# Patient Record
Sex: FEMALE | Employment: OTHER | URBAN - METROPOLITAN AREA
[De-identification: names, ages, dates, MRNs, and addresses within clinical notes are randomized per-mention and may not be internally consistent; named-entity substitution may affect disease eponyms.]

---

## 2019-11-29 ENCOUNTER — TRANSFERRED RECORDS (OUTPATIENT)
Dept: HEALTH INFORMATION MANAGEMENT | Facility: CLINIC | Age: 54
End: 2019-11-29

## 2019-12-03 ENCOUNTER — TRANSFERRED RECORDS (OUTPATIENT)
Dept: HEALTH INFORMATION MANAGEMENT | Facility: CLINIC | Age: 54
End: 2019-12-03

## 2019-12-03 NOTE — TELEPHONE ENCOUNTER
RECORDS RECEIVED FROM: Dr. Garcia from [facility] referring - Eval of posterior thigh mass, unclear etiology. MRI on 11/29 at [facility]. Appt per Susan   DATE RECEIVED: Dec 4, 2019     NOTES STATUS DETAILS   OFFICE NOTE from referring provider In process    OFFICE NOTE from other specialist Care Everywhere Mike Maya MD     DISCHARGE SUMMARY from hospital N/A    DISCHARGE REPORT from the ER N/A    OPERATIVE REPORT N/A    MEDICATION LIST Care Everywhere    IMPLANT RECORD/STICKER N/A    LABS     CBC/DIFF N/A    CULTURES N/A    INJECTIONS DONE IN RADIOLOGY N/A    MRI In process    CT SCAN N/A    XRAYS (IMAGES & REPORTS) In process    TUMOR     PATHOLOGY  Slides & report N/A      12/03/19   5:09 PM   Records from [facility] scanned urgently to chart  Images in PACS  Pre-visit complete  Kayla Emerson CMA    12/03/19   9:37 AM   Requests sent to [facility] and [facility]  Kayla Emerson CMA

## 2019-12-04 ENCOUNTER — OFFICE VISIT (OUTPATIENT)
Dept: ORTHOPEDICS | Facility: CLINIC | Age: 54
End: 2019-12-04
Payer: COMMERCIAL

## 2019-12-04 ENCOUNTER — PRE VISIT (OUTPATIENT)
Dept: ORTHOPEDICS | Facility: CLINIC | Age: 54
End: 2019-12-04

## 2019-12-04 DIAGNOSIS — M79.89 SOFT TISSUE MASS: Primary | ICD-10-CM

## 2019-12-04 RX ORDER — CHOLECALCIFEROL (VITAMIN D3) 50 MCG
1 TABLET ORAL DAILY
COMMUNITY

## 2019-12-04 RX ORDER — LORATADINE 10 MG/1
10 TABLET ORAL
COMMUNITY
End: 2021-01-27

## 2019-12-04 ASSESSMENT — ENCOUNTER SYMPTOMS
SPUTUM PRODUCTION: 0
DYSPNEA ON EXERTION: 0
POSTURAL DYSPNEA: 0
NECK MASS: 0
TROUBLE SWALLOWING: 0
HOARSE VOICE: 0
COUGH DISTURBING SLEEP: 0
SORE THROAT: 0
HEMOPTYSIS: 0
WHEEZING: 0
TASTE DISTURBANCE: 0
SHORTNESS OF BREATH: 0
SINUS CONGESTION: 0
SMELL DISTURBANCE: 0
COUGH: 0
SNORES LOUDLY: 1
SINUS PAIN: 1

## 2019-12-04 NOTE — LETTER
12/4/2019       RE: Richa Ashby  [address]  [place] [address]     Dear Colleague,    Thank you for referring your patient, Richa Ashby, to the [facility] at [facility]. Please see a copy of my visit note below.    This 54-year-old woman has noticed painful posterior right thigh mass.  The pain is isolated to this area and does not seem to radiate.  She notices it when she sits and the mass bumps against the edge of a chair.  I reviewed her patient survey information in the EMR.    On examination she is alert oriented has a normal mood and affect and is in no acute distress.  Respirations are regular and unlabored eyes are nonicteric.  She is over her ideal weight but the mass can be palpated with the leg extended.  Its minimally tender.  There is no erythema or edema in the right leg.  She is able to ambulate independently.  She has a normal motion of hip knee and ankle on that right side and the foot is well-perfused and the leg sensate.    I reviewed her MRI and I can see this enhancing soft tissue mass just superficial to the muscles but under the fascia in the posterior aspect of the thigh.  Its nonspecific in appearance.    I have offered the patient a needle biopsy risks of bleeding pain and infection.  We will call her next week with the results and plan to treat the tumor.  I have answered all of her questions.    description of procedure:    The patient was placed in a prone position after consent and the right thigh mass was marked.  I prepped the skin and infiltrated the skin and subcutaneous tissues with lidocaine.  I then made a small incision with an 11 blade and passed a Americo-Cut needle twice obtaining abnormal tissue.  This was sent in formalin to pathology.  After placing Steri-Strips and a sterile dry dressing the patient was able to stand and ambulate comfortably.  We will call her with the results of the biopsy and  recommendations on further treatment.    Chief Complaint: Right posterior thigh mass    History:     PastMedHx: Asthma, GERD, Obesity    PastSurgHx: Shoulder surgery - H/O respiratory arrest in  with sodium penthal    FamHx: Mother, sister, daughter - depression; Father - alcoholism, high cholesterol; Paternal grandmother - soft tissue cancer    Social History     Tobacco Use     Smoking status: Not on file   Substance Use Topics     Alcohol use: Not on file       Meds:   Current Outpatient Medications   Medication     albuterol (PROAIR RESPICLICK) 108 (90 Base) MCG/ACT inhaler     fluticasone-salmeterol (ADVAIR) 100-50 MCG/DOSE inhaler     loratadine (CLARITIN) 10 MG tablet     melatonin (MELATONIN) 1 MG/ML LIQD liquid     omeprazole (PRILOSEC) 20 MG DR capsule     vitamin D3 (CHOLECALCIFEROL) 2000 units (50 mcg) tablet     No current facility-administered medications for this visit.        Allergies:    Allergies   Allergen Reactions     Pentobarbital      resp arrest       Review of Systems:  See end of note    Physical Exam: There were no vitals taken for this visit.      Imaging:    Impression:    Plan:  Patient was also examined by Dr. Coulter, and he agrees with the plan of care.  Patient was also examined by Dr. Johnson and he agrees with the plan of care.       [facility]  [address] [place] [address] [id number] [phone]  Dept: [phone]  ______________________________________________________________________________    Patient: Richa Ashby   : 1965   MRN: [id number]   2019    INVASIVE PROCEDURE SAFETY CHECKLIST    Date: 2019   Procedure: Right Thigh Biopsy Injection   Patient Name: Richa Ashby  MRN: [id number]  YOB: 1965    Action: Complete sections as appropriate. Any discrepancy results in a HARD COPY until resolved.     PRE PROCEDURE:  Patient ID verified with 2 identifiers (name and  or MRN): Yes  Procedure  and site verified with patient/designee (when able): Yes  Accurate consent documentation in medical record: Yes  H&P (or appropriate assessment) documented in medical record: NA  H&P must be up to 20 days prior to procedure and updates within 24 hours of procedure as applicable: NA  Relevant diagnostic and radiology test results appropriately labeled and displayed as applicable: Yes  Procedure site(s) marked with provider initials: NA    TIMEOUT:  Time-Out performed immediately prior to starting procedure, including verbal and active participation of all team members addressing the following:Yes  * Correct patient identify  * Confirmed that the correct side and site are marked  * An accurate procedure consent form  * Agreement on the procedure to be done  * Correct patient position  * Relevant images and results are properly labeled and appropriately displayed  * The need to administer antibiotics or fluids for irrigation purposes during the procedure as applicable   * Safety precautions based on patient history or medication use    DURING PROCEDURE: Verification of correct person, site, and procedures any time the responsibility for care of the patient is transferred to another member of the care team.       The following medications were given:       Prior to injection, verified patient identity using patient's name and date of birth.  Due to injection administration, patient instructed to remain in clinic for 15 minutes  afterwards, and to report any adverse reaction to me immediately.    Biopsy Injection  Medication Name: Lidocaine NDC: 38434-975-31  Lot: [id number]  Drug Amount Wasted:  None.  Vial/Syringe: Syringe  Expiration Date:  5/1/23    Note: two vials of 5ml lidocaine were used    Audelia Carrera ATC    Again, thank you for allowing me to participate in the care of your patient.      Sincerely,    Guillermo Johnson MD

## 2019-12-04 NOTE — PROGRESS NOTES
This 54-year-old woman has noticed painful posterior right thigh mass.  The pain is isolated to this area and does not seem to radiate.  She notices it when she sits and the mass bumps against the edge of a chair.  I reviewed her patient survey information in the EMR.    On examination she is alert oriented has a normal mood and affect and is in no acute distress.  Respirations are regular and unlabored eyes are nonicteric.  She is over her ideal weight but the mass can be palpated with the leg extended.  Its minimally tender.  There is no erythema or edema in the right leg.  She is able to ambulate independently.  She has a normal motion of hip knee and ankle on that right side and the foot is well-perfused and the leg sensate.    I reviewed her MRI and I can see this enhancing soft tissue mass just superficial to the muscles but under the fascia in the posterior aspect of the thigh.  Its nonspecific in appearance.    I have offered the patient a needle biopsy risks of bleeding pain and infection.  We will call her next week with the results and plan to treat the tumor.  I have answered all of her questions.    description of procedure:    The patient was placed in a prone position after consent and the right thigh mass was marked.  I prepped the skin and infiltrated the skin and subcutaneous tissues with lidocaine.  I then made a small incision with an 11 blade and passed a Americo-Cut needle twice obtaining abnormal tissue.  This was sent in formalin to pathology.  After placing Steri-Strips and a sterile dry dressing the patient was able to stand and ambulate comfortably.  We will call her with the results of the biopsy and recommendations on further treatment.

## 2019-12-04 NOTE — PROGRESS NOTES
Chief Complaint: Right posterior thigh mass    History:     PastMedHx: Asthma, GERD, Obesity    PastSurgHx: Shoulder surgery - H/O respiratory arrest in 1983 with sodium penthal    FamHx: Mother, sister, daughter - depression; Father - alcoholism, high cholesterol; Paternal grandmother - soft tissue cancer    Social History     Tobacco Use     Smoking status: Not on file   Substance Use Topics     Alcohol use: Not on file       Meds:   Current Outpatient Medications   Medication     albuterol (PROAIR RESPICLICK) 108 (90 Base) MCG/ACT inhaler     fluticasone-salmeterol (ADVAIR) 100-50 MCG/DOSE inhaler     loratadine (CLARITIN) 10 MG tablet     melatonin (MELATONIN) 1 MG/ML LIQD liquid     omeprazole (PRILOSEC) 20 MG DR capsule     vitamin D3 (CHOLECALCIFEROL) 2000 units (50 mcg) tablet     No current facility-administered medications for this visit.        Allergies:    Allergies   Allergen Reactions     Pentobarbital      resp arrest       Review of Systems:  See end of note    Physical Exam: There were no vitals taken for this visit.      Imaging:    Impression:    Plan:  Patient was also examined by Dr. Coulter, and he agrees with the plan of care.  Patient was also examined by Dr. Johnson and he agrees with the plan of care.

## 2019-12-04 NOTE — PROGRESS NOTES
[facility]  [address] [place] [id number]  [phone]  Dept: [phone]  ______________________________________________________________________________    Patient: Richa Ashby   : 1965   MRN: [id number]   2019    INVASIVE PROCEDURE SAFETY CHECKLIST    Date: 2019   Procedure: Right Thigh Biopsy Injection   Patient Name: Richa Ashby  MRN: [id number]  YOB: 1965    Action: Complete sections as appropriate. Any discrepancy results in a HARD COPY until resolved.     PRE PROCEDURE:  Patient ID verified with 2 identifiers (name and  or MRN): Yes  Procedure and site verified with patient/designee (when able): Yes  Accurate consent documentation in medical record: Yes  H&P (or appropriate assessment) documented in medical record: NA  H&P must be up to 20 days prior to procedure and updates within 24 hours of procedure as applicable: NA  Relevant diagnostic and radiology test results appropriately labeled and displayed as applicable: Yes  Procedure site(s) marked with provider initials: NA    TIMEOUT:  Time-Out performed immediately prior to starting procedure, including verbal and active participation of all team members addressing the following:Yes  * Correct patient identify  * Confirmed that the correct side and site are marked  * An accurate procedure consent form  * Agreement on the procedure to be done  * Correct patient position  * Relevant images and results are properly labeled and appropriately displayed  * The need to administer antibiotics or fluids for irrigation purposes during the procedure as applicable   * Safety precautions based on patient history or medication use    DURING PROCEDURE: Verification of correct person, site, and procedures any time the responsibility for care of the patient is transferred to another member of the care team.       The following medications were given:         Prior to  injection, verified patient identity using patient's name and date of birth.  Due to injection administration, patient instructed to remain in clinic for 15 minutes  afterwards, and to report any adverse reaction to me immediately.    Biopsy Injection  Medication Name: Lidocaine NDC: 76982-433-54  Lot: [id number]  Drug Amount Wasted:  None.  Vial/Syringe: Syringe  Expiration Date:  5/1/23    Note: two vials of 5ml lidocaine were used        Audelia Carrera ATC

## 2019-12-04 NOTE — NURSING NOTE
Reason For Visit:   Chief Complaint   Patient presents with     Consult     right posterior distal thigh mass       There were no vitals taken for this visit.    Pain Assessment  Patient Currently in Pain: Yes(with pressure)  0-10 Pain Scale: 7  Primary Pain Location: (right posterior thigh)    Audelia Carrera ATC

## 2019-12-06 LAB — COPATH REPORT: NORMAL

## 2019-12-11 ENCOUNTER — TELEPHONE (OUTPATIENT)
Dept: ORTHOPEDICS | Facility: CLINIC | Age: 54
End: 2019-12-11

## 2019-12-11 NOTE — TELEPHONE ENCOUNTER
[facility] Call Center    Phone Message    May a detailed message be left on voicemail: yes    Reason for Call: Requesting Results   Name/type of test: Biopsy   Date of test: 12/04/19  Was test done at a location other than [facility] (Please fill in the location if not [facility])?: No      Action Taken: Message routed to:  [facility] Ortho   none

## 2019-12-12 ENCOUNTER — TELEPHONE (OUTPATIENT)
Dept: ORTHOPEDICS | Facility: CLINIC | Age: 54
End: 2019-12-12

## 2019-12-12 DIAGNOSIS — C49.9 SARCOMA (H): Primary | ICD-10-CM

## 2019-12-12 NOTE — TELEPHONE ENCOUNTER
RN called and spoke Connie.  She is informed of appointment time with Dr. Astudillo at 1300 on  12-17-19 and sim following.  She will have her CT Chest done on same day at 11:40 at the [place].  Patient will be gone out of town from  12-20- to 12-27-19.  She will attend these appointments.

## 2019-12-12 NOTE — TELEPHONE ENCOUNTER
RN called and spoke with Richa.  She works in [place].  She would prefer early am or after work for Radiation.  RN will ask to see if they are able to accommodate.  However, I am not able to schedule this for her and things may change, she is aware of this.  I will ask at time of scheduling consult appointment.  She is in agreement with this.  I will call her back and we will schedule CT same day also.

## 2019-12-12 NOTE — TELEPHONE ENCOUNTER
RN called and scheduled consult with Radiation and sim appointment for  12-17-19.  Pt will be out of town on  12-20-17- to 12-27-19.

## 2019-12-12 NOTE — TELEPHONE ENCOUNTER
She will have her CT Chest done on same day at 11:40 at the [place].  Patient will attend this appt.

## 2019-12-16 ENCOUNTER — TELEPHONE (OUTPATIENT)
Dept: ORTHOPEDICS | Facility: CLINIC | Age: 54
End: 2019-12-16

## 2019-12-16 NOTE — TELEPHONE ENCOUNTER
[facility] Call Center    Phone Message    May a detailed message be left on voicemail: yes    Reason for Call: Other: Pt was seen 12/04/2019 for a mass on thigh and is having some new symptoms and would like a call back as soon as possible to ask a few questions.     Action Taken: Message routed to:  [facility] Ump ortho

## 2019-12-16 NOTE — TELEPHONE ENCOUNTER
RN returned call to Connie.  She is stating that she is now having pain in the arch of her foot.  She denies any new shoe wear, injury or unusual activity.  She is icing and this helps..  She is encouraged to rest/elevate and ice which both have provided relief to her.  She also states that she is having some shoulder pain.  She had a surgery in  2004 to her Left shoulder.  It is not limiting her in her activity.  She states that this has been achy for 2-3 months. RN suggested that she work this up with her PCP.  She will have an appointment with Radiation tomorrow and her CT Chest.  She will mention this to the MD tomorrow as well.

## 2019-12-17 ENCOUNTER — OFFICE VISIT (OUTPATIENT)
Dept: RADIATION ONCOLOGY | Facility: CLINIC | Age: 54
End: 2019-12-17
Attending: RADIOLOGY
Payer: COMMERCIAL

## 2019-12-17 ENCOUNTER — HOSPITAL ENCOUNTER (OUTPATIENT)
Dept: CT IMAGING | Facility: CLINIC | Age: 54
Discharge: HOME | End: 2019-12-17
Attending: ORTHOPAEDIC SURGERY | Admitting: ORTHOPAEDIC SURGERY
Payer: COMMERCIAL

## 2019-12-17 VITALS
DIASTOLIC BLOOD PRESSURE: 70 MMHG | WEIGHT: 243 LBS | HEIGHT: 67 IN | BODY MASS INDEX: 38.14 KG/M2 | SYSTOLIC BLOOD PRESSURE: 141 MMHG

## 2019-12-17 DIAGNOSIS — C49.9 SARCOMA (H): Primary | ICD-10-CM

## 2019-12-17 DIAGNOSIS — C49.9 SARCOMA (H): ICD-10-CM

## 2019-12-17 PROCEDURE — 71250 CT THORAX DX C-: CPT

## 2019-12-17 PROCEDURE — G0463 HOSPITAL OUTPT CLINIC VISIT: HCPCS | Mod: 25 | Performed by: RADIOLOGY

## 2019-12-17 PROCEDURE — 77334 RADIATION TREATMENT AID(S): CPT | Performed by: RADIOLOGY

## 2019-12-17 PROCEDURE — 77290 THER RAD SIMULAJ FIELD CPLX: CPT | Performed by: RADIOLOGY

## 2019-12-17 SDOH — HEALTH STABILITY: MENTAL HEALTH: HOW OFTEN DO YOU HAVE A DRINK CONTAINING ALCOHOL?: 2-3 TIMES A WEEK

## 2019-12-17 SDOH — HEALTH STABILITY: MENTAL HEALTH: HOW MANY STANDARD DRINKS CONTAINING ALCOHOL DO YOU HAVE ON A TYPICAL DAY?: 1 OR 2

## 2019-12-17 SDOH — HEALTH STABILITY: MENTAL HEALTH: HOW OFTEN DO YOU HAVE 6 OR MORE DRINKS ON ONE OCCASION?: NEVER

## 2019-12-17 ASSESSMENT — ENCOUNTER SYMPTOMS
VOMITING: 0
HEMATURIA: 0
SEIZURES: 0
WHEEZING: 0
BLOOD IN STOOL: 0
NECK PAIN: 0
PHOTOPHOBIA: 0
WEAKNESS: 0
MEMORY LOSS: 0
DIARRHEA: 0
HALLUCINATIONS: 0
PND: 0
COUGH: 0
NAUSEA: 0
EYE PAIN: 0
CONSTIPATION: 0
SINUS PAIN: 0
DOUBLE VISION: 0
DIZZINESS: 0
CLAUDICATION: 0
TREMORS: 0
PALPITATIONS: 0
DIAPHORESIS: 0
FLANK PAIN: 0
BACK PAIN: 0
DYSURIA: 0
POLYDIPSIA: 0
HEADACHES: 0
CHILLS: 0
EYE REDNESS: 0
STRIDOR: 0
MYALGIAS: 0
DEPRESSION: 0
SPEECH CHANGE: 0
FOCAL WEAKNESS: 0
SORE THROAT: 0
HEMOPTYSIS: 0
HEARTBURN: 1
NERVOUS/ANXIOUS: 0
INSOMNIA: 0
WEIGHT LOSS: 0
LOSS OF CONSCIOUSNESS: 0
SHORTNESS OF BREATH: 0
FALLS: 0
ORTHOPNEA: 0
EYE DISCHARGE: 0
TINGLING: 0
ABDOMINAL PAIN: 0
FEVER: 0
SPUTUM PRODUCTION: 0
BRUISES/BLEEDS EASILY: 0
SENSORY CHANGE: 0
FREQUENCY: 0
BLURRED VISION: 0

## 2019-12-17 ASSESSMENT — MIFFLIN-ST. JEOR: SCORE: 1726.93

## 2019-12-17 ASSESSMENT — LIFESTYLE VARIABLES: SUBSTANCE_ABUSE: 0

## 2019-12-17 NOTE — PROGRESS NOTES
INITIAL PATIENT ASSESSMENT    Diagnosis: Sarcoma    Prior radiation therapy: None    Prior chemotherapy: None    Prior hormonal therapy:No    Pain Eval:  Current history of pain associated with this visit:   Intensity: 5/10  Current: aching and burning  Location: Right thigh, arch of right foot  Treatment: Ibuprofen 800 mg every 4 hours    Psychosocial  Living arrangements: Lives with   Fall Risk: independent   referral needs: Not needed    Advanced Directive: No  Implantable Cardiac Device? No    Nurse face-to-face time: Level 5:  over 15 min face to face time  HPI      Review of Systems   Constitutional: Negative for chills, diaphoresis, fever, malaise/fatigue and weight loss.   HENT: Negative for congestion, ear discharge, ear pain, hearing loss, nosebleeds, sinus pain, sore throat and tinnitus.    Eyes: Negative for blurred vision, double vision, photophobia, pain, discharge and redness.   Respiratory: Negative for cough, hemoptysis, sputum production, shortness of breath, wheezing and stridor.    Cardiovascular: Negative for chest pain, palpitations, orthopnea, claudication, leg swelling and PND.   Gastrointestinal: Positive for heartburn. Negative for abdominal pain, blood in stool, constipation, diarrhea, melena, nausea and vomiting.   Genitourinary: Negative for dysuria, flank pain, frequency, hematuria and urgency.   Musculoskeletal: Positive for joint pain. Negative for back pain, falls, myalgias and neck pain.        Left shoulder   Skin: Negative for itching and rash.   Neurological: Negative for dizziness, tingling, tremors, sensory change, speech change, focal weakness, seizures, loss of consciousness, weakness and headaches.   Endo/Heme/Allergies: Negative for environmental allergies and polydipsia. Does not bruise/bleed easily.   Psychiatric/Behavioral: Negative for depression, hallucinations, memory loss, substance abuse and suicidal ideas. The patient is not nervous/anxious  and does not have insomnia.

## 2019-12-17 NOTE — PROGRESS NOTES
Radiation Therapy Patient Education    Person involved with teaching: Patient    Patient educational needs for self management of treatment-related side effects assessment completed.  [facility] Patient Ed tab contains Patient Learning Assessment    Education Materials Given  Radiation Therapy and You    Educational Topics Discussed  Side effects expected, Pain management and When to call MD/RN    Response To Teaching  Verbalizes understanding    GYN Only  Vaginal Dilator-given and educated: N/A    Referrals sent: None    Chemotherapy?  No

## 2019-12-19 ENCOUNTER — TELEPHONE (OUTPATIENT)
Dept: ORTHOPEDICS | Facility: CLINIC | Age: 54
End: 2019-12-19

## 2019-12-19 NOTE — TELEPHONE ENCOUNTER
[facility] Call Center    Phone Message    May a detailed message be left on voicemail: yes    Reason for Call: Requesting Results   Name/type of test: CT   Date of test: 12/17/19  Was test done at a location other than [facility] (Please fill in the location if not [facility])?: No      Action Taken: Message routed to:  [facility] Ortho9

## 2019-12-20 ENCOUNTER — TELEPHONE (OUTPATIENT)
Dept: ORTHOPEDICS | Facility: CLINIC | Age: 54
End: 2019-12-20

## 2019-12-20 NOTE — TELEPHONE ENCOUNTER
"[facility] Call Center    Phone Message    May a detailed message be left on voicemail: yes    Reason for Call: Requesting Results---2ND REQUEST FROM pt  Name/type of test: CT Lung Scan   Date of test: Tues 12/17/19 at [facility] location---pt was given the expectation that the radiologist would give her the results on that same date, pt just needs to know \"WHEN\" she can expect the results.  Please call pt. Thank you  Was test done at a location other than [facility] (Please fill in the location if not [facility])?: No      Action Taken: Message routed to:  [facility]  Orthopedics  "

## 2019-12-23 NOTE — TELEPHONE ENCOUNTER
RN returned call to patient.  Review of CT Chest done on 12-17-19.  RN states that we should following with imaging again in 3 months to check for changes.  RN also offered the [facility] as a referral to follow this. She would like to do imaging in  3 month.  RN also states that she has a  Breast nodule.  That she should follow up with her PCP for a mammogram.     Showed:  EXAMINATION: CT CHEST LOW DOSE NON CONTRAST, 12/17/2019 11:42 AM     CLINICAL HISTORY: check for mets; Sarcoma (H)     COMPARISON: None available.     TECHNIQUE: Low-dose CT imaging obtained through the chest without  contrast. Coronal and axial MIP reformatted images obtained.      FINDINGS:     Normal heart size, no pericardial effusion. Normal caliber thoracic  aorta and main pulmonary artery. Normal branching of the transplant  vessels. Normal thyroid. No suspicious upper cervical, axillary, or  mediastinal lymphadenopathy. Normal caliber esophagus.     Substantial tracheal bronchial tree is widely patent. Bibasilar  dependent atelectasis. No pleural effusion or pneumothorax. 3 mm solid  nodule anterior right middle lobe (series 5, image the 177). There is  an additional 2 mm nodule posterior left lower lobe (series 5, image  201). 2 mm left lower lobe image nodule 164 calcified. There is 1.1 cm  partly calcified nodule of the left breast.     Limited views of the abdomen demonstrate no acute pathology. Status  post cholecystectomy.     Bones: No acute or specific bony abnormality. Ribs are normal.                                                                      IMPRESSION:  1. Scattered sub-3 mm pulmonary nodules as above. Follow-up in 3  months.    2. There is 1.1 cm partly calcified nodule in the left breast,  mammographic correlation suggested.      I have personally reviewed the examination and initial interpretation  and I agree with the findings

## 2019-12-30 ENCOUNTER — OFFICE VISIT (OUTPATIENT)
Dept: RADIATION ONCOLOGY | Facility: CLINIC | Age: 54
End: 2019-12-30
Attending: RADIOLOGY
Payer: COMMERCIAL

## 2019-12-30 VITALS — WEIGHT: 245 LBS | BODY MASS INDEX: 38.95 KG/M2

## 2019-12-30 DIAGNOSIS — C49.9 SARCOMA (H): Primary | ICD-10-CM

## 2019-12-30 PROCEDURE — 77386 ZZH IMRT TREATMENT DELIVERY, COMPLEX: CPT | Performed by: RADIOLOGY

## 2019-12-30 NOTE — LETTER
2019       RE: Richa Ashby  [address]  [place] [address]     Dear Colleague,    Thank you for referring your patient, Richa Ashby, to the RADIATION ONCOLOGY CLINIC. Please see a copy of my visit note below.    [facility]  SPECIALIZING IN BREAKTHROUGHS  Radiation Oncology    On Treatment Visit Note      Richa Ashby      Date: 2020   MRN: [id number]   : 1965  Diagnosis: Sarcoma    Treatment Summary to Date  Treatment Site: right thigh Current Dose: 200/5000 cGy Fractions:       Chemotherapy  Chemo concurrent with radx?: No    Subjective: Ms. Ashyb underwent her first treatment of radiation today. She has no new questions, concerns, or symptoms.     Nursing ROS:   Nutrition Alteration  Diet Type: Patient's Preference  Skin  Skin Reaction: 0 - No changes  Skin Note: Aquaphor    Pain Assessment  0-10 Pain Scale: 3    Objective:   Wt 111.1 kg (245 lb)   BMI 38.95 kg/m     Gen: Appears well, NAD  Skin: No erythema    Assessment:    Tolerating radiation therapy well.  All questions and concerns addressed.    Plan:   1. Continue current therapy    Mosaiq chart and setup information reviewed. Imaging reviewed.     Emiliana Astudillo MD  Pager: [phone]

## 2019-12-31 ENCOUNTER — APPOINTMENT (OUTPATIENT)
Dept: RADIATION ONCOLOGY | Facility: CLINIC | Age: 54
End: 2019-12-31
Attending: RADIOLOGY
Payer: COMMERCIAL

## 2019-12-31 PROCEDURE — 77386 ZZH IMRT TREATMENT DELIVERY, COMPLEX: CPT | Performed by: RADIOLOGY

## 2020-01-02 ENCOUNTER — APPOINTMENT (OUTPATIENT)
Dept: RADIATION ONCOLOGY | Facility: CLINIC | Age: 55
End: 2020-01-02
Attending: RADIOLOGY
Payer: COMMERCIAL

## 2020-01-02 PROCEDURE — 77386 ZZH IMRT TREATMENT DELIVERY, COMPLEX: CPT | Performed by: RADIOLOGY

## 2020-01-02 NOTE — PROGRESS NOTES
Radiation Oncology Consultation:  Date on this visit: 12/17/2019    Richa Ashby  is referred by Dr.Christian Celina Johnson for a radiation oncology consultation. She requires evaluation for diagnosis of a high grade undifferentiated pleomorphic sarcoma.       History Of Present Illness:  Ms. 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 Ms. Ashby noted a painful posterior right thigh mass above her popliteal fossa several months ago. She notes that it is fairly uncomfortable and she does at times bump it when sitting. She has had no significant numbness or tingling, or loss of right lower extremity strength or function. She has not noted any erythema, tenderness or warmth at the site. She underwent an MRI that demonstrated a soft tissue mass superficial to the muscle but under the fascia in the posterior aspect of the mid right thigh. She was referred to Dr. Johnson who set her up for biopsy of the mass on 12/04/19. Biopsy revealed a high grade undifferentiated pleomorphic sarcoma. She has now been referred for discussion of radiation treatment options.       Past Medical/Surgical History:  Past Medical History:   Diagnosis Date     Asthma, chronic      Sarcoma (H)     Right lower thigh     Past Surgical History:   Procedure Laterality Date     AS RAD RESEC TONSIL/PILLARS       GALLBLADDER SURGERY       ROTATOR CUFF REPAIR RT/LT  2012     SHOULDER SURGERY       SINUS SURGERY         Past Radiation History: None  Past Chemotherapy History: None  Implanted Cardiac device:   none    Review of Systems:  Reviewed.  See details in nursing note    Allergies:  Allergies as of 12/17/2019 - Reviewed 12/17/2019   Allergen Reaction Noted     Pentobarbital  03/17/2015       Current Medications:  Current Outpatient Medications   Medication Sig Dispense Refill     loratadine (CLARITIN) 10 MG tablet Take 10 mg by mouth       melatonin  "(MELATONIN) 1 MG/ML LIQD liquid Take 5 mg by mouth       omeprazole (PRILOSEC) 20 MG DR capsule        vitamin D3 (CHOLECALCIFEROL) 2000 units (50 mcg) tablet Take 1 tablet by mouth daily       albuterol (PROAIR RESPICLICK) 108 (90 Base) MCG/ACT inhaler Inhale 1-2 puffs into the lungs       fluticasone-salmeterol (ADVAIR) 100-50 MCG/DOSE inhaler Inhale 1 puff into the lungs          Family History:  Family History   Problem Relation Age of Onset     Lung Cancer Maternal Grandmother      Breast Cancer Paternal Grandmother        Social History:  Social History     Tobacco Use     Smoking status: Never Smoker     Smokeless tobacco: Never Used   Substance Use Topics     Alcohol use: Yes     Frequency: 2-3 times a week     Drinks per session: 1 or 2     Binge frequency: Never     Drug use: Never       Physical Exam:  BP (!) 141/70   Ht 1.689 m (5' 6.5\")   Wt 110.2 kg (243 lb)   BMI 38.63 kg/m      GENERAL APPEARANCE: healthy, alert and no distress  EYES: EOMS intact. Sclerae anicteric.   NECK: Supple. No asymmetry or masses visualized  MUSCULOSKELETAL: Palpable mass noted behind and above the right popliteal fossa with no overlying erythema, tenderness, or warmth. Extremities otherwise normal. FROM in all extremities.  SKIN: No suspicious lesions or rashes  NEURO: CN II-XII grossly intact   PSYCHIATRIC: Mentation appears normal and affect normal    Pathology:    Patient Name: DIMAS WILSON   MR#: [id number]   Specimen #: [id number]   Collected: 12/4/2019   Received: 12/4/2019   Reported: 12/6/2019 16:42   Ordering Phy(s): TASHIA ROBERTO     For improved result formatting, select 'View Enhanced Report Format' under    Linked Documents section.     SPECIMEN(S):   Soft tissue mass, right thigh     FINAL DIAGNOSIS:   Soft tissue, right thigh, biopsy:   - Undifferentiated pleomorphic sarcoma, high grade   - See comment     COMMENT:   Immunohistochemical stains were performed on block A1 with appropriate " "  controls. The tumor cells are negative   for SMA, keratin AE1/3, S100, CD34 and desmin, supporting the diagnosis.     I have personally reviewed all specimens and/or slides, including the   listed special stains, and used them   with my medical judgement to determine or confirm the final diagnosis.     Electronically signed out by:     Kaden Rosa M.D., [facility]     CLINICAL HISTORY:   The patient is a 54-year-old female with right thigh mass.     GROSS:   The specimen is received in formalin with proper patient identification,   labeled \"right thigh mass\".  The   specimen consists of 2 tan-yellow soft tissue cores, 0.5-1.4 cm in length   by 0.2 cm in diameter. The specimen   is submitted entirely in A1. (Dictated by: RUPERT Keys 12/4/2019   10:49 AM)     MICROSCOPIC:   Microscopic examination was performed.     The technical component of this testing was completed at the [facility], with the professional component performed    at the [facility], [address] [place] [id number] ([phone])     CPT Codes:   A: 99133-VW2, 14899-PHU, 58133-ODO, 50814-COZ, 90117-UES, 88896-UYK     COLLECTION SITE:   Client: [facility]   Location: [facility] (B)     Resident   [email or web address]     Laboratory/Imaging Studies    EXAMINATION: CT CHEST LOW DOSE NON CONTRAST, 12/17/2019 11:42 AM     CLINICAL HISTORY: check for mets; Sarcoma (H)     COMPARISON: None available.     TECHNIQUE: Low-dose CT imaging obtained through the chest without  contrast. Coronal and axial MIP reformatted images obtained.      FINDINGS:     Normal heart size, no pericardial effusion. Normal caliber thoracic  aorta and main pulmonary artery. Normal branching of the transplant  vessels. Normal thyroid. No suspicious upper cervical, axillary, or  mediastinal lymphadenopathy. Normal caliber " esophagus.     Substantial tracheal bronchial tree is widely patent. Bibasilar  dependent atelectasis. No pleural effusion or pneumothorax. 3 mm solid  nodule anterior right middle lobe (series 5, image the 177). There is  an additional 2 mm nodule posterior left lower lobe (series 5, image  201). 2 mm left lower lobe image nodule 164 calcified. There is 1.1 cm  partly calcified nodule of the left breast.     Limited views of the abdomen demonstrate no acute pathology. Status  post cholecystectomy.     Bones: No acute or specific bony abnormality. Ribs are normal.                                                                      IMPRESSION:  1. Scattered sub-3 mm pulmonary nodules as above. Follow-up in 3  months.    2. There is 1.1 cm partly calcified nodule in the left breast,  mammographic correlation suggested.      I have personally reviewed the examination and initial interpretation  and I agree with the findings.     SEVEN VANCE MD      ASSESSMENT:      Ms. Ashby is a 54 year old woman with a history of a right posterior thigh newly diagnosed undifferentiated pleomorphic sarcoma which appears localized.     RECOMMENDATION:      We had a long discussion with Ms. Ashby and her  regarding the nature of her soft tissue tumor. We discussed specifically that localized high grade UPS is typically treated with a combination of radiation and surgical resection. We discussed timing of radiotherapy either preoperatively or postoperatively with equivalent efficacy but different advantages and disadvantages regarding morbidity. With preoperative radiation, she would be at higher risk of a wound complication but overall, a lower radiation dose to a smaller volume could be utilized compared to postoperative radiotherapy. In this location, we would expect that preoperative radiotherapy may have decreased risk of fibrosis relative to postoperative treatment. Our recommendation would be to proceed with  preoperative radiation for her high grade UPS to overall use lower radiation dose to a smaller volume. We will also discuss our recommendations with Dr. Roberto to determine whether he would be comfortable with this plan.     The risks and benefits of radiotherapy were discussed with the patient. Potential acute and long term side effects were explained. The patient and her  had an opportunity to ask questions answered to their apparent satisfaction. She has agreed to proceed with radiation therapy. A written consent was obtained and we will schedule her for simulation as well.     Thank you for allowing me to participate in Richa Ashby 's care .  Please do not hesitate to call me with questions.    Emiliana Astudillo MD  Pager: [phone]    CC  Primary Physician: No primary care provider on file.   Patient Care Team:  Garry Garcia MD as Referring Physician (Surgery)  Jerica Hooper RN as Specialty Care Coordinator (Orthopedics)  TASHIA ROBERTO

## 2020-01-02 NOTE — PROGRESS NOTES
[facility] PHYSICIANS  SPECIALIZING IN BREAKTHROUGHS  Radiation Oncology    On Treatment Visit Note      Richa Ashby      Date: 2020   MRN: [id number]   : 1965  Diagnosis: Sarcoma    Treatment Summary to Date  Treatment Site: right thigh Current Dose: 200/5000 cGy Fractions:       Chemotherapy  Chemo concurrent with radx?: No    Subjective: Ms. Ashby underwent her first treatment of radiation today. She has no new questions, concerns, or symptoms.     Nursing ROS:   Nutrition Alteration  Diet Type: Patient's Preference  Skin  Skin Reaction: 0 - No changes  Skin Note: Aquaphor    Pain Assessment  0-10 Pain Scale: 3    Objective:   Wt 111.1 kg (245 lb)   BMI 38.95 kg/m    Gen: Appears well, NAD  Skin: No erythema    Assessment:    Tolerating radiation therapy well.  All questions and concerns addressed.    Plan:   1. Continue current therapy    Mosaiq chart and setup information reviewed. Imaging reviewed.     Emiliana Astudillo MD  Pager: [phone]

## 2020-01-03 ENCOUNTER — APPOINTMENT (OUTPATIENT)
Dept: RADIATION ONCOLOGY | Facility: CLINIC | Age: 55
End: 2020-01-03
Attending: RADIOLOGY
Payer: COMMERCIAL

## 2020-01-03 PROCEDURE — 77386 ZZH IMRT TREATMENT DELIVERY, COMPLEX: CPT | Performed by: RADIOLOGY

## 2020-01-06 ENCOUNTER — OFFICE VISIT (OUTPATIENT)
Dept: RADIATION ONCOLOGY | Facility: CLINIC | Age: 55
End: 2020-01-06
Attending: RADIOLOGY
Payer: COMMERCIAL

## 2020-01-06 ENCOUNTER — TELEPHONE (OUTPATIENT)
Dept: ORTHOPEDICS | Facility: CLINIC | Age: 55
End: 2020-01-06

## 2020-01-06 VITALS — BODY MASS INDEX: 38.79 KG/M2 | SYSTOLIC BLOOD PRESSURE: 139 MMHG | WEIGHT: 244 LBS | DIASTOLIC BLOOD PRESSURE: 74 MMHG

## 2020-01-06 DIAGNOSIS — C49.9 SARCOMA (H): ICD-10-CM

## 2020-01-06 DIAGNOSIS — R52 PAIN: Primary | ICD-10-CM

## 2020-01-06 PROCEDURE — 77386 ZZH IMRT TREATMENT DELIVERY, COMPLEX: CPT | Performed by: RADIOLOGY

## 2020-01-06 PROCEDURE — 77336 RADIATION PHYSICS CONSULT: CPT | Performed by: RADIOLOGY

## 2020-01-06 RX ORDER — TRAMADOL HYDROCHLORIDE 50 MG/1
50 TABLET ORAL EVERY 6 HOURS PRN
Qty: 100 TABLET | Refills: 0 | Status: SHIPPED | OUTPATIENT
Start: 2020-01-06 | End: 2020-03-12

## 2020-01-06 NOTE — LETTER
"2020       RE: Richa Ashby  [address]  [place] [address]     Dear Colleague,    Thank you for referring your patient, Richa Ashby, to the RADIATION ONCOLOGY CLINIC. Please see a copy of my visit note below.    [facility] PHYSICIANS  SPECIALIZING IN BREAKTHROUGHS  Radiation Oncology    On Treatment Visit Note      Richa Ashby      Date: 2020   MRN: [id number]   : 1965  Diagnosis: Sarcoma      Reason for Visit:  On Radiation Treatment Visit     Treatment Summary to Date  Treatment Site: right thigh Current Dose: 1000/5000 cGy Fractions:       Chemotherapy  Chemo concurrent with radx?: No    Subjective:  Reports worsening pain over the past week associated with the right posterior thigh mass. Pain radiates into the posterior/lateral leg and arch of the foot. Pain is exacerbated by sitting. She rates if 7/10 and describes it as \"white hot glass\" sensation. She has been taking ibuprofen 800 mg q4h for pain. She also reports some nausea over the past week. She has not noticed a skin reaction and is applying moisturizer BID. Reports fatigue.    Nursing ROS:   Nutrition Alteration  Diet Type: Patient's Preference  Skin  Skin Reaction: 0 - No changes  Skin Note: Aquaphor       Pain Assessment  0-10 Pain Scale: 8  Pain Note: Currently 800 mg of Ibuprofen      Objective:   /74   Wt 110.7 kg (244 lb)   BMI 38.79 kg/m     Gen: Appears well, in no acute distress  Skin: Deferred    Labs:  CBC RESULTS: No results for input(s): WBC, RBC, HGB, HCT, MCV, MCH, MCHC, RDW, PLT in the last 71851 hours.  ELECTROLYTES:  No results for input(s): NA, POTASSIUM, CHLORIDE, VIKTORIA, CO2, BUN, CR, GLC in the last 22713 hours.    Assessment:    Tolerating radiation therapy well.  All questions and concerns addressed.    Toxicities:  Fatigue: Grade 1: Fatigue relieved by rest  Pain: Grade 1: Mild pain  Dermatitis: Grade 0: No toxicity    Plan:   1. Continue current therapy.    2. Rx for " tramadol 50 mg q6h prn      Mosaiq chart and setup information reviewed  MVCT/IGRT images checked    Medication Review  Med list reviewed with patient?: Yes    Educational Topic Discussed  Education Instructions: Reviewed    The patient was seen and discussed with staff, Dr. Astudillo.    Jimmie Olmos MD  Resident, PGY-5  Department of Radiation Oncology  [facility]  P: [phone]    I saw the patient with the resident.  I agree with the resident note and plan of care.      Emiliana Astudillo MD    Please do not send letter to referring physician.        Again, thank you for allowing me to participate in the care of your patient.      Sincerely,    Emiliana Astudillo MD

## 2020-01-06 NOTE — TELEPHONE ENCOUNTER
----- Message from Guillermo Johnson MD sent at 1/3/2020 11:16 AM CST -----  Regarding: RE: [place] patient  Sounds perfect.  Thank you.  ----- Message -----  From: Emiliana Astudillo MD  Sent: 12/23/2019   3:44 PM CST  To: Guillermo Johnson MD, #  Subject: [place] patient                                   Hi all,   I saw Ms. Ashby the other day for discussion of preoperative radiation for her thigh sarcoma. I thought it would be very reasonable to proceed with preoperative RT and we would be able to get her started when she returns from vacation after this week. I saw that you had also chatted with her about her CT scan results. I wanted to check that you were comfortable with us proceeding with preop and I can let you know when she gets closer to completion.   Happy to discuss further.   Thanks much,   Emiliana

## 2020-01-06 NOTE — PROGRESS NOTES
"[facility] PHYSICIANS  SPECIALIZING IN BREAKTHROUGHS  Radiation Oncology    On Treatment Visit Note      Richa Ashby      Date: 2020   MRN: [id number]   : 1965  Diagnosis: Sarcoma      Reason for Visit:  On Radiation Treatment Visit     Treatment Summary to Date  Treatment Site: right thigh Current Dose: 1000/5000 cGy Fractions:       Chemotherapy  Chemo concurrent with radx?: No    Subjective:  Reports worsening pain over the past week associated with the right posterior thigh mass. Pain radiates into the posterior/lateral leg and arch of the foot. Pain is exacerbated by sitting. She rates if 7/10 and describes it as \"white hot glass\" sensation. She has been taking ibuprofen 800 mg q4h for pain. She also reports some nausea over the past week. She has not noticed a skin reaction and is applying moisturizer BID. Reports fatigue.    Nursing ROS:   Nutrition Alteration  Diet Type: Patient's Preference  Skin  Skin Reaction: 0 - No changes  Skin Note: Aquaphor       Pain Assessment  0-10 Pain Scale: 8  Pain Note: Currently 800 mg of Ibuprofen      Objective:   /74   Wt 110.7 kg (244 lb)   BMI 38.79 kg/m    Gen: Appears well, in no acute distress  Skin: Deferred    Labs:  CBC RESULTS: No results for input(s): WBC, RBC, HGB, HCT, MCV, MCH, MCHC, RDW, PLT in the last 46667 hours.  ELECTROLYTES:  No results for input(s): NA, POTASSIUM, CHLORIDE, VIKTORIA, CO2, BUN, CR, GLC in the last 60522 hours.    Assessment:    Tolerating radiation therapy well.  All questions and concerns addressed.    Toxicities:  Fatigue: Grade 1: Fatigue relieved by rest  Pain: Grade 1: Mild pain  Dermatitis: Grade 0: No toxicity    Plan:   1. Continue current therapy.    2. Rx for tramadol 50 mg q6h prn      Mosaiq chart and setup information reviewed  MVCT/IGRT images checked    Medication Review  Med list reviewed with patient?: Yes    Educational Topic Discussed  Education Instructions: Reviewed    The patient " was seen and discussed with staff, Dr. Astudillo.    Jimmie Olmos MD  Resident, PGY-5  Department of Radiation Oncology  [facility]  P: [phone]    I saw the patient with the resident.  I agree with the resident note and plan of care.      Emiliana Astudillo MD    Please do not send letter to referring physician.

## 2020-01-06 NOTE — LETTER
Date:January 14, 2020      Provider requested that no letter be sent. Do not send.       [facility] Health Information

## 2020-01-07 ENCOUNTER — ALLIED HEALTH/NURSE VISIT (OUTPATIENT)
Dept: RADIATION ONCOLOGY | Facility: CLINIC | Age: 55
End: 2020-01-07
Attending: RADIOLOGY
Payer: COMMERCIAL

## 2020-01-07 PROCEDURE — 77386 ZZH IMRT TREATMENT DELIVERY, COMPLEX: CPT | Performed by: RADIOLOGY

## 2020-01-08 ENCOUNTER — APPOINTMENT (OUTPATIENT)
Dept: RADIATION ONCOLOGY | Facility: CLINIC | Age: 55
End: 2020-01-08
Attending: RADIOLOGY
Payer: COMMERCIAL

## 2020-01-08 PROCEDURE — 77386 ZZH IMRT TREATMENT DELIVERY, COMPLEX: CPT | Performed by: RADIOLOGY

## 2020-01-09 ENCOUNTER — APPOINTMENT (OUTPATIENT)
Dept: RADIATION ONCOLOGY | Facility: CLINIC | Age: 55
End: 2020-01-09
Attending: RADIOLOGY
Payer: COMMERCIAL

## 2020-01-09 PROCEDURE — 77386 ZZH IMRT TREATMENT DELIVERY, COMPLEX: CPT | Performed by: RADIOLOGY

## 2020-01-10 ENCOUNTER — APPOINTMENT (OUTPATIENT)
Dept: RADIATION ONCOLOGY | Facility: CLINIC | Age: 55
End: 2020-01-10
Attending: RADIOLOGY
Payer: COMMERCIAL

## 2020-01-10 PROCEDURE — 77386 ZZH IMRT TREATMENT DELIVERY, COMPLEX: CPT | Performed by: RADIOLOGY

## 2020-01-13 ENCOUNTER — APPOINTMENT (OUTPATIENT)
Dept: RADIATION ONCOLOGY | Facility: CLINIC | Age: 55
End: 2020-01-13
Attending: RADIOLOGY
Payer: COMMERCIAL

## 2020-01-13 VITALS — SYSTOLIC BLOOD PRESSURE: 138 MMHG | WEIGHT: 238 LBS | BODY MASS INDEX: 37.84 KG/M2 | DIASTOLIC BLOOD PRESSURE: 78 MMHG

## 2020-01-13 DIAGNOSIS — R52 PAIN: Primary | ICD-10-CM

## 2020-01-13 PROCEDURE — 77386 ZZH IMRT TREATMENT DELIVERY, COMPLEX: CPT | Performed by: RADIOLOGY

## 2020-01-13 PROCEDURE — 77336 RADIATION PHYSICS CONSULT: CPT | Performed by: RADIOLOGY

## 2020-01-13 PROCEDURE — 25000132 ZZH RX MED GY IP 250 OP 250 PS 637: Mod: ZF | Performed by: RADIOLOGY

## 2020-01-13 RX ORDER — OXYCODONE HYDROCHLORIDE 5 MG/1
10 TABLET ORAL ONCE
Status: COMPLETED | OUTPATIENT
Start: 2020-01-13 | End: 2020-01-13

## 2020-01-13 RX ORDER — OXYCODONE HYDROCHLORIDE 5 MG/1
10 TABLET ORAL EVERY 6 HOURS PRN
Qty: 100 TABLET | Refills: 0 | Status: SHIPPED | OUTPATIENT
Start: 2020-01-13 | End: 2020-03-12

## 2020-01-13 RX ADMIN — OXYCODONE HYDROCHLORIDE 10 MG: 5 TABLET ORAL at 14:29

## 2020-01-13 NOTE — LETTER
2020       RE: Richa Ashby  [address]  [place] [address]     Dear Colleague,    Thank you for referring your patient, Richa Ashby, to the RADIATION ONCOLOGY CLINIC. Please see a copy of my visit note below.    Pt was given 10 mg of Oxycodone at 1:30 per Dr Astudillo.    [facility] PHYSICIANS  SPECIALIZING IN BREAKTHROUGHS  Radiation Oncology    On Treatment Visit Note      Richa Ashby      Date: 2020   MRN: [id number]   : 1965  Diagnosis: Sarcoma    Treatment Summary to Date  Treatment Site: right thigh Current Dose: 2000/5000 cGy Fractions: 10/25      Chemotherapy  Chemo concurrent with radx?: No    Subjective: Ms. Ashby is feeling increasing pain this week. We prescribed her Tramadol this past week which has provided minimal relief despite taking it frequently. She feels that the site is swollen too touch and has difficulty with pain in putting any pressure on it including sitting. She no longer has pain down in the foot but notes 9/10 pain in and around the mass above the posterior knee.     Nursing ROS:   Nutrition Alteration  Diet Type: Patient's Preference  Skin  Skin Reaction: 0 - No changes  Skin Note: Aquaphor    Pain Assessment  0-10 Pain Scale: 10(Given 10 mg of Oxycodone in clinic)  Pain Note: Tramadol    Objective:   /78   Wt 108 kg (238 lb)   BMI 37.84 kg/m     Gen: Appears well, NAD  Skin: Mild diffuse erythema over treatment field    Assessment:    Tolerating radiation therapy but noting significant increased pain this week. We discussed options for pain control including oxycodone 5-10 mg po q4-6 hr prn pain and/or initiating steroid if her tumor is presumably swelling due to inflammation as a result of radiation. We had a long discussion with her and her  about the potential pros and cons of oxycodone vs. Decadron vs. A combined approach. At this time, she would be most comfortable starting the oxycodone and we could add  decadron if the oxycodone does not provide relief. She will keep us updated through the week if her pain is uncontrolled.   All questions and concerns addressed.    Plan:   1. Continue current therapy  2. Initiate oxycodone 5-10 mg po prn pain q4-6 hrs - script given today    Mosaiq chart and setup information reviewed. Imaging reviewed.     Emiliana Astudillo MD  Pager: [phone]

## 2020-01-14 ENCOUNTER — APPOINTMENT (OUTPATIENT)
Dept: RADIATION ONCOLOGY | Facility: CLINIC | Age: 55
End: 2020-01-14
Attending: RADIOLOGY
Payer: COMMERCIAL

## 2020-01-14 DIAGNOSIS — G89.3 CANCER ASSOCIATED PAIN: Primary | ICD-10-CM

## 2020-01-14 DIAGNOSIS — R52 PAIN: ICD-10-CM

## 2020-01-14 PROCEDURE — 77386 ZZH IMRT TREATMENT DELIVERY, COMPLEX: CPT | Performed by: RADIOLOGY

## 2020-01-14 RX ORDER — OXYCODONE HYDROCHLORIDE 5 MG/1
10 TABLET ORAL EVERY 4 HOURS PRN
Qty: 100 TABLET | Refills: 0 | Status: SHIPPED | OUTPATIENT
Start: 2020-01-14 | End: 2020-03-12

## 2020-01-15 ENCOUNTER — APPOINTMENT (OUTPATIENT)
Dept: RADIATION ONCOLOGY | Facility: CLINIC | Age: 55
End: 2020-01-15
Attending: RADIOLOGY
Payer: COMMERCIAL

## 2020-01-15 PROCEDURE — 77386 ZZH IMRT TREATMENT DELIVERY, COMPLEX: CPT | Performed by: RADIOLOGY

## 2020-01-16 ENCOUNTER — APPOINTMENT (OUTPATIENT)
Dept: RADIATION ONCOLOGY | Facility: CLINIC | Age: 55
End: 2020-01-16
Attending: RADIOLOGY
Payer: COMMERCIAL

## 2020-01-16 PROCEDURE — 77386 ZZH IMRT TREATMENT DELIVERY, COMPLEX: CPT | Performed by: RADIOLOGY

## 2020-01-17 ENCOUNTER — APPOINTMENT (OUTPATIENT)
Dept: RADIATION ONCOLOGY | Facility: CLINIC | Age: 55
End: 2020-01-17
Attending: RADIOLOGY
Payer: COMMERCIAL

## 2020-01-17 PROCEDURE — 77386 ZZH IMRT TREATMENT DELIVERY, COMPLEX: CPT | Performed by: RADIOLOGY

## 2020-01-20 ENCOUNTER — OFFICE VISIT (OUTPATIENT)
Dept: RADIATION ONCOLOGY | Facility: CLINIC | Age: 55
End: 2020-01-20
Attending: RADIOLOGY
Payer: COMMERCIAL

## 2020-01-20 VITALS — WEIGHT: 232.5 LBS | BODY MASS INDEX: 36.96 KG/M2

## 2020-01-20 VITALS — SYSTOLIC BLOOD PRESSURE: 139 MMHG | HEART RATE: 79 BPM | DIASTOLIC BLOOD PRESSURE: 85 MMHG

## 2020-01-20 DIAGNOSIS — C49.9 SARCOMA (H): Primary | ICD-10-CM

## 2020-01-20 DIAGNOSIS — R52 PAIN: ICD-10-CM

## 2020-01-20 DIAGNOSIS — K59.03 DRUG-INDUCED CONSTIPATION: Primary | ICD-10-CM

## 2020-01-20 PROCEDURE — 77336 RADIATION PHYSICS CONSULT: CPT | Performed by: RADIOLOGY

## 2020-01-20 PROCEDURE — 77386 ZZH IMRT TREATMENT DELIVERY, COMPLEX: CPT | Performed by: RADIOLOGY

## 2020-01-20 RX ORDER — METHYLPREDNISOLONE 4 MG
TABLET, DOSE PACK ORAL
Qty: 21 TABLET | Refills: 0 | Status: SHIPPED | OUTPATIENT
Start: 2020-01-20 | End: 2020-10-16

## 2020-01-20 RX ORDER — POLYETHYLENE GLYCOL 3350 17 G/17G
1 POWDER, FOR SOLUTION ORAL DAILY
Qty: 507 G | Refills: 1 | Status: SHIPPED | OUTPATIENT
Start: 2020-01-20 | End: 2020-10-16

## 2020-01-20 NOTE — PROGRESS NOTES
[facility] PHYSICIANS  SPECIALIZING IN BREAKTHROUGHS  Radiation Oncology    On Treatment Visit Note      Richa Ashby      Date: 2020   MRN: [id number]   : 1965  Diagnosis: Sarcoma    Treatment Summary to Date  Treatment Site: right thigh Current Dose: 2000/5000 cGy Fractions: 10/25      Chemotherapy  Chemo concurrent with radx?: No    Subjective: Ms. Ashby is feeling increasing pain this week. We prescribed her Tramadol this past week which has provided minimal relief despite taking it frequently. She feels that the site is swollen too touch and has difficulty with pain in putting any pressure on it including sitting. She no longer has pain down in the foot but notes 9/10 pain in and around the mass above the posterior knee.     Nursing ROS:   Nutrition Alteration  Diet Type: Patient's Preference  Skin  Skin Reaction: 0 - No changes  Skin Note: Aquaphor    Pain Assessment  0-10 Pain Scale: 10(Given 10 mg of Oxycodone in clinic)  Pain Note: Tramadol    Objective:   /78   Wt 108 kg (238 lb)   BMI 37.84 kg/m    Gen: Appears well, NAD  Skin: Mild diffuse erythema over treatment field    Assessment:    Tolerating radiation therapy but noting significant increased pain this week. We discussed options for pain control including oxycodone 5-10 mg po q4-6 hr prn pain and/or initiating steroid if her tumor is presumably swelling due to inflammation as a result of radiation. We had a long discussion with her and her  about the potential pros and cons of oxycodone vs. Decadron vs. A combined approach. At this time, she would be most comfortable starting the oxycodone and we could add decadron if the oxycodone does not provide relief. She will keep us updated through the week if her pain is uncontrolled.   All questions and concerns addressed.    Plan:   1. Continue current therapy  2. Initiate oxycodone 5-10 mg po prn pain q4-6 hrs - script given today    Mosaiq chart and setup  information reviewed. Imaging reviewed.     Emiliana Astudillo MD  Pager: [phone]

## 2020-01-20 NOTE — LETTER
Date:January 28, 2020      Provider requested that no letter be sent. Do not send.       [facility] Health Information

## 2020-01-20 NOTE — LETTER
2020       RE: Richa Ashby  [address]  [place] [address]     Dear Colleague,    Thank you for referring your patient, Richa Ashby, to the RADIATION ONCOLOGY CLINIC. Please see a copy of my visit note below.    [facility] PHYSICIANS  SPECIALIZING IN BREAKTHROUGHS  Radiation Oncology    On Treatment Visit Note      Richa Ashby      Date: 2020   MRN: [id number]   : 1965  Diagnosis: Sarcoma      Reason for Visit:  On Radiation Treatment Visit     Treatment Summary to Date  Treatment Site: right thigh Current Dose: 3000/5000 cGy Fractions: 15/25      Chemotherapy  Chemo concurrent with radx?: No    Subjective:   Overall doing fairly well aside from her persistent, significant pain. Pain associated with mass is stable but oxycodone has helped somewhat. She is taking 10 mg q4h. The pain does occasionally wake her at night. She is also constipated despite taking senna-S 2 tabs at night. She is also fatigued and appetite is decreased. Applying moisturizer BID, noticing some slight skin irritation.     Nursing ROS:   Nutrition Alteration  Diet Type: Patient's Preference  Skin  Skin Reaction: 0 - No changes  Skin Note: Aquaphor           Gastrointestinal  Constipation NCI: 1 - Occasional or intermittent s/sx  GI Note: Senna x3 a day        Pain Assessment  0-10 Pain Scale: 8  Pain Note: Oxycodone       Objective:   Wt 105.5 kg (232 lb 8 oz)   BMI 36.96 kg/m     Gen: Appears well, in no acute distress  Skin: Mild diffuse erythema over treatment field      Assessment:    Tolerating radiation therapy well.  All questions and concerns addressed.    Toxicities:  Fatigue: Grade 1: Fatigue relieved by rest  Pain: Grade 1: Mild pain  Dermatitis: Grade 1: Faint erythema or dry desquamation    Plan:   1. Continue current therapy.    2. Rx sent for medrol dosepak for pain related to tumor-associated inflammation  3. Rx sent for miralax daily for constipation  4. Recommend  increasing senna to 2 tabs BID  5. Increase oxycodone to 10-15 mg q4h prn      Mosaiq chart and setup information reviewed  MVCT/IGRT images checked    Medication Review  Med list reviewed with patient?: Yes    Educational Topic Discussed  Education Instructions: Reviewed    The patient was seen and discussed with staff, Dr. Astudillo.    Jimmie Olmos MD  Resident, PGY-5  Department of Radiation Oncology  [facility]  P: [phone]    I saw the patient with the resident.  I agree with the resident note and plan of care.      Emiliana Astudillo MD      Please do not send letter to referring physician.        Again, thank you for allowing me to participate in the care of your patient.      Sincerely,    Emiliana Astudillo MD

## 2020-01-20 NOTE — PROGRESS NOTES
[facility] PHYSICIANS  SPECIALIZING IN BREAKTHROUGHS  Radiation Oncology    On Treatment Visit Note      Richa Ashby      Date: 2020   MRN: [id number]   : 1965  Diagnosis: Sarcoma      Reason for Visit:  On Radiation Treatment Visit     Treatment Summary to Date  Treatment Site: right thigh Current Dose: 3000/5000 cGy Fractions: 15/25      Chemotherapy  Chemo concurrent with radx?: No    Subjective:   Overall doing fairly well aside from her persistent, significant pain. Pain associated with mass is stable but oxycodone has helped somewhat. She is taking 10 mg q4h. The pain does occasionally wake her at night. She is also constipated despite taking senna-S 2 tabs at night. She is also fatigued and appetite is decreased. Applying moisturizer BID, noticing some slight skin irritation.     Nursing ROS:   Nutrition Alteration  Diet Type: Patient's Preference  Skin  Skin Reaction: 0 - No changes  Skin Note: Aquaphor           Gastrointestinal  Constipation NCI: 1 - Occasional or intermittent s/sx  GI Note: Senna x3 a day        Pain Assessment  0-10 Pain Scale: 8  Pain Note: Oxycodone       Objective:   Wt 105.5 kg (232 lb 8 oz)   BMI 36.96 kg/m    Gen: Appears well, in no acute distress  Skin: Mild diffuse erythema over treatment field      Assessment:    Tolerating radiation therapy well.  All questions and concerns addressed.    Toxicities:  Fatigue: Grade 1: Fatigue relieved by rest  Pain: Grade 1: Mild pain  Dermatitis: Grade 1: Faint erythema or dry desquamation    Plan:   1. Continue current therapy.    2. Rx sent for medrol dosepak for pain related to tumor-associated inflammation  3. Rx sent for miralax daily for constipation  4. Recommend increasing senna to 2 tabs BID  5. Increase oxycodone to 10-15 mg q4h prn      Mosaiq chart and setup information reviewed  MVCT/IGRT images checked    Medication Review  Med list reviewed with patient?: Yes    Educational Topic  Discussed  Education Instructions: Reviewed    The patient was seen and discussed with staff, Dr. Astudillo.    Jimmie Olmos MD  Resident, PGY-5  Department of Radiation Oncology  [facility]  P: [phone]    I saw the patient with the resident.  I agree with the resident note and plan of care.      Emiliana Astudillo MD      Please do not send letter to referring physician.

## 2020-01-21 ENCOUNTER — APPOINTMENT (OUTPATIENT)
Dept: RADIATION ONCOLOGY | Facility: CLINIC | Age: 55
End: 2020-01-21
Attending: RADIOLOGY
Payer: COMMERCIAL

## 2020-01-21 PROCEDURE — 77386 ZZH IMRT TREATMENT DELIVERY, COMPLEX: CPT | Performed by: RADIOLOGY

## 2020-01-22 ENCOUNTER — APPOINTMENT (OUTPATIENT)
Dept: RADIATION ONCOLOGY | Facility: CLINIC | Age: 55
End: 2020-01-22
Attending: RADIOLOGY
Payer: COMMERCIAL

## 2020-01-22 PROCEDURE — 77386 ZZH IMRT TREATMENT DELIVERY, COMPLEX: CPT | Performed by: RADIOLOGY

## 2020-01-23 ENCOUNTER — APPOINTMENT (OUTPATIENT)
Dept: RADIATION ONCOLOGY | Facility: CLINIC | Age: 55
End: 2020-01-23
Attending: RADIOLOGY
Payer: COMMERCIAL

## 2020-01-23 PROCEDURE — 77386 ZZH IMRT TREATMENT DELIVERY, COMPLEX: CPT | Performed by: RADIOLOGY

## 2020-01-24 ENCOUNTER — APPOINTMENT (OUTPATIENT)
Dept: RADIATION ONCOLOGY | Facility: CLINIC | Age: 55
End: 2020-01-24
Attending: RADIOLOGY
Payer: COMMERCIAL

## 2020-01-24 PROCEDURE — 77386 ZZH IMRT TREATMENT DELIVERY, COMPLEX: CPT | Performed by: RADIOLOGY

## 2020-01-27 ENCOUNTER — OFFICE VISIT (OUTPATIENT)
Dept: RADIATION ONCOLOGY | Facility: CLINIC | Age: 55
End: 2020-01-27
Attending: RADIOLOGY
Payer: COMMERCIAL

## 2020-01-27 VITALS
DIASTOLIC BLOOD PRESSURE: 74 MMHG | BODY MASS INDEX: 36.41 KG/M2 | SYSTOLIC BLOOD PRESSURE: 115 MMHG | HEART RATE: 72 BPM | WEIGHT: 229 LBS

## 2020-01-27 DIAGNOSIS — C49.9 SARCOMA (H): Primary | ICD-10-CM

## 2020-01-27 PROCEDURE — 77336 RADIATION PHYSICS CONSULT: CPT | Performed by: RADIOLOGY

## 2020-01-27 PROCEDURE — 77386 ZZH IMRT TREATMENT DELIVERY, COMPLEX: CPT | Performed by: RADIOLOGY

## 2020-01-27 NOTE — LETTER
2020       RE: Richa Ashby  [address]  [place] [address]     Dear Colleague,    Thank you for referring your patient, Richa Ashby, to the RADIATION ONCOLOGY CLINIC. Please see a copy of my visit note below.    [facility] PHYSICIANS  SPECIALIZING IN BREAKTHROUGHS  Radiation Oncology    On Treatment Visit Note      Richa Ashby      Date: 2020   MRN: [id number]   : 1965  Diagnosis: Sarcoma      Reason for Visit:  On Radiation Treatment Visit     Treatment Summary to Date  Treatment Site: right thigh Current Dose: 4000/5000 cGy Fractions:       Chemotherapy  Chemo concurrent with radx?: No    Subjective:  Overall doing better this week. Pain has improved significantly after the medrol Dosepak. She is no longer requiring any oxycodone. She is taking ibuprofen 400 mg 1-2 times per day as needed for pain. She notes some fatigue but went back to work today. No significant skin irritation. Constipation has resolved.      Nursing ROS:   Nutrition Alteration  Diet Type: Patient's Preference  Skin  Skin Reaction: 0 - No changes  Skin Note: Aquaphor           Gastrointestinal  Constipation NCI: 1 - Occasional or intermittent s/sx  GI Note: Senna x3 a day        Pain Assessment  0-10 Pain Scale: 4  Pain Note: Ibuprofen      Objective:   /74   Pulse 72   Wt 103.9 kg (229 lb)   BMI 36.41 kg/m     Gen: Appears well, in no acute distress  Skin: Mild diffuse erythema over treatment field    Assessment:    Tolerating radiation therapy well.  All questions and concerns addressed.    Toxicities:  Fatigue: Grade 1: Fatigue relieved by rest  Pain: Grade 1: Mild pain  Dermatitis: Grade 1: Faint erythema or dry desquamation    Plan:   1. Continue current therapy.        Mosaiq chart and setup information reviewed  MVCT/IGRT images checked    Medication Review  Med list reviewed with patient?: Yes    Educational Topic Discussed  Education Instructions: Reviewed      The  patient was seen and discussed with staff, Dr. Astudillo.    Jimmie Olmos MD  Resident, PGY-5  Department of Radiation Oncology  [facility]  P: [phone]    I saw the patient with the resident.  I agree with the resident note and plan of care.      Emiliana Astudillo MD      Please do not send letter to referring physician.        Again, thank you for allowing me to participate in the care of your patient.      Sincerely,    Emiliana Astudillo MD

## 2020-01-27 NOTE — PROGRESS NOTES
[facility] PHYSICIANS  SPECIALIZING IN BREAKTHROUGHS  Radiation Oncology    On Treatment Visit Note      Richa Ashby      Date: 2020   MRN: [id number]   : 1965  Diagnosis: Sarcoma      Reason for Visit:  On Radiation Treatment Visit     Treatment Summary to Date  Treatment Site: right thigh Current Dose: 4000/5000 cGy Fractions: 20/25      Chemotherapy  Chemo concurrent with radx?: No    Subjective:  Overall doing better this week. Pain has improved significantly after the medrol Dosepak. She is no longer requiring any oxycodone. She is taking ibuprofen 400 mg 1-2 times per day as needed for pain. She notes some fatigue but went back to work today. No significant skin irritation. Constipation has resolved.      Nursing ROS:   Nutrition Alteration  Diet Type: Patient's Preference  Skin  Skin Reaction: 0 - No changes  Skin Note: Aquaphor           Gastrointestinal  Constipation NCI: 1 - Occasional or intermittent s/sx  GI Note: Senna x3 a day        Pain Assessment  0-10 Pain Scale: 4  Pain Note: Ibuprofen      Objective:   /74   Pulse 72   Wt 103.9 kg (229 lb)   BMI 36.41 kg/m    Gen: Appears well, in no acute distress  Skin: Mild diffuse erythema over treatment field    Assessment:    Tolerating radiation therapy well.  All questions and concerns addressed.    Toxicities:  Fatigue: Grade 1: Fatigue relieved by rest  Pain: Grade 1: Mild pain  Dermatitis: Grade 1: Faint erythema or dry desquamation    Plan:   1. Continue current therapy.        Mosaiq chart and setup information reviewed  MVCT/IGRT images checked    Medication Review  Med list reviewed with patient?: Yes    Educational Topic Discussed  Education Instructions: Reviewed      The patient was seen and discussed with staff, Dr. Astudillo.    Jimmie Olmos MD  Resident, PGY-5  Department of Radiation Oncology  [facility]  P: [phone]    I saw the patient with the resident.  I agree with the  resident note and plan of care.      Emiliana Astudillo MD      Please do not send letter to referring physician.

## 2020-01-27 NOTE — LETTER
Date:February 4, 2020      Provider requested that no letter be sent. Do not send.       [facility] Physicians Health Information

## 2020-01-28 ENCOUNTER — APPOINTMENT (OUTPATIENT)
Dept: RADIATION ONCOLOGY | Facility: CLINIC | Age: 55
End: 2020-01-28
Attending: RADIOLOGY
Payer: COMMERCIAL

## 2020-01-28 PROCEDURE — 77386 ZZH IMRT TREATMENT DELIVERY, COMPLEX: CPT | Performed by: RADIOLOGY

## 2020-01-29 ENCOUNTER — APPOINTMENT (OUTPATIENT)
Dept: RADIATION ONCOLOGY | Facility: CLINIC | Age: 55
End: 2020-01-29
Attending: RADIOLOGY
Payer: COMMERCIAL

## 2020-01-29 PROCEDURE — 77386 ZZH IMRT TREATMENT DELIVERY, COMPLEX: CPT | Performed by: RADIOLOGY

## 2020-01-30 ENCOUNTER — APPOINTMENT (OUTPATIENT)
Dept: RADIATION ONCOLOGY | Facility: CLINIC | Age: 55
End: 2020-01-30
Attending: RADIOLOGY
Payer: COMMERCIAL

## 2020-01-30 PROCEDURE — 77386 ZZH IMRT TREATMENT DELIVERY, COMPLEX: CPT | Performed by: RADIOLOGY

## 2020-01-31 ENCOUNTER — APPOINTMENT (OUTPATIENT)
Dept: RADIATION ONCOLOGY | Facility: CLINIC | Age: 55
End: 2020-01-31
Attending: RADIOLOGY
Payer: COMMERCIAL

## 2020-01-31 ENCOUNTER — TELEPHONE (OUTPATIENT)
Dept: ORTHOPEDICS | Facility: CLINIC | Age: 55
End: 2020-01-31

## 2020-01-31 PROCEDURE — 77386 ZZH IMRT TREATMENT DELIVERY, COMPLEX: CPT | Performed by: RADIOLOGY

## 2020-01-31 NOTE — TELEPHONE ENCOUNTER
[facility] Call Center    Phone Message    May a detailed message be left on voicemail: yes    Reason for Call: patient did multiple radiation treatments after seeing Dr. Johnson for the first time, patient is having her last radiation treatment on 2/3/2020 and she is wanting to know what is the next step that Dr. Johnson would like her to do?     Please advise     Action Taken: Message routed to:  [facility] ([facility] ortho

## 2020-02-03 ENCOUNTER — APPOINTMENT (OUTPATIENT)
Dept: RADIATION ONCOLOGY | Facility: CLINIC | Age: 55
End: 2020-02-03
Attending: RADIOLOGY
Payer: COMMERCIAL

## 2020-02-03 ENCOUNTER — TELEPHONE (OUTPATIENT)
Dept: ORTHOPEDICS | Facility: CLINIC | Age: 55
End: 2020-02-03

## 2020-02-03 VITALS — SYSTOLIC BLOOD PRESSURE: 120 MMHG | DIASTOLIC BLOOD PRESSURE: 72 MMHG | BODY MASS INDEX: 36.41 KG/M2 | WEIGHT: 229 LBS

## 2020-02-03 DIAGNOSIS — C49.9 SARCOMA (H): Primary | ICD-10-CM

## 2020-02-03 PROCEDURE — 77386 ZZH IMRT TREATMENT DELIVERY, COMPLEX: CPT | Performed by: RADIOLOGY

## 2020-02-03 PROCEDURE — 77336 RADIATION PHYSICS CONSULT: CPT | Performed by: RADIOLOGY

## 2020-02-03 NOTE — LETTER
2/3/2020       RE: Richa Ashby  [address]  [place] [address]     Dear Colleague,    Thank you for referring your patient, Richa Ashby, to the RADIATION ONCOLOGY CLINIC. Please see a copy of my visit note below.    [facility] PHYSICIANS  SPECIALIZING IN BREAKTHROUGHS  Radiation Oncology    On Treatment Visit Note      Richa Ashby      Date: 2020   MRN: [id number]   : 1965  Diagnosis: Sarcoma    Treatment Summary to Date  Treatment Site: right thigh Current Dose: 5000/5000 cGy Fractions: 25/25      Chemotherapy  Chemo concurrent with radx?: No    Subjective: Ms. Ashby's pain has significantly improved since her Medrol Dosepak. She does note that she continues to have mild pain for which she takes oxycodone as needed.     Nursing ROS:   Nutrition Alteration  Diet Type: Patient's Preference  Skin  Skin Reaction: 0 - No changes  Skin Note: Aquaphor      Gastrointestinal  Constipation NCI: 1 - Occasional or intermittent s/sx  GI Note: Senna x3 a day     Pain Assessment  0-10 Pain Scale: 3  Pain Note: Oxycodone     Objective:   /72   Wt 103.9 kg (229 lb)   BMI 36.41 kg/m     Gen: Appears well, NAD  Skin: Mild diffuse erythema over treatment field    Assessment:    Tolerating radiation therapy well.  All questions and concerns addressed.    Plan:   1. Continue current therapy  2. She has an appointment scheduled with Dr. Johnson for follow-up and MRI. She is planning to return to our department in May for follow-up unless she has any questions or concerns that arise sooner.     Mosaiq chart and setup information reviewed. Imaging reviewed.     Emiliana Astudillo MD  Pager: [phone]

## 2020-02-03 NOTE — TELEPHONE ENCOUNTER
Rn returned call to Connie.  She has completed her Radiation as of today,.  Based on this plan of care will be:    MRI of right thigh on  03-04-20  CT [facility]  On  03-04-20  Dr. Johnson   To see on 03-04-20    Anticipated OR date of 03-12-20

## 2020-02-05 NOTE — PROCEDURES
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     Treatment Summary:  Radiation Oncology - Course: 1   Treatment Site Dose          Modality    From            To            Days Fx.  Right thigh         5,000 cGy 06 X 12/30/2019     2020  35 25           Dose per Fraction: 200 cGy        Total Dose: 5000 cGy              COMMENTS:                      Ms. 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  She noticed a mass in this area several months ago and an MRI demonstrated a soft tissue mass measuring 4.4 cm in this area.  She was subsequently referred to Dr. Johnson who biopsied the mass on 2019.  Pathology showed high-grade undifferentiated pleomorphic sarcoma.  She was subsequently referred to our department for preoperative radiotherapy which she completed in our department as described above over the course of 5 weeks.  She was treated using an IMRT plan.  She tolerated radiotherapy well overall.  However, she did develop significant progressive pain in the area of the mass.  This was associated with erythema and warmth overlying the mass but she never had signs of infection.  She was initially treated with oxycodone for pain which relieved her pain somewhat.  She was later treated with a steroid burst to address suspected inflammation associated with the mass which  decreased her pain significantly.  She otherwise experienced only the expected acute toxicities of treatment and had no unexpected treatment breaks.     ED visits/hospitalizations: None     Missed treatments: None     Acute Toxicity Profile by CTC v5.0:  Grade 1 fatigue  Grade 1 dermatitis  Grade 1 pain     PAIN MANAGEMENT: Oxycodone 10 mg q4h prn. Medrol dosepak                             FOLLOW UP PLAN: Follow up with Dr. Johnson in 1 month. She will follow-up with us in May after surgical recovery. She was instructed to call us with any questions or concerns that arise.                           Resident Physician:  Jimmie Olmos M.D.   Staff Physician: Emiliana Astudillo M.D.  Physicist: Zaire Lei     CC:   Guillermo Johnson MD                                           Radiation Oncology:  [facility] [address] [place] [address]

## 2020-02-10 ENCOUNTER — DOCUMENTATION ONLY (OUTPATIENT)
Dept: CARE COORDINATION | Facility: CLINIC | Age: 55
End: 2020-02-10

## 2020-02-10 NOTE — PROGRESS NOTES
[facility] PHYSICIANS  SPECIALIZING IN BREAKTHROUGHS  Radiation Oncology    On Treatment Visit Note      Richa Ashby      Date: 2020   MRN: [id number]   : 1965  Diagnosis: Sarcoma    Treatment Summary to Date  Treatment Site: right thigh Current Dose: 5000/5000 cGy Fractions: 25/25      Chemotherapy  Chemo concurrent with radx?: No    Subjective: Ms. Ashby's pain has significantly improved since her Medrol Dosepak. She does note that she continues to have mild pain for which she takes oxycodone as needed.     Nursing ROS:   Nutrition Alteration  Diet Type: Patient's Preference  Skin  Skin Reaction: 0 - No changes  Skin Note: Aquaphor      Gastrointestinal  Constipation NCI: 1 - Occasional or intermittent s/sx  GI Note: Senna x3 a day     Pain Assessment  0-10 Pain Scale: 3  Pain Note: Oxycodone     Objective:   /72   Wt 103.9 kg (229 lb)   BMI 36.41 kg/m    Gen: Appears well, NAD  Skin: Mild diffuse erythema over treatment field    Assessment:    Tolerating radiation therapy well.  All questions and concerns addressed.    Plan:   1. Continue current therapy  2. She has an appointment scheduled with Dr. Johnson for follow-up and MRI. She is planning to return to our department in May for follow-up unless she has any questions or concerns that arise sooner.     Mosaiq chart and setup information reviewed. Imaging reviewed.     Emiliana Astudillo MD  Pager: [phone]

## 2020-02-12 ENCOUNTER — TELEPHONE (OUTPATIENT)
Dept: ORTHOPEDICS | Facility: CLINIC | Age: 55
End: 2020-02-12

## 2020-02-12 NOTE — TELEPHONE ENCOUNTER
RN called and spoke with Connie.  I answered all her questions about recovery.  She will be back to normal in 2 weeks.

## 2020-03-04 ENCOUNTER — ANCILLARY PROCEDURE (OUTPATIENT)
Dept: MRI IMAGING | Facility: CLINIC | Age: 55
End: 2020-03-04
Attending: ORTHOPAEDIC SURGERY
Payer: COMMERCIAL

## 2020-03-04 ENCOUNTER — ANCILLARY PROCEDURE (OUTPATIENT)
Dept: CT IMAGING | Facility: CLINIC | Age: 55
End: 2020-03-04
Attending: ORTHOPAEDIC SURGERY
Payer: COMMERCIAL

## 2020-03-04 ENCOUNTER — OFFICE VISIT (OUTPATIENT)
Dept: ORTHOPEDICS | Facility: CLINIC | Age: 55
End: 2020-03-04
Payer: COMMERCIAL

## 2020-03-04 VITALS — BODY MASS INDEX: 36.88 KG/M2 | WEIGHT: 235 LBS | HEIGHT: 67 IN

## 2020-03-04 DIAGNOSIS — C49.9 SARCOMA (H): Primary | ICD-10-CM

## 2020-03-04 DIAGNOSIS — C49.9 SARCOMA (H): ICD-10-CM

## 2020-03-04 RX ORDER — GADOBUTROL 604.72 MG/ML
10 INJECTION INTRAVENOUS ONCE
Status: COMPLETED | OUTPATIENT
Start: 2020-03-04 | End: 2020-03-04

## 2020-03-04 RX ADMIN — GADOBUTROL 10 ML: 604.72 INJECTION INTRAVENOUS at 10:52

## 2020-03-04 ASSESSMENT — MIFFLIN-ST. JEOR: SCORE: 1693.58

## 2020-03-04 NOTE — LETTER
3/4/2020       RE: Richa Ashby  [address]  [place] [address]     Dear Colleague,    Thank you for referring your patient, Richa Ashby, to the [facility]TH[facility] at [facility]. Please see a copy of my visit note below.    This 55-year-old woman has completed radiation and presents now prior to surgery.  She notices that the mass has increased in size but is less sore now.  I talked with her for 10 more minutes about her imaging and the surgery which is more than half her visit.    Imaging shows that the edema surrounding the mass is decreased but the mass has increased in size.  It has a thick fibrin covering around it.  The chest scan showed no new nodules.    For surgery patient will have outpatient procedure and will go home with a 15 [place] Hemovac.  She will have to come back in a week to have this removed.  I have answered all of her questions.    Again, thank you for allowing me to participate in the care of your patient.      Sincerely,    Guillermo Johnson MD

## 2020-03-04 NOTE — PROGRESS NOTES
This 55-year-old woman has completed radiation and presents now prior to surgery.  She notices that the mass has increased in size but is less sore now.  I talked with her for 10 more minutes about her imaging and the surgery which is more than half her visit.    Imaging shows that the edema surrounding the mass is decreased but the mass has increased in size.  It has a thick fibrin covering around it.  The chest scan showed no new nodules.    For surgery patient will have outpatient procedure and will go home with a 15 [place] Hemovac.  She will have to come back in a week to have this removed.  I have answered all of her questions.

## 2020-03-04 NOTE — NURSING NOTE
"Reason For Visit:   Chief Complaint   Patient presents with     RECHECK     followup posterior right thigh mass and discuss surger // review MRI and CT       Ht 1.702 m (5' 7\")   Wt 106.6 kg (235 lb)   BMI 36.81 kg/m      Pain Assessment  Patient Currently in Pain: Yes  0-10 Pain Scale: 2  Primary Pain Location: (right thigh)  Pain Descriptors: Discomfort            Win Debi, ATC    "

## 2020-03-04 NOTE — NURSING NOTE
Teaching Flowsheet   Relevant Diagnosis: Right thigh mass resection  Teaching Topic: Pre op teaching     Person(s) involved in teaching:   Patient and      Motivation Level:  Asks Questions: Yes  Eager to Learn: Yes  Cooperative: Yes  Receptive (willing/able to accept information): Yes  Any cultural factors/Christianity beliefs that may influence understanding or compliance? No       Patient demonstrates understanding of the following:  Reason for the appointment, diagnosis and treatment plan: Yes  Knowledge of proper use of medications and conditions for which they are ordered (with special attention to potential side effects or drug interactions): Yes  Which situations necessitate calling provider and whom to contact: Yes       Teaching Concerns Addressed: RN discussed all aspects of pre op teaching with patient and spouse including location, pre op shower, NPO status and post surgery pain mgmt. Patient will come back in one week post op for drain removal. Date is set March 12 at [facility]. Patient has no further questions. Will reach out to Jerica is she has any.        Proper use and care of meds (medical equip, care aids, etc.): Yes  Nutritional needs and diet plan: Yes  Pain management techniques: Yes  Wound Care: Yes  How and/when to access community resources: Yes     Instructional Materials Used/Given: Pre op packet and antibacterial soap.     Time spent with patient: 15 minutes.

## 2020-03-11 ENCOUNTER — ANESTHESIA EVENT (OUTPATIENT)
Dept: SURGERY | Facility: AMBULATORY SURGERY CENTER | Age: 55
End: 2020-03-11

## 2020-03-12 ENCOUNTER — ANESTHESIA (OUTPATIENT)
Dept: SURGERY | Facility: AMBULATORY SURGERY CENTER | Age: 55
End: 2020-03-12

## 2020-03-12 ENCOUNTER — HOSPITAL ENCOUNTER (OUTPATIENT)
Facility: AMBULATORY SURGERY CENTER | Age: 55
End: 2020-03-12
Attending: ORTHOPAEDIC SURGERY
Payer: COMMERCIAL

## 2020-03-12 VITALS
TEMPERATURE: 97.8 F | HEART RATE: 59 BPM | HEIGHT: 67 IN | DIASTOLIC BLOOD PRESSURE: 77 MMHG | RESPIRATION RATE: 14 BRPM | OXYGEN SATURATION: 98 % | BODY MASS INDEX: 36.88 KG/M2 | SYSTOLIC BLOOD PRESSURE: 110 MMHG | WEIGHT: 235 LBS

## 2020-03-12 DIAGNOSIS — C49.9 SARCOMA (H): ICD-10-CM

## 2020-03-12 LAB
HCG UR QL: NEGATIVE
INTERNAL QC OK POCT: YES

## 2020-03-12 RX ORDER — CEFAZOLIN SODIUM 2 G/50ML
2 SOLUTION INTRAVENOUS
Status: COMPLETED | OUTPATIENT
Start: 2020-03-12 | End: 2020-03-12

## 2020-03-12 RX ORDER — OXYCODONE HYDROCHLORIDE 5 MG/1
5 TABLET ORAL EVERY 4 HOURS PRN
Status: DISCONTINUED | OUTPATIENT
Start: 2020-03-12 | End: 2020-03-13 | Stop reason: HOSPADM

## 2020-03-12 RX ORDER — OXYCODONE HYDROCHLORIDE 5 MG/1
5 TABLET ORAL
Status: COMPLETED | OUTPATIENT
Start: 2020-03-12 | End: 2020-03-12

## 2020-03-12 RX ORDER — SODIUM CHLORIDE, SODIUM LACTATE, POTASSIUM CHLORIDE, CALCIUM CHLORIDE 600; 310; 30; 20 MG/100ML; MG/100ML; MG/100ML; MG/100ML
INJECTION, SOLUTION INTRAVENOUS CONTINUOUS
Status: DISCONTINUED | OUTPATIENT
Start: 2020-03-12 | End: 2020-03-12 | Stop reason: HOSPADM

## 2020-03-12 RX ORDER — FENTANYL CITRATE 50 UG/ML
INJECTION, SOLUTION INTRAMUSCULAR; INTRAVENOUS PRN
Status: DISCONTINUED | OUTPATIENT
Start: 2020-03-12 | End: 2020-03-12

## 2020-03-12 RX ORDER — SODIUM CHLORIDE, SODIUM LACTATE, POTASSIUM CHLORIDE, CALCIUM CHLORIDE 600; 310; 30; 20 MG/100ML; MG/100ML; MG/100ML; MG/100ML
INJECTION, SOLUTION INTRAVENOUS CONTINUOUS
Status: DISCONTINUED | OUTPATIENT
Start: 2020-03-12 | End: 2020-03-13 | Stop reason: HOSPADM

## 2020-03-12 RX ORDER — LIDOCAINE 40 MG/G
CREAM TOPICAL
Status: DISCONTINUED | OUTPATIENT
Start: 2020-03-12 | End: 2020-03-12 | Stop reason: HOSPADM

## 2020-03-12 RX ORDER — ONDANSETRON 4 MG/1
4 TABLET, ORALLY DISINTEGRATING ORAL EVERY 30 MIN PRN
Status: DISCONTINUED | OUTPATIENT
Start: 2020-03-12 | End: 2020-03-13 | Stop reason: HOSPADM

## 2020-03-12 RX ORDER — KETOROLAC TROMETHAMINE 30 MG/ML
INJECTION, SOLUTION INTRAMUSCULAR; INTRAVENOUS PRN
Status: DISCONTINUED | OUTPATIENT
Start: 2020-03-12 | End: 2020-03-12

## 2020-03-12 RX ORDER — OXYCODONE HYDROCHLORIDE 5 MG/1
5-10 TABLET ORAL EVERY 4 HOURS PRN
Qty: 20 TABLET | Refills: 0 | Status: SHIPPED | OUTPATIENT
Start: 2020-03-12 | End: 2020-10-16

## 2020-03-12 RX ORDER — ONDANSETRON 2 MG/ML
4 INJECTION INTRAMUSCULAR; INTRAVENOUS EVERY 30 MIN PRN
Status: DISCONTINUED | OUTPATIENT
Start: 2020-03-12 | End: 2020-03-13 | Stop reason: HOSPADM

## 2020-03-12 RX ORDER — PROPOFOL 10 MG/ML
INJECTION, EMULSION INTRAVENOUS CONTINUOUS PRN
Status: DISCONTINUED | OUTPATIENT
Start: 2020-03-12 | End: 2020-03-12

## 2020-03-12 RX ORDER — MONTELUKAST SODIUM 10 MG/1
10 TABLET ORAL DAILY
COMMUNITY

## 2020-03-12 RX ORDER — PROPOFOL 10 MG/ML
INJECTION, EMULSION INTRAVENOUS PRN
Status: DISCONTINUED | OUTPATIENT
Start: 2020-03-12 | End: 2020-03-12

## 2020-03-12 RX ORDER — MEPERIDINE HYDROCHLORIDE 25 MG/ML
12.5 INJECTION INTRAMUSCULAR; INTRAVENOUS; SUBCUTANEOUS
Status: DISCONTINUED | OUTPATIENT
Start: 2020-03-12 | End: 2020-03-13 | Stop reason: HOSPADM

## 2020-03-12 RX ORDER — ACETAMINOPHEN 325 MG/1
650 TABLET ORAL
Status: DISCONTINUED | OUTPATIENT
Start: 2020-03-12 | End: 2020-03-13 | Stop reason: HOSPADM

## 2020-03-12 RX ORDER — CEFAZOLIN SODIUM 1 G/50ML
1 SOLUTION INTRAVENOUS SEE ADMIN INSTRUCTIONS
Status: DISCONTINUED | OUTPATIENT
Start: 2020-03-12 | End: 2020-03-12 | Stop reason: HOSPADM

## 2020-03-12 RX ORDER — SODIUM CHLORIDE, SODIUM LACTATE, POTASSIUM CHLORIDE, CALCIUM CHLORIDE 600; 310; 30; 20 MG/100ML; MG/100ML; MG/100ML; MG/100ML
INJECTION, SOLUTION INTRAVENOUS CONTINUOUS PRN
Status: DISCONTINUED | OUTPATIENT
Start: 2020-03-12 | End: 2020-03-12

## 2020-03-12 RX ORDER — FENTANYL CITRATE 50 UG/ML
25-50 INJECTION, SOLUTION INTRAMUSCULAR; INTRAVENOUS EVERY 5 MIN PRN
Status: DISCONTINUED | OUTPATIENT
Start: 2020-03-12 | End: 2020-03-12 | Stop reason: HOSPADM

## 2020-03-12 RX ORDER — HYDROXYZINE HYDROCHLORIDE 25 MG/1
25 TABLET, FILM COATED ORAL
Status: DISCONTINUED | OUTPATIENT
Start: 2020-03-12 | End: 2020-03-13 | Stop reason: HOSPADM

## 2020-03-12 RX ORDER — ACETAMINOPHEN 325 MG/1
975 TABLET ORAL ONCE
Status: COMPLETED | OUTPATIENT
Start: 2020-03-12 | End: 2020-03-12

## 2020-03-12 RX ORDER — AMOXICILLIN 250 MG
1-2 CAPSULE ORAL 2 TIMES DAILY
Qty: 30 TABLET | Refills: 0 | Status: SHIPPED | OUTPATIENT
Start: 2020-03-12 | End: 2020-10-16

## 2020-03-12 RX ORDER — LIDOCAINE HYDROCHLORIDE 20 MG/ML
INJECTION, SOLUTION INFILTRATION; PERINEURAL PRN
Status: DISCONTINUED | OUTPATIENT
Start: 2020-03-12 | End: 2020-03-12

## 2020-03-12 RX ORDER — BUPIVACAINE HYDROCHLORIDE 2.5 MG/ML
INJECTION, SOLUTION INFILTRATION; PERINEURAL PRN
Status: DISCONTINUED | OUTPATIENT
Start: 2020-03-12 | End: 2020-03-12 | Stop reason: HOSPADM

## 2020-03-12 RX ORDER — DEXAMETHASONE SODIUM PHOSPHATE 4 MG/ML
INJECTION, SOLUTION INTRA-ARTICULAR; INTRALESIONAL; INTRAMUSCULAR; INTRAVENOUS; SOFT TISSUE PRN
Status: DISCONTINUED | OUTPATIENT
Start: 2020-03-12 | End: 2020-03-12

## 2020-03-12 RX ORDER — ONDANSETRON 2 MG/ML
INJECTION INTRAMUSCULAR; INTRAVENOUS PRN
Status: DISCONTINUED | OUTPATIENT
Start: 2020-03-12 | End: 2020-03-12

## 2020-03-12 RX ORDER — NALOXONE HYDROCHLORIDE 0.4 MG/ML
.1-.4 INJECTION, SOLUTION INTRAMUSCULAR; INTRAVENOUS; SUBCUTANEOUS
Status: DISCONTINUED | OUTPATIENT
Start: 2020-03-12 | End: 2020-03-13 | Stop reason: HOSPADM

## 2020-03-12 RX ORDER — ONDANSETRON 4 MG/1
4 TABLET, ORALLY DISINTEGRATING ORAL
Status: DISCONTINUED | OUTPATIENT
Start: 2020-03-12 | End: 2020-03-13 | Stop reason: HOSPADM

## 2020-03-12 RX ORDER — GABAPENTIN 300 MG/1
300 CAPSULE ORAL ONCE
Status: COMPLETED | OUTPATIENT
Start: 2020-03-12 | End: 2020-03-12

## 2020-03-12 RX ADMIN — DEXAMETHASONE SODIUM PHOSPHATE 4 MG: 4 INJECTION, SOLUTION INTRA-ARTICULAR; INTRALESIONAL; INTRAMUSCULAR; INTRAVENOUS; SOFT TISSUE at 13:43

## 2020-03-12 RX ADMIN — FENTANYL CITRATE 25 MCG: 50 INJECTION, SOLUTION INTRAMUSCULAR; INTRAVENOUS at 15:14

## 2020-03-12 RX ADMIN — GABAPENTIN 300 MG: 300 CAPSULE ORAL at 12:24

## 2020-03-12 RX ADMIN — FENTANYL CITRATE 100 MCG: 50 INJECTION, SOLUTION INTRAMUSCULAR; INTRAVENOUS at 13:41

## 2020-03-12 RX ADMIN — ACETAMINOPHEN 975 MG: 325 TABLET ORAL at 12:24

## 2020-03-12 RX ADMIN — LIDOCAINE HYDROCHLORIDE 100 MG: 20 INJECTION, SOLUTION INFILTRATION; PERINEURAL at 13:41

## 2020-03-12 RX ADMIN — CEFAZOLIN SODIUM 2 G: 2 SOLUTION INTRAVENOUS at 14:00

## 2020-03-12 RX ADMIN — PROPOFOL 150 MCG/KG/MIN: 10 INJECTION, EMULSION INTRAVENOUS at 13:42

## 2020-03-12 RX ADMIN — Medication 50 MG: at 13:43

## 2020-03-12 RX ADMIN — SODIUM CHLORIDE, SODIUM LACTATE, POTASSIUM CHLORIDE, CALCIUM CHLORIDE: 600; 310; 30; 20 INJECTION, SOLUTION INTRAVENOUS at 12:25

## 2020-03-12 RX ADMIN — PROPOFOL 200 MG: 10 INJECTION, EMULSION INTRAVENOUS at 13:42

## 2020-03-12 RX ADMIN — SODIUM CHLORIDE, SODIUM LACTATE, POTASSIUM CHLORIDE, CALCIUM CHLORIDE: 600; 310; 30; 20 INJECTION, SOLUTION INTRAVENOUS at 13:31

## 2020-03-12 RX ADMIN — ONDANSETRON 4 MG: 2 INJECTION INTRAMUSCULAR; INTRAVENOUS at 14:39

## 2020-03-12 RX ADMIN — OXYCODONE HYDROCHLORIDE 5 MG: 5 TABLET ORAL at 15:20

## 2020-03-12 RX ADMIN — KETOROLAC TROMETHAMINE 30 MG: 30 INJECTION, SOLUTION INTRAMUSCULAR; INTRAVENOUS at 14:39

## 2020-03-12 RX ADMIN — FENTANYL CITRATE 25 MCG: 50 INJECTION, SOLUTION INTRAMUSCULAR; INTRAVENOUS at 15:26

## 2020-03-12 ASSESSMENT — MIFFLIN-ST. JEOR: SCORE: 1693.58

## 2020-03-12 NOTE — DISCHARGE INSTRUCTIONS
[facility]  Home Care Following Anesthesia  For 24 hours after surgery:  1. Get plenty of rest.  A responsible adult must stay with you for at least 24 hours after you leave the surgery center.  2. Do not drive or use heavy equipment.  If you have weakness or tingling, don't drive or use heavy equipment until this feeling goes away.   3. Do not drink alcohol.   4. Avoid strenuous or risky activities.  Ask for help when climbing stairs.  5. You may feel lightheaded.  IF so, sit for a few minutes before standing.  Have someone help you get up.   6. If you have nausea (feel sick to your stomach): Drink only clear liquids such as apple juice, ginger ale, broth or 7-Up.  Rest may also help.  Be sure to drink enough fluids.  Move to a regular diet as you feel able.   7. You may have a slight fever.  Call the doctor if your fever is over 100 F (37.7 C) (taken under the tongue) or lasts longer than 24 hours.  8. You may have a dry mouth, a sore throat, muscle aches or trouble sleeping. These should go away after 24 hours.  9. Do not make important or legal decisions.               Tips for taking pain medications  To get the best pain relief possible, remember these points:    Take pain medications as directed, before pain becomes severe.    Pain medication can upset your stomach: taking it with food may help.    Constipation is a common side effect of pain medication. Drink plenty of  fluids.    Eat foods high in fiber. Take a stool softener if recommended by your doctor or pharmacist.    Do not drink alcohol, drive or operate machinery while taking pain medications.    Ask about other ways to control pain, such as with heat, ice or relaxation.    Tylenol/Acetaminophen Consumption  To help encourage the safe use of acetaminophen, the makers of TYLENOL  have lowered the maximum daily dose for single-ingredient Extra Strength TYLENOL  (acetaminophen) products sold in the [place] from 8  pills per day (4,000 mg) to 6 pills per day (3,000 mg). The dosing interval has also changed from 2 pills every 4-6 hours to 2 pills every 6 hours.    If you feel your pain relief is insufficient, you may take Tylenol/Acetaminophen in addition to your narcotic pain medication.     Be careful not to exceed 3,000 mg of Tylenol/Acetaminophen in a 24 hour period from all sources.    If you are taking extra strength Tylenol/acetaminophen (500 mg), the maximum dose is 6 tablets in 24 hours.    If you are taking regular strength acetaminophen (325 mg), the maximum dose is 9 tablets in 24 hours.  975 mg of Tylenol given at 12:30 pm next dose may be given after 6:30 pm    Call a doctor for any of the followin. Signs of infection (fever, growing tenderness at the surgery site, a large amount of drainage or bleeding, severe pain, foul-smelling drainage, redness, swelling).  2. It has been over 8 to 10 hours since surgery and you are still not able to urinate (pass water).  3. Headache for over 24 hours.    Your doctor is:       Dr. Guillermo Johnson, Orthopaedics: [phone]               Or dial [phone] and ask for the resident on call for:  Orthopaedics  For emergency care, call the:  [facility] Emergency Department: [phone] (TTY for hearing impaired: [phone])      Safety Tips for Using Crutches    Crutch Fit:    Assume good standing posture with shoulders relaxed and crutch tips 6-8 inches out from the side of the foot.    The underarm pad should fall 2-3 fingers width below the armpit.    The handgrip is positioned level with the wrist to allow 30  flexion at the elbow.    Safety Tips:    Bear weight on your hands, not on your armpits.    Do not add extra padding to the underarm pad. This will, in effect, lengthen the crutches and increase risk of nerve injury.    Wear flat, properly fitting shoes. Do not walk in stocking feet, high heels or slippers.    Household hazards:  --Throw rugs should be  "removed from floors.  --Stairs should be cleared of obstacles.  --Use extra caution on slippery, highly polished, littered or uneven floor surfaces.  --Check for electric cords.    Check crutch tips for excessive wear and keep wing nuts tight.    While walking, look forward with  head up  and  eyes open.  Take equal length steps.    Use BOTH crutches.    Stairs Sequence:    UP: \"Good\" leg first, followed by  bad  leg, then crutches.    DOWN: Crutches, followed by  bad  leg, \"good\" leg.     Walking with Crutches:    Move both crutches forward at the same time.    Non-Weight Bearing (NWB):  Hold the involved leg up and swing through the crutches with the involved leg. The involved leg does not touch the floor.    Toe Touch Weight Bearing (TTWB): Move the involved leg forward. Rest it lightly on the floor for balance only. Step through the crutches with the uninvolved leg.    Partial Weight Bearing (PWB): Move the involved leg forward. Step down the weight of the leg only.  Step through the crutches with the uninvolved leg.    Weight Bearing As Tolerated (WBAT): Move the involved leg forward. Put as much pressure through the involved leg as you can tolerate comfortably. Then step through the crutches with the uninvolved leg.    Caring for Your Edson-De La Cruz Drain    You have been discharged with a Edson-De La Cruz drainage tube. This tube drains fluid from your incision, helping prevent swelling and reducing the risk for infection. The tube is held in place by a few stitches. The drain will be removed when your doctor determines you no longer need it and when the amount of drainage decreases. The color and amount of fluid varies. Right after surgery, the fluid may be bright red and may become clearer over time.   Dressing:    Keep the skin around the tube dry.    If you have a dressing, change it every day.   o Wash your hands.  o Remove the old bandage. Do not use scissors-you may accidentally cut the tube.  o Check for " any redness, swelling, drainage, or broken stitches. (Call your doctor with any of these findings).  o Wash your hands again.  o Wet a cotton swab (Q-tip) and clean around the incision and the tube site. Use normal saline solution (salt and water) or soap and water. Start at the tube site and move outward in a circular motion.   o Pat dry.  o Put a new bandage on the incision and tube site. Make the bandage large enough to cover the whole incision area.   o Tape the bandage in place.  o Throw out old materials and wash your hands.   Home Care:    Tape the tube to the skin below the bandage. Make sure to keep some slack in the tube to keep it from pulling out.     DO NOT sleep on the same side as the tube.    Secure the tube and bulb inside your clothing with a safety pin. This helps keep the tube from being pulled out.     Keep the bulb compressed at all times, except when you empty it.    Empty your drain at least twice a day. Empty it more often if the drain is full.   o Lift the opening of the drain.  o Drain the fluid into a measuring cup.  o Record the amount of fluid each time you empty. Share the information with your doctor at your follow-up visit.   o Squeeze the bulb with your hands until you hear air coming out of the bulb.  o Close the opening.     Tape plastic wrap over the bandage and tube site when you shower.      Stripping  the tube helps keep blood clots from blocking the tube.                         ONLY DO THIS IF YOUR DOCTOR INSTRUCTED YOU TO DO SO!  o Hold the tubing where it leaves the skin with one hand. This keeps it from pulling on the skin.  o Pinch the tubing with the thumb and first finger of your other hand.   o Slowly and firmly pull your thumb and first finger down the tube (squeezing the tube between your fingers). Keep squeezing the tube as you run your fingers towards the bulb. If the pulling hurts or feels like it is coming out of the skin, STOP. Begin again more gently.  o Let  go of the tubing with both hands. If the tube is still blocked, repeat these steps three or four times. Make sure that the bulb is compressed so it creates suction.  When to call your doctor:    New or increased pain around the tube    Redness, warmth, or swelling around the incision or tube    Drainage that is foul smelling    Vomiting    Fever over 101 F degrees    Fluid leaking around the tube    Incision seems not to be healing    Stitches become loose    The tube falls out    Drainage that changes from light pick to dark red    A sudden increase or decrease in the amount of drainage (over 30 ml).  Your drainage record:  Date Time Bulb 1: Amount of drainage (ml or cc) Bulb 2: Amount of drainage (ml or cc) Notes

## 2020-03-12 NOTE — ANESTHESIA POSTPROCEDURE EVALUATION
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     Anesthesia Type: General       Disposition: Outpatient   Postop Pain Control: Uneventful            Sign Out: Well controlled pain   PONV: No   Neuro/Psych: Uneventful            Sign Out: Acceptable/Baseline neuro status   Airway/Respiratory: Uneventful            Sign Out: Acceptable/Baseline resp. status   CV/Hemodynamics: Uneventful            Sign Out: Acceptable CV status   Other NRE: NONE   DID A NON-ROUTINE EVENT OCCUR? No         Last Anesthesia Record Vitals:  CRNA VITALS  3/12/2020 1432 - 3/12/2020 1532      3/12/2020             Pulse:  81    SpO2:  98 %    Resp Rate (observed):  16    Resp Rate (set):  (!) 6          Last PACU Vitals:  Vitals Value Taken Time   /77 3/12/2020  3:30 PM   Temp 36.6  C (97.8  F) 3/12/2020  3:30 PM   Pulse 59 3/12/2020  3:30 PM   Resp 14 3/12/2020  3:30 PM   SpO2 98 % 3/12/2020  3:30 PM   Temp src     NIBP     Pulse     SpO2     Resp     Temp     Ht Rate     Temp 2           Electronically Signed By: Ancelmo Kenyon MD, 2020, 5:05 PM

## 2020-03-12 NOTE — ANESTHESIA PREPROCEDURE EVALUATION
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]   Procedure(s):  Resection of right thigh tumor     LABS:  CBC: No results found for: WBC, HGB, HCT, PLT  BMP: No results found for: NA, POTASSIUM, CHLORIDE, CO2, BUN, CR, GLC  COAGS: No results found for: PTT, INR, FIBR  POC:   Lab Results   Component Value Date    HCG Negative 2020     OTHER: No results found for: PH, LACT, A1C, VIKTORIA, PHOS, MAG, ALBUMIN, PROTTOTAL, ALT, AST, GGT, ALKPHOS, BILITOTAL, BILIDIRECT, LIPASE, AMYLASE, LALO, TSH, T4, T3, CRP, SED     Preop Vitals    BP Readings from Last 3 Encounters:   20 (!) 140/72   20 120/72   20 115/74    Pulse Readings from Last 3 Encounters:   20 70   20 72   20 79      Resp Readings from Last 3 Encounters:   20 16    SpO2 Readings from Last 3 Encounters:   20 96%      Temp Readings from Last 1 Encounters:   20 37.1  C (98.7  F) (Oral)    Ht Readings from Last 1 Encounters:   20 1.702 m (5' 7\")      Wt Readings from Last 1 Encounters:   20 106.6 kg (235 lb)    Estimated body mass index is 36.81 kg/m  as calculated from the following:    Height as of this encounter: 1.702 m (5' 7\").    Weight as of this encounter: 106.6 kg (235 lb).     LDA:  Peripheral IV 20 Left Hand (Active)   Number of days: 0       ETT (Active)   Number of days: 0        Past Medical History:   Diagnosis Date     Asthma, chronic      Sarcoma (H)     Right lower thigh      Past Surgical History:   Procedure Laterality Date     AS RAD RESEC TONSIL/PILLARS       GALLBLADDER SURGERY       ROTATOR CUFF REPAIR RT/LT       SHOULDER SURGERY       SINUS SURGERY        Allergies   Allergen Reactions     Pentobarbital      resp arrest        Anesthesia Evaluation     . Pt has had prior anesthetic.     History of anesthetic " complications    Cardiac arrest with pentothal, otherwise no recent issues      ROS/MED HX    ENT/Pulmonary:     (+)Mild Persistent asthma , . .    Neurologic:  - neg neurologic ROS     Cardiovascular:  - neg cardiovascular ROS       METS/Exercise Tolerance:  >4 METS   Hematologic:  - neg hematologic  ROS       Musculoskeletal:  - neg musculoskeletal ROS       GI/Hepatic:  - neg GI/hepatic ROS       Renal/Genitourinary:  - ROS Renal section negative       Endo:     (+) Obesity, .      Psychiatric:  - neg psychiatric ROS       Infectious Disease:  - neg infectious disease ROS       Malignancy:   (+) Malignancy History of Other  Other CA Leg sarcoma status post         Other:                         PHYSICAL EXAM:   Mental Status/Neuro: A/A/O   Airway: Facies: Feasible  Mallampati: II  Mouth/Opening: Full  TM distance: > 6 cm  Neck ROM: Full   Respiratory:   Resp. Rate: Normal     Resp. Effort: Normal      CV:    Comments:      Dental: Normal Dentition                Assessment:   ASA SCORE: 2    H&P: History and physical reviewed and following examination; no interval change.   Smoking Status:  Non-Smoker/Unknown   NPO Status: NPO Appropriate     Plan:   Anes. Type:  General   Pre-Medication: Acetaminophen; Gabapentin   Induction:  IV (Standard)   Airway: ETT; Oral   Access/Monitoring: PIV   Maintenance: Balanced     Postop Plan:   Postop Pain: Opioids  Postop Sedation/Airway: Not planned  Disposition: Outpatient     PONV Management:   Adult Risk Factors: Female, Non-Smoker, Postop Opioids   Prevention: Ondansetron, Dexamethasone, Propofol     CONSENT: Direct conversation   Plan and risks discussed with: Patient   Blood Products: Consent Deferred (Minimal Blood Loss)                   Ancelmo Kenyon MD

## 2020-03-12 NOTE — OP NOTE
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  She presents now to have this resected and understands the risks of infection wound dehiscence of bleeding pain numbness tingling weakness and limp.    DESCRIPTION OF PROCEDURE: The patient was placed prone after general anesthesia and the right leg was washed and sterilely prepped and draped.  I made an incision directly over the mass sharply through the skin divided subcutaneous tissue with cautery.  We needed to leave some of the subcutaneous tissue on this tumor as it was emanating from the muscle fascia.  I divided the subcutaneous tissue circumferentially around the tumor and then divided the fascia.  I needed to also cut into the muscle and leave some muscle and tendon on the tumor where it was adherent.  After working all the way around the mass it was removed tagged for pathology and sent to the lab.  We copiously irrigated cauterized some small bleeding vessels and then closed over a 15 round English channel cut TAMIKO drain.  The drain was secured with a nylon stitch and then we used 2-0 PDS in the subcutaneous tissue and 3-0 PDS and the skin followed by Steri-Strips and a sterile dry dressing.  The patient was turned supine extubated and taken to the recovery room in stable condition.  The estimated blood loss is 20 mL.  There were no complications.  I was present for all critical portions of the procedure.    Guillermo Johnson MD

## 2020-03-12 NOTE — BRIEF OP NOTE
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]   Post-operative diagnosis same   Procedure: Procedure(s):  Resection of right thigh tumor   Surgeon(s): Surgeon(s) and Role:     * Guillermo Johnson MD - Primary     * Shannan Pedraza PA-C - Assisting   Estimated blood loss: 10 mL    Specimens: ID Type Source Tests Collected by Time Destination   A : Posterior Right Thigh Mass; short stitch proximal, long stitch lateral Tissue Leg, Right SURGICAL PATHOLOGY EXAM Guillermo Johnson MD 3/12/2020  2:29 PM       Findings: Sarcoma    Post-op Plan: aquacel until post-op appt  WB status:  FWB  Device:    Drain  DVT Prophylaxis:  Not needed   Follow-up:  1 week with Dr. Johnson/RAYA for wound check/drain removal

## 2020-03-12 NOTE — ANESTHESIA CARE TRANSFER NOTE
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    Anesthesia Type:   General     Note:  Airway :Room Air  Patient transferred to:PACU  Handoff Report: Identifed the Patient, Identified the Reponsible Provider, Reviewed the pertinent medical history, Discussed the surgical course, Reviewed Intra-OP anesthesia mangement and issues during anesthesia, Set expectations for post-procedure period and Allowed opportunity for questions and acknowledgement of understanding      Vitals: (Last set prior to Anesthesia Care Transfer)    CRNA VITALS  3/12/2020 1432 - 3/12/2020 1506      3/12/2020             Pulse:  81    SpO2:  98 %    Resp Rate (observed):  16    Resp Rate (set):  (!) 6                Electronically Signed By: NUPUR Anne CRNA  March 12, 2020  3:06 PM

## 2020-03-16 LAB — COPATH REPORT: NORMAL

## 2020-03-17 ENCOUNTER — NURSE TRIAGE (OUTPATIENT)
Dept: NURSING | Facility: CLINIC | Age: 55
End: 2020-03-17

## 2020-03-17 NOTE — TELEPHONE ENCOUNTER
On 3/12/2020 Resection of right thigh tumor.       Pt reporting, increased fluid draining from the incision area.    Empty the drain this morning.    Which was 30 CC of fluid from the incision.   The color is like a red tinge fluid from the incision.   No pain.  Still feeling a lot of numbness in the incision area.   No hot sweats, cold sweats, or the chills.             Also, Pt was supposed to get the drain out this week on Thursday.   But had not heard anything back from the clinic.     Pl call the Pt back @ [phone] for further assistance.     Thank you     Lexie Diggs RN  Central Triage Red Flags/Med Refills      Reason for Disposition    Dressing soaked with blood or body fluid (e.g., drainage)    Additional Information    Negative: [1] Major abdominal surgical incision AND [2] wound gaping open AND [3] visible internal organs    Negative: Sounds like a life-threatening emergency to the triager    Negative: [1] Bleeding from incision AND [2] won't stop after 10 minutes of direct pressure    Negative: [1] Widespread rash AND [2] bright red, sunburn-like    Negative: Severe pain in the incision    Negative: [1] Suture came out early AND [2] wound gaping AND [3] < 48 hours since sutures placed    Negative: [1] Incision gaping open AND [2] length of opening > 2 inches (5 cm)    Negative: Patient sounds very sick or weak to the triager    Negative: Sounds like a serious complication to the triager    Negative: Fever > 100.5 F (38.1 C)    Negative: [1] Incision looks infected (spreading redness, pain) AND [2] fever > 99.5 F (37.5 C)    Negative: [1] Incision looks infected (spreading redness, pain) AND [2] large red area (> 2 in. or 5 cm)    Negative: [1] Incision looks infected (spreading redness, pain) AND [2] face wound    Negative: [1] Red streak runs from the incision AND [2] longer than 1 inch (2.5 cm)    Negative: [1] Pus or bad-smelling fluid draining from incision AND [2] no fever    Negative: [1]  Post-op pain AND [2] not controlled with pain medications    Protocols used: POST-OP INCISION SYMPTOMS-A-AH

## 2020-03-18 NOTE — LETTER
12/17/2019       RE: Richa Ashby  [address]  [place] [address]     Dear Colleague,    Thank you for referring your patient, Richa Ashby, to the RADIATION ONCOLOGY CLINIC. Please see a copy of my visit note below.    INITIAL PATIENT ASSESSMENT    Diagnosis: Sarcoma    Prior radiation therapy: None    Prior chemotherapy: None    Prior hormonal therapy:No    Pain Eval:  Current history of pain associated with this visit:   Intensity: 5/10  Current: aching and burning  Location: Right thigh, arch of right foot  Treatment: Ibuprofen 800 mg every 4 hours    Psychosocial  Living arrangements: Lives with   Fall Risk: independent   referral needs: Not needed    Advanced Directive: No  Implantable Cardiac Device? No    Nurse face-to-face time: Level 5:  over 15 min face to face time  HPI      Review of Systems   Constitutional: Negative for chills, diaphoresis, fever, malaise/fatigue and weight loss.   HENT: Negative for congestion, ear discharge, ear pain, hearing loss, nosebleeds, sinus pain, sore throat and tinnitus.    Eyes: Negative for blurred vision, double vision, photophobia, pain, discharge and redness.   Respiratory: Negative for cough, hemoptysis, sputum production, shortness of breath, wheezing and stridor.    Cardiovascular: Negative for chest pain, palpitations, orthopnea, claudication, leg swelling and PND.   Gastrointestinal: Positive for heartburn. Negative for abdominal pain, blood in stool, constipation, diarrhea, melena, nausea and vomiting.   Genitourinary: Negative for dysuria, flank pain, frequency, hematuria and urgency.   Musculoskeletal: Positive for joint pain. Negative for back pain, falls, myalgias and neck pain.        Left shoulder   Skin: Negative for itching and rash.   Neurological: Negative for dizziness, tingling, tremors, sensory change, speech change, focal weakness, seizures, loss of consciousness, weakness and headaches.    Endo/Heme/Allergies: Negative for environmental allergies and polydipsia. Does not bruise/bleed easily.   Psychiatric/Behavioral: Negative for depression, hallucinations, memory loss, substance abuse and suicidal ideas. The patient is not nervous/anxious and does not have insomnia.                  Radiation Oncology Consultation:  Date on this visit: 12/17/2019    Richa Ashby  is referred by Dr.Christian Celina Johnson for a radiation oncology consultation. She requires evaluation for diagnosis of a high grade undifferentiated pleomorphic sarcoma.       History Of Present Illness:  Ms. 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 Ms. Ashby noted a painful posterior right thigh mass above her popliteal fossa several months ago. She notes that it is fairly uncomfortable and she does at times bump it when sitting. She has had no significant numbness or tingling, or loss of right lower extremity strength or function. She has not noted any erythema, tenderness or warmth at the site. She underwent an MRI that demonstrated a soft tissue mass superficial to the muscle but under the fascia in the posterior aspect of the mid right thigh. She was referred to Dr. Johnson who set her up for biopsy of the mass on 12/04/19. Biopsy revealed a high grade undifferentiated pleomorphic sarcoma. She has now been referred for discussion of radiation treatment options.       Past Medical/Surgical History:  Past Medical History:   Diagnosis Date     Asthma, chronic      Sarcoma (H)     Right lower thigh     Past Surgical History:   Procedure Laterality Date     AS RAD RESEC TONSIL/PILLARS       GALLBLADDER SURGERY       ROTATOR CUFF REPAIR RT/LT  2012     SHOULDER SURGERY       SINUS SURGERY         Past Radiation History: None  Past Chemotherapy History: None  Implanted Cardiac device:   none    Review of Systems:  Reviewed.  See details in   SAMIR TRINIDAD   is a   74   male who complains of change in stools. H/o chronic constipation, has been taking linzess 145mcg daily with good results along with MoM prn.BRHowever starting late last week (5 days ago) had explosive loose stool after taking linzess and about 12 BMs throughout the day (usually has 2-3 in the AM). Has not taken any laxatives or fiber since, and has continued to have loose stool daily, although improved from the first day. Yesterday had approx. 5 BMs and 2 so far this AM. Denies abdominal pain or bleeding. Of note, he has been having issues with intermittent low grade fevers and had a "touch of" PNA a few weeks ago although recent CXR was clear. PCP gave him cefdinir x 7 days and is finishing today. He continues on align daily, which he has taken for a few years now. S/p sigmoid colectomy 12/2018 for recurrent diverticulitis, no issues since. UTD on colonoscopy--5/2018 adenomatous polyps, recall 5 years.JOSE "nursing note    Allergies:  Allergies as of 12/17/2019 - Reviewed 12/17/2019   Allergen Reaction Noted     Pentobarbital  03/17/2015       Current Medications:  Current Outpatient Medications   Medication Sig Dispense Refill     loratadine (CLARITIN) 10 MG tablet Take 10 mg by mouth       melatonin (MELATONIN) 1 MG/ML LIQD liquid Take 5 mg by mouth       omeprazole (PRILOSEC) 20 MG DR capsule        vitamin D3 (CHOLECALCIFEROL) 2000 units (50 mcg) tablet Take 1 tablet by mouth daily       albuterol (PROAIR RESPICLICK) 108 (90 Base) MCG/ACT inhaler Inhale 1-2 puffs into the lungs       fluticasone-salmeterol (ADVAIR) 100-50 MCG/DOSE inhaler Inhale 1 puff into the lungs          Family History:  Family History   Problem Relation Age of Onset     Lung Cancer Maternal Grandmother      Breast Cancer Paternal Grandmother        Social History:  Social History     Tobacco Use     Smoking status: Never Smoker     Smokeless tobacco: Never Used   Substance Use Topics     Alcohol use: Yes     Frequency: 2-3 times a week     Drinks per session: 1 or 2     Binge frequency: Never     Drug use: Never       Physical Exam:  BP (!) 141/70   Ht 1.689 m (5' 6.5\")   Wt 110.2 kg (243 lb)   BMI 38.63 kg/m       GENERAL APPEARANCE: healthy, alert and no distress  EYES: EOMS intact. Sclerae anicteric.   NECK: Supple. No asymmetry or masses visualized  MUSCULOSKELETAL: Palpable mass noted behind and above the right popliteal fossa with no overlying erythema, tenderness, or warmth. Extremities otherwise normal. FROM in all extremities.  SKIN: No suspicious lesions or rashes  NEURO: CN II-XII grossly intact   PSYCHIATRIC: Mentation appears normal and affect normal    Pathology:    Patient Name: DIMAS WILSON   MR#: [id number]   Specimen #: [id number]   Collected: 12/4/2019   Received: 12/4/2019   Reported: 12/6/2019 16:42   Ordering Phy(s): TASHIA ROBERTO     For improved result formatting, select 'View Enhanced Report Format' " "under    Linked Documents section.     SPECIMEN(S):   Soft tissue mass, right thigh     FINAL DIAGNOSIS:   Soft tissue, right thigh, biopsy:   - Undifferentiated pleomorphic sarcoma, high grade   - See comment     COMMENT:   Immunohistochemical stains were performed on block A1 with appropriate   controls. The tumor cells are negative   for SMA, keratin AE1/3, S100, CD34 and desmin, supporting the diagnosis.     I have personally reviewed all specimens and/or slides, including the   listed special stains, and used them   with my medical judgement to determine or confirm the final diagnosis.     Electronically signed out by:     Kaden Rosa M.D., [facility]     CLINICAL HISTORY:   The patient is a 54-year-old female with right thigh mass.     GROSS:   The specimen is received in formalin with proper patient identification,   labeled \"right thigh mass\".  The   specimen consists of 2 tan-yellow soft tissue cores, 0.5-1.4 cm in length   by 0.2 cm in diameter. The specimen   is submitted entirely in A1. (Dictated by: RUPERT Keys 12/4/2019   10:49 AM)     MICROSCOPIC:   Microscopic examination was performed.     The technical component of this testing was completed at the [facility], with the professional component performed    at the [facility], [address] [place] [phone] ([phone])     CPT Codes:   A: 77942-OX2, 17958-RJV, 35863-IOQ, 60293-RXJ, 56196-MGQ, 98164-AWT     COLLECTION SITE:   Client: [facility]   Location: [facility] ([facility])     Resident   [email or web address]     Laboratory/Imaging Studies    EXAMINATION: CT CHEST LOW DOSE NON CONTRAST, 12/17/2019 11:42 AM     CLINICAL HISTORY: check for mets; Sarcoma (H)     COMPARISON: None available.     TECHNIQUE: Low-dose CT imaging obtained through the chest without  contrast. Coronal and axial MIP " reformatted images obtained.      FINDINGS:     Normal heart size, no pericardial effusion. Normal caliber thoracic  aorta and main pulmonary artery. Normal branching of the transplant  vessels. Normal thyroid. No suspicious upper cervical, axillary, or  mediastinal lymphadenopathy. Normal caliber esophagus.     Substantial tracheal bronchial tree is widely patent. Bibasilar  dependent atelectasis. No pleural effusion or pneumothorax. 3 mm solid  nodule anterior right middle lobe (series 5, image the 177). There is  an additional 2 mm nodule posterior left lower lobe (series 5, image  201). 2 mm left lower lobe image nodule 164 calcified. There is 1.1 cm  partly calcified nodule of the left breast.     Limited views of the abdomen demonstrate no acute pathology. Status  post cholecystectomy.     Bones: No acute or specific bony abnormality. Ribs are normal.                                                                      IMPRESSION:  1. Scattered sub-3 mm pulmonary nodules as above. Follow-up in 3  months.    2. There is 1.1 cm partly calcified nodule in the left breast,  mammographic correlation suggested.      I have personally reviewed the examination and initial interpretation  and I agree with the findings.     SEVEN VANCE MD      ASSESSMENT:      Ms. Ashby is a 54 year old woman with a history of a right posterior thigh newly diagnosed undifferentiated pleomorphic sarcoma which appears localized.     RECOMMENDATION:      We had a long discussion with Ms. Ashby and her  regarding the nature of her soft tissue tumor. We discussed specifically that localized high grade UPS is typically treated with a combination of radiation and surgical resection. We discussed timing of radiotherapy either preoperatively or postoperatively with equivalent efficacy but different advantages and disadvantages regarding morbidity. With preoperative radiation, she would be at higher risk of a wound complication but  overall, a lower radiation dose to a smaller volume could be utilized compared to postoperative radiotherapy. In this location, we would expect that preoperative radiotherapy may have decreased risk of fibrosis relative to postoperative treatment. Our recommendation would be to proceed with preoperative radiation for her high grade UPS to overall use lower radiation dose to a smaller volume. We will also discuss our recommendations with Dr. Roberto to determine whether he would be comfortable with this plan.     The risks and benefits of radiotherapy were discussed with the patient. Potential acute and long term side effects were explained. The patient and her  had an opportunity to ask questions answered to their apparent satisfaction. She has agreed to proceed with radiation therapy. A written consent was obtained and we will schedule her for simulation as well.     Thank you for allowing me to participate in Richa Ashby 's care .  Please do not hesitate to call me with questions.    Emiliana Astudillo MD  Pager: [phone]    CC  Primary Physician: No primary care provider on file.   Patient Care Team:  Garry Garcia MD as Referring Physician (Surgery)  Jerica Hooper RN as Specialty Care Coordinator (Orthopedics)  TASHIA ROBERTO

## 2020-03-19 ENCOUNTER — TELEPHONE (OUTPATIENT)
Dept: ORTHOPEDICS | Facility: CLINIC | Age: 55
End: 2020-03-19

## 2020-03-20 ENCOUNTER — OFFICE VISIT (OUTPATIENT)
Dept: ORTHOPEDICS | Facility: CLINIC | Age: 55
End: 2020-03-20
Payer: COMMERCIAL

## 2020-03-20 DIAGNOSIS — C49.9 SARCOMA (H): Primary | ICD-10-CM

## 2020-03-20 NOTE — PROGRESS NOTES
This patient is 8 days out from removal of a soft tissue sarcoma.  She is had a drain in place.  The drain is down to about 30 cc a day.  I remove the drain without difficulty.  The patient tolerated this well and there was no drainage following removal.  I covered the drain site with a sterile dressing.  The patient will return to see me in 3-1/2 months with an MRI of the thigh and a CT scan of the chest.  I have answered all of her questions about return to activity.

## 2020-03-20 NOTE — NURSING NOTE
"Reason For Visit:   Chief Complaint   Patient presents with     RECHECK     followup right thigh tumor resection DOS 3/12/20 // drain removal        There were no vitals taken for this visit.    Pain Assessment  Patient Currently in Pain: Yes  0-10 Pain Scale: 3  Primary Pain Location: (right thigh)  Aggravating Factors: (\"worst at the end of day\")        Audelia Carrera ATC    "

## 2020-04-03 ENCOUNTER — NURSE TRIAGE (OUTPATIENT)
Dept: NURSING | Facility: CLINIC | Age: 55
End: 2020-04-03

## 2020-04-03 ENCOUNTER — TELEPHONE (OUTPATIENT)
Dept: ORTHOPEDICS | Facility: CLINIC | Age: 55
End: 2020-04-03

## 2020-04-03 NOTE — TELEPHONE ENCOUNTER
This patient states that she remains swollen. She said the back of the leg where she had the incision is warm to touch.  Pt. reports having a resection of right thigh tumor. She does not report the wound being open.  She denies bleeding or drainage currently.  She did not retake her temperature at this time as she stated she did not have a thermometer at home. Some discoloration of skin due to radiation. Pt. denies tightness.   Pt. will continue to monitor symptoms- signs/symptoms of infection or wound opening to go to ED.

## 2020-04-03 NOTE — TELEPHONE ENCOUNTER
On 3/12/2020 Resection of right thigh tumor     Since the surgery Pt has had some discomfort and discoloration of the surgical area.   But within the last couple of days.    There has been increased swelling, redness, warmth to the touch, and increased discomfort.    Fever 99.0 (Forehead)    No drainage noted.    The redness has spread from the base of the incision up closer to the beginning of the incision surgical site.  There is pain in the back of the right knee up to the beginning of the surgical site.       Pt wondering if there is infection??    Pt has had a low grade fever for the last 2 days.    No other cold symptoms noted.  Pl call the Pt back @ [phone] for further assistance.        Lexie Diggs RN  Central Triage Red Flags/Med Refills          Reason for Disposition    [1] Incision looks infected (spreading redness, pain) AND [2] fever > 99.5 F (37.5 C)    Additional Information    Negative: [1] Major abdominal surgical incision AND [2] wound gaping open AND [3] visible internal organs    Negative: Sounds like a life-threatening emergency to the triager    Negative: [1] Bleeding from incision AND [2] won't stop after 10 minutes of direct pressure    Negative: [1] Widespread rash AND [2] bright red, sunburn-like    Negative: Severe pain in the incision    Negative: [1] Suture came out early AND [2] wound gaping AND [3] < 48 hours since sutures placed    Negative: [1] Incision gaping open AND [2] length of opening > 2 inches (5 cm)    Negative: Patient sounds very sick or weak to the triager    Negative: Sounds like a serious complication to the triager    Negative: Fever > 100.5 F (38.1 C)    Protocols used: POST-OP INCISION SYMPTOMS-A-AH

## 2020-05-10 NOTE — PROGRESS NOTES
Department of Radiation Oncology  [facility]  [address] [place] [address]  [phone]       Radiation Oncology Follow-up Visit  May 11, 2020      Richa Ashby  MRN: [id number]   : 1965     DISEASE TREATED:   high-grade undifferentiated pleomorphic sarcoma of the right posterior thigh    RADIATION THERAPY DELIVERED:   Pre-operative radiotherapy, 5000 cGy in 25 once-daily fractions, completed on 2/3/2020.    SYSTEMIC THERAPY:  None    INTERVAL SINCE COMPLETION OF RADIATION THERAPY:   3 months    SUBJECTIVE:   Richa Ashby is a 55 year old female who was seen by us for a diagnosis of high-grade undifferentiated pleomorphic sarcoma of the right posterior thigh, just superior to the popliteal fossa. She noticed a mass in this area months ago and an outside MRI on 2019 demonstrated a soft tissue mass measuring 4.4 cm in this area. She was subsequently referred to Dr. Johnson who biopsied the mass on 2019. Pathology ([id number]) showed high-grade undifferentiated pleomorphic sarcoma. She was subsequently referred to our department for preoperative radiotherapy which she completed in our department as described above over the course of 5 weeks. She tolerated radiotherapy well overall. However, she did develop significant progressive pain in the area of the mass, associated with erythema and warmth overlying the mass but she never had signs of infection. She was initially treated with oxycodone for pain, and was later treated with a steroid burst to address suspected inflammation associated with the mass which decreased her pain significantly. On 3/12/2020, she was brought back to the OR by Dr. Johnson for wide local resection. Surgical pathology ([id number]) reported the removal of a 8.6 cm undifferentiated pleomorphic sarcoma with 95% tumor necrosis in response to radiotherapy. Margins were uninvolved, with the closest margin of 1 mm anteriorly. Her  pathological disease staging is ypT2 cN0 cM0, grade 3, stage IIIA. Her post-operative course was initially without concerns, but on 4/3/2020 she discussed with Orthopedics the development of swelling and pain at the surgical site, which was warm to touch. She did not notice any wound opening, bleeding or drainage, but she also noted a low fever. She did not seek further care. She now presents for a follow-up visit schduled prior, and has appointment with Dr. Johnson in late 06/2020 after repeat MRI.     She is overall doing well. Her swelling has been improving since surgery. 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 This is associated with radiating pain to the arch of her right foot. She has been self managing with as needed ibuprofen, which seems to help a little, but she finds what works well is covering her thigh with ice pad. Otherwise, she reports numbness in the skin over the surgical incision, and notes there are two stiches that are on their way out. There is no bleeding, drainage, discharge, skin erythema, or warmness in the skin over her incision. She is otherwise without specific symptoms or complaints.    PHYSICAL EXAM:  Not done, virtual visit.    LABS AND IMAGING:  No recent labs/imaging    IMPRESSION:   Ms. Ashby is a 55 year old female with a high-grade undifferentiated pleomorphic sarcoma of the right posterior thigh, for which she underwent pre-operative radiotherapy to 5000 cGy followed by wide excision. Pathological staging of her disease is ypT2 cN0 cM0, grade 3, stage IIIA. Although the follow up is limited in its nature, she appears to be recovering well from her treatment, and has no symptoms suspicious for disease residual/recurrence. She likely has a seroma in the treated area.    PLAN:   We encouraged her to follow up with  Dr. Johnson as scheduled in late June. We briefly discussed the general approaches to managing seroma, but will defer to Dr. Johnson for his recommendations. From our end, we can follow up with Ms. Ashby on as-needed basis, and we encouraged her to call forth with any questions or concerns,    The patient was seen and discussed with staff, Dr. Astudillo.    Hipolito Ellis MD  Resident, PGY-2  Department of Radiation Oncology  [facility]    I saw the patient with the resident.  I agree with the resident note and plan of care.      Emiliana Astudillo MD

## 2020-05-11 ENCOUNTER — VIRTUAL VISIT (OUTPATIENT)
Dept: RADIATION ONCOLOGY | Facility: CLINIC | Age: 55
End: 2020-05-11
Attending: RADIOLOGY
Payer: COMMERCIAL

## 2020-05-11 DIAGNOSIS — C49.9 SARCOMA (H): Primary | ICD-10-CM

## 2020-05-11 NOTE — PROGRESS NOTES
"Richa Ashby is a 55 year old female who is being evaluated via a billable telephone visit.      The patient has been notified of following:     \"This telephone visit will be conducted via a call between you and your physician/provider. We have found that certain health care needs can be provided without the need for a physical exam.  This service lets us provide the care you need with a short phone conversation.  If a prescription is necessary we can send it directly to your pharmacy.  If lab work is needed we can place an order for that and you can then stop by our lab to have the test done at a later time.    Telephone visits are billed at different rates depending on your insurance coverage. During this emergency period, for some insurers they may be billed the same as an in-person visit.  Please reach out to your insurance provider with any questions.    If during the course of the call the physician/provider feels a telephone visit is not appropriate, you will not be charged for this service.\"    Patient has given verbal consent for Telephone visit?  Yes    What phone number would you like to be contacted at? [phone]    How would you like to obtain your AVS? MyChart    FOLLOW-UP VISIT    Patient Name: Richa Ashby      : 1965     Age: 55 year old        ______________________________________________________________________________     Chief Complaint   Patient presents with     Cancer     Telephone follow up:Sarcoma: Right thigh 5000 cGy completed 20       Pain  Denies    Labs  Other Labs: No    Imaging  None    Other Appointments:     MD Name: Dr Johnson Appointment Date:    MD Name: Appointment Date:   MD Name: Appointment Date:   Other Appointment Notes:     Residual Radiation side effect: Fatigue, discomfort and swelling     Additional Instructions:     Nurse face-to-face time: Level 3:  10 min face to face time          "

## 2020-07-01 ENCOUNTER — ANCILLARY PROCEDURE (OUTPATIENT)
Dept: MRI IMAGING | Facility: CLINIC | Age: 55
End: 2020-07-01
Attending: ORTHOPAEDIC SURGERY
Payer: COMMERCIAL

## 2020-07-01 ENCOUNTER — OFFICE VISIT (OUTPATIENT)
Dept: ORTHOPEDICS | Facility: CLINIC | Age: 55
End: 2020-07-01
Payer: COMMERCIAL

## 2020-07-01 DIAGNOSIS — C49.9 SARCOMA (H): Primary | ICD-10-CM

## 2020-07-01 DIAGNOSIS — C49.9 SARCOMA (H): ICD-10-CM

## 2020-07-01 RX ORDER — GADOBUTROL 604.72 MG/ML
10 INJECTION INTRAVENOUS ONCE
Status: COMPLETED | OUTPATIENT
Start: 2020-07-01 | End: 2020-07-01

## 2020-07-01 RX ADMIN — GADOBUTROL 10 ML: 604.72 INJECTION INTRAVENOUS at 11:30

## 2020-07-01 NOTE — LETTER
7/1/2020         RE: Richa Ashby  [address]  [address]  [place] [address]        Dear Colleague,    Thank you for referring your patient, Richa Ashby, to the [facility]. Please see a copy of my visit note below.    This 55-year-old is 3 months out from a right posterior thigh sarcoma resection with negative margins.  She is doing well overall.  She is noticed some suture fragments in the incision.  I removed 3 of these without difficulty.  There is no erythema around them no drainage and they did not even bleed.  Otherwise she is noticed some soreness when she sits too long or walks too much.  This will make the knee feel tight and when it is tight and she even has some discomfort that radiates down to the foot.    On examination she is alert oriented has a normal mood and affect and is in no acute distress.  Respirations are regular and unlabored eyes are nonicteric.  The incision is clean dry and intact.  She is able to extend the knee fully and flex past 90 degrees.  There is no distal edema or erythema.  She has some subtle swelling in the thigh but it is not edematous.    I reviewed her MRI which shows a fluid collection but no enhancing mass and no sign of local recurrence.    Would like her to return in 3 months at this time we will get an MRI and a CT scan of the lungs.  She should continue to work on her endurance and stretching of the hamstring muscles.  I have answered all of her questions today.

## 2020-07-01 NOTE — PROGRESS NOTES
This 55-year-old is 3 months out from a right posterior thigh sarcoma resection with negative margins.  She is doing well overall.  She is noticed some suture fragments in the incision.  I removed 3 of these without difficulty.  There is no erythema around them no drainage and they did not even bleed.  Otherwise she is noticed some soreness when she sits too long or walks too much.  This will make the knee feel tight and when it is tight and she even has some discomfort that radiates down to the foot.    On examination she is alert oriented has a normal mood and affect and is in no acute distress.  Respirations are regular and unlabored eyes are nonicteric.  The incision is clean dry and intact.  She is able to extend the knee fully and flex past 90 degrees.  There is no distal edema or erythema.  She has some subtle swelling in the thigh but it is not edematous.    I reviewed her MRI which shows a fluid collection but no enhancing mass and no sign of local recurrence.    Would like her to return in 3 months at this time we will get an MRI and a CT scan of the lungs.  She should continue to work on her endurance and stretching of the hamstring muscles.  I have answered all of her questions today.

## 2020-07-01 NOTE — DISCHARGE INSTRUCTIONS
MRI Contrast Discharge Instructions    The IV contrast you received today will pass out of your body in your  urine. This will happen in the next 24 hours. You will not feel this process.  Your urine will not change color.    Drink at least 4 extra glasses of water or juice today (unless your doctor  has restricted your fluids). This reduces the stress on your kidneys.  You may take your regular medicines.    If you are on dialysis: It is best to have dialysis today.    If you have a reaction: Most reactions happen right away. If you have  any new symptoms after leaving the hospital (such as hives or swelling),  call your hospital at the correct number below. Or call your family doctor.  If you have breathing distress or wheezing, call 911.    Special instructions: ***    I have read and understand the above information.    Signature:______________________________________ Date:___________    Staff:__________________________________________ Date:___________     Time:__________    [facility] Departments:    ___[place]: [phone]  ___[facility]: [phone]  ___[place]: [phone] ___[facility]: [phone]  ___[facility]: [phone]  ___[facility]: [phone]  ___[phone]  ___[facility]: [phone]  ___[phone]

## 2020-07-01 NOTE — NURSING NOTE
Reason For Visit:   Chief Complaint   Patient presents with     RECHECK     followup right thigh tumor resection // review MRI       There were no vitals taken for this visit.    Pain Assessment  Patient Currently in Pain: Yes(no pain of right knee, more of discomfort)  0-10 Pain Scale: 4(at its worst)  Primary Pain Location: Ankle(right ankle)  Pain Descriptors: Radiating(radiating pain from right ankle and to arch)    Audelia Carrera, ATC

## 2020-08-06 ENCOUNTER — NURSE TRIAGE (OUTPATIENT)
Dept: NURSING | Facility: CLINIC | Age: 55
End: 2020-08-06

## 2020-08-06 NOTE — TELEPHONE ENCOUNTER
Per Alexandra, pt can be seen virtually by Dr. Johnson tomorrow. ATC called pt and scheduled pt with Dr. Johnson. She is only available after 3 due to work. Pt will call with questions or concerns.            -Saurabh ATC- Orthopedics

## 2020-08-06 NOTE — TELEPHONE ENCOUNTER
"Richa calling today, as began having \"seepage\" from posterior right thigh incision yesterday after picking at an area that felt hard, light colored \"scab\" appearing piece removed prior to \"seepage\" occurring.  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   placed a silicone tegaderm pad over area last night, which is containing drainage.  Has not removed the pad this morning, is unable to do this on her own due to location on back of thigh.  After speaking with Ashish, high priority message being sent to ortho team  Advised to continue monitoring temp, if develops fever, increasing pain or drainage is to go to ED.  Agrees  Best number to contact today for appointment is [phone]    Additional Information    Negative: Major abdominal surgical incision and wound gaping open with visible internal organs    Negative: Sounds like a life-threatening emergency to the triager    Answer Assessment - Initial Assessment Questions  1. SYMPTOM: \"What's the main symptom you're concerned about?\" (e.g., redness, pain, drainage)      Incision seeping  2. ONSET: \"When did seeping  start?\"      Yesterday afternoon  3. SURGERY: \"What surgery was performed?\"      Resection of right thigh tumor   4. DATE of SURGERY: \"When was surgery performed?\"       3/12/20  5. INCISION SITE: \"Where is the incision located?\"       Back of right thigh  6. REDNESS: \"Is there any redness at the incision site?\" If yes, ask: \"How wide across is the redness?\" (Inches, centimeters)       Not that anyone has told her, she is unable to visualize incision  7. PAIN: \"Is there any pain?\" If so, ask: \"How bad is it?\"  (Scale 1-10; or mild, moderate, severe)      Denies pain, describes as discomfort, but not pain.   8. BLEEDING: \"Is there any bleeding?\" If so, ask: \"How much?\" and \"Where?\"      \"Seepage\" was clear in color, as it dried on gauze appeared yellow-brown  9. " "DRAINAGE: \"Is there any drainage from the incision site?\" If yes, ask: \"What color and how much?\" (e.g., red, cloudy, pus; drops, teaspoon)      Unsure if is still occurring.  Has a tegaderm silicone dressing in place, which was placed yesterday late afternoon by .  This dressing is containing the \"seepage\", nothing coming from under current bandage.  Is unable to change dressing on her own due to location of incision  10. FEVER: \"Do you have a fever?\" If so, ask: \"What is your temperature, how was it measured, and when did it start?\"        Denies, has checked it 3 times this morning  11. OTHER SYMPTOMS: \"Do you have any other symptoms?\" (e.g., shaking chills, weakness, rash elsewhere on body)        Has noticed more aching of ankle and arch of foot today, not relieved by ibuprofen.    Protocols used: POST-OP INCISION SYMPTOMS AND [id number]      "

## 2020-08-07 ENCOUNTER — VIRTUAL VISIT (OUTPATIENT)
Dept: ORTHOPEDICS | Facility: CLINIC | Age: 55
End: 2020-08-07
Payer: COMMERCIAL

## 2020-08-07 DIAGNOSIS — C49.9 SARCOMA (H): Primary | ICD-10-CM

## 2020-08-07 NOTE — PROGRESS NOTES
"Richa Ashby is a 55 year old female who is being evaluated via a billable video visit.      The patient has been notified of following:     \"This video visit will be conducted via a call between you and your physician/provider. We have found that certain health care needs can be provided without the need for an in-person physical exam.  This service lets us provide the care you need with a video conversation.  If a prescription is necessary we can send it directly to your pharmacy.  If lab work is needed we can place an order for that and you can then stop by our lab to have the test done at a later time.    Video visits are billed at different rates depending on your insurance coverage.  Please reach out to your insurance provider with any questions.    If during the course of the call the physician/provider feels a video visit is not appropriate, you will not be charged for this service.\"    Patient has given verbal consent for Video visit? Yes  How would you like to obtain your AVS? [employer]      Video Visit Technology for this patient: James Video Visit- Patient was left in waiting room        Will anyone else be joining your video visit? No        Video-Visit Details    Type of service:  Video Visit    Video Start Time: 3:08 PM  Video End Time: 3:16 PM    Originating Location (pt. 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  She recently picked the scab off the back of the thigh that she thought might be an emerging suture and noticed persistent drainage from this small hole for about 10 to 12 hours.  It has since sealed up.  She kept it covered for 2 days and no drainage out of it and now is not covering it any longer.    Today she was alert oriented in her usual mood and affect and in no acute distress.  " Respirations were regular and unlabored eyes are nonicteric.    I reviewed her old MRI but she has no new imaging.  Her old MRI showed a fluid collection in the subcutaneous tissue that was nonenhancing consistent with a postoperative seroma and a radiated sarcoma patient.    I think this drainage she had may have been from her deep collection.  Given that she has no erythema or soreness or persistent drainage then a would not treat her with antibiotics at this time.  If she does have intermittent drainage pain or erythema then we would consider antibiotics and potentially other treatments as well.  She will return to see us in about 2 months with repeat staging studies.  She will reach out to us immediately if she has further wound issues as noted above.  I have answered all of her questions today.    Guillermo Johnson MD

## 2020-08-07 NOTE — NURSING NOTE
Reason For Visit:   Chief Complaint   Patient presents with     RECHECK     right thigh posterior wound check       There were no vitals taken for this visit.    Pain Assessment  Patient Currently in Pain: Unable to assess    Audelia Carrera ATC

## 2020-08-07 NOTE — LETTER
"    8/7/2020         RE: Richa Ashby  [address]  [address]  [place] [address]        Dear Colleague,    Thank you for referring your patient, Richa Ashby, to the [facility]. Please see a copy of my visit note below.    Richa Ashby is a 55 year old female who is being evaluated via a billable video visit.      The patient has been notified of following:     \"This video visit will be conducted via a call between you and your physician/provider. We have found that certain health care needs can be provided without the need for an in-person physical exam.  This service lets us provide the care you need with a video conversation.  If a prescription is necessary we can send it directly to your pharmacy.  If lab work is needed we can place an order for that and you can then stop by our lab to have the test done at a later time.    Video visits are billed at different rates depending on your insurance coverage.  Please reach out to your insurance provider with any questions.    If during the course of the call the physician/provider feels a video visit is not appropriate, you will not be charged for this service.\"    Patient has given verbal consent for Video visit? Yes  How would you like to obtain your AVS? [employer]      Video Visit Technology for this patient: James Video Visit- Patient was left in waiting room        Will anyone else be joining your video visit? No        Video-Visit Details    Type of service:  Video Visit    Video Start Time: 3:08 PM  Video End Time: 3:16 PM    Originating Location (pt. Location): Home    Distant Location (provider location):  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  She recently picked the scab off the back of the thigh that she thought might be an emerging suture and noticed persistent drainage " from this small hole for about 10 to 12 hours.  It has since sealed up.  She kept it covered for 2 days and no drainage out of it and now is not covering it any longer.    Today she was alert oriented in her usual mood and affect and in no acute distress.  Respirations were regular and unlabored eyes are nonicteric.    I reviewed her old MRI but she has no new imaging.  Her old MRI showed a fluid collection in the subcutaneous tissue that was nonenhancing consistent with a postoperative seroma and a radiated sarcoma patient.    I think this drainage she had may have been from her deep collection.  Given that she has no erythema or soreness or persistent drainage then a would not treat her with antibiotics at this time.  If she does have intermittent drainage pain or erythema then we would consider antibiotics and potentially other treatments as well.  She will return to see us in about 2 months with repeat staging studies.  She will reach out to us immediately if she has further wound issues as noted above.  I have answered all of her questions today.    Guillermo Johnson MD

## 2020-08-17 DIAGNOSIS — C49.9 SARCOMA (H): Primary | ICD-10-CM

## 2020-08-24 ENCOUNTER — TELEPHONE (OUTPATIENT)
Dept: ORTHOPEDICS | Facility: CLINIC | Age: 55
End: 2020-08-24

## 2020-10-08 ENCOUNTER — TELEPHONE (OUTPATIENT)
Dept: ORTHOPEDICS | Facility: CLINIC | Age: 55
End: 2020-10-08

## 2020-10-08 NOTE — TELEPHONE ENCOUNTER
[facility] Call Center    Phone Message    May a detailed message be left on voicemail: yes     Reason for Call: Other: Patient would like a call back regarding a mass that was found through CT on her lung. Ok to leave msg if no answer.      Action Taken: Message routed to:  [facility] ortho    Travel Screening: Not Applicable

## 2020-10-08 NOTE — TELEPHONE ENCOUNTER
----- Message from Jose Alberto Lopez MD sent at 10/8/2020  3:31 PM CDT -----  An you get the images and reports to pacs    ----- Message -----  From: Imtiaz New MD  Sent: 10/8/2020   3:10 PM CDT  To: Guillermo Johnson MD, #    Nazareth, please see my note on this patient today.  Would you please contact this patient has she needs to see Dr. 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    Roya, would you also please import the patient's most recent lung CT into our PACS system.  Dr. King Diaz, her PCP, will know where the study was performed.    Thank you

## 2020-10-08 NOTE — LETTER
10/8/2020       RE: Richa Ashby  [address]  [address]  [place] [address]     Dear Colleague,    Thank you for referring your patient, Richa Ashby, to the [facility] [place] [facility]. Please see a copy of my visit note below.    Attending on-call note:    Received call from  Dr. King Diaz, patient's primary care physician, regarding this patient who had recent lung CT scan demonstrated multiple masses, likely metastases.  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 Johnson this past year.  I will notify him and make arrangements for her to be seen by Dr. Jose Alberto Lopez of our medical oncology section as well.     I will also ask Roya Gunn to obtain her chest CT for import into our PACS imaging system.    MD Nain Sandoval Family Professor  Oncology and Adult Reconstructive Surgery  Dept Orthopaedic Surgery, [facility]  [phone] office, [phone] pager  [email or web address]          Again, thank you for allowing me to participate in the care of your patient.      Sincerely,    Imtiaz New MD

## 2020-10-08 NOTE — TELEPHONE ENCOUNTER
Attending on-call note:    Received call from  Dr. King Diaz, patient's primary care physician, regarding this patient who had recent lung CT scan demonstrated multiple masses, likely metastases.  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 Johnson this past year.  I will notify him and make arrangements for her to be seen by Dr. Jose Alberto Lopez of our medical oncology section as well.     I will also ask Roya Gunn to obtain her chest CT for import into our PACS imaging system.    Imtiaz New MD  [facility] Family Professor  Oncology and Adult Reconstructive Surgery  Dept Orthopaedic Surgery, [facility]  [phone] office, [phone] pager  [email or web address]

## 2020-10-08 NOTE — TELEPHONE ENCOUNTER
RN left voice message with Imaging coordinator to obtain imaging CT Chest from [facility] [place] [facility]  CT Chest done on  10-07-20 and then cxr also

## 2020-10-09 NOTE — TELEPHONE ENCOUNTER
Pt had CXR and Chest CT recently per PCP. A mass was seen.  We do not have images or report.  Jerica is working on getting pt to be seen by Dr. Lopez next week.  I will add her on to tumor conference on Wed. For review of plan based on imaging findings.  We will call her after tumor conference with plan.  Pt is having some SOB with activity and is concerned.  Previous h/o right thigh UPS.

## 2020-10-15 ENCOUNTER — TELEPHONE (OUTPATIENT)
Dept: ORTHOPEDICS | Facility: CLINIC | Age: 55
End: 2020-10-15

## 2020-10-15 NOTE — TELEPHONE ENCOUNTER
----- Message from Jose Alberto Lopez MD sent at 10/15/2020  2:47 PM CDT -----  Regarding: can you arrange new pt VIDEO  visit w me 10-16 at 9 AM-thanks

## 2020-10-16 ENCOUNTER — VIRTUAL VISIT (OUTPATIENT)
Dept: ONCOLOGY | Facility: CLINIC | Age: 55
End: 2020-10-16
Attending: INTERNAL MEDICINE
Payer: COMMERCIAL

## 2020-10-16 DIAGNOSIS — C49.9 SARCOMA OF SOFT TISSUE (H): Primary | ICD-10-CM

## 2020-10-16 DIAGNOSIS — R91.8 PULMONARY NODULES: ICD-10-CM

## 2020-10-16 DIAGNOSIS — K21.9 GASTROESOPHAGEAL REFLUX DISEASE WITHOUT ESOPHAGITIS: ICD-10-CM

## 2020-10-16 DIAGNOSIS — E66.9 OBESITY WITHOUT SERIOUS COMORBIDITY, UNSPECIFIED CLASSIFICATION, UNSPECIFIED OBESITY TYPE: ICD-10-CM

## 2020-10-16 DIAGNOSIS — J45.40 MODERATE PERSISTENT ASTHMA, UNSPECIFIED WHETHER COMPLICATED: ICD-10-CM

## 2020-10-16 PROCEDURE — 96523 IRRIG DRUG DELIVERY DEVICE: CPT | Mod: GT

## 2020-10-16 PROCEDURE — 999N001193 HC VIDEO/TELEPHONE VISIT; NO CHARGE

## 2020-10-16 PROCEDURE — 99205 OFFICE O/P NEW HI 60 MIN: CPT | Mod: 95 | Performed by: INTERNAL MEDICINE

## 2020-10-16 NOTE — PROGRESS NOTES
"Richa Ashby is a 55 year old female who is being evaluated via a billable video visit.      The patient has been notified of following:     \"This video visit will be conducted via a call between you and your physician/provider. We have found that certain health care needs can be provided without the need for an in-person physical exam.  This service lets us provide the care you need with a video conversation.  If a prescription is necessary we can send it directly to your pharmacy.  If lab work is needed we can place an order for that and you can then stop by our lab to have the test done at a later time.    Video visits are billed at different rates depending on your insurance coverage.  Please reach out to your insurance provider with any questions.    If during the course of the call the physician/provider feels a video visit is not appropriate, you will not be charged for this service.\"    Patient has given verbal consent for Video visit? Yes  How would you like to obtain your AVS? MyChart  If you are dropped from the video visit, the video invite should be resent to: Text to cell phone: [phone]  Will anyone else be joining your video visit? No      Vitals - Patient Reported 10/16/2020   Height (Patient Reported) 5' 7\"   Weight (Patient Reported) 245 lb   BMI (Based on Pt Reported Ht/Wt) 38.37 kg/m2   Pulse (Patient Reported) 78     0/10 on pain scale.     No refills needed.     Yvonne Rivero, [facility]        Video-Visit Details    Type of service:  Video Visit    Video Start Time:858  Video End Time: 925    Originating Location (pt. Location):home    Distant Location (provider location):  [facility]     Platform used for Video Visit:[employer]            10-16-20    Due to covid we are doing a video visit    I saw Richa Ashby being referred for a sarcoma. In brief she noted a lump in the right leg in November 2019 that grew. This led to imaging and a biopsy showing a " high-grade UPS. She got preoperative radiotherapy and the tumor was resected 3-12-20. She was then followed but developed a cough and some tightness in the chest and right scapular sudden pain. She at first thought this was due to an asthma exacerbation but chest imaging revealed multiple lung nodules. That pain lasted about four to six hours and then became a much more moderate pain for five to seven days. She no longer has any pain there.  She tries to walk 20 to 30 minutes a day most days and stops due to fatigue. She does get NAZARIO going up one floor but that is not new. She does have some local pain in the right ankle when she's tired and this has been present since around the time of surgery. She does have a seroma but this is not bothering her. Her ROS is notable for GERD controlled w Prilosec and  the asthma for which she uses rescue inhalers one to two times a day along with her other medications. She's eating and sleeping well though she takes melatonin for sleep.  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 She is somewhat obese.     She has had an abnormal but stable mammogram in the past and is due for one soon.     Her 14 point ROS is otherwise negative.     Her past history is as noted in the chart and reviewed including T&A and sinus surgery and NC.  FH is NC though a paternal GM had breast cancer and mom had lung cancer but was a heavy smoker.  She is  and lives with her  and has two healthy adult children. She's a never smoker and doesn't abuse alcohol or other drugs. Her LMP was 2019.       On exam she appeared comfortable with normal affect.  HEENT full EOMs   MSK normal ROM of the neck and arms  NECK no obvious goiter neck mass  CHEST no resp distress  SKIN no rash on visible skin  NEURO normal mentation and speech  PSYCH good mood.   Further exam limited by covid restrictions      Pathology review:    Biopsy 12-4-19  Soft tissue, right thigh, biopsy:   - Undifferentiated  pleomorphic sarcoma, high grade     COMMENT:   Immunohistochemical stains were performed on block A1 with appropriate   controls. The tumor cells are negative   for SMA, keratin AE1/3, S100, CD34 and desmin, supporting the diagnosis.      3-12-10 resection:  Right thigh tumor, resection:   - Undifferentiated pleomorphic sarcoma, high grade   - Therapy effect: Present, 95% tumor necrosis   - Size: 8.6 cm in greatest dimension   - Negative margins    -  I reviewed her recent imaging and there are multiple new sizable lung nodules on the outside chest CT.    -    We discussed the nature of cancer in general and hers, in particular. Given the mammogram history we will get a lung biopsy do prove this is sarcoma which is the most likely diagnosis. We went over the scenario if this does turn out to be sarcoma including chemotherapy with doxil ifosfamide and went over the typical toxicities of that. Her  would do neuro monitoring. We also discussed the use of foundation 1 testing which we will get on the lung biopsy if possible. If that doesn't work we will get it down the original biopsy specimen not the radiation treated specimen. We will start pre approval for keytruda and we also went over that drug as well.  We did go over the serious nature of the problem with the very limited chance of cure and the limited overall survival on average. Nevertheless, some people do quite well and we pointed out we won't know how things go until we try that. She does not want to see palliaitive yet but will keep it in mind for the future. We addressed a number of questions including the approach to neutropenic fever and the use of a port and they will call if others arise. We will try to get most of this done next week and then start treatment.  She is set for a CT next week and this will give us a better idea of how quickly the tumor is growing.     We will get another chest CT just before cycle 2 to assess response.    Jose Alberto MCCOLLUM  MELANIE Lopez.  Professor  Hematology, Oncology and Transplantation

## 2020-10-16 NOTE — LETTER
"    10/16/2020         RE: Richa Ashby  [address]  [place] [address]        Dear Colleague,    Thank you for referring your patient, Richa Ashby, to the [facility]. Please see a copy of my visit note below.    Richa Ashby is a 55 year old female who is being evaluated via a billable video visit.      The patient has been notified of following:     \"This video visit will be conducted via a call between you and your physician/provider. We have found that certain health care needs can be provided without the need for an in-person physical exam.  This service lets us provide the care you need with a video conversation.  If a prescription is necessary we can send it directly to your pharmacy.  If lab work is needed we can place an order for that and you can then stop by our lab to have the test done at a later time.    Video visits are billed at different rates depending on your insurance coverage.  Please reach out to your insurance provider with any questions.    If during the course of the call the physician/provider feels a video visit is not appropriate, you will not be charged for this service.\"    Patient has given verbal consent for Video visit? Yes  How would you like to obtain your AVS? MyChart  If you are dropped from the video visit, the video invite should be resent to: Text to cell phone: [phone]  Will anyone else be joining your video visit? No      Vitals - Patient Reported 10/16/2020   Height (Patient Reported) 5' 7\"   Weight (Patient Reported) 245 lb   BMI (Based on Pt Reported Ht/Wt) 38.37 kg/m2   Pulse (Patient Reported) 78     0/10 on pain scale.     No refills needed.     Yvonne Rivero, LEYDA        Video-Visit Details    Type of service:  Video Visit    Video Start Time:858  Video End Time: 925    Originating Location (pt. Location):home    Distant Location (provider location):  [facility] "     Platform used for Video Visit:BlaBlaCar            10-16-20    Due to covid we are doing a video visit    I saw Richa Ashby being referred for a sarcoma. In brief she noted a lump in the right leg in November 2019 that grew. This led to imaging and a biopsy showing a high-grade UPS. She got preoperative radiotherapy and the tumor was resected 3-12-20. She was then followed but developed a cough and some tightness in the chest and right scapular sudden pain. She at first thought this was due to an asthma exacerbation but chest imaging revealed multiple lung nodules. That pain lasted about four to six hours and then became a much more moderate pain for five to seven days. She no longer has any pain there.  She tries to walk 20 to 30 minutes a day most days and stops due to fatigue. She does get NAZARIO going up one floor but that is not new. She does have some local pain in the right ankle when she's tired and this has been present since around the time of surgery. She does have a seroma but this is not bothering her. Her ROS is notable for GERD controlled w Prilosec and  the asthma for which she uses rescue inhalers one to two times a day along with her other medications. She's eating and sleeping well though she takes melatonin for sleep.  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 She is somewhat obese.     She has had an abnormal but stable mammogram in the past and is due for one soon.     Her 14 point ROS is otherwise negative.     Her past history is as noted in the chart and reviewed including T&A and sinus surgery and NC.  FH is NC though a paternal GM had breast cancer and mom had lung cancer but was a heavy smoker.  She is  and lives with her  and has two healthy adult children. She's a never smoker and doesn't abuse alcohol or other drugs. Her LMP was 2019.       On exam she appeared comfortable with normal affect.  HEENT full EOMs   MSK normal ROM of the neck and arms  NECK no  obvious goiter neck mass  CHEST no resp distress  SKIN no rash on visible skin  NEURO normal mentation and speech  PSYCH good mood.   Further exam limited by covid restrictions      Pathology review:    Biopsy 12-4-19  Soft tissue, right thigh, biopsy:   - Undifferentiated pleomorphic sarcoma, high grade     COMMENT:   Immunohistochemical stains were performed on block A1 with appropriate   controls. The tumor cells are negative   for SMA, keratin AE1/3, S100, CD34 and desmin, supporting the diagnosis.      3-12-10 resection:  Right thigh tumor, resection:   - Undifferentiated pleomorphic sarcoma, high grade   - Therapy effect: Present, 95% tumor necrosis   - Size: 8.6 cm in greatest dimension   - Negative margins    -  I reviewed her recent imaging and there are multiple new sizable lung nodules on the outside chest CT.    -    We discussed the nature of cancer in general and hers, in particular. Given the mammogram history we will get a lung biopsy do prove this is sarcoma which is the most likely diagnosis. We went over the scenario if this does turn out to be sarcoma including chemotherapy with doxil ifosfamide and went over the typical toxicities of that. Her  would do neuro monitoring. We also discussed the use of foundation 1 testing which we will get on the lung biopsy if possible. If that doesn't work we will get it down the original biopsy specimen not the radiation treated specimen. We will start pre approval for keytruda and we also went over that drug as well.  We did go over the serious nature of the problem with the very limited chance of cure and the limited overall survival on average. Nevertheless, some people do quite well and we pointed out we won't know how things go until we try that. She does not want to see palliaitive yet but will keep it in mind for the future. We addressed a number of questions including the approach to neutropenic fever and the use of a port and they will call if  others arise. We will try to get most of this done next week and then start treatment.  She is set for a CT next week and this will give us a better idea of how quickly the tumor is growing.     We will get another chest CT just before cycle 2 to assess response.    Jose Alberto Lopez M.D.  Professor  Hematology, Oncology and Transplantation

## 2020-10-19 ENCOUNTER — DOCUMENTATION ONLY (OUTPATIENT)
Dept: INTERVENTIONAL RADIOLOGY/VASCULAR | Facility: CLINIC | Age: 55
End: 2020-10-19

## 2020-10-19 DIAGNOSIS — C49.9 SARCOMA OF SOFT TISSUE (H): Primary | ICD-10-CM

## 2020-10-19 NOTE — PROGRESS NOTES
"Outpatient IR Biopsy Referral    This is a 55 year old female with history of possible sarcoma with new multiple lung lesions. IR has been asked to biopsy new lung nodules, radiologist choice.    Case and imaging was reviewed with Dr. Ackerman from IR and biopsy of the right upper lobe lung lesion is recommended, although may be radiologist choice.    In order for the biopsy to be scheduled the procedure order (CT lung biopsy right) and all diagnostic labs for the tissue/fluid sample need to be placed in Epic. Please use the IR order set \"IR RAD Biopsy or Fluid Aspirate Specimens\" to select your necessary diagnostic labs and pend for admission. If there are labs you desire that are not found in this order set or you have questions regarding specific diagnostic labs please call the associated lab personnel. IR will not enter diagnostic labs on the day of the procedure.     Page sent to primary team to enter orders. Clinic visit with IR provider will be coordinated to discuss biopsy procedure, risks and benefits.     Kenia Gonzales PA-C  Interventional Radiology   IR on-call pager: [phone]    "

## 2020-10-20 DIAGNOSIS — R91.8 PULMONARY NODULES: ICD-10-CM

## 2020-10-20 DIAGNOSIS — C49.9 SARCOMA OF SOFT TISSUE (H): Primary | ICD-10-CM

## 2020-10-20 DIAGNOSIS — Z11.59 ENCOUNTER FOR SCREENING FOR OTHER VIRAL DISEASES: Primary | ICD-10-CM

## 2020-10-20 ASSESSMENT — ENCOUNTER SYMPTOMS
DYSPNEA ON EXERTION: 1
SHORTNESS OF BREATH: 1
SPUTUM PRODUCTION: 0
WHEEZING: 1
SNORES LOUDLY: 1
COUGH: 1
POSTURAL DYSPNEA: 0
COUGH DISTURBING SLEEP: 0
HEMOPTYSIS: 0

## 2020-10-21 ENCOUNTER — OFFICE VISIT (OUTPATIENT)
Dept: ORTHOPEDICS | Facility: CLINIC | Age: 55
End: 2020-10-21
Payer: COMMERCIAL

## 2020-10-21 ENCOUNTER — TELEPHONE (OUTPATIENT)
Dept: INTERVENTIONAL RADIOLOGY/VASCULAR | Facility: CLINIC | Age: 55
End: 2020-10-21

## 2020-10-21 ENCOUNTER — ANCILLARY PROCEDURE (OUTPATIENT)
Dept: MRI IMAGING | Facility: CLINIC | Age: 55
End: 2020-10-21
Attending: ORTHOPAEDIC SURGERY
Payer: COMMERCIAL

## 2020-10-21 ENCOUNTER — ANCILLARY PROCEDURE (OUTPATIENT)
Dept: CT IMAGING | Facility: CLINIC | Age: 55
End: 2020-10-21
Attending: ORTHOPAEDIC SURGERY
Payer: COMMERCIAL

## 2020-10-21 DIAGNOSIS — C49.9 SARCOMA (H): ICD-10-CM

## 2020-10-21 DIAGNOSIS — C49.9 SARCOMA (H): Primary | ICD-10-CM

## 2020-10-21 DIAGNOSIS — C49.9 SARCOMA OF SOFT TISSUE (H): ICD-10-CM

## 2020-10-21 LAB
ALBUMIN SERPL-MCNC: 3.5 G/DL (ref 3.4–5)
ALP SERPL-CCNC: 91 U/L (ref 40–150)
ALT SERPL W P-5'-P-CCNC: 22 U/L (ref 0–50)
ANION GAP SERPL CALCULATED.3IONS-SCNC: 4 MMOL/L (ref 3–14)
AST SERPL W P-5'-P-CCNC: 12 U/L (ref 0–45)
BASOPHILS # BLD AUTO: 0 10E9/L (ref 0–0.2)
BASOPHILS NFR BLD AUTO: 0.6 %
BILIRUB SERPL-MCNC: 0.3 MG/DL (ref 0.2–1.3)
BUN SERPL-MCNC: 13 MG/DL (ref 7–30)
CALCIUM SERPL-MCNC: 9.1 MG/DL (ref 8.5–10.1)
CHLORIDE SERPL-SCNC: 105 MMOL/L (ref 94–109)
CO2 SERPL-SCNC: 29 MMOL/L (ref 20–32)
CREAT SERPL-MCNC: 0.81 MG/DL (ref 0.52–1.04)
DIFFERENTIAL METHOD BLD: NORMAL
EOSINOPHIL # BLD AUTO: 0.4 10E9/L (ref 0–0.7)
EOSINOPHIL NFR BLD AUTO: 5.1 %
ERYTHROCYTE [DISTWIDTH] IN BLOOD BY AUTOMATED COUNT: 12.7 % (ref 10–15)
GFR SERPL CREATININE-BSD FRML MDRD: 82 ML/MIN/{1.73_M2}
GLUCOSE SERPL-MCNC: 80 MG/DL (ref 70–99)
HCT VFR BLD AUTO: 39.7 % (ref 35–47)
HGB BLD-MCNC: 12.7 G/DL (ref 11.7–15.7)
IMM GRANULOCYTES # BLD: 0 10E9/L (ref 0–0.4)
IMM GRANULOCYTES NFR BLD: 0.3 %
LYMPHOCYTES # BLD AUTO: 1.6 10E9/L (ref 0.8–5.3)
LYMPHOCYTES NFR BLD AUTO: 23.1 %
MCH RBC QN AUTO: 27.9 PG (ref 26.5–33)
MCHC RBC AUTO-ENTMCNC: 32 G/DL (ref 31.5–36.5)
MCV RBC AUTO: 87 FL (ref 78–100)
MONOCYTES # BLD AUTO: 0.5 10E9/L (ref 0–1.3)
MONOCYTES NFR BLD AUTO: 6.6 %
NEUTROPHILS # BLD AUTO: 4.4 10E9/L (ref 1.6–8.3)
NEUTROPHILS NFR BLD AUTO: 64.3 %
NRBC # BLD AUTO: 0 10*3/UL
NRBC BLD AUTO-RTO: 0 /100
PLATELET # BLD AUTO: 323 10E9/L (ref 150–450)
POTASSIUM SERPL-SCNC: 4.3 MMOL/L (ref 3.4–5.3)
PROT SERPL-MCNC: 7.3 G/DL (ref 6.8–8.8)
RBC # BLD AUTO: 4.56 10E12/L (ref 3.8–5.2)
SODIUM SERPL-SCNC: 138 MMOL/L (ref 133–144)
WBC # BLD AUTO: 6.9 10E9/L (ref 4–11)

## 2020-10-21 PROCEDURE — 99213 OFFICE O/P EST LOW 20 MIN: CPT | Performed by: ORTHOPAEDIC SURGERY

## 2020-10-21 PROCEDURE — A9585 GADOBUTROL INJECTION: HCPCS | Performed by: RADIOLOGY

## 2020-10-21 PROCEDURE — 80053 COMPREHEN METABOLIC PANEL: CPT | Performed by: PATHOLOGY

## 2020-10-21 PROCEDURE — 36415 COLL VENOUS BLD VENIPUNCTURE: CPT | Performed by: PATHOLOGY

## 2020-10-21 PROCEDURE — 73720 MRI LWR EXTREMITY W/O&W/DYE: CPT | Mod: RT | Performed by: RADIOLOGY

## 2020-10-21 PROCEDURE — 71250 CT THORAX DX C-: CPT | Performed by: RADIOLOGY

## 2020-10-21 PROCEDURE — 85025 COMPLETE CBC W/AUTO DIFF WBC: CPT | Performed by: PATHOLOGY

## 2020-10-21 RX ORDER — GADOBUTROL 604.72 MG/ML
10 INJECTION INTRAVENOUS ONCE
Status: COMPLETED | OUTPATIENT
Start: 2020-10-21 | End: 2020-10-21

## 2020-10-21 RX ADMIN — GADOBUTROL 10 ML: 604.72 INJECTION INTRAVENOUS at 11:29

## 2020-10-21 NOTE — NURSING NOTE
Reason For Visit:   Chief Complaint   Patient presents with     RECHECK     followup right thigh // review MRI and CT       There were no vitals taken for this visit.    Pain Assessment  Patient Currently in Pain: Yes  0-10 Pain Scale: 1  Primary Pain Location: Knee(right posterior knee)    Audelia Carrera ATC

## 2020-10-21 NOTE — PROGRESS NOTES
This patient is now more than 7 months out from resection of a right posterior thigh soft tissue sarcoma.  On her last screening CT scan she was noted to have some lung masses.  She is getting set up for chemotherapy with our oncology group here.  She has no complaints about her leg although she does notice a little fullness just above the knee in the posterior aspect of the thigh.  She is able to ambulate comfortably and perform activities of daily living.    On examination she is alert oriented has a normal mood and affect and is in no acute distress.  Respirations are regular and unlabored eyes are nonicteric.  She does not appear to have any distal edema in the right lower extremity.  She is able to ambulate independently without a marked limp.    I reviewed her MRI and latest CT scan.  There is no sign of local recurrence but a large fluid collection around the hamstring muscles on the right thigh.  She does have numerous masses in the lung fields that are new since March 2020.    I will resume seeing her once she is through her chemotherapy.  I will follow-up with her medical oncologist to get updates on her condition and will scan her right thigh MRI for any symptoms there.

## 2020-10-21 NOTE — TELEPHONE ENCOUNTER
DIAGNOSIS: Lung biopsy   Refer: dave  Confirmed appt with pt 10.21 CR   DATE RECEIVED: 10.22.20   NOTES STATUS DETAILS   OFFICE NOTE from referring provider Internal 10.16.20  Dr. Jose Alberto Lopez  [facility]   OFFICE NOTE from other specialist Internal 8.7.20, 7.1.20, 3.20.20  Dr. Guillermo Johnson  [facility]   DISCHARGE SUMMARY from hospital N/A    DISCHARGE REPORT from the ER N/A    OPERATIVE REPORT N/A    MEDICATION LIST Internal    XRAYS (IMAGES & REPORTS) PACS 10.21.20, 3.4.20, 12.17.19  CT Chest   PATHOLOGY  Slides & report N/A

## 2020-10-21 NOTE — LETTER
10/21/2020         RE: Richa Ashby  [address]  [address]  [place] [address]        Dear Colleague,    Thank you for referring your patient, Richa Ashby, to the [facility] [place]. Please see a copy of my visit note below.    This patient is now more than 7 months out from resection of a right posterior thigh soft tissue sarcoma.  On her last screening CT scan she was noted to have some lung masses.  She is getting set up for chemotherapy with our oncology group here.  She has no complaints about her leg although she does notice a little fullness just above the knee in the posterior aspect of the thigh.  She is able to ambulate comfortably and perform activities of daily living.    On examination she is alert oriented has a normal mood and affect and is in no acute distress.  Respirations are regular and unlabored eyes are nonicteric.  She does not appear to have any distal edema in the right lower extremity.  She is able to ambulate independently without a marked limp.    I reviewed her MRI and latest CT scan.  There is no sign of local recurrence but a large fluid collection around the hamstring muscles on the right thigh.  She does have numerous masses in the lung fields that are new since March 2020.    I will resume seeing her once she is through her chemotherapy.  I will follow-up with her medical oncologist to get updates on her condition and will scan her right thigh MRI for any symptoms there.    Sincerely,        Guillermo Johnson MD

## 2020-10-22 ENCOUNTER — PRE VISIT (OUTPATIENT)
Dept: INTERVENTIONAL RADIOLOGY/VASCULAR | Facility: CLINIC | Age: 55
End: 2020-10-22

## 2020-10-26 DIAGNOSIS — Z11.59 ENCOUNTER FOR SCREENING FOR OTHER VIRAL DISEASES: ICD-10-CM

## 2020-10-26 PROCEDURE — U0003 INFECTIOUS AGENT DETECTION BY NUCLEIC ACID (DNA OR RNA); SEVERE ACUTE RESPIRATORY SYNDROME CORONAVIRUS 2 (SARS-COV-2) (CORONAVIRUS DISEASE [COVID-19]), AMPLIFIED PROBE TECHNIQUE, MAKING USE OF HIGH THROUGHPUT TECHNOLOGIES AS DESCRIBED BY CMS-2020-01-R: HCPCS | Performed by: RADIOLOGY

## 2020-10-27 ENCOUNTER — ANESTHESIA EVENT (OUTPATIENT)
Dept: SURGERY | Facility: AMBULATORY SURGERY CENTER | Age: 55
End: 2020-10-27

## 2020-10-27 ENCOUNTER — ANCILLARY PROCEDURE (OUTPATIENT)
Dept: CARDIOLOGY | Facility: CLINIC | Age: 55
End: 2020-10-27
Attending: INTERNAL MEDICINE
Payer: COMMERCIAL

## 2020-10-27 DIAGNOSIS — Z11.59 ENCOUNTER FOR SCREENING FOR OTHER VIRAL DISEASES: Primary | ICD-10-CM

## 2020-10-27 DIAGNOSIS — R91.8 PULMONARY NODULES: ICD-10-CM

## 2020-10-27 DIAGNOSIS — C49.9 SARCOMA OF SOFT TISSUE (H): ICD-10-CM

## 2020-10-27 DIAGNOSIS — C49.9 SARCOMA OF SOFT TISSUE (H): Primary | ICD-10-CM

## 2020-10-27 DIAGNOSIS — K21.9 GASTROESOPHAGEAL REFLUX DISEASE WITHOUT ESOPHAGITIS: ICD-10-CM

## 2020-10-27 DIAGNOSIS — E66.9 OBESITY WITHOUT SERIOUS COMORBIDITY, UNSPECIFIED CLASSIFICATION, UNSPECIFIED OBESITY TYPE: ICD-10-CM

## 2020-10-27 DIAGNOSIS — J45.40 MODERATE PERSISTENT ASTHMA, UNSPECIFIED WHETHER COMPLICATED: ICD-10-CM

## 2020-10-27 LAB
SARS-COV-2 RNA SPEC QL NAA+PROBE: NOT DETECTED
SPECIMEN SOURCE: NORMAL

## 2020-10-27 PROCEDURE — 93306 TTE W/DOPPLER COMPLETE: CPT | Performed by: STUDENT IN AN ORGANIZED HEALTH CARE EDUCATION/TRAINING PROGRAM

## 2020-10-27 NOTE — ANESTHESIA PREPROCEDURE EVALUATION
"Anesthesia Pre-Procedure Evaluation    Patient: Richa Ashby   MRN:     [id number] Gender:   female   Age:    55 year old :      1965        Preoperative Diagnosis: Soft tissue sarcoma (H) [C49.9]   Procedure(s):  single lumen power port placement @0745     LABS:  CBC:   Lab Results   Component Value Date    WBC 6.9 10/21/2020    HGB 12.7 10/21/2020    HCT 39.7 10/21/2020     10/21/2020     BMP:   Lab Results   Component Value Date     10/21/2020    POTASSIUM 4.3 10/21/2020    CHLORIDE 105 10/21/2020    CO2 29 10/21/2020    BUN 13 10/21/2020    CR 0.81 10/21/2020    GLC 80 10/21/2020     COAGS: No results found for: PTT, INR, FIBR  POC:   Lab Results   Component Value Date    HCG Negative 2020     OTHER:   Lab Results   Component Value Date    VIKTORIA 9.1 10/21/2020    ALBUMIN 3.5 10/21/2020    PROTTOTAL 7.3 10/21/2020    ALT 22 10/21/2020    AST 12 10/21/2020    ALKPHOS 91 10/21/2020    BILITOTAL 0.3 10/21/2020        Preop Vitals    BP Readings from Last 3 Encounters:   20 (P) 107/65   20 120/72   20 115/74    Pulse Readings from Last 3 Encounters:   20 59   20 72   20 79      Resp Readings from Last 3 Encounters:   20 (P) 16    SpO2 Readings from Last 3 Encounters:   20 98%      Temp Readings from Last 1 Encounters:   20 36.6  C (97.8  F)    Ht Readings from Last 1 Encounters:   20 1.702 m (5' 7\")      Wt Readings from Last 1 Encounters:   20 106.6 kg (235 lb)    Estimated body mass index is 36.81 kg/m  as calculated from the following:    Height as of 3/12/20: 1.702 m (5' 7\").    Weight as of 3/12/20: 106.6 kg (235 lb).     LDA:  Closed/Suction Drain Right;Posterior Leg Bulb 15 Turkmen round, channel drain (Active)   Number of days: 229        Past Medical History:   Diagnosis Date     Asthma, chronic      Sarcoma (H)     Right lower thigh      Past Surgical History:   Procedure Laterality Date     AS RAD RESEC " TONSIL/PILLARS       GALLBLADDER SURGERY       RESECT TUMOR LOWER EXTREMITY Right 3/12/2020    Procedure: Resection of right thigh tumor;  Surgeon: Guillermo Johnson MD;  Location: [facility] OR     ROTATOR CUFF REPAIR RT/LT  2012     SHOULDER SURGERY       SINUS SURGERY        Allergies   Allergen Reactions     Pentobarbital      resp arrest        Anesthesia Evaluation     . Pt has had prior anesthetic.     History of anesthetic complications    Cardiac arrest with pentothal, otherwise no recent issues      ROS/MED HX    ENT/Pulmonary:     (+)Mild Persistent asthma , . .    Neurologic:  - neg neurologic ROS     Cardiovascular:  - neg cardiovascular ROS       METS/Exercise Tolerance:  >4 METS   Hematologic:  - neg hematologic  ROS       Musculoskeletal:  - neg musculoskeletal ROS       GI/Hepatic:  - neg GI/hepatic ROS       Renal/Genitourinary:  - ROS Renal section negative       Endo:     (+) Obesity, .      Psychiatric:  - neg psychiatric ROS       Infectious Disease:  - neg infectious disease ROS       Malignancy:   (+) Malignancy History of Other  Other CA Leg sarcoma status post         Other:                         PHYSICAL EXAM:   Mental Status/Neuro: A/A/O   Airway: Facies: Feasible  Mallampati: II  Mouth/Opening: Full  TM distance: > 6 cm  Neck ROM: Full   Respiratory:   Resp. Rate: Normal     Resp. Effort: Normal      CV:    Comments:      Dental: Normal Dentition                Assessment:   ASA SCORE: 2    H&P: History and physical reviewed and following examination; no interval change.    NPO Status: NPO Appropriate     Plan:   Anes. Type:  MAC   Pre-Medication: None   Induction:  N/a   Airway: Native Airway   Access/Monitoring: PIV   Maintenance: N/a     Postop Plan:   Postop Pain: None  Postop Sedation/Airway: Not planned  Disposition: Outpatient     PONV Management: Adult Risk Factors: Female   Prevention: Ondansetron, Dexamethasone     CONSENT: Direct conversation   Plan and risks discussed with:  Patient                      Moises Gan MD     ANESTHESIA PREOP EVALUATION    PROCEDURE: Procedure(s):  single lumen power port placement @0745    HPI: Richa Ashby is a 55 year old female who presents for above procedure.    PAST MEDICAL HISTORY:    Past Medical History:   Diagnosis Date     Asthma, chronic      Sarcoma (H)     Right lower thigh       PAST SURGICAL HISTORY:    Past Surgical History:   Procedure Laterality Date     AS RAD RESEC TONSIL/PILLARS       GALLBLADDER SURGERY       RESECT TUMOR LOWER EXTREMITY Right 3/12/2020    Procedure: Resection of right thigh tumor;  Surgeon: Guillermo Johnson MD;  Location: [facility] OR     ROTATOR CUFF REPAIR RT/LT  2012     SHOULDER SURGERY       SINUS SURGERY         SOCIAL HISTORY:       Social History     Tobacco Use     Smoking status: Never Smoker     Smokeless tobacco: Never Used   Substance Use Topics     Alcohol use: Yes     Frequency: 2-3 times a week     Drinks per session: 1 or 2     Binge frequency: Never     Comment: 2-3 glasses of wine/week       ALLERGIES:     Allergies   Allergen Reactions     Pentobarbital      resp arrest       MEDICATIONS:     (Not in a hospital admission)      Current Outpatient Medications   Medication Sig Dispense Refill     ADVAIR DISKUS 500-50 MCG/DOSE inhaler TAKE 1 PUFF BY MOUTH TWICE A DAY       albuterol (PROAIR RESPICLICK) 108 (90 Base) MCG/ACT inhaler Inhale 1-2 puffs into the lungs       loratadine (CLARITIN) 10 MG tablet Take 10 mg by mouth       melatonin (MELATONIN) 1 MG/ML LIQD liquid Take 5 mg by mouth       montelukast (SINGULAIR) 10 MG tablet Take 10 mg by mouth daily       omeprazole (PRILOSEC) 20 MG DR capsule        vitamin D3 (CHOLECALCIFEROL) 2000 units (50 mcg) tablet Take 1 tablet by mouth daily         Current Outpatient Medications Ordered in Epic   Medication Sig Dispense Refill     ADVAIR DISKUS 500-50 MCG/DOSE inhaler TAKE 1 PUFF BY MOUTH TWICE A DAY       albuterol (PROAIR RESPICLICK) 108 (90  Base) MCG/ACT inhaler Inhale 1-2 puffs into the lungs       loratadine (CLARITIN) 10 MG tablet Take 10 mg by mouth       melatonin (MELATONIN) 1 MG/ML LIQD liquid Take 5 mg by mouth       montelukast (SINGULAIR) 10 MG tablet Take 10 mg by mouth daily       omeprazole (PRILOSEC) 20 MG DR capsule        vitamin D3 (CHOLECALCIFEROL) 2000 units (50 mcg) tablet Take 1 tablet by mouth daily       Current Facility-Administered Medications Ordered in Epic   Medication Dose Route Frequency Provider Last Rate Last Dose     sodium chloride 0.9 % bag TABLE SOLN  1 Bag TABLE SOLN Continuous PRN Garcia Saleh PA-C           PHYSICAL EXAM:    Vitals: T Data Unavailable, P Data Unavailable, BP Data Unavailable, R Data Unavailable, SpO2  , Weight   Wt Readings from Last 2 Encounters:   03/12/20 106.6 kg (235 lb)   03/04/20 106.6 kg (235 lb)       See doc flowsheet    NPO STATUS: see doc flowsheet    LABS:    BMP:  Recent Labs   Lab Test 10/21/20  1258      POTASSIUM 4.3   CHLORIDE 105   CO2 29   BUN 13   CR 0.81   GLC 80   VIKTORIA 9.1       LFTs:   Recent Labs   Lab Test 10/21/20  1258   PROTTOTAL 7.3   ALBUMIN 3.5   BILITOTAL 0.3   ALKPHOS 91   AST 12   ALT 22       CBC:   Recent Labs   Lab Test 10/21/20  1258   WBC 6.9   RBC 4.56   HGB 12.7   HCT 39.7   MCV 87   MCH 27.9   MCHC 32.0   RDW 12.7          Coags:  No results for input(s): INR, PTT, FIBR in the last 38519 hours.    Imaging:  No orders to display       Moises Gan MD  Anesthesiology Staff  Pager [phone]    10/27/2020  1:00 PM

## 2020-10-28 ENCOUNTER — ANCILLARY PROCEDURE (OUTPATIENT)
Dept: RADIOLOGY | Facility: AMBULATORY SURGERY CENTER | Age: 55
End: 2020-10-28
Attending: INTERNAL MEDICINE
Payer: COMMERCIAL

## 2020-10-28 ENCOUNTER — ANESTHESIA (OUTPATIENT)
Dept: SURGERY | Facility: AMBULATORY SURGERY CENTER | Age: 55
End: 2020-10-28

## 2020-10-28 ENCOUNTER — HOSPITAL ENCOUNTER (OUTPATIENT)
Facility: AMBULATORY SURGERY CENTER | Age: 55
Discharge: HOME | End: 2020-10-28
Attending: RADIOLOGY | Admitting: RADIOLOGY
Payer: COMMERCIAL

## 2020-10-28 VITALS
OXYGEN SATURATION: 98 % | RESPIRATION RATE: 16 BRPM | SYSTOLIC BLOOD PRESSURE: 101 MMHG | BODY MASS INDEX: 38.14 KG/M2 | HEIGHT: 67 IN | TEMPERATURE: 98 F | WEIGHT: 243 LBS | DIASTOLIC BLOOD PRESSURE: 62 MMHG | HEART RATE: 60 BPM

## 2020-10-28 DIAGNOSIS — C49.9 SARCOMA OF SOFT TISSUE (H): ICD-10-CM

## 2020-10-28 LAB — INR PPP: 0.9 (ref 0.86–1.14)

## 2020-10-28 PROCEDURE — 77001 FLUOROGUIDE FOR VEIN DEVICE: CPT | Mod: 26 | Performed by: RADIOLOGY

## 2020-10-28 PROCEDURE — 76937 US GUIDE VASCULAR ACCESS: CPT | Mod: 26 | Performed by: RADIOLOGY

## 2020-10-28 PROCEDURE — 36561 INSERT TUNNELED CV CATH: CPT | Performed by: RADIOLOGY

## 2020-10-28 PROCEDURE — 36561 INSERT TUNNELED CV CATH: CPT

## 2020-10-28 PROCEDURE — 999N000127 HC STATISTIC PERIPHERAL IV START W US GUIDANCE

## 2020-10-28 DEVICE — CATH PORT POWERPORT CLEARVUE SLIM 6FR 5616000
Type: IMPLANTABLE DEVICE | Site: CHEST  WALL | Status: NON-FUNCTIONAL
Removed: 2021-12-17

## 2020-10-28 RX ORDER — SODIUM CHLORIDE, SODIUM LACTATE, POTASSIUM CHLORIDE, CALCIUM CHLORIDE 600; 310; 30; 20 MG/100ML; MG/100ML; MG/100ML; MG/100ML
INJECTION, SOLUTION INTRAVENOUS CONTINUOUS
Status: DISCONTINUED | OUTPATIENT
Start: 2020-10-28 | End: 2020-10-29 | Stop reason: HOSPADM

## 2020-10-28 RX ORDER — KETOROLAC TROMETHAMINE 30 MG/ML
30 INJECTION, SOLUTION INTRAMUSCULAR; INTRAVENOUS EVERY 6 HOURS PRN
Status: DISCONTINUED | OUTPATIENT
Start: 2020-10-28 | End: 2020-10-29 | Stop reason: HOSPADM

## 2020-10-28 RX ORDER — HYDROMORPHONE HYDROCHLORIDE 1 MG/ML
.3-.5 INJECTION, SOLUTION INTRAMUSCULAR; INTRAVENOUS; SUBCUTANEOUS EVERY 10 MIN PRN
Status: DISCONTINUED | OUTPATIENT
Start: 2020-10-28 | End: 2020-10-29 | Stop reason: HOSPADM

## 2020-10-28 RX ORDER — CEFAZOLIN SODIUM 2 G/50ML
2 SOLUTION INTRAVENOUS
Status: COMPLETED | OUTPATIENT
Start: 2020-10-28 | End: 2020-10-28

## 2020-10-28 RX ORDER — ONDANSETRON 2 MG/ML
4 INJECTION INTRAMUSCULAR; INTRAVENOUS EVERY 30 MIN PRN
Status: DISCONTINUED | OUTPATIENT
Start: 2020-10-28 | End: 2020-10-29 | Stop reason: HOSPADM

## 2020-10-28 RX ORDER — LIDOCAINE HYDROCHLORIDE 20 MG/ML
INJECTION, SOLUTION INFILTRATION; PERINEURAL PRN
Status: DISCONTINUED | OUTPATIENT
Start: 2020-10-28 | End: 2020-10-28

## 2020-10-28 RX ORDER — LIDOCAINE 40 MG/G
CREAM TOPICAL
Status: DISCONTINUED | OUTPATIENT
Start: 2020-10-28 | End: 2020-10-29 | Stop reason: HOSPADM

## 2020-10-28 RX ORDER — HEPARIN SODIUM,PORCINE 10 UNIT/ML
5 VIAL (ML) INTRAVENOUS EVERY 24 HOURS
Status: CANCELLED | OUTPATIENT
Start: 2020-10-28

## 2020-10-28 RX ORDER — SODIUM CHLORIDE, SODIUM LACTATE, POTASSIUM CHLORIDE, CALCIUM CHLORIDE 600; 310; 30; 20 MG/100ML; MG/100ML; MG/100ML; MG/100ML
INJECTION, SOLUTION INTRAVENOUS CONTINUOUS PRN
Status: DISCONTINUED | OUTPATIENT
Start: 2020-10-28 | End: 2020-10-28

## 2020-10-28 RX ORDER — DEXTROSE MONOHYDRATE 25 G/50ML
25-50 INJECTION, SOLUTION INTRAVENOUS
Status: DISCONTINUED | OUTPATIENT
Start: 2020-10-28 | End: 2020-10-29 | Stop reason: HOSPADM

## 2020-10-28 RX ORDER — NICOTINE POLACRILEX 4 MG
15-30 LOZENGE BUCCAL
Status: DISCONTINUED | OUTPATIENT
Start: 2020-10-28 | End: 2020-10-29 | Stop reason: HOSPADM

## 2020-10-28 RX ORDER — ONDANSETRON 2 MG/ML
INJECTION INTRAMUSCULAR; INTRAVENOUS PRN
Status: DISCONTINUED | OUTPATIENT
Start: 2020-10-28 | End: 2020-10-28

## 2020-10-28 RX ORDER — OXYCODONE HYDROCHLORIDE 5 MG/1
5 TABLET ORAL EVERY 4 HOURS PRN
Status: DISCONTINUED | OUTPATIENT
Start: 2020-10-28 | End: 2020-10-29 | Stop reason: HOSPADM

## 2020-10-28 RX ORDER — ONDANSETRON 4 MG/1
4 TABLET, ORALLY DISINTEGRATING ORAL EVERY 30 MIN PRN
Status: DISCONTINUED | OUTPATIENT
Start: 2020-10-28 | End: 2020-10-29 | Stop reason: HOSPADM

## 2020-10-28 RX ORDER — HEPARIN SODIUM (PORCINE) LOCK FLUSH IV SOLN 100 UNIT/ML 100 UNIT/ML
5 SOLUTION INTRAVENOUS
Status: CANCELLED | OUTPATIENT
Start: 2020-10-28

## 2020-10-28 RX ORDER — PROPOFOL 10 MG/ML
INJECTION, EMULSION INTRAVENOUS PRN
Status: DISCONTINUED | OUTPATIENT
Start: 2020-10-28 | End: 2020-10-28

## 2020-10-28 RX ORDER — MEPERIDINE HYDROCHLORIDE 25 MG/ML
12.5 INJECTION INTRAMUSCULAR; INTRAVENOUS; SUBCUTANEOUS
Status: DISCONTINUED | OUTPATIENT
Start: 2020-10-28 | End: 2020-10-29 | Stop reason: HOSPADM

## 2020-10-28 RX ORDER — FENTANYL CITRATE 50 UG/ML
25-50 INJECTION, SOLUTION INTRAMUSCULAR; INTRAVENOUS
Status: DISCONTINUED | OUTPATIENT
Start: 2020-10-28 | End: 2020-10-29 | Stop reason: HOSPADM

## 2020-10-28 RX ORDER — PROPOFOL 10 MG/ML
INJECTION, EMULSION INTRAVENOUS CONTINUOUS PRN
Status: DISCONTINUED | OUTPATIENT
Start: 2020-10-28 | End: 2020-10-28

## 2020-10-28 RX ORDER — IBUPROFEN 200 MG
400 TABLET ORAL EVERY 4 HOURS PRN
COMMUNITY
End: 2021-01-27

## 2020-10-28 RX ORDER — NALOXONE HYDROCHLORIDE 0.4 MG/ML
.1-.4 INJECTION, SOLUTION INTRAMUSCULAR; INTRAVENOUS; SUBCUTANEOUS
Status: DISCONTINUED | OUTPATIENT
Start: 2020-10-28 | End: 2020-10-29 | Stop reason: HOSPADM

## 2020-10-28 RX ADMIN — PROPOFOL 125 MCG/KG/MIN: 10 INJECTION, EMULSION INTRAVENOUS at 08:25

## 2020-10-28 RX ADMIN — LIDOCAINE HYDROCHLORIDE 60 MG: 20 INJECTION, SOLUTION INFILTRATION; PERINEURAL at 08:25

## 2020-10-28 RX ADMIN — PROPOFOL 30 MG: 10 INJECTION, EMULSION INTRAVENOUS at 08:47

## 2020-10-28 RX ADMIN — ONDANSETRON 4 MG: 2 INJECTION INTRAMUSCULAR; INTRAVENOUS at 08:36

## 2020-10-28 RX ADMIN — CEFAZOLIN SODIUM 2 G: 2 SOLUTION INTRAVENOUS at 08:31

## 2020-10-28 RX ADMIN — SODIUM CHLORIDE, SODIUM LACTATE, POTASSIUM CHLORIDE, CALCIUM CHLORIDE: 600; 310; 30; 20 INJECTION, SOLUTION INTRAVENOUS at 08:16

## 2020-10-28 ASSESSMENT — MIFFLIN-ST. JEOR: SCORE: 1729.87

## 2020-10-28 NOTE — ANESTHESIA POSTPROCEDURE EVALUATION
Anesthesia POST Procedure Evaluation    Patient: Richa Ashby   MRN:     [id number] Gender:   female   Age:    55 year old :      1965        Preoperative Diagnosis: Soft tissue sarcoma (H) [C49.9]   Procedure(s):  single lumen power port placement @0745   Postop Comments: No value filed.     Anesthesia Type: MAC       Disposition: Outpatient   Postop Pain Control: Uneventful            Sign Out: Well controlled pain   PONV: No   Neuro/Psych: Uneventful            Sign Out: Acceptable/Baseline neuro status   Airway/Respiratory: Uneventful            Sign Out: Acceptable/Baseline resp. status   CV/Hemodynamics: Uneventful            Sign Out: Acceptable CV status   Other NRE: NONE   DID A NON-ROUTINE EVENT OCCUR? No         Last Anesthesia Record Vitals:  CRNA VITALS  10/28/2020 0838 - 10/28/2020 0938      10/28/2020             Resp Rate (observed):  (!) 1    Resp Rate (set):  10          Last PACU Vitals:  No vitals data found for the desired time range.        Electronically Signed By: Moises Gan MD, 2020, 2:48 PM

## 2020-10-28 NOTE — DISCHARGE INSTRUCTIONS
A collaboration between [facility] and [facility]  Experts in minimally invasive, targeted treatments performed using imaging guidance    Venous Access Device,  Port Catheter or Tunneled or Non-Tunneled Central Line Placement    Today you had a procedure today to install a venous access device; either a tunneled central vein catheter or a subcutaneous port catheter.    After you go home:  - Drink plenty of fluids.  Generally 6-8 (8 ounce) glasses a day is recommended.  - Resume your regular diet unless otherwise ordered by a medical provider.  - Keep any applied tape/gauze dressings clean and dry.  Change tape/gauze dressings if they get wet or soiled.  - You may shower the following day after procedure, however cover and protect from moisture any tape/gauze dressings.  You may let water hit and run over dried skin glue, but do not scrub.  Pat the area dry after showering.  - Port placement incisions are closed with absorbable suture, meaning they do not need to be removed at a later date, and a topical skin adhesive (skin glue).  This glue will wear off in 7-14 days.  Do not remove before this time.  If 14 days have passed and residual glue is present, you may gently remove it.  - Do not apply gels, lotions, or ointments to the glue site for the first 10 days as this may cause the glue to prematurely soften and fail.  - Do not perform strenuous activities or lift greater than 10 pounds for the next three days.  - If there is bleeding or oozing from the procedure site, apply firm pressure to the area for 5-10 minutes.  If the bleeding continues seek medical advice at the numbers below.  - Mild procedure site discomfort can be treated with an ice pack and over-the-counter pain relievers.        For 24 hours after any sedation used:  - Relax and take it easy.  No strenuous activities.  - Do not drive or operate machines at home or at work.  - No alcohol  consumption.  - Do not make any important or legal decisions.    Call our Interventional Radiology (IR) service if:  - If you start bleeding from the procedure site.  If you do start to bleed from the site, lie down and hold some pressure on the site.  Our radiology provider can help you decide if you need to return to the hospital.  - If you have new or worsening pain related to the procedure.  - If you have concerning swelling at the procedure site.  - If you develop persistent nausea or vomiting.  - If you develop hives or a rash or any unexplained itching.  - If you have a fever of greater than 100.5  F and chills in the first 5 days after procedure.  - Any other concerns related to your procedure.      [facility]  Interventional Radiology (IR)  [address] [place] [address]    Contact Number:  [phone]  (IR control desk)  - Monday - Friday 8:00 am - 4:30 pm    After hours for urgent concerns:  [phone]  - After 4:30 pm Monday - Friday, Weekends and Holidays.   - Ask for Interventional Radiology on-call.  Someone is available 24 hours a day.  - [facility] toll free number:  [phone]

## 2020-10-28 NOTE — ANESTHESIA CARE TRANSFER NOTE
Patient: Richa Ashby    Procedure(s):  single lumen power port placement @0745    Diagnosis: Soft tissue sarcoma (H) [C49.9]  Diagnosis Additional Information: No value filed.    Anesthesia Type:   MAC     Note:  Airway :Room Air  Patient transferred to:Phase II  Handoff Report: Identifed the Patient, Identified the Reponsible Provider, Reviewed the pertinent medical history, Discussed the surgical course, Reviewed Intra-OP anesthesia mangement and issues during anesthesia, Set expectations for post-procedure period and Allowed opportunity for questions and acknowledgement of understanding      Vitals: (Last set prior to Anesthesia Care Transfer)    CRNA VITALS  10/28/2020 0838 - 10/28/2020 0914      10/28/2020             Resp Rate (observed):  (!) 1    Resp Rate (set):  10                Electronically Signed By: NUPUR Argueta CRNA  October 28, 2020  9:14 AM

## 2020-10-28 NOTE — BRIEF OP NOTE
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  Complications: None.  Implants:   Implant Name Type Inv. Item Serial No.  Lot No. LRB No. Used Action   CATH PORT POWERPORT CLEARVUE SLIM 6FR [id number] Port CATH PORT POWERPORT CLEARVUE SLIM 6FR [id number]   [employer] [facility] [id number] Right 1 Implanted     Port placed via right internal jugular vein. Port heparin locked and ready for immediate use.

## 2020-10-29 DIAGNOSIS — Z11.59 ENCOUNTER FOR SCREENING FOR OTHER VIRAL DISEASES: ICD-10-CM

## 2020-10-29 PROCEDURE — U0003 INFECTIOUS AGENT DETECTION BY NUCLEIC ACID (DNA OR RNA); SEVERE ACUTE RESPIRATORY SYNDROME CORONAVIRUS 2 (SARS-COV-2) (CORONAVIRUS DISEASE [COVID-19]), AMPLIFIED PROBE TECHNIQUE, MAKING USE OF HIGH THROUGHPUT TECHNOLOGIES AS DESCRIBED BY CMS-2020-01-R: HCPCS | Performed by: PHYSICIAN ASSISTANT

## 2020-10-30 ENCOUNTER — TELEPHONE (OUTPATIENT)
Dept: INTERVENTIONAL RADIOLOGY/VASCULAR | Facility: CLINIC | Age: 55
End: 2020-10-30

## 2020-10-30 LAB
SARS-COV-2 RNA SPEC QL NAA+PROBE: NOT DETECTED
SPECIMEN SOURCE: NORMAL

## 2020-11-02 ENCOUNTER — APPOINTMENT (OUTPATIENT)
Dept: MEDSURG UNIT | Facility: CLINIC | Age: 55
End: 2020-11-02
Attending: INTERNAL MEDICINE
Payer: COMMERCIAL

## 2020-11-02 ENCOUNTER — HOSPITAL ENCOUNTER (OUTPATIENT)
Facility: CLINIC | Age: 55
Discharge: HOME | End: 2020-11-02
Attending: INTERNAL MEDICINE | Admitting: RADIOLOGY
Payer: COMMERCIAL

## 2020-11-02 ENCOUNTER — APPOINTMENT (OUTPATIENT)
Dept: GENERAL RADIOLOGY | Facility: CLINIC | Age: 55
End: 2020-11-02
Attending: INTERNAL MEDICINE
Payer: COMMERCIAL

## 2020-11-02 ENCOUNTER — APPOINTMENT (OUTPATIENT)
Dept: INTERVENTIONAL RADIOLOGY/VASCULAR | Facility: CLINIC | Age: 55
End: 2020-11-02
Attending: PHYSICIAN ASSISTANT
Payer: COMMERCIAL

## 2020-11-02 VITALS
BODY MASS INDEX: 38.14 KG/M2 | RESPIRATION RATE: 18 BRPM | OXYGEN SATURATION: 98 % | SYSTOLIC BLOOD PRESSURE: 113 MMHG | WEIGHT: 243 LBS | HEART RATE: 72 BPM | DIASTOLIC BLOOD PRESSURE: 56 MMHG | HEIGHT: 67 IN | TEMPERATURE: 98.1 F

## 2020-11-02 DIAGNOSIS — C49.9 SARCOMA (H): ICD-10-CM

## 2020-11-02 PROCEDURE — 88341 IMHCHEM/IMCYTCHM EA ADD ANTB: CPT | Mod: 26 | Performed by: PATHOLOGY

## 2020-11-02 PROCEDURE — 250N000011 HC RX IP 250 OP 636: Performed by: STUDENT IN AN ORGANIZED HEALTH CARE EDUCATION/TRAINING PROGRAM

## 2020-11-02 PROCEDURE — 88342 IMHCHEM/IMCYTCHM 1ST ANTB: CPT | Mod: TC | Performed by: INTERNAL MEDICINE

## 2020-11-02 PROCEDURE — 250N000013 HC RX MED GY IP 250 OP 250 PS 637: Performed by: STUDENT IN AN ORGANIZED HEALTH CARE EDUCATION/TRAINING PROGRAM

## 2020-11-02 PROCEDURE — 258N000003 HC RX IP 258 OP 636: Performed by: RADIOLOGY

## 2020-11-02 PROCEDURE — 999N000133 HC STATISTIC PP CARE STAGE 2

## 2020-11-02 PROCEDURE — 272N000506 HC NEEDLE CR6

## 2020-11-02 PROCEDURE — 88305 TISSUE EXAM BY PATHOLOGIST: CPT | Mod: 26 | Performed by: PATHOLOGY

## 2020-11-02 PROCEDURE — 88342 IMHCHEM/IMCYTCHM 1ST ANTB: CPT | Mod: 26 | Performed by: PATHOLOGY

## 2020-11-02 PROCEDURE — 88305 TISSUE EXAM BY PATHOLOGIST: CPT | Mod: TC | Performed by: INTERNAL MEDICINE

## 2020-11-02 PROCEDURE — 32405 CT LUNG MEDIASTINUM BIOPSY: CPT

## 2020-11-02 PROCEDURE — 71046 X-RAY EXAM CHEST 2 VIEWS: CPT | Mod: 26 | Performed by: RADIOLOGY

## 2020-11-02 PROCEDURE — 88341 IMHCHEM/IMCYTCHM EA ADD ANTB: CPT | Mod: TC | Performed by: INTERNAL MEDICINE

## 2020-11-02 PROCEDURE — 250N000009 HC RX 250: Performed by: STUDENT IN AN ORGANIZED HEALTH CARE EDUCATION/TRAINING PROGRAM

## 2020-11-02 PROCEDURE — 999N000065 XR CHEST 2 VW

## 2020-11-02 PROCEDURE — 99152 MOD SED SAME PHYS/QHP 5/>YRS: CPT

## 2020-11-02 PROCEDURE — 99152 MOD SED SAME PHYS/QHP 5/>YRS: CPT | Mod: GC | Performed by: RADIOLOGY

## 2020-11-02 PROCEDURE — 32405 CT LUNG MEDIASTINUM BIOPSY: CPT | Mod: RT | Performed by: RADIOLOGY

## 2020-11-02 PROCEDURE — 77012 CT SCAN FOR NEEDLE BIOPSY: CPT | Mod: 26 | Performed by: RADIOLOGY

## 2020-11-02 RX ORDER — SODIUM CHLORIDE 9 MG/ML
INJECTION, SOLUTION INTRAVENOUS CONTINUOUS
Status: DISCONTINUED | OUTPATIENT
Start: 2020-11-02 | End: 2020-11-02 | Stop reason: HOSPADM

## 2020-11-02 RX ORDER — ACETAMINOPHEN 325 MG/1
325-650 TABLET ORAL EVERY 6 HOURS PRN
COMMUNITY

## 2020-11-02 RX ORDER — NICOTINE POLACRILEX 4 MG
15-30 LOZENGE BUCCAL
Status: DISCONTINUED | OUTPATIENT
Start: 2020-11-02 | End: 2020-11-02 | Stop reason: HOSPADM

## 2020-11-02 RX ORDER — LIDOCAINE 40 MG/G
CREAM TOPICAL
Status: DISCONTINUED | OUTPATIENT
Start: 2020-11-02 | End: 2020-11-02 | Stop reason: HOSPADM

## 2020-11-02 RX ORDER — FLUMAZENIL 0.1 MG/ML
0.2 INJECTION, SOLUTION INTRAVENOUS
Status: DISCONTINUED | OUTPATIENT
Start: 2020-11-02 | End: 2020-11-02 | Stop reason: HOSPADM

## 2020-11-02 RX ORDER — NALOXONE HYDROCHLORIDE 0.4 MG/ML
.1-.4 INJECTION, SOLUTION INTRAMUSCULAR; INTRAVENOUS; SUBCUTANEOUS
Status: DISCONTINUED | OUTPATIENT
Start: 2020-11-02 | End: 2020-11-02 | Stop reason: HOSPADM

## 2020-11-02 RX ORDER — DEXTROSE MONOHYDRATE 25 G/50ML
25-50 INJECTION, SOLUTION INTRAVENOUS
Status: DISCONTINUED | OUTPATIENT
Start: 2020-11-02 | End: 2020-11-02 | Stop reason: HOSPADM

## 2020-11-02 RX ORDER — IBUPROFEN 400 MG/1
400 TABLET, FILM COATED ORAL ONCE
Status: COMPLETED | OUTPATIENT
Start: 2020-11-02 | End: 2020-11-02

## 2020-11-02 RX ORDER — FENTANYL CITRATE 50 UG/ML
25-50 INJECTION, SOLUTION INTRAMUSCULAR; INTRAVENOUS EVERY 5 MIN PRN
Status: DISCONTINUED | OUTPATIENT
Start: 2020-11-02 | End: 2020-11-02 | Stop reason: HOSPADM

## 2020-11-02 RX ADMIN — MIDAZOLAM 0.5 MG: 1 INJECTION INTRAMUSCULAR; INTRAVENOUS at 11:15

## 2020-11-02 RX ADMIN — SODIUM CHLORIDE: 9 INJECTION, SOLUTION INTRAVENOUS at 08:59

## 2020-11-02 RX ADMIN — FENTANYL CITRATE 25 MCG: 50 INJECTION, SOLUTION INTRAMUSCULAR; INTRAVENOUS at 11:15

## 2020-11-02 RX ADMIN — FENTANYL CITRATE 25 MCG: 50 INJECTION, SOLUTION INTRAMUSCULAR; INTRAVENOUS at 11:10

## 2020-11-02 RX ADMIN — LIDOCAINE HYDROCHLORIDE 7 ML: 10 INJECTION, SOLUTION EPIDURAL; INFILTRATION; INTRACAUDAL; PERINEURAL at 11:18

## 2020-11-02 RX ADMIN — IBUPROFEN 400 MG: 200 TABLET, FILM COATED ORAL at 13:40

## 2020-11-02 RX ADMIN — MIDAZOLAM 0.5 MG: 1 INJECTION INTRAMUSCULAR; INTRAVENOUS at 11:10

## 2020-11-02 RX ADMIN — MIDAZOLAM 1 MG: 1 INJECTION INTRAMUSCULAR; INTRAVENOUS at 11:03

## 2020-11-02 RX ADMIN — FENTANYL CITRATE 50 MCG: 50 INJECTION, SOLUTION INTRAMUSCULAR; INTRAVENOUS at 11:03

## 2020-11-02 ASSESSMENT — MIFFLIN-ST. JEOR: SCORE: 1729.99

## 2020-11-02 NOTE — PROGRESS NOTES
Patient returned to 2A post lung biopsy.  VSS.  Denies pain.  Right upper back site C/D/I, no hematoma.  PO food and fluids at bedside.  CXR in 1 hr.  Patient aware.

## 2020-11-02 NOTE — PROGRESS NOTES
Pt prepped for lung biopsy.Port was accessed and was difficult to puncture so will have IR take a look at site This procedure has been fully reviewed with the patient and written informed consent was signed with  at bedside.

## 2020-11-02 NOTE — PROCEDURES
[facility]     Procedure: IR Procedure Note    Date/Time: 11/2/2020 11:30 AM  Performed by: Génesis Cornejo MD  Authorized by: Génesis Cornejo MD     UNIVERSAL PROTOCOL   Site Marked: NA  Prior Images Obtained and Reviewed:  Yes  Required items: Required blood products, implants, devices and special equipment available    Patient identity confirmed:  Verbally with patient, arm band, provided demographic data and hospital-assigned identification number  Patient was reevaluated immediately before administering moderate or deep sedation or anesthesia  Confirmation Checklist:  Patient's identity using two indicators, relevant allergies, procedure was appropriate and matched the consent or emergent situation and correct equipment/implants were available  Time out: Immediately prior to the procedure a time out was called    Universal Protocol: the Joint Commission Universal Protocol was followed    Preparation: Patient was prepped and draped in usual sterile fashion           ANESTHESIA    Anesthesia: Local infiltration  Local Anesthetic:  Lidocaine 1% without epinephrine      SEDATION    Patient Sedated: Yes    Sedation Type:  Moderate (conscious) sedation  Sedation:  Fentanyl and midazolam  Vital signs: Vital signs monitored during sedation    See dictated procedure note for full details.  Findings: A 20 gauge biopsy needle was advanced into a right upper lobe nodule. 4 biopsies were taken and placed into formalin. No immediate complications identified.       Specimens: core needle biopsy specimens sent for pathological analysis    Complications: None    Condition: Stable    Plan: Bedrest for 1 hour  Chest xray in 1 hour   Patient can be discharged if appropriate criteria are met.     PROCEDURE   Patient Tolerance:  Patient tolerated the procedure well with no immediate complications    Length of time physician/provider present for 1:1 monitoring during sedation: 20

## 2020-11-02 NOTE — SEDATION DOCUMENTATION
[facility] Procedure Note        Sedation:      Performed by: Carlos Duke DO  Authorized by: Carlos Duke DO    Pre-Procedure Assessment done at 0830.    Expected Level:  Moderate Sedation    Indication:  Sedation is required to allow for Lung nodule biopsy    Consent obtained from patient after discussing the risks, benefits and alternatives.    PO Intake:  Appropriately NPO for procedure    ASA Class:  Class 1 - HEALTHY PATIENT    Mallampati:  Grade 2:  Soft palate, base of uvula, tonsillar pillars, and portion of posterior pharyngeal wall visible    Lungs: Lungs Clear with good breath sounds bilaterally.     Heart: Normal heart sounds and rate    History and physical reviewed and no updates needed. I have reviewed the lab findings, diagnostic data, medications, and the plan for sedation. I have determined this patient to be an appropriate candidate for the planned sedation and procedure and have reassessed the patient IMMEDIATELY PRIOR to sedation and procedure.

## 2020-11-02 NOTE — PROGRESS NOTES
Per Bing Morrison NP, pt was wearing appropriate PPE during possible exposure on 10/30 to a pt at assisted living with Covid + test, and therefore should continue be treated as Covid - after her test on 10/29.

## 2020-11-02 NOTE — IP AVS SNAPSHOT
MRN:[id number]                      After Visit Summary   11/2/2020    Richa Ashby    MRN: [id number]           Visit Information        Department      11/2/2020  7:47 AM [facility] Interventional Radiology          Review of your medicines      CONTINUE these medicines which have NOT CHANGED       Dose / Directions   acetaminophen 325 MG tablet  Commonly known as: TYLENOL      Dose: 325-650 mg  Take 325-650 mg by mouth every 6 hours as needed for mild pain  Refills: 0     Advair Diskus 500-50 MCG/DOSE inhaler  Generic drug: fluticasone-salmeterol      TAKE 1 PUFF BY MOUTH TWICE A DAY  Refills: 0     albuterol 108 (90 Base) MCG/ACT inhaler  Commonly known as: PROAIR RESPICLICK      Dose: 1-2 puff  Inhale 1-2 puffs into the lungs  Refills: 0     ibuprofen 200 MG tablet  Commonly known as: ADVIL/MOTRIN      Dose: 400 mg  Take 400 mg by mouth every 4 hours as needed for mild pain  Refills: 0     loratadine 10 MG tablet  Commonly known as: CLARITIN      Dose: 10 mg  Take 10 mg by mouth  Refills: 0     melatonin 1 MG/ML Liqd liquid      Dose: 5 mg  Take 5 mg by mouth  Refills: 0     montelukast 10 MG tablet  Commonly known as: SINGULAIR      Dose: 10 mg  Take 10 mg by mouth daily  Refills: 0     omeprazole 20 MG DR capsule  Commonly known as: priLOSEC      Refills: 0     vitamin D3 50 mcg (2000 units) tablet  Commonly known as: CHOLECALCIFEROL      Dose: 1 tablet  Take 1 tablet by mouth daily  Refills: 0              Protect others around you: Learn how to safely use, store and throw away your medicines at www.disposemymeds.org.       Follow-ups after your visit       Care Instructions       Further instructions from your care team       [facility]    Interventional Radiology  Patient Instructions Following Lung Biopsy    AFTER YOU GO HOME  ? If you were given sedation DO NOT drive or operate machinery at home or at work for at least 24 hours  ? DO relax and take  it easy for 48 hours, no strenuous activity for 24 hours  ? DO drink plenty of fluids  ? DO resume your regular diet, unless otherwise instructed by your Primary Physician  ? Keep the dressing dry and in place for 24 hours.  ? DO NOT SMOKE FOR AT LEAST 24 HOURS, if you have been given any medications that were to help you relax or sedate you during your procedure  ? DO NOT drink alcoholic beverages the day of your procedure  ? DO NOT do any strenuous exercise or lifting (> 10 lbs) for at least 7 days following your procedure  ? DO NOT take a bath or shower for at least 12 hours following your procedure  ? Remove dressing after shower the next day. Replace with Band aid for 2 days.  Never leave a wet dressing in place.  ? DO NOT make any important or legal decisions for 24 hours following your procedure  ? There should be minimum drainage from the biopsy site    CALL THE PHYSICIAN IF:  ? You start bleeding from the procedure site.  If you do start to bleed from that site, lie down flat and hold pressure on the site for a minimum of 10 minutes.  Your physician will tell you if you need to return to the hospital  ? You develop nausea or vomiting  ? You have excessive swelling, redness, or tenderness at the site  ? You have drainage that looks like it is infected.  ? You experience severe pain  ? You develop hives or a rash or unexplained itching  ? You develop shortness of breath  ? You develop a temperature of 101 degrees F or greater  ? You develop chest pain or cough up blood, lightheadedness or fainting        [facility] INTERVENTIONAL RADIOLOGY DEPARTMENT  Procedure Physician: Dr. Cornejo and Dr. Castillo                                    Date of procedure: November 2, 2020  Telephone Numbers: [phone] Monday-Friday 8:00 am to 4:30 pm  [phone] After 4:30 pm Monday-Friday, Weekends & Holidays.   Ask for the Interventional Radiologist on call.  Someone is on call 24 hrs/day  [facility] toll free  "number: [phone] Monday-Friday 8:00 am to 4:30 pm  [facility] Emergency Dept: [phone]          Additional Information About Your Visit       MyChart Information    [employer] gives you secure access to your electronic health record. If you see a primary care provider, you can also send messages to your care team and make appointments. If you have questions, please call your primary care clinic.  If you do not have a primary care provider, please call [phone] and they will assist you.       Care EveryWhere ID    This is your Care EveryWhere ID. This could be used by other organizations to access your [facility] medical records  [id number]       Your Vitals Were  Most recent update: 11/2/2020 12:35 PM    Blood Pressure   113/56          Pulse   62          Temperature   98.1  F (36.7  C) (Oral)          Respirations   16          Height   1.702 m (5' 7.01\")             Weight   110.2 kg (243 lb)    Last Period   03/19/2019    Pulse Oximetry   96%    BMI (Body Mass Index)   38.05 kg/m           Primary Care Provider Office Phone # Fax #    King A MD Emily [phone]      Equal Access to Services    SAM BRADFORD AH: Hadii doron brito hadasho Sogómezali, waaxda luqadaha, qaybta kaalmada adeegyada, shea yusuf. So [facility] [phone].    ATENCIÓN: Si habla español, tiene a brunson disposición servicios gratuitos de asistencia lingüística. Llame al [phone].    We comply with applicable federal and state civil rights laws, including the [place] Human Rights Act. We do not discriminate on the basis of race, color, creed, Spiritism, national origin, marital status, age, disability, sex, sexual orientation, or gender identity.       Thank you!    Thank you for choosing [facility] for your care. Our goal is always to provide you with excellent care. Hearing back from our patients is one way we can continue to improve our services. Please take a few minutes to complete the written " survey that you may receive in the mail after you visit with us. Thank you!            Medication List      Medications       Morning Afternoon Evening Bedtime As Needed   acetaminophen 325 MG tablet  Also known as: TYLENOL  INSTRUCTIONS: Take 325-650 mg by mouth every 6 hours as needed for mild pain                    Advair Diskus 500-50 MCG/DOSE inhaler  INSTRUCTIONS: TAKE 1 PUFF BY MOUTH TWICE A DAY  Generic drug: fluticasone-salmeterol                    albuterol 108 (90 Base) MCG/ACT inhaler  Also known as: PROAIR RESPICLICK  INSTRUCTIONS: Inhale 1-2 puffs into the lungs                    ibuprofen 200 MG tablet  Also known as: ADVIL/MOTRIN  INSTRUCTIONS: Take 400 mg by mouth every 4 hours as needed for mild pain                    loratadine 10 MG tablet  Also known as: CLARITIN  INSTRUCTIONS: Take 10 mg by mouth                    melatonin 1 MG/ML Liqd liquid  INSTRUCTIONS: Take 5 mg by mouth                    montelukast 10 MG tablet  Also known as: SINGULAIR  INSTRUCTIONS: Take 10 mg by mouth daily                    omeprazole 20 MG DR capsule  Also known as: priLOSEC                    vitamin D3 50 mcg (2000 units) tablet  Also known as: CHOLECALCIFEROL  INSTRUCTIONS: Take 1 tablet by mouth daily

## 2020-11-02 NOTE — IP AVS SNAPSHOT
[facility] Interventional Radiology  [address] [address]  Phone: [phone]                                    After Visit Summary   11/2/2020    Richa Ashby    MRN: [id number]           After Visit Summary Signature Page    I have received my discharge instructions, and my questions have been answered. I have discussed any challenges I see with this plan with the nurse or doctor.    ..........................................................................................................................................  Patient/Patient Representative Signature      ..........................................................................................................................................  Patient Representative Print Name and Relationship to Patient    ..................................................               ................................................  Date                                   Time    ..........................................................................................................................................  Reviewed by Signature/Title    ...................................................              ..............................................  Date                                               Time          22EPIC Rev 08/18

## 2020-11-02 NOTE — DISCHARGE INSTRUCTIONS
[facility]    Interventional Radiology  Patient Instructions Following Lung Biopsy    AFTER YOU GO HOME  ? If you were given sedation DO NOT drive or operate machinery at home or at work for at least 24 hours  ? DO relax and take it easy for 48 hours, no strenuous activity for 24 hours  ? DO drink plenty of fluids  ? DO resume your regular diet, unless otherwise instructed by your Primary Physician  ? Keep the dressing dry and in place for 24 hours.  ? DO NOT SMOKE FOR AT LEAST 24 HOURS, if you have been given any medications that were to help you relax or sedate you during your procedure  ? DO NOT drink alcoholic beverages the day of your procedure  ? DO NOT do any strenuous exercise or lifting (> 10 lbs) for at least 7 days following your procedure  ? DO NOT take a bath or shower for at least 12 hours following your procedure  ? Remove dressing after shower the next day. Replace with Band aid for 2 days.  Never leave a wet dressing in place.  ? DO NOT make any important or legal decisions for 24 hours following your procedure  ? There should be minimum drainage from the biopsy site    CALL THE PHYSICIAN IF:  ? You start bleeding from the procedure site.  If you do start to bleed from that site, lie down flat and hold pressure on the site for a minimum of 10 minutes.  Your physician will tell you if you need to return to the hospital  ? You develop nausea or vomiting  ? You have excessive swelling, redness, or tenderness at the site  ? You have drainage that looks like it is infected.  ? You experience severe pain  ? You develop hives or a rash or unexplained itching  ? You develop shortness of breath  ? You develop a temperature of 101 degrees F or greater  ? You develop chest pain or cough up blood, lightheadedness or fainting        [facility] INTERVENTIONAL RADIOLOGY DEPARTMENT  Procedure Physician: Dr. Cornejo and Dr. Castillo                                    Date of procedure: November 2,  2020  Telephone Numbers: [phone] Monday-Friday 8:00 am to 4:30 pm  [phone] After 4:30 pm Monday-Friday, Weekends & Holidays.   Ask for the Interventional Radiologist on call.  Someone is on call 24 hrs/day  [facility] toll free number: [phone] Monday-Friday 8:00 am to 4:30 pm  [facility] Emergency Dept: [phone]

## 2020-11-02 NOTE — IR NOTE
Patient Name: Richa Ashby  Medical Record Number: [id number]  Today's Date: 11/2/2020    Procedure: Image guided right upper lobe lung nodule biopsy  Proceduralist: Dr. Castillo    Procedure Start: 1102  Procedure end: 1121  Sedation medications administered: 100 mcg fentanyl and 2 mg versed    Report given to: Ira OLSON   : TEA    Other Notes: Pt arrived to IR room CT 2 from . Consent reviewed. Pt denies any questions or concerns regarding procedure. Pt positioned prone and monitored per protocol. Pt tolerated procedure without any noted complications. Pt transferred back to .    Chest xray 1 hour post procedure.

## 2020-11-02 NOTE — PROGRESS NOTES
Patient tolerated recovery stage well. VSS, right upper back ssite clean/dry/intact, no hematoma, and complains of  Pain.A heating pack and Ibuprofen was given to help relief pain Patient tolerated PO food and fluids. Teaching was done and discharge instructions were given. Patient ambulated, voided, and PIV was removed.Pt PORT was deaccessed and no heparin instilled because needle was not in place.Pt CXR was cleared by Attending. Patient discharged from the hospital via wheel chair to home with .

## 2020-11-04 ENCOUNTER — TELEPHONE (OUTPATIENT)
Dept: ONCOLOGY | Facility: CLINIC | Age: 55
End: 2020-11-04

## 2020-11-04 ENCOUNTER — HOSPITAL ENCOUNTER (EMERGENCY)
Facility: CLINIC | Age: 55
Discharge: HOME | End: 2020-11-04
Attending: EMERGENCY MEDICINE | Admitting: EMERGENCY MEDICINE
Payer: COMMERCIAL

## 2020-11-04 ENCOUNTER — APPOINTMENT (OUTPATIENT)
Dept: GENERAL RADIOLOGY | Facility: CLINIC | Age: 55
End: 2020-11-04
Attending: EMERGENCY MEDICINE
Payer: COMMERCIAL

## 2020-11-04 ENCOUNTER — APPOINTMENT (OUTPATIENT)
Dept: CT IMAGING | Facility: CLINIC | Age: 55
End: 2020-11-04
Attending: EMERGENCY MEDICINE
Payer: COMMERCIAL

## 2020-11-04 VITALS
OXYGEN SATURATION: 99 % | DIASTOLIC BLOOD PRESSURE: 78 MMHG | SYSTOLIC BLOOD PRESSURE: 141 MMHG | WEIGHT: 240 LBS | TEMPERATURE: 98 F | HEART RATE: 68 BPM | RESPIRATION RATE: 16 BRPM | BODY MASS INDEX: 37.67 KG/M2 | HEIGHT: 67 IN

## 2020-11-04 DIAGNOSIS — M54.9 BACK PAIN, UNSPECIFIED BACK LOCATION, UNSPECIFIED BACK PAIN LATERALITY, UNSPECIFIED CHRONICITY: ICD-10-CM

## 2020-11-04 LAB
ALBUMIN SERPL-MCNC: 3.4 G/DL (ref 3.4–5)
ALP SERPL-CCNC: 95 U/L (ref 40–150)
ALT SERPL W P-5'-P-CCNC: 28 U/L (ref 0–50)
ANION GAP SERPL CALCULATED.3IONS-SCNC: 6 MMOL/L (ref 3–14)
AST SERPL W P-5'-P-CCNC: 15 U/L (ref 0–45)
BASOPHILS # BLD AUTO: 0.1 10E9/L (ref 0–0.2)
BASOPHILS NFR BLD AUTO: 0.7 %
BILIRUB SERPL-MCNC: 0.3 MG/DL (ref 0.2–1.3)
BUN SERPL-MCNC: 16 MG/DL (ref 7–30)
CALCIUM SERPL-MCNC: 9.6 MG/DL (ref 8.5–10.1)
CHLORIDE SERPL-SCNC: 106 MMOL/L (ref 94–109)
CO2 SERPL-SCNC: 26 MMOL/L (ref 20–32)
COPATH REPORT: NORMAL
CREAT SERPL-MCNC: 0.74 MG/DL (ref 0.52–1.04)
DIFFERENTIAL METHOD BLD: NORMAL
EOSINOPHIL # BLD AUTO: 0.4 10E9/L (ref 0–0.7)
EOSINOPHIL NFR BLD AUTO: 5.7 %
ERYTHROCYTE [DISTWIDTH] IN BLOOD BY AUTOMATED COUNT: 13 % (ref 10–15)
GFR SERPL CREATININE-BSD FRML MDRD: >90 ML/MIN/{1.73_M2}
GLUCOSE SERPL-MCNC: 97 MG/DL (ref 70–99)
HCT VFR BLD AUTO: 39 % (ref 35–47)
HGB BLD-MCNC: 12.8 G/DL (ref 11.7–15.7)
IMM GRANULOCYTES # BLD: 0 10E9/L (ref 0–0.4)
IMM GRANULOCYTES NFR BLD: 0.3 %
LYMPHOCYTES # BLD AUTO: 2.2 10E9/L (ref 0.8–5.3)
LYMPHOCYTES NFR BLD AUTO: 29.5 %
MCH RBC QN AUTO: 27.9 PG (ref 26.5–33)
MCHC RBC AUTO-ENTMCNC: 32.8 G/DL (ref 31.5–36.5)
MCV RBC AUTO: 85 FL (ref 78–100)
MONOCYTES # BLD AUTO: 0.5 10E9/L (ref 0–1.3)
MONOCYTES NFR BLD AUTO: 7.1 %
NEUTROPHILS # BLD AUTO: 4.1 10E9/L (ref 1.6–8.3)
NEUTROPHILS NFR BLD AUTO: 56.7 %
NRBC # BLD AUTO: 0 10*3/UL
NRBC BLD AUTO-RTO: 0 /100
PLATELET # BLD AUTO: 294 10E9/L (ref 150–450)
POTASSIUM SERPL-SCNC: 3.8 MMOL/L (ref 3.4–5.3)
PROT SERPL-MCNC: 7.6 G/DL (ref 6.8–8.8)
RBC # BLD AUTO: 4.59 10E12/L (ref 3.8–5.2)
SODIUM SERPL-SCNC: 138 MMOL/L (ref 133–144)
TROPONIN I SERPL-MCNC: <0.015 UG/L (ref 0–0.04)
WBC # BLD AUTO: 7.3 10E9/L (ref 4–11)

## 2020-11-04 PROCEDURE — 93010 ELECTROCARDIOGRAM REPORT: CPT | Performed by: EMERGENCY MEDICINE

## 2020-11-04 PROCEDURE — 80053 COMPREHEN METABOLIC PANEL: CPT | Performed by: EMERGENCY MEDICINE

## 2020-11-04 PROCEDURE — 71275 CT ANGIOGRAPHY CHEST: CPT

## 2020-11-04 PROCEDURE — 99285 EMERGENCY DEPT VISIT HI MDM: CPT | Mod: 25 | Performed by: EMERGENCY MEDICINE

## 2020-11-04 PROCEDURE — 250N000011 HC RX IP 250 OP 636: Performed by: EMERGENCY MEDICINE

## 2020-11-04 PROCEDURE — 71046 X-RAY EXAM CHEST 2 VIEWS: CPT

## 2020-11-04 PROCEDURE — 71275 CT ANGIOGRAPHY CHEST: CPT | Mod: 26 | Performed by: RADIOLOGY

## 2020-11-04 PROCEDURE — 93005 ELECTROCARDIOGRAM TRACING: CPT | Performed by: EMERGENCY MEDICINE

## 2020-11-04 PROCEDURE — 85025 COMPLETE CBC W/AUTO DIFF WBC: CPT | Performed by: EMERGENCY MEDICINE

## 2020-11-04 PROCEDURE — 84484 ASSAY OF TROPONIN QUANT: CPT | Performed by: EMERGENCY MEDICINE

## 2020-11-04 PROCEDURE — 71046 X-RAY EXAM CHEST 2 VIEWS: CPT | Mod: 26 | Performed by: RADIOLOGY

## 2020-11-04 RX ORDER — IOPAMIDOL 755 MG/ML
73 INJECTION, SOLUTION INTRAVASCULAR ONCE
Status: COMPLETED | OUTPATIENT
Start: 2020-11-04 | End: 2020-11-04

## 2020-11-04 RX ADMIN — IOPAMIDOL 73 ML: 755 INJECTION, SOLUTION INTRAVENOUS at 22:45

## 2020-11-04 ASSESSMENT — ENCOUNTER SYMPTOMS
ABDOMINAL PAIN: 0
CHEST TIGHTNESS: 1
FEVER: 0
SHORTNESS OF BREATH: 1
COUGH: 1

## 2020-11-04 ASSESSMENT — MIFFLIN-ST. JEOR: SCORE: 1716.26

## 2020-11-04 NOTE — TELEPHONE ENCOUNTER
[facility] Telephone Triage Note    Assessment: Patient called in to triage reporting the following symptoms: Connie states that she had lung biopsy on Monday, November 2. Now having pain/tightness, like a gas bubble, felt behind R shoulder blade and into lung area. Pain has decreased [place] with rest but at its greatest level, she felt slightly dizzy and nauseous as well, both have resolved since pain level decreased.Onset of 30 minutes ago, states no difficulty or pain with inspiration/expiration.    Recommendations: Discussed with Dr Jose Alberto Lopez.  ER Visit recommended.  Pt plans on being seen at  [facility] ER, report called to Panchito.    Follow-Up: Instructed patient to seek care immediately for worsening symptoms, including: fever, chest pain, shortness of breath, dizziness. Patient voiced understanding of advice and/or instructions given.

## 2020-11-04 NOTE — ED TRIAGE NOTES
Today at 120 PM: 45 minute episode of 'gaseous pain' to right chest radiating to the back and right neck. Lung biopsy on Monday. History of lung sarcoma.

## 2020-11-04 NOTE — ED AVS SNAPSHOT
[facility] Emergency Department  [address] [address]  Phone: [phone]                                    Richa Ashby   MRN: [id number]    Department: [facility] Emergency Department   Date of Visit: 11/4/2020           After Visit Summary Signature Page    I have received my discharge instructions, and my questions have been answered. I have discussed any challenges I see with this plan with the nurse or doctor.    ..........................................................................................................................................  Patient/Patient Representative Signature      ..........................................................................................................................................  Patient Representative Print Name and Relationship to Patient    ..................................................               ................................................  Date                                   Time    ..........................................................................................................................................  Reviewed by Signature/Title    ...................................................              ..............................................  Date                                               Time          22EPIC Rev 08/18

## 2020-11-05 LAB — INTERPRETATION ECG - MUSE: NORMAL

## 2020-11-05 NOTE — ED PROVIDER NOTES
"    [facility] EMERGENCY DEPARTMENT ([facility])  November 4, 2020  History     Chief Complaint   Patient presents with     Chest Pain     Today at 120 PM: 45 minute episode of 'gaseous pain' to right chest radiating to the back and right neck. Lung biopsy on Monday.     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  Patient reports a 45 minute episode of gaseous pain of her right chest earlier today.  She states the pain radiates to her back and right neck.  Patient notes she has a lung biopsy scheduled for 11/9.      Patient states that she had a biopsy of her lung through her back on Wednesday (10/28) and had felt more fatigued since. Today she went back to work. States she had a brief episode of increased physical exertion during the day and then suddenly had a painful \"gas bubble\" sensation just under her right shoulder blade. States that she felt like she couldn't catch her breath and started panicking. This lasted approximately 45 minutes to an hour. Called her Oncology clinic who told her to come to the ED for evaluation.    Now states that most of the pain is resolved but still has some lingering pain under the right shoulder and occasionally will have brief intense pain there. She states that she has had ongoing chest tightness, cough and shortness of breath since she presented with these symptoms and was found to have metastatic nodules in her chest back on October 2nd. These symptoms are not worse than this baseline currently. States that she had a negative COVID-19 test 5 days ago while she was having these symptoms. States that so far she has been alternating ibuprofen and tylenol. Last took 400 mg of ibuprofen early this afternoon.    States that the primary tumor was thought to be a right lower thigh sarcoma which was removed surgically in march.  Is " still waiting to hear from her oncologist about the biopsy results but they have already placed a PICC line in preparation for IV chemotherapy.    PAST MEDICAL HISTORY:   Past Medical History:   Diagnosis Date     Asthma, chronic      Sarcoma (H)     Right lower thigh       PAST SURGICAL HISTORY:   Past Surgical History:   Procedure Laterality Date     AS RAD RESEC TONSIL/PILLARS       GALLBLADDER SURGERY       INSERT PORT VASCULAR ACCESS Right 10/28/2020    Procedure: single lumen power port placement @0745;  Surgeon: Tex Ackerman MD;  Location: [facility] OR     IR CHEST PORT PLACEMENT > 5 YRS OF AGE  10/28/2020     RESECT TUMOR LOWER EXTREMITY Right 3/12/2020    Procedure: Resection of right thigh tumor;  Surgeon: Guillermo Johnson MD;  Location: [facility] OR     ROTATOR CUFF REPAIR RT/LT  2012     SHOULDER SURGERY       SINUS SURGERY         Past medical history, past surgical history, medications, and allergies were reviewed with the patient. Additional pertinent items: None    FAMILY HISTORY:   Family History   Problem Relation Age of Onset     Lung Cancer Maternal Grandmother      Breast Cancer Paternal Grandmother        SOCIAL HISTORY:   Social History     Tobacco Use     Smoking status: Never Smoker     Smokeless tobacco: Never Used   Substance Use Topics     Alcohol use: Yes     Frequency: 2-3 times a week     Drinks per session: 1 or 2     Binge frequency: Never     Comment: 2-3 glasses of wine/week     Social history was reviewed with the patient. Additional pertinent items: None      Patient's Medications   New Prescriptions    No medications on file   Previous Medications    ACETAMINOPHEN (TYLENOL) 325 MG TABLET    Take 325-650 mg by mouth every 6 hours as needed for mild pain    ADVAIR DISKUS 500-50 MCG/DOSE INHALER    TAKE 1 PUFF BY MOUTH TWICE A DAY    ALBUTEROL (PROAIR RESPICLICK) 108 (90 BASE) MCG/ACT INHALER    Inhale 1-2 puffs into the lungs    IBUPROFEN (ADVIL/MOTRIN) 200 MG TABLET    Take  "400 mg by mouth every 4 hours as needed for mild pain    LORATADINE (CLARITIN) 10 MG TABLET    Take 10 mg by mouth    MELATONIN (MELATONIN) 1 MG/ML LIQD LIQUID    Take 5 mg by mouth    MONTELUKAST (SINGULAIR) 10 MG TABLET    Take 10 mg by mouth daily    OMEPRAZOLE (PRILOSEC) 20 MG DR CAPSULE        VITAMIN D3 (CHOLECALCIFEROL) 2000 UNITS (50 MCG) TABLET    Take 1 tablet by mouth daily   Modified Medications    No medications on file   Discontinued Medications    No medications on file          Allergies   Allergen Reactions     Pentobarbital      resp arrest        Review of Systems   Constitutional: Negative for fever.   Respiratory: Positive for cough, chest tightness and shortness of breath.    Cardiovascular: Positive for chest pain.   Gastrointestinal: Negative for abdominal pain.   All other systems reviewed and are negative.    A complete review of systems was performed with pertinent positives and negatives noted in the HPI, and all other systems negative.    Physical Exam   BP: 139/89  Pulse: 76  Temp: 98  F (36.7  C)  Resp: 16  Height: 170.2 cm (5' 7\")  Weight: 108.9 kg (240 lb)  SpO2: 97 %      Physical Exam  Constitutional:       General: She is not in acute distress.     Appearance: She is not diaphoretic.   HENT:      Head: Normocephalic.      Mouth/Throat:      Pharynx: No oropharyngeal exudate.   Eyes:      Extraocular Movements: Extraocular movements intact.   Neck:      Musculoskeletal: Neck supple.   Cardiovascular:      Rate and Rhythm: Normal rate and regular rhythm.      Heart sounds: Normal heart sounds.   Pulmonary:      Effort: No respiratory distress.      Breath sounds: Normal breath sounds.   Abdominal:      General: There is no distension.      Palpations: Abdomen is soft.      Tenderness: There is no abdominal tenderness.   Musculoskeletal:         General: No deformity.   Skin:     General: Skin is warm and dry.   Neurological:      Mental Status: She is alert.      Comments: alert "   Psychiatric:         Behavior: Behavior normal.         ED Course   9:24 PM  The patient was seen and examined by Ancelmo Keene DO in Room ED08.       Procedures             EKG Interpretation:      Interpreted by Ancelmo Keene DO  Time reviewed: 1650  Symptoms at time of EKG: Right back pain  Rhythm: normal sinus   Rate: normal 68  Axis: normal  Ectopy: none  Conduction: normal  ST Segments/ T Waves: No ST-T wave changes  Q Waves: none  Comparison to prior: Unchanged    Clinical Impression: normal EKG        Results for orders placed or performed during the hospital encounter of 11/04/20 (from the past 24 hour(s))   CBC with platelets differential   Result Value Ref Range    WBC 7.3 4.0 - 11.0 10e9/L    RBC Count 4.59 3.8 - 5.2 10e12/L    Hemoglobin 12.8 11.7 - 15.7 g/dL    Hematocrit 39.0 35.0 - 47.0 %    MCV 85 78 - 100 fl    MCH 27.9 26.5 - 33.0 pg    MCHC 32.8 31.5 - 36.5 g/dL    RDW 13.0 10.0 - 15.0 %    Platelet Count 294 150 - 450 10e9/L    Diff Method Automated Method     % Neutrophils 56.7 %    % Lymphocytes 29.5 %    % Monocytes 7.1 %    % Eosinophils 5.7 %    % Basophils 0.7 %    % Immature Granulocytes 0.3 %    Nucleated RBCs 0 0 /100    Absolute Neutrophil 4.1 1.6 - 8.3 10e9/L    Absolute Lymphocytes 2.2 0.8 - 5.3 10e9/L    Absolute Monocytes 0.5 0.0 - 1.3 10e9/L    Absolute Eosinophils 0.4 0.0 - 0.7 10e9/L    Absolute Basophils 0.1 0.0 - 0.2 10e9/L    Abs Immature Granulocytes 0.0 0 - 0.4 10e9/L    Absolute Nucleated RBC 0.0    Comprehensive metabolic panel   Result Value Ref Range    Sodium 138 133 - 144 mmol/L    Potassium 3.8 3.4 - 5.3 mmol/L    Chloride 106 94 - 109 mmol/L    Carbon Dioxide 26 20 - 32 mmol/L    Anion Gap 6 3 - 14 mmol/L    Glucose 97 70 - 99 mg/dL    Urea Nitrogen 16 7 - 30 mg/dL    Creatinine 0.74 0.52 - 1.04 mg/dL    GFR Estimate >90 >60 mL/min/[1.73_m2]    GFR Estimate If Black >90 >60 mL/min/[1.73_m2]    Calcium 9.6 8.5 - 10.1 mg/dL    Bilirubin Total 0.3 0.2 -  1.3 mg/dL    Albumin 3.4 3.4 - 5.0 g/dL    Protein Total 7.6 6.8 - 8.8 g/dL    Alkaline Phosphatase 95 40 - 150 U/L    ALT 28 0 - 50 U/L    AST 15 0 - 45 U/L   Troponin I   Result Value Ref Range    Troponin I ES <0.015 0.000 - 0.045 ug/L   EKG 12-lead, tracing only   Result Value Ref Range    Interpretation ECG Click View Image link to view waveform and result    Chest XR,  PA & LAT    Narrative    XR CHEST 2 VW  11/4/2020 5:27 PM      HISTORY: Chest Pain    COMPARISON: Chest radiograph 11/2/2020, chest CT 11/2/2020    FINDINGS: PA and lateral views of the chest. Right IJ Port-A-Cath tip  terminates in the mid SVC. Cardiac silhouette is within normal limits.  Multiple pulmonary masses/nodules noted bilaterally. No acute focal  airspace opacities. No pleural effusion or pneumothorax.  Cholecystectomy clips.      Impression    IMPRESSION:   1. No acute cardiopulmonary abnormality.  2. Redemonstration of multiple pulmonary masses/nodules bilaterally.    I have personally reviewed the examination and initial interpretation  and I agree with the findings.    SHANNON AGUILLON MD   CT Chest Pulmonary Embolism w Contrast    Narrative    EXAM: CT CHEST PULMONARY EMBOLISM W CONTRAST  LOCATION: [facility]  DATE/TIME: 11/4/2020 10:44 PM    INDICATION: Chest pain and shortness of breath. 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 Recent lung biopsy.    COMPARISON: CT from the chest biopsy on 11/2/2020.    TECHNIQUE: CT chest pulmonary angiogram during arterial phase injection of IV contrast. Multiplanar reformats and MIP reconstructions were performed. Dose reduction techniques were used.     FINDINGS:  ANGIOGRAM CHEST: Pulmonary arteries are normal caliber and negative for pulmonary emboli. Thoracic aorta is negative for dissection. No CT evidence of right heart strain.    LUNGS AND PLEURA: No acute infiltrates or consolidation. Numerous bilateral metastatic pulmonary nodules compatible with the provided  history of metastatic sarcoma to the lungs. The largest lesion is in the right upper chest medially abutting the pleura   measuring 4.3 x 2.7 cm on series 6 image 62. This corresponds with the lesion biopsied on the recent CT scan. No pneumothorax.    MEDIASTINUM/AXILLAE: No hilar or mediastinal adenopathy. Normal heart size. No pericardial effusion. Esophagus is grossly negative. Right-sided Port-A-Cath in place. Axillary regions are unremarkable.    UPPER ABDOMEN: Normal.    MUSCULOSKELETAL: Normal.      Impression    IMPRESSION:  1.  Negative for pulmonary embolism. No acute findings in the chest.    2.  No evidence for pneumonitis.    3.  Numerous bilateral metastatic pulmonary nodules compatible with the provided history of metastatic sarcoma to the lungs. The largest lesion in the right upper chest medially was recently biopsied. There is no evidence for pneumothorax or complication   related to the biopsy. No recent comparison diagnostic CT scans are available to assess for rated change of the pulmonary nodules. Correlation with prior studies would be helpful.     Medications   iopamidol (ISOVUE-370) solution 73 mL (73 mLs Intravenous Given 11/4/20 2242)   sodium chloride (PF) 0.9% PF flush 100 mL (100 mLs Intravenous Given 11/4/20 2243)           Results for orders placed or performed during the hospital encounter of 11/04/20   Chest XR,  PA & LAT     Status: None    Narrative    XR CHEST 2 VW  11/4/2020 5:27 PM      HISTORY: Chest Pain    COMPARISON: Chest radiograph 11/2/2020, chest CT 11/2/2020    FINDINGS: PA and lateral views of the chest. Right IJ Port-A-Cath tip  terminates in the mid SVC. Cardiac silhouette is within normal limits.  Multiple pulmonary masses/nodules noted bilaterally. No acute focal  airspace opacities. No pleural effusion or pneumothorax.  Cholecystectomy clips.      Impression    IMPRESSION:   1. No acute cardiopulmonary abnormality.  2. Redemonstration of multiple pulmonary  masses/nodules bilaterally.    I have personally reviewed the examination and initial interpretation  and I agree with the findings.    SHANNON AGUILLON MD   CT Chest Pulmonary Embolism w Contrast     Status: None (Preliminary result)    Narrative    EXAM: CT CHEST PULMONARY EMBOLISM W CONTRAST  LOCATION: [facility]  DATE/TIME: 11/4/2020 10:44 PM    INDICATION: Chest pain and shortness of breath. 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 Recent lung biopsy.    COMPARISON: CT from the chest biopsy on 11/2/2020.    TECHNIQUE: CT chest pulmonary angiogram during arterial phase injection of IV contrast. Multiplanar reformats and MIP reconstructions were performed. Dose reduction techniques were used.     FINDINGS:  ANGIOGRAM CHEST: Pulmonary arteries are normal caliber and negative for pulmonary emboli. Thoracic aorta is negative for dissection. No CT evidence of right heart strain.    LUNGS AND PLEURA: No acute infiltrates or consolidation. Numerous bilateral metastatic pulmonary nodules compatible with the provided history of metastatic sarcoma to the lungs. The largest lesion is in the right upper chest medially abutting the pleura   measuring 4.3 x 2.7 cm on series 6 image 62. This corresponds with the lesion biopsied on the recent CT scan. No pneumothorax.    MEDIASTINUM/AXILLAE: No hilar or mediastinal adenopathy. Normal heart size. No pericardial effusion. Esophagus is grossly negative. Right-sided Port-A-Cath in place. Axillary regions are unremarkable.    UPPER ABDOMEN: Normal.    MUSCULOSKELETAL: Normal.      Impression    IMPRESSION:  1.  Negative for pulmonary embolism. No acute findings in the chest.    2.  No evidence for pneumonitis.    3.  Numerous bilateral metastatic pulmonary nodules compatible with the provided history of metastatic sarcoma to the lungs. The largest lesion in the right upper chest medially was recently biopsied. There is no evidence for pneumothorax or  complication   related to the biopsy. No recent comparison diagnostic CT scans are available to assess for rated change of the pulmonary nodules. Correlation with prior studies would be helpful.   CBC with platelets differential     Status: None   Result Value Ref Range    WBC 7.3 4.0 - 11.0 10e9/L    RBC Count 4.59 3.8 - 5.2 10e12/L    Hemoglobin 12.8 11.7 - 15.7 g/dL    Hematocrit 39.0 35.0 - 47.0 %    MCV 85 78 - 100 fl    MCH 27.9 26.5 - 33.0 pg    MCHC 32.8 31.5 - 36.5 g/dL    RDW 13.0 10.0 - 15.0 %    Platelet Count 294 150 - 450 10e9/L    Diff Method Automated Method     % Neutrophils 56.7 %    % Lymphocytes 29.5 %    % Monocytes 7.1 %    % Eosinophils 5.7 %    % Basophils 0.7 %    % Immature Granulocytes 0.3 %    Nucleated RBCs 0 0 /100    Absolute Neutrophil 4.1 1.6 - 8.3 10e9/L    Absolute Lymphocytes 2.2 0.8 - 5.3 10e9/L    Absolute Monocytes 0.5 0.0 - 1.3 10e9/L    Absolute Eosinophils 0.4 0.0 - 0.7 10e9/L    Absolute Basophils 0.1 0.0 - 0.2 10e9/L    Abs Immature Granulocytes 0.0 0 - 0.4 10e9/L    Absolute Nucleated RBC 0.0    Comprehensive metabolic panel     Status: None   Result Value Ref Range    Sodium 138 133 - 144 mmol/L    Potassium 3.8 3.4 - 5.3 mmol/L    Chloride 106 94 - 109 mmol/L    Carbon Dioxide 26 20 - 32 mmol/L    Anion Gap 6 3 - 14 mmol/L    Glucose 97 70 - 99 mg/dL    Urea Nitrogen 16 7 - 30 mg/dL    Creatinine 0.74 0.52 - 1.04 mg/dL    GFR Estimate >90 >60 mL/min/[1.73_m2]    GFR Estimate If Black >90 >60 mL/min/[1.73_m2]    Calcium 9.6 8.5 - 10.1 mg/dL    Bilirubin Total 0.3 0.2 - 1.3 mg/dL    Albumin 3.4 3.4 - 5.0 g/dL    Protein Total 7.6 6.8 - 8.8 g/dL    Alkaline Phosphatase 95 40 - 150 U/L    ALT 28 0 - 50 U/L    AST 15 0 - 45 U/L   Troponin I     Status: None   Result Value Ref Range    Troponin I ES <0.015 0.000 - 0.045 ug/L   EKG 12-lead, tracing only     Status: None (Preliminary result)   Result Value Ref Range    Interpretation ECG Click View Image link to view waveform  and result        Assessments & Plan (with Medical Decision Making)   55-year-old female presents to us with a chief complaint of sudden onset right back pain that is improved.  Pain lasts approximately 40 minutes.  Patient recently did have a biopsy for a metastatic sarcoma.  Vital signs today were unremarkable.  Chest x-ray was clear.  EKG and troponin were normal.  I have a low suspicion for a cardiac etiology given the atypical nature.  CT scan of the chest showed no evidence of PE.  Expected metastatic lesions were present.  Patient was reevaluated and she is feeling better at this point.  Recommend she follow-up with primary care and return to us as needed.    I have reviewed the nursing notes.    I have reviewed the findings, diagnosis, plan and need for follow up with the patient.    New Prescriptions    No medications on file       Final diagnoses:   Back pain, unspecified back location, unspecified back pain laterality, unspecified chronicity     IAhsan, am serving as a trained medical scribe to document services personally performed by Ancelmo Keene DO, based on the provider's statements to me.   IAncelmo DO, was physically present and have reviewed and verified the accuracy of this note documented by Ahsan Salgado.     --  Ancelmo Keene DO  11/4/2020   [facility] EMERGENCY DEPARTMENT     Ancelmo Keene DO  11/04/20 7070

## 2020-11-05 NOTE — DISCHARGE INSTRUCTIONS
Follow-up with your primary care provider and your oncologist.  Return to the emergency department as needed for any new or worsening symptoms.

## 2020-11-06 ENCOUNTER — PATIENT OUTREACH (OUTPATIENT)
Dept: ONCOLOGY | Facility: CLINIC | Age: 55
End: 2020-11-06

## 2020-11-06 DIAGNOSIS — C49.9 SARCOMA OF SOFT TISSUE (H): Primary | ICD-10-CM

## 2020-11-09 ENCOUNTER — PATIENT OUTREACH (OUTPATIENT)
Dept: ONCOLOGY | Facility: CLINIC | Age: 55
End: 2020-11-09

## 2020-11-09 ENCOUNTER — APPOINTMENT (OUTPATIENT)
Dept: LAB | Facility: CLINIC | Age: 55
End: 2020-11-09
Payer: COMMERCIAL

## 2020-11-09 DIAGNOSIS — C49.9 SARCOMA OF SOFT TISSUE (H): ICD-10-CM

## 2020-11-09 DIAGNOSIS — C49.9 SARCOMA OF SOFT TISSUE (H): Primary | ICD-10-CM

## 2020-11-09 PROCEDURE — U0003 INFECTIOUS AGENT DETECTION BY NUCLEIC ACID (DNA OR RNA); SEVERE ACUTE RESPIRATORY SYNDROME CORONAVIRUS 2 (SARS-COV-2) (CORONAVIRUS DISEASE [COVID-19]), AMPLIFIED PROBE TECHNIQUE, MAKING USE OF HIGH THROUGHPUT TECHNOLOGIES AS DESCRIBED BY CMS-2020-01-R: HCPCS | Performed by: INTERNAL MEDICINE

## 2020-11-09 NOTE — PROGRESS NOTES
Spoke with Connie to discuss chemotherapy and resources available at the [facility].  Provided patient with [employer] printouts on doxil/ifosfamide/mesna/neulasta.  Reviewed administration, side effects (including myelosuppression, nausea/vomiting, diarrhea/constipation, hair loss, mouth sores) and ongoing symptom management by KALINA's in clinic.  Reviewed that this regimen will be done both at the infusion center and she will have a pump for the ifosfamide.  Wnet over Provided phone numbers for triage and after hours care.  Discussed that chemo treatment may be delayed due to blood counts, infusion schedule, or patient's need to modify.  Discussed port options-she already has a port.  Discussed appropriate use of MyChart, specifically not sending symptom messages or urgent concerns.

## 2020-11-10 LAB
SARS-COV-2 RNA SPEC QL NAA+PROBE: NOT DETECTED
SPECIMEN SOURCE: NORMAL

## 2020-11-11 DIAGNOSIS — C49.9 SARCOMA (H): Primary | ICD-10-CM

## 2020-11-11 RX ORDER — ALBUTEROL SULFATE 90 UG/1
1-2 AEROSOL, METERED RESPIRATORY (INHALATION)
Status: CANCELLED
Start: 2020-11-13

## 2020-11-11 RX ORDER — LORAZEPAM 2 MG/ML
0.5 INJECTION INTRAMUSCULAR EVERY 4 HOURS PRN
Status: CANCELLED
Start: 2020-11-13

## 2020-11-11 RX ORDER — METHYLPREDNISOLONE SODIUM SUCCINATE 125 MG/2ML
125 INJECTION, POWDER, LYOPHILIZED, FOR SOLUTION INTRAMUSCULAR; INTRAVENOUS
Status: CANCELLED
Start: 2020-11-13

## 2020-11-11 RX ORDER — ALBUTEROL SULFATE 0.83 MG/ML
2.5 SOLUTION RESPIRATORY (INHALATION)
Status: CANCELLED | OUTPATIENT
Start: 2020-11-13

## 2020-11-11 RX ORDER — NALOXONE HYDROCHLORIDE 0.4 MG/ML
.1-.4 INJECTION, SOLUTION INTRAMUSCULAR; INTRAVENOUS; SUBCUTANEOUS
Status: CANCELLED | OUTPATIENT
Start: 2020-11-13

## 2020-11-11 RX ORDER — EPINEPHRINE 1 MG/ML
0.3 INJECTION, SOLUTION INTRAMUSCULAR; SUBCUTANEOUS EVERY 5 MIN PRN
Status: CANCELLED | OUTPATIENT
Start: 2020-11-13

## 2020-11-11 RX ORDER — HEPARIN SODIUM,PORCINE 10 UNIT/ML
5 VIAL (ML) INTRAVENOUS
Status: CANCELLED | OUTPATIENT
Start: 2020-11-13

## 2020-11-11 RX ORDER — HEPARIN SODIUM (PORCINE) LOCK FLUSH IV SOLN 100 UNIT/ML 100 UNIT/ML
5 SOLUTION INTRAVENOUS
Status: CANCELLED | OUTPATIENT
Start: 2020-11-13

## 2020-11-11 RX ORDER — SODIUM CHLORIDE 9 MG/ML
1000 INJECTION, SOLUTION INTRAVENOUS CONTINUOUS PRN
Status: CANCELLED
Start: 2020-11-13

## 2020-11-11 RX ORDER — MEPERIDINE HYDROCHLORIDE 25 MG/ML
25 INJECTION INTRAMUSCULAR; INTRAVENOUS; SUBCUTANEOUS EVERY 30 MIN PRN
Status: CANCELLED | OUTPATIENT
Start: 2020-11-13

## 2020-11-11 RX ORDER — DIPHENHYDRAMINE HYDROCHLORIDE 50 MG/ML
50 INJECTION INTRAMUSCULAR; INTRAVENOUS
Status: CANCELLED
Start: 2020-11-13

## 2020-11-11 RX ORDER — PALONOSETRON 0.05 MG/ML
0.25 INJECTION, SOLUTION INTRAVENOUS ONCE
Status: CANCELLED
Start: 2020-11-13

## 2020-11-13 ENCOUNTER — HOME INFUSION (PRE-WILLOW HOME INFUSION) (OUTPATIENT)
Dept: PHARMACY | Facility: CLINIC | Age: 55
End: 2020-11-13

## 2020-11-13 ENCOUNTER — APPOINTMENT (OUTPATIENT)
Dept: LAB | Facility: CLINIC | Age: 55
End: 2020-11-13
Attending: INTERNAL MEDICINE
Payer: COMMERCIAL

## 2020-11-13 ENCOUNTER — INFUSION THERAPY VISIT (OUTPATIENT)
Dept: ONCOLOGY | Facility: CLINIC | Age: 55
End: 2020-11-13
Attending: INTERNAL MEDICINE
Payer: COMMERCIAL

## 2020-11-13 VITALS
DIASTOLIC BLOOD PRESSURE: 77 MMHG | WEIGHT: 250.1 LBS | BODY MASS INDEX: 39.17 KG/M2 | OXYGEN SATURATION: 97 % | HEART RATE: 65 BPM | TEMPERATURE: 97 F | SYSTOLIC BLOOD PRESSURE: 118 MMHG | RESPIRATION RATE: 16 BRPM

## 2020-11-13 DIAGNOSIS — C49.9 SARCOMA (H): ICD-10-CM

## 2020-11-13 DIAGNOSIS — C49.9 SARCOMA OF SOFT TISSUE (H): Primary | ICD-10-CM

## 2020-11-13 LAB
ALBUMIN SERPL-MCNC: 3.4 G/DL (ref 3.4–5)
ALP SERPL-CCNC: 99 U/L (ref 40–150)
ALT SERPL W P-5'-P-CCNC: 42 U/L (ref 0–50)
ANION GAP SERPL CALCULATED.3IONS-SCNC: 4 MMOL/L (ref 3–14)
AST SERPL W P-5'-P-CCNC: 17 U/L (ref 0–45)
BASOPHILS # BLD AUTO: 0 10E9/L (ref 0–0.2)
BASOPHILS NFR BLD AUTO: 0.6 %
BILIRUB SERPL-MCNC: 0.6 MG/DL (ref 0.2–1.3)
BUN SERPL-MCNC: 14 MG/DL (ref 7–30)
CALCIUM SERPL-MCNC: 8.9 MG/DL (ref 8.5–10.1)
CHLORIDE SERPL-SCNC: 107 MMOL/L (ref 94–109)
CO2 SERPL-SCNC: 27 MMOL/L (ref 20–32)
CREAT SERPL-MCNC: 0.72 MG/DL (ref 0.52–1.04)
DIFFERENTIAL METHOD BLD: NORMAL
EOSINOPHIL # BLD AUTO: 0.4 10E9/L (ref 0–0.7)
EOSINOPHIL NFR BLD AUTO: 5.6 %
ERYTHROCYTE [DISTWIDTH] IN BLOOD BY AUTOMATED COUNT: 13.2 % (ref 10–15)
GFR SERPL CREATININE-BSD FRML MDRD: >90 ML/MIN/{1.73_M2}
GLUCOSE SERPL-MCNC: 102 MG/DL (ref 70–99)
HCT VFR BLD AUTO: 37 % (ref 35–47)
HGB BLD-MCNC: 11.7 G/DL (ref 11.7–15.7)
IMM GRANULOCYTES # BLD: 0 10E9/L (ref 0–0.4)
IMM GRANULOCYTES NFR BLD: 0.3 %
LDH SERPL L TO P-CCNC: 168 U/L (ref 81–234)
LYMPHOCYTES # BLD AUTO: 1.4 10E9/L (ref 0.8–5.3)
LYMPHOCYTES NFR BLD AUTO: 22.2 %
MAGNESIUM SERPL-MCNC: 1.9 MG/DL (ref 1.6–2.3)
MCH RBC QN AUTO: 27.8 PG (ref 26.5–33)
MCHC RBC AUTO-ENTMCNC: 31.6 G/DL (ref 31.5–36.5)
MCV RBC AUTO: 88 FL (ref 78–100)
MONOCYTES # BLD AUTO: 0.5 10E9/L (ref 0–1.3)
MONOCYTES NFR BLD AUTO: 7.9 %
NEUTROPHILS # BLD AUTO: 4.1 10E9/L (ref 1.6–8.3)
NEUTROPHILS NFR BLD AUTO: 63.4 %
NRBC # BLD AUTO: 0 10*3/UL
NRBC BLD AUTO-RTO: 0 /100
PHOSPHATE SERPL-MCNC: 3.2 MG/DL (ref 2.5–4.5)
PLATELET # BLD AUTO: 320 10E9/L (ref 150–450)
POTASSIUM SERPL-SCNC: 3.9 MMOL/L (ref 3.4–5.3)
PROT SERPL-MCNC: 7.4 G/DL (ref 6.8–8.8)
RBC # BLD AUTO: 4.21 10E12/L (ref 3.8–5.2)
SODIUM SERPL-SCNC: 138 MMOL/L (ref 133–144)
WBC # BLD AUTO: 6.4 10E9/L (ref 4–11)

## 2020-11-13 PROCEDURE — 84100 ASSAY OF PHOSPHORUS: CPT | Performed by: INTERNAL MEDICINE

## 2020-11-13 PROCEDURE — 96361 HYDRATE IV INFUSION ADD-ON: CPT

## 2020-11-13 PROCEDURE — G0463 HOSPITAL OUTPT CLINIC VISIT: HCPCS | Mod: 25

## 2020-11-13 PROCEDURE — 258N000003 HC RX IP 258 OP 636: Performed by: INTERNAL MEDICINE

## 2020-11-13 PROCEDURE — 83735 ASSAY OF MAGNESIUM: CPT | Performed by: INTERNAL MEDICINE

## 2020-11-13 PROCEDURE — 96375 TX/PRO/DX INJ NEW DRUG ADDON: CPT

## 2020-11-13 PROCEDURE — 83615 LACTATE (LD) (LDH) ENZYME: CPT | Performed by: INTERNAL MEDICINE

## 2020-11-13 PROCEDURE — 85025 COMPLETE CBC W/AUTO DIFF WBC: CPT | Performed by: INTERNAL MEDICINE

## 2020-11-13 PROCEDURE — 96413 CHEMO IV INFUSION 1 HR: CPT

## 2020-11-13 PROCEDURE — 96415 CHEMO IV INFUSION ADDL HR: CPT

## 2020-11-13 PROCEDURE — 80053 COMPREHEN METABOLIC PANEL: CPT | Performed by: INTERNAL MEDICINE

## 2020-11-13 PROCEDURE — G0498 CHEMO EXTEND IV INFUS W/PUMP: HCPCS

## 2020-11-13 PROCEDURE — 250N000011 HC RX IP 250 OP 636: Performed by: INTERNAL MEDICINE

## 2020-11-13 RX ORDER — CETIRIZINE HYDROCHLORIDE 10 MG/1
10 TABLET ORAL DAILY
COMMUNITY

## 2020-11-13 RX ORDER — MEPERIDINE HYDROCHLORIDE 25 MG/ML
25 INJECTION INTRAMUSCULAR; INTRAVENOUS; SUBCUTANEOUS EVERY 30 MIN PRN
Status: DISCONTINUED | OUTPATIENT
Start: 2020-11-13 | End: 2020-11-13 | Stop reason: HOSPADM

## 2020-11-13 RX ORDER — ALBUTEROL SULFATE 90 UG/1
1-2 AEROSOL, METERED RESPIRATORY (INHALATION)
Status: DISCONTINUED | OUTPATIENT
Start: 2020-11-13 | End: 2020-11-13 | Stop reason: HOSPADM

## 2020-11-13 RX ORDER — RIBOFLAVIN (VITAMIN B2) 100 MG
100 TABLET ORAL DAILY
COMMUNITY
End: 2020-12-02 | Stop reason: ALTCHOICE

## 2020-11-13 RX ORDER — HEPARIN SODIUM (PORCINE) LOCK FLUSH IV SOLN 100 UNIT/ML 100 UNIT/ML
5 SOLUTION INTRAVENOUS ONCE
Status: COMPLETED | OUTPATIENT
Start: 2020-11-13 | End: 2020-11-13

## 2020-11-13 RX ORDER — GRANISETRON HYDROCHLORIDE 1 MG/1
2 TABLET, FILM COATED ORAL DAILY
Qty: 12 TABLET | Refills: 3 | Status: SHIPPED | OUTPATIENT
Start: 2020-11-13 | End: 2021-01-16

## 2020-11-13 RX ORDER — EPINEPHRINE 1 MG/ML
0.3 INJECTION, SOLUTION INTRAMUSCULAR; SUBCUTANEOUS EVERY 5 MIN PRN
Status: DISCONTINUED | OUTPATIENT
Start: 2020-11-13 | End: 2020-11-13 | Stop reason: HOSPADM

## 2020-11-13 RX ORDER — SODIUM CHLORIDE 9 MG/ML
1000 INJECTION, SOLUTION INTRAVENOUS CONTINUOUS PRN
Status: DISCONTINUED | OUTPATIENT
Start: 2020-11-13 | End: 2020-11-13 | Stop reason: HOSPADM

## 2020-11-13 RX ORDER — NALOXONE HYDROCHLORIDE 0.4 MG/ML
.1-.4 INJECTION, SOLUTION INTRAMUSCULAR; INTRAVENOUS; SUBCUTANEOUS
Status: DISCONTINUED | OUTPATIENT
Start: 2020-11-13 | End: 2020-11-13 | Stop reason: HOSPADM

## 2020-11-13 RX ORDER — PALONOSETRON 0.05 MG/ML
0.25 INJECTION, SOLUTION INTRAVENOUS ONCE
Status: COMPLETED | OUTPATIENT
Start: 2020-11-13 | End: 2020-11-13

## 2020-11-13 RX ORDER — DIPHENHYDRAMINE HYDROCHLORIDE 50 MG/ML
50 INJECTION INTRAMUSCULAR; INTRAVENOUS
Status: COMPLETED | OUTPATIENT
Start: 2020-11-13 | End: 2020-11-13

## 2020-11-13 RX ORDER — ALBUTEROL SULFATE 0.83 MG/ML
2.5 SOLUTION RESPIRATORY (INHALATION)
Status: DISCONTINUED | OUTPATIENT
Start: 2020-11-13 | End: 2020-11-13 | Stop reason: HOSPADM

## 2020-11-13 RX ORDER — PROCHLORPERAZINE MALEATE 10 MG
10 TABLET ORAL EVERY 6 HOURS PRN
Qty: 30 TABLET | Refills: 3 | Status: SHIPPED | OUTPATIENT
Start: 2020-11-13 | End: 2021-01-16

## 2020-11-13 RX ORDER — METHYLPREDNISOLONE SODIUM SUCCINATE 125 MG/2ML
125 INJECTION, POWDER, LYOPHILIZED, FOR SOLUTION INTRAMUSCULAR; INTRAVENOUS
Status: DISCONTINUED | OUTPATIENT
Start: 2020-11-13 | End: 2020-11-13 | Stop reason: HOSPADM

## 2020-11-13 RX ADMIN — PALONOSETRON 0.25 MG: 0.05 INJECTION, SOLUTION INTRAVENOUS at 08:19

## 2020-11-13 RX ADMIN — DEXTROSE MONOHYDRATE 250 ML: 50 INJECTION, SOLUTION INTRAVENOUS at 08:18

## 2020-11-13 RX ADMIN — DIPHENHYDRAMINE HYDROCHLORIDE 50 MG: 50 INJECTION, SOLUTION INTRAMUSCULAR; INTRAVENOUS at 09:26

## 2020-11-13 RX ADMIN — DOXORUBICIN HYDROCHLORIDE 100 MG: 2 INJECTABLE, LIPOSOMAL INTRAVENOUS at 09:02

## 2020-11-13 RX ADMIN — METHYLPREDNISOLONE SODIUM SUCCINATE 125 MG: 125 INJECTION, POWDER, FOR SOLUTION INTRAMUSCULAR; INTRAVENOUS at 09:23

## 2020-11-13 RX ADMIN — Medication 5 ML: at 06:39

## 2020-11-13 ASSESSMENT — PAIN SCALES - GENERAL: PAINLEVEL: MILD PAIN (3)

## 2020-11-13 NOTE — PROGRESS NOTES
Infusion Nursing Note:  Richa Ashby presents today for Day 1 Cycle 1 Doxil Ifosfamide mesna pump connect.    Patient seen by provider today: No   present during visit today: Not Applicable.    Note: Connie arrives to infusion today doing well. She is feeling anxious to get treatment started but feels ready. Patient new to oncology infusion room and is receiving Doxil and Ifosfamide mesna pump for the first time. Pt oriented to infusion room, including chair, restrooms and call light. New patient teaching done previously by Gomez Love, RNCC. Writer provided new pt folder today and reviewed chemotherapy teaching/side effects and schedule.     Pt instructed to call care coordinator, triage (or MD on call if after hours/weekends) with chills/temp >=100.5, questions/concerns. Pt stated understanding of plan.     About 10 minutes into the Doxil infusion, patient noted new shoulder pain. Doxil stopped and pain assessed. The pain moved down her side and then moved into her low back/spine. She also noted her stomach felt like her bowels needed to move. VS stable throughout. Hypersensitivity medications given IV. Warm pack applied to low back. Pain started to improve once medications were administered. Dr. Lopez notified.     TORB @ 1006 Dr. Lopez/ Rosemary Wilson RN: OK to restart Doxil once symptoms have completely resolved. Restart at 10 mL/hr and increase quickly to the rate of reaction and finish per protocol.  Writer titrated per protocol:   10 mL/hr x 5 mins  25 mL/hr x 5 mins  50 mL/hr x 5 mins  100 mL/hr x 5 mins  200 mL/hr x 5 mins  360 mL/hr for remainder    Intravenous Access:  Implanted Port.    Treatment Conditions:  Lab Results   Component Value Date    HGB 11.7 11/13/2020     Lab Results   Component Value Date    WBC 6.4 11/13/2020      Lab Results   Component Value Date    ANEU 4.1 11/13/2020     Lab Results   Component Value Date     11/13/2020      Lab Results   Component  Value Date     11/13/2020                   Lab Results   Component Value Date    POTASSIUM 3.9 11/13/2020           Lab Results   Component Value Date    MAG 1.9 11/13/2020            Lab Results   Component Value Date    CR 0.72 11/13/2020                   Lab Results   Component Value Date    VIKTORIA 8.9 11/13/2020                Lab Results   Component Value Date    BILITOTAL 0.6 11/13/2020           Lab Results   Component Value Date    ALBUMIN 3.4 11/13/2020                    Lab Results   Component Value Date    ALT 42 11/13/2020           Lab Results   Component Value Date    AST 17 11/13/2020       Results reviewed, labs MET treatment parameters, ok to proceed with treatment.  ECHO/MUGA completed 10/27/20  EF 60-65%.    Post Infusion Assessment:  Patient tolerated infusion poorly due to : Hypersensitivity: Did patient have a hypersensitivity reaction? : Yes  Drug or Product name: Doxil  Were pre-meds administered?: Yes  If Yes, what pre-meds were administered?: Palonesetron (aloxi)  First or Subsequent treatment: First time receiving  Rate of infusion when patient had hypersensitivity reaction: 50   Time the hypersensitivity reaction was first recognized: 5892  Symptoms observed or reported (select all that apply): Other: (Comment)(shoulder pain)  Interventions/treatment following reaction: Infusion stopped;Hypersensitivity medications administered  What hypersensitivity medications were administered?: DiphendydrAMINE (benadryl);Methylprednisolone;NaCl 0.9% Bolus  Name of provider notified: Dr. Lopez  Time provider notified: 5945  Type of notification (select all that apply): Paged/Phone      Doxil was rechallenged and patient tolerated the remainder of infusion without incident.  Blood return noted pre and post infusion and prior to pump connect.  Ifosfamide CADD pump connected at 1315 and set to run at 7 ml/hr. Patient set up for at home labs on Days 4-8, Mesna bag change on Day 7 (11/19), and pump  disconnect with Neulasta OnPro with [facility] on Day 8 11/20 at 1315.   All connections verified as taped and open and pump display RUNNING by second RN, Bhumi Barrett.     Discharge Plan:   Prescription refills given for Kytril and Compazine.  Discharge instructions reviewed with: Patient.  Patient verbalized understanding of discharge instructions and all questions answered.  Copy of AVS reviewed with patient.  Patient will return in 4 weeks for next appointment. In basket message sent to Gomez Love RNCC regarding follow up schedule. Patient is aware to watch "[employer]HART for appointments to be schedule.   Patient discharged in stable condition accompanied by: self.  Departure Mode: Ambulatory.    Rosemary Wilson RN

## 2020-11-13 NOTE — NURSING NOTE
"Chief Complaint   Patient presents with     Port Draw     Labs drawn via port by RN. VS taken     Port accessed with 20g one inch power needle and labs drawn by rn.  Port flushed with NS and heparin.  Pt tolerated well.  VS taken.  Pt checked in for next appt.    Pt's port needs to be accessed with a 1\" needle. It is canted toward the midline and sits deeper in her tissue. Port was accessed originally with 1\" needle flushed and nils labs well, but more than a 1/4\" of needle was exposed. Pt was okay with trying the 3/4\" to reduce chance of irritation or accidental de-accessing the port while at home. Pt required to have accessed for six days. 3/4\" did not penetrate deep enough and could not flush. Had to re-access with a 1\" needle and secure it well.     Alex Patel RN  "

## 2020-11-13 NOTE — PATIENT INSTRUCTIONS
Contact Numbers  [facility]: [phone] (for symptom and scheduling needs)    Please call the [facility] Triage line if you experience a temperature greater than or equal to 100.4, shaking chills, have uncontrolled nausea, vomiting and/or diarrhea, dizziness, shortness of breath, chest pain, bleeding, unexplained bruising, or if you have any other new/concerning symptoms, questions or concerns.     If you are having any concerning symptoms or wish to speak to a provider before your next infusion visit, please call your care coordinator or triage to notify them so we can adequately serve you.     If you need a refill on a narcotic prescription or other medication, please call triage before your infusion appointment.      Start Kytril 1 mg tablets on Wednesday:  Take 2 tablets (2 mg) every morning on Days 3 through 8.    May also use Compazine 10 mg as needed for nausea: Take 1 tablet every 6 hours as needed.

## 2020-11-16 ENCOUNTER — MEDICAL CORRESPONDENCE (OUTPATIENT)
Dept: HEALTH INFORMATION MANAGEMENT | Facility: CLINIC | Age: 55
End: 2020-11-16

## 2020-11-16 ENCOUNTER — HOME INFUSION (PRE-WILLOW HOME INFUSION) (OUTPATIENT)
Dept: PHARMACY | Facility: CLINIC | Age: 55
End: 2020-11-16

## 2020-11-16 LAB
ANION GAP SERPL CALCULATED.3IONS-SCNC: 5 MMOL/L (ref 3–14)
BUN SERPL-MCNC: 11 MG/DL (ref 7–30)
CALCIUM SERPL-MCNC: 8.8 MG/DL (ref 8.5–10.1)
CHLORIDE SERPL-SCNC: 105 MMOL/L (ref 94–109)
CO2 SERPL-SCNC: 29 MMOL/L (ref 20–32)
CREAT SERPL-MCNC: 0.74 MG/DL (ref 0.52–1.04)
GFR SERPL CREATININE-BSD FRML MDRD: >90 ML/MIN/{1.73_M2}
GLUCOSE SERPL-MCNC: 113 MG/DL (ref 70–99)
MAGNESIUM SERPL-MCNC: 2 MG/DL (ref 1.6–2.3)
PHOSPHATE SERPL-MCNC: 2.9 MG/DL (ref 2.5–4.5)
POTASSIUM SERPL-SCNC: 3.8 MMOL/L (ref 3.4–5.3)
SODIUM SERPL-SCNC: 139 MMOL/L (ref 133–144)

## 2020-11-16 PROCEDURE — 80048 BASIC METABOLIC PNL TOTAL CA: CPT | Performed by: INTERNAL MEDICINE

## 2020-11-16 PROCEDURE — 83735 ASSAY OF MAGNESIUM: CPT | Performed by: INTERNAL MEDICINE

## 2020-11-16 PROCEDURE — 84100 ASSAY OF PHOSPHORUS: CPT | Performed by: INTERNAL MEDICINE

## 2020-11-16 NOTE — PROGRESS NOTES
This is a recent snapshot of the patient's [facility] medical record.  For current drug dose and complete information and questions, call [phone] or In Basket pool, fv home infusion ([id number])  CSN Number:  [id number]

## 2020-11-17 ENCOUNTER — MEDICAL CORRESPONDENCE (OUTPATIENT)
Dept: HEALTH INFORMATION MANAGEMENT | Facility: CLINIC | Age: 55
End: 2020-11-17

## 2020-11-17 ENCOUNTER — HOME INFUSION (PRE-WILLOW HOME INFUSION) (OUTPATIENT)
Dept: PHARMACY | Facility: CLINIC | Age: 55
End: 2020-11-17

## 2020-11-17 LAB
ANION GAP SERPL CALCULATED.3IONS-SCNC: 4 MMOL/L (ref 3–14)
BUN SERPL-MCNC: 10 MG/DL (ref 7–30)
CALCIUM SERPL-MCNC: 8.9 MG/DL (ref 8.5–10.1)
CHLORIDE SERPL-SCNC: 104 MMOL/L (ref 94–109)
CO2 SERPL-SCNC: 30 MMOL/L (ref 20–32)
CREAT SERPL-MCNC: 0.69 MG/DL (ref 0.52–1.04)
GFR SERPL CREATININE-BSD FRML MDRD: >90 ML/MIN/{1.73_M2}
GLUCOSE SERPL-MCNC: 86 MG/DL (ref 70–99)
MAGNESIUM SERPL-MCNC: 2.2 MG/DL (ref 1.6–2.3)
PHOSPHATE SERPL-MCNC: 2.4 MG/DL (ref 2.5–4.5)
POTASSIUM SERPL-SCNC: 4 MMOL/L (ref 3.4–5.3)
SODIUM SERPL-SCNC: 138 MMOL/L (ref 133–144)

## 2020-11-17 PROCEDURE — 83735 ASSAY OF MAGNESIUM: CPT | Performed by: INTERNAL MEDICINE

## 2020-11-17 PROCEDURE — 80048 BASIC METABOLIC PNL TOTAL CA: CPT | Performed by: INTERNAL MEDICINE

## 2020-11-17 PROCEDURE — 84100 ASSAY OF PHOSPHORUS: CPT | Performed by: INTERNAL MEDICINE

## 2020-11-17 RX ORDER — PALONOSETRON 0.05 MG/ML
0.25 INJECTION, SOLUTION INTRAVENOUS ONCE
Status: CANCELLED
Start: 2020-11-17 | End: 2020-11-17

## 2020-11-17 NOTE — PROGRESS NOTES
This is a recent snapshot of the patient's [facility] medical record.  For current drug dose and complete information and questions, call [phone] or In [facility] pool, fv home infusion ([id number])  CSN Number:  [id number]

## 2020-11-18 ENCOUNTER — MEDICAL CORRESPONDENCE (OUTPATIENT)
Dept: HEALTH INFORMATION MANAGEMENT | Facility: CLINIC | Age: 55
End: 2020-11-18

## 2020-11-18 ENCOUNTER — HOME INFUSION (PRE-WILLOW HOME INFUSION) (OUTPATIENT)
Dept: PHARMACY | Facility: CLINIC | Age: 55
End: 2020-11-18

## 2020-11-18 LAB
ANION GAP SERPL CALCULATED.3IONS-SCNC: 6 MMOL/L (ref 3–14)
BUN SERPL-MCNC: 12 MG/DL (ref 7–30)
CALCIUM SERPL-MCNC: 9 MG/DL (ref 8.5–10.1)
CHLORIDE SERPL-SCNC: 106 MMOL/L (ref 94–109)
CO2 SERPL-SCNC: 27 MMOL/L (ref 20–32)
CREAT SERPL-MCNC: 0.68 MG/DL (ref 0.52–1.04)
GFR SERPL CREATININE-BSD FRML MDRD: >90 ML/MIN/{1.73_M2}
GLUCOSE SERPL-MCNC: 110 MG/DL (ref 70–99)
MAGNESIUM SERPL-MCNC: 2.2 MG/DL (ref 1.6–2.3)
PHOSPHATE SERPL-MCNC: 2.6 MG/DL (ref 2.5–4.5)
POTASSIUM SERPL-SCNC: 3.5 MMOL/L (ref 3.4–5.3)
SODIUM SERPL-SCNC: 139 MMOL/L (ref 133–144)

## 2020-11-18 PROCEDURE — 84100 ASSAY OF PHOSPHORUS: CPT | Performed by: INTERNAL MEDICINE

## 2020-11-18 PROCEDURE — 80048 BASIC METABOLIC PNL TOTAL CA: CPT | Performed by: INTERNAL MEDICINE

## 2020-11-18 PROCEDURE — 83735 ASSAY OF MAGNESIUM: CPT | Performed by: INTERNAL MEDICINE

## 2020-11-19 ENCOUNTER — HOME INFUSION (PRE-WILLOW HOME INFUSION) (OUTPATIENT)
Dept: PHARMACY | Facility: CLINIC | Age: 55
End: 2020-11-19

## 2020-11-19 ENCOUNTER — MEDICAL CORRESPONDENCE (OUTPATIENT)
Dept: HEALTH INFORMATION MANAGEMENT | Facility: CLINIC | Age: 55
End: 2020-11-19

## 2020-11-19 LAB
ANION GAP SERPL CALCULATED.3IONS-SCNC: 4 MMOL/L (ref 3–14)
BUN SERPL-MCNC: 15 MG/DL (ref 7–30)
CALCIUM SERPL-MCNC: 8.3 MG/DL (ref 8.5–10.1)
CHLORIDE SERPL-SCNC: 107 MMOL/L (ref 94–109)
CO2 SERPL-SCNC: 26 MMOL/L (ref 20–32)
CREAT SERPL-MCNC: 0.65 MG/DL (ref 0.52–1.04)
GFR SERPL CREATININE-BSD FRML MDRD: >90 ML/MIN/{1.73_M2}
GLUCOSE SERPL-MCNC: 84 MG/DL (ref 70–99)
MAGNESIUM SERPL-MCNC: 2.1 MG/DL (ref 1.6–2.3)
PHOSPHATE SERPL-MCNC: 2.2 MG/DL (ref 2.5–4.5)
POTASSIUM SERPL-SCNC: 3.4 MMOL/L (ref 3.4–5.3)
SODIUM SERPL-SCNC: 137 MMOL/L (ref 133–144)

## 2020-11-19 PROCEDURE — 84100 ASSAY OF PHOSPHORUS: CPT | Performed by: INTERNAL MEDICINE

## 2020-11-19 PROCEDURE — 80048 BASIC METABOLIC PNL TOTAL CA: CPT | Performed by: INTERNAL MEDICINE

## 2020-11-19 PROCEDURE — 83735 ASSAY OF MAGNESIUM: CPT | Performed by: INTERNAL MEDICINE

## 2020-11-19 NOTE — PROGRESS NOTES
This is a recent snapshot of the patient's [facility] medical record.  For current drug dose and complete information and questions, call [phone] or In Basket pool, fv home infusion ([id number])  CSN Number:  [id number]

## 2020-11-19 NOTE — PROGRESS NOTES
This is a recent snapshot of the patient's [facility] medical record.  For current drug dose and complete information and questions, call [phone] or In Basket pool, fv home infusion ([id number])  CSN Number:  [id number]

## 2020-11-20 ENCOUNTER — HOME INFUSION (PRE-WILLOW HOME INFUSION) (OUTPATIENT)
Dept: PHARMACY | Facility: CLINIC | Age: 55
End: 2020-11-20

## 2020-11-20 LAB
ANION GAP SERPL CALCULATED.3IONS-SCNC: 4 MMOL/L (ref 3–14)
BUN SERPL-MCNC: 13 MG/DL (ref 7–30)
CALCIUM SERPL-MCNC: 8 MG/DL (ref 8.5–10.1)
CHLORIDE SERPL-SCNC: 108 MMOL/L (ref 94–109)
CO2 SERPL-SCNC: 26 MMOL/L (ref 20–32)
CREAT SERPL-MCNC: 0.63 MG/DL (ref 0.52–1.04)
GFR SERPL CREATININE-BSD FRML MDRD: >90 ML/MIN/{1.73_M2}
GLUCOSE SERPL-MCNC: 75 MG/DL (ref 70–99)
MAGNESIUM SERPL-MCNC: 2.3 MG/DL (ref 1.6–2.3)
PHOSPHATE SERPL-MCNC: 2 MG/DL (ref 2.5–4.5)
POTASSIUM SERPL-SCNC: 3.5 MMOL/L (ref 3.4–5.3)
SODIUM SERPL-SCNC: 138 MMOL/L (ref 133–144)

## 2020-11-20 PROCEDURE — 84100 ASSAY OF PHOSPHORUS: CPT | Performed by: INTERNAL MEDICINE

## 2020-11-20 PROCEDURE — 80048 BASIC METABOLIC PNL TOTAL CA: CPT | Performed by: INTERNAL MEDICINE

## 2020-11-20 PROCEDURE — 83735 ASSAY OF MAGNESIUM: CPT | Performed by: INTERNAL MEDICINE

## 2020-11-20 NOTE — PROGRESS NOTES
This is a recent snapshot of the patient's [facility] medical record.  For current drug dose and complete information and questions, call [phone] or In Basket pool, fv home infusion ([id number])  CSN Number:  [id number]

## 2020-11-23 NOTE — PROGRESS NOTES
This is a recent snapshot of the patient's [facility] medical record.  For current drug dose and complete information and questions, call [phone] or In Basket pool, fv home infusion ([id number])  CSN Number:  [id number]

## 2020-11-25 ENCOUNTER — PATIENT OUTREACH (OUTPATIENT)
Dept: ONCOLOGY | Facility: CLINIC | Age: 55
End: 2020-11-25

## 2020-11-25 DIAGNOSIS — C49.9 SARCOMA OF SOFT TISSUE (H): Primary | ICD-10-CM

## 2020-11-25 DIAGNOSIS — R50.9 FEVER AND CHILLS: Primary | ICD-10-CM

## 2020-11-25 NOTE — PROGRESS NOTES
Connie calls to report feeling some chills, but she feels warm to the touch. Around noon her temp was 98.7, and just now at 145pm it is 99.5. She is short of breath per her baseline per her asthma, and has a cough with some increased wheezing. The cough began in the last three days. She does not notice a loss of smell or taste. She does not believe she has been exposed to anyone with covid 19. She is pushing fluids, normal urination patterns. Her appetite is decreased, but she is getting small meals in throughout the day.     Per Dr. Lopez, patient should have Covid test and CBC. She was given the number to get a covid test scheduled and set that up for Friday. She will have her labs drawn on Friday as well.    Winter Cristina RN

## 2020-11-27 ENCOUNTER — ONCOLOGY VISIT (OUTPATIENT)
Dept: ONCOLOGY | Facility: CLINIC | Age: 55
End: 2020-11-27
Payer: COMMERCIAL

## 2020-11-27 DIAGNOSIS — R82.90 NONSPECIFIC FINDING ON EXAMINATION OF URINE: Primary | ICD-10-CM

## 2020-11-27 DIAGNOSIS — C49.9 SARCOMA OF SOFT TISSUE (H): Primary | ICD-10-CM

## 2020-11-27 DIAGNOSIS — R50.9 FEVER AND CHILLS: ICD-10-CM

## 2020-11-27 DIAGNOSIS — C49.9 SARCOMA OF SOFT TISSUE (H): ICD-10-CM

## 2020-11-27 LAB
ALBUMIN UR-MCNC: 10 MG/DL
APPEARANCE UR: CLEAR
BACTERIA #/AREA URNS HPF: ABNORMAL /HPF
BILIRUB UR QL STRIP: NEGATIVE
COLOR UR AUTO: YELLOW
DIFFERENTIAL METHOD BLD: ABNORMAL
ERYTHROCYTE [DISTWIDTH] IN BLOOD BY AUTOMATED COUNT: 12.7 % (ref 10–15)
GLUCOSE UR STRIP-MCNC: NEGATIVE MG/DL
HCT VFR BLD AUTO: 32.3 % (ref 35–47)
HGB BLD-MCNC: 10.5 G/DL (ref 11.7–15.7)
HGB UR QL STRIP: NEGATIVE
KETONES UR STRIP-MCNC: NEGATIVE MG/DL
LEUKOCYTE ESTERASE UR QL STRIP: ABNORMAL
LYMPHOCYTES # BLD AUTO: 1.1 10E9/L (ref 0.8–5.3)
LYMPHOCYTES NFR BLD AUTO: 22 %
MCH RBC QN AUTO: 27.8 PG (ref 26.5–33)
MCHC RBC AUTO-ENTMCNC: 32.5 G/DL (ref 31.5–36.5)
MCV RBC AUTO: 85 FL (ref 78–100)
METAMYELOCYTES # BLD: 0.2 10E9/L
METAMYELOCYTES NFR BLD MANUAL: 3 %
MONOCYTES # BLD AUTO: 0.5 10E9/L (ref 0–1.3)
MONOCYTES NFR BLD AUTO: 10 %
MUCOUS THREADS #/AREA URNS LPF: PRESENT /LPF
MYELOCYTES # BLD: 0.1 10E9/L
MYELOCYTES NFR BLD MANUAL: 1 %
NEUTROPHILS # BLD AUTO: 3.2 10E9/L (ref 1.6–8.3)
NEUTROPHILS NFR BLD AUTO: 64 %
NITRATE UR QL: NEGATIVE
NRBC # BLD AUTO: 0.1 10*3/UL
NRBC BLD AUTO-RTO: 1 /100
PH UR STRIP: 6 PH (ref 5–7)
PLATELET # BLD AUTO: 229 10E9/L (ref 150–450)
PLATELET # BLD EST: ABNORMAL 10*3/UL
RBC # BLD AUTO: 3.78 10E12/L (ref 3.8–5.2)
RBC #/AREA URNS AUTO: ABNORMAL /HPF
RBC MORPH BLD: NORMAL
SOURCE: ABNORMAL
SP GR UR STRIP: 1.02 (ref 1–1.03)
UROBILINOGEN UR STRIP-MCNC: 2 MG/DL (ref 0–2)
WBC # BLD AUTO: 5 10E9/L (ref 4–11)
WBC #/AREA URNS AUTO: ABNORMAL /HPF
WBC CLUMPS #/AREA URNS HPF: PRESENT /HPF

## 2020-11-27 PROCEDURE — 85025 COMPLETE CBC W/AUTO DIFF WBC: CPT | Performed by: INTERNAL MEDICINE

## 2020-11-27 PROCEDURE — 87086 URINE CULTURE/COLONY COUNT: CPT | Performed by: INTERNAL MEDICINE

## 2020-11-27 PROCEDURE — U0003 INFECTIOUS AGENT DETECTION BY NUCLEIC ACID (DNA OR RNA); SEVERE ACUTE RESPIRATORY SYNDROME CORONAVIRUS 2 (SARS-COV-2) (CORONAVIRUS DISEASE [COVID-19]), AMPLIFIED PROBE TECHNIQUE, MAKING USE OF HIGH THROUGHPUT TECHNOLOGIES AS DESCRIBED BY CMS-2020-01-R: HCPCS | Performed by: INTERNAL MEDICINE

## 2020-11-27 PROCEDURE — 81001 URINALYSIS AUTO W/SCOPE: CPT | Performed by: INTERNAL MEDICINE

## 2020-11-27 PROCEDURE — 36591 DRAW BLOOD OFF VENOUS DEVICE: CPT

## 2020-11-27 PROCEDURE — 87186 SC STD MICRODIL/AGAR DIL: CPT | Performed by: INTERNAL MEDICINE

## 2020-11-27 PROCEDURE — 87088 URINE BACTERIA CULTURE: CPT | Performed by: INTERNAL MEDICINE

## 2020-11-27 RX ORDER — HEPARIN SODIUM (PORCINE) LOCK FLUSH IV SOLN 100 UNIT/ML 100 UNIT/ML
5 SOLUTION INTRAVENOUS
Status: DISCONTINUED | OUTPATIENT
Start: 2020-11-27 | End: 2020-11-27 | Stop reason: HOSPADM

## 2020-11-27 RX ADMIN — HEPARIN SODIUM (PORCINE) LOCK FLUSH IV SOLN 100 UNIT/ML 5 ML: 100 SOLUTION at 10:09

## 2020-11-27 NOTE — PROGRESS NOTES
Patient noted she has urinary frequency with burning symptoms. Collected a just-in-case urine specimen. Message sent to Gomez MCKEON and Dr. Lopez requesting orders. Tolerated port access and draw without complaint. One inch needle used. Port site scrubbed with Chloraprep for 30 seconds and allowed to dry completely prior to dressing application. Accessed using sterile technique. [place] tubes drawn-Red gel/Green/Purple tubes. Double signed by patient and RN. See documentation flowsheet. Lona Ramirez, RN, BSN, OCN

## 2020-11-29 LAB
BACTERIA SPEC CULT: ABNORMAL
Lab: ABNORMAL
SARS-COV-2 RNA SPEC QL NAA+PROBE: NOT DETECTED
SPECIMEN SOURCE: ABNORMAL
SPECIMEN SOURCE: NORMAL

## 2020-11-30 ENCOUNTER — MYC MEDICAL ADVICE (OUTPATIENT)
Dept: ONCOLOGY | Facility: CLINIC | Age: 55
End: 2020-11-30

## 2020-12-01 ENCOUNTER — PATIENT OUTREACH (OUTPATIENT)
Dept: ONCOLOGY | Facility: CLINIC | Age: 55
End: 2020-12-01

## 2020-12-01 DIAGNOSIS — C49.9 SARCOMA OF SOFT TISSUE (H): Primary | ICD-10-CM

## 2020-12-01 RX ORDER — SULFAMETHOXAZOLE/TRIMETHOPRIM 800-160 MG
1 TABLET ORAL 2 TIMES DAILY
Qty: 14 TABLET | Refills: 0 | Status: SHIPPED | OUTPATIENT
Start: 2020-12-01 | End: 2020-12-08

## 2020-12-01 RX ORDER — NYSTATIN 100000/ML
500000 SUSPENSION, ORAL (FINAL DOSE FORM) ORAL 4 TIMES DAILY
Qty: 140 ML | Refills: 0 | Status: SHIPPED | OUTPATIENT
Start: 2020-12-01 | End: 2021-03-31

## 2020-12-01 RX ORDER — LIDOCAINE/PRILOCAINE 2.5 %-2.5%
CREAM (GRAM) TOPICAL PRN
Qty: 30 G | Refills: 3 | Status: ON HOLD | OUTPATIENT
Start: 2020-12-01 | End: 2021-08-31

## 2020-12-01 RX ORDER — FLUCONAZOLE 100 MG/1
100 TABLET ORAL DAILY
Qty: 5 TABLET | Refills: 0 | Status: SHIPPED | OUTPATIENT
Start: 2020-12-01 | End: 2020-12-06

## 2020-12-02 ENCOUNTER — VIRTUAL VISIT (OUTPATIENT)
Dept: PALLIATIVE CARE | Facility: CLINIC | Age: 55
End: 2020-12-02
Attending: INTERNAL MEDICINE
Payer: COMMERCIAL

## 2020-12-02 DIAGNOSIS — C49.9 SARCOMA (H): Primary | ICD-10-CM

## 2020-12-02 DIAGNOSIS — F43.23 ADJUSTMENT DISORDER WITH MIXED ANXIETY AND DEPRESSED MOOD: ICD-10-CM

## 2020-12-02 DIAGNOSIS — R50.9 FEVER AND CHILLS: ICD-10-CM

## 2020-12-02 DIAGNOSIS — L27.1 HAND FOOT SYNDROME: ICD-10-CM

## 2020-12-02 DIAGNOSIS — G89.3 CANCER ASSOCIATED PAIN: ICD-10-CM

## 2020-12-02 DIAGNOSIS — G47.01 INSOMNIA DUE TO MEDICAL CONDITION: ICD-10-CM

## 2020-12-02 PROCEDURE — 999N001193 HC VIDEO/TELEPHONE VISIT; NO CHARGE

## 2020-12-02 PROCEDURE — 99204 OFFICE O/P NEW MOD 45 MIN: CPT | Mod: 95 | Performed by: FAMILY MEDICINE

## 2020-12-02 PROCEDURE — U0003 INFECTIOUS AGENT DETECTION BY NUCLEIC ACID (DNA OR RNA); SEVERE ACUTE RESPIRATORY SYNDROME CORONAVIRUS 2 (SARS-COV-2) (CORONAVIRUS DISEASE [COVID-19]), AMPLIFIED PROBE TECHNIQUE, MAKING USE OF HIGH THROUGHPUT TECHNOLOGIES AS DESCRIBED BY CMS-2020-01-R: HCPCS | Performed by: INTERNAL MEDICINE

## 2020-12-02 RX ORDER — UREA 8.5 G/85G
CREAM TOPICAL PRN
Qty: 453 G | Refills: 0 | Status: SHIPPED | OUTPATIENT
Start: 2020-12-02 | End: 2021-07-06

## 2020-12-02 RX ORDER — DOXEPIN HYDROCHLORIDE 10 MG/ML
3-6 SOLUTION ORAL AT BEDTIME
Qty: 118 ML | Refills: 0 | Status: SHIPPED | OUTPATIENT
Start: 2020-12-02 | End: 2020-12-04

## 2020-12-02 NOTE — PATIENT INSTRUCTIONS
It was a pleasure to meet you today, Connie.  Palliative medicine is a specialty that works to support people (and their families) going through difficult medical situations, to try to lessen symptoms from a disease or treatment of a disease, and to help you live as well as possible.    I'm going to reach out to Tiffany Astorga our Palliative  to connect with you--this is to help with processing/working through the grief and emotions that come with having a serious illness.    Try to do some movement every day.  If you can piece several 5-minute blocks that would be a half hour!  It can be walking around your apartment, stretching/yoga, gentle movement.      The [employer] website has guides for completing advanced directives and conversations with loved ones.   Going forward, if you have questions as you're filling out the forms, I'm happy to talk further with you.  If you don't feel like filling it out for a month or so, it's okay--but as time goes forward, you'll want to talk with Elliot and the kids about what your thoughts are and what feels most important to you--just in case.  We want to hope for the best and prepare in case something else happens.    Start doxepin liquid 3-6mg by mouth at bedtime.  It can make your mouth feel numb-rell.  It's an antidepressant medicine but a tiny dose (no where near the dose you would need to treat for major depression).  Let's see if getting better sleep helps you feel better.    The urea cream is for the hand/foot symptoms.  Continue with eucerin/gentle unscented lotions also.    Please follow up with me in a month.  Take good care and let me know if your symptoms are changing/getting worse.    My clinic is Wednesday Afternoons in the [facility]: [phone] (scheduling); [phone] (triage). Palliative care [facility] outpatient care coordinator is Chelsea Kline RN: [phone]. Palliative After-Hours Answering Service: [phone].

## 2020-12-02 NOTE — PROGRESS NOTES
"Richa Ashby is a 55 year old female who is being evaluated via a billable video visit.      The patient has been notified of following:     \"This video visit will be conducted via a call between you and your physician/provider. We have found that certain health care needs can be provided without the need for an in-person physical exam.  This service lets us provide the care you need with a video conversation.  If a prescription is necessary we can send it directly to your pharmacy.  If lab work is needed we can place an order for that and you can then stop by our lab to have the test done at a later time.    Video visits are billed at different rates depending on your insurance coverage.  Please reach out to your insurance provider with any questions.    If during the course of the call the physician/provider feels a video visit is not appropriate, you will not be charged for this service.\"    Patient has given verbal consent for Video visit? Yes  How would you like to obtain your AVS? MyChart  If you are dropped from the video visit, the video invite should be resent to: Text to cell phone: [phone]  Will anyone else be joining your video visit? No       Video-Visit Details    Type of service:  Video Visit    Video Start Time: 12:48  Video End Time: 1:48pm    Originating Location (pt. Location): Home    Distant Location (provider location):  [facility]     Platform used for Video Visit: James Sheth MD  Fellow, Hospice and Palliative Medicine  This visit was staffed with Dr Robert Blanca.        I have reviewed and updated the patient's allergies and medication list. Patient was asked if they had any patient reported vital signs to present, if yes, please see documented vitals.  Patient was also asked for their current weight and height, if presented, documented in vitals.    Concerns: Pt would like to discuss depression and anxiety.    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   The patient's primary care provider is:  King Diaz.     History of Present Illness:  Connie noted a R thigh lump in 11/2019 which grew larger.   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  She received radiation therapy (January) to the site and then tumor resection from R thigh 3/12/20.  Subsequently, developed cough, chest tightness and subscapular pain.  With her history of mild persistent asthma, initially thought it was an exacerbation but imaging performed confirmed presence of multiple bilateral lung nodules which were biopsied (11/02) and confirmed to be metastatic sarcoma.  Connie has followed with Dr Lopez for oncologic treatment of her sarcoma; port placed 10/28/20. Her treatment plan consists of doxil/ifosfamide initially, and possibly keytruda down the line.     Prognosis: per consultation note Oct 2020 w Dr Lopez:  We did go over the serious nature of the problem with the very limited chance of cure and the limited overall survival on average. Nevertheless, some people do quite well and we pointed out we won't know how things go until we try that. She does not want to see palliaitive yet but will keep it in mind for the future.    Distressing Symptom/s:  She has the following concerns/needs at today's palliative medicine consultation: noting acute on chronic difficulties with sleep, hard to fall and stay asleep at night (x past year).  Much more tearful in past month or so.    Feels she is having a lot more symptoms than she expected  "from chemotherapy regimen.  Noting mucositis symptoms, hand and foot syndrome (using lotion but still burning and dry skin on feet).  Notes profound fatigue.  Feels overwhelmed at times with the illness, unable to plan for future.  Lots of uncertainty.  Notices differences between her grief style and her  Elliot's (\"he shuts down emotionally\")    Has pain symptoms that vary in location.  Up until two days ago she noted pain on occasion with taking a deep breath that was sharp and brief; also had occasional subscapular pain.  Two days ago, she stopped noticing pain as much.  Also has chronic \"achiness\" and soreness over whole body that she notes at night when sleep is restless.      No nausea, no vomiting.  BMs fluctuate from sl constipation to loose; takes miralax if she hasn't had a BM in 24 hrs.    Notes chronically restless sleep for the past year.  Wakes with aching, notes back and shoulder pain.  No history snoring or ANDIE.    Describes a lot of distress with diagnosis, worries about future.  Additional situational stress this week as her  dx as COVID positive two days ago, pt's test was negative.  History of medication use for depression symptoms 25 years ago, was for seasonal affective disorder, doesn't remember what she was prescribed, took it for several months and then stopped.      Last, had fever last week with dysuria and fatigue.  Positive UC klebsiella senstive to bactrim, just started bactrim a few days ago.  Feeling better.     Patient's Disease Understanding: aware she has metastatic sarcoma, that it has approx 5% chance of cure, and that it's unclear if she will have good response to chemotherapy or not.  Aware that prognosis could be very serious.    Coping:  Tearful, struggling w  Social isolation from COVID.  Talks with her family and friends and gets support from that.  Misses work and meaningful activities.    Social History  Living Situation: lives with .   Children: two " adult children, daughter and her /granddaughter live in [place]; adult son is in the [place] stationed at [employer] and currently training in [place].  Actual/Potential Caregiver(s): 1  Support System: , family members, friends.  Has been tough w social isolation w covid.  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     Substance Use/History of misuse: no JAYY history.  1-2 glasses of wine/week prior to getting ill, no alcohol in past month.  Financial Concerns: not discussed  Spiritual Background: not discussed     Social History     Tobacco Use     Smoking status: Never Smoker     Smokeless tobacco: Never Used   Substance Use Topics     Alcohol use: Yes     Frequency: 2-3 times a week     Drinks per session: 1 or 2     Binge frequency: Never     Comment: 2-3 glasses of wine/week     Drug use: Never       Family History  Family History   Problem Relation Age of Onset     Lung Cancer Maternal Grandmother      Breast Cancer Paternal Grandmother      Patient's Involvement with Prior History of Serious Illness in Family: father  of frontotemporal dementia at 80; was in hospice 12 hrs before he .    Advance Care Planning:  Advance Directive:    none  Has form, hasn't looked at it much; feels overwhelmed.    Allergies   Allergen Reactions     Pentobarbital      resp arrest     Current Outpatient Medications   Medication Sig Dispense Refill     acetaminophen (TYLENOL) 325 MG tablet Take 325-650 mg by mouth every 6 hours as needed for mild pain       ADVAIR DISKUS 500-50 MCG/DOSE inhaler TAKE 1 PUFF BY MOUTH TWICE A DAY       albuterol (PROAIR RESPICLICK) 108 (90 Base) MCG/ACT inhaler Inhale 1-2 puffs into the lungs       cetirizine (ZYRTEC) 10 MG tablet Take 10 mg by mouth daily       fluconazole (DIFLUCAN) 100 MG tablet Take 1 tablet (100 mg) by mouth daily for 5 days 5 tablet 0     granisetron (KYTRIL) 1 MG tablet Take 2 tablets (2 mg) by mouth daily Take 2  tablets (2 mg) every morning on Days 3 through 8. 12 tablet 3     ibuprofen (ADVIL/MOTRIN) 200 MG tablet Take 400 mg by mouth every 4 hours as needed for mild pain       lidocaine-prilocaine (EMLA) 2.5-2.5 % external cream Apply topically as needed for moderate pain 30 g 3     loratadine (CLARITIN) 10 MG tablet Take 10 mg by mouth       melatonin (MELATONIN) 1 MG/ML LIQD liquid Take 5 mg by mouth       montelukast (SINGULAIR) 10 MG tablet Take 10 mg by mouth daily       nystatin (MYCOSTATIN) 982858 UNIT/ML suspension Take 5 mLs (500,000 Units) by mouth 4 times daily 140 mL 0     omeprazole (PRILOSEC) 20 MG DR capsule        prochlorperazine (COMPAZINE) 10 MG tablet Take 1 tablet (10 mg) by mouth every 6 hours as needed (Nausea/Vomiting) 30 tablet 3     sulfamethoxazole-trimethoprim (BACTRIM DS) 800-160 MG tablet Take 1 tablet by mouth 2 times daily for 7 days 14 tablet 0     vitamin C (ASCORBIC ACID) 100 MG tablet Take 100 mg by mouth daily       vitamin D3 (CHOLECALCIFEROL) 2000 units (50 mcg) tablet Take 1 tablet by mouth daily       Past Medical History:   Diagnosis Date     Asthma, chronic      Sarcoma (H)     Right lower thigh     Past Surgical History:   Procedure Laterality Date     AS RAD RESEC TONSIL/PILLARS       GALLBLADDER SURGERY       INSERT PORT VASCULAR ACCESS Right 10/28/2020    Procedure: single lumen power port placement @0745;  Surgeon: Tex Ackerman MD;  [place] [facility] [place]     IR CHEST PORT PLACEMENT > 5 YRS OF AGE  10/28/2020     RESECT TUMOR LOWER EXTREMITY Right 3/12/2020    Procedure: Resection of right thigh tumor;  Surgeon: Guillermo Johnson MD;  Location: [facility] OR     ROTATOR CUFF REPAIR RT/LT  2012     SHOULDER SURGERY       SINUS SURGERY         REVIEW OF SYSTEMS:   ROS: 10 point ROS neg other than the symptoms noted above in the HPI and here:  Palliative Symptom Review (0=no symptom/no concern, 1=mild, 2=moderate, 3=severe):      Pain: 1      Fatigue: 3      Nausea: 0       Constipation: 0      Diarrhea: 0      Depressive Symptoms: 3      Anxiety: 3      Drowsiness: 2      Poor Appetite: 2      Shortness of Breath: 1      Insomnia: 2  Losing hair in clumps; shaved her head last week; got a wig.    PE  Alert 56 yo woman, pleasant and tearful at times in discussing recent diagnosis and medical issues.  Wearing a surgical mask ( is seated >10 feet away, masked, to be supportive but to try to isolate w covid)  No pallor.  Speech fluent, normal thought content and not suicidal.  Normal psychomotor activity.  Breathing is nonlabored, no coughing or audible wheezing.      Data Reviewed:  LABS: wbc 5.0, hgb 10.5, plat 229 on   UC  positive for >100,000 cfu klebsiela  Neg covid test     IMAGING: reviewed CT chest reports from October from [facility][note 3]         Impressions:  Palliative Performance Score:  80%  Decision Making Capacity:  intact       Recommendations & Counselin yo woman with undifferentiated pleomorphic sarcoma, metastatic.  Experiencing pain due to tumor.    Pain, cancer related:  - continue acetaminophen, ibuprofen, ice/heat.  - has limited supply of oxycodone and tramadol from her lung biopsy and prior surgery; hasn't needed to use.  - pt instructed to call for update/instructions should pain symptoms progress/worsen.    Insomnia  - start doxepin 3-6ml PO at bedtime  - increase physical activity as tolerated daily.  - encourage good sleep hygeine as able.     Existential distress  - anxiety and depression mixed d/t acute grief reaction w serious medical diagnosis.  - refer to Tiffany daily for counselling; may be helpful to include pt's  as they have different grieving styles.  - didn't start selective serotonin reuptake inhibitor but would consider if severe symptoms persist in month(s) ahead.    Hand and foot syndrome  - d/t chemotherapy  - continue emollient use  - add urea cream, Rx sent.    Fatigue, due to chemotherapy  treatment    Goals of care: life prolonging.  Will discuss HCD at future visit but needed to address symptoms, provide support to pt first.    Support: LISW visits w Tiffany Astorga requested.  Follow up in clinic in 1 month with palliative medicine    Olivia Sheth MD  Fellow, Hospice and Palliative Medicine  This visit was staffed with Dr Robert Blanca.    Attending Note:  Patient seen and evaluated with Dr Sheth and I agree with/confirm their findings/recs in this note.      Thank you for involving us in the patient's care.   Robert Blanca MD / Palliative Medicine / Text me via [facility].

## 2020-12-03 ENCOUNTER — HOME INFUSION (PRE-WILLOW HOME INFUSION) (OUTPATIENT)
Dept: PHARMACY | Facility: CLINIC | Age: 55
End: 2020-12-03

## 2020-12-03 ENCOUNTER — PATIENT OUTREACH (OUTPATIENT)
Dept: ONCOLOGY | Facility: CLINIC | Age: 55
End: 2020-12-03
Payer: COMMERCIAL

## 2020-12-03 LAB
SARS-COV-2 RNA SPEC QL NAA+PROBE: ABNORMAL
SPECIMEN SOURCE: ABNORMAL

## 2020-12-04 ENCOUNTER — DOCUMENTATION ONLY (OUTPATIENT)
Dept: ONCOLOGY | Facility: CLINIC | Age: 55
End: 2020-12-04

## 2020-12-04 RX ORDER — DOXEPIN HYDROCHLORIDE 10 MG/ML
3-6 SOLUTION ORAL AT BEDTIME
Qty: 18 ML | Refills: 0 | COMMUNITY
Start: 2020-12-04 | End: 2021-01-07

## 2020-12-04 NOTE — PROGRESS NOTES
STD paperwork received via e-mail from patient for [employer]. Will be placed in provider folder for signature upon completion.     Fax: [phone]    Yvonne Askew CMA

## 2020-12-04 NOTE — PROGRESS NOTES
"Richa Ashby is a 55 year old female who is being evaluated via a billable video visit.      The patient has been notified of following:     \"This video visit will be conducted via a call between you and your physician/provider. We have found that certain health care needs can be provided without the need for an in-person physical exam.  This service lets us provide the care you need with a video conversation.  If a prescription is necessary we can send it directly to your pharmacy.  If lab work is needed we can place an order for that and you can then stop by our lab to have the test done at a later time.    Video visits are billed at different rates depending on your insurance coverage.  Please reach out to your insurance provider with any questions.    If during the course of the call the physician/provider feels a video visit is not appropriate, you will not be charged for this service.\"    Patient has given verbal consent for Video visit? No  How would you like to obtain your AVS? MyChart  If you are dropped from the video visit, the video invite should be resent to: Text to cell phone: [phone]  Will anyone else be joining your video visit? No      Vitals - Patient Reported  Weight (Patient Reported): 102.1 kg (225 lb)  Pain Score: No Pain (0)        I have reviewed and updated patient's allergy and medication list.    Concerns: NONE  Refills: NONE      Mayte Gill CMA    Video-Visit Details    Type of service:  Video Visit    Unable to connect via video. Phone call duration 18 minutes.     Originating Location (pt. 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 This led to imaging and a biopsy showing a high-grade UPS. She got preoperative radiotherapy and the tumor was resected 3-12-20. She was then followed but developed a cough and some tightness in the chest and right scapular sudden pain. She at first thought this was due to an asthma exacerbation but chest imaging revealed multiple lung nodules. Lung biopsy 11/2/2020 consistent with sarcoma.   - Started Doxil/ifos 11/13/2020, but now has COVID    Interval History:  Richa TAMANNA Ashby was met with for follow up over telephone.   - She was diagnosed with COVID on 12/2/2020. She is Exhausted, sleeping 20 hours a day. Getting slowly better. 2 days fever free as of now. Trying to stay hydrated and eat to get strength back though concerned she is not staying adequately hydrated. Having a cough, this is also improving but persists. No longer having coughing jags. Rescue inhaler seems to help. No chest pain, headaches, loss of taste/smell, facial congestion.  Notes dizziness after coughing jags and with fevers, but this is improving. Having issues with diarrhea- this is improving with imodium. Mouth tastes funny, dry, lips are cracked.  O2 sats have been 94 or above the last 2 days. Overall feels weak.   - With her chemo, she thinks she did okay, though it is tough to recall details as she is feeling so poorly right now. Noted poor taste and smell from ifos. Had some mild nausea. No emesis. No stomach pains or bowels. Had some numbness in both hands/thumb 1st and 2nd finger. Not painful. Feels like they have been burnt. This persists. Unsure if urea cream is helping. Dry skin on feet. Energy level was low, but not like now. She was able to perform ADLs. Napped 1-2 hour nap daily during that time. No reported neurotoxicities. Energy was improving just prior to being diagnosed with Covid.  - Presented to the ED on 11/4/2020 for chest pain/back pain.  Cardiac work-up was unremarkable.  There is low  suspicion for cardiac etiology for the pain.  CT scan of the chest did not show any evidence of pulmonary embolism.  As she was feeling better and labs and imaging were unremarkable, she was discharged home with instructions to follow-up with primary care.  -Followed up with palliative care on December 2.  She was started on doxepin for insomnia and urea cream for hand-foot syndrome.  Plans to follow-up in about 1 month.  They also requested follow-up with palliative social work.     ROS: 10 point ROS neg other than the symptoms noted above in the HPI.      Current Outpatient Medications   Medication Sig Dispense Refill     acetaminophen (TYLENOL) 325 MG tablet Take 325-650 mg by mouth every 6 hours as needed for mild pain       ADVAIR DISKUS 500-50 MCG/DOSE inhaler TAKE 1 PUFF BY MOUTH TWICE A DAY       albuterol (PROAIR RESPICLICK) 108 (90 Base) MCG/ACT inhaler Inhale 1-2 puffs into the lungs       cetirizine (ZYRTEC) 10 MG tablet Take 10 mg by mouth daily       doxepin (SINEQUAN) 10 MG/ML (HIGH CONC) solution Take 0.3-0.6 mLs (3-6 mg) by mouth At Bedtime 118 mL 0     fluconazole (DIFLUCAN) 100 MG tablet Take 1 tablet (100 mg) by mouth daily for 5 days 5 tablet 0     granisetron (KYTRIL) 1 MG tablet Take 2 tablets (2 mg) by mouth daily Take 2 tablets (2 mg) every morning on Days 3 through 8. 12 tablet 3     ibuprofen (ADVIL/MOTRIN) 200 MG tablet Take 400 mg by mouth every 4 hours as needed for mild pain       lidocaine-prilocaine (EMLA) 2.5-2.5 % external cream Apply topically as needed for moderate pain 30 g 3     loratadine (CLARITIN) 10 MG tablet Take 10 mg by mouth       melatonin (MELATONIN) 1 MG/ML LIQD liquid Take 5 mg by mouth       montelukast (SINGULAIR) 10 MG tablet Take 10 mg by mouth daily       nystatin (MYCOSTATIN) 370048 UNIT/ML suspension Take 5 mLs (500,000 Units) by mouth 4 times daily 140 mL 0     omeprazole (PRILOSEC) 20 MG DR capsule        prochlorperazine (COMPAZINE) 10 MG tablet Take 1  tablet (10 mg) by mouth every 6 hours as needed (Nausea/Vomiting) 30 tablet 3     sulfamethoxazole-trimethoprim (BACTRIM DS) 800-160 MG tablet Take 1 tablet by mouth 2 times daily for 7 days 14 tablet 0     urea (CARMOL) 10 % external cream Apply topically as needed for dry skin 453 g 0     vitamin D3 (CHOLECALCIFEROL) 2000 units (50 mcg) tablet Take 1 tablet by mouth daily         Physical Examination:  LMP 03/19/2019   Wt Readings from Last 10 Encounters:   11/13/20 113.4 kg (250 lb 1.6 oz)   11/04/20 108.9 kg (240 lb)   11/02/20 110.2 kg (243 lb)   10/28/20 110.2 kg (243 lb)   03/12/20 106.6 kg (235 lb)   03/04/20 106.6 kg (235 lb)   02/03/20 103.9 kg (229 lb)   01/27/20 103.9 kg (229 lb)   01/20/20 105.5 kg (232 lb 8 oz)   01/13/20 108 kg (238 lb)     Telephone visit was done today  Voice sounded strong, able to follow thought processes, did not sound to be in acute distress    Laboratory Data:  Would like to have drawn tomorrow.     Assessment and Plan:  Undifferentiated pleomorphic sarcoma  -Started doxil ifosfamide on 11/13/2020. She had BMP,PO4 d 4,5,6,7,8. Her  was doing neuro monitoring.   - Plan to restage after 2 cycles of treatment  - Previoulsy discussed the use of foundation 1 testing which we will get on the lung biopsy if possible. If that doesn't work we will get it down the original biopsy specimen not the radiation treated specimen.   - Of note, she is pre approval for keytruda  - She is established with palliative care.   - She was due for cycle #2 tomorrow, though she is still feeling pretty poorly with Covid.  We will cancel that infusion until we see clinical improvement from Covid.    Covid infection, diagnosed December 2, 2020.  -Today she reports that while she is feeling a little bit better, she is concerned about dehydration.  She recently had significant diarrhea and feels very weak.  I will see if I can get her set up for IV fluids and labs tomorrow.  I think we should also do  IV fluids about every other day for her until we find that she has some clinical improvement.  She is aware that if she continues to have issues or continues to feel weak and needs emergent care she should present back to the emergency department.  She voiced understanding.    Depression/anxiety.   - Following with palliative care and palliative SW    Insomnia.   -Taking Doxepin at night per palliative.     Reported hand-foot syndrome.  -Using urea cream for hands/feet.     Asthma.   - Baseline dyspnea- continue inhalers.     Farheen Monteiro PA-C  [facility]  [address], [place] [id number] [phone]

## 2020-12-04 NOTE — PROGRESS NOTES
This is a recent snapshot of the patient's [facility] medical record.  For current drug dose and complete information and questions, call [phone] or In Basket pool, [facility] home infusion ([id number])  CSN Number:  [id number]

## 2020-12-08 NOTE — PROGRESS NOTES
STD paperwork completed, checked for accuracy, signed and faxed to [employer] @ [phone]. A copy was made, sent to scanning and original mailed to patient at home address.    Successful transmission verified in Right Fax.      Sue Roberts CMA

## 2020-12-09 ENCOUNTER — VIRTUAL VISIT (OUTPATIENT)
Dept: ONCOLOGY | Facility: CLINIC | Age: 55
End: 2020-12-09
Attending: INTERNAL MEDICINE
Payer: COMMERCIAL

## 2020-12-09 DIAGNOSIS — U07.1 CLINICAL DIAGNOSIS OF COVID-19: ICD-10-CM

## 2020-12-09 DIAGNOSIS — E86.0 DEHYDRATION: ICD-10-CM

## 2020-12-09 DIAGNOSIS — C49.9 SARCOMA (H): Primary | ICD-10-CM

## 2020-12-09 PROCEDURE — 999N001193 HC VIDEO/TELEPHONE VISIT; NO CHARGE

## 2020-12-09 PROCEDURE — 99214 OFFICE O/P EST MOD 30 MIN: CPT | Mod: 95 | Performed by: PHYSICIAN ASSISTANT

## 2020-12-09 NOTE — LETTER
"    12/9/2020         RE: Richa Ashby  [address]  [place] [address]        Dear Colleague,    Thank you for referring your patient, Richa Ashby, to the [facility]. Please see a copy of my visit note below.    Richa Ashby is a 55 year old female who is being evaluated via a billable video visit.      The patient has been notified of following:     \"This video visit will be conducted via a call between you and your physician/provider. We have found that certain health care needs can be provided without the need for an in-person physical exam.  This service lets us provide the care you need with a video conversation.  If a prescription is necessary we can send it directly to your pharmacy.  If lab work is needed we can place an order for that and you can then stop by our lab to have the test done at a later time.    Video visits are billed at different rates depending on your insurance coverage.  Please reach out to your insurance provider with any questions.    If during the course of the call the physician/provider feels a video visit is not appropriate, you will not be charged for this service.\"    Patient has given verbal consent for Video visit? No  How would you like to obtain your AVS? MyChart  If you are dropped from the video visit, the video invite should be resent to: Text to cell phone: [phone]  Will anyone else be joining your video visit? No      Vitals - Patient Reported  Weight (Patient Reported): 102.1 kg (225 lb)  Pain Score: No Pain (0)        I have reviewed and updated patient's allergy and medication list.    Concerns: NONE  Refills: NONE      Mayte Gill CMA    Video-Visit Details    Type of service:  Video Visit    Unable to connect via video. Phone call duration 18 minutes.     Originating Location (pt. 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 This led to imaging and a biopsy showing a high-grade UPS. She got preoperative radiotherapy and the tumor was resected 3-12-20. She was then followed but developed a cough and some tightness in the chest and right scapular sudden pain. She at first thought this was due to an asthma exacerbation but chest imaging revealed multiple lung nodules. Lung biopsy 11/2/2020 consistent with sarcoma.   - Started Doxil/ifos 11/13/2020, but now has COVID    Interval History:  Richa Ashby was met with for follow up over telephone.   - She was diagnosed with COVID on 12/2/2020. She is Exhausted, sleeping 20 hours a day. Getting slowly better. 2 days fever free as of now. Trying to stay hydrated and eat to get strength back though concerned she is not staying adequately hydrated. Having a cough, this is also improving but persists. No longer having coughing jags. Rescue inhaler seems to help. No chest pain, headaches, loss of taste/smell, facial congestion.  Notes dizziness after coughing jags and with fevers, but this is improving. Having issues with diarrhea- this is improving with imodium. Mouth tastes funny, dry, lips are cracked.  O2 sats have been 94 or above the last 2 days. Overall feels weak.   - With her chemo, she thinks she did okay, though it is tough to recall details as she is feeling so poorly right now. Noted poor taste and smell from ifos. Had some mild nausea. No emesis. No stomach pains or bowels. Had some numbness in both hands/thumb 1st and 2nd finger. Not painful. Feels like they have been burnt. This persists. Unsure if urea cream is helping. Dry skin on feet. Energy level was low, but not like now.  She was able to perform ADLs. Napped 1-2 hour nap daily during that time. No reported neurotoxicities. Energy was improving just prior to being diagnosed with Covid.  - Presented to the ED on 11/4/2020 for chest pain/back pain.  Cardiac work-up was unremarkable.  There is low suspicion for cardiac etiology for the pain.  CT scan of the chest did not show any evidence of pulmonary embolism.  As she was feeling better and labs and imaging were unremarkable, she was discharged home with instructions to follow-up with primary care.  -Followed up with palliative care on December 2.  She was started on doxepin for insomnia and urea cream for hand-foot syndrome.  Plans to follow-up in about 1 month.  They also requested follow-up with palliative social work.     ROS: 10 point ROS neg other than the symptoms noted above in the HPI.      Current Outpatient Medications   Medication Sig Dispense Refill     acetaminophen (TYLENOL) 325 MG tablet Take 325-650 mg by mouth every 6 hours as needed for mild pain       ADVAIR DISKUS 500-50 MCG/DOSE inhaler TAKE 1 PUFF BY MOUTH TWICE A DAY       albuterol (PROAIR RESPICLICK) 108 (90 Base) MCG/ACT inhaler Inhale 1-2 puffs into the lungs       cetirizine (ZYRTEC) 10 MG tablet Take 10 mg by mouth daily       doxepin (SINEQUAN) 10 MG/ML (HIGH CONC) solution Take 0.3-0.6 mLs (3-6 mg) by mouth At Bedtime 118 mL 0     fluconazole (DIFLUCAN) 100 MG tablet Take 1 tablet (100 mg) by mouth daily for 5 days 5 tablet 0     granisetron (KYTRIL) 1 MG tablet Take 2 tablets (2 mg) by mouth daily Take 2 tablets (2 mg) every morning on Days 3 through 8. 12 tablet 3     ibuprofen (ADVIL/MOTRIN) 200 MG tablet Take 400 mg by mouth every 4 hours as needed for mild pain       lidocaine-prilocaine (EMLA) 2.5-2.5 % external cream Apply topically as needed for moderate pain 30 g 3     loratadine (CLARITIN) 10 MG tablet Take 10 mg by mouth       melatonin (MELATONIN) 1 MG/ML LIQD liquid Take 5 mg by mouth        montelukast (SINGULAIR) 10 MG tablet Take 10 mg by mouth daily       nystatin (MYCOSTATIN) 115868 UNIT/ML suspension Take 5 mLs (500,000 Units) by mouth 4 times daily 140 mL 0     omeprazole (PRILOSEC) 20 MG DR capsule        prochlorperazine (COMPAZINE) 10 MG tablet Take 1 tablet (10 mg) by mouth every 6 hours as needed (Nausea/Vomiting) 30 tablet 3     sulfamethoxazole-trimethoprim (BACTRIM DS) 800-160 MG tablet Take 1 tablet by mouth 2 times daily for 7 days 14 tablet 0     urea (CARMOL) 10 % external cream Apply topically as needed for dry skin 453 g 0     vitamin D3 (CHOLECALCIFEROL) 2000 units (50 mcg) tablet Take 1 tablet by mouth daily         Physical Examination:  LMP 03/19/2019   Wt Readings from Last 10 Encounters:   11/13/20 113.4 kg (250 lb 1.6 oz)   11/04/20 108.9 kg (240 lb)   11/02/20 110.2 kg (243 lb)   10/28/20 110.2 kg (243 lb)   03/12/20 106.6 kg (235 lb)   03/04/20 106.6 kg (235 lb)   02/03/20 103.9 kg (229 lb)   01/27/20 103.9 kg (229 lb)   01/20/20 105.5 kg (232 lb 8 oz)   01/13/20 108 kg (238 lb)     Telephone visit was done today  Voice sounded strong, able to follow thought processes, did not sound to be in acute distress    Laboratory Data:  Would like to have drawn tomorrow.     Assessment and Plan:  Undifferentiated pleomorphic sarcoma  -Started doxil ifosfamide on 11/13/2020. She had BMP,PO4 d 4,5,6,7,8. Her  was doing neuro monitoring.   - Plan to restage after 2 cycles of treatment  - Previoulsy discussed the use of foundation 1 testing which we will get on the lung biopsy if possible. If that doesn't work we will get it down the original biopsy specimen not the radiation treated specimen.   - Of note, she is pre approval for keytruda  - She is established with palliative care.   - She was due for cycle #2 tomorrow, though she is still feeling pretty poorly with Covid.  We will cancel that infusion until we see clinical improvement from Covid.    Covid infection, diagnosed  December 2, 2020.  -Today she reports that while she is feeling a little bit better, she is concerned about dehydration.  She recently had significant diarrhea and feels very weak.  I will see if I can get her set up for IV fluids and labs tomorrow.  I think we should also do IV fluids about every other day for her until we find that she has some clinical improvement.  She is aware that if she continues to have issues or continues to feel weak and needs emergent care she should present back to the emergency department.  She voiced understanding.    Depression/anxiety.   - Following with palliative care and palliative SW    Insomnia.   -Taking Doxepin at night per palliative.     Reported hand-foot syndrome.  -Using urea cream for hands/feet.     Asthma.   - Baseline dyspnea- continue inhalers.     Farheen Monteiro PA-C  [facility]  [address] [place] [address] [phone]

## 2020-12-14 ENCOUNTER — TELEPHONE (OUTPATIENT)
Dept: PALLIATIVE CARE | Facility: CLINIC | Age: 55
End: 2020-12-14

## 2020-12-14 NOTE — TELEPHONE ENCOUNTER
Palliative Care Counseling Contact    Data: Referral from Dr Lopez    Intervention: Call to introduce self and role, offer assistance to schedule if desired    Response/Assessment: Connie was receptive to counseling and scheduling with me    Plan: Intake  at 3pm    TEJAS Pompa, [facility]  Palliative Care Counseling Services  [facility]  Office Phone: [phone]  [phone] ([facility]) or [phone] ([place])

## 2020-12-17 NOTE — PROGRESS NOTES
"   Richa Ashby is a 55 year old female who is being evaluated via a billable video visit.      The patient has been notified of following:     \"This video visit will be conducted via a call between you and your physician/provider. We have found that certain health care needs can be provided without the need for an in-person physical exam.  This service lets us provide the care you need with a video conversation.  If a prescription is necessary we can send it directly to your pharmacy.  If lab work is needed we can place an order for that and you can then stop by our lab to have the test done at a later time.    Video visits are billed at different rates depending on your insurance coverage.  Please reach out to your insurance provider with any questions.    If during the course of the call the physician/provider feels a video visit is not appropriate, you will not be charged for this service.\"    Patient has given verbal consent for Video visit? Yes  How would you like to obtain your AVS? MyChart  If you are dropped from the video visit, the video invite should be resent to: Text to cell phone: [phone]  Will anyone else be joining your video visit? No    Vitals - Patient Reported  Weight (Patient Reported): 104.3 kg (230 lb)  Height (Patient Reported): 170.2 cm (5' 7\")  BMI (Based on Pt Reported Ht/Wt): 36.02  Pain Score: No Pain (0)      Renetta Lisa MA      Video-Visit Details    Type of service:  Video Visit    Video Start Time: 2:00 PM  Video End Time: 2:13 PM    Originating Location (pt. 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 This led to imaging and " "a biopsy showing a high-grade UPS. She got preoperative radiotherapy and the tumor was resected 3-12-20. She was then followed but developed a cough and some tightness in the chest and right scapular sudden pain. She at first thought this was due to an asthma exacerbation but chest imaging revealed multiple lung nodules. Lung biopsy 11/2/2020 consistent with sarcoma.   - Started Doxil/ifos 11/13/2020, delayed due to COVID dx 12/2    Interval History:  Richa Ashby was met with for follow up over video.    -Steady increase in energy. Awake during the day, sleeping later, maybe until 9 am. Eating and drinking again.   -First cycle went okay. Did not really have any nausea--only a couple episodes of breakthrough. Some peeling on the hands, not painful. Feet warm but not peeing. Using urea. Breathing back to baseline. No chest pain, headache/diziness. Bowels \"okay\", moving bowels every other day. intermittent numbness in last 3 fingers of both hands, positional and not request. No neurotoxicities. Doxepin for sleeping made her feel funny so she stopped. Hoping to go back to work. Son is getting  on April 10.       ROS: 10 point ROS neg other than the symptoms noted above in the HPI.    Current Outpatient Medications   Medication Sig Dispense Refill     acetaminophen (TYLENOL) 325 MG tablet Take 325-650 mg by mouth every 6 hours as needed for mild pain       ADVAIR DISKUS 500-50 MCG/DOSE inhaler TAKE 1 PUFF BY MOUTH TWICE A DAY       albuterol (PROAIR RESPICLICK) 108 (90 Base) MCG/ACT inhaler Inhale 1-2 puffs into the lungs       cetirizine (ZYRTEC) 10 MG tablet Take 10 mg by mouth daily       doxepin (SINEQUAN) 10 MG/ML (HIGH CONC) solution Take 0.3-0.6 mLs (3-6 mg) by mouth At Bedtime 18 mL 0     granisetron (KYTRIL) 1 MG tablet Take 2 tablets (2 mg) by mouth daily Take 2 tablets (2 mg) every morning on Days 3 through 8. 12 tablet 3     ibuprofen (ADVIL/MOTRIN) 200 MG tablet Take 400 mg by mouth every 4 " hours as needed for mild pain       lidocaine-prilocaine (EMLA) 2.5-2.5 % external cream Apply topically as needed for moderate pain 30 g 3     loratadine (CLARITIN) 10 MG tablet Take 10 mg by mouth       melatonin (MELATONIN) 1 MG/ML LIQD liquid Take 5 mg by mouth       montelukast (SINGULAIR) 10 MG tablet Take 10 mg by mouth daily       nystatin (MYCOSTATIN) 020207 UNIT/ML suspension Take 5 mLs (500,000 Units) by mouth 4 times daily 140 mL 0     omeprazole (PRILOSEC) 20 MG DR capsule        prochlorperazine (COMPAZINE) 10 MG tablet Take 1 tablet (10 mg) by mouth every 6 hours as needed (Nausea/Vomiting) 30 tablet 3     urea (CARMOL) 10 % external cream Apply topically as needed for dry skin 453 g 0     vitamin D3 (CHOLECALCIFEROL) 2000 units (50 mcg) tablet Take 1 tablet by mouth daily         Physical Examination:  /68 (BP Location: Right arm, Patient Position: Sitting, Cuff Size: Adult Large)   Pulse 89   Temp 99.6  F (37.6  C)   Resp 16   Wt 106.5 kg (234 lb 14.4 oz)   LMP 03/19/2019   SpO2 97%   BMI 36.79 kg/m      Wt Readings from Last 10 Encounters:   11/13/20 113.4 kg (250 lb 1.6 oz)   11/04/20 108.9 kg (240 lb)   11/02/20 110.2 kg (243 lb)   10/28/20 110.2 kg (243 lb)   03/12/20 106.6 kg (235 lb)   03/04/20 106.6 kg (235 lb)   02/03/20 103.9 kg (229 lb)   01/27/20 103.9 kg (229 lb)   01/20/20 105.5 kg (232 lb 8 oz)   01/13/20 108 kg (238 lb)     Telephone visit was done today  Voice sounded strong, able to follow thought processes, did not sound to be in acute distress    Laboratory Data:     12/18/2020 13:39   Sodium 138   Potassium 4.0   Chloride 104   Carbon Dioxide 27   Urea Nitrogen 9   Creatinine 0.65   GFR Estimate >90   GFR Estimate If Black >90   Calcium 9.7   Anion Gap 7   Magnesium 2.3   Phosphorus 3.1   Albumin 3.0 (L)   Protein Total 7.9   Bilirubin Total 0.4   Alkaline Phosphatase 158 (H)   ALT 53 (H)   AST 32   Lactate Dehydrogenase 198   Glucose 97   WBC 5.8   Hemoglobin 10.1  (L)   Hematocrit 32.0 (L)   Platelet Count 444   RBC Count 3.61 (L)   MCV 89   MCH 28.0   MCHC 31.6   RDW 14.9   Diff Method Automated Method   % Neutrophils 55.3   % Lymphocytes 25.4   % Monocytes 11.1   % Eosinophils 6.8   % Basophils 0.7   % Immature Granulocytes 0.7   Nucleated RBCs 0   Absolute Neutrophil 3.2   Absolute Lymphocytes 1.5   Absolute Monocytes 0.6   Absolute Eosinophils 0.4   Absolute Basophils 0.0   Abs Immature Granulocytes 0.0   Absolute Nucleated RBC 0.0        Assessment and Plan:  Undifferentiated pleomorphic sarcoma  -Started doxil ifosfamide on 11/13/2020. She had BMP,PO4 d 4,5,6,7,8. Her  was doing neuro monitoring.   - Plan to restage after 2 cycles of treatment, I will request this for next month.   - Previoulsy discussed the use of foundation 1 testing which we will get on the lung biopsy if possible. If that doesn't work we will get it down the original biopsy specimen not the radiation treated specimen.   - Of note, she is pre approval for keytruda  - She is established with palliative care.   - Cycle #2 delayed due to COVID recovery. Today, she is back to baseline and labs okay so will proceed.    -Follow up with chest CT prior to C3.     Covid infection, diagnosed December 2, 2020.  -Breathing, bowels, appetite, and energy have all greatly improved. No concerns with treatment today.     Depression/anxiety.   - Following with palliative care and palliative SW    Insomnia.   -Stopped taking Doxepin due to side effects. Did not discuss other option today. Defer to palliative    Reported hand-foot syndrome.  -Using urea cream for hands/feet. Grade 1    Asthma.   - Baseline dyspnea- continue inhalers.     Elevated transaminases: Improving today compared to 12/3 in ER. 2/2 COVID? [place].     Ilda Hogue, CNP on 12/18/2020 at 4:39 PM

## 2020-12-18 ENCOUNTER — INFUSION THERAPY VISIT (OUTPATIENT)
Dept: ONCOLOGY | Facility: CLINIC | Age: 55
End: 2020-12-18
Attending: INTERNAL MEDICINE
Payer: COMMERCIAL

## 2020-12-18 ENCOUNTER — HOME INFUSION (PRE-WILLOW HOME INFUSION) (OUTPATIENT)
Dept: PHARMACY | Facility: CLINIC | Age: 55
End: 2020-12-18

## 2020-12-18 VITALS
RESPIRATION RATE: 16 BRPM | DIASTOLIC BLOOD PRESSURE: 68 MMHG | OXYGEN SATURATION: 97 % | WEIGHT: 234.9 LBS | SYSTOLIC BLOOD PRESSURE: 112 MMHG | TEMPERATURE: 99.6 F | HEART RATE: 89 BPM | BODY MASS INDEX: 36.79 KG/M2

## 2020-12-18 DIAGNOSIS — Z53.9 ERRONEOUS ENCOUNTER--DISREGARD: Primary | ICD-10-CM

## 2020-12-18 DIAGNOSIS — R91.8 PULMONARY NODULES: ICD-10-CM

## 2020-12-18 DIAGNOSIS — C49.9 SARCOMA (H): Primary | ICD-10-CM

## 2020-12-18 LAB
ALBUMIN SERPL-MCNC: 3 G/DL (ref 3.4–5)
ALP SERPL-CCNC: 158 U/L (ref 40–150)
ALT SERPL W P-5'-P-CCNC: 53 U/L (ref 0–50)
ANION GAP SERPL CALCULATED.3IONS-SCNC: 7 MMOL/L (ref 3–14)
AST SERPL W P-5'-P-CCNC: 32 U/L (ref 0–45)
BASOPHILS # BLD AUTO: 0 10E9/L (ref 0–0.2)
BASOPHILS NFR BLD AUTO: 0.7 %
BILIRUB SERPL-MCNC: 0.4 MG/DL (ref 0.2–1.3)
BUN SERPL-MCNC: 9 MG/DL (ref 7–30)
CALCIUM SERPL-MCNC: 9.7 MG/DL (ref 8.5–10.1)
CHLORIDE SERPL-SCNC: 104 MMOL/L (ref 94–109)
CO2 SERPL-SCNC: 27 MMOL/L (ref 20–32)
CREAT SERPL-MCNC: 0.65 MG/DL (ref 0.52–1.04)
DIFFERENTIAL METHOD BLD: ABNORMAL
EOSINOPHIL # BLD AUTO: 0.4 10E9/L (ref 0–0.7)
EOSINOPHIL NFR BLD AUTO: 6.8 %
ERYTHROCYTE [DISTWIDTH] IN BLOOD BY AUTOMATED COUNT: 14.9 % (ref 10–15)
GFR SERPL CREATININE-BSD FRML MDRD: >90 ML/MIN/{1.73_M2}
GLUCOSE SERPL-MCNC: 97 MG/DL (ref 70–99)
HCT VFR BLD AUTO: 32 % (ref 35–47)
HGB BLD-MCNC: 10.1 G/DL (ref 11.7–15.7)
IMM GRANULOCYTES # BLD: 0 10E9/L (ref 0–0.4)
IMM GRANULOCYTES NFR BLD: 0.7 %
LDH SERPL L TO P-CCNC: 198 U/L (ref 81–234)
LYMPHOCYTES # BLD AUTO: 1.5 10E9/L (ref 0.8–5.3)
LYMPHOCYTES NFR BLD AUTO: 25.4 %
MAGNESIUM SERPL-MCNC: 2.3 MG/DL (ref 1.6–2.3)
MCH RBC QN AUTO: 28 PG (ref 26.5–33)
MCHC RBC AUTO-ENTMCNC: 31.6 G/DL (ref 31.5–36.5)
MCV RBC AUTO: 89 FL (ref 78–100)
MONOCYTES # BLD AUTO: 0.6 10E9/L (ref 0–1.3)
MONOCYTES NFR BLD AUTO: 11.1 %
NEUTROPHILS # BLD AUTO: 3.2 10E9/L (ref 1.6–8.3)
NEUTROPHILS NFR BLD AUTO: 55.3 %
NRBC # BLD AUTO: 0 10*3/UL
NRBC BLD AUTO-RTO: 0 /100
PHOSPHATE SERPL-MCNC: 3.1 MG/DL (ref 2.5–4.5)
PLATELET # BLD AUTO: 444 10E9/L (ref 150–450)
POTASSIUM SERPL-SCNC: 4 MMOL/L (ref 3.4–5.3)
PROT SERPL-MCNC: 7.9 G/DL (ref 6.8–8.8)
RBC # BLD AUTO: 3.61 10E12/L (ref 3.8–5.2)
SODIUM SERPL-SCNC: 138 MMOL/L (ref 133–144)
WBC # BLD AUTO: 5.8 10E9/L (ref 4–11)

## 2020-12-18 PROCEDURE — 250N000011 HC RX IP 250 OP 636: Performed by: PHYSICIAN ASSISTANT

## 2020-12-18 PROCEDURE — 96375 TX/PRO/DX INJ NEW DRUG ADDON: CPT

## 2020-12-18 PROCEDURE — 83735 ASSAY OF MAGNESIUM: CPT | Performed by: NURSE PRACTITIONER

## 2020-12-18 PROCEDURE — 80053 COMPREHEN METABOLIC PANEL: CPT | Performed by: NURSE PRACTITIONER

## 2020-12-18 PROCEDURE — 84100 ASSAY OF PHOSPHORUS: CPT | Performed by: NURSE PRACTITIONER

## 2020-12-18 PROCEDURE — 85025 COMPLETE CBC W/AUTO DIFF WBC: CPT | Performed by: NURSE PRACTITIONER

## 2020-12-18 PROCEDURE — G0498 CHEMO EXTEND IV INFUS W/PUMP: HCPCS

## 2020-12-18 PROCEDURE — 250N000011 HC RX IP 250 OP 636: Mod: GT | Performed by: NURSE PRACTITIONER

## 2020-12-18 PROCEDURE — 99214 OFFICE O/P EST MOD 30 MIN: CPT | Mod: 95 | Performed by: NURSE PRACTITIONER

## 2020-12-18 PROCEDURE — 83615 LACTATE (LD) (LDH) ENZYME: CPT | Performed by: NURSE PRACTITIONER

## 2020-12-18 PROCEDURE — 96415 CHEMO IV INFUSION ADDL HR: CPT

## 2020-12-18 PROCEDURE — 96413 CHEMO IV INFUSION 1 HR: CPT

## 2020-12-18 PROCEDURE — 258N000003 HC RX IP 258 OP 636: Performed by: PHYSICIAN ASSISTANT

## 2020-12-18 RX ORDER — ALBUTEROL SULFATE 0.83 MG/ML
2.5 SOLUTION RESPIRATORY (INHALATION)
Status: CANCELLED | OUTPATIENT
Start: 2020-12-18

## 2020-12-18 RX ORDER — ALBUTEROL SULFATE 90 UG/1
1-2 AEROSOL, METERED RESPIRATORY (INHALATION)
Status: CANCELLED
Start: 2020-12-18

## 2020-12-18 RX ORDER — DIPHENHYDRAMINE HYDROCHLORIDE 50 MG/ML
50 INJECTION INTRAMUSCULAR; INTRAVENOUS
Status: CANCELLED
Start: 2020-12-18

## 2020-12-18 RX ORDER — METHYLPREDNISOLONE SODIUM SUCCINATE 125 MG/2ML
125 INJECTION, POWDER, LYOPHILIZED, FOR SOLUTION INTRAMUSCULAR; INTRAVENOUS
Status: CANCELLED
Start: 2020-12-18

## 2020-12-18 RX ORDER — HEPARIN SODIUM (PORCINE) LOCK FLUSH IV SOLN 100 UNIT/ML 100 UNIT/ML
5 SOLUTION INTRAVENOUS
Status: COMPLETED | OUTPATIENT
Start: 2020-12-18 | End: 2020-12-18

## 2020-12-18 RX ORDER — HEPARIN SODIUM,PORCINE 10 UNIT/ML
5 VIAL (ML) INTRAVENOUS
Status: CANCELLED | OUTPATIENT
Start: 2020-12-18

## 2020-12-18 RX ORDER — HEPARIN SODIUM (PORCINE) LOCK FLUSH IV SOLN 100 UNIT/ML 100 UNIT/ML
5 SOLUTION INTRAVENOUS
Status: CANCELLED | OUTPATIENT
Start: 2020-12-18

## 2020-12-18 RX ORDER — LORAZEPAM 2 MG/ML
0.5 INJECTION INTRAMUSCULAR EVERY 4 HOURS PRN
Status: CANCELLED
Start: 2020-12-18

## 2020-12-18 RX ORDER — EPINEPHRINE 1 MG/ML
0.3 INJECTION, SOLUTION INTRAMUSCULAR; SUBCUTANEOUS EVERY 5 MIN PRN
Status: CANCELLED | OUTPATIENT
Start: 2020-12-18

## 2020-12-18 RX ORDER — SODIUM CHLORIDE 9 MG/ML
1000 INJECTION, SOLUTION INTRAVENOUS CONTINUOUS PRN
Status: CANCELLED
Start: 2020-12-18

## 2020-12-18 RX ORDER — NALOXONE HYDROCHLORIDE 0.4 MG/ML
.1-.4 INJECTION, SOLUTION INTRAMUSCULAR; INTRAVENOUS; SUBCUTANEOUS
Status: CANCELLED | OUTPATIENT
Start: 2020-12-18

## 2020-12-18 RX ORDER — PALONOSETRON 0.05 MG/ML
0.25 INJECTION, SOLUTION INTRAVENOUS ONCE
Status: COMPLETED | OUTPATIENT
Start: 2020-12-18 | End: 2020-12-18

## 2020-12-18 RX ORDER — PALONOSETRON 0.05 MG/ML
0.25 INJECTION, SOLUTION INTRAVENOUS ONCE
Status: CANCELLED
Start: 2020-12-18

## 2020-12-18 RX ORDER — MEPERIDINE HYDROCHLORIDE 25 MG/ML
25 INJECTION INTRAMUSCULAR; INTRAVENOUS; SUBCUTANEOUS EVERY 30 MIN PRN
Status: CANCELLED | OUTPATIENT
Start: 2020-12-18

## 2020-12-18 RX ADMIN — Medication 5 ML: at 13:19

## 2020-12-18 RX ADMIN — PALONOSETRON 0.25 MG: 0.05 INJECTION, SOLUTION INTRAVENOUS at 14:41

## 2020-12-18 RX ADMIN — DOXORUBICIN HYDROCHLORIDE 100 MG: 2 INJECTABLE, LIPOSOMAL INTRAVENOUS at 15:04

## 2020-12-18 RX ADMIN — DEXTROSE MONOHYDRATE 250 ML: 50 INJECTION, SOLUTION INTRAVENOUS at 14:40

## 2020-12-18 NOTE — PROGRESS NOTES
Infusion Nursing Note:  Richa Ashby presents today for C2D1 Doxil-Ifosfamide/[place] pump.    Patient seen by provider today: Yes: Ilda Hogue, CLIFF    present during visit today: Not Applicable.    Note: Pt saw provider prior to infusion, ok for treament.    Doxil RXN C1, titrated as follows today:  10ml-5min  25ml-5min  50ml-5min  100ml-5min  360ml for the rest of the infusion.    Based on treatment plan, additional labs are not ordered for D4-8.  IB sent to Ilda to verify no labs on D8.    Intravenous Access:  Implanted Port.    Treatment Conditions:  Lab Results   Component Value Date    HGB 10.1 12/18/2020     Lab Results   Component Value Date    WBC 5.8 12/18/2020      Lab Results   Component Value Date    ANEU 3.2 12/18/2020     Lab Results   Component Value Date     12/18/2020      Lab Results   Component Value Date     12/18/2020                   Lab Results   Component Value Date    POTASSIUM 4.0 12/18/2020           Lab Results   Component Value Date    MAG 2.3 12/18/2020            Lab Results   Component Value Date    CR 0.65 12/18/2020                   Lab Results   Component Value Date    VIKTORIA 9.7 12/18/2020                Lab Results   Component Value Date    BILITOTAL 0.4 12/18/2020           Lab Results   Component Value Date    ALBUMIN 3.0 12/18/2020                    Lab Results   Component Value Date    ALT 53 12/18/2020           Lab Results   Component Value Date    AST 32 12/18/2020       ECHO/MUGA completed 10/27  EF 60-65%.      Post Infusion Assessment:  Patient tolerated infusion without incident.  Blood return noted pre and post infusion.  Site patent and intact, free from redness, edema or discomfort.  No evidence of extravasations.   Prior to discharge: Port is secured in place with tegaderm and flushed with 10cc NS with positive blood return noted.  Continuous home infusion CADD pump connected.    All connectors secured in place and clamps taped open.   "  Pump started, \"running\" noted on display (CADD): YES.  Pump Connection double checked with Yvonne Purdy RN.  Patient instructed to call our clinic or [facility] with any questions or concerns at home.  Patient verbalized understanding.    Patient set up for pump disconnect at home with [facility] on 12/24 @1700 for a [place] bag change and 12/25@1700 for Pump d-c and NeuSharita mckinney @ [facility] aware.          Discharge Plan:   Prescription refills given for Kytril.  Discharge instructions reviewed with: Patient.  Patient and/or family verbalized understanding of discharge instructions and all questions answered.  AVS to patient via [employer].  Checkout orders not done prior to infusion.  Patient discharged in stable condition accompanied by: self.  Departure Mode: Ambulatory.    Suzan Bishop RN        "

## 2020-12-18 NOTE — LETTER
12/18/2020         RE: Richa Ashby  [address]  [address]  [place] [address]        Dear Colleague,    Thank you for referring your patient, Richa Ashby, to the [facility]. Please see a copy of my visit note below.      This encounter was opened in error. Please disregard.      Again, thank you for allowing me to participate in the care of your patient.        Sincerely,        [facility]

## 2020-12-18 NOTE — LETTER
"    12/18/2020         RE: Richa Ashby  [address]  [address]  [place] [address]        Dear Colleague,    Thank you for referring your patient, Richa Ashby, to the [facility]. Please see a copy of my visit note below.       Richa Ashby is a 55 year old female who is being evaluated via a billable video visit.      The patient has been notified of following:     \"This video visit will be conducted via a call between you and your physician/provider. We have found that certain health care needs can be provided without the need for an in-person physical exam.  This service lets us provide the care you need with a video conversation.  If a prescription is necessary we can send it directly to your pharmacy.  If lab work is needed we can place an order for that and you can then stop by our lab to have the test done at a later time.    Video visits are billed at different rates depending on your insurance coverage.  Please reach out to your insurance provider with any questions.    If during the course of the call the physician/provider feels a video visit is not appropriate, you will not be charged for this service.\"    Patient has given verbal consent for Video visit? Yes  How would you like to obtain your AVS? MyChart  If you are dropped from the video visit, the video invite should be resent to: Text to cell phone: [phone]  Will anyone else be joining your video visit? No    Vitals - Patient Reported  Weight (Patient Reported): 104.3 kg (230 lb)  Height (Patient Reported): 170.2 cm (5' 7\")  BMI (Based on Pt Reported Ht/Wt): 36.02  Pain Score: No Pain (0)      Renetta Lisa MA      Video-Visit Details    Type of service:  Video Visit    Video Start Time: 2:00 PM  Video End Time: 2:13 PM    Originating Location (pt. 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 This led to imaging and a biopsy showing a high-grade UPS. She got preoperative radiotherapy and the tumor was resected 3-12-20. She was then followed but developed a cough and some tightness in the chest and right scapular sudden pain. She at first thought this was due to an asthma exacerbation but chest imaging revealed multiple lung nodules. Lung biopsy 11/2/2020 consistent with sarcoma.   - Started Doxil/ifos 11/13/2020, delayed due to COVID dx 12/2    Interval History:  Richa Ashby was met with for follow up over video.    -Steady increase in energy. Awake during the day, sleeping later, maybe until 9 am. Eating and drinking again.   -First cycle went okay. Did not really have any nausea--only a couple episodes of breakthrough. Some peeling on the hands, not painful. Feet warm but not peeing. Using urea. Breathing back to baseline. No chest pain, headache/diziness. Bowels \"okay\", moving bowels every other day. intermittent numbness in last 3 fingers of both hands, positional and not request. No neurotoxicities. Doxepin for sleeping made her feel funny so she stopped. Hoping to go back to work. Son is getting  on April 10.       ROS: 10 point ROS neg other than the symptoms noted above in the HPI.    Current Outpatient Medications   Medication Sig Dispense Refill     acetaminophen (TYLENOL) 325 MG tablet Take 325-650 mg by mouth every 6 hours as needed for mild pain       ADVAIR DISKUS 500-50 MCG/DOSE inhaler TAKE 1 PUFF BY MOUTH TWICE A DAY       albuterol (PROAIR RESPICLICK) 108 (90 Base) MCG/ACT inhaler Inhale 1-2 puffs into the lungs       cetirizine (ZYRTEC) 10 MG tablet Take 10 mg by mouth daily       doxepin (SINEQUAN) 10 MG/ML (HIGH " CONC) solution Take 0.3-0.6 mLs (3-6 mg) by mouth At Bedtime 18 mL 0     granisetron (KYTRIL) 1 MG tablet Take 2 tablets (2 mg) by mouth daily Take 2 tablets (2 mg) every morning on Days 3 through 8. 12 tablet 3     ibuprofen (ADVIL/MOTRIN) 200 MG tablet Take 400 mg by mouth every 4 hours as needed for mild pain       lidocaine-prilocaine (EMLA) 2.5-2.5 % external cream Apply topically as needed for moderate pain 30 g 3     loratadine (CLARITIN) 10 MG tablet Take 10 mg by mouth       melatonin (MELATONIN) 1 MG/ML LIQD liquid Take 5 mg by mouth       montelukast (SINGULAIR) 10 MG tablet Take 10 mg by mouth daily       nystatin (MYCOSTATIN) 027731 UNIT/ML suspension Take 5 mLs (500,000 Units) by mouth 4 times daily 140 mL 0     omeprazole (PRILOSEC) 20 MG DR capsule        prochlorperazine (COMPAZINE) 10 MG tablet Take 1 tablet (10 mg) by mouth every 6 hours as needed (Nausea/Vomiting) 30 tablet 3     urea (CARMOL) 10 % external cream Apply topically as needed for dry skin 453 g 0     vitamin D3 (CHOLECALCIFEROL) 2000 units (50 mcg) tablet Take 1 tablet by mouth daily         Physical Examination:  /68 (BP Location: Right arm, Patient Position: Sitting, Cuff Size: Adult Large)   Pulse 89   Temp 99.6  F (37.6  C)   Resp 16   Wt 106.5 kg (234 lb 14.4 oz)   LMP 03/19/2019   SpO2 97%   BMI 36.79 kg/m      Wt Readings from Last 10 Encounters:   11/13/20 113.4 kg (250 lb 1.6 oz)   11/04/20 108.9 kg (240 lb)   11/02/20 110.2 kg (243 lb)   10/28/20 110.2 kg (243 lb)   03/12/20 106.6 kg (235 lb)   03/04/20 106.6 kg (235 lb)   02/03/20 103.9 kg (229 lb)   01/27/20 103.9 kg (229 lb)   01/20/20 105.5 kg (232 lb 8 oz)   01/13/20 108 kg (238 lb)     Telephone visit was done today  Voice sounded strong, able to follow thought processes, did not sound to be in acute distress    Laboratory Data:     12/18/2020 13:39   Sodium 138   Potassium 4.0   Chloride 104   Carbon Dioxide 27   Urea Nitrogen 9   Creatinine 0.65   GFR  Estimate >90   GFR Estimate If Black >90   Calcium 9.7   Anion Gap 7   Magnesium 2.3   Phosphorus 3.1   Albumin 3.0 (L)   Protein Total 7.9   Bilirubin Total 0.4   Alkaline Phosphatase 158 (H)   ALT 53 (H)   AST 32   Lactate Dehydrogenase 198   Glucose 97   WBC 5.8   Hemoglobin 10.1 (L)   Hematocrit 32.0 (L)   Platelet Count 444   RBC Count 3.61 (L)   MCV 89   MCH 28.0   MCHC 31.6   RDW 14.9   Diff Method Automated Method   % Neutrophils 55.3   % Lymphocytes 25.4   % Monocytes 11.1   % Eosinophils 6.8   % Basophils 0.7   % Immature Granulocytes 0.7   Nucleated RBCs 0   Absolute Neutrophil 3.2   Absolute Lymphocytes 1.5   Absolute Monocytes 0.6   Absolute Eosinophils 0.4   Absolute Basophils 0.0   Abs Immature Granulocytes 0.0   Absolute Nucleated RBC 0.0        Assessment and Plan:  Undifferentiated pleomorphic sarcoma  -Started doxil ifosfamide on 11/13/2020. She had BMP,PO4 d 4,5,6,7,8. Her  was doing neuro monitoring.   - Plan to restage after 2 cycles of treatment, I will request this for next month.   - Previoulsy discussed the use of foundation 1 testing which we will get on the lung biopsy if possible. If that doesn't work we will get it down the original biopsy specimen not the radiation treated specimen.   - Of note, she is pre approval for keytruda  - She is established with palliative care.   - Cycle #2 delayed due to COVID recovery. Today, she is back to baseline and labs okay so will proceed.    -Follow up with chest CT prior to C3.     Covid infection, diagnosed December 2, 2020.  -Breathing, bowels, appetite, and energy have all greatly improved. No concerns with treatment today.     Depression/anxiety.   - Following with palliative care and palliative SW    Insomnia.   -Stopped taking Doxepin due to side effects. Did not discuss other option today. Defer to palliative    Reported hand-foot syndrome.  -Using urea cream for hands/feet. Grade 1    Asthma.   - Baseline dyspnea- continue inhalers.      Elevated transaminases: Improving today compared to 12/3 in ER. 2/2 COVID? [place].     Ilda Hogue CNP on 12/18/2020 at 4:39 PM        Again, thank you for allowing me to participate in the care of your patient.        Sincerely,        Ilda Hogue CNP

## 2020-12-18 NOTE — NURSING NOTE
"Chief Complaint   Patient presents with     Video Visit     SARCOMA     Port Draw     labs drawn from port by rn.  vs taken     Port accessed with 20 gauge 1\" Power needle and labs drawn by rn.  Port flushed with NS and heparin.  Pt tolerated well.  VS taken.      Kassi Landin RN      "

## 2020-12-21 ENCOUNTER — MEDICAL CORRESPONDENCE (OUTPATIENT)
Dept: HEALTH INFORMATION MANAGEMENT | Facility: CLINIC | Age: 55
End: 2020-12-21

## 2020-12-21 ENCOUNTER — HOME INFUSION (PRE-WILLOW HOME INFUSION) (OUTPATIENT)
Dept: PHARMACY | Facility: CLINIC | Age: 55
End: 2020-12-21

## 2020-12-21 LAB
ANION GAP SERPL CALCULATED.3IONS-SCNC: 6 MMOL/L (ref 3–14)
BUN SERPL-MCNC: 11 MG/DL (ref 7–30)
CALCIUM SERPL-MCNC: 9.1 MG/DL (ref 8.5–10.1)
CHLORIDE SERPL-SCNC: 104 MMOL/L (ref 94–109)
CO2 SERPL-SCNC: 28 MMOL/L (ref 20–32)
CREAT SERPL-MCNC: 0.68 MG/DL (ref 0.52–1.04)
GFR SERPL CREATININE-BSD FRML MDRD: >90 ML/MIN/{1.73_M2}
GLUCOSE SERPL-MCNC: 64 MG/DL (ref 70–99)
MAGNESIUM SERPL-MCNC: 2.2 MG/DL (ref 1.6–2.3)
PHOSPHATE SERPL-MCNC: 3.2 MG/DL (ref 2.5–4.5)
POTASSIUM SERPL-SCNC: 4.2 MMOL/L (ref 3.4–5.3)
SODIUM SERPL-SCNC: 138 MMOL/L (ref 133–144)

## 2020-12-21 PROCEDURE — 84100 ASSAY OF PHOSPHORUS: CPT | Performed by: INTERNAL MEDICINE

## 2020-12-21 PROCEDURE — 80048 BASIC METABOLIC PNL TOTAL CA: CPT | Performed by: INTERNAL MEDICINE

## 2020-12-21 PROCEDURE — 83735 ASSAY OF MAGNESIUM: CPT | Performed by: INTERNAL MEDICINE

## 2020-12-21 NOTE — PROGRESS NOTES
This encounter was opened in error. Please disregard.   Hospitalist Progress Note    NAME: Irineo Lorenzo   :  1953   MRN:  [id number]       Assessment / Plan:  Acute metabolic encephalopathy  -improved, AAOx4 but need placement , lives alone   --states he took  wife's ativan Rx (she passed in July). I-CXR low lung volumes, no acute findings  -Leukocytosis labile 15>13>17>15  - UA negative   -COVID-19 negative    MINA   Urinary retention   Creat trending up :1.37>1.70  Started on IVF   Renal US   Bladder scan showed more than 2liter   Will place nash     Depression  Bereavement  -lost wife of 30+ years in 2019  -denies intent to self harm but admits he is still struggling with the loss  Psych cs noted : adjustment disorder    Sedative overdose?  -as above UDS negative for benzos but reports taking wife's ativan Rx, unknown dose  -CT head negative  -UA negative  -No fever  -History of EtOH abuse, clean last 19 months per [facility] and family counselor  -Ativan as needed for agitation for now  -was started On empiric ceftriaxone for? UTI but UA unimpressive. Bcx negative   Chest xray wnl 3days ago  Will DC ceftriaxone     CAD  -Continue statin, beta-blocker, Plavix    DM2  -SSI insulin, POC's  C/w lantus , dose increased to 35units   Get 46units at home     Tobacco use  -Nicotine patch    Hx EtOH abuse  -DC ativan with concern for sedative OD on admission. No signs of withdrawal and per [facility] and family counselor, not drinking for 19 months    Body mass index is 28.4 kg/m². Spoke to 1st cousin who is [facility], Lisa Blankenship and Samina Mcpherson, family therapist: [phone]    Code status: DNR, DDNR on file  Prophylaxis: heparin     Subjective:     Chief Complaint / Reason for Physician Visit  FU AMS . Pt is AAOx4 .  No acute complaints   Review of Systems:  Symptom Y/N Comments  Symptom Y/N Comments   Fever/Chills n   Chest Pain n    Poor Appetite    Edema     Cough n   Abdominal Pain n    Sputum n   Joint Pain     SOB/ROTHMAN n   Pruritis/Rash     Nausea/vomit n   Tolerating PT/OT     Diarrhea n   Tolerating Diet y    Constipation y   Other       Could NOT obtain due to:      Objective:     VITALS:   Last 24hrs VS reviewed since prior progress note. Most recent are:  Patient Vitals for the past 24 hrs:   Temp Pulse Resp BP SpO2   05/19/20 0307 98 °F (36.7 °C) 81 18 163/89 98 %   05/18/20 2153 97.5 °F (36.4 °C) 71 18 153/82 94 %   05/18/20 1957 98.3 °F (36.8 °C) 75 18 160/82 96 %   05/18/20 1602 97.8 °F (36.6 °C) 75 20 146/67 97 %   05/18/20 1104 97 °F (36.1 °C) 95 20 179/87 98 %   05/18/20 0835 99.5 °F (37.5 °C) 89 20 (!) 188/91 98 %       Intake/Output Summary (Last 24 hours) at 5/19/2020 0734  Last data filed at 5/18/2020 1351  Gross per 24 hour   Intake 1379.17 ml   Output    Net 1379.17 ml        PHYSICAL EXAM:  General: , no acute distress  EENT:  EOMI. Anicteric sclerae. MMM  Resp:  Normal breath sounds . No accessory muscle use  CV:  Regular  rhythm,  No edema  GI:  Soft, Non distended, Non tender.  +Bowel sounds  Neurologic:  AAOx4  Psych:   Poor insight   Skin:  No rashes. No jaundice    Reviewed most current lab test results and cultures  YES  Reviewed most current radiology test results   YES  Review and summation of old records today    NO  Reviewed patient's current orders and MAR    YES  PMH/ reviewed - no change compared to H&P  ________________________________________________________________________  Care Plan discussed with:    Comments   Patient x    Family      RN x    Care Manager     Consultant                       x Multidiciplinary team rounds were held today with , nursing, pharmacist and clinical coordinator. Patient's plan of care was discussed; medications were reviewed and discharge planning was addressed.      ________________________________________________________________________  Total NON critical care TIME:  35   Minutes    Total CRITICAL CARE TIME Spent:   Minutes non procedure based      Comments   >50% of visit spent in counseling and coordination of care x    ________________________________________________________________________  Janice Puente MD     Procedures: see electronic medical records for all procedures/Xrays and details which were not copied into this note but were reviewed prior to creation of Plan. LABS:  I reviewed today's most current labs and imaging studies.   Pertinent labs include:  Recent Labs     05/18/20  0316 05/17/20  1218 05/16/20  1751   WBC 17.2* 13.5* 15.7*   HGB 12.6 12.6 12.6   HCT 37.1 36.7 38.0    204 219     Recent Labs     05/18/20  0316 05/17/20  1218 05/16/20  1751    137 138   K 4.1 4.3 3.7    107 105   CO2 22 24 27   * 243* 218*   BUN 32* 29* 30*   CREA 1.37* 1.18 1.35*   CA 8.9 8.8 9.8   MG 2.6* 1.2*  --    ALB  --   --  4.0   TBILI  --   --  0.3   SGOT  --   --  14*   ALT  --   --  27       Signed: Janice Puente MD

## 2020-12-22 ENCOUNTER — HOME INFUSION (PRE-WILLOW HOME INFUSION) (OUTPATIENT)
Dept: PHARMACY | Facility: CLINIC | Age: 55
End: 2020-12-22

## 2020-12-22 LAB
ANION GAP SERPL CALCULATED.3IONS-SCNC: 7 MMOL/L (ref 3–14)
BUN SERPL-MCNC: 14 MG/DL (ref 7–30)
CALCIUM SERPL-MCNC: 9 MG/DL (ref 8.5–10.1)
CHLORIDE SERPL-SCNC: 106 MMOL/L (ref 94–109)
CO2 SERPL-SCNC: 26 MMOL/L (ref 20–32)
CREAT SERPL-MCNC: 0.65 MG/DL (ref 0.52–1.04)
GFR SERPL CREATININE-BSD FRML MDRD: >90 ML/MIN/{1.73_M2}
GLUCOSE SERPL-MCNC: 66 MG/DL (ref 70–99)
MAGNESIUM SERPL-MCNC: 2.4 MG/DL (ref 1.6–2.3)
PHOSPHATE SERPL-MCNC: 2.7 MG/DL (ref 2.5–4.5)
POTASSIUM SERPL-SCNC: 4.1 MMOL/L (ref 3.4–5.3)
SODIUM SERPL-SCNC: 139 MMOL/L (ref 133–144)

## 2020-12-22 PROCEDURE — 84100 ASSAY OF PHOSPHORUS: CPT | Performed by: INTERNAL MEDICINE

## 2020-12-22 PROCEDURE — 80048 BASIC METABOLIC PNL TOTAL CA: CPT | Performed by: INTERNAL MEDICINE

## 2020-12-22 PROCEDURE — 83735 ASSAY OF MAGNESIUM: CPT | Performed by: INTERNAL MEDICINE

## 2020-12-22 NOTE — PROGRESS NOTES
This is a recent snapshot of the patient's [facility] medical record.  For current drug dose and complete information and questions, call [phone] or In Basket pool, fv home infusion ([id number])  CSN Number:  [id number]

## 2020-12-23 ENCOUNTER — HOME INFUSION (PRE-WILLOW HOME INFUSION) (OUTPATIENT)
Dept: PHARMACY | Facility: CLINIC | Age: 55
End: 2020-12-23

## 2020-12-23 LAB
ANION GAP SERPL CALCULATED.3IONS-SCNC: 5 MMOL/L (ref 3–14)
BUN SERPL-MCNC: 13 MG/DL (ref 7–30)
CALCIUM SERPL-MCNC: 8.4 MG/DL (ref 8.5–10.1)
CHLORIDE SERPL-SCNC: 106 MMOL/L (ref 94–109)
CO2 SERPL-SCNC: 28 MMOL/L (ref 20–32)
CREAT SERPL-MCNC: 0.62 MG/DL (ref 0.52–1.04)
GFR SERPL CREATININE-BSD FRML MDRD: >90 ML/MIN/{1.73_M2}
GLUCOSE SERPL-MCNC: 114 MG/DL (ref 70–99)
MAGNESIUM SERPL-MCNC: 2.3 MG/DL (ref 1.6–2.3)
PHOSPHATE SERPL-MCNC: 1.7 MG/DL (ref 2.5–4.5)
POTASSIUM SERPL-SCNC: 3.3 MMOL/L (ref 3.4–5.3)
SODIUM SERPL-SCNC: 139 MMOL/L (ref 133–144)

## 2020-12-23 PROCEDURE — 80048 BASIC METABOLIC PNL TOTAL CA: CPT | Performed by: INTERNAL MEDICINE

## 2020-12-23 PROCEDURE — 83735 ASSAY OF MAGNESIUM: CPT | Performed by: INTERNAL MEDICINE

## 2020-12-23 PROCEDURE — 84100 ASSAY OF PHOSPHORUS: CPT | Performed by: INTERNAL MEDICINE

## 2020-12-23 NOTE — PROGRESS NOTES
This is a recent snapshot of the patient's [facility] medical record.  For current drug dose and complete information and questions, call [phone] or In Basket pool, fv home infusion ([id number])  CSN Number:  [id number]

## 2020-12-24 ENCOUNTER — HOME INFUSION (PRE-WILLOW HOME INFUSION) (OUTPATIENT)
Dept: PHARMACY | Facility: CLINIC | Age: 55
End: 2020-12-24

## 2020-12-24 ENCOUNTER — MEDICAL CORRESPONDENCE (OUTPATIENT)
Dept: HEALTH INFORMATION MANAGEMENT | Facility: CLINIC | Age: 55
End: 2020-12-24

## 2020-12-24 ENCOUNTER — APPOINTMENT (OUTPATIENT)
Dept: LAB | Facility: CLINIC | Age: 55
End: 2020-12-24
Attending: INTERNAL MEDICINE
Payer: COMMERCIAL

## 2020-12-24 LAB
ANION GAP SERPL CALCULATED.3IONS-SCNC: 7 MMOL/L (ref 3–14)
BUN SERPL-MCNC: 12 MG/DL (ref 7–30)
CALCIUM SERPL-MCNC: 8.9 MG/DL (ref 8.5–10.1)
CHLORIDE SERPL-SCNC: 109 MMOL/L (ref 94–109)
CO2 SERPL-SCNC: 23 MMOL/L (ref 20–32)
CREAT SERPL-MCNC: 0.65 MG/DL (ref 0.52–1.04)
GFR SERPL CREATININE-BSD FRML MDRD: >90 ML/MIN/{1.73_M2}
GLUCOSE SERPL-MCNC: 86 MG/DL (ref 70–99)
MAGNESIUM SERPL-MCNC: 2.2 MG/DL (ref 1.6–2.3)
PHOSPHATE SERPL-MCNC: 2 MG/DL (ref 2.5–4.5)
POTASSIUM SERPL-SCNC: 3.4 MMOL/L (ref 3.4–5.3)
SODIUM SERPL-SCNC: 139 MMOL/L (ref 133–144)

## 2020-12-24 PROCEDURE — 84100 ASSAY OF PHOSPHORUS: CPT | Performed by: INTERNAL MEDICINE

## 2020-12-24 PROCEDURE — 80048 BASIC METABOLIC PNL TOTAL CA: CPT | Performed by: INTERNAL MEDICINE

## 2020-12-24 PROCEDURE — 83735 ASSAY OF MAGNESIUM: CPT | Performed by: INTERNAL MEDICINE

## 2020-12-25 ENCOUNTER — MEDICAL CORRESPONDENCE (OUTPATIENT)
Dept: HEALTH INFORMATION MANAGEMENT | Facility: CLINIC | Age: 55
End: 2020-12-25

## 2020-12-25 ENCOUNTER — HOME INFUSION (PRE-WILLOW HOME INFUSION) (OUTPATIENT)
Dept: PHARMACY | Facility: CLINIC | Age: 55
End: 2020-12-25

## 2020-12-26 ENCOUNTER — HOME INFUSION (PRE-WILLOW HOME INFUSION) (OUTPATIENT)
Dept: PHARMACY | Facility: CLINIC | Age: 55
End: 2020-12-26

## 2020-12-26 LAB
ANION GAP SERPL CALCULATED.3IONS-SCNC: 7 MMOL/L (ref 3–14)
BUN SERPL-MCNC: 12 MG/DL (ref 7–30)
CALCIUM SERPL-MCNC: 8.7 MG/DL (ref 8.5–10.1)
CHLORIDE SERPL-SCNC: 108 MMOL/L (ref 94–109)
CO2 SERPL-SCNC: 24 MMOL/L (ref 20–32)
CREAT SERPL-MCNC: 0.61 MG/DL (ref 0.52–1.04)
GFR SERPL CREATININE-BSD FRML MDRD: >90 ML/MIN/{1.73_M2}
GLUCOSE SERPL-MCNC: 122 MG/DL (ref 70–99)
MAGNESIUM SERPL-MCNC: 2.4 MG/DL (ref 1.6–2.3)
PHOSPHATE SERPL-MCNC: 1.6 MG/DL (ref 2.5–4.5)
POTASSIUM SERPL-SCNC: 3.4 MMOL/L (ref 3.4–5.3)
SODIUM SERPL-SCNC: 139 MMOL/L (ref 133–144)

## 2020-12-26 PROCEDURE — 83735 ASSAY OF MAGNESIUM: CPT | Performed by: INTERNAL MEDICINE

## 2020-12-26 PROCEDURE — 84100 ASSAY OF PHOSPHORUS: CPT | Performed by: INTERNAL MEDICINE

## 2020-12-26 PROCEDURE — 80048 BASIC METABOLIC PNL TOTAL CA: CPT | Performed by: INTERNAL MEDICINE

## 2020-12-28 ENCOUNTER — PATIENT OUTREACH (OUTPATIENT)
Dept: ONCOLOGY | Facility: CLINIC | Age: 55
End: 2020-12-28
Payer: COMMERCIAL

## 2020-12-28 DIAGNOSIS — C49.9 SARCOMA (H): Primary | ICD-10-CM

## 2020-12-28 RX ORDER — POTASSIUM CHLORIDE 750 MG/1
20 TABLET, EXTENDED RELEASE ORAL DAILY
Qty: 60 TABLET | Refills: 1 | Status: SHIPPED | OUTPATIENT
Start: 2020-12-28 | End: 2021-01-27

## 2020-12-29 NOTE — PROGRESS NOTES
This is a recent snapshot of the patient's [facility] medical record.  For current drug dose and complete information and questions, call [phone] or In [facility] pool, fv home infusion ([id number])  CSN Number:  [id number]

## 2020-12-29 NOTE — PROGRESS NOTES
This is a recent snapshot of the patient's [facility] medical record.  For current drug dose and complete information and questions, call [phone] or In [facility] pool, fv home infusion ([id number])  CSN Number:  [id number]

## 2020-12-29 NOTE — PROGRESS NOTES
Called Connie to let her know that her labs potassium and phosphorous are low and she will need replacement supplements for them.  They will be sent to her local pharmacy and she will need labs rechecked in a few days.  A request has been sent to scheduling.  She agreed with and verbalized understanding of the plan of care.

## 2020-12-29 NOTE — PROGRESS NOTES
This is a recent snapshot of the patient's [facility] medical record.  For current drug dose and complete information and questions, call [phone] or In Basket pool, fv home infusion ([id number])  CSN Number:  [id number]

## 2020-12-29 NOTE — PROGRESS NOTES
This is a recent snapshot of the patient's [facility] medical record.  For current drug dose and complete information and questions, call [phone] or In [facility] pool, fv home infusion ([id number])  CSN Number:  [id number]

## 2020-12-29 NOTE — PROGRESS NOTES
This is a recent snapshot of the patient's [facility] medical record.  For current drug dose and complete information and questions, call [phone] or In Basket pool, fv home infusion ([id number])  CSN Number:  [id number]

## 2020-12-30 DIAGNOSIS — C49.9 SARCOMA (H): ICD-10-CM

## 2020-12-30 DIAGNOSIS — C49.9 SARCOMA (H): Primary | ICD-10-CM

## 2020-12-30 LAB
ALBUMIN SERPL-MCNC: 3.2 G/DL (ref 3.4–5)
ALP SERPL-CCNC: 113 U/L (ref 40–150)
ALT SERPL W P-5'-P-CCNC: 75 U/L (ref 0–50)
ANION GAP SERPL CALCULATED.3IONS-SCNC: 5 MMOL/L (ref 3–14)
AST SERPL W P-5'-P-CCNC: 58 U/L (ref 0–45)
BILIRUB SERPL-MCNC: 0.5 MG/DL (ref 0.2–1.3)
BUN SERPL-MCNC: 10 MG/DL (ref 7–30)
CALCIUM SERPL-MCNC: 9 MG/DL (ref 8.5–10.1)
CHLORIDE SERPL-SCNC: 109 MMOL/L (ref 94–109)
CO2 SERPL-SCNC: 27 MMOL/L (ref 20–32)
CREAT SERPL-MCNC: 0.56 MG/DL (ref 0.52–1.04)
DIFFERENTIAL METHOD BLD: ABNORMAL
ERYTHROCYTE [DISTWIDTH] IN BLOOD BY AUTOMATED COUNT: 14.4 % (ref 10–15)
GFR SERPL CREATININE-BSD FRML MDRD: >90 ML/MIN/{1.73_M2}
GLUCOSE SERPL-MCNC: 104 MG/DL (ref 70–99)
HCT VFR BLD AUTO: 23.2 % (ref 35–47)
HGB BLD-MCNC: 7.6 G/DL (ref 11.7–15.7)
MCH RBC QN AUTO: 27.8 PG (ref 26.5–33)
MCHC RBC AUTO-ENTMCNC: 32.8 G/DL (ref 31.5–36.5)
MCV RBC AUTO: 85 FL (ref 78–100)
PLATELET # BLD AUTO: 68 10E9/L (ref 150–450)
POTASSIUM SERPL-SCNC: 3.8 MMOL/L (ref 3.4–5.3)
PROT SERPL-MCNC: 7.1 G/DL (ref 6.8–8.8)
RBC # BLD AUTO: 2.73 10E12/L (ref 3.8–5.2)
SODIUM SERPL-SCNC: 141 MMOL/L (ref 133–144)
WBC # BLD AUTO: 0.4 10E9/L (ref 4–11)

## 2020-12-30 PROCEDURE — 85025 COMPLETE CBC W/AUTO DIFF WBC: CPT | Performed by: INTERNAL MEDICINE

## 2020-12-30 PROCEDURE — 36415 COLL VENOUS BLD VENIPUNCTURE: CPT | Performed by: INTERNAL MEDICINE

## 2020-12-30 PROCEDURE — 80053 COMPREHEN METABOLIC PANEL: CPT | Performed by: INTERNAL MEDICINE

## 2021-01-04 ENCOUNTER — HEALTH MAINTENANCE LETTER (OUTPATIENT)
Age: 56
End: 2021-01-04

## 2021-01-05 ENCOUNTER — DOCUMENTATION ONLY (OUTPATIENT)
Dept: LAB | Facility: CLINIC | Age: 56
End: 2021-01-05

## 2021-01-06 ENCOUNTER — PATIENT OUTREACH (OUTPATIENT)
Dept: ONCOLOGY | Facility: CLINIC | Age: 56
End: 2021-01-06

## 2021-01-06 ENCOUNTER — TELEPHONE (OUTPATIENT)
Dept: ONCOLOGY | Facility: CLINIC | Age: 56
End: 2021-01-06

## 2021-01-06 ENCOUNTER — OFFICE VISIT (OUTPATIENT)
Dept: ONCOLOGY | Facility: CLINIC | Age: 56
End: 2021-01-06
Payer: COMMERCIAL

## 2021-01-06 DIAGNOSIS — C49.9 SARCOMA OF SOFT TISSUE (H): ICD-10-CM

## 2021-01-06 DIAGNOSIS — C49.9 SARCOMA OF SOFT TISSUE (H): Primary | ICD-10-CM

## 2021-01-06 DIAGNOSIS — D63.0 ANEMIA IN NEOPLASTIC DISEASE: ICD-10-CM

## 2021-01-06 PROCEDURE — 86923 COMPATIBILITY TEST ELECTRIC: CPT | Performed by: INTERNAL MEDICINE

## 2021-01-06 PROCEDURE — 86850 RBC ANTIBODY SCREEN: CPT | Performed by: INTERNAL MEDICINE

## 2021-01-06 PROCEDURE — 86900 BLOOD TYPING SEROLOGIC ABO: CPT | Performed by: INTERNAL MEDICINE

## 2021-01-06 PROCEDURE — 36591 DRAW BLOOD OFF VENOUS DEVICE: CPT

## 2021-01-06 PROCEDURE — 86901 BLOOD TYPING SEROLOGIC RH(D): CPT | Performed by: INTERNAL MEDICINE

## 2021-01-06 RX ORDER — HEPARIN SODIUM (PORCINE) LOCK FLUSH IV SOLN 100 UNIT/ML 100 UNIT/ML
5 SOLUTION INTRAVENOUS EVERY 8 HOURS
Status: DISCONTINUED | OUTPATIENT
Start: 2021-01-06 | End: 2021-01-06 | Stop reason: HOSPADM

## 2021-01-06 RX ADMIN — HEPARIN SODIUM (PORCINE) LOCK FLUSH IV SOLN 100 UNIT/ML 5 ML: 100 SOLUTION at 13:35

## 2021-01-06 NOTE — PROGRESS NOTES
Sterile technique performed and maintained. Patient tolerated procedure well. Tubes drawn in rainbow order: red, green, purple. Transparent dressing placed with use of tegaderm.    Tiffany Mchugh RN  [facility] [place]

## 2021-01-06 NOTE — TELEPHONE ENCOUNTER
DATE:  1/6/2021   TIME OF RECEIPT FROM LAB:  1439  LAB TEST:  Hgb  LAB VALUE:  7.4  TIME LAB VALUE REPORTED TO PROVIDER:   Paged Dr. Lopez per protocol.

## 2021-01-07 ENCOUNTER — INFUSION THERAPY VISIT (OUTPATIENT)
Dept: INFUSION THERAPY | Facility: CLINIC | Age: 56
End: 2021-01-07
Payer: COMMERCIAL

## 2021-01-07 VITALS
OXYGEN SATURATION: 99 % | SYSTOLIC BLOOD PRESSURE: 105 MMHG | RESPIRATION RATE: 14 BRPM | HEART RATE: 69 BPM | DIASTOLIC BLOOD PRESSURE: 72 MMHG | TEMPERATURE: 97.7 F

## 2021-01-07 DIAGNOSIS — D63.0 ANEMIA IN NEOPLASTIC DISEASE: Primary | ICD-10-CM

## 2021-01-07 LAB
ABO + RH BLD: NORMAL
ABO + RH BLD: NORMAL
BLD GP AB SCN SERPL QL: NORMAL
BLD PROD TYP BPU: NORMAL
BLD PROD TYP BPU: NORMAL
BLD UNIT ID BPU: 0
BLOOD BANK CMNT PATIENT-IMP: NORMAL
BLOOD PRODUCT CODE: NORMAL
BPU ID: NORMAL
NUM BPU REQUESTED: 1
SPECIMEN EXP DATE BLD: NORMAL
TRANSFUSION STATUS PATIENT QL: NORMAL
TRANSFUSION STATUS PATIENT QL: NORMAL

## 2021-01-07 PROCEDURE — 36430 TRANSFUSION BLD/BLD COMPNT: CPT | Performed by: NURSE PRACTITIONER

## 2021-01-07 PROCEDURE — P9040 RBC LEUKOREDUCED IRRADIATED: HCPCS | Performed by: INTERNAL MEDICINE

## 2021-01-07 PROCEDURE — 99207 PR NO CHARGE LOS: CPT | Performed by: NURSE PRACTITIONER

## 2021-01-07 PROCEDURE — P9016 RBC LEUKOCYTES REDUCED: HCPCS | Performed by: NURSE PRACTITIONER

## 2021-01-07 RX ORDER — HEPARIN SODIUM,PORCINE 10 UNIT/ML
5 VIAL (ML) INTRAVENOUS
Status: CANCELLED | OUTPATIENT
Start: 2021-01-07

## 2021-01-07 RX ORDER — HEPARIN SODIUM (PORCINE) LOCK FLUSH IV SOLN 100 UNIT/ML 100 UNIT/ML
5 SOLUTION INTRAVENOUS
Status: CANCELLED | OUTPATIENT
Start: 2021-01-07

## 2021-01-07 NOTE — PROGRESS NOTES
SPIRITUAL HEALTH SERVICES Progress Note  [facility]    Visited Richayimi Ashby (Becky)  in the infusion center to introduce Spiritual Health support here at [place].  Offered reflective conversation, support and affirmation as she shared some of her journey.       Illness Narrative - Patient states that this is a reoccurence for her and she is here for RBC.  She has been feeling tired.  [place] [facility]      Concerns - No concerns mentioned.       Coping - Patient is  to [place] and had two adult children who both live out of state. Her son is in the .  Her daughter is traveling from [place] to see them this upcoming weekend along with a 7 month old grand daughter.  Patient is a member of [employer] here in [place].       Meaning-Making - Not discussed       Plan -  I gave the patient my contact information and she knows that she can request a Spiritual Health encounter as needed.     Palma Campo  Staff

## 2021-01-07 NOTE — PROGRESS NOTES
Spoke with Connie to let her know that her hgb is low at 7.4 and  would like for her to get a blood transfusion.  She is not complaining of any new symptoms with the low hgb.  She already is a little fatigued from chemo but no sob, palor, lightheadedness etc.  She has been scheduled at  tomorrow at 12:00.

## 2021-01-07 NOTE — PROGRESS NOTES
Infusion Nursing Note:  Richa Ashby presents today for 1 unit packed red blood cells.    Patient seen by provider today: No   present during visit today: Not Applicable.    Note:   Had positive Covid test on 12/2/20. Did not offer routine screening Covid test, per protocol.    Intravenous Access:  Implanted Port.    Treatment Conditions:  Lab Results   Component Value Date    HGB 7.4 01/06/2021     Lab Results   Component Value Date    WBC 4.5 01/06/2021      Lab Results   Component Value Date    ANEU 1.9 01/06/2021     Lab Results   Component Value Date     01/06/2021      Results reviewed, labs MET treatment parameters, ok to proceed with treatment.  Blood transfusion consent signed today by Dr Perry.      Post Infusion Assessment:  Patient tolerated infusion without incident.  Blood return noted pre and post infusion.  Site patent and intact, free from redness, edema or discomfort.  No evidence of extravasations.  Access discontinued per protocol.       Discharge Plan:   AVS to patient via MYCHART.  Patient will return 1/15/21, at the , for lab appointment.   Patient discharged in stable condition accompanied by: self.  is .  Departure Mode: Ambulatory.    Bela Rivera RN

## 2021-01-08 DIAGNOSIS — C49.9 SARCOMA OF SOFT TISSUE (H): Primary | ICD-10-CM

## 2021-01-12 NOTE — PROGRESS NOTES
"Connie is a 55 year old who is being evaluated via a billable video visit.      How would you like to obtain your AVS? MyChart  If the video visit is dropped, the invitation should be resent by: Text to cell phone: [phone]  Will anyone else be joining your video visit? No      Vitals - Patient Reported  Weight (Patient Reported): 104.3 kg (230 lb)  Height (Patient Reported): 170.2 cm (5' 7\")  BMI (Based on Pt Reported Ht/Wt): 36.02  Pain Score: Mild Pain (3)  Pain Loc: Other - see comment(RIGHT SIDE RIB CAGE)    Keena FLOOD    Video Start Time: 3:42 PM  Video-Visit Details    Type of service:  Video Visit    Video End Time:4:06 PM    Additional 20 minutes was spent in documentation and coordinating care with Dr. Lopez.     Originating Location (pt. 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 This led to imaging and a biopsy showing a high-grade UPS. She got preoperative radiotherapy and the tumor was resected 3-12-20. She was then followed but developed a cough and some tightness in the chest and right scapular sudden pain. She at first thought this was due to an asthma exacerbation but chest imaging revealed multiple lung nodules. Lung biopsy 11/2/2020 consistent with sarcoma.   - Started Doxil/ifos 11/13/2020, delayed due to COVID dx 12/2    Interval History:  Richa Ashby was met with for follow up over video.   - Week two after her treatments are rough- had significant hand foot syndrome and heartburn. She doubled her omeprazole and baking soda water. Had significant mouth sores- could not chew anything until week three. Pain skin/peeling occurred on her bilateral hands and " interfered with function. Did not find relief with urea cream. Emollients helped more. Utilized gloves. Now improving but still with some irritation. Function had returned. No symptoms to feet.   - No neurotoxicities with last cycle.   - Noting some tenderness on right side near base of rib cage. No swelling/bumps.   - She required a blood transfusion following last chemo.   - No fevers/chills, urinary symptoms, bowel issues.   - Was having headaches prior to the transfusion- but now resolved since transfuse. Has had some dizziness when trying to do too much. Resolved with rest.   - Sleeping well. Naps a lot week 1 and week 2. Now back at work and not sleeping.   - Continues on phos/potassium supplements.      ROS: 10 point ROS neg other than the symptoms noted above in the HPI.    Current Outpatient Medications   Medication Sig Dispense Refill     acetaminophen (TYLENOL) 325 MG tablet Take 325-650 mg by mouth every 6 hours as needed for mild pain       albuterol (PROAIR RESPICLICK) 108 (90 Base) MCG/ACT inhaler Inhale 1-2 puffs into the lungs       cetirizine (ZYRTEC) 10 MG tablet Take 10 mg by mouth daily       granisetron (KYTRIL) 1 MG tablet Take 2 tablets (2 mg) by mouth daily Take 2 tablets (2 mg) every morning on Days 3 through 8. 12 tablet 3     ibuprofen (ADVIL/MOTRIN) 200 MG tablet Take 400 mg by mouth every 4 hours as needed for mild pain       lidocaine-prilocaine (EMLA) 2.5-2.5 % external cream Apply topically as needed for moderate pain 30 g 3     loratadine (CLARITIN) 10 MG tablet Take 10 mg by mouth       magic mouthwash suspension (diphenhydrAMINE, lidocaine, aluminum-magnesium & simethicone) Swish and swallow 10 mLs in mouth every 6 hours as needed for mouth sores 120 mL 0     melatonin (MELATONIN) 1 MG/ML LIQD liquid Take 5 mg by mouth       montelukast (SINGULAIR) 10 MG tablet Take 10 mg by mouth daily       nystatin (MYCOSTATIN) 806520 UNIT/ML suspension Take 5 mLs (500,000 Units) by mouth 4  times daily 140 mL 0     omeprazole (PRILOSEC) 20 MG DR capsule        phosphorus tablet 250 mg (PHOSPHA 250 NEUTRAL) 250 MG per tablet Take 1 tablet (250 mg) by mouth 3 times daily (with meals) 90 tablet 1     potassium chloride ER (KLOR-CON M) 10 MEQ CR tablet Take 2 tablets (20 mEq) by mouth daily 60 tablet 1     prochlorperazine (COMPAZINE) 10 MG tablet Take 1 tablet (10 mg) by mouth every 6 hours as needed (Nausea/Vomiting) 30 tablet 3     urea (CARMOL) 10 % external cream Apply topically as needed for dry skin 453 g 0     vitamin D3 (CHOLECALCIFEROL) 2000 units (50 mcg) tablet Take 1 tablet by mouth daily         Physical Examination:  Not taken today.     Wt Readings from Last 10 Encounters:   12/18/20 106.5 kg (234 lb 14.4 oz)   11/13/20 113.4 kg (250 lb 1.6 oz)   11/04/20 108.9 kg (240 lb)   11/02/20 110.2 kg (243 lb)   10/28/20 110.2 kg (243 lb)   03/12/20 106.6 kg (235 lb)   03/04/20 106.6 kg (235 lb)   02/03/20 103.9 kg (229 lb)   01/27/20 103.9 kg (229 lb)   01/20/20 105.5 kg (232 lb 8 oz)     Video physical exam  General: Patient appears well in no acute distress.   Skin: No visualized rash or lesions on visualized skin  Eyes: EOMI, no erythema, sclera icterus or discharge noted  Resp: Appears to be breathing comfortably without accessory muscle usage, speaking in full sentences, no cough  MSK: Appears to have normal range of motion based on visualized movements  Neurologic: No apparent tremors, facial movements symmetric  Psych: affect normal, alert and oriented    The rest of a comprehensive physical examination is deferred due to PHE (public health emergency) video restrictions      Laboratory Data:  Results for STEVEAMADEO (MRN [id number]) as of 1/13/2021 18:20   Ref. Range 1/13/2021 07:50   Sodium Latest Ref Range: 133 - 144 mmol/L 142   Potassium Latest Ref Range: 3.4 - 5.3 mmol/L 4.0   Chloride Latest Ref Range: 94 - 109 mmol/L 112 (H)   Carbon Dioxide Latest Ref Range: 20 - 32 mmol/L 27    Urea Nitrogen Latest Ref Range: 7 - 30 mg/dL 8   Creatinine Latest Ref Range: 0.52 - 1.04 mg/dL 0.58   GFR Estimate Latest Ref Range: >60 mL/min/1.73_m2 >90   GFR Estimate If Black Latest Ref Range: >60 mL/min/1.73_m2 >90   Calcium Latest Ref Range: 8.5 - 10.1 mg/dL 9.4   Anion Gap Latest Ref Range: 3 - 14 mmol/L 3   Magnesium Latest Ref Range: 1.6 - 2.3 mg/dL 2.2   Phosphorus Latest Ref Range: 2.5 - 4.5 mg/dL 4.1   Albumin Latest Ref Range: 3.4 - 5.0 g/dL 3.4   Protein Total Latest Ref Range: 6.8 - 8.8 g/dL 7.1   Bilirubin Total Latest Ref Range: 0.2 - 1.3 mg/dL 0.3   Alkaline Phosphatase Latest Ref Range: 40 - 150 U/L 95   ALT Latest Ref Range: 0 - 50 U/L 33   AST Latest Ref Range: 0 - 45 U/L 14   Glucose Latest Ref Range: 70 - 99 mg/dL 100 (H)   WBC Latest Ref Range: 4.0 - 11.0 10e9/L 2.8 (L)   Hemoglobin Latest Ref Range: 11.7 - 15.7 g/dL 9.3 (L)   Hematocrit Latest Ref Range: 35.0 - 47.0 % 29.1 (L)   Platelet Count Latest Ref Range: 150 - 450 10e9/L 420   RBC Count Latest Ref Range: 3.8 - 5.2 10e12/L 3.13 (L)   MCV Latest Ref Range: 78 - 100 fl 93   MCH Latest Ref Range: 26.5 - 33.0 pg 29.7   MCHC Latest Ref Range: 31.5 - 36.5 g/dL 32.0   RDW Latest Ref Range: 10.0 - 15.0 % 19.6 (H)   Diff Method Unknown Automated Method   % Neutrophils Latest Units: % 51.8   % Lymphocytes Latest Units: % 23.7   % Monocytes Latest Units: % 19.8   % Eosinophils Latest Units: % 0.4   % Basophils Latest Units: % 1.8   % Immature Granulocytes Latest Units: % 2.5   Absolute Neutrophil Latest Ref Range: 1.6 - 8.3 10e9/L 1.4 (L)   Absolute Lymphocytes Latest Ref Range: 0.8 - 5.3 10e9/L 0.7 (L)   Absolute Monocytes Latest Ref Range: 0.0 - 1.3 10e9/L 0.6   Absolute Eosinophils Latest Ref Range: 0.0 - 0.7 10e9/L 0.0   Absolute Basophils Latest Ref Range: 0.0 - 0.2 10e9/L 0.1   Abs Immature Granulocytes Latest Ref Range: 0 - 0.4 10e9/L 0.1          Assessment and Plan:  Undifferentiated pleomorphic sarcoma  -Started doxil ifosfamide on  11/13/2020. Cycle #2 delayed for COVID diagnosis.  She had BMP,PO4 d 4,5,6,7,8. Her  was doing neuro monitoring.   - CT 1/13/21 after 2 cycles prelim verbal report for radiology with improvement in pulmonary nodules- no new obvious lesions- will follow up on final report.   - Connie reports grade 2-3 mucositis and hand foot syndrome after cycle #2. Also noted low phosphorus, potassium. WBC declined to 0.4, hgb 7.4. She required a blood transfusion. Will reduce doxil by 20%. Maintain ifos dose and continue electrolyte supplements.   - Weekly labs.   - Follow up 2 weeks after chemo for symptom check.   - Of note, she is pre approval for keytruda  - She is established with palliative care.     Depression/anxiety.   - Following with palliative care and palliative SW    Insomnia.   -Stopped taking Doxepin due to side effects. Did not discuss other option today. Defer to palliative    Hand-foot syndrome.  -Grade 2-3 hand foot syndrome. Did not find urea cream helpful. Utilizing another moisturizer that is helping. Improving. Reduce dose of doxil by 20%, as above.   - Continue emmollients     Mucositis  - Grade 2-3 based on report. Send script for MMW and advised to  healios. Reducing doxil as above. Advised to utilize ice chips during infusion.     Asthma.   - Baseline dyspnea- continue inhalers.     Right rib tenderness- unclear etiology. Await CT scan results. Aware to seek medical attention for any new cardiopulmonary symptoms.     COVID 19 vaccine. Given recent COVID illness, advised to wait 90 days from positive test before getting.     Farheen Monteiro PA-C

## 2021-01-13 ENCOUNTER — OFFICE VISIT (OUTPATIENT)
Dept: ONCOLOGY | Facility: CLINIC | Age: 56
End: 2021-01-13
Payer: COMMERCIAL

## 2021-01-13 ENCOUNTER — ANCILLARY PROCEDURE (OUTPATIENT)
Dept: CT IMAGING | Facility: CLINIC | Age: 56
End: 2021-01-13
Attending: PHYSICIAN ASSISTANT
Payer: COMMERCIAL

## 2021-01-13 ENCOUNTER — VIRTUAL VISIT (OUTPATIENT)
Dept: ONCOLOGY | Facility: CLINIC | Age: 56
End: 2021-01-13
Attending: INTERNAL MEDICINE
Payer: COMMERCIAL

## 2021-01-13 DIAGNOSIS — C49.9 SARCOMA (H): ICD-10-CM

## 2021-01-13 DIAGNOSIS — D63.0 ANEMIA IN NEOPLASTIC DISEASE: Primary | ICD-10-CM

## 2021-01-13 DIAGNOSIS — C49.9 SARCOMA OF SOFT TISSUE (H): ICD-10-CM

## 2021-01-13 DIAGNOSIS — K12.30 MUCOSITIS: ICD-10-CM

## 2021-01-13 LAB
ALBUMIN SERPL-MCNC: 3.4 G/DL (ref 3.4–5)
ALP SERPL-CCNC: 95 U/L (ref 40–150)
ALT SERPL W P-5'-P-CCNC: 33 U/L (ref 0–50)
ANION GAP SERPL CALCULATED.3IONS-SCNC: 3 MMOL/L (ref 3–14)
AST SERPL W P-5'-P-CCNC: 14 U/L (ref 0–45)
BASOPHILS # BLD AUTO: 0.1 10E9/L (ref 0–0.2)
BASOPHILS NFR BLD AUTO: 1.8 %
BILIRUB SERPL-MCNC: 0.3 MG/DL (ref 0.2–1.3)
BUN SERPL-MCNC: 8 MG/DL (ref 7–30)
CALCIUM SERPL-MCNC: 9.4 MG/DL (ref 8.5–10.1)
CHLORIDE SERPL-SCNC: 112 MMOL/L (ref 94–109)
CO2 SERPL-SCNC: 27 MMOL/L (ref 20–32)
CREAT SERPL-MCNC: 0.58 MG/DL (ref 0.52–1.04)
DIFFERENTIAL METHOD BLD: ABNORMAL
EOSINOPHIL # BLD AUTO: 0 10E9/L (ref 0–0.7)
EOSINOPHIL NFR BLD AUTO: 0.4 %
ERYTHROCYTE [DISTWIDTH] IN BLOOD BY AUTOMATED COUNT: 19.6 % (ref 10–15)
GFR SERPL CREATININE-BSD FRML MDRD: >90 ML/MIN/{1.73_M2}
GLUCOSE SERPL-MCNC: 100 MG/DL (ref 70–99)
HCT VFR BLD AUTO: 29.1 % (ref 35–47)
HGB BLD-MCNC: 9.3 G/DL (ref 11.7–15.7)
IMM GRANULOCYTES # BLD: 0.1 10E9/L (ref 0–0.4)
IMM GRANULOCYTES NFR BLD: 2.5 %
LYMPHOCYTES # BLD AUTO: 0.7 10E9/L (ref 0.8–5.3)
LYMPHOCYTES NFR BLD AUTO: 23.7 %
MAGNESIUM SERPL-MCNC: 2.2 MG/DL (ref 1.6–2.3)
MCH RBC QN AUTO: 29.7 PG (ref 26.5–33)
MCHC RBC AUTO-ENTMCNC: 32 G/DL (ref 31.5–36.5)
MCV RBC AUTO: 93 FL (ref 78–100)
MONOCYTES # BLD AUTO: 0.6 10E9/L (ref 0–1.3)
MONOCYTES NFR BLD AUTO: 19.8 %
NEUTROPHILS # BLD AUTO: 1.4 10E9/L (ref 1.6–8.3)
NEUTROPHILS NFR BLD AUTO: 51.8 %
PHOSPHATE SERPL-MCNC: 4.1 MG/DL (ref 2.5–4.5)
PLATELET # BLD AUTO: 420 10E9/L (ref 150–450)
POTASSIUM SERPL-SCNC: 4 MMOL/L (ref 3.4–5.3)
PROT SERPL-MCNC: 7.1 G/DL (ref 6.8–8.8)
RBC # BLD AUTO: 3.13 10E12/L (ref 3.8–5.2)
SODIUM SERPL-SCNC: 142 MMOL/L (ref 133–144)
WBC # BLD AUTO: 2.8 10E9/L (ref 4–11)

## 2021-01-13 PROCEDURE — 99215 OFFICE O/P EST HI 40 MIN: CPT | Mod: 95 | Performed by: PHYSICIAN ASSISTANT

## 2021-01-13 PROCEDURE — 36591 DRAW BLOOD OFF VENOUS DEVICE: CPT

## 2021-01-13 PROCEDURE — 71260 CT THORAX DX C+: CPT | Performed by: RADIOLOGY

## 2021-01-13 PROCEDURE — 85025 COMPLETE CBC W/AUTO DIFF WBC: CPT | Performed by: PHYSICIAN ASSISTANT

## 2021-01-13 PROCEDURE — 83735 ASSAY OF MAGNESIUM: CPT | Performed by: PHYSICIAN ASSISTANT

## 2021-01-13 PROCEDURE — 80053 COMPREHEN METABOLIC PANEL: CPT | Performed by: PHYSICIAN ASSISTANT

## 2021-01-13 PROCEDURE — 84100 ASSAY OF PHOSPHORUS: CPT | Performed by: PHYSICIAN ASSISTANT

## 2021-01-13 PROCEDURE — 74177 CT ABD & PELVIS W/CONTRAST: CPT | Performed by: RADIOLOGY

## 2021-01-13 RX ORDER — LORAZEPAM 2 MG/ML
0.5 INJECTION INTRAMUSCULAR EVERY 4 HOURS PRN
Status: CANCELLED
Start: 2021-01-15

## 2021-01-13 RX ORDER — HEPARIN SODIUM (PORCINE) LOCK FLUSH IV SOLN 100 UNIT/ML 100 UNIT/ML
5 SOLUTION INTRAVENOUS
Status: CANCELLED | OUTPATIENT
Start: 2021-01-15

## 2021-01-13 RX ORDER — HEPARIN SODIUM (PORCINE) LOCK FLUSH IV SOLN 100 UNIT/ML 100 UNIT/ML
5 SOLUTION INTRAVENOUS
Status: DISCONTINUED | OUTPATIENT
Start: 2021-01-13 | End: 2021-01-13 | Stop reason: HOSPADM

## 2021-01-13 RX ORDER — ALBUTEROL SULFATE 0.83 MG/ML
2.5 SOLUTION RESPIRATORY (INHALATION)
Status: CANCELLED | OUTPATIENT
Start: 2021-01-15

## 2021-01-13 RX ORDER — MEPERIDINE HYDROCHLORIDE 25 MG/ML
25 INJECTION INTRAMUSCULAR; INTRAVENOUS; SUBCUTANEOUS EVERY 30 MIN PRN
Status: CANCELLED | OUTPATIENT
Start: 2021-01-15

## 2021-01-13 RX ORDER — SODIUM CHLORIDE 9 MG/ML
1000 INJECTION, SOLUTION INTRAVENOUS CONTINUOUS PRN
Status: CANCELLED | OUTPATIENT
Start: 2021-01-15

## 2021-01-13 RX ORDER — HEPARIN SODIUM,PORCINE 10 UNIT/ML
5 VIAL (ML) INTRAVENOUS
Status: CANCELLED | OUTPATIENT
Start: 2021-01-15

## 2021-01-13 RX ORDER — ALBUTEROL SULFATE 90 UG/1
1-2 AEROSOL, METERED RESPIRATORY (INHALATION)
Status: CANCELLED | OUTPATIENT
Start: 2021-01-15

## 2021-01-13 RX ORDER — EPINEPHRINE 1 MG/ML
0.3 INJECTION, SOLUTION INTRAMUSCULAR; SUBCUTANEOUS EVERY 5 MIN PRN
Status: CANCELLED | OUTPATIENT
Start: 2021-01-15

## 2021-01-13 RX ORDER — PALONOSETRON 0.05 MG/ML
0.25 INJECTION, SOLUTION INTRAVENOUS ONCE
Status: CANCELLED | OUTPATIENT
Start: 2021-01-15

## 2021-01-13 RX ORDER — DIPHENHYDRAMINE HYDROCHLORIDE 50 MG/ML
50 INJECTION INTRAMUSCULAR; INTRAVENOUS
Status: CANCELLED | OUTPATIENT
Start: 2021-01-15

## 2021-01-13 RX ORDER — IOPAMIDOL 755 MG/ML
135 INJECTION, SOLUTION INTRAVASCULAR ONCE
Status: COMPLETED | OUTPATIENT
Start: 2021-01-13 | End: 2021-01-13

## 2021-01-13 RX ORDER — NALOXONE HYDROCHLORIDE 0.4 MG/ML
.1-.4 INJECTION, SOLUTION INTRAMUSCULAR; INTRAVENOUS; SUBCUTANEOUS
Status: CANCELLED | OUTPATIENT
Start: 2021-01-15

## 2021-01-13 RX ORDER — METHYLPREDNISOLONE SODIUM SUCCINATE 125 MG/2ML
125 INJECTION, POWDER, LYOPHILIZED, FOR SOLUTION INTRAMUSCULAR; INTRAVENOUS
Status: CANCELLED | OUTPATIENT
Start: 2021-01-15

## 2021-01-13 RX ADMIN — IOPAMIDOL 135 ML: 755 INJECTION, SOLUTION INTRAVASCULAR at 08:32

## 2021-01-13 RX ADMIN — HEPARIN SODIUM (PORCINE) LOCK FLUSH IV SOLN 100 UNIT/ML 5 ML: 100 SOLUTION at 07:47

## 2021-01-13 NOTE — PROGRESS NOTES
"Patient's name and  were verified.  See Doc Flowsheet - IV assess for details.  IVAD accessed with 20G  1\" nieves gripper plus needle  blood return positive: YES  Site without redness, tenderness or swelling: YES  flushed with 30cc NS and 5cc 100u/ml heparin  Needle: Left accessed for CT    Comments: Labs drawn.  Patient tolerated procedure without incident.    Tiana Soriano  RN, BSN, OCN        "

## 2021-01-13 NOTE — LETTER
"    1/13/2021         RE: Richa Ashby  [address]  [place] [address]        Dear Colleague,    Thank you for referring your patient, Richa Ashby, to the [facility]. Please see a copy of my visit note below.    Connie is a 55 year old who is being evaluated via a billable video visit.      How would you like to obtain your AVS? MyChart  If the video visit is dropped, the invitation should be resent by: Text to cell phone: [phone]  Will anyone else be joining your video visit? No      Vitals - Patient Reported  Weight (Patient Reported): 104.3 kg (230 lb)  Height (Patient Reported): 170.2 cm (5' 7\")  BMI (Based on Pt Reported Ht/Wt): 36.02  Pain Score: Mild Pain (3)  Pain Loc: Other - see comment(RIGHT SIDE RIB CAGE)    Keena FLOOD    Video Start Time: 3:42 PM  Video-Visit Details    Type of service:  Video Visit    Video End Time:4:06 PM    Additional 20 minutes was spent in documentation and coordinating care with Dr. Lopez.     Originating Location (pt. 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 This led to imaging and a biopsy showing a high-grade UPS. She got preoperative radiotherapy and the tumor was resected 3-12-20. She was then followed but developed a cough and some tightness in the chest and right scapular sudden pain. She at first thought this was due to an asthma exacerbation but chest imaging revealed multiple lung nodules. Lung biopsy 11/2/2020 consistent with sarcoma.   - Started Doxil/ifos 11/13/2020, delayed due to COVID dx 12/2    Interval History:  Richa Ashby was met with for follow up over video. "   - Week two after her treatments are rough- had significant hand foot syndrome and heartburn. She doubled her omeprazole and baking soda water. Had significant mouth sores- could not chew anything until week three. Pain skin/peeling occurred on her bilateral hands and interfered with function. Did not find relief with urea cream. Emollients helped more. Utilized gloves. Now improving but still with some irritation. Function had returned. No symptoms to feet.   - No neurotoxicities with last cycle.   - Noting some tenderness on right side near base of rib cage. No swelling/bumps.   - She required a blood transfusion following last chemo.   - No fevers/chills, urinary symptoms, bowel issues.   - Was having headaches prior to the transfusion- but now resolved since transfuse. Has had some dizziness when trying to do too much. Resolved with rest.   - Sleeping well. Naps a lot week 1 and week 2. Now back at work and not sleeping.   - Continues on phos/potassium supplements.      ROS: 10 point ROS neg other than the symptoms noted above in the HPI.    Current Outpatient Medications   Medication Sig Dispense Refill     acetaminophen (TYLENOL) 325 MG tablet Take 325-650 mg by mouth every 6 hours as needed for mild pain       albuterol (PROAIR RESPICLICK) 108 (90 Base) MCG/ACT inhaler Inhale 1-2 puffs into the lungs       cetirizine (ZYRTEC) 10 MG tablet Take 10 mg by mouth daily       granisetron (KYTRIL) 1 MG tablet Take 2 tablets (2 mg) by mouth daily Take 2 tablets (2 mg) every morning on Days 3 through 8. 12 tablet 3     ibuprofen (ADVIL/MOTRIN) 200 MG tablet Take 400 mg by mouth every 4 hours as needed for mild pain       lidocaine-prilocaine (EMLA) 2.5-2.5 % external cream Apply topically as needed for moderate pain 30 g 3     loratadine (CLARITIN) 10 MG tablet Take 10 mg by mouth       magic mouthwash suspension (diphenhydrAMINE, lidocaine, aluminum-magnesium & simethicone) Swish and swallow 10 mLs in mouth every 6  hours as needed for mouth sores 120 mL 0     melatonin (MELATONIN) 1 MG/ML LIQD liquid Take 5 mg by mouth       montelukast (SINGULAIR) 10 MG tablet Take 10 mg by mouth daily       nystatin (MYCOSTATIN) 088376 UNIT/ML suspension Take 5 mLs (500,000 Units) by mouth 4 times daily 140 mL 0     omeprazole (PRILOSEC) 20 MG DR capsule        phosphorus tablet 250 mg (PHOSPHA 250 NEUTRAL) 250 MG per tablet Take 1 tablet (250 mg) by mouth 3 times daily (with meals) 90 tablet 1     potassium chloride ER (KLOR-CON M) 10 MEQ CR tablet Take 2 tablets (20 mEq) by mouth daily 60 tablet 1     prochlorperazine (COMPAZINE) 10 MG tablet Take 1 tablet (10 mg) by mouth every 6 hours as needed (Nausea/Vomiting) 30 tablet 3     urea (CARMOL) 10 % external cream Apply topically as needed for dry skin 453 g 0     vitamin D3 (CHOLECALCIFEROL) 2000 units (50 mcg) tablet Take 1 tablet by mouth daily         Physical Examination:  Not taken today.     Wt Readings from Last 10 Encounters:   12/18/20 106.5 kg (234 lb 14.4 oz)   11/13/20 113.4 kg (250 lb 1.6 oz)   11/04/20 108.9 kg (240 lb)   11/02/20 110.2 kg (243 lb)   10/28/20 110.2 kg (243 lb)   03/12/20 106.6 kg (235 lb)   03/04/20 106.6 kg (235 lb)   02/03/20 103.9 kg (229 lb)   01/27/20 103.9 kg (229 lb)   01/20/20 105.5 kg (232 lb 8 oz)     Video physical exam  General: Patient appears well in no acute distress.   Skin: No visualized rash or lesions on visualized skin  Eyes: EOMI, no erythema, sclera icterus or discharge noted  Resp: Appears to be breathing comfortably without accessory muscle usage, speaking in full sentences, no cough  MSK: Appears to have normal range of motion based on visualized movements  Neurologic: No apparent tremors, facial movements symmetric  Psych: affect normal, alert and oriented    The rest of a comprehensive physical examination is deferred due to PHE (public health emergency) video restrictions      Laboratory Data:  Results for AMADEO WILSON (MRN  [id number]) as of 1/13/2021 18:20   Ref. Range 1/13/2021 07:50   Sodium Latest Ref Range: 133 - 144 mmol/L 142   Potassium Latest Ref Range: 3.4 - 5.3 mmol/L 4.0   Chloride Latest Ref Range: 94 - 109 mmol/L 112 (H)   Carbon Dioxide Latest Ref Range: 20 - 32 mmol/L 27   Urea Nitrogen Latest Ref Range: 7 - 30 mg/dL 8   Creatinine Latest Ref Range: 0.52 - 1.04 mg/dL 0.58   GFR Estimate Latest Ref Range: >60 mL/min/1.73_m2 >90   GFR Estimate If Black Latest Ref Range: >60 mL/min/1.73_m2 >90   Calcium Latest Ref Range: 8.5 - 10.1 mg/dL 9.4   Anion Gap Latest Ref Range: 3 - 14 mmol/L 3   Magnesium Latest Ref Range: 1.6 - 2.3 mg/dL 2.2   Phosphorus Latest Ref Range: 2.5 - 4.5 mg/dL 4.1   Albumin Latest Ref Range: 3.4 - 5.0 g/dL 3.4   Protein Total Latest Ref Range: 6.8 - 8.8 g/dL 7.1   Bilirubin Total Latest Ref Range: 0.2 - 1.3 mg/dL 0.3   Alkaline Phosphatase Latest Ref Range: 40 - 150 U/L 95   ALT Latest Ref Range: 0 - 50 U/L 33   AST Latest Ref Range: 0 - 45 U/L 14   Glucose Latest Ref Range: 70 - 99 mg/dL 100 (H)   WBC Latest Ref Range: 4.0 - 11.0 10e9/L 2.8 (L)   Hemoglobin Latest Ref Range: 11.7 - 15.7 g/dL 9.3 (L)   Hematocrit Latest Ref Range: 35.0 - 47.0 % 29.1 (L)   Platelet Count Latest Ref Range: 150 - 450 10e9/L 420   RBC Count Latest Ref Range: 3.8 - 5.2 10e12/L 3.13 (L)   MCV Latest Ref Range: 78 - 100 fl 93   MCH Latest Ref Range: 26.5 - 33.0 pg 29.7   MCHC Latest Ref Range: 31.5 - 36.5 g/dL 32.0   RDW Latest Ref Range: 10.0 - 15.0 % 19.6 (H)   Diff Method Unknown Automated Method   % Neutrophils Latest Units: % 51.8   % Lymphocytes Latest Units: % 23.7   % Monocytes Latest Units: % 19.8   % Eosinophils Latest Units: % 0.4   % Basophils Latest Units: % 1.8   % Immature Granulocytes Latest Units: % 2.5   Absolute Neutrophil Latest Ref Range: 1.6 - 8.3 10e9/L 1.4 (L)   Absolute Lymphocytes Latest Ref Range: 0.8 - 5.3 10e9/L 0.7 (L)   Absolute Monocytes Latest Ref Range: 0.0 - 1.3 10e9/L 0.6   Absolute  Eosinophils Latest Ref Range: 0.0 - 0.7 10e9/L 0.0   Absolute Basophils Latest Ref Range: 0.0 - 0.2 10e9/L 0.1   Abs Immature Granulocytes Latest Ref Range: 0 - 0.4 10e9/L 0.1          Assessment and Plan:  Undifferentiated pleomorphic sarcoma  -Started doxil ifosfamide on 11/13/2020. Cycle #2 delayed for COVID diagnosis.  She had BMP,PO4 d 4,5,6,7,8. Her  was doing neuro monitoring.   - CT 1/13/21 after 2 cycles prelim verbal report for radiology with improvement in pulmonary nodules- no new obvious lesions- will follow up on final report.   - Connie reports grade 2-3 mucositis and hand foot syndrome after cycle #2. Also noted low phosphorus, potassium. WBC declined to 0.4, hgb 7.4. She required a blood transfusion. Will reduce doxil by 20%. Maintain ifos dose and continue electrolyte supplements.   - Weekly labs.   - Follow up 2 weeks after chemo for symptom check.   - Of note, she is pre approval for keytruda  - She is established with palliative care.     Depression/anxiety.   - Following with palliative care and palliative SW    Insomnia.   -Stopped taking Doxepin due to side effects. Did not discuss other option today. Defer to palliative    Hand-foot syndrome.  -Grade 2-3 hand foot syndrome. Did not find urea cream helpful. Utilizing another moisturizer that is helping. Improving. Reduce dose of doxil by 20%, as above.   - Continue emmollients     Mucositis  - Grade 2-3 based on report. Send script for MMW and advised to  healios. Reducing doxil as above. Advised to utilize ice chips during infusion.     Asthma.   - Baseline dyspnea- continue inhalers.     Right rib tenderness- unclear etiology. Await CT scan results. Aware to seek medical attention for any new cardiopulmonary symptoms.     COVID 19 vaccine. Given recent COVID illness, advised to wait 90 days from positive test before getting.     Farheen Monteiro PA-C       Again, thank you for allowing me to participate in the care of your patient.         Sincerely,        Farheen Monteiro PA-C

## 2021-01-14 ENCOUNTER — HOME INFUSION (PRE-WILLOW HOME INFUSION) (OUTPATIENT)
Dept: PHARMACY | Facility: CLINIC | Age: 56
End: 2021-01-14

## 2021-01-15 ENCOUNTER — APPOINTMENT (OUTPATIENT)
Dept: LAB | Facility: CLINIC | Age: 56
End: 2021-01-15
Attending: INTERNAL MEDICINE
Payer: COMMERCIAL

## 2021-01-15 ENCOUNTER — INFUSION THERAPY VISIT (OUTPATIENT)
Dept: ONCOLOGY | Facility: CLINIC | Age: 56
End: 2021-01-15
Attending: INTERNAL MEDICINE
Payer: COMMERCIAL

## 2021-01-15 ENCOUNTER — HOME INFUSION (PRE-WILLOW HOME INFUSION) (OUTPATIENT)
Dept: PHARMACY | Facility: CLINIC | Age: 56
End: 2021-01-15

## 2021-01-15 VITALS
DIASTOLIC BLOOD PRESSURE: 48 MMHG | RESPIRATION RATE: 16 BRPM | SYSTOLIC BLOOD PRESSURE: 118 MMHG | HEART RATE: 77 BPM | BODY MASS INDEX: 36.44 KG/M2 | OXYGEN SATURATION: 96 % | HEIGHT: 67 IN | WEIGHT: 232.2 LBS | TEMPERATURE: 98 F

## 2021-01-15 DIAGNOSIS — D63.0 ANEMIA IN NEOPLASTIC DISEASE: Primary | ICD-10-CM

## 2021-01-15 DIAGNOSIS — C49.9 SARCOMA (H): ICD-10-CM

## 2021-01-15 LAB
ALBUMIN SERPL-MCNC: 3.5 G/DL (ref 3.4–5)
ALP SERPL-CCNC: 99 U/L (ref 40–150)
ALT SERPL W P-5'-P-CCNC: 38 U/L (ref 0–50)
ANION GAP SERPL CALCULATED.3IONS-SCNC: 2 MMOL/L (ref 3–14)
AST SERPL W P-5'-P-CCNC: 22 U/L (ref 0–45)
BASOPHILS # BLD AUTO: 0.1 10E9/L (ref 0–0.2)
BASOPHILS NFR BLD AUTO: 1.3 %
BILIRUB SERPL-MCNC: 0.4 MG/DL (ref 0.2–1.3)
BUN SERPL-MCNC: 12 MG/DL (ref 7–30)
CALCIUM SERPL-MCNC: 9.5 MG/DL (ref 8.5–10.1)
CHLORIDE SERPL-SCNC: 110 MMOL/L (ref 94–109)
CO2 SERPL-SCNC: 30 MMOL/L (ref 20–32)
CREAT SERPL-MCNC: 0.68 MG/DL (ref 0.52–1.04)
DIFFERENTIAL METHOD BLD: ABNORMAL
EOSINOPHIL # BLD AUTO: 0 10E9/L (ref 0–0.7)
EOSINOPHIL NFR BLD AUTO: 0.6 %
ERYTHROCYTE [DISTWIDTH] IN BLOOD BY AUTOMATED COUNT: 19.8 % (ref 10–15)
GFR SERPL CREATININE-BSD FRML MDRD: >90 ML/MIN/{1.73_M2}
GLUCOSE SERPL-MCNC: 100 MG/DL (ref 70–99)
HCT VFR BLD AUTO: 31.7 % (ref 35–47)
HGB BLD-MCNC: 10.1 G/DL (ref 11.7–15.7)
IMM GRANULOCYTES # BLD: 0 10E9/L (ref 0–0.4)
IMM GRANULOCYTES NFR BLD: 0.6 %
LDH SERPL L TO P-CCNC: 190 U/L (ref 81–234)
LYMPHOCYTES # BLD AUTO: 0.8 10E9/L (ref 0.8–5.3)
LYMPHOCYTES NFR BLD AUTO: 16.4 %
MAGNESIUM SERPL-MCNC: 2.3 MG/DL (ref 1.6–2.3)
MCH RBC QN AUTO: 29.7 PG (ref 26.5–33)
MCHC RBC AUTO-ENTMCNC: 31.9 G/DL (ref 31.5–36.5)
MCV RBC AUTO: 93 FL (ref 78–100)
MONOCYTES # BLD AUTO: 0.7 10E9/L (ref 0–1.3)
MONOCYTES NFR BLD AUTO: 13.7 %
NEUTROPHILS # BLD AUTO: 3.2 10E9/L (ref 1.6–8.3)
NEUTROPHILS NFR BLD AUTO: 67.4 %
NRBC # BLD AUTO: 0 10*3/UL
NRBC BLD AUTO-RTO: 0 /100
PHOSPHATE SERPL-MCNC: 3.6 MG/DL (ref 2.5–4.5)
PLATELET # BLD AUTO: 437 10E9/L (ref 150–450)
POTASSIUM SERPL-SCNC: 4.1 MMOL/L (ref 3.4–5.3)
PROT SERPL-MCNC: 7.3 G/DL (ref 6.8–8.8)
RBC # BLD AUTO: 3.4 10E12/L (ref 3.8–5.2)
SODIUM SERPL-SCNC: 141 MMOL/L (ref 133–144)
WBC # BLD AUTO: 4.8 10E9/L (ref 4–11)

## 2021-01-15 PROCEDURE — 250N000009 HC RX 250: Performed by: PHYSICIAN ASSISTANT

## 2021-01-15 PROCEDURE — G0498 CHEMO EXTEND IV INFUS W/PUMP: HCPCS

## 2021-01-15 PROCEDURE — 84100 ASSAY OF PHOSPHORUS: CPT | Performed by: PHYSICIAN ASSISTANT

## 2021-01-15 PROCEDURE — 80053 COMPREHEN METABOLIC PANEL: CPT | Performed by: PHYSICIAN ASSISTANT

## 2021-01-15 PROCEDURE — 85025 COMPLETE CBC W/AUTO DIFF WBC: CPT | Performed by: PHYSICIAN ASSISTANT

## 2021-01-15 PROCEDURE — 250N000011 HC RX IP 250 OP 636: Performed by: INTERNAL MEDICINE

## 2021-01-15 PROCEDURE — 96413 CHEMO IV INFUSION 1 HR: CPT

## 2021-01-15 PROCEDURE — 250N000011 HC RX IP 250 OP 636: Performed by: PHYSICIAN ASSISTANT

## 2021-01-15 PROCEDURE — 83735 ASSAY OF MAGNESIUM: CPT | Performed by: PHYSICIAN ASSISTANT

## 2021-01-15 PROCEDURE — 96375 TX/PRO/DX INJ NEW DRUG ADDON: CPT

## 2021-01-15 PROCEDURE — 83615 LACTATE (LD) (LDH) ENZYME: CPT | Performed by: PHYSICIAN ASSISTANT

## 2021-01-15 PROCEDURE — 250N000013 HC RX MED GY IP 250 OP 250 PS 637: Performed by: PHYSICIAN ASSISTANT

## 2021-01-15 PROCEDURE — 258N000003 HC RX IP 258 OP 636: Performed by: PHYSICIAN ASSISTANT

## 2021-01-15 RX ORDER — HEPARIN SODIUM (PORCINE) LOCK FLUSH IV SOLN 100 UNIT/ML 100 UNIT/ML
5 SOLUTION INTRAVENOUS
Status: DISCONTINUED | OUTPATIENT
Start: 2021-01-15 | End: 2021-01-15 | Stop reason: HOSPADM

## 2021-01-15 RX ORDER — PALONOSETRON 0.05 MG/ML
0.25 INJECTION, SOLUTION INTRAVENOUS ONCE
Status: COMPLETED | OUTPATIENT
Start: 2021-01-15 | End: 2021-01-15

## 2021-01-15 RX ORDER — ACETAMINOPHEN 325 MG/1
650 TABLET ORAL ONCE
Status: COMPLETED | OUTPATIENT
Start: 2021-01-15 | End: 2021-01-15

## 2021-01-15 RX ADMIN — Medication 5 ML: at 13:47

## 2021-01-15 RX ADMIN — FAMOTIDINE 20 MG: 10 INJECTION INTRAVENOUS at 15:04

## 2021-01-15 RX ADMIN — ACETAMINOPHEN 650 MG: 325 TABLET ORAL at 14:17

## 2021-01-15 RX ADMIN — DOXORUBICIN HYDROCHLORIDE 80 MG: 2 INJECTABLE, LIPOSOMAL INTRAVENOUS at 15:00

## 2021-01-15 RX ADMIN — PALONOSETRON 0.25 MG: 0.05 INJECTION, SOLUTION INTRAVENOUS at 14:20

## 2021-01-15 RX ADMIN — DEXTROSE MONOHYDRATE 250 ML: 50 INJECTION, SOLUTION INTRAVENOUS at 14:20

## 2021-01-15 ASSESSMENT — MIFFLIN-ST. JEOR: SCORE: 1680.88

## 2021-01-15 ASSESSMENT — PAIN SCALES - GENERAL: PAINLEVEL: MILD PAIN (2)

## 2021-01-15 NOTE — PROGRESS NOTES
This is a recent snapshot of the patient's [facility] medical record.  For current drug dose and complete information and questions, call [phone] or In Basket pool, [facility]v home infusion ([id number])  CSN Number:  [id number]

## 2021-01-15 NOTE — PROGRESS NOTES
Chief Complaint   Patient presents with     Port Draw     Labs drawn, VSS, heparin locked, Checked into infusion     Brayden Hansen RN on 1/15/2021 at 1:49 PM

## 2021-01-15 NOTE — PROGRESS NOTES
"Infusion Nursing Note:  Richa Ashby presents today for Cycle 3, Day 1 Doxil, Ifosfamide/[place]esna continuous pump.    Patient seen by provider today: No   present during visit today: Not Applicable.    Note: Pt assessed upon arrival to infusion suite. Denies fever, chills, cough, SOB or other signs/symptoms of infection. Pt complaining of neck and headache tension today, rating 2-3/10. Heat pack and tylenol given with relief. No other new issues or concerns.     Intravenous Access:  Implanted Port.    Treatment Conditions:  Lab Results   Component Value Date    HGB 10.1 01/15/2021     Lab Results   Component Value Date    WBC 4.8 01/15/2021      Lab Results   Component Value Date    ANEU 3.2 01/15/2021     Lab Results   Component Value Date     01/15/2021      Lab Results   Component Value Date     01/15/2021                   Lab Results   Component Value Date    POTASSIUM 4.1 01/15/2021           Lab Results   Component Value Date    MAG 2.3 01/15/2021            Lab Results   Component Value Date    CR 0.68 01/15/2021                   Lab Results   Component Value Date    VIKTORIA 9.5 01/15/2021                Lab Results   Component Value Date    BILITOTAL 0.4 01/15/2021           Lab Results   Component Value Date    ALBUMIN 3.5 01/15/2021                    Lab Results   Component Value Date    ALT 38 01/15/2021           Lab Results   Component Value Date    AST 22 01/15/2021       Results reviewed, labs MET treatment parameters, ok to proceed with treatment.  ECHO/MUGA completed 10/27/2020  EF 60-65%.    Post Infusion Assessment:  Patient tolerated infusion without incident.  Blood return noted pre and post infusion.  Site patent and intact, free from redness, edema or discomfort.  No evidence of extravasations.     Pt connected to CADD Ifosfamide/Mesna continuous pump. Settings and tape connections double checked by Anita Garcia;and, RN. Pump \"running\" prior to patient discharging. " [facility] notified of Mesna bag change on 1/21 and pump disconnect on 1/22 and Neulasta injection.     Discharge Plan:   Prescription refills given for Compazine.  AVS to patient via DIY[facility].  Patient will return 2/12/21 for next appointment.   Patient discharged in stable condition accompanied by: self.  Departure Mode: Ambulatory.    Shannan Barrett RN

## 2021-01-16 ENCOUNTER — NURSE TRIAGE (OUTPATIENT)
Dept: NURSING | Facility: CLINIC | Age: 56
End: 2021-01-16

## 2021-01-16 ENCOUNTER — VIRTUAL VISIT (OUTPATIENT)
Dept: PALLIATIVE CARE | Facility: CLINIC | Age: 56
End: 2021-01-16
Attending: SOCIAL WORKER
Payer: COMMERCIAL

## 2021-01-16 DIAGNOSIS — C49.9 SARCOMA (H): ICD-10-CM

## 2021-01-16 DIAGNOSIS — Z51.5 ENCOUNTER FOR PALLIATIVE CARE: ICD-10-CM

## 2021-01-16 DIAGNOSIS — F43.23 ADJUSTMENT DISORDER WITH MIXED ANXIETY AND DEPRESSED MOOD: Primary | ICD-10-CM

## 2021-01-16 PROCEDURE — 999N000102 HC STATISTIC NO CHARGE CLINIC VISIT

## 2021-01-16 RX ORDER — PROCHLORPERAZINE MALEATE 10 MG
10 TABLET ORAL EVERY 6 HOURS PRN
Qty: 30 TABLET | Refills: 1 | Status: SHIPPED | OUTPATIENT
Start: 2021-01-16 | End: 2021-05-10

## 2021-01-16 RX ORDER — GRANISETRON HYDROCHLORIDE 1 MG/1
2 TABLET, FILM COATED ORAL DAILY
Qty: 12 TABLET | Refills: 1 | Status: SHIPPED | OUTPATIENT
Start: 2021-01-16 | End: 2021-02-10

## 2021-01-16 NOTE — LETTER
2021       RE: Richa Ashby  [address]  [address]  [facility] [address]     Dear Colleague,    Thank you for referring your patient, Richa Ashby, [facility]. Please see a copy of my visit note below.    Palliative Care Counseling Services - Initial Assessment    PLEASE NOTE:  THIS IS A MENTAL HEALTH NOTE.  OTHER PROVIDERS VIEWING THIS NOTE SHOULD USE THIS ONLY FOR UNDERSTANDING THE CONTEXT OF THE PATIENT S EXPERIENCE.  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    Referred by: Dr Lopez    Presenting Concerns: Coping with illness     Preferred Name: Connie    Mental Status Exam: (List all that apply)      Appearance: Appropriate      Eye Contact: Good       Orientation: Yes, x4      Mood: euthymic       Affect: Appropriate      Thought Content: Clear         Thought Form: Logical      Psychomotor Behavior: Normal    Family:       Marital status:  Years : 34 years in [place]    Years together:      Name of spouse/partner: Elliot      Children: 2 adults - Hector 31, in [place] in [place] with aurelia Rojas; 29 year old daughter  to Akash, first grandchild, 8 months old, live outside [place]      Parents: mom is living and staying with us; dad  in       Siblings: 2 younger sisters, Fe and Indu, and younger brother Xander      Other:     Support system: Family and Friends   Good friends, very helpful and supportive - friend Blanco is one, texts me daily    Living situation: rent, have moved up to [place] from [place] 6 years ago, house there is being rented, plan to stay renters, hope to sell [place] house in summer    Employment history:      Current employment status: full time      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   Explored barriers - has felt like something I'm not ready to face. Important, and \"I don't to think about that.\"    Medical History/Issues (patient account): 1 year ago Nov - at conference - noticed something on my thigh, it was uncomfortable to sit on. Not long after, went to MD. 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 Jan 2020 I had radiation 5 days per week for 5 weeks, then March surgery to remove the tumor. I knew at the time that if the tumor could metastasize it almost always goes to the lung. Back to work in April. Had follow up appts. No scans in summer with Dr Johnson. Granddaughter born, everything was great. Doing best we could to cope with covid19. The in Oct shortness of breath and cough. I saw MD and thought asthma or covid19. Then chest Xray - showed nodules in lung, sarcoma mets. Since then \"on the fast track\" - roller coaster - MDs, chemo, feeling like crud, up and down. Just started 3rd round of chemo yesterday.   Chemo is a rollercoaster ride. First round, about end of second week,  and I both ended up with covid19. That first round was really rough, hard to say which was covid19, in bed 6 days with fever.  fared better. Having hand/foot syndrome on hands. Stomach - heartburn. Soreness of mouth felt like acid " "burns from stomach. Finally got a handle on that. Hard to chew. Based on that they adjusted chemo dosages - two chemos - for this round - and gave me other ways to manage. Blood work problems also this round - transfusion, phosphorus, etc. Will see PA middle of week 2 to check in.     Pain/Discomfort Issues: See above. Cough and SOB do not continue.   Scans last week - encouraging - nodules in lung have shrunk, no new ones. Cautiously optimistic.   Plan is to continue chemo and scans. Understands this as long road.Infusion, then gets hooked up to a pump for 6 days for other meds cocktails. Hooked up for 1 week each round. Feels like an anchor.   Using intermittent FMLA for work during week 1. Shonda from home work/ off. Week 2 immune system is weakest. Stays home weeks 1 and 2, then in office 3 and 4.    Health related areas of loss, grief: uncertainty, disruption, discomfort    Coping: prayer is helpful, reads to distract self, sleeps a lot during that time  Daughter and granddaughter call daily, enjoys this    Sleep: sleeps a lot    Sexual Health/Intimacy: not discussed today     Mental Health History and Current Review of Symptoms (patient account):     Counseling in past?:  no   Medications for anxiety, depression or other mental health concern in past?: yes  Many years ago took some antidepressant for seasonal affective disorder, 20 years ago   Counseling or medications currently?:  no     Depression diagnosis or symptoms in past?: yes      Depression symptoms currently?:  - Anhedonia:  yes  - Hopeless or down mood:  Yes - good health news this week has kind of helped, but my mind goes dark places. Has more to do with - How long will I live? Is unknown - Dr Lopez was very straightforward about the survival and cure rate - He shared at our first meeting that 5% chance of curing this. Life - 1 - 5 - 10 years? First goal is cure, then \"how long can we manage and control this\". Goal right now remains to cure the " "sarcoma. Hardest thing for me - online support groups, blogs, FB groups. But most women my age with cancer have other kinds. Hard to find someone whose cancer is similar. Ov, Br, cerv Soniya are \"more curable.\"  - Excessively negative self perception:  no  - Sleep problems (too much or too little):  yes  - Fatigue:  yes  - Appetite (too much or too little):  yes  - Trouble concentrating:  no  - Motor slowness:  no  - Current and/or recent thoughts of suicide:  No    Suicide risk screen:  - Past thoughts of suicide?  no  - Past attempts to end own life?  no  - If yes, how? Not applicable   - Protective factors currently? \"FOMO\" - Son getting , postponed to April 2022 - \"Gotta make it to the wedding\" motivation - [place] trip planned for 2021. Things shifted where we are now planning things in the future to look forward to. Has helped me. Normal for me to have plans like that.   - Safety plan needed currently? no    Anxiety in past?: yes - Has a very stressful job. But more aware of anxiety since the cancer and especially the second cancer diagnosis. First one felt totally doable, simple to get done.   See above re: mental health treatment history  Anxiety specific treatments in past or present: no    Anxiety symptoms currently?  - Worrying a lot/hard to manage worries:  yes  Specific recent areas of worry/fear/concern: health, prognosis  - Nervous, on edge:  yes  - Sleep problems:  no  - Tense, hard to relax:  Yes - almost body pain - just learning to recognize it as anxiety - ie pain in my neck  - Feeling restless, hard to sit still:  no  - Irritable:  yes  - Feeling of dread/ afraid that something awful may happen:  no  - Panic symptoms: no  - How long? Mild in past, moderate since second cancer diagnosis  - Impacts on daily life? significant    Any other mental health diagnosis or symptoms in past?:  no      Any family history of mental health concerns?  no      Positive Bipolar Disorder screen?  no "   Positive Thought Disorder screen? no   History of learning difficulties? no       Psychological Trauma and/or Major Losses:   Cancer  No other trauma history identified    Safety Screen:  History of being harmed or controlled by someone close to you?  no  Being hurt or controlled by someone close to you?  no  Worried will be harmed in future?  no  Worried will harm someone else in future?  no    CD History:   Current concerns (self or other) about alcohol or drug use? no  Past concerns and/or addiction treatment? no    Using alcohol actively? no    Amount per week:     Medications:   Any current concerns? no     Angie/Spirituality:  Identify with a specific Congregational: yes   Actively practicing: yes  Angie community: yes - [employer] - Anabaptist is Lucian Benav[employer] in [place]  Identify as spiritual: yes  How find expression: prayer, services on Youtube,  has dropped off communion and called up    Hope:      What do you hope for:   Looking forward to halfway and spending time with children and grandchildren    Internal Resources (positive memories, current sources of meño):  Calls with daughter and granddaughter - Going back to work last week was uplifting - Loves what she does and is passionate about. First time back since Nov 13. Powerful. Spending time with my .     Perceived Needs: Counseling    Resource needs/Referrals: no     Assessment:    Connie is a 56 year old woman. They were referred by Dr Lopez for counseling and attend the interview today alone. They are diagnosed with sarcoma, and in addition to their medical diagnosis also meets criteria for adjustment disorder with anxiety and depression. Their symptoms include low mood, sleep concerns, irritability, difficult to manage worries. These symptoms began around time of sarcoma diagnosis and the client has experienced functional impairment in coping with worries, sleep since then. It appears that contributing factors for their anxiety  and depression include illness stressors and unknowns.   Connie presents as reflective and caring woman, facing sarcoma. Their relationships are described as supportive. Other complicating situations in their life include work stressors. They identify as a part of not discussed culture. Their medical condition has the potential to influence their mental health in the following ways: uncertainty, treatments, discomfort.   Other mental health diagnoses that were considered include: generalized anxiety disorder or major depressive disorder with anxious distress. The symptoms related to these possible diagnoses can currently be explained by adjustment disorder with mixed anxiety and depressed mood.   It is medically necessary for this client to receive outpatient psychotherapy services at this time. If their current mental health symptoms go untreated it is likely that stress level and low mood may impact daily function and quality of life. My recommendations for services for this client include psychotherapy q 2 - 4 weks. The client's prognosis from a mental health perspective is good.      Intervention: Initial palliative care counseling / clinical social work evaluation was conducted.  Palliative Care Counseling interventions available were discussed, including counseling related to serious illness, behavioral interventions for symptom management, consultation regarding goals of care/health care directive/POLST, and other interventions specific to the patient's situation or concerns.     Plan: Return in 2 weeks    DSM5 Diagnoses:   Adjustment Disorders  309.28 (F43.23) With mixed anxiety and depressed mood    Time Spent with Patient/Family: 50 minutes  (Start 3:05pm, end 3:55pm)    YAMILETH Samayoa    DO NOT SEND ANY LETTERS            Again, thank you for allowing me to participate in the care of your patient.      Sincerely,    YAMILETH Samayoa

## 2021-01-16 NOTE — TELEPHONE ENCOUNTER
"Connie had infusion, chemo yesterday. Wasn't given Kytril, she thinks she is supposed to start tomorrow (Sunday).    Would like script sent to [facility].    Provider on call, Fellow Charissa Gerald paged. She will refill Kytril and also prn Compazine. Let Connie know, who was very appreciative.                 Additional Information    Negative: Drug overdose and nurse unable to answer question    Negative: Caller requesting information not related to medicine    Negative: Caller requesting a prescription for Strep throat and has a positive culture result    Negative: Rash while taking a medication or within 3 days of stopping it    Negative: Immunization reaction suspected    Negative: [1] Asthma AND [2] having symptoms of asthma (cough, wheezing, etc)    Negative: MORE THAN A DOUBLE DOSE of a prescription or over-the-counter (OTC) drug    Negative: [1] DOUBLE DOSE (an extra dose or lesser amount) of over-the-counter (OTC) drug AND [2] any symptoms (e.g., dizziness, nausea, pain, sleepiness)    Negative: [1] DOUBLE DOSE (an extra dose or lesser amount) of prescription drug AND [2] any symptoms (e.g., dizziness, nausea, pain, sleepiness)    Negative: Took another person's prescription drug    Negative: [1] DOUBLE DOSE (an extra dose or lesser amount) of prescription drug AND [2] NO symptoms (Exception: a double dose of antibiotics)    Negative: Diabetes drug error or overdose (e.g., insulin or extra dose)    Negative: [1] Request for URGENT new prescription or refill of \"essential\" medication (i.e., likelihood of harm to patient if not taken) AND [2] triager unable to fill per unit policy    Negative: [1] Prescription not at pharmacy AND [2] was prescribed today by PCP    Negative: Pharmacy calling with prescription questions and triager unable to answer question    Negative: Caller has urgent medication question about med that PCP prescribed and triager unable to answer question    Negative: Caller has NON-URGENT " medication question about med that PCP prescribed and triager unable to answer question    Negative: Caller requesting a NON-URGENT new prescription or refill and triager unable to refill per unit policy    Negative: Caller has medication question about med not prescribed by PCP and triager unable to answer question (e.g., compatibility with other med, storage)    Negative: [1] DOUBLE DOSE (an extra dose or lesser amount) of over-the-counter (OTC) drug AND [2] NO symptoms    Negative: [1] DOUBLE DOSE (an extra dose or lesser amount) of antibiotic drug AND [2] NO symptoms    Caller has medication question only, adult not sick, and triager answers question    Protocols used: MEDICATION QUESTION CALL-A-

## 2021-01-16 NOTE — PROGRESS NOTES
Palliative Care Counseling Services - Initial Assessment    PLEASE NOTE:  THIS IS A MENTAL HEALTH NOTE.  OTHER PROVIDERS VIEWING THIS NOTE SHOULD USE THIS ONLY FOR UNDERSTANDING THE CONTEXT OF THE PATIENT S EXPERIENCE.  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    Referred by: Dr Lopez    Presenting Concerns: Coping with illness     Preferred Name: Connie    Mental Status Exam: (List all that apply)      Appearance: Appropriate      Eye Contact: Good       Orientation: Yes, x4      Mood: euthymic       Affect: Appropriate      Thought Content: Clear         Thought Form: Logical      Psychomotor Behavior: Normal    Family:       Marital status:  Years : 34 years in [place]    Years together:      Name of spouse/partner: Elliot      Children: 2 adults - Hector 31, in [place] in [place] with aurelia Rojas; 29 year old daughter  to Akash, first grandchild, 8 months old, live outside [place]      Parents: mom is living and staying with us; dad  in       Siblings: 2 younger sisters, Fe and Indu, and younger brother Xander      Other:     Support system: Family and Friends   Good friends, very helpful and supportive - friend Blanco weiner one, texts me daily    Living situation: rent, have moved up to [place] from [place] 6 years ago, house there is being rented, plan to stay renters, hope to sell [place] house in summer    Employment history:      Current employment status: full time      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   Explored barriers - has felt like something I'm not ready to face. Important, and \"I don't to think about that.\"    Medical History/Issues (patient account): 1 year ago Nov - at conference - noticed something on my thigh, it was uncomfortable to sit on. Not long after, went to MD. 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 Jan 2020 I had radiation 5 days per week for 5 weeks, then March surgery to remove the tumor. I knew at the time that if the tumor could metastasize it almost always goes to the lung. Back to work in April. Had follow up appts. No scans in summer with Dr Johnson. Granddaughter born, everything was great. Doing best we could to cope with covid19. The in Oct shortness of breath and cough. I saw MD and thought asthma or covid19. Then chest Xray - showed nodules in lung, sarcoma mets. Since then \"on the fast track\" - roller coaster - MDs, chemo, feeling like crud, up and down. Just started 3rd round of chemo yesterday.   Chemo is a rollercoaster ride. First round, about end of second week,  and I both ended up with covid19. That first round was really rough, hard to say which was covid19, in bed 6 days with fever.  fared better. Having hand/foot syndrome on hands. Stomach - heartburn. Soreness of mouth felt like acid burns from stomach. Finally got a handle on that. Hard to chew. Based on that they adjusted chemo dosages - two chemos - for this round - and gave me other ways to manage. Blood work problems also this round - transfusion, phosphorus, etc. Will see PA middle of week 2 to check in.     Pain/Discomfort Issues: See above. Cough and SOB do " "not continue.   Scans last week - encouraging - nodules in lung have shrunk, no new ones. Cautiously optimistic.   Plan is to continue chemo and scans. Understands this as long road.Infusion, then gets hooked up to a pump for 6 days for other meds cocktails. Hooked up for 1 week each round. Feels like an anchor.   Using intermittent FMLA for work during week 1. Shonda from home work/ off. Week 2 immune system is weakest. Stays home weeks 1 and 2, then in office 3 and 4.    Health related areas of loss, grief: uncertainty, disruption, discomfort    Coping: prayer is helpful, reads to distract self, sleeps a lot during that time  Daughter and granddaughter call daily, enjoys this    Sleep: sleeps a lot    Sexual Health/Intimacy: not discussed today     Mental Health History and Current Review of Symptoms (patient account):     Counseling in past?:  no   Medications for anxiety, depression or other mental health concern in past?: yes  Many years ago took some antidepressant for seasonal affective disorder, 20 years ago   Counseling or medications currently?:  no     Depression diagnosis or symptoms in past?: yes      Depression symptoms currently?:  - Anhedonia:  yes  - Hopeless or down mood:  Yes - good health news this week has kind of helped, but my mind goes dark places. Has more to do with - How long will I live? Is unknown - Dr Lopez was very straightforward about the survival and cure rate - He shared at our first meeting that 5% chance of curing this. Life - 1 - 5 - 10 years? First goal is cure, then \"how long can we manage and control this\". Goal right now remains to cure the sarcoma. Hardest thing for me - online support groups, blogs, FB groups. But most women my age with cancer have other kinds. Hard to find someone whose cancer is similar. Ov, Br, cerv Soniya are \"more curable.\"  - Excessively negative self perception:  no  - Sleep problems (too much or too little):  yes  - Fatigue:  yes  - Appetite (too " "much or too little):  yes  - Trouble concentrating:  no  - Motor slowness:  no  - Current and/or recent thoughts of suicide:  No    Suicide risk screen:  - Past thoughts of suicide?  no  - Past attempts to end own life?  no  - If yes, how? Not applicable   - Protective factors currently? \"FOMO\" - Son getting , postponed to April 2022 - \"Gotta make it to the wedding\" motivation - [place] trip planned for 2021. Things shifted where we are now planning things in the future to look forward to. Has helped me. Normal for me to have plans like that.   - Safety plan needed currently? no    Anxiety in past?: yes - Has a very stressful job. But more aware of anxiety since the cancer and especially the second cancer diagnosis. First one felt totally doable, simple to get done.   See above re: mental health treatment history  Anxiety specific treatments in past or present: no    Anxiety symptoms currently?  - Worrying a lot/hard to manage worries:  yes  Specific recent areas of worry/fear/concern: health, prognosis  - Nervous, on edge:  yes  - Sleep problems:  no  - Tense, hard to relax:  Yes - almost body pain - just learning to recognize it as anxiety - ie pain in my neck  - Feeling restless, hard to sit still:  no  - Irritable:  yes  - Feeling of dread/ afraid that something awful may happen:  no  - Panic symptoms: no  - How long? Mild in past, moderate since second cancer diagnosis  - Impacts on daily life? significant    Any other mental health diagnosis or symptoms in past?:  no      Any family history of mental health concerns?  no      Positive Bipolar Disorder screen?  no   Positive Thought Disorder screen? no   History of learning difficulties? no       Psychological Trauma and/or Major Losses:   Cancer  No other trauma history identified    Safety Screen:  History of being harmed or controlled by someone close to you?  no  Being hurt or controlled by someone close to you?  no  Worried will be harmed in " future?  no  Worried will harm someone else in future?  no    CD History:   Current concerns (self or other) about alcohol or drug use? no  Past concerns and/or addiction treatment? no    Using alcohol actively? no    Amount per week:     Medications:   Any current concerns? no     Angie/Spirituality:  Identify with a specific Samaritan: yes   Actively practicing: yes  Angie community: yes - Sabianist - Scientologist is Lucian Benavides in [place]  Identify as spiritual: yes  How find expression: prayer, services on Youtube,  has dropped off communion and called up    Hope:      What do you hope for:   Looking forward to half-way and spending time with children and grandchildren    Internal Resources (positive memories, current sources of meño):  Calls with daughter and granddaughter - Going back to work last week was uplifting - Loves what she does and is passionate about. First time back since Nov 13. Powerful. Spending time with my .     Perceived Needs: Counseling    Resource needs/Referrals: no     Assessment:    Connie is a 56 year old woman. They were referred by Dr Lopez for counseling and attend the interview today alone. They are diagnosed with sarcoma, and in addition to their medical diagnosis also meets criteria for adjustment disorder with anxiety and depression. Their symptoms include low mood, sleep concerns, irritability, difficult to manage worries. These symptoms began around time of sarcoma diagnosis and the client has experienced functional impairment in coping with worries, sleep since then. It appears that contributing factors for their anxiety and depression include illness stressors and unknowns.   Connie presents as reflective and caring woman, facing sarcoma. Their relationships are described as supportive. Other complicating situations in their life include work stressors. They identify as a part of not discussed culture. Their medical condition has the potential to  influence their mental health in the following ways: uncertainty, treatments, discomfort.   Other mental health diagnoses that were considered include: generalized anxiety disorder or major depressive disorder with anxious distress. The symptoms related to these possible diagnoses can currently be explained by adjustment disorder with mixed anxiety and depressed mood.   It is medically necessary for this client to receive outpatient psychotherapy services at this time. If their current mental health symptoms go untreated it is likely that stress level and low mood may impact daily function and quality of life. My recommendations for services for this client include psychotherapy q 2 - 4 weks. The client's prognosis from a mental health perspective is good.      Intervention: Initial palliative care counseling / clinical social work evaluation was conducted.  Palliative Care Counseling interventions available were discussed, including counseling related to serious illness, behavioral interventions for symptom management, consultation regarding goals of care/health care directive/POLST, and other interventions specific to the patient's situation or concerns.     Plan: Return in 2 weeks    DSM5 Diagnoses:   Adjustment Disorders  309.28 (F43.23) With mixed anxiety and depressed mood    Time Spent with Patient/Family: 50 minutes  (Start 3:05pm, end 3:55pm)    Tiffany Astorga [facility] [place]    DO NOT SEND ANY LETTERS

## 2021-01-18 ENCOUNTER — HOME INFUSION (PRE-WILLOW HOME INFUSION) (OUTPATIENT)
Dept: PHARMACY | Facility: CLINIC | Age: 56
End: 2021-01-18

## 2021-01-18 LAB
ANION GAP SERPL CALCULATED.3IONS-SCNC: 8 MMOL/L (ref 3–14)
BUN SERPL-MCNC: 10 MG/DL (ref 7–30)
CALCIUM SERPL-MCNC: 8.7 MG/DL (ref 8.5–10.1)
CHLORIDE SERPL-SCNC: 107 MMOL/L (ref 94–109)
CO2 SERPL-SCNC: 26 MMOL/L (ref 20–32)
CREAT SERPL-MCNC: 0.57 MG/DL (ref 0.52–1.04)
GFR SERPL CREATININE-BSD FRML MDRD: >90 ML/MIN/{1.73_M2}
GLUCOSE SERPL-MCNC: 84 MG/DL (ref 70–99)
MAGNESIUM SERPL-MCNC: 2.1 MG/DL (ref 1.6–2.3)
PHOSPHATE SERPL-MCNC: 3 MG/DL (ref 2.5–4.5)
POTASSIUM SERPL-SCNC: 3.8 MMOL/L (ref 3.4–5.3)
SODIUM SERPL-SCNC: 141 MMOL/L (ref 133–144)

## 2021-01-18 PROCEDURE — 84100 ASSAY OF PHOSPHORUS: CPT | Performed by: INTERNAL MEDICINE

## 2021-01-18 PROCEDURE — 80048 BASIC METABOLIC PNL TOTAL CA: CPT | Performed by: INTERNAL MEDICINE

## 2021-01-18 PROCEDURE — 83735 ASSAY OF MAGNESIUM: CPT | Performed by: INTERNAL MEDICINE

## 2021-01-18 NOTE — PROGRESS NOTES
This is a recent snapshot of the patient's [facility] medical record.  For current drug dose and complete information and questions, call [phone] or In Basket pool, fv home infusion ([id number])  CSN Number:  [id number]

## 2021-01-19 ENCOUNTER — HOME INFUSION (PRE-WILLOW HOME INFUSION) (OUTPATIENT)
Dept: PHARMACY | Facility: CLINIC | Age: 56
End: 2021-01-19

## 2021-01-19 LAB
ANION GAP SERPL CALCULATED.3IONS-SCNC: 8 MMOL/L (ref 3–14)
BUN SERPL-MCNC: 12 MG/DL (ref 7–30)
CALCIUM SERPL-MCNC: 8.9 MG/DL (ref 8.5–10.1)
CHLORIDE SERPL-SCNC: 108 MMOL/L (ref 94–109)
CO2 SERPL-SCNC: 25 MMOL/L (ref 20–32)
CREAT SERPL-MCNC: 0.61 MG/DL (ref 0.52–1.04)
GFR SERPL CREATININE-BSD FRML MDRD: >90 ML/MIN/{1.73_M2}
GLUCOSE SERPL-MCNC: 93 MG/DL (ref 70–99)
MAGNESIUM SERPL-MCNC: 2 MG/DL (ref 1.6–2.3)
PHOSPHATE SERPL-MCNC: 2.6 MG/DL (ref 2.5–4.5)
POTASSIUM SERPL-SCNC: 3.5 MMOL/L (ref 3.4–5.3)
SODIUM SERPL-SCNC: 141 MMOL/L (ref 133–144)

## 2021-01-19 PROCEDURE — 83735 ASSAY OF MAGNESIUM: CPT | Performed by: INTERNAL MEDICINE

## 2021-01-19 PROCEDURE — 80048 BASIC METABOLIC PNL TOTAL CA: CPT | Performed by: INTERNAL MEDICINE

## 2021-01-19 PROCEDURE — 84100 ASSAY OF PHOSPHORUS: CPT | Performed by: INTERNAL MEDICINE

## 2021-01-19 NOTE — PROGRESS NOTES
This is a recent snapshot of the patient's [facility] medical record.  For current drug dose and complete information and questions, call [phone] or In Basket pool, fv home infusion ([id number])  CSN Number:  [id number]

## 2021-01-20 ENCOUNTER — PATIENT OUTREACH (OUTPATIENT)
Dept: ONCOLOGY | Facility: CLINIC | Age: 56
End: 2021-01-20

## 2021-01-20 ENCOUNTER — MEDICAL CORRESPONDENCE (OUTPATIENT)
Dept: HEALTH INFORMATION MANAGEMENT | Facility: CLINIC | Age: 56
End: 2021-01-20

## 2021-01-20 ENCOUNTER — HOME INFUSION (PRE-WILLOW HOME INFUSION) (OUTPATIENT)
Dept: PHARMACY | Facility: CLINIC | Age: 56
End: 2021-01-20

## 2021-01-20 LAB
ANION GAP SERPL CALCULATED.3IONS-SCNC: 6 MMOL/L (ref 3–14)
BUN SERPL-MCNC: 12 MG/DL (ref 7–30)
CALCIUM SERPL-MCNC: 9 MG/DL (ref 8.5–10.1)
CHLORIDE SERPL-SCNC: 109 MMOL/L (ref 94–109)
CO2 SERPL-SCNC: 25 MMOL/L (ref 20–32)
CREAT SERPL-MCNC: 0.58 MG/DL (ref 0.52–1.04)
GFR SERPL CREATININE-BSD FRML MDRD: >90 ML/MIN/{1.73_M2}
GLUCOSE SERPL-MCNC: 109 MG/DL (ref 70–99)
MAGNESIUM SERPL-MCNC: 2 MG/DL (ref 1.6–2.3)
PHOSPHATE SERPL-MCNC: 1.9 MG/DL (ref 2.5–4.5)
POTASSIUM SERPL-SCNC: 3.4 MMOL/L (ref 3.4–5.3)
SODIUM SERPL-SCNC: 140 MMOL/L (ref 133–144)

## 2021-01-20 PROCEDURE — 80048 BASIC METABOLIC PNL TOTAL CA: CPT | Performed by: INTERNAL MEDICINE

## 2021-01-20 PROCEDURE — 84100 ASSAY OF PHOSPHORUS: CPT | Performed by: INTERNAL MEDICINE

## 2021-01-20 PROCEDURE — 83735 ASSAY OF MAGNESIUM: CPT | Performed by: INTERNAL MEDICINE

## 2021-01-21 ENCOUNTER — INFUSION THERAPY VISIT (OUTPATIENT)
Dept: PHARMACY | Facility: CLINIC | Age: 56
End: 2021-01-21

## 2021-01-21 ENCOUNTER — MEDICAL CORRESPONDENCE (OUTPATIENT)
Dept: HEALTH INFORMATION MANAGEMENT | Facility: CLINIC | Age: 56
End: 2021-01-21

## 2021-01-21 ENCOUNTER — HOME INFUSION (PRE-WILLOW HOME INFUSION) (OUTPATIENT)
Dept: PHARMACY | Facility: CLINIC | Age: 56
End: 2021-01-21

## 2021-01-21 LAB
ANION GAP SERPL CALCULATED.3IONS-SCNC: 9 MMOL/L (ref 3–14)
BASOPHILS # BLD AUTO: 0.1 10E9/L (ref 0–0.2)
BASOPHILS NFR BLD AUTO: 2.1 %
BUN SERPL-MCNC: 14 MG/DL (ref 7–30)
CALCIUM SERPL-MCNC: 8.7 MG/DL (ref 8.5–10.1)
CHLORIDE SERPL-SCNC: 109 MMOL/L (ref 94–109)
CO2 SERPL-SCNC: 24 MMOL/L (ref 20–32)
CREAT SERPL-MCNC: 0.7 MG/DL (ref 0.52–1.04)
DIFFERENTIAL METHOD BLD: ABNORMAL
EOSINOPHIL # BLD AUTO: 0.1 10E9/L (ref 0–0.7)
EOSINOPHIL NFR BLD AUTO: 2.1 %
ERYTHROCYTE [DISTWIDTH] IN BLOOD BY AUTOMATED COUNT: 18.6 % (ref 10–15)
GFR SERPL CREATININE-BSD FRML MDRD: >90 ML/MIN/{1.73_M2}
GLUCOSE SERPL-MCNC: 102 MG/DL (ref 70–99)
HCT VFR BLD AUTO: 28.5 % (ref 35–47)
HGB BLD-MCNC: 9.3 G/DL (ref 11.7–15.7)
IMM GRANULOCYTES # BLD: 0 10E9/L (ref 0–0.4)
IMM GRANULOCYTES NFR BLD: 0.3 %
LYMPHOCYTES # BLD AUTO: 0.5 10E9/L (ref 0.8–5.3)
LYMPHOCYTES NFR BLD AUTO: 17.8 %
MAGNESIUM SERPL-MCNC: 2.1 MG/DL (ref 1.6–2.3)
MCH RBC QN AUTO: 30.9 PG (ref 26.5–33)
MCHC RBC AUTO-ENTMCNC: 32.6 G/DL (ref 31.5–36.5)
MCV RBC AUTO: 95 FL (ref 78–100)
MONOCYTES # BLD AUTO: 0.2 10E9/L (ref 0–1.3)
MONOCYTES NFR BLD AUTO: 7.3 %
NEUTROPHILS # BLD AUTO: 2 10E9/L (ref 1.6–8.3)
NEUTROPHILS NFR BLD AUTO: 70.4 %
NRBC # BLD AUTO: 0 10*3/UL
NRBC BLD AUTO-RTO: 0 /100
PHOSPHATE SERPL-MCNC: 2.3 MG/DL (ref 2.5–4.5)
PLATELET # BLD AUTO: 271 10E9/L (ref 150–450)
POTASSIUM SERPL-SCNC: 3.1 MMOL/L (ref 3.4–5.3)
RBC # BLD AUTO: 3.01 10E12/L (ref 3.8–5.2)
SODIUM SERPL-SCNC: 142 MMOL/L (ref 133–144)
WBC # BLD AUTO: 2.9 10E9/L (ref 4–11)

## 2021-01-21 PROCEDURE — 83735 ASSAY OF MAGNESIUM: CPT | Performed by: INTERNAL MEDICINE

## 2021-01-21 PROCEDURE — 84100 ASSAY OF PHOSPHORUS: CPT | Performed by: INTERNAL MEDICINE

## 2021-01-21 PROCEDURE — 85025 COMPLETE CBC W/AUTO DIFF WBC: CPT | Performed by: INTERNAL MEDICINE

## 2021-01-21 PROCEDURE — 80048 BASIC METABOLIC PNL TOTAL CA: CPT | Performed by: INTERNAL MEDICINE

## 2021-01-21 NOTE — PROGRESS NOTES
This is a recent snapshot of the patient's [facility] medical record.  For current drug dose and complete information and questions, call [phone] or In Basket pool, fv home infusion ([id number])  CSN Number:  [id number]

## 2021-01-21 NOTE — PROGRESS NOTES
Called to let Connie know that her phosphorous is low at 1.9 and  would like for her to increase her PO4 tabs to 2 tabs 3 times a day and recheck her labs again on Monday.  She states that she is scheduled for labs next Wednesday and is wondering if they can be done then.  Let her know that I will check with him and get back to her.

## 2021-01-22 ENCOUNTER — MEDICAL CORRESPONDENCE (OUTPATIENT)
Dept: HEALTH INFORMATION MANAGEMENT | Facility: CLINIC | Age: 56
End: 2021-01-22

## 2021-01-22 ENCOUNTER — HOME INFUSION (PRE-WILLOW HOME INFUSION) (OUTPATIENT)
Dept: PHARMACY | Facility: CLINIC | Age: 56
End: 2021-01-22

## 2021-01-22 LAB
ANION GAP SERPL CALCULATED.3IONS-SCNC: 7 MMOL/L (ref 3–14)
BUN SERPL-MCNC: 16 MG/DL (ref 7–30)
CALCIUM SERPL-MCNC: 8.6 MG/DL (ref 8.5–10.1)
CHLORIDE SERPL-SCNC: 112 MMOL/L (ref 94–109)
CO2 SERPL-SCNC: 24 MMOL/L (ref 20–32)
CREAT SERPL-MCNC: 0.62 MG/DL (ref 0.52–1.04)
GFR SERPL CREATININE-BSD FRML MDRD: >90 ML/MIN/{1.73_M2}
GLUCOSE SERPL-MCNC: 94 MG/DL (ref 70–99)
MAGNESIUM SERPL-MCNC: 2.1 MG/DL (ref 1.6–2.3)
PHOSPHATE SERPL-MCNC: 2.2 MG/DL (ref 2.5–4.5)
POTASSIUM SERPL-SCNC: 3.2 MMOL/L (ref 3.4–5.3)
SODIUM SERPL-SCNC: 143 MMOL/L (ref 133–144)

## 2021-01-22 PROCEDURE — 84100 ASSAY OF PHOSPHORUS: CPT | Performed by: INTERNAL MEDICINE

## 2021-01-22 PROCEDURE — 83735 ASSAY OF MAGNESIUM: CPT | Performed by: INTERNAL MEDICINE

## 2021-01-22 PROCEDURE — 80048 BASIC METABOLIC PNL TOTAL CA: CPT | Performed by: INTERNAL MEDICINE

## 2021-01-22 NOTE — PROGRESS NOTES
Skilled nurse visit in the home, for discontinuation of ifosfamide/mesna. 3700 mg of Plain Mesna  infused over 24 hours for day 7 of cycle started at 1730. Estimated completion 1730 on 1/22/2021.    Yury George RN  [facility]  [email or web address]  [phone]    HPI      ROS      Physical Exam

## 2021-01-25 NOTE — PROGRESS NOTES
This is a recent snapshot of the patient's [facility] medical record.  For current drug dose and complete information and questions, call [phone] or In Basket pool, fv home infusion ([id number])  CSN Number:  [id number]

## 2021-01-25 NOTE — PROGRESS NOTES
This is a recent snapshot of the patient's [facility] medical record.  For current drug dose and complete information and questions, call [phone] or In Basket pool, fv home infusion ([id number])  CSN Number:  [id number]

## 2021-01-26 NOTE — PROGRESS NOTES
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 She received preoperative radiotherapy and the tumor was resected 3/12/20. She then developed a cough, chest tightness, and scapular pain and imaging revealed lung nodules. Lung biopsy 11/2/20 with sarcoma. She was started on Doxil + Ifos 11/13/20. Had delay in cycle 2 due to COVID dx 12/2/20. CT imaging after cycle 1 with positive response to treatment with decrease in pulmonary nodules. Received cycle 2 12/18/20. Received cycle 3 1/15/21 with dose reduction in Doxil for skin toxicity and mucositis..     She was seen today for close oncology follow-up on treatment.     Interval History:  Ms. Ashby was seen today for oncology follow-up. We discussed the following:  -Skin is better this cycle but still bothersome. Has redness and dry skin of hands with tenderness in fingertips. Skin is dry overall and she has small blisters in areas of prior adhesive. No feet concerns. Using aveeno lotion along with urea cream, cut back urea cream to 2x per day as when she did it more often it was bothersome. No functional impairments  -Mouth sores were better, doing Healios and baking soda swishes. Lidocaine solution not as helpful. Did have sore throat, now resolved. Continues to feel like there is a sore in the back of her throat, though able to eat and drink OK. Appetite is reduced  -Admits to feeling dizzy and having a headache the last few days, similar to when she needed a blood transfusion before  -Heartburn continues to be an issue, she is taking 40mg Omeprazole which has helped a little but still bothersome  -Prior right sided pain resolved  -Admits to being more  emotional this round, meeting with palliative and SW which helps but still tearful  -Wondering about getting the COVID vaccine-she had COVID in December.  -Denies fevers, chest pain, SOB, cough. Minimal nausea issues, has antiemetics PRN. No abdominal pain, bowel or bladder concerns, edema, bleeding issues, neuropathy, neuro concerns.     Current Outpatient Medications   Medication Sig Dispense Refill     acetaminophen (TYLENOL) 325 MG tablet Take 325-650 mg by mouth every 6 hours as needed for mild pain       albuterol (PROAIR RESPICLICK) 108 (90 Base) MCG/ACT inhaler Inhale 1-2 puffs into the lungs       cetirizine (ZYRTEC) 10 MG tablet Take 10 mg by mouth daily       granisetron (KYTRIL) 1 MG tablet Take 2 tablets (2 mg) by mouth daily Take 2 tablets (2 mg) every morning on Days 3 through 8. 12 tablet 1     ibuprofen (ADVIL/MOTRIN) 200 MG tablet Take 400 mg by mouth every 4 hours as needed for mild pain       lidocaine-prilocaine (EMLA) 2.5-2.5 % external cream Apply topically as needed for moderate pain 30 g 3     loratadine (CLARITIN) 10 MG tablet Take 10 mg by mouth       magic mouthwash suspension (diphenhydrAMINE, lidocaine, aluminum-magnesium & simethicone) Swish and swallow 10 mLs in mouth every 6 hours as needed for mouth sores 120 mL 0     melatonin (MELATONIN) 1 MG/ML LIQD liquid Take 5 mg by mouth       montelukast (SINGULAIR) 10 MG tablet Take 10 mg by mouth daily       nystatin (MYCOSTATIN) 931441 UNIT/ML suspension Take 5 mLs (500,000 Units) by mouth 4 times daily 140 mL 0     omeprazole (PRILOSEC) 20 MG DR capsule        phosphorus tablet 250 mg (PHOSPHA 250 NEUTRAL) 250 MG per tablet Take 1 tablet (250 mg) by mouth 3 times daily (with meals) 90 tablet 1     potassium chloride ER (KLOR-CON M) 10 MEQ CR tablet Take 2 tablets (20 mEq) by mouth daily 60 tablet 1     prochlorperazine (COMPAZINE) 10 MG tablet Take 1 tablet (10 mg) by mouth every 6 hours as needed (Nausea/Vomiting) 30 tablet 1     urea  (CARMOL) 10 % external cream Apply topically as needed for dry skin 453 g 0     vitamin D3 (CHOLECALCIFEROL) 2000 units (50 mcg) tablet Take 1 tablet by mouth daily       Physical Examination:  /65   Pulse 85   Temp 98.4  F (36.9  C) (Oral)   Resp 16   Wt 105.5 kg (232 lb 8 oz)   LMP 03/19/2019   SpO2 100%   BMI 36.41 kg/m    Wt Readings from Last 10 Encounters:   01/15/21 105.3 kg (232 lb 3.2 oz)   12/18/20 106.5 kg (234 lb 14.4 oz)   11/13/20 113.4 kg (250 lb 1.6 oz)   11/04/20 108.9 kg (240 lb)   11/02/20 110.2 kg (243 lb)   10/28/20 110.2 kg (243 lb)   03/12/20 106.6 kg (235 lb)   03/04/20 106.6 kg (235 lb)   02/03/20 103.9 kg (229 lb)   01/27/20 103.9 kg (229 lb)     Constitutional: Well-appearing female in no acute distress.  Eyes: EOMI, PERRL. No scleral icterus.  ENT: Oral mucosa is moist without lesions or thrush.   Lymphatic: Neck is supple without cervical or supraclavicular lymphadenopathy.   Cardiovascular: Regular rate and rhythm. No murmurs, gallops, or rubs. No peripheral edema.  Respiratory: Clear to auscultation bilaterally. No wheezes or crackles.  Gastrointestinal: Bowel sounds present. Abdomen soft, non-tender.   Neurologic: Cranial nerves II through XII are grossly intact.  Skin: Dry peeling skin on hands, slightly erythematous. Few healing blisters on arm from adhesive. No other rashes, petechiae, or bruising noted on exposed skin.    Laboratory Data:  Results for AMADEO WILSON (MRN [id number]) as of 1/27/2021 15:35   1/27/2021 12:58   Sodium 140   Potassium 3.8   Chloride 108   Carbon Dioxide 26   Urea Nitrogen 14   Creatinine 0.52   GFR Estimate >90   GFR Estimate If Black >90   Calcium 8.9   Anion Gap 5   Magnesium 2.2   Phosphorus 2.8   Albumin 3.2 (L)   Protein Total 6.9   Bilirubin Total 0.5   Alkaline Phosphatase 116   ALT 86 (H)   AST 30   Glucose 97   WBC 0.6 (LL)   Hemoglobin 7.4 (L)   Hematocrit 22.3 (L)   Platelet Count 59 (L)   RBC Count 2.39 (L)   MCV 93   MCH  31.0   MCHC 33.2   RDW 16.8 (H)   Diff Method Manual Differential   % Neutrophils 32.9   % Lymphocytes 53.1   % Monocytes 4.2   % Eosinophils 5.6   % Basophils 4.2   Absolute Neutrophil 0.2 (LL)   Absolute Lymphocytes 0.3 (L)   Absolute Monocytes 0.0   Absolute Eosinophils 0.0   Absolute Basophils 0.0       Assessment and Plan:  1. 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 Required Doxil dose reduction for cycle 3 for skin and mouth toxicity. Doing better this round-see supportive cares below. Will plan on seeing KALINA prior to cycle 4. Plan to repeat imaging prior to cycle 5 and see Dr. Lopez to discuss.      to do neuro monitoring with future cycles. Continue lab monitoring day 4-8 and weekly given electrolyte and count issues    She is pre-approved for Keytruda. Will follow-up on [facility].    2. Heme  Anemia: 2/2 Ifosfamide, hgb 7.4 today. Symptomatic with headache and dizziness. Will transfuse 1 unit pRBC today. Recheck in one week    Thrombocytopenia: 2/2 ifosfamide, no bleeding concerns. Recheck in one week, call if bleeding concerns    Neutropenia: 2/2 ifosfamide, continue neutropenic precautions. Did receive Neulasta.     3. FEN  Hypokalemia: 2/2 ifosfamide, improved Continue 20meq daily    Hypophosphatemia: 2/2 ifosfamide, improved. Can go back to 250mg TID    Hydration/Nutrition: Doing OK at this time, discussed small frequent meals, high protein, pushing fluids    4. GI  GERD: Chronic, worse with treatment. Discussed continuing Omeprazole 40mg daily and doing Tums PRN. If no improvement, increase Omeprazole to 40mg BID    Nausea: 2/2 treatment, controlled with Kytril day 3-8 and Compazine PRN    Mucositis: 2/2 Doxil, improved with dose reduction. Continue baking soda swishes and Healios.    5. Derm  Hand/foot: 2/2 Doxil, grade 1, improved with dose reduction. Continue lotion and urea BID. Avoid irritants    Dry skin: 2/2 Doxil,  continue lotion and discussed humidifier    6. Pulm  Asthma: stable, continue inhalers.    COVID: Diagnosed in December, recovered. Will wait on COVID vaccine until after 90 days from illness    7. Psych  Anxiety/depression: Offered emotional support during visit, continue palliative care follow-up    Insomnia: Not discussed, has palliative visit this afternoon     45 minutes spent on the date of the encounter doing chart review, review of test results, interpretation of tests, patient visit and documentation     Harshal Schuster PA-C  [facility]  [address], [place] [address] [phone]

## 2021-01-27 ENCOUNTER — ONCOLOGY VISIT (OUTPATIENT)
Dept: ONCOLOGY | Facility: CLINIC | Age: 56
End: 2021-01-27
Attending: INTERNAL MEDICINE
Payer: COMMERCIAL

## 2021-01-27 ENCOUNTER — APPOINTMENT (OUTPATIENT)
Dept: LAB | Facility: CLINIC | Age: 56
End: 2021-01-27
Attending: INTERNAL MEDICINE
Payer: COMMERCIAL

## 2021-01-27 ENCOUNTER — VIRTUAL VISIT (OUTPATIENT)
Dept: PALLIATIVE CARE | Facility: CLINIC | Age: 56
End: 2021-01-27
Attending: INTERNAL MEDICINE
Payer: COMMERCIAL

## 2021-01-27 ENCOUNTER — INFUSION THERAPY VISIT (OUTPATIENT)
Dept: ONCOLOGY | Facility: CLINIC | Age: 56
End: 2021-01-27
Attending: PHYSICIAN ASSISTANT
Payer: COMMERCIAL

## 2021-01-27 VITALS
TEMPERATURE: 97.3 F | HEART RATE: 72 BPM | RESPIRATION RATE: 18 BRPM | OXYGEN SATURATION: 99 % | DIASTOLIC BLOOD PRESSURE: 73 MMHG | SYSTOLIC BLOOD PRESSURE: 112 MMHG

## 2021-01-27 VITALS
RESPIRATION RATE: 16 BRPM | WEIGHT: 232.5 LBS | OXYGEN SATURATION: 100 % | SYSTOLIC BLOOD PRESSURE: 110 MMHG | DIASTOLIC BLOOD PRESSURE: 65 MMHG | HEART RATE: 85 BPM | TEMPERATURE: 98.4 F | BODY MASS INDEX: 36.41 KG/M2

## 2021-01-27 DIAGNOSIS — T45.1X5A ANEMIA DUE TO ANTINEOPLASTIC CHEMOTHERAPY: ICD-10-CM

## 2021-01-27 DIAGNOSIS — L27.1 HAND FOOT SYNDROME: ICD-10-CM

## 2021-01-27 DIAGNOSIS — R45.86 MOOD AND AFFECT DISTURBANCE: ICD-10-CM

## 2021-01-27 DIAGNOSIS — K12.31 MUCOSITIS DUE TO CHEMOTHERAPY: ICD-10-CM

## 2021-01-27 DIAGNOSIS — D63.0 ANEMIA IN NEOPLASTIC DISEASE: Primary | ICD-10-CM

## 2021-01-27 DIAGNOSIS — R91.8 PULMONARY NODULES: ICD-10-CM

## 2021-01-27 DIAGNOSIS — C49.9 SARCOMA (H): Primary | ICD-10-CM

## 2021-01-27 DIAGNOSIS — G47.01 INSOMNIA DUE TO MEDICAL CONDITION: ICD-10-CM

## 2021-01-27 DIAGNOSIS — R53.0 NEOPLASTIC (MALIGNANT) RELATED FATIGUE: ICD-10-CM

## 2021-01-27 DIAGNOSIS — K21.9 GASTROESOPHAGEAL REFLUX DISEASE WITHOUT ESOPHAGITIS: ICD-10-CM

## 2021-01-27 DIAGNOSIS — G89.3 CANCER ASSOCIATED PAIN: ICD-10-CM

## 2021-01-27 DIAGNOSIS — D64.81 ANEMIA DUE TO ANTINEOPLASTIC CHEMOTHERAPY: ICD-10-CM

## 2021-01-27 LAB
ABO + RH BLD: NORMAL
ABO + RH BLD: NORMAL
ALBUMIN SERPL-MCNC: 3.2 G/DL (ref 3.4–5)
ALP SERPL-CCNC: 116 U/L (ref 40–150)
ALT SERPL W P-5'-P-CCNC: 86 U/L (ref 0–50)
ANION GAP SERPL CALCULATED.3IONS-SCNC: 5 MMOL/L (ref 3–14)
ANISOCYTOSIS BLD QL SMEAR: SLIGHT
AST SERPL W P-5'-P-CCNC: 30 U/L (ref 0–45)
BASOPHILS # BLD AUTO: 0 10E9/L (ref 0–0.2)
BASOPHILS NFR BLD AUTO: 4.2 %
BILIRUB SERPL-MCNC: 0.5 MG/DL (ref 0.2–1.3)
BLD GP AB SCN SERPL QL: NORMAL
BLD PROD TYP BPU: NORMAL
BLD PROD TYP BPU: NORMAL
BLD UNIT ID BPU: 0
BLOOD BANK CMNT PATIENT-IMP: NORMAL
BLOOD PRODUCT CODE: NORMAL
BPU ID: NORMAL
BUN SERPL-MCNC: 14 MG/DL (ref 7–30)
CALCIUM SERPL-MCNC: 8.9 MG/DL (ref 8.5–10.1)
CHLORIDE SERPL-SCNC: 108 MMOL/L (ref 94–109)
CO2 SERPL-SCNC: 26 MMOL/L (ref 20–32)
CREAT SERPL-MCNC: 0.52 MG/DL (ref 0.52–1.04)
DIFFERENTIAL METHOD BLD: ABNORMAL
EOSINOPHIL # BLD AUTO: 0 10E9/L (ref 0–0.7)
EOSINOPHIL NFR BLD AUTO: 5.6 %
ERYTHROCYTE [DISTWIDTH] IN BLOOD BY AUTOMATED COUNT: 16.8 % (ref 10–15)
GFR SERPL CREATININE-BSD FRML MDRD: >90 ML/MIN/{1.73_M2}
GLUCOSE SERPL-MCNC: 97 MG/DL (ref 70–99)
HCT VFR BLD AUTO: 22.3 % (ref 35–47)
HGB BLD-MCNC: 7.4 G/DL (ref 11.7–15.7)
LYMPHOCYTES # BLD AUTO: 0.3 10E9/L (ref 0.8–5.3)
LYMPHOCYTES NFR BLD AUTO: 53.1 %
MAGNESIUM SERPL-MCNC: 2.2 MG/DL (ref 1.6–2.3)
MCH RBC QN AUTO: 31 PG (ref 26.5–33)
MCHC RBC AUTO-ENTMCNC: 33.2 G/DL (ref 31.5–36.5)
MCV RBC AUTO: 93 FL (ref 78–100)
MONOCYTES # BLD AUTO: 0 10E9/L (ref 0–1.3)
MONOCYTES NFR BLD AUTO: 4.2 %
NEUTROPHILS # BLD AUTO: 0.2 10E9/L (ref 1.6–8.3)
NEUTROPHILS NFR BLD AUTO: 32.9 %
NUM BPU REQUESTED: 1
PHOSPHATE SERPL-MCNC: 2.8 MG/DL (ref 2.5–4.5)
PLATELET # BLD AUTO: 59 10E9/L (ref 150–450)
PLATELET # BLD EST: ABNORMAL 10*3/UL
POIKILOCYTOSIS BLD QL SMEAR: SLIGHT
POTASSIUM SERPL-SCNC: 3.8 MMOL/L (ref 3.4–5.3)
PROT SERPL-MCNC: 6.9 G/DL (ref 6.8–8.8)
RBC # BLD AUTO: 2.39 10E12/L (ref 3.8–5.2)
SODIUM SERPL-SCNC: 140 MMOL/L (ref 133–144)
SPECIMEN EXP DATE BLD: NORMAL
TRANSFUSION STATUS PATIENT QL: NORMAL
TRANSFUSION STATUS PATIENT QL: NORMAL
WBC # BLD AUTO: 0.6 10E9/L (ref 4–11)

## 2021-01-27 PROCEDURE — 86900 BLOOD TYPING SEROLOGIC ABO: CPT | Performed by: PHYSICIAN ASSISTANT

## 2021-01-27 PROCEDURE — 36430 TRANSFUSION BLD/BLD COMPNT: CPT

## 2021-01-27 PROCEDURE — 86923 COMPATIBILITY TEST ELECTRIC: CPT | Performed by: PHYSICIAN ASSISTANT

## 2021-01-27 PROCEDURE — 99214 OFFICE O/P EST MOD 30 MIN: CPT | Mod: 95 | Performed by: FAMILY MEDICINE

## 2021-01-27 PROCEDURE — 85025 COMPLETE CBC W/AUTO DIFF WBC: CPT | Performed by: PHYSICIAN ASSISTANT

## 2021-01-27 PROCEDURE — 84100 ASSAY OF PHOSPHORUS: CPT | Performed by: PHYSICIAN ASSISTANT

## 2021-01-27 PROCEDURE — 86901 BLOOD TYPING SEROLOGIC RH(D): CPT | Performed by: PHYSICIAN ASSISTANT

## 2021-01-27 PROCEDURE — P9016 RBC LEUKOCYTES REDUCED: HCPCS | Performed by: PHYSICIAN ASSISTANT

## 2021-01-27 PROCEDURE — 250N000011 HC RX IP 250 OP 636: Performed by: PHYSICIAN ASSISTANT

## 2021-01-27 PROCEDURE — 99207 PR NO BILLABLE SERVICE THIS VISIT: CPT

## 2021-01-27 PROCEDURE — 99215 OFFICE O/P EST HI 40 MIN: CPT | Performed by: PHYSICIAN ASSISTANT

## 2021-01-27 PROCEDURE — 86850 RBC ANTIBODY SCREEN: CPT | Performed by: PHYSICIAN ASSISTANT

## 2021-01-27 PROCEDURE — G0463 HOSPITAL OUTPT CLINIC VISIT: HCPCS

## 2021-01-27 PROCEDURE — 80053 COMPREHEN METABOLIC PANEL: CPT | Performed by: PHYSICIAN ASSISTANT

## 2021-01-27 PROCEDURE — 83735 ASSAY OF MAGNESIUM: CPT | Performed by: PHYSICIAN ASSISTANT

## 2021-01-27 RX ORDER — HEPARIN SODIUM (PORCINE) LOCK FLUSH IV SOLN 100 UNIT/ML 100 UNIT/ML
5 SOLUTION INTRAVENOUS ONCE
Status: COMPLETED | OUTPATIENT
Start: 2021-01-27 | End: 2021-01-27

## 2021-01-27 RX ORDER — HEPARIN SODIUM (PORCINE) LOCK FLUSH IV SOLN 100 UNIT/ML 100 UNIT/ML
5 SOLUTION INTRAVENOUS
Status: CANCELLED | OUTPATIENT
Start: 2021-01-27

## 2021-01-27 RX ORDER — HEPARIN SODIUM (PORCINE) LOCK FLUSH IV SOLN 100 UNIT/ML 100 UNIT/ML
500 SOLUTION INTRAVENOUS ONCE
Status: COMPLETED | OUTPATIENT
Start: 2021-01-27 | End: 2021-01-27

## 2021-01-27 RX ORDER — POTASSIUM CHLORIDE 1500 MG/1
20 TABLET, EXTENDED RELEASE ORAL DAILY
Qty: 60 TABLET | Refills: 3 | Status: SHIPPED | OUTPATIENT
Start: 2021-01-27 | End: 2021-05-10

## 2021-01-27 RX ORDER — HEPARIN SODIUM,PORCINE 10 UNIT/ML
5 VIAL (ML) INTRAVENOUS
Status: CANCELLED | OUTPATIENT
Start: 2021-01-27

## 2021-01-27 RX ADMIN — Medication 5 ML: at 12:45

## 2021-01-27 RX ADMIN — Medication 500 UNITS: at 15:12

## 2021-01-27 RX ADMIN — Medication 500 UNITS: at 17:49

## 2021-01-27 ASSESSMENT — PAIN SCALES - GENERAL: PAINLEVEL: NO PAIN (0)

## 2021-01-27 NOTE — NURSING NOTE
Chief Complaint   Patient presents with     Port Draw     Labs drawn via port by RN in lab. VS taken.      Port accessed with 20g gripper needle by RN, labs collected, line flushed with saline and heparin.  Vitals taken. Pt checked in for appointment(s).    Sonia MONROE RN PHN BSN  BMT/Oncology Lab

## 2021-01-27 NOTE — NURSING NOTE
"Oncology Rooming Note    January 27, 2021 1:11 PM   Richa Ashby is a 55 year old female who presents for:    Chief Complaint   Patient presents with     Port Draw     Labs drawn via port by RN in lab. VS taken.      Oncology Clinic Visit     Sarcoma     Initial Vitals: /65   Pulse 85   Temp 98.4  F (36.9  C) (Oral)   Resp 16   Wt 105.5 kg (232 lb 8 oz)   LMP 03/19/2019   SpO2 100%   BMI 36.41 kg/m   Estimated body mass index is 36.41 kg/m  as calculated from the following:    Height as of 1/15/21: 1.702 m (5' 7\").    Weight as of this encounter: 105.5 kg (232 lb 8 oz). Body surface area is 2.23 meters squared.  No Pain (0) Comment: general malaise   Patient's last menstrual period was 03/19/2019.  Allergies reviewed: Yes  Medications reviewed: Yes    Medications: MEDICATION REFILLS NEEDED TODAY. Provider was notified. Needs phosphorus and potassium refilled.    Pharmacy name entered into [employer]:    [id number] - [place], [place] - [address] [place] AT [place], [place] - [address] [place] [address] [phone]    Clinical concerns: dry skin with blistering. New blisters appears with removal of bandages. Describes as \"sore and raw\". Struggling with all adhesives. Notes hands are better, no longer \"shedding like a snake.\"  Also having severe heartburn even with doubling omeprazole.      Flaca Alatorre, LEYDA            "

## 2021-01-27 NOTE — LETTER
"1/27/2021       RE: Richa Ashby  [address]  [facility] [address]     Dear Colleague,    Thank you for referring your patient, Richa Ashby, to the [facility] CANCER CLINIC at [facility]. Please see a copy of my visit note below.    Connie is a 55 year old who is being evaluated via a billable video visit.      How would you like to obtain your AVS? MyChart     If the video visit is dropped, the invitation should be resent by: Text to cell phone: [phone]     Will anyone else be joining your video visit? No        Vitals - Patient Reported  Weight (Patient Reported): 105.2 kg (232 lb)  Height (Patient Reported): 170.2 cm (5' 7.01\")  BMI (Based on Pt Reported Ht/Wt): 36.33  Pain Score: No Pain (0)    Wicho Varner LPN    Video Start Time: 1605  Video-Visit Details    Type of service:  Video Visit    Video End Time: 1636    Originating Location (pt. Location): Other Infusion Center at [facility] [place] [facility]    Distant Location (provider location):  [facility] CANCER [facility]     Platform used for Video Visit: [facility]     Palliative Care Outpatient Clinic Progress Note    Patient Name:  Richa Ashby  Primary Provider:  King Diaz    Chief Complaint: I'm following up on my cancer and symptoms    Interim History:  Richa Ashby 55 year old female with undifferentiated pleomorphic sarcoma who returns to be seen by palliative care today.  Since our last visit, she has started her chemotherapy and has been noticing more symptoms.  She has had mucositis symptoms with the second round of chemotherapy.  She has had more dry skin and has had skin denuded from taking off adhesives/bandaids.  She notes minimal pain symptoms.  Fatigue is significant.    Coping:  Finds she's starting to share feelings more and voicing when she feels angry/frustrated with things.  Appreciates support from her /mom and friends/family.  Feels " like she's doing okay, considering all the changes and challenges she's gone through.  Hopeful that the improvements seen on CT scan earlier this month will continue/tumor will respond to chemo.    Social History:  Pertinent changes to social history/social situation since last visit: Her mother moved in with her  and Connie to provide support.  It's going well.  She and her  are recovered from COVID infection from late Nov/early Dec.      Key support resources: , mother, friends.  Advance Directive Status:  Full code  Social History     Tobacco Use     Smoking status: Never Smoker     Smokeless tobacco: Never Used   Substance Use Topics     Alcohol use: Yes     Frequency: 2-3 times a week     Drinks per session: 1 or 2     Binge frequency: Never     Comment: 2-3 glasses of wine/week     Drug use: Never         Allergies   Allergen Reactions     Pentobarbital      resp arrest     Current Outpatient Medications   Medication Sig Dispense Refill     acetaminophen (TYLENOL) 325 MG tablet Take 325-650 mg by mouth every 6 hours as needed for mild pain       albuterol (PROAIR RESPICLICK) 108 (90 Base) MCG/ACT inhaler Inhale 1-2 puffs into the lungs       cetirizine (ZYRTEC) 10 MG tablet Take 10 mg by mouth daily       granisetron (KYTRIL) 1 MG tablet Take 2 tablets (2 mg) by mouth daily Take 2 tablets (2 mg) every morning on Days 3 through 8. 12 tablet 1     ibuprofen (ADVIL/MOTRIN) 200 MG tablet Take 400 mg by mouth every 4 hours as needed for mild pain       lidocaine-prilocaine (EMLA) 2.5-2.5 % external cream Apply topically as needed for moderate pain 30 g 3     loratadine (CLARITIN) 10 MG tablet Take 10 mg by mouth       magic mouthwash suspension (diphenhydrAMINE, lidocaine, aluminum-magnesium & simethicone) Swish and swallow 10 mLs in mouth every 6 hours as needed for mouth sores 120 mL 0     melatonin (MELATONIN) 1 MG/ML LIQD liquid Take 5 mg by mouth       montelukast (SINGULAIR) 10 MG tablet  Take 10 mg by mouth daily       nystatin (MYCOSTATIN) 682507 UNIT/ML suspension Take 5 mLs (500,000 Units) by mouth 4 times daily 140 mL 0     omeprazole (PRILOSEC) 20 MG DR capsule        phosphorus tablet 250 mg (PHOSPHA 250 NEUTRAL) 250 MG per tablet Take 1 tablet (250 mg) by mouth 3 times daily (with meals) 90 tablet 1     potassium chloride ER (KLOR-CON M) 20 MEQ CR tablet Take 1 tablet (20 mEq) by mouth daily 60 tablet 3     prochlorperazine (COMPAZINE) 10 MG tablet Take 1 tablet (10 mg) by mouth every 6 hours as needed (Nausea/Vomiting) 30 tablet 1     urea (CARMOL) 10 % external cream Apply topically as needed for dry skin 453 g 0     vitamin D3 (CHOLECALCIFEROL) 2000 units (50 mcg) tablet Take 1 tablet by mouth daily       Past Medical History:   Diagnosis Date     Asthma, chronic      Sarcoma (H)     Right lower thigh     Past Surgical History:   Procedure Laterality Date     AS RAD RESEC TONSIL/PILLARS       GALLBLADDER SURGERY       INSERT PORT VASCULAR ACCESS Right 10/28/2020    Procedure: single lumen power port placement @0745;  Surgeon: Tex Ackerman MD;  Location: [facility] OR     IR CHEST PORT PLACEMENT > 5 YRS OF AGE  10/28/2020     RESECT TUMOR LOWER EXTREMITY Right 3/12/2020    Procedure: Resection of right thigh tumor;  Surgeon: Guillermo Johnson MD;  Location: [facility] OR     ROTATOR CUFF REPAIR RT/LT  2012     SHOULDER SURGERY       SINUS SURGERY         Review of Systems:   ROS: 10 point ROS neg other than the symptoms noted above in the HPI and pertinents here:  Palliative Symptom Review (0=no symptom/no concern, 1=mild, 2=moderate, 3=severe):      Pain: 0-1      Fatigue: 2      Nausea: 0   GERD- significant symptoms; doubled omeprazole, could do 40 BID if need omeprazole and has adjustable bed.      Constipation: 0      Diarrhea: 0      Depressive Symptoms: 1      Anxiety: 1-2; irritability      Drowsiness: 1      Poor Appetite: poor appetite 1      Shortness of Breath: 0      Insomnia:  0     EXAM:    Alert woman, NAD.  Facial movements symmetric.   Speech fluent, full affect.  Breathing nonlabored  Pallor noted.  No JVD      Key Data Reviewed:  LABS:   Lab Results   Component Value Date    WBC 0.6 01/27/2021     Lab Results   Component Value Date    RBC 2.39 01/27/2021     Lab Results   Component Value Date    HGB 7.4 01/27/2021     Lab Results   Component Value Date    HCT 22.3 01/27/2021     No components found for: MCT  Lab Results   Component Value Date    MCV 93 01/27/2021     Lab Results   Component Value Date    MCH 31.0 01/27/2021     Lab Results   Component Value Date    MCHC 33.2 01/27/2021     Lab Results   Component Value Date    RDW 16.8 01/27/2021     Lab Results   Component Value Date    PLT 59 01/27/2021       IMAGING: CT CAP 1/13/21 reviewed:   REPORT:  In this patient with history of sarcoma with metastatic bone disease,  there is interval partial response is evident by significant decrease  in numerous bilateral pulmonary nodules.     One exception in the left lower lobe demonstrates interval mild  increase, which could potentially be secondary to internal  hemorrhage/necrosis from underlying treatment, as evident by 60% of  this nodule demonstrating clear nonenhancing necrotic changes as  opposed to prior exam when clear necrosis was not evident. Similar  internal nonenhancing necrotic changes are present to a larger extent  in the previously biopsied right upper lobe nodule, which is  essentially stable since prior exam.     New groundglass opacities in bilateral lungs, likely representing  sequela of patient's known Covid diagnosis. Attention on follow-up.     No new site of metastatic disease.     Left ovarian/paraovarian cyst is statistically benign finding. The an  11 mm hypodensity in the right kidney likely represents  hemorrhagic/proteinaceous cyst. Simple attention on routine follow-up  can still be made to these 2 findings.    Impression & Recommendations &  Counselin yo woman with undifferentiated pleomorphic sarcoma, metastatic on doxil/ifosfomide with recent COVID infection in 2020.  Having symptoms of skin toxicity, anemia, leukopenia/neutropenia, and fatigue/irritablity with cancer treatments.     Pain, cancer related: minimal  - continue acetaminophen,  Ice/heat.    - has limited supply of oxycodone and tramadol from her lung biopsy and prior surgery; hasn't needed to use.  - pt instructed to call for update/instructions should pain symptoms progress/worsen.     Insomnia  - had trial of doxepin 3-6ml PO at bedtime--stopped med, no restart.  - continue melatonin.  -  physical activity as tolerated daily.  - encourage good sleep hygeine as able.     Existential distress  - anxiety and depression mixed d/t acute grief reaction w serious medical diagnosis.  - continue working with Tiffany Astorga for counselling; may be helpful to include pt's  as they have different grieving styles.  - didn't start selective serotonin reuptake inhibitor but would consider if severe symptoms persist in month(s) ahead.     Mucositis (noted worse post second round chemo)  - soda/salt washes  - may continue with Healios amino acid product  - could consider topical honey (evidence in mucositis guideline)  - prn magic mouthwash (has already)  - reviewed doxepin liquid can also help w mucositis pain, could retain in mouth as long as able and then either swallow (if need sleep med) or spit.      Hand and foot syndrome (on hands only) and skin toxicity  - d/t chemotherapy  - continue emollient use aggressively  - continue urea cream, would refill prn.  - caution with adhesives.     Fatigue, multifactorial (chemotherapy, anemia)  - PRBCs per oncology today.  - activity as able, plan for activities/prioritize activities for low energy.  - allow adequate time for sleep.      Goals of care: life prolonging.  Will discuss HCD at future visit but needed to address symptoms,  provide support to pt first.     Support: [facility] visits w Tiffany Astorga requested.  Mother,  also supportive  Follow up in clinic in  6 weeks with palliative medicine    Olivia Sheth MD  Fellow, Hospice and Palliative Medicine  This visit was staffed with Dr Robert Blanca.    Attending Note:  Patient seen and evaluated with Dr Sheth and I agree with/confirm their findings/recs in this note.      Thank you for involving us in the patient's care.   Robert Blanca MD / Palliative Medicine / Text me via [facility].          Again, thank you for allowing me to participate in the care of your patient.      Sincerely,    Olivia Sheth MD

## 2021-01-27 NOTE — LETTER
1/27/2021         RE: Richa Ashby  [address]  [address]  [place] [address]      Oncology/Hematology Visit Note  Jan 27, 2021    Reason for Visit: Follow up of undifferentiated pleomorphic sarcoma    History of Present Illness: Richa Ashby is a 55 year old female with PMH asthma and GERD with undifferentiated pleomorphic sarcoma. 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 She received preoperative radiotherapy and the tumor was resected 3/12/20. She then developed a cough, chest tightness, and scapular pain and imaging revealed lung nodules. Lung biopsy 11/2/20 with sarcoma. She was started on Doxil + Ifos 11/13/20. Had delay in cycle 2 due to COVID dx 12/2/20. CT imaging after cycle 1 with positive response to treatment with decrease in pulmonary nodules. Received cycle 2 12/18/20. Received cycle 3 1/15/21 with dose reduction in Doxil for skin toxicity and mucositis..     She was seen today for close oncology follow-up on treatment.     Interval History:  Ms. Ashby was seen today for oncology follow-up. We discussed the following:  -Skin is better this cycle but still bothersome. Has redness and dry skin of hands with tenderness in fingertips. Skin is dry overall and she has small blisters in areas of prior adhesive. No feet concerns. Using aveeno lotion along with urea cream, cut back urea cream to 2x per day as when she did it more often it was bothersome. No functional impairments  -Mouth sores were better, doing Healios and baking soda swishes. Lidocaine solution not as helpful. Did have sore throat, now resolved. Continues to feel like there is a sore in the back of her throat, though able to eat and drink OK. Appetite is reduced  -Admits to feeling dizzy and having a headache the last few days, similar to when she needed a blood transfusion before  -Heartburn continues to be an issue, she is taking 40mg Omeprazole  which has helped a little but still bothersome  -Prior right sided pain resolved  -Admits to being more emotional this round, meeting with palliative and SW which helps but still tearful  -Wondering about getting the COVID vaccine-she had COVID in December.  -Denies fevers, chest pain, SOB, cough. Minimal nausea issues, has antiemetics PRN. No abdominal pain, bowel or bladder concerns, edema, bleeding issues, neuropathy, neuro concerns.     Current Outpatient Medications   Medication Sig Dispense Refill     acetaminophen (TYLENOL) 325 MG tablet Take 325-650 mg by mouth every 6 hours as needed for mild pain       albuterol (PROAIR RESPICLICK) 108 (90 Base) MCG/ACT inhaler Inhale 1-2 puffs into the lungs       cetirizine (ZYRTEC) 10 MG tablet Take 10 mg by mouth daily       granisetron (KYTRIL) 1 MG tablet Take 2 tablets (2 mg) by mouth daily Take 2 tablets (2 mg) every morning on Days 3 through 8. 12 tablet 1     ibuprofen (ADVIL/MOTRIN) 200 MG tablet Take 400 mg by mouth every 4 hours as needed for mild pain       lidocaine-prilocaine (EMLA) 2.5-2.5 % external cream Apply topically as needed for moderate pain 30 g 3     loratadine (CLARITIN) 10 MG tablet Take 10 mg by mouth       magic mouthwash suspension (diphenhydrAMINE, lidocaine, aluminum-magnesium & simethicone) Swish and swallow 10 mLs in mouth every 6 hours as needed for mouth sores 120 mL 0     melatonin (MELATONIN) 1 MG/ML LIQD liquid Take 5 mg by mouth       montelukast (SINGULAIR) 10 MG tablet Take 10 mg by mouth daily       nystatin (MYCOSTATIN) 898683 UNIT/ML suspension Take 5 mLs (500,000 Units) by mouth 4 times daily 140 mL 0     omeprazole (PRILOSEC) 20 MG DR capsule        phosphorus tablet 250 mg (PHOSPHA 250 NEUTRAL) 250 MG per tablet Take 1 tablet (250 mg) by mouth 3 times daily (with meals) 90 tablet 1     potassium chloride ER (KLOR-CON M) 10 MEQ CR tablet Take 2 tablets (20 mEq) by mouth daily 60 tablet 1     prochlorperazine (COMPAZINE) 10  MG tablet Take 1 tablet (10 mg) by mouth every 6 hours as needed (Nausea/Vomiting) 30 tablet 1     urea (CARMOL) 10 % external cream Apply topically as needed for dry skin 453 g 0     vitamin D3 (CHOLECALCIFEROL) 2000 units (50 mcg) tablet Take 1 tablet by mouth daily       Physical Examination:  /65   Pulse 85   Temp 98.4  F (36.9  C) (Oral)   Resp 16   Wt 105.5 kg (232 lb 8 oz)   LMP 03/19/2019   SpO2 100%   BMI 36.41 kg/m    Wt Readings from Last 10 Encounters:   01/15/21 105.3 kg (232 lb 3.2 oz)   12/18/20 106.5 kg (234 lb 14.4 oz)   11/13/20 113.4 kg (250 lb 1.6 oz)   11/04/20 108.9 kg (240 lb)   11/02/20 110.2 kg (243 lb)   10/28/20 110.2 kg (243 lb)   03/12/20 106.6 kg (235 lb)   03/04/20 106.6 kg (235 lb)   02/03/20 103.9 kg (229 lb)   01/27/20 103.9 kg (229 lb)     Constitutional: Well-appearing female in no acute distress.  Eyes: EOMI, PERRL. No scleral icterus.  ENT: Oral mucosa is moist without lesions or thrush.   Lymphatic: Neck is supple without cervical or supraclavicular lymphadenopathy.   Cardiovascular: Regular rate and rhythm. No murmurs, gallops, or rubs. No peripheral edema.  Respiratory: Clear to auscultation bilaterally. No wheezes or crackles.  Gastrointestinal: Bowel sounds present. Abdomen soft, non-tender.   Neurologic: Cranial nerves II through XII are grossly intact.  Skin: Dry peeling skin on hands, slightly erythematous. Few healing blisters on arm from adhesive. No other rashes, petechiae, or bruising noted on exposed skin.    Laboratory Data:  Results for AMADEO WILSON (MRN [id number]) as of 1/27/2021 15:35   1/27/2021 12:58   Sodium 140   Potassium 3.8   Chloride 108   Carbon Dioxide 26   Urea Nitrogen 14   Creatinine 0.52   GFR Estimate >90   GFR Estimate If Black >90   Calcium 8.9   Anion Gap 5   Magnesium 2.2   Phosphorus 2.8   Albumin 3.2 (L)   Protein Total 6.9   Bilirubin Total 0.5   Alkaline Phosphatase 116   ALT 86 (H)   AST 30   Glucose 97   WBC 0.6 (LL)    Hemoglobin 7.4 (L)   Hematocrit 22.3 (L)   Platelet Count 59 (L)   RBC Count 2.39 (L)   MCV 93   MCH 31.0   MCHC 33.2   RDW 16.8 (H)   Diff Method Manual Differential   % Neutrophils 32.9   % Lymphocytes 53.1   % Monocytes 4.2   % Eosinophils 5.6   % Basophils 4.2   Absolute Neutrophil 0.2 (LL)   Absolute Lymphocytes 0.3 (L)   Absolute Monocytes 0.0   Absolute Eosinophils 0.0   Absolute Basophils 0.0       Assessment and Plan:  1. 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 Required Doxil dose reduction for cycle 3 for skin and mouth toxicity. Doing better this round-see supportive cares below. Will plan on seeing KALINA prior to cycle 4. Plan to repeat imaging prior to cycle 5 and see Dr. Lopez to discuss.      to do neuro monitoring with future cycles. Continue lab monitoring day 4-8 and weekly given electrolyte and count issues    She is pre-approved for Keytruda. Will follow-up on [facility].    2. Heme  Anemia: 2/2 Ifosfamide, hgb 7.4 today. Symptomatic with headache and dizziness. Will transfuse 1 unit pRBC today. Recheck in one week    Thrombocytopenia: 2/2 ifosfamide, no bleeding concerns. Recheck in one week, call if bleeding concerns    Neutropenia: 2/2 ifosfamide, continue neutropenic precautions. Did receive Neulasta.     3. FEN  Hypokalemia: 2/2 ifosfamide, improved Continue 20meq daily    Hypophosphatemia: 2/2 ifosfamide, improved. Can go back to 250mg TID    Hydration/Nutrition: Doing OK at this time, discussed small frequent meals, high protein, pushing fluids    4. GI  GERD: Chronic, worse with treatment. Discussed continuing Omeprazole 40mg daily and doing Tums PRN. If no improvement, increase Omeprazole to 40mg BID    Nausea: 2/2 treatment, controlled with Kytril day 3-8 and Compazine PRN    Mucositis: 2/2 Doxil, improved with dose reduction. Continue baking soda swishes and Healios.    5. Derm  Hand/foot: 2/2 Doxil, grade 1,  improved with dose reduction. Continue lotion and urea BID. Avoid irritants    Dry skin: 2/2 Doxil, continue lotion and discussed humidifier    6. Pulm  Asthma: stable, continue inhalers.    COVID: Diagnosed in December, recovered. Will wait on COVID vaccine until after 90 days from illness    7. Psych  Anxiety/depression: Offered emotional support during visit, continue palliative care follow-up    Insomnia: Not discussed, has palliative visit this afternoon     45 minutes spent on the date of the encounter doing chart review, review of test results, interpretation of tests, patient visit and documentation     Harshal Schuster PA-C  [facility]  [address] [place] [address]  [phone]          RUPERT Guaman

## 2021-01-27 NOTE — PATIENT INSTRUCTIONS
I was glad to see improvements on your CT scan this month!      For mucositis:   Continue salt/soda rinses/gargles, can use the helios  Use the magic mouthwash if needed  Doxepin liquid can also help mouth pain from mucositis; would put in mouth and hold in mouth as long as able and spit out or may swallow if mucositis keeping you up at night.  Honey has been shown to help mucositis symptoms too (could buy Medi Honey)    For mood symptoms from adjusting to living with cancer and its treatment:  Continue working with Tiffany Astorga  Keep talking with your /mom about how you're feeling.  If you feel like the irritability is persisting or you notice more sadness or symptoms are affecting your relationships or how you're able to function, let me know--at that point a medication could be helpful.    For pain:   Continue the tylenol as needed.  Try to avoid ibuprofen to protect your kidneys.    I'd like to follow up with you in 6-8 weeks.  If you are having new pain symptoms or other difficulties before then let me know.    My clinic is Wednesday Afternoons in the [facility] [place] [facility]: [phone] (scheduling); [phone] (triage). Palliative care [facility] outpatient care coordinator is Chelsea Kline RN: [phone]. Palliative After-Hours Answering Service: [phone].

## 2021-01-27 NOTE — PROGRESS NOTES
Infusion Nursing Note:  Richa Ashby presents today for 1 unit PRBC.    Patient seen by provider today: Yes: Harshal EMERY   present during visit today: Not Applicable.    Note: Instruction given to patient as per provider. Verbalized understanding. No new complaints made. Otherwise well.       Intravenous Access:  Implanted Port.    Treatment Conditions:  Lab Results   Component Value Date    HGB 7.4 01/27/2021     Lab Results   Component Value Date    WBC 0.6 01/27/2021      Lab Results   Component Value Date    ANEU 0.2 01/27/2021     Lab Results   Component Value Date    PLT 59 01/27/2021      Lab Results   Component Value Date     01/27/2021                   Lab Results   Component Value Date    POTASSIUM 3.8 01/27/2021           Lab Results   Component Value Date    MAG 2.2 01/27/2021            Lab Results   Component Value Date    CR 0.52 01/27/2021                   Lab Results   Component Value Date    VIKTORIA 8.9 01/27/2021                Lab Results   Component Value Date    BILITOTAL 0.5 01/27/2021           Lab Results   Component Value Date    ALBUMIN 3.2 01/27/2021                    Lab Results   Component Value Date    ALT 86 01/27/2021           Lab Results   Component Value Date    AST 30 01/27/2021       Results reviewed, labs MET treatment parameters, ok to proceed with treatment.  Blood transfusion consent signed 1/7/21.    TORB: 1/27/21/Harshal EMERY/ Please let the patient to hold on to her COVID vaccine for now. May run blood for 1 hour.  Will schedule scan and Dr. Lopez appointment prior to next appointment. Keep appointment as scheduled.      Post Infusion Assessment:  Patient tolerated infusion without incident.  Blood return noted pre and post infusion.  Site patent and intact, free from redness, edema or discomfort.  No evidence of extravasations.  Access discontinued per protocol.       Discharge Plan:   Prescription refills given for Phosphorus  and Potassium.   Discharge instructions reviewed with: Patient.  Patient and/or family verbalized understanding of discharge instructions and all questions answered.  AVS to MyChart reviewed with patient and/or family.  Patient will return 2/3 for next lab appointment and 2/10 for provider appointment.  Patient discharged in stable condition accompanied by: self.  Departure Mode: Ambulatory.    MAX IBARRA RN

## 2021-01-27 NOTE — PATIENT INSTRUCTIONS
Contact Numbers  [facility]: [phone]    After Hours:  [phone]  Triage: [phone]    Please call the [facility] Triage line if you experience a temperature greater than or equal to 100.5, shaking chills, have uncontrolled nausea, vomiting and/or diarrhea, dizziness, shortness of breath, chest pain, bleeding, unexplained bruising, or if you have any other new/concerning symptoms, questions or concerns.     If it is after hours, weekends, or holidays, please call the main hospital  at  [phone] and ask to speak to the Oncology doctor on call.     If you are having any concerning symptoms or wish to speak to a provider before your next infusion visit, please call your care coordinator or triage to notify them so we can adequately serve you.     If you need a refill on a narcotic prescription or other medication, please call triage before your infusion appointment.         January 2021 Sunday Monday Tuesday Wednesday Thursday Friday Saturday 1    LAB   2:15 PM   (15 min.)   [facility] LAB HOME INFUSION   [facility] 2       3     4     5     6    LAB   1:15 PM   (15 min.)   LAB ONC [facility]    LAB CENTRAL   1:45 PM   (15 min.)   NURSE ONLY CANCER CENTER   [facility] 7    LEVEL 2  12:00 PM   (120 min.)   BAY 1 INFUSION   [facility] 8     9       10     11    LAB CENTRAL   4:00 PM   (15 min.)   NURSE ONLY CANCER [facility] 12     13    LAB CENTRAL   7:30 AM   (15 min.)   NURSE ONLY CANCER [facility]    CT CHEST/ABDOMEN/PELVIS W   8:15 AM   (30 min.)   MGCT1   [facility]    VIDEO VISIT RETURN   3:15 PM   (50 min.)   Farheen Monteiro PA-C   [facility] 14     15    LAB CENTRAL   1:30 PM   (15  min.)   [facility] DRAW   [facility] ONC INFUSION 180   2:00 PM   (180 min.)   [facility] ONCOLOGY INFUSION   [facility] 16    VIDEO VISIT NEW   2:45 PM   (60 min.)   Tiffany Astorga LICSW   [facility]   17     18     19     20     21     22     23       24     25     26     27    LAB CENTRAL  12:30 PM   (15 min.)   [facility]   [facility]    UM RETURN  12:45 PM   (50 min.)   Harshal Schuster PA   [facility] ONC INFUSION 180   2:30 PM   (180 min.)   [facility] ONCOLOGY INFUSION   [facility]    VIDEO VISIT RETURN   3:45 PM   (45 min.)   Olivia Sheth MD   [facility] 28 29 30 31 February 2021 Sunday Monday Tuesday Wednesday Thursday Friday Saturday        1     2     3    LAB CENTRAL   1:00 PM   (15 min.)    [facility] DRAW   [facility] 4     5     6       7     8     9     10    VIDEO VISIT RETURN   1:35 PM   (50 min.)   Farheen Monteiro PA-C   [facility] 11     12    LAB CENTRAL   1:30 PM   (15 min.)   [facility]   [facility] ONC INFUSION 180   2:00 PM   (180 min.)   [facility] ONCOLOGY INFUSION   [facility] 13       14     15    VIDEO VISIT RETURN   1:45 PM   (60 min.)   Tiffany Astorga LICSW   [facility] 16     17     18     19     20       21     22     23     24     25     26     27       28                                                   Lab Results:  Recent Results (from the past 12 hour(s))   Magnesium    Collection Time: 01/27/21 12:58 PM   Result Value Ref Range    Magnesium 2.2 1.6 - 2.3 mg/dL   Phosphorus    Collection Time: 01/27/21 12:58 PM    Result Value Ref Range    Phosphorus 2.8 2.5 - 4.5 mg/dL   Comprehensive metabolic panel    Collection Time: 01/27/21 12:58 PM   Result Value Ref Range    Sodium 140 133 - 144 mmol/L    Potassium 3.8 3.4 - 5.3 mmol/L    Chloride 108 94 - 109 mmol/L    Carbon Dioxide 26 20 - 32 mmol/L    Anion Gap 5 3 - 14 mmol/L    Glucose 97 70 - 99 mg/dL    Urea Nitrogen 14 7 - 30 mg/dL    Creatinine 0.52 0.52 - 1.04 mg/dL    GFR Estimate >90 >60 mL/min/[1.73_m2]    GFR Estimate If Black >90 >60 mL/min/[1.73_m2]    Calcium 8.9 8.5 - 10.1 mg/dL    Bilirubin Total 0.5 0.2 - 1.3 mg/dL    Albumin 3.2 (L) 3.4 - 5.0 g/dL    Protein Total 6.9 6.8 - 8.8 g/dL    Alkaline Phosphatase 116 40 - 150 U/L    ALT 86 (H) 0 - 50 U/L    AST 30 0 - 45 U/L   *CBC with platelets differential    Collection Time: 01/27/21 12:58 PM   Result Value Ref Range    WBC 0.6 (LL) 4.0 - 11.0 10e9/L    RBC Count 2.39 (L) 3.8 - 5.2 10e12/L    Hemoglobin 7.4 (L) 11.7 - 15.7 g/dL    Hematocrit 22.3 (L) 35.0 - 47.0 %    MCV 93 78 - 100 fl    MCH 31.0 26.5 - 33.0 pg    MCHC 33.2 31.5 - 36.5 g/dL    RDW 16.8 (H) 10.0 - 15.0 %    Platelet Count 59 (L) 150 - 450 10e9/L    Diff Method Manual Differential     % Neutrophils 32.9 %    % Lymphocytes 53.1 %    % Monocytes 4.2 %    % Eosinophils 5.6 %    % Basophils 4.2 %    Absolute Neutrophil 0.2 (LL) 1.6 - 8.3 10e9/L    Absolute Lymphocytes 0.3 (L) 0.8 - 5.3 10e9/L    Absolute Monocytes 0.0 0.0 - 1.3 10e9/L    Absolute Eosinophils 0.0 0.0 - 0.7 10e9/L    Absolute Basophils 0.0 0.0 - 0.2 10e9/L    Anisocytosis Slight     Poikilocytosis Slight     Platelet Estimate Confirming automated cell count    ABO/Rh type and screen    Collection Time: 01/27/21 12:58 PM   Result Value Ref Range    Units Ordered 1     ABO O     RH(D) Pos     Antibody Screen Neg     Test Valid Only At          [facility]    Specimen Expires 01/30/2021     Crossmatch Red Blood Cells    Blood component     Collection Time: 01/27/21 12:58 PM   Result Value Ref Range    Unit Number [id number]     Blood Component Type Red Blood Cells LeukoReduced (Part 2)     Division Number 00     Status of Unit IN-TRANSIT     Blood Product Code [id number]     Unit Status

## 2021-01-27 NOTE — PROGRESS NOTES
"Connie is a 55 year old who is being evaluated via a billable video visit.      How would you like to obtain your AVS? MyChart     If the video visit is dropped, the invitation should be resent by: Text to cell phone: [phone]     Will anyone else be joining your video visit? No        Vitals - Patient Reported  Weight (Patient Reported): 105.2 kg (232 lb)  Height (Patient Reported): 170.2 cm (5' 7.01\")  BMI (Based on Pt Reported Ht/Wt): 36.33  Pain Score: No Pain (0)    Wicho Noe Dooley LPN    Video Start Time: 1605  Video-Visit Details    Type of service:  Video Visit    Video End Time: 1636    Originating Location (pt. 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  Since our last visit, she has started her chemotherapy and has been noticing more symptoms.  She has had mucositis symptoms with the second round of chemotherapy.  She has had more dry skin and has had skin denuded from taking off adhesives/bandaids.  She notes minimal pain symptoms.  Fatigue is significant.    Coping:  Finds she's starting to share feelings more and voicing when she feels angry/frustrated with things.  Appreciates support from her /mom and friends/family.  Feels like she's doing okay, considering all the changes and challenges she's gone through.  Hopeful that the improvements seen on CT scan earlier this month will continue/tumor will respond to chemo.    Social History:  Pertinent changes to social history/social situation since last visit: Her mother moved in with her "  and Connie to provide support.  It's going well.  She and her  are recovered from COVID infection from late Nov/early Dec.      Key support resources: , mother, friends.  Advance Directive Status:  Full code  Social History     Tobacco Use     Smoking status: Never Smoker     Smokeless tobacco: Never Used   Substance Use Topics     Alcohol use: Yes     Frequency: 2-3 times a week     Drinks per session: 1 or 2     Binge frequency: Never     Comment: 2-3 glasses of wine/week     Drug use: Never         Allergies   Allergen Reactions     Pentobarbital      resp arrest     Current Outpatient Medications   Medication Sig Dispense Refill     acetaminophen (TYLENOL) 325 MG tablet Take 325-650 mg by mouth every 6 hours as needed for mild pain       albuterol (PROAIR RESPICLICK) 108 (90 Base) MCG/ACT inhaler Inhale 1-2 puffs into the lungs       cetirizine (ZYRTEC) 10 MG tablet Take 10 mg by mouth daily       granisetron (KYTRIL) 1 MG tablet Take 2 tablets (2 mg) by mouth daily Take 2 tablets (2 mg) every morning on Days 3 through 8. 12 tablet 1     ibuprofen (ADVIL/MOTRIN) 200 MG tablet Take 400 mg by mouth every 4 hours as needed for mild pain       lidocaine-prilocaine (EMLA) 2.5-2.5 % external cream Apply topically as needed for moderate pain 30 g 3     loratadine (CLARITIN) 10 MG tablet Take 10 mg by mouth       magic mouthwash suspension (diphenhydrAMINE, lidocaine, aluminum-magnesium & simethicone) Swish and swallow 10 mLs in mouth every 6 hours as needed for mouth sores 120 mL 0     melatonin (MELATONIN) 1 MG/ML LIQD liquid Take 5 mg by mouth       montelukast (SINGULAIR) 10 MG tablet Take 10 mg by mouth daily       nystatin (MYCOSTATIN) 936818 UNIT/ML suspension Take 5 mLs (500,000 Units) by mouth 4 times daily 140 mL 0     omeprazole (PRILOSEC) 20 MG DR capsule        phosphorus tablet 250 mg (PHOSPHA 250 NEUTRAL) 250 MG per tablet Take 1 tablet (250 mg) by mouth 3 times daily (with meals) 90  tablet 1     potassium chloride ER (KLOR-CON M) 20 MEQ CR tablet Take 1 tablet (20 mEq) by mouth daily 60 tablet 3     prochlorperazine (COMPAZINE) 10 MG tablet Take 1 tablet (10 mg) by mouth every 6 hours as needed (Nausea/Vomiting) 30 tablet 1     urea (CARMOL) 10 % external cream Apply topically as needed for dry skin 453 g 0     vitamin D3 (CHOLECALCIFEROL) 2000 units (50 mcg) tablet Take 1 tablet by mouth daily       Past Medical History:   Diagnosis Date     Asthma, chronic      Sarcoma (H)     Right lower thigh     Past Surgical History:   Procedure Laterality Date     AS RAD RESEC TONSIL/PILLARS       GALLBLADDER SURGERY       INSERT PORT VASCULAR ACCESS Right 10/28/2020    Procedure: single lumen power port placement @0745;  Surgeon: Tex Ackerman MD;  Location: [facility] OR     IR CHEST PORT PLACEMENT > 5 YRS OF AGE  10/28/2020     RESECT TUMOR LOWER EXTREMITY Right 3/12/2020    Procedure: Resection of right thigh tumor;  Surgeon: Guillermo Johnson MD;  [facility]     ROTATOR CUFF REPAIR RT/LT  2012     SHOULDER SURGERY       SINUS SURGERY         Review of Systems:   ROS: 10 point ROS neg other than the symptoms noted above in the HPI and pertinents here:  Palliative Symptom Review (0=no symptom/no concern, 1=mild, 2=moderate, 3=severe):      Pain: 0-1      Fatigue: 2      Nausea: 0   GERD- significant symptoms; doubled omeprazole, could do 40 BID if need omeprazole and has adjustable bed.      Constipation: 0      Diarrhea: 0      Depressive Symptoms: 1      Anxiety: 1-2; irritability      Drowsiness: 1      Poor Appetite: poor appetite 1      Shortness of Breath: 0      Insomnia: 0     EXAM:    Alert woman, NAD.  Facial movements symmetric.   Speech fluent, full affect.  Breathing nonlabored  Pallor noted.  No JVD      Key Data Reviewed:  LABS:   Lab Results   Component Value Date    WBC 0.6 01/27/2021     Lab Results   Component Value Date    RBC 2.39 01/27/2021     Lab Results    Component Value Date    HGB 7.4 2021     Lab Results   Component Value Date    HCT 22.3 2021     No components found for: MCT  Lab Results   Component Value Date    MCV 93 2021     Lab Results   Component Value Date    MCH 31.0 2021     Lab Results   Component Value Date    MCHC 33.2 2021     Lab Results   Component Value Date    RDW 16.8 2021     Lab Results   Component Value Date    PLT 59 2021       IMAGING: CT CAP 21 reviewed:   REPORT:  In this patient with history of sarcoma with metastatic bone disease,  there is interval partial response is evident by significant decrease  in numerous bilateral pulmonary nodules.     One exception in the left lower lobe demonstrates interval mild  increase, which could potentially be secondary to internal  hemorrhage/necrosis from underlying treatment, as evident by 60% of  this nodule demonstrating clear nonenhancing necrotic changes as  opposed to prior exam when clear necrosis was not evident. Similar  internal nonenhancing necrotic changes are present to a larger extent  in the previously biopsied right upper lobe nodule, which is  essentially stable since prior exam.     New groundglass opacities in bilateral lungs, likely representing  sequela of patient's known Covid diagnosis. Attention on follow-up.     No new site of metastatic disease.     Left ovarian/paraovarian cyst is statistically benign finding. The an  11 mm hypodensity in the right kidney likely represents  hemorrhagic/proteinaceous cyst. Simple attention on routine follow-up  can still be made to these 2 findings.    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  Having symptoms of skin toxicity, anemia, leukopenia/neutropenia, and fatigue/irritablity with cancer treatments.     Pain, cancer related: minimal  - continue  acetaminophen,  Ice/heat.    - has limited supply of oxycodone and tramadol from her lung biopsy and prior surgery; hasn't needed to use.  - pt instructed to call for update/instructions should pain symptoms progress/worsen.     Insomnia  - had trial of doxepin 3-6ml PO at bedtime--stopped med, no restart.  - continue melatonin.  -  physical activity as tolerated daily.  - encourage good sleep hygeine as able.     Existential distress  - anxiety and depression mixed d/t acute grief reaction w serious medical diagnosis.  - continue working with Tiffanylotus Astorga for counselling; may be helpful to include pt's  as they have different grieving styles.  - didn't start selective serotonin reuptake inhibitor but would consider if severe symptoms persist in month(s) ahead.     Mucositis (noted worse post second round chemo)  - soda/salt washes  - may continue with Healios amino acid product  - could consider topical honey (evidence in mucositis guideline)  - prn magic mouthwash (has already)  - reviewed doxepin liquid can also help w mucositis pain, could retain in mouth as long as able and then either swallow (if need sleep med) or spit.      Hand and foot syndrome (on hands only) and skin toxicity  - d/t chemotherapy  - continue emollient use aggressively  - continue urea cream, would refill prn.  - caution with adhesives.     Fatigue, multifactorial (chemotherapy, anemia)  - PRBCs per oncology today.  - activity as able, plan for activities/prioritize activities for low energy.  - allow adequate time for sleep.      Goals of care: life prolonging.  Will discuss HCD at future visit but needed to address symptoms, provide support to pt first.     Support: LISW visits w Tiffanylotus Astorga requested.  Mother,  also supportive  Follow up in clinic in  6 weeks with palliative medicine    Olivia Sheth MD  Fellow, Hospice and Palliative Medicine  This visit was staffed with Dr Robert Blanca.    Attending Note:   Patient seen and evaluated with Dr Sheth and I agree with/confirm their findings/recs in this note.      Thank you for involving us in the patient's care.   Robert Blanca MD / Palliative Medicine / Text me via [facility].

## 2021-02-03 DIAGNOSIS — C49.9 SARCOMA (H): ICD-10-CM

## 2021-02-03 LAB
ALBUMIN SERPL-MCNC: 3.5 G/DL (ref 3.4–5)
ALP SERPL-CCNC: 114 U/L (ref 40–150)
ALT SERPL W P-5'-P-CCNC: 43 U/L (ref 0–50)
ANION GAP SERPL CALCULATED.3IONS-SCNC: 7 MMOL/L (ref 3–14)
ANISOCYTOSIS BLD QL SMEAR: SLIGHT
AST SERPL W P-5'-P-CCNC: 12 U/L (ref 0–45)
BASOPHILS # BLD AUTO: 0 10E9/L (ref 0–0.2)
BASOPHILS NFR BLD AUTO: 0 %
BILIRUB SERPL-MCNC: 0.3 MG/DL (ref 0.2–1.3)
BUN SERPL-MCNC: 8 MG/DL (ref 7–30)
CALCIUM SERPL-MCNC: 9.3 MG/DL (ref 8.5–10.1)
CHLORIDE SERPL-SCNC: 108 MMOL/L (ref 94–109)
CO2 SERPL-SCNC: 26 MMOL/L (ref 20–32)
CREAT SERPL-MCNC: 0.7 MG/DL (ref 0.52–1.04)
DIFFERENTIAL METHOD BLD: ABNORMAL
EOSINOPHIL # BLD AUTO: 0 10E9/L (ref 0–0.7)
EOSINOPHIL NFR BLD AUTO: 0 %
ERYTHROCYTE [DISTWIDTH] IN BLOOD BY AUTOMATED COUNT: 16.2 % (ref 10–15)
GFR SERPL CREATININE-BSD FRML MDRD: >90 ML/MIN/{1.73_M2}
GLUCOSE SERPL-MCNC: 115 MG/DL (ref 70–99)
HCT VFR BLD AUTO: 23.6 % (ref 35–47)
HGB BLD-MCNC: 7.8 G/DL (ref 11.7–15.7)
LYMPHOCYTES # BLD AUTO: 0.8 10E9/L (ref 0.8–5.3)
LYMPHOCYTES NFR BLD AUTO: 30.4 %
MACROCYTES BLD QL SMEAR: PRESENT
MAGNESIUM SERPL-MCNC: 1.9 MG/DL (ref 1.6–2.3)
MCH RBC QN AUTO: 31 PG (ref 26.5–33)
MCHC RBC AUTO-ENTMCNC: 33.1 G/DL (ref 31.5–36.5)
MCV RBC AUTO: 94 FL (ref 78–100)
METAMYELOCYTES # BLD: 0 10E9/L
METAMYELOCYTES NFR BLD MANUAL: 0.9 %
MONOCYTES # BLD AUTO: 0.4 10E9/L (ref 0–1.3)
MONOCYTES NFR BLD AUTO: 13.9 %
NEUTROPHILS # BLD AUTO: 1.5 10E9/L (ref 1.6–8.3)
NEUTROPHILS NFR BLD AUTO: 54.8 %
NRBC # BLD AUTO: 0 10*3/UL
NRBC BLD AUTO-RTO: 1 /100
PHOSPHATE SERPL-MCNC: 3.1 MG/DL (ref 2.5–4.5)
PLATELET # BLD AUTO: 87 10E9/L (ref 150–450)
PLATELET # BLD EST: ABNORMAL 10*3/UL
POTASSIUM SERPL-SCNC: 3.8 MMOL/L (ref 3.4–5.3)
PROT SERPL-MCNC: 7 G/DL (ref 6.8–8.8)
RBC # BLD AUTO: 2.52 10E12/L (ref 3.8–5.2)
SODIUM SERPL-SCNC: 141 MMOL/L (ref 133–144)
WBC # BLD AUTO: 2.7 10E9/L (ref 4–11)

## 2021-02-03 PROCEDURE — 84100 ASSAY OF PHOSPHORUS: CPT | Performed by: PHYSICIAN ASSISTANT

## 2021-02-03 PROCEDURE — 80053 COMPREHEN METABOLIC PANEL: CPT | Performed by: PHYSICIAN ASSISTANT

## 2021-02-03 PROCEDURE — 83735 ASSAY OF MAGNESIUM: CPT | Performed by: PHYSICIAN ASSISTANT

## 2021-02-03 PROCEDURE — 999N001117 HC STATISTIC FOUNDATION ONE GENE PANEL: Mod: GT | Performed by: INTERNAL MEDICINE

## 2021-02-03 PROCEDURE — 250N000011 HC RX IP 250 OP 636: Performed by: INTERNAL MEDICINE

## 2021-02-03 PROCEDURE — 85025 COMPLETE CBC W/AUTO DIFF WBC: CPT | Performed by: PHYSICIAN ASSISTANT

## 2021-02-03 RX ORDER — HEPARIN SODIUM (PORCINE) LOCK FLUSH IV SOLN 100 UNIT/ML 100 UNIT/ML
5 SOLUTION INTRAVENOUS
Status: COMPLETED | OUTPATIENT
Start: 2021-02-03 | End: 2021-02-03

## 2021-02-03 RX ADMIN — Medication 5 ML: at 13:15

## 2021-02-03 NOTE — NURSING NOTE
"Chief Complaint   Patient presents with     Lab Only     labs drawn from port by rn.      Port accessed with 20 gauge 1\" gripper needle and labs drawn by rn.  Port flushed with NS and heparin then de-accessed.  Pt tolerated well.     Kassi Landin RN      "

## 2021-02-05 ENCOUNTER — RESULTS ONLY (OUTPATIENT)
Dept: ONCOLOGY | Facility: CLINIC | Age: 56
End: 2021-02-05

## 2021-02-09 NOTE — PROGRESS NOTES
"Connie is a 55 year old who is being evaluated via a billable video visit.      How would you like to obtain your AVS? MyChart  If the video visit is dropped, the invitation should be resent by: Text to cell phone: [phone]  Will anyone else be joining your video visit? No       I have reviewed and updated the patient's allergies and medication list.    Concerns: none  Refills: none     Vitals - Patient Reported  Weight (Patient Reported): 103 kg (227 lb)  Height (Patient Reported): 170.2 cm (5' 7\")  BMI (Based on Pt Reported Ht/Wt): 35.55  Pain Score: No Pain (0)    Flaca Alatorre CMA        Video Start Time: 1:48 PM  Video-Visit Details    Type of service:  Video Visit    Video End Time:2:16 PM    Originating Location (pt. Location): Home    Distant Location (provider location):  [facility] CAN[facility]ER [facility]     Platform used for Video Visit: [facility]      Oncology/Hematology Visit Note  Feb 10, 2021    Reason for Visit: Follow up of undifferentiated pleomorphic sarcoma    History of Present Illness: Richa Ashby is a 55 year old female with PMH asthma and GERD with undifferentiated pleomorphic sarcoma. 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 She received preoperative radiotherapy and the tumor was resected 3/12/20. She then developed a cough, chest tightness, and scapular pain and imaging revealed lung nodules. Lung biopsy 11/2/20 with sarcoma. She was started on Doxil + Ifos 11/13/20. Had delay in cycle 2 due to COVID dx 12/2/20. CT imaging after cycle 1 with positive response to treatment with decrease in pulmonary nodules. Received cycle 2 12/18/20. Received cycle 3 1/15/21 with dose reduction in Doxil for skin toxicity and mucositis..     She was seen today for close oncology follow-up on treatment.     Interval History:  Richa Ashby was met with for follow up.   - Overall doing well- went back to work lase week and this " is helping with her mood. Gives her a sense a purpose.   - Does note fatigue when she is working reallly long days. Sleeping okay at night. Melatonin is helping.   - Had very dry skin with last round of chemo. Used a lot of lotion. Silk pajamas are helping with this. Noting some mild peeling in hands. Having a numb/raw sensation to pads of thumb and *first two fingers. Small peeling site on foot . Overall better than prior rounds and manageable.   - Mouth is  Okay- less mouth soreness. Using mouth rinses  - Stomach is overall better. Had a flare yesterday, but feels this is elated to something she ate. Better today. Remains on omeprazole. Now back down to 20 mg.   - Always cold  - Weigth is overall stable.   - No confusion.      ROS: 10 point ROS neg other than the symptoms noted above in the HPI.      Current Outpatient Medications   Medication Sig Dispense Refill     acetaminophen (TYLENOL) 325 MG tablet Take 325-650 mg by mouth every 6 hours as needed for mild pain       albuterol (PROAIR RESPICLICK) 108 (90 Base) MCG/ACT inhaler Inhale 1-2 puffs into the lungs       cetirizine (ZYRTEC) 10 MG tablet Take 10 mg by mouth daily       granisetron (KYTRIL) 1 MG tablet Take 2 tablets (2 mg) by mouth daily Take 2 tablets (2 mg) every morning on Days 3 through 8. 12 tablet 1     lidocaine-prilocaine (EMLA) 2.5-2.5 % external cream Apply topically as needed for moderate pain 30 g 3     magic mouthwash suspension (diphenhydrAMINE, lidocaine, aluminum-magnesium & simethicone) Swish and swallow 10 mLs in mouth every 6 hours as needed for mouth sores 120 mL 0     melatonin (MELATONIN) 1 MG/ML LIQD liquid Take 5 mg by mouth       montelukast (SINGULAIR) 10 MG tablet Take 10 mg by mouth daily       nystatin (MYCOSTATIN) 605917 UNIT/ML suspension Take 5 mLs (500,000 Units) by mouth 4 times daily 140 mL 0     omeprazole (PRILOSEC) 20 MG DR capsule        phosphorus tablet 250 mg (PHOSPHA 250 NEUTRAL) 250 MG per tablet Take 1  tablet (250 mg) by mouth 3 times daily (with meals) 90 tablet 1     potassium chloride ER (KLOR-CON M) 20 MEQ CR tablet Take 1 tablet (20 mEq) by mouth daily 60 tablet 3     prochlorperazine (COMPAZINE) 10 MG tablet Take 1 tablet (10 mg) by mouth every 6 hours as needed (Nausea/Vomiting) 30 tablet 1     urea (CARMOL) 10 % external cream Apply topically as needed for dry skin 453 g 0     vitamin D3 (CHOLECALCIFEROL) 2000 units (50 mcg) tablet Take 1 tablet by mouth daily       Physical Examination:  Not vital were done.   Wt Readings from Last 10 Encounters:   01/27/21 105.5 kg (232 lb 8 oz)   01/15/21 105.3 kg (232 lb 3.2 oz)   12/18/20 106.5 kg (234 lb 14.4 oz)   11/13/20 113.4 kg (250 lb 1.6 oz)   11/04/20 108.9 kg (240 lb)   11/02/20 110.2 kg (243 lb)   10/28/20 110.2 kg (243 lb)   03/12/20 106.6 kg (235 lb)   03/04/20 106.6 kg (235 lb)   02/03/20 103.9 kg (229 lb)     Video physical exam  General: Patient appears well in no acute distress.   Skin: No visualized rash or lesions on visualized skin  Eyes: EOMI, no erythema, sclera icterus or discharge noted  Resp: Appears to be breathing comfortably without accessory muscle usage, speaking in full sentences, no cough  MSK: Appears to have normal range of motion based on visualized movements  Neurologic: No apparent tremors, facial movements symmetric  Psych: affect normal, alert and oriented    The rest of a comprehensive physical examination is deferred due to PHE (public health emergency) video restriction    Laboratory Data:  None to review      Assessment and Plan:  1. 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 Required Doxil dose reduction for cycle 3 for skin and mouth toxicity. Doing better this round-see supportive cares below. Will plan for cycle 4 at same decreased doses on 2/15. Plan to repeat imaging prior to cycle 5 and see Dr. Lopez to discuss.      to do neuro monitoring with  future cycles. Continue lab monitoring day 4-8 and weekly given electrolyte and count issues    She is pre-approved for Keytruda. Will follow-up on [facility].    2. Heme  H/o pancytopenia: 2/2 Ifosfamide. Await labs next week. Infectious and bleeding precautions.       3. FEN  H/o Hypokalemia: 2/2 ifosfamide, improved Continue 20meq daily    H/o Hypophosphatemia: 2/2 ifosfamide, improved. on 250mg TID    4. GI  GERD: Chronic, worse with treatment. She recently decreased Omeprazole 20mg daily as she was feeling better. Aware she should increase dose up to 40 mg bID if symptoms return.  Continue Tums PRN.     Nausea: 2/2 treatment, controlled with Kytril day 3-8 and Compazine PRN    Mucositis: 2/2 Doxil, improved with dose reduction. Continue baking soda swishes and Healios.    5. Derm  Hand/foot: 2/2 Doxil, grade 1, improved with dose reduction. Continue lotion and urea BID. Avoid irritants    Dry skin: 2/2 Doxil, continue lotion and discussed humidifier    6. Pulm  Asthma: stable, continue inhalers.    COVID: Diagnosed in December, recovered. Planning on getting COVID vaccine on 2/11    7. Psych  Anxiety/depression:  continue palliative care follow-up    Insomnia: Not discussed, following with palliative    Fatigue- likely multifactorial. Will check TSH.     40 minutes spent on the date of the encounter doing chart review, review of test results, interpretation of tests, patient visit, documentation and discussion with other provider(s)        Farheen Monteiro PA-C  [facility]  [address], [place] [address] [phone]

## 2021-02-10 ENCOUNTER — VIRTUAL VISIT (OUTPATIENT)
Dept: ONCOLOGY | Facility: CLINIC | Age: 56
End: 2021-02-10
Attending: INTERNAL MEDICINE
Payer: COMMERCIAL

## 2021-02-10 DIAGNOSIS — R53.83 OTHER FATIGUE: ICD-10-CM

## 2021-02-10 DIAGNOSIS — C49.9 SARCOMA (H): Primary | ICD-10-CM

## 2021-02-10 DIAGNOSIS — D63.0 ANEMIA IN NEOPLASTIC DISEASE: ICD-10-CM

## 2021-02-10 PROCEDURE — 999N001193 HC VIDEO/TELEPHONE VISIT; NO CHARGE

## 2021-02-10 PROCEDURE — 99214 OFFICE O/P EST MOD 30 MIN: CPT | Mod: 95 | Performed by: PHYSICIAN ASSISTANT

## 2021-02-10 RX ORDER — DIPHENHYDRAMINE HYDROCHLORIDE 50 MG/ML
50 INJECTION INTRAMUSCULAR; INTRAVENOUS
Status: CANCELLED | OUTPATIENT
Start: 2021-02-15

## 2021-02-10 RX ORDER — MEPERIDINE HYDROCHLORIDE 25 MG/ML
25 INJECTION INTRAMUSCULAR; INTRAVENOUS; SUBCUTANEOUS EVERY 30 MIN PRN
Status: CANCELLED | OUTPATIENT
Start: 2021-02-15

## 2021-02-10 RX ORDER — EPINEPHRINE 1 MG/ML
0.3 INJECTION, SOLUTION INTRAMUSCULAR; SUBCUTANEOUS EVERY 5 MIN PRN
Status: CANCELLED | OUTPATIENT
Start: 2021-02-15

## 2021-02-10 RX ORDER — GRANISETRON HYDROCHLORIDE 1 MG/1
2 TABLET, FILM COATED ORAL DAILY
Qty: 12 TABLET | Refills: 1 | Status: SHIPPED | OUTPATIENT
Start: 2021-02-10 | End: 2021-05-10

## 2021-02-10 RX ORDER — ALBUTEROL SULFATE 90 UG/1
1-2 AEROSOL, METERED RESPIRATORY (INHALATION)
Status: CANCELLED | OUTPATIENT
Start: 2021-02-15

## 2021-02-10 RX ORDER — HEPARIN SODIUM (PORCINE) LOCK FLUSH IV SOLN 100 UNIT/ML 100 UNIT/ML
5 SOLUTION INTRAVENOUS
Status: CANCELLED | OUTPATIENT
Start: 2021-02-15

## 2021-02-10 RX ORDER — HEPARIN SODIUM,PORCINE 10 UNIT/ML
5 VIAL (ML) INTRAVENOUS
Status: CANCELLED | OUTPATIENT
Start: 2021-02-15

## 2021-02-10 RX ORDER — NALOXONE HYDROCHLORIDE 0.4 MG/ML
.1-.4 INJECTION, SOLUTION INTRAMUSCULAR; INTRAVENOUS; SUBCUTANEOUS
Status: CANCELLED | OUTPATIENT
Start: 2021-02-15

## 2021-02-10 RX ORDER — LORAZEPAM 2 MG/ML
0.5 INJECTION INTRAMUSCULAR EVERY 4 HOURS PRN
Status: CANCELLED
Start: 2021-02-15

## 2021-02-10 RX ORDER — SODIUM CHLORIDE 9 MG/ML
1000 INJECTION, SOLUTION INTRAVENOUS CONTINUOUS PRN
Status: CANCELLED | OUTPATIENT
Start: 2021-02-15

## 2021-02-10 RX ORDER — PALONOSETRON 0.05 MG/ML
0.25 INJECTION, SOLUTION INTRAVENOUS ONCE
Status: CANCELLED | OUTPATIENT
Start: 2021-02-15

## 2021-02-10 RX ORDER — ALBUTEROL SULFATE 0.83 MG/ML
2.5 SOLUTION RESPIRATORY (INHALATION)
Status: CANCELLED | OUTPATIENT
Start: 2021-02-15

## 2021-02-10 RX ORDER — METHYLPREDNISOLONE SODIUM SUCCINATE 125 MG/2ML
125 INJECTION, POWDER, LYOPHILIZED, FOR SOLUTION INTRAMUSCULAR; INTRAVENOUS
Status: CANCELLED | OUTPATIENT
Start: 2021-02-15

## 2021-02-10 NOTE — LETTER
"    2/10/2021         RE: Richa Ashby  [address]  [address]  [place] [address]        Dear Colleague,    Thank you for referring your patient, Richa Ashby, to the [facility]. Please see a copy of my visit note below.    Connie is a 55 year old who is being evaluated via a billable video visit.      How would you like to obtain your AVS? MyChart  If the video visit is dropped, the invitation should be resent by: Text to cell phone: [phone]  Will anyone else be joining your video visit? No       I have reviewed and updated the patient's allergies and medication list.    Concerns: none  Refills: none     Vitals - Patient Reported  Weight (Patient Reported): 103 kg (227 lb)  Height (Patient Reported): 170.2 cm (5' 7\")  BMI (Based on Pt Reported Ht/Wt): 35.55  Pain Score: No Pain (0)    Flaca Alatorre CMA        Video Start Time: 1:48 PM  Video-Visit Details    Type of service:  Video Visit    Video End Time:2:16 PM    Originating Location (pt. 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 She received preoperative radiotherapy and the tumor was resected 3/12/20. She then developed a cough, chest tightness, and scapular pain and imaging revealed lung nodules. Lung biopsy 11/2/20 with sarcoma. She was started on Doxil + Ifos 11/13/20. Had delay in cycle 2 due to COVID dx 12/2/20. CT imaging after cycle 1 with positive response to treatment with decrease in pulmonary nodules. Received cycle 2 12/18/20. " Received cycle 3 1/15/21 with dose reduction in Doxil for skin toxicity and mucositis..     She was seen today for close oncology follow-up on treatment.     Interval History:  Richa TAMANNA Ashby was met with for follow up.   - Overall doing well- went back to work lase week and this is helping with her mood. Gives her a sense a purpose.   - Does note fatigue when she is working reallly long days. Sleeping okay at night. Melatonin is helping.   - Had very dry skin with last round of chemo. Used a lot of lotion. Silk pajamas are helping with this. Noting some mild peeling in hands. Having a numb/raw sensation to pads of thumb and *first two fingers. Small peeling site on foot . Overall better than prior rounds and manageable.   - Mouth is  Okay- less mouth soreness. Using mouth rinses  - Stomach is overall better. Had a flare yesterday, but feels this is elated to something she ate. Better today. Remains on omeprazole. Now back down to 20 mg.   - Always cold  - Weigth is overall stable.   - No confusion.      ROS: 10 point ROS neg other than the symptoms noted above in the HPI.      Current Outpatient Medications   Medication Sig Dispense Refill     acetaminophen (TYLENOL) 325 MG tablet Take 325-650 mg by mouth every 6 hours as needed for mild pain       albuterol (PROAIR RESPICLICK) 108 (90 Base) MCG/ACT inhaler Inhale 1-2 puffs into the lungs       cetirizine (ZYRTEC) 10 MG tablet Take 10 mg by mouth daily       granisetron (KYTRIL) 1 MG tablet Take 2 tablets (2 mg) by mouth daily Take 2 tablets (2 mg) every morning on Days 3 through 8. 12 tablet 1     lidocaine-prilocaine (EMLA) 2.5-2.5 % external cream Apply topically as needed for moderate pain 30 g 3     magic mouthwash suspension (diphenhydrAMINE, lidocaine, aluminum-magnesium & simethicone) Swish and swallow 10 mLs in mouth every 6 hours as needed for mouth sores 120 mL 0     melatonin (MELATONIN) 1 MG/ML LIQD liquid Take 5 mg by mouth       montelukast  (SINGULAIR) 10 MG tablet Take 10 mg by mouth daily       nystatin (MYCOSTATIN) 138237 UNIT/ML suspension Take 5 mLs (500,000 Units) by mouth 4 times daily 140 mL 0     omeprazole (PRILOSEC) 20 MG DR capsule        phosphorus tablet 250 mg (PHOSPHA 250 NEUTRAL) 250 MG per tablet Take 1 tablet (250 mg) by mouth 3 times daily (with meals) 90 tablet 1     potassium chloride ER (KLOR-CON M) 20 MEQ CR tablet Take 1 tablet (20 mEq) by mouth daily 60 tablet 3     prochlorperazine (COMPAZINE) 10 MG tablet Take 1 tablet (10 mg) by mouth every 6 hours as needed (Nausea/Vomiting) 30 tablet 1     urea (CARMOL) 10 % external cream Apply topically as needed for dry skin 453 g 0     vitamin D3 (CHOLECALCIFEROL) 2000 units (50 mcg) tablet Take 1 tablet by mouth daily       Physical Examination:  Not vital were done.   Wt Readings from Last 10 Encounters:   01/27/21 105.5 kg (232 lb 8 oz)   01/15/21 105.3 kg (232 lb 3.2 oz)   12/18/20 106.5 kg (234 lb 14.4 oz)   11/13/20 113.4 kg (250 lb 1.6 oz)   11/04/20 108.9 kg (240 lb)   11/02/20 110.2 kg (243 lb)   10/28/20 110.2 kg (243 lb)   03/12/20 106.6 kg (235 lb)   03/04/20 106.6 kg (235 lb)   02/03/20 103.9 kg (229 lb)     Video physical exam  General: Patient appears well in no acute distress.   Skin: No visualized rash or lesions on visualized skin  Eyes: EOMI, no erythema, sclera icterus or discharge noted  Resp: Appears to be breathing comfortably without accessory muscle usage, speaking in full sentences, no cough  MSK: Appears to have normal range of motion based on visualized movements  Neurologic: No apparent tremors, facial movements symmetric  Psych: affect normal, alert and oriented    The rest of a comprehensive physical examination is deferred due to PHE (public health emergency) video restriction    Laboratory Data:  None to review      Assessment and Plan:  1. 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 Required  Doxil dose reduction for cycle 3 for skin and mouth toxicity. Doing better this round-see supportive cares below. Will plan for cycle 4 at same decreased doses on 2/15. Plan to repeat imaging prior to cycle 5 and see Dr. Lopez to discuss.      to do neuro monitoring with future cycles. Continue lab monitoring day 4-8 and weekly given electrolyte and count issues    She is pre-approved for Keytruda. Will follow-up on [facility].    2. Heme  H/o pancytopenia: 2/2 Ifosfamide. Await labs next week. Infectious and bleeding precautions.       3. FEN  H/o Hypokalemia: 2/2 ifosfamide, improved Continue 20meq daily    H/o Hypophosphatemia: 2/2 ifosfamide, improved. on 250mg TID    4. GI  GERD: Chronic, worse with treatment. She recently decreased Omeprazole 20mg daily as she was feeling better. Aware she should increase dose up to 40 mg bID if symptoms return.  Continue Tums PRN.     Nausea: 2/2 treatment, controlled with Kytril day 3-8 and Compazine PRN    Mucositis: 2/2 Doxil, improved with dose reduction. Continue baking soda swishes and Healios.    5. Derm  Hand/foot: 2/2 Doxil, grade 1, improved with dose reduction. Continue lotion and urea BID. Avoid irritants    Dry skin: 2/2 Doxil, continue lotion and discussed humidifier    6. Pulm  Asthma: stable, continue inhalers.    COVID: Diagnosed in December, recovered. Planning on getting COVID vaccine on 2/11    7. Psych  Anxiety/depression:  continue palliative care follow-up    Insomnia: Not discussed, following with palliative    Fatigue- likely multifactorial. Will check TSH.     40 minutes spent on the date of the encounter doing chart review, review of test results, interpretation of tests, patient visit, documentation and discussion with other provider(s)        Farheen Monteiro PA-C  [facility]  [address] [place] [address] [phone]

## 2021-02-15 ENCOUNTER — INFUSION THERAPY VISIT (OUTPATIENT)
Dept: ONCOLOGY | Facility: CLINIC | Age: 56
End: 2021-02-15
Attending: INTERNAL MEDICINE
Payer: COMMERCIAL

## 2021-02-15 ENCOUNTER — HOME INFUSION (PRE-WILLOW HOME INFUSION) (OUTPATIENT)
Dept: PHARMACY | Facility: CLINIC | Age: 56
End: 2021-02-15

## 2021-02-15 ENCOUNTER — VIRTUAL VISIT (OUTPATIENT)
Dept: PALLIATIVE CARE | Facility: CLINIC | Age: 56
End: 2021-02-15
Attending: SOCIAL WORKER
Payer: COMMERCIAL

## 2021-02-15 ENCOUNTER — APPOINTMENT (OUTPATIENT)
Dept: LAB | Facility: CLINIC | Age: 56
End: 2021-02-15
Attending: INTERNAL MEDICINE
Payer: COMMERCIAL

## 2021-02-15 VITALS
BODY MASS INDEX: 36.34 KG/M2 | RESPIRATION RATE: 16 BRPM | SYSTOLIC BLOOD PRESSURE: 115 MMHG | DIASTOLIC BLOOD PRESSURE: 79 MMHG | WEIGHT: 232 LBS | HEART RATE: 82 BPM | TEMPERATURE: 97.8 F | OXYGEN SATURATION: 98 %

## 2021-02-15 DIAGNOSIS — R53.83 OTHER FATIGUE: ICD-10-CM

## 2021-02-15 DIAGNOSIS — F43.23 ADJUSTMENT DISORDER WITH MIXED ANXIETY AND DEPRESSED MOOD: Primary | ICD-10-CM

## 2021-02-15 DIAGNOSIS — C49.9 SARCOMA (H): ICD-10-CM

## 2021-02-15 DIAGNOSIS — D63.0 ANEMIA IN NEOPLASTIC DISEASE: Primary | ICD-10-CM

## 2021-02-15 LAB
ACANTHOCYTES BLD QL SMEAR: SLIGHT
ALBUMIN SERPL-MCNC: 3.3 G/DL (ref 3.4–5)
ALP SERPL-CCNC: 111 U/L (ref 40–150)
ALT SERPL W P-5'-P-CCNC: 44 U/L (ref 0–50)
ALT SERPL W P-5'-P-CCNC: 58 U/L (ref 0–50)
ANION GAP SERPL CALCULATED.3IONS-SCNC: 4 MMOL/L (ref 3–14)
ANISOCYTOSIS BLD QL SMEAR: ABNORMAL
AST SERPL W P-5'-P-CCNC: 35 U/L (ref 0–45)
AST SERPL W P-5'-P-CCNC: 43 U/L (ref 0–45)
BASOPHILS # BLD AUTO: 0 10E9/L (ref 0–0.2)
BASOPHILS NFR BLD AUTO: 0 %
BILIRUB SERPL-MCNC: 0.5 MG/DL (ref 0.2–1.3)
BILIRUB SERPL-MCNC: 0.7 MG/DL (ref 0.2–1.3)
BUN SERPL-MCNC: 8 MG/DL (ref 7–30)
CALCIUM SERPL-MCNC: 9 MG/DL (ref 8.5–10.1)
CHLORIDE SERPL-SCNC: 108 MMOL/L (ref 94–109)
CO2 SERPL-SCNC: 27 MMOL/L (ref 20–32)
CREAT BLD-MCNC: 0.6 MG/DL (ref 0.52–1.04)
CREAT SERPL-MCNC: 0.71 MG/DL (ref 0.52–1.04)
DIFFERENTIAL METHOD BLD: ABNORMAL
EOSINOPHIL # BLD AUTO: 0.2 10E9/L (ref 0–0.7)
EOSINOPHIL NFR BLD AUTO: 5.3 %
ERYTHROCYTE [DISTWIDTH] IN BLOOD BY AUTOMATED COUNT: 18.6 % (ref 10–15)
GFR SERPL CREATININE-BSD FRML MDRD: >90 ML/MIN/{1.73_M2}
GFR SERPL CREATININE-BSD FRML MDRD: >90 ML/MIN/{1.73_M2}
GLUCOSE SERPL-MCNC: 90 MG/DL (ref 70–99)
HCT VFR BLD AUTO: 28.2 % (ref 35–47)
HGB BLD-MCNC: 9.3 G/DL (ref 11.7–15.7)
LDH SERPL L TO P-CCNC: 216 U/L (ref 81–234)
LYMPHOCYTES # BLD AUTO: 0.8 10E9/L (ref 0.8–5.3)
LYMPHOCYTES NFR BLD AUTO: 24.5 %
MAGNESIUM SERPL-MCNC: 2.1 MG/DL (ref 1.6–2.3)
MCH RBC QN AUTO: 32 PG (ref 26.5–33)
MCHC RBC AUTO-ENTMCNC: 33 G/DL (ref 31.5–36.5)
MCV RBC AUTO: 97 FL (ref 78–100)
MONOCYTES # BLD AUTO: 0.5 10E9/L (ref 0–1.3)
MONOCYTES NFR BLD AUTO: 15.8 %
NEUTROPHILS # BLD AUTO: 1.7 10E9/L (ref 1.6–8.3)
NEUTROPHILS NFR BLD AUTO: 54.4 %
OVALOCYTES BLD QL SMEAR: SLIGHT
PHOSPHATE SERPL-MCNC: 3 MG/DL (ref 2.5–4.5)
PLATELET # BLD AUTO: 251 10E9/L (ref 150–450)
PLATELET # BLD EST: ABNORMAL 10*3/UL
POIKILOCYTOSIS BLD QL SMEAR: SLIGHT
POLYCHROMASIA BLD QL SMEAR: ABNORMAL
POTASSIUM SERPL-SCNC: 4 MMOL/L (ref 3.4–5.3)
PROT SERPL-MCNC: 7 G/DL (ref 6.8–8.8)
RBC # BLD AUTO: 2.91 10E12/L (ref 3.8–5.2)
SODIUM SERPL-SCNC: 139 MMOL/L (ref 133–144)
TSH SERPL DL<=0.005 MIU/L-ACNC: 2.66 MU/L (ref 0.4–4)
WBC # BLD AUTO: 3.2 10E9/L (ref 4–11)

## 2021-02-15 PROCEDURE — 250N000011 HC RX IP 250 OP 636: Performed by: INTERNAL MEDICINE

## 2021-02-15 PROCEDURE — 84450 TRANSFERASE (AST) (SGOT): CPT

## 2021-02-15 PROCEDURE — G0498 CHEMO EXTEND IV INFUS W/PUMP: HCPCS

## 2021-02-15 PROCEDURE — 80053 COMPREHEN METABOLIC PANEL: CPT | Performed by: PHYSICIAN ASSISTANT

## 2021-02-15 PROCEDURE — 96375 TX/PRO/DX INJ NEW DRUG ADDON: CPT

## 2021-02-15 PROCEDURE — 84100 ASSAY OF PHOSPHORUS: CPT | Performed by: PHYSICIAN ASSISTANT

## 2021-02-15 PROCEDURE — 84443 ASSAY THYROID STIM HORMONE: CPT | Performed by: PHYSICIAN ASSISTANT

## 2021-02-15 PROCEDURE — 85025 COMPLETE CBC W/AUTO DIFF WBC: CPT | Performed by: PHYSICIAN ASSISTANT

## 2021-02-15 PROCEDURE — 258N000003 HC RX IP 258 OP 636: Performed by: PHYSICIAN ASSISTANT

## 2021-02-15 PROCEDURE — 83615 LACTATE (LD) (LDH) ENZYME: CPT | Performed by: PHYSICIAN ASSISTANT

## 2021-02-15 PROCEDURE — 999N001117 HC STATISTIC FOUNDATION ONE GENE PANEL: Mod: GT | Performed by: INTERNAL MEDICINE

## 2021-02-15 PROCEDURE — 83735 ASSAY OF MAGNESIUM: CPT | Performed by: PHYSICIAN ASSISTANT

## 2021-02-15 PROCEDURE — 250N000011 HC RX IP 250 OP 636: Performed by: PHYSICIAN ASSISTANT

## 2021-02-15 PROCEDURE — 96413 CHEMO IV INFUSION 1 HR: CPT

## 2021-02-15 RX ORDER — PALONOSETRON 0.05 MG/ML
0.25 INJECTION, SOLUTION INTRAVENOUS ONCE
Status: COMPLETED | OUTPATIENT
Start: 2021-02-15 | End: 2021-02-15

## 2021-02-15 RX ORDER — HEPARIN SODIUM (PORCINE) LOCK FLUSH IV SOLN 100 UNIT/ML 100 UNIT/ML
5 SOLUTION INTRAVENOUS ONCE
Status: COMPLETED | OUTPATIENT
Start: 2021-02-15 | End: 2021-02-15

## 2021-02-15 RX ADMIN — DOXORUBICIN HYDROCHLORIDE 80 MG: 2 INJECTABLE, LIPOSOMAL INTRAVENOUS at 15:19

## 2021-02-15 RX ADMIN — PALONOSETRON 0.25 MG: 0.05 INJECTION, SOLUTION INTRAVENOUS at 14:43

## 2021-02-15 RX ADMIN — Medication 5 ML: at 13:47

## 2021-02-15 RX ADMIN — DEXTROSE MONOHYDRATE 250 ML: 50 INJECTION, SOLUTION INTRAVENOUS at 14:47

## 2021-02-15 ASSESSMENT — PAIN SCALES - GENERAL: PAINLEVEL: NO PAIN (0)

## 2021-02-15 NOTE — PATIENT INSTRUCTIONS
Contact Numbers  [facility]: [phone]    After Hours:  [phone]  Triage: [phone]    Please call the [facility] Triage line if you experience a temperature greater than or equal to 100.5, shaking chills, have uncontrolled nausea, vomiting and/or diarrhea, dizziness, shortness of breath, chest pain, bleeding, unexplained bruising, or if you have any other new/concerning symptoms, questions or concerns.     If it is after hours, weekends, or holidays, please call the main hospital  at  [phone] and ask to speak to the Oncology doctor on call.     If you are having any concerning symptoms or wish to speak to a provider before your next infusion visit, please call your care coordinator or triage to notify them so we can adequately serve you.     If you need a refill on a narcotic prescription or other medication, please call triage before your infusion appointment.         February 2021 Sunday Monday Tuesday Wednesday Thursday Friday Saturday        1    LAB   1:45 PM   (15 min.)   [facility] LAB HOME INFUSION   [facility] 2     3    LAB CENTRAL   1:00 PM   (15 min.)   [facility]   [facility] 4     5     6       7     8     9     10    VIDEO VISIT RETURN   1:35 PM   (50 min.)   Farheen Monteiro PA-C   [facility] 11     12     13       14     15    LAB CENTRAL   1:00 PM   (15 min.)   [facility]   [facility]    [facility] ONC INFUSION 180   1:30 PM   (180 min.)   [facility] ONCOLOGY INFUSION   [facility]    VIDEO VISIT RETURN   1:45 PM   (60 min.)   [facility]   [facility] 16     17     18     19     20       21     22     23     24     25     26     27       28                                               March 2021 Sunday Monday Tuesday Wednesday Thursday Friday Saturday        1      2  Happy Birthday!     3     4     5     6    VIDEO VISIT RETURN  12:45 PM   (60 min.)   Tiffany Astorga LICSW   [facility]   7     8    CT CHEST/ABDOMEN/PELVIS W   2:30 PM   (30 min.)   MGCT1   [facility] 9    VIDEO VISIT RETURN  12:15 PM   (30 min.)   Jose Alberto Lopez MD   [facility] 10     11    LAB CENTRAL   1:30 PM   (15 min.)   [facility] DRAW   [facility]    [facility] ONC INFUSION 180   2:00 PM   (180 min.)   [facility] ONCOLOGY INFUSION   [facility] 12     13       14     15     16     17     18     19     20       21     22     23     24     25     26     27       28     29     30     31                                    Lab Results:  Recent Results (from the past 12 hour(s))   ALT    Collection Time: 02/15/21  1:40 PM   Result Value Ref Range    ALT 44 0 - 50 U/L   AST    Collection Time: 02/15/21  1:40 PM   Result Value Ref Range    AST 43 0 - 45 U/L   Bilirubin  total    Collection Time: 02/15/21  1:40 PM   Result Value Ref Range    Bilirubin Total 0.7 0.2 - 1.3 mg/dL   CBC with platelets differential    Collection Time: 02/15/21  2:03 PM   Result Value Ref Range    WBC 3.2 (L) 4.0 - 11.0 10e9/L    RBC Count 2.91 (L) 3.8 - 5.2 10e12/L    Hemoglobin 9.3 (L) 11.7 - 15.7 g/dL    Hematocrit 28.2 (L) 35.0 - 47.0 %    MCV 97 78 - 100 fl    MCH 32.0 26.5 - 33.0 pg    MCHC 33.0 31.5 - 36.5 g/dL    RDW 18.6 (H) 10.0 - 15.0 %    Platelet Count 251 150 - 450 10e9/L    Diff Method Manual Differential     % Neutrophils 54.4 %    % Lymphocytes 24.5 %    % Monocytes 15.8 %    % Eosinophils 5.3 %    % Basophils 0.0 %    Absolute Neutrophil 1.7 1.6 - 8.3 10e9/L    Absolute Lymphocytes 0.8 0.8 - 5.3 10e9/L    Absolute Monocytes 0.5 0.0 - 1.3 10e9/L    Absolute Eosinophils 0.2 0.0 - 0.7 10e9/L    Absolute Basophils 0.0 0.0 - 0.2 10e9/L    Anisocytosis Moderate      Poikilocytosis Slight     Polychromasia Moderate     Acanthocytes Slight     Ovalocytes Slight     Platelet Estimate Confirming automated cell count    Comprehensive metabolic panel    Collection Time: 02/15/21  2:03 PM   Result Value Ref Range    Sodium 139 133 - 144 mmol/L    Potassium 4.0 3.4 - 5.3 mmol/L    Chloride 108 94 - 109 mmol/L    Carbon Dioxide 27 20 - 32 mmol/L    Anion Gap 4 3 - 14 mmol/L    Glucose 90 70 - 99 mg/dL    Urea Nitrogen 8 7 - 30 mg/dL    Creatinine 0.71 0.52 - 1.04 mg/dL    GFR Estimate >90 >60 mL/min/[1.73_m2]    GFR Estimate If Black >90 >60 mL/min/[1.73_m2]    Calcium 9.0 8.5 - 10.1 mg/dL    Bilirubin Total 0.5 0.2 - 1.3 mg/dL    Albumin 3.3 (L) 3.4 - 5.0 g/dL    Protein Total 7.0 6.8 - 8.8 g/dL    Alkaline Phosphatase 111 40 - 150 U/L    ALT 58 (H) 0 - 50 U/L    AST 35 0 - 45 U/L   Phosphorus    Collection Time: 02/15/21  2:03 PM   Result Value Ref Range    Phosphorus 3.0 2.5 - 4.5 mg/dL   Magnesium    Collection Time: 02/15/21  2:03 PM   Result Value Ref Range    Magnesium 2.1 1.6 - 2.3 mg/dL   Lactate Dehydrogenase    Collection Time: 02/15/21  2:03 PM   Result Value Ref Range    Lactate Dehydrogenase 216 81 - 234 U/L   TSH with free T4 reflex    Collection Time: 02/15/21  2:03 PM   Result Value Ref Range    TSH 2.66 0.40 - 4.00 mU/L   Creatinine POCT    Collection Time: 02/15/21  2:12 PM   Result Value Ref Range    Creatinine 0.6 0.52 - 1.04 mg/dL    GFR Estimate >90 >60 mL/min/[1.73_m2]    GFR Estimate If Black >90 >60 mL/min/[1.73_m2]

## 2021-02-15 NOTE — PROGRESS NOTES
Palliative Care Counseling Contact    Data: Connie was scheduled for return psychotherapy appt today    Intervention: Exchanged messages with Connie, whose infusion schedule ended up conflicting.    Response/Assessment: Connie would like to continue, but find another day to meet    Plan: Wait to hear back re offer of times on , soonest available.    TEJAS Pompa, [facility]  Palliative Care Counseling Services  [facility]  Office Phone: [phone]  [phone] ([facility]) or [phone] ([place])

## 2021-02-15 NOTE — PROGRESS NOTES
Infusion Nursing Note:  Richa Ashby presents today for C4D1 Doxil/Ifosfamide/[place] pump.    Patient seen by provider today: No   present during visit today: Not Applicable.    Note: Patient endorses right foot peeling than left. Denies redness, swelling and warmth feeling at night. Denies open areas nor any discharge. Denies any issue with ambulation.  Had few episodes with ear buzzing as described as machine sound on and off at night for 3-4 days. Denies pain nor issue with hearing. No new complaints made. Otherwise well. Paged Farheen EMERY for concern.      Instruction given to patient as per provider. Verbalized understanding.       Intravenous Access:  Implanted Port.    Treatment Conditions:  Lab Results   Component Value Date    HGB 9.3 02/15/2021     Lab Results   Component Value Date    WBC 3.2 02/15/2021      Lab Results   Component Value Date    ANEU 1.7 02/15/2021     Lab Results   Component Value Date     02/15/2021      Lab Results   Component Value Date     02/15/2021                   Lab Results   Component Value Date    POTASSIUM 4.0 02/15/2021           Lab Results   Component Value Date    MAG 2.1 02/15/2021            Lab Results   Component Value Date    CR 0.71 02/15/2021                   Lab Results   Component Value Date    VIKTORIA 9.0 02/15/2021                Lab Results   Component Value Date    BILITOTAL 0.5 02/15/2021           Lab Results   Component Value Date    ALBUMIN 3.3 02/15/2021                    Lab Results   Component Value Date    ALT 58 02/15/2021           Lab Results   Component Value Date    AST 35 02/15/2021       Results reviewed, labs MET treatment parameters, ok to proceed with treatment.  ECHO/MUGA completed 10/27/20  EF 60-65%.    TORB: 2/15/21/Farheen EMERY/Chana Chávez RN/ Symptoms noted. To proceed with treatment as planned. No dose changes. Please instruct patient to continue to monitor hearing and moisturized on affected  "areas.       Infusion:  Continous Infusion of Fluorouracil at 7 ml/hour for 144 hours, double checked with Kelli Frazier RN.    Post Infusion Assessment:    Results reviewed, copy given to patient.  Proceed with treatment.    Prior to discharge: Port is secured in place with tegaderm and flushed with 10cc NS with positive blood return noted.  Continuous home infusion CADD pump connected.    All connectors secured in place and clamps taped open.    Pump started, \"running\" noted on display (CADD): *YES.double checked with Kelli Frazier RN.  Patient instructed to call our clinic or [facility] with any questions or concerns at home.  Patient verbalized understanding.    Patient set up for change pump at [facility]  on 2/21/21@1700H, pump disconnect and Neulasta on 2/22. Verified with Evelin.       Post Infusion Assessment:  Patient tolerated infusion without incident.  Blood return noted pre and post infusion.  Site patent and intact, free from redness, edema or discomfort.  No evidence of extravasations.  Access discontinued per protocol.       Discharge Plan:   Prescription refills given for Kytril.  Patient declined prescription refills.  Discharge instructions reviewed with: Patient.  Patient and/or family verbalized understanding of discharge instructions and all questions answered.  AVS to patient via [employer].  Patient will return 3/9/21 for next appointment.   Patient discharged in stable condition accompanied by: self.  Departure Mode: Ambulatory.      MAX IBARRA, RN                        "

## 2021-02-15 NOTE — NURSING NOTE
Chief Complaint   Patient presents with     Chemotherapy     C4D1 Doxil/Ifosfamide/Mesna pump     Port Draw     Labs drawn via port by rn in lab. VS taken.     Port accessed with 20g 1 inch power needle by RN, labs collected, line flushed with saline and heparin.  Vitals taken. Pt checked in for appointment(s).    Elliot Alfaro RN

## 2021-02-15 NOTE — LETTER
2/15/2021       RE: Richa Ashby  [address]  [address]  [place] [address]     Dear Colleague,    [facility] you [facility]by[facility]. Please see a copy of my visit note below.    Palliative Care Counseling Contact    Data: Connie was scheduled for return psychotherapy appt today    Intervention: Exchanged messages with Connie, whose infusion schedule ended up conflicting.    Response/Assessment: Connie would like to continue, but find another day to meet    Plan: Wait to hear back re offer of times on , soonest available.    TEJAS Pompa, YAMILETH  Palliative Care Counseling Services  [facility]  Office Phone: [phone]  [phone] ([facility]) or [phone] ([facility])                Again, thank you for allowing me to participate in the care of your patient.      Sincerely,    YAMILETH Samayoa

## 2021-02-15 NOTE — LETTER
Date:April 15, 2021      Provider requested that no letter be sent. Do not send.       [facility]

## 2021-02-18 ENCOUNTER — HOME INFUSION (PRE-WILLOW HOME INFUSION) (OUTPATIENT)
Dept: PHARMACY | Facility: CLINIC | Age: 56
End: 2021-02-18

## 2021-02-18 ENCOUNTER — MEDICAL CORRESPONDENCE (OUTPATIENT)
Dept: HEALTH INFORMATION MANAGEMENT | Facility: CLINIC | Age: 56
End: 2021-02-18

## 2021-02-18 LAB
ANION GAP SERPL CALCULATED.3IONS-SCNC: 9 MMOL/L (ref 3–14)
BUN SERPL-MCNC: 11 MG/DL (ref 7–30)
CALCIUM SERPL-MCNC: 9.2 MG/DL (ref 8.5–10.1)
CHLORIDE SERPL-SCNC: 105 MMOL/L (ref 94–109)
CO2 SERPL-SCNC: 27 MMOL/L (ref 20–32)
CREAT SERPL-MCNC: 0.59 MG/DL (ref 0.52–1.04)
GFR SERPL CREATININE-BSD FRML MDRD: >90 ML/MIN/{1.73_M2}
GLUCOSE SERPL-MCNC: 92 MG/DL (ref 70–99)
MAGNESIUM SERPL-MCNC: 1.9 MG/DL (ref 1.6–2.3)
PHOSPHATE SERPL-MCNC: 3.7 MG/DL (ref 2.5–4.5)
POTASSIUM SERPL-SCNC: 3.6 MMOL/L (ref 3.4–5.3)
SODIUM SERPL-SCNC: 141 MMOL/L (ref 133–144)

## 2021-02-18 PROCEDURE — 80048 BASIC METABOLIC PNL TOTAL CA: CPT | Performed by: INTERNAL MEDICINE

## 2021-02-18 PROCEDURE — 84100 ASSAY OF PHOSPHORUS: CPT | Performed by: INTERNAL MEDICINE

## 2021-02-18 PROCEDURE — 83735 ASSAY OF MAGNESIUM: CPT | Performed by: INTERNAL MEDICINE

## 2021-02-19 ENCOUNTER — HOME INFUSION (PRE-WILLOW HOME INFUSION) (OUTPATIENT)
Dept: PHARMACY | Facility: CLINIC | Age: 56
End: 2021-02-19

## 2021-02-19 ENCOUNTER — MEDICAL CORRESPONDENCE (OUTPATIENT)
Dept: HEALTH INFORMATION MANAGEMENT | Facility: CLINIC | Age: 56
End: 2021-02-19

## 2021-02-19 LAB
ANION GAP SERPL CALCULATED.3IONS-SCNC: 8 MMOL/L (ref 3–14)
BUN SERPL-MCNC: 12 MG/DL (ref 7–30)
CALCIUM SERPL-MCNC: 9.2 MG/DL (ref 8.5–10.1)
CHLORIDE SERPL-SCNC: 105 MMOL/L (ref 94–109)
CO2 SERPL-SCNC: 26 MMOL/L (ref 20–32)
CREAT SERPL-MCNC: 0.73 MG/DL (ref 0.52–1.04)
GFR SERPL CREATININE-BSD FRML MDRD: >90 ML/MIN/{1.73_M2}
GLUCOSE SERPL-MCNC: 97 MG/DL (ref 70–99)
MAGNESIUM SERPL-MCNC: 2.1 MG/DL (ref 1.6–2.3)
PHOSPHATE SERPL-MCNC: 3.8 MG/DL (ref 2.5–4.5)
POTASSIUM SERPL-SCNC: 3.6 MMOL/L (ref 3.4–5.3)
SODIUM SERPL-SCNC: 139 MMOL/L (ref 133–144)

## 2021-02-19 PROCEDURE — 80048 BASIC METABOLIC PNL TOTAL CA: CPT | Performed by: INTERNAL MEDICINE

## 2021-02-19 PROCEDURE — 83735 ASSAY OF MAGNESIUM: CPT | Performed by: INTERNAL MEDICINE

## 2021-02-19 PROCEDURE — 84100 ASSAY OF PHOSPHORUS: CPT | Performed by: INTERNAL MEDICINE

## 2021-02-19 NOTE — PROGRESS NOTES
This is a recent snapshot of the patient's [facility] medical record.  For current drug dose and complete information and questions, call [phone] or In Basket pool, fv home infusion ([id number])  CSN Number:  [id number]

## 2021-02-20 ENCOUNTER — MEDICAL CORRESPONDENCE (OUTPATIENT)
Dept: HEALTH INFORMATION MANAGEMENT | Facility: CLINIC | Age: 56
End: 2021-02-20

## 2021-02-20 LAB
ANION GAP SERPL CALCULATED.3IONS-SCNC: 7 MMOL/L (ref 3–14)
BUN SERPL-MCNC: 15 MG/DL (ref 7–30)
CALCIUM SERPL-MCNC: 8.8 MG/DL (ref 8.5–10.1)
CHLORIDE SERPL-SCNC: 107 MMOL/L (ref 94–109)
CO2 SERPL-SCNC: 27 MMOL/L (ref 20–32)
CREAT SERPL-MCNC: 0.54 MG/DL (ref 0.52–1.04)
GFR SERPL CREATININE-BSD FRML MDRD: >90 ML/MIN/{1.73_M2}
GLUCOSE SERPL-MCNC: 116 MG/DL (ref 70–99)
MAGNESIUM SERPL-MCNC: 2 MG/DL (ref 1.6–2.3)
PHOSPHATE SERPL-MCNC: 3.7 MG/DL (ref 2.5–4.5)
POTASSIUM SERPL-SCNC: 3.3 MMOL/L (ref 3.4–5.3)
SODIUM SERPL-SCNC: 141 MMOL/L (ref 133–144)

## 2021-02-20 PROCEDURE — 83735 ASSAY OF MAGNESIUM: CPT | Performed by: INTERNAL MEDICINE

## 2021-02-20 PROCEDURE — 80048 BASIC METABOLIC PNL TOTAL CA: CPT | Performed by: INTERNAL MEDICINE

## 2021-02-20 PROCEDURE — 84100 ASSAY OF PHOSPHORUS: CPT | Performed by: INTERNAL MEDICINE

## 2021-02-21 ENCOUNTER — HOME INFUSION (PRE-WILLOW HOME INFUSION) (OUTPATIENT)
Dept: PHARMACY | Facility: CLINIC | Age: 56
End: 2021-02-21

## 2021-02-21 ENCOUNTER — MEDICAL CORRESPONDENCE (OUTPATIENT)
Dept: HEALTH INFORMATION MANAGEMENT | Facility: CLINIC | Age: 56
End: 2021-02-21

## 2021-02-21 LAB
ANION GAP SERPL CALCULATED.3IONS-SCNC: 5 MMOL/L (ref 3–14)
ANISOCYTOSIS BLD QL SMEAR: SLIGHT
BASOPHILS # BLD AUTO: 0.1 10E9/L (ref 0–0.2)
BASOPHILS NFR BLD AUTO: 2.2 %
BUN SERPL-MCNC: 12 MG/DL (ref 7–30)
CALCIUM SERPL-MCNC: 8.5 MG/DL (ref 8.5–10.1)
CHLORIDE SERPL-SCNC: 108 MMOL/L (ref 94–109)
CO2 SERPL-SCNC: 25 MMOL/L (ref 20–32)
CREAT SERPL-MCNC: 0.63 MG/DL (ref 0.52–1.04)
DIFFERENTIAL METHOD BLD: ABNORMAL
EOSINOPHIL # BLD AUTO: 0.2 10E9/L (ref 0–0.7)
EOSINOPHIL NFR BLD AUTO: 7.8 %
ERYTHROCYTE [DISTWIDTH] IN BLOOD BY AUTOMATED COUNT: 17 % (ref 10–15)
GFR SERPL CREATININE-BSD FRML MDRD: >90 ML/MIN/{1.73_M2}
GLUCOSE SERPL-MCNC: 82 MG/DL (ref 70–99)
HCT VFR BLD AUTO: 26.3 % (ref 35–47)
HGB BLD-MCNC: 8.8 G/DL (ref 11.7–15.7)
LYMPHOCYTES # BLD AUTO: 0.3 10E9/L (ref 0.8–5.3)
LYMPHOCYTES NFR BLD AUTO: 11.1 %
MAGNESIUM SERPL-MCNC: 2.2 MG/DL (ref 1.6–2.3)
MCH RBC QN AUTO: 32.5 PG (ref 26.5–33)
MCHC RBC AUTO-ENTMCNC: 33.5 G/DL (ref 31.5–36.5)
MCV RBC AUTO: 97 FL (ref 78–100)
MICROCYTES BLD QL SMEAR: PRESENT
MONOCYTES # BLD AUTO: 0 10E9/L (ref 0–1.3)
MONOCYTES NFR BLD AUTO: 0 %
NEUTROPHILS # BLD AUTO: 2 10E9/L (ref 1.6–8.3)
NEUTROPHILS NFR BLD AUTO: 78.9 %
PHOSPHATE SERPL-MCNC: 3.1 MG/DL (ref 2.5–4.5)
PLATELET # BLD AUTO: 256 10E9/L (ref 150–450)
PLATELET # BLD EST: ABNORMAL 10*3/UL
POTASSIUM SERPL-SCNC: 3.5 MMOL/L (ref 3.4–5.3)
RBC # BLD AUTO: 2.71 10E12/L (ref 3.8–5.2)
SODIUM SERPL-SCNC: 138 MMOL/L (ref 133–144)
WBC # BLD AUTO: 2.5 10E9/L (ref 4–11)

## 2021-02-21 PROCEDURE — 84100 ASSAY OF PHOSPHORUS: CPT | Performed by: INTERNAL MEDICINE

## 2021-02-21 PROCEDURE — 80048 BASIC METABOLIC PNL TOTAL CA: CPT | Performed by: INTERNAL MEDICINE

## 2021-02-21 PROCEDURE — 85025 COMPLETE CBC W/AUTO DIFF WBC: CPT | Performed by: INTERNAL MEDICINE

## 2021-02-21 PROCEDURE — 83735 ASSAY OF MAGNESIUM: CPT | Performed by: INTERNAL MEDICINE

## 2021-02-22 ENCOUNTER — HOME INFUSION (PRE-WILLOW HOME INFUSION) (OUTPATIENT)
Dept: PHARMACY | Facility: CLINIC | Age: 56
End: 2021-02-22

## 2021-02-22 LAB
ANION GAP SERPL CALCULATED.3IONS-SCNC: 8 MMOL/L (ref 3–14)
BASOPHILS # BLD AUTO: 0 10E9/L (ref 0–0.2)
BASOPHILS NFR BLD AUTO: 1.1 %
BUN SERPL-MCNC: 12 MG/DL (ref 7–30)
CALCIUM SERPL-MCNC: 8.6 MG/DL (ref 8.5–10.1)
CHLORIDE SERPL-SCNC: 111 MMOL/L (ref 94–109)
CO2 SERPL-SCNC: 23 MMOL/L (ref 20–32)
CREAT SERPL-MCNC: 0.6 MG/DL (ref 0.52–1.04)
DIFFERENTIAL METHOD BLD: ABNORMAL
EOSINOPHIL # BLD AUTO: 0.2 10E9/L (ref 0–0.7)
EOSINOPHIL NFR BLD AUTO: 7.3 %
ERYTHROCYTE [DISTWIDTH] IN BLOOD BY AUTOMATED COUNT: 16.5 % (ref 10–15)
GFR SERPL CREATININE-BSD FRML MDRD: >90 ML/MIN/{1.73_M2}
GLUCOSE SERPL-MCNC: 81 MG/DL (ref 70–99)
HCT VFR BLD AUTO: 24.7 % (ref 35–47)
HGB BLD-MCNC: 8.4 G/DL (ref 11.7–15.7)
IMM GRANULOCYTES # BLD: 0 10E9/L (ref 0–0.4)
IMM GRANULOCYTES NFR BLD: 0.4 %
LYMPHOCYTES # BLD AUTO: 0.5 10E9/L (ref 0.8–5.3)
LYMPHOCYTES NFR BLD AUTO: 16.4 %
MAGNESIUM SERPL-MCNC: 2.3 MG/DL (ref 1.6–2.3)
MCH RBC QN AUTO: 32.7 PG (ref 26.5–33)
MCHC RBC AUTO-ENTMCNC: 34 G/DL (ref 31.5–36.5)
MCV RBC AUTO: 96 FL (ref 78–100)
MONOCYTES # BLD AUTO: 0.1 10E9/L (ref 0–1.3)
MONOCYTES NFR BLD AUTO: 3.6 %
NEUTROPHILS # BLD AUTO: 2 10E9/L (ref 1.6–8.3)
NEUTROPHILS NFR BLD AUTO: 71.2 %
NRBC # BLD AUTO: 0 10*3/UL
NRBC BLD AUTO-RTO: 0 /100
PHOSPHATE SERPL-MCNC: 2.3 MG/DL (ref 2.5–4.5)
PLATELET # BLD AUTO: 232 10E9/L (ref 150–450)
POTASSIUM SERPL-SCNC: 3.4 MMOL/L (ref 3.4–5.3)
RBC # BLD AUTO: 2.57 10E12/L (ref 3.8–5.2)
SODIUM SERPL-SCNC: 142 MMOL/L (ref 133–144)
WBC # BLD AUTO: 2.8 10E9/L (ref 4–11)

## 2021-02-22 PROCEDURE — 85025 COMPLETE CBC W/AUTO DIFF WBC: CPT | Performed by: INTERNAL MEDICINE

## 2021-02-22 PROCEDURE — 80048 BASIC METABOLIC PNL TOTAL CA: CPT | Performed by: INTERNAL MEDICINE

## 2021-02-22 PROCEDURE — 83735 ASSAY OF MAGNESIUM: CPT | Performed by: INTERNAL MEDICINE

## 2021-02-22 PROCEDURE — 84100 ASSAY OF PHOSPHORUS: CPT | Performed by: INTERNAL MEDICINE

## 2021-02-22 NOTE — PROGRESS NOTES
This is a recent snapshot of the patient's [facility] medical record.  For current drug dose and complete information and questions, call [phone] or In Basket pool, fv home infusion ([id number])  CSN Number:  [id number]

## 2021-02-23 NOTE — PROGRESS NOTES
This is a recent snapshot of the patient's [facility] medical record.  For current drug dose and complete information and questions, call [phone] or In Basket pool, fv home infusion ([id number])  CSN Number:  [id number]

## 2021-02-24 ENCOUNTER — HOME INFUSION (PRE-WILLOW HOME INFUSION) (OUTPATIENT)
Dept: PHARMACY | Facility: CLINIC | Age: 56
End: 2021-02-24

## 2021-02-26 NOTE — PROGRESS NOTES
This is a recent snapshot of the patient's [facility] medical record.  For current drug dose and complete information and questions, call [phone] or In Basket pool, fv home infusion ([id number])  CSN Number:  [id number]

## 2021-02-26 NOTE — PROGRESS NOTES
This is a recent snapshot of the patient's [facility] medical record.  For current drug dose and complete information and questions, call [phone] or In Basket pool, fv home infusion ([id number])  CSN Number:  [id number]

## 2021-03-05 ENCOUNTER — ANCILLARY PROCEDURE (OUTPATIENT)
Dept: ULTRASOUND IMAGING | Facility: CLINIC | Age: 56
End: 2021-03-05
Attending: PHYSICIAN ASSISTANT
Payer: COMMERCIAL

## 2021-03-05 ENCOUNTER — TELEPHONE (OUTPATIENT)
Dept: ONCOLOGY | Facility: CLINIC | Age: 56
End: 2021-03-05

## 2021-03-05 DIAGNOSIS — C49.9 SARCOMA (H): ICD-10-CM

## 2021-03-05 DIAGNOSIS — M79.661 PAIN OF RIGHT LOWER LEG: ICD-10-CM

## 2021-03-05 DIAGNOSIS — D63.0 ANEMIA IN NEOPLASTIC DISEASE: Primary | ICD-10-CM

## 2021-03-05 DIAGNOSIS — M79.661 PAIN OF RIGHT LOWER LEG: Primary | ICD-10-CM

## 2021-03-05 LAB
ABO + RH BLD: NORMAL
ABO + RH BLD: NORMAL
ANISOCYTOSIS BLD QL SMEAR: SLIGHT
BASOPHILS # BLD AUTO: 0 10E9/L (ref 0–0.2)
BASOPHILS NFR BLD AUTO: 0.9 %
BLD GP AB SCN SERPL QL: NORMAL
BLD PROD TYP BPU: NORMAL
BLOOD BANK CMNT PATIENT-IMP: NORMAL
DIFFERENTIAL METHOD BLD: ABNORMAL
EOSINOPHIL # BLD AUTO: 0 10E9/L (ref 0–0.7)
EOSINOPHIL NFR BLD AUTO: 0.9 %
ERYTHROCYTE [DISTWIDTH] IN BLOOD BY AUTOMATED COUNT: 15.9 % (ref 10–15)
HCT VFR BLD AUTO: 21.5 % (ref 35–47)
HGB BLD-MCNC: 7.3 G/DL (ref 11.7–15.7)
LYMPHOCYTES # BLD AUTO: 0.5 10E9/L (ref 0.8–5.3)
LYMPHOCYTES NFR BLD AUTO: 13.9 %
MCH RBC QN AUTO: 32.3 PG (ref 26.5–33)
MCHC RBC AUTO-ENTMCNC: 34 G/DL (ref 31.5–36.5)
MCV RBC AUTO: 95 FL (ref 78–100)
MONOCYTES # BLD AUTO: 0.2 10E9/L (ref 0–1.3)
MONOCYTES NFR BLD AUTO: 6.1 %
MYELOCYTES # BLD: 0 10E9/L
MYELOCYTES NFR BLD MANUAL: 0.9 %
NEUTROPHILS # BLD AUTO: 2.7 10E9/L (ref 1.6–8.3)
NEUTROPHILS NFR BLD AUTO: 77.3 %
NRBC # BLD AUTO: 0.1 10*3/UL
NRBC BLD AUTO-RTO: 2 /100
NUM BPU REQUESTED: 1
PLATELET # BLD AUTO: 67 10E9/L (ref 150–450)
PLATELET # BLD EST: ABNORMAL 10*3/UL
POIKILOCYTOSIS BLD QL SMEAR: SLIGHT
POLYCHROMASIA BLD QL SMEAR: SLIGHT
RBC # BLD AUTO: 2.26 10E12/L (ref 3.8–5.2)
SPECIMEN EXP DATE BLD: NORMAL
WBC # BLD AUTO: 3.5 10E9/L (ref 4–11)

## 2021-03-05 PROCEDURE — 250N000011 HC RX IP 250 OP 636: Performed by: INTERNAL MEDICINE

## 2021-03-05 PROCEDURE — 86923 COMPATIBILITY TEST ELECTRIC: CPT

## 2021-03-05 PROCEDURE — 36591 DRAW BLOOD OFF VENOUS DEVICE: CPT

## 2021-03-05 PROCEDURE — 93971 EXTREMITY STUDY: CPT | Mod: RT | Performed by: RADIOLOGY

## 2021-03-05 PROCEDURE — 86900 BLOOD TYPING SEROLOGIC ABO: CPT

## 2021-03-05 PROCEDURE — 86850 RBC ANTIBODY SCREEN: CPT

## 2021-03-05 PROCEDURE — 86901 BLOOD TYPING SEROLOGIC RH(D): CPT

## 2021-03-05 PROCEDURE — 85025 COMPLETE CBC W/AUTO DIFF WBC: CPT | Performed by: PHYSICIAN ASSISTANT

## 2021-03-05 RX ORDER — HEPARIN SODIUM (PORCINE) LOCK FLUSH IV SOLN 100 UNIT/ML 100 UNIT/ML
5 SOLUTION INTRAVENOUS
Status: CANCELLED | OUTPATIENT
Start: 2021-03-05

## 2021-03-05 RX ORDER — HEPARIN SODIUM,PORCINE 10 UNIT/ML
5 VIAL (ML) INTRAVENOUS
Status: CANCELLED | OUTPATIENT
Start: 2021-03-05

## 2021-03-05 RX ORDER — HEPARIN SODIUM (PORCINE) LOCK FLUSH IV SOLN 100 UNIT/ML 100 UNIT/ML
5 SOLUTION INTRAVENOUS DAILY PRN
Status: DISCONTINUED | OUTPATIENT
Start: 2021-03-05 | End: 2021-03-13 | Stop reason: HOSPADM

## 2021-03-05 RX ADMIN — SODIUM CHLORIDE, PRESERVATIVE FREE 5 ML: 5 INJECTION INTRAVENOUS at 11:50

## 2021-03-05 NOTE — TELEPHONE ENCOUNTER
Pt called in to triage reporting 2 days of little red/purple dots on bilateral inner lower legs R>L. Stated she'd had petechiae before and these look like those, flat, non-itchy, starts on arch of feet up to calves. Asked her to send pictures on [place]. She denied pain or swelling, bruises or bleeding anywhere else. She has had a headache the last few days, takes tylenol at bed time, has difficulty sleeping due to tinnitus but state tylenol is somewhat helpful. Can get labs today if recommended. Paged Harshal EMERY.

## 2021-03-05 NOTE — TELEPHONE ENCOUNTER
Per Harshal: hgb 7.3, transfuse 1u over the weekend.    Pt scheduled for [facility] infusion at 7 am 3/6, pt accepted and scheduling messaged, not stated last few hours R calf is painful, flexing toes up hurts more. She is back at work but can leave and go to [facility] for ultrasound if needed. Stated HA is mild but present when fatigued. Rash is not worse. Paged Harshal.

## 2021-03-05 NOTE — NURSING NOTE
Chief Complaint   Patient presents with     Port Draw     Labs drawn via port by RN in lab.      Ilda Gutiérrez RN

## 2021-03-05 NOTE — TELEPHONE ENCOUNTER
[facility] scheduling messaged to add on lab apt either before or after CT on Monday, Harshal ordered RLE US doppler, [facility] at 3:30 [address]. Pt informed.

## 2021-03-06 ENCOUNTER — INFUSION THERAPY VISIT (OUTPATIENT)
Dept: INFUSION THERAPY | Facility: CLINIC | Age: 56
End: 2021-03-06
Attending: INTERNAL MEDICINE
Payer: COMMERCIAL

## 2021-03-06 VITALS
RESPIRATION RATE: 16 BRPM | OXYGEN SATURATION: 98 % | TEMPERATURE: 97.9 F | DIASTOLIC BLOOD PRESSURE: 70 MMHG | SYSTOLIC BLOOD PRESSURE: 110 MMHG | HEART RATE: 97 BPM

## 2021-03-06 DIAGNOSIS — D63.0 ANEMIA IN NEOPLASTIC DISEASE: Primary | ICD-10-CM

## 2021-03-06 LAB
BLD PROD TYP BPU: NORMAL
BLD UNIT ID BPU: 0
BLOOD PRODUCT CODE: NORMAL
BPU ID: NORMAL
TRANSFUSION STATUS PATIENT QL: NORMAL
TRANSFUSION STATUS PATIENT QL: NORMAL

## 2021-03-06 PROCEDURE — P9016 RBC LEUKOCYTES REDUCED: HCPCS

## 2021-03-06 PROCEDURE — 36430 TRANSFUSION BLD/BLD COMPNT: CPT

## 2021-03-06 PROCEDURE — 250N000011 HC RX IP 250 OP 636: Performed by: NURSE PRACTITIONER

## 2021-03-06 RX ORDER — HEPARIN SODIUM (PORCINE) LOCK FLUSH IV SOLN 100 UNIT/ML 100 UNIT/ML
5 SOLUTION INTRAVENOUS
Status: DISCONTINUED | OUTPATIENT
Start: 2021-03-06 | End: 2021-03-06 | Stop reason: HOSPADM

## 2021-03-06 RX ADMIN — Medication 5 ML: at 08:56

## 2021-03-06 NOTE — PROGRESS NOTES
Blood Product Transfusion Nursing Note:    Richa Ashby presents today to [facility] for a one unit Transfuse red blood cell unit   transfusion.  During today's [facility] appointment orders from Cyndi Bains APRN CNP  were completed.    Progress note:  ID verified by name and .  Assessment completed.  Vitals were stable throughout time in [facility].    verbal education given to patient/representative regarding transfusion and possible side effects.  Patient/representative verbalized understanding.     present during visit today: Not Applicable.    All pertinent labs reviewed prior to infusion: YES. 7.3 hgb     Date of consent or authorization: 2021    Patient tolerated the procedure well    Transfusion given over approximately  90 minutes.     Discharge Plan:    Discharge instructions were reviewed with patient Yes  Patient/representative verbalized understanding of discharge instructions and all questions answered Yes.  Declined AVS      Nicky June RN    BP (P) 120/78   Pulse (P) 76   Temp (P) 98.3  F (36.8  C) (Oral)   Resp (P) 16   LMP 2019   SpO2 98%

## 2021-03-06 NOTE — LETTER
3/6/2021         RE: Richa Ashby  [address]  [place] [address]        Dear Colleague,    Thank you for referring your patient, Richa Ashby, to the [facility]. Please see a copy of my visit note below.    Blood Product Transfusion Nursing Note:    Richa Ashby presents today to [facility] for a one unit Transfuse red blood cell unit   transfusion.  During today's [facility] appointment orders from Cyndi Bains APRN CNP  were completed.    Progress note:  ID verified by name and .  Assessment completed.  Vitals were stable throughout time in [facility].    verbal education given to patient/representative regarding transfusion and possible side effects.  Patient/representative verbalized understanding.     present during visit today: Not Applicable.    All pertinent labs reviewed prior to infusion: YES. 7.3 hgb     Date of consent or authorization: 2021    Patient tolerated the procedure well    Transfusion given over approximately  90 minutes.     Discharge Plan:    Discharge instructions were reviewed with patient Yes  Patient/representative verbalized understanding of discharge instructions and all questions answered Yes.  Declined AVS      Nicky June RN    BP (P) 120/78   Pulse (P) 76   Temp (P) 98.3  F (36.8  C) (Oral)   Resp (P) 16   LMP 2019   SpO2 98%             Again, thank you for allowing me to participate in the care of your patient.        Sincerely,        [facility]

## 2021-03-06 NOTE — PATIENT INSTRUCTIONS
Patient Education   After Your Blood Transfusion  Discharge Instructions  After you leave  After a blood transfusion (received red blood cells, platelets, plasma, cryo or granulocytes), you will need to watch for signs of a reaction for the next 48 hours.  Call your clinic or 911 (or go to the Emergency room) if you have any signs of a reaction:    Shaking or chills    Fever (temperature above 100.0 F)    Headache    Nausea    Hives    Itching    Swelling of the face or feeling flushed    Ongoing dry cough (nothing is coughed up)    Trouble breathing or wheezing  Some signs of a reaction won't show up for a few days or up to 4 weeks.   These may include:    Fatigue    Dizziness    Pink or red urine  Your clinic is:   ________________________________________  ________________________________________  For informational purposes only. Not to replace the advice of your health care provider.   Copyright   2015 [employer]. All rights reserved. Linguastat 792129 - Rev 07/16.

## 2021-03-08 ENCOUNTER — ANCILLARY PROCEDURE (OUTPATIENT)
Dept: CT IMAGING | Facility: CLINIC | Age: 56
End: 2021-03-08
Attending: PHYSICIAN ASSISTANT
Payer: COMMERCIAL

## 2021-03-08 DIAGNOSIS — C49.9 SARCOMA (H): ICD-10-CM

## 2021-03-08 DIAGNOSIS — R91.8 PULMONARY NODULES: ICD-10-CM

## 2021-03-08 PROCEDURE — 71260 CT THORAX DX C+: CPT | Performed by: RADIOLOGY

## 2021-03-08 PROCEDURE — 74177 CT ABD & PELVIS W/CONTRAST: CPT | Performed by: RADIOLOGY

## 2021-03-08 RX ORDER — HEPARIN SODIUM (PORCINE) LOCK FLUSH IV SOLN 100 UNIT/ML 100 UNIT/ML
5 SOLUTION INTRAVENOUS
Status: SHIPPED | OUTPATIENT
Start: 2021-03-08

## 2021-03-08 RX ORDER — IOPAMIDOL 755 MG/ML
135 INJECTION, SOLUTION INTRAVASCULAR ONCE
Status: COMPLETED | OUTPATIENT
Start: 2021-03-08 | End: 2021-03-08

## 2021-03-08 RX ADMIN — IOPAMIDOL 135 ML: 755 INJECTION, SOLUTION INTRAVASCULAR at 14:47

## 2021-03-08 RX ADMIN — HEPARIN SODIUM (PORCINE) LOCK FLUSH IV SOLN 100 UNIT/ML 5 ML: 100 SOLUTION at 15:02

## 2021-03-08 NOTE — PROGRESS NOTES
"  Connie is a 56 year old who is being evaluated via a billable video visit.      How would you like to obtain your AVS? MyChart  If the video visit is dropped, the invitation should be resent by: Text to cell phone: [phone]  Will anyone else be joining your video visit? No    Vitals - Patient Reported  Weight (Patient Reported): 99.8 kg (220 lb)  Height (Patient Reported): 170.2 cm (5' 7\")  BMI (Based on Pt Reported Ht/Wt): 34.46  Pain Score: No Pain (0)    Video Start Time:1223  Video-Visit Details    Type of service:  Video Visit    Video End [id number]    Originating Location (pt. 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 She received preoperative radiotherapy and the tumor was resected 3/12/20. She then developed a cough, chest tightness, and scapular pain and imaging revealed lung nodules. Lung biopsy 11/2/20 with sarcoma. She started Doxil + Ifos 11/13/20. Had delay in cycle 2 due to COVID dx 12/2/20. CT imaging after cycle 1 with positive response to treatment with decrease in pulmonary nodules. Received cycle 2 12/18/20. Received cycle 3 1/15/21 with dose reduction in Doxil for skin toxicity and mucositis..   -       Interval History:        Due to covid we are doing a video visit.     She was started on Doxil + Ifos 11/13/20    She has had 4 cycles.    She got a plt tx sat    Her skin burns and is just starting to improve, buttock and backs of calves.  She did get more blistering of her feet this time that's not yet healed.  Mouth was dry but not sore this month.    Did not affect sleep or " walking.    She missed a bit more than the first 2 weeks of this cycle.     Does note fatigue sometimes. Improved w transfusion.  Sleeping okay at night. Melatonin is helping.     She notes tinnitis that started in Jan/Feb. Usually lasts quite a while if at night. She did do an ear wash. W ediscussed use of an earwash bulb.    She had some GERD that responded to bid prilosec.    Soft BMs    Often feels cold.TSH normal 2-15.    No confusion w last cycle.     She has been taking 2 neutraphos tid w meals and now is at 1 tid.  KCL 2 m Eq/d.     ROS: 14 point ROS neg other than the symptoms noted above in the HPI.         Video physical exam  On exam she appeared well in no acute distress w normal affect.     Eyes: EOMI    NECK no obvious goiter or mass.   Skin: No visualized rash or lesions on visualized skin   Resp: no resp distress   MSK: Appears to have normal range of motion based on visualized movements   Neurologic: No apparent tremors, normal mentation and speech   Psych: affect normal, alert and oriented,good mood     The rest of a comprehensive physical examination is deferred due to PHE (public health emergency) video restriction    -  CBC, LFT, GNE OK though Hb 8.6, plt 120, creat 0.62, PO4 3.2, K3.7.    -  I reviewed the new images and I think 2 spots are a bit smaller but one seems larger, though it has not yet been formally read.    Formal read:xx    -    Assessment and Plan:  1. 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 Required Doxil dose reduction for cycle 3 for skin and mouth toxicity.     She has had 4 cycles.    I am concerned the R lesion is progressing.  We will wait for radiology read but likely change to keytruda - this would not exclude use of doxil/ifos in the future if this lesion were dealt with.     will do neuro monitoring iif we do more. Continue lab monitoring day 4-8 and weekly given electrolyte and count  issues-may decrease ifos if we do more.     She is pre-approved for Keytruda and will plan to start it thurs pending XR read-typical toxicites reviewed.    Will follow-up on Foundation One.    Addendum-formal CTread  IMPRESSION:   In this patient with metastatic sarcoma, there is evidence of overall  positive response to therapy:  1. No new pulmonary nodules. The majority of the pulmonary metastases  have decreased in size. One pulmonary metastasis in the right middle  lobe has increased in size.  2. A few prominent nonenlarged mediastinal lymph nodes measure  slightly larger, although there is still no lymphadenopathy by size  criteria within the chest. This may represent reactive changes and  attention on follow-up is recommended.  3. Stable statistically benign left ovarian cystic focus and  technically indeterminate hypodensities in the right kidney which  likely represents cysts.  4. Ovoid densities in the left breast presumably represent fibrocystic  change. [facility] is recommended, as  well as attention on follow-up.    I think it is worth 1 more cycle of current Rx before going to keytruda so we will restage after 1 more.  We will decrease ifos somewhat and doxil to 75 mg.    2. Heme  H/o pancytopenia: 2/2 Ifosfamide.   Hb 8.6 after tx.       3. FEN  H/o Hypokalemia: 2/2 ifosfamide, stable on current Rx Continue 20meq daily     H/o Hypophosphatemia: 2/2 ifosfamide, stable on 250mg TID    4. GI  GERD: Chronic, worse with treatment. She will adjust dose up to 40 mg bID depending on symptoms return.  Continue Tums PRN.      Nausea: 2/2 treatment, controlled with Kytril day 3-8 and Compazine PRN  Mucositis: 2/2 Doxil, improved with dose reduction.     5. HFS  Hand/foot: 2/2 Doxil, improved with dose reduction. Continue lotion and urea BID. Avoid irritants    6. Pulm  Asthma: stable, continue inhalers.     7-COVID: Diagnosed in December, recovered. Planning on getting 2nd COVID  vaccine next week    7. Psych  Anxiety/depression:  continue palliative care follow-up     Insomnia: Not discussed, following with palliative     Fatigue- likely multifactorial. TSH OK.     She will call if other questions arise.  T>40 min inc image review and orders      Jose Alberto Lopez M.D.  Professor  Hematology, Oncology and Transplantation

## 2021-03-09 ENCOUNTER — VIRTUAL VISIT (OUTPATIENT)
Dept: ONCOLOGY | Facility: CLINIC | Age: 56
End: 2021-03-09
Attending: PHYSICIAN ASSISTANT
Payer: COMMERCIAL

## 2021-03-09 DIAGNOSIS — R53.83 OTHER FATIGUE: ICD-10-CM

## 2021-03-09 DIAGNOSIS — K21.9 GASTROESOPHAGEAL REFLUX DISEASE WITHOUT ESOPHAGITIS: ICD-10-CM

## 2021-03-09 DIAGNOSIS — C49.9 SARCOMA (H): Primary | ICD-10-CM

## 2021-03-09 DIAGNOSIS — L27.1 HAND FOOT SYNDROME: ICD-10-CM

## 2021-03-09 DIAGNOSIS — R91.8 PULMONARY NODULES: ICD-10-CM

## 2021-03-09 DIAGNOSIS — D63.0 ANEMIA IN NEOPLASTIC DISEASE: ICD-10-CM

## 2021-03-09 PROCEDURE — 99215 OFFICE O/P EST HI 40 MIN: CPT | Mod: 95 | Performed by: INTERNAL MEDICINE

## 2021-03-09 PROCEDURE — 999N001193 HC VIDEO/TELEPHONE VISIT; NO CHARGE

## 2021-03-09 NOTE — LETTER
"    3/9/2021         RE: Richa Ashby  [address]  [address]  [place] [address]        Dear Colleague,    Thank you for referring your patient, Richa Ashby, to the [facility]. Please see a copy of my visit note below.      Connie is a 56 year old who is being evaluated via a billable video visit.      How would you like to obtain your AVS? MyChart  If the video visit is dropped, the invitation should be resent by: Text to cell phone: [phone]  Will anyone else be joining your video visit? No    Vitals - Patient Reported  Weight (Patient Reported): 99.8 kg (220 lb)  Height (Patient Reported): 170.2 cm (5' 7\")  BMI (Based on Pt Reported Ht/Wt): 34.46  Pain Score: No Pain (0)    Video Start Time:1223  Video-Visit Details    Type of service:  Video Visit    Video End [id number]    Originating Location (pt. Location):home    Distant Location (provider location):  [facility]     Platform used for Video Visit: jean-paul Lisa MA            3-9-21    I saw Connie Ashby for follow up of undifferentiated pleomorphic sarcoma    Background  Connie Ashby has a PMH asthma and GERD with undifferentiated pleomorphic sarcoma. 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 She received preoperative radiotherapy and the tumor was resected 3/12/20. She then developed a cough, chest tightness, and scapular pain and imaging revealed lung nodules. Lung biopsy 11/2/20 with sarcoma. She started Doxil + Ifos 11/13/20. Had delay in cycle 2 due to COVID dx 12/2/20. CT imaging after cycle 1 with positive response to treatment with decrease in pulmonary nodules. Received cycle 2 12/18/20. Received cycle 3 1/15/21 with dose reduction in Doxil for skin toxicity and mucositis..   -       Interval History:        Due to covid we are doing a video visit.     She was started on Doxil + Ifos " 11/13/20    She has had 4 cycles.    She got a plt tx sat    Her skin burns and is just starting to improve, buttock and backs of calves.  She did get more blistering of her feet this time that's not yet healed.  Mouth was dry but not sore this month.    Did not affect sleep or walking.    She missed a bit more than the first 2 weeks of this cycle.     Does note fatigue sometimes. Improved w transfusion.  Sleeping okay at night. Melatonin is helping.     She notes tinnitis that started in Jan/Feb. Usually lasts quite a while if at night. She did do an ear wash. W ediscussed use of an earwash bulb.    She had some GERD that responded to bid prilosec.    Soft BMs    Often feels cold.TSH normal 2-15.    No confusion w last cycle.     She has been taking 2 neutraphos tid w meals and now is at 1 tid.  KCL 2 m Eq/d.     ROS: 14 point ROS neg other than the symptoms noted above in the HPI.         Video physical exam  On exam she appeared well in no acute distress w normal affect.     Eyes: EOMI    NECK no obvious goiter or mass.   Skin: No visualized rash or lesions on visualized skin   Resp: no resp distress   MSK: Appears to have normal range of motion based on visualized movements   Neurologic: No apparent tremors, normal mentation and speech   Psych: affect normal, alert and oriented,good mood     The rest of a comprehensive physical examination is deferred due to PHE (public health emergency) video restriction    -  CBC, LFT, GNE OK though Hb 8.6, plt 120, creat 0.62, PO4 3.2, K3.7.    -  I reviewed the new images and I think 2 spots are a bit smaller but one seems larger, though it has not yet been formally read.    Formal read:xx    -    Assessment and Plan:  1. 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 Required Doxil dose reduction for cycle 3 for skin and mouth toxicity.     She has had 4 cycles.    I am concerned the R lesion is progressing.  We  will wait for radiology read but likely change to keytruda - this would not exclude use of doxil/ifos in the future if this lesion were dealt with.     will do neuro monitoring iif we do more. Continue lab monitoring day 4-8 and weekly given electrolyte and count issues-may decrease ifos if we do more.     She is pre-approved for Keytruda and will plan to start it thurs pending XR read-typical toxicites reviewed.    Will follow-up on Foundation One.    Addendum-formal CTread  IMPRESSION:   In this patient with metastatic sarcoma, there is evidence of overall  positive response to therapy:  1. No new pulmonary nodules. The majority of the pulmonary metastases  have decreased in size. One pulmonary metastasis in the right middle  lobe has increased in size.  2. A few prominent nonenlarged mediastinal lymph nodes measure  slightly larger, although there is still no lymphadenopathy by size  criteria within the chest. This may represent reactive changes and  attention on follow-up is recommended.  3. Stable statistically benign left ovarian cystic focus and  technically indeterminate hypodensities in the right kidney which  likely represents cysts.  4. Ovoid densities in the left breast presumably represent fibrocystic  change. [facility] is recommended, as  well as attention on follow-up.    I think it is worth 1 more cycle of current Rx before going to keytruda so we will restage after 1 more.  We will decrease ifos somewhat and doxil to 75 mg.    2. Heme  H/o pancytopenia: 2/2 Ifosfamide.   Hb 8.6 after tx.       3. FEN  H/o Hypokalemia: 2/2 ifosfamide, stable on current Rx Continue 20meq daily     H/o Hypophosphatemia: 2/2 ifosfamide, stable on 250mg TID    4. GI  GERD: Chronic, worse with treatment. She will adjust dose up to 40 mg bID depending on symptoms return.  Continue Tums PRN.      Nausea: 2/2 treatment, controlled with Kytril day 3-8 and Compazine PRN  Mucositis: 2/2  Doxil, improved with dose reduction.     5. HFS  Hand/foot: 2/2 Doxil, improved with dose reduction. Continue lotion and urea BID. Avoid irritants    6. Pulm  Asthma: stable, continue inhalers.     7-COVID: Diagnosed in December, recovered. Planning on getting 2nd COVID vaccine next week    7. Psych  Anxiety/depression:  continue palliative care follow-up     Insomnia: Not discussed, following with palliative     Fatigue- likely multifactorial. TSH OK.     She will call if other questions arise.  T>40 min inc image review and orders      Jose Alberto Lopez M.D.  Professor  Hematology, Oncology and Transplantation

## 2021-03-10 ENCOUNTER — PATIENT OUTREACH (OUTPATIENT)
Dept: ONCOLOGY | Facility: CLINIC | Age: 56
End: 2021-03-10
Payer: COMMERCIAL

## 2021-03-10 ENCOUNTER — HOME INFUSION (PRE-WILLOW HOME INFUSION) (OUTPATIENT)
Dept: PHARMACY | Facility: CLINIC | Age: 56
End: 2021-03-10

## 2021-03-10 DIAGNOSIS — C49.9 SARCOMA (H): ICD-10-CM

## 2021-03-10 DIAGNOSIS — D63.0 ANEMIA IN NEOPLASTIC DISEASE: Primary | ICD-10-CM

## 2021-03-10 RX ORDER — ALBUTEROL SULFATE 0.83 MG/ML
2.5 SOLUTION RESPIRATORY (INHALATION)
Status: CANCELLED | OUTPATIENT
Start: 2021-03-11

## 2021-03-10 RX ORDER — HEPARIN SODIUM (PORCINE) LOCK FLUSH IV SOLN 100 UNIT/ML 100 UNIT/ML
5 SOLUTION INTRAVENOUS
Status: CANCELLED | OUTPATIENT
Start: 2021-03-11

## 2021-03-10 RX ORDER — METHYLPREDNISOLONE SODIUM SUCCINATE 125 MG/2ML
125 INJECTION, POWDER, LYOPHILIZED, FOR SOLUTION INTRAMUSCULAR; INTRAVENOUS
Status: CANCELLED | OUTPATIENT
Start: 2021-03-11

## 2021-03-10 RX ORDER — EPINEPHRINE 1 MG/ML
0.3 INJECTION, SOLUTION INTRAMUSCULAR; SUBCUTANEOUS EVERY 5 MIN PRN
Status: CANCELLED | OUTPATIENT
Start: 2021-03-11

## 2021-03-10 RX ORDER — MEPERIDINE HYDROCHLORIDE 25 MG/ML
25 INJECTION INTRAMUSCULAR; INTRAVENOUS; SUBCUTANEOUS EVERY 30 MIN PRN
Status: CANCELLED | OUTPATIENT
Start: 2021-03-11

## 2021-03-10 RX ORDER — SODIUM CHLORIDE 9 MG/ML
1000 INJECTION, SOLUTION INTRAVENOUS CONTINUOUS PRN
Status: CANCELLED | OUTPATIENT
Start: 2021-03-11

## 2021-03-10 RX ORDER — NALOXONE HYDROCHLORIDE 0.4 MG/ML
.1-.4 INJECTION, SOLUTION INTRAMUSCULAR; INTRAVENOUS; SUBCUTANEOUS
Status: CANCELLED | OUTPATIENT
Start: 2021-03-11

## 2021-03-10 RX ORDER — ALBUTEROL SULFATE 90 UG/1
1-2 AEROSOL, METERED RESPIRATORY (INHALATION)
Status: CANCELLED | OUTPATIENT
Start: 2021-03-11

## 2021-03-10 RX ORDER — HEPARIN SODIUM,PORCINE 10 UNIT/ML
5 VIAL (ML) INTRAVENOUS
Status: CANCELLED | OUTPATIENT
Start: 2021-03-11

## 2021-03-10 RX ORDER — LORAZEPAM 2 MG/ML
0.5 INJECTION INTRAMUSCULAR EVERY 4 HOURS PRN
Status: CANCELLED
Start: 2021-03-11

## 2021-03-10 RX ORDER — PALONOSETRON 0.05 MG/ML
0.25 INJECTION, SOLUTION INTRAVENOUS ONCE
Status: CANCELLED | OUTPATIENT
Start: 2021-03-11

## 2021-03-10 RX ORDER — DIPHENHYDRAMINE HYDROCHLORIDE 50 MG/ML
50 INJECTION INTRAMUSCULAR; INTRAVENOUS
Status: CANCELLED | OUTPATIENT
Start: 2021-03-11

## 2021-03-10 NOTE — PROGRESS NOTES
Spoke with Connie to discuss chemotherapy and resources available at the [facility].  Provided patient with [employer] printouts on keytruda.  Reviewed administration, side effects (including myelosuppression, nausea/vomiting, diarrhea/constipation, hair loss, mouth sores) and ongoing symptom management by KALINA's in clinic.  Provided phone numbers for triage and after hours care  Discussed that chemo treatment may be delayed due to blood counts, infusion schedule, or patient's need to modify.  Discussed appropriate use of [employer], specifically not sending symptom messages or urgent concerns.

## 2021-03-11 ENCOUNTER — INFUSION THERAPY VISIT (OUTPATIENT)
Dept: ONCOLOGY | Facility: CLINIC | Age: 56
End: 2021-03-11
Payer: COMMERCIAL

## 2021-03-11 ENCOUNTER — HOME INFUSION (PRE-WILLOW HOME INFUSION) (OUTPATIENT)
Dept: PHARMACY | Facility: CLINIC | Age: 56
End: 2021-03-11

## 2021-03-11 VITALS
BODY MASS INDEX: 35.38 KG/M2 | RESPIRATION RATE: 16 BRPM | HEART RATE: 73 BPM | OXYGEN SATURATION: 95 % | WEIGHT: 225.9 LBS | DIASTOLIC BLOOD PRESSURE: 75 MMHG | SYSTOLIC BLOOD PRESSURE: 112 MMHG | TEMPERATURE: 98.1 F

## 2021-03-11 DIAGNOSIS — C49.9 SARCOMA (H): ICD-10-CM

## 2021-03-11 DIAGNOSIS — D63.0 ANEMIA IN NEOPLASTIC DISEASE: Primary | ICD-10-CM

## 2021-03-11 PROCEDURE — 250N000011 HC RX IP 250 OP 636: Performed by: INTERNAL MEDICINE

## 2021-03-11 PROCEDURE — G0498 CHEMO EXTEND IV INFUS W/PUMP: HCPCS

## 2021-03-11 PROCEDURE — 96375 TX/PRO/DX INJ NEW DRUG ADDON: CPT

## 2021-03-11 PROCEDURE — 96413 CHEMO IV INFUSION 1 HR: CPT

## 2021-03-11 PROCEDURE — 258N000003 HC RX IP 258 OP 636: Performed by: INTERNAL MEDICINE

## 2021-03-11 RX ORDER — PALONOSETRON 0.05 MG/ML
0.25 INJECTION, SOLUTION INTRAVENOUS ONCE
Status: COMPLETED | OUTPATIENT
Start: 2021-03-11 | End: 2021-03-11

## 2021-03-11 RX ADMIN — DOXORUBICIN HYDROCHLORIDE 75 MG: 2 INJECTABLE, LIPOSOMAL INTRAVENOUS at 15:03

## 2021-03-11 RX ADMIN — PALONOSETRON 0.25 MG: 0.05 INJECTION, SOLUTION INTRAVENOUS at 14:41

## 2021-03-11 RX ADMIN — DEXTROSE MONOHYDRATE 250 ML: 50 INJECTION, SOLUTION INTRAVENOUS at 14:41

## 2021-03-11 NOTE — PATIENT INSTRUCTIONS
Contact Numbers  [facility]: [phone] (for symptom and scheduling needs)    Please call the [facility] Triage line if you experience a temperature greater than or equal to 100.4, shaking chills, have uncontrolled nausea, vomiting and/or diarrhea, dizziness, shortness of breath, chest pain, bleeding, unexplained bruising, or if you have any other new/concerning symptoms, questions or concerns.     If you are having any concerning symptoms or wish to speak to a provider before your next infusion visit, please call your care coordinator or triage to notify them so we can adequately serve you.     If you need a refill on a narcotic prescription or other medication, please call triage before your infusion appointment.          March 2021 Sunday Monday Tuesday Wednesday Thursday Friday Saturday        1    LAB   6:15 AM   (15 min.)   UR LAB HOME INFUSION   [facility] 2  [facility]!     3     4     5    LAB CENTRAL  11:45 AM   (15 min.)   [facility] LAB DRAW   [facility]    US LWR EXT VENOUS DUPLEX RIGHT   3:30 PM   (30 min.)   MGUS1   [facility] 6    UMP SPEC INFUSION 180   7:00 AM   (180 min.)   UC SPEC INFUSION   [facility]   7     8    CT CHEST/ABDOMEN/PELVIS W   2:30 PM   (30 min.)   MGCT1   [facility] 9    VIDEO VISIT RETURN  12:15 PM   (30 min.)   Jose Alberto Lopez MD   [facility] 10     11    UMP ONC INFUSION 120   2:00 PM   (180 min.)   UC ONCOLOGY INFUSION   [facility] 12     13       14     15     16     17    VIDEO VISIT RETURN   1:45 PM   (60 min.)   Tiffany Astorga LICSW   [facility] 18     19     20       21     22     23     24     25     26     27       28     29     30     31    VIDEO VISIT RETURN   3:45 PM   (45 min.)   Meryl  MD Olivia   [facility]                            April 2021 Sunday Monday Tuesday Wednesday Thursday Friday Saturday                       1     2     3    TELEPHONE VISIT RETURN   1:00 PM   (60 min.)   [facility]   4     5    CT CHEST WO   2:20 PM   (20 min.)   UCSCCT1   [facility] 6     7    VIDEO VISIT RETURN   1:35 PM   (50 min.)   Harshal Schuster PA   [facility] 8    LAB CENTRAL   1:15 PM   (15 min.)   [facility] DRAW   [facility]    UMP ONC INFUSION 180   2:00 PM   (180 min.)    ONCOLOGY INFUSION   [facility] 9     10       11     12     13     14     15     16     17       18     19     20     21     22     23     24       25     26     27     28     29     30                          Lab Results:  No results found for this or any previous visit (from the past 12 hour(s)).

## 2021-03-11 NOTE — PROGRESS NOTES
Infusion Nursing Note:  Richa Ashby presents today for Cycle 5 Day 1 Doxil and Ifosfamide/[place] Pump Connect .    Patient seen by provider today: No   present during visit today: Not Applicable.    Note: Patient arrives to infusion today feeling well. Denies any new SOB, chest pain, fever, chills or cough. No new concerns or complaints since visit with Dr. Lopez on 3/9.    Intravenous Access:  Implanted Port.    Treatment Conditions:  Lab Results   Component Value Date    HGB 8.6 03/08/2021     Lab Results   Component Value Date    WBC 3.5 03/08/2021      Lab Results   Component Value Date    ANEU 2.1 03/08/2021     Lab Results   Component Value Date     03/08/2021      Lab Results   Component Value Date     03/08/2021                   Lab Results   Component Value Date    POTASSIUM 3.7 03/08/2021           Lab Results   Component Value Date    MAG 2.0 03/08/2021            Lab Results   Component Value Date    CR 0.62 03/08/2021                   Lab Results   Component Value Date    VIKTORIA 9.1 03/08/2021                Lab Results   Component Value Date    BILITOTAL 0.5 03/08/2021           Lab Results   Component Value Date    ALBUMIN 3.5 03/08/2021                    Lab Results   Component Value Date    ALT 38 03/08/2021           Lab Results   Component Value Date    AST 17 03/08/2021     Results reviewed from 3/8/2021, labs MET treatment parameters, ok to proceed with treatment.  ECHO/MUGA completed 10/27/2020  EF 60-65%.    Post Infusion Assessment:  Patient tolerated infusion without incident.  Blood return noted pre and post infusion.  Site patent and intact, free from redness, edema or discomfort.  No evidence of extravasations.    Prior to discharge: Port is secured in place with tegaderm and flushed with 10cc NS with positive blood return noted.  Continuous home infusion CADD pump connected.    All connectors secured in place and clamps taped open.    Pump started,  "\"running\" noted on display (CADD): YES.  Pump Connection double checked with Kelli Frazier RN.  Patient instructed to call our clinic or [facility] with any questions or concerns at home.  Patient verbalized understanding.    Patient set up for Mesna bag change on 3/17/2021 and pump disconnect with Neulasta Injection on 3/18/2021 at home with [facility].    Discharge Plan:   Prescription refills given for Kytril.  Discharge instructions reviewed with: Patient.  Patient and/or family verbalized understanding of discharge instructions and all questions answered.  AVS to patient via Te[employer].  Patient will return 4/8 for next appointment.   Patient discharged in stable condition accompanied by: self.  Departure Mode: Ambulatory.    Anita Manjarrez RN                      "

## 2021-03-12 NOTE — PROGRESS NOTES
This is a recent snapshot of the patient's [facility] medical record.  For current drug dose and complete information and questions, call [phone] or In Basket pool, fv home infusion ([id number])  CSN Number:  [id number]

## 2021-03-14 ENCOUNTER — HOME INFUSION (PRE-WILLOW HOME INFUSION) (OUTPATIENT)
Dept: PHARMACY | Facility: CLINIC | Age: 56
End: 2021-03-14

## 2021-03-14 LAB
ANION GAP SERPL CALCULATED.3IONS-SCNC: 5 MMOL/L (ref 3–14)
BUN SERPL-MCNC: 10 MG/DL (ref 7–30)
CALCIUM SERPL-MCNC: 9.1 MG/DL (ref 8.5–10.1)
CHLORIDE SERPL-SCNC: 106 MMOL/L (ref 94–109)
CO2 SERPL-SCNC: 28 MMOL/L (ref 20–32)
CREAT SERPL-MCNC: 0.69 MG/DL (ref 0.52–1.04)
GFR SERPL CREATININE-BSD FRML MDRD: >90 ML/MIN/{1.73_M2}
GLUCOSE SERPL-MCNC: 87 MG/DL (ref 70–99)
MAGNESIUM SERPL-MCNC: 2.1 MG/DL (ref 1.6–2.3)
PHOSPHATE SERPL-MCNC: 2.8 MG/DL (ref 2.5–4.5)
POTASSIUM SERPL-SCNC: 3.7 MMOL/L (ref 3.4–5.3)
SODIUM SERPL-SCNC: 139 MMOL/L (ref 133–144)

## 2021-03-14 PROCEDURE — 83735 ASSAY OF MAGNESIUM: CPT | Performed by: INTERNAL MEDICINE

## 2021-03-14 PROCEDURE — 80048 BASIC METABOLIC PNL TOTAL CA: CPT | Performed by: INTERNAL MEDICINE

## 2021-03-14 PROCEDURE — 84100 ASSAY OF PHOSPHORUS: CPT | Performed by: INTERNAL MEDICINE

## 2021-03-15 ENCOUNTER — HOME INFUSION (PRE-WILLOW HOME INFUSION) (OUTPATIENT)
Dept: PHARMACY | Facility: CLINIC | Age: 56
End: 2021-03-15

## 2021-03-15 LAB
ANION GAP SERPL CALCULATED.3IONS-SCNC: 6 MMOL/L (ref 3–14)
BUN SERPL-MCNC: 14 MG/DL (ref 7–30)
CALCIUM SERPL-MCNC: 9 MG/DL (ref 8.5–10.1)
CHLORIDE SERPL-SCNC: 106 MMOL/L (ref 94–109)
CO2 SERPL-SCNC: 27 MMOL/L (ref 20–32)
CREAT SERPL-MCNC: 0.71 MG/DL (ref 0.52–1.04)
GFR SERPL CREATININE-BSD FRML MDRD: >90 ML/MIN/{1.73_M2}
GLUCOSE SERPL-MCNC: 99 MG/DL (ref 70–99)
MAGNESIUM SERPL-MCNC: 2.4 MG/DL (ref 1.6–2.3)
PHOSPHATE SERPL-MCNC: 2.1 MG/DL (ref 2.5–4.5)
POTASSIUM SERPL-SCNC: 3.6 MMOL/L (ref 3.4–5.3)
SODIUM SERPL-SCNC: 139 MMOL/L (ref 133–144)

## 2021-03-15 PROCEDURE — 80048 BASIC METABOLIC PNL TOTAL CA: CPT | Performed by: INTERNAL MEDICINE

## 2021-03-15 PROCEDURE — 83735 ASSAY OF MAGNESIUM: CPT | Performed by: INTERNAL MEDICINE

## 2021-03-15 PROCEDURE — 84100 ASSAY OF PHOSPHORUS: CPT | Performed by: INTERNAL MEDICINE

## 2021-03-16 ENCOUNTER — HOME INFUSION (PRE-WILLOW HOME INFUSION) (OUTPATIENT)
Dept: PHARMACY | Facility: CLINIC | Age: 56
End: 2021-03-16

## 2021-03-16 LAB
ANION GAP SERPL CALCULATED.3IONS-SCNC: 8 MMOL/L (ref 3–14)
BUN SERPL-MCNC: 13 MG/DL (ref 7–30)
CALCIUM SERPL-MCNC: 9.4 MG/DL (ref 8.5–10.1)
CHLORIDE SERPL-SCNC: 107 MMOL/L (ref 94–109)
CO2 SERPL-SCNC: 25 MMOL/L (ref 20–32)
CREAT SERPL-MCNC: 0.69 MG/DL (ref 0.52–1.04)
GFR SERPL CREATININE-BSD FRML MDRD: >90 ML/MIN/{1.73_M2}
GLUCOSE SERPL-MCNC: 114 MG/DL (ref 70–99)
MAGNESIUM SERPL-MCNC: 2.2 MG/DL (ref 1.6–2.3)
PHOSPHATE SERPL-MCNC: 1.9 MG/DL (ref 2.5–4.5)
POTASSIUM SERPL-SCNC: 3.3 MMOL/L (ref 3.4–5.3)
SODIUM SERPL-SCNC: 140 MMOL/L (ref 133–144)

## 2021-03-16 PROCEDURE — 84100 ASSAY OF PHOSPHORUS: CPT | Performed by: INTERNAL MEDICINE

## 2021-03-16 PROCEDURE — 83735 ASSAY OF MAGNESIUM: CPT | Performed by: INTERNAL MEDICINE

## 2021-03-16 PROCEDURE — 80048 BASIC METABOLIC PNL TOTAL CA: CPT | Performed by: INTERNAL MEDICINE

## 2021-03-16 NOTE — PROGRESS NOTES
This is a recent snapshot of the patient's [facility] medical record.  For current drug dose and complete information and questions, call [phone] or In Basket pool, fv home infusion ([id number])  CSN Number:  [id number]

## 2021-03-17 ENCOUNTER — HOME INFUSION (PRE-WILLOW HOME INFUSION) (OUTPATIENT)
Dept: PHARMACY | Facility: CLINIC | Age: 56
End: 2021-03-17

## 2021-03-17 LAB
ANION GAP SERPL CALCULATED.3IONS-SCNC: 6 MMOL/L (ref 3–14)
BUN SERPL-MCNC: 13 MG/DL (ref 7–30)
CALCIUM SERPL-MCNC: 8.9 MG/DL (ref 8.5–10.1)
CHLORIDE SERPL-SCNC: 110 MMOL/L (ref 94–109)
CO2 SERPL-SCNC: 24 MMOL/L (ref 20–32)
CREAT SERPL-MCNC: 0.75 MG/DL (ref 0.52–1.04)
GFR SERPL CREATININE-BSD FRML MDRD: 89 ML/MIN/{1.73_M2}
GLUCOSE SERPL-MCNC: 78 MG/DL (ref 70–99)
LAB SCANNED RESULT: NORMAL
MAGNESIUM SERPL-MCNC: 2.3 MG/DL (ref 1.6–2.3)
PHOSPHATE SERPL-MCNC: 2.1 MG/DL (ref 2.5–4.5)
POTASSIUM SERPL-SCNC: 3.9 MMOL/L (ref 3.4–5.3)
SODIUM SERPL-SCNC: 140 MMOL/L (ref 133–144)

## 2021-03-17 PROCEDURE — 80048 BASIC METABOLIC PNL TOTAL CA: CPT | Performed by: INTERNAL MEDICINE

## 2021-03-17 PROCEDURE — 83735 ASSAY OF MAGNESIUM: CPT | Performed by: INTERNAL MEDICINE

## 2021-03-17 PROCEDURE — 84100 ASSAY OF PHOSPHORUS: CPT | Performed by: INTERNAL MEDICINE

## 2021-03-17 NOTE — PROGRESS NOTES
This is a recent snapshot of the patient's [facility] medical record.  For current drug dose and complete information and questions, call [phone] or In Basket pool, fv home infusion ([id number])  CSN Number:  [id number]

## 2021-03-17 NOTE — PROGRESS NOTES
This is a recent snapshot of the patient's [facility] medical record.  For current drug dose and complete information and questions, call [phone] or In Basket pool, fv home infusion ([id number])  CSN Number:  [id number]

## 2021-03-18 ENCOUNTER — HOME INFUSION (PRE-WILLOW HOME INFUSION) (OUTPATIENT)
Dept: PHARMACY | Facility: CLINIC | Age: 56
End: 2021-03-18

## 2021-03-18 ENCOUNTER — MEDICAL CORRESPONDENCE (OUTPATIENT)
Dept: HEALTH INFORMATION MANAGEMENT | Facility: CLINIC | Age: 56
End: 2021-03-18

## 2021-03-18 DIAGNOSIS — C49.9 SARCOMA (H): Primary | ICD-10-CM

## 2021-03-18 LAB
ANION GAP SERPL CALCULATED.3IONS-SCNC: 8 MMOL/L (ref 3–14)
BUN SERPL-MCNC: 12 MG/DL (ref 7–30)
CALCIUM SERPL-MCNC: 9 MG/DL (ref 8.5–10.1)
CHLORIDE SERPL-SCNC: 108 MMOL/L (ref 94–109)
CO2 SERPL-SCNC: 24 MMOL/L (ref 20–32)
CREAT SERPL-MCNC: 0.65 MG/DL (ref 0.52–1.04)
GFR SERPL CREATININE-BSD FRML MDRD: >90 ML/MIN/{1.73_M2}
GLUCOSE SERPL-MCNC: 123 MG/DL (ref 70–99)
MAGNESIUM SERPL-MCNC: 2.3 MG/DL (ref 1.6–2.3)
PHOSPHATE SERPL-MCNC: 1.6 MG/DL (ref 2.5–4.5)
POTASSIUM SERPL-SCNC: 3.4 MMOL/L (ref 3.4–5.3)
SODIUM SERPL-SCNC: 140 MMOL/L (ref 133–144)

## 2021-03-18 PROCEDURE — 84100 ASSAY OF PHOSPHORUS: CPT | Performed by: INTERNAL MEDICINE

## 2021-03-18 PROCEDURE — 80048 BASIC METABOLIC PNL TOTAL CA: CPT | Performed by: INTERNAL MEDICINE

## 2021-03-18 PROCEDURE — 83735 ASSAY OF MAGNESIUM: CPT | Performed by: INTERNAL MEDICINE

## 2021-03-22 DIAGNOSIS — Z11.59 ENCOUNTER FOR SCREENING FOR OTHER VIRAL DISEASES: Primary | ICD-10-CM

## 2021-03-22 DIAGNOSIS — Z11.59 ENCOUNTER FOR SCREENING FOR OTHER VIRAL DISEASES: ICD-10-CM

## 2021-03-22 LAB
SARS-COV-2 RNA RESP QL NAA+PROBE: NORMAL
SPECIMEN SOURCE: NORMAL

## 2021-03-22 PROCEDURE — U0005 INFEC AGEN DETEC AMPLI PROBE: HCPCS | Performed by: SPECIALIST

## 2021-03-22 PROCEDURE — U0003 INFECTIOUS AGENT DETECTION BY NUCLEIC ACID (DNA OR RNA); SEVERE ACUTE RESPIRATORY SYNDROME CORONAVIRUS 2 (SARS-COV-2) (CORONAVIRUS DISEASE [COVID-19]), AMPLIFIED PROBE TECHNIQUE, MAKING USE OF HIGH THROUGHPUT TECHNOLOGIES AS DESCRIBED BY CMS-2020-01-R: HCPCS | Performed by: SPECIALIST

## 2021-03-22 NOTE — PROGRESS NOTES
This is a recent snapshot of the patient's [facility] medical record.  For current drug dose and complete information and questions, call [phone] or In Basket pool, fv home infusion ([id number])  CSN Number:  [id number]

## 2021-03-23 LAB
LABORATORY COMMENT REPORT: NORMAL
SARS-COV-2 RNA RESP QL NAA+PROBE: NEGATIVE
SPECIMEN SOURCE: NORMAL

## 2021-03-25 ENCOUNTER — HOME INFUSION (PRE-WILLOW HOME INFUSION) (OUTPATIENT)
Dept: PHARMACY | Facility: CLINIC | Age: 56
End: 2021-03-25

## 2021-03-26 ENCOUNTER — ANCILLARY PROCEDURE (OUTPATIENT)
Dept: GENERAL RADIOLOGY | Facility: CLINIC | Age: 56
End: 2021-03-26
Attending: INTERNAL MEDICINE
Payer: COMMERCIAL

## 2021-03-26 DIAGNOSIS — C49.9 SARCOMA (H): ICD-10-CM

## 2021-03-26 PROCEDURE — 36598 INJ W/FLUOR EVAL CV DEVICE: CPT | Performed by: PHYSICIAN ASSISTANT

## 2021-03-26 RX ORDER — HEPARIN SODIUM (PORCINE) LOCK FLUSH IV SOLN 100 UNIT/ML 100 UNIT/ML
5 SOLUTION INTRAVENOUS ONCE
Status: COMPLETED | OUTPATIENT
Start: 2021-03-26 | End: 2021-03-26

## 2021-03-26 RX ORDER — IODIXANOL 320 MG/ML
50 INJECTION, SOLUTION INTRAVASCULAR ONCE
Status: COMPLETED | OUTPATIENT
Start: 2021-03-26 | End: 2021-03-26

## 2021-03-26 RX ADMIN — IODIXANOL 20 ML: 320 INJECTION, SOLUTION INTRAVASCULAR at 14:03

## 2021-03-26 RX ADMIN — HEPARIN SODIUM (PORCINE) LOCK FLUSH IV SOLN 100 UNIT/ML 5 ML: 100 SOLUTION at 14:15

## 2021-03-26 NOTE — PROGRESS NOTES
This is a recent snapshot of the patient's [facility] medical record.  For current drug dose and complete information and questions, call [phone] or In Basket pool, fv home infusion ([id number])  CSN Number:  [id number]     ? UC  check stool studies as above  UC per primary,GI

## 2021-03-26 NOTE — PROGRESS NOTES
Interventional Radiology Brief Post Procedure Note    Procedure: Port evaluation.    Proceduralist: Garcia Saleh [facility] RAYA    Assistant: None    Time Out: Prior to the start of the procedure and with procedural staff participation, I verbally confirmed the patient s identity using two indicators, relevant allergies, that the procedure was appropriate and matched the consent or emergent situation, and that the correct equipment/implants were available. Immediately prior to starting the procedure I conducted the Time Out with the procedural staff and re-confirmed the patient s name, procedure, and site/side. (The Joint Commission universal protocol was followed.)  Yes    Medications   Medication Event Details Admin User Admin Time       Sedation: None.    Findings: Report of port flushing well but with difficulty aspirating. Limited blood return with aspiration, which improved after forceful flush. Fluoroscopic evaluation reveals catheter to be shorter than ideal length, with intermittent contact with vascular wall. No fibrin sheath or extravasation.    Estimated Blood Loss: Minimal    Fluoroscopy Time:  Less than 5 minute(s)    SPECIMENS: None    Complications: 1. None     Condition: Stable    Plan: Follow up per primary team. May use port as indicated. If dysfunction persists, patient may schedule port revision, with plan for longer catheter length.    Comments: See dictated procedure note for full details.    Garcia Saleh PA-C

## 2021-03-31 ENCOUNTER — VIRTUAL VISIT (OUTPATIENT)
Dept: PALLIATIVE CARE | Facility: CLINIC | Age: 56
End: 2021-03-31
Attending: INTERNAL MEDICINE
Payer: COMMERCIAL

## 2021-03-31 DIAGNOSIS — R45.86 MOOD AND AFFECT DISTURBANCE: ICD-10-CM

## 2021-03-31 DIAGNOSIS — K12.30 MUCOSITIS: ICD-10-CM

## 2021-03-31 DIAGNOSIS — T45.1X5A CHEMOTHERAPY-INDUCED PERIPHERAL NEUROPATHY (H): ICD-10-CM

## 2021-03-31 DIAGNOSIS — C49.9 SARCOMA (H): Primary | ICD-10-CM

## 2021-03-31 DIAGNOSIS — R63.0 ANOREXIA SYMPTOM: ICD-10-CM

## 2021-03-31 DIAGNOSIS — L27.1 HAND FOOT SYNDROME: ICD-10-CM

## 2021-03-31 DIAGNOSIS — G62.0 CHEMOTHERAPY-INDUCED PERIPHERAL NEUROPATHY (H): ICD-10-CM

## 2021-03-31 PROCEDURE — 99215 OFFICE O/P EST HI 40 MIN: CPT | Mod: 95 | Performed by: FAMILY MEDICINE

## 2021-03-31 PROCEDURE — 999N001193 HC VIDEO/TELEPHONE VISIT; NO CHARGE

## 2021-03-31 RX ORDER — GABAPENTIN 100 MG/1
CAPSULE ORAL
Qty: 240 CAPSULE | Refills: 1 | Status: SHIPPED | OUTPATIENT
Start: 2021-03-31 | End: 2021-04-28 | Stop reason: DRUGHIGH

## 2021-03-31 NOTE — PROGRESS NOTES
"Connie is a 56 year old who is being evaluated via a billable video visit.      How would you like to obtain your AVS? MyChart     If the video visit is dropped, the invitation should be resent by: Text to cell phone: [phone]     Will anyone else be joining your video visit? No          .Vitals - Patient Reported  Weight (Patient Reported): 97.5 kg (215 lb)  Height (Patient Reported): 170.2 cm (5' 7\")  BMI (Based on Pt Reported Ht/Wt): 33.67  Pain Score: Mild Pain (3)(neuropathy)  Pain Loc: (fingers and toes)    Wicho Varner LPN    Video Start Time: 1405  Video-Visit Details    Type of service:  Video Visit    Video End Time:1442    Originating Location (pt. Location): Other at her workplace    Distant Location (provider location):  [facility]     Platform used for Video Visit: [facility]      Palliative Care Outpatient Clinic Progress Note    Patient Name:  Richa Ashby  Primary Provider:  King Diaz    Chief Complaint: follow up on pleomorphic sarcoma, chemotherapy induced peripheral neuropathy, hand-foot syndrome, insomnia.    Interim History:  Richa Ashby 56 year old female returns to be seen by palliative care today. 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  Plan currently is to do another cycle of ifos-doxil and possibly transition to keytruda after that pending staging.  She has a CT scheduled for 4/5/21 to recheck.    Since last visit, mucositis has waxed and waned.  Has tinnitus still but able to function.  Notes 30-lb weight loss since starting chemo.  Feels mood is pretty good, denies feeling depressed.  Does tire easily, getting up in AM to go to work/shower/get dressed can wear her out.  Some worry on whether that will get better or not.  Feels supported by family and friends.    Coping:  Doing fairly well.    Feels frustrated at times with the " peripheral neuropathy.  Had brain fog noted w first two weeks of some chemo and felt like it was hard to deal with at times (recalling names, occasionally with trying to remember how to fold a fitted sheet) but the second two weeks of cycle feels able to focus adequately and concentrate at work.  Notes she doesn't feel particularly anxious prior to scans themselves, but does worry some about what if there is ongoing intense medical needs indefinitely with her cancer.  She notes her  is starting to realize that this could be a possibility.      Social History:  Pertinent changes to social history/social situation since last visit: Mom had been staying with Connie and her , she has moved back to her own home.  Connie is working the second half of her last few chemo cycles.  Key support resources: , adult children (both live out of state), friends, Faith     Financial concerns: working, wants to continue with work  Advance Directive Status:  No HCD.    Social History     Tobacco Use     Smoking status: Never Smoker     Smokeless tobacco: Never Used   Substance Use Topics     Alcohol use: Yes     Frequency: 2-3 times a week     Drinks per session: 1 or 2     Binge frequency: Never     Comment: 2-3 glasses of wine/week     Drug use: Never         Allergies   Allergen Reactions     Pentobarbital      resp arrest     Current Outpatient Medications   Medication Sig Dispense Refill     acetaminophen (TYLENOL) 325 MG tablet Take 325-650 mg by mouth every 6 hours as needed for mild pain       albuterol (PROAIR RESPICLICK) 108 (90 Base) MCG/ACT inhaler Inhale 1-2 puffs into the lungs       cetirizine (ZYRTEC) 10 MG tablet Take 10 mg by mouth daily       granisetron (KYTRIL) 1 MG tablet Take 2 tablets (2 mg) by mouth daily Take 2 tablets (2 mg) every morning on Days 3 through 8. 12 tablet 1     lidocaine-prilocaine (EMLA) 2.5-2.5 % external cream Apply topically as needed for moderate pain 30 g 3     magic  mouthwash suspension (diphenhydrAMINE, lidocaine, aluminum-magnesium & simethicone) Swish and swallow 10 mLs in mouth every 6 hours as needed for mouth sores 120 mL 0     melatonin (MELATONIN) 1 MG/ML LIQD liquid Take 5 mg by mouth       montelukast (SINGULAIR) 10 MG tablet Take 10 mg by mouth daily       nystatin (MYCOSTATIN) 803009 UNIT/ML suspension Take 5 mLs (500,000 Units) by mouth 4 times daily 140 mL 0     omeprazole (PRILOSEC) 20 MG DR capsule        phosphorus tablet 250 mg (PHOSPHA 250 NEUTRAL) 250 MG per tablet Take 1 tablet (250 mg) by mouth 3 times daily (with meals) 90 tablet 1     potassium chloride ER (KLOR-CON M) 20 MEQ CR tablet Take 1 tablet (20 mEq) by mouth daily 60 tablet 3     prochlorperazine (COMPAZINE) 10 MG tablet Take 1 tablet (10 mg) by mouth every 6 hours as needed (Nausea/Vomiting) 30 tablet 1     urea (CARMOL) 10 % external cream Apply topically as needed for dry skin 453 g 0     vitamin D3 (CHOLECALCIFEROL) 2000 units (50 mcg) tablet Take 1 tablet by mouth daily       Past Medical History:   Diagnosis Date     Asthma, chronic      Sarcoma (H)     Right lower thigh     Past Surgical History:   Procedure Laterality Date     AS RAD RESEC TONSIL/PILLARS       GALLBLADDER SURGERY       INSERT PORT VASCULAR ACCESS Right 10/28/2020    Procedure: single lumen power port placement @0745;  Surgeon: Tex Ackerman MD;  Location: [facility] OR     IR CHEST PORT PLACEMENT > 5 YRS OF AGE  10/28/2020     IR PORT CHECK RIGHT  3/26/2021     RESECT TUMOR LOWER EXTREMITY Right 3/12/2020    Procedure: Resection of right thigh tumor;  Surgeon: Guillermo Johnson MD;  Location: [facility] OR     ROTATOR CUFF REPAIR RT/LT  2012     SHOULDER SURGERY       SINUS SURGERY         Review of Systems:   ROS: 10 point ROS neg other than the symptoms noted above in the HPI and pertinents here:  Palliative Symptom Review (0=no symptom/no concern, 1=mild, 2=moderate, 3=severe):      Pain: 2--  Neuropathy hands and  feet moderate and hard to do some fine motor tasks       Fatigue:  1       Nausea: 0       Constipation: 0      Diarrhea: 0      Depressive Symptoms: 1, mild, occas down/frustrated but not interfering with daily functioning.      Anxiety: 0-1      Drowsiness: 1      Poor Appetite:  food doesn't interest me now.not hungry, no foods sound good.  Has lost 5-8 lbs each round for net of 30 lbs.      Shortness of Breath:  0      Insomnia: 2      Other: tinnitus: 1-2, bothers her more at night, but able to focus on other things during the day.      Overall (0 good/no concerns, 3 very poor):  1    PE    Alert woman, NAD  Well groomed, affect full, sitting at desk in work/office.  Smiling, affect full and engaging.  Neck supple.  Breathing nonlabored.  Able to speak full sentences without stopping for breath.  No significant pallor noted.  Facial movements symmetric.      Key Data Reviewed:  LABS: K 3.4, creatinine 0.65 on 3/18/21    On 3/8/21: WBC 3.5, hgb 8.6, plt 120    IMAGING: personally reviewed CT chest/abdomen/pelvis 3/8/2021:  IMPRESSION:   In this patient with metastatic sarcoma, there is evidence of overall  positive response to therapy:  1. No new pulmonary nodules. The majority of the pulmonary metastases  have decreased in size. One pulmonary metastasis in the right middle  lobe has increased in size.  2. A few prominent nonenlarged mediastinal lymph nodes measure  slightly larger, although there is still no lymphadenopathy by size  criteria within the chest. This may represent reactive changes and  attention on follow-up is recommended.  3. Stable statistically benign left ovarian cystic focus and  technically indeterminate hypodensities in the right kidney which  likely represents cysts.  4. Ovoid densities in the left breast presumably represent fibrocystic  change. [facility] is recommended, as  well as attention on follow-up.         Impression & Recommendations & Counseling:      57 yo woman with undifferentiated pleomorphic sarcoma, metastatic on doxil/ifosfomide with recent COVID infection in December 2020.  Has almost completed 5 cycles chemotherapy with doxil-ifos and difficulties with  symptoms of skin toxicity (improving), tinnitus, fatigue, and now worsening peripheral neuropathy, and 30-lb weight loss from anorexia.        Pain, due to peripheral neuropathy  - symptomatic enough that treatment merited.  - reviewed starting gabapentin.  Start 200-200-200 x 5 days then 200-200-400 after that.  Long term would consider 300-300-600 if doing well so may need new Rx.        Insomnia , continues  - previously had trial of doxepin 3-6ml PO at bedtime--stopped med and no desire to restartt.  - continue melatonin prn  -  physical activity as tolerated daily.  - encourage good sleep hygeine as able, discussed white noise to help w tinnitus.  - gabapentin may also help with this.    - possible sleep psychologist could be helpful if no improvement.     Existential distress  - anxiety and depression mixed d/t acute grief reaction w serious medical diagnosis.  She is hopeful to have good outcome after going through the chemo, hopes to go into remission.  - she notes  realizing they may be in a different place.    - continue working with Tiffany Astorga for counselling; may be helpful to include pt's  as they have different grieving styles.  Has had difficulty w scheduling due to conflicts w infusion schedule; await further interaction as able based on availability.  - functioning not affected; have not started selective serotonin reuptake inhibitor.  - gabapentin may help w anxiety symptoms r/t medical condition.  Monitor at next visit and consider phq9        Hand and foot syndrome (on hands only) and skin toxicity  - d/t chemotherapy w doxil  - continue emollient use aggressively  - continue urea cream, would refill prn.  - caution with adhesives.     Anorexia, intermittent.  -  encourage regular meals.  - consider MVI (not getting much for fresh fruits/vegetables w mucositis and texture aversions) and protein shakes for healthy nutrients.  - denies dysgeusia.  Fatigue, multifactorial (chemotherapy, anemia)  - PRBCs per oncology today.  - activity as able, plan for activities/prioritize activities for low energy.  Try to exercise some.  - allow adequate time for sleep/sleep hygeine.     Goals of care: life prolonging.  Will need to discuss HCD at next visit.     Support: LISW visits w Tiffanylotus Grossorion requested.  Mother,  also supportive and work colleagues supportive.  Follow up in clinic in  4-6 weeks with palliative medicine     Olivia Sheth MD  Fellow, Hospice and Palliative Medicine  This visit was staffed with Dr Robert Blanca.    Attending Note:  Patient seen and evaluated with Dr Sheth and I agree with/confirm their findings/recs in this note.      Thank you for involving us in the patient's care.   Robert Blanca MD / Palliative Medicine / Text me via [facility].

## 2021-03-31 NOTE — LETTER
"3/31/2021       RE: Richa Ashby  [address]  [place] [address]     Dear Colleague,    Thank you for referring your patient, Richa Ashby, to the [facility] at [facility]. Please see a copy of my visit note below.    Connie is a 56 year old who is being evaluated via a billable video visit.      How would you like to obtain your AVS? MyChart     If the video visit is dropped, the invitation should be resent by: Text to cell phone: [phone]     Will anyone else be joining your video visit? No        .Vitals - Patient Reported  Weight (Patient Reported): 97.5 kg (215 lb)  Height (Patient Reported): 170.2 cm (5' 7\")  BMI (Based on Pt Reported Ht/Wt): 33.67  Pain Score: Mild Pain (3)(neuropathy)  Pain Loc: (fingers and toes)    Wicho Varner LPN    Video-Visit Details    Type of service:  Video Visit    Video Start Time: 1405  Video End Time:1442    Originating Location (pt. Location): Other at her workplace    Distant Location (provider location):  [facility] CANCER [facility]     Platform used for Video Visit: [facility]      Palliative Care Outpatient Clinic Progress Note    Patient Name:  Richa Ashby  Primary Provider:  King Diaz    Chief Complaint: follow up on pleomorphic sarcoma, chemotherapy induced peripheral neuropathy, hand-foot syndrome, insomnia.    Interim History:  Richa Ashby 56 year old female returns to be seen by palliative care today. 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  Plan currently is to do another cycle of ifos-doxil and possibly transition to keytruda after that pending staging.  She has a CT scheduled for 4/5/21 to recheck.    Since last visit, mucositis has waxed and waned.  Has tinnitus still but able to function.  Notes 30-lb weight loss " since starting chemo.  Feels mood is pretty good, denies feeling depressed.  Does tire easily, getting up in AM to go to work/shower/get dressed can wear her out.  Some worry on whether that will get better or not.  Feels supported by family and friends.    Coping:  Doing fairly well.    Feels frustrated at times with the peripheral neuropathy.  Had brain fog noted w first two weeks of some chemo and felt like it was hard to deal with at times (recalling names, occasionally with trying to remember how to fold a fitted sheet) but the second two weeks of cycle feels able to focus adequately and concentrate at work.  Notes she doesn't feel particularly anxious prior to scans themselves, but does worry some about what if there is ongoing intense medical needs indefinitely with her cancer.  She notes her  is starting to realize that this could be a possibility.      Social History:  Pertinent changes to social history/social situation since last visit: Mom had been staying with Connie and her , she has moved back to her own home.  Connie is working the second half of her last few chemo cycles.  Key support resources: , adult children (both live out of state), friends, Buddhism     Financial concerns: working, wants to continue with work  Advance Directive Status:  No HCD.    Social History     Tobacco Use     Smoking status: Never Smoker     Smokeless tobacco: Never Used   Substance Use Topics     Alcohol use: Yes     Frequency: 2-3 times a week     Drinks per session: 1 or 2     Binge frequency: Never     Comment: 2-3 glasses of wine/week     Drug use: Never         Allergies   Allergen Reactions     Pentobarbital      resp arrest     Current Outpatient Medications   Medication Sig Dispense Refill     acetaminophen (TYLENOL) 325 MG tablet Take 325-650 mg by mouth every 6 hours as needed for mild pain       albuterol (PROAIR RESPICLICK) 108 (90 Base) MCG/ACT inhaler Inhale 1-2 puffs into the lungs        cetirizine (ZYRTEC) 10 MG tablet Take 10 mg by mouth daily       granisetron (KYTRIL) 1 MG tablet Take 2 tablets (2 mg) by mouth daily Take 2 tablets (2 mg) every morning on Days 3 through 8. 12 tablet 1     lidocaine-prilocaine (EMLA) 2.5-2.5 % external cream Apply topically as needed for moderate pain 30 g 3     magic mouthwash suspension (diphenhydrAMINE, lidocaine, aluminum-magnesium & simethicone) Swish and swallow 10 mLs in mouth every 6 hours as needed for mouth sores 120 mL 0     melatonin (MELATONIN) 1 MG/ML LIQD liquid Take 5 mg by mouth       montelukast (SINGULAIR) 10 MG tablet Take 10 mg by mouth daily       nystatin (MYCOSTATIN) 734366 UNIT/ML suspension Take 5 mLs (500,000 Units) by mouth 4 times daily 140 mL 0     omeprazole (PRILOSEC) 20 MG DR capsule        phosphorus tablet 250 mg (PHOSPHA 250 NEUTRAL) 250 MG per tablet Take 1 tablet (250 mg) by mouth 3 times daily (with meals) 90 tablet 1     potassium chloride ER (KLOR-CON M) 20 MEQ CR tablet Take 1 tablet (20 mEq) by mouth daily 60 tablet 3     prochlorperazine (COMPAZINE) 10 MG tablet Take 1 tablet (10 mg) by mouth every 6 hours as needed (Nausea/Vomiting) 30 tablet 1     urea (CARMOL) 10 % external cream Apply topically as needed for dry skin 453 g 0     vitamin D3 (CHOLECALCIFEROL) 2000 units (50 mcg) tablet Take 1 tablet by mouth daily       Past Medical History:   Diagnosis Date     Asthma, chronic      Sarcoma (H)     Right lower thigh     Past Surgical History:   Procedure Laterality Date     AS RAD RESEC TONSIL/PILLARS       GALLBLADDER SURGERY       INSERT PORT VASCULAR ACCESS Right 10/28/2020    Procedure: single lumen power port placement @0745;  Surgeon: Tex Ackerman MD;  Location: [facility] OR     IR CHEST PORT PLACEMENT > 5 YRS OF AGE  10/28/2020     IR PORT CHECK RIGHT  3/26/2021     RESECT TUMOR LOWER EXTREMITY Right 3/12/2020    Procedure: Resection of right thigh tumor;  Surgeon: Guillermo Johnson MD;  Location:   OR     ROTATOR CUFF REPAIR RT/LT  2012     SHOULDER SURGERY       SINUS SURGERY         Review of Systems:   ROS: 10 point ROS neg other than the symptoms noted above in the HPI and pertinents here:  Palliative Symptom Review (0=no symptom/no concern, 1=mild, 2=moderate, 3=severe):      Pain: 2--  Neuropathy hands and feet moderate and hard to do some fine motor tasks       Fatigue:  1       Nausea: 0       Constipation: 0      Diarrhea: 0      Depressive Symptoms: 1, mild, occas down/frustrated but not interfering with daily functioning.      Anxiety: 0-1      Drowsiness: 1      Poor Appetite:  food doesn't interest me now.not hungry, no foods sound good.  Has lost 5-8 lbs each round for net of 30 lbs.      Shortness of Breath:  0      Insomnia: 2      Other: tinnitus: 1-2, bothers her more at night, but able to focus on other things during the day.      Overall (0 good/no concerns, 3 very poor):  1    PE    Alert woman, NAD  Well groomed, affect full, sitting at desk in work/office.  Smiling, affect full and engaging.  Neck supple.  Breathing nonlabored.  Able to speak full sentences without stopping for breath.  No significant pallor noted.  Facial movements symmetric.      Key Data Reviewed:  LABS: K 3.4, creatinine 0.65 on 3/18/21    On 3/8/21: WBC 3.5, hgb 8.6, plt 120    IMAGING: personally reviewed CT chest/abdomen/pelvis 3/8/2021:  IMPRESSION:   In this patient with metastatic sarcoma, there is evidence of overall  positive response to therapy:  1. No new pulmonary nodules. The majority of the pulmonary metastases  have decreased in size. One pulmonary metastasis in the right middle  lobe has increased in size.  2. A few prominent nonenlarged mediastinal lymph nodes measure  slightly larger, although there is still no lymphadenopathy by size  criteria within the chest. This may represent reactive changes and  attention on follow-up is recommended.  3. Stable statistically benign left ovarian cystic focus  and  technically indeterminate hypodensities in the right kidney which  likely represents cysts.  4. Ovoid densities in the left breast presumably represent fibrocystic  change. Further evaluation at the [facility] is recommended, as  well as attention on follow-up.         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  Has almost completed 5 cycles chemotherapy with doxil-ifos and difficulties with  symptoms of skin toxicity (improving), tinnitus, fatigue, and now worsening peripheral neuropathy, and 30-lb weight loss from anorexia.        Pain, due to peripheral neuropathy  - symptomatic enough that treatment merited.  - reviewed starting gabapentin.  Start 200-200-200 x 5 days then 200-200-400 after that.  Long term would consider 300-300-600 if doing well so may need new Rx.        Insomnia , continues  - previously had trial of doxepin 3-6ml PO at bedtime--stopped med and no desire to restartt.  - continue melatonin prn  -  physical activity as tolerated daily.  - encourage good sleep hygeine as able, discussed white noise to help w tinnitus.  - gabapentin may also help with this.    - possible sleep psychologist could be helpful if no improvement.     Existential distress  - anxiety and depression mixed d/t acute grief reaction w serious medical diagnosis.  She is hopeful to have good outcome after going through the chemo, hopes to go into remission.  - she notes  realizing they may be in a different place.    - continue working with Tiffany Astorga for counselling; may be helpful to include pt's  as they have different grieving styles.  Has had difficulty w scheduling due to conflicts w infusion schedule; await further interaction as able based on availability.  - functioning not affected; have not started selective serotonin reuptake inhibitor.  - gabapentin may  help w anxiety symptoms r/t medical condition.  Monitor at next visit and consider phq9        Hand and foot syndrome (on hands only) and skin toxicity  - d/t chemotherapy w doxil  - continue emollient use aggressively  - continue urea cream, would refill prn.  - caution with adhesives.     Anorexia, intermittent.  - encourage regular meals.  - consider MVI (not getting much for fresh fruits/vegetables w mucositis and texture aversions) and protein shakes for healthy nutrients.  - denies dysgeusia.  Fatigue, multifactorial (chemotherapy, anemia)  - PRBCs per oncology today.  - activity as able, plan for activities/prioritize activities for low energy.  Try to exercise some.  - allow adequate time for sleep/sleep hygeine.     Goals of care: life prolonging.  Will need to discuss HCD at next visit.     Support: LISW visits w Tiffanylotus Astorga requested.  Mother,  also supportive and work colleagues supportive.  Follow up in clinic in  4-6 weeks with palliative medicine     Olivia Sheth MD  Fellow, Hospice and Palliative Medicine  This visit was staffed with Dr Robert Blanca.    Attending Note:  Patient seen and evaluated with Dr Sheth and I agree with/confirm their findings/recs in this note.      Thank you for involving us in the patient's care.   Robert Blanca MD / Palliative Medicine / Text me via [facility].

## 2021-03-31 NOTE — PATIENT INSTRUCTIONS
Connie, it was good to see you this afternoon.    Start the gabapentin.  To begin with a lower dose, i'm using the 100mg capsules.    Day 1-5: start with 200mg (2 caps) by mouth three times daily: in morning, at 2pm, and at bedtime.  Day 5 and onward: 200mg-200mg and take 400mg at bedtime.    If needed, over the following two weeks, we can make further adjustments to dose, but if this makes the peripheral neuropathy symptoms more tolerable, would stick with the lowest effective dose.  People can get a little sleepy with the medication.  It can lessen over the course of 1-2 weeks.      Gabapentin can lessen some insomnia also.    Continue with the urea cream as needed for dry skin and the lotions for the hand-foot syndrome.    Consider a multivitamin PLUS getting protein shake to be sure you're not losing more weight.    Follow up with me in a month.    I'd recommend taking time over the next few months to review completing an advanced directive/health care directive.  https://www.honoringchoices.org/tools-resources/how-to-start  This site will take you to the website for honoring choices [place] and has videos and guides to help you with conversations with your family, things to consider in completing a directive.  If you have questions as you do this, write them down and I would be very happy to discuss with you at our next visit!    My clinic is Wednesday Afternoons in the [facility]: [phone] (scheduling); [phone] (triage). Palliative care [facility] outpatient care coordinator is Chelsea Kline RN: [phone]. Palliative After-Hours Answering Service: [phone].

## 2021-04-01 ENCOUNTER — APPOINTMENT (OUTPATIENT)
Dept: LAB | Facility: CLINIC | Age: 56
End: 2021-04-01
Attending: INTERNAL MEDICINE
Payer: COMMERCIAL

## 2021-04-05 ENCOUNTER — ANCILLARY PROCEDURE (OUTPATIENT)
Dept: CT IMAGING | Facility: CLINIC | Age: 56
End: 2021-04-05
Attending: NURSE PRACTITIONER
Payer: COMMERCIAL

## 2021-04-05 DIAGNOSIS — C49.9 SARCOMA (H): ICD-10-CM

## 2021-04-05 PROCEDURE — 71250 CT THORAX DX C-: CPT | Mod: GC | Performed by: RADIOLOGY

## 2021-04-05 NOTE — PROGRESS NOTES
"Connie is a 56 year old who is being evaluated via a billable video visit.      How would you like to obtain your AVS? MyChart  If the video visit is dropped, the invitation should be resent by: Text to cell phone: [phone]  Will anyone else be joining your video visit? No      I have reviewed and updated the patient's allergies and medication list.    Concerns: No new concerns.   Refills: None needed.      Vitals - Patient Reported  Weight (Patient Reported): 97.5 kg (215 lb)  Height (Patient Reported): 170.2 cm (5' 7\")  BMI (Based on Pt Reported Ht/Wt): 33.67  Pain Score: Mild Pain (3)  Pain Loc: Other - see comment(NEUROPATHY IN HANDS AND FEET)    Yvonne Askew CMA    Video Start Time: 2:01pm    Video-Visit Details    Type of service:  Video Visit    Video End Time: 2:31pm    Originating Location (pt. 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 She received preoperative radiotherapy and the tumor was resected 3/12/20. She then developed a cough, chest tightness, and scapular pain and imaging revealed lung nodules. Lung biopsy 11/2/20 with sarcoma. She was started on Doxil + Ifos 11/13/20. Had delay in cycle 2 due to COVID dx 12/2/20. CT imaging after cycle 1 with positive response to treatment with decrease in pulmonary nodules. Received cycle 2 12/18/20. Received cycle 3 1/15/21 with dose reduction in Doxil for skin toxicity and mucositis. Received cycle 4 2/15/21.     CT CAP 3/8/21 with positive response to treatment, decreased size of pulmonary mets " though one lesion was larger. Did see fibrocystic changes in left breast, recommended breast imaging. Received cycle 5 3/11/21.     She was called today for follow-up.     Interval History:  Connie was called today for follow-up. She overall notes this last cycle was more difficult. She has had noticeably more neuropathy in her feet to the point it was hard to walk and in her hands which was burning. This hasn't resolved yet and she was recently started on gabapentin which has helped. She had mouth sores that resolved after 6 days. She had more brain fog. She has had low energy, though slightly better this week. She has a poor appetite but still eating and drinking OK, mnimal nausea. She had a cough this AM but thinks it might be allergies. No fevers. Skin is dry but no rashes, erythema, peeling. She thought there might be petechiae on her leg.       Current Outpatient Medications   Medication Sig Dispense Refill     acetaminophen (TYLENOL) 325 MG tablet Take 325-650 mg by mouth every 6 hours as needed for mild pain       albuterol (PROAIR RESPICLICK) 108 (90 Base) MCG/ACT inhaler Inhale 1-2 puffs into the lungs       cetirizine (ZYRTEC) 10 MG tablet Take 10 mg by mouth daily       gabapentin (NEURONTIN) 100 MG capsule 200mg PO TID (in AM, 2pm, and at bedtime) x 5 days then 200mg PO qam, q 2pm and 400mg at HS thereafter. 240 capsule 1     granisetron (KYTRIL) 1 MG tablet Take 2 tablets (2 mg) by mouth daily Take 2 tablets (2 mg) every morning on Days 3 through 8. 12 tablet 1     lidocaine-prilocaine (EMLA) 2.5-2.5 % external cream Apply topically as needed for moderate pain 30 g 3     magic mouthwash suspension (diphenhydrAMINE, lidocaine, aluminum-magnesium & simethicone) Swish and swallow 10 mLs in mouth every 6 hours as needed for mouth sores 120 mL 3     melatonin (MELATONIN) 1 MG/ML LIQD liquid Take 5 mg by mouth       montelukast (SINGULAIR) 10 MG tablet Take 10 mg by mouth daily       omeprazole (PRILOSEC) 20  MG DR capsule        phosphorus tablet 250 mg (PHOSPHA 250 NEUTRAL) 250 MG per tablet Take 1 tablet (250 mg) by mouth 3 times daily (with meals) 90 tablet 1     potassium chloride ER (KLOR-CON M) 20 MEQ CR tablet Take 1 tablet (20 mEq) by mouth daily 60 tablet 3     prochlorperazine (COMPAZINE) 10 MG tablet Take 1 tablet (10 mg) by mouth every 6 hours as needed (Nausea/Vomiting) 30 tablet 1     urea (CARMOL) 10 % external cream Apply topically as needed for dry skin 453 g 0     vitamin D3 (CHOLECALCIFEROL) 2000 units (50 mcg) tablet Take 1 tablet by mouth daily       Physical Examination:  LMP 03/19/2019   Wt Readings from Last 10 Encounters:   03/11/21 102.5 kg (225 lb 14.4 oz)   02/15/21 105.2 kg (232 lb)   01/27/21 105.5 kg (232 lb 8 oz)   01/15/21 105.3 kg (232 lb 3.2 oz)   12/18/20 106.5 kg (234 lb 14.4 oz)   11/13/20 113.4 kg (250 lb 1.6 oz)   11/04/20 108.9 kg (240 lb)   11/02/20 110.2 kg (243 lb)   10/28/20 110.2 kg (243 lb)   03/12/20 106.6 kg (235 lb)     Video physical exam  General: Patient appears well in no acute distress.   Skin: No visualized rash or lesions on visualized skin  Eyes: EOMI, no erythema, sclera icterus or discharge noted  Resp: Appears to be breathing comfortably without accessory muscle usage, speaking in full sentences, no cough  MSK: Appears to have normal range of motion based on visualized movements  Neurologic: No apparent tremors, facial movements symmetric  Psych: affect normal, alert and oriented    The rest of a comprehensive physical examination is deferred due to PHE (public health emergency) video restrictions    Laboratory Data:  Results for AMADEO WILSON (MRN [id number]) as of 4/7/2021 21:32   4/7/2021 15:30   Sodium 140   Potassium 3.5   Chloride 107   Carbon Dioxide 28   Urea Nitrogen 8   Creatinine 0.70   GFR Estimate >90   GFR Estimate If Black >90   Calcium 9.1   Anion Gap 5   Magnesium 1.9   Phosphorus 3.7   Albumin 3.3 (L)   Protein Total 7.0   Bilirubin Total  0.5   Alkaline Phosphatase 103   ALT 27   AST 13   Glucose 90   WBC 3.3 (L)   Hemoglobin 6.9 (LL)   Hematocrit 21.6 (L)   Platelet Count 112 (L)   RBC Count 2.09 (L)    (H)   MCH 33.0   MCHC 31.9   RDW 19.9 (H)   Diff Method Automated Method   % Neutrophils 58.2   % Lymphocytes 20.6   % Monocytes 18.5   % Eosinophils 2.1   % Basophils 0.3   % Immature Granulocytes 0.3   Absolute Neutrophil 1.9   Absolute Lymphocytes 0.7 (L)   Absolute Monocytes 0.6   Absolute Eosinophils 0.1   Absolute Basophils 0.0   Abs Immature Granulocytes 0.0     CT Chest 4/5/21  FINDINGS:     Lines and tubes: Right chest wall port catheter with the tip in the  mid thoracic SVC.     Lungs: Multiple metastatic nodules/masses the lungs which are not  appreciably changed in size, accounting for differences in imaging  plane, with the exception of a right middle lobe nodule measuring 2.2  x 2.2 x 1.6 cm, previously 2.0 x 2.0 x 1.5 cm. A dominant mass  measuring up to 3.8 cm in the medial right upper lobe is not  definitely changed. There is a lesion in the left lower lobe measuring  1.5 x 1.1 cm. No new focal airspace consolidation or pulmonary nodule.     Pleura: No pleural effusion or pneumothorax.     Heart: Heart size is normal. No pericardial effusion.     Thoracic vasculature:  Limited visualization on this noncontrast exam.  The thoracic aorta and main pulmonary artery are normal in caliber.     Thyroid: Partially visualized. No suspicious nodules.     Mediastinum:  Central tracheobronchial tree is patent.  Mildly  patulous distal esophagus.  Mildly prominent lymph nodes again noted  which are not enlarged by size criteria.     Upper abdomen: Limited. Mild elevation of the right hemidiaphragm. No  suspicious lesions in the visualized portion of the liver or spleen.  Perisplenic nodules noted which likely represent accessory splenic  splenules. No suspicious lesions in visualized portion of the kidneys  or pancreas. No adrenal nodules.  Postsurgical changes cholecystectomy.  Cystic lesions in the kidneys noted on prior exam are not visualized.     Bones and soft tissues: No acute fracture or suspicious osseous  lesion. Ovoid densities in the left breast similar to prior exam.                                                                      IMPRESSION:   In this patient with a history of metastatic sarcoma:  1. Metastatic lesions in the lungs are not appreciably changed in  size, with the exception of a right middle lobe nodule, which is  slightly increased in size. No new lesions identified.  2. Prominent mediastinal lymph nodes, which are not enlarged by size  criteria, similar to prior exam. Attention on follow-up.  3. Ovoid hyperdensities in the left breast similar to prior exam.  Consider referral for mammography if these have not been previously  assessed.     I have personally reviewed the examination and initial interpretation  and I agree with the findings.     ADEEL ROJAS, DO      Assessment and Plan:  1. ONc  Metastatic UPS, currently on Doxil + Ifosfamide with positive response to treatment initially. Has received 5 cycles. More recent imaging with stable pulmonary nodules though one right sided lesion continues to slightly increase. Having more issues with treatment including neuropathy, pancytopenia.     Discussed with Dr. Lopez previously and with patient today that overall current regimen is keeping disease stable other than one lung lesion, and as such can continue though would do Doxil alone at this point. She is agreeable knowing the toxicities will be less.    Will plan to do cycle 6 with Doxil alone Monday 4/12. No home labs or Neulasta needed. Removed pre-meds. Few day delay this cycle due to family trip this weekend.    Plan to do 2-3 cycles and then repeat imaging, will confirm timing with Dr. Lopez.     She is approved for Keytruda as next line of therapy.    Has had issues with port-if still not functioning well  next week will arrange port check.     2. Heme  Anemia: 2/2 Ifosfamide, hgb 6.9  today. Will bring in tomorrow for 2 units pRBC     Thrombocytopenia: 2/2 ifosfamide, no bleeding concerns. OK today at 112.      Neutropenia: 2/2 ifosfamide, improving. Discussed less concern with Doxil alone and does not need Neulasta     3. FEN  Hypokalemia: 2/2 ifosfamide, improved Continue 20meq daily but if stable next week will stop.      Hypophosphatemia: 2/2 ifosfamide, improved. Can stop since we are stopping ifos and recheck next week.      Hydration/Nutrition: Doing OK at this time, should be able to tolerate better with Doxil alone      4. GI  GERD: continuing Omeprazole and tums, not discussed in detail      Nausea: 2/2 treatment, should not need Kytril or Compazine with Doxil alone. Removed pre-meds      Mucositis: 2/2 Doxil, improved with dose reduction. Continue baking soda swishes, MMW, and Healios.     5. Derm  Hand/foot: 2/2 Doxil, resolved with dose reduction. Continue lotion and urea BID. Avoid irritants     Dry skin: 2/2 Doxil, continue lotion.     6. Pulm  Asthma: stable, continue inhalers.     COVID: Diagnosed in December, recovered.     7. Neuro  Neuropathy: 2/2 ifosfamide, worsening. Stopping ifos as above. Continue gabapentin and monitor for improvement.    Brain fog: No acute neuro concerns, monitor.     8. Other  Did not discuss radiology comment about mammogram, will discuss with Dr. Lopez      75 minutes spent on the date of the encounter doing chart review, review of test results, interpretation of tests, patient visit and documentation     Addendum: Per Dr. Lopez get repeat CT after 2 cycles of Doxil alone. Hold off on mammogram for now.    Harshal Schuster PA-C  [facility]  [address] [place] [address]  [phone]

## 2021-04-06 NOTE — PROGRESS NOTES
This is a recent snapshot of the patient's [facility] medical record.  For current drug dose and complete information and questions, call [phone] or In Basket pool, [facility] home infusion ([id number])  CSN Number:  [id number]

## 2021-04-06 NOTE — PROGRESS NOTES
This is a recent snapshot of the patient's [facility] medical record.  For current drug dose and complete information and questions, call [phone] or In Basket pool, [facility]v home infusion ([id number])  CSN Number:  [id number]

## 2021-04-07 ENCOUNTER — VIRTUAL VISIT (OUTPATIENT)
Dept: ONCOLOGY | Facility: CLINIC | Age: 56
End: 2021-04-07
Attending: INTERNAL MEDICINE
Payer: COMMERCIAL

## 2021-04-07 ENCOUNTER — TELEPHONE (OUTPATIENT)
Dept: ONCOLOGY | Facility: CLINIC | Age: 56
End: 2021-04-07

## 2021-04-07 DIAGNOSIS — D63.0 ANEMIA IN NEOPLASTIC DISEASE: ICD-10-CM

## 2021-04-07 DIAGNOSIS — C49.9 SARCOMA (H): Primary | ICD-10-CM

## 2021-04-07 DIAGNOSIS — C49.9 SARCOMA (H): ICD-10-CM

## 2021-04-07 LAB
ABO + RH BLD: NORMAL
ABO + RH BLD: NORMAL
ALBUMIN SERPL-MCNC: 3.3 G/DL (ref 3.4–5)
ALP SERPL-CCNC: 103 U/L (ref 40–150)
ALT SERPL W P-5'-P-CCNC: 27 U/L (ref 0–50)
ANION GAP SERPL CALCULATED.3IONS-SCNC: 5 MMOL/L (ref 3–14)
AST SERPL W P-5'-P-CCNC: 13 U/L (ref 0–45)
BASOPHILS # BLD AUTO: 0 10E9/L (ref 0–0.2)
BASOPHILS NFR BLD AUTO: 0.3 %
BILIRUB SERPL-MCNC: 0.5 MG/DL (ref 0.2–1.3)
BLD GP AB SCN SERPL QL: NORMAL
BLD PROD TYP BPU: NORMAL
BLOOD BANK CMNT PATIENT-IMP: NORMAL
BUN SERPL-MCNC: 8 MG/DL (ref 7–30)
CALCIUM SERPL-MCNC: 9.1 MG/DL (ref 8.5–10.1)
CHLORIDE SERPL-SCNC: 107 MMOL/L (ref 94–109)
CO2 SERPL-SCNC: 28 MMOL/L (ref 20–32)
CREAT SERPL-MCNC: 0.7 MG/DL (ref 0.52–1.04)
DIFFERENTIAL METHOD BLD: ABNORMAL
EOSINOPHIL # BLD AUTO: 0.1 10E9/L (ref 0–0.7)
EOSINOPHIL NFR BLD AUTO: 2.1 %
ERYTHROCYTE [DISTWIDTH] IN BLOOD BY AUTOMATED COUNT: 19.9 % (ref 10–15)
GFR SERPL CREATININE-BSD FRML MDRD: >90 ML/MIN/{1.73_M2}
GLUCOSE SERPL-MCNC: 90 MG/DL (ref 70–99)
HCT VFR BLD AUTO: 21.6 % (ref 35–47)
HGB BLD-MCNC: 6.9 G/DL (ref 11.7–15.7)
IMM GRANULOCYTES # BLD: 0 10E9/L (ref 0–0.4)
IMM GRANULOCYTES NFR BLD: 0.3 %
LYMPHOCYTES # BLD AUTO: 0.7 10E9/L (ref 0.8–5.3)
LYMPHOCYTES NFR BLD AUTO: 20.6 %
MAGNESIUM SERPL-MCNC: 1.9 MG/DL (ref 1.6–2.3)
MCH RBC QN AUTO: 33 PG (ref 26.5–33)
MCHC RBC AUTO-ENTMCNC: 31.9 G/DL (ref 31.5–36.5)
MCV RBC AUTO: 103 FL (ref 78–100)
MONOCYTES # BLD AUTO: 0.6 10E9/L (ref 0–1.3)
MONOCYTES NFR BLD AUTO: 18.5 %
NEUTROPHILS # BLD AUTO: 1.9 10E9/L (ref 1.6–8.3)
NEUTROPHILS NFR BLD AUTO: 58.2 %
NUM BPU REQUESTED: 2
PHOSPHATE SERPL-MCNC: 3.7 MG/DL (ref 2.5–4.5)
PLATELET # BLD AUTO: 112 10E9/L (ref 150–450)
POTASSIUM SERPL-SCNC: 3.5 MMOL/L (ref 3.4–5.3)
PROT SERPL-MCNC: 7 G/DL (ref 6.8–8.8)
RBC # BLD AUTO: 2.09 10E12/L (ref 3.8–5.2)
SODIUM SERPL-SCNC: 140 MMOL/L (ref 133–144)
SPECIMEN EXP DATE BLD: NORMAL
WBC # BLD AUTO: 3.3 10E9/L (ref 4–11)

## 2021-04-07 PROCEDURE — 83735 ASSAY OF MAGNESIUM: CPT | Performed by: PHYSICIAN ASSISTANT

## 2021-04-07 PROCEDURE — 80053 COMPREHEN METABOLIC PANEL: CPT | Performed by: PHYSICIAN ASSISTANT

## 2021-04-07 PROCEDURE — 86901 BLOOD TYPING SEROLOGIC RH(D): CPT | Mod: GT | Performed by: PHYSICIAN ASSISTANT

## 2021-04-07 PROCEDURE — 999N001193 HC VIDEO/TELEPHONE VISIT; NO CHARGE

## 2021-04-07 PROCEDURE — 84100 ASSAY OF PHOSPHORUS: CPT | Performed by: PHYSICIAN ASSISTANT

## 2021-04-07 PROCEDURE — 36591 DRAW BLOOD OFF VENOUS DEVICE: CPT

## 2021-04-07 PROCEDURE — 86923 COMPATIBILITY TEST ELECTRIC: CPT | Mod: GT | Performed by: PHYSICIAN ASSISTANT

## 2021-04-07 PROCEDURE — 86900 BLOOD TYPING SEROLOGIC ABO: CPT | Mod: GT | Performed by: PHYSICIAN ASSISTANT

## 2021-04-07 PROCEDURE — 85025 COMPLETE CBC W/AUTO DIFF WBC: CPT | Performed by: PHYSICIAN ASSISTANT

## 2021-04-07 PROCEDURE — 99417 PROLNG OP E/M EACH 15 MIN: CPT | Performed by: PHYSICIAN ASSISTANT

## 2021-04-07 PROCEDURE — 99215 OFFICE O/P EST HI 40 MIN: CPT | Mod: 95 | Performed by: PHYSICIAN ASSISTANT

## 2021-04-07 PROCEDURE — 86850 RBC ANTIBODY SCREEN: CPT | Mod: GT | Performed by: PHYSICIAN ASSISTANT

## 2021-04-07 RX ORDER — DIPHENHYDRAMINE HYDROCHLORIDE 50 MG/ML
50 INJECTION INTRAMUSCULAR; INTRAVENOUS
Status: CANCELLED | OUTPATIENT
Start: 2021-04-12

## 2021-04-07 RX ORDER — ALBUTEROL SULFATE 90 UG/1
1-2 AEROSOL, METERED RESPIRATORY (INHALATION)
Status: CANCELLED | OUTPATIENT
Start: 2021-04-12

## 2021-04-07 RX ORDER — HEPARIN SODIUM (PORCINE) LOCK FLUSH IV SOLN 100 UNIT/ML 100 UNIT/ML
5 SOLUTION INTRAVENOUS
Status: CANCELLED | OUTPATIENT
Start: 2021-04-07

## 2021-04-07 RX ORDER — LORAZEPAM 2 MG/ML
0.5 INJECTION INTRAMUSCULAR EVERY 4 HOURS PRN
Status: CANCELLED
Start: 2021-04-12

## 2021-04-07 RX ORDER — MEPERIDINE HYDROCHLORIDE 25 MG/ML
25 INJECTION INTRAMUSCULAR; INTRAVENOUS; SUBCUTANEOUS EVERY 30 MIN PRN
Status: CANCELLED | OUTPATIENT
Start: 2021-04-12

## 2021-04-07 RX ORDER — HEPARIN SODIUM (PORCINE) LOCK FLUSH IV SOLN 100 UNIT/ML 100 UNIT/ML
5 SOLUTION INTRAVENOUS
Status: CANCELLED | OUTPATIENT
Start: 2021-04-12

## 2021-04-07 RX ORDER — NALOXONE HYDROCHLORIDE 0.4 MG/ML
.1-.4 INJECTION, SOLUTION INTRAMUSCULAR; INTRAVENOUS; SUBCUTANEOUS
Status: CANCELLED | OUTPATIENT
Start: 2021-04-12

## 2021-04-07 RX ORDER — HEPARIN SODIUM,PORCINE 10 UNIT/ML
5 VIAL (ML) INTRAVENOUS
Status: CANCELLED | OUTPATIENT
Start: 2021-04-07

## 2021-04-07 RX ORDER — HEPARIN SODIUM (PORCINE) LOCK FLUSH IV SOLN 100 UNIT/ML 100 UNIT/ML
500 SOLUTION INTRAVENOUS ONCE
Status: COMPLETED | OUTPATIENT
Start: 2021-04-07 | End: 2021-04-07

## 2021-04-07 RX ORDER — SODIUM CHLORIDE 9 MG/ML
1000 INJECTION, SOLUTION INTRAVENOUS CONTINUOUS PRN
Status: CANCELLED | OUTPATIENT
Start: 2021-04-12

## 2021-04-07 RX ORDER — HEPARIN SODIUM,PORCINE 10 UNIT/ML
5 VIAL (ML) INTRAVENOUS
Status: CANCELLED | OUTPATIENT
Start: 2021-04-12

## 2021-04-07 RX ORDER — METHYLPREDNISOLONE SODIUM SUCCINATE 125 MG/2ML
125 INJECTION, POWDER, LYOPHILIZED, FOR SOLUTION INTRAMUSCULAR; INTRAVENOUS
Status: CANCELLED | OUTPATIENT
Start: 2021-04-12

## 2021-04-07 RX ORDER — ALBUTEROL SULFATE 0.83 MG/ML
2.5 SOLUTION RESPIRATORY (INHALATION)
Status: CANCELLED | OUTPATIENT
Start: 2021-04-12

## 2021-04-07 RX ORDER — EPINEPHRINE 1 MG/ML
0.3 INJECTION, SOLUTION INTRAMUSCULAR; SUBCUTANEOUS EVERY 5 MIN PRN
Status: CANCELLED | OUTPATIENT
Start: 2021-04-12

## 2021-04-07 RX ADMIN — HEPARIN SODIUM (PORCINE) LOCK FLUSH IV SOLN 100 UNIT/ML 500 UNITS: 100 SOLUTION at 15:40

## 2021-04-07 NOTE — PROGRESS NOTES
"Patient's name and  were verified.  See Doc Flowsheet - IV assess for details.  IVAD accessed with 20G  1\" nieves gripper plus needle  blood return positive: YES  Site without redness, tenderness or swelling: YES  flushed with 30cc NS and 5cc 100u/ml heparin  Needle: De-accessed    Comments: Labs drawn.  Patient tolerated procedure without incident.    Tiana Soriano  RN, BSN, OCN        "

## 2021-04-07 NOTE — TELEPHONE ENCOUNTER
[facility] Triage Telephone Call    Called by lab at [facility] with critical result for patient Hgb 6.9    Plan:  Patient being seen today by provider and she has been notified    Charissa Munroe RN BSN, HNBC, STAR-T         Detail Level: Generalized Detail Level: Simple Dapsone Counseling: I discussed with the patient the risks of dapsone including but not limited to hemolytic anemia, agranulocytosis, rashes, methemoglobinemia, kidney failure, peripheral neuropathy, headaches, GI upset, and liver toxicity.  Patients who start dapsone require monitoring including baseline LFTs and weekly CBCs for the first month, then every month thereafter.  The patient verbalized understanding of the proper use and possible adverse effects of dapsone.  All of the patient's questions and concerns were addressed. Topical Sulfur Applications Counseling: Topical Sulfur Counseling: Patient counseled that this medication may cause skin irritation or allergic reactions.  In the event of skin irritation, the patient was advised to reduce the amount of the drug applied or use it less frequently.   The patient verbalized understanding of the proper use and possible adverse effects of topical sulfur application.  All of the patient's questions and concerns were addressed. Erythromycin Counseling:  I discussed with the patient the risks of erythromycin including but not limited to GI upset, allergic reaction, drug rash, diarrhea, increase in liver enzymes, and yeast infections. Tetracycline Pregnancy And Lactation Text: This medication is Pregnancy Category D and not consider safe during pregnancy. It is also excreted in breast milk. High Dose Vitamin A Pregnancy And Lactation Text: High dose vitamin A therapy is contraindicated during pregnancy and breast feeding. Erythromycin Pregnancy And Lactation Text: This medication is Pregnancy Category B and is considered safe during pregnancy. It is also excreted in breast milk. Tazorac Counseling:  Patient advised that medication is irritating and drying.  Patient may need to apply sparingly and wash off after an hour before eventually leaving it on overnight.  The patient verbalized understanding of the proper use and possible adverse effects of tazorac.  All of the patient's questions and concerns were addressed. Spironolactone Counseling: Patient advised regarding risks of diarrhea, abdominal pain, hyperkalemia, birth defects (for female patients), liver toxicity and renal toxicity. The patient may need blood work to monitor liver and kidney function and potassium levels while on therapy. The patient verbalized understanding of the proper use and possible adverse effects of spironolactone.  All of the patient's questions and concerns were addressed. Bactrim Counseling:  I discussed with the patient the risks of sulfa antibiotics including but not limited to GI upset, allergic reaction, drug rash, diarrhea, dizziness, photosensitivity, and yeast infections.  Rarely, more serious reactions can occur including but not limited to aplastic anemia, agranulocytosis, methemoglobinemia, blood dyscrasias, liver or kidney failure, lung infiltrates or desquamative/blistering drug rashes. Bactrim Pregnancy And Lactation Text: This medication is Pregnancy Category D and is known to cause fetal risk.  It is also excreted in breast milk. Tazorac Pregnancy And Lactation Text: This medication is not safe during pregnancy. It is unknown if this medication is excreted in breast milk. Topical Sulfur Applications Pregnancy And Lactation Text: This medication is Pregnancy Category C and has an unknown safety profile during pregnancy. It is unknown if this topical medication is excreted in breast milk. Minocycline Counseling: Patient advised regarding possible photosensitivity and discoloration of the teeth, skin, lips, tongue and gums.  Patient instructed to avoid sunlight, if possible.  When exposed to sunlight, patients should wear protective clothing, sunglasses, and sunscreen.  The patient was instructed to call the office immediately if the following severe adverse effects occur:  hearing changes, easy bruising/bleeding, severe headache, or vision changes.  The patient verbalized understanding of the proper use and possible adverse effects of minocycline.  All of the patient's questions and concerns were addressed. Benzoyl Peroxide Counseling: Patient counseled that medicine may cause skin irritation and bleach clothing.  In the event of skin irritation, the patient was advised to reduce the amount of the drug applied or use it less frequently.   The patient verbalized understanding of the proper use and possible adverse effects of benzoyl peroxide.  All of the patient's questions and concerns were addressed. Dapsone Pregnancy And Lactation Text: This medication is Pregnancy Category C and is not considered safe during pregnancy or breast feeding. Doxycycline Counseling:  Patient counseled regarding possible photosensitivity and increased risk for sunburn.  Patient instructed to avoid sunlight, if possible.  When exposed to sunlight, patients should wear protective clothing, sunglasses, and sunscreen.  The patient was instructed to call the office immediately if the following severe adverse effects occur:  hearing changes, easy bruising/bleeding, severe headache, or vision changes.  The patient verbalized understanding of the proper use and possible adverse effects of doxycycline.  All of the patient's questions and concerns were addressed. Include Pregnancy/Lactation Warning?: No Spironolactone Pregnancy And Lactation Text: This medication can cause feminization of the male fetus and should be avoided during pregnancy. The active metabolite is also found in breast milk. Isotretinoin Counseling: Patient should get monthly blood tests, not donate blood, not drive at night if vision affected, not share medication, and not undergo elective surgery for 6 months after tx completed. Side effects reviewed, pt to contact office should one occur. Tetracycline Counseling: Patient counseled regarding possible photosensitivity and increased risk for sunburn.  Patient instructed to avoid sunlight, if possible.  When exposed to sunlight, patients should wear protective clothing, sunglasses, and sunscreen.  The patient was instructed to call the office immediately if the following severe adverse effects occur:  hearing changes, easy bruising/bleeding, severe headache, or vision changes.  The patient verbalized understanding of the proper use and possible adverse effects of tetracycline.  All of the patient's questions and concerns were addressed. Patient understands to avoid pregnancy while on therapy due to potential birth defects. Birth Control Pills Counseling: Birth Control Pill Counseling: I discussed with the patient the potential side effects of OCPs including but not limited to increased risk of stroke, heart attack, thrombophlebitis, deep venous thrombosis, hepatic adenomas, breast changes, GI upset, headaches, and depression.  The patient verbalized understanding of the proper use and possible adverse effects of OCPs. All of the patient's questions and concerns were addressed. Benzoyl Peroxide Pregnancy And Lactation Text: This medication is Pregnancy Category C. It is unknown if benzoyl peroxide is excreted in breast milk. Topical Clindamycin Counseling: Patient counseled that this medication may cause skin irritation or allergic reactions.  In the event of skin irritation, the patient was advised to reduce the amount of the drug applied or use it less frequently.   The patient verbalized understanding of the proper use and possible adverse effects of clindamycin.  All of the patient's questions and concerns were addressed. Doxycycline Pregnancy And Lactation Text: This medication is Pregnancy Category D and not consider safe during pregnancy. It is also excreted in breast milk but is considered safe for shorter treatment courses. Topical Retinoid counseling:  Patient advised to apply a pea-sized amount only at bedtime and wait 30 minutes after washing their face before applying.  If too drying, patient may add a non-comedogenic moisturizer. The patient verbalized understanding of the proper use and possible adverse effects of retinoids.  All of the patient's questions and concerns were addressed. Azithromycin Counseling:  I discussed with the patient the risks of azithromycin including but not limited to GI upset, allergic reaction, drug rash, diarrhea, and yeast infections. Isotretinoin Pregnancy And Lactation Text: This medication is Pregnancy Category X and is considered extremely dangerous during pregnancy. It is unknown if it is excreted in breast milk. High Dose Vitamin A Counseling: Side effects reviewed, pt to contact office should one occur. Azithromycin Pregnancy And Lactation Text: This medication is considered safe during pregnancy and is also secreted in breast milk. Topical Clindamycin Pregnancy And Lactation Text: This medication is Pregnancy Category B and is considered safe during pregnancy. It is unknown if it is excreted in breast milk. Birth Control Pills Pregnancy And Lactation Text: This medication should be avoided if pregnant and for the first 30 days post-partum. Sarecycline Counseling: Patient advised regarding possible photosensitivity and discoloration of the teeth, skin, lips, tongue and gums.  Patient instructed to avoid sunlight, if possible.  When exposed to sunlight, patients should wear protective clothing, sunglasses, and sunscreen.  The patient was instructed to call the office immediately if the following severe adverse effects occur:  hearing changes, easy bruising/bleeding, severe headache, or vision changes.  The patient verbalized understanding of the proper use and possible adverse effects of sarecycline.  All of the patient's questions and concerns were addressed. Topical Retinoid Pregnancy And Lactation Text: This medication is Pregnancy Category C. It is unknown if this medication is excreted in breast milk.

## 2021-04-07 NOTE — LETTER
4/7/2021         RE: Richa Ashby  [address]  [place] [address]        Dear Colleague,    Thank you for referring your patient, Richa Ashby, to the [facility]. Please see a copy of my visit note below.    Oncology/Hematology Visit Note  Apr 7, 2021    Reason for Visit: Follow up of undifferentiated pleomorphic sarcoma     History of Present Illness: Richa Ashby is a 55 year old female with PMH asthma and GERD with undifferentiated pleomorphic sarcoma. 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 She received preoperative radiotherapy and the tumor was resected 3/12/20. She then developed a cough, chest tightness, and scapular pain and imaging revealed lung nodules. Lung biopsy 11/2/20 with sarcoma. She was started on Doxil + Ifos 11/13/20. Had delay in cycle 2 due to COVID dx 12/2/20. CT imaging after cycle 1 with positive response to treatment with decrease in pulmonary nodules. Received cycle 2 12/18/20. Received cycle 3 1/15/21 with dose reduction in Doxil for skin toxicity and mucositis. Received cycle 4 2/15/21.     CT CAP 3/8/21 with positive response to treatment, decreased size of pulmonary mets though one lesion was larger. Did see fibrocystic changes in left breast, recommended breast imaging. Received cycle 5 3/11/21.     She was called today for follow-up.     Interval History:  Connie was called today for follow-up. She overall notes this last cycle was more difficult. She has had noticeably more neuropathy in her feet to the point it was hard to walk and in her hands which was burning. This hasn't resolved yet and she was recently started on gabapentin which has helped. She had mouth sores that resolved after 6 days. She had more brain fog. She has had low energy, though slightly better this week. She has a poor appetite but still eating and drinking OK, mnimal  nausea. She had a cough this AM but thinks it might be allergies. No fevers. Skin is dry but no rashes, erythema, peeling. She thought there might be petechiae on her leg.       Current Outpatient Medications   Medication Sig Dispense Refill     acetaminophen (TYLENOL) 325 MG tablet Take 325-650 mg by mouth every 6 hours as needed for mild pain       albuterol (PROAIR RESPICLICK) 108 (90 Base) MCG/ACT inhaler Inhale 1-2 puffs into the lungs       cetirizine (ZYRTEC) 10 MG tablet Take 10 mg by mouth daily       gabapentin (NEURONTIN) 100 MG capsule 200mg PO TID (in AM, 2pm, and at bedtime) x 5 days then 200mg PO qam, q 2pm and 400mg at HS thereafter. 240 capsule 1     granisetron (KYTRIL) 1 MG tablet Take 2 tablets (2 mg) by mouth daily Take 2 tablets (2 mg) every morning on Days 3 through 8. 12 tablet 1     lidocaine-prilocaine (EMLA) 2.5-2.5 % external cream Apply topically as needed for moderate pain 30 g 3     magic mouthwash suspension (diphenhydrAMINE, lidocaine, aluminum-magnesium & simethicone) Swish and swallow 10 mLs in mouth every 6 hours as needed for mouth sores 120 mL 3     melatonin (MELATONIN) 1 MG/ML LIQD liquid Take 5 mg by mouth       montelukast (SINGULAIR) 10 MG tablet Take 10 mg by mouth daily       omeprazole (PRILOSEC) 20 MG DR capsule        phosphorus tablet 250 mg (PHOSPHA 250 NEUTRAL) 250 MG per tablet Take 1 tablet (250 mg) by mouth 3 times daily (with meals) 90 tablet 1     potassium chloride ER (KLOR-CON M) 20 MEQ CR tablet Take 1 tablet (20 mEq) by mouth daily 60 tablet 3     prochlorperazine (COMPAZINE) 10 MG tablet Take 1 tablet (10 mg) by mouth every 6 hours as needed (Nausea/Vomiting) 30 tablet 1     urea (CARMOL) 10 % external cream Apply topically as needed for dry skin 453 g 0     vitamin D3 (CHOLECALCIFEROL) 2000 units (50 mcg) tablet Take 1 tablet by mouth daily       Physical Examination:  LMP 03/19/2019   Wt Readings from Last 10 Encounters:   03/11/21 102.5 kg (225 lb 14.4  oz)   02/15/21 105.2 kg (232 lb)   01/27/21 105.5 kg (232 lb 8 oz)   01/15/21 105.3 kg (232 lb 3.2 oz)   12/18/20 106.5 kg (234 lb 14.4 oz)   11/13/20 113.4 kg (250 lb 1.6 oz)   11/04/20 108.9 kg (240 lb)   11/02/20 110.2 kg (243 lb)   10/28/20 110.2 kg (243 lb)   03/12/20 106.6 kg (235 lb)     Video physical exam  General: Patient appears well in no acute distress.   Skin: No visualized rash or lesions on visualized skin  Eyes: EOMI, no erythema, sclera icterus or discharge noted  Resp: Appears to be breathing comfortably without accessory muscle usage, speaking in full sentences, no cough  MSK: Appears to have normal range of motion based on visualized movements  Neurologic: No apparent tremors, facial movements symmetric  Psych: affect normal, alert and oriented    The rest of a comprehensive physical examination is deferred due to PHE (public health emergency) video restrictions    Laboratory Data:  Results for AMADEO WILSON (MRN [id number]) as of 4/7/2021 21:32   4/7/2021 15:30   Sodium 140   Potassium 3.5   Chloride 107   Carbon Dioxide 28   Urea Nitrogen 8   Creatinine 0.70   GFR Estimate >90   GFR Estimate If Black >90   Calcium 9.1   Anion Gap 5   Magnesium 1.9   Phosphorus 3.7   Albumin 3.3 (L)   Protein Total 7.0   Bilirubin Total 0.5   Alkaline Phosphatase 103   ALT 27   AST 13   Glucose 90   WBC 3.3 (L)   Hemoglobin 6.9 (LL)   Hematocrit 21.6 (L)   Platelet Count 112 (L)   RBC Count 2.09 (L)    (H)   MCH 33.0   MCHC 31.9   RDW 19.9 (H)   Diff Method Automated Method   % Neutrophils 58.2   % Lymphocytes 20.6   % Monocytes 18.5   % Eosinophils 2.1   % Basophils 0.3   % Immature Granulocytes 0.3   Absolute Neutrophil 1.9   Absolute Lymphocytes 0.7 (L)   Absolute Monocytes 0.6   Absolute Eosinophils 0.1   Absolute Basophils 0.0   Abs Immature Granulocytes 0.0     CT Chest 4/5/21  FINDINGS:     Lines and tubes: Right chest wall port catheter with the tip in the  mid thoracic SVC.     Lungs:  Multiple metastatic nodules/masses the lungs which are not  appreciably changed in size, accounting for differences in imaging  plane, with the exception of a right middle lobe nodule measuring 2.2  x 2.2 x 1.6 cm, previously 2.0 x 2.0 x 1.5 cm. A dominant mass  measuring up to 3.8 cm in the medial right upper lobe is not  definitely changed. There is a lesion in the left lower lobe measuring  1.5 x 1.1 cm. No new focal airspace consolidation or pulmonary nodule.     Pleura: No pleural effusion or pneumothorax.     Heart: Heart size is normal. No pericardial effusion.     Thoracic vasculature:  Limited visualization on this noncontrast exam.  The thoracic aorta and main pulmonary artery are normal in caliber.     Thyroid: Partially visualized. No suspicious nodules.     Mediastinum:  Central tracheobronchial tree is patent.  Mildly  patulous distal esophagus.  Mildly prominent lymph nodes again noted  which are not enlarged by size criteria.     Upper abdomen: Limited. Mild elevation of the right hemidiaphragm. No  suspicious lesions in the visualized portion of the liver or spleen.  Perisplenic nodules noted which likely represent accessory splenic  splenules. No suspicious lesions in visualized portion of the kidneys  or pancreas. No adrenal nodules. Postsurgical changes cholecystectomy.  Cystic lesions in the kidneys noted on prior exam are not visualized.     Bones and soft tissues: No acute fracture or suspicious osseous  lesion. Ovoid densities in the left breast similar to prior exam.                                                                      IMPRESSION:   In this patient with a history of metastatic sarcoma:  1. Metastatic lesions in the lungs are not appreciably changed in  size, with the exception of a right middle lobe nodule, which is  slightly increased in size. No new lesions identified.  2. Prominent mediastinal lymph nodes, which are not enlarged by size  criteria, similar to prior exam.  Attention on follow-up.  3. Ovoid hyperdensities in the left breast similar to prior exam.  Consider referral for mammography if these have not been previously  assessed.     I have personally reviewed the examination and initial interpretation  and I agree with the findings.     ADEEL ROJAS, DO      Assessment and Plan:  1. ONc  Metastatic UPS, currently on Doxil + Ifosfamide with positive response to treatment initially. Has received 5 cycles. More recent imaging with stable pulmonary nodules though one right sided lesion continues to slightly increase. Having more issues with treatment including neuropathy, pancytopenia.     Discussed with Dr. Lopez previously and with patient today that overall current regimen is keeping disease stable other than one lung lesion, and as such can continue though would do Doxil alone at this point. She is agreeable knowing the toxicities will be less.    Will plan to do cycle 6 with Doxil alone Monday 4/12. No home labs or Neulasta needed. Removed pre-meds. Few day delay this cycle due to family trip this weekend.    Plan to do 2-3 cycles and then repeat imaging, will confirm timing with Dr. Lopez.     She is approved for Keytruda as next line of therapy.    Has had issues with port-if still not functioning well next week will arrange port check.     2. Heme  Anemia: 2/2 Ifosfamide, hgb 6.9  today. Will bring in tomorrow for 2 units pRBC     Thrombocytopenia: 2/2 ifosfamide, no bleeding concerns. OK today at 112.      Neutropenia: 2/2 ifosfamide, improving. Discussed less concern with Doxil alone and does not need Neulasta     3. FEN  Hypokalemia: 2/2 ifosfamide, improved Continue 20meq daily but if stable next week will stop.      Hypophosphatemia: 2/2 ifosfamide, improved. Can stop since we are stopping ifos and recheck next week.      Hydration/Nutrition: Doing OK at this time, should be able to tolerate better with Doxil alone      4. GI  GERD: continuing Omeprazole and  tums, not discussed in detail      Nausea: 2/2 treatment, should not need Kytril or Compazine with Doxil alone. Removed pre-meds      Mucositis: 2/2 Doxil, improved with dose reduction. Continue baking soda swishes, MMW, and Healios.     5. Derm  Hand/foot: 2/2 Doxil, resolved with dose reduction. Continue lotion and urea BID. Avoid irritants     Dry skin: 2/2 Doxil, continue lotion.     6. Pulm  Asthma: stable, continue inhalers.     COVID: Diagnosed in December, recovered.     7. Neuro  Neuropathy: 2/2 ifosfamide, worsening. Stopping ifos as above. Continue gabapentin and monitor for improvement.    Brain fog: No acute neuro concerns, monitor.     8. Other  Did not discuss radiology comment about mammogram, will discuss with Dr. Lopez      75 minutes spent on the date of the encounter doing chart review, review of test results, interpretation of tests, patient visit and documentation     Addendum: Per Dr. Lopez get repeat CT after 2 cycles of Doxil alone. Hold off on mammogram for now.    Harshal Schuster PA-C  [facility]  [address] [place] [address]  [phone]        Again, thank you for allowing me to participate in the care of your patient.      Sincerely,    RUPERT Guaman

## 2021-04-08 ENCOUNTER — INFUSION THERAPY VISIT (OUTPATIENT)
Dept: ONCOLOGY | Facility: CLINIC | Age: 56
End: 2021-04-08
Attending: INTERNAL MEDICINE
Payer: COMMERCIAL

## 2021-04-08 VITALS
HEART RATE: 69 BPM | TEMPERATURE: 98.7 F | RESPIRATION RATE: 16 BRPM | OXYGEN SATURATION: 97 % | DIASTOLIC BLOOD PRESSURE: 73 MMHG | SYSTOLIC BLOOD PRESSURE: 111 MMHG

## 2021-04-08 DIAGNOSIS — D63.0 ANEMIA IN NEOPLASTIC DISEASE: Primary | ICD-10-CM

## 2021-04-08 LAB
BLD PROD TYP BPU: NORMAL
BLD PROD TYP BPU: NORMAL
BLD UNIT ID BPU: 0
BLD UNIT ID BPU: 0
BLOOD PRODUCT CODE: NORMAL
BLOOD PRODUCT CODE: NORMAL
BPU ID: NORMAL
BPU ID: NORMAL
TRANSFUSION STATUS PATIENT QL: NORMAL

## 2021-04-08 PROCEDURE — 250N000011 HC RX IP 250 OP 636: Performed by: NURSE PRACTITIONER

## 2021-04-08 PROCEDURE — P9016 RBC LEUKOCYTES REDUCED: HCPCS | Mod: GT | Performed by: PHYSICIAN ASSISTANT

## 2021-04-08 PROCEDURE — 36430 TRANSFUSION BLD/BLD COMPNT: CPT

## 2021-04-08 RX ORDER — HEPARIN SODIUM (PORCINE) LOCK FLUSH IV SOLN 100 UNIT/ML 100 UNIT/ML
5 SOLUTION INTRAVENOUS
Status: DISCONTINUED | OUTPATIENT
Start: 2021-04-08 | End: 2021-04-08 | Stop reason: HOSPADM

## 2021-04-08 RX ADMIN — Medication 5 ML: at 09:56

## 2021-04-08 ASSESSMENT — PAIN SCALES - GENERAL: PAINLEVEL: NO PAIN (0)

## 2021-04-08 NOTE — PROGRESS NOTES
Infusion Nursing Note:  Richa Ashby presents today for 2 units RBCs.    Patient seen by provider today: Yes: Harshal Schuster PA-C 4/7.   present during visit today: Not Applicable.    Note: Pt saw provider 4/7, no changes.    Intravenous Access:  Implanted Port.    Treatment Conditions:  Lab Results   Component Value Date    HGB 6.9 Transfuse if hgb is less than or equal to 8 and/or symptomatic 04/07/2021     Lab Results   Component Value Date    WBC 3.3 04/07/2021      Lab Results   Component Value Date    ANEU 1.9 04/07/2021     Lab Results   Component Value Date     04/07/2021      Blood transfusion consent signed 1/7/21.      Post Infusion Assessment:  Patient tolerated infusion without incident.  Blood return noted pre and post infusion.  Site patent and intact, free from redness, edema or discomfort.  No evidence of extravasations.  Access discontinued per protocol.       Discharge Plan:   Patient declined prescription refills.  Discharge instructions reviewed with: Patient.  Patient and/or family verbalized understanding of discharge instructions and all questions answered.  AVS to patient via [facility].  Patient will return 4/12 for next appointment.   Patient discharged in stable condition accompanied by: self.  Departure Mode: Ambulatory.    Suzan Bishop RN

## 2021-04-13 ENCOUNTER — MYC MEDICAL ADVICE (OUTPATIENT)
Dept: PALLIATIVE CARE | Facility: CLINIC | Age: 56
End: 2021-04-13

## 2021-04-13 ENCOUNTER — APPOINTMENT (OUTPATIENT)
Dept: LAB | Facility: CLINIC | Age: 56
End: 2021-04-13
Attending: PHYSICIAN ASSISTANT
Payer: COMMERCIAL

## 2021-04-13 ENCOUNTER — INFUSION THERAPY VISIT (OUTPATIENT)
Dept: ONCOLOGY | Facility: CLINIC | Age: 56
End: 2021-04-13
Attending: PHYSICIAN ASSISTANT
Payer: COMMERCIAL

## 2021-04-13 VITALS
SYSTOLIC BLOOD PRESSURE: 126 MMHG | OXYGEN SATURATION: 99 % | RESPIRATION RATE: 16 BRPM | BODY MASS INDEX: 35.07 KG/M2 | WEIGHT: 223.9 LBS | DIASTOLIC BLOOD PRESSURE: 75 MMHG | HEART RATE: 70 BPM | TEMPERATURE: 97.9 F

## 2021-04-13 DIAGNOSIS — D63.0 ANEMIA IN NEOPLASTIC DISEASE: Primary | ICD-10-CM

## 2021-04-13 DIAGNOSIS — C49.9 SARCOMA (H): ICD-10-CM

## 2021-04-13 LAB
ALBUMIN SERPL-MCNC: 3.1 G/DL (ref 3.4–5)
ALP SERPL-CCNC: 119 U/L (ref 40–150)
ALT SERPL W P-5'-P-CCNC: 27 U/L (ref 0–50)
ANION GAP SERPL CALCULATED.3IONS-SCNC: 5 MMOL/L (ref 3–14)
AST SERPL W P-5'-P-CCNC: 17 U/L (ref 0–45)
BASOPHILS # BLD AUTO: 0 10E9/L (ref 0–0.2)
BASOPHILS NFR BLD AUTO: 0.5 %
BILIRUB SERPL-MCNC: 0.7 MG/DL (ref 0.2–1.3)
BUN SERPL-MCNC: 7 MG/DL (ref 7–30)
CALCIUM SERPL-MCNC: 9.3 MG/DL (ref 8.5–10.1)
CHLORIDE SERPL-SCNC: 107 MMOL/L (ref 94–109)
CO2 SERPL-SCNC: 27 MMOL/L (ref 20–32)
CREAT SERPL-MCNC: 0.75 MG/DL (ref 0.52–1.04)
DIFFERENTIAL METHOD BLD: ABNORMAL
EOSINOPHIL # BLD AUTO: 0.2 10E9/L (ref 0–0.7)
EOSINOPHIL NFR BLD AUTO: 5.7 %
ERYTHROCYTE [DISTWIDTH] IN BLOOD BY AUTOMATED COUNT: 20.2 % (ref 10–15)
GFR SERPL CREATININE-BSD FRML MDRD: 90 ML/MIN/{1.73_M2}
GLUCOSE SERPL-MCNC: 90 MG/DL (ref 70–99)
HCT VFR BLD AUTO: 29.9 % (ref 35–47)
HGB BLD-MCNC: 9.6 G/DL (ref 11.7–15.7)
IMM GRANULOCYTES # BLD: 0 10E9/L (ref 0–0.4)
IMM GRANULOCYTES NFR BLD: 0.5 %
LDH SERPL L TO P-CCNC: 183 U/L (ref 81–234)
LYMPHOCYTES # BLD AUTO: 1 10E9/L (ref 0.8–5.3)
LYMPHOCYTES NFR BLD AUTO: 25.3 %
MAGNESIUM SERPL-MCNC: 2.2 MG/DL (ref 1.6–2.3)
MCH RBC QN AUTO: 31.5 PG (ref 26.5–33)
MCHC RBC AUTO-ENTMCNC: 32.1 G/DL (ref 31.5–36.5)
MCV RBC AUTO: 98 FL (ref 78–100)
MONOCYTES # BLD AUTO: 0.6 10E9/L (ref 0–1.3)
MONOCYTES NFR BLD AUTO: 16.7 %
NEUTROPHILS # BLD AUTO: 2 10E9/L (ref 1.6–8.3)
NEUTROPHILS NFR BLD AUTO: 51.3 %
NRBC # BLD AUTO: 0 10*3/UL
NRBC BLD AUTO-RTO: 0 /100
PHOSPHATE SERPL-MCNC: 3 MG/DL (ref 2.5–4.5)
PLATELET # BLD AUTO: 149 10E9/L (ref 150–450)
POTASSIUM SERPL-SCNC: 3.7 MMOL/L (ref 3.4–5.3)
PROT SERPL-MCNC: 7.3 G/DL (ref 6.8–8.8)
RBC # BLD AUTO: 3.05 10E12/L (ref 3.8–5.2)
SODIUM SERPL-SCNC: 139 MMOL/L (ref 133–144)
WBC # BLD AUTO: 3.8 10E9/L (ref 4–11)

## 2021-04-13 PROCEDURE — 85025 COMPLETE CBC W/AUTO DIFF WBC: CPT | Performed by: PHYSICIAN ASSISTANT

## 2021-04-13 PROCEDURE — 84100 ASSAY OF PHOSPHORUS: CPT | Performed by: PHYSICIAN ASSISTANT

## 2021-04-13 PROCEDURE — 96413 CHEMO IV INFUSION 1 HR: CPT

## 2021-04-13 PROCEDURE — 80053 COMPREHEN METABOLIC PANEL: CPT | Performed by: PHYSICIAN ASSISTANT

## 2021-04-13 PROCEDURE — 83735 ASSAY OF MAGNESIUM: CPT | Performed by: PHYSICIAN ASSISTANT

## 2021-04-13 PROCEDURE — 83615 LACTATE (LD) (LDH) ENZYME: CPT | Performed by: PHYSICIAN ASSISTANT

## 2021-04-13 PROCEDURE — 250N000011 HC RX IP 250 OP 636: Performed by: PHYSICIAN ASSISTANT

## 2021-04-13 PROCEDURE — 258N000003 HC RX IP 258 OP 636: Performed by: PHYSICIAN ASSISTANT

## 2021-04-13 RX ORDER — HEPARIN SODIUM (PORCINE) LOCK FLUSH IV SOLN 100 UNIT/ML 100 UNIT/ML
5 SOLUTION INTRAVENOUS ONCE
Status: COMPLETED | OUTPATIENT
Start: 2021-04-13 | End: 2021-04-13

## 2021-04-13 RX ORDER — HEPARIN SODIUM (PORCINE) LOCK FLUSH IV SOLN 100 UNIT/ML 100 UNIT/ML
5 SOLUTION INTRAVENOUS
Status: DISCONTINUED | OUTPATIENT
Start: 2021-04-13 | End: 2021-04-13 | Stop reason: HOSPADM

## 2021-04-13 RX ADMIN — DEXTROSE MONOHYDRATE 250 ML: 50 INJECTION, SOLUTION INTRAVENOUS at 12:38

## 2021-04-13 RX ADMIN — Medication 5 ML: at 13:55

## 2021-04-13 RX ADMIN — DOXORUBICIN HYDROCHLORIDE 75 MG: 2 INJECTABLE, LIPOSOMAL INTRAVENOUS at 12:38

## 2021-04-13 RX ADMIN — Medication 5 ML: at 11:12

## 2021-04-13 NOTE — PROGRESS NOTES
"Infusion Nursing Note:  Richa Ashby presents today for Cycle 6 Day 1 Doxil.    Patient seen by provider today: No   present during visit today: Not Applicable.    Note: pt presents to infusion room today feeling generally well. Pt states that neuropathy in hands/feet continues, and needs a refill of gabapentin today (does not have enough for tonight). Pt states that bilateral hands are primarily numb, but bilateral feet are burning and painful. This RN attempted to facilitate refill of gabapentin through messages to palliative care team and pharmacy. Pt does state a fall \"about a week ago\"; pt reports walking up the stairs with hands full, and fell on the right knee. Pt denies hitting head, and knee/thigh pain is improving daily. Pt otherwise offers no new complaints.    Pain: ongoing neuropathy hands/feet, this RN attempting to help with gabapentin refill, denies need for other intervention    Intravenous Access:  Implanted Port.    Treatment Conditions:  Lab Results   Component Value Date    HGB 9.6 04/13/2021     Lab Results   Component Value Date    WBC 3.8 04/13/2021      Lab Results   Component Value Date    ANEU 2.0 04/13/2021     Lab Results   Component Value Date     04/13/2021      Lab Results   Component Value Date     04/13/2021                   Lab Results   Component Value Date    POTASSIUM 3.7 04/13/2021           Lab Results   Component Value Date    MAG 2.2 04/13/2021            Lab Results   Component Value Date    CR 0.75 04/13/2021                   Lab Results   Component Value Date    VIKTORIA 9.3 04/13/2021                Lab Results   Component Value Date    BILITOTAL 0.7 04/13/2021           Lab Results   Component Value Date    ALBUMIN 3.1 04/13/2021                    Lab Results   Component Value Date    ALT 27 04/13/2021           Lab Results   Component Value Date    AST 17 04/13/2021       Results reviewed, labs MET treatment parameters, ok to proceed with " treatment.  ECHO/MUGA completed 10/27/20  EF 60-65%.      Post Infusion Assessment:  Patient tolerated infusion without incident.  Blood return noted pre and post infusion.  Site patent and intact, free from redness, edema or discomfort.  No evidence of extravasations.  Access discontinued per protocol.       Discharge Plan:   Prescription refills given for gabapentin; not ready at time of discharge. Chart notes indicate that palliative care is working on this. This RN encouraged pt to call palliative care tomorrow morning if prescription is still not filled.   AVS to patient via [employer].  Patient will return 5/10 for next appointment.   Patient discharged in stable condition accompanied by: self.  Departure Mode: Ambulatory.    TALISHA EDWARDS RN

## 2021-04-13 NOTE — TELEPHONE ENCOUNTER
PA requested using insurance info below via [email or web address]    BIN # [id number]    PCN # [id number]    ID # [id number]    [phone]

## 2021-04-13 NOTE — PATIENT INSTRUCTIONS
Contact Numbers    [facility] [place] Main Line: [phone]  [facility] [place] Triage and after hours / weekends / holidays: [phone]      Please call the triage or after hours line if you experience a temperature greater than or equal to 100.4, shaking chills, have uncontrolled nausea, vomiting and/or diarrhea, dizziness, shortness of breath, chest pain, bleeding, unexplained bruising, or if you have any other new/concerning symptoms, questions or concerns.      If you are having any concerning symptoms or wish to speak to a provider before your next infusion visit, please call your care coordinator or triage to notify them so we can adequately serve you.     If you need a refill on a narcotic prescription or other medication, please call before your infusion appointment.       April 2021 Sunday Monday Tuesday Wednesday Thursday Friday Saturday                       1    LAB   2:00 PM   (15 min.)   [facility] LAB HOME INFUSION   [facility] Laboratory 2     3       4     5    CT CHEST WO   2:20 PM   (20 min.)   [facility]SC[facility]1   [facility] [place] 6     7    VIDEO VISIT RETURN   1:35 PM   (50 min.)   Harshal Schuster PA   [facility]    LAB CENTRAL   3:30 PM   (15 min.)   NURSE ONLY CANCER CENTER   [address] ONC INFUSION 240   7:00 AM   (240 min.)    ONCOLOGY INFUSION   [facility] 9     10       11     12     13    LAB CENTRAL  11:00 AM   (15 min.)   [facility] DRAW   [facility] ONC INFUSION 120  11:30 AM   (120 min.)   UC ONCOLOGY INFUSION   [facility] 14     15    VIDEO VISIT NEW   3:30 PM   (60 min.)   Darlene Torres LICSW   [facility] [place] 16     17       18     19     20     21     22     23     24       25     26     27     28    VIDEO VISIT RETURN   3:45 PM   (45 min.)   Meryl  MD Olivia   [facility] 29     30                      May 2021      [place] Monday Tuesday Wednesday Thursday Friday Saturday                                 1       2     3     4     5     6     7     8       9     10    LAB CENTRAL   7:15 AM   (15 min.)    [facility] DRAW   [facility]    RETURN   7:35 AM   (50 min.)   Harshal Schuster PA   [facility]    [facility] ONC INFUSION 120  10:30 AM   (120 min.)   UC ONCOLOGY INFUSION   [facility] 11     12     13     14     15       16     17     18     19     20     21     22       23     24     25     26     27     28     29       30     31                                             Recent Results (from the past 24 hour(s))   CBC with platelets differential    Collection Time: 04/13/21 11:19 AM   Result Value Ref Range    WBC 3.8 (L) 4.0 - 11.0 10e9/L    RBC Count 3.05 (L) 3.8 - 5.2 10e12/L    Hemoglobin 9.6 (L) 11.7 - 15.7 g/dL    Hematocrit 29.9 (L) 35.0 - 47.0 %    MCV 98 78 - 100 fl    MCH 31.5 26.5 - 33.0 pg    MCHC 32.1 31.5 - 36.5 g/dL    RDW 20.2 (H) 10.0 - 15.0 %    Platelet Count 149 (L) 150 - 450 10e9/L    Diff Method Automated Method     % Neutrophils 51.3 %    % Lymphocytes 25.3 %    % Monocytes 16.7 %    % Eosinophils 5.7 %    % Basophils 0.5 %    % Immature Granulocytes 0.5 %    Nucleated RBCs 0 0 /100    Absolute Neutrophil 2.0 1.6 - 8.3 10e9/L    Absolute Lymphocytes 1.0 0.8 - 5.3 10e9/L    Absolute Monocytes 0.6 0.0 - 1.3 10e9/L    Absolute Eosinophils 0.2 0.0 - 0.7 10e9/L    Absolute Basophils 0.0 0.0 - 0.2 10e9/L    Abs Immature Granulocytes 0.0 0 - 0.4 10e9/L    Absolute Nucleated RBC 0.0    Comprehensive metabolic panel    Collection Time: 04/13/21 11:19 AM   Result Value Ref Range    Sodium 139 133 - 144 mmol/L    Potassium 3.7 3.4 - 5.3 mmol/L    Chloride 107 94 - 109 mmol/L    Carbon Dioxide 27 20 - 32 mmol/L    Anion Gap 5 3  - 14 mmol/L    Glucose 90 70 - 99 mg/dL    Urea Nitrogen 7 7 - 30 mg/dL    Creatinine 0.75 0.52 - 1.04 mg/dL    GFR Estimate 90 >60 mL/min/[1.73_m2]    GFR Estimate If Black >90 >60 mL/min/[1.73_m2]    Calcium 9.3 8.5 - 10.1 mg/dL    Bilirubin Total 0.7 0.2 - 1.3 mg/dL    Albumin 3.1 (L) 3.4 - 5.0 g/dL    Protein Total 7.3 6.8 - 8.8 g/dL    Alkaline Phosphatase 119 40 - 150 U/L    ALT 27 0 - 50 U/L    AST 17 0 - 45 U/L   Phosphorus    Collection Time: 04/13/21 11:19 AM   Result Value Ref Range    Phosphorus 3.0 2.5 - 4.5 mg/dL   Magnesium    Collection Time: 04/13/21 11:19 AM   Result Value Ref Range    Magnesium 2.2 1.6 - 2.3 mg/dL   Lactate Dehydrogenase    Collection Time: 04/13/21 11:19 AM   Result Value Ref Range    Lactate Dehydrogenase 183 81 - 234 U/L

## 2021-04-14 ENCOUNTER — TELEPHONE (OUTPATIENT)
Dept: ONCOLOGY | Facility: CLINIC | Age: 56
End: 2021-04-14

## 2021-04-15 ENCOUNTER — VIRTUAL VISIT (OUTPATIENT)
Dept: ONCOLOGY | Facility: CLINIC | Age: 56
End: 2021-04-15
Attending: INTERNAL MEDICINE
Payer: COMMERCIAL

## 2021-04-15 DIAGNOSIS — F43.21 ADJUSTMENT DISORDER WITH DEPRESSED MOOD: Primary | ICD-10-CM

## 2021-04-15 PROCEDURE — 90834 PSYTX W PT 45 MINUTES: CPT | Mod: 95 | Performed by: SOCIAL WORKER

## 2021-04-15 NOTE — Clinical Note
4/15/2021         RE: Richa Ashby  [address]  [address]  [place] [address]        Dear Colleague,    Thank you for referring your patient, Richa Ashby, to the [facility] [place]. Please see a copy of my visit note below.    Telemedicine Visit: The patient's condition can be safely assessed and treated via synchronous audio and visual telemedicine encounter.      Reason for Telemedicine Visit: covid19 precautions     Originating Site (Patient Location): Patient's home    Distant Site (Provider Location): [facility]: [facility]     Consent:  The patient/guardian has verbally consented to: the potential risks and benefits of telemedicine (video visit) versus in person care; bill my insurance or make self-payment for services provided; and responsibility for payment of non-covered services.     Mode of Communication:  Video Conference via [facility]    As the provider I attest to compliance with applicable laws and regulations related to telemedicine.      Palliative Care Clinical Social Work Return Appointment    PLEASE NOTE:  THIS IS A MENTAL HEALTH NOTE.  OTHER PROVIDERS VIEWING THIS NOTE SHOULD USE THIS ONLY FOR UNDERSTANDING THE CONTEXT OF THE PATIENT'S EXPERIENCE.  TOPICS DESCRIBED IN THIS NOTE SHOULD NOT BE REFERENCED TO THE PATIENT BY MEDICAL PROVIDERS.    Connie Ashby is a 56 year old woman with sarcoma, seen today via [facility]  for a return psychotherapy appointment to address adjustment disorder with depressed mood as it relates to coping with illness and treatment.  This is following her initial assessment in on 1/16/21 with YAMILETH Pompa    Mental Status Exam:(List all that apply)      Appearance: Appropriate      Eye Contact: Good       Orientation: Yes, x4      Mood: Normal      Affect: Appropriate-- tearful at times when discussing difficult topics      Thought Content: Clear         Thought Form: Logical      Psychomotor Behavior:  "Normal    Visit themes: family, role changes, grief, making needs known    Adjustment to Illness: Connie has had some increased symptom burden but it has been tolerable. Main concerns were \"brain fog\" which is frustrating, and a low Hg which required a transfusion.  She is continuing to work, which is helpful.     Mental Health (thoughts, feelings, actions, coping, and symptoms): Connie describes a sense of \"being on a roller coaster\" noting that she has \"ups and downs\".  She identifies as a person who is always the caregiver, so relying on others and needing to ask for help feels difficult.  SHe also has a tendency to downplay her emotions or needs, especially if she is feeling sad or worried because she doesn't want to upset people around her.      Helpful activities: Connie has been able to see more family now that she and her family are vaccinated.  This includes being able to travel to a family reunion and she has plans to travel for her son's wedding in May.  She is also planning more travel for later this year and summer of 2022.  She reflected that even if these trips don't come to pass, doing this sort of planning is something that she enjoys and it gives her something to focus on and look forward to on difficult days.         Body-Mind Skills Education: not explored today    Relationships: has support from her children and her . Has family dinners every Sunday.     End of Life and Advance Care Planning: not disussed today    Main therapeutic interventions provided this session include:   Provide psychoeducation and Facilitate processing of thoughts and feelings    Plan:  Will return for psychotherapy with palliative care focus in 1 month    Time spent with patient/family:  50 minutes (Start 3:30, end 4:22)    Palliative Care Counseling Treatment Plan    Client's Name: Connie Ashby  YOB: 1965    Date: ***    Initial DSM5 Diagnoses:   Adjustment Disorders  309.9 (F43.20) " Unspecified      Date to review plan (90 days usually): 8/21/19    Referral / Collaboration:  .Referral to another professional/service is not indicated at this time.    Anticipated number of sessions to complete episode of care:  10         Treatment Goal(s)  Date Goal Dates  Reviewed Status   5/23/2019   Goal 1:  Client will {Prim [place] Goals I Palliative Care:020774}.      {Status:957844}   5/23/2019   Objective #1A (Client Action)  {Client:797314} will {Treatment Objective Groups Palliative Care:752642}.    Intervention(s)  Therapist will {Interventions Palliative Care:067275} .    {Status:669944}   5/23/2019   Objective #1B  {Client:583511} will {Treatment Objective Groups Palliative Care:662441}.    Intervention(s)  Therapist will {Interventions Palliative Care:619301}.    {Status:755597}   5/23/2019   Objective #1C  {Client:024220} will {Treatment Objective Groups Palliative Care:457798}.    Intervention(s)  Therapist will {Interventions Palliative Care:954259}.    {Status:542662}       Date Goal Dates  Reviewed Status   5/23/2019   Goal 2:  Client will {Prim [place] Goals I Palliative Care:742599}.    {Status:015542}   5/23/2019   Objective #2A (Client Action)  {Client:245036} will {Treatment Objective Groups Palliative Care:615034}.    Intervention(s) (Therapist Action)  Therapist will {Interventions Palliative Care:671774}.    {Status:137095}   5/23/2019   Objective #2B  {Client:557491} will {Treatment Objective Groups Palliative Care:035118}.    Intervention(s)  Therapist will {Interventions Palliative Care:478939}.    {Status:198725}   5/23/2019     Objective #2C  {Client:418820} will {Treatment Objective Groups Palliative Care:434580}.    Intervention(s)  Therapist will {Interventions Palliative Care:759633}.    {Status:132280}       Client has contributed regarding goals and concerns, but has not reviewed the treatment plan. Plan to review at future session.    TEJAS Macias, [facility]  Palliative Care  Clinical        DO NOT SEND ANY LETTERS                    Again, thank you for allowing me to participate in the care of your patient.        Sincerely,        DUDLEY MaciasSW

## 2021-04-15 NOTE — Clinical Note
4/15/2021         RE: Richa Ashby  [address]  [address]  [place] [address]        Dear Colleague,    Thank you for referring your patient, Richa Ashby, to the [facility] [place]. Please see a copy of my visit note below.    Telemedicine Visit: The patient's condition can be safely assessed and treated via synchronous audio and visual telemedicine encounter.      Reason for Telemedicine Visit: covid19 precautions     Originating Site (Patient Location): Patient's home    Distant Site (Provider Location): [facility]: [facility]     Consent:  The patient/guardian has verbally consented to: the potential risks and benefits of telemedicine (video visit) versus in person care; bill my insurance or make self-payment for services provided; and responsibility for payment of non-covered services.     Mode of Communication:  Video Conference via [email or web address]    As the provider I attest to compliance with applicable laws and regulations related to telemedicine.      Palliative Care Clinical Social Work Return Appointment    PLEASE NOTE:  THIS IS A MENTAL HEALTH NOTE.  OTHER PROVIDERS VIEWING THIS NOTE SHOULD USE THIS ONLY FOR UNDERSTANDING THE CONTEXT OF THE PATIENT'S EXPERIENCE.  TOPICS DESCRIBED IN THIS NOTE SHOULD NOT BE REFERENCED TO THE PATIENT BY MEDICAL PROVIDERS.    Connie Ashby is a 56 year old woman with sarcoma, seen today via [facility]  for a return psychotherapy appointment to address adjustment disorder with depressed mood as it relates to coping with illness and treatment.  This is following her initial assessment in on 1/16/21 with YAMILETH Pompa    Mental Status Exam:(List all that apply)      Appearance: Appropriate      Eye Contact: Good       Orientation: Yes, x4      Mood: Normal      Affect: Appropriate-- tearful at times when discussing difficult topics      Thought Content: Clear         Thought Form: Logical      Psychomotor Behavior:  "Normal    Visit themes: family, role changes, grief, making needs known    Adjustment to Illness: Connie has had some increased symptom burden but it has been tolerable. Main concerns were \"brain fog\" which is frustrating, and a low Hg which required a transfusion.  She is continuing to work, which is helpful.     Mental Health (thoughts, feelings, actions, coping, and symptoms): Connie describes a sense of \"being on a roller coaster\" noting that she has \"ups and downs\".  She identifies as a person who is always the caregiver, so relying on others and needing to ask for help feels difficult.  SHe also has a tendency to downplay her emotions or needs, especially if she is feeling sad or worried because she doesn't want to upset people around her.      Helpful activities: Connie has been able to see more family now that she and her family are vaccinated.  This includes being able to travel to a family reunion and she has plans to travel for her son's wedding in May.  She is also planning more travel for later this year and summer of 2022.  She reflected that even if these trips don't come to pass, doing this sort of planning is something that she enjoys and it gives her something to focus on and look forward to on difficult days.         Body-Mind Skills Education: not explored today    Relationships: has support from her children and her . Has family dinners every Sunday.     End of Life and Advance Care Planning: not disussed today    Main therapeutic interventions provided this session include:   Provide psychoeducation and Facilitate processing of thoughts and feelings    Plan:  Will return for psychotherapy with palliative care focus in 1 month    Time spent with patient/family:  50 minutes (Start 3:30, end 4:22)    Palliative Care Counseling Treatment Plan    Client's Name: Connie Ashby  YOB: 1965    Date: ***    Initial DSM5 Diagnoses:   Adjustment Disorders  309.9 (F43.20) " Unspecified      Date to review plan (90 days usually): 8/21/19    Referral / Collaboration:  .Referral to another professional/service is not indicated at this time.    Anticipated number of sessions to complete episode of care:  10         Treatment Goal(s)  Date Goal Dates  Reviewed Status   5/23/2019   Goal 1:  Client will {Prim TX Goals I Palliative Care:062258}.      {Status:263478}   5/23/2019   Objective #1A (Client Action)  {Client:308751} will {Treatment Objective Groups Palliative Care:163343}.    Intervention(s)  Therapist will {Interventions Palliative Care:276593} .    {Status:567413}   5/23/2019   Objective #1B  {Client:290246} will {Treatment Objective Groups Palliative Care:972884}.    Intervention(s)  Therapist will {Interventions Palliative Care:158156}.    {Status:418566}   5/23/2019   Objective #1C  {Client:814550} will {Treatment Objective Groups Palliative Care:126298}.    Intervention(s)  Therapist will {Interventions Palliative Care:939987}.    {Status:480786}       Date Goal Dates  Reviewed Status   5/23/2019   Goal 2:  Client will {Prim [place] Goals I Palliative Care:884864}.    {Status:417884}   5/23/2019   Objective #2A (Client Action)  {Client:192250} will {Treatment Objective Groups Palliative Care:933651}.    Intervention(s) (Therapist Action)  Therapist will {Interventions Palliative Care:836008}.    {Status:469269}   5/23/2019   Objective #2B  {Client:962566} will {Treatment Objective Groups Palliative Care:703932}.    Intervention(s)  Therapist will {Interventions Palliative Care:515355}.    {Status:352830}   5/23/2019     Objective #2C  {Client:909786} will {Treatment Objective Groups Palliative Care:526403}.    Intervention(s)  Therapist will {Interventions Palliative Care:156847}.    {Status:931918}       Client has contributed regarding goals and concerns, but has not reviewed the treatment plan. Plan to review at future session.    TEJAS Macias, [facility]  Palliative Care  Clinical        DO NOT SEND ANY LETTERS                    Again, thank you for allowing me to participate in the care of your patient.        Sincerely,        DUDLEY MaciasSW

## 2021-04-15 NOTE — PROGRESS NOTES
"Telemedicine Visit: The patient's condition can be safely assessed and treated via synchronous audio and visual telemedicine encounter.      Reason for Telemedicine Visit: covid19 precautions     Originating Site (Patient Location): Patient's home    Distant Site (Provider Location): [facility]: [facility]     Consent:  The patient/guardian has verbally consented to: the potential risks and benefits of telemedicine (video visit) versus in person care; bill my insurance or make self-payment for services provided; and responsibility for payment of non-covered services.     Mode of Communication:  Video Conference via [employer]    As the provider I attest to compliance with applicable laws and regulations related to telemedicine.      Palliative Care Clinical Social Work Return Appointment    PLEASE NOTE:  THIS IS A MENTAL HEALTH NOTE.  OTHER PROVIDERS VIEWING THIS NOTE SHOULD USE THIS ONLY FOR UNDERSTANDING THE CONTEXT OF THE PATIENT'S EXPERIENCE.  TOPICS DESCRIBED IN THIS NOTE SHOULD NOT BE REFERENCED TO THE PATIENT BY MEDICAL PROVIDERS.    Connie Ashby is a 56 year old woman with sarcoma, seen today via [facility]  for a return psychotherapy appointment to address adjustment disorder with depressed mood as it relates to coping with illness and treatment.  This is following her initial assessment in on 1/16/21 with Tiffany Astorga [facility]    Mental Status Exam:(List all that apply)      Appearance: Appropriate      Eye Contact: Good       Orientation: Yes, x4      Mood: Normal      Affect: Appropriate-- tearful at times when discussing difficult topics      Thought Content: Clear         Thought Form: Logical      Psychomotor Behavior: Normal    Visit themes: family, role changes, grief, making needs known    Adjustment to Illness: Connie has had some increased symptom burden but it has been tolerable. Main concerns were \"brain fog\" which is frustrating, and a low Hg which required a transfusion.  She is " "continuing to work, which is helpful.     Mental Health (thoughts, feelings, actions, coping, and symptoms): Connie describes a sense of \"being on a roller coaster\" noting that she has \"ups and downs\".  She identifies as a person who is always the caregiver, so relying on others and needing to ask for help feels difficult.  SHe also has a tendency to downplay her emotions or needs, especially if she is feeling sad or worried because she doesn't want to upset people around her.      Helpful activities: Connie has been able to see more family now that she and her family are vaccinated.  This includes being able to travel to a family reunion and she has plans to travel for her son's wedding in May.  She is also planning more travel for later this year and summer of 2022.  She reflected that even if these trips don't come to pass, doing this sort of planning is something that she enjoys and it gives her something to focus on and look forward to on difficult days.         Body-Mind Skills Education: not explored today    Relationships: has support from her children and her . Has family dinners every Sunday.     End of Life and Advance Care Planning: not disussed today    Main therapeutic interventions provided this session include:   Provide psychoeducation and Facilitate processing of thoughts and feelings    Plan:  Will return for psychotherapy with palliative care focus in 1 month    Time spent with patient/family:  50 minutes (Start 3:30, end 4:22)    Palliative Care Counseling Treatment Plan    Client's Name: Connie Ashby  YOB: 1965    Date: 4/15/2021    Initial DSM5 Diagnoses:   Adjustment Disorders  309.9 (F43.20) Unspecified      Date to review plan (90 days usually): 8/21/19    Referral / Collaboration:  .Referral to another professional/service is not indicated at this time.    Anticipated number of sessions to complete episode of care:  10         Treatment Goal(s)  Date Goal " Dates  Reviewed Status   5/23/2019   Goal 1:  Client will demonstrate effective management of adjustment difficulties related to cancer diagnosis and treatment .      New   5/23/2019   Objective #1A (Client Action)  Patient will Communicate effectively with children in family re serious illness.    Intervention(s)  Therapist will Provide psychoeducation, Facilitate processing of thoughts and feelings and Facilitate structured problem solving .    New   5/23/2019   Objective #1B  Patient will Identify effective coping strategies.    Intervention(s)  Therapist will Provide psychoeducation, Facilitate processing of thoughts and feelings and Facilitate structured problem solving.    New   5/23/2019   Objective #1C  Patient will Communicate effectively with medical provider re needed information.    Intervention(s)  Therapist will Provide psychoeducation, Facilitate processing of thoughts and feelings and Facilitate structured problem solving.    New       Date Goal Dates  Reviewed Status   5/23/2019   Goal 2:  Client will develop effective strategies for grief / loss.    New   5/23/2019   Objective #2A (Client Action)  Patient will Increase understanding of loss and grief.    Intervention(s) (Therapist Action)  Therapist will Provide psychoeducation.    New   5/23/2019   Objective #2B  Patient will Identify losses related to illness.    Intervention(s)  Therapist will Provide psychoeducation and Facilitate processing of thoughts and feelings.    New   5/23/2019     Objective #2C  Patient will Develop strategies for coping with grief/loss.    Intervention(s)  Therapist will Provide psychoeducation and Facilitate processing of thoughts and feelings.    New       Client has contributed regarding goals and concerns, but has not reviewed the treatment plan. Plan to review at future session.    TEJAS Macias, [facility]  Palliative Care Clinical        DO NOT SEND ANY LETTERS

## 2021-04-19 ENCOUNTER — HOME INFUSION (PRE-WILLOW HOME INFUSION) (OUTPATIENT)
Dept: PHARMACY | Facility: CLINIC | Age: 56
End: 2021-04-19

## 2021-04-21 ENCOUNTER — MYC MEDICAL ADVICE (OUTPATIENT)
Dept: ONCOLOGY | Facility: CLINIC | Age: 56
End: 2021-04-21

## 2021-04-21 NOTE — PROGRESS NOTES
This is a recent snapshot of the patient's [facility] medical record.  For current drug dose and complete information and questions, call [phone] or In Basket pool, [facility] ([id number])  CSN Number:  [id number]

## 2021-04-28 ENCOUNTER — VIRTUAL VISIT (OUTPATIENT)
Dept: PALLIATIVE CARE | Facility: CLINIC | Age: 56
End: 2021-04-28
Attending: FAMILY MEDICINE
Payer: COMMERCIAL

## 2021-04-28 DIAGNOSIS — G62.0 DRUG-INDUCED POLYNEUROPATHY (H): ICD-10-CM

## 2021-04-28 DIAGNOSIS — G47.01 INSOMNIA DUE TO MEDICAL CONDITION: ICD-10-CM

## 2021-04-28 DIAGNOSIS — Z71.89 ADVANCE CARE PLANNING: ICD-10-CM

## 2021-04-28 DIAGNOSIS — R63.0 ANOREXIA SYMPTOM: ICD-10-CM

## 2021-04-28 DIAGNOSIS — C49.9 SARCOMA (H): Primary | ICD-10-CM

## 2021-04-28 DIAGNOSIS — L27.1 HAND FOOT SYNDROME: ICD-10-CM

## 2021-04-28 DIAGNOSIS — R53.0 NEOPLASTIC (MALIGNANT) RELATED FATIGUE: ICD-10-CM

## 2021-04-28 PROCEDURE — 99215 OFFICE O/P EST HI 40 MIN: CPT | Mod: 95 | Performed by: FAMILY MEDICINE

## 2021-04-28 RX ORDER — GABAPENTIN 300 MG/1
CAPSULE ORAL
Qty: 120 CAPSULE | Refills: 1 | COMMUNITY
Start: 2021-04-28 | End: 2021-06-09

## 2021-04-28 NOTE — PROGRESS NOTES
"Connie is a 56 year old who is being evaluated via a billable video visit.      How would you like to obtain your AVS? MyChart  If the video visit is dropped, the invitation should be resent by: Text to cell phone: [phone].  Will anyone else be joining your video visit? No     Vitals - Patient Reported  Weight (Patient Reported): 97.5 kg (215 lb)  Height (Patient Reported): 170.2 cm (5' 7\")  BMI (Based on Pt Reported Ht/Wt): 33.67  Pain Score: Moderate Pain (4)  Pain Loc: (nueropathy, right shoulder blade-3.)      Susan Sainz, LEYDA on 4/28/2021 at 3:38 PM      Video Start Time: 4:00pm  Video-Visit Details    Type of service:  Video Visit    Video End Time:1640    Originating Location (pt. Location): Other Connie's work    Distant Location (provider location):  [facility] CANCER [facility]     Platform used for Video Visit: [facility]        Palliative Care Outpatient Clinic Progress Note    Patient Name:  Richa Ashby  Primary Provider:  King Diaz    Chief Complaint:  Follow up on metastatic pleomorphic sarcoma, chemotherapy-induced peripheral neuropathy, hand-foot syndrome, insomnia.    Interim History:      Richa Ashby is a 56 year old female returns to be seen by palliative care today. 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  She  completed 5 cycles of ifosfamide + doxil (second cycle delayed d/t covid infection in December) and had CT 3/8/21 showing positive response to treatment.  Plan currently is to do another cycle of ifos-doxil and possibly transition to keytruda after that pending staging.      She had  a CT scheduled for 4/5/21 (see below), appears disease is stable.  Now on monotherapy w doxil alone given peripheral neuropathy w ifosfamide.  Has option for keytruda in future if iti s needed.    Coping:  Feels like she's managing okay--able to find things to plan and enjoy and happy about that.  Feels supported by her family " "and team.  Hopeful about last scan and to keep disease stable as long as possible  Thinking about would she want to keep working vs retire early, and work remains a meaningful and rewarding activity ad a priority to continue.  She mentioned \"I don't think I'm going to have to plan for sustaining [employer] funds until age 89.\"  Mentions having discussion with her  about thinking about future, how they want to spend their time together.    Social History:  Pertinent changes to social history/social situation since last visit: now back at work nearly full time.  Takes 1-2 days off at beginning ofcycle w chemo infusion.  feeeling a lot better.  Going to wedding for her son over Memorial Day weekend.    Key support resources: family, friends, work family  Advance Directive Status:  None on file    Social History     Tobacco Use     Smoking status: Never Smoker     Smokeless tobacco: Never Used   Substance Use Topics     Alcohol use: Yes     Frequency: 2-3 times a week     Drinks per session: 1 or 2     Binge frequency: Never     Comment: 2-3 glasses of wine/week     Drug use: Never         Allergies   Allergen Reactions     Pentobarbital      resp arrest     Current Outpatient Medications   Medication Sig Dispense Refill     acetaminophen (TYLENOL) 325 MG tablet Take 325-650 mg by mouth every 6 hours as needed for mild pain       albuterol (PROAIR RESPICLICK) 108 (90 Base) MCG/ACT inhaler Inhale 1-2 puffs into the lungs       cetirizine (ZYRTEC) 10 MG tablet Take 10 mg by mouth daily       gabapentin (NEURONTIN) 100 MG capsule 200mg PO TID (in AM, 2pm, and at bedtime) x 5 days then 200mg PO qam, q 2pm and 400mg at HS thereafter. 240 capsule 1     gabapentin (NEURONTIN) 300 MG capsule 300mg PO qAM and q afternoon at 1400, and 600mg PO at bedtime. 120 capsule 1     granisetron (KYTRIL) 1 MG tablet Take 2 tablets (2 mg) by mouth daily Take 2 tablets (2 mg) every morning on Days 3 through 8. (Patient not taking: " Reported on 4/13/2021) 12 tablet 1     lidocaine-prilocaine (EMLA) 2.5-2.5 % external cream Apply topically as needed for moderate pain 30 g 3     magic mouthwash suspension (diphenhydrAMINE, lidocaine, aluminum-magnesium & simethicone) Swish and swallow 10 mLs in mouth every 6 hours as needed for mouth sores 120 mL 3     melatonin (MELATONIN) 1 MG/ML LIQD liquid Take 5 mg by mouth       montelukast (SINGULAIR) 10 MG tablet Take 10 mg by mouth daily       omeprazole (PRILOSEC) 20 MG DR capsule        potassium chloride ER (KLOR-CON M) 20 MEQ CR tablet Take 1 tablet (20 mEq) by mouth daily 60 tablet 3     prochlorperazine (COMPAZINE) 10 MG tablet Take 1 tablet (10 mg) by mouth every 6 hours as needed (Nausea/Vomiting) 30 tablet 1     Pyridoxine HCl (VITAMIN B-6 PO) Take by mouth daily Pt unsure of dose       urea (CARMOL) 10 % external cream Apply topically as needed for dry skin 453 g 0     vitamin D3 (CHOLECALCIFEROL) 2000 units (50 mcg) tablet Take 1 tablet by mouth daily       Past Medical History:   Diagnosis Date     Asthma, chronic      Sarcoma (H)     Right lower thigh     Past Surgical History:   Procedure Laterality Date     AS RAD RESEC TONSIL/PILLARS       GALLBLADDER SURGERY       INSERT PORT VASCULAR ACCESS Right 10/28/2020    Procedure: single lumen power port placement @0745;  Surgeon: Tex Ackerman MD;  Location: [facility] OR     IR CHEST PORT PLACEMENT > 5 YRS OF AGE  10/28/2020     IR PORT CHECK RIGHT  3/26/2021     RESECT TUMOR LOWER EXTREMITY Right 3/12/2020    Procedure: Resection of right thigh tumor;  Surgeon: Guillermo Johnson MD;  Location: [facility] OR     ROTATOR CUFF REPAIR RT/LT  2012     SHOULDER SURGERY       SINUS SURGERY         Review of Systems:   ROS: 10 point ROS neg other than the symptoms noted above in the HPI and pertinents here:  Palliative Symptom Review (0=no symptom/no concern, 1=mild, 2=moderate, 3=severe):      Pain: 1      Fatigue: 1-2      Nausea: 0       Constipation: 0      Diarrhea: 0      Depressive Symptoms: 0      Anxiety: 1      Drowsiness: 1      Poor Appetite: 1      Shortness of Breath: 0      Insomnia: 1      Other: 0      Overall (0 good/no concerns, 3 very poor):  1      PE  Due to pandemic this was a virtual visit and examination from video visit reveals:  Alert woman, well groomed, in office at work.  NAD  Pleasant, full affect, speech fluent.  Breathing nonlabored, no accessory muscle use or wheezing audible  Color normal   Facial movements symmetric  Moves arms equally.  No tremor, oriented x3.      Key Data Reviewed:  LABS: 4/13/21:  Cr 0.75, Na 139, K 3.7, WBC 3.8, Hgb 9.6, Plt 149    IMAGING:    CT CAP 3/8/21:  IMPRESSION:   In this patient with metastatic sarcoma, there is evidence of overall  positive response to therapy:  1. No new pulmonary nodules. The majority of the pulmonary metastases  have decreased in size. One pulmonary metastasis in the right middle  lobe has increased in size.  2. A few prominent nonenlarged mediastinal lymph nodes measure  slightly larger, although there is still no lymphadenopathy by size  criteria within the chest. This may represent reactive changes and  attention on follow-up is recommended.  3. Stable statistically benign left ovarian cystic focus and  technically indeterminate hypodensities in the right kidney which  likely represents cysts.  4. Ovoid densities in the left breast presumably represent fibrocystic  change. [facility] is recommended, as  well as attention on follow-up.    CT chest w/o contrast 4/5/21  IMPRESSION:   In this patient with a history of metastatic sarcoma:  1. Metastatic lesions in the lungs are not appreciably changed in  size, with the exception of a right middle lobe nodule, which is  slightly increased in size. No new lesions identified.  2. Prominent mediastinal lymph nodes, which are not enlarged by size  criteria, similar to prior exam. Attention on  follow-up.  3. Ovoid hyperdensities in the left breast similar to prior exam.  Consider referral for mammography if these have not been previously  assessed.       Impression & Recommendations & Counselin yo woman with undifferentiated pleomorphic sarcoma, 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  Completed 5 cycles chemotherapy with doxil-ifos and difficulties with symptoms of skin toxicity (improving), tinnitus, fatigue, and now worsening peripheral neuropathy, and 30-lb weight loss from anorexia.  In mid-April changed to monotherapy with Doxil alone to lessen toxicity.   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    - increased to  300-300-600  - still bothersome peripheral neuropathy; hopefully ifosfamide effect will lessen and also contribute to improvement.  - increase gabapentin weekly with following schedule:   week 1: 600-300-600   week 2: 600-600-600   week 3: 600-600-900  Hold at that level and follow up via mychart if still bothersome symptoms.  If too much fatigue, drop to prior level.    Insomnia , improved  - previously had trial of doxepin 3-6ml PO at bedtime--stopped med and no desire to restart  - continue melatonin prn  -  physical activity as tolerated daily.   - continue gabapentin (is helping w sleep) titration  - encourage good sleep hygeine as able, discussed white noise to help w tinnitus.  - possible sleep psychologist could be helpful if no improvement.     Existential distress: improved.  - anxiety and depression mixed d/t acute grief reaction w serious medical diagnosis.  She is hopeful to have good outcome after going through the chemo, hopes to go into remission.   - continue working with Tiffany GERBER and/or Darlene MARMOLEJO  for counselling   - functioning not affected; have not started selective serotonin reuptake inhibitor.     Myalgias,  trapezius/rhomboids  - imaging showed no evidence of metastases in these areas  - Connie has questions about safety of massage; her thrombocyopenia is resolving and she's not on blood thinners and no worries about bony mets.  Ok to proceed.    Hand and foot syndrome ( hands only) and skin toxicity  - d/t chemotherapy w doxil  - continue emollient use aggressively  - continue urea cream, would refill prn.  - caution with adhesives.     Anorexia, intermittent: improved  - encourage regular meals.  - rare dysgeusia.    Fatigue, multifactorial (chemotherapy, anemia): stable  - activity as able, plan for activities/prioritize activities for low energy.  - working to increase exercise    - allowing adequate time for sleep   - snbairon, hasn't had sleep study,  has ANDIE; encouraged to monitor and let Dr Diaz (primary MD) know if she feels a sleep study is needed.  Understandably she has multiple medical demands and this may not be highest priority.     Goals of care: life prolonging.  wants to keep working as long as able/possibly 62 or 65 but is also planning trips and activities she enjoys.  Discussed HCD : will mail long form health care directive and serious illness health care goals worksheet to Connie.       Support: LISW visits fadia MARMOLEJO .  Mother, children, and  also supportive and work colleagues supportive.  Follow up in clinic in  6-8 weeks.    Olivia Sheth MD  Fellow, Hospice and Palliative Medicine  This visit was staffed with Dr Robert Blanca.    Attending Note:  Patient seen and evaluated with Dr Sheth and I agree with/confirm their findings/recs in this note.      Thank you for involving us in the patient's care.   Robert Blanca MD / Palliative Medicine / Text me via [facility].

## 2021-04-28 NOTE — PATIENT INSTRUCTIONS
Connie, I am glad this was a pretty good month!    Try to do 20 minutes of activity a day (walking, doing a flight of stairs, stretching/strengthening exercises)    I highly support getting caught up on your massages.    For the neuropathy, increase gabapentin as follows:  Current--you are on 300-300-600  Week 1: 600-300-600  Week 2: 600-600-600  Week 3: 600-600-900.  i'd hold at the dosing for week 3 if symptoms are manageable.  I think you have 100-mg capsules also at home.  If the above changes in dose make you too tired, let me know and we'll go more slowly if needed (increasing by 100mg every few days).    If you get too tired, would you drop down to the dose from the week before.  Take first dose around 7 or 8am  Take second dose around 2pm  Take third dose between 8-10pm.    Keep up your excellent habits for the mucositis prevention.    Let Dr Diaz know if you'd like to pursue a sleep study to check for sleep apnea.    I will mail you a health care directive and a work sheet.  The best time to do that is when you're feeling pretty well and not having a medical crisis, so you can reflect on what's most important to you and talk with your family about it too.    I'm excited for the upcoming trips for you and am hopeful for a really good summer.  You are doing a great job getting through all the treatments and doing good care of yourself.    My staff will check in to schedule an appointment in the next 6-8 weeks.      My clinic is Wednesday Afternoons in the [facility]: [phone] (scheduling); [phone] (triage). Palliative care [facility] outpatient care coordinator is Chelsea Kline RN: [phone]. Palliative After-Hours Answering Service: [phone].

## 2021-04-28 NOTE — LETTER
"4/28/2021       RE: Richa Ashby  [address]  [place] [address]     Dear Colleague,    Thank you for referring your patient, Richa Ashby, to the [facility] at [facility]. Please see a copy of my visit note below.    Connie is a 56 year old who is being evaluated via a billable video visit.      How would you like to obtain your AVS? MyChart  If the video visit is dropped, the invitation should be resent by: Text to cell phone: [phone].  Will anyone else be joining your video visit? No     Vitals - Patient Reported  Weight (Patient Reported): 97.5 kg (215 lb)  Height (Patient Reported): 170.2 cm (5' 7\")  BMI (Based on Pt Reported Ht/Wt): 33.67  Pain Score: Moderate Pain (4)  Pain Loc: (nueropathy, right shoulder blade-3.)      Susan Sainz CMA on 4/28/2021 at 3:38 PM    Video-Visit Details    Type of service:  Video Visit    Video Start Time: 4:00pm    Video End Time:1640    Originating Location (pt. Location): Other Connie's work    Distant Location (provider location):  [facility] CANCER [facility]     Platform used for Video Visit: [facility]        Palliative Care Outpatient Clinic Progress Note    Patient Name:  Richa Ashby  Primary Provider:  King Diaz    Chief Complaint:  Follow up on metastatic pleomorphic sarcoma, chemotherapy-induced peripheral neuropathy, hand-foot syndrome, insomnia.    Interim History:      Richa Ashby is a 56 year old female returns to be seen by palliative care today. 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  She  completed 5 cycles of ifosfamide + doxil (second cycle delayed d/t covid infection in December) and had CT 3/8/21 showing positive response to treatment.  Plan currently is to do another cycle of ifos-doxil and possibly transition to keytruda after that pending staging.      She had  a " "CT scheduled for 4/5/21 (see below), appears disease is stable.  Now on monotherapy w doxil alone given peripheral neuropathy w ifosfamide.  Has option for keytruda in future if iti s needed.    Coping:  Feels like she's managing okay--able to find things to plan and enjoy and happy about that.  Feels supported by her family and team.  Hopeful about last scan and to keep disease stable as long as possible  Thinking about would she want to keep working vs retire early, and work remains a meaningful and rewarding activity ad a priority to continue.  She mentioned \"I don't think I'm going to have to plan for sustaining [employer] funds until age 89.\"  Mentions having discussion with her  about thinking about future, how they want to spend their time together.    Social History:  Pertinent changes to social history/social situation since last visit: now back at work nearly full time.  Takes 1-2 days off at beginning ofcycle w chemo infusion.  feeeling a lot better.  Going to wedding for her son over Memorial Day weekend.    Key support resources: family, friends, work family  Advance Directive Status:  None on file    Social History     Tobacco Use     Smoking status: Never Smoker     Smokeless tobacco: Never Used   Substance Use Topics     Alcohol use: Yes     Frequency: 2-3 times a week     Drinks per session: 1 or 2     Binge frequency: Never     Comment: 2-3 glasses of wine/week     Drug use: Never         Allergies   Allergen Reactions     Pentobarbital      resp arrest     Current Outpatient Medications   Medication Sig Dispense Refill     acetaminophen (TYLENOL) 325 MG tablet Take 325-650 mg by mouth every 6 hours as needed for mild pain       albuterol (PROAIR RESPICLICK) 108 (90 Base) MCG/ACT inhaler Inhale 1-2 puffs into the lungs       cetirizine (ZYRTEC) 10 MG tablet Take 10 mg by mouth daily       gabapentin (NEURONTIN) 100 MG capsule 200mg PO TID (in AM, 2pm, and at bedtime) x 5 days then 200mg PO " qam, q 2pm and 400mg at HS thereafter. 240 capsule 1     gabapentin (NEURONTIN) 300 MG capsule 300mg PO qAM and q afternoon at 1400, and 600mg PO at bedtime. 120 capsule 1     granisetron (KYTRIL) 1 MG tablet Take 2 tablets (2 mg) by mouth daily Take 2 tablets (2 mg) every morning on Days 3 through 8. (Patient not taking: Reported on 4/13/2021) 12 tablet 1     lidocaine-prilocaine (EMLA) 2.5-2.5 % external cream Apply topically as needed for moderate pain 30 g 3     magic mouthwash suspension (diphenhydrAMINE, lidocaine, aluminum-magnesium & simethicone) Swish and swallow 10 mLs in mouth every 6 hours as needed for mouth sores 120 mL 3     melatonin (MELATONIN) 1 MG/ML LIQD liquid Take 5 mg by mouth       montelukast (SINGULAIR) 10 MG tablet Take 10 mg by mouth daily       omeprazole (PRILOSEC) 20 MG DR capsule        potassium chloride ER (KLOR-CON M) 20 MEQ CR tablet Take 1 tablet (20 mEq) by mouth daily 60 tablet 3     prochlorperazine (COMPAZINE) 10 MG tablet Take 1 tablet (10 mg) by mouth every 6 hours as needed (Nausea/Vomiting) 30 tablet 1     Pyridoxine HCl (VITAMIN B-6 PO) Take by mouth daily Pt unsure of dose       urea (CARMOL) 10 % external cream Apply topically as needed for dry skin 453 g 0     vitamin D3 (CHOLECALCIFEROL) 2000 units (50 mcg) tablet Take 1 tablet by mouth daily       Past Medical History:   Diagnosis Date     Asthma, chronic      Sarcoma (H)     Right lower thigh     Past Surgical History:   Procedure Laterality Date     AS RAD RESEC TONSIL/PILLARS       GALLBLADDER SURGERY       INSERT PORT VASCULAR ACCESS Right 10/28/2020    Procedure: single lumen power port placement @0745;  Surgeon: Tex Ackerman MD;  Location: [facility] OR     IR CHEST PORT PLACEMENT > 5 YRS OF AGE  10/28/2020     IR PORT CHECK RIGHT  3/26/2021     RESECT TUMOR LOWER EXTREMITY Right 3/12/2020    Procedure: Resection of right thigh tumor;  Surgeon: Guillermo Johnson MD;  Location: [facility] OR     ROTATOR CUFF  REPAIR RT/LT  2012     SHOULDER SURGERY       SINUS SURGERY         Review of Systems:   ROS: 10 point ROS neg other than the symptoms noted above in the HPI and pertinents here:  Palliative Symptom Review (0=no symptom/no concern, 1=mild, 2=moderate, 3=severe):      Pain: 1      Fatigue: 1-2      Nausea: 0      Constipation: 0      Diarrhea: 0      Depressive Symptoms: 0      Anxiety: 1      Drowsiness: 1      Poor Appetite: 1      Shortness of Breath: 0      Insomnia: 1      Other: 0      Overall (0 good/no concerns, 3 very poor):  1      PE  Due to pandemic this was a virtual visit and examination from video visit reveals:  Alert woman, well groomed, in office at work.  NAD  Pleasant, full affect, speech fluent.  Breathing nonlabored, no accessory muscle use or wheezing audible  Color normal   Facial movements symmetric  Moves arms equally.  No tremor, oriented x3.      Key Data Reviewed:  LABS: 4/13/21:  Cr 0.75, Na 139, K 3.7, WBC 3.8, Hgb 9.6, Plt 149    IMAGING:    CT CAP 3/8/21:  IMPRESSION:   In this patient with metastatic sarcoma, there is evidence of overall  positive response to therapy:  1. No new pulmonary nodules. The majority of the pulmonary metastases  have decreased in size. One pulmonary metastasis in the right middle  lobe has increased in size.  2. A few prominent nonenlarged mediastinal lymph nodes measure  slightly larger, although there is still no lymphadenopathy by size  criteria within the chest. This may represent reactive changes and  attention on follow-up is recommended.  3. Stable statistically benign left ovarian cystic focus and  technically indeterminate hypodensities in the right kidney which  likely represents cysts.  4. Ovoid densities in the left breast presumably represent fibrocystic  change. [facility] is recommended, as  well as attention on follow-up.    CT chest w/o contrast 4/5/21  IMPRESSION:   In this patient with a history of metastatic  sarcoma:  1. Metastatic lesions in the lungs are not appreciably changed in  size, with the exception of a right middle lobe nodule, which is  slightly increased in size. No new lesions identified.  2. Prominent mediastinal lymph nodes, which are not enlarged by size  criteria, similar to prior exam. Attention on follow-up.  3. Ovoid hyperdensities in the left breast similar to prior exam.  Consider referral for mammography if these have not been previously  assessed.       Impression & Recommendations & Counselin yo woman with undifferentiated pleomorphic sarcoma, 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  Completed 5 cycles chemotherapy with doxil-ifos and difficulties with symptoms of skin toxicity (improving), tinnitus, fatigue, and now worsening peripheral neuropathy, and 30-lb weight loss from anorexia.  In mid-April changed to monotherapy with Doxil alone to lessen toxicity.   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    - increased to  300-300-600  - still bothersome peripheral neuropathy; hopefully ifosfamide effect will lessen and also contribute to improvement.  - increase gabapentin weekly with following schedule:   week 1: 600-300-600   week 2: 600-600-600   week 3: 600-600-900  Hold at that level and follow up via mychart if still bothersome symptoms.  If too much fatigue, drop to prior level.    Insomnia , improved  - previously had trial of doxepin 3-6ml PO at bedtime--stopped med and no desire to restart  - continue melatonin prn  -  physical activity as tolerated daily.   - continue gabapentin (is helping w sleep) titration  - encourage good sleep hygeine as able, discussed white noise to help w tinnitus.  - possible sleep psychologist could be helpful if no improvement.     Existential distress: improved.  - anxiety and depression mixed d/t  acute grief reaction w serious medical diagnosis.  She is hopeful to have good outcome after going through the chemo, hopes to go into remission.   - continue working with Tiffany GERBER and/or Darlene MARMOLEJO  for counselling   - functioning not affected; have not started selective serotonin reuptake inhibitor.     Myalgias, trapezius/rhomboids  - imaging showed no evidence of metastases in these areas  - Connie has questions about safety of massage; her thrombocyopenia is resolving and she's not on blood thinners and no worries about bony mets.  Ok to proceed.    Hand and foot syndrome ( hands only) and skin toxicity  - d/t chemotherapy w doxil  - continue emollient use aggressively  - continue urea cream, would refill prn.  - caution with adhesives.     Anorexia, intermittent: improved  - encourage regular meals.  - rare dysgeusia.    Fatigue, multifactorial (chemotherapy, anemia): stable  - activity as able, plan for activities/prioritize activities for low energy.  - working to increase exercise    - allowing adequate time for sleep   - snores, hasn't had sleep study,  has ANDIE; encouraged to monitor and let Dr Diaz (primary MD) know if she feels a sleep study is needed.  Understandably she has multiple medical demands and this may not be highest priority.     Goals of care: life prolonging.  wants to keep working as long as able/possibly 62 or 65 but is also planning trips and activities she enjoys.  Discussed HCD : will mail long form health care directive and serious illness health care goals worksheet to Connie.       Support: LISCW visits w Tiffany MARMOLEJO .  Mother, children, and  also supportive and work colleagues supportive.  Follow up in clinic in  6-8 weeks.    Olivia Sheth MD  Fellow, Hospice and Palliative Medicine  This visit was staffed with Dr Robert Blanca.    Attending Note:  Patient seen and evaluated with Dr Sheth and I agree with/confirm their findings/recs in this note.      Thank  you for involving us in the patient's care.   Robert Blanca MD / Palliative Medicine / Text me via [facility].

## 2021-05-10 ENCOUNTER — ONCOLOGY VISIT (OUTPATIENT)
Dept: ONCOLOGY | Facility: CLINIC | Age: 56
End: 2021-05-10
Attending: INTERNAL MEDICINE
Payer: COMMERCIAL

## 2021-05-10 ENCOUNTER — ANCILLARY PROCEDURE (OUTPATIENT)
Dept: CT IMAGING | Facility: CLINIC | Age: 56
End: 2021-05-10
Attending: PHYSICIAN ASSISTANT
Payer: COMMERCIAL

## 2021-05-10 VITALS
RESPIRATION RATE: 18 BRPM | SYSTOLIC BLOOD PRESSURE: 107 MMHG | DIASTOLIC BLOOD PRESSURE: 70 MMHG | HEIGHT: 67 IN | WEIGHT: 219.8 LBS | HEART RATE: 89 BPM | OXYGEN SATURATION: 97 % | TEMPERATURE: 98.3 F | BODY MASS INDEX: 34.5 KG/M2

## 2021-05-10 VITALS
OXYGEN SATURATION: 98 % | SYSTOLIC BLOOD PRESSURE: 104 MMHG | DIASTOLIC BLOOD PRESSURE: 67 MMHG | RESPIRATION RATE: 16 BRPM | TEMPERATURE: 98.3 F | HEART RATE: 80 BPM

## 2021-05-10 DIAGNOSIS — C49.9 SARCOMA (H): ICD-10-CM

## 2021-05-10 DIAGNOSIS — D63.0 ANEMIA IN NEOPLASTIC DISEASE: ICD-10-CM

## 2021-05-10 DIAGNOSIS — R91.8 PULMONARY NODULES: ICD-10-CM

## 2021-05-10 DIAGNOSIS — D63.0 ANEMIA IN NEOPLASTIC DISEASE: Primary | ICD-10-CM

## 2021-05-10 DIAGNOSIS — C49.9 SARCOMA (H): Primary | ICD-10-CM

## 2021-05-10 LAB
ABO + RH BLD: NORMAL
ABO + RH BLD: NORMAL
ALBUMIN SERPL-MCNC: 2.8 G/DL (ref 3.4–5)
ALP SERPL-CCNC: 98 U/L (ref 40–150)
ALT SERPL W P-5'-P-CCNC: 13 U/L (ref 0–50)
ANION GAP SERPL CALCULATED.3IONS-SCNC: 7 MMOL/L (ref 3–14)
AST SERPL W P-5'-P-CCNC: 7 U/L (ref 0–45)
BASOPHILS # BLD AUTO: 0 10E9/L (ref 0–0.2)
BASOPHILS NFR BLD AUTO: 0.7 %
BILIRUB SERPL-MCNC: 0.6 MG/DL (ref 0.2–1.3)
BLD GP AB SCN SERPL QL: NORMAL
BLD PROD TYP BPU: NORMAL
BLD UNIT ID BPU: 0
BLD UNIT ID BPU: 0
BLOOD BANK CMNT PATIENT-IMP: NORMAL
BLOOD PRODUCT CODE: NORMAL
BLOOD PRODUCT CODE: NORMAL
BPU ID: NORMAL
BPU ID: NORMAL
BUN SERPL-MCNC: 8 MG/DL (ref 7–30)
CALCIUM SERPL-MCNC: 9 MG/DL (ref 8.5–10.1)
CHLORIDE SERPL-SCNC: 106 MMOL/L (ref 94–109)
CO2 SERPL-SCNC: 27 MMOL/L (ref 20–32)
CREAT SERPL-MCNC: 0.78 MG/DL (ref 0.52–1.04)
DIFFERENTIAL METHOD BLD: ABNORMAL
EOSINOPHIL # BLD AUTO: 0.2 10E9/L (ref 0–0.7)
EOSINOPHIL NFR BLD AUTO: 3.8 %
ERYTHROCYTE [DISTWIDTH] IN BLOOD BY AUTOMATED COUNT: 18.6 % (ref 10–15)
FERRITIN SERPL-MCNC: 621 NG/ML (ref 8–252)
GFR SERPL CREATININE-BSD FRML MDRD: 86 ML/MIN/{1.73_M2}
GLUCOSE SERPL-MCNC: 113 MG/DL (ref 70–99)
HCT VFR BLD AUTO: 25.6 % (ref 35–47)
HGB BLD-MCNC: 7.9 G/DL (ref 11.7–15.7)
IMM GRANULOCYTES # BLD: 0 10E9/L (ref 0–0.4)
IMM GRANULOCYTES NFR BLD: 0.2 %
IRON SATN MFR SERPL: 22 % (ref 15–46)
IRON SERPL-MCNC: 38 UG/DL (ref 35–180)
LDH SERPL L TO P-CCNC: 143 U/L (ref 81–234)
LYMPHOCYTES # BLD AUTO: 0.7 10E9/L (ref 0.8–5.3)
LYMPHOCYTES NFR BLD AUTO: 17.4 %
MAGNESIUM SERPL-MCNC: 2.2 MG/DL (ref 1.6–2.3)
MCH RBC QN AUTO: 30.5 PG (ref 26.5–33)
MCHC RBC AUTO-ENTMCNC: 30.9 G/DL (ref 31.5–36.5)
MCV RBC AUTO: 99 FL (ref 78–100)
MONOCYTES # BLD AUTO: 0.6 10E9/L (ref 0–1.3)
MONOCYTES NFR BLD AUTO: 14.1 %
NEUTROPHILS # BLD AUTO: 2.7 10E9/L (ref 1.6–8.3)
NEUTROPHILS NFR BLD AUTO: 63.8 %
NRBC # BLD AUTO: 0 10*3/UL
NRBC BLD AUTO-RTO: 0 /100
NUM BPU REQUESTED: 2
PHOSPHATE SERPL-MCNC: 3.4 MG/DL (ref 2.5–4.5)
PLATELET # BLD AUTO: 218 10E9/L (ref 150–450)
POTASSIUM SERPL-SCNC: 3.9 MMOL/L (ref 3.4–5.3)
PROT SERPL-MCNC: 7.2 G/DL (ref 6.8–8.8)
RBC # BLD AUTO: 2.59 10E12/L (ref 3.8–5.2)
SODIUM SERPL-SCNC: 140 MMOL/L (ref 133–144)
SPECIMEN EXP DATE BLD: NORMAL
TIBC SERPL-MCNC: 176 UG/DL (ref 240–430)
TRANSFUSION STATUS PATIENT QL: NORMAL
TSH SERPL DL<=0.005 MIU/L-ACNC: 3.85 MU/L (ref 0.4–4)
WBC # BLD AUTO: 4.3 10E9/L (ref 4–11)

## 2021-05-10 PROCEDURE — 99215 OFFICE O/P EST HI 40 MIN: CPT | Performed by: PHYSICIAN ASSISTANT

## 2021-05-10 PROCEDURE — 86901 BLOOD TYPING SEROLOGIC RH(D): CPT | Performed by: PHYSICIAN ASSISTANT

## 2021-05-10 PROCEDURE — 83735 ASSAY OF MAGNESIUM: CPT | Performed by: PHYSICIAN ASSISTANT

## 2021-05-10 PROCEDURE — P9016 RBC LEUKOCYTES REDUCED: HCPCS | Performed by: PHYSICIAN ASSISTANT

## 2021-05-10 PROCEDURE — 96413 CHEMO IV INFUSION 1 HR: CPT

## 2021-05-10 PROCEDURE — 36430 TRANSFUSION BLD/BLD COMPNT: CPT

## 2021-05-10 PROCEDURE — 86900 BLOOD TYPING SEROLOGIC ABO: CPT | Performed by: PHYSICIAN ASSISTANT

## 2021-05-10 PROCEDURE — 83540 ASSAY OF IRON: CPT | Performed by: PHYSICIAN ASSISTANT

## 2021-05-10 PROCEDURE — 250N000011 HC RX IP 250 OP 636: Performed by: INTERNAL MEDICINE

## 2021-05-10 PROCEDURE — 84443 ASSAY THYROID STIM HORMONE: CPT | Performed by: INTERNAL MEDICINE

## 2021-05-10 PROCEDURE — 84100 ASSAY OF PHOSPHORUS: CPT | Performed by: PHYSICIAN ASSISTANT

## 2021-05-10 PROCEDURE — 250N000011 HC RX IP 250 OP 636: Performed by: PHYSICIAN ASSISTANT

## 2021-05-10 PROCEDURE — 82728 ASSAY OF FERRITIN: CPT | Performed by: PHYSICIAN ASSISTANT

## 2021-05-10 PROCEDURE — 86850 RBC ANTIBODY SCREEN: CPT | Performed by: PHYSICIAN ASSISTANT

## 2021-05-10 PROCEDURE — 71250 CT THORAX DX C-: CPT | Mod: GC | Performed by: RADIOLOGY

## 2021-05-10 PROCEDURE — 86923 COMPATIBILITY TEST ELECTRIC: CPT | Performed by: PHYSICIAN ASSISTANT

## 2021-05-10 PROCEDURE — 83550 IRON BINDING TEST: CPT | Performed by: PHYSICIAN ASSISTANT

## 2021-05-10 PROCEDURE — 85025 COMPLETE CBC W/AUTO DIFF WBC: CPT | Performed by: PHYSICIAN ASSISTANT

## 2021-05-10 PROCEDURE — 80053 COMPREHEN METABOLIC PANEL: CPT | Performed by: PHYSICIAN ASSISTANT

## 2021-05-10 PROCEDURE — G0463 HOSPITAL OUTPT CLINIC VISIT: HCPCS

## 2021-05-10 PROCEDURE — 83615 LACTATE (LD) (LDH) ENZYME: CPT | Performed by: PHYSICIAN ASSISTANT

## 2021-05-10 PROCEDURE — 258N000003 HC RX IP 258 OP 636: Performed by: INTERNAL MEDICINE

## 2021-05-10 RX ORDER — MEPERIDINE HYDROCHLORIDE 25 MG/ML
25 INJECTION INTRAMUSCULAR; INTRAVENOUS; SUBCUTANEOUS EVERY 30 MIN PRN
Status: CANCELLED | OUTPATIENT
Start: 2021-05-10

## 2021-05-10 RX ORDER — LORAZEPAM 2 MG/ML
0.5 INJECTION INTRAMUSCULAR EVERY 4 HOURS PRN
Status: CANCELLED
Start: 2021-05-10

## 2021-05-10 RX ORDER — METHYLPREDNISOLONE SODIUM SUCCINATE 125 MG/2ML
125 INJECTION, POWDER, LYOPHILIZED, FOR SOLUTION INTRAMUSCULAR; INTRAVENOUS
Status: CANCELLED
Start: 2021-05-10

## 2021-05-10 RX ORDER — ALBUTEROL SULFATE 90 UG/1
1-2 AEROSOL, METERED RESPIRATORY (INHALATION)
Status: CANCELLED
Start: 2021-05-10

## 2021-05-10 RX ORDER — NALOXONE HYDROCHLORIDE 0.4 MG/ML
.1-.4 INJECTION, SOLUTION INTRAMUSCULAR; INTRAVENOUS; SUBCUTANEOUS
Status: CANCELLED | OUTPATIENT
Start: 2021-05-10

## 2021-05-10 RX ORDER — HEPARIN SODIUM (PORCINE) LOCK FLUSH IV SOLN 100 UNIT/ML 100 UNIT/ML
5 SOLUTION INTRAVENOUS
Status: CANCELLED | OUTPATIENT
Start: 2021-05-10

## 2021-05-10 RX ORDER — SODIUM CHLORIDE 9 MG/ML
1000 INJECTION, SOLUTION INTRAVENOUS CONTINUOUS PRN
Status: CANCELLED
Start: 2021-05-10

## 2021-05-10 RX ORDER — DIPHENHYDRAMINE HYDROCHLORIDE 50 MG/ML
50 INJECTION INTRAMUSCULAR; INTRAVENOUS
Status: CANCELLED
Start: 2021-05-10

## 2021-05-10 RX ORDER — ALBUTEROL SULFATE 0.83 MG/ML
2.5 SOLUTION RESPIRATORY (INHALATION)
Status: CANCELLED | OUTPATIENT
Start: 2021-05-10

## 2021-05-10 RX ORDER — HEPARIN SODIUM,PORCINE 10 UNIT/ML
5 VIAL (ML) INTRAVENOUS
Status: CANCELLED | OUTPATIENT
Start: 2021-05-10

## 2021-05-10 RX ORDER — MULTIPLE VITAMINS W/ MINERALS TAB 9MG-400MCG
1 TAB ORAL DAILY
COMMUNITY

## 2021-05-10 RX ORDER — HEPARIN SODIUM (PORCINE) LOCK FLUSH IV SOLN 100 UNIT/ML 100 UNIT/ML
5 SOLUTION INTRAVENOUS DAILY PRN
Status: DISCONTINUED | OUTPATIENT
Start: 2021-05-10 | End: 2021-05-10 | Stop reason: HOSPADM

## 2021-05-10 RX ORDER — EPINEPHRINE 1 MG/ML
0.3 INJECTION, SOLUTION INTRAMUSCULAR; SUBCUTANEOUS EVERY 5 MIN PRN
Status: CANCELLED | OUTPATIENT
Start: 2021-05-10

## 2021-05-10 RX ADMIN — SODIUM CHLORIDE 250 ML: 9 INJECTION, SOLUTION INTRAVENOUS at 13:57

## 2021-05-10 RX ADMIN — Medication 5 ML: at 07:33

## 2021-05-10 RX ADMIN — Medication 5 ML: at 14:33

## 2021-05-10 RX ADMIN — SODIUM CHLORIDE 200 MG: 9 INJECTION, SOLUTION INTRAVENOUS at 13:57

## 2021-05-10 ASSESSMENT — MIFFLIN-ST. JEOR: SCORE: 1619.76

## 2021-05-10 ASSESSMENT — PAIN SCALES - GENERAL: PAINLEVEL: MODERATE PAIN (5)

## 2021-05-10 NOTE — NURSING NOTE
Chief Complaint   Patient presents with     Port Draw     Labs drawn via port by RN in lab. VS taken.      Labs drawn via Port accessed using 20g flat needle. Line flushed and Heparin locked. Vital signs taken. Checked into next appointment.   Ilda Gutiérrez RN

## 2021-05-10 NOTE — PATIENT INSTRUCTIONS
Side Effect Management of Immune Therapy    Fatigue  Exercise daily. Call if fatigue so severe unable to get out of bed. Limit naps to less than 1 hour per day to avoid affecting nighttime sleep quality.    Loose stools or diarrhea  Okay to take Imodium (loperamide) up to 8 tablets/day. If having 4 or more loose stools per day or if having blood in stool or dark/tarry stools, call the clinic.     Dry or itchy skin  Try Aveeno lotion or Eucerin cream at least twice daily. Okay to take Benadryl orally or use topically. Avoid topical steroids if possible (hydrocortisone). Call if these measures ineffective.    Joint or muscle aches  Okay to try Tylenol (acetaminophen) no more than 1000 mg three times/day or Advil (ibuprofen) with food no more than 800 mg three times/day. Okay to try topical lidocaine patches or cream or creams like TigerBalm or IcyHot. May also try ice or heat to affected areas.  Call clinic if ineffective.    Call if you develop  -Difficulty breathing  -Persistent dry cough  -Chest pain  -Moderate to severe abdominal pain  -Decrease in urine output despite good fluid intake    -Fever of 100.4F or higher     Call [phone] and ask to speak with triage nurse for any medical concerns. Use [employer] messaging only for non-urgent concerns and not for symptom concerns.

## 2021-05-10 NOTE — NURSING NOTE
"Oncology Rooming Note    May 10, 2021 7:51 AM   Richa Ashyb is a 56 year old female who presents for:    Chief Complaint   Patient presents with     Port Draw     Labs drawn via port by RN in lab. VS taken.      Oncology Clinic Visit     [facility] RETURN - [facility]COM[facility]     Initial Vitals: /70   Pulse 89   Temp 98.3  F (36.8  C) (Oral)   Resp 18   Ht 1.702 m (5' 7.01\")   Wt 99.7 kg (219 lb 12.8 oz)   LMP 03/19/2019   SpO2 97%   BMI 34.42 kg/m   Estimated body mass index is 34.42 kg/m  as calculated from the following:    Height as of this encounter: 1.702 m (5' 7.01\").    Weight as of this encounter: 99.7 kg (219 lb 12.8 oz). Body surface area is 2.17 meters squared.  Moderate Pain (5) Comment: Data Unavailable   Patient's last menstrual period was 03/19/2019.  Allergies reviewed: Yes  Medications reviewed: Yes    Medications: Medication refills not needed today.  Pharmacy name entered into [employer]:    [id number] - [place], [place] - [address] [place] AT [facility] [place], [place] -  [id number]    Clinical concerns: No new concerns. Harshal was notified.      Wicho Varner LPN            "

## 2021-05-10 NOTE — LETTER
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 She received preoperative radiotherapy and the tumor was resected 3/12/20. She then developed a cough, chest tightness, and scapular pain and imaging revealed lung nodules. Lung biopsy 11/2/20 with sarcoma. She was started on Doxil + Ifos 11/13/20. Had delay in cycle 2 due to COVID dx 12/2/20. CT imaging after cycle 1 with positive response to treatment with decrease in pulmonary nodules. Received cycle 2 12/18/20. Received cycle 3 1/15/21 with dose reduction in Doxil for skin toxicity and mucositis. Received cycle 4 2/15/21.      CT CAP 3/8/21 with positive response to treatment, decreased size of pulmonary mets though one lesion was larger. Did see fibrocystic changes in left breast, recommended breast imaging. Received cycle 5 Doxil + ifos 3/11/21.      Imaging 4/5/21 with overall stable disease except one lung lesion, opted to continue Doxil alone for now and switch to Keytruda in future if ongoing progression.     She was seen today for follow-up.     Interval History:  Connie was seen today for follow-up. She is doing OK, noting treatment went better without the ifosfamide. The neuropathy in her hands and feet persists and is worse in her feet. She also has noted new swelling/tightness in her hands and feet and that they feel stiff and sore. No rashes. Mouth sores were minimal and managed with Healios. She was able to eat and drink OK. She does feel fatigued. Her has had a cough for the past few weeks, no fevers, chest pain, or SOB. She has had a  few headaches and positional dizziness. No nausea, vomiting, abdominal pain. Stools were really loose after treatment, now still soft but no diarrhea. No urinary issues. She is still working and has her son's wedding Memorial day weekend.    Current Outpatient Medications   Medication Sig Dispense Refill     acetaminophen (TYLENOL) 325 MG tablet Take 325-650 mg by mouth every 6 hours as needed for mild pain       albuterol (PROAIR RESPICLICK) 108 (90 Base) MCG/ACT inhaler Inhale 1-2 puffs into the lungs       cetirizine (ZYRTEC) 10 MG tablet Take 10 mg by mouth daily       gabapentin (NEURONTIN) 300 MG capsule 600mg PO qAM, 300mg PO q afternoon at 1400, and 600mg PO at bedtime. 120 capsule 1     granisetron (KYTRIL) 1 MG tablet Take 2 tablets (2 mg) by mouth daily Take 2 tablets (2 mg) every morning on Days 3 through 8. (Patient not taking: Reported on 4/13/2021) 12 tablet 1     lidocaine-prilocaine (EMLA) 2.5-2.5 % external cream Apply topically as needed for moderate pain 30 g 3     magic mouthwash suspension (diphenhydrAMINE, lidocaine, aluminum-magnesium & simethicone) Swish and swallow 10 mLs in mouth every 6 hours as needed for mouth sores 120 mL 3     melatonin (MELATONIN) 1 MG/ML LIQD liquid Take 5 mg by mouth       montelukast (SINGULAIR) 10 MG tablet Take 10 mg by mouth daily       omeprazole (PRILOSEC) 20 MG DR capsule        potassium chloride ER (KLOR-CON M) 20 MEQ CR tablet Take 1 tablet (20 mEq) by mouth daily 60 tablet 3     prochlorperazine (COMPAZINE) 10 MG tablet Take 1 tablet (10 mg) by mouth every 6 hours as needed (Nausea/Vomiting) 30 tablet 1     Pyridoxine HCl (VITAMIN B-6 PO) Take by mouth daily Pt unsure of dose       urea (CARMOL) 10 % external cream Apply topically as needed for dry skin 453 g 0     vitamin D3 (CHOLECALCIFEROL) 2000 units (50 mcg) tablet Take 1 tablet by mouth daily       Physical Examination:  /70   Pulse 89   Temp 98.3  F (36.8  C) (Oral)   Resp 18   Ht 1.702  "m (5' 7.01\")   Wt 99.7 kg (219 lb 12.8 oz)   LMP 03/19/2019   SpO2 97%   BMI 34.42 kg/m    Wt Readings from Last 10 Encounters:   04/13/21 101.6 kg (223 lb 14.4 oz)   03/11/21 102.5 kg (225 lb 14.4 oz)   02/15/21 105.2 kg (232 lb)   01/27/21 105.5 kg (232 lb 8 oz)   01/15/21 105.3 kg (232 lb 3.2 oz)   12/18/20 106.5 kg (234 lb 14.4 oz)   11/13/20 113.4 kg (250 lb 1.6 oz)   11/04/20 108.9 kg (240 lb)   11/02/20 110.2 kg (243 lb)   10/28/20 110.2 kg (243 lb)     Constitutional: Well-appearing female in no acute distress.  Eyes: EOMI, PERRL. No scleral icterus.  ENT: Oral mucosa is moist without lesions or thrush.   Lymphatic: Neck is supple without cervical or supraclavicular lymphadenopathy.  Cardiovascular: Regular rate and rhythm. No murmurs, gallops, or rubs. Trace bilateral peripheral edema.  Respiratory: Clear to auscultation bilaterally. No wheezes or crackles.  Gastrointestinal: Bowel sounds present. Abdomen soft, non-tender. No palpable hepatosplenomegaly or masses.   Neurologic: Cranial nerves II through XII are grossly intact.  Skin: No rashes, petechiae, or bruising noted on exposed skin. Hands swollen but without erythema or tenderness. No tenderness or peeling on soles of feet.     Laboratory Data:  Results for AMADEO WILSON (MRN [id number]) as of 5/10/2021 20:14   5/10/2021 07:39   Sodium 140   Potassium 3.9   Chloride 106   Carbon Dioxide 27   Urea Nitrogen 8   Creatinine 0.78   GFR Estimate 86   GFR Estimate If Black >90   Calcium 9.0   Anion Gap 7   Magnesium 2.2   Phosphorus 3.4   Albumin 2.8 (L)   Protein Total 7.2   Bilirubin Total 0.6   Alkaline Phosphatase 98   ALT 13   AST 7   Ferritin 621 (H)   Iron 38   Iron Binding Cap 176 (L)   Iron Saturation Index 22   Lactate Dehydrogenase 143   TSH 3.85   Glucose 113 (H)   WBC 4.3   Hemoglobin 7.9 (L)   Hematocrit 25.6 (L)   Platelet Count 218   RBC Count 2.59 (L)   MCV 99   MCH 30.5   MCHC 30.9 (L)   RDW 18.6 (H)   Diff Method Automated Method "   % Neutrophils 63.8   % Lymphocytes 17.4   % Monocytes 14.1   % Eosinophils 3.8   % Basophils 0.7   % Immature Granulocytes 0.2   Nucleated RBCs 0   Absolute Neutrophil 2.7   Absolute Lymphocytes 0.7 (L)   Absolute Monocytes 0.6   Absolute Eosinophils 0.2   Absolute Basophils 0.0   Abs Immature Granulocytes 0.0   Absolute Nucleated RBC 0.0     CT Chest 5/10/21  FINDINGS:     Lungs: The central tracheobronchial tree is patent. No pleural  effusion, pneumothorax, or focal consolidation. Several pulmonary  nodules/masses appear increased in size compared to prior, for example  (all series 4):  - 5.3 x 4.5 cm subpleural mass of the medial right upper lobe (image  59), previously 3.4 x 2.8 cm.  - 2.9 x 2.6 cm nodule of the right middle lobe (image 133), previously  2.2 x 2.1 cm.  - 1.9 x 1.8 cm nodule of the left lower lobe (image 163), previously  1.3 x 1.1 cm.     Mediastinum: Right chest wall Port-A-Cath tip in the mid SVC. The  visualized thyroid gland is unremarkable. Mild atherosclerotic  calcification of the aortic arch. The heart is not enlarged. No  significant pericardial effusion. The ascending aorta and main  pulmonary artery are normal in caliber. No mediastinal or axillary  lymphadenopathy.     Upper abdomen: Cholecystectomy clips. Splenules.     Bones/soft tissues: Degenerative changes of the spine. No acute or  suspicious osseous lesions. Stable hyperdensities of the left breast.                                                                      IMPRESSION: In this patient with a history of metastatic sarcoma:  1. Multiple metastatic lesions of the lungs are increased in size  compared to 4/5/2021, with the largest measuring 5.3 cm in the right  upper lobe (previously 3.4 cm), suspicious for disease progression.  2. Stable hyperdensities of the left breast. Consider further  evaluation with mammography.     I have personally reviewed the examination and initial interpretation  and I agree with the  findings.     SHIRLEY INMAN MD    Assessment and Plan:  1. ONc  Metastatic UPS, was on Doxil + Ifosfamide (5 cycles) with positive response to treatment initially, then due to poor tolerance switched to Doxil alone (1 cycle). Worsening hand/foot symptoms today and cough concerning for progression. Did CT Chest which confirmed progressive disease.     Will switch to Keytruda today. Discussed side effects and risk of immune mediated toxicities such as pneumonitis and colitis. Will plan for treatment every 3 weeks and repeat imaging after cycle 3.     2. Heme  Anemia: Previously thought 2/2 Ifosfamide, now low again at 7.9, iron studies without OH, suspect ongoing anemia related to chemotherapy and anemia of chronic disease. Will transfuse 1 unit today given she is symptomatic and recheck in 3 weeks.     Thrombocytopenia: 2/2 ifosfamide, resolved.     Neutropenia: 2/2 ifosfamide resolved. OK for dental work while on Keytruda.      3. FEN  Hypokalemia: 2/2 ifosfamide, improved, Ok to stop potassium supplement.     Hypophosphatemia: 2/2 ifosfamide, resolved.      Hydration/Nutrition: Doing OK at this time, should so well with Keytruda.     4. GI  GERD: continuing Omeprazole and tums, stable.      Nausea: Resolved.     Mucositis: 2/2 Doxil, no current issues.     Diarrhea: Intermittent, discussed Imodium PRN and monitoring with starting immunotherapy.     5. Derm  Hand/foot: 2/2 Doxil, was improved with dose reductions. Now with sore, tight skin and swelling of hands and feet. Stopping Doxil as above. Continue lotion PRN.     Dry skin: 2/2 Doxil, continue lotion.     6. Pulm  Asthma: stable, continue inhalers.     COVID: Diagnosed in December, recovered.    Suspect current cough related to disease progression vs infection given no acute findings on CT and no other concerning symptoms.      7. 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 Continues to be an issue. Palliative ramping up gabapentin.      Brain  fog: No acute neuro concerns, not discussed.     60 minutes spent on the date of the encounter doing chart review, review of test results, interpretation of tests, patient visit, documentation and discussion with other provider(s) Including reviewing CT images myself and with radiology and discussing treatment with Dr. Lopez.     Harshal Schuster PA-C  [facility] [address] [address]  [phone]          RUPERT Guaman

## 2021-05-10 NOTE — PROGRESS NOTES
Oncology/Hematology Visit Note  May 10, 2021    Reason for Visit: Follow up of undifferentiated pleomorphic sarcoma     History of Present Illness: Richa Ashby is a 55 year old female with PMH asthma and GERD with undifferentiated pleomorphic sarcoma. 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 She received preoperative radiotherapy and the tumor was resected 3/12/20. She then developed a cough, chest tightness, and scapular pain and imaging revealed lung nodules. Lung biopsy 11/2/20 with sarcoma. She was started on Doxil + Ifos 11/13/20. Had delay in cycle 2 due to COVID dx 12/2/20. CT imaging after cycle 1 with positive response to treatment with decrease in pulmonary nodules. Received cycle 2 12/18/20. Received cycle 3 1/15/21 with dose reduction in Doxil for skin toxicity and mucositis. Received cycle 4 2/15/21.      CT CAP 3/8/21 with positive response to treatment, decreased size of pulmonary mets though one lesion was larger. Did see fibrocystic changes in left breast, recommended breast imaging. Received cycle 5 Doxil + ifos 3/11/21.      Imaging 4/5/21 with overall stable disease except one lung lesion, opted to continue Doxil alone for now and switch to Keytruda in future if ongoing progression.     She was seen today for follow-up.     Interval History:  Connie was seen today for follow-up. She is doing OK, noting treatment went better without the ifosfamide. The neuropathy in her hands and feet persists and is worse in her feet. She also has noted new swelling/tightness in her hands and feet and that they feel stiff and sore. No rashes. Mouth sores were minimal and managed with Healios. She was able to eat and drink OK. She does feel fatigued. Her has had a cough for the past few weeks, no fevers, chest pain, or SOB. She has had a few headaches and positional dizziness. No nausea, vomiting, abdominal pain. Stools were really loose after  "treatment, now still soft but no diarrhea. No urinary issues. She is still working and has her son's wedding Memorial day weekend.    Current Outpatient Medications   Medication Sig Dispense Refill     acetaminophen (TYLENOL) 325 MG tablet Take 325-650 mg by mouth every 6 hours as needed for mild pain       albuterol (PROAIR RESPICLICK) 108 (90 Base) MCG/ACT inhaler Inhale 1-2 puffs into the lungs       cetirizine (ZYRTEC) 10 MG tablet Take 10 mg by mouth daily       gabapentin (NEURONTIN) 300 MG capsule 600mg PO qAM, 300mg PO q afternoon at 1400, and 600mg PO at bedtime. 120 capsule 1     granisetron (KYTRIL) 1 MG tablet Take 2 tablets (2 mg) by mouth daily Take 2 tablets (2 mg) every morning on Days 3 through 8. (Patient not taking: Reported on 4/13/2021) 12 tablet 1     lidocaine-prilocaine (EMLA) 2.5-2.5 % external cream Apply topically as needed for moderate pain 30 g 3     magic mouthwash suspension (diphenhydrAMINE, lidocaine, aluminum-magnesium & simethicone) Swish and swallow 10 mLs in mouth every 6 hours as needed for mouth sores 120 mL 3     melatonin (MELATONIN) 1 MG/ML LIQD liquid Take 5 mg by mouth       montelukast (SINGULAIR) 10 MG tablet Take 10 mg by mouth daily       omeprazole (PRILOSEC) 20 MG DR capsule        potassium chloride ER (KLOR-CON M) 20 MEQ CR tablet Take 1 tablet (20 mEq) by mouth daily 60 tablet 3     prochlorperazine (COMPAZINE) 10 MG tablet Take 1 tablet (10 mg) by mouth every 6 hours as needed (Nausea/Vomiting) 30 tablet 1     Pyridoxine HCl (VITAMIN B-6 PO) Take by mouth daily Pt unsure of dose       urea (CARMOL) 10 % external cream Apply topically as needed for dry skin 453 g 0     vitamin D3 (CHOLECALCIFEROL) 2000 units (50 mcg) tablet Take 1 tablet by mouth daily       Physical Examination:  /70   Pulse 89   Temp 98.3  F (36.8  C) (Oral)   Resp 18   Ht 1.702 m (5' 7.01\")   Wt 99.7 kg (219 lb 12.8 oz)   LMP 03/19/2019   SpO2 97%   BMI 34.42 kg/m    Wt Readings " from Last 10 Encounters:   04/13/21 101.6 kg (223 lb 14.4 oz)   03/11/21 102.5 kg (225 lb 14.4 oz)   02/15/21 105.2 kg (232 lb)   01/27/21 105.5 kg (232 lb 8 oz)   01/15/21 105.3 kg (232 lb 3.2 oz)   12/18/20 106.5 kg (234 lb 14.4 oz)   11/13/20 113.4 kg (250 lb 1.6 oz)   11/04/20 108.9 kg (240 lb)   11/02/20 110.2 kg (243 lb)   10/28/20 110.2 kg (243 lb)     Constitutional: Well-appearing female in no acute distress.  Eyes: EOMI, PERRL. No scleral icterus.  ENT: Oral mucosa is moist without lesions or thrush.   Lymphatic: Neck is supple without cervical or supraclavicular lymphadenopathy.  Cardiovascular: Regular rate and rhythm. No murmurs, gallops, or rubs. Trace bilateral peripheral edema.  Respiratory: Clear to auscultation bilaterally. No wheezes or crackles.  Gastrointestinal: Bowel sounds present. Abdomen soft, non-tender. No palpable hepatosplenomegaly or masses.   Neurologic: Cranial nerves II through XII are grossly intact.  Skin: No rashes, petechiae, or bruising noted on exposed skin. Hands swollen but without erythema or tenderness. No tenderness or peeling on soles of feet.     Laboratory Data:  Results for AMADEO WILSON (MRN [id number]) as of 5/10/2021 20:14   5/10/2021 07:39   Sodium 140   Potassium 3.9   Chloride 106   Carbon Dioxide 27   Urea Nitrogen 8   Creatinine 0.78   GFR Estimate 86   GFR Estimate If Black >90   Calcium 9.0   Anion Gap 7   Magnesium 2.2   Phosphorus 3.4   Albumin 2.8 (L)   Protein Total 7.2   Bilirubin Total 0.6   Alkaline Phosphatase 98   ALT 13   AST 7   Ferritin 621 (H)   Iron 38   Iron Binding Cap 176 (L)   Iron Saturation Index 22   Lactate Dehydrogenase 143   TSH 3.85   Glucose 113 (H)   WBC 4.3   Hemoglobin 7.9 (L)   Hematocrit 25.6 (L)   Platelet Count 218   RBC Count 2.59 (L)   MCV 99   MCH 30.5   MCHC 30.9 (L)   RDW 18.6 (H)   Diff Method Automated Method   % Neutrophils 63.8   % Lymphocytes 17.4   % Monocytes 14.1   % Eosinophils 3.8   % Basophils 0.7   %  Immature Granulocytes 0.2   Nucleated RBCs 0   Absolute Neutrophil 2.7   Absolute Lymphocytes 0.7 (L)   Absolute Monocytes 0.6   Absolute Eosinophils 0.2   Absolute Basophils 0.0   Abs Immature Granulocytes 0.0   Absolute Nucleated RBC 0.0     CT Chest 5/10/21  FINDINGS:     Lungs: The central tracheobronchial tree is patent. No pleural  effusion, pneumothorax, or focal consolidation. Several pulmonary  nodules/masses appear increased in size compared to prior, for example  (all series 4):  - 5.3 x 4.5 cm subpleural mass of the medial right upper lobe (image  59), previously 3.4 x 2.8 cm.  - 2.9 x 2.6 cm nodule of the right middle lobe (image 133), previously  2.2 x 2.1 cm.  - 1.9 x 1.8 cm nodule of the left lower lobe (image 163), previously  1.3 x 1.1 cm.     Mediastinum: Right chest wall Port-A-Cath tip in the mid SVC. The  visualized thyroid gland is unremarkable. Mild atherosclerotic  calcification of the aortic arch. The heart is not enlarged. No  significant pericardial effusion. The ascending aorta and main  pulmonary artery are normal in caliber. No mediastinal or axillary  lymphadenopathy.     Upper abdomen: Cholecystectomy clips. Splenules.     Bones/soft tissues: Degenerative changes of the spine. No acute or  suspicious osseous lesions. Stable hyperdensities of the left breast.                                                                      IMPRESSION: In this patient with a history of metastatic sarcoma:  1. Multiple metastatic lesions of the lungs are increased in size  compared to 4/5/2021, with the largest measuring 5.3 cm in the right  upper lobe (previously 3.4 cm), suspicious for disease progression.  2. Stable hyperdensities of the left breast. Consider further  evaluation with mammography.     I have personally reviewed the examination and initial interpretation  and I agree with the findings.     SHIRLEY INMAN MD    Assessment and Plan:  1. ONc  Metastatic UPS, was on Doxil +  Ifosfamide (5 cycles) with positive response to treatment initially, then due to poor tolerance switched to Doxil alone (1 cycle). Worsening hand/foot symptoms today and cough concerning for progression. Did CT Chest which confirmed progressive disease.     Will switch to Keytruda today. Discussed side effects and risk of immune mediated toxicities such as pneumonitis and colitis. Will plan for treatment every 3 weeks and repeat imaging after cycle 3.     2. Heme  Anemia: Previously thought 2/2 Ifosfamide, now low again at 7.9, iron studies without OH, suspect ongoing anemia related to chemotherapy and anemia of chronic disease. Will transfuse 1 unit today given she is symptomatic and recheck in 3 weeks.     Thrombocytopenia: 2/2 ifosfamide, resolved.     Neutropenia: 2/2 ifosfamide resolved. OK for dental work while on Keytruda.      3. FEN  Hypokalemia: 2/2 ifosfamide, improved, Ok to stop potassium supplement.     Hypophosphatemia: 2/2 ifosfamide, resolved.      Hydration/Nutrition: Doing OK at this time, should so well with Keytruda.     4. GI  GERD: continuing Omeprazole and tums, stable.      Nausea: Resolved.     Mucositis: 2/2 Doxil, no current issues.     Diarrhea: Intermittent, discussed Imodium PRN and monitoring with starting immunotherapy.     5. Derm  Hand/foot: 2/2 Doxil, was improved with dose reductions. Now with sore, tight skin and swelling of hands and feet. Stopping Doxil as above. Continue lotion PRN.     Dry skin: 2/2 Doxil, continue lotion.     6. Pulm  Asthma: stable, continue inhalers.     COVID: Diagnosed in December, recovered.    Suspect current cough related to disease progression vs infection given no acute findings on CT and no other concerning symptoms.      7. 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 Continues to be an issue. Palliative ramping up gabapentin.      Brain fog: No acute neuro concerns, not discussed.     60 minutes spent on the date of the encounter doing  chart review, review of test results, interpretation of tests, patient visit, documentation and discussion with other provider(s) Including reviewing CT images myself and with radiology and discussing treatment with Dr. Lopez.     Harshal Schuster PA-C  [facility] [address] [address]  [phone]

## 2021-05-10 NOTE — PROGRESS NOTES
Infusion Nursing Note:  Richa Ashby presents today for 1 unit PRBC and Cycle 1 Day 1 Keytruda.    Patient seen by provider today: Yes: RUPERT Guaman saw patient prior to infusion   present during visit today: Not Applicable.    Note:   TORB RUPERT Guaman/Diana Pederson RN at 1100 on 5/10/21  Only give 1 unit PRBC today.  Patient to start Keytruda today.    RUPERT Guaman provided patient with education for Keytruda.  Patient denies need for further education.  She has no more questions.    Patient denies need for pain intervention.      Intravenous Access:  Implanted Port.    Treatment Conditions:  Lab Results   Component Value Date    HGB 7.9 05/10/2021     Lab Results   Component Value Date    WBC 4.3 05/10/2021      Lab Results   Component Value Date    ANEU 2.7 05/10/2021     Lab Results   Component Value Date     05/10/2021      Lab Results   Component Value Date     05/10/2021                   Lab Results   Component Value Date    POTASSIUM 3.9 05/10/2021           Lab Results   Component Value Date    MAG 2.2 05/10/2021            Lab Results   Component Value Date    CR 0.78 05/10/2021                   Lab Results   Component Value Date    VIKTORIA 9.0 05/10/2021                Lab Results   Component Value Date    BILITOTAL 0.6 05/10/2021           Lab Results   Component Value Date    ALBUMIN 2.8 05/10/2021                    Lab Results   Component Value Date    ALT 13 05/10/2021           Lab Results   Component Value Date    AST 7 05/10/2021       Results reviewed, labs MET treatment parameters, ok to proceed with treatment.  Blood transfusion consent signed 1/7/21.      Post Infusion Assessment:  Patient tolerated infusion without incident.  Blood return noted pre and post infusion.  Site patent and intact, free from redness, edema or discomfort.  No evidence of extravasations.  Access discontinued per protocol.       Discharge Plan:   Patient  declined prescription refills.  Discharge instructions reviewed with: Patient.  Patient and/or family verbalized understanding of discharge instructions and all questions answered.  AVS to patient via "[employer].  Patient will return next appointment to be moved to 6/1/21.  Patient is aware to watch for this appointment change.  Harshal to make request to scheduling.   Patient discharged in stable condition accompanied by: self.  Departure Mode: Ambulatory.    Diana Pederson RN

## 2021-05-13 ENCOUNTER — VIRTUAL VISIT (OUTPATIENT)
Dept: ONCOLOGY | Facility: CLINIC | Age: 56
End: 2021-05-13
Attending: STUDENT IN AN ORGANIZED HEALTH CARE EDUCATION/TRAINING PROGRAM
Payer: COMMERCIAL

## 2021-05-13 DIAGNOSIS — F43.20 ADJUSTMENT DISORDER, UNSPECIFIED TYPE: Primary | ICD-10-CM

## 2021-05-13 DIAGNOSIS — Z51.5 ENCOUNTER FOR PALLIATIVE CARE: ICD-10-CM

## 2021-05-13 NOTE — LETTER
5/13/2021         RE: Richa Ashby  [address]  [place] [address]        Dear Colleague,    Thank you for referring your patient, Richa Ashby, to the [facility] [place]. Please see a copy of my visit note below.    Telemedicine Visit: The patient's condition can be safely assessed and treated via synchronous audio and visual telemedicine encounter.      Reason for Telemedicine Visit: covid19 precautions    Originating Site (Patient Location): Patient's home    Distant Site (Provider Location): [facility] Clinics: [facility]    Consent:  The patient/guardian has verbally consented to: the potential risks and benefits of telemedicine (video visit) versus in person care; bill my insurance or make self-payment for services provided; and responsibility for payment of non-covered services.     Mode of Communication:  Video Conference via [employer]    As the provider I attest to compliance with applicable laws and regulations related to telemedicine.      Palliative Care Clinical Social Work Return Appointment    PLEASE NOTE:  THIS IS A MENTAL HEALTH NOTE.  OTHER PROVIDERS VIEWING THIS NOTE SHOULD USE THIS ONLY FOR UNDERSTANDING THE CONTEXT OF THE PATIENT'S EXPERIENCE.  TOPICS DESCRIBED IN THIS NOTE SHOULD NOT BE REFERENCED TO THE PATIENT BY MEDICAL PROVIDERS.    Connie Ashby is a 56 year old woman with diagnosis of sarcoma, seen today via phone for a return psychotherapy appointment to address adjustment disorder  as it relates to coping with illness and treatment.  Visit was 20 minutes due to late start.     Mental Status Exam:(List all that apply)      Appearance: Appropriate      Eye Contact: Good       Orientation: Yes, x4      Mood: Normal      Affect: Appropriate      Thought Content: Clear         Thought Form: Logical      Psychomotor Behavior: Normal    Visit themes: communication with family    Adjustment to Illness: Connie shared that the  "findings of her most recent scans were \"bad\" and she is now on  Palliative ketruda.  She is feeling ok, continues to work     Mental Health (thoughts, feelings, actions, coping, and symptoms): Had a good conversation with her   About her emotional needs and her concerns that her expressing emotion with family will upset others.   was supportive, shared \"we can't help if we don't know what you're feeling or what you need\".  Processed around this and she described the conversation as \"hard but good\" and shared that she feels \"I'm in a better place\".  She is thinking about how to talk with her children as well--they are in regular contact, but she feels it is important to talk with them about how she is doing emotionally.  She does not want to do this at the next 2 events where she will see them--a grandchild's birthday and a wedding, which I agreed was appropriate.  Did suggest being intentional and setting aside a designated time to talk to them.     Helpful activities: time with family    Helpful cognitions:     Unhelpful and/or triggering or exacerbating factors:     Body-Mind Skills Education:     Relationships: supportive.      End of Life and Advance Care Planning:     Main therapeutic interventions provided this session include:   Provide psychoeducation, Facilitate processing of thoughts and feelings and Facilitate structured problem solving    Plan:  Will return for psychotherapy with palliative care focus in 1 month    Time spent with patient/family:  20 minutes (Start 4;06, end 4:25)          Client has contributed regarding goals and concerns, but has not reviewed the treatment plan. Plan to review at future session.    TEJAS Macias, [facility]  Palliative Care Clinical        DO NOT SEND ANY LETTERS                    Again, thank you for allowing me to participate in the care of your patient.        Sincerely,        YAMILETH Macias    "

## 2021-05-13 NOTE — PROGRESS NOTES
"Telemedicine Visit: The patient's condition can be safely assessed and treated via synchronous audio and visual telemedicine encounter.      Reason for Telemedicine Visit: covid19 precautions    Originating Site (Patient Location): Patient's home    Distant Site (Provider Location): [facility]: [facility]    Consent:  The patient/guardian has verbally consented to: the potential risks and benefits of telemedicine (video visit) versus in person care; bill my insurance or make self-payment for services provided; and responsibility for payment of non-covered services.     Mode of Communication:  Video Conference via [employer]    As the provider I attest to compliance with applicable laws and regulations related to telemedicine.      Palliative Care Clinical Social Work Return Appointment    PLEASE NOTE:  THIS IS A MENTAL HEALTH NOTE.  OTHER PROVIDERS VIEWING THIS NOTE SHOULD USE THIS ONLY FOR UNDERSTANDING THE CONTEXT OF THE PATIENT'S EXPERIENCE.  TOPICS DESCRIBED IN THIS NOTE SHOULD NOT BE REFERENCED TO THE PATIENT BY MEDICAL PROVIDERS.    Connie Ashby is a 56 year old woman with diagnosis of sarcoma, seen today via phone for a return psychotherapy appointment to address adjustment disorder  as it relates to coping with illness and treatment.  Visit was 20 minutes due to late start.     Mental Status Exam:(List all that apply)      Appearance: Appropriate      Eye Contact: Good       Orientation: Yes, x4      Mood: Normal      Affect: Appropriate      Thought Content: Clear         Thought Form: Logical      Psychomotor Behavior: Normal    Visit themes: communication with family    Adjustment to Illness: Connie shared that the findings of her most recent scans were \"bad\" and she is now on  Palliative ketruda.  She is feeling ok, continues to work     Mental Health (thoughts, feelings, actions, coping, and symptoms): Had a good conversation with her   About her emotional needs and her concerns " "that her expressing emotion with family will upset others.   was supportive, shared \"we can't help if we don't know what you're feeling or what you need\".  Processed around this and she described the conversation as \"hard but good\" and shared that she feels \"I'm in a better place\".  She is thinking about how to talk with her children as well--they are in regular contact, but she feels it is important to talk with them about how she is doing emotionally.  She does not want to do this at the next 2 events where she will see them--a grandchild's birthday and a wedding, which I agreed was appropriate.  Did suggest being intentional and setting aside a designated time to talk to them.     Helpful activities: time with family    Helpful cognitions:     Unhelpful and/or triggering or exacerbating factors:     Body-Mind Skills Education:     Relationships: supportive.      End of Life and Advance Care Planning:     Main therapeutic interventions provided this session include:   Provide psychoeducation, Facilitate processing of thoughts and feelings and Facilitate structured problem solving    Plan:  Will return for psychotherapy with palliative care focus in 1 month    Time spent with patient/family:  20 minutes (Start 4;06, end 4:25)          Client has contributed regarding goals and concerns, but has not reviewed the treatment plan. Plan to review at future session.    TEJAS Macias, [facility]  Palliative Care Clinical        DO NOT SEND ANY LETTERS                "

## 2021-05-13 NOTE — LETTER
5/13/2021         RE: Richa Ashby  [address]  [address]  [place] [address]        Dear Colleague,    Thank you for referring your patient, Richa Ashby, to the [facility] [place]. Please see a copy of my visit note below.    Telemedicine Visit: The patient's condition can be safely assessed and treated via synchronous audio and visual telemedicine encounter.      Reason for Telemedicine Visit: covid19 precautions    Originating Site (Patient Location): Patient's home    Distant Site (Provider Location): [facility] Clinics: [facility]    Consent:  The patient/guardian has verbally consented to: the potential risks and benefits of telemedicine (video visit) versus in person care; bill my insurance or make self-payment for services provided; and responsibility for payment of non-covered services.     Mode of Communication:  Video Conference via [employer]    As the provider I attest to compliance with applicable laws and regulations related to telemedicine.      Palliative Care Clinical Social Work Return Appointment    PLEASE NOTE:  THIS IS A MENTAL HEALTH NOTE.  OTHER PROVIDERS VIEWING THIS NOTE SHOULD USE THIS ONLY FOR UNDERSTANDING THE CONTEXT OF THE PATIENT'S EXPERIENCE.  TOPICS DESCRIBED IN THIS NOTE SHOULD NOT BE REFERENCED TO THE PATIENT BY MEDICAL PROVIDERS.    Connie Ashby is a 56 year old woman with diagnosis of sarcoma, seen today via phone for a return psychotherapy appointment to address adjustment disorder  as it relates to coping with illness and treatment.  Visit was 20 minutes due to late start.     Mental Status Exam:(List all that apply)      Appearance: Appropriate      Eye Contact: Good       Orientation: Yes, x4      Mood: Normal      Affect: Appropriate      Thought Content: Clear         Thought Form: Logical      Psychomotor Behavior: Normal    Visit themes: communication with family    Adjustment to Illness: Connie shared that the  "findings of her most recent scans were \"bad\" and she is now on  Palliative ketruda.  She is feeling ok, continues to work     Mental Health (thoughts, feelings, actions, coping, and symptoms): Had a good conversation with her   About her emotional needs and her concerns that her expressing emotion with family will upset others.   was supportive, shared \"we can't help if we don't know what you're feeling or what you need\".  Processed around this and she described the conversation as \"hard but good\" and shared that she feels \"I'm in a better place\".  She is thinking about how to talk with her children as well--they are in regular contact, but she feels it is important to talk with them about how she is doing emotionally.  She does not want to do this at the next 2 events where she will see them--a grandchild's birthday and a wedding, which I agreed was appropriate.  Did suggest being intentional and setting aside a designated time to talk to them.     Helpful activities: time with family    Helpful cognitions:     Unhelpful and/or triggering or exacerbating factors:     Body-Mind Skills Education:     Relationships: supportive.      End of Life and Advance Care Planning:     Main therapeutic interventions provided this session include:   Provide psychoeducation, Facilitate processing of thoughts and feelings and Facilitate structured problem solving    Plan:  Will return for psychotherapy with palliative care focus in 1 month    Time spent with patient/family:  20 minutes (Start 4;06, end 4:25)          Client has contributed regarding goals and concerns, but has not reviewed the treatment plan. Plan to review at future session.    TEJAS Macias, [facility]  Palliative Care Clinical        DO NOT SEND ANY LETTERS                    Again, thank you for allowing me to participate in the care of your patient.        Sincerely,        YAMILETH Macias    "

## 2021-05-26 ENCOUNTER — APPOINTMENT (OUTPATIENT)
Dept: LAB | Facility: CLINIC | Age: 56
End: 2021-05-26
Attending: PHYSICIAN ASSISTANT
Payer: COMMERCIAL

## 2021-05-26 ENCOUNTER — TELEPHONE (OUTPATIENT)
Dept: ONCOLOGY | Facility: CLINIC | Age: 56
End: 2021-05-26

## 2021-05-26 ENCOUNTER — ONCOLOGY VISIT (OUTPATIENT)
Dept: ONCOLOGY | Facility: CLINIC | Age: 56
End: 2021-05-26
Attending: PHYSICIAN ASSISTANT
Payer: COMMERCIAL

## 2021-05-26 ENCOUNTER — ANCILLARY PROCEDURE (OUTPATIENT)
Dept: CT IMAGING | Facility: CLINIC | Age: 56
End: 2021-05-26
Attending: PHYSICIAN ASSISTANT
Payer: COMMERCIAL

## 2021-05-26 VITALS
DIASTOLIC BLOOD PRESSURE: 60 MMHG | HEART RATE: 84 BPM | BODY MASS INDEX: 33.79 KG/M2 | SYSTOLIC BLOOD PRESSURE: 109 MMHG | RESPIRATION RATE: 16 BRPM | TEMPERATURE: 99 F | WEIGHT: 215.8 LBS | OXYGEN SATURATION: 96 %

## 2021-05-26 DIAGNOSIS — C49.9 SARCOMA (H): Primary | ICD-10-CM

## 2021-05-26 DIAGNOSIS — R53.83 OTHER FATIGUE: ICD-10-CM

## 2021-05-26 DIAGNOSIS — R06.00 DYSPNEA, UNSPECIFIED TYPE: ICD-10-CM

## 2021-05-26 DIAGNOSIS — C49.9 SARCOMA (H): ICD-10-CM

## 2021-05-26 DIAGNOSIS — R05.9 COUGH: Primary | ICD-10-CM

## 2021-05-26 DIAGNOSIS — R05.9 COUGH: ICD-10-CM

## 2021-05-26 LAB
ABO + RH BLD: NORMAL
ABO + RH BLD: NORMAL
ALBUMIN SERPL-MCNC: 2.7 G/DL (ref 3.4–5)
ALP SERPL-CCNC: 108 U/L (ref 40–150)
ALT SERPL W P-5'-P-CCNC: 16 U/L (ref 0–50)
ANION GAP SERPL CALCULATED.3IONS-SCNC: 8 MMOL/L (ref 3–14)
AST SERPL W P-5'-P-CCNC: 7 U/L (ref 0–45)
BASOPHILS # BLD AUTO: 0 10E9/L (ref 0–0.2)
BASOPHILS NFR BLD AUTO: 0.5 %
BILIRUB SERPL-MCNC: 0.5 MG/DL (ref 0.2–1.3)
BLD GP AB SCN SERPL QL: NORMAL
BLOOD BANK CMNT PATIENT-IMP: NORMAL
BUN SERPL-MCNC: 8 MG/DL (ref 7–30)
C PNEUM DNA SPEC QL NAA+PROBE: NOT DETECTED
CALCIUM SERPL-MCNC: 9.2 MG/DL (ref 8.5–10.1)
CHLORIDE SERPL-SCNC: 105 MMOL/L (ref 94–109)
CO2 SERPL-SCNC: 26 MMOL/L (ref 20–32)
CREAT SERPL-MCNC: 0.75 MG/DL (ref 0.52–1.04)
DIFFERENTIAL METHOD BLD: ABNORMAL
EOSINOPHIL # BLD AUTO: 0.3 10E9/L (ref 0–0.7)
EOSINOPHIL NFR BLD AUTO: 3.3 %
ERYTHROCYTE [DISTWIDTH] IN BLOOD BY AUTOMATED COUNT: 17.2 % (ref 10–15)
FLUAV H1 2009 PAND RNA SPEC QL NAA+PROBE: NOT DETECTED
FLUAV H1 RNA SPEC QL NAA+PROBE: NOT DETECTED
FLUAV H3 RNA SPEC QL NAA+PROBE: NOT DETECTED
FLUAV RNA SPEC QL NAA+PROBE: NOT DETECTED
FLUBV RNA SPEC QL NAA+PROBE: NOT DETECTED
GFR SERPL CREATININE-BSD FRML MDRD: 89 ML/MIN/{1.73_M2}
GLUCOSE SERPL-MCNC: 106 MG/DL (ref 70–99)
HADV DNA SPEC QL NAA+PROBE: NOT DETECTED
HCOV PNL SPEC NAA+PROBE: NOT DETECTED
HCT VFR BLD AUTO: 27.9 % (ref 35–47)
HGB BLD-MCNC: 8.6 G/DL (ref 11.7–15.7)
HMPV RNA SPEC QL NAA+PROBE: NOT DETECTED
HPIV1 RNA SPEC QL NAA+PROBE: NOT DETECTED
HPIV2 RNA SPEC QL NAA+PROBE: NOT DETECTED
HPIV3 RNA SPEC QL NAA+PROBE: NOT DETECTED
HPIV4 RNA SPEC QL NAA+PROBE: NOT DETECTED
IMM GRANULOCYTES # BLD: 0 10E9/L (ref 0–0.4)
IMM GRANULOCYTES NFR BLD: 0.4 %
LABORATORY COMMENT REPORT: NORMAL
LYMPHOCYTES # BLD AUTO: 0.9 10E9/L (ref 0.8–5.3)
LYMPHOCYTES NFR BLD AUTO: 11.3 %
M PNEUMO DNA SPEC QL NAA+PROBE: NOT DETECTED
MAGNESIUM SERPL-MCNC: 2 MG/DL (ref 1.6–2.3)
MCH RBC QN AUTO: 30 PG (ref 26.5–33)
MCHC RBC AUTO-ENTMCNC: 30.8 G/DL (ref 31.5–36.5)
MCV RBC AUTO: 97 FL (ref 78–100)
MICROBIOLOGIST REVIEW: NORMAL
MONOCYTES # BLD AUTO: 0.7 10E9/L (ref 0–1.3)
MONOCYTES NFR BLD AUTO: 9.4 %
NEUTROPHILS # BLD AUTO: 5.7 10E9/L (ref 1.6–8.3)
NEUTROPHILS NFR BLD AUTO: 75.1 %
NRBC # BLD AUTO: 0 10*3/UL
NRBC BLD AUTO-RTO: 0 /100
NT-PROBNP SERPL-MCNC: 114 PG/ML (ref 0–125)
PHOSPHATE SERPL-MCNC: 2.7 MG/DL (ref 2.5–4.5)
PLATELET # BLD AUTO: 205 10E9/L (ref 150–450)
POTASSIUM SERPL-SCNC: 3.9 MMOL/L (ref 3.4–5.3)
PROT SERPL-MCNC: 7.4 G/DL (ref 6.8–8.8)
RBC # BLD AUTO: 2.87 10E12/L (ref 3.8–5.2)
RSV RNA SPEC QL NAA+PROBE: NOT DETECTED
RSV RNA SPEC QL NAA+PROBE: NOT DETECTED
RV+EV RNA SPEC QL NAA+PROBE: NOT DETECTED
SARS-COV-2 RNA RESP QL NAA+PROBE: NEGATIVE
SARS-COV-2 RNA RESP QL NAA+PROBE: NORMAL
SODIUM SERPL-SCNC: 138 MMOL/L (ref 133–144)
SPECIMEN EXP DATE BLD: NORMAL
SPECIMEN SOURCE: NORMAL
SPECIMEN SOURCE: NORMAL
TSH SERPL DL<=0.005 MIU/L-ACNC: 2.36 MU/L (ref 0.4–4)
WBC # BLD AUTO: 7.6 10E9/L (ref 4–11)

## 2021-05-26 PROCEDURE — 86900 BLOOD TYPING SEROLOGIC ABO: CPT | Performed by: PHYSICIAN ASSISTANT

## 2021-05-26 PROCEDURE — 99417 PROLNG OP E/M EACH 15 MIN: CPT | Performed by: PHYSICIAN ASSISTANT

## 2021-05-26 PROCEDURE — 86901 BLOOD TYPING SEROLOGIC RH(D): CPT | Performed by: PHYSICIAN ASSISTANT

## 2021-05-26 PROCEDURE — 80053 COMPREHEN METABOLIC PANEL: CPT | Performed by: PHYSICIAN ASSISTANT

## 2021-05-26 PROCEDURE — 87581 M.PNEUMON DNA AMP PROBE: CPT | Performed by: PHYSICIAN ASSISTANT

## 2021-05-26 PROCEDURE — 85025 COMPLETE CBC W/AUTO DIFF WBC: CPT | Performed by: PHYSICIAN ASSISTANT

## 2021-05-26 PROCEDURE — 84443 ASSAY THYROID STIM HORMONE: CPT | Performed by: PHYSICIAN ASSISTANT

## 2021-05-26 PROCEDURE — 71275 CT ANGIOGRAPHY CHEST: CPT | Performed by: RADIOLOGY

## 2021-05-26 PROCEDURE — 36591 DRAW BLOOD OFF VENOUS DEVICE: CPT

## 2021-05-26 PROCEDURE — 83880 ASSAY OF NATRIURETIC PEPTIDE: CPT | Performed by: PHYSICIAN ASSISTANT

## 2021-05-26 PROCEDURE — 83735 ASSAY OF MAGNESIUM: CPT | Performed by: PHYSICIAN ASSISTANT

## 2021-05-26 PROCEDURE — 86850 RBC ANTIBODY SCREEN: CPT | Performed by: PHYSICIAN ASSISTANT

## 2021-05-26 PROCEDURE — U0005 INFEC AGEN DETEC AMPLI PROBE: HCPCS | Performed by: PHYSICIAN ASSISTANT

## 2021-05-26 PROCEDURE — 87486 CHLMYD PNEUM DNA AMP PROBE: CPT | Performed by: PHYSICIAN ASSISTANT

## 2021-05-26 PROCEDURE — 87633 RESP VIRUS 12-25 TARGETS: CPT | Performed by: PHYSICIAN ASSISTANT

## 2021-05-26 PROCEDURE — U0003 INFECTIOUS AGENT DETECTION BY NUCLEIC ACID (DNA OR RNA); SEVERE ACUTE RESPIRATORY SYNDROME CORONAVIRUS 2 (SARS-COV-2) (CORONAVIRUS DISEASE [COVID-19]), AMPLIFIED PROBE TECHNIQUE, MAKING USE OF HIGH THROUGHPUT TECHNOLOGIES AS DESCRIBED BY CMS-2020-01-R: HCPCS | Performed by: PHYSICIAN ASSISTANT

## 2021-05-26 PROCEDURE — G0463 HOSPITAL OUTPT CLINIC VISIT: HCPCS

## 2021-05-26 PROCEDURE — 99215 OFFICE O/P EST HI 40 MIN: CPT | Performed by: PHYSICIAN ASSISTANT

## 2021-05-26 PROCEDURE — 250N000011 HC RX IP 250 OP 636: Performed by: PHYSICIAN ASSISTANT

## 2021-05-26 PROCEDURE — 84100 ASSAY OF PHOSPHORUS: CPT | Performed by: PHYSICIAN ASSISTANT

## 2021-05-26 RX ORDER — IOPAMIDOL 755 MG/ML
71 INJECTION, SOLUTION INTRAVASCULAR ONCE
Status: COMPLETED | OUTPATIENT
Start: 2021-05-26 | End: 2021-05-26

## 2021-05-26 RX ORDER — HEPARIN SODIUM (PORCINE) LOCK FLUSH IV SOLN 100 UNIT/ML 100 UNIT/ML
5 SOLUTION INTRAVENOUS ONCE
Status: COMPLETED | OUTPATIENT
Start: 2021-05-26 | End: 2021-05-26

## 2021-05-26 RX ORDER — BENZONATATE 100 MG/1
100-200 CAPSULE ORAL 3 TIMES DAILY PRN
Qty: 60 CAPSULE | Refills: 1 | Status: SHIPPED | OUTPATIENT
Start: 2021-05-26 | End: 2021-07-06

## 2021-05-26 RX ADMIN — IOPAMIDOL 71 ML: 755 INJECTION, SOLUTION INTRAVASCULAR at 12:12

## 2021-05-26 RX ADMIN — Medication 5 ML: at 10:28

## 2021-05-26 ASSESSMENT — PAIN SCALES - GENERAL: PAINLEVEL: SEVERE PAIN (6)

## 2021-05-26 NOTE — LETTER
5/26/2021         RE: Rihca Ashby  [address]  [address]  [place] [address]      Oncology/Hematology Visit Note  May 26, 2021    Reason for Visit: Follow up of undifferentiated pleomorphic sarcoma     History of Present Illness: Richa Ashby is a 55 year old female with PMH asthma and GERD with undifferentiated pleomorphic sarcoma. 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 She received preoperative radiotherapy and the tumor was resected 3/12/20. She then developed a cough, chest tightness, and scapular pain and imaging revealed lung nodules. Lung biopsy 11/2/20 with sarcoma. She was started on Doxil + Ifos 11/13/20. Had delay in cycle 2 due to COVID dx 12/2/20. CT imaging after cycle 1 with positive response to treatment with decrease in pulmonary nodules. Received cycle 2 12/18/20. Received cycle 3 1/15/21 with dose reduction in Doxil for skin toxicity and mucositis. Received cycle 4 2/15/21.      CT CAP 3/8/21 with positive response to treatment, decreased size of pulmonary mets though one lesion was larger. Did see fibrocystic changes in left breast, recommended breast imaging. Received cycle 5 Doxil + ifos 3/11/21.      Imaging 4/5/21 with overall stable disease except one lung lesion, opted to continue Doxil alone for now and switch to Keytruda in future if ongoing progression.      Started Keytruda 5/10/21.    I was asked to see her today as an add on for SOB and cough.      Interval History:  Connie was seen today for follow-up. She states she did well after her Keytruda infusion on 5/10/21 until this last weekend when she developed body aches, mild diarrhea )max 3 episodes per day), and fatigue along with progression of her dry cough. Then yesterday she noted SOB and used her pulse ox at home which was reading 80-90%, improved with deep breathing. She was also extremely tired and overall didn't feel well. This  slightly improved and today she feels better but still has NAZARIO and cough with low energy. Her O2 sats have been normal.     She denies fevers, headaches. Has had mild dizziness. No chest pain, though she does have occasional twinge of pain in her right back with deep breathing. No nausea, abdominal pain, blood in stool, urinary issues. Still has some swelling in ankles. Her neuropathy in her hands is much improved though the feet are still quite sore and difficult to walk at times. She has been eating and drinking OK.    Her son's wedding is this weekend and she flies to [place] tomorrow.     Current Outpatient Medications   Medication Sig Dispense Refill     acetaminophen (TYLENOL) 325 MG tablet Take 325-650 mg by mouth every 6 hours as needed for mild pain       albuterol (PROAIR RESPICLICK) 108 (90 Base) MCG/ACT inhaler Inhale 1-2 puffs into the lungs       cetirizine (ZYRTEC) 10 MG tablet Take 10 mg by mouth daily       gabapentin (NEURONTIN) 300 MG capsule 600 mg morning & afternoon - 900 mg at bedtime 120 capsule 1     melatonin (MELATONIN) 1 MG/ML LIQD liquid Take 5 mg by mouth       montelukast (SINGULAIR) 10 MG tablet Take 10 mg by mouth daily       multivitamin w/minerals (MULTI-VITAMIN) tablet Take 1 tablet by mouth daily       omeprazole (PRILOSEC) 20 MG DR capsule        lidocaine-prilocaine (EMLA) 2.5-2.5 % external cream Apply topically as needed for moderate pain (Patient not taking: Reported on 5/26/2021) 30 g 3     magic mouthwash suspension (diphenhydrAMINE, lidocaine, aluminum-magnesium & simethicone) Swish and swallow 10 mLs in mouth every 6 hours as needed for mouth sores (Patient not taking: Reported on 5/26/2021) 120 mL 3     urea (CARMOL) 10 % external cream Apply topically as needed for dry skin (Patient not taking: Reported on 5/26/2021) 453 g 0     vitamin D3 (CHOLECALCIFEROL) 2000 units (50 mcg) tablet Take 1 tablet by mouth daily         Physical Examination:  /60 (BP Location:  Right arm, Patient Position: Sitting, Cuff Size: Adult Large)   Pulse 84   Temp 99  F (37.2  C) (Oral)   Resp 16   Wt 97.9 kg (215 lb 12.8 oz)   LMP 03/19/2019   SpO2 96%   BMI 33.79 kg/m    Wt Readings from Last 10 Encounters:   05/26/21 97.9 kg (215 lb 12.8 oz)   05/10/21 99.7 kg (219 lb 12.8 oz)   04/13/21 101.6 kg (223 lb 14.4 oz)   03/11/21 102.5 kg (225 lb 14.4 oz)   02/15/21 105.2 kg (232 lb)   01/27/21 105.5 kg (232 lb 8 oz)   01/15/21 105.3 kg (232 lb 3.2 oz)   12/18/20 106.5 kg (234 lb 14.4 oz)   11/13/20 113.4 kg (250 lb 1.6 oz)   11/04/20 108.9 kg (240 lb)     Constitutional: Well-appearing female in no acute distress.  Eyes: EOMI, PERRL. No scleral icterus.  ENT: Oral mucosa is moist without lesions or thrush.   Lymphatic: Neck is supple without cervical or supraclavicular lymphadenopathy.   Cardiovascular: Regular rate and rhythm. No murmurs, gallops, or rubs. Trace peripheral edema in ankles, slightly sore to palpation.  Respiratory: Clear to auscultation bilaterally. No wheezes or crackles. SpO2 97-99% at rest and with walking.  Gastrointestinal: Bowel sounds present. Abdomen soft, non-tender.  Neurologic: Cranial nerves II through XII are grossly intact.  Skin: No rashes, petechiae, or bruising noted on exposed skin.    Laboratory Data:  Results for AMADEO WILSON (MRN [id number]) as of 5/28/2021 10:07   5/26/2021 10:38   Sodium 138   Potassium 3.9   Chloride 105   Carbon Dioxide 26   Urea Nitrogen 8   Creatinine 0.75   GFR Estimate 89   GFR Estimate If Black >90   Calcium 9.2   Anion Gap 8   Magnesium 2.0   Phosphorus 2.7   Albumin 2.7 (L)   Protein Total 7.4   Bilirubin Total 0.5   Alkaline Phosphatase 108   ALT 16   AST 7   N-Terminal Pro Bnp 114   TSH 2.36   Glucose 106 (H)   WBC 7.6   Hemoglobin 8.6 (L)   Hematocrit 27.9 (L)   Platelet Count 205   RBC Count 2.87 (L)   MCV 97   MCH 30.0   MCHC 30.8 (L)   RDW 17.2 (H)   Diff Method Automated Method   % Neutrophils 75.1   % Lymphocytes  11.3   % Monocytes 9.4   % Eosinophils 3.3   % Basophils 0.5   % Immature Granulocytes 0.4   Nucleated RBCs 0   Absolute Neutrophil 5.7   Absolute Lymphocytes 0.9   Absolute Monocytes 0.7   Absolute Eosinophils 0.3   Absolute Basophils 0.0   Abs Immature Granulocytes 0.0   Absolute Nucleated RBC 0.0   ABO O   RH(D) Pos   Antibody Screen Neg   Test Valid Only At [facility]   Specimen Expires 05/29/2021     Results for AMADEO WILSON (MRN [id number]) as of 5/28/2021 10:07   5/26/2021 11:35   COVID-19 Virus PCR to [facility] - Source Nasopharyngeal   COVID-19 Virus PCR to [facility] - Result Test received-See reflex to IDDL test SARS CoV2 (COVID-19) Virus RT-PCR   SARS-CoV-2 Virus Specimen Source Nasopharyngeal   SARS-CoV-2 PCR Result NEGATIVE       Ref Range & Units 2d ago    Adenovirus NDET^Not Detected Not Detected     Coronavirus NDET^Not Detected Not Detected    Comment: This test detects Coronavirus 229E, HKU1, NL63, and OC43 but does not   distinguish between them. It does not detect MERS ( Respiratory   Syndrome), SARS (Severe Acute Respiratory Syndrome) or 2019-nCoV (2019 Novel)   Coronavirus.     Human Metapneumovirus NDET^Not Detected Not Detected     Human Rhinovirus/Enterovirus NDET^Not Detected Not Detected     Influenza A NDET^Not Detected Not Detected     Influenza A, H1 NDET^Not Detected Not Detected     Influenza A 2009 H1N1 NDET^Not Detected Not Detected     Influenza A, H3 NDET^Not Detected Not Detected     Influenza B NDET^Not Detected Not Detected     Parainfluenza Virus 1 NDET^Not Detected Not Detected     Parainfluenza Virus 2 NDET^Not Detected Not Detected     Parainfluenza Virus 3 NDET^Not Detected Not Detected     Parainfluenza Virus 4 NDET^Not Detected Not Detected     Respiratory Syncytial Virus A NDET^Not Detected Not Detected     Respiratory Syncytial Virus B NDET^Not Detected Not Detected     Chlamydia pneumoniae NDET^Not Detected  Not Detected     Mycoplasma pneumoniae NDET^Not Detected Not Detected      FINDINGS: Right chest wall port catheter tip is in the distal SVC. No  enlarged axillary, mediastinal or hilar lymph nodes. Somewhat rounded  density is noted in the left breast (series 10 image 170) measuring  2.3 cm. This is of uncertain etiology comment please correlate with  breast mammograms and/or ultrasound. Upper abdomen shows an accessory  splenule with no masses.  The included thyroid appears normal. No pleural or pericardial  effusion. Multiple pulmonary masses are present, the largest in the  paravertebral right upper lobe (series 10 image 82) measures  approximately 6.2 x 5.6 cm in the axial plane. A right middle lobe  nodule with vascularity (series 10 image 126) measures 2.9 cm in  greatest dimension. A left lower lobe nodule (series 10 image 154)  measures 2.4 cm. A pleural-based left lung base posterior nodule  (series 10 image 220) measures 0.5 cm.  No evidence of hypodense filling defect in the well opacified  pulmonary artery tree. The right heart is not enlarged. Prominent fat  invagination just superior to the infrahepatic IVC incidentally noted.  Bones show mild degenerative changes in the thoracic spine. No  suspicious sclerotic or lytic/destructive lesions. Mild levocurvature  of the upper thoracic spine.     With regard to the pulmonary findings and in comparison to 5/10/2021,  no significant interval change in the size or number of the probable  metastases.                                                                      IMPRESSION: Stable size and number of multiple pulmonary nodules and  masses concerning for metastatic disease given history of sarcoma. No  evidence of pulmonary embolus or right heart strain. Left breast  rounded density of uncertain etiology, this may represent a benign  cyst but correlation with relevant mammograms and/or breast ultrasound  is recommended.     SHIRLEY INMAN,  MD    Assessment and Plan:    1. Dyspnea and Cough  Progressive cough and new dyspnea with occasional pleuritic pain, body aches, and diarrhea in setting of metastatic sarcoma. DDx includes PE, viral infection including COVID (though less likely given she is fully vaccinated and had in past), pneumonitis from Keytruda, pneumonia (though less likely no fevers), cardiac etiology, disease progression.     Following work-up done:  -CBC, CMP, NT-BNP, TSH: All WNL, anemia improving so no need for blood transfusion. WBC WNL so argues against infection. With normal NT-BNP no need for echo. CMP stable, no signs of dehydration   -Respiratory viral panel: Negative  -COVID 19: Negative  -CT PE: No PE or focal infection, no signs of pneumonitis, metastatic disease stable. I reviewed the images personally with Dr. Lopez in addition to the radiology report. Reviewed images with patient.     Discussed case with thoracic surgery. They note an elevated right hemidiaphragm and that she may have phrenic nerve involvement of RUL tumor which could explain her dyspnea and cough. No easy solution to this outside of hopefully getting a response from treatment, though pending course could refer to throracic surgery in future to discuss fixation of R hemidiaphragm. Could also order sniff fluoroscopic test to confirm diagnosis.     For now discussed following plan:  -Tessalon 100-200mg TID PRN cough. She declines Guaifenesin with Codeine  -Tylenol PRN body aches (likely related to Keytruda)  -Imodium PRN mild diarrhea (likely related to Keytruda) but need to call if diarrhea worsens or develop pain/bleeding  -OK to travel for wedding this weekend (confirmed with both Dr. Lopez and thoracic surgery)    I will see her next week for her schedule appointment prior to cycle 2 Keytruda.    75 minutes spent on the date of the encounter doing chart review, review of test results, interpretation of tests, patient visit, documentation and discussion  with other provider(s)     Harshal Schuster PA-C  [facility] [id number] [place] [address] [phone]          RUPERT Guaman

## 2021-05-26 NOTE — PROGRESS NOTES
Oncology/Hematology Visit Note  May 26, 2021    Reason for Visit: Follow up of undifferentiated pleomorphic sarcoma     History of Present Illness: Richa Ashby is a 55 year old female with PMH asthma and GERD with undifferentiated pleomorphic sarcoma. 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 She received preoperative radiotherapy and the tumor was resected 3/12/20. She then developed a cough, chest tightness, and scapular pain and imaging revealed lung nodules. Lung biopsy 11/2/20 with sarcoma. She was started on Doxil + Ifos 11/13/20. Had delay in cycle 2 due to COVID dx 12/2/20. CT imaging after cycle 1 with positive response to treatment with decrease in pulmonary nodules. Received cycle 2 12/18/20. Received cycle 3 1/15/21 with dose reduction in Doxil for skin toxicity and mucositis. Received cycle 4 2/15/21.      CT CAP 3/8/21 with positive response to treatment, decreased size of pulmonary mets though one lesion was larger. Did see fibrocystic changes in left breast, recommended breast imaging. Received cycle 5 Doxil + ifos 3/11/21.      Imaging 4/5/21 with overall stable disease except one lung lesion, opted to continue Doxil alone for now and switch to Keytruda in future if ongoing progression.      Started Keytruda 5/10/21.    I was asked to see her today as an add on for SOB and cough.      Interval History:  Connie was seen today for follow-up. She states she did well after her Keytruda infusion on 5/10/21 until this last weekend when she developed body aches, mild diarrhea )max 3 episodes per day), and fatigue along with progression of her dry cough. Then yesterday she noted SOB and used her pulse ox at home which was reading 80-90%, improved with deep breathing. She was also extremely tired and overall didn't feel well. This slightly improved and today she feels better but still has NAZARIO and cough with low energy. Her O2 sats have  been normal.     She denies fevers, headaches. Has had mild dizziness. No chest pain, though she does have occasional twinge of pain in her right back with deep breathing. No nausea, abdominal pain, blood in stool, urinary issues. Still has some swelling in ankles. Her neuropathy in her hands is much improved though the feet are still quite sore and difficult to walk at times. She has been eating and drinking OK.    Her son's wedding is this weekend and she flies to [place] tomorrow.     Current Outpatient Medications   Medication Sig Dispense Refill     acetaminophen (TYLENOL) 325 MG tablet Take 325-650 mg by mouth every 6 hours as needed for mild pain       albuterol (PROAIR RESPICLICK) 108 (90 Base) MCG/ACT inhaler Inhale 1-2 puffs into the lungs       cetirizine (ZYRTEC) 10 MG tablet Take 10 mg by mouth daily       gabapentin (NEURONTIN) 300 MG capsule 600 mg morning & afternoon - 900 mg at bedtime 120 capsule 1     melatonin (MELATONIN) 1 MG/ML LIQD liquid Take 5 mg by mouth       montelukast (SINGULAIR) 10 MG tablet Take 10 mg by mouth daily       multivitamin w/minerals (MULTI-VITAMIN) tablet Take 1 tablet by mouth daily       omeprazole (PRILOSEC) 20 MG DR capsule        lidocaine-prilocaine (EMLA) 2.5-2.5 % external cream Apply topically as needed for moderate pain (Patient not taking: Reported on 5/26/2021) 30 g 3     magic mouthwash suspension (diphenhydrAMINE, lidocaine, aluminum-magnesium & simethicone) Swish and swallow 10 mLs in mouth every 6 hours as needed for mouth sores (Patient not taking: Reported on 5/26/2021) 120 mL 3     urea (CARMOL) 10 % external cream Apply topically as needed for dry skin (Patient not taking: Reported on 5/26/2021) 453 g 0     vitamin D3 (CHOLECALCIFEROL) 2000 units (50 mcg) tablet Take 1 tablet by mouth daily         Physical Examination:  /60 (BP Location: Right arm, Patient Position: Sitting, Cuff Size: Adult Large)   Pulse 84   Temp 99  F (37.2  C) (Oral)    Resp 16   Wt 97.9 kg (215 lb 12.8 oz)   LMP 03/19/2019   SpO2 96%   BMI 33.79 kg/m    Wt Readings from Last 10 Encounters:   05/26/21 97.9 kg (215 lb 12.8 oz)   05/10/21 99.7 kg (219 lb 12.8 oz)   04/13/21 101.6 kg (223 lb 14.4 oz)   03/11/21 102.5 kg (225 lb 14.4 oz)   02/15/21 105.2 kg (232 lb)   01/27/21 105.5 kg (232 lb 8 oz)   01/15/21 105.3 kg (232 lb 3.2 oz)   12/18/20 106.5 kg (234 lb 14.4 oz)   11/13/20 113.4 kg (250 lb 1.6 oz)   11/04/20 108.9 kg (240 lb)     Constitutional: Well-appearing female in no acute distress.  Eyes: EOMI, PERRL. No scleral icterus.  ENT: Oral mucosa is moist without lesions or thrush.   Lymphatic: Neck is supple without cervical or supraclavicular lymphadenopathy.   Cardiovascular: Regular rate and rhythm. No murmurs, gallops, or rubs. Trace peripheral edema in ankles, slightly sore to palpation.  Respiratory: Clear to auscultation bilaterally. No wheezes or crackles. SpO2 97-99% at rest and with walking.  Gastrointestinal: Bowel sounds present. Abdomen soft, non-tender.  Neurologic: Cranial nerves II through XII are grossly intact.  Skin: No rashes, petechiae, or bruising noted on exposed skin.    Laboratory Data:  Results for STEVEAMADEO (MRN [id number]) as of 5/28/2021 10:07   5/26/2021 10:38   Sodium 138   Potassium 3.9   Chloride 105   Carbon Dioxide 26   Urea Nitrogen 8   Creatinine 0.75   GFR Estimate 89   GFR Estimate If Black >90   Calcium 9.2   Anion Gap 8   Magnesium 2.0   Phosphorus 2.7   Albumin 2.7 (L)   Protein Total 7.4   Bilirubin Total 0.5   Alkaline Phosphatase 108   ALT 16   AST 7   N-Terminal Pro Bnp 114   TSH 2.36   Glucose 106 (H)   WBC 7.6   Hemoglobin 8.6 (L)   Hematocrit 27.9 (L)   Platelet Count 205   RBC Count 2.87 (L)   MCV 97   MCH 30.0   MCHC 30.8 (L)   RDW 17.2 (H)   Diff Method Automated Method   % Neutrophils 75.1   % Lymphocytes 11.3   % Monocytes 9.4   % Eosinophils 3.3   % Basophils 0.5   % Immature Granulocytes 0.4   Nucleated RBCs 0    Absolute Neutrophil 5.7   Absolute Lymphocytes 0.9   Absolute Monocytes 0.7   Absolute Eosinophils 0.3   Absolute Basophils 0.0   Abs Immature Granulocytes 0.0   Absolute Nucleated RBC 0.0   ABO O   RH(D) Pos   Antibody Screen Neg   Test Valid Only At [facility]   Specimen Expires 05/29/2021     Results for AMADEO WILSON (MRN [id number]) as of 5/28/2021 10:07   5/26/2021 11:35   COVID-19 Virus PCR to [facility] - Source Nasopharyngeal   COVID-19 Virus PCR to [facility] - Result Test received-See reflex to IDDL test SARS CoV2 (COVID-19) Virus RT-PCR   SARS-CoV-2 Virus Specimen Source Nasopharyngeal   SARS-CoV-2 PCR Result NEGATIVE       Ref Range & Units 2d ago    Adenovirus NDET^Not Detected Not Detected     Coronavirus NDET^Not Detected Not Detected    Comment: This test detects Coronavirus 229E, HKU1, NL63, and OC43 but does not   distinguish between them. It does not detect MERS ( Respiratory   Syndrome), SARS (Severe Acute Respiratory Syndrome) or 2019-nCoV (2019 Novel)   Coronavirus.     Human Metapneumovirus NDET^Not Detected Not Detected     Human Rhinovirus/Enterovirus NDET^Not Detected Not Detected     Influenza A NDET^Not Detected Not Detected     Influenza A, H1 NDET^Not Detected Not Detected     Influenza A 2009 H1N1 NDET^Not Detected Not Detected     Influenza A, H3 NDET^Not Detected Not Detected     Influenza B NDET^Not Detected Not Detected     Parainfluenza Virus 1 NDET^Not Detected Not Detected     Parainfluenza Virus 2 NDET^Not Detected Not Detected     Parainfluenza Virus 3 NDET^Not Detected Not Detected     Parainfluenza Virus 4 NDET^Not Detected Not Detected     Respiratory Syncytial Virus A NDET^Not Detected Not Detected     Respiratory Syncytial Virus B NDET^Not Detected Not Detected     Chlamydia pneumoniae NDET^Not Detected Not Detected     Mycoplasma pneumoniae NDET^Not Detected Not Detected      FINDINGS: Right chest wall port  catheter tip is in the distal SVC. No  enlarged axillary, mediastinal or hilar lymph nodes. Somewhat rounded  density is noted in the left breast (series 10 image 170) measuring  2.3 cm. This is of uncertain etiology comment please correlate with  breast mammograms and/or ultrasound. Upper abdomen shows an accessory  splenule with no masses.  The included thyroid appears normal. No pleural or pericardial  effusion. Multiple pulmonary masses are present, the largest in the  paravertebral right upper lobe (series 10 image 82) measures  approximately 6.2 x 5.6 cm in the axial plane. A right middle lobe  nodule with vascularity (series 10 image 126) measures 2.9 cm in  greatest dimension. A left lower lobe nodule (series 10 image 154)  measures 2.4 cm. A pleural-based left lung base posterior nodule  (series 10 image 220) measures 0.5 cm.  No evidence of hypodense filling defect in the well opacified  pulmonary artery tree. The right heart is not enlarged. Prominent fat  invagination just superior to the infrahepatic IVC incidentally noted.  Bones show mild degenerative changes in the thoracic spine. No  suspicious sclerotic or lytic/destructive lesions. Mild levocurvature  of the upper thoracic spine.     With regard to the pulmonary findings and in comparison to 5/10/2021,  no significant interval change in the size or number of the probable  metastases.                                                                      IMPRESSION: Stable size and number of multiple pulmonary nodules and  masses concerning for metastatic disease given history of sarcoma. No  evidence of pulmonary embolus or right heart strain. Left breast  rounded density of uncertain etiology, this may represent a benign  cyst but correlation with relevant mammograms and/or breast ultrasound  is recommended.     SHIRLEY INMAN MD    Assessment and Plan:    1. Dyspnea and Cough  Progressive cough and new dyspnea with occasional pleuritic pain,  body aches, and diarrhea in setting of metastatic sarcoma. DDx includes PE, viral infection including COVID (though less likely given she is fully vaccinated and had in past), pneumonitis from Keytruda, pneumonia (though less likely no fevers), cardiac etiology, disease progression.     Following work-up done:  -CBC, CMP, NT-BNP, TSH: All WNL, anemia improving so no need for blood transfusion. WBC WNL so argues against infection. With normal NT-BNP no need for echo. CMP stable, no signs of dehydration   -Respiratory viral panel: Negative  -COVID 19: Negative  -CT PE: No PE or focal infection, no signs of pneumonitis, metastatic disease stable. I reviewed the images personally with Dr. Lopez in addition to the radiology report. Reviewed images with patient.     Discussed case with thoracic surgery. They note an elevated right hemidiaphragm and that she may have phrenic nerve involvement of RUL tumor which could explain her dyspnea and cough. No easy solution to this outside of hopefully getting a response from treatment, though pending course could refer to throracic surgery in future to discuss fixation of R hemidiaphragm. Could also order sniff fluoroscopic test to confirm diagnosis.     For now discussed following plan:  -Tessalon 100-200mg TID PRN cough. She declines Guaifenesin with Codeine  -Tylenol PRN body aches (likely related to Keytruda)  -Imodium PRN mild diarrhea (likely related to Keytruda) but need to call if diarrhea worsens or develop pain/bleeding  -OK to travel for wedding this weekend (confirmed with both Dr. Lopez and thoracic surgery)    I will see her next week for her schedule appointment prior to cycle 2 Keytruda.    75 minutes spent on the date of the encounter doing chart review, review of test results, interpretation of tests, patient visit, documentation and discussion with other provider(s)     Harshal Schuster PA-C  [facility]  [address] [place] [address]  [address]  [phone]

## 2021-05-26 NOTE — NURSING NOTE
Chief Complaint   Patient presents with     Port Draw     Labs drawn via port by rn in lab. VS taken.     Port accessed with 20g 3/4 inch gripper needle by RN, labs collected, line flushed with saline and heparin.  Vitals taken. Pt checked in for appointment(s).    Elliot Alfaro, RN

## 2021-05-26 NOTE — Clinical Note
5/26/2021         RE: Richa Ashby  [address]  [place] [address]        Dear Colleague,    Thank you for referring your patient, Richa Ashby, to the [facility]. Please see a copy of my visit note below.    Oncology/Hematology Visit Note  May 26, 2021    Reason for Visit: Follow up of undifferentiated pleomorphic sarcoma     History of Present Illness: Richa Ashby is a 55 year old female with PMH asthma and GERD with undifferentiated pleomorphic sarcoma. 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 She received preoperative radiotherapy and the tumor was resected 3/12/20. She then developed a cough, chest tightness, and scapular pain and imaging revealed lung nodules. Lung biopsy 11/2/20 with sarcoma. She was started on Doxil + Ifos 11/13/20. Had delay in cycle 2 due to COVID dx 12/2/20. CT imaging after cycle 1 with positive response to treatment with decrease in pulmonary nodules. Received cycle 2 12/18/20. Received cycle 3 1/15/21 with dose reduction in Doxil for skin toxicity and mucositis. Received cycle 4 2/15/21.      CT CAP 3/8/21 with positive response to treatment, decreased size of pulmonary mets though one lesion was larger. Did see fibrocystic changes in left breast, recommended breast imaging. Received cycle 5 Doxil + ifos 3/11/21.      Imaging 4/5/21 with overall stable disease except one lung lesion, opted to continue Doxil alone for now and switch to Keytruda in future if ongoing progression.      Started Keytruda 5/10/21.    I was asked to see her today as an add on for SOB and cough.      Interval History:  IN PROGRESS    Current Outpatient Medications   Medication Sig Dispense Refill     acetaminophen (TYLENOL) 325 MG tablet Take 325-650 mg by mouth every 6 hours as needed for mild pain       albuterol (PROAIR RESPICLICK) 108 (90 Base) MCG/ACT inhaler Inhale  1-2 puffs into the lungs       cetirizine (ZYRTEC) 10 MG tablet Take 10 mg by mouth daily       gabapentin (NEURONTIN) 300 MG capsule 600 mg morning & afternoon - 900 mg at bedtime 120 capsule 1     melatonin (MELATONIN) 1 MG/ML LIQD liquid Take 5 mg by mouth       montelukast (SINGULAIR) 10 MG tablet Take 10 mg by mouth daily       multivitamin w/minerals (MULTI-VITAMIN) tablet Take 1 tablet by mouth daily       omeprazole (PRILOSEC) 20 MG DR capsule        lidocaine-prilocaine (EMLA) 2.5-2.5 % external cream Apply topically as needed for moderate pain (Patient not taking: Reported on 5/26/2021) 30 g 3     magic mouthwash suspension (diphenhydrAMINE, lidocaine, aluminum-magnesium & simethicone) Swish and swallow 10 mLs in mouth every 6 hours as needed for mouth sores (Patient not taking: Reported on 5/26/2021) 120 mL 3     urea (CARMOL) 10 % external cream Apply topically as needed for dry skin (Patient not taking: Reported on 5/26/2021) 453 g 0     vitamin D3 (CHOLECALCIFEROL) 2000 units (50 mcg) tablet Take 1 tablet by mouth daily         Physical Examination:  /60 (BP Location: Right arm, Patient Position: Sitting, Cuff Size: Adult Large)   Pulse 84   Temp 99  F (37.2  C) (Oral)   Resp 16   Wt 97.9 kg (215 lb 12.8 oz)   LMP 03/19/2019   SpO2 96%   BMI 33.79 kg/m    Wt Readings from Last 10 Encounters:   05/26/21 97.9 kg (215 lb 12.8 oz)   05/10/21 99.7 kg (219 lb 12.8 oz)   04/13/21 101.6 kg (223 lb 14.4 oz)   03/11/21 102.5 kg (225 lb 14.4 oz)   02/15/21 105.2 kg (232 lb)   01/27/21 105.5 kg (232 lb 8 oz)   01/15/21 105.3 kg (232 lb 3.2 oz)   12/18/20 106.5 kg (234 lb 14.4 oz)   11/13/20 113.4 kg (250 lb 1.6 oz)   11/04/20 108.9 kg (240 lb)     Constitutional: Well-appearing female in no acute distress.  Eyes: EOMI, PERRL. No scleral icterus.  ENT: Oral mucosa is moist without lesions or thrush.   Lymphatic: Neck is supple without cervical or supraclavicular lymphadenopathy. No axillary  lymphadenopathy.  Cardiovascular: Regular rate and rhythm. No murmurs, gallops, or rubs. No peripheral edema.  Respiratory: Clear to auscultation bilaterally. No wheezes or crackles.  Gastrointestinal: Bowel sounds present. Abdomen soft, non-tender. No palpable hepatosplenomegaly or masses.   Neurologic: Cranial nerves II through XII are grossly intact.  Skin: No rashes, petechiae, or bruising noted on exposed skin.    Laboratory Data:        Assessment and Plan:          Harshal Schuster PA-C  [facility] [address] [address]  [phone]        Again, thank you for allowing me to participate in the care of your patient.        Sincerely,        RUPERT Guaman

## 2021-05-26 NOTE — TELEPHONE ENCOUNTER
[facility] Telephone Triage Note    Assessment:   Connie (pt)reporting the following symptoms: feels very tired. Started yesterday morning when woke up, tried working but slept for 3hrs when returned home.   This morning achy and tired still.    Having cough which started several weeks ago and SOB started yesterday morning, sometimes pain with deep breath located under right shoulder blade, close to middle of back.     Diarrhea for last two days, drinking water well. Last 24hrs bowl of cereal, half sandwich, two slices of cheese/tomato pizza.     Denies, headache, dizziness, fevers/chills, n/v, bowel & bladder issues.    Hx of hemoglobin dropping, Goal of traveling tomorrow for son's wedding in [place] and concerned for blood levels and ability to make it through weekend.     Next appt for labs/infusion is not until next week Wed 6/2, wondering if able to consider today for labs/infusion?          Recommendations: This writer consulted with provider during call, per Harshal EMERY  Clinic visit today with labs.  Labs scheduled for 10:15am.   Provider appt today  Pending infusion appt availability and lab results.    Patient was seen by provider and no infusion needed. See provider encounters.    Follow-Up:  Patient voiced understanding of advice and/or instructions given.

## 2021-06-02 ENCOUNTER — APPOINTMENT (OUTPATIENT)
Dept: LAB | Facility: CLINIC | Age: 56
End: 2021-06-02
Attending: PHYSICIAN ASSISTANT
Payer: COMMERCIAL

## 2021-06-02 ENCOUNTER — ONCOLOGY VISIT (OUTPATIENT)
Dept: ONCOLOGY | Facility: CLINIC | Age: 56
End: 2021-06-02
Attending: PHYSICIAN ASSISTANT
Payer: COMMERCIAL

## 2021-06-02 VITALS
RESPIRATION RATE: 17 BRPM | OXYGEN SATURATION: 98 % | DIASTOLIC BLOOD PRESSURE: 73 MMHG | SYSTOLIC BLOOD PRESSURE: 108 MMHG | HEART RATE: 75 BPM | TEMPERATURE: 98.1 F

## 2021-06-02 VITALS
WEIGHT: 216.9 LBS | RESPIRATION RATE: 16 BRPM | TEMPERATURE: 96.8 F | BODY MASS INDEX: 33.96 KG/M2 | DIASTOLIC BLOOD PRESSURE: 53 MMHG | HEART RATE: 83 BPM | OXYGEN SATURATION: 99 % | SYSTOLIC BLOOD PRESSURE: 104 MMHG

## 2021-06-02 DIAGNOSIS — C49.9 SARCOMA (H): ICD-10-CM

## 2021-06-02 DIAGNOSIS — D63.0 ANEMIA IN NEOPLASTIC DISEASE: ICD-10-CM

## 2021-06-02 DIAGNOSIS — D63.0 ANEMIA IN NEOPLASTIC DISEASE: Primary | ICD-10-CM

## 2021-06-02 DIAGNOSIS — C49.9 SARCOMA (H): Primary | ICD-10-CM

## 2021-06-02 LAB
ABO + RH BLD: NORMAL
ABO + RH BLD: NORMAL
ALBUMIN SERPL-MCNC: 2.6 G/DL (ref 3.4–5)
ALP SERPL-CCNC: 106 U/L (ref 40–150)
ALT SERPL W P-5'-P-CCNC: 13 U/L (ref 0–50)
ANION GAP SERPL CALCULATED.3IONS-SCNC: 4 MMOL/L (ref 3–14)
AST SERPL W P-5'-P-CCNC: 9 U/L (ref 0–45)
BASOPHILS # BLD AUTO: 0 10E9/L (ref 0–0.2)
BASOPHILS NFR BLD AUTO: 0.4 %
BILIRUB SERPL-MCNC: 0.4 MG/DL (ref 0.2–1.3)
BLD GP AB SCN SERPL QL: NORMAL
BLD PROD TYP BPU: NORMAL
BLD UNIT ID BPU: 0
BLD UNIT ID BPU: 0
BLOOD BANK CMNT PATIENT-IMP: NORMAL
BLOOD PRODUCT CODE: NORMAL
BLOOD PRODUCT CODE: NORMAL
BPU ID: NORMAL
BPU ID: NORMAL
BUN SERPL-MCNC: 7 MG/DL (ref 7–30)
CALCIUM SERPL-MCNC: 9.1 MG/DL (ref 8.5–10.1)
CHLORIDE SERPL-SCNC: 108 MMOL/L (ref 94–109)
CO2 SERPL-SCNC: 27 MMOL/L (ref 20–32)
CREAT SERPL-MCNC: 0.64 MG/DL (ref 0.52–1.04)
DIFFERENTIAL METHOD BLD: ABNORMAL
EOSINOPHIL # BLD AUTO: 0.4 10E9/L (ref 0–0.7)
EOSINOPHIL NFR BLD AUTO: 7.2 %
ERYTHROCYTE [DISTWIDTH] IN BLOOD BY AUTOMATED COUNT: 17 % (ref 10–15)
GFR SERPL CREATININE-BSD FRML MDRD: >90 ML/MIN/{1.73_M2}
GLUCOSE SERPL-MCNC: 90 MG/DL (ref 70–99)
HCT VFR BLD AUTO: 26.6 % (ref 35–47)
HGB BLD-MCNC: 8.2 G/DL (ref 11.7–15.7)
IMM GRANULOCYTES # BLD: 0 10E9/L (ref 0–0.4)
IMM GRANULOCYTES NFR BLD: 0.2 %
LYMPHOCYTES # BLD AUTO: 0.8 10E9/L (ref 0.8–5.3)
LYMPHOCYTES NFR BLD AUTO: 15.1 %
MCH RBC QN AUTO: 29.7 PG (ref 26.5–33)
MCHC RBC AUTO-ENTMCNC: 30.8 G/DL (ref 31.5–36.5)
MCV RBC AUTO: 96 FL (ref 78–100)
MONOCYTES # BLD AUTO: 0.4 10E9/L (ref 0–1.3)
MONOCYTES NFR BLD AUTO: 8.3 %
NEUTROPHILS # BLD AUTO: 3.6 10E9/L (ref 1.6–8.3)
NEUTROPHILS NFR BLD AUTO: 68.8 %
NRBC # BLD AUTO: 0 10*3/UL
NRBC BLD AUTO-RTO: 0 /100
NUM BPU REQUESTED: 2
PLATELET # BLD AUTO: 207 10E9/L (ref 150–450)
POTASSIUM SERPL-SCNC: 3.6 MMOL/L (ref 3.4–5.3)
PROT SERPL-MCNC: 6.9 G/DL (ref 6.8–8.8)
RBC # BLD AUTO: 2.76 10E12/L (ref 3.8–5.2)
SODIUM SERPL-SCNC: 139 MMOL/L (ref 133–144)
SPECIMEN EXP DATE BLD: NORMAL
T4 FREE SERPL-MCNC: 1.08 NG/DL (ref 0.76–1.46)
TRANSFUSION STATUS PATIENT QL: NORMAL
TSH SERPL DL<=0.005 MIU/L-ACNC: 4.53 MU/L (ref 0.4–4)
VIT B12 SERPL-MCNC: 465 PG/ML (ref 193–986)
WBC # BLD AUTO: 5.3 10E9/L (ref 4–11)

## 2021-06-02 PROCEDURE — G0463 HOSPITAL OUTPT CLINIC VISIT: HCPCS

## 2021-06-02 PROCEDURE — 80053 COMPREHEN METABOLIC PANEL: CPT | Performed by: PHYSICIAN ASSISTANT

## 2021-06-02 PROCEDURE — 82607 VITAMIN B-12: CPT | Performed by: PHYSICIAN ASSISTANT

## 2021-06-02 PROCEDURE — 250N000011 HC RX IP 250 OP 636: Performed by: PHYSICIAN ASSISTANT

## 2021-06-02 PROCEDURE — 85025 COMPLETE CBC W/AUTO DIFF WBC: CPT | Performed by: PHYSICIAN ASSISTANT

## 2021-06-02 PROCEDURE — 86901 BLOOD TYPING SEROLOGIC RH(D): CPT | Performed by: PHYSICIAN ASSISTANT

## 2021-06-02 PROCEDURE — 250N000013 HC RX MED GY IP 250 OP 250 PS 637: Performed by: PHYSICIAN ASSISTANT

## 2021-06-02 PROCEDURE — 86900 BLOOD TYPING SEROLOGIC ABO: CPT | Performed by: PHYSICIAN ASSISTANT

## 2021-06-02 PROCEDURE — P9016 RBC LEUKOCYTES REDUCED: HCPCS | Performed by: PHYSICIAN ASSISTANT

## 2021-06-02 PROCEDURE — 86923 COMPATIBILITY TEST ELECTRIC: CPT | Performed by: PHYSICIAN ASSISTANT

## 2021-06-02 PROCEDURE — 36430 TRANSFUSION BLD/BLD COMPNT: CPT

## 2021-06-02 PROCEDURE — 258N000003 HC RX IP 258 OP 636: Performed by: PHYSICIAN ASSISTANT

## 2021-06-02 PROCEDURE — 84439 ASSAY OF FREE THYROXINE: CPT | Performed by: PHYSICIAN ASSISTANT

## 2021-06-02 PROCEDURE — 84443 ASSAY THYROID STIM HORMONE: CPT | Performed by: PHYSICIAN ASSISTANT

## 2021-06-02 PROCEDURE — 99215 OFFICE O/P EST HI 40 MIN: CPT | Performed by: PHYSICIAN ASSISTANT

## 2021-06-02 PROCEDURE — 86850 RBC ANTIBODY SCREEN: CPT | Performed by: PHYSICIAN ASSISTANT

## 2021-06-02 PROCEDURE — 96413 CHEMO IV INFUSION 1 HR: CPT

## 2021-06-02 RX ORDER — HEPARIN SODIUM (PORCINE) LOCK FLUSH IV SOLN 100 UNIT/ML 100 UNIT/ML
5 SOLUTION INTRAVENOUS
Status: CANCELLED | OUTPATIENT
Start: 2021-06-02

## 2021-06-02 RX ORDER — ACETAMINOPHEN 325 MG/1
975 TABLET ORAL ONCE
Status: COMPLETED | OUTPATIENT
Start: 2021-06-02 | End: 2021-06-02

## 2021-06-02 RX ORDER — SODIUM CHLORIDE 9 MG/ML
1000 INJECTION, SOLUTION INTRAVENOUS CONTINUOUS PRN
Status: CANCELLED
Start: 2021-06-02

## 2021-06-02 RX ORDER — HEPARIN SODIUM,PORCINE 10 UNIT/ML
5 VIAL (ML) INTRAVENOUS
Status: CANCELLED | OUTPATIENT
Start: 2021-06-02

## 2021-06-02 RX ORDER — LORAZEPAM 2 MG/ML
0.5 INJECTION INTRAMUSCULAR EVERY 4 HOURS PRN
Status: CANCELLED
Start: 2021-06-02

## 2021-06-02 RX ORDER — METHYLPREDNISOLONE SODIUM SUCCINATE 125 MG/2ML
125 INJECTION, POWDER, LYOPHILIZED, FOR SOLUTION INTRAMUSCULAR; INTRAVENOUS
Status: CANCELLED
Start: 2021-06-02

## 2021-06-02 RX ORDER — EPINEPHRINE 1 MG/ML
0.3 INJECTION, SOLUTION INTRAMUSCULAR; SUBCUTANEOUS EVERY 5 MIN PRN
Status: CANCELLED | OUTPATIENT
Start: 2021-06-02

## 2021-06-02 RX ORDER — DIPHENHYDRAMINE HYDROCHLORIDE 50 MG/ML
50 INJECTION INTRAMUSCULAR; INTRAVENOUS
Status: CANCELLED
Start: 2021-06-02

## 2021-06-02 RX ORDER — ALBUTEROL SULFATE 90 UG/1
1-2 AEROSOL, METERED RESPIRATORY (INHALATION)
Status: CANCELLED
Start: 2021-06-02

## 2021-06-02 RX ORDER — ALBUTEROL SULFATE 0.83 MG/ML
2.5 SOLUTION RESPIRATORY (INHALATION)
Status: CANCELLED | OUTPATIENT
Start: 2021-06-02

## 2021-06-02 RX ORDER — MEPERIDINE HYDROCHLORIDE 25 MG/ML
25 INJECTION INTRAMUSCULAR; INTRAVENOUS; SUBCUTANEOUS EVERY 30 MIN PRN
Status: CANCELLED | OUTPATIENT
Start: 2021-06-02

## 2021-06-02 RX ORDER — NALOXONE HYDROCHLORIDE 0.4 MG/ML
.1-.4 INJECTION, SOLUTION INTRAMUSCULAR; INTRAVENOUS; SUBCUTANEOUS
Status: CANCELLED | OUTPATIENT
Start: 2021-06-02

## 2021-06-02 RX ORDER — HEPARIN SODIUM (PORCINE) LOCK FLUSH IV SOLN 100 UNIT/ML 100 UNIT/ML
5 SOLUTION INTRAVENOUS
Status: COMPLETED | OUTPATIENT
Start: 2021-06-02 | End: 2021-06-02

## 2021-06-02 RX ORDER — HEPARIN SODIUM (PORCINE) LOCK FLUSH IV SOLN 100 UNIT/ML 100 UNIT/ML
5 SOLUTION INTRAVENOUS
Status: DISCONTINUED | OUTPATIENT
Start: 2021-06-02 | End: 2021-06-02 | Stop reason: HOSPADM

## 2021-06-02 RX ADMIN — SODIUM CHLORIDE 250 ML: 9 INJECTION, SOLUTION INTRAVENOUS at 09:32

## 2021-06-02 RX ADMIN — ACETAMINOPHEN 975 MG: 325 TABLET ORAL at 09:13

## 2021-06-02 RX ADMIN — SODIUM CHLORIDE 200 MG: 9 INJECTION, SOLUTION INTRAVENOUS at 09:33

## 2021-06-02 RX ADMIN — Medication 5 ML: at 07:28

## 2021-06-02 RX ADMIN — Medication 5 ML: at 12:03

## 2021-06-02 ASSESSMENT — PAIN SCALES - GENERAL: PAINLEVEL: SEVERE PAIN (6)

## 2021-06-02 NOTE — NURSING NOTE
"Chief Complaint   Patient presents with     Oncology Clinic Visit     SARCOMA     Port Draw     labs drawn from port by rn.  vs taken     Port accessed with 20 gauge 3/4\" gripper needle and labs drawn by rn.  Port flushed with NS and heparin.  Pt tolerated well.  VS taken.  Pt checked in for next appt.    Kassi Landin, RN      "

## 2021-06-02 NOTE — NURSING NOTE
"Oncology Rooming Note    June 2, 2021 7:38 AM   Richa Ashby is a 56 year old female who presents for:    Chief Complaint   Patient presents with     Oncology Clinic Visit     SARCOMA     Port Draw     labs drawn from port by rn.  vs taken     Initial Vitals: /53 (BP Location: Right arm, Patient Position: Sitting, Cuff Size: Adult Large)   Pulse 83   Temp 96.8  F (36  C) (Tympanic)   Resp 16   Wt 98.4 kg (216 lb 14.4 oz)   LMP 03/19/2019   SpO2 99%   BMI 33.96 kg/m   Estimated body mass index is 33.96 kg/m  as calculated from the following:    Height as of 5/10/21: 1.702 m (5' 7.01\").    Weight as of this encounter: 98.4 kg (216 lb 14.4 oz). Body surface area is 2.16 meters squared.  Severe Pain (6) Comment: Data Unavailable   Patient's last menstrual period was 03/19/2019.  Allergies reviewed: Yes  Medications reviewed: Yes    Medications: Medication refills not needed today.  Pharmacy name entered into [employer]:    [id number] - [place], [place] - [address], [place] AT [facility] [place], [place] - [phone]    Clinical concerns: None.       Renetta Lisa MA            "

## 2021-06-02 NOTE — PROGRESS NOTES
Infusion Nursing Note:  Richa Ashby presents today for  Cycle 2 Day 1 Pembrolizumab and 1 unit PRBC   Patient seen by provider today: Yes: Harshal ROMAN PA-C   present during visit today: Not Applicable.    Note: Connie presents to infusion today stating she feels well. She denies any symptoms, concerns, or pain today.     Per Harshal - Please transfuse 1 unit PRBC today due to increased fatigue for Hgb 8.2 and give Tylenol 975 mg x 1 for pain (done by writer)    Intravenous Access:  Implanted Port.    Treatment Conditions:  Lab Results   Component Value Date    HGB 8.2 06/02/2021     Lab Results   Component Value Date    WBC 5.3 06/02/2021      Lab Results   Component Value Date    ANEU 3.6 06/02/2021     Lab Results   Component Value Date     06/02/2021      Lab Results   Component Value Date     06/02/2021                   Lab Results   Component Value Date    POTASSIUM 3.6 06/02/2021           Lab Results   Component Value Date    MAG 2.0 05/26/2021            Lab Results   Component Value Date    CR 0.64 06/02/2021                   Lab Results   Component Value Date    VIKTORIA 9.1 06/02/2021                Lab Results   Component Value Date    BILITOTAL 0.4 06/02/2021           Lab Results   Component Value Date    ALBUMIN 2.6 06/02/2021                    Lab Results   Component Value Date    ALT 13 06/02/2021           Lab Results   Component Value Date    AST 9 06/02/2021       Results reviewed, labs MET treatment parameters, ok to proceed with treatment.  Consent 1/7/2021    Post Infusion Assessment:  Patient tolerated infusion without incident.  Blood return noted pre and post infusion.  No evidence of extravasations.  Access discontinued per protocol.       Discharge Plan:   Discharge instructions reviewed with: Patient.  AVS to patient via [employer].  Patient will return 6/23 for next appointment.   Departure Mode: Ambulatory.      Charissa Rowell RN

## 2021-06-02 NOTE — PROGRESS NOTES
Oncology/Hematology Visit Note  Jun 2, 2021    Reason for Visit: Follow up of undifferentiated pleomorphic sarcoma     History of Present Illness: Richa Ashby is a 55 year old female with PMH asthma and GERD with undifferentiated pleomorphic sarcoma. 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 She received preoperative radiotherapy and the tumor was resected 3/12/20. She then developed a cough, chest tightness, and scapular pain and imaging revealed lung nodules. Lung biopsy 11/2/20 with sarcoma. She was started on Doxil + Ifos 11/13/20. Had delay in cycle 2 due to COVID dx 12/2/20. CT imaging after cycle 1 with positive response to treatment with decrease in pulmonary nodules. Received cycle 2 12/18/20. Received cycle 3 1/15/21 with dose reduction in Doxil for skin toxicity and mucositis. Received cycle 4 2/15/21.      CT CAP 3/8/21 with positive response to treatment, decreased size of pulmonary mets though one lesion was larger. Did see fibrocystic changes in left breast, recommended breast imaging. Received cycle 5 Doxil + ifos 3/11/21.      Imaging 4/5/21 with overall stable disease except one lung lesion, opted to continue Doxil alone for now and switch to Keytruda in future if ongoing progression.      Started Keytruda 5/10/21. Was seen last week for cough, SOB, fatigue work-up thought to be 2/2 phrenic nerve involvement of R apical lung mass.     She returns today for follow-up.    Interval History:  Connie was seen today for follow-up. She is overall doing OK though admits the weekend with the wedding was a bit exhausting. She is very fatigued. She noted that her legs were sore over the weekend since she had to be on her feet more. She also had more swelling and pain in her feet. Both of these are better now that she is home and has been able to rest. The neuropathy is generally stable to improved, especially in her hands. She does have  pain in her R shoulder/back and has been taking Tylenol for that. It is not pleuritic and isn't preventing her from sleeping.     She continues to cough, Tessalon has been somewhat helpful, cough is not worse. Breathing OK and no chest pain. No fevers, does have night sweats. She denies headaches or dizziness. No nausea, vomiting, abdominal pain. Looser stools improved, no soft and no diarrhea. No urinary concerns. No rashes though has had dry skin on her feet. She continues to have dry mouth. She is eating and drinking OK.    Overall she feels better compared to last week.     Current Outpatient Medications   Medication Sig Dispense Refill     acetaminophen (TYLENOL) 325 MG tablet Take 325-650 mg by mouth every 6 hours as needed for mild pain       albuterol (PROAIR RESPICLICK) 108 (90 Base) MCG/ACT inhaler Inhale 1-2 puffs into the lungs       benzonatate (TESSALON) 100 MG capsule Take 1-2 capsules (100-200 mg) by mouth 3 times daily as needed for cough 60 capsule 1     cetirizine (ZYRTEC) 10 MG tablet Take 10 mg by mouth daily       gabapentin (NEURONTIN) 300 MG capsule 600 mg morning & afternoon - 900 mg at bedtime 120 capsule 1     lidocaine-prilocaine (EMLA) 2.5-2.5 % external cream Apply topically as needed for moderate pain (Patient not taking: Reported on 5/26/2021) 30 g 3     magic mouthwash suspension (diphenhydrAMINE, lidocaine, aluminum-magnesium & simethicone) Swish and swallow 10 mLs in mouth every 6 hours as needed for mouth sores (Patient not taking: Reported on 5/26/2021) 120 mL 3     melatonin (MELATONIN) 1 MG/ML LIQD liquid Take 5 mg by mouth       montelukast (SINGULAIR) 10 MG tablet Take 10 mg by mouth daily       multivitamin w/minerals (MULTI-VITAMIN) tablet Take 1 tablet by mouth daily       omeprazole (PRILOSEC) 20 MG DR capsule        urea (CARMOL) 10 % external cream Apply topically as needed for dry skin (Patient not taking: Reported on 5/26/2021) 453 g 0     vitamin D3 (CHOLECALCIFEROL)  2000 units (50 mcg) tablet Take 1 tablet by mouth daily       Physical Examination:  /53 (BP Location: Right arm, Patient Position: Sitting, Cuff Size: Adult Large)   Pulse 83   Temp 96.8  F (36  C) (Tympanic)   Resp 16   Wt 98.4 kg (216 lb 14.4 oz)   LMP 03/19/2019   SpO2 99%   BMI 33.96 kg/m    Wt Readings from Last 10 Encounters:   05/26/21 97.9 kg (215 lb 12.8 oz)   05/10/21 99.7 kg (219 lb 12.8 oz)   04/13/21 101.6 kg (223 lb 14.4 oz)   03/11/21 102.5 kg (225 lb 14.4 oz)   02/15/21 105.2 kg (232 lb)   01/27/21 105.5 kg (232 lb 8 oz)   01/15/21 105.3 kg (232 lb 3.2 oz)   12/18/20 106.5 kg (234 lb 14.4 oz)   11/13/20 113.4 kg (250 lb 1.6 oz)   11/04/20 108.9 kg (240 lb)     Constitutional: Well-appearing female in no acute distress.  Eyes: EOMI, PERRL. No scleral icterus.  ENT: Oral mucosa is moist without lesions or thrush.   Lymphatic: Neck is supple without cervical or supraclavicular lymphadenopathy.   Cardiovascular: Regular rate and rhythm. No murmurs, gallops, or rubs. Trace peripheral edema.  Respiratory: Clear to auscultation bilaterally. No wheezes or crackles.  Gastrointestinal: Bowel sounds present. Abdomen soft, non-tender.   Neurologic: Cranial nerves II through XII are grossly intact.  Skin: Slightly dry skin on feet. No rashes, petechiae, or bruising noted on exposed skin.    Laboratory Data:  Results for AMADEO WILSON (MRN [id number]) as of 6/2/2021 09:32   6/2/2021 07:33   Sodium 139   Potassium 3.6   Chloride 108   Carbon Dioxide 27   Urea Nitrogen 7   Creatinine 0.64   GFR Estimate >90   GFR Estimate If Black >90   Calcium 9.1   Anion Gap 4   Albumin 2.6 (L)   Protein Total 6.9   Bilirubin Total 0.4   Alkaline Phosphatase 106   ALT 13   AST 9   T4 Free 1.08   TSH 4.53 (H)   Glucose 90   WBC 5.3   Hemoglobin 8.2 (L)   Hematocrit 26.6 (L)   Platelet Count 207   RBC Count 2.76 (L)   MCV 96   MCH 29.7   MCHC 30.8 (L)   RDW 17.0 (H)   Diff Method Automated Method   % Neutrophils  68.8   % Lymphocytes 15.1   % Monocytes 8.3   % Eosinophils 7.2   % Basophils 0.4   % Immature Granulocytes 0.2   Nucleated RBCs 0   Absolute Neutrophil 3.6   Absolute Lymphocytes 0.8   Absolute Monocytes 0.4   Absolute Eosinophils 0.4   Absolute Basophils 0.0   Abs Immature Granulocytes 0.0   Absolute Nucleated RBC 0.0       Assessment and Plan:  1. ONc  Metastatic UPS, was on Doxil + Ifosfamide (5 cycles) with positive response to treatment initially, then due to poor tolerance switched to Doxil alone (1 cycle) but had progression. Started on Keytruda 5/10/21      No signs or symptoms of immune toxicities-did feel achy over the weekend but improved now. Will continue with cycle 2 today. Will plan for treatment every 3 weeks and repeat imaging after cycle 3.     2. Heme  Anemia: Previously thought 2/2 Ifosfamide suspect ongoing anemia related to chemotherapy and anemia of chronic disease. Iron studies WNL. Will check B12 level. Given lower at 8.2 and symptomatic, will transfuse 1 unit today. Repeat CBC in 3 weeks.      Thrombocytopenia: 2/2 ifosfamide, resolved.     Neutropenia: 2/2 ifosfamide resolved.     3. FEN  Electrolyte derangements resolved after stopping Ifos. No longer on supplements      Hydration/Nutrition: Doing OK at this time, improved compared to last week. CMP stable.      4. GI  GERD: continuing Omeprazole and tums, stable.      Nausea: Resolved.     Dry Mouth: Continue Biotene      Diarrhea: Intermittent, discussed Imodium PRN, not currently needing.      5. Derm  Hand/foot: 2/2 Doxil, improving now that she is off Doxil.      Dry skin: 2/2 Doxil and now Keytruda, continue lotion.     6. Pulm  Asthma: stable, continue inhalers.     COVID: Diagnosed in December, recovered.     Cough/SOB: See work-up from 5/26/21 (negative CT PE, RVP, COVID, NTBNP). Thought to be 2/2 phrenic nerve involvement of tumor. Stable with Tessalon PRN. Could see thoracic surgery in future if symptoms worsen.        7. 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 Improving slightly, continue Gabapentin.      Brain fog: No acute neuro concerns, not discussed.     8. MSK  R shoulder/back pain: Suspect 2/2 cancer, continue Tylenol PRN and OK to use NSAIDs sparingly since she is off chemo. Declines opioids    Arthralgias: 2/2 Keytruda, was worse with exertion this weekend, now improved. Discussed Tylenol/NSAIDs PRN and Voltaren gel PRN.    40 minutes spent on the date of the encounter doing chart review, review of test results, interpretation of tests, patient visit and documentation      Harshal Schuster PA-C  [facility]  [address] [place] [address] [phone]

## 2021-06-02 NOTE — LETTER
6/2/2021         RE: Richa Ashby  [address]  [place] [address]      Oncology/Hematology Visit Note  Jun 2, 2021    Reason for Visit: Follow up of undifferentiated pleomorphic sarcoma     History of Present Illness: Richa Ashby is a 55 year old female with PMH asthma and GERD with undifferentiated pleomorphic sarcoma. 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 She received preoperative radiotherapy and the tumor was resected 3/12/20. She then developed a cough, chest tightness, and scapular pain and imaging revealed lung nodules. Lung biopsy 11/2/20 with sarcoma. She was started on Doxil + Ifos 11/13/20. Had delay in cycle 2 due to COVID dx 12/2/20. CT imaging after cycle 1 with positive response to treatment with decrease in pulmonary nodules. Received cycle 2 12/18/20. Received cycle 3 1/15/21 with dose reduction in Doxil for skin toxicity and mucositis. Received cycle 4 2/15/21.      CT CAP 3/8/21 with positive response to treatment, decreased size of pulmonary mets though one lesion was larger. Did see fibrocystic changes in left breast, recommended breast imaging. Received cycle 5 Doxil + ifos 3/11/21.      Imaging 4/5/21 with overall stable disease except one lung lesion, opted to continue Doxil alone for now and switch to Keytruda in future if ongoing progression.      Started Keytruda 5/10/21. Was seen last week for cough, SOB, fatigue work-up thought to be 2/2 phrenic nerve involvement of R apical lung mass.     She returns today for follow-up.    Interval History:  Connie was seen today for follow-up. She is overall doing OK though admits the weekend with the wedding was a bit exhausting. She is very fatigued. She noted that her legs were sore over the weekend since she had to be on her feet more. She also had more swelling and pain in her feet. Both of these are better now that she is home and has been  able to rest. The neuropathy is generally stable to improved, especially in her hands. She does have pain in her R shoulder/back and has been taking Tylenol for that. It is not pleuritic and isn't preventing her from sleeping.     She continues to cough, Tessalon has been somewhat helpful, cough is not worse. Breathing OK and no chest pain. No fevers, does have night sweats. She denies headaches or dizziness. No nausea, vomiting, abdominal pain. Looser stools improved, no soft and no diarrhea. No urinary concerns. No rashes though has had dry skin on her feet. She continues to have dry mouth. She is eating and drinking OK.    Overall she feels better compared to last week.     Current Outpatient Medications   Medication Sig Dispense Refill     acetaminophen (TYLENOL) 325 MG tablet Take 325-650 mg by mouth every 6 hours as needed for mild pain       albuterol (PROAIR RESPICLICK) 108 (90 Base) MCG/ACT inhaler Inhale 1-2 puffs into the lungs       benzonatate (TESSALON) 100 MG capsule Take 1-2 capsules (100-200 mg) by mouth 3 times daily as needed for cough 60 capsule 1     cetirizine (ZYRTEC) 10 MG tablet Take 10 mg by mouth daily       gabapentin (NEURONTIN) 300 MG capsule 600 mg morning & afternoon - 900 mg at bedtime 120 capsule 1     lidocaine-prilocaine (EMLA) 2.5-2.5 % external cream Apply topically as needed for moderate pain (Patient not taking: Reported on 5/26/2021) 30 g 3     magic mouthwash suspension (diphenhydrAMINE, lidocaine, aluminum-magnesium & simethicone) Swish and swallow 10 mLs in mouth every 6 hours as needed for mouth sores (Patient not taking: Reported on 5/26/2021) 120 mL 3     melatonin (MELATONIN) 1 MG/ML LIQD liquid Take 5 mg by mouth       montelukast (SINGULAIR) 10 MG tablet Take 10 mg by mouth daily       multivitamin w/minerals (MULTI-VITAMIN) tablet Take 1 tablet by mouth daily       omeprazole (PRILOSEC) 20 MG DR capsule        urea (CARMOL) 10 % external cream Apply topically as  needed for dry skin (Patient not taking: Reported on 5/26/2021) 453 g 0     vitamin D3 (CHOLECALCIFEROL) 2000 units (50 mcg) tablet Take 1 tablet by mouth daily       Physical Examination:  /53 (BP Location: Right arm, Patient Position: Sitting, Cuff Size: Adult Large)   Pulse 83   Temp 96.8  F (36  C) (Tympanic)   Resp 16   Wt 98.4 kg (216 lb 14.4 oz)   LMP 03/19/2019   SpO2 99%   BMI 33.96 kg/m    Wt Readings from Last 10 Encounters:   05/26/21 97.9 kg (215 lb 12.8 oz)   05/10/21 99.7 kg (219 lb 12.8 oz)   04/13/21 101.6 kg (223 lb 14.4 oz)   03/11/21 102.5 kg (225 lb 14.4 oz)   02/15/21 105.2 kg (232 lb)   01/27/21 105.5 kg (232 lb 8 oz)   01/15/21 105.3 kg (232 lb 3.2 oz)   12/18/20 106.5 kg (234 lb 14.4 oz)   11/13/20 113.4 kg (250 lb 1.6 oz)   11/04/20 108.9 kg (240 lb)     Constitutional: Well-appearing female in no acute distress.  Eyes: EOMI, PERRL. No scleral icterus.  ENT: Oral mucosa is moist without lesions or thrush.   Lymphatic: Neck is supple without cervical or supraclavicular lymphadenopathy.   Cardiovascular: Regular rate and rhythm. No murmurs, gallops, or rubs. Trace peripheral edema.  Respiratory: Clear to auscultation bilaterally. No wheezes or crackles.  Gastrointestinal: Bowel sounds present. Abdomen soft, non-tender.   Neurologic: Cranial nerves II through XII are grossly intact.  Skin: Slightly dry skin on feet. No rashes, petechiae, or bruising noted on exposed skin.    Laboratory Data:  Results for AMADEO WILSON (MRN [id number]) as of 6/2/2021 09:32   6/2/2021 07:33   Sodium 139   Potassium 3.6   Chloride 108   Carbon Dioxide 27   Urea Nitrogen 7   Creatinine 0.64   GFR Estimate >90   GFR Estimate If Black >90   Calcium 9.1   Anion Gap 4   Albumin 2.6 (L)   Protein Total 6.9   Bilirubin Total 0.4   Alkaline Phosphatase 106   ALT 13   AST 9   T4 Free 1.08   TSH 4.53 (H)   Glucose 90   WBC 5.3   Hemoglobin 8.2 (L)   Hematocrit 26.6 (L)   Platelet Count 207   RBC Count 2.76  (L)   MCV 96   MCH 29.7   MCHC 30.8 (L)   RDW 17.0 (H)   Diff Method Automated Method   % Neutrophils 68.8   % Lymphocytes 15.1   % Monocytes 8.3   % Eosinophils 7.2   % Basophils 0.4   % Immature Granulocytes 0.2   Nucleated RBCs 0   Absolute Neutrophil 3.6   Absolute Lymphocytes 0.8   Absolute Monocytes 0.4   Absolute Eosinophils 0.4   Absolute Basophils 0.0   Abs Immature Granulocytes 0.0   Absolute Nucleated RBC 0.0       Assessment and Plan:  1. ONc  Metastatic UPS, was on Doxil + Ifosfamide (5 cycles) with positive response to treatment initially, then due to poor tolerance switched to Doxil alone (1 cycle) but had progression. Started on Keytruda 5/10/21      No signs or symptoms of immune toxicities-did feel achy over the weekend but improved now. Will continue with cycle 2 today. Will plan for treatment every 3 weeks and repeat imaging after cycle 3.     2. Heme  Anemia: Previously thought 2/2 Ifosfamide suspect ongoing anemia related to chemotherapy and anemia of chronic disease. Iron studies WNL. Will check B12 level. Given lower at 8.2 and symptomatic, will transfuse 1 unit today. Repeat CBC in 3 weeks.      Thrombocytopenia: 2/2 ifosfamide, resolved.     Neutropenia: 2/2 ifosfamide resolved.     3. FEN  Electrolyte derangements resolved after stopping Ifos. No longer on supplements      Hydration/Nutrition: Doing OK at this time, improved compared to last week. CMP stable.      4. GI  GERD: continuing Omeprazole and tums, stable.      Nausea: Resolved.     Dry Mouth: Continue Biotene      Diarrhea: Intermittent, discussed Imodium PRN, not currently needing.      5. Derm  Hand/foot: 2/2 Doxil, improving now that she is off Doxil.      Dry skin: 2/2 Doxil and now Keytruda, continue lotion.     6. Pulm  Asthma: stable, continue inhalers.     COVID: Diagnosed in December, recovered.     Cough/SOB: See work-up from 5/26/21 (negative CT PE, RVP, COVID, NTBNP). Thought to be 2/2 phrenic nerve involvement of  tumor. Stable with Tessalon PRN. Could see thoracic surgery in future if symptoms worsen.       7. 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 Improving slightly, continue Gabapentin.      Brain fog: No acute neuro concerns, not discussed.     8. MSK  R shoulder/back pain: Suspect 2/2 cancer, continue Tylenol PRN and OK to use NSAIDs sparingly since she is off chemo. Declines opioids    Arthralgias: 2/2 Keytruda, was worse with exertion this weekend, now improved. Discussed Tylenol/NSAIDs PRN and Voltaren gel PRN.    40 minutes spent on the date of the encounter doing chart review, review of test results, interpretation of tests, patient visit and documentation      Harshal Schuster PA-C  [facility]  [address] [place] [address] [phone]          RUPERT Guaman

## 2021-06-09 DIAGNOSIS — G62.0 DRUG-INDUCED POLYNEUROPATHY (H): ICD-10-CM

## 2021-06-09 RX ORDER — GABAPENTIN 300 MG/1
CAPSULE ORAL
Qty: 210 CAPSULE | Refills: 1 | Status: SHIPPED | OUTPATIENT
Start: 2021-06-09 | End: 2021-07-06

## 2021-06-09 NOTE — CONFIDENTIAL NOTE
Received request for refill of gabapentin.     Last refill: 4/20/2021, dose changed 4/28/2021  Last office visit: 4/28/2021  Scheduled for follow up 6/16/2021     Will route request to MD for review.     Reviewed [place]  Report.

## 2021-06-15 ENCOUNTER — INFUSION THERAPY VISIT (OUTPATIENT)
Dept: ONCOLOGY | Facility: CLINIC | Age: 56
End: 2021-06-15
Attending: PHYSICIAN ASSISTANT
Payer: COMMERCIAL

## 2021-06-15 ENCOUNTER — ANCILLARY PROCEDURE (OUTPATIENT)
Dept: CT IMAGING | Facility: CLINIC | Age: 56
End: 2021-06-15
Attending: PHYSICIAN ASSISTANT
Payer: COMMERCIAL

## 2021-06-15 ENCOUNTER — TELEPHONE (OUTPATIENT)
Dept: PHARMACY | Facility: OTHER | Age: 56
End: 2021-06-15

## 2021-06-15 ENCOUNTER — APPOINTMENT (OUTPATIENT)
Dept: LAB | Facility: CLINIC | Age: 56
End: 2021-06-15
Attending: PHYSICIAN ASSISTANT
Payer: COMMERCIAL

## 2021-06-15 ENCOUNTER — TELEPHONE (OUTPATIENT)
Dept: ONCOLOGY | Facility: CLINIC | Age: 56
End: 2021-06-15

## 2021-06-15 VITALS
HEART RATE: 99 BPM | RESPIRATION RATE: 16 BRPM | OXYGEN SATURATION: 99 % | DIASTOLIC BLOOD PRESSURE: 62 MMHG | WEIGHT: 208.8 LBS | SYSTOLIC BLOOD PRESSURE: 119 MMHG | TEMPERATURE: 99.8 F | BODY MASS INDEX: 32.69 KG/M2

## 2021-06-15 VITALS — TEMPERATURE: 99.3 F

## 2021-06-15 DIAGNOSIS — R19.7 DIARRHEA, UNSPECIFIED TYPE: ICD-10-CM

## 2021-06-15 DIAGNOSIS — R05.9 COUGH: ICD-10-CM

## 2021-06-15 DIAGNOSIS — R53.83 OTHER FATIGUE: ICD-10-CM

## 2021-06-15 DIAGNOSIS — C49.9 SARCOMA (H): ICD-10-CM

## 2021-06-15 DIAGNOSIS — C49.9 SARCOMA OF SOFT TISSUE (H): ICD-10-CM

## 2021-06-15 DIAGNOSIS — D63.0 ANEMIA IN NEOPLASTIC DISEASE: Primary | ICD-10-CM

## 2021-06-15 DIAGNOSIS — C78.00 MALIGNANT NEOPLASM METASTATIC TO LUNG, UNSPECIFIED LATERALITY (H): ICD-10-CM

## 2021-06-15 DIAGNOSIS — R07.89 CHEST TIGHTNESS: ICD-10-CM

## 2021-06-15 DIAGNOSIS — C49.9 SARCOMA (H): Primary | ICD-10-CM

## 2021-06-15 LAB
ALBUMIN SERPL-MCNC: 2.6 G/DL (ref 3.4–5)
ALP SERPL-CCNC: 154 U/L (ref 40–150)
ALT SERPL W P-5'-P-CCNC: 18 U/L (ref 0–50)
ANION GAP SERPL CALCULATED.3IONS-SCNC: 10 MMOL/L (ref 3–14)
AST SERPL W P-5'-P-CCNC: 15 U/L (ref 0–45)
BASOPHILS # BLD AUTO: 0 10E9/L (ref 0–0.2)
BASOPHILS NFR BLD AUTO: 0.3 %
BILIRUB SERPL-MCNC: 0.8 MG/DL (ref 0.2–1.3)
BUN SERPL-MCNC: 10 MG/DL (ref 7–30)
CALCIUM SERPL-MCNC: 9.1 MG/DL (ref 8.5–10.1)
CHLORIDE SERPL-SCNC: 102 MMOL/L (ref 94–109)
CO2 SERPL-SCNC: 26 MMOL/L (ref 20–32)
CORTIS SERPL-MCNC: 12.8 UG/DL (ref 4–22)
CREAT SERPL-MCNC: 0.72 MG/DL (ref 0.52–1.04)
DIFFERENTIAL METHOD BLD: ABNORMAL
EOSINOPHIL # BLD AUTO: 0.4 10E9/L (ref 0–0.7)
EOSINOPHIL NFR BLD AUTO: 4.1 %
ERYTHROCYTE [DISTWIDTH] IN BLOOD BY AUTOMATED COUNT: 16.3 % (ref 10–15)
GFR SERPL CREATININE-BSD FRML MDRD: >90 ML/MIN/{1.73_M2}
GLUCOSE SERPL-MCNC: 82 MG/DL (ref 70–99)
HCT VFR BLD AUTO: 28 % (ref 35–47)
HGB BLD-MCNC: 8.7 G/DL (ref 11.7–15.7)
IMM GRANULOCYTES # BLD: 0 10E9/L (ref 0–0.4)
IMM GRANULOCYTES NFR BLD: 0.3 %
LYMPHOCYTES # BLD AUTO: 1.3 10E9/L (ref 0.8–5.3)
LYMPHOCYTES NFR BLD AUTO: 14.8 %
MAGNESIUM SERPL-MCNC: 2.3 MG/DL (ref 1.6–2.3)
MCH RBC QN AUTO: 29.1 PG (ref 26.5–33)
MCHC RBC AUTO-ENTMCNC: 31.1 G/DL (ref 31.5–36.5)
MCV RBC AUTO: 94 FL (ref 78–100)
MONOCYTES # BLD AUTO: 1 10E9/L (ref 0–1.3)
MONOCYTES NFR BLD AUTO: 11.5 %
NEUTROPHILS # BLD AUTO: 6.1 10E9/L (ref 1.6–8.3)
NEUTROPHILS NFR BLD AUTO: 69 %
NRBC # BLD AUTO: 0 10*3/UL
NRBC BLD AUTO-RTO: 0 /100
PLATELET # BLD AUTO: 219 10E9/L (ref 150–450)
POTASSIUM SERPL-SCNC: 3.6 MMOL/L (ref 3.4–5.3)
PROT SERPL-MCNC: 7.6 G/DL (ref 6.8–8.8)
RBC # BLD AUTO: 2.99 10E12/L (ref 3.8–5.2)
SODIUM SERPL-SCNC: 138 MMOL/L (ref 133–144)
T4 FREE SERPL-MCNC: 1.19 NG/DL (ref 0.76–1.46)
TROPONIN I SERPL-MCNC: <0.015 UG/L (ref 0–0.04)
TSH SERPL DL<=0.005 MIU/L-ACNC: 4.15 MU/L (ref 0.4–4)
WBC # BLD AUTO: 8.8 10E9/L (ref 4–11)

## 2021-06-15 PROCEDURE — 84443 ASSAY THYROID STIM HORMONE: CPT | Performed by: PHYSICIAN ASSISTANT

## 2021-06-15 PROCEDURE — 82533 TOTAL CORTISOL: CPT | Performed by: PHYSICIAN ASSISTANT

## 2021-06-15 PROCEDURE — 71250 CT THORAX DX C-: CPT | Performed by: RADIOLOGY

## 2021-06-15 PROCEDURE — 84439 ASSAY OF FREE THYROXINE: CPT | Performed by: PHYSICIAN ASSISTANT

## 2021-06-15 PROCEDURE — 258N000003 HC RX IP 258 OP 636: Performed by: PHYSICIAN ASSISTANT

## 2021-06-15 PROCEDURE — 250N000011 HC RX IP 250 OP 636: Performed by: PHYSICIAN ASSISTANT

## 2021-06-15 PROCEDURE — 99215 OFFICE O/P EST HI 40 MIN: CPT | Performed by: PHYSICIAN ASSISTANT

## 2021-06-15 PROCEDURE — 85025 COMPLETE CBC W/AUTO DIFF WBC: CPT | Performed by: PHYSICIAN ASSISTANT

## 2021-06-15 PROCEDURE — 93010 ELECTROCARDIOGRAM REPORT: CPT | Performed by: INTERNAL MEDICINE

## 2021-06-15 PROCEDURE — 99417 PROLNG OP E/M EACH 15 MIN: CPT | Performed by: PHYSICIAN ASSISTANT

## 2021-06-15 PROCEDURE — 80053 COMPREHEN METABOLIC PANEL: CPT | Performed by: PHYSICIAN ASSISTANT

## 2021-06-15 PROCEDURE — 83735 ASSAY OF MAGNESIUM: CPT | Performed by: PHYSICIAN ASSISTANT

## 2021-06-15 PROCEDURE — 93005 ELECTROCARDIOGRAM TRACING: CPT

## 2021-06-15 PROCEDURE — 84484 ASSAY OF TROPONIN QUANT: CPT | Performed by: PHYSICIAN ASSISTANT

## 2021-06-15 PROCEDURE — 96360 HYDRATION IV INFUSION INIT: CPT

## 2021-06-15 RX ORDER — HEPARIN SODIUM (PORCINE) LOCK FLUSH IV SOLN 100 UNIT/ML 100 UNIT/ML
500 SOLUTION INTRAVENOUS EVERY 8 HOURS
Status: DISCONTINUED | OUTPATIENT
Start: 2021-06-15 | End: 2021-06-15 | Stop reason: HOSPADM

## 2021-06-15 RX ORDER — ONDANSETRON 8 MG/1
8 TABLET, FILM COATED ORAL EVERY 8 HOURS PRN
Qty: 60 TABLET | Refills: 1 | Status: SHIPPED | OUTPATIENT
Start: 2021-06-15 | End: 2021-12-28

## 2021-06-15 RX ORDER — CODEINE PHOSPHATE AND GUAIFENESIN 10; 100 MG/5ML; MG/5ML
1-2 SOLUTION ORAL EVERY 4 HOURS PRN
Qty: 473 ML | Refills: 1 | Status: SHIPPED | OUTPATIENT
Start: 2021-06-15 | End: 2021-07-06

## 2021-06-15 RX ORDER — DEXAMETHASONE SODIUM PHOSPHATE 4 MG/ML
10 INJECTION, SOLUTION INTRA-ARTICULAR; INTRALESIONAL; INTRAMUSCULAR; INTRAVENOUS; SOFT TISSUE ONCE
Status: CANCELLED
Start: 2021-06-15 | End: 2021-06-15

## 2021-06-15 RX ORDER — HEPARIN SODIUM (PORCINE) LOCK FLUSH IV SOLN 100 UNIT/ML 100 UNIT/ML
5 SOLUTION INTRAVENOUS
Status: COMPLETED | OUTPATIENT
Start: 2021-06-15 | End: 2021-06-15

## 2021-06-15 RX ADMIN — Medication 500 UNITS: at 14:48

## 2021-06-15 RX ADMIN — Medication 5 ML: at 13:21

## 2021-06-15 RX ADMIN — SODIUM CHLORIDE 1000 ML: 9 INJECTION, SOLUTION INTRAVENOUS at 13:38

## 2021-06-15 ASSESSMENT — PAIN SCALES - GENERAL: PAINLEVEL: MILD PAIN (3)

## 2021-06-15 NOTE — LETTER
Clara 15, 2021    Richa Ashby  [address]  [address]  [place] [address]    To whom it may concern,    I am following Ms. Ashby for her cancer. Due to symptoms related to her cancer and her treatment she is recommended to not work at least from 6/14/21-6/25/21. Pending on improvement in her symptoms we will determine if she can return to work.    Please call our clinic if you have any questions.    Thank you,      Harshal Schuster PA-C  [facility]  [address] [place] [address] [phone]

## 2021-06-15 NOTE — NURSING NOTE
"Chief Complaint   Patient presents with     Port Draw     labs drawn from port by rn.  vs taken     Port accessed with 20 gauge 3/4\" gripper needle and labs drawn by rn.  Port flushed with NS and heparin.  Pt tolerated well.  VS taken.  Pt checked in for next appt.    Kassi Landin RN      "

## 2021-06-15 NOTE — TELEPHONE ENCOUNTER
Fatigue states slept 20 out of 24 hours yesterday. Mon and Tues last week very fatigued, by Wednesday feeling a little better. And then again Monday of this week. Feels like can't even get out of bed.  Gabapentin cut back dose  At bed time to 600 mg. Stopped taking tessalon for cough as it was not working. No change in Fatigue. No difficulty sleeping at night. 2x loose stools at night took imodium x1, 1 x episode of nausea and took compazine. Some dizziness states feels sweaty. No fever.   Went over fatigue protocol and she is already taking those steps.

## 2021-06-15 NOTE — PROGRESS NOTES
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 She received preoperative radiotherapy and the tumor was resected 3/12/20. She then developed a cough, chest tightness, and scapular pain and imaging revealed lung nodules. Lung biopsy 11/2/20 with sarcoma. She was started on Doxil + Ifos 11/13/20. Had delay in cycle 2 due to COVID dx 12/2/20. CT imaging after cycle 1 with positive response to treatment with decrease in pulmonary nodules. Received cycle 2 12/18/20. Received cycle 3 1/15/21 with dose reduction in Doxil for skin toxicity and mucositis. Received cycle 4 2/15/21.      CT CAP 3/8/21 with positive response to treatment, decreased size of pulmonary mets though one lesion was larger. Did see fibrocystic changes in left breast, recommended breast imaging. Received cycle 5 Doxil + ifos 3/11/21.      Imaging 4/5/21 with overall stable disease except one lung lesion, opted to continue Doxil alone for now and switch to Keytruda in future if ongoing progression.      Started Keytruda 5/10/21. Was seen 5/26/21 cough, SOB, fatigue work-up thought to be 2/2 phrenic nerve involvement of R apical lung mass.     I was asked to see her today as an add on for fatigue    Interval History:  Connie was seen today as an add on. She states that last week M-W she felt poorly with fatigue and poor appetite, spent most of her time in bed. Th-Sun she felt better and was able to meet up with friends. Then yesterday and today the fatigue returned and she had a hard time getting out of bed. She also wasn't eating or drinking as well, has no appetite. She had one episode of terrible diarrhea yesterday, no  recurrent issues. She continues to cough, stopped Tessalon as it wasn't helpful, and she is now coughing so hard she feels like she may vomiting. She denies SOB or chest pain, though notes she can't take a deep breath without coughing. No fevers but has been sweating. Neuropathy better. Feet still slightly swollen. No nausea/vomiting. Has slight dizziness.       Current Outpatient Medications   Medication Sig Dispense Refill     acetaminophen (TYLENOL) 325 MG tablet Take 325-650 mg by mouth every 6 hours as needed for mild pain       albuterol (PROAIR RESPICLICK) 108 (90 Base) MCG/ACT inhaler Inhale 1-2 puffs into the lungs       benzonatate (TESSALON) 100 MG capsule Take 1-2 capsules (100-200 mg) by mouth 3 times daily as needed for cough 60 capsule 1     cetirizine (ZYRTEC) 10 MG tablet Take 10 mg by mouth daily       gabapentin (NEURONTIN) 300 MG capsule 600 mg morning & afternoon - 900 mg at bedtime 210 capsule 1     lidocaine-prilocaine (EMLA) 2.5-2.5 % external cream Apply topically as needed for moderate pain (Patient not taking: Reported on 6/2/2021) 30 g 3     magic mouthwash suspension (diphenhydrAMINE, lidocaine, aluminum-magnesium & simethicone) Swish and swallow 10 mLs in mouth every 6 hours as needed for mouth sores (Patient not taking: Reported on 5/26/2021) 120 mL 3     melatonin (MELATONIN) 1 MG/ML LIQD liquid Take 5 mg by mouth       montelukast (SINGULAIR) 10 MG tablet Take 10 mg by mouth daily       multivitamin w/minerals (MULTI-VITAMIN) tablet Take 1 tablet by mouth daily       omeprazole (PRILOSEC) 20 MG DR capsule        urea (CARMOL) 10 % external cream Apply topically as needed for dry skin 453 g 0     vitamin D3 (CHOLECALCIFEROL) 2000 units (50 mcg) tablet Take 1 tablet by mouth daily         Physical Examination:  /62 (BP Location: Right arm, Patient Position: Sitting, Cuff Size: Adult Large)   Pulse 99   Temp 99.8  F (37.7  C) (Tympanic)   Resp 16   Wt 94.7 kg (208 lb 12.8 oz)    LMP 03/19/2019   SpO2 99%   BMI 32.69 kg/m    Wt Readings from Last 10 Encounters:   06/02/21 98.4 kg (216 lb 14.4 oz)   05/26/21 97.9 kg (215 lb 12.8 oz)   05/10/21 99.7 kg (219 lb 12.8 oz)   04/13/21 101.6 kg (223 lb 14.4 oz)   03/11/21 102.5 kg (225 lb 14.4 oz)   02/15/21 105.2 kg (232 lb)   01/27/21 105.5 kg (232 lb 8 oz)   01/15/21 105.3 kg (232 lb 3.2 oz)   12/18/20 106.5 kg (234 lb 14.4 oz)   11/13/20 113.4 kg (250 lb 1.6 oz)     Constitutional: Well-appearing female in no acute distress.  Eyes: EOMI, PERRL. No scleral icterus.  ENT: Oral mucosa is moist without lesions or thrush.   Lymphatic: Neck is supple without cervical or supraclavicular lymphadenopathy.  Cardiovascular: Regular rate and rhythm. No murmurs, gallops, or rubs. Trace peripheral edema.  Respiratory: Clear to auscultation bilaterally. No wheezes or crackles. Coughing with deep breathing.   Gastrointestinal: Bowel sounds present. Abdomen soft, non-tender.   Neurologic: Cranial nerves II through XII are grossly intact.  Skin: No rashes, petechiae, or bruising noted on exposed skin.    Laboratory Data:  Results for AMADEO WILSON (MRN [id number]) as of 6/16/2021 11:19   6/15/2021 13:25   Sodium 138   Potassium 3.6   Chloride 102   Carbon Dioxide 26   Urea Nitrogen 10   Creatinine 0.72   GFR Estimate >90   GFR Estimate If Black >90   Calcium 9.1   Anion Gap 10   Magnesium 2.3   Albumin 2.6 (L)   Protein Total 7.6   Bilirubin Total 0.8   Alkaline Phosphatase 154 (H)   ALT 18   AST 15   Cortisol Serum 12.8   T4 Free 1.19   Troponin I ES <0.015   TSH 4.15 (H)   Glucose 82   WBC 8.8   Hemoglobin 8.7 (L)   Hematocrit 28.0 (L)   Platelet Count 219   RBC Count 2.99 (L)   MCV 94   MCH 29.1   MCHC 31.1 (L)   RDW 16.3 (H)   Diff Method Automated Method   % Neutrophils 69.0   % Lymphocytes 14.8   % Monocytes 11.5   % Eosinophils 4.1   % Basophils 0.3   % Immature Granulocytes 0.3   Nucleated RBCs 0   Absolute Neutrophil 6.1   Absolute Lymphocytes 1.3    Absolute Monocytes 1.0   Absolute Eosinophils 0.4   Absolute Basophils 0.0   Abs Immature Granulocytes 0.0   Absolute Nucleated RBC 0.0     CT Chest 6/15/21  FINDINGS: No contrast. Right IJ catheter tip in the SVC. The included  thyroid appears normal. No enlarged axillary lymph nodes. Nonenlarged  pretracheal and right lower paratracheal lymph nodes appears similar.  Heart size and calibers of the thoracic aorta and main pulmonary  artery are normal. Compared with the prior CT scans, the the  paravertebral right upper lobe mass (series 2 image 16) measures 9.1 x  7.2 cm in the axial plane, previously 3.4 x 2.9 cm on the 3/8/2021 CT  column of the mass also measured approximately 7.0 x 5.2 cm on  5/26/2021. Pleural-based 3.4 cm mass (series 4 image 155) which  measured 3.4 cm on the 5/26/2021 CT and 2.1 cm in the right middle  lobe. Interval growth of left lateral nodule (series 4 image 173), now  measuring 3.4 cm, was 1.6 cm in March and 2.6 cm on 5/26/2021. Right  middle lobe anterior pleural-based nodule (series 4 image 174) appears  unchanged, measuring 4 mm with interval growth of a pleural-based left  lung base nodule, was 11 mm (series 4 image 249) currently and 7 mm on  5/26/2021.  Mild levocurvature of the upper thoracic spine. No bone destructions.  Degenerative changes in the thoracic spine.  Upper abdomen shows cholecystectomy surgical clips. Accessory spleen.  Mild left adrenal nodular hyperplasia appears unchanged.                                                                      IMPRESSION: Interval growth in size of the pulmonary nodules and  masses. No evidence of immunotherapy associated pneumonitis.     SHIRLEY INMAN MD    Assessment and Plan:  1. Onc  Metastatic UPS, was on Doxil + Ifosfamide (5 cycles) with positive response to treatment initially, then due to poor tolerance switched to Doxil alone (1 cycle) but had progression. Started on Keytruda 5/10/21.    Has had multiple issues  since starting with worsening cough, SOB, fatigue, intermittent diarrhea-all of which with negative work-up.    Repeat CT today for worsening cough concerning for progression vs pseudoprogression. Spoke with Dr. Lopez-too early to stop Keytruda but given symptomatic growth need to add Gemzar to aid in disease control quickly. Will start Gemzar tomorrow, plan to do 700mg/m2 day 1 day 8 every 21 days with keytruda on day 8.     Discussed toxicities of treatment including myelosuppression, n/v, flu-like symptoms, edema, rare pneumonitis.  asked about prognosis-discussed that if she does in fact have progression on Keytruda it would likely be less than a year.    I will see her next week for day 8 treatment and close follow-up.    2. Fatigue  Worked up for adrenal insufficiency-cortisol WNL, hypothyroidism-TSH minimally elevated and free T4 normal, OH-iron studies OK, anemia-hgb improving, infection-no signs on CT imaging and WBC normal, no fevers, [id number], troponin negative EKG NSR per nursing, result still pending. As such still unclear etiology. Discussed steroids with Dr. Lopez but given no findings of adrenal insuffiencey would not do Dex. Discussed Ritalin with patient but she prefers to wait.    Discussed having her take a break from work (he  says she is very busy and works overtime often), focus on resting, improving nutrition/hydration the next few weeks and see how she feels.     3. Heme  Anemia: Previously thought 2/2 Ifosfamide suspect ongoing anemia related to chemotherapy and anemia of chronic disease. Iron and B12 WNL. Improved today. With starting Gemzar discussed risk of worsening anemia and likely will need pRBC in the future.     4. FEN  Losing weight. Did give IVF in clinic today but not much change in symptoms. CMP stable.      5. GI  GERD: continuing Omeprazole and tums, stable.     Diarrhea: Intermittent, discussed Imodium PRN. Did order C. Diff testing if severe  diarrhea returns.      6. Derm  Hand/foot: 2/2 Doxil, improving now that she is off Doxil.      7. Pulm  Asthma: stable, continue inhalers.     COVID: Diagnosed in December, recovered.     Cough/SOB: See work-up from 5/26/21 (negative CT PE, RVP, COVID, NTBNP). Thought to be 2/2 phrenic nerve involvement of tumor. Worse likely 2/2 growth of pulmonary mets. Discussed radiation to RUL mass with Dr. Lopez but then risk of nodules growing further so would like to start with Gemzar. Prescribed Robitussin AC.      8. 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 Improving.     9. MSK  R shoulder/back pain: Suspect 2/2 cancer, continue Tylenol PRN. Not discussed.      Arthralgias: 2/2 Keytruda, not discussed.     70 minutes spent on the date of the encounter doing chart review, review of test results, interpretation of tests, patient visit, documentation and discussion with other provider(s)      Harshal Schuster PA-C  [facility]  [address] [place] [address] [id number] [phone]

## 2021-06-15 NOTE — TELEPHONE ENCOUNTER
Prior Authorization Approval    Authorization Effective Date: 5/16/2021  Authorization Expiration Date: 6/15/2022  Medication: Ondansetron PA Approved  Approved Dose/Quantity: 60/20ds  Reference #: (Key: [id number])   Insurance Company: [employer] - Phone [phone] Fax [phone]  Expected CoPay:       CoPay Card Available:      Foundation Assistance Needed:    Which Pharmacy is filling the prescription (Not needed for infusion/clinic administered): [facility] [address] [phone]  Pharmacy Notified: Yes  Patient Notified: Yes

## 2021-06-15 NOTE — PROGRESS NOTES
Infusion Nursing Note:  Richa NOLEN Isma presents today for add on IV fluids.    Patient seen by provider today: Yes: Harshal EMERY in infusion   present during visit today: Not Applicable.    Note: IV fluids given and EKG done in infusion. Sent to CT scan following infusion with instructions to check back in on 2nd floor to see Harshal for scan results.     Intravenous Access:  Implanted Port.    Treatment Conditions:  Lab Results   Component Value Date    HGB 8.7 06/15/2021     Lab Results   Component Value Date    WBC 8.8 06/15/2021      Lab Results   Component Value Date    ANEU 6.1 06/15/2021     Lab Results   Component Value Date     06/15/2021      Lab Results   Component Value Date     06/15/2021                   Lab Results   Component Value Date    POTASSIUM 3.6 06/15/2021           Lab Results   Component Value Date    MAG 2.3 06/15/2021            Lab Results   Component Value Date    CR 0.72 06/15/2021                   Lab Results   Component Value Date    VIKTORIA 9.1 06/15/2021                Lab Results   Component Value Date    BILITOTAL 0.8 06/15/2021           Lab Results   Component Value Date    ALBUMIN 2.6 06/15/2021                    Lab Results   Component Value Date    ALT 18 06/15/2021           Lab Results   Component Value Date    AST 15 06/15/2021     TSH   Date Value Ref Range Status   06/15/2021 4.15 (H) 0.40 - 4.00 mU/L Final       Pt sent home with CDiff stool collection kit.     Post Infusion Assessment:  Patient tolerated infusion without incident.  Blood return noted pre and post infusion.  Access discontinued per protocol.       Discharge Plan:   Patient declined prescription refills.  AVS to patient via [email or web address].  Patient will return per Harshal for next appointment.   Patient discharged in stable condition accompanied by: self.  Departure Mode: Ambulatory.  Face to Face time: 0.      Lynette Holbrook RN

## 2021-06-15 NOTE — TELEPHONE ENCOUNTER
PA Initiation    Medication: Ondansetron PA Initiated  Insurance Company: [employer] - Phone [phone] Fax [phone]  Pharmacy Filling the Rx: [facility] [place], [place] - [address] [place] [address] [phone]  Filling Pharmacy Phone:    Filling Pharmacy Fax:    Start Date: 6/15/2021

## 2021-06-15 NOTE — TELEPHONE ENCOUNTER
Spoke with Harshal EMERY she would like Connie to come in today for a lab appointment at 12:45 and an office visit with her at 1:30 pm. Connie called and updated on appointments. She states she will be in for her appointments.

## 2021-06-15 NOTE — Clinical Note
6/15/2021         RE: Richa Ashby  [address]  [place] [address]        Dear Colleague,    Thank you for referring your patient, Richa Ashby, to the [facility]. Please see a copy of my visit note below.    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 She received preoperative radiotherapy and the tumor was resected 3/12/20. She then developed a cough, chest tightness, and scapular pain and imaging revealed lung nodules. Lung biopsy 11/2/20 with sarcoma. She was started on Doxil + Ifos 11/13/20. Had delay in cycle 2 due to COVID dx 12/2/20. CT imaging after cycle 1 with positive response to treatment with decrease in pulmonary nodules. Received cycle 2 12/18/20. Received cycle 3 1/15/21 with dose reduction in Doxil for skin toxicity and mucositis. Received cycle 4 2/15/21.      CT CAP 3/8/21 with positive response to treatment, decreased size of pulmonary mets though one lesion was larger. Did see fibrocystic changes in left breast, recommended breast imaging. Received cycle 5 Doxil + ifos 3/11/21.      Imaging 4/5/21 with overall stable disease except one lung lesion, opted to continue Doxil alone for now and switch to Keytruda in future if ongoing progression.      Started Keytruda 5/10/21. Was seen 5/26/21 cough, SOB, fatigue work-up thought to be 2/2 phrenic nerve involvement of R apical lung mass.     I was asked to see her today as an add on for fatigue    Interval History:      Review of Systems:  Patient denies fevers, chills, night sweats, unexplained weight changes, headaches, dizziness, vision or hearing  changes, new lumps or bumps, chest pain, shortness of breath, cough, abdominal pain, nausea, vomiting, changes to bowel or bladder, swelling of extremities, bleeding issues, or rash.    Current Outpatient Medications   Medication Sig Dispense Refill     acetaminophen (TYLENOL) 325 MG tablet Take 325-650 mg by mouth every 6 hours as needed for mild pain       albuterol (PROAIR RESPICLICK) 108 (90 Base) MCG/ACT inhaler Inhale 1-2 puffs into the lungs       benzonatate (TESSALON) 100 MG capsule Take 1-2 capsules (100-200 mg) by mouth 3 times daily as needed for cough 60 capsule 1     cetirizine (ZYRTEC) 10 MG tablet Take 10 mg by mouth daily       gabapentin (NEURONTIN) 300 MG capsule 600 mg morning & afternoon - 900 mg at bedtime 210 capsule 1     lidocaine-prilocaine (EMLA) 2.5-2.5 % external cream Apply topically as needed for moderate pain (Patient not taking: Reported on 6/2/2021) 30 g 3     magic mouthwash suspension (diphenhydrAMINE, lidocaine, aluminum-magnesium & simethicone) Swish and swallow 10 mLs in mouth every 6 hours as needed for mouth sores (Patient not taking: Reported on 5/26/2021) 120 mL 3     melatonin (MELATONIN) 1 MG/ML LIQD liquid Take 5 mg by mouth       montelukast (SINGULAIR) 10 MG tablet Take 10 mg by mouth daily       multivitamin w/minerals (MULTI-VITAMIN) tablet Take 1 tablet by mouth daily       omeprazole (PRILOSEC) 20 MG DR capsule        urea (CARMOL) 10 % external cream Apply topically as needed for dry skin 453 g 0     vitamin D3 (CHOLECALCIFEROL) 2000 units (50 mcg) tablet Take 1 tablet by mouth daily         Physical Examination:  /62 (BP Location: Right arm, Patient Position: Sitting, Cuff Size: Adult Large)   Pulse 99   Temp 99.8  F (37.7  C) (Tympanic)   Resp 16   Wt 94.7 kg (208 lb 12.8 oz)   LMP 03/19/2019   SpO2 99%   BMI 32.69 kg/m    Wt Readings from Last 10 Encounters:   06/02/21 98.4 kg (216 lb 14.4 oz)   05/26/21 97.9 kg (215 lb 12.8 oz)   05/10/21 99.7 kg  (219 lb 12.8 oz)   04/13/21 101.6 kg (223 lb 14.4 oz)   03/11/21 102.5 kg (225 lb 14.4 oz)   02/15/21 105.2 kg (232 lb)   01/27/21 105.5 kg (232 lb 8 oz)   01/15/21 105.3 kg (232 lb 3.2 oz)   12/18/20 106.5 kg (234 lb 14.4 oz)   11/13/20 113.4 kg (250 lb 1.6 oz)     Constitutional: Well-appearing female in no acute distress.  Eyes: EOMI, PERRL. No scleral icterus.  ENT: Oral mucosa is moist without lesions or thrush.   Lymphatic: Neck is supple without cervical or supraclavicular lymphadenopathy. No axillary lymphadenopathy.  Cardiovascular: Regular rate and rhythm. No murmurs, gallops, or rubs. No peripheral edema.  Respiratory: Clear to auscultation bilaterally. No wheezes or crackles.  Gastrointestinal: Bowel sounds present. Abdomen soft, non-tender. No palpable hepatosplenomegaly or masses.   Neurologic: Cranial nerves II through XII are grossly intact.  Skin: No rashes, petechiae, or bruising noted on exposed skin.    Laboratory Data:        Assessment and Plan:          Harshal Schuster PA-C  [facility]  [address] [place] [address]  [phone]      Infusion Nursing Note:  Richa Ashby presents today for add on for IV fluids.    Patient seen by provider today: Yes: Harshal EMERY in infusion.   present during visit today: Not Applicable.    Note: Patient received 1 liter IVF and EKG in infusion. Provided CDiff collection kit and sent down to CT scan following infusion. Patient will report back to see Harshal on the 2nd floor to go over results.    Intravenous Access:  Implanted Port.    Treatment Conditions:  Lab Results   Component Value Date    HGB 8.7 06/15/2021     Lab Results   Component Value Date    WBC 8.8 06/15/2021      Lab Results   Component Value Date    ANEU 6.1 06/15/2021     Lab Results   Component Value Date     06/15/2021      Lab Results   Component Value Date     06/15/2021                   Lab Results   Component Value Date     POTASSIUM 3.6 06/15/2021           Lab Results   Component Value Date    MAG 2.3 06/15/2021            Lab Results   Component Value Date    CR 0.72 06/15/2021                   Lab Results   Component Value Date    IVKTORIA 9.1 06/15/2021                Lab Results   Component Value Date    BILITOTAL 0.8 06/15/2021           Lab Results   Component Value Date    ALBUMIN 2.6 06/15/2021                    Lab Results   Component Value Date    ALT 18 06/15/2021           Lab Results   Component Value Date    AST 15 06/15/2021     TSH   Date Value Ref Range Status   06/15/2021 4.15 (H) 0.40 - 4.00 mU/L Final       Post Infusion Assessment:  Patient tolerated infusion without incident.  Blood return noted pre and post infusion.  Access discontinued per protocol.       Discharge Plan:   Patient declined prescription refills.  AVS to patient via [email or web address].  Patient will return per Harshal for next appointment.   Patient discharged in stable condition accompanied by: self.  Departure Mode: Ambulatory.  Face to Face time: 0.      Lynette Holbrook RN                        Again, thank you for allowing me to participate in the care of your patient.        Sincerely,        RUPERT Guaman

## 2021-06-15 NOTE — PROGRESS NOTES
Infusion Nursing Note:  Richa TAMANNA Ashby presents today for add on for IV fluids.    Patient seen by provider today: Yes: Harshal EMERY in infusion.   present during visit today: Not Applicable.    Note: Patient received 1 liter IVF and EKG in infusion. Provided CDiff collection kit and sent down to CT scan following infusion. Patient will report back to see Harshal on the 2nd floor to go over results.    Intravenous Access:  Implanted Port.    Treatment Conditions:  Lab Results   Component Value Date    HGB 8.7 06/15/2021     Lab Results   Component Value Date    WBC 8.8 06/15/2021      Lab Results   Component Value Date    ANEU 6.1 06/15/2021     Lab Results   Component Value Date     06/15/2021      Lab Results   Component Value Date     06/15/2021                   Lab Results   Component Value Date    POTASSIUM 3.6 06/15/2021           Lab Results   Component Value Date    MAG 2.3 06/15/2021            Lab Results   Component Value Date    CR 0.72 06/15/2021                   Lab Results   Component Value Date    VIKTORIA 9.1 06/15/2021                Lab Results   Component Value Date    BILITOTAL 0.8 06/15/2021           Lab Results   Component Value Date    ALBUMIN 2.6 06/15/2021                    Lab Results   Component Value Date    ALT 18 06/15/2021           Lab Results   Component Value Date    AST 15 06/15/2021     TSH   Date Value Ref Range Status   06/15/2021 4.15 (H) 0.40 - 4.00 mU/L Final       Post Infusion Assessment:  Patient tolerated infusion without incident.  Blood return noted pre and post infusion.  Access discontinued per protocol.       Discharge Plan:   Patient declined prescription refills.  AVS to patient via [employer].  Patient will return per Harshal for next appointment.   Patient discharged in stable condition accompanied by: self.  Departure Mode: Ambulatory.  Face to Face time: 0.      Lynette Holbrook RN

## 2021-06-15 NOTE — LETTER
6/15/2021         RE: Richa Ashby  [address]  [place] [address]      Oncology/Hematology Visit Note  Markos 15, 2021    Reason for Visit: Follow up of undifferentiated pleomorphic sarcoma, add on fatigue      History of Present Illness: Richa Ashby is a 55 year old female with PMH asthma and GERD with undifferentiated pleomorphic sarcoma. 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 She received preoperative radiotherapy and the tumor was resected 3/12/20. She then developed a cough, chest tightness, and scapular pain and imaging revealed lung nodules. Lung biopsy 11/2/20 with sarcoma. She was started on Doxil + Ifos 11/13/20. Had delay in cycle 2 due to COVID dx 12/2/20. CT imaging after cycle 1 with positive response to treatment with decrease in pulmonary nodules. Received cycle 2 12/18/20. Received cycle 3 1/15/21 with dose reduction in Doxil for skin toxicity and mucositis. Received cycle 4 2/15/21.      CT CAP 3/8/21 with positive response to treatment, decreased size of pulmonary mets though one lesion was larger. Did see fibrocystic changes in left breast, recommended breast imaging. Received cycle 5 Doxil + ifos 3/11/21.      Imaging 4/5/21 with overall stable disease except one lung lesion, opted to continue Doxil alone for now and switch to Keytruda in future if ongoing progression.      Started Keytruda 5/10/21. Was seen 5/26/21 cough, SOB, fatigue work-up thought to be 2/2 phrenic nerve involvement of R apical lung mass.     I was asked to see her today as an add on for fatigue    Interval History:  Connie was seen today as an add on. She states that last week M-W she felt poorly with fatigue and poor appetite, spent most of her time in bed. Th-Sun she felt better and was able to meet up with friends. Then yesterday and today the fatigue returned and she had a hard time getting out of bed. She also  wasn't eating or drinking as well, has no appetite. She had one episode of terrible diarrhea yesterday, no recurrent issues. She continues to cough, stopped Tessalon as it wasn't helpful, and she is now coughing so hard she feels like she may vomiting. She denies SOB or chest pain, though notes she can't take a deep breath without coughing. No fevers but has been sweating. Neuropathy better. Feet still slightly swollen. No nausea/vomiting. Has slight dizziness.       Current Outpatient Medications   Medication Sig Dispense Refill     acetaminophen (TYLENOL) 325 MG tablet Take 325-650 mg by mouth every 6 hours as needed for mild pain       albuterol (PROAIR RESPICLICK) 108 (90 Base) MCG/ACT inhaler Inhale 1-2 puffs into the lungs       benzonatate (TESSALON) 100 MG capsule Take 1-2 capsules (100-200 mg) by mouth 3 times daily as needed for cough 60 capsule 1     cetirizine (ZYRTEC) 10 MG tablet Take 10 mg by mouth daily       gabapentin (NEURONTIN) 300 MG capsule 600 mg morning & afternoon - 900 mg at bedtime 210 capsule 1     lidocaine-prilocaine (EMLA) 2.5-2.5 % external cream Apply topically as needed for moderate pain (Patient not taking: Reported on 6/2/2021) 30 g 3     magic mouthwash suspension (diphenhydrAMINE, lidocaine, aluminum-magnesium & simethicone) Swish and swallow 10 mLs in mouth every 6 hours as needed for mouth sores (Patient not taking: Reported on 5/26/2021) 120 mL 3     melatonin (MELATONIN) 1 MG/ML LIQD liquid Take 5 mg by mouth       montelukast (SINGULAIR) 10 MG tablet Take 10 mg by mouth daily       multivitamin w/minerals (MULTI-VITAMIN) tablet Take 1 tablet by mouth daily       omeprazole (PRILOSEC) 20 MG DR capsule        urea (CARMOL) 10 % external cream Apply topically as needed for dry skin 453 g 0     vitamin D3 (CHOLECALCIFEROL) 2000 units (50 mcg) tablet Take 1 tablet by mouth daily         Physical Examination:  /62 (BP Location: Right arm, Patient Position: Sitting, Cuff  Size: Adult Large)   Pulse 99   Temp 99.8  F (37.7  C) (Tympanic)   Resp 16   Wt 94.7 kg (208 lb 12.8 oz)   LMP 03/19/2019   SpO2 99%   BMI 32.69 kg/m    Wt Readings from Last 10 Encounters:   06/02/21 98.4 kg (216 lb 14.4 oz)   05/26/21 97.9 kg (215 lb 12.8 oz)   05/10/21 99.7 kg (219 lb 12.8 oz)   04/13/21 101.6 kg (223 lb 14.4 oz)   03/11/21 102.5 kg (225 lb 14.4 oz)   02/15/21 105.2 kg (232 lb)   01/27/21 105.5 kg (232 lb 8 oz)   01/15/21 105.3 kg (232 lb 3.2 oz)   12/18/20 106.5 kg (234 lb 14.4 oz)   11/13/20 113.4 kg (250 lb 1.6 oz)     Constitutional: Well-appearing female in no acute distress.  Eyes: EOMI, PERRL. No scleral icterus.  ENT: Oral mucosa is moist without lesions or thrush.   Lymphatic: Neck is supple without cervical or supraclavicular lymphadenopathy.  Cardiovascular: Regular rate and rhythm. No murmurs, gallops, or rubs. Trace peripheral edema.  Respiratory: Clear to auscultation bilaterally. No wheezes or crackles. Coughing with deep breathing.   Gastrointestinal: Bowel sounds present. Abdomen soft, non-tender.   Neurologic: Cranial nerves II through XII are grossly intact.  Skin: No rashes, petechiae, or bruising noted on exposed skin.    Laboratory Data:  Results for AMADEO WILSON (MRN [id number]) as of 6/16/2021 11:19   6/15/2021 13:25   Sodium 138   Potassium 3.6   Chloride 102   Carbon Dioxide 26   Urea Nitrogen 10   Creatinine 0.72   GFR Estimate >90   GFR Estimate If Black >90   Calcium 9.1   Anion Gap 10   Magnesium 2.3   Albumin 2.6 (L)   Protein Total 7.6   Bilirubin Total 0.8   Alkaline Phosphatase 154 (H)   ALT 18   AST 15   Cortisol Serum 12.8   T4 Free 1.19   Troponin I ES <0.015   TSH 4.15 (H)   Glucose 82   WBC 8.8   Hemoglobin 8.7 (L)   Hematocrit 28.0 (L)   Platelet Count 219   RBC Count 2.99 (L)   MCV 94   MCH 29.1   MCHC 31.1 (L)   RDW 16.3 (H)   Diff Method Automated Method   % Neutrophils 69.0   % Lymphocytes 14.8   % Monocytes 11.5   % Eosinophils 4.1   %  Basophils 0.3   % Immature Granulocytes 0.3   Nucleated RBCs 0   Absolute Neutrophil 6.1   Absolute Lymphocytes 1.3   Absolute Monocytes 1.0   Absolute Eosinophils 0.4   Absolute Basophils 0.0   Abs Immature Granulocytes 0.0   Absolute Nucleated RBC 0.0     CT Chest 6/15/21  FINDINGS: No contrast. Right IJ catheter tip in the SVC. The included  thyroid appears normal. No enlarged axillary lymph nodes. Nonenlarged  pretracheal and right lower paratracheal lymph nodes appears similar.  Heart size and calibers of the thoracic aorta and main pulmonary  artery are normal. Compared with the prior CT scans, the the  paravertebral right upper lobe mass (series 2 image 16) measures 9.1 x  7.2 cm in the axial plane, previously 3.4 x 2.9 cm on the 3/8/2021 CT  column of the mass also measured approximately 7.0 x 5.2 cm on  5/26/2021. Pleural-based 3.4 cm mass (series 4 image 155) which  measured 3.4 cm on the 5/26/2021 CT and 2.1 cm in the right middle  lobe. Interval growth of left lateral nodule (series 4 image 173), now  measuring 3.4 cm, was 1.6 cm in March and 2.6 cm on 5/26/2021. Right  middle lobe anterior pleural-based nodule (series 4 image 174) appears  unchanged, measuring 4 mm with interval growth of a pleural-based left  lung base nodule, was 11 mm (series 4 image 249) currently and 7 mm on  5/26/2021.  Mild levocurvature of the upper thoracic spine. No bone destructions.  Degenerative changes in the thoracic spine.  Upper abdomen shows cholecystectomy surgical clips. Accessory spleen.  Mild left adrenal nodular hyperplasia appears unchanged.                                                                      IMPRESSION: Interval growth in size of the pulmonary nodules and  masses. No evidence of immunotherapy associated pneumonitis.     SHIRLEY INMAN MD    Assessment and Plan:  1. Onc  Metastatic UPS, was on Doxil + Ifosfamide (5 cycles) with positive response to treatment initially, then due to poor  tolerance switched to Doxil alone (1 cycle) but had progression. Started on Keytruda 5/10/21.    Has had multiple issues since starting with worsening cough, SOB, fatigue, intermittent diarrhea-all of which with negative work-up.    Repeat CT today for worsening cough concerning for progression vs pseudoprogression. Spoke with Dr. Lopez-too early to stop Keytruda but given symptomatic growth need to add Gemzar to aid in disease control quickly. Will start Gemzar tomorrow, plan to do 700mg/m2 day 1 day 8 every 21 days with keytruda on day 8.     Discussed toxicities of treatment including myelosuppression, n/v, flu-like symptoms, edema, rare pneumonitis.  asked about prognosis-discussed that if she does in fact have progression on Keytruda it would likely be less than a year.    I will see her next week for day 8 treatment and close follow-up.    2. Fatigue  Worked up for adrenal insufficiency-cortisol WNL, hypothyroidism-TSH minimally elevated and free T4 normal, OH-iron studies OK, anemia-hgb improving, infection-no signs on CT imaging and WBC normal, no fevers, [id number], troponin negative EKG NSR per nursing, result still pending. As such still unclear etiology. Discussed steroids with Dr. Lopez but given no findings of adrenal insuffiencey would not do Dex. Discussed Ritalin with patient but she prefers to wait.    Discussed having her take a break from work (he  says she is very busy and works overtime often), focus on resting, improving nutrition/hydration the next few weeks and see how she feels.     3. Heme  Anemia: Previously thought 2/2 Ifosfamide suspect ongoing anemia related to chemotherapy and anemia of chronic disease. Iron and B12 WNL. Improved today. With starting Gemzar discussed risk of worsening anemia and likely will need pRBC in the future.     4. FEN  Losing weight. Did give IVF in clinic today but not much change in symptoms. CMP stable.      5. GI  GERD: continuing  Omeprazole and tums, stable.     Diarrhea: Intermittent, discussed Imodium PRN. Did order C. Diff testing if severe diarrhea returns.      6. Derm  Hand/foot: 2/2 Doxil, improving now that she is off Doxil.      7. Pulm  Asthma: stable, continue inhalers.     COVID: Diagnosed in December, recovered.     Cough/SOB: See work-up from 5/26/21 (negative CT PE, RVP, COVID, NTBNP). Thought to be 2/2 phrenic nerve involvement of tumor. Worse likely 2/2 growth of pulmonary mets. Discussed radiation to RUL mass with Dr. Lopez but then risk of nodules growing further so would like to start with Gemzar. Prescribed Robitussin AC.      8. 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 Improving.     9. MSK  R shoulder/back pain: Suspect 2/2 cancer, continue Tylenol PRN. Not discussed.      Arthralgias: 2/2 Keytruda, not discussed.     70 minutes spent on the date of the encounter doing chart review, review of test results, interpretation of tests, patient visit, documentation and discussion with other provider(s)      Harshal Schuster PA-C  [facility]  [address] [id number] [phone]      Infusion Nursing Note:  Richa Ashby presents today for add on for IV fluids.    Patient seen by provider today: Yes: Harshal EMERY in infusion.   present during visit today: Not Applicable.    Note: Patient received 1 liter IVF and EKG in infusion. Provided CDiff collection kit and sent down to CT scan following infusion. Patient will report back to see Harshal on the 2nd floor to go over results.    Intravenous Access:  Implanted Port.    Treatment Conditions:  Lab Results   Component Value Date    HGB 8.7 06/15/2021     Lab Results   Component Value Date    WBC 8.8 06/15/2021      Lab Results   Component Value Date    ANEU 6.1 06/15/2021     Lab Results   Component Value Date     06/15/2021      Lab Results   Component Value Date     06/15/2021                   Lab  Results   Component Value Date    POTASSIUM 3.6 06/15/2021           Lab Results   Component Value Date    MAG 2.3 06/15/2021            Lab Results   Component Value Date    CR 0.72 06/15/2021                   Lab Results   Component Value Date    VIKTORIA 9.1 06/15/2021                Lab Results   Component Value Date    BILITOTAL 0.8 06/15/2021           Lab Results   Component Value Date    ALBUMIN 2.6 06/15/2021                    Lab Results   Component Value Date    ALT 18 06/15/2021           Lab Results   Component Value Date    AST 15 06/15/2021     TSH   Date Value Ref Range Status   06/15/2021 4.15 (H) 0.40 - 4.00 mU/L Final       Post Infusion Assessment:  Patient tolerated infusion without incident.  Blood return noted pre and post infusion.  Access discontinued per protocol.       Discharge Plan:   Patient declined prescription refills.  AVS to patient via [employer].  Patient will return per Harshal for next appointment.   Patient discharged in stable condition accompanied by: self.  Departure Mode: Ambulatory.  Face to Face time: 0.      WHITNEY Cheng PA

## 2021-06-16 ENCOUNTER — VIRTUAL VISIT (OUTPATIENT)
Dept: PALLIATIVE CARE | Facility: CLINIC | Age: 56
End: 2021-06-16
Attending: FAMILY MEDICINE
Payer: COMMERCIAL

## 2021-06-16 ENCOUNTER — INFUSION THERAPY VISIT (OUTPATIENT)
Dept: ONCOLOGY | Facility: CLINIC | Age: 56
End: 2021-06-16
Attending: INTERNAL MEDICINE
Payer: COMMERCIAL

## 2021-06-16 ENCOUNTER — TELEPHONE (OUTPATIENT)
Dept: ONCOLOGY | Facility: CLINIC | Age: 56
End: 2021-06-16

## 2021-06-16 ENCOUNTER — MYC MEDICAL ADVICE (OUTPATIENT)
Dept: ONCOLOGY | Facility: CLINIC | Age: 56
End: 2021-06-16

## 2021-06-16 VITALS
OXYGEN SATURATION: 98 % | TEMPERATURE: 98.6 F | DIASTOLIC BLOOD PRESSURE: 70 MMHG | HEART RATE: 100 BPM | RESPIRATION RATE: 18 BRPM | SYSTOLIC BLOOD PRESSURE: 109 MMHG

## 2021-06-16 DIAGNOSIS — C49.9 SARCOMA (H): Primary | ICD-10-CM

## 2021-06-16 DIAGNOSIS — G62.0 CHEMOTHERAPY-INDUCED PERIPHERAL NEUROPATHY (H): ICD-10-CM

## 2021-06-16 DIAGNOSIS — C78.00 MALIGNANT NEOPLASM METASTATIC TO LUNG, UNSPECIFIED LATERALITY (H): ICD-10-CM

## 2021-06-16 DIAGNOSIS — R05.9 COUGH: ICD-10-CM

## 2021-06-16 DIAGNOSIS — D63.0 ANEMIA IN NEOPLASTIC DISEASE: ICD-10-CM

## 2021-06-16 DIAGNOSIS — G89.3 CANCER ASSOCIATED PAIN: ICD-10-CM

## 2021-06-16 DIAGNOSIS — T45.1X5A CHEMOTHERAPY-INDUCED PERIPHERAL NEUROPATHY (H): ICD-10-CM

## 2021-06-16 DIAGNOSIS — C49.9 SARCOMA (H): ICD-10-CM

## 2021-06-16 DIAGNOSIS — C49.9 SARCOMA OF SOFT TISSUE (H): Primary | ICD-10-CM

## 2021-06-16 DIAGNOSIS — C78.00 MALIGNANT NEOPLASM METASTATIC TO LUNG, UNSPECIFIED LATERALITY (H): Primary | ICD-10-CM

## 2021-06-16 DIAGNOSIS — C49.9 SARCOMA OF SOFT TISSUE (H): ICD-10-CM

## 2021-06-16 DIAGNOSIS — G47.01 INSOMNIA DUE TO MEDICAL CONDITION: ICD-10-CM

## 2021-06-16 DIAGNOSIS — R45.86 MOOD AND AFFECT DISTURBANCE: ICD-10-CM

## 2021-06-16 PROCEDURE — 99215 OFFICE O/P EST HI 40 MIN: CPT | Mod: 95 | Performed by: FAMILY MEDICINE

## 2021-06-16 PROCEDURE — 96375 TX/PRO/DX INJ NEW DRUG ADDON: CPT

## 2021-06-16 PROCEDURE — 258N000003 HC RX IP 258 OP 636: Performed by: INTERNAL MEDICINE

## 2021-06-16 PROCEDURE — 96415 CHEMO IV INFUSION ADDL HR: CPT

## 2021-06-16 PROCEDURE — 96413 CHEMO IV INFUSION 1 HR: CPT

## 2021-06-16 PROCEDURE — 250N000011 HC RX IP 250 OP 636: Performed by: INTERNAL MEDICINE

## 2021-06-16 RX ORDER — EPINEPHRINE 1 MG/ML
0.3 INJECTION, SOLUTION INTRAMUSCULAR; SUBCUTANEOUS EVERY 5 MIN PRN
Status: CANCELLED | OUTPATIENT
Start: 2021-06-16

## 2021-06-16 RX ORDER — LORAZEPAM 2 MG/ML
0.5 INJECTION INTRAMUSCULAR EVERY 4 HOURS PRN
Status: CANCELLED
Start: 2021-06-23

## 2021-06-16 RX ORDER — DIPHENHYDRAMINE HYDROCHLORIDE 50 MG/ML
50 INJECTION INTRAMUSCULAR; INTRAVENOUS
Status: CANCELLED
Start: 2021-06-23

## 2021-06-16 RX ORDER — MEPERIDINE HYDROCHLORIDE 25 MG/ML
25 INJECTION INTRAMUSCULAR; INTRAVENOUS; SUBCUTANEOUS EVERY 30 MIN PRN
Status: CANCELLED | OUTPATIENT
Start: 2021-06-23

## 2021-06-16 RX ORDER — HEPARIN SODIUM,PORCINE 10 UNIT/ML
5 VIAL (ML) INTRAVENOUS
Status: CANCELLED | OUTPATIENT
Start: 2021-06-23

## 2021-06-16 RX ORDER — DIPHENHYDRAMINE HYDROCHLORIDE 50 MG/ML
50 INJECTION INTRAMUSCULAR; INTRAVENOUS
Status: CANCELLED
Start: 2021-06-16

## 2021-06-16 RX ORDER — GRANISETRON HYDROCHLORIDE 1 MG/ML
1 INJECTION INTRAVENOUS ONCE
Status: CANCELLED
Start: 2021-06-16 | End: 2021-06-16

## 2021-06-16 RX ORDER — METHYLPREDNISOLONE SODIUM SUCCINATE 125 MG/2ML
125 INJECTION, POWDER, LYOPHILIZED, FOR SOLUTION INTRAMUSCULAR; INTRAVENOUS
Status: CANCELLED
Start: 2021-06-23

## 2021-06-16 RX ORDER — GRANISETRON HYDROCHLORIDE 1 MG/ML
1 INJECTION INTRAVENOUS ONCE
Status: CANCELLED
Start: 2021-06-23 | End: 2021-06-23

## 2021-06-16 RX ORDER — EPINEPHRINE 1 MG/ML
0.3 INJECTION, SOLUTION INTRAMUSCULAR; SUBCUTANEOUS EVERY 5 MIN PRN
Status: CANCELLED | OUTPATIENT
Start: 2021-06-23

## 2021-06-16 RX ORDER — HEPARIN SODIUM (PORCINE) LOCK FLUSH IV SOLN 100 UNIT/ML 100 UNIT/ML
5 SOLUTION INTRAVENOUS
Status: DISCONTINUED | OUTPATIENT
Start: 2021-06-16 | End: 2021-06-16 | Stop reason: HOSPADM

## 2021-06-16 RX ORDER — HEPARIN SODIUM (PORCINE) LOCK FLUSH IV SOLN 100 UNIT/ML 100 UNIT/ML
5 SOLUTION INTRAVENOUS
Status: CANCELLED | OUTPATIENT
Start: 2021-06-23

## 2021-06-16 RX ORDER — ALBUTEROL SULFATE 5 MG/ML
2.5 SOLUTION RESPIRATORY (INHALATION)
Status: CANCELLED | OUTPATIENT
Start: 2021-06-16

## 2021-06-16 RX ORDER — PROCHLORPERAZINE MALEATE 10 MG
10 TABLET ORAL EVERY 6 HOURS PRN
Qty: 30 TABLET | Refills: 5 | Status: SHIPPED | OUTPATIENT
Start: 2021-06-16 | End: 2021-08-11

## 2021-06-16 RX ORDER — NALOXONE HYDROCHLORIDE 0.4 MG/ML
0.2 INJECTION, SOLUTION INTRAMUSCULAR; INTRAVENOUS; SUBCUTANEOUS
Status: CANCELLED | OUTPATIENT
Start: 2021-06-16

## 2021-06-16 RX ORDER — NALOXONE HYDROCHLORIDE 0.4 MG/ML
0.2 INJECTION, SOLUTION INTRAMUSCULAR; INTRAVENOUS; SUBCUTANEOUS
Status: CANCELLED | OUTPATIENT
Start: 2021-06-23

## 2021-06-16 RX ORDER — MEPERIDINE HYDROCHLORIDE 25 MG/ML
25 INJECTION INTRAMUSCULAR; INTRAVENOUS; SUBCUTANEOUS EVERY 30 MIN PRN
Status: CANCELLED | OUTPATIENT
Start: 2021-06-16

## 2021-06-16 RX ORDER — ALBUTEROL SULFATE 5 MG/ML
2.5 SOLUTION RESPIRATORY (INHALATION)
Status: CANCELLED | OUTPATIENT
Start: 2021-06-23

## 2021-06-16 RX ORDER — METHYLPREDNISOLONE SODIUM SUCCINATE 125 MG/2ML
125 INJECTION, POWDER, LYOPHILIZED, FOR SOLUTION INTRAMUSCULAR; INTRAVENOUS
Status: CANCELLED
Start: 2021-06-16

## 2021-06-16 RX ORDER — ALBUTEROL SULFATE 90 UG/1
1-2 AEROSOL, METERED RESPIRATORY (INHALATION)
Status: CANCELLED
Start: 2021-06-16

## 2021-06-16 RX ORDER — HEPARIN SODIUM,PORCINE 10 UNIT/ML
5 VIAL (ML) INTRAVENOUS
Status: CANCELLED | OUTPATIENT
Start: 2021-06-16

## 2021-06-16 RX ORDER — LORAZEPAM 2 MG/ML
0.5 INJECTION INTRAMUSCULAR EVERY 4 HOURS PRN
Status: CANCELLED
Start: 2021-06-16

## 2021-06-16 RX ORDER — HEPARIN SODIUM (PORCINE) LOCK FLUSH IV SOLN 100 UNIT/ML 100 UNIT/ML
5 SOLUTION INTRAVENOUS
Status: CANCELLED | OUTPATIENT
Start: 2021-06-16

## 2021-06-16 RX ORDER — ALBUTEROL SULFATE 90 UG/1
1-2 AEROSOL, METERED RESPIRATORY (INHALATION)
Status: CANCELLED
Start: 2021-06-23

## 2021-06-16 RX ORDER — GRANISETRON HYDROCHLORIDE 1 MG/ML
1 INJECTION INTRAVENOUS ONCE
Status: COMPLETED | OUTPATIENT
Start: 2021-06-16 | End: 2021-06-16

## 2021-06-16 RX ORDER — OXYCODONE HYDROCHLORIDE 5 MG/1
2.5-5 TABLET ORAL EVERY 4 HOURS PRN
Qty: 20 TABLET | Refills: 0 | Status: ON HOLD | OUTPATIENT
Start: 2021-06-16 | End: 2021-06-20

## 2021-06-16 RX ADMIN — GRANISETRON HYDROCHLORIDE 1 MG: 1 INJECTION, SOLUTION INTRAVENOUS at 12:46

## 2021-06-16 RX ADMIN — SODIUM CHLORIDE 250 ML: 9 INJECTION, SOLUTION INTRAVENOUS at 12:46

## 2021-06-16 RX ADMIN — SODIUM CHLORIDE 1400 MG: 900 INJECTION, SOLUTION INTRAVENOUS at 13:18

## 2021-06-16 RX ADMIN — Medication 5 ML: at 14:50

## 2021-06-16 NOTE — TELEPHONE ENCOUNTER
6/16/21    Connie has progressive cancer and she needs Gemcitabine urgently. It is medically necessary for her to get this treatment today.     Harshal Schuster PA-C

## 2021-06-16 NOTE — LETTER
"6/16/2021       RE: Richa Ashby  [address]  [address]  [place] [id number]     Dear Colleague,    Thank you for referring your patient, Richa Ashby, to the [facility] at [facility]. Please see a copy of my visit note below.    Connie is a 56 year old who is being evaluated via a billable video visit.      How would you like to obtain your AVS? MyChart     If the video visit is dropped, the invitation should be resent by: Text to cell phone: [phone]     Will anyone else be joining your video visit? No        Vitals - Patient Reported  Weight (Patient Reported): 92.4 kg (203 lb 11.2 oz)  Height (Patient Reported): 170.2 cm (5' 7\")  BMI (Based on Pt Reported Ht/Wt): 31.9  Pain Score: No Pain (0)    Wicho Varner LPN    Video Start Time: 4:05 PM  Video-Visit Details    Type of service:  Video Visit    Video End Time:16:41    Originating Location (pt. Location): Home    Distant Location (provider location):  [facility] [place]     Platform used for Video Visit: [facility]     Palliative Care Outpatient Clinic Progress Note    Patient Name:  Richa Ashby  Primary Provider:  King Diaz    Chief Complaint: sarcoma    Interim History:  Richa Ashby 56 year old female returns to be seen by palliative care today.  She has sarcoma and has been on doxil-ifosfamide, finished 5 cycles and had stable disease on imaging 4/5/21 w exception of a lung lesion.  Continued doxil as monotherapy w plan to change to keytruda if further progression.    Started keytruda 5/10/21 and developed cough, dyspnea, fatigue w concern that she had phrenic nerve involvement d/t R apical lung mass.  Cough--had neg CT PE, RVP, COVID.  Taking tessalon perles.  Symptoms worsened in invervening two weeks and now there is worry she is having further progression, so started Gemzar today to aid in disease control per " oncology.    Peripheral neuropathy--when felt tired last week, cut gabapentin to 600-600-600.  Neuropathy symptoms are much better, resolved in hands for most part, mild in feet.  With profound fatigue she is wondering about further taper in gabapentin.    Profound fatigue--sleeping and napping a lot.  On two week leave from work.  Wakes feeling unrefreshed.  Reviewed meds; saw that tessalon perles and gabpentin both can contribute to fatigue.  Stopped tessalon perles (didn't help her cough) and discussed taper gabapentin.      Notes significant stiffness in AM, hard to move, needs hsuband to help her to get to bathroom first out of bed and can get off toilet and start walking after that on her own. If sits too long notes stiffness/soreness.    No joint swelling.    Severe cough- tessalon perles didn't help.   The codeine-guaifenesin does help.  Wakes up with coughing.   Uses MDI at bedtime and seems to help some.    Pain-more body aching.  Thinking it's tumor pain under R shoulder pain.    Has lost 34 lbs in the past six months.    Coping:  Doing okay; notes having good conversations with Kierra NEVES with oncology and her  about possible decision points.  Working through decidion about what she wants to spend her time with if she doesn't have good response to treatment.    Had son's wedding last month, really good time w family and friends.  Got really tired, was taxing with fam and friends--was in a historic bed and breakfast on second floor and lots of stairs then, didn't sit as much.      Social History:  Pertinent changes to social history/social situation since last visit: none  Key support resources: , kids, friends  Advance Directive Status:  No HCD; notes she is not ready for DNAR at this time.  Has forms to work on and is contemplating these.  Social History     Tobacco Use     Smoking status: Never Smoker     Smokeless tobacco: Never Used   Substance Use Topics     Alcohol use: Yes      Frequency: 2-3 times a week     Drinks per session: 1 or 2     Binge frequency: Never     Comment: 2-3 glasses of wine/week     Drug use: Never         Allergies   Allergen Reactions     Pentobarbital      resp arrest     Current Outpatient Medications   Medication Sig Dispense Refill     acetaminophen (TYLENOL) 325 MG tablet Take 325-650 mg by mouth every 6 hours as needed for mild pain       ADVAIR DISKUS 100-50 MCG/DOSE inhaler        albuterol (PROAIR RESPICLICK) 108 (90 Base) MCG/ACT inhaler Inhale 1-2 puffs into the lungs       benzonatate (TESSALON) 100 MG capsule Take 1-2 capsules (100-200 mg) by mouth 3 times daily as needed for cough 60 capsule 1     cetirizine (ZYRTEC) 10 MG tablet Take 10 mg by mouth daily       gabapentin (NEURONTIN) 300 MG capsule 600 mg morning & afternoon - 900 mg at bedtime 210 capsule 1     guaiFENesin-codeine (ROBITUSSIN AC) 100-10 MG/5ML solution Take 5-10 mLs by mouth every 4 hours as needed for cough 473 mL 1     melatonin (MELATONIN) 1 MG/ML LIQD liquid Take 5 mg by mouth       montelukast (SINGULAIR) 10 MG tablet Take 10 mg by mouth daily       multivitamin w/minerals (MULTI-VITAMIN) tablet Take 1 tablet by mouth daily       omeprazole (PRILOSEC) 20 MG DR capsule        ondansetron (ZOFRAN) 8 MG tablet Take 1 tablet (8 mg) by mouth every 8 hours as needed for nausea 60 tablet 1     prochlorperazine (COMPAZINE) 10 MG tablet Take 1 tablet (10 mg) by mouth every 6 hours as needed (Nausea/Vomiting) 30 tablet 5     urea (CARMOL) 10 % external cream Apply topically as needed for dry skin 453 g 0     vitamin D3 (CHOLECALCIFEROL) 2000 units (50 mcg) tablet Take 1 tablet by mouth daily       lidocaine-prilocaine (EMLA) 2.5-2.5 % external cream Apply topically as needed for moderate pain (Patient not taking: Reported on 6/2/2021) 30 g 3     magic mouthwash suspension (diphenhydrAMINE, lidocaine, aluminum-magnesium & simethicone) Swish and swallow 10 mLs in mouth every 6 hours as needed  for mouth sores (Patient not taking: Reported on 5/26/2021) 120 mL 3     Past Medical History:   Diagnosis Date     Asthma, chronic      Sarcoma (H)     Right lower thigh     Past Surgical History:   Procedure Laterality Date     AS RAD RESEC TONSIL/PILLARS       GALLBLADDER SURGERY       INSERT PORT VASCULAR ACCESS Right 10/28/2020    Procedure: single lumen power port placement @0745;  Surgeon: Tex Ackerman MD;  Location: [facility] OR     IR CHEST PORT PLACEMENT > 5 YRS OF AGE  10/28/2020     IR PORT CHECK RIGHT  3/26/2021     RESECT TUMOR LOWER EXTREMITY Right 3/12/2020    Procedure: Resection of right thigh tumor;  Surgeon: Guillermo Johnson MD;  Location: [facility] OR     ROTATOR CUFF REPAIR RT/LT  2012     SHOULDER SURGERY       SINUS SURGERY         Review of Systems:   ROS: 10 point ROS neg other than the symptoms noted above in the HPI and pertinents here:  Palliative Symptom Review (0=no symptom/no concern, 1=mild, 2=moderate, 3=severe):      Pain: 2      Fatigue: 3      Nausea: 1-2      Constipation: 0      Diarrhea: 1      Depressive Symptoms: 1      Anxiety: 0      Drowsiness: 1      Poor Appetite: 2      Shortness of Breath: 1      Insomnia: 0     PE  Due to public health emergency, video virtual visit performed.  Gen: alert woman, NAD.  Speech fluent.  Facial movements symmetric.  Neck supple without JVD  Breathing nonlabored without wheezing/accessory muscle use or coughing.  Affect full.      Key Data Reviewed:  LABS: reviewed.  Last Comprehensive Metabolic Panel:  Sodium   Date Value Ref Range Status   06/15/2021 138 133 - 144 mmol/L Final     Potassium   Date Value Ref Range Status   06/15/2021 3.6 3.4 - 5.3 mmol/L Final     Chloride   Date Value Ref Range Status   06/15/2021 102 94 - 109 mmol/L Final     Carbon Dioxide   Date Value Ref Range Status   06/15/2021 26 20 - 32 mmol/L Final     Anion Gap   Date Value Ref Range Status   06/15/2021 10 3 - 14 mmol/L Final     Glucose   Date Value Ref  Range Status   06/15/2021 82 70 - 99 mg/dL Final     Urea Nitrogen   Date Value Ref Range Status   06/15/2021 10 7 - 30 mg/dL Final     Creatinine   Date Value Ref Range Status   06/15/2021 0.72 0.52 - 1.04 mg/dL Final     GFR Estimate   Date Value Ref Range Status   06/15/2021 >90 >60 mL/min/[1.73_m2] Final     Comment:     Non  GFR Calc  Starting 2018, serum creatinine based estimated GFR (eGFR) will be   calculated using the Chronic Kidney Disease Epidemiology Collaboration   (CKD-EPI) equation.       Calcium   Date Value Ref Range Status   06/15/2021 9.1 8.5 - 10.1 mg/dL Final     Lab Results   Component Value Date    WBC 8.8 06/15/2021     Lab Results   Component Value Date    RBC 2.99 06/15/2021     Lab Results   Component Value Date    HGB 8.7 06/15/2021     Lab Results   Component Value Date    HCT 28.0 06/15/2021     No components found for: MCT  Lab Results   Component Value Date    MCV 94 06/15/2021     Lab Results   Component Value Date    MCH 29.1 06/15/2021     Lab Results   Component Value Date    MCHC 31.1 06/15/2021     Lab Results   Component Value Date    RDW 16.3 06/15/2021     Lab Results   Component Value Date     06/15/2021       IMAGING: reviewed      Impression & Recommendations & Counselin yo woman with undifferentiated pleomorphic sarcoma, 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  Completed 5 cycles chemotherapy with doxil-ifos and difficulties with symptoms of skin toxicity (improving), tinnitus, fatigue, and now worsening peripheral neuropathy, and 30-lb weight loss from anorexia.  In mid-April changed to monotherapy with Doxil alone to lessen toxicity.  Now on keytruda=gemcitabine due to evidence disease progression.    Pain: multifactorial  1. Peripheral neuropathy d/t chemotherapy: improved  -taper gabapentin to 300-300-300 to lessen fatigue  -monitor  symptoms.    2. R scapular pain d/t apical R lung mass  - start oxycodone 2.5-5 mg q 4H prn pain, Rx sent  - may use heat, ibuprofen prn.  Monitor.    Insomnia; improved  Continue gabapentin as above, melatoning, and activity as tolerated.    Existential distress: stable  -notes able to have discussions with /family members.  -low threshold to start selective serotonin reuptake inhibitor if decline in function  -continue work with palliative SW    Fatigue, multifactorial  - continue activigty as able, prioritize activites for low energy  -would consider use of provigil/methamphenidine if severe difficulties with fatigue.    Goals of care: life prolonging.  Now reconsidering whether she wants to keep working as long as able vs spending time with family; has heard prognosis might be a year depending upon response to keytriuda.  Recommend continued discussions on HCD/ACP with her loved ones.  She has forms to complete.    Support: Continue LISW visits with Darlene ZAZUETA  Family supportive.  Follow up with palliative medicine in 4 weeks.    Olivia Sheth MD  Fellow, Hospice and Palliative Medicine  This visit was staffed with Dr Robret Blanca.    Attending Note:  Patient seen and evaluated with Dr Sheth and I agree with/confirm their findings/recs in this note.      Thank you for involving us in the patient's care.   Robert Blanca MD / Palliative Medicine / Text me via [facility].                  Again, thank you for allowing me to participate in the care of your patient.      Sincerely,    Olivia Sheth MD

## 2021-06-16 NOTE — PROGRESS NOTES
Infusion Nursing Note:  iRcha Ashby presents today for Cycle 1 Day 1 Gemzar.    Patient seen by provider today: No   present during visit today: Not Applicable.    Note: Pt arrives to infusion today feeling at baseline with continued fatigue. She states the cough medicine is helping her. Denies any s/s of infection or any new concerns or complaints since visit with RUPERT Guaman yesterday.    Patient is not new to the infusion room today but is receiving Gemzar for the first time.  Verified that patient recieved written chemotherapy information previously.  Verbally reviewed chemotherapy teaching, side effects, take-home medications, and follow-up schedule with patient. Patient instructed to call triage with any questions or if she experiences a temperature >100.4, shaking chills, uncontrolled nausea/vomiting/diarrhea, dizziness, shortness of breath, bleeding not relieved with pressure, or with any other concerns.     Intravenous Access:  Implanted Port.    Treatment Conditions:  Lab Results   Component Value Date    HGB 8.7 06/15/2021     Lab Results   Component Value Date    WBC 8.8 06/15/2021      Lab Results   Component Value Date    ANEU 6.1 06/15/2021     Lab Results   Component Value Date     06/15/2021      Lab Results   Component Value Date     06/15/2021                   Lab Results   Component Value Date    POTASSIUM 3.6 06/15/2021           Lab Results   Component Value Date    MAG 2.3 06/15/2021            Lab Results   Component Value Date    CR 0.72 06/15/2021                   Lab Results   Component Value Date    VIKTORIA 9.1 06/15/2021                Lab Results   Component Value Date    BILITOTAL 0.8 06/15/2021           Lab Results   Component Value Date    ALBUMIN 2.6 06/15/2021                    Lab Results   Component Value Date    ALT 18 06/15/2021           Lab Results   Component Value Date    AST 15 06/15/2021     Results reviewed, labs MET treatment  parameters, ok to proceed with treatment.    Post Infusion Assessment:  Patient tolerated infusion without incident.  Blood return noted pre and post infusion.  Site patent and intact, free from redness, edema or discomfort.  No evidence of extravasations.  Access discontinued per protocol.     Discharge Plan:   Patient declined prescription refills.  AVS to patient via [email or web address].  Patient will return 6/23 for next appointment.   Patient discharged in stable condition accompanied by: self.  Departure Mode: Ambulatory.  Face to Face time: 0.      Kelli Frazier RN

## 2021-06-17 ENCOUNTER — APPOINTMENT (OUTPATIENT)
Dept: GENERAL RADIOLOGY | Facility: CLINIC | Age: 56
End: 2021-06-17
Attending: EMERGENCY MEDICINE
Payer: COMMERCIAL

## 2021-06-17 ENCOUNTER — DOCUMENTATION ONLY (OUTPATIENT)
Dept: ONCOLOGY | Facility: CLINIC | Age: 56
End: 2021-06-17

## 2021-06-17 ENCOUNTER — HOSPITAL ENCOUNTER (INPATIENT)
Facility: CLINIC | Age: 56
LOS: 3 days | Discharge: HOME | End: 2021-06-20
Attending: EMERGENCY MEDICINE | Admitting: INTERNAL MEDICINE
Payer: COMMERCIAL

## 2021-06-17 ENCOUNTER — APPOINTMENT (OUTPATIENT)
Dept: ULTRASOUND IMAGING | Facility: CLINIC | Age: 56
End: 2021-06-17
Attending: INTERNAL MEDICINE
Payer: COMMERCIAL

## 2021-06-17 VITALS
TEMPERATURE: 102.4 F | BODY MASS INDEX: 33.21 KG/M2 | WEIGHT: 212.1 LBS | OXYGEN SATURATION: 96 % | RESPIRATION RATE: 16 BRPM

## 2021-06-17 DIAGNOSIS — Z11.52 ENCOUNTER FOR SCREENING LABORATORY TESTING FOR SEVERE ACUTE RESPIRATORY SYNDROME CORONAVIRUS 2 (SARS-COV-2): ICD-10-CM

## 2021-06-17 DIAGNOSIS — R50.9 FEVER, UNSPECIFIED FEVER CAUSE: Primary | ICD-10-CM

## 2021-06-17 DIAGNOSIS — R50.9 FEVER, UNSPECIFIED FEVER CAUSE: ICD-10-CM

## 2021-06-17 DIAGNOSIS — I95.9 HYPOTENSION, UNSPECIFIED HYPOTENSION TYPE: ICD-10-CM

## 2021-06-17 DIAGNOSIS — C49.9 SARCOMA OF SOFT TISSUE (H): ICD-10-CM

## 2021-06-17 DIAGNOSIS — R50.9 FEVER IN ADULT: ICD-10-CM

## 2021-06-17 DIAGNOSIS — D63.0 ANEMIA IN NEOPLASTIC DISEASE: Primary | ICD-10-CM

## 2021-06-17 LAB
ALBUMIN SERPL-MCNC: 2.2 G/DL (ref 3.4–5)
ALBUMIN UR-MCNC: 20 MG/DL
ALP SERPL-CCNC: 217 U/L (ref 40–150)
ALT SERPL W P-5'-P-CCNC: 22 U/L (ref 0–50)
ANION GAP SERPL CALCULATED.3IONS-SCNC: 7 MMOL/L (ref 3–14)
APPEARANCE UR: CLEAR
AST SERPL W P-5'-P-CCNC: 16 U/L (ref 0–45)
BASOPHILS # BLD AUTO: 0 10E9/L (ref 0–0.2)
BASOPHILS NFR BLD AUTO: 0.2 %
BILIRUB SERPL-MCNC: 1.2 MG/DL (ref 0.2–1.3)
BILIRUB UR QL STRIP: NEGATIVE
BUN SERPL-MCNC: 8 MG/DL (ref 7–30)
CALCIUM SERPL-MCNC: 8.8 MG/DL (ref 8.5–10.1)
CHLORIDE SERPL-SCNC: 102 MMOL/L (ref 94–109)
CO2 SERPL-SCNC: 25 MMOL/L (ref 20–32)
COLOR UR AUTO: YELLOW
CREAT SERPL-MCNC: 0.69 MG/DL (ref 0.52–1.04)
DIFFERENTIAL METHOD BLD: ABNORMAL
EOSINOPHIL # BLD AUTO: 0.2 10E9/L (ref 0–0.7)
EOSINOPHIL NFR BLD AUTO: 3.5 %
ERYTHROCYTE [DISTWIDTH] IN BLOOD BY AUTOMATED COUNT: 16.1 % (ref 10–15)
GFR SERPL CREATININE-BSD FRML MDRD: >90 ML/MIN/{1.73_M2}
GLUCOSE SERPL-MCNC: 93 MG/DL (ref 70–99)
GLUCOSE UR STRIP-MCNC: NEGATIVE MG/DL
HCT VFR BLD AUTO: 24 % (ref 35–47)
HGB BLD-MCNC: 7.4 G/DL (ref 11.7–15.7)
HGB UR QL STRIP: NEGATIVE
IMM GRANULOCYTES # BLD: 0 10E9/L (ref 0–0.4)
IMM GRANULOCYTES NFR BLD: 0.3 %
INTERPRETATION ECG - MUSE: NORMAL
KETONES UR STRIP-MCNC: NEGATIVE MG/DL
LABORATORY COMMENT REPORT: NORMAL
LACTATE BLD-SCNC: 0.8 MMOL/L (ref 0.7–2)
LEUKOCYTE ESTERASE UR QL STRIP: NEGATIVE
LYMPHOCYTES # BLD AUTO: 0.8 10E9/L (ref 0.8–5.3)
LYMPHOCYTES NFR BLD AUTO: 12.2 %
MCH RBC QN AUTO: 28.6 PG (ref 26.5–33)
MCHC RBC AUTO-ENTMCNC: 30.8 G/DL (ref 31.5–36.5)
MCV RBC AUTO: 93 FL (ref 78–100)
MONOCYTES # BLD AUTO: 0.5 10E9/L (ref 0–1.3)
MONOCYTES NFR BLD AUTO: 7.1 %
MUCOUS THREADS #/AREA URNS LPF: PRESENT /LPF
NEUTROPHILS # BLD AUTO: 5.1 10E9/L (ref 1.6–8.3)
NEUTROPHILS NFR BLD AUTO: 76.7 %
NITRATE UR QL: NEGATIVE
NRBC # BLD AUTO: 0 10*3/UL
NRBC BLD AUTO-RTO: 0 /100
PH UR STRIP: 5.5 PH (ref 5–7)
PLATELET # BLD AUTO: 173 10E9/L (ref 150–450)
POTASSIUM SERPL-SCNC: 3.4 MMOL/L (ref 3.4–5.3)
PROT SERPL-MCNC: 6.9 G/DL (ref 6.8–8.8)
RBC # BLD AUTO: 2.59 10E12/L (ref 3.8–5.2)
RBC #/AREA URNS AUTO: <1 /HPF (ref 0–2)
SARS-COV-2 RNA RESP QL NAA+PROBE: NEGATIVE
SODIUM SERPL-SCNC: 134 MMOL/L (ref 133–144)
SOURCE: ABNORMAL
SP GR UR STRIP: 1.02 (ref 1–1.03)
SPECIMEN SOURCE: NORMAL
SQUAMOUS #/AREA URNS AUTO: 1 /HPF (ref 0–1)
TROPONIN I SERPL-MCNC: <0.015 UG/L (ref 0–0.04)
UROBILINOGEN UR STRIP-MCNC: NORMAL MG/DL (ref 0–2)
WBC # BLD AUTO: 6.7 10E9/L (ref 4–11)
WBC #/AREA URNS AUTO: 1 /HPF (ref 0–5)

## 2021-06-17 PROCEDURE — 93308 TTE F-UP OR LMTD: CPT

## 2021-06-17 PROCEDURE — 84484 ASSAY OF TROPONIN QUANT: CPT | Performed by: EMERGENCY MEDICINE

## 2021-06-17 PROCEDURE — 250N000013 HC RX MED GY IP 250 OP 250 PS 637: Performed by: EMERGENCY MEDICINE

## 2021-06-17 PROCEDURE — 94640 AIRWAY INHALATION TREATMENT: CPT

## 2021-06-17 PROCEDURE — U0005 INFEC AGEN DETEC AMPLI PROBE: HCPCS | Performed by: EMERGENCY MEDICINE

## 2021-06-17 PROCEDURE — C9803 HOPD COVID-19 SPEC COLLECT: HCPCS

## 2021-06-17 PROCEDURE — 93005 ELECTROCARDIOGRAM TRACING: CPT

## 2021-06-17 PROCEDURE — 250N000011 HC RX IP 250 OP 636: Performed by: INTERNAL MEDICINE

## 2021-06-17 PROCEDURE — 250N000013 HC RX MED GY IP 250 OP 250 PS 637: Performed by: INTERNAL MEDICINE

## 2021-06-17 PROCEDURE — 93971 EXTREMITY STUDY: CPT | Mod: LT

## 2021-06-17 PROCEDURE — 250N000011 HC RX IP 250 OP 636: Performed by: PHYSICIAN ASSISTANT

## 2021-06-17 PROCEDURE — 96366 THER/PROPH/DIAG IV INF ADDON: CPT

## 2021-06-17 PROCEDURE — U0003 INFECTIOUS AGENT DETECTION BY NUCLEIC ACID (DNA OR RNA); SEVERE ACUTE RESPIRATORY SYNDROME CORONAVIRUS 2 (SARS-COV-2) (CORONAVIRUS DISEASE [COVID-19]), AMPLIFIED PROBE TECHNIQUE, MAKING USE OF HIGH THROUGHPUT TECHNOLOGIES AS DESCRIBED BY CMS-2020-01-R: HCPCS | Performed by: EMERGENCY MEDICINE

## 2021-06-17 PROCEDURE — 81001 URINALYSIS AUTO W/SCOPE: CPT | Performed by: EMERGENCY MEDICINE

## 2021-06-17 PROCEDURE — 85025 COMPLETE CBC W/AUTO DIFF WBC: CPT | Performed by: PHYSICIAN ASSISTANT

## 2021-06-17 PROCEDURE — 87506 IADNA-DNA/RNA PROBE TQ 6-11: CPT | Performed by: INTERNAL MEDICINE

## 2021-06-17 PROCEDURE — 250N000011 HC RX IP 250 OP 636: Performed by: EMERGENCY MEDICINE

## 2021-06-17 PROCEDURE — 93971 EXTREMITY STUDY: CPT | Mod: 26 | Performed by: RADIOLOGY

## 2021-06-17 PROCEDURE — 999N000127 HC STATISTIC PERIPHERAL IV START W US GUIDANCE

## 2021-06-17 PROCEDURE — 87040 BLOOD CULTURE FOR BACTERIA: CPT | Performed by: PHYSICIAN ASSISTANT

## 2021-06-17 PROCEDURE — 80053 COMPREHEN METABOLIC PANEL: CPT | Performed by: PHYSICIAN ASSISTANT

## 2021-06-17 PROCEDURE — 87086 URINE CULTURE/COLONY COUNT: CPT | Performed by: EMERGENCY MEDICINE

## 2021-06-17 PROCEDURE — 99285 EMERGENCY DEPT VISIT HI MDM: CPT | Mod: 25

## 2021-06-17 PROCEDURE — 99223 1ST HOSP IP/OBS HIGH 75: CPT | Mod: AI | Performed by: INTERNAL MEDICINE

## 2021-06-17 PROCEDURE — 96365 THER/PROPH/DIAG IV INF INIT: CPT

## 2021-06-17 PROCEDURE — 120N000005 HC R&B MS OVERFLOW UMMC

## 2021-06-17 PROCEDURE — 71046 X-RAY EXAM CHEST 2 VIEWS: CPT | Mod: 26 | Performed by: RADIOLOGY

## 2021-06-17 PROCEDURE — 83605 ASSAY OF LACTIC ACID: CPT | Performed by: EMERGENCY MEDICINE

## 2021-06-17 PROCEDURE — 93308 TTE F-UP OR LMTD: CPT | Mod: 26 | Performed by: EMERGENCY MEDICINE

## 2021-06-17 PROCEDURE — 87493 C DIFF AMPLIFIED PROBE: CPT | Performed by: INTERNAL MEDICINE

## 2021-06-17 PROCEDURE — 93010 ELECTROCARDIOGRAM REPORT: CPT | Mod: 59 | Performed by: EMERGENCY MEDICINE

## 2021-06-17 PROCEDURE — 86481 TB AG RESPONSE T-CELL SUSP: CPT | Performed by: INTERNAL MEDICINE

## 2021-06-17 PROCEDURE — 71046 X-RAY EXAM CHEST 2 VIEWS: CPT

## 2021-06-17 PROCEDURE — 99285 EMERGENCY DEPT VISIT HI MDM: CPT | Mod: 25 | Performed by: EMERGENCY MEDICINE

## 2021-06-17 RX ORDER — HEPARIN SODIUM (PORCINE) LOCK FLUSH IV SOLN 100 UNIT/ML 100 UNIT/ML
5 SOLUTION INTRAVENOUS ONCE
Status: COMPLETED | OUTPATIENT
Start: 2021-06-17 | End: 2021-06-17

## 2021-06-17 RX ORDER — PROCHLORPERAZINE MALEATE 5 MG
10 TABLET ORAL EVERY 6 HOURS PRN
Status: DISCONTINUED | OUTPATIENT
Start: 2021-06-17 | End: 2021-06-20 | Stop reason: HOSPADM

## 2021-06-17 RX ORDER — LIDOCAINE 40 MG/G
CREAM TOPICAL
Status: DISCONTINUED | OUTPATIENT
Start: 2021-06-17 | End: 2021-06-20 | Stop reason: HOSPADM

## 2021-06-17 RX ORDER — NALOXONE HYDROCHLORIDE 0.4 MG/ML
0.4 INJECTION, SOLUTION INTRAMUSCULAR; INTRAVENOUS; SUBCUTANEOUS
Status: DISCONTINUED | OUTPATIENT
Start: 2021-06-17 | End: 2021-06-20 | Stop reason: HOSPADM

## 2021-06-17 RX ORDER — MONTELUKAST SODIUM 10 MG/1
10 TABLET ORAL DAILY
Status: DISCONTINUED | OUTPATIENT
Start: 2021-06-18 | End: 2021-06-20 | Stop reason: HOSPADM

## 2021-06-17 RX ORDER — GABAPENTIN 300 MG/1
300 CAPSULE ORAL 3 TIMES DAILY
Status: DISCONTINUED | OUTPATIENT
Start: 2021-06-17 | End: 2021-06-20 | Stop reason: HOSPADM

## 2021-06-17 RX ORDER — NALOXONE HYDROCHLORIDE 0.4 MG/ML
0.2 INJECTION, SOLUTION INTRAMUSCULAR; INTRAVENOUS; SUBCUTANEOUS
Status: DISCONTINUED | OUTPATIENT
Start: 2021-06-17 | End: 2021-06-20 | Stop reason: HOSPADM

## 2021-06-17 RX ORDER — ONDANSETRON 8 MG/1
8 TABLET, FILM COATED ORAL EVERY 8 HOURS PRN
Status: DISCONTINUED | OUTPATIENT
Start: 2021-06-17 | End: 2021-06-20 | Stop reason: HOSPADM

## 2021-06-17 RX ORDER — ACETAMINOPHEN 325 MG/1
650 TABLET ORAL EVERY 4 HOURS PRN
Status: DISCONTINUED | OUTPATIENT
Start: 2021-06-17 | End: 2021-06-20 | Stop reason: HOSPADM

## 2021-06-17 RX ORDER — HEPARIN SODIUM 5000 [USP'U]/.5ML
5000 INJECTION, SOLUTION INTRAVENOUS; SUBCUTANEOUS EVERY 12 HOURS
Status: DISCONTINUED | OUTPATIENT
Start: 2021-06-17 | End: 2021-06-17

## 2021-06-17 RX ORDER — ACETAMINOPHEN 325 MG/1
325-650 TABLET ORAL EVERY 6 HOURS PRN
Status: DISCONTINUED | OUTPATIENT
Start: 2021-06-17 | End: 2021-06-17

## 2021-06-17 RX ORDER — CODEINE PHOSPHATE AND GUAIFENESIN 10; 100 MG/5ML; MG/5ML
5 SOLUTION ORAL EVERY 4 HOURS PRN
Status: DISCONTINUED | OUTPATIENT
Start: 2021-06-17 | End: 2021-06-20 | Stop reason: HOSPADM

## 2021-06-17 RX ORDER — LEVOFLOXACIN 750 MG/1
750 TABLET, FILM COATED ORAL DAILY
Qty: 7 TABLET | Refills: 0 | Status: ON HOLD | OUTPATIENT
Start: 2021-06-17 | End: 2021-06-20

## 2021-06-17 RX ORDER — CODEINE PHOSPHATE AND GUAIFENESIN 10; 100 MG/5ML; MG/5ML
5-10 SOLUTION ORAL EVERY 4 HOURS PRN
Status: DISCONTINUED | OUTPATIENT
Start: 2021-06-17 | End: 2021-06-17

## 2021-06-17 RX ORDER — ALBUTEROL SULFATE 90 UG/1
2 AEROSOL, METERED RESPIRATORY (INHALATION) ONCE
Status: COMPLETED | OUTPATIENT
Start: 2021-06-17 | End: 2021-06-17

## 2021-06-17 RX ORDER — MULTIPLE VITAMINS W/ MINERALS TAB 9MG-400MCG
1 TAB ORAL DAILY
Status: DISCONTINUED | OUTPATIENT
Start: 2021-06-18 | End: 2021-06-20 | Stop reason: HOSPADM

## 2021-06-17 RX ORDER — ACETAMINOPHEN 500 MG
1000 TABLET ORAL ONCE
Status: COMPLETED | OUTPATIENT
Start: 2021-06-17 | End: 2021-06-17

## 2021-06-17 RX ORDER — CETIRIZINE HYDROCHLORIDE 10 MG/1
10 TABLET ORAL DAILY
Status: DISCONTINUED | OUTPATIENT
Start: 2021-06-18 | End: 2021-06-20 | Stop reason: HOSPADM

## 2021-06-17 RX ADMIN — CEFEPIME HYDROCHLORIDE 2 G: 2 INJECTION, POWDER, FOR SOLUTION INTRAVENOUS at 16:40

## 2021-06-17 RX ADMIN — GUAIFENESIN AND CODEINE PHOSPHATE 5 ML: 10; 100 LIQUID ORAL at 19:16

## 2021-06-17 RX ADMIN — ACETAMINOPHEN 1000 MG: 500 TABLET, FILM COATED ORAL at 18:46

## 2021-06-17 RX ADMIN — ALBUTEROL SULFATE 2 PUFF: 90 AEROSOL, METERED RESPIRATORY (INHALATION) at 18:38

## 2021-06-17 RX ADMIN — Medication 5 ML: at 13:47

## 2021-06-17 RX ADMIN — Medication 1 MG: at 22:18

## 2021-06-17 RX ADMIN — GABAPENTIN 300 MG: 300 CAPSULE ORAL at 22:18

## 2021-06-17 RX ADMIN — GUAIFENESIN AND CODEINE PHOSPHATE 5 ML: 10; 100 LIQUID ORAL at 22:36

## 2021-06-17 RX ADMIN — HEPARIN SODIUM 5000 UNITS: 5000 INJECTION, SOLUTION INTRAVENOUS; SUBCUTANEOUS at 22:18

## 2021-06-17 ASSESSMENT — ENCOUNTER SYMPTOMS
HEADACHES: 1
FEVER: 1
FATIGUE: 1
SHORTNESS OF BREATH: 0
ABDOMINAL PAIN: 1
COUGH: 1
APPETITE CHANGE: 1
CHILLS: 1
SORE THROAT: 0
WEAKNESS: 1
FREQUENCY: 0
DIARRHEA: 1
MYALGIAS: 1
DYSURIA: 0

## 2021-06-17 ASSESSMENT — PAIN SCALES - GENERAL: PAINLEVEL: MILD PAIN (3)

## 2021-06-17 ASSESSMENT — MIFFLIN-ST. JEOR: SCORE: 1547.97

## 2021-06-17 NOTE — ED PROVIDER NOTES
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  Patient is complaining of a 1 month history of generalized fatigue and weakness.  Patient was seen at clinic today, was found to be hypotensive and was given IV fluids, subsequently sent here to the ED.  Patient reports feeling generally unwell endorsing generalized myalgias, chills, and a fever of 102.8  F last night.  She also complains of a headache that comes on about once a week and resolves after eating or drinking, last headache was over a week ago none currently.  She states that she has a fairly poor appetite.  She also endorses a mild unproductive cough as well as generalized abdominal tenderness.  She states her last BM was 3 days ago.  She states this BM was urgent diarrhea and felt different from her normal diarrhea so she took an Imodium.  She has not had a BM since.  Patient also endorses achy joints diffusely but denies any red hot swollen joints.  She has been on Keytruda for at least the past 6 weeks.  Today her oncologist sent her in for blood cultures.  Patient denies recent illness, sore throat, chest pain, shortness of breath, dysuria, urgency or frequency.    Per chart review, patient's last chemotherapy infusion occurred yesterday on 6/16/2021.    EXAM: CT chest without contrast, 6/15/2021  INDICATION: Persistent cough. Other fatigue. Sarcoma. Rule out immune  mediated pneumonitis versus disease progression, on Keytruda  COMPARISON: CT pulmonary angiogram 5/26/2021 and routine chest CT  3/8/2021                                                    IMPRESSION: Interval growth in size of the pulmonary nodules  and  masses. No evidence of immunotherapy associated pneumonitis.       Past Medical History  Past Medical History:   Diagnosis Date     Asthma, chronic      Sarcoma (H)     Right lower thigh     Past Surgical History:   Procedure Laterality Date     AS RAD RESEC TONSIL/PILLARS       GALLBLADDER SURGERY       INSERT PORT VASCULAR ACCESS Right 10/28/2020    Procedure: single lumen power port placement @0745;  Surgeon: Tex Ackerman MD;  Location: [facility] OR     IR CHEST PORT PLACEMENT > 5 YRS OF AGE  10/28/2020     IR PORT CHECK RIGHT  3/26/2021     RESECT TUMOR LOWER EXTREMITY Right 3/12/2020    Procedure: Resection of right thigh tumor;  Surgeon: Guillermo Johnson MD;  Location: [facility] OR     ROTATOR CUFF REPAIR RT/LT  2012     SHOULDER SURGERY       SINUS SURGERY       acetaminophen (TYLENOL) 325 MG tablet  ADVAIR DISKUS 100-50 MCG/DOSE inhaler  albuterol (PROAIR RESPICLICK) 108 (90 Base) MCG/ACT inhaler  benzonatate (TESSALON) 100 MG capsule  cetirizine (ZYRTEC) 10 MG tablet  gabapentin (NEURONTIN) 300 MG capsule  guaiFENesin-codeine (ROBITUSSIN AC) 100-10 MG/5ML solution  levofloxacin (LEVAQUIN) 750 MG tablet  lidocaine-prilocaine (EMLA) 2.5-2.5 % external cream  magic mouthwash suspension (diphenhydrAMINE, lidocaine, aluminum-magnesium & simethicone)  melatonin (MELATONIN) 1 MG/ML LIQD liquid  montelukast (SINGULAIR) 10 MG tablet  multivitamin w/minerals (MULTI-VITAMIN) tablet  omeprazole (PRILOSEC) 20 MG DR capsule  ondansetron (ZOFRAN) 8 MG tablet  oxyCODONE (ROXICODONE) 5 MG tablet  prochlorperazine (COMPAZINE) 10 MG tablet  urea (CARMOL) 10 % external cream  vitamin D3 (CHOLECALCIFEROL) 2000 units (50 mcg) tablet      Allergies   Allergen Reactions     Pentobarbital      resp arrest     Family History  Family History   Problem Relation Age of Onset     Lung Cancer Maternal Grandmother      Breast Cancer Paternal Grandmother      Social History   Social History     Tobacco Use     Smoking status:  "Never Smoker     Smokeless tobacco: Never Used   Substance Use Topics     Alcohol use: Yes     Frequency: 2-3 times a week     Drinks per session: 1 or 2     Binge frequency: Never     Comment: 2-3 glasses of wine/week     Drug use: Never      Past medical history, past surgical history, medications, allergies, family history, and social history were reviewed with the patient. No additional pertinent items.       Review of Systems   Constitutional: Positive for appetite change (poor appetite), chills, fatigue and fever.   HENT: Negative for sore throat.    Respiratory: Positive for cough (mild, unproducitve). Negative for shortness of breath.    Cardiovascular: Negative for chest pain.   Gastrointestinal: Positive for abdominal pain (generalized tenderness) and diarrhea.   Genitourinary: Negative for dysuria, frequency and urgency.   Musculoskeletal: Positive for myalgias (generalized, achey joints).   Neurological: Positive for weakness and headaches (Intermittent).   All other systems reviewed and are negative.    A complete review of systems was performed with pertinent positives and negatives noted in the HPI, and all other systems negative.    Physical Exam   BP: 121/63  Pulse: 94  Temp: 99.6  F (37.6  C)  Resp: 18  Height: 170.2 cm (5' 7\")  Weight: 92.5 kg (204 lb)  SpO2: 98 %  Physical Exam   General: awake, alert, lying in bed resting nontoxic-appearing  Head: normal cephalic  HEENT: pupils equal, conjugate gaze intact  Neck: Supple  CV: regular rate and rhythm without murmur  Chest: Port in her right upper chest.  No surrounding redness, swelling, erythema  Lungs: clear to auscultation  Abd: soft, non-tender, no guarding, no peritoneal signs  EXT: lower extremities without swelling or edema  Neuro: awake, answers questions appropriately. No focal deficits noted   ED Course      Procedures  Results for orders placed during the hospital encounter of 06/17/21   POC US ECHO LIMITED    Impression Limited Bedside " Cardiac Ultrasound, performed and interpreted by me.   Indication: Hypotension/shock.  Parasternal long axis, parasternal short axis and apical 4 chamber views were acquired.   Image quality was satisfactory.    Findings:    Global left ventricular function appears intact.  Chambers do not appear dilated.  There is no evidence of free fluid within the pericardium.    IMPRESSION: Grossly normal left ventricular function and chamber size.  No pericardial effusion..          The medical record was reviewed and interpreted.  Current labs reviewed and interpreted.  Previous labs reviewed and interpreted.  Current images reviewed and interpreted: No acute findings on the chest.       EKG Interpretation:      Interpreted by Kermit Patrick MD  Time reviewed: 1632   Symptoms at time of EKG: Generalized weakness and hypertension  Rhythm: Normal sinus   Rate: 92 BPM  Axis: Normal  Ectopy: None  Conduction: Normal  ST Segments/ T Waves: No ST-T wave changes and No acute ischemic changes  Q Waves: None  Comparison to prior: Unchanged from previous on 6/15/2021    Clinical Impression: No signs of acute ischemic changes       Results for orders placed or performed during the hospital encounter of 06/17/21   XR Chest 2 Views     Status: None    Narrative    Exam: XR CHEST 2 VW, 6/17/2021 4:19 PM    Indication: fever    Comparison: CT chest 6/15/2021.    Findings:   PA and lateral view of the chest. Redemonstration of right apical,  right mid, and left basilar lung masses. No new focal airspace  opacity, pleural effusion, or pneumothorax. Tip of the right-sided  port projects over the mid SVC. Upper abdomen is unremarkable.      Impression    Impression:   1. No new airspace opacity.  2. Redemonstration of right apical, right middle, and left basilar  lung masses.    I have personally reviewed the examination and initial interpretation  and I agree with the findings.    TONG SMITH MD   [facility] ECHO LIMITED     Status: None     Impression    Limited Bedside Cardiac Ultrasound, performed and interpreted by me.   Indication: Hypotension/shock.  Parasternal long axis, parasternal short axis and apical 4 chamber views were acquired.   Image quality was satisfactory.    Findings:    Global left ventricular function appears intact.  Chambers do not appear dilated.  There is no evidence of free fluid within the pericardium.    IMPRESSION: Grossly normal left ventricular function and chamber size.  No pericardial effusion..     Lactic acid whole blood     Status: None   Result Value Ref Range    Lactic Acid 0.8 0.7 - 2.0 mmol/L   Troponin I     Status: None   Result Value Ref Range    Troponin I ES <0.015 0.000 - 0.045 ug/L   EKG 12 lead     Status: None   Result Value Ref Range    Interpretation ECG Click View Image link to view waveform and result    EKG 12-lead, tracing only     Status: None (Preliminary result)   Result Value Ref Range    Interpretation ECG Click View Image link to view waveform and result    Results for orders placed or performed in visit on 06/17/21   Comprehensive metabolic panel     Status: Abnormal   Result Value Ref Range    Sodium 134 133 - 144 mmol/L    Potassium 3.4 3.4 - 5.3 mmol/L    Chloride 102 94 - 109 mmol/L    Carbon Dioxide 25 20 - 32 mmol/L    Anion Gap 7 3 - 14 mmol/L    Glucose 93 70 - 99 mg/dL    Urea Nitrogen 8 7 - 30 mg/dL    Creatinine 0.69 0.52 - 1.04 mg/dL    GFR Estimate >90 >60 mL/min/[1.73_m2]    GFR Estimate If Black >90 >60 mL/min/[1.73_m2]    Calcium 8.8 8.5 - 10.1 mg/dL    Bilirubin Total 1.2 0.2 - 1.3 mg/dL    Albumin 2.2 (L) 3.4 - 5.0 g/dL    Protein Total 6.9 6.8 - 8.8 g/dL    Alkaline Phosphatase 217 (H) 40 - 150 U/L    ALT 22 0 - 50 U/L    AST 16 0 - 45 U/L   *CBC with platelets differential     Status: Abnormal   Result Value Ref Range    WBC 6.7 4.0 - 11.0 10e9/L    RBC Count 2.59 (L) 3.8 - 5.2 10e12/L    Hemoglobin 7.4 (L) 11.7 - 15.7 g/dL    Hematocrit 24.0 (L) 35.0 - 47.0 %    MCV 93  78 - 100 fl    MCH 28.6 26.5 - 33.0 pg    MCHC 30.8 (L) 31.5 - 36.5 g/dL    RDW 16.1 (H) 10.0 - 15.0 %    Platelet Count 173 150 - 450 10e9/L    Diff Method Automated Method     % Neutrophils 76.7 %    % Lymphocytes 12.2 %    % Monocytes 7.1 %    % Eosinophils 3.5 %    % Basophils 0.2 %    % Immature Granulocytes 0.3 %    Nucleated RBCs 0 0 /100    Absolute Neutrophil 5.1 1.6 - 8.3 10e9/L    Absolute Lymphocytes 0.8 0.8 - 5.3 10e9/L    Absolute Monocytes 0.5 0.0 - 1.3 10e9/L    Absolute Eosinophils 0.2 0.0 - 0.7 10e9/L    Absolute Basophils 0.0 0.0 - 0.2 10e9/L    Abs Immature Granulocytes 0.0 0 - 0.4 10e9/L    Absolute Nucleated RBC 0.0    Blood culture     Status: None (Preliminary result)    Specimen: Blood    Port   Result Value Ref Range    Specimen Description Blood Port     Culture Micro PENDING    Blood culture     Status: None (Preliminary result)    Specimen: Blood    Unspecified Site   Result Value Ref Range    Specimen Description Blood Unspecified Site     Culture Micro PENDING      Medications   ceFEPIme (MAXIPIME) 2 g vial to attach to  ml bag for ADULTS or 50 ml bag for PEDS (0 g Intravenous Stopped 6/17/21 1830)   albuterol (PROAIR HFA/PROVENTIL HFA/VENTOLIN HFA) 108 (90 Base) MCG/ACT inhaler 2 puff (2 puffs Inhalation Given 6/17/21 1838)        Assessments & Plan (with Medical Decision Making)   Connie is a 56-year-old female who presents to the emergency department from clinic with hypotension and fever last night.    Patient had been given 1 L of IV fluids in clinic, here she has normal tensive.  She is low-grade temperature of 99.6 without antipyretics, otherwise normal vital signs.  Physical exam is relatively unremarkable no obvious source of infection based on physical exam.    Reviewed labs in clinic, patient had normal white count, a hemoglobin of 7 which is been her baseline since starting chemotherapy.     Will obtain chest x-ray, UA, lactic acid.  Blood cultures had already been  obtained and are currently in process.    Differential for hypotension could include things such as dehydration, cardiac etiology, or sepsis.  High suspicion for sepsis given the recent fever.      Patient's EKG showed no signs of acute ischemia.  Bedside ultrasound performed to assess for pericardial effusion.  Patient is denying any shortness of breath, she is satting in the 98% on room air and only 1 episode of hypotension that recurred with fluid in the setting of high fever so lower suspicion for PE.    Reassuringly lactic acid is normal.  Chest x-ray without acute findings redemonstration of known metastatic disease.    Patient was covered with cefepime, discussed case with the oncology service will admit while cultures are pending and trend fever.  Patient remained vitally stable while in the emergency department with no recurrent episodes of hypotension.    I have reviewed the nursing notes. I have reviewed the findings, diagnosis, plan and need for follow up with the patient.    New Prescriptions    No medications on file       Final diagnoses:   Fever in adult   Hypotension, unspecified hypotension type   I, Mike Mead, am serving as a trained medical scribe to document services personally performed by Kermit Patrick MD, based on the provider's statements to me.     I, Kermit Patrick MD, was physically present and have reviewed and verified the accuracy of this note documented by Mike Mead.      --  Kermit Patrick MD  [facility] EMERGENCY DEPARTMENT  6/17/2021     Kermit Patrick MD  06/17/21 1335

## 2021-06-17 NOTE — ED TRIAGE NOTES
Pt GUILLERMINAA from [facility]. Pt has been feeling sick and weak for the past few weeks. Went to clinic for blood cx. Clinic found pt hypotensive at 84/46. Gave 500mL IVF. EMS en route /64. Pt is currently on chemo infusion for sarcoma to the lungs. Last infusion was yesterday. Pt has port accessed. Denies CP and SOB. A&Ox4 on arrival.

## 2021-06-17 NOTE — PROGRESS NOTES
"Connie is a 56 year old who is being evaluated via a billable video visit.      How would you like to obtain your AVS? MyChart     If the video visit is dropped, the invitation should be resent by: Text to cell phone: [phone]     Will anyone else be joining your video visit? No        Vitals - Patient Reported  Weight (Patient Reported): 92.4 kg (203 lb 11.2 oz)  Height (Patient Reported): 170.2 cm (5' 7\")  BMI (Based on Pt Reported Ht/Wt): 31.9  Pain Score: No Pain (0)    Wicho Noe Soumya LPN    Video Start Time: 4:05 PM  Video-Visit Details    Type of service:  Video Visit    Video End Time:16:41    Originating Location (pt. Location): Home    Distant Location (provider location):  [facility]     Platform used for Video Visit: [facility]     Palliative Care Outpatient Clinic Progress Note    Patient Name:  Richa Ashby  Primary Provider:  King Diaz    Chief Complaint: sarcoma    Interim History:  Richa Ashby 56 year old female returns to be seen by palliative care today.  She has sarcoma and has been on doxil-ifosfamide, finished 5 cycles and had stable disease on imaging 4/5/21 w exception of a lung lesion.  Continued doxil as monotherapy w plan to change to keytruda if further progression.    Started keytruda 5/10/21 and developed cough, dyspnea, fatigue w concern that she had phrenic nerve involvement d/t R apical lung mass.  Cough--had neg CT PE, RVP, COVID.  Taking tessalon perles.  Symptoms worsened in invervening two weeks and now there is worry she is having further progression, so started Gemzar today to aid in disease control per oncology.    Peripheral neuropathy--when felt tired last week, cut gabapentin to 600-600-600.  Neuropathy symptoms are much better, resolved in hands for most part, mild in feet.  With profound fatigue she is wondering about further taper in gabapentin.    Profound fatigue--sleeping and napping a lot.  On two week leave from work.  " Wakes feeling unrefreshed.  Reviewed meds; saw that tessalon perles and gabpentin both can contribute to fatigue.  Stopped tessalon perles (didn't help her cough) and discussed taper gabapentin.      Notes significant stiffness in AM, hard to move, needs hsuband to help her to get to bathroom first out of bed and can get off toilet and start walking after that on her own. If sits too long notes stiffness/soreness.    No joint swelling.    Severe cough- tessalon perles didn't help.   The codeine-guaifenesin does help.  Wakes up with coughing.   Uses MDI at bedtime and seems to help some.    Pain-more body aching.  Thinking it's tumor pain under R shoulder pain.    Has lost 34 lbs in the past six months.    Coping:  Doing okay; notes having good conversations with Kierra NEVES with oncology and her  about possible decision points.  Working through decidion about what she wants to spend her time with if she doesn't have good response to treatment.    Had son's wedding last month, really good time w family and friends.  Got really tired, was taxing with fam and friends--was in a historic bed and breakfast on second floor and lots of stairs then, didn't sit as much.      Social History:  Pertinent changes to social history/social situation since last visit: none  Key support resources: , kids, friends  Advance Directive Status:  No HCD; notes she is not ready for DNAR at this time.  Has forms to work on and is contemplating these.  Social History     Tobacco Use     Smoking status: Never Smoker     Smokeless tobacco: Never Used   Substance Use Topics     Alcohol use: Yes     Frequency: 2-3 times a week     Drinks per session: 1 or 2     Binge frequency: Never     Comment: 2-3 glasses of wine/week     Drug use: Never         Allergies   Allergen Reactions     Pentobarbital      resp arrest     Current Outpatient Medications   Medication Sig Dispense Refill     acetaminophen (TYLENOL) 325 MG tablet Take 325-650 mg  by mouth every 6 hours as needed for mild pain       ADVAIR DISKUS 100-50 MCG/DOSE inhaler        albuterol (PROAIR RESPICLICK) 108 (90 Base) MCG/ACT inhaler Inhale 1-2 puffs into the lungs       benzonatate (TESSALON) 100 MG capsule Take 1-2 capsules (100-200 mg) by mouth 3 times daily as needed for cough 60 capsule 1     cetirizine (ZYRTEC) 10 MG tablet Take 10 mg by mouth daily       gabapentin (NEURONTIN) 300 MG capsule 600 mg morning & afternoon - 900 mg at bedtime 210 capsule 1     guaiFENesin-codeine (ROBITUSSIN AC) 100-10 MG/5ML solution Take 5-10 mLs by mouth every 4 hours as needed for cough 473 mL 1     melatonin (MELATONIN) 1 MG/ML LIQD liquid Take 5 mg by mouth       montelukast (SINGULAIR) 10 MG tablet Take 10 mg by mouth daily       multivitamin w/minerals (MULTI-VITAMIN) tablet Take 1 tablet by mouth daily       omeprazole (PRILOSEC) 20 MG DR capsule        ondansetron (ZOFRAN) 8 MG tablet Take 1 tablet (8 mg) by mouth every 8 hours as needed for nausea 60 tablet 1     prochlorperazine (COMPAZINE) 10 MG tablet Take 1 tablet (10 mg) by mouth every 6 hours as needed (Nausea/Vomiting) 30 tablet 5     urea (CARMOL) 10 % external cream Apply topically as needed for dry skin 453 g 0     vitamin D3 (CHOLECALCIFEROL) 2000 units (50 mcg) tablet Take 1 tablet by mouth daily       lidocaine-prilocaine (EMLA) 2.5-2.5 % external cream Apply topically as needed for moderate pain (Patient not taking: Reported on 6/2/2021) 30 g 3     magic mouthwash suspension (diphenhydrAMINE, lidocaine, aluminum-magnesium & simethicone) Swish and swallow 10 mLs in mouth every 6 hours as needed for mouth sores (Patient not taking: Reported on 5/26/2021) 120 mL 3     Past Medical History:   Diagnosis Date     Asthma, chronic      Sarcoma (H)     Right lower thigh     Past Surgical History:   Procedure Laterality Date     AS RAD RESEC TONSIL/PILLARS       GALLBLADDER SURGERY       INSERT PORT VASCULAR ACCESS Right 10/28/2020     Procedure: single lumen power port placement @0745;  Surgeon: Tex Ackerman MD;  Location: [facility] OR     IR CHEST PORT PLACEMENT > 5 YRS OF AGE  10/28/2020     IR PORT CHECK RIGHT  3/26/2021     RESECT TUMOR LOWER EXTREMITY Right 3/12/2020    Procedure: Resection of right thigh tumor;  Surgeon: Guillermo Johnson MD;  Location: [facility] OR     ROTATOR CUFF REPAIR RT/LT  2012     SHOULDER SURGERY       SINUS SURGERY         Review of Systems:   ROS: 10 point ROS neg other than the symptoms noted above in the HPI and pertinents here:  Palliative Symptom Review (0=no symptom/no concern, 1=mild, 2=moderate, 3=severe):      Pain: 2      Fatigue: 3      Nausea: 1-2      Constipation: 0      Diarrhea: 1      Depressive Symptoms: 1      Anxiety: 0      Drowsiness: 1      Poor Appetite: 2      Shortness of Breath: 1      Insomnia: 0     PE  Due to public health emergency, video virtual visit performed.  Gen: alert woman, NAD.  Speech fluent.  Facial movements symmetric.  Neck supple without JVD  Breathing nonlabored without wheezing/accessory muscle use or coughing.  Affect full.      Key Data Reviewed:  LABS: reviewed.  Last Comprehensive Metabolic Panel:  Sodium   Date Value Ref Range Status   06/15/2021 138 133 - 144 mmol/L Final     Potassium   Date Value Ref Range Status   06/15/2021 3.6 3.4 - 5.3 mmol/L Final     Chloride   Date Value Ref Range Status   06/15/2021 102 94 - 109 mmol/L Final     Carbon Dioxide   Date Value Ref Range Status   06/15/2021 26 20 - 32 mmol/L Final     Anion Gap   Date Value Ref Range Status   06/15/2021 10 3 - 14 mmol/L Final     Glucose   Date Value Ref Range Status   06/15/2021 82 70 - 99 mg/dL Final     Urea Nitrogen   Date Value Ref Range Status   06/15/2021 10 7 - 30 mg/dL Final     Creatinine   Date Value Ref Range Status   06/15/2021 0.72 0.52 - 1.04 mg/dL Final     GFR Estimate   Date Value Ref Range Status   06/15/2021 >90 >60 mL/min/[1.73_m2] Final     Comment:     Non   American GFR Calc  Starting 2018, serum creatinine based estimated GFR (eGFR) will be   calculated using the Chronic Kidney Disease Epidemiology Collaboration   (CKD-EPI) equation.       Calcium   Date Value Ref Range Status   06/15/2021 9.1 8.5 - 10.1 mg/dL Final     Lab Results   Component Value Date    WBC 8.8 06/15/2021     Lab Results   Component Value Date    RBC 2.99 06/15/2021     Lab Results   Component Value Date    HGB 8.7 06/15/2021     Lab Results   Component Value Date    HCT 28.0 06/15/2021     No components found for: MCT  Lab Results   Component Value Date    MCV 94 06/15/2021     Lab Results   Component Value Date    MCH 29.1 06/15/2021     Lab Results   Component Value Date    MCHC 31.1 06/15/2021     Lab Results   Component Value Date    RDW 16.3 06/15/2021     Lab Results   Component Value Date     06/15/2021       IMAGING: reviewed      Impression & Recommendations & Counselin yo woman with undifferentiated pleomorphic sarcoma, 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  Completed 5 cycles chemotherapy with doxil-ifos and difficulties with symptoms of skin toxicity (improving), tinnitus, fatigue, and now worsening peripheral neuropathy, and 30-lb weight loss from anorexia.  In mid-April changed to monotherapy with Doxil alone to lessen toxicity.  Now on keytruda=gemcitabine due to evidence disease progression.    Pain: multifactorial  1. Peripheral neuropathy d/t chemotherapy: improved  -taper gabapentin to 300-300-300 to lessen fatigue  -monitor symptoms.    2. R scapular pain d/t apical R lung mass  - start oxycodone 2.5-5 mg q 4H prn pain, Rx sent  - may use heat, ibuprofen prn.  Monitor.    Insomnia; improved  Continue gabapentin as above, melatoning, and activity as tolerated.    Existential distress: stable  -notes able to have discussions with /family members.  -low  threshold to start selective serotonin reuptake inhibitor if decline in function  -continue work with palliative SW    Fatigue, multifactorial  - continue activigty as able, prioritize activites for low energy  -would consider use of provigil/methamphenidine if severe difficulties with fatigue.    Goals of care: life prolonging.  Now reconsidering whether she wants to keep working as long as able vs spending time with family; has heard prognosis might be a year depending upon response to keytriuda.  Recommend continued discussions on HCD/ACP with her loved ones.  She has forms to complete.    Support: Continue LISW visits with Darlene ZAZUETA  Family supportive.  Follow up with palliative medicine in 4 weeks.    Olivia Sheth MD  Fellow, Hospice and Palliative Medicine  This visit was staffed with Dr Robert Blanca.    Attending Note:  Patient seen and evaluated with Dr Sheth and I agree with/confirm their findings/recs in this note.      Thank you for involving us in the patient's care.   Robert Blanca MD / Palliative Medicine / Text me via [facility].

## 2021-06-17 NOTE — PROGRESS NOTES
"Rapid Response Epic Documentation     Situation:  Pt presented to lab with a fever of 102.4 oral, BP 85/58 and feeling really tired. RRT called.      Objective:    Pt in lab febrile and hypotensive. Chemo yesterday.    Assessment:  Pt is alert and oriented. Pt states she knows something is wrong and started running a temp last night. Pt took Tylenol at home 2200 hours but did not take any today so she \"didn't mask the symptoms\".           BP:  83/54    Pulse: 75  Respiration: 18  SPO2: 96 %  Mental Status: Alert  CMS: Intact  Stroke Scale: Negative  EKG: Not Performed      Treatment: Pt had labs and cultures drawn. Pt states she knows something is wrong and wants to go to ED. BP will not increase, so 911 called for transport.     Medication: Normal Saline WO      Location:   2nd floor lab       Disposition:      Transfer to Emergency Room Via: 911    Protocol Used:     Hypotension        "

## 2021-06-17 NOTE — PATIENT INSTRUCTIONS
Connie, it was good to see you again.    Start the oxycodone 2.5-5 mg every 4 hours as needed for cough or R scapular pain/chest pain .    Monitor how this is doing.      You could stop the guaifenesin-codeine if the oxycodone alone is helping cough symptoms.    Keep talking to your family and reflecting upon what's important to you.    Drop the gabapentin to 300-300-300 and if you're still really tired despite that after a week, we can work on tapering futher.    I appreciated being part of your care and hope for a good response to your treatments.    Follow up with Dr Blanca in 4 weeks.  Our clinic is  in the [facility]: [phone] (scheduling); [phone] (triage). Palliative care [facility] outpatient care coordinator is Chelsea Kline RN: [phone]. Palliative After-Hours Answering Service: [phone].

## 2021-06-17 NOTE — TELEPHONE ENCOUNTER
Had fever of 102.8 overnight but this morning fever broke and does not feel as if she is running a fever any longer. Took acetaminophen at bedtime last night. Eating small frequent meals, taking in fluids without difficulty. passing urine without difficulty, urine is dark yellow but is definitely getting lighter in color as she drinks more. Still having body aches at this time, general malaise.   Feels she is able to manage at home at this time, wanted to notify her care team of overnight fever.

## 2021-06-17 NOTE — NURSING NOTE
Chief Complaint   Patient presents with     Port Draw     Labs drawn from port by RN in lab. Vitals taken. Rapid response called. Provider paged.      Port accessed with 20 gauge gripper needle by RN in lab.  Port flushed with saline and heparin.  Vital signs taken. Rapid response called due to decreased BP and fever. Fluids started, patient in supine position. 911 called to transport to ER. Patient's  at bedside.    Belinda Zayas RN

## 2021-06-17 NOTE — TELEPHONE ENCOUNTER
Per Harshal Rosales should come in for CBC,CMP, UA, CXR, and blood cultures.   Call placed to Connie to notify her of the new orders for lab and CXR. Someone will be calling her to schedule.   Message sent to  to schedule ASAP lab and CXR.

## 2021-06-18 LAB
ALBUMIN SERPL-MCNC: 2 G/DL (ref 3.4–5)
ALP SERPL-CCNC: 173 U/L (ref 40–150)
ALT SERPL W P-5'-P-CCNC: 19 U/L (ref 0–50)
ANION GAP SERPL CALCULATED.3IONS-SCNC: 6 MMOL/L (ref 3–14)
AST SERPL W P-5'-P-CCNC: 10 U/L (ref 0–45)
BACTERIA SPEC CULT: NO GROWTH
BILIRUB SERPL-MCNC: 1.1 MG/DL (ref 0.2–1.3)
BLD PROD TYP BPU: NORMAL
BLD UNIT ID BPU: 0
BLOOD PRODUCT CODE: NORMAL
BPU ID: NORMAL
BUN SERPL-MCNC: 6 MG/DL (ref 7–30)
C COLI+JEJUNI+LARI FUSA STL QL NAA+PROBE: NOT DETECTED
C DIFF TOX B STL QL: NEGATIVE
CALCIUM SERPL-MCNC: 8.3 MG/DL (ref 8.5–10.1)
CHLORIDE SERPL-SCNC: 104 MMOL/L (ref 94–109)
CO2 SERPL-SCNC: 28 MMOL/L (ref 20–32)
CREAT SERPL-MCNC: 0.62 MG/DL (ref 0.52–1.04)
CREAT SERPL-MCNC: 0.65 MG/DL (ref 0.52–1.04)
EC STX1 GENE STL QL NAA+PROBE: NOT DETECTED
EC STX2 GENE STL QL NAA+PROBE: NOT DETECTED
ENTERIC PATHOGEN COMMENT: NORMAL
ERYTHROCYTE [DISTWIDTH] IN BLOOD BY AUTOMATED COUNT: 16.3 % (ref 10–15)
GFR SERPL CREATININE-BSD FRML MDRD: >90 ML/MIN/{1.73_M2}
GFR SERPL CREATININE-BSD FRML MDRD: >90 ML/MIN/{1.73_M2}
GLUCOSE SERPL-MCNC: 111 MG/DL (ref 70–99)
HCT VFR BLD AUTO: 21.5 % (ref 35–47)
HGB BLD-MCNC: 6.6 G/DL (ref 11.7–15.7)
HGB BLD-MCNC: 7.3 G/DL (ref 11.7–15.7)
LACTATE BLD-SCNC: 0.6 MMOL/L (ref 0.7–2)
Lab: NORMAL
MAGNESIUM SERPL-MCNC: 1.4 MG/DL (ref 1.6–2.3)
MAGNESIUM SERPL-MCNC: 2 MG/DL (ref 1.6–2.3)
MCH RBC QN AUTO: 28.7 PG (ref 26.5–33)
MCHC RBC AUTO-ENTMCNC: 30.7 G/DL (ref 31.5–36.5)
MCV RBC AUTO: 94 FL (ref 78–100)
NOROV GI+II ORF1-ORF2 JNC STL QL NAA+PR: NOT DETECTED
PHOSPHATE SERPL-MCNC: 1.7 MG/DL (ref 2.5–4.5)
PHOSPHATE SERPL-MCNC: 2.4 MG/DL (ref 2.5–4.5)
PLATELET # BLD AUTO: 144 10E9/L (ref 150–450)
POTASSIUM SERPL-SCNC: 2.4 MMOL/L (ref 3.4–5.3)
POTASSIUM SERPL-SCNC: 3.2 MMOL/L (ref 3.4–5.3)
POTASSIUM SERPL-SCNC: 3.6 MMOL/L (ref 3.4–5.3)
PROT SERPL-MCNC: 6.2 G/DL (ref 6.8–8.8)
RBC # BLD AUTO: 2.3 10E12/L (ref 3.8–5.2)
RVA NSP5 STL QL NAA+PROBE: NOT DETECTED
SALMONELLA SP RPOD STL QL NAA+PROBE: NOT DETECTED
SHIGELLA SP+EIEC IPAH STL QL NAA+PROBE: NOT DETECTED
SODIUM SERPL-SCNC: 137 MMOL/L (ref 133–144)
SPECIMEN SOURCE: NORMAL
SPECIMEN SOURCE: NORMAL
TRANSFUSION STATUS PATIENT QL: NORMAL
TRANSFUSION STATUS PATIENT QL: NORMAL
URATE SERPL-MCNC: 2.1 MG/DL (ref 2.6–6)
V CHOL+PARA RFBL+TRKH+TNAA STL QL NAA+PR: NOT DETECTED
WBC # BLD AUTO: 6.7 10E9/L (ref 4–11)
Y ENTERO RECN STL QL NAA+PROBE: NOT DETECTED

## 2021-06-18 PROCEDURE — 250N000013 HC RX MED GY IP 250 OP 250 PS 637: Performed by: EMERGENCY MEDICINE

## 2021-06-18 PROCEDURE — 83605 ASSAY OF LACTIC ACID: CPT | Performed by: STUDENT IN AN ORGANIZED HEALTH CARE EDUCATION/TRAINING PROGRAM

## 2021-06-18 PROCEDURE — 84100 ASSAY OF PHOSPHORUS: CPT | Performed by: INTERNAL MEDICINE

## 2021-06-18 PROCEDURE — 250N000013 HC RX MED GY IP 250 OP 250 PS 637: Performed by: INTERNAL MEDICINE

## 2021-06-18 PROCEDURE — 250N000013 HC RX MED GY IP 250 OP 250 PS 637: Performed by: STUDENT IN AN ORGANIZED HEALTH CARE EDUCATION/TRAINING PROGRAM

## 2021-06-18 PROCEDURE — 85018 HEMOGLOBIN: CPT | Performed by: INTERNAL MEDICINE

## 2021-06-18 PROCEDURE — 250N000009 HC RX 250: Performed by: INTERNAL MEDICINE

## 2021-06-18 PROCEDURE — 84550 ASSAY OF BLOOD/URIC ACID: CPT | Performed by: INTERNAL MEDICINE

## 2021-06-18 PROCEDURE — 86850 RBC ANTIBODY SCREEN: CPT | Performed by: INTERNAL MEDICINE

## 2021-06-18 PROCEDURE — 86900 BLOOD TYPING SEROLOGIC ABO: CPT | Performed by: INTERNAL MEDICINE

## 2021-06-18 PROCEDURE — 99223 1ST HOSP IP/OBS HIGH 75: CPT | Mod: GC | Performed by: INTERNAL MEDICINE

## 2021-06-18 PROCEDURE — 85027 COMPLETE CBC AUTOMATED: CPT | Performed by: INTERNAL MEDICINE

## 2021-06-18 PROCEDURE — 86923 COMPATIBILITY TEST ELECTRIC: CPT | Performed by: INTERNAL MEDICINE

## 2021-06-18 PROCEDURE — 80053 COMPREHEN METABOLIC PANEL: CPT | Performed by: INTERNAL MEDICINE

## 2021-06-18 PROCEDURE — 84132 ASSAY OF SERUM POTASSIUM: CPT | Performed by: INTERNAL MEDICINE

## 2021-06-18 PROCEDURE — 120N000005 HC R&B MS OVERFLOW UMMC

## 2021-06-18 PROCEDURE — 258N000003 HC RX IP 258 OP 636: Performed by: INTERNAL MEDICINE

## 2021-06-18 PROCEDURE — P9016 RBC LEUKOCYTES REDUCED: HCPCS | Performed by: INTERNAL MEDICINE

## 2021-06-18 PROCEDURE — 250N000011 HC RX IP 250 OP 636: Performed by: INTERNAL MEDICINE

## 2021-06-18 PROCEDURE — 84132 ASSAY OF SERUM POTASSIUM: CPT | Performed by: STUDENT IN AN ORGANIZED HEALTH CARE EDUCATION/TRAINING PROGRAM

## 2021-06-18 PROCEDURE — 250N000011 HC RX IP 250 OP 636: Performed by: STUDENT IN AN ORGANIZED HEALTH CARE EDUCATION/TRAINING PROGRAM

## 2021-06-18 PROCEDURE — 82565 ASSAY OF CREATININE: CPT | Performed by: STUDENT IN AN ORGANIZED HEALTH CARE EDUCATION/TRAINING PROGRAM

## 2021-06-18 PROCEDURE — 99232 SBSQ HOSP IP/OBS MODERATE 35: CPT | Performed by: STUDENT IN AN ORGANIZED HEALTH CARE EDUCATION/TRAINING PROGRAM

## 2021-06-18 PROCEDURE — 99207 PR CDG-MDM COMPONENT: MEETS MODERATE - DOWN CODED: CPT | Performed by: STUDENT IN AN ORGANIZED HEALTH CARE EDUCATION/TRAINING PROGRAM

## 2021-06-18 PROCEDURE — 86901 BLOOD TYPING SEROLOGIC RH(D): CPT | Performed by: INTERNAL MEDICINE

## 2021-06-18 PROCEDURE — 83735 ASSAY OF MAGNESIUM: CPT | Performed by: INTERNAL MEDICINE

## 2021-06-18 RX ORDER — LANOLIN ALCOHOL/MO/W.PET/CERES
6 CREAM (GRAM) TOPICAL
Status: DISCONTINUED | OUTPATIENT
Start: 2021-06-18 | End: 2021-06-20 | Stop reason: HOSPADM

## 2021-06-18 RX ORDER — POTASSIUM CHLORIDE 750 MG/1
40 TABLET, EXTENDED RELEASE ORAL ONCE
Status: DISCONTINUED | OUTPATIENT
Start: 2021-06-18 | End: 2021-06-20 | Stop reason: HOSPADM

## 2021-06-18 RX ORDER — MAGNESIUM OXIDE 400 MG/1
400 TABLET ORAL 3 TIMES DAILY
Status: COMPLETED | OUTPATIENT
Start: 2021-06-18 | End: 2021-06-20

## 2021-06-18 RX ORDER — POTASSIUM CHLORIDE 750 MG/1
40 TABLET, EXTENDED RELEASE ORAL ONCE
Status: COMPLETED | OUTPATIENT
Start: 2021-06-18 | End: 2021-06-18

## 2021-06-18 RX ORDER — ALBUTEROL SULFATE 90 UG/1
1-2 AEROSOL, METERED RESPIRATORY (INHALATION) EVERY 4 HOURS PRN
Status: DISCONTINUED | OUTPATIENT
Start: 2021-06-18 | End: 2021-06-20 | Stop reason: HOSPADM

## 2021-06-18 RX ORDER — POTASSIUM CHLORIDE 1.5 G/1.58G
40 POWDER, FOR SOLUTION ORAL ONCE
Status: COMPLETED | OUTPATIENT
Start: 2021-06-18 | End: 2021-06-18

## 2021-06-18 RX ADMIN — ACETAMINOPHEN 650 MG: 325 TABLET, FILM COATED ORAL at 06:03

## 2021-06-18 RX ADMIN — Medication 400 MG: at 14:21

## 2021-06-18 RX ADMIN — Medication 1 TABLET: at 08:22

## 2021-06-18 RX ADMIN — ALBUTEROL SULFATE 2 PUFF: 90 AEROSOL, METERED RESPIRATORY (INHALATION) at 20:10

## 2021-06-18 RX ADMIN — POTASSIUM CHLORIDE 40 MEQ: 1.5 POWDER, FOR SOLUTION ORAL at 14:23

## 2021-06-18 RX ADMIN — ACETAMINOPHEN 650 MG: 325 TABLET, FILM COATED ORAL at 00:29

## 2021-06-18 RX ADMIN — POTASSIUM & SODIUM PHOSPHATES POWDER PACK 280-160-250 MG 2 PACKET: 280-160-250 PACK at 14:22

## 2021-06-18 RX ADMIN — MONTELUKAST 10 MG: 10 TABLET, FILM COATED ORAL at 08:24

## 2021-06-18 RX ADMIN — POTASSIUM CHLORIDE 40 MEQ: 750 TABLET, EXTENDED RELEASE ORAL at 08:23

## 2021-06-18 RX ADMIN — ACETAMINOPHEN 650 MG: 325 TABLET, FILM COATED ORAL at 20:21

## 2021-06-18 RX ADMIN — ENOXAPARIN SODIUM 40 MG: 40 INJECTION SUBCUTANEOUS at 14:21

## 2021-06-18 RX ADMIN — GABAPENTIN 300 MG: 300 CAPSULE ORAL at 20:07

## 2021-06-18 RX ADMIN — CEFEPIME 2 G: 2 INJECTION, POWDER, FOR SOLUTION INTRAVENOUS at 00:13

## 2021-06-18 RX ADMIN — Medication 400 MG: at 20:07

## 2021-06-18 RX ADMIN — FLUTICASONE FUROATE AND VILANTEROL TRIFENATATE 1 PUFF: 100; 25 POWDER RESPIRATORY (INHALATION) at 14:29

## 2021-06-18 RX ADMIN — ACETAMINOPHEN 650 MG: 325 TABLET, FILM COATED ORAL at 14:30

## 2021-06-18 RX ADMIN — CEFEPIME 2 G: 2 INJECTION, POWDER, FOR SOLUTION INTRAVENOUS at 16:25

## 2021-06-18 RX ADMIN — OMEPRAZOLE 20 MG: 20 CAPSULE, DELAYED RELEASE ORAL at 08:22

## 2021-06-18 RX ADMIN — GABAPENTIN 300 MG: 300 CAPSULE ORAL at 08:22

## 2021-06-18 RX ADMIN — ALBUTEROL SULFATE 2 PUFF: 90 AEROSOL, METERED RESPIRATORY (INHALATION) at 14:25

## 2021-06-18 RX ADMIN — POTASSIUM & SODIUM PHOSPHATES POWDER PACK 280-160-250 MG 2 PACKET: 280-160-250 PACK at 21:41

## 2021-06-18 RX ADMIN — ONDANSETRON HYDROCHLORIDE 8 MG: 8 TABLET, FILM COATED ORAL at 09:56

## 2021-06-18 RX ADMIN — GUAIFENESIN AND CODEINE PHOSPHATE 5 ML: 10; 100 LIQUID ORAL at 19:06

## 2021-06-18 RX ADMIN — CETIRIZINE HYDROCHLORIDE 10 MG: 10 TABLET, FILM COATED ORAL at 08:14

## 2021-06-18 RX ADMIN — MELATONIN 6 MG: at 21:42

## 2021-06-18 RX ADMIN — GABAPENTIN 300 MG: 300 CAPSULE ORAL at 14:22

## 2021-06-18 RX ADMIN — GUAIFENESIN AND CODEINE PHOSPHATE 5 ML: 10; 100 LIQUID ORAL at 09:51

## 2021-06-18 RX ADMIN — SODIUM PHOSPHATE, MONOBASIC, MONOHYDRATE 15 MMOL: 276; 142 INJECTION, SOLUTION INTRAVENOUS at 08:20

## 2021-06-18 RX ADMIN — GUAIFENESIN AND CODEINE PHOSPHATE 5 ML: 10; 100 LIQUID ORAL at 03:21

## 2021-06-18 RX ADMIN — CEFEPIME 2 G: 2 INJECTION, POWDER, FOR SOLUTION INTRAVENOUS at 08:29

## 2021-06-18 ASSESSMENT — ACTIVITIES OF DAILY LIVING (ADL)
ADLS_ACUITY_SCORE: 17
ADLS_ACUITY_SCORE: 20

## 2021-06-18 NOTE — CONSULTS
ONCOLOGY  Consult Note   Date of Service: 06/18/2021    Patient:  Richa Ashby  MRN:  [id number]  Admission Date:  6/17/2021  Hospital Day # 1  Cancer Diagnosis:  Metastatic undifferentiated pleomorphic sarcoma  Current Treatment:  Gemcitabine/Pembrolizumab  Outpatient Oncologist:  Dr. Lopez      Reason for Consult:  Patient with metastatic sarcoma on gemcitabine/pembrolizumab, admitted to the hospital with fevers, hypotension and weakness.      History of Present Illness:    Richa Ashby is a 56 year old woman with metastatic undifferentiated pleomorphic sarcoma, currently on chemotherapy with gemcitabine (last given on 6/16/2021).  She presented to the ED on 6/17/2021 for evaluation of generalized weakness, hypotension and fevers.  She notes that this had gotten progressively worse over the last month, though the fevers were new over the preceding day or 2.  When seen in clinic earlier in the day, she was noted to be weak and fatigued, with low blood pressure.  She was given IV fluids and subsequently sent to the ED.  Other symptoms include intermittent headaches, mild nonproductive cough, generalized abdominal pain, poor appetite, and recent diarrhea.  She also endorsed achy joints.  No recent sick contacts.  She denied any shortness of breath, chest pain, sore throat, dysuria, urgency or frequency.  Upon arrival in the ED she was noted to be afebrile and hemodynamically stable.  BP had improved with IV fluids in clinic.  CXR showed no acute airspace opacity.  Lactic acid and troponin were within normal limits.  CBC was notable for WBC 6.7, hemoglobin 7.4, and platelets 173.  She was started on cefepime and admitted to the general medicine service.  Shortly after admission, she spiked a fever to 103.1  F.  She reported feeling diaphoretic and ill at that time.  She does feel a bit better this morning, though is certainly not back to feeling normal.  She notes that her diarrhea has  resolved.    Review of Systems:  A comprehensive ROS was performed and found to be negative or non-contributory with the exception of that noted in the HPI above.    Past Medical History:  Past Medical History:   Diagnosis Date     Asthma, chronic      Sarcoma (H)     Right lower thigh     Past Surgical History:  Past Surgical History:   Procedure Laterality Date     AS RAD RESEC TONSIL/PILLARS       GALLBLADDER SURGERY       INSERT PORT VASCULAR ACCESS Right 10/28/2020    Procedure: single lumen power port placement @0745;  Surgeon: Tex Ackerman MD;  Location: [facility] OR     IR CHEST PORT PLACEMENT > 5 YRS OF AGE  10/28/2020     IR PORT CHECK RIGHT  3/26/2021     RESECT TUMOR LOWER EXTREMITY Right 3/12/2020    Procedure: Resection of right thigh tumor;  Surgeon: Guillermo Johnson MD;  Location: [facility] OR     ROTATOR CUFF REPAIR RT/LT  2012     SHOULDER SURGERY       SINUS SURGERY       Social History:  Social History     Socioeconomic History     Marital status:      Spouse name: None     Number of children: None     Years of education: None     Highest education level: None   Occupational History     None   Social Needs     Financial resource strain: None     Food insecurity     Worry: None     Inability: None     Transportation needs     Medical: None     Non-medical: None   Tobacco Use     Smoking status: Never Smoker     Smokeless tobacco: Never Used   Substance and Sexual Activity     Alcohol use: Yes     Frequency: 2-3 times a week     Drinks per session: 1 or 2     Binge frequency: Never     Comment: 2-3 glasses of wine/week     Drug use: Never     Sexual activity: None   Lifestyle     Physical activity     Days per week: None     Minutes per session: None     Stress: None   Relationships     Social connections     Talks on phone: None     Gets together: None     Attends Mandaeism service: None     Active member of club or organization: None     Attends meetings of clubs or organizations: None      Relationship status: None     Intimate partner violence     Fear of current or ex partner: None     Emotionally abused: None     Physically abused: None     Forced sexual activity: None   Other Topics Concern     None   Social History Narrative     None      Family History  Family History   Problem Relation Age of Onset     Lung Cancer Maternal Grandmother      Breast Cancer Paternal Grandmother        Outpatient Medications:  [COMPLETED] heparin 100 UNIT/ML injection 5 mL  heparin 100 UNIT/ML injection 5 mL  [COMPLETED] saline flush for lab use ONLY    acetaminophen (TYLENOL) 325 MG tablet, Take 325-650 mg by mouth every 6 hours as needed for mild pain  ADVAIR DISKUS 100-50 MCG/DOSE inhaler,   albuterol (PROAIR RESPICLICK) 108 (90 Base) MCG/ACT inhaler, Inhale 1-2 puffs into the lungs  benzonatate (TESSALON) 100 MG capsule, Take 1-2 capsules (100-200 mg) by mouth 3 times daily as needed for cough  cetirizine (ZYRTEC) 10 MG tablet, Take 10 mg by mouth daily  gabapentin (NEURONTIN) 300 MG capsule, 600 mg morning & afternoon - 900 mg at bedtime  guaiFENesin-codeine (ROBITUSSIN AC) 100-10 MG/5ML solution, Take 5-10 mLs by mouth every 4 hours as needed for cough  levofloxacin (LEVAQUIN) 750 MG tablet, Take 1 tablet (750 mg) by mouth daily  lidocaine-prilocaine (EMLA) 2.5-2.5 % external cream, Apply topically as needed for moderate pain (Patient not taking: Reported on 6/2/2021)  magic mouthwash suspension (diphenhydrAMINE, lidocaine, aluminum-magnesium & simethicone), Swish and swallow 10 mLs in mouth every 6 hours as needed for mouth sores (Patient not taking: Reported on 5/26/2021)  melatonin (MELATONIN) 1 MG/ML LIQD liquid, Take 5 mg by mouth  montelukast (SINGULAIR) 10 MG tablet, Take 10 mg by mouth daily  multivitamin w/minerals (MULTI-VITAMIN) tablet, Take 1 tablet by mouth daily  omeprazole (PRILOSEC) 20 MG DR capsule,   ondansetron (ZOFRAN) 8 MG tablet, Take 1 tablet (8 mg) by mouth every 8 hours as needed  "for nausea  oxyCODONE (ROXICODONE) 5 MG tablet, Take 0.5-1 tablets (2.5-5 mg) by mouth every 4 hours as needed for moderate to severe pain (for cough or/and pain)  prochlorperazine (COMPAZINE) 10 MG tablet, Take 1 tablet (10 mg) by mouth every 6 hours as needed (Nausea/Vomiting)  urea (CARMOL) 10 % external cream, Apply topically as needed for dry skin  vitamin D3 (CHOLECALCIFEROL) 2000 units (50 mcg) tablet, Take 1 tablet by mouth daily      Physical Exam:    Blood pressure 105/62, pulse 96, temperature 99.5  F (37.5  C), temperature source Oral, resp. rate 16, height 1.702 m (5' 7\"), weight 92.5 kg (204 lb), last menstrual period 03/19/2019, SpO2 97 %.  General: alert and cooperative, lying in bed, appears ill and fatigued but in no acute distress  HEENT: sclera anicteric, EOMI, MMM  Neck: supple, normal ROM  CV: RRR, no murmurs  Resp: CTAB, normal respiratory effort on ambient air  GI: soft, non-tender, non-distended, bowel sounds present and normoactive  MSK: warm and well-perfused, no edema  Skin: no rashes on limited exam, no jaundice  Neuro: AOx3, moves all extremities equally, no focal deficits  Psych: Mood and affect are appropriate  Access: Port in right upper chest (c/d/i)    Labs & Studies: I personally reviewed the following studies:  ROUTINE LABS (Last four results):  CMP  Recent Labs   Lab 06/18/21  0815 06/18/21  0311 06/17/21  1348 06/15/21  1325   NA  --  137 134 138   POTASSIUM 2.4* 3.2* 3.4 3.6   CHLORIDE  --  104 102 102   CO2  --  28 25 26   ANIONGAP  --  6 7 10   GLC  --  111* 93 82   BUN  --  6* 8 10   CR  --  0.62 0.69 0.72   GFRESTIMATED  --  >90 >90 >90   GFRESTBLACK  --  >90 >90 >90   VIKTORIA  --  8.3* 8.8 9.1   MAG 1.4* 2.0  --  2.3   PHOS 1.7* 2.4*  --   --    PROTTOTAL  --  6.2* 6.9 7.6   ALBUMIN  --  2.0* 2.2* 2.6*   BILITOTAL  --  1.1 1.2 0.8   ALKPHOS  --  173* 217* 154*   AST  --  10 16 15   ALT  --  19 22 18     CBC  Recent Labs   Lab 06/18/21  0311 06/17/21  1348 06/15/21  1325   WBC " 6.7 6.7 8.8   RBC 2.30* 2.59* 2.99*   HGB 6.6* 7.4* 8.7*   HCT 21.5* 24.0* 28.0*   MCV 94 93 94   MCH 28.7 28.6 29.1   MCHC 30.7* 30.8* 31.1*   RDW 16.3* 16.1* 16.3*   * 173 219     XR CHEST 2 VW, 6/17/2021 4:19 PM  Indication: fever  Comparison: CT chest 6/15/2021.  Impression:   1. No new airspace opacity.  2. Redemonstration of right apical, right middle, and left basilar  lung masses.    US LOWER EXTREMITY VENOUS DUPLEX LEFT  6/17/2021 8:37 PM   CLINICAL HISTORY: Left lower extremity, fever, rule out DVT  COMPARISON: None      IMPRESSION:.  No deep vein thrombosis in the left lower extremity.    Assessment & Plan:   Richa Ashby is a 56 year old woman with metastatic undifferentiated pleomorphic sarcoma, currently undergoing treatment with gemcitabine and pembrolizumab.  She is admitted to the hospital for evaluation of fevers, weakness and hypotension.    Non-neutropenic fevers, unclear infectious source.  Patient reports feeling poorly for about 1 month prior to admission, though fevers started in a day or 2 prior to admission.  Associated symptoms include fatigue, generalized weakness, nonproductive cough, diarrhea, poor appetite, and intermittent headaches.  Work-up thus far has been largely unrevealing.  Blood cultures remain no growth to date.  She remains on cefepime.  Differential also includes drug fevers related to gemcitabine, which occur in up to 40% of patients on this drug - though this would be a diagnosis of exclusion. As she is feeling poorly in addition to the fevers, we feel drug fevers are less likely the culprit and agree with ongoing infectious work-up.    Follow-up blood cultures.  Agree with cefepime for now.    Would not expect deep neutropenia with this chemotherapy regimen, especially this early in the cycle.    Metastatic undifferentiated pleomorphic sarcoma.  Patient follows with Dr. Lopez.  She presented initially with a lump on her right leg in November 2019.  Biopsy  demonstrated high-grade UPS.  She received preoperative radiotherapy and the tumor was resected on 3/12/2020.  She then developed a cough and chest tightness.  Imaging revealed lung nodules, and biopsy on 11/2/2020 demonstrated metastatic sarcoma.  She started Doxil plus ifosfamide on 11/13/2020.  Initial imaging demonstrated a positive response to treatment with decrease in pulmonary nodules.  She continued on Doxil alone starting on 4/5/2021.  With ongoing progression, she started pembrolizumab on 5/10/2021.  Imaging on 6/15/2021 demonstrated interval growth in the size of the pulmonary nodules and masses.  Plan was to switch to gemcitabine with pembrolizumab.  First infusion of gemcitabine was 6/16/2021.    Next chemotherapy appointment is scheduled for 6/23/2021.  We will reschedule this pending hospital course.    Fatigue.  Likely multifactorial related to ongoing malignancy, chemotherapy and anemia. Has been worked up for adrenal insufficiency, which was unremarkable. Had discussed use of Ritalin with the outpatient team but had opted to defer for now.    Monitor.    Anemia.  Suspected to be related to ongoing chemotherapy and anemia of chronic disease. Hgb dropped to 6.6 on AM labs.     Daily CBC w/differential while hospitalized.    Transfuse for goal Hgb >7 and plts >10K.      We will continue to follow this patient while hospitalized. Please do not hesitate to page with any questions or concerns.    Patient was seen and plan of care was discussed with attending physician Dr. Carolin Ren.    Flaca Espinoza MD/PhD  Heme/Onc Fellow

## 2021-06-18 NOTE — PLAN OF CARE
Pt spiked a temp max of 103.1 ax. She is on telemetry and oximetry continuous with HR tachy 1 teens to 100s 02 saturations in 91 to 98 % and she is also having infrequent cough. Guaifenesin with Codeine given x 2. And she also c/o wheezing and took her home inhaler. Stool cultures sent for enteric panel and C-Diff and C-diff came back Negative. She had x 1 small and x 1 medium soft stools.Waiting for pt to give sputum sample. Tylenol given for headache . She triggered SIRS for sepsis protocol and lactic acid collection is pending due to blood transfusion going and incoming RN updated to collect it. Low potassium, 3.2, Phos, 2.4 and low hemoglobin, 6.7. Pt I getting Blood transfusion and tolerating it and she will be needing po potassium chloride and IV Sodium Phos. She complained of feeling weak and lightheaded when she got up to use the bathroom. Pt was encouraged to call for assist and she is using call light appropriately to call for assist. She gets NAZARIO. Continue to monitor pt and follow plan of care.      Problem: Adult Inpatient Plan of Care  Goal: Patient-Specific Goal (Individualized)  Outcome: No Change     Problem: Adult Inpatient Plan of Care  Goal: Plan of Care Review  Outcome: No Change     Problem: Pain Chronic (Persistent) (Comorbidity Management)  Goal: Acceptable Pain Control and Functional Ability  Outcome: No Change  Intervention: Develop Pain Management Plan  Recent Flowsheet Documentation  Taken 6/18/2021 0114 by Richa Bach RN  Pain Management Interventions: medication (see MAR)

## 2021-06-18 NOTE — PROVIDER NOTIFICATION
Provider on call notified with temp spiked of 103.1 just before the start of her blood transfusion. Provider said to give Tylenol and give the blood. Writer requested order for blood culture and provider said one was done in the ED but he was going to put in conditional order for next time.

## 2021-06-18 NOTE — PLAN OF CARE
"/51 (BP Location: Left arm)   Pulse 93   Temp 99.1  F (37.3  C) (Oral)   Resp 18   Ht 1.702 m (5' 7\")   Wt 92.5 kg (204 lb)   LMP 03/19/2019   SpO2 98%   BMI 31.95 kg/m  . Patient is on telemetry and oximetry continuous with HR tachy 1 teens to 100s 02 saturations in 91 to 98 %, increased NAZARIO. Port cath is patent and good blood return, site is C/D/I. Patient is A & O x 4. Patient c/o mild lower back and received tylenol 650 mg po x 1 with relief. Patient c/o nausea and had emesis x 1, undigested food and received Zofran x 1 with relief. Patient has dry cough and received robitussin AC x 1 and albuterol inhale x 1 with some relief. Lactic acid was drawn and result is 0.6.  Patient received potassium x 2, phos iv and po and recheck with am labs. Patient still needs sputum culture to be colleted. Patient is eating and drinking fair. Urine is still dark and and normal bowel movement x 2, normal. Patient has stress incontinent of urine x 2. Patient is up with one assist and bed alarm is on due to soft BP, dizziness at times. Patient's  is at the bedside and very supportive. Continue with plan of care and notify MD for status changes.    Problem: Adult Inpatient Plan of Care  Goal: Plan of Care Review  Outcome: No Change  Flowsheets (Taken 6/18/2021 1722)  Plan of Care Reviewed With:   patient   spouse   caregiver     Problem: Adult Inpatient Plan of Care  Goal: Absence of Hospital-Acquired Illness or Injury  Intervention: Identify and Manage Fall Risk  Recent Flowsheet Documentation  Taken 6/18/2021 0804 by Xuan Mejias RN  Safety Promotion/Fall Prevention:   activity supervised   bed alarm on   fall prevention program maintained   increased rounding and observation   increase visualization of patient   lighting adjusted   mobility aid in reach   nonskid shoes/slippers when out of bed   patient and family education     Problem: Adult Inpatient Plan of Care  Goal: Absence of " Hospital-Acquired Illness or Injury  Intervention: Prevent Skin Injury  Recent Flowsheet Documentation  Taken 6/18/2021 1720 by Xuan Mejias RN  Body Position: position changed independently  Taken 6/18/2021 0804 by Xuan Mejias RN  Body Position:   position changed independently   foot of bed elevated   heels elevated   legs elevated     Problem: Adult Inpatient Plan of Care  Goal: Absence of Hospital-Acquired Illness or Injury  Intervention: Prevent and Manage VTE (Venous Thromboembolism) Risk  Recent Flowsheet Documentation  Taken 6/18/2021 0804 by Xuan Mejias RN  VTE Prevention/Management:   ambulation promoted   bleeding risk assessed     Problem: Adult Inpatient Plan of Care  Goal: Absence of Hospital-Acquired Illness or Injury  Intervention: Prevent Infection  Recent Flowsheet Documentation  Taken 6/18/2021 0804 by Xuan Mejias RN  Infection Prevention:   cohorting utilized   environmental surveillance performed   equipment surfaces disinfected   hand hygiene promoted   personal protective equipment utilized   rest/sleep promoted     Problem: Adult Inpatient Plan of Care  Goal: Readiness for Transition of Care  Recent Flowsheet Documentation  Taken 6/18/2021 1700 by Xuan Mejias RN  Transportation Anticipated: family or friend will provide     Problem: Adult Inpatient Plan of Care  Goal: Readiness for Transition of Care  Intervention: Mutually Develop Transition Plan  Recent Flowsheet Documentation  Taken 6/18/2021 1700 by Xuan Mejias RN  Transportation Anticipated: family or friend will provide  Patient/Family Anticipated Services at Transition: none  Patient/Family Anticipates Transition to: home with family     Problem: Pain Chronic (Persistent) (Comorbidity Management)  Goal: Acceptable Pain Control and Functional Ability  Intervention: Develop Pain Management Plan  Recent Flowsheet Documentation  Taken 6/18/2021 1421 by Xuan Mejias  WHITNEY Bhakta  Pain Management Interventions: medication (see MAR)

## 2021-06-18 NOTE — PROVIDER NOTIFICATION
"Paged gold cross cover ([id number])  Updated to pt admission to unit and also \"Pt needs blood transfusion and needs to have a consent signed first. Also, please put in heparin lock flush orders for her port.\"  "

## 2021-06-18 NOTE — PROGRESS NOTES
Patient admitted to: To 5C  Admitted from: ED  Arrived by: Wheel chair at 20:45  Reason for admission: Hypotension, weakness  Patient accompanied by:1 staff   Belongings: are with pt in room. No home medication   Teaching: including call light system, unit routine, bed operation room service meal order.   Skin double check completed by: Connie Bach RN and Roseanna Short RN

## 2021-06-18 NOTE — H&P
[facility]    History and Physical - Hospitalist Service, Gold night       Date of Admission:  6/17/2021    Assessment & Plan   Richa Ashby is a 56 year old female admitted on 6/17/2021. 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 Patient was sent to the ED from the clinic after she presented there with fever of 102.9 F and was found to have BP of 85/58. Patient had blood cultures drawn at the clinic and treated with IV NS 1 Litre. She was started on IV Cefepime in the ED and UA/UC and lactic acid sent. No clear etiology for fevers identified and hypotension is likely 2/2 poor oral intake and recent diarrhea.     1. Fever of unknown origin:   - This is likely related to her underlying malignancy and chemo treatments.   - Unable to identify infectious etiology. CT chest without evidence of immunotherapy associated pneumonitis. UA done post receiving cefepime but negative. BC and UC are pending. No evidence of any joint inflammation. Check US LLE as there is mild swelling LLE to r/o DVT but very low suspicion. Given the short bout of diarrhea check stool cultures and c diff but no h/o recent antibiotic use and suspicion is low. Check quantiferon gold TB test.   - Continue IV Cefepime for now.     2. Hypotension:  Doubt this represents sepsis in the setting of fever. Patient responded well to IV hydration with 1L NS. Suspect at least some of this is from poor oral intake. Monitor.     3. 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 Lung bx 11/2/20 positive for sarcoma. On Doxil+iphos started 11/13/20. Dose reduction in Doxil for skin toxicity and mucositis in January. Last cycle of this 3/21. Then switched to single dose Doxil starting April/21. Started Keytruda 5/10/21. Started Gemzar 6/16/21.    Recent  cough/SOB: thought to be 2/2 phrenic nerve involvement of tumor and likely 2/2 growth of pulmonary mets.    4. Anemia: likely 2/2 chemo and anemia of chronic disease. Monitor. Transfuse for hgb<7    5. Nutrition: with decreased appetite will benefit from nutrition consult for dietary supplements.    6. GERD: continue daily PPI    7. Asthma: Continue advair discus and prn albuterol    8. Peripheral neuropathy: 2/2 ifosfamide which has improved. Continue home dose neurontin and PRN oxycodone.     Diet:   regular diet  DVT Prophylaxis: pneumatic compression device  Ness Catheter: not present  Code Status:   full code         Disposition Plan   Expected discharge: 2 - 3 days, recommended to prior living arrangement .  Entered: Sonia Boyle MD 06/17/2021, 8:39 PM     The patient's care was discussed with the Patient and Patient's Family.    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 Patient was sent to the ED from her oncology clinic after she presented there with fever of 102.9 F and was found to have BP of 85/58. Patient had blood cultures drawn at the clinic and treated with IV NS 1 Litre. She was started on IV Cefepime in the ED and UA/UC and lactic acid sent.     Patient was started on chemotherapy November 2020 with doxil and phosphamide. In March this was changed to single agent chemotherapy with Doxil due to side effects (hand foot dz). In May she was started on Keytruda. It  appears that patient was restarted on chemo with Gemzar yesterday. She complaints of not feeling well for last approximately 2 weeks. Has been having evening rise in temperature on and off and not feeling well overall with fatigue and poor appetite. 4 days ago developed a bout of diarrhea worse than her usual loose stools and after 2 episodes took imodium which helped control the same. Has been passing gas but no further BMs.   Has an ongoing cough with some phlegm for last 4 weeks which has not changed. Has ongoing fatigue, and poor appetite for a few weeks now. Denies urinary symptoms. Denies symptoms of UTI. No nausea or vomiting. Passing gas but no BM last 24 hours. Swelling of feet which has been attributed to Doxil toxicity (hand foot dz) and is ongoing as per patient. Had COVID19 in early December. Received 2 nd dose COVID vaccine March 17/18.     Review of Systems    The 10 point Review of Systems is negative other than noted in the HPI or here.     Past Medical History    I have reviewed this patient's medical history and updated it with pertinent information if needed.   Past Medical History:   Diagnosis Date     Asthma, chronic      Sarcoma (H)     Right lower thigh       Past Surgical History   I have reviewed this patient's surgical history and updated it with pertinent information if needed.  Past Surgical History:   Procedure Laterality Date     AS RAD RESEC TONSIL/PILLARS       GALLBLADDER SURGERY       INSERT PORT VASCULAR ACCESS Right 10/28/2020    Procedure: single lumen power port placement @0745;  Surgeon: Tex Ackerman MD;  [place] [facility] [place]     IR CHEST PORT PLACEMENT > 5 YRS OF AGE  10/28/2020     IR PORT CHECK RIGHT  3/26/2021     RESECT TUMOR LOWER EXTREMITY Right 3/12/2020    Procedure: Resection of right thigh tumor;  Surgeon: Guillermo Johnson MD;  Location: [facility] OR     ROTATOR CUFF REPAIR RT/LT  2012     SHOULDER SURGERY       SINUS SURGERY         Social History   I have  reviewed this patient's social history and updated it with pertinent information if needed.  Social History     Tobacco Use     Smoking status: Never Smoker     Smokeless tobacco: Never Used   Substance Use Topics     Alcohol use: Yes     Frequency: 2-3 times a week     Drinks per session: 1 or 2     Binge frequency: Never     Comment: 2-3 glasses of wine/week     Drug use: Never       Family History   I have reviewed this patient's family history and updated it with pertinent information if needed.  Family History   Problem Relation Age of Onset     Lung Cancer Maternal Grandmother      Breast Cancer Paternal Grandmother        Prior to Admission Medications   Prior to Admission Medications   Prescriptions Last Dose Informant Patient Reported? Taking?   ADVAIR DISKUS 100-50 MCG/DOSE inhaler   Yes No   acetaminophen (TYLENOL) 325 MG tablet   Yes No   Sig: Take 325-650 mg by mouth every 6 hours as needed for mild pain   albuterol (PROAIR RESPICLICK) 108 (90 Base) MCG/ACT inhaler   Yes No   Sig: Inhale 1-2 puffs into the lungs   benzonatate (TESSALON) 100 MG capsule   No No   Sig: Take 1-2 capsules (100-200 mg) by mouth 3 times daily as needed for cough   cetirizine (ZYRTEC) 10 MG tablet   Yes No   Sig: Take 10 mg by mouth daily   gabapentin (NEURONTIN) 300 MG capsule   No No   Si mg morning & afternoon - 900 mg at bedtime   guaiFENesin-codeine (ROBITUSSIN AC) 100-10 MG/5ML solution   No No   Sig: Take 5-10 mLs by mouth every 4 hours as needed for cough   levofloxacin (LEVAQUIN) 750 MG tablet   No No   Sig: Take 1 tablet (750 mg) by mouth daily   lidocaine-prilocaine (EMLA) 2.5-2.5 % external cream   No No   Sig: Apply topically as needed for moderate pain   Patient not taking: Reported on 2021   magic mouthwash suspension (diphenhydrAMINE, lidocaine, aluminum-magnesium & simethicone)   No No   Sig: Swish and swallow 10 mLs in mouth every 6 hours as needed for mouth sores   Patient not taking: Reported on  5/26/2021   melatonin (MELATONIN) 1 MG/ML LIQD liquid   Yes No   Sig: Take 5 mg by mouth   montelukast (SINGULAIR) 10 MG tablet   Yes No   Sig: Take 10 mg by mouth daily   multivitamin w/minerals (MULTI-VITAMIN) tablet   Yes No   Sig: Take 1 tablet by mouth daily   omeprazole (PRILOSEC) 20 MG DR capsule   Yes No   ondansetron (ZOFRAN) 8 MG tablet   No No   Sig: Take 1 tablet (8 mg) by mouth every 8 hours as needed for nausea   oxyCODONE (ROXICODONE) 5 MG tablet   No No   Sig: Take 0.5-1 tablets (2.5-5 mg) by mouth every 4 hours as needed for moderate to severe pain (for cough or/and pain)   prochlorperazine (COMPAZINE) 10 MG tablet   No No   Sig: Take 1 tablet (10 mg) by mouth every 6 hours as needed (Nausea/Vomiting)   urea (CARMOL) 10 % external cream   No No   Sig: Apply topically as needed for dry skin   vitamin D3 (CHOLECALCIFEROL) 2000 units (50 mcg) tablet   Yes No   Sig: Take 1 tablet by mouth daily      Facility-Administered Medications: None     Allergies   Allergies   Allergen Reactions     Pentobarbital      resp arrest       Physical Exam   Vital Signs: Temp: 101  F (38.3  C) Temp src: Oral BP: 110/54 Pulse: 93   Resp: 16 SpO2: 93 % O2 Device: None (Room air)    Weight: 204 lbs 0 oz    General Appearance: well built and nourished, comfortable at rest, not in any acute cardio pulmonary distress  Eyes: NAD  HEENT: NAD  Respiratory: CTA b/l  Cardiovascular: S1S2 normal RRR  GI: Soft NT  Skin: Pale  Musculoskeletal: NAD except swelling both feet, trace edema LLE  Neurologic: moving all 4 extremities spontaneously, no obvious focal deficit, a,a,ox3      Data   Data reviewed today: I reviewed all medications, new labs and imaging results over the last 24 hours.   Recent Labs   Lab 06/17/21  1558 06/17/21  1348 06/15/21  1325   WBC  --  6.7 8.8   HGB  --  7.4* 8.7*   MCV  --  93 94   PLT  --  173 219   NA  --  134 138   POTASSIUM  --  3.4 3.6   CHLORIDE  --  102 102   CO2  --  25 26   BUN  --  8 10   CR  --   0.69 0.72   ANIONGAP  --  7 10   VIKTORIA  --  8.8 9.1   GLC  --  93 82   ALBUMIN  --  2.2* 2.6*   PROTTOTAL  --  6.9 7.6   BILITOTAL  --  1.2 0.8   ALKPHOS  --  217* 154*   ALT  --  22 18   AST  --  16 15   TROPI <0.015  --  <0.015     Recent Results (from the past 24 hour(s))   XR Chest 2 Views    Narrative    Exam: XR CHEST 2 VW, 6/17/2021 4:19 PM    Indication: fever    Comparison: CT chest 6/15/2021.    Findings:   PA and lateral view of the chest. Redemonstration of right apical,  right mid, and left basilar lung masses. No new focal airspace  opacity, pleural effusion, or pneumothorax. Tip of the right-sided  port projects over the mid SVC. Upper abdomen is unremarkable.      Impression    Impression:   1. No new airspace opacity.  2. Redemonstration of right apical, right middle, and left basilar  lung masses.    I have personally reviewed the examination and initial interpretation  and I agree with the findings.    TONG SMITH MD   POC US ECHO LIMITED    Impression    Limited Bedside Cardiac Ultrasound, performed and interpreted by me.   Indication: Hypotension/shock.  Parasternal long axis, parasternal short axis and apical 4 chamber views were acquired.   Image quality was satisfactory.    Findings:    Global left ventricular function appears intact.  Chambers do not appear dilated.  There is no evidence of free fluid within the pericardium.    IMPRESSION: Grossly normal left ventricular function and chamber size.  No pericardial effusion..     US Lower Extremity Venous Duplex Left    Narrative    EXAMINATION: US LOWER EXTREMITY VENOUS DUPLEX LEFT  6/17/2021 8:37 PM       CLINICAL HISTORY: Left lower extremity, fever, rule out DVT    COMPARISON: None        PROCEDURE COMMENTS: Ultrasound was performed of the deep venous system  of the left lower extremity using grayscale, color, and spectral  Doppler.    FINDINGS:  The common femoral, greater saphenous origin, femoral, popliteal, and  deep calf veins are  visualized and are patent. Venous waveforms are  normal. There is normal response to compression.      Impression    IMPRESSION:.  No deep vein thrombosis in the left lower extremity.    I have personally reviewed the examination and initial interpretation  and I agree with the findings.    TONG SMITH MD

## 2021-06-18 NOTE — PROGRESS NOTES
[facility]    Medicine Progress Note - Hospitalist Service, Gold 11       Date of Admission:  6/17/2021  Assessment & Plan     Richa Ashby is a 56 year old female admitted on 6/17/2021. 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 Patient was sent to the ED from the clinic after she presented there with fever of 102.9 F and was found to have BP of 85/58. Patient had blood cultures drawn at the clinic and treated with IV NS 1 Litre. She was started on IV Cefepime in the ED and UA/UC and lactic acid sent. No clear etiology for fevers identified and hypotension is likely 2/2 poor oral intake and recent diarrhea.      Febrile Illness, Etiology unknown - suspect chemotherapy related, especially given normal white counts however prudent evaluation indicated. CT chest negative for infiltrate, UA negative, BC/UC pending  Hypotension, resolved, fluid responsive - at least element of poor PO  Cough Dyspnea - Recent cough/SOB: thought to be 2/2 phrenic nerve involvement of tumor and likely 2/2 growth of pulmonary mets.  - follow up BCx UCx, Stool cultures / Cdiff, Check Quantiferon Gold  - most likely focality of infection seems lung vs GI in this particular patient  - Cefepime 6/17 - current, will treat for 48 hours, watch cultures and de-escalate if remaining stable  - hemodynamically stable so no need for serial lactic acids or fluid boluses at this time.  - if ongoing fevers could expand work up to include evaluation of gallbladder, cerebrospinal fluid but suspicion low at this time.    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 Lung bx 11/2/20 positive for  sarcoma. On Doxil+iphos started 11/13/20. Dose reduction in Doxil for skin toxicity and mucositis in January. Last cycle of this 3/21. Then switched to single dose Doxil starting April/21. Started Keytruda 5/10/21. Started Gemzar 6/16/21.  - Consult Oncology     Chronic Anemia: likely 2/2 chemo and anemia of chronic disease. Monitor. Transfuse for hgb<7     Non-Severe Malnutriton: with decreased appetite will benefit from nutrition consult for dietary supplements.     GERD: continue daily PPI     Asthma: Continue advair discus and prn albuterol     Peripheral neuropathy: 2/2 ifosfamide which has improved. Continue home dose neurontin and PRN oxycodone.     Diet: Combination Diet Regular Diet Adult    DVT Prophylaxis: Enoxaparin (Lovenox) SQ  Ness Catheter: not present  Code Status: Full Code           Disposition Plan   Expected discharge: 2 - 3 days, recommended to prior living arrangement once SIRS/Sepsis treated.  Entered: Garcia Ha DO 06/18/2021, 1:38 PM       The patient's care was discussed with the Patient.    Garcia Ha DO  Hospitalist Service, [address]  Contact information available via [facility] Paging/Directory  Please see sign in/sign out for up to date coverage information  ______________________________________________________________________    Interval History   Seen at bedside, overall feeling well, noted fevers and instructed to come to ED from clinic. States slight cough which is mildly productive of thin secretions, ongoing long standing cough. Also with mild abdominal pain but no NVD no bleeding states described as crampiness and associated with normal stooling.    A 4 point review of systems evaluated including questioning patient about cardiovascular symptoms, gastrointestinal symptoms, respiratory symptoms, constitutional symptoms, and hematology / bleeding symptoms and all were negative except as noted in HPI above.    Data  reviewed today: I reviewed all medications, new labs and imaging results over the last 24 hours. I personally reviewed no images or EKG's today.    Physical Exam   Vital Signs: Temp: 99.5  F (37.5  C) Temp src: Oral BP: 105/62 Pulse: 96   Resp: 16 SpO2: 97 % O2 Device: None (Room air)    Weight: 204 lbs 0 oz  General: non-distressed adult  HEENT: Normocephalic, atraumatic, pupils equal round reactive, extraocular muscles intact, membranes moist  CV: normal capillary refill, heart regular rate and rhythm, port noted  Respiratory: Non-labored breathing, bronchovesicular lung sounds  Abdominal: soft, non-tender, no rebound, no guarding, no rigidity, +bowel sounds  Genitourinary: no suprapubic tenderness  Musculoskeletal: normal bulk and tone  Skin: no rash, normal turgor  Neurologic: no facial droop, moving upper and lower extremities spontaneously, sensation in tact to light touch on extremities  Psychiatric: normal mood and affect    Data   Recent Labs   Lab 06/18/21  1308 06/18/21  1155 06/18/21  0815 06/18/21  0311 06/17/21  1558 06/17/21  1348 06/15/21  1325   WBC  --   --   --  6.7  --  6.7 8.8   HGB  --  7.3*  --  6.6*  --  7.4* 8.7*   MCV  --   --   --  94  --  93 94   PLT  --   --   --  144*  --  173 219   NA  --   --   --  137  --  134 138   POTASSIUM 3.6  --  2.4* 3.2*  --  3.4 3.6   CHLORIDE  --   --   --  104  --  102 102   CO2  --   --   --  28  --  25 26   BUN  --   --   --  6*  --  8 10   CR  --   --   --  0.62  --  0.69 0.72   ANIONGAP  --   --   --  6  --  7 10   VIKTORIA  --   --   --  8.3*  --  8.8 9.1   GLC  --   --   --  111*  --  93 82   ALBUMIN  --   --   --  2.0*  --  2.2* 2.6*   PROTTOTAL  --   --   --  6.2*  --  6.9 7.6   BILITOTAL  --   --   --  1.1  --  1.2 0.8   ALKPHOS  --   --   --  173*  --  217* 154*   ALT  --   --   --  19  --  22 18   AST  --   --   --  10  --  16 15   TROPI  --   --   --   --  <0.015  --  <0.015

## 2021-06-19 LAB
ABO + RH BLD: NORMAL
ABO + RH BLD: NORMAL
ANION GAP SERPL CALCULATED.3IONS-SCNC: 5 MMOL/L (ref 3–14)
BLD GP AB SCN SERPL QL: NORMAL
BLD PROD TYP BPU: NORMAL
BLD PROD TYP BPU: NORMAL
BLD UNIT ID BPU: 0
BLOOD BANK CMNT PATIENT-IMP: NORMAL
BLOOD PRODUCT CODE: NORMAL
BPU ID: NORMAL
BUN SERPL-MCNC: 7 MG/DL (ref 7–30)
CALCIUM SERPL-MCNC: 8.3 MG/DL (ref 8.5–10.1)
CHLORIDE SERPL-SCNC: 109 MMOL/L (ref 94–109)
CO2 SERPL-SCNC: 26 MMOL/L (ref 20–32)
CREAT SERPL-MCNC: 0.64 MG/DL (ref 0.52–1.04)
ERYTHROCYTE [DISTWIDTH] IN BLOOD BY AUTOMATED COUNT: 16.6 % (ref 10–15)
GAMMA INTERFERON BACKGROUND BLD IA-ACNC: 0 IU/ML
GFR SERPL CREATININE-BSD FRML MDRD: >90 ML/MIN/{1.73_M2}
GLUCOSE SERPL-MCNC: 89 MG/DL (ref 70–99)
HCT VFR BLD AUTO: 21.9 % (ref 35–47)
HGB BLD-MCNC: 6.6 G/DL (ref 11.7–15.7)
M TB IFN-G CD4+ BCKGRND COR BLD-ACNC: 1.41 IU/ML
M TB TUBERC IFN-G BLD QL: NEGATIVE
MAGNESIUM SERPL-MCNC: 2.1 MG/DL (ref 1.6–2.3)
MCH RBC QN AUTO: 28.9 PG (ref 26.5–33)
MCHC RBC AUTO-ENTMCNC: 30.1 G/DL (ref 31.5–36.5)
MCV RBC AUTO: 96 FL (ref 78–100)
MITOGEN IGNF BCKGRD COR BLD-ACNC: 0.03 IU/ML
MITOGEN IGNF BCKGRD COR BLD-ACNC: 0.03 IU/ML
NUM BPU REQUESTED: 2
PHOSPHATE SERPL-MCNC: 3 MG/DL (ref 2.5–4.5)
PLATELET # BLD AUTO: 129 10E9/L (ref 150–450)
POTASSIUM SERPL-SCNC: 3.9 MMOL/L (ref 3.4–5.3)
RBC # BLD AUTO: 2.28 10E12/L (ref 3.8–5.2)
SODIUM SERPL-SCNC: 140 MMOL/L (ref 133–144)
SPECIMEN EXP DATE BLD: NORMAL
TRANSFUSION STATUS PATIENT QL: NORMAL
TRANSFUSION STATUS PATIENT QL: NORMAL
WBC # BLD AUTO: 5 10E9/L (ref 4–11)

## 2021-06-19 PROCEDURE — 250N000013 HC RX MED GY IP 250 OP 250 PS 637: Performed by: STUDENT IN AN ORGANIZED HEALTH CARE EDUCATION/TRAINING PROGRAM

## 2021-06-19 PROCEDURE — 250N000013 HC RX MED GY IP 250 OP 250 PS 637: Performed by: INTERNAL MEDICINE

## 2021-06-19 PROCEDURE — 84100 ASSAY OF PHOSPHORUS: CPT | Performed by: INTERNAL MEDICINE

## 2021-06-19 PROCEDURE — 80048 BASIC METABOLIC PNL TOTAL CA: CPT | Performed by: INTERNAL MEDICINE

## 2021-06-19 PROCEDURE — 99232 SBSQ HOSP IP/OBS MODERATE 35: CPT | Performed by: STUDENT IN AN ORGANIZED HEALTH CARE EDUCATION/TRAINING PROGRAM

## 2021-06-19 PROCEDURE — 250N000011 HC RX IP 250 OP 636: Performed by: STUDENT IN AN ORGANIZED HEALTH CARE EDUCATION/TRAINING PROGRAM

## 2021-06-19 PROCEDURE — 85027 COMPLETE CBC AUTOMATED: CPT | Performed by: INTERNAL MEDICINE

## 2021-06-19 PROCEDURE — 83735 ASSAY OF MAGNESIUM: CPT | Performed by: INTERNAL MEDICINE

## 2021-06-19 PROCEDURE — 99232 SBSQ HOSP IP/OBS MODERATE 35: CPT | Mod: GC | Performed by: INTERNAL MEDICINE

## 2021-06-19 PROCEDURE — 250N000011 HC RX IP 250 OP 636: Performed by: INTERNAL MEDICINE

## 2021-06-19 PROCEDURE — 250N000013 HC RX MED GY IP 250 OP 250 PS 637: Performed by: EMERGENCY MEDICINE

## 2021-06-19 PROCEDURE — 120N000005 HC R&B MS OVERFLOW UMMC

## 2021-06-19 PROCEDURE — P9040 RBC LEUKOREDUCED IRRADIATED: HCPCS | Performed by: INTERNAL MEDICINE

## 2021-06-19 PROCEDURE — 99207 PR CDG-MDM COMPONENT: MEETS MODERATE - DOWN CODED: CPT | Performed by: STUDENT IN AN ORGANIZED HEALTH CARE EDUCATION/TRAINING PROGRAM

## 2021-06-19 RX ADMIN — POTASSIUM & SODIUM PHOSPHATES POWDER PACK 280-160-250 MG 2 PACKET: 280-160-250 PACK at 08:08

## 2021-06-19 RX ADMIN — CEFEPIME 2 G: 2 INJECTION, POWDER, FOR SOLUTION INTRAVENOUS at 15:43

## 2021-06-19 RX ADMIN — Medication 1 TABLET: at 08:08

## 2021-06-19 RX ADMIN — GABAPENTIN 300 MG: 300 CAPSULE ORAL at 13:57

## 2021-06-19 RX ADMIN — ACETAMINOPHEN 650 MG: 325 TABLET, FILM COATED ORAL at 03:42

## 2021-06-19 RX ADMIN — ACETAMINOPHEN 650 MG: 325 TABLET, FILM COATED ORAL at 08:08

## 2021-06-19 RX ADMIN — GUAIFENESIN AND CODEINE PHOSPHATE 5 ML: 10; 100 LIQUID ORAL at 21:07

## 2021-06-19 RX ADMIN — PROCHLORPERAZINE MALEATE 10 MG: 5 TABLET ORAL at 08:08

## 2021-06-19 RX ADMIN — MELATONIN 6 MG: at 21:07

## 2021-06-19 RX ADMIN — Medication 400 MG: at 20:21

## 2021-06-19 RX ADMIN — GABAPENTIN 300 MG: 300 CAPSULE ORAL at 20:21

## 2021-06-19 RX ADMIN — ENOXAPARIN SODIUM 40 MG: 40 INJECTION SUBCUTANEOUS at 13:57

## 2021-06-19 RX ADMIN — GUAIFENESIN AND CODEINE PHOSPHATE 5 ML: 10; 100 LIQUID ORAL at 13:57

## 2021-06-19 RX ADMIN — GABAPENTIN 300 MG: 300 CAPSULE ORAL at 08:08

## 2021-06-19 RX ADMIN — CEFEPIME 2 G: 2 INJECTION, POWDER, FOR SOLUTION INTRAVENOUS at 23:53

## 2021-06-19 RX ADMIN — GUAIFENESIN AND CODEINE PHOSPHATE 5 ML: 10; 100 LIQUID ORAL at 00:01

## 2021-06-19 RX ADMIN — FLUTICASONE FUROATE AND VILANTEROL TRIFENATATE 1 PUFF: 100; 25 POWDER RESPIRATORY (INHALATION) at 08:08

## 2021-06-19 RX ADMIN — OMEPRAZOLE 20 MG: 20 CAPSULE, DELAYED RELEASE ORAL at 08:08

## 2021-06-19 RX ADMIN — CETIRIZINE HYDROCHLORIDE 10 MG: 10 TABLET, FILM COATED ORAL at 08:08

## 2021-06-19 RX ADMIN — Medication 400 MG: at 13:57

## 2021-06-19 RX ADMIN — MONTELUKAST 10 MG: 10 TABLET, FILM COATED ORAL at 08:08

## 2021-06-19 RX ADMIN — Medication 400 MG: at 08:08

## 2021-06-19 RX ADMIN — CEFEPIME 2 G: 2 INJECTION, POWDER, FOR SOLUTION INTRAVENOUS at 08:07

## 2021-06-19 RX ADMIN — CEFEPIME 2 G: 2 INJECTION, POWDER, FOR SOLUTION INTRAVENOUS at 00:01

## 2021-06-19 ASSESSMENT — ACTIVITIES OF DAILY LIVING (ADL)
ADLS_ACUITY_SCORE: 20
ADLS_ACUITY_SCORE: 19
ADLS_ACUITY_SCORE: 20
ADLS_ACUITY_SCORE: 20

## 2021-06-19 ASSESSMENT — MIFFLIN-ST. JEOR: SCORE: 1590.61

## 2021-06-19 NOTE — PLAN OF CARE
"BP 90/55 (BP Location: Left arm)   Pulse 88   Temp 98.4  F (36.9  C) (Oral)   Resp 18   Ht 1.702 m (5' 7\")   Wt 92.5 kg (204 lb)   LMP 03/19/2019   SpO2 100%   BMI 31.95 kg/m  on 2L n/c during the night when sleeping sats drop to 88% RA.  Shift: 8875-2339  Neuro: A&O x4   Cardiac: Tachy   Respiratory: Dry cough and had robitussin ,need sputum sample but not able.Wheezing occ.  GI/: Voiding and denies nausea ,No BM during the night.  Diet/Appetite: Regular ,poor appetite  Activity: Up to bathroom assist of 1  Pain: C/o pain LUQ and headache this am. Tylenol given  Skin: Good,Edema LE.  LDA's: Oliver cath TKO   Other: Hgb yesterday was 6.6 and had 1 unit RBC and up to 7.3 ,Today 6.6 this am and MD notified.  Plan:  Need order for blood transfusion.Continue to monitor.  "

## 2021-06-19 NOTE — PLAN OF CARE
"/56 (BP Location: Right arm)   Pulse 87   Temp 98.8  F (37.1  C) (Axillary)   Resp 16   Ht 1.702 m (5' 7\")   Wt 96.8 kg (213 lb 6.4 oz)   LMP 03/19/2019   SpO2 99%   BMI 33.42 kg/m  . Patient is on telemetry with NSR and oxygen monitoring. Patient drops into the 80's while she sleeps and placed on 2 L NC and SpO2 is upper 90's. Primary care team are aware of the drop of the oxygen when the patient is sleeping and continue to monitor. Port cath is patent and infusing. Patient is A & O x 4. Patient c/o mild headache early this shift and received tylenol 650 mg po x 1 with relief. Patient c/o mild nausea piror to breakfast and received compazine 10 mg po x 1 with relief. Around 1600 patient c/o seeing black spot on the left eye and primary care team was notified, am team will follow up with the patient. Patient is eating and drinking fair. Patient has good urine output, yellow, clear. Patient received unit of RBC and tolerated. Patient is up with one assist due to lightheaded some times with the soft BP. Continue with plan of care and notify MD for status changes.    Problem: Adult Inpatient Plan of Care  Goal: Plan of Care Review  Outcome: No Change  Flowsheets (Taken 6/19/2021 1818)  Plan of Care Reviewed With:   patient   spouse   caregiver     Problem: Adult Inpatient Plan of Care  Goal: Absence of Hospital-Acquired Illness or Injury  Intervention: Identify and Manage Fall Risk  Recent Flowsheet Documentation  Taken 6/19/2021 0805 by Xuan Mejias RN  Safety Promotion/Fall Prevention:   fall prevention program maintained   increased rounding and observation   increase visualization of patient   lighting adjusted   mobility aid in reach   nonskid shoes/slippers when out of bed   patient and family education   room near nurse's station     Problem: Adult Inpatient Plan of Care  Goal: Absence of Hospital-Acquired Illness or Injury  Intervention: Prevent Skin Injury  Recent Flowsheet " Documentation  Taken 6/19/2021 0919 by Xuan Mejias RN  Body Position: position changed independently     Problem: Adult Inpatient Plan of Care  Goal: Absence of Hospital-Acquired Illness or Injury  Intervention: Prevent and Manage VTE (Venous Thromboembolism) Risk  Recent Flowsheet Documentation  Taken 6/19/2021 0805 by Xuan Mejias RN  VTE Prevention/Management:   ambulation promoted   bleeding risk assessed     Problem: Adult Inpatient Plan of Care  Goal: Absence of Hospital-Acquired Illness or Injury  Intervention: Prevent Infection  Recent Flowsheet Documentation  Taken 6/19/2021 0805 by Xuan Mejias RN  Infection Prevention:   cohorting utilized   equipment surfaces disinfected   hand hygiene promoted   personal protective equipment utilized     Problem: Pain Chronic (Persistent) (Comorbidity Management)  Goal: Acceptable Pain Control and Functional Ability  Intervention: Develop Pain Management Plan  Recent Flowsheet Documentation  Taken 6/19/2021 0805 by Xuan Mejias RN  Pain Management Interventions:   medication (see MAR)   MD notified (comment)

## 2021-06-19 NOTE — PROVIDER NOTIFICATION
Provider notified [address] and called back.Pt c/o persistent pain LUQ and not going away 4-5/10 and has had all day but worse tonight Has low grade temp,cough,HR  tachy.Also wanted melatonin 6 mg ordered as she takes at home.MD will make note of pain and Melatonin over 2 mg not effective.Will continue to monitor.

## 2021-06-19 NOTE — CONSULTS
Care Management Initial Consult    General Information  Assessment completed with: Care Team MemberALTAGRACIA-chart review    Type of CM/SW Visit: Initial Assessment    Primary Care Provider verified and updated as needed: Yes   Readmission within the last 30 days: No previous admission in last 30 days      Reason for Consult: Elevated Risk Score   Advance Care Planning: Reviewed: Yes       Communication Assessment  Patient's communication style: Spoken language (English or Bilingual)    Hearing Difficulty or Deaf: no   Wear Glasses or Blind: yes    Cognitive  Cognitive/Neuro/Behavioral: WDL                      Living Environment:   People in home: Spouse     Current living Arrangements: House      Able to return to prior arrangements: Yes     Family/Social Support:  Care provided by: Self, spouse/significant other  Provides care for: No one     Description of Support System: Supportive, Involved      Current Resources:   Patient receiving home care services: No  Community Resources: Palliative Care, OP Infusion  Equipment currently used at home: None  Supplies currently used at home: None    Employment/Financial:  Employment Status: Employed full-time        Financial Concerns: No concerns identified      Lifestyle & Psychosocial Needs:        Socioeconomic History     Marital status:      Spouse name: Not on file     Number of children: Not on file     Years of education: Not on file     Highest education level: Not on file     Tobacco Use     Smoking status: Never Smoker     Smokeless tobacco: Never Used   Substance and Sexual Activity     Alcohol use: Yes     Frequency: 2-3 times a week     Drinks per session: 1 or 2     Binge frequency: Never     Comment: 2-3 glasses of wine/week     Drug use: Never       Functional Status:  Prior to admission patient needed assistance: Independent      Mental Health Status:  Mental Health Status: No Current Concerns       Chemical Dependency Status:  Chemical Dependency  Status: No Current Concerns           Values/Beliefs:  Spiritual, Cultural Beliefs, Faith Practices, Values that affect care: No               Additional Information:    Patient was admitted for evaluation of fevers, weakness and hypotension. Patient currently on IV antibiotics, final plan pending.     CM assessment being completed due to elevated unplanned readmission risk score.    Per chart review, patient receives OP infusion and palliative care services at the [facility] [place].     Care Management will continue to follow and assist with discharge planning as needed.    Yodit Kirby RN, BSN, PHN  Care Coordinator   P: [phone], [facility]

## 2021-06-19 NOTE — PROVIDER NOTIFICATION
Primary care team, Garcia Morales, DO about the patient c/o about seeing black spots on the left eye sight field. When she close her eyes, she sees flush light and there is no pain or other discomfort. Patient stated she did had the same experience long time ago. Continue to monitor and notify primary care team if patient becomes symptomatic.

## 2021-06-19 NOTE — PROGRESS NOTES
[facility]    Medicine Progress Note - Hospitalist Service, Gold 11       Date of Admission:  6/17/2021  Assessment & Plan     Richa Ashby is a 56 year old female admitted on 6/17/2021. 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 Patient was sent to the ED from the clinic after she presented there with fever of 102.9 F and was found to have BP of 85/58. Patient had blood cultures drawn at the clinic and treated with IV NS 1 Litre. She was started on IV Cefepime in the ED and UA/UC and lactic acid sent. No clear etiology for fevers identified and hypotension is likely 2/2 poor oral intake and recent diarrhea.      Febrile Illness, Etiology unknown - suspect chemotherapy related, especially given normal white counts however prudent evaluation indicated. CT chest negative for infiltrate, UA negative, BC/UC pending  Hypotension, resolved, fluid responsive - at least element of poor PO  Cough Dyspnea - Recent cough/SOB: thought to be 2/2 phrenic nerve involvement of tumor and likely 2/2 growth of pulmonary mets.  - follow up BCx UCx, Stool cultures / Cdiff, Check Quantiferon Gold  - most likely focality of infection seems lung vs GI in this particular patient  - Cefepime 6/17 - current, will treat for 48 hours, watch cultures and de-escalate vs continue course of Abx  - hemodynamically stable so no need for serial lactic acids or fluid boluses at this time.  - if ongoing fevers could expand work up to include evaluation of gallbladder, cerebrospinal fluid but suspicion low at this time.     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 Lung bx 11/2/20 positive  for sarcoma. On Doxil+iphos started 11/13/20. Dose reduction in Doxil for skin toxicity and mucositis in January. Last cycle of this 3/21. Then switched to single dose Doxil starting April/21. Started Keytruda 5/10/21. Started Gemzar 6/16/21.  - Consult Oncology     Chronic Anemia: likely 2/2 chemo and anemia of chronic disease. Monitor. Transfuse for hgb<7 does not need irradiated blood     Non-Severe Malnutriton: with decreased appetite will benefit from nutrition consult for dietary supplements.     GERD: continue daily PPI     Asthma: Continue advair discus and prn albuterol     Peripheral neuropathy: 2/2 ifosfamide which has improved. Continue home dose neurontin and PRN oxycodone.     Diet: Combination Diet Regular Diet Adult    DVT Prophylaxis: Enoxaparin (Lovenox) SQ  Ness Catheter: not present  Code Status: Full Code           Disposition Plan   Expected discharge: 2 - 3 days, recommended to prior living arrangement once SIRS/Sepsis treated.  Entered: Garcia Ha DO 06/19/2021, 2:33 PM       The patient's care was discussed with the Patient.    Garcia Ha DO  Hospitalist Service, [address]  Contact information available via [facility] Paging/Directory  Please see sign in/sign out for up to date coverage information  ______________________________________________________________________    Interval History   Feels well at bedside, mild LUQ abdominal pain, chronic ongoing cough with thin white sputum. No fevers but occasionally sweats the bed.    A 4 point review of systems evaluated including questioning patient about cardiovascular symptoms, gastrointestinal symptoms, respiratory symptoms, constitutional symptoms, and hematology / bleeding symptoms and all were negative except as noted in HPI above.    Data reviewed today: I reviewed all medications, new labs and imaging results over the last 24 hours. I personally reviewed no images or EKG's  today.    Physical Exam   Vital Signs: Temp: 97.8  F (36.6  C) Temp src: Axillary BP: 103/71 Pulse: 86   Resp: 18 SpO2: 98 % O2 Device: None (Room air) Oxygen Delivery: 2 LPM  Weight: 213 lbs 6.4 oz  General: non-distressed adult  HEENT: Normocephalic, atraumatic, pupils equal round reactive, extraocular muscles intact, membranes moist  CV: normal capillary refill, heart regular rate and rhythm, port noted  Respiratory: Non-labored breathing, bronchovesicular lung sounds  Abdominal: soft, non-tender, no rebound, no guarding, no rigidity, +bowel sounds  Genitourinary: no suprapubic tenderness  Musculoskeletal: normal bulk and tone  Skin: no rash, normal turgor  Neurologic: no facial droop, moving upper and lower extremities spontaneously, sensation in tact to light touch on extremities  Psychiatric: normal mood and affect    Data   Recent Labs   Lab 06/19/21  0445 06/18/21  1308 06/18/21  1155 06/18/21  0815 06/18/21  0311 06/17/21  1558 06/17/21  1348 06/15/21  1325   WBC 5.0  --   --   --  6.7  --  6.7 8.8   HGB 6.6*  --  7.3*  --  6.6*  --  7.4* 8.7*   MCV 96  --   --   --  94  --  93 94   *  --   --   --  144*  --  173 219     --   --   --  137  --  134 138   POTASSIUM 3.9 3.6  --  2.4* 3.2*  --  3.4 3.6   CHLORIDE 109  --   --   --  104  --  102 102   CO2 26  --   --   --  28  --  25 26   BUN 7  --   --   --  6*  --  8 10   CR 0.64 0.65  --   --  0.62  --  0.69 0.72   ANIONGAP 5  --   --   --  6  --  7 10   VIKTORIA 8.3*  --   --   --  8.3*  --  8.8 9.1   GLC 89  --   --   --  111*  --  93 82   ALBUMIN  --   --   --   --  2.0*  --  2.2* 2.6*   PROTTOTAL  --   --   --   --  6.2*  --  6.9 7.6   BILITOTAL  --   --   --   --  1.1  --  1.2 0.8   ALKPHOS  --   --   --   --  173*  --  217* 154*   ALT  --   --   --   --  19  --  22 18   AST  --   --   --   --  10  --  16 15   TROPI  --   --   --   --   --  <0.015  --  <0.015

## 2021-06-19 NOTE — PROGRESS NOTES
Hematology / Oncology  Daily Progress Note   Date of Service: 06/19/2021  Patient: Richa Ashby  MRN: [id number]  Admission Date: 6/17/2021  Hospital Day # 2    Assessment & Plan:   Richa Ashby is a 56 year old woman with metastatic undifferentiated pleomorphic sarcoma, currently undergoing treatment with gemcitabine and pembrolizumab.  She is admitted to the hospital for evaluation of fevers, weakness and hypotension.     Non-neutropenic fevers, unclear infectious source.  Patient reports feeling poorly for about 1 month prior to admission, though fevers started in a day or 2 prior to admission.  Associated symptoms include fatigue, generalized weakness, nonproductive cough, diarrhea, poor appetite, and intermittent headaches.  Work-up thus far has been largely unrevealing.  Blood cultures remain no growth to date.  She remains on cefepime.  Differential also includes drug fevers related to gemcitabine, which occur in up to 40% of patients on this drug - though this would be a diagnosis of exclusion. As she is feeling poorly in addition to the fevers, we feel drug fevers are less likely the culprit and agree with ongoing infectious work-up.    Follow-up blood cultures, so far NGTD.      Agree with transitioning to PO antibiotics or trial off antibiotics, per primary team.    Would not expect deep neutropenia with this chemotherapy regimen, especially this early in the cycle.     Metastatic undifferentiated pleomorphic sarcoma.  Patient follows with Dr. Lopez.  She presented initially with a lump on her right leg in November 2019.  Biopsy demonstrated high-grade UPS.  She received preoperative radiotherapy and the tumor was resected on 3/12/2020.  She then developed a cough and chest tightness.  Imaging revealed lung nodules, and biopsy on 11/2/2020 demonstrated metastatic sarcoma.  She started Doxil plus ifosfamide on 11/13/2020.  Initial imaging demonstrated a positive response to treatment  "with decrease in pulmonary nodules.  She continued on Doxil alone starting on 4/5/2021.  With ongoing progression, she started pembrolizumab on 5/10/2021.  Imaging on 6/15/2021 demonstrated interval growth in the size of the pulmonary nodules and masses.  Plan was to switch to gemcitabine with pembrolizumab.  First infusion of gemcitabine was 6/16/2021.    Next chemotherapy appointment is scheduled for 6/23/2021.  We will reschedule this pending hospital course.     Fatigue.  Likely multifactorial related to ongoing malignancy, chemotherapy and anemia. Has been worked up for adrenal insufficiency, which was unremarkable. Had discussed use of Ritalin with the outpatient team but had opted to defer for now.    Monitor.     Anemia.  Suspected to be related to ongoing chemotherapy and anemia of chronic disease. Hgb dropped to 6.6 on AM labs again this AM.     Daily CBC w/differential while hospitalized.    Transfuse for goal Hgb >7 and plts >10K.        We will continue to follow this patient while hospitalized. Please do not hesitate to page with any questions or concerns.     Patient was seen and plan of care was discussed with attending physician Dr. Carolin Ren.     Flaca Espinoza MD/PhD  Heme/Onc Fellow  ___________________________________________________________________    Subjective & Interval History:    No acute events noted overnight. Complained of a headache across her forehead, and some LUQ pain. Feeling better this AM. Up to chair today. No fevers, chills or sweats overnight. Wonders if her headache is related to Hgb 6.6 this AM.  No new concerns today.    Physical Exam:    Blood pressure 103/71, pulse 86, temperature 97.8  F (36.6  C), temperature source Axillary, resp. rate 18, height 1.702 m (5' 7\"), weight 96.8 kg (213 lb 6.4 oz), last menstrual period 03/19/2019, SpO2 98 %.  General: alert and cooperative, sitting up in chair, no acute distress  HEENT: sclera anicteric, EOMI, MMM  Neck: supple, " normal ROM  Resp: normal respiratory effort on ambient air  MSK: warm and well-perfused, no edema  Skin: no rashes on limited exam, no jaundice  Neuro: AOx3, moves all extremities equally, no focal deficits  Psych: Mood and affect are appropriate  Access: Port in right upper chest (c/d/i)    Labs & Studies: I personally reviewed the following studies:  ROUTINE LABS (Last four results):  CMP  Recent Labs   Lab 06/19/21  0445 06/18/21  1308 06/18/21  0815 06/18/21  0311 06/17/21  1348 06/15/21  1325     --   --  137 134 138   POTASSIUM 3.9 3.6 2.4* 3.2* 3.4 3.6   CHLORIDE 109  --   --  104 102 102   CO2 26  --   --  28 25 26   ANIONGAP 5  --   --  6 7 10   GLC 89  --   --  111* 93 82   BUN 7  --   --  6* 8 10   CR 0.64 0.65  --  0.62 0.69 0.72   GFRESTIMATED >90 >90  --  >90 >90 >90   GFRESTBLACK >90 >90  --  >90 >90 >90   VIKTORIA 8.3*  --   --  8.3* 8.8 9.1   MAG 2.1  --  1.4* 2.0  --  2.3   PHOS 3.0  --  1.7* 2.4*  --   --    PROTTOTAL  --   --   --  6.2* 6.9 7.6   ALBUMIN  --   --   --  2.0* 2.2* 2.6*   BILITOTAL  --   --   --  1.1 1.2 0.8   ALKPHOS  --   --   --  173* 217* 154*   AST  --   --   --  10 16 15   ALT  --   --   --  19 22 18     CBC  Recent Labs   Lab 06/19/21  0445 06/18/21  1155 06/18/21  0311 06/17/21  1348 06/15/21  1325   WBC 5.0  --  6.7 6.7 8.8   RBC 2.28*  --  2.30* 2.59* 2.99*   HGB 6.6* 7.3* 6.6* 7.4* 8.7*   HCT 21.9*  --  21.5* 24.0* 28.0*   MCV 96  --  94 93 94   MCH 28.9  --  28.7 28.6 29.1   MCHC 30.1*  --  30.7* 30.8* 31.1*   RDW 16.6*  --  16.3* 16.1* 16.3*   *  --  144* 173 219     XR CHEST 2 VW, 6/17/2021 4:19 PM  Indication: fever  Comparison: CT chest 6/15/2021.  Impression:   1. No new airspace opacity.  2. Redemonstration of right apical, right middle, and left basilar  lung masses.     US LOWER EXTREMITY VENOUS DUPLEX LEFT  6/17/2021 8:37 PM   CLINICAL HISTORY: Left lower extremity, fever, rule out DVT  COMPARISON: None      IMPRESSION:.  No deep vein thrombosis in the  left lower extremity.    Medication list for reference:  Current Facility-Administered Medications   Medication     acetaminophen (TYLENOL) tablet 650 mg     albuterol (PROAIR HFA/PROVENTIL HFA/VENTOLIN HFA) 108 (90 Base) MCG/ACT inhaler 1-2 puff     ceFEPIme (MAXIPIME) 2 g vial to attach to  ml bag for ADULTS or 50 ml bag for PEDS     cetirizine (zyrTEC) tablet 10 mg     enoxaparin ANTICOAGULANT (LOVENOX) injection 40 mg     fluticasone-vilanterol (BREO ELLIPTA) 100-25 MCG/INH inhaler 1 puff     gabapentin (NEURONTIN) capsule 300 mg     guaiFENesin-codeine (ROBITUSSIN AC) 100-10 MG/5ML solution 5 mL     lidocaine (LMX4) cream     lidocaine 1 % 0.1-1 mL     magnesium oxide (MAG-OX) tablet 400 mg     melatonin tablet 6 mg     montelukast (SINGULAIR) tablet 10 mg     multivitamin w/minerals (THERA-VIT-M) tablet 1 tablet     naloxone (NARCAN) injection 0.2 mg    Or     naloxone (NARCAN) injection 0.4 mg    Or     naloxone (NARCAN) injection 0.2 mg    Or     naloxone (NARCAN) injection 0.4 mg     omeprazole (priLOSEC) CR capsule 20 mg     ondansetron (ZOFRAN) tablet 8 mg     oxyCODONE IR (ROXICODONE) half-tab 2.5-5 mg     potassium chloride ER (KLOR-CON M) CR tablet 40 mEq     prochlorperazine (COMPAZINE) tablet 10 mg     sodium chloride (PF) 0.9% PF flush 3 mL     sodium chloride (PF) 0.9% PF flush 3 mL     Facility-Administered Medications Ordered in Other Encounters   Medication     heparin 100 UNIT/ML injection 5 mL

## 2021-06-19 NOTE — PROVIDER NOTIFICATION
Lab called about critical Hgb 6.6 this am and notified charge nurse and provider [id number] and no blood tx order.

## 2021-06-20 VITALS
RESPIRATION RATE: 18 BRPM | OXYGEN SATURATION: 100 % | TEMPERATURE: 97.8 F | SYSTOLIC BLOOD PRESSURE: 96 MMHG | HEIGHT: 67 IN | WEIGHT: 209.8 LBS | BODY MASS INDEX: 32.93 KG/M2 | DIASTOLIC BLOOD PRESSURE: 53 MMHG | HEART RATE: 82 BPM

## 2021-06-20 LAB
ANION GAP SERPL CALCULATED.3IONS-SCNC: 5 MMOL/L (ref 3–14)
BUN SERPL-MCNC: 8 MG/DL (ref 7–30)
CALCIUM SERPL-MCNC: 8.5 MG/DL (ref 8.5–10.1)
CHLORIDE SERPL-SCNC: 105 MMOL/L (ref 94–109)
CO2 SERPL-SCNC: 28 MMOL/L (ref 20–32)
CREAT SERPL-MCNC: 0.6 MG/DL (ref 0.52–1.04)
ERYTHROCYTE [DISTWIDTH] IN BLOOD BY AUTOMATED COUNT: 16.8 % (ref 10–15)
GFR SERPL CREATININE-BSD FRML MDRD: >90 ML/MIN/{1.73_M2}
GLUCOSE SERPL-MCNC: 92 MG/DL (ref 70–99)
HCT VFR BLD AUTO: 25.4 % (ref 35–47)
HGB BLD-MCNC: 8 G/DL (ref 11.7–15.7)
MAGNESIUM SERPL-MCNC: 2 MG/DL (ref 1.6–2.3)
MCH RBC QN AUTO: 29.2 PG (ref 26.5–33)
MCHC RBC AUTO-ENTMCNC: 31.5 G/DL (ref 31.5–36.5)
MCV RBC AUTO: 93 FL (ref 78–100)
PHOSPHATE SERPL-MCNC: 3.1 MG/DL (ref 2.5–4.5)
PLATELET # BLD AUTO: 123 10E9/L (ref 150–450)
POTASSIUM SERPL-SCNC: 3.6 MMOL/L (ref 3.4–5.3)
RBC # BLD AUTO: 2.74 10E12/L (ref 3.8–5.2)
SODIUM SERPL-SCNC: 138 MMOL/L (ref 133–144)
WBC # BLD AUTO: 3.4 10E9/L (ref 4–11)

## 2021-06-20 PROCEDURE — 80048 BASIC METABOLIC PNL TOTAL CA: CPT | Performed by: STUDENT IN AN ORGANIZED HEALTH CARE EDUCATION/TRAINING PROGRAM

## 2021-06-20 PROCEDURE — 250N000013 HC RX MED GY IP 250 OP 250 PS 637: Performed by: INTERNAL MEDICINE

## 2021-06-20 PROCEDURE — 250N000013 HC RX MED GY IP 250 OP 250 PS 637: Performed by: STUDENT IN AN ORGANIZED HEALTH CARE EDUCATION/TRAINING PROGRAM

## 2021-06-20 PROCEDURE — 85027 COMPLETE CBC AUTOMATED: CPT | Performed by: STUDENT IN AN ORGANIZED HEALTH CARE EDUCATION/TRAINING PROGRAM

## 2021-06-20 PROCEDURE — 99231 SBSQ HOSP IP/OBS SF/LOW 25: CPT | Performed by: INTERNAL MEDICINE

## 2021-06-20 PROCEDURE — 250N000011 HC RX IP 250 OP 636: Performed by: INTERNAL MEDICINE

## 2021-06-20 PROCEDURE — 84100 ASSAY OF PHOSPHORUS: CPT | Performed by: STUDENT IN AN ORGANIZED HEALTH CARE EDUCATION/TRAINING PROGRAM

## 2021-06-20 PROCEDURE — 250N000013 HC RX MED GY IP 250 OP 250 PS 637: Performed by: EMERGENCY MEDICINE

## 2021-06-20 PROCEDURE — 83735 ASSAY OF MAGNESIUM: CPT | Performed by: STUDENT IN AN ORGANIZED HEALTH CARE EDUCATION/TRAINING PROGRAM

## 2021-06-20 PROCEDURE — 99238 HOSP IP/OBS DSCHRG MGMT 30/<: CPT | Performed by: STUDENT IN AN ORGANIZED HEALTH CARE EDUCATION/TRAINING PROGRAM

## 2021-06-20 RX ADMIN — OMEPRAZOLE 20 MG: 20 CAPSULE, DELAYED RELEASE ORAL at 07:51

## 2021-06-20 RX ADMIN — FLUTICASONE FUROATE AND VILANTEROL TRIFENATATE 1 PUFF: 100; 25 POWDER RESPIRATORY (INHALATION) at 07:52

## 2021-06-20 RX ADMIN — Medication 1 TABLET: at 07:51

## 2021-06-20 RX ADMIN — ACETAMINOPHEN 650 MG: 325 TABLET, FILM COATED ORAL at 09:50

## 2021-06-20 RX ADMIN — GUAIFENESIN AND CODEINE PHOSPHATE 5 ML: 10; 100 LIQUID ORAL at 07:51

## 2021-06-20 RX ADMIN — PROCHLORPERAZINE MALEATE 10 MG: 5 TABLET ORAL at 07:51

## 2021-06-20 RX ADMIN — Medication 400 MG: at 07:55

## 2021-06-20 RX ADMIN — CEFEPIME 2 G: 2 INJECTION, POWDER, FOR SOLUTION INTRAVENOUS at 07:52

## 2021-06-20 RX ADMIN — GUAIFENESIN AND CODEINE PHOSPHATE 5 ML: 10; 100 LIQUID ORAL at 02:36

## 2021-06-20 RX ADMIN — GABAPENTIN 300 MG: 300 CAPSULE ORAL at 07:51

## 2021-06-20 RX ADMIN — CETIRIZINE HYDROCHLORIDE 10 MG: 10 TABLET, FILM COATED ORAL at 07:51

## 2021-06-20 RX ADMIN — MONTELUKAST 10 MG: 10 TABLET, FILM COATED ORAL at 07:51

## 2021-06-20 ASSESSMENT — ACTIVITIES OF DAILY LIVING (ADL)
ADLS_ACUITY_SCORE: 20

## 2021-06-20 ASSESSMENT — MIFFLIN-ST. JEOR: SCORE: 1574.28

## 2021-06-20 NOTE — PROGRESS NOTES
Pt discharged to: Home.   Via: Car.   Accompanied by: .   Belongings: With the patient and her .   Teaching: Done by RN and verbalized understanding of the discharge instructions.   Clinic appointment: Wednesday June 23,2021 at 8:00 am for labs and 10 am infusion.   Report called/faxed: No. Just the Augmentin is order at [facility] near the patient's house.   Local housing: Yes.

## 2021-06-20 NOTE — PROGRESS NOTES
Hematology / Oncology  Daily Progress Note   Date of Service: 06/20/2021  Patient: Richa Ashby  MRN: [id number]  Admission Date: 6/17/2021  Hospital Day # 3    Assessment & Plan:   Richa Ashby is a 56 year old woman with metastatic undifferentiated pleomorphic sarcoma, currently undergoing treatment with gemcitabine and pembrolizumab.  She is admitted to the hospital for evaluation of fevers, weakness and hypotension.     Non-neutropenic fevers, unclear infectious source.  Patient reports feeling poorly for about 1 month prior to admission, though fevers started in a day or 2 prior to admission.  Associated symptoms include fatigue, generalized weakness, nonproductive cough, diarrhea, poor appetite, and intermittent headaches.  Work-up thus far has been largely unrevealing.  Blood cultures remain no growth to date.  She remains on cefepime.  Differential also includes drug fevers related to gemcitabine, which occur in up to 40% of patients on this drug - though this would be a diagnosis of exclusion. As she is feeling poorly in addition to the fevers, we feel drug fevers are less likely the culprit and agree with ongoing infectious work-up.    Follow-up blood cultures, so far NGTD.      Agree with discharge with po antibiotics.     Metastatic undifferentiated pleomorphic sarcoma.  Patient follows with Dr. Lopez.  She presented initially with a lump on her right leg in November 2019.  Biopsy demonstrated high-grade UPS.  She received preoperative radiotherapy and the tumor was resected on 3/12/2020.  She then developed a cough and chest tightness.  Imaging revealed lung nodules, and biopsy on 11/2/2020 demonstrated metastatic sarcoma.  She started Doxil plus ifosfamide on 11/13/2020.  Initial imaging demonstrated a positive response to treatment with decrease in pulmonary nodules.  She continued on Doxil alone starting on 4/5/2021.  With ongoing progression, she started pembrolizumab on  "5/10/2021.  Imaging on 6/15/2021 demonstrated interval growth in the size of the pulmonary nodules and masses.  Plan was to switch to gemcitabine with pembrolizumab.  First infusion of gemcitabine was 6/16/2021.    Next chemotherapy appointment is scheduled for 6/23/2021.  She knows to follow up in the clinic for this appt this week.     Fatigue.  Likely multifactorial related to ongoing malignancy, chemotherapy and anemia. Has been worked up for adrenal insufficiency, which was unremarkable. Had discussed use of Ritalin with the outpatient team but had opted to defer for now.    Monitor.     Anemia.  Suspected to be related to ongoing chemotherapy and anemia of chronic disease. Hgb dropped to 6.6 on AM labs again this AM.     Daily CBC w/differential while hospitalized.    Transfuse for goal Hgb >7 and plts >10K.      Carolin Ren MD        ___________________________________________________________________    Subjective & Interval History:    No acute events noted overnight. Feeling better this AM. Up to chair today. No fevers, chills or sweats overnight.  No new concerns today.    Physical Exam:    Blood pressure 96/53, pulse 82, temperature 97.8  F (36.6  C), temperature source Axillary, resp. rate 18, height 1.702 m (5' 7\"), weight 95.2 kg (209 lb 12.8 oz), last menstrual period 03/19/2019, SpO2 100 %.  General: alert and cooperative, sitting up in chair, no acute distress  HEENT: sclera anicteric, EOMI, MMM  Neck: supple, normal ROM  Resp: normal respiratory effort on ambient air  MSK: warm and well-perfused, no edema  Skin: no rashes on limited exam, no jaundice  Neuro: AOx3, moves all extremities equally, no focal deficits  Psych: Mood and affect are appropriate  Access: Port in right upper chest (c/d/i)    Labs & Studies: I personally reviewed the following studies:  ROUTINE LABS (Last four results):  CMP  Recent Labs   Lab 06/20/21  0231 06/19/21  0445 06/18/21  1308 06/18/21  0815 06/18/21  0311 " 06/17/21  1348 06/15/21  1325    140  --   --  137 134 138   POTASSIUM 3.6 3.9 3.6 2.4* 3.2* 3.4 3.6   CHLORIDE 105 109  --   --  104 102 102   CO2 28 26  --   --  28 25 26   ANIONGAP 5 5  --   --  6 7 10   GLC 92 89  --   --  111* 93 82   BUN 8 7  --   --  6* 8 10   CR 0.60 0.64 0.65  --  0.62 0.69 0.72   GFRESTIMATED >90 >90 >90  --  >90 >90 >90   GFRESTBLACK >90 >90 >90  --  >90 >90 >90   VIKTORIA 8.5 8.3*  --   --  8.3* 8.8 9.1   MAG 2.0 2.1  --  1.4* 2.0  --  2.3   PHOS 3.1 3.0  --  1.7* 2.4*  --   --    PROTTOTAL  --   --   --   --  6.2* 6.9 7.6   ALBUMIN  --   --   --   --  2.0* 2.2* 2.6*   BILITOTAL  --   --   --   --  1.1 1.2 0.8   ALKPHOS  --   --   --   --  173* 217* 154*   AST  --   --   --   --  10 16 15   ALT  --   --   --   --  19 22 18     CBC  Recent Labs   Lab 06/20/21  0231 06/19/21  0445 06/18/21  1155 06/18/21  0311 06/17/21  1348   WBC 3.4* 5.0  --  6.7 6.7   RBC 2.74* 2.28*  --  2.30* 2.59*   HGB 8.0* 6.6* 7.3* 6.6* 7.4*   HCT 25.4* 21.9*  --  21.5* 24.0*   MCV 93 96  --  94 93   MCH 29.2 28.9  --  28.7 28.6   MCHC 31.5 30.1*  --  30.7* 30.8*   RDW 16.8* 16.6*  --  16.3* 16.1*   * 129*  --  144* 173     XR CHEST 2 VW, 6/17/2021 4:19 PM  Indication: fever  Comparison: CT chest 6/15/2021.  Impression:   1. No new airspace opacity.  2. Redemonstration of right apical, right middle, and left basilar  lung masses.     US LOWER EXTREMITY VENOUS DUPLEX LEFT  6/17/2021 8:37 PM   CLINICAL HISTORY: Left lower extremity, fever, rule out DVT  COMPARISON: None      IMPRESSION:.  No deep vein thrombosis in the left lower extremity.    Medication list for reference:  Current Facility-Administered Medications   Medication     acetaminophen (TYLENOL) tablet 650 mg     albuterol (PROAIR HFA/PROVENTIL HFA/VENTOLIN HFA) 108 (90 Base) MCG/ACT inhaler 1-2 puff     ceFEPIme (MAXIPIME) 2 g vial to attach to  ml bag for ADULTS or 50 ml bag for PEDS     cetirizine (zyrTEC) tablet 10 mg     enoxaparin  ANTICOAGULANT (LOVENOX) injection 40 mg     fluticasone-vilanterol (BREO ELLIPTA) 100-25 MCG/INH inhaler 1 puff     gabapentin (NEURONTIN) capsule 300 mg     guaiFENesin-codeine (ROBITUSSIN AC) 100-10 MG/5ML solution 5 mL     lidocaine (LMX4) cream     lidocaine 1 % 0.1-1 mL     melatonin tablet 6 mg     montelukast (SINGULAIR) tablet 10 mg     multivitamin w/minerals (THERA-VIT-M) tablet 1 tablet     naloxone (NARCAN) injection 0.2 mg    Or     naloxone (NARCAN) injection 0.4 mg    Or     naloxone (NARCAN) injection 0.2 mg    Or     naloxone (NARCAN) injection 0.4 mg     omeprazole (priLOSEC) CR capsule 20 mg     ondansetron (ZOFRAN) tablet 8 mg     oxyCODONE IR (ROXICODONE) half-tab 2.5-5 mg     potassium chloride ER (KLOR-CON M) CR tablet 40 mEq     prochlorperazine (COMPAZINE) tablet 10 mg     sodium chloride (PF) 0.9% PF flush 3 mL     sodium chloride (PF) 0.9% PF flush 3 mL     Facility-Administered Medications Ordered in Other Encounters   Medication     heparin 100 UNIT/ML injection 5 mL

## 2021-06-20 NOTE — PLAN OF CARE
"1900-0730    Vitals: AVSS; ex soft BPs    BP 91/52 (BP Location: Right arm)   Pulse 89   Temp 97.8  F (36.6  C) (Axillary)   Resp 18   Ht 1.702 m (5' 7\")   Wt 96.8 kg (213 lb 6.4 oz)   LMP 03/19/2019   SpO2 100%   BMI 33.42 kg/m      Mobility: SBA  Pain: denies  Nausea: denies    Dry cough; aggravated by exertion. Robitussin x2. Occasional HA when coughing; declines intervention. Melatonin at bedtime. Soft, stable BPs; pt not symptomatic overnight.     Problem: Fever  Goal: Body Temperature in Desired Range  Outcome: No Change     "

## 2021-06-20 NOTE — PLAN OF CARE
"BP 96/53 (BP Location: Right arm)   Pulse 82   Temp 97.8  F (36.6  C) (Axillary)   Resp 18   Ht 1.702 m (5' 7\")   Wt 95.2 kg (209 lb 12.8 oz)   LMP 03/19/2019   SpO2 100%   BMI 32.86 kg/m    Port cath is HL. Patient is A & O x 4. Patient c/o mild headache this morning and received tylenol 650 mg po x 1 with relief. Patient received compazine 10 mg po x 1 for mild nausea with relief. Discharge instructions were explained to the patient and her  ( Caregiver), verbalized understanding of the discharge instructions, medication list and follow up appointments. Continue with plan of care and notify MD for status changes.   Problem: Adult Inpatient Plan of Care  Goal: Plan of Care Review  Outcome: Adequate for Discharge  Flowsheets (Taken 6/20/2021 1110)  Plan of Care Reviewed With:   patient   spouse   caregiver     Problem: Adult Inpatient Plan of Care  Goal: Absence of Hospital-Acquired Illness or Injury  Intervention: Identify and Manage Fall Risk  Recent Flowsheet Documentation  Taken 6/20/2021 0748 by Xuan Mejias RN  Safety Promotion/Fall Prevention:   increased rounding and observation   increase visualization of patient   lighting adjusted   mobility aid in reach   nonskid shoes/slippers when out of bed   patient and family education     Problem: Adult Inpatient Plan of Care  Goal: Absence of Hospital-Acquired Illness or Injury  Intervention: Prevent Skin Injury  Recent Flowsheet Documentation  Taken 6/20/2021 0948 by Xuan Mejias RN  Body Position: position changed independently  Taken 6/20/2021 0748 by Xuan Mejias RN  Body Position: position changed independently     Problem: Adult Inpatient Plan of Care  Goal: Absence of Hospital-Acquired Illness or Injury  Intervention: Prevent and Manage VTE (Venous Thromboembolism) Risk  Recent Flowsheet Documentation  Taken 6/20/2021 0748 by Xuan Mejias RN  VTE Prevention/Management:   ambulation promoted   " bleeding risk assessed     Problem: Adult Inpatient Plan of Care  Goal: Absence of Hospital-Acquired Illness or Injury  Intervention: Prevent Infection  Recent Flowsheet Documentation  Taken 6/20/2021 0748 by Xuan Mejias RN  Infection Prevention:   cohorting utilized   environmental surveillance performed   hand hygiene promoted     Problem: Pain Chronic (Persistent) (Comorbidity Management)  Goal: Acceptable Pain Control and Functional Ability  Intervention: Develop Pain Management Plan  Recent Flowsheet Documentation  Taken 6/20/2021 0948 by Xuan Mejias RN  Pain Management Interventions: medication (see MAR)

## 2021-06-20 NOTE — DISCHARGE SUMMARY
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    Follow-ups Needed After Discharge   Follow-up Appointments     Follow Up and recommended labs and tests      Follow up with primary care provider, King Diaz, within 7 days for   hospital follow- up.  No follow up labs or test are needed.    Follow up with your primary oncologist on your regularly scheduled visit.               Unresulted Labs Ordered in the Past 30 Days of this Admission     Date and Time Order Name Status Description    6/17/2021 1339 BLOOD CULTURE Preliminary     6/17/2021 1339 BLOOD CULTURE Preliminary       Follow up with primary care physician for these results.    Discharge Disposition   Discharged to home  Condition at discharge: Stable    Hospital Course   Richa Ashby is a 56 year old female admitted on 6/17/2021. 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 Patient was sent to the ED from the clinic after she presented there with fever of 102.9 F and was found to have BP of 85/58. Patient had blood cultures drawn at the clinic and treated with IV NS 1 Litre. She was started on IV Cefepime in the ED and UA/UC and lactic acid sent. No clear etiology for fevers identified and hypotension is likely 2/2 poor oral intake and recent diarrhea.     She was started on broad spectrum antibiotics and her fevers disappeared. She didn't  report much for symptoms and her cultures remained negative throughout her hospital course. No localizing infection was identified. She was discharged improved and vitally stable to complete a course of antibiotics at home.     Consultations This Hospital Stay   ONCOLOGY ADULT IP CONSULT  CARE MANAGEMENT / SOCIAL WORK IP CONSULT    Code Status   Full Code    Time Spent on this Encounter   IGarcia DO, personally saw the patient today and spent less than or equal to 30 minutes discharging this patient.       Garcia Ha DO  [facility] [address] [facility] [address] [address]  Phone: [phone]  Fax: [phone]  ______________________________________________________________________    Physical Exam   Vital Signs: Temp: 97.8  F (36.6  C) Temp src: Axillary BP: 96/53 Pulse: 82   Resp: 18 SpO2: 100 % O2 Device: None (Room air) Oxygen Delivery: 2 LPM  Weight: 209 lbs 12.8 oz  General: non-distressed adult  HEENT: Normocephalic, atraumatic, pupils equal round reactive, extraocular muscles intact, membranes moist  CV: normal capillary refill, heart regular rate and rhythm, port noted  Respiratory: Non-labored breathing, bronchovesicular lung sounds  Abdominal: soft, non-tender, no rebound, no guarding, no rigidity, +bowel sounds  Genitourinary: no suprapubic tenderness  Musculoskeletal: normal bulk and tone  Skin: no rash, normal turgor  Neurologic: no facial droop, moving upper and lower extremities spontaneously, sensation in tact to light touch on extremities  Psychiatric: normal mood and affect       Primary Care Physician   King Diaz    Discharge Orders      Reason for your hospital stay    Fevers     Follow Up and recommended labs and tests    Follow up with primary care provider, King Diaz, within 7 days for hospital follow- up.  No follow up labs or test are needed.    Follow up with your primary oncologist on your regularly scheduled visit.      Activity    Your activity upon discharge: activity as tolerated and no driving for today     Discharge Instructions    If you experience any of the following please seek medical attention: Fevers, shortness of breath, severe chest or abdominal pain, bleeding, or loss of consciousness.     Full Code     Diet    Follow this diet upon discharge: Orders Placed This Encounter      Combination Diet Regular Diet Adult       Significant Results and Procedures   Most Recent 3 CBC's:  Recent Labs   Lab Test 06/20/21  0231 06/19/21  0445 06/18/21  1155 06/18/21  0311   WBC 3.4* 5.0  --  6.7   HGB 8.0* 6.6* 7.3* 6.6*   MCV 93 96  --  94   * 129*  --  144*     Most Recent 3 BMP's:  Recent Labs   Lab Test 06/20/21  0231 06/19/21  0445 06/18/21  1308 06/18/21  0311 06/18/21  0311    140  --   --  137   POTASSIUM 3.6 3.9 3.6   < > 3.2*   CHLORIDE 105 109  --   --  104   CO2 28 26  --   --  28   BUN 8 7  --   --  6*   CR 0.60 0.64 0.65  --  0.62   ANIONGAP 5 5  --   --  6   VIKTORIA 8.5 8.3*  --   --  8.3*   GLC 92 89  --   --  111*    < > = values in this interval not displayed.     Most Recent 2 LFT's:  Recent Labs   Lab Test 06/18/21 0311 06/17/21  1348   AST 10 16   ALT 19 22   ALKPHOS 173* 217*   BILITOTAL 1.1 1.2       Discharge Medications   Current Discharge Medication List      START taking these medications    Details   amoxicillin-clavulanate (AUGMENTIN) 875-125 MG tablet Take 1 tablet by mouth 2 times daily for 7 days  Qty: 14 tablet, Refills: 0    Associated Diagnoses: Fever in adult         CONTINUE these medications which have NOT CHANGED    Details   montelukast (SINGULAIR) 10 MG tablet Take 10 mg by mouth daily      acetaminophen (TYLENOL) 325 MG tablet Take 325-650 mg by mouth every 6 hours as needed for mild pain      ADVAIR DISKUS 100-50 MCG/DOSE inhaler       albuterol (PROAIR RESPICLICK) 108 (90 Base) MCG/ACT inhaler Inhale 1-2 puffs into the lungs      benzonatate (TESSALON) 100 MG capsule Take  1-2 capsules (100-200 mg) by mouth 3 times daily as needed for cough  Qty: 60 capsule, Refills: 1    Associated Diagnoses: Cough      cetirizine (ZYRTEC) 10 MG tablet Take 10 mg by mouth daily      gabapentin (NEURONTIN) 300 MG capsule 600 mg morning & afternoon - 900 mg at bedtime  Qty: 210 capsule, Refills: 1    Associated Diagnoses: Drug-induced polyneuropathy (H)      guaiFENesin-codeine (ROBITUSSIN AC) 100-10 MG/5ML solution Take 5-10 mLs by mouth every 4 hours as needed for cough  Qty: 473 mL, Refills: 1    Associated Diagnoses: Cough      lidocaine-prilocaine (EMLA) 2.5-2.5 % external cream Apply topically as needed for moderate pain  Qty: 30 g, Refills: 3    Associated Diagnoses: Sarcoma of soft tissue (H)      magic mouthwash suspension (diphenhydrAMINE, lidocaine, aluminum-magnesium & simethicone) Swish and swallow 10 mLs in mouth every 6 hours as needed for mouth sores  Qty: 120 mL, Refills: 3    Associated Diagnoses: Mucositis      melatonin (MELATONIN) 1 MG/ML LIQD liquid Take 5 mg by mouth      multivitamin w/minerals (MULTI-VITAMIN) tablet Take 1 tablet by mouth daily      omeprazole (PRILOSEC) 20 MG DR capsule       ondansetron (ZOFRAN) 8 MG tablet Take 1 tablet (8 mg) by mouth every 8 hours as needed for nausea  Qty: 60 tablet, Refills: 1    Associated Diagnoses: Sarcoma (H)      prochlorperazine (COMPAZINE) 10 MG tablet Take 1 tablet (10 mg) by mouth every 6 hours as needed (Nausea/Vomiting)  Qty: 30 tablet, Refills: 5    Associated Diagnoses: Malignant neoplasm metastatic to lung, unspecified laterality (H); Anemia in neoplastic disease; Sarcoma of soft tissue (H); Sarcoma (H)      urea (CARMOL) 10 % external cream Apply topically as needed for dry skin  Qty: 453 g, Refills: 0    Associated Diagnoses: Hand foot syndrome      vitamin D3 (CHOLECALCIFEROL) 2000 units (50 mcg) tablet Take 1 tablet by mouth daily         STOP taking these medications       levofloxacin (LEVAQUIN) 750 MG tablet  Comments:   Reason for Stopping:         oxyCODONE (ROXICODONE) 5 MG tablet Comments:   Reason for Stopping:             Allergies   Allergies   Allergen Reactions     Pentobarbital      resp arrest

## 2021-06-21 ENCOUNTER — PATIENT OUTREACH (OUTPATIENT)
Dept: ONCOLOGY | Facility: CLINIC | Age: 56
End: 2021-06-21

## 2021-06-21 ENCOUNTER — PATIENT OUTREACH (OUTPATIENT)
Dept: CARE COORDINATION | Facility: CLINIC | Age: 56
End: 2021-06-21

## 2021-06-21 DIAGNOSIS — Z71.89 OTHER SPECIFIED COUNSELING: ICD-10-CM

## 2021-06-21 NOTE — PROGRESS NOTES
Oncology/Hematology Visit Note  Jun 23, 2021    Reason for Visit: Follow up of undifferentiated pleomorphic sarcoma     History of Present Illness: Richa Ashby is a 55 year old female with PMH asthma and GERD with undifferentiated pleomorphic sarcoma. 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 She received preoperative radiotherapy and the tumor was resected 3/12/20. She then developed a cough, chest tightness, and scapular pain and imaging revealed lung nodules. Lung biopsy 11/2/20 with sarcoma. She was started on Doxil + Ifos 11/13/20. Had delay in cycle 2 due to COVID dx 12/2/20. CT imaging after cycle 1 with positive response to treatment with decrease in pulmonary nodules. Received cycle 2 12/18/20. Received cycle 3 1/15/21 with dose reduction in Doxil for skin toxicity and mucositis. Received cycle 4 2/15/21.      CT CAP 3/8/21 with positive response to treatment, decreased size of pulmonary mets though one lesion was larger. Did see fibrocystic changes in left breast, recommended breast imaging. Received cycle 5 Doxil + ifos 3/11/21.      Imaging 4/5/21 with overall stable disease except one lung lesion, opted to continue Doxil alone for now and switch to Keytruda in future if ongoing progression.      Started Keytruda 5/10/21. Was seen 5/26/21 cough, SOB, fatigue work-up thought to be 2/2 phrenic nerve involvement of R apical lung mass.     Due to worsening fatigue and cough, repeat CT 6/15/21 concerning for progression vs pseudoprogression. Added Gemzar 6/16/21.    She was admitted 6/17/21-6/20/21 with fever of unknown origin, infectious work-up negative, treated with broad spectrum antibiotics.    She was seen today for hospital follow-up.      Interval History:  Connie was seen today for follow-up. She is doing better since last week, noting a clear improvement with the antibiotics which she will finish next week. She notes her fatigue  and cough are improved, still using cough syrup but less often. She is eating better. She does have slight irritation in her mouth. She also has pruritis on her trunk though no rash. She has pain in her right shoulder/back and has been taking Tylenol for this.    No recurrent fevers. No headaches, dizziness, chest pain, SOB, nausea, vomiting, abdominal pain, bowel or bladder concerns. Edema/neuropathy in feet slightly improved-able to wear normal shoes today. No bleeding issues.     Current Outpatient Medications   Medication Sig Dispense Refill     acetaminophen (TYLENOL) 325 MG tablet Take 325-650 mg by mouth every 6 hours as needed for mild pain       ADVAIR DISKUS 100-50 MCG/DOSE inhaler        albuterol (PROAIR RESPICLICK) 108 (90 Base) MCG/ACT inhaler Inhale 1-2 puffs into the lungs       amoxicillin-clavulanate (AUGMENTIN) 875-125 MG tablet Take 1 tablet by mouth 2 times daily for 7 days 14 tablet 0     benzonatate (TESSALON) 100 MG capsule Take 1-2 capsules (100-200 mg) by mouth 3 times daily as needed for cough 60 capsule 1     cetirizine (ZYRTEC) 10 MG tablet Take 10 mg by mouth daily       gabapentin (NEURONTIN) 300 MG capsule 600 mg morning & afternoon - 900 mg at bedtime 210 capsule 1     guaiFENesin-codeine (ROBITUSSIN AC) 100-10 MG/5ML solution Take 5-10 mLs by mouth every 4 hours as needed for cough 473 mL 1     lidocaine-prilocaine (EMLA) 2.5-2.5 % external cream Apply topically as needed for moderate pain (Patient not taking: Reported on 6/2/2021) 30 g 3     magic mouthwash suspension (diphenhydrAMINE, lidocaine, aluminum-magnesium & simethicone) Swish and swallow 10 mLs in mouth every 6 hours as needed for mouth sores (Patient not taking: Reported on 5/26/2021) 120 mL 3     melatonin (MELATONIN) 1 MG/ML LIQD liquid Take 5 mg by mouth       montelukast (SINGULAIR) 10 MG tablet Take 10 mg by mouth daily       multivitamin w/minerals (MULTI-VITAMIN) tablet Take 1 tablet by mouth daily        omeprazole (PRILOSEC) 20 MG DR capsule        ondansetron (ZOFRAN) 8 MG tablet Take 1 tablet (8 mg) by mouth every 8 hours as needed for nausea 60 tablet 1     prochlorperazine (COMPAZINE) 10 MG tablet Take 1 tablet (10 mg) by mouth every 6 hours as needed (Nausea/Vomiting) 30 tablet 5     urea (CARMOL) 10 % external cream Apply topically as needed for dry skin 453 g 0     vitamin D3 (CHOLECALCIFEROL) 2000 units (50 mcg) tablet Take 1 tablet by mouth daily       Physical Examination:  /59 (BP Location: Right arm, Patient Position: Sitting, Cuff Size: Adult Large)   Pulse 84   Temp 98.1  F (36.7  C) (Oral)   Resp 16   Wt 95.1 kg (209 lb 11.2 oz)   LMP 03/19/2019   SpO2 99%   BMI 32.84 kg/m    Wt Readings from Last 10 Encounters:   06/20/21 95.2 kg (209 lb 12.8 oz)   06/17/21 96.2 kg (212 lb 1.6 oz)   06/15/21 94.7 kg (208 lb 12.8 oz)   06/02/21 98.4 kg (216 lb 14.4 oz)   05/26/21 97.9 kg (215 lb 12.8 oz)   05/10/21 99.7 kg (219 lb 12.8 oz)   04/13/21 101.6 kg (223 lb 14.4 oz)   03/11/21 102.5 kg (225 lb 14.4 oz)   02/15/21 105.2 kg (232 lb)   01/27/21 105.5 kg (232 lb 8 oz)     Constitutional: Well-appearing female in no acute distress.  Eyes: EOMI, PERRL. No scleral icterus.  ENT: Oral mucosa is moist without lesions or thrush.   Lymphatic: Neck is supple without cervical or supraclavicular lymphadenopathy.   Cardiovascular: Regular rate and rhythm. No murmurs, gallops, or rubs. No peripheral edema.  Respiratory: Clear to auscultation bilaterally. No wheezes or crackles.  Gastrointestinal: Bowel sounds present. Abdomen soft, non-tender.   Neurologic: Cranial nerves II through XII are grossly intact.  Skin: No rashes, petechiae, or bruising noted on exposed skin.    Laboratory Data:  Results for AMADEO WILSON (MRN [id number]) as of 6/25/2021 07:35   6/23/2021 08:38   Sodium 140   Potassium 3.6   Chloride 105   Carbon Dioxide 28   Urea Nitrogen 9   Creatinine 0.66   GFR Estimate >90   GFR Estimate If  Black >90   Calcium 9.0   Anion Gap 7   Albumin 2.5 (L)   Protein Total 7.2   Bilirubin Total 0.4   Alkaline Phosphatase 185 (H)   ALT 53 (H)   AST 32   TSH 3.28   Glucose 121 (H)   WBC 3.6 (L)   Hemoglobin 8.9 (L)   Hematocrit 28.4 (L)   Platelet Count 67 (L)   RBC Count 3.06 (L)   MCV 93   MCH 29.1   MCHC 31.3 (L)   RDW 15.3 (H)   Diff Method Manual Differential   % Neutrophils 75.6   % Lymphocytes 15.6   % Monocytes 7.0   % Eosinophils 0.9   % Basophils 0.9   Absolute Neutrophil 2.7   Absolute Lymphocytes 0.6 (L)   Absolute Monocytes 0.3   Absolute Eosinophils 0.0   Absolute Basophils 0.0   Anisocytosis Slight   Poikilocytosis Slight   Platelet Estimate Confirming automated cell count       Assessment and Plan:  1. Onc  Metastatic UPS, was on Doxil + Ifosfamide (5 cycles) with positive response to treatment initially, then due to poor tolerance switched to Doxil alone (1 cycle) but had progression. Started on Keytruda 5/10/21. Due to tumor growth (progression vs pseudoprogression) added Gemzar 6/16/21.    Will get Gemzar day 8 + Keytruda today. Clinically doing better. Labs with thrombocytopenia-discussed with Dr. Lopez and given rapid change in tumors do not want to delay treatment today. Will reduce Gemzar dose to 1100mg for today and then go to 1200mg for cycle 3 per my discussion with Dr. Lpoez.     I will see her with C1D3. Plan to keep CT mid July to assess response to treatment.    2. ID  Admitted 6/17/21 for high fevers and hypotension, infectious work-up negative though did improve with broad spectrum antibiotics. Will complete Augmentin this weekend.    Possibly the fevers were related to Gemzar-will prescribe prednisone 20mg x 3 days if she develops flu-like symptoms/low grade temp and asked her to call if she develops true fever.    3. Fatigue  Worked up for adrenal insufficiency-cortisol WNL, hypothyroidism-TSH minimally elevated and free T4 normal, OH-iron studies OK, anemia-hgb  improving,[id number], troponin negative. Fatigue much better now after antibiotics so possible unknown infection causing symptoms. Will monitor.      4. Heme  Anemia: ACD and chemotherapy, did require pRBC inpatient. Hgb OK today at 8.9. Suspect this will drop after Gemzar today-will set up for possible pRBC and plt next week.    Thrombocytopenia: 2/2 Gemzar. No bleeding issues. Dose reduction as above. Set up for possible plt next week.     Transfuse to keep hgb >8 and plt >10.      5. FEN  Eating better now that she isn't so fatigued. Monitor weight. CMP stable.      6. GI  GERD: continuing Omeprazole and tums, stable.      Diarrhea: Resolved.     Monitor slight LFT elevation.     Salt/soda swishes for slight mucositis.      7. Derm  Hand/foot: 2/2 Doxil, improving now that she is off Doxil.     Prescribed Hydroxyzine PRN for pruritis.      8. Pulm  Asthma: stable, continue inhalers.     COVID: Diagnosed in December, recovered.     Cough/SOB: See work-up from 5/26/21 (negative CT PE, RVP, COVID, NTBNP). Thought to be 2/2 phrenic nerve involvement of tumor. Possibly infectious component given improvement now with antibiotics. Continue Robitussin PRN. Consider rad onc referral pending improvement with Gemzar/Keytruda.      9. 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 Improving.     10. MSK  R shoulder/back pain: Suspect 2/2 cancer, continue Tylenol PRN. Will give Tylenol in infusion today.    45 minutes spent on the date of the encounter doing chart review, review of test results, interpretation of tests, patient visit, documentation and discussion with other provider(s)     Harshal Schuster PA-C  [facility]  [address] [place] [address] [phone]

## 2021-06-21 NOTE — PROGRESS NOTES
Clinic Care Coordination Contact    Patient has already been in contact with care team following hospital discharge. Will cancel/close post-hospital call from [facility] at this time.       Lori Lazar MA  [facility]

## 2021-06-23 ENCOUNTER — ONCOLOGY VISIT (OUTPATIENT)
Dept: ONCOLOGY | Facility: CLINIC | Age: 56
End: 2021-06-23
Attending: PHYSICIAN ASSISTANT
Payer: COMMERCIAL

## 2021-06-23 VITALS
BODY MASS INDEX: 32.84 KG/M2 | RESPIRATION RATE: 16 BRPM | HEART RATE: 84 BPM | DIASTOLIC BLOOD PRESSURE: 59 MMHG | SYSTOLIC BLOOD PRESSURE: 110 MMHG | WEIGHT: 209.7 LBS | TEMPERATURE: 98.1 F | OXYGEN SATURATION: 99 %

## 2021-06-23 DIAGNOSIS — L29.9 GENERALIZED PRURITUS: ICD-10-CM

## 2021-06-23 DIAGNOSIS — C49.9 SARCOMA OF SOFT TISSUE (H): ICD-10-CM

## 2021-06-23 DIAGNOSIS — C78.00 MALIGNANT NEOPLASM METASTATIC TO LUNG, UNSPECIFIED LATERALITY (H): Primary | ICD-10-CM

## 2021-06-23 DIAGNOSIS — D63.0 ANEMIA IN NEOPLASTIC DISEASE: ICD-10-CM

## 2021-06-23 DIAGNOSIS — C49.9 SARCOMA (H): ICD-10-CM

## 2021-06-23 DIAGNOSIS — C49.9 SARCOMA OF SOFT TISSUE (H): Primary | ICD-10-CM

## 2021-06-23 LAB
ALBUMIN SERPL-MCNC: 2.5 G/DL (ref 3.4–5)
ALP SERPL-CCNC: 185 U/L (ref 40–150)
ALT SERPL W P-5'-P-CCNC: 53 U/L (ref 0–50)
ANION GAP SERPL CALCULATED.3IONS-SCNC: 7 MMOL/L (ref 3–14)
ANISOCYTOSIS BLD QL SMEAR: SLIGHT
AST SERPL W P-5'-P-CCNC: 32 U/L (ref 0–45)
BACTERIA SPEC CULT: NO GROWTH
BACTERIA SPEC CULT: NO GROWTH
BASOPHILS # BLD AUTO: 0 10E9/L (ref 0–0.2)
BASOPHILS NFR BLD AUTO: 0.9 %
BILIRUB SERPL-MCNC: 0.4 MG/DL (ref 0.2–1.3)
BUN SERPL-MCNC: 9 MG/DL (ref 7–30)
CALCIUM SERPL-MCNC: 9 MG/DL (ref 8.5–10.1)
CHLORIDE SERPL-SCNC: 105 MMOL/L (ref 94–109)
CO2 SERPL-SCNC: 28 MMOL/L (ref 20–32)
CREAT SERPL-MCNC: 0.66 MG/DL (ref 0.52–1.04)
DIFFERENTIAL METHOD BLD: ABNORMAL
EOSINOPHIL # BLD AUTO: 0 10E9/L (ref 0–0.7)
EOSINOPHIL NFR BLD AUTO: 0.9 %
ERYTHROCYTE [DISTWIDTH] IN BLOOD BY AUTOMATED COUNT: 15.3 % (ref 10–15)
GFR SERPL CREATININE-BSD FRML MDRD: >90 ML/MIN/{1.73_M2}
GLUCOSE SERPL-MCNC: 121 MG/DL (ref 70–99)
HCT VFR BLD AUTO: 28.4 % (ref 35–47)
HGB BLD-MCNC: 8.9 G/DL (ref 11.7–15.7)
LYMPHOCYTES # BLD AUTO: 0.6 10E9/L (ref 0.8–5.3)
LYMPHOCYTES NFR BLD AUTO: 15.6 %
MCH RBC QN AUTO: 29.1 PG (ref 26.5–33)
MCHC RBC AUTO-ENTMCNC: 31.3 G/DL (ref 31.5–36.5)
MCV RBC AUTO: 93 FL (ref 78–100)
MONOCYTES # BLD AUTO: 0.3 10E9/L (ref 0–1.3)
MONOCYTES NFR BLD AUTO: 7 %
NEUTROPHILS # BLD AUTO: 2.7 10E9/L (ref 1.6–8.3)
NEUTROPHILS NFR BLD AUTO: 75.6 %
PLATELET # BLD AUTO: 67 10E9/L (ref 150–450)
PLATELET # BLD EST: ABNORMAL 10*3/UL
POIKILOCYTOSIS BLD QL SMEAR: SLIGHT
POTASSIUM SERPL-SCNC: 3.6 MMOL/L (ref 3.4–5.3)
PROT SERPL-MCNC: 7.2 G/DL (ref 6.8–8.8)
RBC # BLD AUTO: 3.06 10E12/L (ref 3.8–5.2)
SODIUM SERPL-SCNC: 140 MMOL/L (ref 133–144)
SPECIMEN SOURCE: NORMAL
SPECIMEN SOURCE: NORMAL
TSH SERPL DL<=0.005 MIU/L-ACNC: 3.28 MU/L (ref 0.4–4)
WBC # BLD AUTO: 3.6 10E9/L (ref 4–11)

## 2021-06-23 PROCEDURE — 96413 CHEMO IV INFUSION 1 HR: CPT

## 2021-06-23 PROCEDURE — 96375 TX/PRO/DX INJ NEW DRUG ADDON: CPT

## 2021-06-23 PROCEDURE — 85025 COMPLETE CBC W/AUTO DIFF WBC: CPT | Performed by: INTERNAL MEDICINE

## 2021-06-23 PROCEDURE — 250N000011 HC RX IP 250 OP 636: Performed by: PHYSICIAN ASSISTANT

## 2021-06-23 PROCEDURE — 250N000013 HC RX MED GY IP 250 OP 250 PS 637: Performed by: PHYSICIAN ASSISTANT

## 2021-06-23 PROCEDURE — 96417 CHEMO IV INFUS EACH ADDL SEQ: CPT

## 2021-06-23 PROCEDURE — 99215 OFFICE O/P EST HI 40 MIN: CPT | Performed by: PHYSICIAN ASSISTANT

## 2021-06-23 PROCEDURE — 80053 COMPREHEN METABOLIC PANEL: CPT | Performed by: INTERNAL MEDICINE

## 2021-06-23 PROCEDURE — 250N000011 HC RX IP 250 OP 636: Performed by: INTERNAL MEDICINE

## 2021-06-23 PROCEDURE — 258N000003 HC RX IP 258 OP 636: Performed by: PHYSICIAN ASSISTANT

## 2021-06-23 PROCEDURE — 258N000003 HC RX IP 258 OP 636: Performed by: INTERNAL MEDICINE

## 2021-06-23 PROCEDURE — 84443 ASSAY THYROID STIM HORMONE: CPT | Performed by: INTERNAL MEDICINE

## 2021-06-23 PROCEDURE — G0463 HOSPITAL OUTPT CLINIC VISIT: HCPCS

## 2021-06-23 RX ORDER — HEPARIN SODIUM (PORCINE) LOCK FLUSH IV SOLN 100 UNIT/ML 100 UNIT/ML
5 SOLUTION INTRAVENOUS ONCE
Status: COMPLETED | OUTPATIENT
Start: 2021-06-23 | End: 2021-06-23

## 2021-06-23 RX ORDER — GRANISETRON HYDROCHLORIDE 1 MG/ML
1 INJECTION INTRAVENOUS ONCE
Status: COMPLETED | OUTPATIENT
Start: 2021-06-23 | End: 2021-06-23

## 2021-06-23 RX ORDER — ACETAMINOPHEN 325 MG/1
975 TABLET ORAL ONCE
Status: COMPLETED | OUTPATIENT
Start: 2021-06-23 | End: 2021-06-23

## 2021-06-23 RX ORDER — HYDROXYZINE HYDROCHLORIDE 25 MG/1
25 TABLET, FILM COATED ORAL 3 TIMES DAILY PRN
Qty: 30 TABLET | Refills: 1 | Status: SHIPPED | OUTPATIENT
Start: 2021-06-23 | End: 2021-07-06

## 2021-06-23 RX ORDER — PREDNISONE 20 MG/1
20 TABLET ORAL DAILY
Qty: 3 TABLET | Refills: 0 | Status: SHIPPED | OUTPATIENT
Start: 2021-06-23 | End: 2021-06-26

## 2021-06-23 RX ORDER — ACETAMINOPHEN 325 MG/1
975 TABLET ORAL ONCE
Status: CANCELLED
Start: 2021-06-23 | End: 2021-06-23

## 2021-06-23 RX ORDER — HEPARIN SODIUM (PORCINE) LOCK FLUSH IV SOLN 100 UNIT/ML 100 UNIT/ML
5 SOLUTION INTRAVENOUS
Status: DISCONTINUED | OUTPATIENT
Start: 2021-06-23 | End: 2021-06-23 | Stop reason: HOSPADM

## 2021-06-23 RX ADMIN — Medication 5 ML: at 13:06

## 2021-06-23 RX ADMIN — ACETAMINOPHEN 975 MG: 325 TABLET ORAL at 10:02

## 2021-06-23 RX ADMIN — GEMCITABINE 1100 MG: 38 INJECTION, SOLUTION INTRAVENOUS at 10:48

## 2021-06-23 RX ADMIN — SODIUM CHLORIDE 200 MG: 9 INJECTION, SOLUTION INTRAVENOUS at 12:20

## 2021-06-23 RX ADMIN — Medication 5 ML: at 08:31

## 2021-06-23 RX ADMIN — GRANISETRON HYDROCHLORIDE 1 MG: 1 INJECTION, SOLUTION INTRAVENOUS at 10:03

## 2021-06-23 RX ADMIN — SODIUM CHLORIDE 250 ML: 9 INJECTION, SOLUTION INTRAVENOUS at 10:03

## 2021-06-23 ASSESSMENT — PAIN SCALES - GENERAL: PAINLEVEL: MILD PAIN (3)

## 2021-06-23 NOTE — LETTER
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 She received preoperative radiotherapy and the tumor was resected 3/12/20. She then developed a cough, chest tightness, and scapular pain and imaging revealed lung nodules. Lung biopsy 11/2/20 with sarcoma. She was started on Doxil + Ifos 11/13/20. Had delay in cycle 2 due to COVID dx 12/2/20. CT imaging after cycle 1 with positive response to treatment with decrease in pulmonary nodules. Received cycle 2 12/18/20. Received cycle 3 1/15/21 with dose reduction in Doxil for skin toxicity and mucositis. Received cycle 4 2/15/21.      CT CAP 3/8/21 with positive response to treatment, decreased size of pulmonary mets though one lesion was larger. Did see fibrocystic changes in left breast, recommended breast imaging. Received cycle 5 Doxil + ifos 3/11/21.      Imaging 4/5/21 with overall stable disease except one lung lesion, opted to continue Doxil alone for now and switch to Keytruda in future if ongoing progression.      Started Keytruda 5/10/21. Was seen 5/26/21 cough, SOB, fatigue work-up thought to be 2/2 phrenic nerve involvement of R apical lung mass.     Due to worsening fatigue and cough, repeat CT 6/15/21 concerning for progression vs pseudoprogression. Added Gemzar 6/16/21.    She was admitted 6/17/21-6/20/21 with fever of unknown origin, infectious work-up negative, treated with broad spectrum antibiotics.    She was seen today for hospital follow-up.      Interval History:  Connie was seen today for follow-up. She is doing better since last  week, noting a clear improvement with the antibiotics which she will finish next week. She notes her fatigue and cough are improved, still using cough syrup but less often. She is eating better. She does have slight irritation in her mouth. She also has pruritis on her trunk though no rash. She has pain in her right shoulder/back and has been taking Tylenol for this.    No recurrent fevers. No headaches, dizziness, chest pain, SOB, nausea, vomiting, abdominal pain, bowel or bladder concerns. Edema/neuropathy in feet slightly improved-able to wear normal shoes today. No bleeding issues.     Current Outpatient Medications   Medication Sig Dispense Refill     acetaminophen (TYLENOL) 325 MG tablet Take 325-650 mg by mouth every 6 hours as needed for mild pain       ADVAIR DISKUS 100-50 MCG/DOSE inhaler        albuterol (PROAIR RESPICLICK) 108 (90 Base) MCG/ACT inhaler Inhale 1-2 puffs into the lungs       amoxicillin-clavulanate (AUGMENTIN) 875-125 MG tablet Take 1 tablet by mouth 2 times daily for 7 days 14 tablet 0     benzonatate (TESSALON) 100 MG capsule Take 1-2 capsules (100-200 mg) by mouth 3 times daily as needed for cough 60 capsule 1     cetirizine (ZYRTEC) 10 MG tablet Take 10 mg by mouth daily       gabapentin (NEURONTIN) 300 MG capsule 600 mg morning & afternoon - 900 mg at bedtime 210 capsule 1     guaiFENesin-codeine (ROBITUSSIN AC) 100-10 MG/5ML solution Take 5-10 mLs by mouth every 4 hours as needed for cough 473 mL 1     lidocaine-prilocaine (EMLA) 2.5-2.5 % external cream Apply topically as needed for moderate pain (Patient not taking: Reported on 6/2/2021) 30 g 3     magic mouthwash suspension (diphenhydrAMINE, lidocaine, aluminum-magnesium & simethicone) Swish and swallow 10 mLs in mouth every 6 hours as needed for mouth sores (Patient not taking: Reported on 5/26/2021) 120 mL 3     melatonin (MELATONIN) 1 MG/ML LIQD liquid Take 5 mg by mouth       montelukast (SINGULAIR) 10 MG tablet Take 10 mg by  mouth daily       multivitamin w/minerals (MULTI-VITAMIN) tablet Take 1 tablet by mouth daily       omeprazole (PRILOSEC) 20 MG DR capsule        ondansetron (ZOFRAN) 8 MG tablet Take 1 tablet (8 mg) by mouth every 8 hours as needed for nausea 60 tablet 1     prochlorperazine (COMPAZINE) 10 MG tablet Take 1 tablet (10 mg) by mouth every 6 hours as needed (Nausea/Vomiting) 30 tablet 5     urea (CARMOL) 10 % external cream Apply topically as needed for dry skin 453 g 0     vitamin D3 (CHOLECALCIFEROL) 2000 units (50 mcg) tablet Take 1 tablet by mouth daily       Physical Examination:  /59 (BP Location: Right arm, Patient Position: Sitting, Cuff Size: Adult Large)   Pulse 84   Temp 98.1  F (36.7  C) (Oral)   Resp 16   Wt 95.1 kg (209 lb 11.2 oz)   LMP 03/19/2019   SpO2 99%   BMI 32.84 kg/m    Wt Readings from Last 10 Encounters:   06/20/21 95.2 kg (209 lb 12.8 oz)   06/17/21 96.2 kg (212 lb 1.6 oz)   06/15/21 94.7 kg (208 lb 12.8 oz)   06/02/21 98.4 kg (216 lb 14.4 oz)   05/26/21 97.9 kg (215 lb 12.8 oz)   05/10/21 99.7 kg (219 lb 12.8 oz)   04/13/21 101.6 kg (223 lb 14.4 oz)   03/11/21 102.5 kg (225 lb 14.4 oz)   02/15/21 105.2 kg (232 lb)   01/27/21 105.5 kg (232 lb 8 oz)     Constitutional: Well-appearing female in no acute distress.  Eyes: EOMI, PERRL. No scleral icterus.  ENT: Oral mucosa is moist without lesions or thrush.   Lymphatic: Neck is supple without cervical or supraclavicular lymphadenopathy.   Cardiovascular: Regular rate and rhythm. No murmurs, gallops, or rubs. No peripheral edema.  Respiratory: Clear to auscultation bilaterally. No wheezes or crackles.  Gastrointestinal: Bowel sounds present. Abdomen soft, non-tender.   Neurologic: Cranial nerves II through XII are grossly intact.  Skin: No rashes, petechiae, or bruising noted on exposed skin.    Laboratory Data:  Results for AMADEO WILSON (MRN [id number]) as of 6/25/2021 07:35   6/23/2021 08:38   Sodium 140   Potassium 3.6    Chloride 105   Carbon Dioxide 28   Urea Nitrogen 9   Creatinine 0.66   GFR Estimate >90   GFR Estimate If Black >90   Calcium 9.0   Anion Gap 7   Albumin 2.5 (L)   Protein Total 7.2   Bilirubin Total 0.4   Alkaline Phosphatase 185 (H)   ALT 53 (H)   AST 32   TSH 3.28   Glucose 121 (H)   WBC 3.6 (L)   Hemoglobin 8.9 (L)   Hematocrit 28.4 (L)   Platelet Count 67 (L)   RBC Count 3.06 (L)   MCV 93   MCH 29.1   MCHC 31.3 (L)   RDW 15.3 (H)   Diff Method Manual Differential   % Neutrophils 75.6   % Lymphocytes 15.6   % Monocytes 7.0   % Eosinophils 0.9   % Basophils 0.9   Absolute Neutrophil 2.7   Absolute Lymphocytes 0.6 (L)   Absolute Monocytes 0.3   Absolute Eosinophils 0.0   Absolute Basophils 0.0   Anisocytosis Slight   Poikilocytosis Slight   Platelet Estimate Confirming automated cell count       Assessment and Plan:  1. Onc  Metastatic UPS, was on Doxil + Ifosfamide (5 cycles) with positive response to treatment initially, then due to poor tolerance switched to Doxil alone (1 cycle) but had progression. Started on Keytruda 5/10/21. Due to tumor growth (progression vs pseudoprogression) added Gemzar 6/16/21.    Will get Gemzar day 8 + Keytruda today. Clinically doing better. Labs with thrombocytopenia-discussed with Dr. Lopez and given rapid change in tumors do not want to delay treatment today. Will reduce Gemzar dose to 1100mg for today and then go to 1200mg for cycle 3 per my discussion with Dr. Lopez.     I will see her with C1D3. Plan to keep CT mid July to assess response to treatment.    2. ID  Admitted 6/17/21 for high fevers and hypotension, infectious work-up negative though did improve with broad spectrum antibiotics. Will complete Augmentin this weekend.    Possibly the fevers were related to Gemzar-will prescribe prednisone 20mg x 3 days if she develops flu-like symptoms/low grade temp and asked her to call if she develops true fever.    3. Fatigue  Worked up for adrenal insufficiency-cortisol  WNL, hypothyroidism-TSH minimally elevated and free T4 normal, OH-iron studies OK, anemia-hgb improving,zikrpms-VQ-HQM, troponin negative. Fatigue much better now after antibiotics so possible unknown infection causing symptoms. Will monitor.      4. Heme  Anemia: ACD and chemotherapy, did require pRBC inpatient. Hgb OK today at 8.9. Suspect this will drop after Gemzar today-will set up for possible pRBC and plt next week.    Thrombocytopenia: 2/2 Gemzar. No bleeding issues. Dose reduction as above. Set up for possible plt next week.     Transfuse to keep hgb >8 and plt >10.      5. FEN  Eating better now that she isn't so fatigued. Monitor weight. CMP stable.      6. GI  GERD: continuing Omeprazole and tums, stable.      Diarrhea: Resolved.     Monitor slight LFT elevation.     Salt/soda swishes for slight mucositis.      7. Derm  Hand/foot: 2/2 Doxil, improving now that she is off Doxil.     Prescribed Hydroxyzine PRN for pruritis.      8. Pulm  Asthma: stable, continue inhalers.     COVID: Diagnosed in December, recovered.     Cough/SOB: See work-up from 5/26/21 (negative CT PE, RVP, COVID, NTBNP). Thought to be 2/2 phrenic nerve involvement of tumor. Possibly infectious component given improvement now with antibiotics. Continue Robitussin PRN. Consider rad onc referral pending improvement with Gemzar/Keytruda.      9. 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 Improving.     10. MSK  R shoulder/back pain: Suspect 2/2 cancer, continue Tylenol PRN. Will give Tylenol in infusion today.    45 minutes spent on the date of the encounter doing chart review, review of test results, interpretation of tests, patient visit, documentation and discussion with other provider(s)     Harshal Schuster PA-C  [facility]  [address] [place] [address] [phone]          RUPERT Guaman

## 2021-06-23 NOTE — NURSING NOTE
"Oncology Rooming Note    June 23, 2021 8:44 AM   Richa Ashby is a 56 year old female who presents for:    Chief Complaint   Patient presents with     Port Draw     Labs drawn via port by rn in lab. VS taken.     Oncology Clinic Visit     SARCOMA      Initial Vitals: /59 (BP Location: Right arm, Patient Position: Sitting, Cuff Size: Adult Large)   Pulse 84   Temp 98.1  F (36.7  C) (Oral)   Resp 16   Wt 95.1 kg (209 lb 11.2 oz)   LMP 03/19/2019   SpO2 99%   BMI 32.84 kg/m   Estimated body mass index is 32.84 kg/m  as calculated from the following:    Height as of 6/17/21: 1.702 m (5' 7\").    Weight as of this encounter: 95.1 kg (209 lb 11.2 oz). Body surface area is 2.12 meters squared.  Mild Pain (3) Comment: Data Unavailable   Patient's last menstrual period was 03/19/2019.  Allergies reviewed: Yes  Medications reviewed: Yes    Medications: Medication refills not needed today.  Pharmacy name entered into [employer]:    [id number] - [place], [place] - [address] [place] AT [facility] [place], [place] - [phone]    Clinical concerns: No new concerns today  Harshal montero was NOT notified.      Emiliana Mares            "

## 2021-06-23 NOTE — PROGRESS NOTES
Infusion Nursing Note:  Richa Ashby presents today for D8, C1 gemzar/keytruda.    Patient seen by provider today: Yes: Harshal Schuster.   present during visit today: Not Applicable.    Note: Patient arrives feeling well with no concerns.  Endorses mild shoulder pain which provider is aware of.  Tylenol given with good relief per patient report.    TORB: Harshal EMERY/Leyda Borja RN @ 0940: dose reduced gemzar for thrombocytopenia and give tylenol for shoulder pain.        Intravenous Access:  Implanted Port.    Treatment Conditions:  Lab Results   Component Value Date    HGB 8.9 06/23/2021     Lab Results   Component Value Date    WBC 3.6 06/23/2021      Lab Results   Component Value Date    ANEU 2.7 06/23/2021     Lab Results   Component Value Date    PLT 67 06/23/2021      Lab Results   Component Value Date     06/23/2021                   Lab Results   Component Value Date    POTASSIUM 3.6 06/23/2021           Lab Results   Component Value Date    MAG 2.0 06/20/2021            Lab Results   Component Value Date    CR 0.66 06/23/2021                   Lab Results   Component Value Date    VIKTORIA 9.0 06/23/2021                Lab Results   Component Value Date    BILITOTAL 0.4 06/23/2021           Lab Results   Component Value Date    ALBUMIN 2.5 06/23/2021                    Lab Results   Component Value Date    ALT 53 06/23/2021           Lab Results   Component Value Date    AST 32 06/23/2021       Results reviewed, labs MET treatment parameters, ok to proceed with treatment.      Post Infusion Assessment:  Patient tolerated infusion without incident.  Blood return noted pre and post infusion.  Site patent and intact, free from redness, edema or discomfort.  No evidence of extravasations.  Access discontinued per protocol.       Discharge Plan:   Prescription refills given for prednisone and atarax.  Patient and/or family verbalized understanding of discharge instructions and  all questions answered.  AVS to patient via [employer].  Patient will return 7/14 for next appointment.   Patient discharged in stable condition accompanied by: self.  Departure Mode: Ambulatory.      Lakeshia Ziegler RN

## 2021-06-23 NOTE — Clinical Note
6/23/2021         RE: Richa Ashby  [address]  [address]  [place] [address]        Dear Colleague,    Thank you for referring your patient, Richa Ashby, to the [facility]. Please see a copy of my visit note below.    Oncology/Hematology Visit Note  Jun 23, 2021    Reason for Visit: Follow up of undifferentiated pleomorphic sarcoma     History of Present Illness: Richa Ashby is a 55 year old female with PMH asthma and GERD with undifferentiated pleomorphic sarcoma. 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 She received preoperative radiotherapy and the tumor was resected 3/12/20. She then developed a cough, chest tightness, and scapular pain and imaging revealed lung nodules. Lung biopsy 11/2/20 with sarcoma. She was started on Doxil + Ifos 11/13/20. Had delay in cycle 2 due to COVID dx 12/2/20. CT imaging after cycle 1 with positive response to treatment with decrease in pulmonary nodules. Received cycle 2 12/18/20. Received cycle 3 1/15/21 with dose reduction in Doxil for skin toxicity and mucositis. Received cycle 4 2/15/21.      CT CAP 3/8/21 with positive response to treatment, decreased size of pulmonary mets though one lesion was larger. Did see fibrocystic changes in left breast, recommended breast imaging. Received cycle 5 Doxil + ifos 3/11/21.      Imaging 4/5/21 with overall stable disease except one lung lesion, opted to continue Doxil alone for now and switch to Keytruda in future if ongoing progression.      Started Keytruda 5/10/21. Was seen 5/26/21 cough, SOB, fatigue work-up thought to be 2/2 phrenic nerve involvement of R apical lung mass.     Due to worsening fatigue and cough, repeat CT 6/15/21 concerning for progression vs pseudoprogression. Added Gemzar 6/16/21.    She was admitted 6/17/21-6/20/21 with fever of unknown origin, infectious work-up negative, treated  with broad spectrum antibiotics.    She was seen today for hospital follow-up.      Interval History:  *itch  *mouth  *pain  *fatigue and cough better     Review of Systems:  Patient denies fevers, chills, night sweats, unexplained weight changes, headaches, dizziness, vision or hearing changes, new lumps or bumps, chest pain, shortness of breath, cough, abdominal pain, nausea, vomiting, changes to bowel or bladder, swelling of extremities, bleeding issues, or rash.    Current Outpatient Medications   Medication Sig Dispense Refill     acetaminophen (TYLENOL) 325 MG tablet Take 325-650 mg by mouth every 6 hours as needed for mild pain       ADVAIR DISKUS 100-50 MCG/DOSE inhaler        albuterol (PROAIR RESPICLICK) 108 (90 Base) MCG/ACT inhaler Inhale 1-2 puffs into the lungs       amoxicillin-clavulanate (AUGMENTIN) 875-125 MG tablet Take 1 tablet by mouth 2 times daily for 7 days 14 tablet 0     benzonatate (TESSALON) 100 MG capsule Take 1-2 capsules (100-200 mg) by mouth 3 times daily as needed for cough 60 capsule 1     cetirizine (ZYRTEC) 10 MG tablet Take 10 mg by mouth daily       gabapentin (NEURONTIN) 300 MG capsule 600 mg morning & afternoon - 900 mg at bedtime 210 capsule 1     guaiFENesin-codeine (ROBITUSSIN AC) 100-10 MG/5ML solution Take 5-10 mLs by mouth every 4 hours as needed for cough 473 mL 1     lidocaine-prilocaine (EMLA) 2.5-2.5 % external cream Apply topically as needed for moderate pain (Patient not taking: Reported on 6/2/2021) 30 g 3     magic mouthwash suspension (diphenhydrAMINE, lidocaine, aluminum-magnesium & simethicone) Swish and swallow 10 mLs in mouth every 6 hours as needed for mouth sores (Patient not taking: Reported on 5/26/2021) 120 mL 3     melatonin (MELATONIN) 1 MG/ML LIQD liquid Take 5 mg by mouth       montelukast (SINGULAIR) 10 MG tablet Take 10 mg by mouth daily       multivitamin w/minerals (MULTI-VITAMIN) tablet Take 1 tablet by mouth daily       omeprazole (PRILOSEC)  20 MG DR capsule        ondansetron (ZOFRAN) 8 MG tablet Take 1 tablet (8 mg) by mouth every 8 hours as needed for nausea 60 tablet 1     prochlorperazine (COMPAZINE) 10 MG tablet Take 1 tablet (10 mg) by mouth every 6 hours as needed (Nausea/Vomiting) 30 tablet 5     urea (CARMOL) 10 % external cream Apply topically as needed for dry skin 453 g 0     vitamin D3 (CHOLECALCIFEROL) 2000 units (50 mcg) tablet Take 1 tablet by mouth daily         Physical Examination:  LMP 03/19/2019   Wt Readings from Last 10 Encounters:   06/20/21 95.2 kg (209 lb 12.8 oz)   06/17/21 96.2 kg (212 lb 1.6 oz)   06/15/21 94.7 kg (208 lb 12.8 oz)   06/02/21 98.4 kg (216 lb 14.4 oz)   05/26/21 97.9 kg (215 lb 12.8 oz)   05/10/21 99.7 kg (219 lb 12.8 oz)   04/13/21 101.6 kg (223 lb 14.4 oz)   03/11/21 102.5 kg (225 lb 14.4 oz)   02/15/21 105.2 kg (232 lb)   01/27/21 105.5 kg (232 lb 8 oz)     Constitutional: Well-appearing female in no acute distress.  Eyes: EOMI, PERRL. No scleral icterus.  ENT: Oral mucosa is moist without lesions or thrush.   Lymphatic: Neck is supple without cervical or supraclavicular lymphadenopathy. No axillary lymphadenopathy.  Cardiovascular: Regular rate and rhythm. No murmurs, gallops, or rubs. No peripheral edema.  Respiratory: Clear to auscultation bilaterally. No wheezes or crackles.  Gastrointestinal: Bowel sounds present. Abdomen soft, non-tender. No palpable hepatosplenomegaly or masses.   Neurologic: Cranial nerves II through XII are grossly intact.  Skin: No rashes, petechiae, or bruising noted on exposed skin.    Laboratory Data:        Assessment and Plan:          Harshal Schuster PA-C  [facility] [address] [id number] [phone]        Again, thank you for allowing me to participate in the care of your patient.        Sincerely,        RUPERT Guaman

## 2021-06-23 NOTE — LETTER
June 23, 2021    Richa Ashby  [address]  [address]  [place] [address]      To whom it may concern,    Ms. Ashby is under my care for cancer. Due to complications from her cancer and treatment it is difficult for her to work full time. We recommend she continue to not work the week of 6/21-6/25/21. She is able to return to work 6/28/21 on a limited schedule with reduced hours as tolerated. We will continue to update you on her ability to work pending improvement in her symptoms.    Please contact our clinic with any additional questions.    Thank you,      Harshal Schuster PA-C  [facility]  [address] [place] [address] [phone]

## 2021-06-24 ENCOUNTER — VIRTUAL VISIT (OUTPATIENT)
Dept: ONCOLOGY | Facility: CLINIC | Age: 56
End: 2021-06-24
Attending: INTERNAL MEDICINE
Payer: COMMERCIAL

## 2021-06-24 DIAGNOSIS — F43.21 ADJUSTMENT DISORDER WITH DEPRESSED MOOD: Primary | ICD-10-CM

## 2021-06-24 DIAGNOSIS — F43.21 GRIEF: ICD-10-CM

## 2021-06-24 DIAGNOSIS — Z51.5 ENCOUNTER FOR PALLIATIVE CARE: ICD-10-CM

## 2021-06-24 PROCEDURE — 90832 PSYTX W PT 30 MINUTES: CPT | Mod: GT | Performed by: SOCIAL WORKER

## 2021-06-24 NOTE — PROGRESS NOTES
"Telemedicine Visit: The patient's condition can be safely assessed and treated via synchronous audio and visual telemedicine encounter.      Reason for Telemedicine Visit: covid19 precautions    Originating Site (Patient Location): Patient's home    Distant Site (Provider Location): [facility]: [facility]    Consent:  The patient/guardian has verbally consented to: the potential risks and benefits of telemedicine (video visit) versus in person care; bill my insurance or make self-payment for services provided; and responsibility for payment of non-covered services.     Mode of Communication:  Video Conference via Softdesk    As the provider I attest to compliance with applicable laws and regulations related to telemedicine.      Palliative Care Clinical Social Work Return Appointment    PLEASE NOTE:  THIS IS A MENTAL HEALTH NOTE.  OTHER PROVIDERS VIEWING THIS NOTE SHOULD USE THIS ONLY FOR UNDERSTANDING THE CONTEXT OF THE PATIENT'S EXPERIENCE.  TOPICS DESCRIBED IN THIS NOTE SHOULD NOT BE REFERENCED TO THE PATIENT BY MEDICAL PROVIDERS.    Connie Ashby is a 56 year old woman with diagnosis of sarcoma, seen today via phone for a return psychotherapy appointment to address adjustment disorder  as it relates to coping with illness and treatment.      Mental Status Exam:(List all that apply)      Appearance: Appropriate      Eye Contact: Good       Orientation: Yes, x4      Mood: Normal      Affect: Appropriate      Thought Content: Clear         Thought Form: Logical      Psychomotor Behavior: Normal    Visit themes: communication with family    Adjustment to Illness: Last few weeks have been difficult with 'ups and downs\" including a hospitalization for fever and low blood pressure.  She is home now and recovering.  She is starting the process of leaving her job and taking long term disability benefits.      Mental Health (thoughts, feelings, actions, coping, and symptoms): Lots of grief for thought of " "leaving her work is very meaningful to her and she is very proud of what she has accomplished in her role.  She is able to reflect that \"it feels like the right choice\" but it is a difficult choice to make all the same.  She continues to have good support from her family.  Her children and their spouses are arranging to come out to help with household chores and meal planning.      Helpful activities: time with family    Helpful cognitions: appreciated her oncology provider telling her that most people in her condition are not working full time, this gave her a sense of permission to consider leaving and allowing someone else to finish the work she has done.     Unhelpful and/or triggering or exacerbating factors:     Body-Mind Skills Education:     Relationships: supportive.      End of Life and Advance Care Planning:     Main therapeutic interventions provided this session include:   Provide psychoeducation, Facilitate processing of thoughts and feelings and Facilitate structured problem solving    Plan:  Will return for psychotherapy with palliative care focus in 1 month    Time spent with patient/family:  32minutes (Start 3:30, end 4:06)    Palliative Care Counseling Treatment Plan    Client's Name: Connie Ashby  YOB: 1965    Date: June 24/2021     Initial DSM5 Diagnoses:   Adjustment Disorders  309.9 (F43.20) Unspecified      Date to review plan (90 days usually): 8/21/19    Referral / Collaboration:  .Referral to another professional/service is not indicated at this time.    Anticipated number of sessions to complete episode of care:  10         Treatment Goal(s)  Date Goal Dates  Reviewed Status   5/23/2019   Goal 1:  Client will develop effective strategies for grief / loss.      New   5/23/2019   Objective #1A (Client Action)  Patient will Increase understanding of loss and grief and Identify losses related to illness.    Intervention(s)  Therapist will Provide psychoeducation and Facilitate " processing of thoughts and feelings .    New   5/23/2019   Objective #1B  Patient will Develop strategies for coping with grief/loss and Identify effective coping strategies.    Intervention(s)  Therapist will Provide psychoeducation and Facilitate processing of thoughts and feelings.    New   5/23/2019   Objective #1C  Patient will Effectively communicate with others re loss and grief.    Intervention(s)  Therapist will Provide psychoeducation, Facilitate processing of thoughts and feelings and Facilitate structured problem solving.    New       Date Goal Dates  Reviewed Status   5/23/2019   Goal 2:  Client will demonstrate effective management of adjustment difficulties related to changes in life brought on by cancer diagnosis and treatment .    New   5/23/2019   Objective #2A (Client Action)  Patient will Communicate effectively with family/friends re needs.    Intervention(s) (Therapist Action)  Therapist will Provide psychoeducation, Facilitate processing of thoughts and feelings and Facilitate structured problem solving.    New   5/23/2019   Objective #2B  Patient will Identify effective coping strategies.    Intervention(s)  Therapist will Provide psychoeducation and Facilitate processing of thoughts and feelings.    New   5/23/2019     Objective #2C  Patient will Participate in goals of care discussion.    Intervention(s)  Therapist will Facilitate goals of care discussion.    New       Client has contributed regarding goals and concerns, but has not reviewed the treatment plan. Plan to review at future session.    TEJAS Macias, [facility]  Palliative Care Clinical        DO NOT SEND ANY LETTERS

## 2021-06-25 ENCOUNTER — PATIENT OUTREACH (OUTPATIENT)
Dept: CARE COORDINATION | Facility: CLINIC | Age: 56
End: 2021-06-25

## 2021-06-25 RX ORDER — HEPARIN SODIUM (PORCINE) LOCK FLUSH IV SOLN 100 UNIT/ML 100 UNIT/ML
5 SOLUTION INTRAVENOUS
Status: CANCELLED | OUTPATIENT
Start: 2021-06-25

## 2021-06-25 RX ORDER — HEPARIN SODIUM,PORCINE 10 UNIT/ML
5 VIAL (ML) INTRAVENOUS
Status: CANCELLED | OUTPATIENT
Start: 2021-06-25

## 2021-06-25 NOTE — PROGRESS NOTES
Social Work Follow-Up Encounter Visit  Oncology Clinic    Data/Intervention:  Patient Name:  Richa Ashby  /Age:  1965 (56 year old)    Reason for Follow-Up:  Seeking information about Disability     Collaborated With:    -Patient  -  Resources Provided:  Disability Resources:   https://www.ssa.gov/applyfordisability/  Link for checklist: very helpful to review before starting application  https://www.ssa.gov/hlp/radr/10/fyf421-jwtutrrlv.pdf  Disability linkage line- 0-806-226-1644  Cancer and careers- https://www.cancerandcareers.org/en/at-work/where-to-start/managing-treatment-side-effects  Cancer Triage- https://triagecancer.org/cancer-disability-insurance-resources  Legal Assistance:   Cancer legal Line: free advisement from licensed attorneys 957-085-7202  Disability Rights Legal Center- provides free legal information and resources 3-000-340-2003 or www.disabilityrightslegalcenter.org  [place] disability Law Center 081-154-3479 or www.[employer]jackaid.org/mdlc  [place]  Society 213-607-9507 or www.mylegalaid.org      Assessment:  SW spoke with patient regarding their disability questions. Patient reports they have been working with their human resource department at their current place of employment on utilizing leave benefits. Patient reports they were using a leave option and are presently considering continuing to work, what work would look like I they continued to work, exploring long term disability through their employer and through Social security disability. SW provided validation, supportive listening and brief supportive counseling; and assisted patient in exploring these different options. SW provided information about the return to work program through long term social security disability. SW discussed additional resources that may be helpful to patient as they consider their different options. Patient was appreciative of the resources and agreed to review them and follow up as needed for  additional supports.     Plan:  Previously provided patient/family with writer's contact information and availability.   Patient to review above resources and reach out as needed for on going supports. SW will remain available as needed.    Heavenly LAZARO, [facility]  - Oncology  Phone : [phone]  Pager: [phone]

## 2021-06-29 RX ORDER — HEPARIN SODIUM,PORCINE 10 UNIT/ML
5 VIAL (ML) INTRAVENOUS
Status: CANCELLED | OUTPATIENT
Start: 2021-06-29

## 2021-06-29 RX ORDER — HEPARIN SODIUM (PORCINE) LOCK FLUSH IV SOLN 100 UNIT/ML 100 UNIT/ML
5 SOLUTION INTRAVENOUS
Status: CANCELLED | OUTPATIENT
Start: 2021-06-29

## 2021-06-30 ENCOUNTER — INFUSION THERAPY VISIT (OUTPATIENT)
Dept: ONCOLOGY | Facility: CLINIC | Age: 56
End: 2021-06-30
Attending: INTERNAL MEDICINE
Payer: COMMERCIAL

## 2021-06-30 ENCOUNTER — APPOINTMENT (OUTPATIENT)
Dept: LAB | Facility: CLINIC | Age: 56
End: 2021-06-30
Attending: INTERNAL MEDICINE
Payer: COMMERCIAL

## 2021-06-30 VITALS
WEIGHT: 204 LBS | SYSTOLIC BLOOD PRESSURE: 112 MMHG | DIASTOLIC BLOOD PRESSURE: 72 MMHG | TEMPERATURE: 98.3 F | BODY MASS INDEX: 31.95 KG/M2 | HEART RATE: 78 BPM | RESPIRATION RATE: 16 BRPM | OXYGEN SATURATION: 98 %

## 2021-06-30 DIAGNOSIS — C49.9 SARCOMA (H): ICD-10-CM

## 2021-06-30 DIAGNOSIS — D63.0 ANEMIA IN NEOPLASTIC DISEASE: Primary | ICD-10-CM

## 2021-06-30 DIAGNOSIS — C49.9 SARCOMA (H): Primary | ICD-10-CM

## 2021-06-30 LAB
ABO + RH BLD: NORMAL
ABO + RH BLD: NORMAL
ANISOCYTOSIS BLD QL SMEAR: SLIGHT
BASOPHILS # BLD AUTO: 0 10E9/L (ref 0–0.2)
BASOPHILS NFR BLD AUTO: 0.9 %
BLD GP AB SCN SERPL QL: NORMAL
BLD PROD TYP BPU: NORMAL
BLD UNIT ID BPU: 0
BLOOD BANK CMNT PATIENT-IMP: NORMAL
BLOOD PRODUCT CODE: NORMAL
BPU ID: NORMAL
DACRYOCYTES BLD QL SMEAR: SLIGHT
DIFFERENTIAL METHOD BLD: ABNORMAL
EOSINOPHIL # BLD AUTO: 0 10E9/L (ref 0–0.7)
EOSINOPHIL NFR BLD AUTO: 0.9 %
ERYTHROCYTE [DISTWIDTH] IN BLOOD BY AUTOMATED COUNT: 15.1 % (ref 10–15)
HCT VFR BLD AUTO: 27.8 % (ref 35–47)
HGB BLD-MCNC: 9 G/DL (ref 11.7–15.7)
LYMPHOCYTES # BLD AUTO: 0.6 10E9/L (ref 0.8–5.3)
LYMPHOCYTES NFR BLD AUTO: 30.4 %
MCH RBC QN AUTO: 29.6 PG (ref 26.5–33)
MCHC RBC AUTO-ENTMCNC: 32.4 G/DL (ref 31.5–36.5)
MCV RBC AUTO: 91 FL (ref 78–100)
METAMYELOCYTES # BLD: 0 10E9/L
METAMYELOCYTES NFR BLD MANUAL: 0.9 %
MONOCYTES # BLD AUTO: 0.2 10E9/L (ref 0–1.3)
MONOCYTES NFR BLD AUTO: 10.4 %
NEUTROPHILS # BLD AUTO: 1.2 10E9/L (ref 1.6–8.3)
NEUTROPHILS NFR BLD AUTO: 56.5 %
PLATELET # BLD AUTO: 35 10E9/L (ref 150–450)
PLATELET # BLD EST: ABNORMAL 10*3/UL
POIKILOCYTOSIS BLD QL SMEAR: SLIGHT
POLYCHROMASIA BLD QL SMEAR: SLIGHT
RBC # BLD AUTO: 3.04 10E12/L (ref 3.8–5.2)
SPECIMEN EXP DATE BLD: NORMAL
TRANSFUSION STATUS PATIENT QL: NORMAL
TRANSFUSION STATUS PATIENT QL: NORMAL
WBC # BLD AUTO: 2.1 10E9/L (ref 4–11)

## 2021-06-30 PROCEDURE — 258N000003 HC RX IP 258 OP 636: Performed by: PHYSICIAN ASSISTANT

## 2021-06-30 PROCEDURE — 250N000011 HC RX IP 250 OP 636: Performed by: INTERNAL MEDICINE

## 2021-06-30 PROCEDURE — 96360 HYDRATION IV INFUSION INIT: CPT

## 2021-06-30 PROCEDURE — 86901 BLOOD TYPING SEROLOGIC RH(D): CPT | Performed by: PHYSICIAN ASSISTANT

## 2021-06-30 PROCEDURE — 86850 RBC ANTIBODY SCREEN: CPT | Performed by: PHYSICIAN ASSISTANT

## 2021-06-30 PROCEDURE — 85025 COMPLETE CBC W/AUTO DIFF WBC: CPT | Performed by: PHYSICIAN ASSISTANT

## 2021-06-30 PROCEDURE — 86900 BLOOD TYPING SEROLOGIC ABO: CPT | Performed by: PHYSICIAN ASSISTANT

## 2021-06-30 RX ORDER — HEPARIN SODIUM (PORCINE) LOCK FLUSH IV SOLN 100 UNIT/ML 100 UNIT/ML
5 SOLUTION INTRAVENOUS EVERY 8 HOURS PRN
Status: DISCONTINUED | OUTPATIENT
Start: 2021-06-30 | End: 2021-06-30 | Stop reason: HOSPADM

## 2021-06-30 RX ADMIN — SODIUM CHLORIDE 1000 ML: 9 INJECTION, SOLUTION INTRAVENOUS at 09:48

## 2021-06-30 RX ADMIN — Medication 5 ML: at 10:54

## 2021-06-30 RX ADMIN — Medication 5 ML: at 08:18

## 2021-06-30 ASSESSMENT — PAIN SCALES - GENERAL: PAINLEVEL: NO PAIN (0)

## 2021-06-30 NOTE — PROGRESS NOTES
"Infusion Nursing Note:  Richa Ashby presents today for poss PRBC/plts (not needed) - Add-on IV Fluids.    Patient seen by provider today: No   present during visit today: Not Applicable.    Note: Patient reports feeling \"exhausted\" since yesterday morning. Hgb stable today. Felt slightly off balance this morning when she woke up but she said her ears were plugged too. Denies weakness, chest pain or SOB. Having mild hemorrhoid bleeding but denies active bleeding from other sources. Appetite poor, and would like to get referral to dietician. Has dropped 5lbs in the last week. Also reports work is becoming too much and would like to get on short term disability.    Per EFRAINB RUPERT Guaman/Leyda Borja RN @0942 6/30/21:  - No need for transfusion today  - See if patient would like fluids (has standing orders)  - Will put in nutrition referral  - Message RNCC/MSW to assist with getting STD letter  - Have patient return on Friday for CBC recheck to make sure plts not trending down (will get set up in [place])    Patient updated with plan and verbalized understanding. Pt wishes to get fluids today.    Intravenous Access:  Implanted Port.    Treatment Conditions:  Lab Results   Component Value Date    HGB 9.0 06/30/2021     Lab Results   Component Value Date    WBC 2.1 06/30/2021      Lab Results   Component Value Date    ANEU 1.2 06/30/2021     Lab Results   Component Value Date    PLT 35 06/30/2021      Lab Results   Component Value Date     06/23/2021                   Lab Results   Component Value Date    POTASSIUM 3.6 06/23/2021           Lab Results   Component Value Date    MAG 2.0 06/20/2021            Lab Results   Component Value Date    CR 0.66 06/23/2021                   Lab Results   Component Value Date    VIKTORIA 9.0 06/23/2021                Lab Results   Component Value Date    BILITOTAL 0.4 06/23/2021           Lab Results   Component Value Date    ALBUMIN 2.5 06/23/2021         "            Lab Results   Component Value Date    ALT 53 06/23/2021           Lab Results   Component Value Date    AST 32 06/23/2021       Results reviewed, labs did NOT meet treatment parameters: Hgb >8 and Plts >10.      Post Infusion Assessment:  Patient tolerated infusion without incident.  Blood return noted pre and post infusion.  Site patent and intact, free from redness, edema or discomfort.  Access discontinued per protocol.       Discharge Plan:   Patient declined prescription refills.  Discharge instructions reviewed with: Patient.  Patient and/or family verbalized understanding of discharge instructions and all questions answered.  AVS to patient via PolyPidT.  Patient will return to  for labs 7/2 and then 7/7 for next Gemzar infusion.  Patient discharged in stable condition accompanied by: self.  Departure Mode: Ambulatory.      Leyda Borja RN

## 2021-06-30 NOTE — NURSING NOTE
Chief Complaint   Patient presents with     Port Draw     Labs drawn via PORT by RN in lab. VS taken.      Maximiliano Castro RN

## 2021-07-02 DIAGNOSIS — D63.0 ANEMIA IN NEOPLASTIC DISEASE: ICD-10-CM

## 2021-07-02 LAB
DIFFERENTIAL METHOD BLD: ABNORMAL
EOSINOPHIL # BLD AUTO: 0.1 10E9/L (ref 0–0.7)
EOSINOPHIL NFR BLD AUTO: 4 %
ERYTHROCYTE [DISTWIDTH] IN BLOOD BY AUTOMATED COUNT: 16.2 % (ref 10–15)
HCT VFR BLD AUTO: 28.4 % (ref 35–47)
HGB BLD-MCNC: 9.1 G/DL (ref 11.7–15.7)
LYMPHOCYTES # BLD AUTO: 1.1 10E9/L (ref 0.8–5.3)
LYMPHOCYTES NFR BLD AUTO: 29 %
MCH RBC QN AUTO: 29.5 PG (ref 26.5–33)
MCHC RBC AUTO-ENTMCNC: 32 G/DL (ref 31.5–36.5)
MCV RBC AUTO: 92 FL (ref 78–100)
MONOCYTES # BLD AUTO: 0.3 10E9/L (ref 0–1.3)
MONOCYTES NFR BLD AUTO: 8 %
NEUTROPHILS # BLD AUTO: 2.2 10E9/L (ref 1.6–8.3)
NEUTROPHILS NFR BLD AUTO: 59 %
PLATELET # BLD AUTO: 37 10E9/L (ref 150–450)
PLATELET # BLD EST: ABNORMAL 10*3/UL
RBC # BLD AUTO: 3.08 10E12/L (ref 3.8–5.2)
RBC MORPH BLD: NORMAL
WBC # BLD AUTO: 3.7 10E9/L (ref 4–11)

## 2021-07-02 PROCEDURE — 36591 DRAW BLOOD OFF VENOUS DEVICE: CPT

## 2021-07-02 PROCEDURE — 85025 COMPLETE CBC W/AUTO DIFF WBC: CPT | Performed by: PHYSICIAN ASSISTANT

## 2021-07-02 RX ORDER — HEPARIN SODIUM (PORCINE) LOCK FLUSH IV SOLN 100 UNIT/ML 100 UNIT/ML
5 SOLUTION INTRAVENOUS EVERY 8 HOURS
Status: DISCONTINUED | OUTPATIENT
Start: 2021-07-02 | End: 2021-07-02 | Stop reason: HOSPADM

## 2021-07-02 RX ADMIN — HEPARIN SODIUM (PORCINE) LOCK FLUSH IV SOLN 100 UNIT/ML 5 ML: 100 SOLUTION at 08:36

## 2021-07-02 NOTE — PROGRESS NOTES
Port needle placed for lab draw access, Sterile technique performed and maintained. Patient tolerated procedure well. Tubes drawn in rainbow order: red, green, purple. Gauze dressing placed with use of paper tape.    RN advised patient to leave gauze in place for 20 mins.     Patient demonstrated verbal understanding.     Tiffany Mchugh RN  [facility] On[facility]

## 2021-07-05 ENCOUNTER — TELEPHONE (OUTPATIENT)
Dept: ONCOLOGY | Facility: CLINIC | Age: 56
End: 2021-07-05

## 2021-07-05 NOTE — TELEPHONE ENCOUNTER
STD forms received via patient (MyChart) from [employer].      Forms to be completed and put in folder for provider to approve.    Fax #:  [phone]  Claim: Policy #[id number]    Monica Espinoza MA

## 2021-07-06 ENCOUNTER — VIRTUAL VISIT (OUTPATIENT)
Dept: PALLIATIVE CARE | Facility: CLINIC | Age: 56
End: 2021-07-06
Attending: INTERNAL MEDICINE
Payer: COMMERCIAL

## 2021-07-06 DIAGNOSIS — G62.0 DRUG-INDUCED POLYNEUROPATHY (H): ICD-10-CM

## 2021-07-06 DIAGNOSIS — Z71.89 ADVANCE CARE PLANNING: ICD-10-CM

## 2021-07-06 DIAGNOSIS — C49.9 SARCOMA OF SOFT TISSUE (H): Primary | ICD-10-CM

## 2021-07-06 PROCEDURE — 99214 OFFICE O/P EST MOD 30 MIN: CPT | Mod: 95 | Performed by: INTERNAL MEDICINE

## 2021-07-06 PROCEDURE — 999N001193 HC VIDEO/TELEPHONE VISIT; NO CHARGE

## 2021-07-06 RX ORDER — GABAPENTIN 300 MG/1
300 CAPSULE ORAL 3 TIMES DAILY
Qty: 210 CAPSULE | Refills: 1 | COMMUNITY
Start: 2021-07-06 | End: 2021-07-28

## 2021-07-06 NOTE — LETTER
7/6/2021       RE: Richa Ashby  [address]  [address]  [place] [address]     Dear Colleague,    Thank you for referring your patient[facility]by[facility]. Please see a copy of my visit note below.    Connie is a 56 year old who is being evaluated via a billable video visit.      How would you like to obtain your AVS? MyChart  If the video visit is dropped, the invitation should be resent by: Send to e-mail at: [email or web address]  Will anyone else be joining your video visit? No     Keena Vásquez [facility]          Palliative Care Outpatient Clinic    (This note was transcribed using voice recognition software. While I review and edit the transcription, I may miss errors, and the software sometimes does unexpected capitalizations and formatting that I miss. 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   5/2021 started pembrolizumab.   6/16/21 added gemcitabine due to progression vs pseudoprogression  6/2021 hospitalized with fevers, no clear source     [facility]    Social - Lives with . 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 Son in [place] with his family; Dtr in [facility].     Care Planning - Multiple ACP discussions with us eg 6/16/2021, 7/2021. Completing HCD, well informed about the natural history of her cancer.       History:  History gathered today from: patient, medical chart    Hospitalized for fevers--no clear origin identified.  Otherwise gemcitabine has gone ok, no major side effects. Fevers have resolved on empiric abx. Had a cough and shoulder pain which have improved.        She has an oxycodone Rx but has not  "filled it, feels confident she doesn't need it.    She has decided to take a 90 day leave of absence from her job \"work was no longer fueling me it was draining me.\"   Wants to rest, take care of herself. Feels good about that decision, worries tho about boredom.   Tentatively plans on getting back to work after 90 days but is not yet fully sure.     Weight loss has been an issue. Hoping to focus on eating and meal planning to stabilize her weight.   Dysgeusia--she notes her taste buds are maybe coming back lately ?chemo changes last months. Daughter cooked tasty meals for her on a visit and that's helped. No nausea. Bowels ok.    Mood overall ok.   Sleep good, perhaps too well, takes 2+h nap ~daily.     Gabapentin --she's on 300 tid for neuropathy, has decreased her dose, skipped a dose a couple times and her feet hurt clarifying that it is indeed helpful    ACP:  Sat down with kids and discussed HCD this weekend which was emotional, important; went well; kids and their spouses really engaged with the discussion. Plans on completing HCD formally soon. Working on financial will too.     PE: LMP 03/19/2019    Wt Readings from Last 3 Encounters:   06/30/21 92.5 kg (204 lb)   06/23/21 95.1 kg (209 lb 11.2 oz)   06/20/21 95.2 kg (209 lb 12.8 oz)     Alert NAD  Healthy appearing, hair coming back in  Clear sensorium, full affect  No cough    Data reviewed:  I reviewed recent labs and imaging, my comments:  Cr 0.6, Alb 2.5, Plt 37   CT June--multiple lung masses somewhat enlarged, images personally reviewed     database reviewed: y      Impression & Recommendations:  55 yo with recurrent and metastatic sarcoma    We discussed many supportive care issues as above; nutrition, energy, sleep, mood, neuropathy; as well as ACP as above.  She is doing well and I think the transition to pembro + now gem is improving her qol nicely overall.  She is being realistic about the life threatening nature of her cancer + also making " plans for next summer too and appears to be coping well     40+ minutes spent on the date of the encounter doing chart review, history and exam, patient education & counseling, documentation and other activities as noted above.    Thank you for involving us in the patient's care.   Robert Blanca MD / Palliative Medicine / [facility] gaviria via [facility].          Video-Visit Details    Originating Location (pt. Location): Home    Distant Location (provider location):  Home    Platform used for Video Visit: AmWell      Again, thank you for allowing me to participate in the care of your patient.      Sincerely,    Robert Blanca MD

## 2021-07-06 NOTE — PROGRESS NOTES
"Connie is a 56 year old who is being evaluated via a billable video visit.      How would you like to obtain your AVS? MyChart  If the video visit is dropped, the invitation should be resent by: Send to e-mail at: [email or web address]  Will anyone else be joining your video visit? No     Keena Vásquez [facility]          Palliative Care Outpatient Clinic    (This note was transcribed using voice recognition software. While I review and edit the transcription, I may miss errors, and the software sometimes does unexpected capitalizations and formatting that I miss. 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   5/2021 started pembrolizumab.   6/16/21 added gemcitabine due to progression vs pseudoprogression  6/2021 hospitalized with fevers, no clear source     [facility]    Social - Lives with . 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     Care Planning - Multiple ACP discussions with us eg 6/16/2021, 7/2021. Completing HCD, well informed about the natural history of her cancer.       History:  History gathered today from: patient, medical chart    Hospitalized for fevers--no clear origin identified.  Otherwise gemcitabine has gone ok, no major side effects. Fevers have resolved on empiric abx. Had a cough and shoulder pain which have improved.        She has an oxycodone Rx but has not filled it, feels confident she doesn't need it.    She has decided to take a 90 day leave of absence from her job \"work was no longer fueling me it was draining me.\"   Wants to rest, take care of herself. Feels good about that decision, worries tho about boredom.   Tentatively plans on getting back to work after 90 days but " is not yet fully sure.     Weight loss has been an issue. Hoping to focus on eating and meal planning to stabilize her weight.   Dysgeusia--she notes her taste buds are maybe coming back lately ?chemo changes last months. Daughter cooked tasty meals for her on a visit and that's helped. No nausea. Bowels ok.    Mood overall ok.   Sleep good, perhaps too well, takes 2+h nap ~daily.     Gabapentin --she's on 300 tid for neuropathy, has decreased her dose, skipped a dose a couple times and her feet hurt clarifying that it is indeed helpful    ACP:  Sat down with kids and discussed HCD this weekend which was emotional, important; went well; kids and their spouses really engaged with the discussion. Plans on completing HCD formally soon. Working on financial will too.     PE: LMP 03/19/2019    Wt Readings from Last 3 Encounters:   06/30/21 92.5 kg (204 lb)   06/23/21 95.1 kg (209 lb 11.2 oz)   06/20/21 95.2 kg (209 lb 12.8 oz)     Alert NAD  Healthy appearing, hair coming back in  Clear sensorium, full affect  No cough    Data reviewed:  I reviewed recent labs and imaging, my comments:  Cr 0.6, Alb 2.5, Plt 37   CT June--multiple lung masses somewhat enlarged, images personally reviewed     database reviewed: y      Impression & Recommendations:  57 yo with recurrent and metastatic sarcoma    We discussed many supportive care issues as above; nutrition, energy, sleep, mood, neuropathy; as well as ACP as above.  She is doing well and I think the transition to pembro + now gem is improving her qol nicely overall.  She is being realistic about the life threatening nature of her cancer + also making plans for next summer too and appears to be coping well     40+ minutes spent on the date of the encounter doing chart review, history and exam, patient education & counseling, documentation and other activities as noted above.    Thank you for involving us in the patient's care.   Robert Blanca MD / Palliative Medicine /  Text me via Amcom.          Video-Visit Details    Originating Location (pt. Location): Home    Distant Location (provider location):  Home    Platform used for Video Visit: James

## 2021-07-07 ENCOUNTER — ONCOLOGY VISIT (OUTPATIENT)
Dept: ONCOLOGY | Facility: CLINIC | Age: 56
End: 2021-07-07
Attending: INTERNAL MEDICINE
Payer: COMMERCIAL

## 2021-07-07 ENCOUNTER — APPOINTMENT (OUTPATIENT)
Dept: LAB | Facility: CLINIC | Age: 56
End: 2021-07-07
Attending: INTERNAL MEDICINE
Payer: COMMERCIAL

## 2021-07-07 VITALS
DIASTOLIC BLOOD PRESSURE: 58 MMHG | OXYGEN SATURATION: 98 % | TEMPERATURE: 97.8 F | SYSTOLIC BLOOD PRESSURE: 115 MMHG | BODY MASS INDEX: 32.06 KG/M2 | HEART RATE: 83 BPM | WEIGHT: 204.7 LBS | RESPIRATION RATE: 16 BRPM

## 2021-07-07 DIAGNOSIS — D63.0 ANEMIA IN NEOPLASTIC DISEASE: ICD-10-CM

## 2021-07-07 DIAGNOSIS — C49.9 SARCOMA (H): ICD-10-CM

## 2021-07-07 DIAGNOSIS — C78.00 MALIGNANT NEOPLASM METASTATIC TO LUNG, UNSPECIFIED LATERALITY (H): Primary | ICD-10-CM

## 2021-07-07 DIAGNOSIS — C78.00 MALIGNANT NEOPLASM METASTATIC TO LUNG, UNSPECIFIED LATERALITY (H): ICD-10-CM

## 2021-07-07 DIAGNOSIS — C49.9 SARCOMA OF SOFT TISSUE (H): Primary | ICD-10-CM

## 2021-07-07 DIAGNOSIS — C49.9 SARCOMA OF SOFT TISSUE (H): ICD-10-CM

## 2021-07-07 DIAGNOSIS — L29.9 GENERALIZED PRURITUS: ICD-10-CM

## 2021-07-07 LAB
ALBUMIN SERPL-MCNC: 3.2 G/DL (ref 3.4–5)
ALP SERPL-CCNC: 127 U/L (ref 40–150)
ALT SERPL W P-5'-P-CCNC: 18 U/L (ref 0–50)
ANION GAP SERPL CALCULATED.3IONS-SCNC: 7 MMOL/L (ref 3–14)
ANISOCYTOSIS BLD QL SMEAR: SLIGHT
AST SERPL W P-5'-P-CCNC: 10 U/L (ref 0–45)
BASOPHILS # BLD AUTO: 0 10E9/L (ref 0–0.2)
BASOPHILS NFR BLD AUTO: 0.9 %
BILIRUB SERPL-MCNC: 0.5 MG/DL (ref 0.2–1.3)
BUN SERPL-MCNC: 9 MG/DL (ref 7–30)
CALCIUM SERPL-MCNC: 9.1 MG/DL (ref 8.5–10.1)
CHLORIDE SERPL-SCNC: 106 MMOL/L (ref 94–109)
CO2 SERPL-SCNC: 26 MMOL/L (ref 20–32)
CREAT SERPL-MCNC: 0.67 MG/DL (ref 0.52–1.04)
DACRYOCYTES BLD QL SMEAR: SLIGHT
DIFFERENTIAL METHOD BLD: ABNORMAL
EOSINOPHIL # BLD AUTO: 0.2 10E9/L (ref 0–0.7)
EOSINOPHIL NFR BLD AUTO: 5.2 %
ERYTHROCYTE [DISTWIDTH] IN BLOOD BY AUTOMATED COUNT: 17.5 % (ref 10–15)
GFR SERPL CREATININE-BSD FRML MDRD: >90 ML/MIN/{1.73_M2}
GLUCOSE SERPL-MCNC: 91 MG/DL (ref 70–99)
HCT VFR BLD AUTO: 29.3 % (ref 35–47)
HGB BLD-MCNC: 9.2 G/DL (ref 11.7–15.7)
LYMPHOCYTES # BLD AUTO: 0.8 10E9/L (ref 0.8–5.3)
LYMPHOCYTES NFR BLD AUTO: 18.3 %
MCH RBC QN AUTO: 29 PG (ref 26.5–33)
MCHC RBC AUTO-ENTMCNC: 31.4 G/DL (ref 31.5–36.5)
MCV RBC AUTO: 92 FL (ref 78–100)
MONOCYTES # BLD AUTO: 0.4 10E9/L (ref 0–1.3)
MONOCYTES NFR BLD AUTO: 10.4 %
NEUTROPHILS # BLD AUTO: 2.8 10E9/L (ref 1.6–8.3)
NEUTROPHILS NFR BLD AUTO: 65.2 %
PLATELET # BLD AUTO: 148 10E9/L (ref 150–450)
PLATELET # BLD EST: ABNORMAL 10*3/UL
POIKILOCYTOSIS BLD QL SMEAR: SLIGHT
POLYCHROMASIA BLD QL SMEAR: SLIGHT
POTASSIUM SERPL-SCNC: 3.9 MMOL/L (ref 3.4–5.3)
PROT SERPL-MCNC: 7.3 G/DL (ref 6.8–8.8)
RBC # BLD AUTO: 3.17 10E12/L (ref 3.8–5.2)
SODIUM SERPL-SCNC: 140 MMOL/L (ref 133–144)
WBC # BLD AUTO: 4.3 10E9/L (ref 4–11)

## 2021-07-07 PROCEDURE — 250N000011 HC RX IP 250 OP 636: Performed by: PHYSICIAN ASSISTANT

## 2021-07-07 PROCEDURE — 85025 COMPLETE CBC W/AUTO DIFF WBC: CPT | Performed by: INTERNAL MEDICINE

## 2021-07-07 PROCEDURE — 258N000003 HC RX IP 258 OP 636: Performed by: PHYSICIAN ASSISTANT

## 2021-07-07 PROCEDURE — 96415 CHEMO IV INFUSION ADDL HR: CPT

## 2021-07-07 PROCEDURE — 96375 TX/PRO/DX INJ NEW DRUG ADDON: CPT

## 2021-07-07 PROCEDURE — 80053 COMPREHEN METABOLIC PANEL: CPT | Performed by: INTERNAL MEDICINE

## 2021-07-07 PROCEDURE — 99214 OFFICE O/P EST MOD 30 MIN: CPT | Performed by: PHYSICIAN ASSISTANT

## 2021-07-07 PROCEDURE — 250N000011 HC RX IP 250 OP 636: Performed by: INTERNAL MEDICINE

## 2021-07-07 PROCEDURE — 96413 CHEMO IV INFUSION 1 HR: CPT

## 2021-07-07 PROCEDURE — G0463 HOSPITAL OUTPT CLINIC VISIT: HCPCS

## 2021-07-07 RX ORDER — GRANISETRON HYDROCHLORIDE 1 MG/ML
1 INJECTION INTRAVENOUS ONCE
Status: COMPLETED | OUTPATIENT
Start: 2021-07-07 | End: 2021-07-07

## 2021-07-07 RX ORDER — HEPARIN SODIUM,PORCINE 10 UNIT/ML
5 VIAL (ML) INTRAVENOUS
Status: CANCELLED | OUTPATIENT
Start: 2021-07-07

## 2021-07-07 RX ORDER — GRANISETRON HYDROCHLORIDE 1 MG/ML
1 INJECTION INTRAVENOUS ONCE
Status: CANCELLED | OUTPATIENT
Start: 2021-07-14 | End: 2021-07-14

## 2021-07-07 RX ORDER — ALBUTEROL SULFATE 90 UG/1
1-2 AEROSOL, METERED RESPIRATORY (INHALATION)
Status: CANCELLED
Start: 2021-07-07

## 2021-07-07 RX ORDER — HEPARIN SODIUM (PORCINE) LOCK FLUSH IV SOLN 100 UNIT/ML 100 UNIT/ML
5 SOLUTION INTRAVENOUS
Status: DISCONTINUED | OUTPATIENT
Start: 2021-07-07 | End: 2021-07-07 | Stop reason: HOSPADM

## 2021-07-07 RX ORDER — GRANISETRON HYDROCHLORIDE 1 MG/ML
1 INJECTION INTRAVENOUS ONCE
Status: CANCELLED | OUTPATIENT
Start: 2021-07-07 | End: 2021-07-07

## 2021-07-07 RX ORDER — HEPARIN SODIUM (PORCINE) LOCK FLUSH IV SOLN 100 UNIT/ML 100 UNIT/ML
5 SOLUTION INTRAVENOUS
Status: CANCELLED | OUTPATIENT
Start: 2021-07-14

## 2021-07-07 RX ORDER — METHYLPREDNISOLONE SODIUM SUCCINATE 125 MG/2ML
125 INJECTION, POWDER, LYOPHILIZED, FOR SOLUTION INTRAMUSCULAR; INTRAVENOUS
Status: CANCELLED
Start: 2021-07-07

## 2021-07-07 RX ORDER — LORAZEPAM 2 MG/ML
0.5 INJECTION INTRAMUSCULAR EVERY 4 HOURS PRN
Status: CANCELLED
Start: 2021-07-14

## 2021-07-07 RX ORDER — NALOXONE HYDROCHLORIDE 0.4 MG/ML
0.2 INJECTION, SOLUTION INTRAMUSCULAR; INTRAVENOUS; SUBCUTANEOUS
Status: CANCELLED | OUTPATIENT
Start: 2021-07-07

## 2021-07-07 RX ORDER — HEPARIN SODIUM (PORCINE) LOCK FLUSH IV SOLN 100 UNIT/ML 100 UNIT/ML
5 SOLUTION INTRAVENOUS
Status: COMPLETED | OUTPATIENT
Start: 2021-07-07 | End: 2021-07-07

## 2021-07-07 RX ORDER — LORAZEPAM 2 MG/ML
0.5 INJECTION INTRAMUSCULAR EVERY 4 HOURS PRN
Status: CANCELLED
Start: 2021-07-07

## 2021-07-07 RX ORDER — HEPARIN SODIUM,PORCINE 10 UNIT/ML
5 VIAL (ML) INTRAVENOUS
Status: CANCELLED | OUTPATIENT
Start: 2021-07-14

## 2021-07-07 RX ORDER — HEPARIN SODIUM (PORCINE) LOCK FLUSH IV SOLN 100 UNIT/ML 100 UNIT/ML
5 SOLUTION INTRAVENOUS
Status: CANCELLED | OUTPATIENT
Start: 2021-07-07

## 2021-07-07 RX ORDER — MEPERIDINE HYDROCHLORIDE 25 MG/ML
25 INJECTION INTRAMUSCULAR; INTRAVENOUS; SUBCUTANEOUS EVERY 30 MIN PRN
Status: CANCELLED | OUTPATIENT
Start: 2021-07-14

## 2021-07-07 RX ORDER — MEPERIDINE HYDROCHLORIDE 25 MG/ML
25 INJECTION INTRAMUSCULAR; INTRAVENOUS; SUBCUTANEOUS EVERY 30 MIN PRN
Status: CANCELLED | OUTPATIENT
Start: 2021-07-07

## 2021-07-07 RX ORDER — METHYLPREDNISOLONE SODIUM SUCCINATE 125 MG/2ML
125 INJECTION, POWDER, LYOPHILIZED, FOR SOLUTION INTRAMUSCULAR; INTRAVENOUS
Status: CANCELLED
Start: 2021-07-14

## 2021-07-07 RX ORDER — ALBUTEROL SULFATE 0.83 MG/ML
2.5 SOLUTION RESPIRATORY (INHALATION)
Status: CANCELLED | OUTPATIENT
Start: 2021-07-14

## 2021-07-07 RX ORDER — DIPHENHYDRAMINE HYDROCHLORIDE 50 MG/ML
50 INJECTION INTRAMUSCULAR; INTRAVENOUS
Status: CANCELLED
Start: 2021-07-14

## 2021-07-07 RX ORDER — DIPHENHYDRAMINE HYDROCHLORIDE 50 MG/ML
50 INJECTION INTRAMUSCULAR; INTRAVENOUS
Status: CANCELLED
Start: 2021-07-07

## 2021-07-07 RX ORDER — HYDROXYZINE HYDROCHLORIDE 25 MG/1
25 TABLET, FILM COATED ORAL 3 TIMES DAILY PRN
Qty: 30 TABLET | Refills: 1 | COMMUNITY
Start: 2021-07-07 | End: 2021-10-06

## 2021-07-07 RX ORDER — ACETAMINOPHEN 325 MG/1
975 TABLET ORAL ONCE
Status: CANCELLED | OUTPATIENT
Start: 2021-07-14 | End: 2021-07-14

## 2021-07-07 RX ORDER — EPINEPHRINE 1 MG/ML
0.3 INJECTION, SOLUTION INTRAMUSCULAR; SUBCUTANEOUS EVERY 5 MIN PRN
Status: CANCELLED | OUTPATIENT
Start: 2021-07-07

## 2021-07-07 RX ORDER — EPINEPHRINE 1 MG/ML
0.3 INJECTION, SOLUTION INTRAMUSCULAR; SUBCUTANEOUS EVERY 5 MIN PRN
Status: CANCELLED | OUTPATIENT
Start: 2021-07-14

## 2021-07-07 RX ORDER — ALBUTEROL SULFATE 5 MG/ML
2.5 SOLUTION RESPIRATORY (INHALATION)
Status: CANCELLED | OUTPATIENT
Start: 2021-07-07

## 2021-07-07 RX ORDER — NALOXONE HYDROCHLORIDE 0.4 MG/ML
0.2 INJECTION, SOLUTION INTRAMUSCULAR; INTRAVENOUS; SUBCUTANEOUS
Status: CANCELLED | OUTPATIENT
Start: 2021-07-14

## 2021-07-07 RX ORDER — PREDNISONE 10 MG/1
10 TABLET ORAL DAILY
Qty: 3 TABLET | Refills: 0 | Status: SHIPPED | OUTPATIENT
Start: 2021-07-07 | End: 2021-07-10

## 2021-07-07 RX ORDER — ALBUTEROL SULFATE 90 UG/1
1-2 AEROSOL, METERED RESPIRATORY (INHALATION)
Status: CANCELLED
Start: 2021-07-14

## 2021-07-07 RX ADMIN — Medication 5 ML: at 15:00

## 2021-07-07 RX ADMIN — Medication 5 ML: at 11:22

## 2021-07-07 RX ADMIN — SODIUM CHLORIDE 250 ML: 9 INJECTION, SOLUTION INTRAVENOUS at 13:03

## 2021-07-07 RX ADMIN — GEMCITABINE 1200 MG: 38 INJECTION, SOLUTION INTRAVENOUS at 13:27

## 2021-07-07 RX ADMIN — GRANISETRON HYDROCHLORIDE 1 MG: 1 INJECTION INTRAVENOUS at 13:03

## 2021-07-07 ASSESSMENT — PAIN SCALES - GENERAL: PAINLEVEL: NO PAIN (1)

## 2021-07-07 NOTE — PROGRESS NOTES
Infusion Nursing Note:  Richa Ashby presents today for Cycle 2, Day 1 Gemcitabine.    Patient seen by provider today: Yes: RUPERT Murphy   present during visit today: Not Applicable.    Note: Pt assessed prior to infusion appointment. Okay per RUPERT Murphy to proceed with treatment today. Gemcitabine dose increased to 1200mg today since platelet count improved.    Intravenous Access:  Implanted Port.    Treatment Conditions:  Lab Results   Component Value Date    HGB 9.2 07/07/2021     Lab Results   Component Value Date    WBC 4.3 07/07/2021      Lab Results   Component Value Date    ANEU 2.8 07/07/2021     Lab Results   Component Value Date     07/07/2021      Lab Results   Component Value Date     07/07/2021                   Lab Results   Component Value Date    POTASSIUM 3.9 07/07/2021           Lab Results   Component Value Date    MAG 2.0 06/20/2021            Lab Results   Component Value Date    CR 0.67 07/07/2021                   Lab Results   Component Value Date    VIKTORIA 9.1 07/07/2021                Lab Results   Component Value Date    BILITOTAL 0.5 07/07/2021           Lab Results   Component Value Date    ALBUMIN 3.2 07/07/2021                    Lab Results   Component Value Date    ALT 18 07/07/2021           Lab Results   Component Value Date    AST 10 07/07/2021     Results reviewed, labs MET treatment parameters, ok to proceed with treatment.    Post Infusion Assessment:  Patient tolerated infusion without incident.  Blood return noted pre and post infusion.  Site patent and intact, free from redness, edema or discomfort.  No evidence of extravasations.  Access discontinued per protocol.     Discharge Plan:   Prescription refills given for Prednisone.  AVS to patient via [employer] Bingo[employer].  Patient will return 7/14/21 for next appointment.   Patient discharged in stable condition accompanied by: self.  Departure Mode: Ambulatory.      Shannan Barrett  RN

## 2021-07-07 NOTE — LETTER
7/7/2021         RE: Richa Ashby  [address]  [facility] [address]      Oncology/Hematology Visit Note  Jul 7, 2021    Reason for Visit: Follow up of undifferentiated pleomorphic sarcoma     History of Present Illness: Richa Ashby is a 55 year old female with PMH asthma and GERD with undifferentiated pleomorphic sarcoma. 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 She received preoperative radiotherapy and the tumor was resected 3/12/20. She then developed a cough, chest tightness, and scapular pain and imaging revealed lung nodules. Lung biopsy 11/2/20 with sarcoma. She was started on Doxil + Ifos 11/13/20. Had delay in cycle 2 due to COVID dx 12/2/20. CT imaging after cycle 1 with positive response to treatment with decrease in pulmonary nodules. Received cycle 2 12/18/20. Received cycle 3 1/15/21 with dose reduction in Doxil for skin toxicity and mucositis. Received cycle 4 2/15/21.      CT CAP 3/8/21 with positive response to treatment, decreased size of pulmonary mets though one lesion was larger. Did see fibrocystic changes in left breast, recommended breast imaging. Received cycle 5 Doxil + ifos 3/11/21.      Imaging 4/5/21 with overall stable disease except one lung lesion, opted to continue Doxil alone for now and switch to Keytruda in future if ongoing progression.      Started Keytruda 5/10/21. Was seen 5/26/21 cough, SOB, fatigue work-up thought to be 2/2 phrenic nerve involvement of R apical lung mass.      Due to worsening fatigue and cough, repeat CT 6/15/21 concerning for progression vs pseudoprogression. Added Gemzar 6/16/21.     She was admitted 6/17/21-6/20/21 with fever of unknown origin, infectious work-up negative, treated with broad spectrum antibiotics.     She clinically improved on abx. Was able to receive day 8 Gemzar (dose reduced for thrombocytopenia) + Keytruda 6/23/21.     She returns  today for oncology follow-up.     Interval History:  Connie returns today for oncology follow-up. She overall is feeling well. Her fatigue is better, though she did have to take the 3 days of prednisone after her last treatment. She decided to stop working and thinks this has helped her fatigue, though she is hopeful she could go back to work in the future. Her cough is better as is her pleuritic right back pain. She does have a new focal area of pain on her side lower right rib, unclear what caused this. She isn't needing any pain medications. She denies any recurrent fevers. No headaches or dizziness. No chest pain or SOB. Minimal nausea, no vomiting, no bowel or urinary concerns-bowels are now normal for her. Edema is improved. Feet are  but overall neuropathy seems better. Her mood is bright. She has had nice times with her family recently.     Current Outpatient Medications   Medication Sig Dispense Refill     acetaminophen (TYLENOL) 325 MG tablet Take 325-650 mg by mouth every 6 hours as needed for mild pain       ADVAIR DISKUS 100-50 MCG/DOSE inhaler        albuterol (PROAIR RESPICLICK) 108 (90 Base) MCG/ACT inhaler Inhale 1-2 puffs into the lungs       cetirizine (ZYRTEC) 10 MG tablet Take 10 mg by mouth daily       gabapentin (NEURONTIN) 300 MG capsule Take 1 capsule (300 mg) by mouth 3 times daily 210 capsule 1     lidocaine-prilocaine (EMLA) 2.5-2.5 % external cream Apply topically as needed for moderate pain (Patient not taking: Reported on 6/2/2021) 30 g 3     melatonin (MELATONIN) 1 MG/ML LIQD liquid Take 5 mg by mouth       montelukast (SINGULAIR) 10 MG tablet Take 10 mg by mouth daily       multivitamin w/minerals (MULTI-VITAMIN) tablet Take 1 tablet by mouth daily       omeprazole (PRILOSEC) 20 MG DR capsule        ondansetron (ZOFRAN) 8 MG tablet Take 1 tablet (8 mg) by mouth every 8 hours as needed for nausea 60 tablet 1     prochlorperazine (COMPAZINE) 10 MG tablet Take 1 tablet (10  mg) by mouth every 6 hours as needed (Nausea/Vomiting) 30 tablet 5     vitamin D3 (CHOLECALCIFEROL) 2000 units (50 mcg) tablet Take 1 tablet by mouth daily         Physical Examination:  /58 (BP Location: Right arm, Patient Position: Sitting, Cuff Size: Adult Large)   Pulse 83   Temp 97.8  F (36.6  C) (Tympanic)   Resp 16   Wt 92.9 kg (204 lb 11.2 oz)   LMP 03/19/2019   SpO2 98%   BMI 32.06 kg/m    Wt Readings from Last 10 Encounters:   06/30/21 92.5 kg (204 lb)   06/23/21 95.1 kg (209 lb 11.2 oz)   06/20/21 95.2 kg (209 lb 12.8 oz)   06/17/21 96.2 kg (212 lb 1.6 oz)   06/15/21 94.7 kg (208 lb 12.8 oz)   06/02/21 98.4 kg (216 lb 14.4 oz)   05/26/21 97.9 kg (215 lb 12.8 oz)   05/10/21 99.7 kg (219 lb 12.8 oz)   04/13/21 101.6 kg (223 lb 14.4 oz)   03/11/21 102.5 kg (225 lb 14.4 oz)     Constitutional: Well-appearing female in no acute distress.  Eyes: EOMI, PERRL. No scleral icterus.  ENT: Oral mucosa is moist without lesions or thrush.   Lymphatic: Neck is supple without cervical or supraclavicular lymphadenopathy.   Cardiovascular: Regular rate and rhythm. No murmurs, gallops, or rubs. No peripheral edema.  Respiratory: Clear to auscultation bilaterally. No wheezes or crackles.  Gastrointestinal: Bowel sounds present. Abdomen soft, non-tender.   Neurologic: Cranial nerves II through XII are grossly intact.  Skin: No rashes, petechiae, or bruising noted on exposed skin.    Laboratory Data:  Results for STEVEAMADEO (MRN [id number]) as of 7/8/2021 15:28   7/7/2021 11:27   Sodium 140   Potassium 3.9   Chloride 106   Carbon Dioxide 26   Urea Nitrogen 9   Creatinine 0.67   GFR Estimate >90   GFR Estimate If Black >90   Calcium 9.1   Anion Gap 7   Albumin 3.2 (L)   Protein Total 7.3   Bilirubin Total 0.5   Alkaline Phosphatase 127   ALT 18   AST 10   Glucose 91   WBC 4.3   Hemoglobin 9.2 (L)   Hematocrit 29.3 (L)   Platelet Count 148 (L)   RBC Count 3.17 (L)   MCV 92   MCH 29.0   MCHC 31.4 (L)   RDW 17.5  (H)   Diff Method Manual Differential   % Neutrophils 65.2   % Lymphocytes 18.3   % Monocytes 10.4   % Eosinophils 5.2   % Basophils 0.9   Absolute Neutrophil 2.8   Absolute Lymphocytes 0.8   Absolute Monocytes 0.4   Absolute Eosinophils 0.2   Absolute Basophils 0.0   Anisocytosis Slight   Poikilocytosis Slight   Polychromasia Slight   Teardrop Cells Slight   Platelet Estimate Confirming automated cell count       Assessment and Plan:  1. Onc  Metastatic UPS, was on Doxil + Ifosfamide (5 cycles) with positive response to treatment initially, then due to poor tolerance switched to Doxil alone (1 cycle) but had progression. Started on Keytruda 5/10/21. Due to tumor growth (progression vs pseudoprogression) added Gemzar 6/16/21.     Due for cycle 2 today. Doing the best I have seen her in a while. Plt improved-per prior discussion with Dr. Lopez will do 1200mg Gemzar for cycle 2. Will get Keytruda next week in addition to Gemzar for day 8.    Plan to do CT next week to assess for response-keeping close imaging follow-up due to rapidly changing tumor recently.      2. ID  Admitted 6/17/21 for high fevers and hypotension, infectious work-up negative though did improve with broad spectrum antibiotics. No recurrent infectious symptoms.     3. Fatigue  Worked up for adrenal insufficiency-cortisol WNL, hypothyroidism-TSH minimally elevated and free T4 normal, OH-iron studies OK, anemia-hgb improving,[id number], troponin negative. Fatigue much better now after antibiotics so possible unknown infection causing symptoms.     Will keep low dose prednisone 10mg x 3 days this dose of Gemzar and if continues to do well can stop with future doses.    Agree that break from work is necessary and will provide any needed paperwork. If she improves in future could return to work as tolerated.     4. Heme  Anemia: ACD and chemotherapy, did require pRBC inpatient. Hgb improved to 9.2, MOnitor.      Thrombocytopenia: 2/2 Gemzar. No  bleeding issues. Improved today. Dose adjustment as above. Monitor each clinic visit.      Transfuse to keep hgb >8 and plt >10.      5. FEN  Eating better now that she isn't so fatigued. Monitor weight. CMP stable.      6. GI  GERD: continuing Omeprazole and tums, stable.      Diarrhea: Resolved.      LFT WNL.     7. Derm  Hand/foot: 2/2 Doxil, improving now that she is off Doxil.      Previously prescribed Hydroxyzine PRN for pruritis-improved.      8. Pulm  Asthma: stable, continue inhalers.     COVID: Diagnosed in December, recovered.     Cough/SOB: See work-up from 5/26/21 (negative CT PE, RVP, COVID, NTBNP). Thought to be 2/2 phrenic nerve involvement of tumor. Possibly infectious component given improvement with antibiotics. Symptoms much improved currently. Continue Robitussin PRN. Consider rad onc referral pending improvement with Gemzar/Keytruda.      9. 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 Improving.     10. MSK  R shoulder/back pain: Suspect 2/2 cancer, improving which is a good sign. Has Tylenol PRN.    Focal R rib pain unclear etiology-reviewed recent CT images and does have tumor in this area but will be able to evaluate on imaging next week.     30 minutes spent on the date of the encounter doing chart review, review of test results, interpretation of tests, patient visit and documentation     Harshal Schuster PA-C  [facility]  [address] [place] [id number] [phone]          RUPERT Guaman

## 2021-07-07 NOTE — NURSING NOTE
"Oncology Rooming Note    July 7, 2021 11:55 AM   Richa Ashby is a 56 year old female who presents for:    Chief Complaint   Patient presents with     Port Draw     labs drawn from port by rn.  vs taken     Oncology Clinic Visit     sarcoma     Initial Vitals: /58 (BP Location: Right arm, Patient Position: Sitting, Cuff Size: Adult Large)   Pulse 83   Temp 97.8  F (36.6  C) (Tympanic)   Resp 16   Wt 92.9 kg (204 lb 11.2 oz)   LMP 03/19/2019   SpO2 98%   BMI 32.06 kg/m   Estimated body mass index is 32.06 kg/m  as calculated from the following:    Height as of 6/17/21: 1.702 m (5' 7\").    Weight as of this encounter: 92.9 kg (204 lb 11.2 oz). Body surface area is 2.1 meters squared.  No Pain (1) Comment: Data Unavailable   Patient's last menstrual period was 03/19/2019.  Allergies reviewed: Yes  Medications reviewed: Yes    Medications: Medication refills not needed today.  Pharmacy name entered into [employer]:    [id number] - [place], [place] - [address] [place] AT [facility] [place], [place] - [phone]    Clinical concerns: right sided rib pain since Sunday, Tylenol helps. Denies injury.       Flaca Alatorre CMA            "

## 2021-07-07 NOTE — Clinical Note
7/7/2021         RE: Richa Ashby  [address]  [address]  [facility] [address]        Dear Colleague,    Thank you for referring your patient, Richa Ashby, to the [facility]. Please see a copy of my visit note below.    Oncology/Hematology Visit Note  Jul 7, 2021    Reason for Visit: Follow up of undifferentiated pleomorphic sarcoma     History of Present Illness: Richa Ashby is a 55 year old female with PMH asthma and GERD with undifferentiated pleomorphic sarcoma. 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 She received preoperative radiotherapy and the tumor was resected 3/12/20. She then developed a cough, chest tightness, and scapular pain and imaging revealed lung nodules. Lung biopsy 11/2/20 with sarcoma. She was started on Doxil + Ifos 11/13/20. Had delay in cycle 2 due to COVID dx 12/2/20. CT imaging after cycle 1 with positive response to treatment with decrease in pulmonary nodules. Received cycle 2 12/18/20. Received cycle 3 1/15/21 with dose reduction in Doxil for skin toxicity and mucositis. Received cycle 4 2/15/21.      CT CAP 3/8/21 with positive response to treatment, decreased size of pulmonary mets though one lesion was larger. Did see fibrocystic changes in left breast, recommended breast imaging. Received cycle 5 Doxil + ifos 3/11/21.      Imaging 4/5/21 with overall stable disease except one lung lesion, opted to continue Doxil alone for now and switch to Keytruda in future if ongoing progression.      Started Keytruda 5/10/21. Was seen 5/26/21 cough, SOB, fatigue work-up thought to be 2/2 phrenic nerve involvement of R apical lung mass.      Due to worsening fatigue and cough, repeat CT 6/15/21 concerning for progression vs pseudoprogression. Added Gemzar 6/16/21.     She was admitted 6/17/21-6/20/21 with fever of unknown origin, infectious work-up negative, treated  with broad spectrum antibiotics.     She clinically improved on abx. Was able to receive day 8 Gemzar (dose reduced for thrombocytopenia) + Keytruda 6/23/21.     She returns today for oncology follow-up.     Interval History:      Review of Systems:  Patient denies fevers, chills, night sweats, unexplained weight changes, headaches, dizziness, vision or hearing changes, new lumps or bumps, chest pain, shortness of breath, cough, abdominal pain, nausea, vomiting, changes to bowel or bladder, swelling of extremities, bleeding issues, or rash.    Current Outpatient Medications   Medication Sig Dispense Refill     acetaminophen (TYLENOL) 325 MG tablet Take 325-650 mg by mouth every 6 hours as needed for mild pain       ADVAIR DISKUS 100-50 MCG/DOSE inhaler        albuterol (PROAIR RESPICLICK) 108 (90 Base) MCG/ACT inhaler Inhale 1-2 puffs into the lungs       cetirizine (ZYRTEC) 10 MG tablet Take 10 mg by mouth daily       gabapentin (NEURONTIN) 300 MG capsule Take 1 capsule (300 mg) by mouth 3 times daily 210 capsule 1     lidocaine-prilocaine (EMLA) 2.5-2.5 % external cream Apply topically as needed for moderate pain (Patient not taking: Reported on 6/2/2021) 30 g 3     melatonin (MELATONIN) 1 MG/ML LIQD liquid Take 5 mg by mouth       montelukast (SINGULAIR) 10 MG tablet Take 10 mg by mouth daily       multivitamin w/minerals (MULTI-VITAMIN) tablet Take 1 tablet by mouth daily       omeprazole (PRILOSEC) 20 MG DR capsule        ondansetron (ZOFRAN) 8 MG tablet Take 1 tablet (8 mg) by mouth every 8 hours as needed for nausea 60 tablet 1     prochlorperazine (COMPAZINE) 10 MG tablet Take 1 tablet (10 mg) by mouth every 6 hours as needed (Nausea/Vomiting) 30 tablet 5     vitamin D3 (CHOLECALCIFEROL) 2000 units (50 mcg) tablet Take 1 tablet by mouth daily         Physical Examination:  LMP 03/19/2019   Wt Readings from Last 10 Encounters:   06/30/21 92.5 kg (204 lb)   06/23/21 95.1 kg (209 lb 11.2 oz)   06/20/21 95.2 kg  (209 lb 12.8 oz)   06/17/21 96.2 kg (212 lb 1.6 oz)   06/15/21 94.7 kg (208 lb 12.8 oz)   06/02/21 98.4 kg (216 lb 14.4 oz)   05/26/21 97.9 kg (215 lb 12.8 oz)   05/10/21 99.7 kg (219 lb 12.8 oz)   04/13/21 101.6 kg (223 lb 14.4 oz)   03/11/21 102.5 kg (225 lb 14.4 oz)     Constitutional: Well-appearing female in no acute distress.  Eyes: EOMI, PERRL. No scleral icterus.  ENT: Oral mucosa is moist without lesions or thrush.   Lymphatic: Neck is supple without cervical or supraclavicular lymphadenopathy. No axillary lymphadenopathy.  Cardiovascular: Regular rate and rhythm. No murmurs, gallops, or rubs. No peripheral edema.  Respiratory: Clear to auscultation bilaterally. No wheezes or crackles.  Gastrointestinal: Bowel sounds present. Abdomen soft, non-tender. No palpable hepatosplenomegaly or masses.   Neurologic: Cranial nerves II through XII are grossly intact.  Skin: No rashes, petechiae, or bruising noted on exposed skin.    Laboratory Data:        Assessment and Plan:          Harshal Schuster PA-C  [facility] [address] [address]  [phone]        Again, thank you for allowing me to participate in the care of your patient.        Sincerely,        RUPERT Guaman

## 2021-07-07 NOTE — PROGRESS NOTES
Oncology/Hematology Visit Note  Jul 7, 2021    Reason for Visit: Follow up of undifferentiated pleomorphic sarcoma     History of Present Illness: Richa Ashby is a 55 year old female with PMH asthma and GERD with undifferentiated pleomorphic sarcoma. 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 She received preoperative radiotherapy and the tumor was resected 3/12/20. She then developed a cough, chest tightness, and scapular pain and imaging revealed lung nodules. Lung biopsy 11/2/20 with sarcoma. She was started on Doxil + Ifos 11/13/20. Had delay in cycle 2 due to COVID dx 12/2/20. CT imaging after cycle 1 with positive response to treatment with decrease in pulmonary nodules. Received cycle 2 12/18/20. Received cycle 3 1/15/21 with dose reduction in Doxil for skin toxicity and mucositis. Received cycle 4 2/15/21.      CT CAP 3/8/21 with positive response to treatment, decreased size of pulmonary mets though one lesion was larger. Did see fibrocystic changes in left breast, recommended breast imaging. Received cycle 5 Doxil + ifos 3/11/21.      Imaging 4/5/21 with overall stable disease except one lung lesion, opted to continue Doxil alone for now and switch to Keytruda in future if ongoing progression.      Started Keytruda 5/10/21. Was seen 5/26/21 cough, SOB, fatigue work-up thought to be 2/2 phrenic nerve involvement of R apical lung mass.      Due to worsening fatigue and cough, repeat CT 6/15/21 concerning for progression vs pseudoprogression. Added Gemzar 6/16/21.     She was admitted 6/17/21-6/20/21 with fever of unknown origin, infectious work-up negative, treated with broad spectrum antibiotics.     She clinically improved on abx. Was able to receive day 8 Gemzar (dose reduced for thrombocytopenia) + Keytruda 6/23/21.     She returns today for oncology follow-up.     Interval History:  Connie returns today for oncology follow-up. She  overall is feeling well. Her fatigue is better, though she did have to take the 3 days of prednisone after her last treatment. She decided to stop working and thinks this has helped her fatigue, though she is hopeful she could go back to work in the future. Her cough is better as is her pleuritic right back pain. She does have a new focal area of pain on her side lower right rib, unclear what caused this. She isn't needing any pain medications. She denies any recurrent fevers. No headaches or dizziness. No chest pain or SOB. Minimal nausea, no vomiting, no bowel or urinary concerns-bowels are now normal for her. Edema is improved. Feet are  but overall neuropathy seems better. Her mood is bright. She has had nice times with her family recently.     Current Outpatient Medications   Medication Sig Dispense Refill     acetaminophen (TYLENOL) 325 MG tablet Take 325-650 mg by mouth every 6 hours as needed for mild pain       ADVAIR DISKUS 100-50 MCG/DOSE inhaler        albuterol (PROAIR RESPICLICK) 108 (90 Base) MCG/ACT inhaler Inhale 1-2 puffs into the lungs       cetirizine (ZYRTEC) 10 MG tablet Take 10 mg by mouth daily       gabapentin (NEURONTIN) 300 MG capsule Take 1 capsule (300 mg) by mouth 3 times daily 210 capsule 1     lidocaine-prilocaine (EMLA) 2.5-2.5 % external cream Apply topically as needed for moderate pain (Patient not taking: Reported on 6/2/2021) 30 g 3     melatonin (MELATONIN) 1 MG/ML LIQD liquid Take 5 mg by mouth       montelukast (SINGULAIR) 10 MG tablet Take 10 mg by mouth daily       multivitamin w/minerals (MULTI-VITAMIN) tablet Take 1 tablet by mouth daily       omeprazole (PRILOSEC) 20 MG DR capsule        ondansetron (ZOFRAN) 8 MG tablet Take 1 tablet (8 mg) by mouth every 8 hours as needed for nausea 60 tablet 1     prochlorperazine (COMPAZINE) 10 MG tablet Take 1 tablet (10 mg) by mouth every 6 hours as needed (Nausea/Vomiting) 30 tablet 5     vitamin D3 (CHOLECALCIFEROL)  2000 units (50 mcg) tablet Take 1 tablet by mouth daily         Physical Examination:  /58 (BP Location: Right arm, Patient Position: Sitting, Cuff Size: Adult Large)   Pulse 83   Temp 97.8  F (36.6  C) (Tympanic)   Resp 16   Wt 92.9 kg (204 lb 11.2 oz)   LMP 03/19/2019   SpO2 98%   BMI 32.06 kg/m    Wt Readings from Last 10 Encounters:   06/30/21 92.5 kg (204 lb)   06/23/21 95.1 kg (209 lb 11.2 oz)   06/20/21 95.2 kg (209 lb 12.8 oz)   06/17/21 96.2 kg (212 lb 1.6 oz)   06/15/21 94.7 kg (208 lb 12.8 oz)   06/02/21 98.4 kg (216 lb 14.4 oz)   05/26/21 97.9 kg (215 lb 12.8 oz)   05/10/21 99.7 kg (219 lb 12.8 oz)   04/13/21 101.6 kg (223 lb 14.4 oz)   03/11/21 102.5 kg (225 lb 14.4 oz)     Constitutional: Well-appearing female in no acute distress.  Eyes: EOMI, PERRL. No scleral icterus.  ENT: Oral mucosa is moist without lesions or thrush.   Lymphatic: Neck is supple without cervical or supraclavicular lymphadenopathy.   Cardiovascular: Regular rate and rhythm. No murmurs, gallops, or rubs. No peripheral edema.  Respiratory: Clear to auscultation bilaterally. No wheezes or crackles.  Gastrointestinal: Bowel sounds present. Abdomen soft, non-tender.   Neurologic: Cranial nerves II through XII are grossly intact.  Skin: No rashes, petechiae, or bruising noted on exposed skin.    Laboratory Data:  Results for AMADEO WILSON (MRN [id number]) as of 7/8/2021 15:28   7/7/2021 11:27   Sodium 140   Potassium 3.9   Chloride 106   Carbon Dioxide 26   Urea Nitrogen 9   Creatinine 0.67   GFR Estimate >90   GFR Estimate If Black >90   Calcium 9.1   Anion Gap 7   Albumin 3.2 (L)   Protein Total 7.3   Bilirubin Total 0.5   Alkaline Phosphatase 127   ALT 18   AST 10   Glucose 91   WBC 4.3   Hemoglobin 9.2 (L)   Hematocrit 29.3 (L)   Platelet Count 148 (L)   RBC Count 3.17 (L)   MCV 92   MCH 29.0   MCHC 31.4 (L)   RDW 17.5 (H)   Diff Method Manual Differential   % Neutrophils 65.2   % Lymphocytes 18.3   % Monocytes 10.4    % Eosinophils 5.2   % Basophils 0.9   Absolute Neutrophil 2.8   Absolute Lymphocytes 0.8   Absolute Monocytes 0.4   Absolute Eosinophils 0.2   Absolute Basophils 0.0   Anisocytosis Slight   Poikilocytosis Slight   Polychromasia Slight   Teardrop Cells Slight   Platelet Estimate Confirming automated cell count       Assessment and Plan:  1. Onc  Metastatic UPS, was on Doxil + Ifosfamide (5 cycles) with positive response to treatment initially, then due to poor tolerance switched to Doxil alone (1 cycle) but had progression. Started on Keytruda 5/10/21. Due to tumor growth (progression vs pseudoprogression) added Gemzar 6/16/21.     Due for cycle 2 today. Doing the best I have seen her in a while. Plt improved-per prior discussion with Dr. Lopez will do 1200mg Gemzar for cycle 2. Will get Keytruda next week in addition to Gemzar for day 8.    Plan to do CT next week to assess for response-keeping close imaging follow-up due to rapidly changing tumor recently.      2. ID  Admitted 6/17/21 for high fevers and hypotension, infectious work-up negative though did improve with broad spectrum antibiotics. No recurrent infectious symptoms.     3. Fatigue  Worked up for adrenal insufficiency-cortisol WNL, hypothyroidism-TSH minimally elevated and free T4 normal, OH-iron studies OK, anemia-hgb improving,[id number], troponin negative. Fatigue much better now after antibiotics so possible unknown infection causing symptoms.     Will keep low dose prednisone 10mg x 3 days this dose of Gemzar and if continues to do well can stop with future doses.    Agree that break from work is necessary and will provide any needed paperwork. If she improves in future could return to work as tolerated.     4. Heme  Anemia: ACD and chemotherapy, did require pRBC inpatient. Hgb improved to 9.2, MOnitor.      Thrombocytopenia: 2/2 Gemzar. No bleeding issues. Improved today. Dose adjustment as above. Monitor each clinic visit.      Transfuse  to keep hgb >8 and plt >10.      5. FEN  Eating better now that she isn't so fatigued. Monitor weight. CMP stable.      6. GI  GERD: continuing Omeprazole and tums, stable.      Diarrhea: Resolved.      LFT WNL.     7. Derm  Hand/foot: 2/2 Doxil, improving now that she is off Doxil.      Previously prescribed Hydroxyzine PRN for pruritis-improved.      8. Pulm  Asthma: stable, continue inhalers.     COVID: Diagnosed in December, recovered.     Cough/SOB: See work-up from 5/26/21 (negative CT PE, RVP, COVID, NTBNP). Thought to be 2/2 phrenic nerve involvement of tumor. Possibly infectious component given improvement with antibiotics. Symptoms much improved currently. Continue Robitussin PRN. Consider rad onc referral pending improvement with Gemzar/Keytruda.      9. 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 Improving.     10. MSK  R shoulder/back pain: Suspect 2/2 cancer, improving which is a good sign. Has Tylenol PRN.    Focal R rib pain unclear etiology-reviewed recent CT images and does have tumor in this area but will be able to evaluate on imaging next week.     30 minutes spent on the date of the encounter doing chart review, review of test results, interpretation of tests, patient visit and documentation     Harshal Schuster PA-C  [facility]  [address] [place] [address] [phone]

## 2021-07-12 ENCOUNTER — ANCILLARY PROCEDURE (OUTPATIENT)
Dept: CT IMAGING | Facility: CLINIC | Age: 56
End: 2021-07-12
Attending: PHYSICIAN ASSISTANT
Payer: COMMERCIAL

## 2021-07-12 DIAGNOSIS — D63.0 ANEMIA IN NEOPLASTIC DISEASE: ICD-10-CM

## 2021-07-12 DIAGNOSIS — R91.8 PULMONARY NODULES: ICD-10-CM

## 2021-07-12 DIAGNOSIS — C49.9 SARCOMA (H): ICD-10-CM

## 2021-07-12 PROCEDURE — 71250 CT THORAX DX C-: CPT | Mod: GC | Performed by: RADIOLOGY

## 2021-07-12 NOTE — PROGRESS NOTES
"Connie is a 56 year old who is being evaluated via a billable video visit.      How would you like to obtain your AVS? MyChart     If the video visit is dropped, the invitation should be resent by: Text to cell phone: [phone]     Will anyone else be joining your video visit? No          Vitals - Patient Reported  Weight (Patient Reported): 91.6 kg (202 lb)  Height (Patient Reported): 170.2 cm (5' 7\")  BMI (Based on Pt Reported Ht/Wt): 31.64  Pain Score: Mild Pain (2)  Pain Loc:  (RIGHT RIB CAGE)    Wicho Noe Soumya LPN    Video Start Time:830  Video-Visit Details    Type of service:  Video Visit    Video End [id number]    Originating Location (pt. Location)home    Distant Location (provider location):  [facility]     [facility]      7-13-21    I saw Connie Milian for follow up of undifferentiated pleomorphic sarcoma     Background: Richa Ashby is a 55 year old female with PMH asthma and GERD with undifferentiated pleomorphic sarcoma. 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 She received preoperative radiotherapy and the tumor was resected 3/12/20. She then developed a cough, chest tightness, and scapular pain and imaging revealed lung nodules. Lung biopsy 11/2/20 with sarcoma. She was started on Doxil + Ifos 11/13/20. Had delay in cycle 2 due to COVID dx 12/2/20. CT imaging after cycle 1 with positive response to treatment with decrease in pulmonary nodules. Received cycle 2 12/18/20. Received cycle 3 1/15/21 with dose reduction in Doxil for skin toxicity and mucositis. Received cycle 4 2/15/21.      CT CAP 3/8/21 with positive response to treatment, decreased size of pulmonary mets though one lesion was larger. Did see fibrocystic changes in left breast, recommended breast imaging. Received cycle 5 Doxil + ifos 3/11/21.      Imaging 4/5/21 with overall stable disease except one lung lesion, " opted to continue Doxil alone for now and switch to Keytruda in future if ongoing progression.      Started Keytruda 5/10/21. Was seen 5/26/21 cough, SOB, fatigue work-up thought to be 2/2 phrenic nerve involvement of R apical lung mass.      Due to worsening fatigue and cough, repeat CT 6/15/21 concerning for progression vs pseudoprogression. Added Gemzar 6/16/21.     She was admitted 6/17/21-6/20/21 with fever of unknown origin, infectious work-up negative, treated with broad spectrum antibiotics.     She clinically improved on abx. Was able to receive day 8 Gemzar (dose reduced for thrombocytopenia) + Keytruda 6/23/21.         Interval History:    She is no longer working.  The shoulder pain started improving 10 days ago and the cough has improved after the hospitalization.    She is trying to walk daily for 10-15 min.  We discussed a 30 min walk daily.  She did walk 45 min recently.     She still has the pain on her side lower right rib, unclear what caused this. She isn't needing pain medications now; possibly getting slightly better.     No nausea. Edema is improved.     Feet are  but overall neuropathy seems better.     Her mood is  Good.    10-point ROS o/w neg.          On exam she appeared comfortable with normal affect  Eyes: EOMI  NECK no obvious goiter or mass  Respiratory: no resp distress.  Neurologic: normal mentation and speech  Psych  Mood good  Skin: No rashes on exposed skin.      -  GNE and LFT OK      -    I reviewed the new images of 7-12-21 and I think nothing in the chest looks worse and some things look better    Formal read:  IMPRESSION:   In this patient with history of sarcoma:  1. Overall stable bilateral pulmonary nodules and masses as described  above including unchanged size of a right upper lobe paramediastinal  mass concerning for metastatic disease. There has been interval  increase in size of a right lower lobe nodule now measuring up to 9  mm, previously 7  mm      Assessment and Plan:  1. Onc  Metastatic UPS, was on Doxil + Ifosfamide (5 cycles) with positive response to treatment initially, then due to poor tolerance switched to Doxil alone (1 cycle) but had progression. Started Keytruda 5/10/21. Due to tumor growth (progression vs pseudoprogression) added Gemzar 6/16/21.     Now there seems to be an early response.  We will continue Keytruda and Gemzar and probably restage about 9-5/6..      2. ID  Admitted 6/17/21 for high fevers and hypotension, infectious work-up negative though did improve with broad spectrum antibiotics. No recurrent infectious symptoms.     3. Fatigue  Worked up for adrenal insufficiency-cortisol WNL, hypothyroidism-TSH minimally elevated and free T4 normal, OH-iron studies OK, anemia-hgb improving,[id number], troponin negative. Fatigue much better now..     4. Heme  Anemia: ACD and chemotherapy, did require pRBC inpatient. Hgb improved to 9.2, will follow     Thrombocytopenia: 2/2 Gemzar. No bleeding issues. Improved today. Dose adjustment as above. Monitor each clinic visit.      Transfuse to keep hgb >8 and plt >10.     We will check labs tomorrow w infusion.      5. FEN  Eating is much better, almost normal.      6. GI  GERD: continuing Omeprazole and tums, controlled      7. Derm  HFS basically resolved     8. Pulm  Asthma: stable, continue inhalers.     COVID: Diagnosed in December, recovered.     Cough much better     9. 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 Improving.     10. MSK  R shoulder/back pain: Suspect 2/2 cancer, improving which is a good sign. Has Tylenol PRN.     Focal R rib pain unclear etiology-reviewed recent CT images and does have tumor in this area ; will follow.     Jose Alberto Lopez M.D.  Professor  Hematology, Oncology and Transplantation

## 2021-07-13 ENCOUNTER — VIRTUAL VISIT (OUTPATIENT)
Dept: ONCOLOGY | Facility: CLINIC | Age: 56
End: 2021-07-13
Attending: INTERNAL MEDICINE
Payer: COMMERCIAL

## 2021-07-13 ENCOUNTER — VIRTUAL VISIT (OUTPATIENT)
Dept: ONCOLOGY | Facility: CLINIC | Age: 56
End: 2021-07-13
Payer: COMMERCIAL

## 2021-07-13 DIAGNOSIS — G89.3 CANCER ASSOCIATED PAIN: ICD-10-CM

## 2021-07-13 DIAGNOSIS — C49.9 SARCOMA OF SOFT TISSUE (H): Primary | ICD-10-CM

## 2021-07-13 DIAGNOSIS — R91.8 PULMONARY NODULES: ICD-10-CM

## 2021-07-13 DIAGNOSIS — L27.1 HAND FOOT SYNDROME: ICD-10-CM

## 2021-07-13 DIAGNOSIS — K21.9 GASTROESOPHAGEAL REFLUX DISEASE WITHOUT ESOPHAGITIS: ICD-10-CM

## 2021-07-13 DIAGNOSIS — R05.9 COUGH: ICD-10-CM

## 2021-07-13 PROCEDURE — 99215 OFFICE O/P EST HI 40 MIN: CPT | Mod: 95 | Performed by: INTERNAL MEDICINE

## 2021-07-13 PROCEDURE — 97802 MEDICAL NUTRITION INDIV IN: CPT | Mod: 95 | Performed by: DIETITIAN, REGISTERED

## 2021-07-13 PROCEDURE — 999N001193 HC VIDEO/TELEPHONE VISIT; NO CHARGE

## 2021-07-13 NOTE — LETTER
"    7/13/2021         RE: Richa Ashby  [address]  [place] [address]        Dear Colleague,    Thank you for referring your patient, Richa Ashby, to the [facility]. Please see a copy of my visit note below.    Connie is a 56 year old who is being evaluated via a billable video visit.      How would you like to obtain your AVS? MyChart     If the video visit is dropped, the invitation should be resent by: Text to cell phone: [phone]     Will anyone else be joining your video visit? No          Vitals - Patient Reported  Weight (Patient Reported): 91.6 kg (202 lb)  Height (Patient Reported): 170.2 cm (5' 7\")  BMI (Based on Pt Reported Ht/Wt): 31.64  Pain Score: Mild Pain (2)  Pain Loc:  (RIGHT RIB CAGE)    Wicho Varner LPN    Video Start Time:830  Video-Visit Details    Type of service:  Video Visit    Video End [id number]    Originating Location (pt. Location)home    Distant Location (provider location):  [facility]     [facility]      7-13-21    I saw Connie Milian for follow up of undifferentiated pleomorphic sarcoma     Background: Richa Ashby is a 55 year old female with PMH asthma and GERD with undifferentiated pleomorphic sarcoma. 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 She received preoperative radiotherapy and the tumor was resected 3/12/20. She then developed a cough, chest tightness, and scapular pain and imaging revealed lung nodules. Lung biopsy 11/2/20 with sarcoma. She was started on Doxil + Ifos 11/13/20. Had delay in cycle 2 due to COVID dx 12/2/20. CT imaging after cycle 1 with positive response to treatment with decrease in pulmonary nodules. Received cycle 2 12/18/20. Received cycle 3 1/15/21 with dose reduction in Doxil for skin toxicity and mucositis. Received cycle 4 2/15/21.      CT CAP 3/8/21 with " positive response to treatment, decreased size of pulmonary mets though one lesion was larger. Did see fibrocystic changes in left breast, recommended breast imaging. Received cycle 5 Doxil + ifos 3/11/21.      Imaging 4/5/21 with overall stable disease except one lung lesion, opted to continue Doxil alone for now and switch to Keytruda in future if ongoing progression.      Started Keytruda 5/10/21. Was seen 5/26/21 cough, SOB, fatigue work-up thought to be 2/2 phrenic nerve involvement of R apical lung mass.      Due to worsening fatigue and cough, repeat CT 6/15/21 concerning for progression vs pseudoprogression. Added Gemzar 6/16/21.     She was admitted 6/17/21-6/20/21 with fever of unknown origin, infectious work-up negative, treated with broad spectrum antibiotics.     She clinically improved on abx. Was able to receive day 8 Gemzar (dose reduced for thrombocytopenia) + Keytruda 6/23/21.         Interval History:    She is no longer working.  The shoulder pain started improving 10 days ago and the cough has improved after the hospitalization.    She is trying to walk daily for 10-15 min.  We discussed a 30 min walk daily.  She did walk 45 min recently.     She still has the pain on her side lower right rib, unclear what caused this. She isn't needing pain medications now; possibly getting slightly better.     No nausea. Edema is improved.     Feet are  but overall neuropathy seems better.     Her mood is  Good.    10-point ROS o/w neg.          On exam she appeared comfortable with normal affect  Eyes: EOMI  NECK no obvious goiter or mass  Respiratory: no resp distress.  Neurologic: normal mentation and speech  Psych  Mood good  Skin: No rashes on exposed skin.      -  GNE and LFT OK      -    I reviewed the new images of 7-12-21 and I think nothing in the chest looks worse and some things look better    Formal read:  IMPRESSION:   In this patient with history of sarcoma:  1. Overall stable  bilateral pulmonary nodules and masses as described  above including unchanged size of a right upper lobe paramediastinal  mass concerning for metastatic disease. There has been interval  increase in size of a right lower lobe nodule now measuring up to 9  mm, previously 7 mm      Assessment and Plan:  1. Onc  Metastatic UPS, was on Doxil + Ifosfamide (5 cycles) with positive response to treatment initially, then due to poor tolerance switched to Doxil alone (1 cycle) but had progression. Started Keytruda 5/10/21. Due to tumor growth (progression vs pseudoprogression) added Gemzar 6/16/21.     Now there seems to be an early response.  We will continue Keytruda and Gemzar and probably restage about 9-5/6..      2. ID  Admitted 6/17/21 for high fevers and hypotension, infectious work-up negative though did improve with broad spectrum antibiotics. No recurrent infectious symptoms.     3. Fatigue  Worked up for adrenal insufficiency-cortisol WNL, hypothyroidism-TSH minimally elevated and free T4 normal, OH-iron studies OK, anemia-hgb improving,[id number], troponin negative. Fatigue much better now..     4. Heme  Anemia: ACD and chemotherapy, did require pRBC inpatient. Hgb improved to 9.2, will follow     Thrombocytopenia: 2/2 Gemzar. No bleeding issues. Improved today. Dose adjustment as above. Monitor each clinic visit.      Transfuse to keep hgb >8 and plt >10.     We will check labs tomorrow w infusion.      5. FEN  Eating is much better, almost normal.      6. GI  GERD: continuing Omeprazole and tums, controlled      7. Derm  HFS basically resolved     8. Pulm  Asthma: stable, continue inhalers.     COVID: Diagnosed in December, recovered.     Cough much better     9. 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 Improving.     10. MSK  R shoulder/back pain: Suspect 2/2 cancer, improving which is a good sign. Has Tylenol PRN.     Focal R rib pain unclear etiology-reviewed recent CT images and does have  tumor in this area ; will follow.     Jose Alberto Lopez M.D.  Professor  Hematology, Oncology and Transplantation             Again, thank you for allowing me to participate in the care of your patient.        Sincerely,        Jose Alberto Lopez MD

## 2021-07-13 NOTE — PROGRESS NOTES
Video-Visit Details     Type of service:  Video Visit     Video Start Time (time video started): 10:02AM     Video End Time (time video stopped): 10:34AM    Originating Location (pt. 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   -Due to poor intake with chemo, she has had very low energy and motivation to cook meals.  -She tells me that her  is a wonderful caregiver but does not cook the best.  He tend to get carry out or frozen pizza.    -She has been receiving meals from family and friends, however, does not like the meals she is provided as they are all combination meals such as lasagna, casseroles etc.     -She would prefer simple meals such as chicken, fish, tuna with fresh or roasted vegetables.    -Her daughter has been helping her prepare and freeze meals like this which has been helpful and increasing her appetite and intake.   -She also gets Home  meals three times/week which she really likes.   -She and her family have been engaging in Zoom meeting/cook sessions which has been very fun her as well.   -She has been pleased with her weight loss, although, understands that rapid, unintentional weight loss is not healthy.      Diet Recall  Breakfast Lemon poppy seed bread OR bowl honey nut cheerios with 1% milk, vanilla latte with 1%  AM snack: yogurt - yoplait regular   Lunch 1/2 turkey sandwich OR 1/2 tuna melt, tuna  "with crackers, crab salad w/ pasta, 1 cup watermelon  PM snack - sargento balanced snacks cheese, meat, cranberries, ruben   Dinner Frozen pizza - 3 slc OR Home  meal bbq brisquit OR zoom meal BBQ pineapple chicken with brown rice OR 2 chicken cheese enchilada with corn - esquivel in olive oil   Beverages Mostly water all day ~80oz/day     ANTHROPOMETRICS  Height: 67\"  Weight: 204 lb/92kg  BMI: 32  Weight Status:  Obesity Grade I BMI 30-34.9  Weight History: down 30 lb x past 4 months    Wt Readings from Last 12 Encounters:   07/07/21 92.9 kg (204 lb 11.2 oz)   06/30/21 92.5 kg (204 lb)   06/23/21 95.1 kg (209 lb 11.2 oz)   06/20/21 95.2 kg (209 lb 12.8 oz)   06/17/21 96.2 kg (212 lb 1.6 oz)   06/15/21 94.7 kg (208 lb 12.8 oz)   06/02/21 98.4 kg (216 lb 14.4 oz)   05/26/21 97.9 kg (215 lb 12.8 oz)   05/10/21 99.7 kg (219 lb 12.8 oz)   04/13/21 101.6 kg (223 lb 14.4 oz)   03/11/21 102.5 kg (225 lb 14.4 oz)   02/15/21 105.2 kg (232 lb)     Dosing Weight: 69kg    Medications/vitamins/minerals/herbals:   Reviewed    Labs:   Labs reviewed    NUTRITION FOCUSED PHYSICAL ASSESSMENT FOR DIAGNOSING MALNUTRITION:  Not observed due to Covid 19 pandemic - no clinic contact  Consult for education only      ASSESSED NUTRITION NEEDS:  Estimated Energy Needs: 2000 kcals (30 Kcal/Kg)  Justification: maintenance  Estimated Protein Needs: 82 grams protein (1.2 g pro/Kg)  Justification: maintenance/increaed needs with chemo  Estimated Fluid Needs: 2200  mL   Justification: maintenance    NUTRITION DIAGNOSIS:  Inadequate oral intake related to decreased appetite as evidenced by 30 lb wt loss x past 5 months    INTERVENTIONS  Provided written & verbal education:     - Reviewed nutrition and hydration needs. Advised pt to aim for at least 1800-2000kcal and 80g protein daily. Advised pt to aim for 10 cups non-caffeine containing beverages (water/electrolytes) daily.  - Discussed strategies to help fortify meals and snacks with protein.  - " Encouraged to focus on small, frequent meals.  Encouraged to have a protein source with each meal and snack.    - Reviewed common barriers to eating with cancer treatment.  Discussed ways to cope with taste aversion.   - Reviewed healthy weight and healthy rate of weight loss. Encouraged to limit wt loss to <1-2 lb/week with intentions on aiming for 80g protein daily.     Provided pt with corresponding education materials/handouts on:  Healthy shake/smoothie recipes  and Sources of Protein    Pt verbalize understanding of materials provided during consult.   Patient Understanding: Excellent  Expected patient engagement: Excellent     Goals  1.  Aim for 5-6 small frequent meals  2.  Aim for 1800kcal and 80g protein/day  3. Weight maintenance     Follow-Up Plans: Pt has RD contact information for questions.      Heavenly Rosado RD, LD

## 2021-07-13 NOTE — LETTER
7/13/2021         RE: Richa Ashby  [address]  [place] [address]        Dear Colleague,    Thank you for referring your patient, Richa Ashby, to the [facility] [place]. Please see a copy of my visit note below.    Video-Visit Details     Type of service:  Video Visit     Video Start Time (time video started): 10:02AM     Video End Time (time video stopped): 10:34AM    Originating Location (pt. 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   -Due to poor intake with chemo, she has had very low energy and motivation to cook meals.  -She tells me that her  is a wonderful caregiver but does not cook the best.  He tend to get carry out or frozen pizza.    -She has been receiving meals from family and friends, however, does not like the meals she is provided as they are all combination meals such as lasagna, casseroles etc.     -She would prefer simple meals such as chicken, fish, tuna with fresh or roasted vegetables.    -Her daughter has been helping her prepare and freeze meals like this which has been helpful and increasing her appetite and intake.   -She also gets Home  meals three times/week which she really likes.   -She and her family have been engaging in Zoom meeting/cook sessions which has been very fun her as well.   -She has been pleased with her weight loss,  "although, understands that rapid, unintentional weight loss is not healthy.      Diet Recall  Breakfast Lemon poppy seed bread OR bowl honey nut cheerios with 1% milk, vanilla latte with 1%  AM snack: yogurt - yoplait regular   Lunch 1/2 turkey sandwich OR 1/2 tuna melt, tuna with crackers, crab salad w/ pasta, 1 cup watermelon  PM snack - sargento balanced snacks cheese, meat, cranberries, ruben   Dinner Frozen pizza - 3 slc OR Home  meal bbq brisquit OR zoom meal BBQ pineapple chicken with brown rice OR 2 chicken cheese enchilada with corn - esquivel in olive oil   Beverages Mostly water all day ~80oz/day     ANTHROPOMETRICS  Height: 67\"  Weight: 204 lb/92kg  BMI: 32  Weight Status:  Obesity Grade I BMI 30-34.9  Weight History: down 30 lb x past 4 months    Wt Readings from Last 12 Encounters:   07/07/21 92.9 kg (204 lb 11.2 oz)   06/30/21 92.5 kg (204 lb)   06/23/21 95.1 kg (209 lb 11.2 oz)   06/20/21 95.2 kg (209 lb 12.8 oz)   06/17/21 96.2 kg (212 lb 1.6 oz)   06/15/21 94.7 kg (208 lb 12.8 oz)   06/02/21 98.4 kg (216 lb 14.4 oz)   05/26/21 97.9 kg (215 lb 12.8 oz)   05/10/21 99.7 kg (219 lb 12.8 oz)   04/13/21 101.6 kg (223 lb 14.4 oz)   03/11/21 102.5 kg (225 lb 14.4 oz)   02/15/21 105.2 kg (232 lb)     Dosing Weight: 69kg    Medications/vitamins/minerals/herbals:   Reviewed    Labs:   Labs reviewed    NUTRITION FOCUSED PHYSICAL ASSESSMENT FOR DIAGNOSING MALNUTRITION:  Not observed due to Covid 19 pandemic - no clinic contact  Consult for education only      ASSESSED NUTRITION NEEDS:  Estimated Energy Needs: 2000 kcals (30 Kcal/Kg)  Justification: maintenance  Estimated Protein Needs: 82 grams protein (1.2 g pro/Kg)  Justification: maintenance/increaed needs with chemo  Estimated Fluid Needs: 2200  mL   Justification: maintenance    NUTRITION DIAGNOSIS:  Inadequate oral intake related to decreased appetite as evidenced by 30 lb wt loss x past 5 months    INTERVENTIONS  Provided written & verbal education:   "   - Reviewed nutrition and hydration needs. Advised pt to aim for at least 1800-2000kcal and 80g protein daily. Advised pt to aim for 10 cups non-caffeine containing beverages (water/electrolytes) daily.  - Discussed strategies to help fortify meals and snacks with protein.  - Encouraged to focus on small, frequent meals.  Encouraged to have a protein source with each meal and snack.    - Reviewed common barriers to eating with cancer treatment.  Discussed ways to cope with taste aversion.   - Reviewed healthy weight and healthy rate of weight loss. Encouraged to limit wt loss to <1-2 lb/week with intentions on aiming for 80g protein daily.     Provided pt with corresponding education materials/handouts on:  Healthy shake/smoothie recipes  and Sources of Protein    Pt verbalize understanding of materials provided during consult.   Patient Understanding: Excellent  Expected patient engagement: Excellent     Goals  1.  Aim for 5-6 small frequent meals  2.  Aim for 1800kcal and 80g protein/day  3. Weight maintenance     Follow-Up Plans: Pt has RD contact information for questions.      Heavenly Rosado RD, LD          Again, thank you for allowing me to participate in the care of your patient.        Sincerely,        Heavenly Rosado RD

## 2021-07-14 ENCOUNTER — INFUSION THERAPY VISIT (OUTPATIENT)
Dept: ONCOLOGY | Facility: CLINIC | Age: 56
End: 2021-07-14
Attending: INTERNAL MEDICINE
Payer: COMMERCIAL

## 2021-07-14 ENCOUNTER — LAB (OUTPATIENT)
Dept: LAB | Facility: CLINIC | Age: 56
End: 2021-07-14
Attending: INTERNAL MEDICINE
Payer: COMMERCIAL

## 2021-07-14 VITALS
HEART RATE: 78 BPM | RESPIRATION RATE: 16 BRPM | DIASTOLIC BLOOD PRESSURE: 68 MMHG | SYSTOLIC BLOOD PRESSURE: 121 MMHG | TEMPERATURE: 98.1 F | WEIGHT: 206.5 LBS | OXYGEN SATURATION: 97 % | BODY MASS INDEX: 32.34 KG/M2

## 2021-07-14 DIAGNOSIS — C78.00 MALIGNANT NEOPLASM METASTATIC TO LUNG, UNSPECIFIED LATERALITY (H): Primary | ICD-10-CM

## 2021-07-14 DIAGNOSIS — C49.9 SARCOMA OF SOFT TISSUE (H): ICD-10-CM

## 2021-07-14 DIAGNOSIS — C49.9 SARCOMA (H): ICD-10-CM

## 2021-07-14 DIAGNOSIS — D63.0 ANEMIA IN NEOPLASTIC DISEASE: ICD-10-CM

## 2021-07-14 DIAGNOSIS — C78.00 MALIGNANT NEOPLASM METASTATIC TO LUNG, UNSPECIFIED LATERALITY (H): ICD-10-CM

## 2021-07-14 DIAGNOSIS — C49.9 SARCOMA OF SOFT TISSUE (H): Primary | ICD-10-CM

## 2021-07-14 LAB
ALBUMIN SERPL-MCNC: 3.2 G/DL (ref 3.4–5)
ALP SERPL-CCNC: 128 U/L (ref 40–150)
ALT SERPL W P-5'-P-CCNC: 60 U/L (ref 0–50)
ANION GAP SERPL CALCULATED.3IONS-SCNC: 8 MMOL/L (ref 3–14)
AST SERPL W P-5'-P-CCNC: 24 U/L (ref 0–45)
BASOPHILS # BLD MANUAL: 0.1 10E3/UL (ref 0–0.2)
BASOPHILS NFR BLD MANUAL: 3 %
BILIRUB SERPL-MCNC: 0.6 MG/DL (ref 0.2–1.3)
BUN SERPL-MCNC: 9 MG/DL (ref 7–30)
CALCIUM SERPL-MCNC: 9 MG/DL (ref 8.5–10.1)
CHLORIDE BLD-SCNC: 105 MMOL/L (ref 94–109)
CO2 SERPL-SCNC: 27 MMOL/L (ref 20–32)
CREAT SERPL-MCNC: 0.66 MG/DL (ref 0.52–1.04)
EOSINOPHIL # BLD MANUAL: 0.1 10E3/UL (ref 0–0.7)
EOSINOPHIL NFR BLD MANUAL: 3 %
ERYTHROCYTE [DISTWIDTH] IN BLOOD BY AUTOMATED COUNT: 16.9 % (ref 10–15)
GFR SERPL CREATININE-BSD FRML MDRD: >90 ML/MIN/1.73M2
GLUCOSE BLD-MCNC: 109 MG/DL (ref 70–99)
HCT VFR BLD AUTO: 28.3 % (ref 35–47)
HGB BLD-MCNC: 9 G/DL (ref 11.7–15.7)
LYMPHOCYTES # BLD MANUAL: 0.8 10E3/UL (ref 0.8–5.3)
LYMPHOCYTES NFR BLD MANUAL: 47 %
MCH RBC QN AUTO: 29.7 PG (ref 26.5–33)
MCHC RBC AUTO-ENTMCNC: 31.8 G/DL (ref 31.5–36.5)
MCV RBC AUTO: 93 FL (ref 78–100)
MONOCYTES # BLD MANUAL: 0.1 10E3/UL (ref 0–1.3)
MONOCYTES NFR BLD MANUAL: 8 %
MYELOCYTES # BLD MANUAL: 0 10E3/UL
MYELOCYTES NFR BLD MANUAL: 2 %
NEUTROPHILS # BLD MANUAL: 0.6 10E3/UL (ref 1.6–8.3)
NEUTROPHILS NFR BLD MANUAL: 37 %
NRBC # BLD AUTO: 0 10E3/UL
NRBC BLD MANUAL-RTO: 1 %
PLAT MORPH BLD: ABNORMAL
PLATELET # BLD AUTO: 126 10E3/UL (ref 150–450)
POTASSIUM BLD-SCNC: 3.8 MMOL/L (ref 3.4–5.3)
PROT SERPL-MCNC: 7.2 G/DL (ref 6.8–8.8)
RBC # BLD AUTO: 3.03 10E6/UL (ref 3.8–5.2)
RBC MORPH BLD: ABNORMAL
SODIUM SERPL-SCNC: 140 MMOL/L (ref 133–144)
TSH SERPL DL<=0.005 MIU/L-ACNC: 2.78 MU/L (ref 0.4–4)
WBC # BLD AUTO: 1.7 10E3/UL (ref 4–11)

## 2021-07-14 PROCEDURE — 84443 ASSAY THYROID STIM HORMONE: CPT | Performed by: PHYSICIAN ASSISTANT

## 2021-07-14 PROCEDURE — 82040 ASSAY OF SERUM ALBUMIN: CPT | Performed by: PHYSICIAN ASSISTANT

## 2021-07-14 PROCEDURE — 258N000003 HC RX IP 258 OP 636: Performed by: PHYSICIAN ASSISTANT

## 2021-07-14 PROCEDURE — 250N000011 HC RX IP 250 OP 636: Performed by: PHYSICIAN ASSISTANT

## 2021-07-14 PROCEDURE — 85027 COMPLETE CBC AUTOMATED: CPT | Performed by: PHYSICIAN ASSISTANT

## 2021-07-14 PROCEDURE — 250N000011 HC RX IP 250 OP 636: Performed by: INTERNAL MEDICINE

## 2021-07-14 PROCEDURE — 96413 CHEMO IV INFUSION 1 HR: CPT

## 2021-07-14 RX ORDER — HEPARIN SODIUM (PORCINE) LOCK FLUSH IV SOLN 100 UNIT/ML 100 UNIT/ML
5 SOLUTION INTRAVENOUS
Status: DISCONTINUED | OUTPATIENT
Start: 2021-07-14 | End: 2021-09-15

## 2021-07-14 RX ORDER — HEPARIN SODIUM (PORCINE) LOCK FLUSH IV SOLN 100 UNIT/ML 100 UNIT/ML
5 SOLUTION INTRAVENOUS ONCE
Status: COMPLETED | OUTPATIENT
Start: 2021-07-14 | End: 2021-07-14

## 2021-07-14 RX ADMIN — Medication 5 ML: at 10:06

## 2021-07-14 RX ADMIN — Medication 5 ML: at 12:39

## 2021-07-14 RX ADMIN — SODIUM CHLORIDE 200 MG: 9 INJECTION, SOLUTION INTRAVENOUS at 12:01

## 2021-07-14 RX ADMIN — SODIUM CHLORIDE 250 ML: 9 INJECTION, SOLUTION INTRAVENOUS at 12:01

## 2021-07-14 ASSESSMENT — PAIN SCALES - GENERAL: PAINLEVEL: NO PAIN (0)

## 2021-07-14 NOTE — PROGRESS NOTES
Infusion Nursing Note:  Richa Ashby presents today for Cycle 2, day 8 Gemzar and Keytruda.    Patient seen by provider today: No    Note: Patient presents to clinic today feeling well with no questions.  Pt denies any changes to allergies, medications or status overnight.  Pt did not request or require any intervention for pain today.    TORB 7/14/2021 11:17 Dr. Lopez/MARITZA Kessler RN:   -Hold Gemzar today, 7/14    -Return Friday, 7/16 for lab re-check and Gemzar infusion  -Will dose reduce Gemzar from 1200 to 1000mg  -Administer Keytruda today    Intravenous Access:  Peripheral IV placed.    Treatment Conditions:  Lab Results   Component Value Date    HGB 9.0 07/14/2021    HGB 9.2 07/07/2021     Lab Results   Component Value Date    WBC 1.7 07/14/2021    WBC 4.3 07/07/2021      Lab Results   Component Value Date    ANEU 0.6 07/14/2021    ANEU 2.8 07/07/2021     Lab Results   Component Value Date     07/14/2021     07/07/2021      Lab Results   Component Value Date     07/14/2021     07/07/2021                   Lab Results   Component Value Date    POTASSIUM 3.8 07/14/2021    POTASSIUM 3.9 07/07/2021           Lab Results   Component Value Date    MAG 2.0 06/20/2021            Lab Results   Component Value Date    CR 0.66 07/14/2021    CR 0.67 07/07/2021                   Lab Results   Component Value Date    VIKTORIA 9.0 07/14/2021    VIKTORIA 9.1 07/07/2021                Lab Results   Component Value Date    BILITOTAL 0.6 07/14/2021    BILITOTAL 0.5 07/07/2021           Lab Results   Component Value Date    ALBUMIN 3.2 07/14/2021    ALBUMIN 3.2 07/07/2021                    Lab Results   Component Value Date    ALT 60 07/14/2021    ALT 18 07/07/2021           Lab Results   Component Value Date    AST 24 07/14/2021    AST 10 07/07/2021     Results reviewed, labs MET treatment parameters, ok to proceed with treatment.  Results reviewed, labs did NOT meet treatment parameters: gemzar  held.    Post Infusion Assessment:  Patient tolerated infusion without incident.  Blood return noted pre and post infusion.  Site patent and intact, free from redness, edema or discomfort.  No evidence of extravasations.  Access discontinued per protocol.    Discharge Plan:   Patient declined prescription refills.  Discharge instructions reviewed with: Patient.  Patient and/or family verbalized understanding of discharge instructions and all questions answered.  AVS to patient via [employer].  Patient will return 7/16/2021 for next appointment; yet to be scheduled.  Will contact tomorrow once pt removed from wait list.  Pt aware.   Patient discharged in stable condition accompanied by: self.  Departure Mode: Ambulatory.    Sunshine Kessler RN

## 2021-07-14 NOTE — PATIENT INSTRUCTIONS
Contact Numbers    [facility]: [phone]    Call with chills and/or temperature greater than or equal to 100.5, uncontrolled nausea/vomiting, diarrhea, constipation, dizziness, shortness of breath, chest pain, bleeding, unexplained bruising, or any new/concerning symptoms, questions/concerns.     If you are having any concerning symptoms or wish to speak to a provider before your next infusion visit, please call your care coordinator or triage to notify them so we can adequately serve you.       After Hours: [phone]    If after hours, weekends, or holidays, call main hospital  and ask for Oncology doctor on call.       July 2021 Sunday Monday Tuesday Wednesday Thursday Friday Saturday                       1     2    LAB CENTRAL   8:30 AM   (15 min.)   NURSE ONLY CANCER CENTER   [facility] 3       4     5     6    VIDEO VISIT RETURN   2:05 PM   (40 min.)   Robert Blanca MD   [facility] 7    LAB CENTRAL  11:15 AM   (15 min.)   [facility]   [facility]    RETURN  11:45 AM   (45 min.)   Harshal Schuster PA   [facility]    ONC INFUSION 2 HR (120 MIN)   2:30 PM   (120 min.)   [facility] INFUSION NURSE   [facility] 8     9     10       11     12    CT CHEST WO   3:20 PM   (20 min.)   [email or web address]   [facility] [place] 13    VIDEO VISIT RETURN   8:15 AM   (30 min.)   Jose Alberto Lopez MD   [facility]    VIDEO VISIT NEW  10:00 AM   (60 min.)   Heavenly Stratotn RD   [facility] 14    LAB CENTRAL   9:30 AM   (15 min.)   [facility]   [facility]    ONC INFUSION 2 HR (120 MIN)  10:00 AM   (120 min.)   [facility] ONC INFUSION NURSE   [facility] 15     16     17        18     19     20     21     22     23     24       25     26     27     28    LAB CENTRAL  11:45 AM   (15 min.)   [facility]   [facility]    RETURN  12:00 PM   (45 min.)   Harshal Schuster PA   [facility]    ONC INFUSION 1.5 HR (90 MIN)   3:00 PM   (90 min.)   [facility] ONC INFUSION NURSE   [facility] 29    VIDEO VISIT RETURN   2:30 PM   (60 min.)   Darlene Torres LICSW   [facility] [place] 30 31 August 2021 Sunday Monday Tuesday Wednesday Thursday Friday Saturday   1     2     3     4    LAB CENTRAL   1:30 PM   (15 min.)    [facility]   [facility]    ONC INFUSION 2.5 HR (150 MIN)   2:00 PM   (150 min.)   [facility] ONC INFUSION NURSE   [facility] 5     6     7       8     9     10     11     12     13     14       15     16     17     18    LAB CENTRAL   9:45 AM   (15 min.)    [facility]   [facility]    RETURN  10:15 AM   (45 min.)   Harshal Schuster PA   [facility]    ONC INFUSION 1.5 HR (90 MIN)  12:00 PM   (90 min.)   [facility] ON[facility] INFUSION NURSE   [facility] 19     20     21       22     23     24     25    LAB CENTRAL   1:00 PM   (15 min.)   [facility]   [facility]    ONC INFUSION 2.5 HR (150 MIN)   1:30 PM   (150 min.)   [facility] ONC INFUSION NURSE   [facility] 26     27     28       29     30     31                                         Lab Results:  Recent Results (from the past 12 hour(s))   TSH with free T4 reflex    Collection Time: 07/14/21 10:13 AM   Result Value Ref Range    TSH 2.78 0.40 - 4.00 mU/L   Comprehensive metabolic panel    Collection Time: 07/14/21 10:13 AM   Result Value Ref Range    Sodium 140 133 - 144 mmol/L    Potassium 3.8 3.4 - 5.3  mmol/L    Chloride 105 94 - 109 mmol/L    Carbon Dioxide (CO2) 27 20 - 32 mmol/L    Anion Gap 8 3 - 14 mmol/L    Urea Nitrogen 9 7 - 30 mg/dL    Creatinine 0.66 0.52 - 1.04 mg/dL    Calcium 9.0 8.5 - 10.1 mg/dL    Glucose 109 (H) 70 - 99 mg/dL    Alkaline Phosphatase 128 40 - 150 U/L    AST 24 0 - 45 U/L    ALT 60 (H) 0 - 50 U/L    Protein Total 7.2 6.8 - 8.8 g/dL    Albumin 3.2 (L) 3.4 - 5.0 g/dL    Bilirubin Total 0.6 0.2 - 1.3 mg/dL    GFR Estimate >90 >60 mL/min/1.73m2   CBC with platelets and differential    Collection Time: 07/14/21 10:13 AM   Result Value Ref Range    WBC Count 1.7 (L) 4.0 - 11.0 10e3/uL    RBC Count 3.03 (L) 3.80 - 5.20 10e6/uL    Hemoglobin 9.0 (L) 11.7 - 15.7 g/dL    Hematocrit 28.3 (L) 35.0 - 47.0 %    MCV 93 78 - 100 fL    MCH 29.7 26.5 - 33.0 pg    MCHC 31.8 31.5 - 36.5 g/dL    RDW 16.9 (H) 10.0 - 15.0 %    Platelet Count 126 (L) 150 - 450 10e3/uL   Manual Differential    Collection Time: 07/14/21 10:13 AM   Result Value Ref Range    % Neutrophils 37 %    % Lymphocytes 47 %    % Monocytes 8 %    % Eosinophils 3 %    % Basophils 3 %    % Myelocytes 2 %    NRBCs per 100 WBC 1 (H) <=0 %    Absolute Neutrophils 0.6 (L) 1.6 - 8.3 10e3/uL    Absolute Lymphocytes 0.8 0.8 - 5.3 10e3/uL    Absolute Monocytes 0.1 0.0 - 1.3 10e3/uL    Absolute Eosinophils 0.1 0.0 - 0.7 10e3/uL    Absolute Basophils 0.1 0.0 - 0.2 10e3/uL    Absolute Myelocytes 0.0 <=0.0 10e3/uL    Absolute NRBCs 0.0 <=0.0 10e3/uL    RBC Morphology Confirmed RBC Indices     Platelet Assessment  Automated Count Confirmed. Platelet morphology is normal.     Automated Count Confirmed. Platelet morphology is normal.

## 2021-07-15 ENCOUNTER — PATIENT OUTREACH (OUTPATIENT)
Dept: ONCOLOGY | Facility: CLINIC | Age: 56
End: 2021-07-15

## 2021-07-15 NOTE — PROGRESS NOTES
Received notification from RN infusion supervisor Braydne Mccurdy that Connie's anc was 0.6 yesterday and she didn't meet parameters for gemzar.  She is wondering if she can come on Sunday 7/18 for labs and gemzar.  Called Connie and she is willing to come in on Sunday morning.  Will notify  for treatment parameters.

## 2021-07-18 ENCOUNTER — INFUSION THERAPY VISIT (OUTPATIENT)
Dept: ONCOLOGY | Facility: CLINIC | Age: 56
End: 2021-07-18
Attending: INTERNAL MEDICINE
Payer: COMMERCIAL

## 2021-07-18 VITALS
OXYGEN SATURATION: 97 % | WEIGHT: 207.1 LBS | RESPIRATION RATE: 20 BRPM | TEMPERATURE: 98.7 F | SYSTOLIC BLOOD PRESSURE: 97 MMHG | HEART RATE: 83 BPM | DIASTOLIC BLOOD PRESSURE: 63 MMHG | BODY MASS INDEX: 32.44 KG/M2

## 2021-07-18 DIAGNOSIS — C49.9 SARCOMA OF SOFT TISSUE (H): Primary | ICD-10-CM

## 2021-07-18 DIAGNOSIS — D63.0 ANEMIA IN NEOPLASTIC DISEASE: ICD-10-CM

## 2021-07-18 DIAGNOSIS — C78.00 MALIGNANT NEOPLASM METASTATIC TO LUNG, UNSPECIFIED LATERALITY (H): ICD-10-CM

## 2021-07-18 DIAGNOSIS — C49.9 SARCOMA (H): ICD-10-CM

## 2021-07-18 LAB
BASOPHILS # BLD MANUAL: 0 10E3/UL (ref 0–0.2)
BASOPHILS NFR BLD MANUAL: 0 %
EOSINOPHIL # BLD MANUAL: 0.1 10E3/UL (ref 0–0.7)
EOSINOPHIL NFR BLD MANUAL: 4 %
ERYTHROCYTE [DISTWIDTH] IN BLOOD BY AUTOMATED COUNT: 18.1 % (ref 10–15)
HCT VFR BLD AUTO: 27.4 % (ref 35–47)
HGB BLD-MCNC: 8.6 G/DL (ref 11.7–15.7)
LYMPHOCYTES # BLD MANUAL: 0.8 10E3/UL (ref 0.8–5.3)
LYMPHOCYTES NFR BLD MANUAL: 24 %
MCH RBC QN AUTO: 29.5 PG (ref 26.5–33)
MCHC RBC AUTO-ENTMCNC: 31.4 G/DL (ref 31.5–36.5)
MCV RBC AUTO: 94 FL (ref 78–100)
MONOCYTES # BLD MANUAL: 0.4 10E3/UL (ref 0–1.3)
MONOCYTES NFR BLD MANUAL: 11 %
NEUTROPHILS # BLD MANUAL: 1.9 10E3/UL (ref 1.6–8.3)
NEUTROPHILS NFR BLD MANUAL: 58 %
PLAT MORPH BLD: ABNORMAL
PLATELET # BLD AUTO: 82 10E3/UL (ref 150–450)
PROMYELOCYTES # BLD MANUAL: 0.1 10E3/UL
PROMYELOCYTES NFR BLD MANUAL: 3 %
RBC # BLD AUTO: 2.92 10E6/UL (ref 3.8–5.2)
RBC MORPH BLD: ABNORMAL
WBC # BLD AUTO: 3.3 10E3/UL (ref 4–11)

## 2021-07-18 PROCEDURE — 250N000011 HC RX IP 250 OP 636: Performed by: PHYSICIAN ASSISTANT

## 2021-07-18 PROCEDURE — 258N000003 HC RX IP 258 OP 636: Performed by: INTERNAL MEDICINE

## 2021-07-18 PROCEDURE — 96413 CHEMO IV INFUSION 1 HR: CPT

## 2021-07-18 PROCEDURE — 250N000011 HC RX IP 250 OP 636: Performed by: INTERNAL MEDICINE

## 2021-07-18 PROCEDURE — 96375 TX/PRO/DX INJ NEW DRUG ADDON: CPT

## 2021-07-18 PROCEDURE — 85027 COMPLETE CBC AUTOMATED: CPT | Performed by: INTERNAL MEDICINE

## 2021-07-18 RX ORDER — GRANISETRON HYDROCHLORIDE 1 MG/ML
1 INJECTION INTRAVENOUS ONCE
Status: COMPLETED | OUTPATIENT
Start: 2021-07-18 | End: 2021-07-18

## 2021-07-18 RX ORDER — HEPARIN SODIUM (PORCINE) LOCK FLUSH IV SOLN 100 UNIT/ML 100 UNIT/ML
5 SOLUTION INTRAVENOUS ONCE
Status: COMPLETED | OUTPATIENT
Start: 2021-07-18 | End: 2021-07-18

## 2021-07-18 RX ADMIN — GEMCITABINE 1000 MG: 38 INJECTION, SOLUTION INTRAVENOUS at 10:19

## 2021-07-18 RX ADMIN — Medication 5 ML: at 11:49

## 2021-07-18 RX ADMIN — GRANISETRON HYDROCHLORIDE 1 MG: 1 INJECTION INTRAVENOUS at 09:59

## 2021-07-18 ASSESSMENT — PAIN SCALES - GENERAL: PAINLEVEL: NO PAIN (0)

## 2021-07-18 NOTE — PATIENT INSTRUCTIONS
Contact Numbers    [facility]: [phone]    Call with chills and/or temperature greater than or equal to 100.5, uncontrolled nausea/vomiting, diarrhea, constipation, dizziness, shortness of breath, chest pain, bleeding, unexplained bruising, or any new/concerning symptoms, questions/concerns.     If you are having any concerning symptoms or wish to speak to a provider before your next infusion visit, please call your care coordinator or triage to notify them so we can adequately serve you.       After Hours: [phone]    If after hours, weekends, or holidays, call main hospital  and ask for Oncology doctor on call.       July 2021 Sunday Monday Tuesday Wednesday Thursday Friday Saturday                       1     2    LAB CENTRAL   8:30 AM   (15 min.)   NURSE ONLY CANCER CENTER   [facility] 3       4     5     6    VIDEO VISIT RETURN   2:05 PM   (40 min.)   Robert Blanca MD   [facility] 7    LAB CENTRAL  11:15 AM   (15 min.)   [facility]   [facility]    RETURN  11:45 AM   (45 min.)   Harshal Schuster PA   [facility]    ONC INFUSION 2 HR (120 MIN)   2:30 PM   (120 min.)   [facility] ONC INFUSION NURSE   [facility] 8     9     10       11     12    CT CHEST WO   3:20 PM   (20 min.)   [id number]   [facility] [place] 13    VIDEO VISIT RETURN   8:15 AM   (30 min.)   Jose Alberto Lopez MD   [facility]    VIDEO VISIT NEW  10:00 AM   (60 min.)   Heavenly Stratton RD   [facility] 14    LAB CENTRAL   9:30 AM   (15 min.)   [facility]   [facility]    ONC INFUSION 2 HR (120 MIN)  10:00 AM   (120 min.)   UC ONC INFUSION NURSE   [facility] 15     16     17        18    ONC INFUSION 1.5 HR (90 MIN)   9:00 AM   (90 min.)   UC ONC INFUSION NURSE   [facility] 19     20     21     22     23     24       25     26     27     28    LAB CENTRAL  11:45 AM   (15 min.)    [facility]   [facility]    RETURN  12:00 PM   (45 min.)   Harshal Schuster PA   [facility]    ONC INFUSION 1.5 HR (90 MIN)   3:00 PM   (90 min.)   [facility] ONC INFUSION NURSE   [facility] 29    VIDEO VISIT RETURN   2:30 PM   (60 min.)   Darlene Torres LICSW   [facility] [place] 30 31 August 2021 Sunday Monday Tuesday Wednesday Thursday Friday Saturday   1     2     3     4    LAB CENTRAL   1:30 PM   (15 min.)   [facility]   [facility]    ONC INFUSION 2.5 HR (150 MIN)   2:00 PM   (150 min.)    ONC INFUSION NURSE   [facility] 5     6     7       8     9     10     11     12     13     14       15     16     17     18    LAB CENTRAL   9:45 AM   (15 min.)   [facility]   [facility]    RETURN  10:15 AM   (45 min.)   Harshal Schuster PA   [facility]    ONC INFUSION 1.5 HR (90 MIN)  12:00 PM   (90 min.)   [facility] ONC INFUSION NURSE   [facility] 19     20     21       22     23     24     25    LAB CENTRAL   1:00 PM   (15 min.)    [facility]   [facility]    ONC INFUSION 2.5 HR (150 MIN)   1:30 PM   (150 min.)    ONC INFUSION NURSE   [facility] 26     27     28       29     30     31                                         Lab Results:  Recent Results (from the past 12 hour(s))   CBC with platelets and differential    Collection Time: 07/18/21  9:04 AM   Result Value Ref Range    WBC Count 3.3 (L) 4.0 - 11.0 10e3/uL    RBC Count  2.92 (L) 3.80 - 5.20 10e6/uL    Hemoglobin 8.6 (L) 11.7 - 15.7 g/dL    Hematocrit 27.4 (L) 35.0 - 47.0 %    MCV 94 78 - 100 fL    MCH 29.5 26.5 - 33.0 pg    MCHC 31.4 (L) 31.5 - 36.5 g/dL    RDW 18.1 (H) 10.0 - 15.0 %    Platelet Count 82 (L) 150 - 450 10e3/uL   Manual Differential    Collection Time: 07/18/21  9:04 AM   Result Value Ref Range    % Neutrophils 58 %    % Lymphocytes 24 %    % Monocytes 11 %    % Eosinophils 4 %    % Basophils 0 %    % Promyelocytes 3 %    Absolute Neutrophils 1.9 1.6 - 8.3 10e3/uL    Absolute Lymphocytes 0.8 0.8 - 5.3 10e3/uL    Absolute Monocytes 0.4 0.0 - 1.3 10e3/uL    Absolute Eosinophils 0.1 0.0 - 0.7 10e3/uL    Absolute Basophils 0.0 0.0 - 0.2 10e3/uL    Absolute Promyelocytes 0.1 (H) <=0.0 10e3/uL    RBC Morphology Confirmed RBC Indices     Platelet Assessment  Automated Count Confirmed. Platelet morphology is normal.     Automated Count Confirmed. Platelet morphology is normal.

## 2021-07-18 NOTE — PROGRESS NOTES
Infusion Nursing Note:  Richa Ashby presents today for Gemzar.    Patient seen by provider today: No    Note: Patient presents to clinic today feeling well with no questions.  Pt did not request or require any intervention for pain today.    Intravenous Access:  Implanted Port.    Treatment Conditions:  Lab Results   Component Value Date    HGB 8.6 07/18/2021    HGB 9.2 07/07/2021     Lab Results   Component Value Date    WBC 3.3 07/18/2021    WBC 4.3 07/07/2021      Lab Results   Component Value Date    ANEU 1.9 07/18/2021    ANEU 2.8 07/07/2021     Lab Results   Component Value Date    PLT 82 07/18/2021     07/07/2021      Results reviewed, labs MET treatment parameters, ok to proceed with treatment.    Post Infusion Assessment:  Patient tolerated infusion without incident.  Blood return noted pre and post infusion.  Site patent and intact, free from redness, edema or discomfort.  No evidence of extravasations.  Access discontinued per protocol.    Discharge Plan:   Patient declined prescription refills.  Discharge instructions reviewed with: Patient.  Patient and/or family verbalized understanding of discharge instructions and all questions answered.  AVS to patient via RdioT.  Patient will return 7/28/2021 for next appointment.   Patient discharged in stable condition accompanied by: self.  Departure Mode: Ambulatory.    Sunshine Kessler RN

## 2021-07-24 ENCOUNTER — OFFICE VISIT (OUTPATIENT)
Dept: URGENT CARE | Facility: URGENT CARE | Age: 56
End: 2021-07-24
Payer: COMMERCIAL

## 2021-07-24 ENCOUNTER — NURSE TRIAGE (OUTPATIENT)
Dept: NURSING | Facility: CLINIC | Age: 56
End: 2021-07-24

## 2021-07-24 VITALS
SYSTOLIC BLOOD PRESSURE: 119 MMHG | DIASTOLIC BLOOD PRESSURE: 78 MMHG | RESPIRATION RATE: 16 BRPM | WEIGHT: 204 LBS | OXYGEN SATURATION: 98 % | HEART RATE: 86 BPM | BODY MASS INDEX: 31.95 KG/M2 | TEMPERATURE: 98.6 F

## 2021-07-24 DIAGNOSIS — N30.00 ACUTE CYSTITIS WITHOUT HEMATURIA: Primary | ICD-10-CM

## 2021-07-24 DIAGNOSIS — R30.0 DYSURIA: ICD-10-CM

## 2021-07-24 LAB
ALBUMIN UR-MCNC: NEGATIVE MG/DL
APPEARANCE UR: ABNORMAL
BACTERIA #/AREA URNS HPF: ABNORMAL /HPF
BILIRUB UR QL STRIP: NEGATIVE
COLOR UR AUTO: YELLOW
GLUCOSE UR STRIP-MCNC: NEGATIVE MG/DL
HGB UR QL STRIP: ABNORMAL
KETONES UR STRIP-MCNC: NEGATIVE MG/DL
LEUKOCYTE ESTERASE UR QL STRIP: ABNORMAL
NITRATE UR QL: NEGATIVE
PH UR STRIP: 7 [PH] (ref 5–7)
RBC #/AREA URNS AUTO: ABNORMAL /HPF
SP GR UR STRIP: <=1.005 (ref 1–1.03)
SQUAMOUS #/AREA URNS AUTO: ABNORMAL /LPF
UROBILINOGEN UR STRIP-ACNC: 0.2 E.U./DL
WBC #/AREA URNS AUTO: ABNORMAL /HPF

## 2021-07-24 PROCEDURE — 81001 URINALYSIS AUTO W/SCOPE: CPT | Performed by: FAMILY MEDICINE

## 2021-07-24 PROCEDURE — 87086 URINE CULTURE/COLONY COUNT: CPT | Performed by: FAMILY MEDICINE

## 2021-07-24 PROCEDURE — 99213 OFFICE O/P EST LOW 20 MIN: CPT | Performed by: FAMILY MEDICINE

## 2021-07-24 RX ORDER — NITROFURANTOIN 25; 75 MG/1; MG/1
100 CAPSULE ORAL 2 TIMES DAILY
Qty: 14 CAPSULE | Refills: 0 | Status: SHIPPED | OUTPATIENT
Start: 2021-07-24 | End: 2021-07-31

## 2021-07-24 NOTE — TELEPHONE ENCOUNTER
UTI symptoms that started today    Burning and urgency    Rates the pain at a 4-5/10    Started around 2am and she has been up to void about 4-5 times since that time    Denies a temp, abdominal pain, flank pain, back pain, vaginal discharge, blood in the urine,     Shannan Stapleton RN  [place] Nurse Advisor  8:02 AM  7/24/2021      COVID 19 Nurse Triage Plan/Patient Instructions    Please be aware that novel coronavirus (COVID-19) may be circulating in the community. If you develop symptoms such as fever, cough, or SOB or if you have concerns about the presence of another infection including coronavirus (COVID-19), please contact your health care provider or visit [email or web address].     Disposition/Instructions    In-Person Visit with provider recommended. Reference Visit Selection Guide.    Thank you for taking steps to prevent the spread of this virus.  o Limit your contact with others.  o Wear a simple mask to cover your cough.  o Wash your hands well and often.    Resources    [facility]: About COVID-19: [email or web address]    CDC: What to Do If You're Sick: www.cdc.gov/coronavirus/2019-ncov/about/steps-when-sick.html    CDC: Ending Home Isolation: www.cdc.gov/coronavirus/2019-ncov/hcp/disposition-in-home-patients.html     CDC: Caring for Someone: www.cdc.gov/coronavirus/2019-ncov/if-you-are-sick/care-for-someone.html     [facility]: Interim Guidance for Hospital Discharge to Home: www.health.[facility].[place]/diseases/coronavirus/hcp/hospdischarge.pdf    [facility] clinical trials (COVID-19 research studies): clinicalaffairs.[facility]-clinical-trials     Below are the COVID-19 hotlines at the [place] Department of Health ([facility]). Interpreters are available.   o For health questions: Call 866-878-3922 or 1-116.874.7826 (7 a.m. to 7 p.m.)  o For questions about schools and childcare: Call 915-963-0756 or 1-719.205.1635 (7 a.m. to 7 p.m.)                         Reason for  Disposition    Diabetes mellitus or weak immune system (e.g., HIV positive, cancer chemo, splenectomy, organ transplant, chronic steroids)    Additional Information    Negative: Shock suspected (e.g., cold/pale/clammy skin, too weak to stand, low BP, rapid pulse)    Negative: Sounds like a life-threatening emergency to the triager    Negative: [1] Unable to urinate (or only a few drops) > 4 hours AND [2] bladder feels very full (e.g., palpable bladder or strong urge to urinate)    Negative: Vomiting    Negative: Patient sounds very sick or weak to the triager    Negative: [1] SEVERE pain with urination  (e.g., excruciating) AND [2] not improved after 2 hours of pain medicine and Sitz bath    Negative: Fever > 100.4 F (38.0 C)    Negative: Side (flank) or lower back pain present    Protocols used: URINATION PAIN - FEMALE-A-

## 2021-07-24 NOTE — PROGRESS NOTES
Assessment & Plan     Acute cystitis without hematuria  Differentials discussed in detail.  Suspect symptoms secondary to acute cystitis.  Macrobid prescribed, common side effects discussed.  Urine culture sent.  Recommended to continue well hydration and follow-up if symptoms persist.  All questions answered.  - nitroFURantoin macrocrystal-monohydrate (MACROBID) 100 MG capsule; Take 1 capsule (100 mg) by mouth 2 times daily for 7 days    Dysuria  - UA reflex to Microscopic and Culture  - Urine Microscopic Exam  - Urine Culture        Matthew Matthew MD  [facility] [place] [facility]    Philippe Rosales is a 56 year old who presents for the following health issues     HPI     Genitourinary - Female  Onset/Duration: today   Description:   Painful urination (Dysuria): YES, cloudy urine           Frequency: YES  Blood in urine (Hematuria): no  Delay in urine (Hesitency): no  Intensity: moderate  Progression of Symptoms:  worsening  Accompanying Signs & Symptoms:  Fever/chills: no  Flank pain: no  Nausea and vomiting: no  Vaginal symptoms: none  Abdominal/Pelvic Pain: YES  History:   History of frequent UTI s: YES  History of kidney stones: no  Precipitating or alleviating factors: None  Therapies tried and outcome: none         Review of Systems   Constitutional, HEENT, cardiovascular, pulmonary, gi and gu systems are negative, except as otherwise noted.      Objective    /78 (BP Location: Left arm, Patient Position: Sitting, Cuff Size: Adult Large)   Pulse 86   Temp 98.6  F (37  C) (Tympanic)   Resp 16   Wt 92.5 kg (204 lb)   LMP 03/19/2019   SpO2 98%   Breastfeeding No   BMI 31.95 kg/m    Body mass index is 31.95 kg/m .  Physical Exam   GENERAL: alert and no distress  NECK: no adenopathy, no asymmetry, masses, or scars and thyroid normal to palpation  RESP: lungs clear to auscultation - no rales, rhonchi or wheezes  CV: regular rate and rhythm, normal S1 S2, no S3 or S4, no murmur,  click or rub, no peripheral edema and peripheral pulses strong  ABDOMEN: soft, nontender, no hepatosplenomegaly, no masses and bowel sounds normal  MS: no gross musculoskeletal defects noted, no edema  NEURO: Normal strength and tone, mentation intact and speech normal    Results for orders placed or performed in visit on 07/24/21   UA reflex to Microscopic and Culture     Status: Abnormal    Specimen: Urine, Midstream   Result Value Ref Range    Color Urine Yellow Colorless, Straw, Light Yellow, Yellow    Appearance Urine Slightly Cloudy (A) Clear    Glucose Urine Negative Negative mg/dL    Bilirubin Urine Negative Negative    Ketones Urine Negative Negative mg/dL    Specific Gravity Urine <=1.005 1.003 - 1.035    Blood Urine Trace (A) Negative    pH Urine 7.0 5.0 - 7.0    Protein Albumin Urine Negative Negative mg/dL    Urobilinogen Urine 0.2 0.2, 1.0 E.U./dL    Nitrite Urine Negative Negative    Leukocyte Esterase Urine Large (A) Negative   Urine Microscopic Exam     Status: Abnormal   Result Value Ref Range    Bacteria Urine Moderate (A) None Seen /HPF    RBC Urine 0-2 0-2 /HPF /HPF    WBC Urine 25-50 (A) 0-5 /HPF /HPF    Squamous Epithelials Urine Few (A) None Seen /LPF

## 2021-07-25 LAB — BACTERIA UR CULT: NORMAL

## 2021-07-28 ENCOUNTER — APPOINTMENT (OUTPATIENT)
Dept: LAB | Facility: CLINIC | Age: 56
End: 2021-07-28
Attending: INTERNAL MEDICINE
Payer: COMMERCIAL

## 2021-07-28 ENCOUNTER — INFUSION THERAPY VISIT (OUTPATIENT)
Dept: ONCOLOGY | Facility: CLINIC | Age: 56
End: 2021-07-28
Attending: INTERNAL MEDICINE
Payer: COMMERCIAL

## 2021-07-28 ENCOUNTER — ONCOLOGY VISIT (OUTPATIENT)
Dept: ONCOLOGY | Facility: CLINIC | Age: 56
End: 2021-07-28
Attending: PHYSICIAN ASSISTANT
Payer: COMMERCIAL

## 2021-07-28 VITALS
RESPIRATION RATE: 16 BRPM | SYSTOLIC BLOOD PRESSURE: 132 MMHG | HEART RATE: 96 BPM | BODY MASS INDEX: 32.19 KG/M2 | DIASTOLIC BLOOD PRESSURE: 73 MMHG | WEIGHT: 205.5 LBS | OXYGEN SATURATION: 97 % | TEMPERATURE: 98.4 F

## 2021-07-28 DIAGNOSIS — C49.9 SARCOMA (H): ICD-10-CM

## 2021-07-28 DIAGNOSIS — G62.0 DRUG-INDUCED POLYNEUROPATHY (H): ICD-10-CM

## 2021-07-28 DIAGNOSIS — D63.0 ANEMIA IN NEOPLASTIC DISEASE: ICD-10-CM

## 2021-07-28 DIAGNOSIS — C78.00 MALIGNANT NEOPLASM METASTATIC TO LUNG, UNSPECIFIED LATERALITY (H): ICD-10-CM

## 2021-07-28 DIAGNOSIS — C49.9 SARCOMA OF SOFT TISSUE (H): Primary | ICD-10-CM

## 2021-07-28 DIAGNOSIS — C49.9 SARCOMA OF SOFT TISSUE (H): ICD-10-CM

## 2021-07-28 DIAGNOSIS — C78.00 MALIGNANT NEOPLASM METASTATIC TO LUNG, UNSPECIFIED LATERALITY (H): Primary | ICD-10-CM

## 2021-07-28 LAB
ALBUMIN SERPL-MCNC: 3.4 G/DL (ref 3.4–5)
ALP SERPL-CCNC: 144 U/L (ref 40–150)
ALT SERPL W P-5'-P-CCNC: 28 U/L (ref 0–50)
ANION GAP SERPL CALCULATED.3IONS-SCNC: 6 MMOL/L (ref 3–14)
AST SERPL W P-5'-P-CCNC: 17 U/L (ref 0–45)
BASOPHILS # BLD MANUAL: 0 10E3/UL (ref 0–0.2)
BASOPHILS NFR BLD MANUAL: 0 %
BILIRUB SERPL-MCNC: 0.8 MG/DL (ref 0.2–1.3)
BUN SERPL-MCNC: 13 MG/DL (ref 7–30)
CALCIUM SERPL-MCNC: 9.2 MG/DL (ref 8.5–10.1)
CHLORIDE BLD-SCNC: 106 MMOL/L (ref 94–109)
CO2 SERPL-SCNC: 28 MMOL/L (ref 20–32)
CREAT SERPL-MCNC: 0.7 MG/DL (ref 0.52–1.04)
EOSINOPHIL # BLD MANUAL: 0.2 10E3/UL (ref 0–0.7)
EOSINOPHIL NFR BLD MANUAL: 4 %
ERYTHROCYTE [DISTWIDTH] IN BLOOD BY AUTOMATED COUNT: 18.5 % (ref 10–15)
GFR SERPL CREATININE-BSD FRML MDRD: >90 ML/MIN/1.73M2
GLUCOSE BLD-MCNC: 91 MG/DL (ref 70–99)
HCT VFR BLD AUTO: 26.9 % (ref 35–47)
HGB BLD-MCNC: 8.6 G/DL (ref 11.7–15.7)
LYMPHOCYTES # BLD MANUAL: 0.7 10E3/UL (ref 0.8–5.3)
LYMPHOCYTES NFR BLD MANUAL: 17 %
MCH RBC QN AUTO: 30.2 PG (ref 26.5–33)
MCHC RBC AUTO-ENTMCNC: 32 G/DL (ref 31.5–36.5)
MCV RBC AUTO: 94 FL (ref 78–100)
MONOCYTES # BLD MANUAL: 0.7 10E3/UL (ref 0–1.3)
MONOCYTES NFR BLD MANUAL: 16 %
NEUTROPHILS # BLD MANUAL: 2.6 10E3/UL (ref 1.6–8.3)
NEUTROPHILS NFR BLD MANUAL: 63 %
PLAT MORPH BLD: ABNORMAL
PLATELET # BLD AUTO: 96 10E3/UL (ref 150–450)
POLYCHROMASIA BLD QL SMEAR: SLIGHT
POTASSIUM BLD-SCNC: 3.8 MMOL/L (ref 3.4–5.3)
PROT SERPL-MCNC: 7.5 G/DL (ref 6.8–8.8)
RBC # BLD AUTO: 2.85 10E6/UL (ref 3.8–5.2)
RBC MORPH BLD: ABNORMAL
SODIUM SERPL-SCNC: 140 MMOL/L (ref 133–144)
WBC # BLD AUTO: 4.1 10E3/UL (ref 4–11)

## 2021-07-28 PROCEDURE — 96415 CHEMO IV INFUSION ADDL HR: CPT

## 2021-07-28 PROCEDURE — 99417 PROLNG OP E/M EACH 15 MIN: CPT | Performed by: PHYSICIAN ASSISTANT

## 2021-07-28 PROCEDURE — 36591 DRAW BLOOD OFF VENOUS DEVICE: CPT | Performed by: INTERNAL MEDICINE

## 2021-07-28 PROCEDURE — 250N000011 HC RX IP 250 OP 636: Performed by: PHYSICIAN ASSISTANT

## 2021-07-28 PROCEDURE — 82040 ASSAY OF SERUM ALBUMIN: CPT | Performed by: INTERNAL MEDICINE

## 2021-07-28 PROCEDURE — 96375 TX/PRO/DX INJ NEW DRUG ADDON: CPT

## 2021-07-28 PROCEDURE — G0463 HOSPITAL OUTPT CLINIC VISIT: HCPCS

## 2021-07-28 PROCEDURE — 96413 CHEMO IV INFUSION 1 HR: CPT

## 2021-07-28 PROCEDURE — 258N000003 HC RX IP 258 OP 636: Performed by: PHYSICIAN ASSISTANT

## 2021-07-28 PROCEDURE — 85027 COMPLETE CBC AUTOMATED: CPT | Performed by: INTERNAL MEDICINE

## 2021-07-28 PROCEDURE — 99215 OFFICE O/P EST HI 40 MIN: CPT | Performed by: PHYSICIAN ASSISTANT

## 2021-07-28 RX ORDER — NALOXONE HYDROCHLORIDE 0.4 MG/ML
0.2 INJECTION, SOLUTION INTRAMUSCULAR; INTRAVENOUS; SUBCUTANEOUS
Status: CANCELLED | OUTPATIENT
Start: 2021-07-28

## 2021-07-28 RX ORDER — DIPHENHYDRAMINE HYDROCHLORIDE 50 MG/ML
50 INJECTION INTRAMUSCULAR; INTRAVENOUS
Status: CANCELLED
Start: 2021-08-04

## 2021-07-28 RX ORDER — ALBUTEROL SULFATE 0.83 MG/ML
2.5 SOLUTION RESPIRATORY (INHALATION)
Status: CANCELLED | OUTPATIENT
Start: 2021-07-28

## 2021-07-28 RX ORDER — DIPHENHYDRAMINE HYDROCHLORIDE 50 MG/ML
50 INJECTION INTRAMUSCULAR; INTRAVENOUS
Status: CANCELLED
Start: 2021-07-28

## 2021-07-28 RX ORDER — MEPERIDINE HYDROCHLORIDE 25 MG/ML
25 INJECTION INTRAMUSCULAR; INTRAVENOUS; SUBCUTANEOUS EVERY 30 MIN PRN
Status: CANCELLED | OUTPATIENT
Start: 2021-07-28

## 2021-07-28 RX ORDER — HEPARIN SODIUM (PORCINE) LOCK FLUSH IV SOLN 100 UNIT/ML 100 UNIT/ML
5 SOLUTION INTRAVENOUS
Status: CANCELLED | OUTPATIENT
Start: 2021-08-04

## 2021-07-28 RX ORDER — ALBUTEROL SULFATE 90 UG/1
1-2 AEROSOL, METERED RESPIRATORY (INHALATION)
Status: CANCELLED
Start: 2021-07-28

## 2021-07-28 RX ORDER — LORAZEPAM 2 MG/ML
0.5 INJECTION INTRAMUSCULAR EVERY 4 HOURS PRN
Status: CANCELLED
Start: 2021-08-04

## 2021-07-28 RX ORDER — HEPARIN SODIUM (PORCINE) LOCK FLUSH IV SOLN 100 UNIT/ML 100 UNIT/ML
5 SOLUTION INTRAVENOUS
Status: DISCONTINUED | OUTPATIENT
Start: 2021-07-28 | End: 2021-07-28 | Stop reason: HOSPADM

## 2021-07-28 RX ORDER — ALBUTEROL SULFATE 90 UG/1
1-2 AEROSOL, METERED RESPIRATORY (INHALATION)
Status: CANCELLED
Start: 2021-08-04

## 2021-07-28 RX ORDER — EPINEPHRINE 1 MG/ML
0.3 INJECTION, SOLUTION INTRAMUSCULAR; SUBCUTANEOUS EVERY 5 MIN PRN
Status: CANCELLED | OUTPATIENT
Start: 2021-08-04

## 2021-07-28 RX ORDER — GRANISETRON HYDROCHLORIDE 1 MG/ML
1 INJECTION INTRAVENOUS ONCE
Status: CANCELLED | OUTPATIENT
Start: 2021-07-28 | End: 2021-07-28

## 2021-07-28 RX ORDER — LORAZEPAM 2 MG/ML
0.5 INJECTION INTRAMUSCULAR EVERY 4 HOURS PRN
Status: CANCELLED
Start: 2021-07-28

## 2021-07-28 RX ORDER — METHYLPREDNISOLONE SODIUM SUCCINATE 125 MG/2ML
125 INJECTION, POWDER, LYOPHILIZED, FOR SOLUTION INTRAMUSCULAR; INTRAVENOUS
Status: CANCELLED
Start: 2021-07-28

## 2021-07-28 RX ORDER — HEPARIN SODIUM,PORCINE 10 UNIT/ML
5 VIAL (ML) INTRAVENOUS
Status: CANCELLED | OUTPATIENT
Start: 2021-08-04

## 2021-07-28 RX ORDER — ACETAMINOPHEN 325 MG/1
975 TABLET ORAL ONCE
Status: CANCELLED | OUTPATIENT
Start: 2021-08-04 | End: 2021-08-04

## 2021-07-28 RX ORDER — METHYLPREDNISOLONE SODIUM SUCCINATE 125 MG/2ML
125 INJECTION, POWDER, LYOPHILIZED, FOR SOLUTION INTRAMUSCULAR; INTRAVENOUS
Status: CANCELLED
Start: 2021-08-04

## 2021-07-28 RX ORDER — NALOXONE HYDROCHLORIDE 0.4 MG/ML
0.2 INJECTION, SOLUTION INTRAMUSCULAR; INTRAVENOUS; SUBCUTANEOUS
Status: CANCELLED | OUTPATIENT
Start: 2021-08-04

## 2021-07-28 RX ORDER — EPINEPHRINE 1 MG/ML
0.3 INJECTION, SOLUTION INTRAMUSCULAR; SUBCUTANEOUS EVERY 5 MIN PRN
Status: CANCELLED | OUTPATIENT
Start: 2021-07-28

## 2021-07-28 RX ORDER — MEPERIDINE HYDROCHLORIDE 25 MG/ML
25 INJECTION INTRAMUSCULAR; INTRAVENOUS; SUBCUTANEOUS EVERY 30 MIN PRN
Status: CANCELLED | OUTPATIENT
Start: 2021-08-04

## 2021-07-28 RX ORDER — HEPARIN SODIUM,PORCINE 10 UNIT/ML
5 VIAL (ML) INTRAVENOUS
Status: CANCELLED | OUTPATIENT
Start: 2021-07-28

## 2021-07-28 RX ORDER — GRANISETRON HYDROCHLORIDE 1 MG/ML
1 INJECTION INTRAVENOUS ONCE
Status: COMPLETED | OUTPATIENT
Start: 2021-07-28 | End: 2021-07-28

## 2021-07-28 RX ORDER — GABAPENTIN 300 MG/1
300 CAPSULE ORAL 3 TIMES DAILY
Qty: 90 CAPSULE | Refills: 1 | Status: ON HOLD | OUTPATIENT
Start: 2021-07-28 | End: 2021-08-31

## 2021-07-28 RX ORDER — HEPARIN SODIUM (PORCINE) LOCK FLUSH IV SOLN 100 UNIT/ML 100 UNIT/ML
5 SOLUTION INTRAVENOUS ONCE
Status: COMPLETED | OUTPATIENT
Start: 2021-07-28 | End: 2021-07-28

## 2021-07-28 RX ORDER — ALBUTEROL SULFATE 0.83 MG/ML
2.5 SOLUTION RESPIRATORY (INHALATION)
Status: CANCELLED | OUTPATIENT
Start: 2021-08-04

## 2021-07-28 RX ORDER — HEPARIN SODIUM (PORCINE) LOCK FLUSH IV SOLN 100 UNIT/ML 100 UNIT/ML
5 SOLUTION INTRAVENOUS
Status: CANCELLED | OUTPATIENT
Start: 2021-07-28

## 2021-07-28 RX ADMIN — Medication 5 ML: at 15:45

## 2021-07-28 RX ADMIN — GRANISETRON HYDROCHLORIDE 1 MG: 1 INJECTION INTRAVENOUS at 13:57

## 2021-07-28 RX ADMIN — GEMCITABINE 1000 MG: 38 INJECTION, SOLUTION INTRAVENOUS at 14:13

## 2021-07-28 RX ADMIN — Medication 5 ML: at 11:47

## 2021-07-28 RX ADMIN — SODIUM CHLORIDE 250 ML: 9 INJECTION, SOLUTION INTRAVENOUS at 13:57

## 2021-07-28 ASSESSMENT — PAIN SCALES - GENERAL: PAINLEVEL: NO PAIN (0)

## 2021-07-28 NOTE — PROGRESS NOTES
Infusion Nursing Note:  Richa Ashby presents today for Cycle 3 Day 1 Gemzar.    Patient seen by provider today: Yes: RUPERT Guaman   present during visit today: Not Applicable.    Note: Patient arrives to infusion today feeling well. Denies any new concerns or complaints following visit with RUPERT Guaman.    Intravenous Access:  Implanted Port.    Treatment Conditions:  Lab Results   Component Value Date    HGB 8.6 07/28/2021    HGB 9.2 07/07/2021     Lab Results   Component Value Date    WBC 4.1 07/28/2021    WBC 4.3 07/07/2021      Lab Results   Component Value Date    ANEU 2.6 07/28/2021    ANEU 2.8 07/07/2021     Lab Results   Component Value Date    PLT 96 07/28/2021     07/07/2021      Lab Results   Component Value Date     07/28/2021     07/07/2021                   Lab Results   Component Value Date    POTASSIUM 3.8 07/28/2021    POTASSIUM 3.9 07/07/2021           Lab Results   Component Value Date    MAG 2.0 06/20/2021            Lab Results   Component Value Date    CR 0.70 07/28/2021    CR 0.67 07/07/2021                   Lab Results   Component Value Date    VIKTORIA 9.2 07/28/2021    VIKTORIA 9.1 07/07/2021                Lab Results   Component Value Date    BILITOTAL 0.8 07/28/2021    BILITOTAL 0.5 07/07/2021           Lab Results   Component Value Date    ALBUMIN 3.4 07/28/2021    ALBUMIN 3.2 07/07/2021                    Lab Results   Component Value Date    ALT 28 07/28/2021    ALT 18 07/07/2021           Lab Results   Component Value Date    AST 17 07/28/2021    AST 10 07/07/2021     Results reviewed, labs MET treatment parameters, ok to proceed with treatment.    Post Infusion Assessment:  Patient tolerated infusion without incident.  Blood return noted pre and post infusion.  Site patent and intact, free from redness, edema or discomfort.  No evidence of extravasations.  Access discontinued per protocol.     Discharge Plan:   Prescription refills  given for Gabapentin.  Discharge instructions reviewed with: Patient.  Patient and/or family verbalized understanding of discharge instructions and all questions answered.  AVS to patient via BusportalT.  Patient will return 8/4/2021 for next appointment.   Patient discharged in stable condition accompanied by: self.  Departure Mode: Ambulatory.      Anita Manjarrez RN

## 2021-07-28 NOTE — NURSING NOTE
"Oncology Rooming Note    July 28, 2021 12:00 PM   Richa Ashby is a 56 year old female who presents for:    Chief Complaint   Patient presents with     Port Draw     Labs drawn via port by rn in lab. VS taken.     Oncology Clinic Visit     sarcoma     Initial Vitals: /73 (BP Location: Right arm, Patient Position: Sitting, Cuff Size: Adult Regular)   Pulse 96   Temp 98.4  F (36.9  C) (Oral)   Resp 16   Wt 93.2 kg (205 lb 8 oz)   LMP 03/19/2019   SpO2 97%   BMI 32.19 kg/m   Estimated body mass index is 32.19 kg/m  as calculated from the following:    Height as of 6/17/21: 1.702 m (5' 7\").    Weight as of this encounter: 93.2 kg (205 lb 8 oz). Body surface area is 2.1 meters squared.  No Pain (0) Comment: Data Unavailable   Patient's last menstrual period was 03/19/2019.  Allergies reviewed: Yes  Medications reviewed: Yes    Medications: MEDICATION REFILLS NEEDED TODAY. Provider was notified. Gabapentin needs refill    Pharmacy name entered into [employer]:    [id number] - [place], [place] - [address] [place] [address] [place], [place] - [phone]    Clinical concerns: found lump in left thigh at scar site.      Flaca Alatorre CMA            "

## 2021-07-28 NOTE — PATIENT INSTRUCTIONS
Contact Numbers  [facility]: [phone] (for symptom and scheduling needs)    Please call the [facility] Triage line if you experience a temperature greater than or equal to 100.4, shaking chills, have uncontrolled nausea, vomiting and/or diarrhea, dizziness, shortness of breath, chest pain, bleeding, unexplained bruising, or if you have any other new/concerning symptoms, questions or concerns.     If you are having any concerning symptoms or wish to speak to a provider before your next infusion visit, please call your care coordinator or triage to notify them so we can adequately serve you.     If you need a refill on a narcotic prescription or other medication, please call triage before your infusion appointment.          July 2021 Sunday Monday Tuesday Wednesday Thursday Friday Saturday                       1     2    LAB CENTRAL   8:30 AM   (15 min.)   NURSE ONLY CANCER CENTER   [facility] 3       4     5     6    VIDEO VISIT RETURN   2:05 PM   (40 min.)   Robert Blanca MD   [facility] 7    LAB CENTRAL  11:15 AM   (15 min.)   [facility] LAB DRAW   [facility]    RETURN  11:45 AM   (45 min.)   Harshal Schuster PA   [facility]    ONC INFUSION 2 HR (120 MIN)   2:30 PM   (120 min.)    ONC INFUSION NURSE   [facility] 8     9     10       11     12    CT CHEST WO   3:20 PM   (20 min.)   [email or web address]   [facility] 13    VIDEO VISIT RETURN   8:15 AM   (30 min.)   Jose Alberto Lopez MD   [facility]    VIDEO VISIT NEW  10:00 AM   (60 min.)   [address]   [facility] 14    LAB CENTRAL   9:30 AM   (15 min.)   [facility] LAB DRAW   [facility]    ONC INFUSION 2 HR (120 MIN)  10:00 AM   (120 min.)   UC ONC  INFUSION NURSE   [facility] 15     16     17       18    ONC INFUSION 1.5 HR (90 MIN)   9:00 AM   (90 min.)   UC ONC INFUSION NURSE   [facility] 19     20     21     22     23     24    TEAM SHORT  11:00 AM   (15 min.)   Matthew Matthew MD   [facility]   25     26     27     28    LAB CENTRAL  11:45 AM   (15 min.)   [facility]   [facility]    RETURN  12:00 PM   (45 min.)   Harshal Schuster PA   [facility]    ONC INFUSION 1.5 HR (90 MIN)   3:00 PM   (90 min.)   UC ONC INFUSION NURSE   [facility] 29    VIDEO VISIT RETURN   2:30 PM   (60 min.)   Darlene Torres LICSW   [facility] 30 31 August 2021 Sunday Monday Tuesday Wednesday Thursday Friday Saturday   1     2     3     4    LAB CENTRAL   1:30 PM   (15 min.)   [facility] DRAW   [facility]    ONC INFUSION 2.5 HR (150 MIN)   2:00 PM   (150 min.)   UC ONC INFUSION NURSE   [facility] 5     6     7       8     9     10     11     12     13     14       15     16     17     18    LAB CENTRAL   9:45 AM   (15 min.)   [facility]   [facility]    RETURN  10:15 AM   (45 min.)   Harshal Schuster PA   [facility]    ONC INFUSION 1.5 HR (90 MIN)  12:00 PM   (90 min.)   UC ONC INFUSION NURSE   [facility] 19     20     21       22     23     24     25    LAB CENTRAL   1:00 PM   (15 min.)   [facility] DRAW   [facility]    ONC INFUSION 2.5 HR (150 MIN)   1:30 PM   (150 min.)   UC ONC INFUSION NURSE   [facility] 26     27     28       29     30     31                                         Lab Results:  Recent Results  (from the past 12 hour(s))   Comprehensive metabolic panel    Collection Time: 07/28/21 11:54 AM   Result Value Ref Range    Sodium 140 133 - 144 mmol/L    Potassium 3.8 3.4 - 5.3 mmol/L    Chloride 106 94 - 109 mmol/L    Carbon Dioxide (CO2) 28 20 - 32 mmol/L    Anion Gap 6 3 - 14 mmol/L    Urea Nitrogen 13 7 - 30 mg/dL    Creatinine 0.70 0.52 - 1.04 mg/dL    Calcium 9.2 8.5 - 10.1 mg/dL    Glucose 91 70 - 99 mg/dL    Alkaline Phosphatase 144 40 - 150 U/L    AST 17 0 - 45 U/L    ALT 28 0 - 50 U/L    Protein Total 7.5 6.8 - 8.8 g/dL    Albumin 3.4 3.4 - 5.0 g/dL    Bilirubin Total 0.8 0.2 - 1.3 mg/dL    GFR Estimate >90 >60 mL/min/1.73m2   CBC with platelets and differential    Collection Time: 07/28/21 11:54 AM   Result Value Ref Range    WBC Count 4.1 4.0 - 11.0 10e3/uL    RBC Count 2.85 (L) 3.80 - 5.20 10e6/uL    Hemoglobin 8.6 (L) 11.7 - 15.7 g/dL    Hematocrit 26.9 (L) 35.0 - 47.0 %    MCV 94 78 - 100 fL    MCH 30.2 26.5 - 33.0 pg    MCHC 32.0 31.5 - 36.5 g/dL    RDW 18.5 (H) 10.0 - 15.0 %    Platelet Count 96 (L) 150 - 450 10e3/uL   Manual Differential    Collection Time: 07/28/21 11:54 AM   Result Value Ref Range    % Neutrophils 63 %    % Lymphocytes 17 %    % Monocytes 16 %    % Eosinophils 4 %    % Basophils 0 %    Absolute Neutrophils 2.6 1.6 - 8.3 10e3/uL    Absolute Lymphocytes 0.7 (L) 0.8 - 5.3 10e3/uL    Absolute Monocytes 0.7 0.0 - 1.3 10e3/uL    Absolute Eosinophils 0.2 0.0 - 0.7 10e3/uL    Absolute Basophils 0.0 0.0 - 0.2 10e3/uL    RBC Morphology Confirmed RBC Indices     Platelet Assessment  Automated Count Confirmed. Platelet morphology is normal.     Automated Count Confirmed. Platelet morphology is normal.    Polychromasia Slight (A) None Seen

## 2021-07-28 NOTE — PROGRESS NOTES
Oncology/Hematology Visit Note  Jul 28, 2021    Reason for Visit: Follow up of undifferentiated pleomorphic sarcoma     History of Present Illness: Richa Ashby is a 56 year old female with PMH asthma and GERD with undifferentiated pleomorphic sarcoma. 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 She received preoperative radiotherapy and the tumor was resected 3/12/20. She then developed a cough, chest tightness, and scapular pain and imaging revealed lung nodules. Lung biopsy 11/2/20 with sarcoma. She was started on Doxil + Ifos 11/13/20. Had delay in cycle 2 due to COVID dx 12/2/20. CT imaging after cycle 1 with positive response to treatment with decrease in pulmonary nodules. Received cycle 2 12/18/20. Received cycle 3 1/15/21 with dose reduction in Doxil for skin toxicity and mucositis. Received cycle 4 2/15/21.      CT CAP 3/8/21 with positive response to treatment, decreased size of pulmonary mets though one lesion was larger. Did see fibrocystic changes in left breast, recommended breast imaging. Received cycle 5 Doxil + ifos 3/11/21.      Imaging 4/5/21 with overall stable disease except one lung lesion, opted to continue Doxil alone for now and switch to Keytruda in future if ongoing progression.      Started Keytruda 5/10/21. Was seen 5/26/21 cough, SOB, fatigue work-up thought to be 2/2 phrenic nerve involvement of R apical lung mass.      Due to worsening fatigue and cough, repeat CT 6/15/21 concerning for progression vs pseudoprogression. Added Gemzar 6/16/21.     She was admitted 6/17/21-6/20/21 with fever of unknown origin, infectious work-up negative, treated with broad spectrum antibiotics.     She clinically improved on abx. Was able to receive day 8 Gemzar (dose reduced for thrombocytopenia) + Keytruda 6/23/21.     CT 7/12/21 with stable disease.     She returns today for oncology follow-up.     Interval History:  Connie was seen  today for oncology follow-up.  -Tolerating treatment OK-did have more fatigue after last Gemzar but didn't take prednisone this time-was able to sleep better. Fatigue resolved within 3 days   -Had mild nausea and took Zofran but then had headaches   -Rib/back pain resolved and no current pain. Has noted slight increase in cough and some catching with deep breathing, albuterol helped. No chest pain, SOB.  -Felt chilled but no fevers. Has UTI but symptoms improved with abx, to complete Saturday  -Notes recurrent tinnitus in R ear. Has also noted some mental fogginess but no acute neuro issues  -Denies abdominal pain. Bowels fluctuate, OK at the moment.   -No swelling issues though neuropathy in feet persists, was worse after walking more recently.  -Tried recumbent bike due to feet issues with walking-had some soreness in R leg and noted a small nodule in posterior thigh   -No bleeding issues except takes a bit longer to stop bleeding after port de-accessed.   -No rashes, itchiness is better, has had few patches of dry skin     Current Outpatient Medications   Medication Sig Dispense Refill     acetaminophen (TYLENOL) 325 MG tablet Take 325-650 mg by mouth every 6 hours as needed for mild pain       ADVAIR DISKUS 100-50 MCG/DOSE inhaler        albuterol (PROAIR RESPICLICK) 108 (90 Base) MCG/ACT inhaler Inhale 1-2 puffs into the lungs       cetirizine (ZYRTEC) 10 MG tablet Take 10 mg by mouth daily       gabapentin (NEURONTIN) 300 MG capsule Take 1 capsule (300 mg) by mouth 3 times daily 210 capsule 1     hydrOXYzine (ATARAX) 25 MG tablet Take 1 tablet (25 mg) by mouth 3 times daily as needed for itching 30 tablet 1     lidocaine-prilocaine (EMLA) 2.5-2.5 % external cream Apply topically as needed for moderate pain (Patient not taking: Reported on 6/2/2021) 30 g 3     melatonin (MELATONIN) 1 MG/ML LIQD liquid Take 5 mg by mouth       montelukast (SINGULAIR) 10 MG tablet Take 10 mg by mouth daily       multivitamin  w/minerals (MULTI-VITAMIN) tablet Take 1 tablet by mouth daily       nitroFURantoin macrocrystal-monohydrate (MACROBID) 100 MG capsule Take 1 capsule (100 mg) by mouth 2 times daily for 7 days 14 capsule 0     omeprazole (PRILOSEC) 20 MG DR capsule        ondansetron (ZOFRAN) 8 MG tablet Take 1 tablet (8 mg) by mouth every 8 hours as needed for nausea 60 tablet 1     prochlorperazine (COMPAZINE) 10 MG tablet Take 1 tablet (10 mg) by mouth every 6 hours as needed (Nausea/Vomiting) 30 tablet 5     vitamin D3 (CHOLECALCIFEROL) 2000 units (50 mcg) tablet Take 1 tablet by mouth daily         Physical Examination:  /73 (BP Location: Right arm, Patient Position: Sitting, Cuff Size: Adult Regular)   Pulse 96   Temp 98.4  F (36.9  C) (Oral)   Resp 16   Wt 93.2 kg (205 lb 8 oz)   LMP 03/19/2019   SpO2 97%   BMI 32.19 kg/m    Wt Readings from Last 10 Encounters:   07/24/21 92.5 kg (204 lb)   07/18/21 93.9 kg (207 lb 1.6 oz)   07/14/21 93.7 kg (206 lb 8 oz)   07/07/21 92.9 kg (204 lb 11.2 oz)   06/30/21 92.5 kg (204 lb)   06/23/21 95.1 kg (209 lb 11.2 oz)   06/20/21 95.2 kg (209 lb 12.8 oz)   06/17/21 96.2 kg (212 lb 1.6 oz)   06/15/21 94.7 kg (208 lb 12.8 oz)   06/02/21 98.4 kg (216 lb 14.4 oz)     Constitutional: Well-appearing female in no acute distress.  Eyes: EOMI, PERRL. No scleral icterus.  ENT: Right TM grey and not bulging   Lymphatic: Neck is supple without cervical or supraclavicular lymphadenopathy.   Cardiovascular: Regular rate and rhythm. No murmurs, gallops, or rubs. No peripheral edema.  Respiratory: Clear to auscultation bilaterally. No wheezes or crackles.  Gastrointestinal: Bowel sounds present. Abdomen soft, non-tender.  Neurologic: Cranial nerves II through XII are grossly intact.  Skin: No rashes, petechiae, or bruising noted on exposed skin.  MSK: 1cm linear nodule on posterior right thigh, non tender     Laboratory Data:  Results for AMADEO WILSON (MRN [id number]) as of 7/28/2021  14:02   7/28/2021 11:54   Sodium 140   Potassium 3.8   Chloride 106   Carbon Dioxide 28   Urea Nitrogen 13   Creatinine 0.70   GFR Estimate >90   Calcium 9.2   Anion Gap 6   Albumin 3.4   Protein Total 7.5   Bilirubin Total 0.8   Alkaline Phosphatase 144   ALT 28   AST 17   Glucose 91   WBC 4.1   Hemoglobin 8.6 (L)   Hematocrit 26.9 (L)   Platelet Count 96 (L)   RBC Count 2.85 (L)   MCV 94   MCH 30.2   MCHC 32.0   RDW 18.5 (H)   % Neutrophils 63   % Lymphocytes 17   % Monocytes 16   % Eosinophils 4   Absolute Basophils 0.0   % Basophils 0   Absolute Neutrophil 2.6   Absolute Lymphocytes 0.7 (L)   Absolute Monocytes 0.7   Absolute Eosinophils 0.2   RBC Morphology Confirmed RBC Indices   Platelet Morphology Automated Count Confirmed. Platelet morphology is normal.   Polychromasia Slight (A)       Assessment and Plan:  1. Onc  Metastatic UPS, was on Doxil + Ifosfamide (5 cycles) with positive response to treatment initially, then due to poor tolerance switched to Doxil alone (1 cycle) but had progression. Started on Keytruda 5/10/21. Due to tumor growth (progression vs pseudoprogression) added Gemzar 6/16/21. CT imaging mid July with positive response to treatment.     Has had issues with low plt/ANC with Gemzar. Discussed with Dr. Lopez and will change Gemzar to 1000mg day 1 and day 15 today with the start of cycle 3.      Due to logistics with scheduling every 3 week Keytruda, will get 200mg Keytruda next week 8/4 (3 weeks from last dose), and then on 8/25 will plan on doing 400mg (every 6 week) dose.     Plan to repeat imaging early September.      2. ID  Admitted 6/17/21 for high fevers and hypotension, infectious work-up negative though did improve with broad spectrum antibiotics. No recurrent fevers.    Finishing Macrobid for UTI, symptoms improving.      3. Neuro  Fatigue: Worked up for adrenal insufficiency-cortisol WNL, hypothyroidism-TSH minimally elevated and free T4 normal, OH-iron studies OK, anemia-hgb  improving,[id number], troponin negative. Fatigue much better now after antibiotics so possible unknown infection causing symptoms.     No longer on prednisone after treatment-does have fatigue but manageable.     Tinnitus: Will monitor and if persists will refer for audiology and ENT      4. Heme  Anemia: ACD and chemotherapy, did require pRBC inpatient. Hgb stable at 8.6.      Thrombocytopenia: 2/2 Gemzar. No bleeding issues. OK today at 96K. Changing treatment to 1000mg day 1 and day 15 as above.     Neutropenia: no current issues, wait to add Neulasta for now.      Transfuse to keep hgb >8 and plt >10.      5. FEN  Eating better now that she isn't so fatigued. Monitor weight. CMP stable.      6. GI  GERD: continuing Omeprazole and tums, stable.      Diarrhea: Resolved.      LFT WNL.    Nausea: Try Compazine given headaches with Zofran.      7. Derm  Hand/foot: 2/2 Doxil, improving now that she is off Doxil.      Previously prescribed Hydroxyzine PRN for pruritis-improved.      8. Pulm  Asthma: stable, continue inhalers. Can use albuterol more often if cough persists.      COVID: Diagnosed in December, recovered.     Cough/SOB: See work-up from 5/26/21 (negative CT PE, RVP, COVID, NTBNP). Thought to be 2/2 phrenic nerve involvement of tumor. Possibly infectious component given improvement with antibiotics. Symptoms overall improved, though mild cough recently might be asthma. Continue Robitussin PRN. Consider rad onc referral pending improvement with Gemzar/Keytruda.      9. 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 Improving. May need to limit prolonged walking due to more pain with this. Continue Gabapentin 300mg TID.     10. MSK  R shoulder/back pain: Suspect 2/2 cancer, improving which is a good sign. Has Tylenol PRN.     Focal R rib pain unclear etiology-resolved.    Monitor slight nodule on posterior thigh, scar tissue vs subcutaneous met. Would not change plan unless worsening.     75 minutes  spent on the date of the encounter doing chart review, review of test results, interpretation of tests, patient visit, documentation and discussion with other provider(s)     Harshal Schuster PA-C  [facility] [address] [address] [phone]

## 2021-07-28 NOTE — LETTER
7/28/2021         RE: Richa Ashby  [address]  [place] [id number]      Oncology/Hematology Visit Note  Jul 28, 2021    Reason for Visit: Follow up of undifferentiated pleomorphic sarcoma     History of Present Illness: Richa Ashby is a 56 year old female with PMH asthma and GERD with undifferentiated pleomorphic sarcoma. 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 She received preoperative radiotherapy and the tumor was resected 3/12/20. She then developed a cough, chest tightness, and scapular pain and imaging revealed lung nodules. Lung biopsy 11/2/20 with sarcoma. She was started on Doxil + Ifos 11/13/20. Had delay in cycle 2 due to COVID dx 12/2/20. CT imaging after cycle 1 with positive response to treatment with decrease in pulmonary nodules. Received cycle 2 12/18/20. Received cycle 3 1/15/21 with dose reduction in Doxil for skin toxicity and mucositis. Received cycle 4 2/15/21.      CT CAP 3/8/21 with positive response to treatment, decreased size of pulmonary mets though one lesion was larger. Did see fibrocystic changes in left breast, recommended breast imaging. Received cycle 5 Doxil + ifos 3/11/21.      Imaging 4/5/21 with overall stable disease except one lung lesion, opted to continue Doxil alone for now and switch to Keytruda in future if ongoing progression.      Started Keytruda 5/10/21. Was seen 5/26/21 cough, SOB, fatigue work-up thought to be 2/2 phrenic nerve involvement of R apical lung mass.      Due to worsening fatigue and cough, repeat CT 6/15/21 concerning for progression vs pseudoprogression. Added Gemzar 6/16/21.     She was admitted 6/17/21-6/20/21 with fever of unknown origin, infectious work-up negative, treated with broad spectrum antibiotics.     She clinically improved on abx. Was able to receive day 8 Gemzar (dose reduced for thrombocytopenia) + Keytruda 6/23/21.     CT 7/12/21  with stable disease.     She returns today for oncology follow-up.     Interval History:  Connie was seen today for oncology follow-up.  -Tolerating treatment OK-did have more fatigue after last Gemzar but didn't take prednisone this time-was able to sleep better. Fatigue resolved within 3 days   -Had mild nausea and took Zofran but then had headaches   -Rib/back pain resolved and no current pain. Has noted slight increase in cough and some catching with deep breathing, albuterol helped. No chest pain, SOB.  -Felt chilled but no fevers. Has UTI but symptoms improved with abx, to complete Saturday  -Notes recurrent tinnitus in R ear. Has also noted some mental fogginess but no acute neuro issues  -Denies abdominal pain. Bowels fluctuate, OK at the moment.   -No swelling issues though neuropathy in feet persists, was worse after walking more recently.  -Tried recumbent bike due to feet issues with walking-had some soreness in R leg and noted a small nodule in posterior thigh   -No bleeding issues except takes a bit longer to stop bleeding after port de-accessed.   -No rashes, itchiness is better, has had few patches of dry skin     Current Outpatient Medications   Medication Sig Dispense Refill     acetaminophen (TYLENOL) 325 MG tablet Take 325-650 mg by mouth every 6 hours as needed for mild pain       ADVAIR DISKUS 100-50 MCG/DOSE inhaler        albuterol (PROAIR RESPICLICK) 108 (90 Base) MCG/ACT inhaler Inhale 1-2 puffs into the lungs       cetirizine (ZYRTEC) 10 MG tablet Take 10 mg by mouth daily       gabapentin (NEURONTIN) 300 MG capsule Take 1 capsule (300 mg) by mouth 3 times daily 210 capsule 1     hydrOXYzine (ATARAX) 25 MG tablet Take 1 tablet (25 mg) by mouth 3 times daily as needed for itching 30 tablet 1     lidocaine-prilocaine (EMLA) 2.5-2.5 % external cream Apply topically as needed for moderate pain (Patient not taking: Reported on 6/2/2021) 30 g 3     melatonin (MELATONIN) 1 MG/ML LIQD liquid Take 5  mg by mouth       montelukast (SINGULAIR) 10 MG tablet Take 10 mg by mouth daily       multivitamin w/minerals (MULTI-VITAMIN) tablet Take 1 tablet by mouth daily       nitroFURantoin macrocrystal-monohydrate (MACROBID) 100 MG capsule Take 1 capsule (100 mg) by mouth 2 times daily for 7 days 14 capsule 0     omeprazole (PRILOSEC) 20 MG DR capsule        ondansetron (ZOFRAN) 8 MG tablet Take 1 tablet (8 mg) by mouth every 8 hours as needed for nausea 60 tablet 1     prochlorperazine (COMPAZINE) 10 MG tablet Take 1 tablet (10 mg) by mouth every 6 hours as needed (Nausea/Vomiting) 30 tablet 5     vitamin D3 (CHOLECALCIFEROL) 2000 units (50 mcg) tablet Take 1 tablet by mouth daily         Physical Examination:  /73 (BP Location: Right arm, Patient Position: Sitting, Cuff Size: Adult Regular)   Pulse 96   Temp 98.4  F (36.9  C) (Oral)   Resp 16   Wt 93.2 kg (205 lb 8 oz)   LMP 03/19/2019   SpO2 97%   BMI 32.19 kg/m    Wt Readings from Last 10 Encounters:   07/24/21 92.5 kg (204 lb)   07/18/21 93.9 kg (207 lb 1.6 oz)   07/14/21 93.7 kg (206 lb 8 oz)   07/07/21 92.9 kg (204 lb 11.2 oz)   06/30/21 92.5 kg (204 lb)   06/23/21 95.1 kg (209 lb 11.2 oz)   06/20/21 95.2 kg (209 lb 12.8 oz)   06/17/21 96.2 kg (212 lb 1.6 oz)   06/15/21 94.7 kg (208 lb 12.8 oz)   06/02/21 98.4 kg (216 lb 14.4 oz)     Constitutional: Well-appearing female in no acute distress.  Eyes: EOMI, PERRL. No scleral icterus.  ENT: Right TM grey and not bulging   Lymphatic: Neck is supple without cervical or supraclavicular lymphadenopathy.   Cardiovascular: Regular rate and rhythm. No murmurs, gallops, or rubs. No peripheral edema.  Respiratory: Clear to auscultation bilaterally. No wheezes or crackles.  Gastrointestinal: Bowel sounds present. Abdomen soft, non-tender.  Neurologic: Cranial nerves II through XII are grossly intact.  Skin: No rashes, petechiae, or bruising noted on exposed skin.  MSK: 1cm linear nodule on posterior right thigh,  non tender     Laboratory Data:  Results for AMADEO WILSON (MRN [id number]) as of 7/28/2021 14:02   7/28/2021 11:54   Sodium 140   Potassium 3.8   Chloride 106   Carbon Dioxide 28   Urea Nitrogen 13   Creatinine 0.70   GFR Estimate >90   Calcium 9.2   Anion Gap 6   Albumin 3.4   Protein Total 7.5   Bilirubin Total 0.8   Alkaline Phosphatase 144   ALT 28   AST 17   Glucose 91   WBC 4.1   Hemoglobin 8.6 (L)   Hematocrit 26.9 (L)   Platelet Count 96 (L)   RBC Count 2.85 (L)   MCV 94   MCH 30.2   MCHC 32.0   RDW 18.5 (H)   % Neutrophils 63   % Lymphocytes 17   % Monocytes 16   % Eosinophils 4   Absolute Basophils 0.0   % Basophils 0   Absolute Neutrophil 2.6   Absolute Lymphocytes 0.7 (L)   Absolute Monocytes 0.7   Absolute Eosinophils 0.2   RBC Morphology Confirmed RBC Indices   Platelet Morphology Automated Count Confirmed. Platelet morphology is normal.   Polychromasia Slight (A)       Assessment and Plan:  1. Onc  Metastatic UPS, was on Doxil + Ifosfamide (5 cycles) with positive response to treatment initially, then due to poor tolerance switched to Doxil alone (1 cycle) but had progression. Started on Keytruda 5/10/21. Due to tumor growth (progression vs pseudoprogression) added Gemzar 6/16/21. CT imaging mid July with positive response to treatment.     Has had issues with low plt/ANC with Gemzar. Discussed with Dr. Lopez and will change Gemzar to 1000mg day 1 and day 15 today with the start of cycle 3.      Due to logistics with scheduling every 3 week Keytruda, will get 200mg Keytruda next week 8/4 (3 weeks from last dose), and then on 8/25 will plan on doing 400mg (every 6 week) dose.     Plan to repeat imaging early September.      2. ID  Admitted 6/17/21 for high fevers and hypotension, infectious work-up negative though did improve with broad spectrum antibiotics. No recurrent fevers.    Finishing Macrobid for UTI, symptoms improving.      3. Neuro  Fatigue: Worked up for adrenal insufficiency-cortisol  WNL, hypothyroidism-TSH minimally elevated and free T4 normal, OH-iron studies OK, anemia-hgb improving,[id number], troponin negative. Fatigue much better now after antibiotics so possible unknown infection causing symptoms.     No longer on prednisone after treatment-does have fatigue but manageable.     Tinnitus: Will monitor and if persists will refer for audiology and ENT      4. Heme  Anemia: ACD and chemotherapy, did require pRBC inpatient. Hgb stable at 8.6.      Thrombocytopenia: 2/2 Gemzar. No bleeding issues. OK today at 96K. Changing treatment to 1000mg day 1 and day 15 as above.     Neutropenia: no current issues, wait to add Neulasta for now.      Transfuse to keep hgb >8 and plt >10.      5. FEN  Eating better now that she isn't so fatigued. Monitor weight. CMP stable.      6. GI  GERD: continuing Omeprazole and tums, stable.      Diarrhea: Resolved.      LFT WNL.    Nausea: Try Compazine given headaches with Zofran.      7. Derm  Hand/foot: 2/2 Doxil, improving now that she is off Doxil.      Previously prescribed Hydroxyzine PRN for pruritis-improved.      8. Pulm  Asthma: stable, continue inhalers. Can use albuterol more often if cough persists.      COVID: Diagnosed in December, recovered.     Cough/SOB: See work-up from 5/26/21 (negative CT PE, RVP, COVID, NTBNP). Thought to be 2/2 phrenic nerve involvement of tumor. Possibly infectious component given improvement with antibiotics. Symptoms overall improved, though mild cough recently might be asthma. Continue Robitussin PRN. Consider rad onc referral pending improvement with Gemzar/Keytruda.      9. 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 Improving. May need to limit prolonged walking due to more pain with this. Continue Gabapentin 300mg TID.     10. MSK  R shoulder/back pain: Suspect 2/2 cancer, improving which is a good sign. Has Tylenol PRN.     Focal R rib pain unclear etiology-resolved.    Monitor slight nodule on posterior  thigh, scar tissue vs subcutaneous met. Would not change plan unless worsening.     75 minutes spent on the date of the encounter doing chart review, review of test results, interpretation of tests, patient visit, documentation and discussion with other provider(s)     Harshal Schuster PA-C  [facility] [address] [id number] [phone]          RUPERT Guaman

## 2021-07-29 ENCOUNTER — APPOINTMENT (OUTPATIENT)
Dept: ONCOLOGY | Facility: CLINIC | Age: 56
End: 2021-07-29
Attending: INTERNAL MEDICINE
Payer: COMMERCIAL

## 2021-07-29 NOTE — PROGRESS NOTES
Telemedicine Visit: The patient's condition can be safely assessed and treated via synchronous audio and visual telemedicine encounter.      Reason for Telemedicine Visit: covid19 precautions    Originating Site (Patient Location): Patient's home    Distant Site (Provider Location): [facility]: [facility]    Consent:  The patient/guardian has verbally consented to: the potential risks and benefits of telemedicine (video visit) versus in person care; bill my insurance or make self-payment for services provided; and responsibility for payment of non-covered services.     Mode of Communication:  Video Conference via [employer]    As the provider I attest to compliance with applicable laws and regulations related to telemedicine.      Palliative Care Clinical Social Work Return Appointment    PLEASE NOTE:  THIS IS A MENTAL HEALTH NOTE.  OTHER PROVIDERS VIEWING THIS NOTE SHOULD USE THIS ONLY FOR UNDERSTANDING THE CONTEXT OF THE PATIENT'S EXPERIENCE.  TOPICS DESCRIBED IN THIS NOTE SHOULD NOT BE REFERENCED TO THE PATIENT BY MEDICAL PROVIDERS.    Connie Ashby is a 56 year old woman with diagnosis of sarcoma, seen today via phone for a return psychotherapy appointment to address adjustment disorder  as it relates to coping with illness and treatment.      Mental Status Exam:(List all that apply)      Appearance: Appropriate      Eye Contact: Good       Orientation: Yes, x4      Mood: Normal      Affect: Appropriate      Thought Content: Clear         Thought Form: Logical      Psychomotor Behavior: Normal    Visit themes: communication with family    Adjustment to Illness: Connie has been on leave from work for 30 days.  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  It's difficult to be at home because she feels bored, but she has also noticed that she has more energy to spend with her spouse, and she is working on meal planning ot make sure she is meeting her nutrition  "needs.        Mental Health (thoughts, feelings, actions, coping, and symptoms): Feeling bored at home.  In the past, her response to boredom would be to keep busy, but she does not have the energy for that now.  Explored associations with not working (I feel lazy) and her expectations of herself.  She reflected that she has been feeling affected by the highly publicized decision of an olympic gymnast to step back from a competition to take care of her mental health needs, and reflected on her own expectations of herself \"I want to be superwoman, but no one is super woman\"      Helpful activities: time with family, planning trips for the summer     Helpful cognitions: rest is work, and it's important work   Unhelpful and/or triggering or exacerbating factors:     Body-Mind Skills Education: not focus today   Relationships: supportive.      End of Life and Advance Care Planning:     Main therapeutic interventions provided this session include:   Provide psychoeducation, Facilitate processing of thoughts and feelings and Facilitate structured problem solving    Plan:  Will return for psychotherapy with palliative care focus in 1 month    Time spent with patient/family:  30minutes (Start 2:30, end 3:00)    Palliative Care Counseling Treatment Plan    Client's Name: Connie Ashby  YOB: 1965    Date: June 24/2021     Initial DSM5 Diagnoses:   Adjustment Disorders  309.9 (F43.20) Unspecified      Date to review plan (90 days usually): 8/21/19    Referral / Collaboration:  .Referral to another professional/service is not indicated at this time.    Anticipated number of sessions to complete episode of care:  10         Treatment Goal(s)  Date Goal Dates  Reviewed Status   5/23/2019   Goal 1:  Client will develop effective strategies for grief / loss.      New   5/23/2019   Objective #1A (Client Action)  Patient will Increase understanding of loss and grief and Identify losses related to " illness.    Intervention(s)  Therapist will Provide psychoeducation and Facilitate processing of thoughts and feelings .    New   5/23/2019   Objective #1B  Patient will Develop strategies for coping with grief/loss and Identify effective coping strategies.    Intervention(s)  Therapist will Provide psychoeducation and Facilitate processing of thoughts and feelings.    New   5/23/2019   Objective #1C  Patient will Effectively communicate with others re loss and grief.    Intervention(s)  Therapist will Provide psychoeducation, Facilitate processing of thoughts and feelings and Facilitate structured problem solving.    New       Date Goal Dates  Reviewed Status   5/23/2019   Goal 2:  Client will demonstrate effective management of adjustment difficulties related to changes in life brought on by cancer diagnosis and treatment .    New   5/23/2019   Objective #2A (Client Action)  Patient will Communicate effectively with family/friends re needs.    Intervention(s) (Therapist Action)  Therapist will Provide psychoeducation, Facilitate processing of thoughts and feelings and Facilitate structured problem solving.    New   5/23/2019   Objective #2B  Patient will Identify effective coping strategies.    Intervention(s)  Therapist will Provide psychoeducation and Facilitate processing of thoughts and feelings.    New   5/23/2019     Objective #2C  Patient will Participate in goals of care discussion.    Intervention(s)  Therapist will Facilitate goals of care discussion.    New       Client has contributed regarding goals and concerns, but has not reviewed the treatment plan. Plan to review at future session.    TEJAS Macias, [facility]  Palliative Care Clinical        DO NOT SEND ANY LETTERS

## 2021-07-30 ENCOUNTER — TELEPHONE (OUTPATIENT)
Dept: ONCOLOGY | Facility: CLINIC | Age: 56
End: 2021-07-30

## 2021-07-30 NOTE — TELEPHONE ENCOUNTER
Calls in reporting T of 102.6. in tympanic  Also stating temp of 98.6 on forehead.   Does not have an oral thermometer.   Denies nausea vomiting, very tired today took a 5 hour nap. Was very covered up due to air conditioner blowing on her.   Did take tylenol 20 minutes ago for joint pain.   Getting very different readings on forehead and in ear.  Will go purchase an oral thermometer so she can get a reliable reading. If temp remains 100.5 or higher orally then she needs to go in to be seen.  Do not eat or drink for 15 minutes prior to taking temp with oral thermometer.     Will monitor temp over next hour if temp 100.5 or higher then she will go in to be seen in urgent care or ER.

## 2021-08-04 ENCOUNTER — INFUSION THERAPY VISIT (OUTPATIENT)
Dept: ONCOLOGY | Facility: CLINIC | Age: 56
End: 2021-08-04
Attending: INTERNAL MEDICINE
Payer: COMMERCIAL

## 2021-08-04 ENCOUNTER — APPOINTMENT (OUTPATIENT)
Dept: LAB | Facility: CLINIC | Age: 56
End: 2021-08-04
Attending: INTERNAL MEDICINE
Payer: COMMERCIAL

## 2021-08-04 VITALS
WEIGHT: 205.3 LBS | BODY MASS INDEX: 32.15 KG/M2 | OXYGEN SATURATION: 99 % | HEART RATE: 98 BPM | RESPIRATION RATE: 16 BRPM | TEMPERATURE: 98.3 F | SYSTOLIC BLOOD PRESSURE: 122 MMHG | DIASTOLIC BLOOD PRESSURE: 77 MMHG

## 2021-08-04 DIAGNOSIS — C49.9 SARCOMA (H): ICD-10-CM

## 2021-08-04 DIAGNOSIS — D63.0 ANEMIA IN NEOPLASTIC DISEASE: ICD-10-CM

## 2021-08-04 DIAGNOSIS — C49.9 SARCOMA OF SOFT TISSUE (H): ICD-10-CM

## 2021-08-04 DIAGNOSIS — C78.00 MALIGNANT NEOPLASM METASTATIC TO LUNG, UNSPECIFIED LATERALITY (H): Primary | ICD-10-CM

## 2021-08-04 LAB
ABO/RH(D): NORMAL
ALBUMIN SERPL-MCNC: 3 G/DL (ref 3.4–5)
ALP SERPL-CCNC: 143 U/L (ref 40–150)
ALT SERPL W P-5'-P-CCNC: 26 U/L (ref 0–50)
ANION GAP SERPL CALCULATED.3IONS-SCNC: 6 MMOL/L (ref 3–14)
ANTIBODY SCREEN: NEGATIVE
AST SERPL W P-5'-P-CCNC: 13 U/L (ref 0–45)
BASOPHILS # BLD MANUAL: 0.1 10E3/UL (ref 0–0.2)
BASOPHILS NFR BLD MANUAL: 2 %
BILIRUB SERPL-MCNC: 0.6 MG/DL (ref 0.2–1.3)
BLD PROD TYP BPU: NORMAL
BLOOD COMPONENT TYPE: NORMAL
BUN SERPL-MCNC: 14 MG/DL (ref 7–30)
CALCIUM SERPL-MCNC: 9.1 MG/DL (ref 8.5–10.1)
CHLORIDE BLD-SCNC: 108 MMOL/L (ref 94–109)
CO2 SERPL-SCNC: 27 MMOL/L (ref 20–32)
CODING SYSTEM: NORMAL
CREAT SERPL-MCNC: 0.72 MG/DL (ref 0.52–1.04)
CROSSMATCH: NORMAL
EOSINOPHIL # BLD MANUAL: 0.1 10E3/UL (ref 0–0.7)
EOSINOPHIL NFR BLD MANUAL: 2 %
ERYTHROCYTE [DISTWIDTH] IN BLOOD BY AUTOMATED COUNT: 17.8 % (ref 10–15)
GFR SERPL CREATININE-BSD FRML MDRD: >90 ML/MIN/1.73M2
GLUCOSE BLD-MCNC: 100 MG/DL (ref 70–99)
HCT VFR BLD AUTO: 23.5 % (ref 35–47)
HGB BLD-MCNC: 7.5 G/DL (ref 11.7–15.7)
ISSUE DATE AND TIME: NORMAL
LYMPHOCYTES # BLD MANUAL: 0.8 10E3/UL (ref 0.8–5.3)
LYMPHOCYTES NFR BLD MANUAL: 16 %
MCH RBC QN AUTO: 30.4 PG (ref 26.5–33)
MCHC RBC AUTO-ENTMCNC: 31.9 G/DL (ref 31.5–36.5)
MCV RBC AUTO: 95 FL (ref 78–100)
MONOCYTES # BLD MANUAL: 0.5 10E3/UL (ref 0–1.3)
MONOCYTES NFR BLD MANUAL: 10 %
NEUTROPHILS # BLD MANUAL: 3.4 10E3/UL (ref 1.6–8.3)
NEUTROPHILS NFR BLD MANUAL: 70 %
NRBC # BLD AUTO: 0.2 10E3/UL
NRBC BLD MANUAL-RTO: 4 %
PLAT MORPH BLD: ABNORMAL
PLATELET # BLD AUTO: 133 10E3/UL (ref 150–450)
POTASSIUM BLD-SCNC: 3.7 MMOL/L (ref 3.4–5.3)
PROT SERPL-MCNC: 7.2 G/DL (ref 6.8–8.8)
RBC # BLD AUTO: 2.47 10E6/UL (ref 3.8–5.2)
RBC MORPH BLD: ABNORMAL
SODIUM SERPL-SCNC: 141 MMOL/L (ref 133–144)
SPECIMEN EXPIRATION DATE: NORMAL
TSH SERPL DL<=0.005 MIU/L-ACNC: 2.27 MU/L (ref 0.4–4)
UNIT ABO/RH: NORMAL
UNIT NUMBER: NORMAL
UNIT STATUS: NORMAL
UNIT TYPE ISBT: 5100
WBC # BLD AUTO: 4.8 10E3/UL (ref 4–11)

## 2021-08-04 PROCEDURE — 86923 COMPATIBILITY TEST ELECTRIC: CPT | Performed by: PHYSICIAN ASSISTANT

## 2021-08-04 PROCEDURE — 85027 COMPLETE CBC AUTOMATED: CPT | Performed by: PHYSICIAN ASSISTANT

## 2021-08-04 PROCEDURE — 80053 COMPREHEN METABOLIC PANEL: CPT | Performed by: PHYSICIAN ASSISTANT

## 2021-08-04 PROCEDURE — 84443 ASSAY THYROID STIM HORMONE: CPT | Performed by: PHYSICIAN ASSISTANT

## 2021-08-04 PROCEDURE — 86900 BLOOD TYPING SEROLOGIC ABO: CPT | Performed by: INTERNAL MEDICINE

## 2021-08-04 PROCEDURE — 36591 DRAW BLOOD OFF VENOUS DEVICE: CPT | Performed by: PHYSICIAN ASSISTANT

## 2021-08-04 PROCEDURE — 258N000003 HC RX IP 258 OP 636: Performed by: PHYSICIAN ASSISTANT

## 2021-08-04 PROCEDURE — 250N000011 HC RX IP 250 OP 636: Performed by: PHYSICIAN ASSISTANT

## 2021-08-04 PROCEDURE — 250N000011 HC RX IP 250 OP 636: Performed by: INTERNAL MEDICINE

## 2021-08-04 PROCEDURE — 96413 CHEMO IV INFUSION 1 HR: CPT

## 2021-08-04 RX ORDER — HEPARIN SODIUM (PORCINE) LOCK FLUSH IV SOLN 100 UNIT/ML 100 UNIT/ML
5 SOLUTION INTRAVENOUS
Status: CANCELLED | OUTPATIENT
Start: 2021-08-04

## 2021-08-04 RX ORDER — HEPARIN SODIUM,PORCINE 10 UNIT/ML
5 VIAL (ML) INTRAVENOUS
Status: CANCELLED | OUTPATIENT
Start: 2021-08-04

## 2021-08-04 RX ORDER — HEPARIN SODIUM (PORCINE) LOCK FLUSH IV SOLN 100 UNIT/ML 100 UNIT/ML
5 SOLUTION INTRAVENOUS
Status: DISCONTINUED | OUTPATIENT
Start: 2021-08-04 | End: 2021-08-04 | Stop reason: HOSPADM

## 2021-08-04 RX ORDER — HEPARIN SODIUM,PORCINE 10 UNIT/ML
5 VIAL (ML) INTRAVENOUS
Status: DISCONTINUED | OUTPATIENT
Start: 2021-08-04 | End: 2021-08-04 | Stop reason: HOSPADM

## 2021-08-04 RX ORDER — HEPARIN SODIUM (PORCINE) LOCK FLUSH IV SOLN 100 UNIT/ML 100 UNIT/ML
5 SOLUTION INTRAVENOUS ONCE
Status: COMPLETED | OUTPATIENT
Start: 2021-08-04 | End: 2021-08-04

## 2021-08-04 RX ADMIN — SODIUM CHLORIDE 200 MG: 9 INJECTION, SOLUTION INTRAVENOUS at 15:35

## 2021-08-04 RX ADMIN — SODIUM CHLORIDE 250 ML: 9 INJECTION, SOLUTION INTRAVENOUS at 15:35

## 2021-08-04 RX ADMIN — Medication 5 ML: at 16:17

## 2021-08-04 RX ADMIN — Medication 5 ML: at 13:40

## 2021-08-04 ASSESSMENT — PAIN SCALES - GENERAL: PAINLEVEL: NO PAIN (0)

## 2021-08-04 NOTE — PROGRESS NOTES
"Infusion Nursing Note:  Richa Ashby presents today for Cycle 3 Day 8 Keytruda   Patient seen by provider today: No   present during visit today: Not Applicable    Note: Pt assessed upon arrival to infusion.  Pt states that she has been feeling well. Denies fever, chills, SOB or new cough.  Pt reported that she does have cough that seems a bit more congested/productive; sputum is clear.  She did have an elevated temp last week that she thought was being \"bundled up\" too warm.  When she rechecked her temp, it was normal.  No acute signs or symptoms of illness.  Pt did have provider appointment with RUPERT Guaman on 7/28 and denies any acute changes or concerns since that appointment.    Pt was noted to have a hgb 7.5 on today's labs.  Pt stated she has been having headaches about 6/10 pain; no other symptoms noted. RUPERT Guaman was notified and patient to get PRBC transfusion tomorrow am.        Intravenous Access:  Implanted Port.    Treatment Conditions:     Ref. Range 8/4/2021 13:50   Sodium Latest Ref Range: 133 - 144 mmol/L 141   Potassium Latest Ref Range: 3.4 - 5.3 mmol/L 3.7   Chloride Latest Ref Range: 94 - 109 mmol/L 108   Carbon Dioxide Latest Ref Range: 20 - 32 mmol/L 27   Urea Nitrogen Latest Ref Range: 7 - 30 mg/dL 14   Creatinine Latest Ref Range: 0.52 - 1.04 mg/dL 0.72   GFR Estimate Latest Ref Range: >60 mL/min/1.73m2 >90   Calcium Latest Ref Range: 8.5 - 10.1 mg/dL 9.1   Anion Gap Latest Ref Range: 3 - 14 mmol/L 6   Albumin Latest Ref Range: 3.4 - 5.0 g/dL 3.0 (L)   Protein Total Latest Ref Range: 6.8 - 8.8 g/dL 7.2   Bilirubin Total Latest Ref Range: 0.2 - 1.3 mg/dL 0.6   Alkaline Phosphatase Latest Ref Range: 40 - 150 U/L 143   ALT Latest Ref Range: 0 - 50 U/L 26   AST Latest Ref Range: 0 - 45 U/L 13   TSH Latest Ref Range: 0.40 - 4.00 mU/L 2.27   Glucose Latest Ref Range: 70 - 99 mg/dL 100 (H)   WBC Latest Ref Range: 4.0 - 11.0 10e3/uL 4.8   Hemoglobin Latest " Ref Range: 11.7 - 15.7 g/dL 7.5 (L)   Hematocrit Latest Ref Range: 35.0 - 47.0 % 23.5 (L)   Platelet Count Latest Ref Range: 150 - 450 10e3/uL 133 (L)   RBC Count Latest Ref Range: 3.80 - 5.20 10e6/uL 2.47 (L)   MCV Latest Ref Range: 78 - 100 fL 95   MCH Latest Ref Range: 26.5 - 33.0 pg 30.4   MCHC Latest Ref Range: 31.5 - 36.5 g/dL 31.9   RDW Latest Ref Range: 10.0 - 15.0 % 17.8 (H)   % Neutrophils Latest Units: % 70   % Lymphocytes Latest Units: % 16   % Monocytes Latest Units: % 10   % Eosinophils Latest Units: % 2   Absolute Basophils Latest Ref Range: 0.0 - 0.2 10e3/uL 0.1   % Basophils Latest Units: % 2   NRBC/W Latest Ref Range: <=0 % 4 (H)   Absolute Neutrophil Latest Ref Range: 1.6 - 8.3 10e3/uL 3.4   Absolute Lymphocytes Latest Ref Range: 0.8 - 5.3 10e3/uL 0.8   Absolute Monocytes Latest Ref Range: 0.0 - 1.3 10e3/uL 0.5   Absolute Eosinophils Latest Ref Range: 0.0 - 0.7 10e3/uL 0.1   Absolute NRBCs Latest Ref Range: <=0.0 10e3/uL 0.2 (H)   RBC Morphology Unknown Confirmed RBC Indices   Platelet Morphology Latest Ref Range: Automated Count Confirmed. Platelet morphology is normal.  Automated Count Confirmed. Platelet morphology is normal.       Results reviewed, labs MET treatment parameters, ok to proceed with treatment.      Post Infusion Assessment:  Patient tolerated infusion without incident.  Blood return noted pre and post infusion.  Site patent and intact, free from redness, edema or discomfort.  No evidence of extravasations.  Access discontinued per protocol.       Discharge Plan:   Patient declined prescription refills.  Discharge instructions reviewed with: Patient.  Patient and/or family verbalized understanding of discharge instructions and all questions answered.  AVS to patient via [employer].  Patient will return 8/5/21 for next appointment. IB message sent to scheduling to cancel patient's 8/10 infusion appointment.  Patient discharged in stable condition accompanied by: self.  Departure  Mode: Ambulatory.    Tiana Bartlett RN

## 2021-08-05 ENCOUNTER — INFUSION THERAPY VISIT (OUTPATIENT)
Dept: ONCOLOGY | Facility: CLINIC | Age: 56
End: 2021-08-05
Attending: PHYSICIAN ASSISTANT
Payer: COMMERCIAL

## 2021-08-05 VITALS
HEART RATE: 80 BPM | OXYGEN SATURATION: 99 % | RESPIRATION RATE: 18 BRPM | TEMPERATURE: 98.2 F | SYSTOLIC BLOOD PRESSURE: 108 MMHG | DIASTOLIC BLOOD PRESSURE: 72 MMHG

## 2021-08-05 DIAGNOSIS — C49.9 SARCOMA OF SOFT TISSUE (H): ICD-10-CM

## 2021-08-05 DIAGNOSIS — D63.0 ANEMIA IN NEOPLASTIC DISEASE: Primary | ICD-10-CM

## 2021-08-05 PROCEDURE — P9016 RBC LEUKOCYTES REDUCED: HCPCS

## 2021-08-05 PROCEDURE — 36430 TRANSFUSION BLD/BLD COMPNT: CPT

## 2021-08-05 PROCEDURE — 250N000011 HC RX IP 250 OP 636: Performed by: PHYSICIAN ASSISTANT

## 2021-08-05 RX ORDER — HEPARIN SODIUM (PORCINE) LOCK FLUSH IV SOLN 100 UNIT/ML 100 UNIT/ML
5 SOLUTION INTRAVENOUS
Status: DISCONTINUED | OUTPATIENT
Start: 2021-08-05 | End: 2021-08-05 | Stop reason: HOSPADM

## 2021-08-05 RX ADMIN — Medication 5 ML: at 09:19

## 2021-08-05 ASSESSMENT — PAIN SCALES - GENERAL: PAINLEVEL: MILD PAIN (2)

## 2021-08-05 NOTE — PROGRESS NOTES
Infusion Nursing Note:  Richa Ashby presents today for red blood cell transfusion.    Patient seen by provider today: No   present during visit today: Not Applicable.    Note:   Patient feels fatigued and tired this morning. Patient has generalized achy pain, declined interventions. Denies fever, chills, diarrhea, SOB.      Intravenous Access:  Implanted Port.    Treatment Conditions:  Lab Results   Component Value Date    HGB 7.5 08/04/2021    HGB 9.2 07/07/2021     Lab Results   Component Value Date    WBC 4.8 08/04/2021    WBC 4.3 07/07/2021      Lab Results   Component Value Date    ANEU 3.4 08/04/2021    ANEU 2.8 07/07/2021     Lab Results   Component Value Date     08/04/2021     07/07/2021      Results reviewed, labs MET treatment parameters, ok to proceed with treatment.  Blood transfusion consent signed 1/7/21.      Post Infusion Assessment:  Patient tolerated infusion without incident.  Blood return noted pre and post infusion.  Site patent and intact, free from redness, edema or discomfort.  No evidence of extravasations.  Access discontinued per protocol.       Discharge Plan:   Patient declined prescription refills.  Discharge instructions reviewed with: Patient.  Patient and/or family verbalized understanding of discharge instructions and all questions answered.  AVS to patient via [employer].  Patient will return 8/11 for next appointment.   Patient discharged in stable condition accompanied by: self.  Departure Mode: Ambulatory.      Martha Freitas RN

## 2021-08-05 NOTE — PATIENT INSTRUCTIONS
[facility] Triage and after hours / weekends / holidays:  [phone]    Please call the triage or after hours line if you experience a temperature greater than or equal to 100.5, shaking chills, have uncontrolled nausea, vomiting and/or diarrhea, dizziness, shortness of breath, chest pain, bleeding, unexplained bruising, or if you have any other new/concerning symptoms, questions or concerns.      If you are having any concerning symptoms or wish to speak to a provider before your next infusion visit, please call your care coordinator or triage to notify them so we can adequately serve you.     If you need a refill on a narcotic prescription or other medication, please call before your infusion appointment.       August 2021 Sunday Monday Tuesday Wednesday Thursday Friday Saturday   1     2     3     4    LAB CENTRAL   1:30 PM   (15 min.)    [facility]   [facility]    ONC INFUSION 2.5 HR (150 MIN)   2:00 PM   (150 min.)    ONC INFUSION NURSE   [facility] 5    ONC INFUSION 4 HR (240 MIN)   7:00 AM   (240 min.)    ONC INFUSION NURSE   [facility] 6     7       8     9     10     11    LAB CENTRAL  12:15 PM   (15 min.)   [facility]   [facility]    ONC INFUSION 1.5 HR (90 MIN)   1:00 PM   (90 min.)    ONC INFUSION NURSE   [facility]    RETURN   4:45 PM   (45 min.)   Harshal Schuster PA   [facility] 12     13     14       15     16     17     18     19     20     21       22     23     24     25    LAB CENTRAL   6:45 AM   (15 min.)    [facility]   [facility]    RETURN   7:15 AM   (45 min.)   Harsahl Schuster PA   [facility]    ONC INFUSION 2.5 HR (150 MIN)   8:30 AM   (150 min.)    ONC INFUSION NURSE   [facility]  26    VIDEO VISIT RETURN   2:30 PM   (60 min.)   Darlene Torres LICSW   [facility] [place] 27     28       29     30     31 September 2021 Sunday Monday Tuesday Wednesday Thursday Friday Saturday                  1     2     3     4       5     6     7    CT CHEST WO  12:20 PM   (20 min.)   [facility]   [facility] [place] 8    LAB CENTRAL  10:45 AM   (15 min.)    [facility]   [facility]    RETURN  11:15 AM   (45 min.)   Harshal Schuster PA   [facility]    ONC INFUSION 2 HR (120 MIN)   1:00 PM   (120 min.)    ONC INFUSION NURSE   [facility] 9     10    VIDEO VISIT RETURN   4:05 PM   (40 min.)   Robert Blanca MD   [facility] 11       12     13     14     15     16     17     18       19     20     21     22     23     24     25       26     27     28     29     30                              Recent Results (from the past 24 hour(s))   TSH with free T4 reflex    Collection Time: 08/04/21  1:50 PM   Result Value Ref Range    TSH 2.27 0.40 - 4.00 mU/L   Comprehensive metabolic panel    Collection Time: 08/04/21  1:50 PM   Result Value Ref Range    Sodium 141 133 - 144 mmol/L    Potassium 3.7 3.4 - 5.3 mmol/L    Chloride 108 94 - 109 mmol/L    Carbon Dioxide (CO2) 27 20 - 32 mmol/L    Anion Gap 6 3 - 14 mmol/L    Urea Nitrogen 14 7 - 30 mg/dL    Creatinine 0.72 0.52 - 1.04 mg/dL    Calcium 9.1 8.5 - 10.1 mg/dL    Glucose 100 (H) 70 - 99 mg/dL    Alkaline Phosphatase 143 40 - 150 U/L    AST 13 0 - 45 U/L    ALT 26 0 - 50 U/L    Protein Total 7.2 6.8 - 8.8 g/dL    Albumin 3.0 (L) 3.4 - 5.0 g/dL    Bilirubin Total 0.6 0.2 - 1.3 mg/dL    GFR Estimate >90 >60 mL/min/1.73m2   CBC with platelets and differential    Collection Time: 08/04/21  1:50 PM   Result Value Ref Range    WBC Count 4.8 4.0  - 11.0 10e3/uL    RBC Count 2.47 (L) 3.80 - 5.20 10e6/uL    Hemoglobin 7.5 (L) 11.7 - 15.7 g/dL    Hematocrit 23.5 (L) 35.0 - 47.0 %    MCV 95 78 - 100 fL    MCH 30.4 26.5 - 33.0 pg    MCHC 31.9 31.5 - 36.5 g/dL    RDW 17.8 (H) 10.0 - 15.0 %    Platelet Count 133 (L) 150 - 450 10e3/uL   Manual Differential    Collection Time: 08/04/21  1:50 PM   Result Value Ref Range    % Neutrophils 70 %    % Lymphocytes 16 %    % Monocytes 10 %    % Eosinophils 2 %    % Basophils 2 %    NRBCs per 100 WBC 4 (H) <=0 %    Absolute Neutrophils 3.4 1.6 - 8.3 10e3/uL    Absolute Lymphocytes 0.8 0.8 - 5.3 10e3/uL    Absolute Monocytes 0.5 0.0 - 1.3 10e3/uL    Absolute Eosinophils 0.1 0.0 - 0.7 10e3/uL    Absolute Basophils 0.1 0.0 - 0.2 10e3/uL    Absolute NRBCs 0.2 (H) <=0.0 10e3/uL    RBC Morphology Confirmed RBC Indices     Platelet Assessment  Automated Count Confirmed. Platelet morphology is normal.     Automated Count Confirmed. Platelet morphology is normal.   Adult Type and Screen    Collection Time: 08/04/21  1:50 PM   Result Value Ref Range    ABO/RH(D) O POS     Antibody Screen Negative Negative    SPECIMEN EXPIRATION DATE [id number]    Prepare red blood cells (unit)    Collection Time: 08/04/21  5:00 PM   Result Value Ref Range    CROSSMATCH Compatible     UNIT ABO/RH O Pos     Unit Number [id number]     UNIT STATUS Issued     Blood Component Type Red Blood Cells     Product Code [id number]     CODING SYSTEM [id number]     UNIT TYPE ISBT 5100     ISSUE DATE AND TIME [id number]

## 2021-08-09 ENCOUNTER — ANCILLARY PROCEDURE (OUTPATIENT)
Dept: GENERAL RADIOLOGY | Facility: CLINIC | Age: 56
End: 2021-08-09
Attending: PHYSICIAN ASSISTANT
Payer: COMMERCIAL

## 2021-08-09 ENCOUNTER — APPOINTMENT (OUTPATIENT)
Dept: LAB | Facility: CLINIC | Age: 56
End: 2021-08-09
Attending: PHYSICIAN ASSISTANT
Payer: COMMERCIAL

## 2021-08-09 ENCOUNTER — TELEPHONE (OUTPATIENT)
Dept: ONCOLOGY | Facility: CLINIC | Age: 56
End: 2021-08-09

## 2021-08-09 ENCOUNTER — ONCOLOGY VISIT (OUTPATIENT)
Dept: ONCOLOGY | Facility: CLINIC | Age: 56
End: 2021-08-09
Attending: INTERNAL MEDICINE
Payer: COMMERCIAL

## 2021-08-09 VITALS
DIASTOLIC BLOOD PRESSURE: 70 MMHG | OXYGEN SATURATION: 97 % | SYSTOLIC BLOOD PRESSURE: 104 MMHG | TEMPERATURE: 98.4 F | BODY MASS INDEX: 31.68 KG/M2 | WEIGHT: 202.3 LBS | HEART RATE: 100 BPM

## 2021-08-09 DIAGNOSIS — R50.9 FEVER, UNSPECIFIED FEVER CAUSE: Primary | ICD-10-CM

## 2021-08-09 DIAGNOSIS — R50.9 FEVER, UNSPECIFIED FEVER CAUSE: ICD-10-CM

## 2021-08-09 DIAGNOSIS — M19.90 INFLAMMATORY ARTHRITIS: ICD-10-CM

## 2021-08-09 DIAGNOSIS — R05.9 COUGH: ICD-10-CM

## 2021-08-09 DIAGNOSIS — Z95.828 PORT-A-CATH IN PLACE: Primary | ICD-10-CM

## 2021-08-09 LAB
ALBUMIN SERPL-MCNC: 2.8 G/DL (ref 3.4–5)
ALP SERPL-CCNC: 238 U/L (ref 40–150)
ALT SERPL W P-5'-P-CCNC: 21 U/L (ref 0–50)
ANION GAP SERPL CALCULATED.3IONS-SCNC: 7 MMOL/L (ref 3–14)
AST SERPL W P-5'-P-CCNC: 20 U/L (ref 0–45)
BASOPHILS # BLD AUTO: 0 10E3/UL (ref 0–0.2)
BASOPHILS NFR BLD AUTO: 0 %
BILIRUB SERPL-MCNC: 1.1 MG/DL (ref 0.2–1.3)
BUN SERPL-MCNC: 9 MG/DL (ref 7–30)
CALCIUM SERPL-MCNC: 9.5 MG/DL (ref 8.5–10.1)
CHLORIDE BLD-SCNC: 104 MMOL/L (ref 94–109)
CO2 SERPL-SCNC: 26 MMOL/L (ref 20–32)
CREAT SERPL-MCNC: 0.79 MG/DL (ref 0.52–1.04)
CRP SERPL-MCNC: 257 MG/L (ref 0–8)
EOSINOPHIL # BLD AUTO: 0.3 10E3/UL (ref 0–0.7)
EOSINOPHIL NFR BLD AUTO: 3 %
ERYTHROCYTE [DISTWIDTH] IN BLOOD BY AUTOMATED COUNT: 17.9 % (ref 10–15)
GFR SERPL CREATININE-BSD FRML MDRD: 84 ML/MIN/1.73M2
GLUCOSE BLD-MCNC: 107 MG/DL (ref 70–99)
HCT VFR BLD AUTO: 27.3 % (ref 35–47)
HGB BLD-MCNC: 8.2 G/DL (ref 11.7–15.7)
IMM GRANULOCYTES # BLD: 0 10E3/UL
IMM GRANULOCYTES NFR BLD: 1 %
LYMPHOCYTES # BLD AUTO: 1.3 10E3/UL (ref 0.8–5.3)
LYMPHOCYTES NFR BLD AUTO: 14 %
MCH RBC QN AUTO: 28.7 PG (ref 26.5–33)
MCHC RBC AUTO-ENTMCNC: 30 G/DL (ref 31.5–36.5)
MCV RBC AUTO: 96 FL (ref 78–100)
MONOCYTES # BLD AUTO: 1.3 10E3/UL (ref 0–1.3)
MONOCYTES NFR BLD AUTO: 15 %
NEUTROPHILS # BLD AUTO: 5.9 10E3/UL (ref 1.6–8.3)
NEUTROPHILS NFR BLD AUTO: 67 %
NRBC # BLD AUTO: 0 10E3/UL
NRBC BLD AUTO-RTO: 0 /100
PLATELET # BLD AUTO: 188 10E3/UL (ref 150–450)
POTASSIUM BLD-SCNC: 3.7 MMOL/L (ref 3.4–5.3)
PROT SERPL-MCNC: 7.5 G/DL (ref 6.8–8.8)
RBC # BLD AUTO: 2.86 10E6/UL (ref 3.8–5.2)
SARS-COV-2 RNA RESP QL NAA+PROBE: NEGATIVE
SODIUM SERPL-SCNC: 137 MMOL/L (ref 133–144)
WBC # BLD AUTO: 8.7 10E3/UL (ref 4–11)

## 2021-08-09 PROCEDURE — 36415 COLL VENOUS BLD VENIPUNCTURE: CPT | Performed by: PHYSICIAN ASSISTANT

## 2021-08-09 PROCEDURE — 36591 DRAW BLOOD OFF VENOUS DEVICE: CPT | Performed by: PHYSICIAN ASSISTANT

## 2021-08-09 PROCEDURE — 71046 X-RAY EXAM CHEST 2 VIEWS: CPT | Performed by: RADIOLOGY

## 2021-08-09 PROCEDURE — G0463 HOSPITAL OUTPT CLINIC VISIT: HCPCS

## 2021-08-09 PROCEDURE — 86140 C-REACTIVE PROTEIN: CPT | Performed by: PHYSICIAN ASSISTANT

## 2021-08-09 PROCEDURE — 250N000011 HC RX IP 250 OP 636: Performed by: PHYSICIAN ASSISTANT

## 2021-08-09 PROCEDURE — 87040 BLOOD CULTURE FOR BACTERIA: CPT | Mod: XS | Performed by: PHYSICIAN ASSISTANT

## 2021-08-09 PROCEDURE — 85025 COMPLETE CBC W/AUTO DIFF WBC: CPT | Performed by: PHYSICIAN ASSISTANT

## 2021-08-09 PROCEDURE — 80053 COMPREHEN METABOLIC PANEL: CPT | Performed by: PHYSICIAN ASSISTANT

## 2021-08-09 PROCEDURE — 99214 OFFICE O/P EST MOD 30 MIN: CPT | Performed by: PHYSICIAN ASSISTANT

## 2021-08-09 PROCEDURE — U0005 INFEC AGEN DETEC AMPLI PROBE: HCPCS | Performed by: PHYSICIAN ASSISTANT

## 2021-08-09 RX ORDER — PREDNISONE 10 MG/1
TABLET ORAL
Qty: 6 TABLET | Refills: 0 | Status: SHIPPED | OUTPATIENT
Start: 2021-08-09 | End: 2021-08-11

## 2021-08-09 RX ORDER — HEPARIN SODIUM (PORCINE) LOCK FLUSH IV SOLN 100 UNIT/ML 100 UNIT/ML
5 SOLUTION INTRAVENOUS
Status: COMPLETED | OUTPATIENT
Start: 2021-08-09 | End: 2021-08-09

## 2021-08-09 RX ORDER — AZITHROMYCIN 250 MG/1
TABLET, FILM COATED ORAL
Qty: 6 TABLET | Refills: 0 | Status: SHIPPED | OUTPATIENT
Start: 2021-08-09 | End: 2021-08-14

## 2021-08-09 RX ORDER — HEPARIN SODIUM (PORCINE) LOCK FLUSH IV SOLN 100 UNIT/ML 100 UNIT/ML
500 SOLUTION INTRAVENOUS ONCE
Status: COMPLETED | OUTPATIENT
Start: 2021-08-09 | End: 2021-08-09

## 2021-08-09 RX ADMIN — HEPARIN SODIUM (PORCINE) LOCK FLUSH IV SOLN 100 UNIT/ML 500 UNITS: 100 SOLUTION at 13:00

## 2021-08-09 RX ADMIN — Medication 5 ML: at 11:29

## 2021-08-09 ASSESSMENT — PAIN SCALES - GENERAL: PAINLEVEL: MODERATE PAIN (4)

## 2021-08-09 NOTE — PROGRESS NOTES
Oncology/Hematology Visit Note  Aug 9, 2021    Reason for Visit: Follow up of undifferentiated pleomorphic sarcoma, add on fever     History of Present Illness: Richa Ashby is a 56 year old female with PMH asthma and GERD with undifferentiated pleomorphic sarcoma. 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 She received preoperative radiotherapy and the tumor was resected 3/12/20. She then developed a cough, chest tightness, and scapular pain and imaging revealed lung nodules. Lung biopsy 11/2/20 with sarcoma. She was started on Doxil + Ifos 11/13/20. Had delay in cycle 2 due to COVID dx 12/2/20. CT imaging after cycle 1 with positive response to treatment with decrease in pulmonary nodules. Received cycle 2 12/18/20. Received cycle 3 1/15/21 with dose reduction in Doxil for skin toxicity and mucositis. Received cycle 4 2/15/21.      CT CAP 3/8/21 with positive response to treatment, decreased size of pulmonary mets though one lesion was larger. Did see fibrocystic changes in left breast, recommended breast imaging. Received cycle 5 Doxil + ifos 3/11/21.      Imaging 4/5/21 with overall stable disease except one lung lesion, opted to continue Doxil alone for now and switch to Keytruda in future if ongoing progression.      Started Keytruda 5/10/21. Was seen 5/26/21 cough, SOB, fatigue work-up thought to be 2/2 phrenic nerve involvement of R apical lung mass.      Due to worsening fatigue and cough, repeat CT 6/15/21 concerning for progression vs pseudoprogression. Added Gemzar 6/16/21.     She was admitted 6/17/21-6/20/21 with fever of unknown origin, infectious work-up negative, treated with broad spectrum antibiotics.     She clinically improved on abx. Was able to receive day 8 Gemzar (dose reduced for thrombocytopenia) + Keytruda 6/23/21.      CT 7/12/21 with stable disease.     I was asked to see her today as an add on for fever.     Interval  History:  Connie was seen today for follow-up with her . She was doing well until Saturday when she developed a fever up to 101. Associated symptoms include worsening productive cough, body aches, joint swelling and pain (hands, elbows, knees, feet) that make it difficult to walk, a few looser stools (though no abdominal pain or diffuse watery diarrhea). She has been eating small amounts 2/2 mild nausea, still drinking OK. No urinary symptoms, headaches, or dizziness. No back/side pain. No breathing concerns. She did have shaking chills on Saturday, better now. She has been taking Tylenol every 6 hours to stay on top of the fever as without it she gets back to 100-101.     Current Outpatient Medications   Medication Sig Dispense Refill     acetaminophen (TYLENOL) 325 MG tablet Take 325-650 mg by mouth every 6 hours as needed for mild pain       ADVAIR DISKUS 100-50 MCG/DOSE inhaler        albuterol (PROAIR RESPICLICK) 108 (90 Base) MCG/ACT inhaler Inhale 1-2 puffs into the lungs       cetirizine (ZYRTEC) 10 MG tablet Take 10 mg by mouth daily       gabapentin (NEURONTIN) 300 MG capsule Take 1 capsule (300 mg) by mouth 3 times daily 90 capsule 1     hydrOXYzine (ATARAX) 25 MG tablet Take 1 tablet (25 mg) by mouth 3 times daily as needed for itching 30 tablet 1     melatonin (MELATONIN) 1 MG/ML LIQD liquid Take 5 mg by mouth       montelukast (SINGULAIR) 10 MG tablet Take 10 mg by mouth daily       multivitamin w/minerals (MULTI-VITAMIN) tablet Take 1 tablet by mouth daily       omeprazole (PRILOSEC) 20 MG DR capsule        ondansetron (ZOFRAN) 8 MG tablet Take 1 tablet (8 mg) by mouth every 8 hours as needed for nausea 60 tablet 1     prochlorperazine (COMPAZINE) 10 MG tablet Take 1 tablet (10 mg) by mouth every 6 hours as needed (Nausea/Vomiting) 30 tablet 5     vitamin D3 (CHOLECALCIFEROL) 2000 units (50 mcg) tablet Take 1 tablet by mouth daily       lidocaine-prilocaine (EMLA) 2.5-2.5 % external cream Apply  topically as needed for moderate pain (Patient not taking: Reported on 6/2/2021) 30 g 3       Physical Examination:  /70   Pulse 100   Temp 98.4  F (36.9  C) (Oral)   Wt 91.8 kg (202 lb 4.8 oz)   LMP 03/19/2019   SpO2 97%   BMI 31.68 kg/m    Wt Readings from Last 10 Encounters:   08/09/21 91.8 kg (202 lb 4.8 oz)   08/04/21 93.1 kg (205 lb 4.8 oz)   07/28/21 93.2 kg (205 lb 8 oz)   07/24/21 92.5 kg (204 lb)   07/18/21 93.9 kg (207 lb 1.6 oz)   07/14/21 93.7 kg (206 lb 8 oz)   07/07/21 92.9 kg (204 lb 11.2 oz)   06/30/21 92.5 kg (204 lb)   06/23/21 95.1 kg (209 lb 11.2 oz)   06/20/21 95.2 kg (209 lb 12.8 oz)     Constitutional: Well-appearing female in no acute distress.  Eyes: EOMI, PERRL. No scleral icterus.  ENT: Deferred   Lymphatic: Neck is supple without cervical or supraclavicular lymphadenopathy.  Cardiovascular: Regular rate and rhythm. No murmurs, gallops, or rubs. Trace peripheral edema.  Respiratory: Clear to auscultation bilaterally. No wheezes or crackles.  Gastrointestinal: Bowel sounds present. Abdomen soft, non-tender. No palpable hepatosplenomegaly or masses.   Neurologic: Cranial nerves II through XII are grossly intact. Stiff when needing to stand to get to table.  Skin: No rashes, petechiae, or bruising noted on exposed skin.  MSK: Slight swelling noted to hands and ankles without erythema.    Laboratory Data:  Results for AMADEO WILSON (MRN [id number]) as of 8/9/2021 22:47   8/9/2021 11:24   Sodium 137   Potassium 3.7   Chloride 104   Carbon Dioxide 26   Urea Nitrogen 9   Creatinine 0.79   GFR Estimate 84   Calcium 9.5   Anion Gap 7   Albumin 2.8 (L)   Protein Total 7.5   Bilirubin Total 1.1   Alkaline Phosphatase 238 (H)   ALT 21   AST 20   CRP Inflammation 257.0 (H)   Glucose 107 (H)   WBC 8.7   Hemoglobin 8.2 (L)   Hematocrit 27.3 (L)   Platelet Count 188   RBC Count 2.86 (L)   MCV 96   MCH 28.7   MCHC 30.0 (L)   RDW 17.9 (H)   % Neutrophils 67   % Lymphocytes 14   % Monocytes  15   % Eosinophils 3   Absolute Basophils 0.0   % Basophils 0   Absolute Eosinophils 0.3   Absolute Immature Granulocytes 0.0   Absolute Lymphocytes 1.3   Absolute Monocytes 1.3   % Immature Granulocytes 1   Absolute Neutrophils 5.9   Absolute NRBCs 0.0   NRBCs per 100 WBC 0   BLOOD CULTURE Rpt   SARS CoV2 PCR Negative     CXR  FINDINGS: Again noted are bilateral pulmonary masses and nodules which  appear grossly similar in number to the recent chest CT. There is mass  effect upon the trachea from the large right upper hemithorax mass  with slight bowing of the trachea to the left. Right-sided port  catheter tip is in the proximal SVC. Bones appear grossly well  preserved. Heart size is normal.                                                                      IMPRESSION: Two right and one left lung nodules/masses redemonstrated  and not significantly change when allowing for differences in  technique from plain film today persistent chest CT 4 weeks ago.     SHIRLEY INMAN MD      Assessment and Plan:  1. Fever  Concern for respiratory infection with worsening cough. Afebrile in clinic. CXR reassuring. ANC WNL. Will empirically treat with Azithromycin x 5 days. Blood cultures pending. Patient will call with any worsening symptoms. I will see her on Wednesday in infusion to ensure improvement in symptoms before going ahead with Gemzar.    2. Inflammatory Arthritis  2/2 Keytruda. Multiple joints with pain, stiffness, and swelling. CRP elevated. Will do short burst of steroids Prednisone 20mg x 2 days, 10mg x 2 days along with scheduled Ibuprofen. Can use Tylenol and Voltaren gel PRN. Pending resolution may affect how we do Keytruda in the future.     35 minutes spent on the date of the encounter doing chart review, review of test results, interpretation of tests, patient visit and documentation     Harshal Schuster PA-C  [facility]  [address] [place] [id number]  [phone]

## 2021-08-09 NOTE — LETTER
8/9/2021         RE: Richa Ashby  [address]  [place] [address]        Dear Colleague,    Thank you for referring your patient, Richa Ashby, to the [facility]. Please see a copy of my visit note below.    Oncology/Hematology Visit Note  Aug 9, 2021    Reason for Visit: Follow up of undifferentiated pleomorphic sarcoma, add on fever     History of Present Illness: Richa Ashby is a 56 year old female with PMH asthma and GERD with undifferentiated pleomorphic sarcoma. 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 She received preoperative radiotherapy and the tumor was resected 3/12/20. She then developed a cough, chest tightness, and scapular pain and imaging revealed lung nodules. Lung biopsy 11/2/20 with sarcoma. She was started on Doxil + Ifos 11/13/20. Had delay in cycle 2 due to COVID dx 12/2/20. CT imaging after cycle 1 with positive response to treatment with decrease in pulmonary nodules. Received cycle 2 12/18/20. Received cycle 3 1/15/21 with dose reduction in Doxil for skin toxicity and mucositis. Received cycle 4 2/15/21.      CT CAP 3/8/21 with positive response to treatment, decreased size of pulmonary mets though one lesion was larger. Did see fibrocystic changes in left breast, recommended breast imaging. Received cycle 5 Doxil + ifos 3/11/21.      Imaging 4/5/21 with overall stable disease except one lung lesion, opted to continue Doxil alone for now and switch to Keytruda in future if ongoing progression.      Started Keytruda 5/10/21. Was seen 5/26/21 cough, SOB, fatigue work-up thought to be 2/2 phrenic nerve involvement of R apical lung mass.      Due to worsening fatigue and cough, repeat CT 6/15/21 concerning for progression vs pseudoprogression. Added Gemzar 6/16/21.     She was admitted 6/17/21-6/20/21 with fever of unknown origin, infectious work-up  negative, treated with broad spectrum antibiotics.     She clinically improved on abx. Was able to receive day 8 Gemzar (dose reduced for thrombocytopenia) + Keytruda 6/23/21.      CT 7/12/21 with stable disease.     I was asked to see her today as an add on for fever.     Interval History:  Connie was seen today for follow-up with her . She was doing well until Saturday when she developed a fever up to 101. Associated symptoms include worsening productive cough, body aches, joint swelling and pain (hands, elbows, knees, feet) that make it difficult to walk, a few looser stools (though no abdominal pain or diffuse watery diarrhea). She has been eating small amounts 2/2 mild nausea, still drinking OK. No urinary symptoms, headaches, or dizziness. No back/side pain. No breathing concerns. She did have shaking chills on Saturday, better now. She has been taking Tylenol every 6 hours to stay on top of the fever as without it she gets back to 100-101.     Current Outpatient Medications   Medication Sig Dispense Refill     acetaminophen (TYLENOL) 325 MG tablet Take 325-650 mg by mouth every 6 hours as needed for mild pain       ADVAIR DISKUS 100-50 MCG/DOSE inhaler        albuterol (PROAIR RESPICLICK) 108 (90 Base) MCG/ACT inhaler Inhale 1-2 puffs into the lungs       cetirizine (ZYRTEC) 10 MG tablet Take 10 mg by mouth daily       gabapentin (NEURONTIN) 300 MG capsule Take 1 capsule (300 mg) by mouth 3 times daily 90 capsule 1     hydrOXYzine (ATARAX) 25 MG tablet Take 1 tablet (25 mg) by mouth 3 times daily as needed for itching 30 tablet 1     melatonin (MELATONIN) 1 MG/ML LIQD liquid Take 5 mg by mouth       montelukast (SINGULAIR) 10 MG tablet Take 10 mg by mouth daily       multivitamin w/minerals (MULTI-VITAMIN) tablet Take 1 tablet by mouth daily       omeprazole (PRILOSEC) 20 MG DR capsule        ondansetron (ZOFRAN) 8 MG tablet Take 1 tablet (8 mg) by mouth every 8 hours as needed for nausea 60 tablet 1      prochlorperazine (COMPAZINE) 10 MG tablet Take 1 tablet (10 mg) by mouth every 6 hours as needed (Nausea/Vomiting) 30 tablet 5     vitamin D3 (CHOLECALCIFEROL) 2000 units (50 mcg) tablet Take 1 tablet by mouth daily       lidocaine-prilocaine (EMLA) 2.5-2.5 % external cream Apply topically as needed for moderate pain (Patient not taking: Reported on 6/2/2021) 30 g 3       Physical Examination:  /70   Pulse 100   Temp 98.4  F (36.9  C) (Oral)   Wt 91.8 kg (202 lb 4.8 oz)   LMP 03/19/2019   SpO2 97%   BMI 31.68 kg/m    Wt Readings from Last 10 Encounters:   08/09/21 91.8 kg (202 lb 4.8 oz)   08/04/21 93.1 kg (205 lb 4.8 oz)   07/28/21 93.2 kg (205 lb 8 oz)   07/24/21 92.5 kg (204 lb)   07/18/21 93.9 kg (207 lb 1.6 oz)   07/14/21 93.7 kg (206 lb 8 oz)   07/07/21 92.9 kg (204 lb 11.2 oz)   06/30/21 92.5 kg (204 lb)   06/23/21 95.1 kg (209 lb 11.2 oz)   06/20/21 95.2 kg (209 lb 12.8 oz)     Constitutional: Well-appearing female in no acute distress.  Eyes: EOMI, PERRL. No scleral icterus.  ENT: Deferred   Lymphatic: Neck is supple without cervical or supraclavicular lymphadenopathy.  Cardiovascular: Regular rate and rhythm. No murmurs, gallops, or rubs. Trace peripheral edema.  Respiratory: Clear to auscultation bilaterally. No wheezes or crackles.  Gastrointestinal: Bowel sounds present. Abdomen soft, non-tender. No palpable hepatosplenomegaly or masses.   Neurologic: Cranial nerves II through XII are grossly intact. Stiff when needing to stand to get to table.  Skin: No rashes, petechiae, or bruising noted on exposed skin.  MSK: Slight swelling noted to hands and ankles without erythema.    Laboratory Data:  Results for AMADEO WILSON (MRN [id number]) as of 8/9/2021 22:47   8/9/2021 11:24   Sodium 137   Potassium 3.7   Chloride 104   Carbon Dioxide 26   Urea Nitrogen 9   Creatinine 0.79   GFR Estimate 84   Calcium 9.5   Anion Gap 7   Albumin 2.8 (L)   Protein Total 7.5   Bilirubin Total 1.1   Alkaline  Phosphatase 238 (H)   ALT 21   AST 20   CRP Inflammation 257.0 (H)   Glucose 107 (H)   WBC 8.7   Hemoglobin 8.2 (L)   Hematocrit 27.3 (L)   Platelet Count 188   RBC Count 2.86 (L)   MCV 96   MCH 28.7   MCHC 30.0 (L)   RDW 17.9 (H)   % Neutrophils 67   % Lymphocytes 14   % Monocytes 15   % Eosinophils 3   Absolute Basophils 0.0   % Basophils 0   Absolute Eosinophils 0.3   Absolute Immature Granulocytes 0.0   Absolute Lymphocytes 1.3   Absolute Monocytes 1.3   % Immature Granulocytes 1   Absolute Neutrophils 5.9   Absolute NRBCs 0.0   NRBCs per 100 WBC 0   BLOOD CULTURE Rpt   SARS CoV2 PCR Negative     CXR  FINDINGS: Again noted are bilateral pulmonary masses and nodules which  appear grossly similar in number to the recent chest CT. There is mass  effect upon the trachea from the large right upper hemithorax mass  with slight bowing of the trachea to the left. Right-sided port  catheter tip is in the proximal SVC. Bones appear grossly well  preserved. Heart size is normal.                                                                      IMPRESSION: Two right and one left lung nodules/masses redemonstrated  and not significantly change when allowing for differences in  technique from plain film today persistent chest CT 4 weeks ago.     SHIRLEY INMAN MD      Assessment and Plan:  1. Fever  Concern for respiratory infection with worsening cough. Afebrile in clinic. CXR reassuring. ANC WNL. Will empirically treat with Azithromycin x 5 days. Blood cultures pending. Patient will call with any worsening symptoms. I will see her on Wednesday in infusion to ensure improvement in symptoms before going ahead with Gemzar.    2. Inflammatory Arthritis  2/2 Keytruda. Multiple joints with pain, stiffness, and swelling. CRP elevated. Will do short burst of steroids Prednisone 20mg x 2 days, 10mg x 2 days along with scheduled Ibuprofen. Can use Tylenol and Voltaren gel PRN. Pending resolution may affect how we do Keytruda  in the future.     35 minutes spent on the date of the encounter doing chart review, review of test results, interpretation of tests, patient visit and documentation     Harshal Schuster PA-C  [facility] [address] [address] [phone]        Again, thank you for allowing me to participate in the care of your patient.        Sincerely,        RUPERT Guaman

## 2021-08-09 NOTE — NURSING NOTE
"Chief Complaint   Patient presents with     Oncology Clinic Visit     sarcoma     Port Draw     labs drawn from port and vpt (BCx2) by rn.       Port accessed with 20 gauge 3/4\" gripper needle and labs drawn by rn.  Port flushed with NS and heparin.  Pt tolerated well.  VS taken.  Pt checked in for next appt.    BC x 2 obtained from port and vpt as well.  NP swab obtained also.      Kassi Landin RN      "
"Oncology Rooming Note    August 9, 2021 11:16 AM   Richa Ashby is a 56 year old female who presents for:    Chief Complaint   Patient presents with     Oncology Clinic Visit     sarcoma     Initial Vitals: /70   Pulse 100   Temp 98.4  F (36.9  C) (Oral)   Wt 91.8 kg (202 lb 4.8 oz)   LMP 03/19/2019   SpO2 97%   BMI 31.68 kg/m   Estimated body mass index is 31.68 kg/m  as calculated from the following:    Height as of 6/17/21: 1.702 m (5' 7\").    Weight as of this encounter: 91.8 kg (202 lb 4.8 oz). Body surface area is 2.08 meters squared.  Moderate Pain (4) Comment: Data Unavailable   Patient's last menstrual period was 03/19/2019.  Allergies reviewed: Yes  Medications reviewed: Yes    Medications: Medication refills not needed today.  Pharmacy name entered into Selleration:    [id number] - [place], [place] - [address], [place] AT [facility] [place], [place] - [phone]    Clinical concerns: had fever of 101 this morning, ache, joint pain, cough, mild nausea, loose stools but no urgency.      Flaca Alatorre CMA            "
[facility]

## 2021-08-09 NOTE — PATIENT INSTRUCTIONS
Joint Pain  -Prednisone 20mg x 2 days, then 10mg x 2 days, then stop. Take with food in AM  -Ibuprofen 600mg 3x a day until the pain improves. Take with food  -OK to continue Tylenol  -Can try Voltaren gel    Fever  -Chest xray to rule out pneumonia   -Azithromycin x 5 days (2 tablets the first day, then 1 tablet for 4 days)

## 2021-08-09 NOTE — TELEPHONE ENCOUNTER
Keytruda on Wednesday and Blood transfusion on Thursday and began running fever on Saturday.   Running fever since Saturday.   Saturday 101.1  Sunday 99.9  Monday 101.0   Has been taking tylenol for the fever and the pain.   Hands and feet are swollen, pain in wrist joints, ankle joints, knees and feet. Bottoms of feet are swollen and it hurts to walk. Tylenol is effective for pain in joints.   Has more of a barking cough, with phlegm in back of throat. Denies sore throat. Urinating without difficulty. Taking in fluids without difficulty is getting in 8-8 oz glasses of fluid a day.   9:54 paged Harshal EMERY  10:08 Harshal returned call- labs at 10:45 and appointment with Harshal EMERY at 11:15 am.   Call placed to Connie and she will hurry and get to the clinic as soon as she can. She is aware of labs and then to see Harshal EMERY at 11:15 am   Message sent to  that Connie will need the above appointments set up and that she would be a Person under Investigation when she comes to the clinic for her appointments.

## 2021-08-11 ENCOUNTER — ONCOLOGY VISIT (OUTPATIENT)
Dept: ONCOLOGY | Facility: CLINIC | Age: 56
End: 2021-08-11
Attending: PHYSICIAN ASSISTANT
Payer: COMMERCIAL

## 2021-08-11 ENCOUNTER — APPOINTMENT (OUTPATIENT)
Dept: LAB | Facility: CLINIC | Age: 56
End: 2021-08-11
Attending: PHYSICIAN ASSISTANT
Payer: COMMERCIAL

## 2021-08-11 VITALS
SYSTOLIC BLOOD PRESSURE: 108 MMHG | BODY MASS INDEX: 32.26 KG/M2 | WEIGHT: 206 LBS | TEMPERATURE: 97.9 F | RESPIRATION RATE: 16 BRPM | HEART RATE: 87 BPM | DIASTOLIC BLOOD PRESSURE: 67 MMHG | OXYGEN SATURATION: 98 %

## 2021-08-11 DIAGNOSIS — C49.9 SARCOMA OF SOFT TISSUE (H): ICD-10-CM

## 2021-08-11 DIAGNOSIS — C78.00 MALIGNANT NEOPLASM METASTATIC TO LUNG, UNSPECIFIED LATERALITY (H): Primary | ICD-10-CM

## 2021-08-11 DIAGNOSIS — C49.9 SARCOMA (H): ICD-10-CM

## 2021-08-11 DIAGNOSIS — M19.90 INFLAMMATORY ARTHRITIS: ICD-10-CM

## 2021-08-11 DIAGNOSIS — R50.9 FEVER, UNSPECIFIED FEVER CAUSE: ICD-10-CM

## 2021-08-11 DIAGNOSIS — D63.0 ANEMIA IN NEOPLASTIC DISEASE: ICD-10-CM

## 2021-08-11 LAB
ABO/RH(D): NORMAL
ALBUMIN SERPL-MCNC: 2.7 G/DL (ref 3.4–5)
ALP SERPL-CCNC: 199 U/L (ref 40–150)
ALT SERPL W P-5'-P-CCNC: 26 U/L (ref 0–50)
ANION GAP SERPL CALCULATED.3IONS-SCNC: 9 MMOL/L (ref 3–14)
ANTIBODY SCREEN: NEGATIVE
AST SERPL W P-5'-P-CCNC: 13 U/L (ref 0–45)
BASOPHILS # BLD AUTO: 0 10E3/UL (ref 0–0.2)
BASOPHILS NFR BLD AUTO: 0 %
BILIRUB SERPL-MCNC: 0.4 MG/DL (ref 0.2–1.3)
BLD PROD TYP BPU: NORMAL
BLOOD COMPONENT TYPE: NORMAL
BUN SERPL-MCNC: 10 MG/DL (ref 7–30)
CALCIUM SERPL-MCNC: 9.2 MG/DL (ref 8.5–10.1)
CHLORIDE BLD-SCNC: 107 MMOL/L (ref 94–109)
CO2 SERPL-SCNC: 25 MMOL/L (ref 20–32)
CODING SYSTEM: NORMAL
CREAT SERPL-MCNC: 0.66 MG/DL (ref 0.52–1.04)
CROSSMATCH: NORMAL
EOSINOPHIL # BLD AUTO: 0.1 10E3/UL (ref 0–0.7)
EOSINOPHIL NFR BLD AUTO: 1 %
ERYTHROCYTE [DISTWIDTH] IN BLOOD BY AUTOMATED COUNT: 17.5 % (ref 10–15)
GFR SERPL CREATININE-BSD FRML MDRD: >90 ML/MIN/1.73M2
GLUCOSE BLD-MCNC: 179 MG/DL (ref 70–99)
HCT VFR BLD AUTO: 25.2 % (ref 35–47)
HGB BLD-MCNC: 7.9 G/DL (ref 11.7–15.7)
IMM GRANULOCYTES # BLD: 0 10E3/UL
IMM GRANULOCYTES NFR BLD: 1 %
ISSUE DATE AND TIME: NORMAL
LYMPHOCYTES # BLD AUTO: 0.4 10E3/UL (ref 0.8–5.3)
LYMPHOCYTES NFR BLD AUTO: 6 %
MCH RBC QN AUTO: 29.5 PG (ref 26.5–33)
MCHC RBC AUTO-ENTMCNC: 31.3 G/DL (ref 31.5–36.5)
MCV RBC AUTO: 94 FL (ref 78–100)
MONOCYTES # BLD AUTO: 0.3 10E3/UL (ref 0–1.3)
MONOCYTES NFR BLD AUTO: 4 %
NEUTROPHILS # BLD AUTO: 5.3 10E3/UL (ref 1.6–8.3)
NEUTROPHILS NFR BLD AUTO: 88 %
NRBC # BLD AUTO: 0 10E3/UL
NRBC BLD AUTO-RTO: 0 /100
PLATELET # BLD AUTO: 246 10E3/UL (ref 150–450)
POTASSIUM BLD-SCNC: 3 MMOL/L (ref 3.4–5.3)
PROT SERPL-MCNC: 7.4 G/DL (ref 6.8–8.8)
RBC # BLD AUTO: 2.68 10E6/UL (ref 3.8–5.2)
SODIUM SERPL-SCNC: 141 MMOL/L (ref 133–144)
SPECIMEN EXPIRATION DATE: NORMAL
TSH SERPL DL<=0.005 MIU/L-ACNC: 1.99 MU/L (ref 0.4–4)
UNIT ABO/RH: NORMAL
UNIT NUMBER: NORMAL
UNIT STATUS: NORMAL
UNIT TYPE ISBT: 5100
WBC # BLD AUTO: 6.1 10E3/UL (ref 4–11)

## 2021-08-11 PROCEDURE — 96415 CHEMO IV INFUSION ADDL HR: CPT

## 2021-08-11 PROCEDURE — 99213 OFFICE O/P EST LOW 20 MIN: CPT | Performed by: PHYSICIAN ASSISTANT

## 2021-08-11 PROCEDURE — 80053 COMPREHEN METABOLIC PANEL: CPT | Performed by: PHYSICIAN ASSISTANT

## 2021-08-11 PROCEDURE — 96375 TX/PRO/DX INJ NEW DRUG ADDON: CPT

## 2021-08-11 PROCEDURE — P9016 RBC LEUKOCYTES REDUCED: HCPCS | Performed by: PHYSICIAN ASSISTANT

## 2021-08-11 PROCEDURE — 250N000013 HC RX MED GY IP 250 OP 250 PS 637: Performed by: PHYSICIAN ASSISTANT

## 2021-08-11 PROCEDURE — 84443 ASSAY THYROID STIM HORMONE: CPT | Performed by: PHYSICIAN ASSISTANT

## 2021-08-11 PROCEDURE — 36430 TRANSFUSION BLD/BLD COMPNT: CPT

## 2021-08-11 PROCEDURE — 85025 COMPLETE CBC W/AUTO DIFF WBC: CPT | Performed by: PHYSICIAN ASSISTANT

## 2021-08-11 PROCEDURE — 86923 COMPATIBILITY TEST ELECTRIC: CPT | Performed by: PHYSICIAN ASSISTANT

## 2021-08-11 PROCEDURE — 96413 CHEMO IV INFUSION 1 HR: CPT

## 2021-08-11 PROCEDURE — 250N000011 HC RX IP 250 OP 636: Performed by: PHYSICIAN ASSISTANT

## 2021-08-11 PROCEDURE — 36591 DRAW BLOOD OFF VENOUS DEVICE: CPT | Performed by: PHYSICIAN ASSISTANT

## 2021-08-11 PROCEDURE — 258N000003 HC RX IP 258 OP 636: Performed by: PHYSICIAN ASSISTANT

## 2021-08-11 PROCEDURE — 86900 BLOOD TYPING SEROLOGIC ABO: CPT | Performed by: PHYSICIAN ASSISTANT

## 2021-08-11 RX ORDER — ALBUTEROL SULFATE 0.83 MG/ML
2.5 SOLUTION RESPIRATORY (INHALATION)
Status: CANCELLED | OUTPATIENT
Start: 2021-08-11

## 2021-08-11 RX ORDER — PREDNISONE 10 MG/1
10 TABLET ORAL DAILY
Qty: 2 TABLET | Refills: 0 | Status: SHIPPED | OUTPATIENT
Start: 2021-08-11 | End: 2021-08-13

## 2021-08-11 RX ORDER — HEPARIN SODIUM (PORCINE) LOCK FLUSH IV SOLN 100 UNIT/ML 100 UNIT/ML
5 SOLUTION INTRAVENOUS
Status: CANCELLED | OUTPATIENT
Start: 2021-08-11

## 2021-08-11 RX ORDER — ACETAMINOPHEN 325 MG/1
975 TABLET ORAL ONCE
Status: CANCELLED | OUTPATIENT
Start: 2021-08-11 | End: 2021-08-11

## 2021-08-11 RX ORDER — LORAZEPAM 2 MG/ML
0.5 INJECTION INTRAMUSCULAR EVERY 4 HOURS PRN
Status: CANCELLED
Start: 2021-08-11

## 2021-08-11 RX ORDER — HEPARIN SODIUM,PORCINE 10 UNIT/ML
5 VIAL (ML) INTRAVENOUS
Status: CANCELLED | OUTPATIENT
Start: 2021-08-11

## 2021-08-11 RX ORDER — HEPARIN SODIUM (PORCINE) LOCK FLUSH IV SOLN 100 UNIT/ML 100 UNIT/ML
5 SOLUTION INTRAVENOUS
Status: DISCONTINUED | OUTPATIENT
Start: 2021-08-11 | End: 2021-08-11 | Stop reason: HOSPADM

## 2021-08-11 RX ORDER — METHYLPREDNISOLONE SODIUM SUCCINATE 125 MG/2ML
125 INJECTION, POWDER, LYOPHILIZED, FOR SOLUTION INTRAMUSCULAR; INTRAVENOUS
Status: CANCELLED
Start: 2021-08-11

## 2021-08-11 RX ORDER — GRANISETRON HYDROCHLORIDE 1 MG/ML
1 INJECTION INTRAVENOUS ONCE
Status: CANCELLED | OUTPATIENT
Start: 2021-08-11 | End: 2021-08-11

## 2021-08-11 RX ORDER — GRANISETRON HYDROCHLORIDE 1 MG/ML
1 INJECTION INTRAVENOUS ONCE
Status: COMPLETED | OUTPATIENT
Start: 2021-08-11 | End: 2021-08-11

## 2021-08-11 RX ORDER — HEPARIN SODIUM (PORCINE) LOCK FLUSH IV SOLN 100 UNIT/ML 100 UNIT/ML
5 SOLUTION INTRAVENOUS EVERY 8 HOURS PRN
Status: DISCONTINUED | OUTPATIENT
Start: 2021-08-11 | End: 2021-08-13 | Stop reason: HOSPADM

## 2021-08-11 RX ORDER — PROCHLORPERAZINE MALEATE 10 MG
10 TABLET ORAL EVERY 6 HOURS PRN
Qty: 30 TABLET | Refills: 5 | Status: SHIPPED | OUTPATIENT
Start: 2021-08-11 | End: 2021-09-15

## 2021-08-11 RX ORDER — DIPHENHYDRAMINE HYDROCHLORIDE 50 MG/ML
50 INJECTION INTRAMUSCULAR; INTRAVENOUS
Status: CANCELLED
Start: 2021-08-11

## 2021-08-11 RX ORDER — ALBUTEROL SULFATE 90 UG/1
1-2 AEROSOL, METERED RESPIRATORY (INHALATION)
Status: CANCELLED
Start: 2021-08-11

## 2021-08-11 RX ORDER — EPINEPHRINE 1 MG/ML
0.3 INJECTION, SOLUTION INTRAMUSCULAR; SUBCUTANEOUS EVERY 5 MIN PRN
Status: CANCELLED | OUTPATIENT
Start: 2021-08-11

## 2021-08-11 RX ORDER — MEPERIDINE HYDROCHLORIDE 25 MG/ML
25 INJECTION INTRAMUSCULAR; INTRAVENOUS; SUBCUTANEOUS EVERY 30 MIN PRN
Status: CANCELLED | OUTPATIENT
Start: 2021-08-11

## 2021-08-11 RX ORDER — NALOXONE HYDROCHLORIDE 0.4 MG/ML
0.2 INJECTION, SOLUTION INTRAMUSCULAR; INTRAVENOUS; SUBCUTANEOUS
Status: CANCELLED | OUTPATIENT
Start: 2021-08-11

## 2021-08-11 RX ORDER — POTASSIUM CHLORIDE 1500 MG/1
40 TABLET, EXTENDED RELEASE ORAL ONCE
Status: COMPLETED | OUTPATIENT
Start: 2021-08-11 | End: 2021-08-11

## 2021-08-11 RX ADMIN — GEMCITABINE 1000 MG: 38 INJECTION, SOLUTION INTRAVENOUS at 14:42

## 2021-08-11 RX ADMIN — GRANISETRON HYDROCHLORIDE 1 MG: 1 INJECTION, SOLUTION INTRAVENOUS at 14:14

## 2021-08-11 RX ADMIN — POTASSIUM CHLORIDE 40 MEQ: 1500 TABLET, EXTENDED RELEASE ORAL at 14:14

## 2021-08-11 RX ADMIN — Medication 5 ML: at 16:18

## 2021-08-11 RX ADMIN — Medication 5 ML: at 12:44

## 2021-08-11 RX ADMIN — SODIUM CHLORIDE 250 ML: 9 INJECTION, SOLUTION INTRAVENOUS at 14:15

## 2021-08-11 ASSESSMENT — PAIN SCALES - GENERAL: PAINLEVEL: NO PAIN (0)

## 2021-08-11 NOTE — NURSING NOTE
Chief Complaint   Patient presents with     Blood Draw     Labs drawn via  by RN in lab. VS taken.      Maximiliano Castro RN

## 2021-08-11 NOTE — LETTER
8/11/2021         RE: Richa Ashby  [address]  [facility] [address]      Oncology/Hematology Visit Note  Aug 11, 2021    Reason for Visit: Follow up of undifferentiated pleomorphic sarcoma     History of Present Illness: Richa Ashby is a 56 year old female with PMH asthma and GERD with undifferentiated pleomorphic sarcoma. 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 She received preoperative radiotherapy and the tumor was resected 3/12/20. She then developed a cough, chest tightness, and scapular pain and imaging revealed lung nodules. Lung biopsy 11/2/20 with sarcoma. She was started on Doxil + Ifos 11/13/20. Had delay in cycle 2 due to COVID dx 12/2/20. CT imaging after cycle 1 with positive response to treatment with decrease in pulmonary nodules. Received cycle 2 12/18/20. Received cycle 3 1/15/21 with dose reduction in Doxil for skin toxicity and mucositis. Received cycle 4 2/15/21.      CT CAP 3/8/21 with positive response to treatment, decreased size of pulmonary mets though one lesion was larger. Did see fibrocystic changes in left breast, recommended breast imaging. Received cycle 5 Doxil + ifos 3/11/21.      Imaging 4/5/21 with overall stable disease except one lung lesion, opted to continue Doxil alone for now and switch to Keytruda in future if ongoing progression.      Started Keytruda 5/10/21. Was seen 5/26/21 cough, SOB, fatigue work-up thought to be 2/2 phrenic nerve involvement of R apical lung mass.      Due to worsening fatigue and cough, repeat CT 6/15/21 concerning for progression vs pseudoprogression. Added Gemzar 6/16/21.     She was admitted 6/17/21-6/20/21 with fever of unknown origin, infectious work-up negative, treated with broad spectrum antibiotics.     She clinically improved on abx. Was able to receive day 8 Gemzar (dose reduced for thrombocytopenia) + Keytruda 6/23/21.      CT  7/12/21 with stable disease.    Interval History:  Connie was seen today for oncology follow-up. She is feeling much better compared to earlier in the week. No fevers since Monday morning and will be finishing Azithromycin later this week. Cough persists but is improved. No chest pain or SOB. Joint pain and swelling significantly improved with prednisone. She still has pain/neuropathy in her feet which is not new. She is eating and drinking better. Good energy levels. No recurrent diarrhea issues. No nausea/vomiting. She feels well to do treatment today.    Of note she is planning on going to [place] in December. Her and her  are also thinking about moving to be closer to her daughter next year.   Current Outpatient Medications   Medication Sig Dispense Refill     predniSONE (DELTASONE) 10 MG tablet Take 1 tablet (10 mg) by mouth daily for 2 days 2 tablet 0     prochlorperazine (COMPAZINE) 10 MG tablet Take 1 tablet (10 mg) by mouth every 6 hours as needed (Nausea/Vomiting) 30 tablet 5     acetaminophen (TYLENOL) 325 MG tablet Take 325-650 mg by mouth every 6 hours as needed for mild pain       ADVAIR DISKUS 100-50 MCG/DOSE inhaler        albuterol (PROAIR RESPICLICK) 108 (90 Base) MCG/ACT inhaler Inhale 1-2 puffs into the lungs       azithromycin (ZITHROMAX) 250 MG tablet Take 2 tablets (500 mg) by mouth daily for 1 day, THEN 1 tablet (250 mg) daily for 4 days. 6 tablet 0     cetirizine (ZYRTEC) 10 MG tablet Take 10 mg by mouth daily       gabapentin (NEURONTIN) 300 MG capsule Take 1 capsule (300 mg) by mouth 3 times daily 90 capsule 1     hydrOXYzine (ATARAX) 25 MG tablet Take 1 tablet (25 mg) by mouth 3 times daily as needed for itching 30 tablet 1     lidocaine-prilocaine (EMLA) 2.5-2.5 % external cream Apply topically as needed for moderate pain (Patient not taking: Reported on 6/2/2021) 30 g 3     melatonin (MELATONIN) 1 MG/ML LIQD liquid Take 5 mg by mouth       montelukast (SINGULAIR) 10 MG tablet Take  10 mg by mouth daily       multivitamin w/minerals (MULTI-VITAMIN) tablet Take 1 tablet by mouth daily       omeprazole (PRILOSEC) 20 MG DR capsule        ondansetron (ZOFRAN) 8 MG tablet Take 1 tablet (8 mg) by mouth every 8 hours as needed for nausea 60 tablet 1     vitamin D3 (CHOLECALCIFEROL) 2000 units (50 mcg) tablet Take 1 tablet by mouth daily         Physical Examination:  /67 (BP Location: Left arm, Patient Position: Sitting, Cuff Size: Adult Regular)   Pulse 87   Temp 97.9  F (36.6  C) (Oral)   Resp 16   Wt 93.4 kg (206 lb)   LMP 03/19/2019   SpO2 98%   BMI 32.26 kg/m    Wt Readings from Last 10 Encounters:   08/11/21 93.4 kg (206 lb)   08/09/21 91.8 kg (202 lb 4.8 oz)   08/04/21 93.1 kg (205 lb 4.8 oz)   07/28/21 93.2 kg (205 lb 8 oz)   07/24/21 92.5 kg (204 lb)   07/18/21 93.9 kg (207 lb 1.6 oz)   07/14/21 93.7 kg (206 lb 8 oz)   07/07/21 92.9 kg (204 lb 11.2 oz)   06/30/21 92.5 kg (204 lb)   06/23/21 95.1 kg (209 lb 11.2 oz)     Constitutional: Well-appearing female in no acute distress.  Eyes: EOMI, PERRL. No scleral icterus.  ENT: Oral mucosa is moist without lesions or thrush.   Lymphatic: Neck is supple without cervical or supraclavicular lymphadenopathy.   Cardiovascular: Regular rate and rhythm. No murmurs, gallops, or rubs. No peripheral edema.  Respiratory: Clear to auscultation bilaterally. No wheezes or crackles.  Neurologic: Cranial nerves II through XII are grossly intact.  Skin: No rashes, petechiae, or bruising noted on exposed skin.    Laboratory Data:  Results for AMADEO WILSON (MRN [id number]) as of 8/13/2021 09:37   8/11/2021 12:51   Sodium 141   Potassium 3.0 (L)   Chloride 107   Carbon Dioxide 25   Urea Nitrogen 10   Creatinine 0.66   GFR Estimate >90   Calcium 9.2   Anion Gap 9   Albumin 2.7 (L)   Protein Total 7.4   Bilirubin Total 0.4   Alkaline Phosphatase 199 (H)   ALT 26   AST 13   TSH 1.99   Glucose 179 (H)   WBC 6.1   Hemoglobin 7.9 (L)   Hematocrit 25.2 (L)    Platelet Count 246   RBC Count 2.68 (L)   MCV 94   MCH 29.5   MCHC 31.3 (L)   RDW 17.5 (H)   % Neutrophils 88   % Lymphocytes 6   % Monocytes 4   % Eosinophils 1   Absolute Basophils 0.0   % Basophils 0   Absolute Eosinophils 0.1   Absolute Immature Granulocytes 0.0   Absolute Lymphocytes 0.4 (L)   Absolute Monocytes 0.3   % Immature Granulocytes 1   Absolute Neutrophils 5.3   Absolute NRBCs 0.0   NRBCs per 100 WBC 0       Assessment and Plan:  1. Onc  Metastatic UPS, was on Doxil + Ifosfamide (5 cycles) with positive response to treatment initially, then due to poor tolerance switched to Doxil alone (1 cycle) but had progression. Started on Keytruda 5/10/21. Due to tumor growth (progression vs pseudoprogression) added Gemzar 6/16/21. CT imaging mid July with positive response to treatment.      Has had issues with low plt/ANC with Gemzar. Previously changeed Gemzar to 1000mg day 1 and day 15. Continuing Keytruda as well- with recent inflammatory arthritis may need to adjust but will see how next few weeks go.    Infection from earlier in the week improved. Feeling well today. Go ahead with cycle 3 day 15 Gemzar. I will see her in 2 weeks for cycle 4.      Plan to repeat imaging early September.      2. ID  Admitted 6/17/21 for high fevers and hypotension, infectious work-up negative though did improve with broad spectrum antibiotics.     Likely respiratory infection earlier in the week, improved on Azithromycin with no recurrent fevers. Will call with any concerning symptoms     3. Neuro  Fatigue: Worked up for adrenal insufficiency-cortisol WNL, hypothyroidism-TSH minimally elevated and free T4 normal, OH-iron studies OK, anemia-hgb improving,[id number], troponin negative. Improved.     4. Heme  Anemia: ACD and chemotherapy, has been more of an issue with gemzar. Today hgb 7.9. She is feeling OK however anticipate further drop with treatment so will do 1 unit PRBC today.      Thrombocytopenia: 2/2 Gemzar.  No bleeding issues. OK today at 246. Has been better on lower dose every other week.     Neutropenia: no current issues, wait to add Neulasta for now.      Transfuse to keep hgb >8 and plt >10.      5. FEN  Eating better now that she isn't so fatigued. Monitor weight. CMP stable except low potassium-will replace in infusion today.      6. GI  GERD: continuing Omeprazole and tums, stable.      Diarrhea: Resolved.      LFT WNL.     Nausea: Continue Compazine PRN.      7. Derm  Hand/foot: 2/2 Doxil, improving now that she is off Doxil.      Previously prescribed Hydroxyzine PRN for pruritis-improved.      8. Pulm  Asthma: stable, continue inhalers.     COVID: Diagnosed in December, recovered.     Cough/SOB: See work-up from 5/26/21 (negative CT PE, RVP, COVID, NTBNP). Thought to be 2/2 phrenic nerve involvement of tumor. Possibly infectious component given improvement with antibiotics. Symptoms overall improved. Continue Robitussin PRN.      9. 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 Improving. May need to limit prolonged walking due to more pain with this. Continue Gabapentin 300mg TID.     10. MSK  R shoulder/back pain: Suspect 2/2 cancer, improving which is a good sign. Has Tylenol PRN.     Focal R rib pain unclear etiology-resolved.    Diffuse joint pain with elevated CRP, concerning for inflammatory arthritis 2/2 Keytruda, much improved with short course of prednisone. Did prescribe 2 more days in case symptoms flare over weekend, 10mg daily. Will monitor and may need daily prednisone if recurrent issues with keytruda.     25 minutes spent on the date of the encounter doing chart review, review of test results, interpretation of tests, patient visit and documentation      Harshal Schuster PA-C  [facility]  [address] [place] [address] [phone]          RUPERT Guaman

## 2021-08-11 NOTE — PATIENT INSTRUCTIONS
[facility] Triage Nurse Line: [phone]    Call triage nurse with chills and/or temperature greater than or equal to 100.4, uncontrolled nausea/vomiting, diarrhea, constipation, dizziness, shortness of breath, chest pain, bleeding, unexplained bruising, or any new/concerning symptoms, questions/concerns.     If you are having any concerning symptoms or wish to speak to a provider before your next infusion visit, please call your care coordinator or triage to notify them so we can adequately serve you.       Lab Results:  Recent Results (from the past 12 hour(s))   TSH with free T4 reflex    Collection Time: 08/11/21 12:51 PM   Result Value Ref Range    TSH 1.99 0.40 - 4.00 mU/L   Comprehensive metabolic panel    Collection Time: 08/11/21 12:51 PM   Result Value Ref Range    Sodium 141 133 - 144 mmol/L    Potassium 3.0 (L) 3.4 - 5.3 mmol/L    Chloride 107 94 - 109 mmol/L    Carbon Dioxide (CO2) 25 20 - 32 mmol/L    Anion Gap 9 3 - 14 mmol/L    Urea Nitrogen 10 7 - 30 mg/dL    Creatinine 0.66 0.52 - 1.04 mg/dL    Calcium 9.2 8.5 - 10.1 mg/dL    Glucose 179 (H) 70 - 99 mg/dL    Alkaline Phosphatase 199 (H) 40 - 150 U/L    AST 13 0 - 45 U/L    ALT 26 0 - 50 U/L    Protein Total 7.4 6.8 - 8.8 g/dL    Albumin 2.7 (L) 3.4 - 5.0 g/dL    Bilirubin Total 0.4 0.2 - 1.3 mg/dL    GFR Estimate >90 >60 mL/min/1.73m2   CBC with platelets and differential    Collection Time: 08/11/21 12:51 PM   Result Value Ref Range    WBC Count 6.1 4.0 - 11.0 10e3/uL    RBC Count 2.68 (L) 3.80 - 5.20 10e6/uL    Hemoglobin 7.9 (L) 11.7 - 15.7 g/dL    Hematocrit 25.2 (L) 35.0 - 47.0 %    MCV 94 78 - 100 fL    MCH 29.5 26.5 - 33.0 pg    MCHC 31.3 (L) 31.5 - 36.5 g/dL    RDW 17.5 (H) 10.0 - 15.0 %    Platelet Count 246 150 - 450 10e3/uL    % Neutrophils 88 %    % Lymphocytes 6 %    % Monocytes 4 %    % Eosinophils 1 %    % Basophils 0 %    % Immature Granulocytes 1 %    NRBCs per 100 WBC 0 <1 /100    Absolute Neutrophils 5.3 1.6 - 8.3  10e3/uL    Absolute Lymphocytes 0.4 (L) 0.8 - 5.3 10e3/uL    Absolute Monocytes 0.3 0.0 - 1.3 10e3/uL    Absolute Eosinophils 0.1 0.0 - 0.7 10e3/uL    Absolute Basophils 0.0 0.0 - 0.2 10e3/uL    Absolute Immature Granulocytes 0.0 <=0.0 10e3/uL    Absolute NRBCs 0.0 10e3/uL

## 2021-08-11 NOTE — PROGRESS NOTES
Infusion Nursing Note:  Richa Ashby presents today for Cycle 3 Day 15 gemcitabine.    Patient seen by provider today: Yes: Harshal Schuster PA-C   present during visit today: Not Applicable.    Note: Connie presents today feeling well. Denies pain or nausea/vomiting. Denies fevers/chills. Endorses lingering productive cough, mucus is still mostly clear but yellow-rell at times. Denies SOB, chest pain, or dizziness/lightheadedness. Denies constipation/diarrhea. Denies urinary issues. Endorses baseline neuropathy. Offers no new concerns at this appointment.    VORB Harshal Schuster PA-C/Anya Lainez, RN [phone]  Transfuse 1 unit PRBCs for Hgb 7.9, if able to get products in time; otherwise can schedule transfusion for later this week  Ok to replace potassium  Ok to do chemo today      Intravenous Access:  Implanted Port.    Treatment Conditions:  Lab Results   Component Value Date    HGB 7.9 08/11/2021     Lab Results   Component Value Date    WBC 6.1 08/11/2021     Lab Results   Component Value Date    ANEU 5.3 08/11/2021     Lab Results   Component Value Date     08/11/2021     Lab Results   Component Value Date     08/11/2021     Lab Results   Component Value Date    POTASSIUM 3.0 08/11/2021     Lab Results   Component Value Date    CR 0.66 08/11/2021     Lab Results   Component Value Date    VIKTORIA 9.2 08/11/2021     Lab Results   Component Value Date    BILITOTAL 0.4 08/11/2021     Lab Results   Component Value Date    ALBUMIN 2.7 08/11/2021     Lab Results   Component Value Date    ALT 26 08/11/2021      Lab Results   Component Value Date    AST 13 08/11/2021       Results reviewed, labs MET treatment parameters, ok to proceed with treatment.  Blood transfusion consent signed 01/07/21.      Post Infusion Assessment:  Patient tolerated infusion without incident.  Blood return noted pre and post infusion.  Site patent and intact, free from redness, edema or discomfort.  No evidence of  extravasations.  Access discontinued per protocol.       Discharge Plan:   Prescription refills given for compazine and prednisone.  Discharge instructions reviewed with: Patient.  Patient and/or family verbalized understanding of discharge instructions and all questions answered.  AVS to patient via [employer].  Patient will return 08/25 for next infusion appointment.   Patient discharged in stable condition accompanied by: self.  Departure Mode: Ambulatory.      Anya Lainez RN

## 2021-08-11 NOTE — Clinical Note
8/11/2021         RE: Richa Ashby  [address]  [address]  [place] [address]        Dear Colleague,    Thank you for referring your patient, Richa Ashby, to the [facility]. Please see a copy of my visit note below.    No notes on file    Again, thank you for allowing me to participate in the care of your patient.        Sincerely,        RUPERT Guaman

## 2021-08-13 NOTE — PROGRESS NOTES
Oncology/Hematology Visit Note  Aug 11, 2021    Reason for Visit: Follow up of undifferentiated pleomorphic sarcoma     History of Present Illness: Richa Ashby is a 56 year old female with PMH asthma and GERD with undifferentiated pleomorphic sarcoma. 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 She received preoperative radiotherapy and the tumor was resected 3/12/20. She then developed a cough, chest tightness, and scapular pain and imaging revealed lung nodules. Lung biopsy 11/2/20 with sarcoma. She was started on Doxil + Ifos 11/13/20. Had delay in cycle 2 due to COVID dx 12/2/20. CT imaging after cycle 1 with positive response to treatment with decrease in pulmonary nodules. Received cycle 2 12/18/20. Received cycle 3 1/15/21 with dose reduction in Doxil for skin toxicity and mucositis. Received cycle 4 2/15/21.      CT CAP 3/8/21 with positive response to treatment, decreased size of pulmonary mets though one lesion was larger. Did see fibrocystic changes in left breast, recommended breast imaging. Received cycle 5 Doxil + ifos 3/11/21.      Imaging 4/5/21 with overall stable disease except one lung lesion, opted to continue Doxil alone for now and switch to Keytruda in future if ongoing progression.      Started Keytruda 5/10/21. Was seen 5/26/21 cough, SOB, fatigue work-up thought to be 2/2 phrenic nerve involvement of R apical lung mass.      Due to worsening fatigue and cough, repeat CT 6/15/21 concerning for progression vs pseudoprogression. Added Gemzar 6/16/21.     She was admitted 6/17/21-6/20/21 with fever of unknown origin, infectious work-up negative, treated with broad spectrum antibiotics.     She clinically improved on abx. Was able to receive day 8 Gemzar (dose reduced for thrombocytopenia) + Keytruda 6/23/21.      CT 7/12/21 with stable disease.    Interval History:  Connie was seen today for oncology follow-up. She is feeling  much better compared to earlier in the week. No fevers since Monday morning and will be finishing Azithromycin later this week. Cough persists but is improved. No chest pain or SOB. Joint pain and swelling significantly improved with prednisone. She still has pain/neuropathy in her feet which is not new. She is eating and drinking better. Good energy levels. No recurrent diarrhea issues. No nausea/vomiting. She feels well to do treatment today.    Of note she is planning on going to [place] in December. Her and her  are also thinking about moving to be closer to her daughter next year.   Current Outpatient Medications   Medication Sig Dispense Refill     predniSONE (DELTASONE) 10 MG tablet Take 1 tablet (10 mg) by mouth daily for 2 days 2 tablet 0     prochlorperazine (COMPAZINE) 10 MG tablet Take 1 tablet (10 mg) by mouth every 6 hours as needed (Nausea/Vomiting) 30 tablet 5     acetaminophen (TYLENOL) 325 MG tablet Take 325-650 mg by mouth every 6 hours as needed for mild pain       ADVAIR DISKUS 100-50 MCG/DOSE inhaler        albuterol (PROAIR RESPICLICK) 108 (90 Base) MCG/ACT inhaler Inhale 1-2 puffs into the lungs       azithromycin (ZITHROMAX) 250 MG tablet Take 2 tablets (500 mg) by mouth daily for 1 day, THEN 1 tablet (250 mg) daily for 4 days. 6 tablet 0     cetirizine (ZYRTEC) 10 MG tablet Take 10 mg by mouth daily       gabapentin (NEURONTIN) 300 MG capsule Take 1 capsule (300 mg) by mouth 3 times daily 90 capsule 1     hydrOXYzine (ATARAX) 25 MG tablet Take 1 tablet (25 mg) by mouth 3 times daily as needed for itching 30 tablet 1     lidocaine-prilocaine (EMLA) 2.5-2.5 % external cream Apply topically as needed for moderate pain (Patient not taking: Reported on 6/2/2021) 30 g 3     melatonin (MELATONIN) 1 MG/ML LIQD liquid Take 5 mg by mouth       montelukast (SINGULAIR) 10 MG tablet Take 10 mg by mouth daily       multivitamin w/minerals (MULTI-VITAMIN) tablet Take 1 tablet by mouth daily        omeprazole (PRILOSEC) 20 MG DR capsule        ondansetron (ZOFRAN) 8 MG tablet Take 1 tablet (8 mg) by mouth every 8 hours as needed for nausea 60 tablet 1     vitamin D3 (CHOLECALCIFEROL) 2000 units (50 mcg) tablet Take 1 tablet by mouth daily         Physical Examination:  /67 (BP Location: Left arm, Patient Position: Sitting, Cuff Size: Adult Regular)   Pulse 87   Temp 97.9  F (36.6  C) (Oral)   Resp 16   Wt 93.4 kg (206 lb)   LMP 03/19/2019   SpO2 98%   BMI 32.26 kg/m    Wt Readings from Last 10 Encounters:   08/11/21 93.4 kg (206 lb)   08/09/21 91.8 kg (202 lb 4.8 oz)   08/04/21 93.1 kg (205 lb 4.8 oz)   07/28/21 93.2 kg (205 lb 8 oz)   07/24/21 92.5 kg (204 lb)   07/18/21 93.9 kg (207 lb 1.6 oz)   07/14/21 93.7 kg (206 lb 8 oz)   07/07/21 92.9 kg (204 lb 11.2 oz)   06/30/21 92.5 kg (204 lb)   06/23/21 95.1 kg (209 lb 11.2 oz)     Constitutional: Well-appearing female in no acute distress.  Eyes: EOMI, PERRL. No scleral icterus.  ENT: Oral mucosa is moist without lesions or thrush.   Lymphatic: Neck is supple without cervical or supraclavicular lymphadenopathy.   Cardiovascular: Regular rate and rhythm. No murmurs, gallops, or rubs. No peripheral edema.  Respiratory: Clear to auscultation bilaterally. No wheezes or crackles.  Neurologic: Cranial nerves II through XII are grossly intact.  Skin: No rashes, petechiae, or bruising noted on exposed skin.    Laboratory Data:  Results for AMADEO WILSON (MRN [id number]) as of 8/13/2021 09:37   8/11/2021 12:51   Sodium 141   Potassium 3.0 (L)   Chloride 107   Carbon Dioxide 25   Urea Nitrogen 10   Creatinine 0.66   GFR Estimate >90   Calcium 9.2   Anion Gap 9   Albumin 2.7 (L)   Protein Total 7.4   Bilirubin Total 0.4   Alkaline Phosphatase 199 (H)   ALT 26   AST 13   TSH 1.99   Glucose 179 (H)   WBC 6.1   Hemoglobin 7.9 (L)   Hematocrit 25.2 (L)   Platelet Count 246   RBC Count 2.68 (L)   MCV 94   MCH 29.5   MCHC 31.3 (L)   RDW 17.5 (H)   % Neutrophils  88   % Lymphocytes 6   % Monocytes 4   % Eosinophils 1   Absolute Basophils 0.0   % Basophils 0   Absolute Eosinophils 0.1   Absolute Immature Granulocytes 0.0   Absolute Lymphocytes 0.4 (L)   Absolute Monocytes 0.3   % Immature Granulocytes 1   Absolute Neutrophils 5.3   Absolute NRBCs 0.0   NRBCs per 100 WBC 0       Assessment and Plan:  1. Onc  Metastatic UPS, was on Doxil + Ifosfamide (5 cycles) with positive response to treatment initially, then due to poor tolerance switched to Doxil alone (1 cycle) but had progression. Started on Keytruda 5/10/21. Due to tumor growth (progression vs pseudoprogression) added Gemzar 6/16/21. CT imaging mid July with positive response to treatment.      Has had issues with low plt/ANC with Gemzar. Previously changeed Gemzar to 1000mg day 1 and day 15. Continuing Keytruda as well- with recent inflammatory arthritis may need to adjust but will see how next few weeks go.    Infection from earlier in the week improved. Feeling well today. Go ahead with cycle 3 day 15 Gemzar. I will see her in 2 weeks for cycle 4.      Plan to repeat imaging early September.      2. ID  Admitted 6/17/21 for high fevers and hypotension, infectious work-up negative though did improve with broad spectrum antibiotics.     Likely respiratory infection earlier in the week, improved on Azithromycin with no recurrent fevers. Will call with any concerning symptoms     3. Neuro  Fatigue: Worked up for adrenal insufficiency-cortisol WNL, hypothyroidism-TSH minimally elevated and free T4 normal, OH-iron studies OK, anemia-hgb improving,[id number], troponin negative. Improved.     4. Heme  Anemia: ACD and chemotherapy, has been more of an issue with gemzar. Today hgb 7.9. She is feeling OK however anticipate further drop with treatment so will do 1 unit PRBC today.      Thrombocytopenia: 2/2 Gemzar. No bleeding issues. OK today at 246. Has been better on lower dose every other week.     Neutropenia: no  current issues, wait to add Neulasta for now.      Transfuse to keep hgb >8 and plt >10.      5. FEN  Eating better now that she isn't so fatigued. Monitor weight. CMP stable except low potassium-will replace in infusion today.      6. GI  GERD: continuing Omeprazole and tums, stable.      Diarrhea: Resolved.      LFT WNL.     Nausea: Continue Compazine PRN.      7. Derm  Hand/foot: 2/2 Doxil, improving now that she is off Doxil.      Previously prescribed Hydroxyzine PRN for pruritis-improved.      8. Pulm  Asthma: stable, continue inhalers.     COVID: Diagnosed in December, recovered.     Cough/SOB: See work-up from 5/26/21 (negative CT PE, RVP, COVID, NTBNP). Thought to be 2/2 phrenic nerve involvement of tumor. Possibly infectious component given improvement with antibiotics. Symptoms overall improved. Continue Robitussin PRN.      9. 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 Improving. May need to limit prolonged walking due to more pain with this. Continue Gabapentin 300mg TID.     10. MSK  R shoulder/back pain: Suspect 2/2 cancer, improving which is a good sign. Has Tylenol PRN.     Focal R rib pain unclear etiology-resolved.    Diffuse joint pain with elevated CRP, concerning for inflammatory arthritis 2/2 Keytruda, much improved with short course of prednisone. Did prescribe 2 more days in case symptoms flare over weekend, 10mg daily. Will monitor and may need daily prednisone if recurrent issues with keytruda.     25 minutes spent on the date of the encounter doing chart review, review of test results, interpretation of tests, patient visit and documentation      Harshal Schuster PA-C  [facility]  [address] [place] [address] [phone]

## 2021-08-14 ENCOUNTER — INFUSION THERAPY VISIT (OUTPATIENT)
Dept: INFUSION THERAPY | Facility: CLINIC | Age: 56
End: 2021-08-14
Attending: INTERNAL MEDICINE
Payer: COMMERCIAL

## 2021-08-14 VITALS
OXYGEN SATURATION: 98 % | SYSTOLIC BLOOD PRESSURE: 120 MMHG | HEART RATE: 72 BPM | RESPIRATION RATE: 18 BRPM | DIASTOLIC BLOOD PRESSURE: 72 MMHG | TEMPERATURE: 97.7 F

## 2021-08-14 DIAGNOSIS — C49.9 SARCOMA OF SOFT TISSUE (H): ICD-10-CM

## 2021-08-14 DIAGNOSIS — D63.0 ANEMIA IN NEOPLASTIC DISEASE: Primary | ICD-10-CM

## 2021-08-14 LAB
BACTERIA BLD CULT: NO GROWTH
BACTERIA BLD CULT: NO GROWTH
BLD PROD TYP BPU: NORMAL
BLOOD COMPONENT TYPE: NORMAL
CODING SYSTEM: NORMAL
CROSSMATCH: NORMAL
ISSUE DATE AND TIME: NORMAL
UNIT ABO/RH: NORMAL
UNIT NUMBER: NORMAL
UNIT STATUS: NORMAL
UNIT TYPE ISBT: 5100

## 2021-08-14 PROCEDURE — 36430 TRANSFUSION BLD/BLD COMPNT: CPT

## 2021-08-14 PROCEDURE — P9016 RBC LEUKOCYTES REDUCED: HCPCS | Performed by: PHYSICIAN ASSISTANT

## 2021-08-14 PROCEDURE — 250N000011 HC RX IP 250 OP 636: Performed by: PHYSICIAN ASSISTANT

## 2021-08-14 PROCEDURE — 86923 COMPATIBILITY TEST ELECTRIC: CPT | Performed by: PHYSICIAN ASSISTANT

## 2021-08-14 RX ORDER — HEPARIN SODIUM (PORCINE) LOCK FLUSH IV SOLN 100 UNIT/ML 100 UNIT/ML
5 SOLUTION INTRAVENOUS
Status: DISCONTINUED | OUTPATIENT
Start: 2021-08-14 | End: 2021-08-14 | Stop reason: HOSPADM

## 2021-08-14 RX ORDER — HEPARIN SODIUM,PORCINE 10 UNIT/ML
5 VIAL (ML) INTRAVENOUS
Status: CANCELLED | OUTPATIENT
Start: 2021-08-14

## 2021-08-14 RX ORDER — HEPARIN SODIUM (PORCINE) LOCK FLUSH IV SOLN 100 UNIT/ML 100 UNIT/ML
5 SOLUTION INTRAVENOUS
Status: CANCELLED | OUTPATIENT
Start: 2021-08-14

## 2021-08-14 RX ADMIN — Medication 5 ML: at 11:42

## 2021-08-14 NOTE — PATIENT INSTRUCTIONS
Dear Richa Ashby    Thank you for choosing [facility] for your transfusion.  The following information is a summary of our appointment as well as important reminders.      Patient Education   After Your Blood Transfusion  Discharge Instructions  After you leave  After a blood transfusion (received red blood cells, platelets, plasma, cryo or granulocytes), you will need to watch for signs of a reaction for the next 48 hours.  Call your clinic or 911 (or go to the Emergency room) if you have any signs of a reaction:    Shaking or chills    Fever (temperature above 100.0 F)    Headache    Nausea    Hives    Itching    Swelling of the face or feeling flushed    Ongoing dry cough (nothing is coughed up)    Trouble breathing or wheezing  Some signs of a reaction won't show up for a few days or up to 4 weeks.   These may include:    Fatigue    Dizziness    Pink or red urine  Your clinic is:   ________________________________________  ________________________________________  For informational purposes only. Not to replace the advice of your health care provider.   Copyright   2015 [employer]. All rights reserved. [employer] [id number] - Rev 07/16.         We look forward in seeing you on your next appointment here at [facility]  Please don t hesitate to call us at [phone] to reschedule any of your appointments or to speak with one of the [facility] registered nurses.  It was a pleasure taking care of you today.    Sincerely,    [facility]  [address] [place] [address]  [id number]  Phone:  [phone]

## 2021-08-14 NOTE — LETTER
8/14/2021         RE: Richa Ashby  [address]  [place] [address]        Dear Colleague,    Thank you for referring your patient, Richa Ashby, to the [facility]. Please see a copy of my visit note below.    Blood Product Infusion Nursing Note    Richa Ashby comes to [facility] today for a 1 unit PRBCs transfusion. Pt's vitals recorded and entered into flow sheet. Medications recorded on MAR. Access recorded in flow sheet.    Type and Crossmatch completed on: 8/11/21    All pertinent labs reviewed prior to infusion: YES    Blood Product order verified and double checked for accuracy: YES  2nd : Tereso Montesinos    Date of consent or authorization: 1/7/21      Progress Note    Vitals were reviewed All vitals stable     Patient tolerated the procedure well    Note:   Pt added on today for 1 unit PBCS. Hgb on 8/11 was 7.9. Meets parameters to transfuse 1 unit if Hgb less than 8. 1 unit prepared and given.   Transfusion started at 120ml/hr for the first 10 minutes and then increased to 270ml/hr for the remainder of tranfusion.    Discharge Plan    Discharge instructions reviewed with patient: YES  Discharge papers printed and given to patient: YES  Patient/Representative verbalized understanding, all questions answered: YES    Discharged from unit at 1145 with whom: significant other to home.    Becki Rodrigez, RN, RN         Again, thank you for allowing me to participate in the care of your patient.        Sincerely,        [facility]

## 2021-08-14 NOTE — PROGRESS NOTES
Blood Product Infusion Nursing Note    Richa Ashby comes to [facility] today for a 1 unit PRBCs transfusion. Pt's vitals recorded and entered into flow sheet. Medications recorded on MAR. Access recorded in flow sheet.    Type and Crossmatch completed on: 8/11/21    All pertinent labs reviewed prior to infusion: YES    Blood Product order verified and double checked for accuracy: YES  2nd : Tereso Montesinos    Date of consent or authorization: 1/7/21      Progress Note    Vitals were reviewed All vitals stable     Patient tolerated the procedure well    Note:   Pt added on today for 1 unit PBCS. Hgb on 8/11 was 7.9. Meets parameters to transfuse 1 unit if Hgb less than 8. 1 unit prepared and given.   Transfusion started at 120ml/hr for the first 10 minutes and then increased to 270ml/hr for the remainder of tranfusion.    Discharge Plan    Discharge instructions reviewed with patient: YES  Discharge papers printed and given to patient: YES  Patient/Representative verbalized understanding, all questions answered: YES    Discharged from unit at 1145 with whom: significant other to home.    Becki Rodrigez, RN, RN

## 2021-08-22 RX ORDER — HEPARIN SODIUM,PORCINE 10 UNIT/ML
5 VIAL (ML) INTRAVENOUS
Status: CANCELLED | OUTPATIENT
Start: 2021-08-22

## 2021-08-22 RX ORDER — HEPARIN SODIUM (PORCINE) LOCK FLUSH IV SOLN 100 UNIT/ML 100 UNIT/ML
5 SOLUTION INTRAVENOUS
Status: CANCELLED | OUTPATIENT
Start: 2021-08-22

## 2021-08-23 ENCOUNTER — ANCILLARY PROCEDURE (OUTPATIENT)
Dept: GENERAL RADIOLOGY | Facility: CLINIC | Age: 56
End: 2021-08-23
Attending: PHYSICIAN ASSISTANT
Payer: COMMERCIAL

## 2021-08-23 ENCOUNTER — TELEPHONE (OUTPATIENT)
Dept: ONCOLOGY | Facility: CLINIC | Age: 56
End: 2021-08-23

## 2021-08-23 ENCOUNTER — LAB (OUTPATIENT)
Dept: ONCOLOGY | Facility: CLINIC | Age: 56
End: 2021-08-23
Payer: COMMERCIAL

## 2021-08-23 DIAGNOSIS — C49.9 SARCOMA OF SOFT TISSUE (H): ICD-10-CM

## 2021-08-23 DIAGNOSIS — R50.9 FEVER, UNSPECIFIED FEVER CAUSE: ICD-10-CM

## 2021-08-23 DIAGNOSIS — N30.00 ACUTE CYSTITIS WITHOUT HEMATURIA: Primary | ICD-10-CM

## 2021-08-23 DIAGNOSIS — R50.9 FEVER, UNSPECIFIED FEVER CAUSE: Primary | ICD-10-CM

## 2021-08-23 LAB
ALBUMIN SERPL-MCNC: 2.6 G/DL (ref 3.4–5)
ALBUMIN UR-MCNC: 30 MG/DL
ALP SERPL-CCNC: 249 U/L (ref 40–150)
ALT SERPL W P-5'-P-CCNC: 32 U/L (ref 0–50)
ANION GAP SERPL CALCULATED.3IONS-SCNC: 4 MMOL/L (ref 3–14)
APPEARANCE UR: CLEAR
AST SERPL W P-5'-P-CCNC: 11 U/L (ref 0–45)
BASOPHILS # BLD MANUAL: 0 10E3/UL (ref 0–0.2)
BASOPHILS NFR BLD MANUAL: 0 %
BILIRUB SERPL-MCNC: 0.8 MG/DL (ref 0.2–1.3)
BILIRUB UR QL STRIP: NEGATIVE
BUN SERPL-MCNC: 9 MG/DL (ref 7–30)
C PNEUM DNA SPEC QL NAA+PROBE: NOT DETECTED
CALCIUM SERPL-MCNC: 9 MG/DL (ref 8.5–10.1)
CHLORIDE BLD-SCNC: 106 MMOL/L (ref 94–109)
CO2 SERPL-SCNC: 27 MMOL/L (ref 20–32)
COLOR UR AUTO: YELLOW
CREAT SERPL-MCNC: 0.77 MG/DL (ref 0.52–1.04)
EOSINOPHIL # BLD MANUAL: 0.1 10E3/UL (ref 0–0.7)
EOSINOPHIL NFR BLD MANUAL: 1 %
ERYTHROCYTE [DISTWIDTH] IN BLOOD BY AUTOMATED COUNT: 17.3 % (ref 10–15)
FLUAV H1 2009 PAND RNA SPEC QL NAA+PROBE: NOT DETECTED
FLUAV H1 RNA SPEC QL NAA+PROBE: NOT DETECTED
FLUAV H3 RNA SPEC QL NAA+PROBE: NOT DETECTED
FLUAV RNA SPEC QL NAA+PROBE: NOT DETECTED
FLUBV RNA SPEC QL NAA+PROBE: NOT DETECTED
GFR SERPL CREATININE-BSD FRML MDRD: 87 ML/MIN/1.73M2
GLUCOSE BLD-MCNC: 138 MG/DL (ref 70–99)
GLUCOSE UR STRIP-MCNC: NEGATIVE MG/DL
GRAN CASTS #/AREA URNS LPF: ABNORMAL /LPF
HADV DNA SPEC QL NAA+PROBE: NOT DETECTED
HCOV PNL SPEC NAA+PROBE: NOT DETECTED
HCT VFR BLD AUTO: 26.2 % (ref 35–47)
HGB BLD-MCNC: 8.2 G/DL (ref 11.7–15.7)
HGB UR QL STRIP: NEGATIVE
HMPV RNA SPEC QL NAA+PROBE: NOT DETECTED
HPIV1 RNA SPEC QL NAA+PROBE: NOT DETECTED
HPIV2 RNA SPEC QL NAA+PROBE: NOT DETECTED
HPIV3 RNA SPEC QL NAA+PROBE: NOT DETECTED
HPIV4 RNA SPEC QL NAA+PROBE: NOT DETECTED
KETONES UR STRIP-MCNC: NEGATIVE MG/DL
LEUKOCYTE ESTERASE UR QL STRIP: ABNORMAL
LYMPHOCYTES # BLD MANUAL: 1.5 10E3/UL (ref 0.8–5.3)
LYMPHOCYTES NFR BLD MANUAL: 15 %
M PNEUMO DNA SPEC QL NAA+PROBE: NOT DETECTED
MCH RBC QN AUTO: 29.5 PG (ref 26.5–33)
MCHC RBC AUTO-ENTMCNC: 31.3 G/DL (ref 31.5–36.5)
MCV RBC AUTO: 94 FL (ref 78–100)
MONOCYTES # BLD MANUAL: 0.9 10E3/UL (ref 0–1.3)
MONOCYTES NFR BLD MANUAL: 9 %
MUCOUS THREADS #/AREA URNS LPF: PRESENT /LPF
NEUTROPHILS # BLD MANUAL: 7.6 10E3/UL (ref 1.6–8.3)
NEUTROPHILS NFR BLD MANUAL: 75 %
NITRATE UR QL: NEGATIVE
NRBC # BLD AUTO: 0.1 10E3/UL
NRBC BLD MANUAL-RTO: 1 %
PH UR STRIP: 6.5 [PH] (ref 5–7)
PLAT MORPH BLD: ABNORMAL
PLATELET # BLD AUTO: 131 10E3/UL (ref 150–450)
POLYCHROMASIA BLD QL SMEAR: SLIGHT
POTASSIUM BLD-SCNC: 3.7 MMOL/L (ref 3.4–5.3)
PROT SERPL-MCNC: 7.3 G/DL (ref 6.8–8.8)
RBC # BLD AUTO: 2.78 10E6/UL (ref 3.8–5.2)
RBC #/AREA URNS AUTO: ABNORMAL /HPF
RBC MORPH BLD: ABNORMAL
RENAL EPI CELLS #/AREA URNS HPF: ABNORMAL /HPF
RSV RNA SPEC QL NAA+PROBE: NOT DETECTED
RSV RNA SPEC QL NAA+PROBE: NOT DETECTED
RV+EV RNA SPEC QL NAA+PROBE: NOT DETECTED
SARS-COV-2 RNA RESP QL NAA+PROBE: NEGATIVE
SODIUM SERPL-SCNC: 137 MMOL/L (ref 133–144)
SP GR UR STRIP: 1.03 (ref 1–1.03)
SQUAMOUS #/AREA URNS AUTO: ABNORMAL /LPF
TRANS CELLS #/AREA URNS HPF: ABNORMAL /HPF
UROBILINOGEN UR STRIP-MCNC: 4 MG/DL
VARIANT LYMPHS BLD QL SMEAR: PRESENT
WAXY CASTS #/AREA URNS LPF: ABNORMAL /LPF
WBC # BLD AUTO: 10.1 10E3/UL (ref 4–11)
WBC #/AREA URNS AUTO: ABNORMAL /HPF

## 2021-08-23 PROCEDURE — 87486 CHLMYD PNEUM DNA AMP PROBE: CPT | Performed by: INTERNAL MEDICINE

## 2021-08-23 PROCEDURE — U0005 INFEC AGEN DETEC AMPLI PROBE: HCPCS | Performed by: INTERNAL MEDICINE

## 2021-08-23 PROCEDURE — 36591 DRAW BLOOD OFF VENOUS DEVICE: CPT

## 2021-08-23 PROCEDURE — 87633 RESP VIRUS 12-25 TARGETS: CPT | Performed by: INTERNAL MEDICINE

## 2021-08-23 PROCEDURE — 87581 M.PNEUMON DNA AMP PROBE: CPT | Performed by: INTERNAL MEDICINE

## 2021-08-23 PROCEDURE — 71046 X-RAY EXAM CHEST 2 VIEWS: CPT | Performed by: RADIOLOGY

## 2021-08-23 PROCEDURE — 87086 URINE CULTURE/COLONY COUNT: CPT | Performed by: INTERNAL MEDICINE

## 2021-08-23 PROCEDURE — 85027 COMPLETE CBC AUTOMATED: CPT | Performed by: INTERNAL MEDICINE

## 2021-08-23 PROCEDURE — 81001 URINALYSIS AUTO W/SCOPE: CPT | Performed by: INTERNAL MEDICINE

## 2021-08-23 PROCEDURE — U0003 INFECTIOUS AGENT DETECTION BY NUCLEIC ACID (DNA OR RNA); SEVERE ACUTE RESPIRATORY SYNDROME CORONAVIRUS 2 (SARS-COV-2) (CORONAVIRUS DISEASE [COVID-19]), AMPLIFIED PROBE TECHNIQUE, MAKING USE OF HIGH THROUGHPUT TECHNOLOGIES AS DESCRIBED BY CMS-2020-01-R: HCPCS | Performed by: INTERNAL MEDICINE

## 2021-08-23 PROCEDURE — 80053 COMPREHEN METABOLIC PANEL: CPT | Performed by: INTERNAL MEDICINE

## 2021-08-23 RX ORDER — HEPARIN SODIUM (PORCINE) LOCK FLUSH IV SOLN 100 UNIT/ML 100 UNIT/ML
5 SOLUTION INTRAVENOUS EVERY 8 HOURS
Status: DISCONTINUED | OUTPATIENT
Start: 2021-08-23 | End: 2021-08-23 | Stop reason: HOSPADM

## 2021-08-23 RX ORDER — LEVOFLOXACIN 750 MG/1
750 TABLET, FILM COATED ORAL DAILY
Qty: 5 TABLET | Refills: 0 | Status: ON HOLD | OUTPATIENT
Start: 2021-08-23 | End: 2021-08-31

## 2021-08-23 RX ADMIN — HEPARIN SODIUM (PORCINE) LOCK FLUSH IV SOLN 100 UNIT/ML 5 ML: 100 SOLUTION at 11:50

## 2021-08-23 NOTE — PROGRESS NOTES
Oncology/Hematology Visit Note  Aug 25, 2021    Reason for Visit: Follow up of undifferentiated pleomorphic sarcoma     History of Present Illness: Richa Ashby is a 56 year old female with PMH asthma and GERD with undifferentiated pleomorphic sarcoma. 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 She received preoperative radiotherapy and the tumor was resected 3/12/20. She then developed a cough, chest tightness, and scapular pain and imaging revealed lung nodules. Lung biopsy 11/2/20 with sarcoma. She was started on Doxil + Ifos 11/13/20. Had delay in cycle 2 due to COVID dx 12/2/20. CT imaging after cycle 1 with positive response to treatment with decrease in pulmonary nodules. Received cycle 2 12/18/20. Received cycle 3 1/15/21 with dose reduction in Doxil for skin toxicity and mucositis. Received cycle 4 2/15/21.      CT CAP 3/8/21 with positive response to treatment, decreased size of pulmonary mets though one lesion was larger. Did see fibrocystic changes in left breast, recommended breast imaging. Received cycle 5 Doxil + ifos 3/11/21.      Imaging 4/5/21 with overall stable disease except one lung lesion, opted to continue Doxil alone for now and switch to Keytruda in future if ongoing progression.      Started Keytruda 5/10/21. Was seen 5/26/21 cough, SOB, fatigue work-up thought to be 2/2 phrenic nerve involvement of R apical lung mass.      Due to worsening fatigue and cough, repeat CT 6/15/21 concerning for progression vs pseudoprogression. Added Gemzar 6/16/21.     She was admitted 6/17/21-6/20/21 with fever of unknown origin, infectious work-up negative, treated with broad spectrum antibiotics.     She clinically improved on abx. Was able to receive day 8 Gemzar (dose reduced for thrombocytopenia) + Keytruda 6/23/21.      CT 7/12/21 with stable disease.    She called in Monday 8/23/21 with 2 days of fevers, work-up with possible UTI  (COVID, RVP, CXR negative, ANC WNL). She was started on Levaquin. She was seen today for oncology follow-up.    Interval History:  Connie was seen today for oncology follow-up. Despite being on Levaquin she has continued to have daily fevers up to 101-102. She takes Tylenol or Ibuprofen and the fever resolves but then comes back a few hours later. She is extremely fatigued and has been sweating. She continues to cough. She denies any other focal symptoms.  She was due for chemotherapy today.     Current Outpatient Medications   Medication Sig Dispense Refill     acetaminophen (TYLENOL) 325 MG tablet Take 325-650 mg by mouth every 6 hours as needed for mild pain       ADVAIR DISKUS 100-50 MCG/DOSE inhaler        albuterol (PROAIR RESPICLICK) 108 (90 Base) MCG/ACT inhaler Inhale 1-2 puffs into the lungs       cetirizine (ZYRTEC) 10 MG tablet Take 10 mg by mouth daily       gabapentin (NEURONTIN) 300 MG capsule Take 1 capsule (300 mg) by mouth 3 times daily 90 capsule 1     hydrOXYzine (ATARAX) 25 MG tablet Take 1 tablet (25 mg) by mouth 3 times daily as needed for itching 30 tablet 1     lidocaine-prilocaine (EMLA) 2.5-2.5 % external cream Apply topically as needed for moderate pain (Patient not taking: Reported on 6/2/2021) 30 g 3     melatonin (MELATONIN) 1 MG/ML LIQD liquid Take 5 mg by mouth       montelukast (SINGULAIR) 10 MG tablet Take 10 mg by mouth daily       multivitamin w/minerals (MULTI-VITAMIN) tablet Take 1 tablet by mouth daily       omeprazole (PRILOSEC) 20 MG DR capsule        ondansetron (ZOFRAN) 8 MG tablet Take 1 tablet (8 mg) by mouth every 8 hours as needed for nausea 60 tablet 1     prochlorperazine (COMPAZINE) 10 MG tablet Take 1 tablet (10 mg) by mouth every 6 hours as needed (Nausea/Vomiting) 30 tablet 5     vitamin D3 (CHOLECALCIFEROL) 2000 units (50 mcg) tablet Take 1 tablet by mouth daily       Physical Examination:  BP 91/52 (Patient Position: Other (comments))   Pulse 114   Temp 98.3   F (36.8  C) (Oral)   Resp 16   Wt 90.8 kg (200 lb 1.6 oz)   LMP 03/19/2019   SpO2 98%   BMI 31.34 kg/m    Wt Readings from Last 10 Encounters:   08/11/21 93.4 kg (206 lb)   08/09/21 91.8 kg (202 lb 4.8 oz)   08/04/21 93.1 kg (205 lb 4.8 oz)   07/28/21 93.2 kg (205 lb 8 oz)   07/24/21 92.5 kg (204 lb)   07/18/21 93.9 kg (207 lb 1.6 oz)   07/14/21 93.7 kg (206 lb 8 oz)   07/07/21 92.9 kg (204 lb 11.2 oz)   06/30/21 92.5 kg (204 lb)   06/23/21 95.1 kg (209 lb 11.2 oz)     Constitutional: Ill-appearing female in no acute distress.  Eyes: No scleral icterus.  Respiratory: Non-labored breathing  Neurologic: Cranial nerves II through XII are grossly intact.  Skin: No rashes, petechiae, or bruising noted on exposed skin.    Laboratory Data:  Results for AMADEO WILSON (MRN [id number]) as of 8/25/2021 08:02   8/23/2021 11:21 8/23/2021 11:41 8/23/2021 11:47   Sodium 137     Potassium 3.7     Chloride 106     Carbon Dioxide 27     Urea Nitrogen 9     Creatinine 0.77     GFR Estimate 87     Calcium 9.0     Anion Gap 4     Albumin 2.6 (L)     Protein Total 7.3     Bilirubin Total 0.8     Alkaline Phosphatase 249 (H)     ALT 32     AST 11     Glucose 138 (H)     WBC   10.1   Hemoglobin   8.2 (L)   Hematocrit   26.2 (L)   Platelet Count   131 (L)   RBC Count   2.78 (L)   MCV   94   MCH   29.5   MCHC   31.3 (L)   RDW   17.3 (H)   % Neutrophils   75   % Lymphocytes   15   % Monocytes   9   % Eosinophils   1   Absolute Basophils   0.0   % Basophils   0   NRBC/W   1 (H)   Absolute Neutrophil   7.6   Absolute Lymphocytes   1.5   Absolute Monocytes   0.9   Absolute Eosinophils   0.1   Absolute NRBCs   0.1 (H)   RBC Morphology   Confirmed RBC Indices   Platelet Morphology   Automated Count Confirmed. Platelet morphology is normal.   Polychromasia   Slight (A)   Reactive Lymphs   Present (A)   Color Urine   Yellow   Appearance Urine   Clear   Glucose Urine   Negative   Bilirubin Urine   Negative   Ketones Urine   Negative    Specific Gravity Urine   1.028   pH Urine   6.5   Protein Albumin Urine   30 (A)   Urobilinogen mg/dL   4.0 (A)   Nitrite Urine   Negative   Blood Urine   Negative   Leukocyte Esterase Urine   Large (A)   WBC Urine   10-25 (A)   RBC Urine   0-2   Squamous Epithelial /LPF Urine   Many (A)   Transitional Epi   Moderate (A)   Renal Tub Epi   Few (A)   Mucus Urine   Present (A)   Granular Casts   2-5 (A)   Waxy Cast   0-2 (A)     Results for AMADEO WILSON (MRN [id number]) as of 8/25/2021 08:02   8/23/2021 11:41   Coronavirus Not Detected   Influenza A Not Detected   Influenza B Not Detected     Results for AMADEO WILSON (MRN [id number]) as of 8/25/2021 08:02   8/23/2021 11:48   SARS CoV2 PCR Negative     Results for AMADEO WILSON (MRN [id number]) as of 8/25/2021 08:02   8/23/2021 11:41   Adenovirus Not Detected   Chlamydia pneumoniae Not Detected   Human Metapneumovirus Not Detected   Human Rhin/Enterovirus Not Detected   Influenza A 2009 H1N1 Not Detected   Influenza A, H1 Not Detected   Influenza A, H3 Not Detected   Mycoplasma pneumoniae Not Detected   Parainfluenza Virus 1 Not Detected   Parainfluenza Virus 2 Not Detected   Parainfluenza Virus 3 Not Detected   Parainfluenza Virus 4 Not Detected   Respiratory Syncytial Virus A Not Detected   Respiratory Syncytial Virus B Not Detected     CXR 8/25/21  Findings:   Right chest wall internal jugular Port-A-Cath tip projects over the  high SVC. The cardiomediastinal silhouette and pulmonary vasculature  are within normal limits. No pleural effusion or pneumothorax. Stable  to slightly increased bilateral pulmonary nodules/masses, the largest  in the right paramediastinal region. No new focal airspace opacity.  Right upper quadrant cholecystectomy clips.                                                                      Impression:   1. No acute airspace disease.  2. Stable to minimally increased bilateral pulmonary nodules/masses.     ADEEL ROJAS, DO      Assessment and Plan:  1. Fever  Ongoing since the weekend, work-up included CXR (negative), COVID (negative), RVP (negative), CBC (WNL other than persistent anemia), CMP (stable), UA-possible infection though urine culture urogenital kamar. Has been on Levaquin 750mg daily x 3 days but still having fevers. Now hypotension and tachycardic, concerning for sepsis  -Blood cultures x 2 drawn  -IVF and IV Cefepime here   -COVID repeated  -Send to ED now for further work-up of fever (may need CT) and then admission for sepsis   -HOLD CHEMOTHERAPY    2. Onc  Metastatic UPS, was on Doxil + Ifosfamide (5 cycles) with positive response to treatment initially, then due to poor tolerance switched to Doxil alone (1 cycle) but had progression. Started on Keytruda 5/10/21. Due to tumor growth (progression vs pseudoprogression) added Gemzar 6/16/21. CT imaging mid July with positive response to treatment.      Has had issues with low plt/ANC with Gemzar. Previously changeed Gemzar to 1000mg day 1 and day 15. Continuing Keytruda as well. Plan to repeat imaging early September.     Now with fevers and concern for sepsis will HOLD treatment today. Gemzar fevers on ddx however higher concern for infection with clinical picture.      3. Heme  Anemia: ACD and chemotherapy, has been more of an issue with gemzar. Transfuse to keep hgb >8 in clinic, >7 in hospital.      Thrombocytopenia: 2/2 Gemzar. No bleeding issues. OK today.     Neutropenia: no current issues, wait to add Neulasta for now.      4. Pulm  Asthma: stable, continue inhalers.     COVID: Diagnosed in December, recovered. She is vaccinated.      Cough/SOB: See work-up from 5/26/21 (negative CT PE, RVP, COVID, NTBNP). Thought to be 2/2 phrenic nerve involvement of tumor. Continue Robitussin PRN.      5. 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 Improving. May need to limit prolonged walking due to more pain with this. Continue Gabapentin 300mg TID.     6. LEONEL MARLEY  shoulder/back pain: Suspect 2/2 cancer, improving which is a good sign. Has Tylenol PRN.     Focal R rib pain unclear etiology-resolved.    Issues with inflammatory arthritis last keytruda, resolved with prednisone.    45 minutes spent on the date of the encounter doing chart review, review of test results, interpretation of tests, patient visit, documentation and discussion with other provider(s)     Harshal Schuster PA-C  [facility]  [address], [place] [address] [phone]

## 2021-08-23 NOTE — TELEPHONE ENCOUNTER
[facility] Telephone Triage Note    Assessment:   Connie (pt) reporting the following symptoms:  Fever  Started Friday 8/20 late afternoon, [place], woke up with 101Degree.   Took IBUProfen 600mg around 6:00pm, later in evening sweaty and felt limp.   No fever Saturday morning  Saturday lunch had wine/appetizer, went to sleep then woke up to 102F after an afternoon nap, stayed in for dinner. Took Tylenol 2 tablets which helped with fever.   Woke up Sunday morning 100.6F and returned home.   Continues to have a cough that pt has had for months.  Has some jt pain in ankles/feet rating 4/10, throbbing, jabbing, worse with rest. Continues to have neuropathy pain.    Denies headaches, dizziness, sore throat, SOB, n/v, bowel & bladder issues, lost of taste or smell.        Recommendations: Paged provider 8:02am Harshal EMERY  8:10am Orders entered for Respiratory Panel, Symptomatic COVID, XRChest, Routine UA, CMP/CBC.     Scheduling notified to call pt.     Pt also has provider appt scheduled for Wed 8/25.     Follow-Up:  Called and relayed information to Pt, who verbalized understanding.    Instructed patient to seek care immediately for worsening symptoms, including: fever increases, chest pain, shortness of breath, dizziness.

## 2021-08-23 NOTE — PROGRESS NOTES
Port needle placed for lab draw access, Sterile technique performed and maintained. Patient tolerated procedure well. Tubes drawn in rainbow order: red, green, purple. Gauze dressing placed with use of paper tape.    RN advised patient to leave gauze in place for 20 mins.     Patient demonstrated verbal understanding.     Tiffany Mchugh RN  [facility]

## 2021-08-23 NOTE — TELEPHONE ENCOUNTER
8/23/21    Patient was called today. Her temp is 101. She is taking Tylenol 7:30am this morning. No new symptoms-cough stable. She is really fatigued. She hasn't had chills. She has sweats every time her fever breaks. She feels about the same as this weekend. She isn't eating or drinking as much. She denies any urinary symptoms.     CBC with stable anemia, ANC OK. CMP stable. CXR no focal infections. RVP/COVID pending. UA with possible infection. She is also still coughing. To be safe will treat with Levaquin 750mg x 5 days to cover both urinary and respiratory infection. She will start today and call back tomorrow if not better. Confirmed she is no longer on prednisone.     Discussed going to ED if temp up to 103, shaking chills, lightheadedness, shortness of breath.     Harshal Schuster PA-C

## 2021-08-24 LAB — BACTERIA UR CULT: NORMAL

## 2021-08-25 ENCOUNTER — APPOINTMENT (OUTPATIENT)
Dept: LAB | Facility: CLINIC | Age: 56
End: 2021-08-25
Attending: INTERNAL MEDICINE
Payer: COMMERCIAL

## 2021-08-25 ENCOUNTER — ONCOLOGY VISIT (OUTPATIENT)
Dept: ONCOLOGY | Facility: CLINIC | Age: 56
End: 2021-08-25
Attending: PHYSICIAN ASSISTANT
Payer: COMMERCIAL

## 2021-08-25 ENCOUNTER — INFUSION THERAPY VISIT (OUTPATIENT)
Dept: ONCOLOGY | Facility: CLINIC | Age: 56
End: 2021-08-25
Attending: INTERNAL MEDICINE
Payer: COMMERCIAL

## 2021-08-25 ENCOUNTER — DOCUMENTATION ONLY (OUTPATIENT)
Dept: ONCOLOGY | Facility: CLINIC | Age: 56
End: 2021-08-25

## 2021-08-25 ENCOUNTER — HOSPITAL ENCOUNTER (OUTPATIENT)
Age: 56
End: 2021-08-25
Attending: PHYSICIAN ASSISTANT
Payer: COMMERCIAL

## 2021-08-25 ENCOUNTER — APPOINTMENT (OUTPATIENT)
Dept: GENERAL RADIOLOGY | Facility: CLINIC | Age: 56
End: 2021-08-25
Attending: EMERGENCY MEDICINE
Payer: COMMERCIAL

## 2021-08-25 ENCOUNTER — HOSPITAL ENCOUNTER (INPATIENT)
Facility: CLINIC | Age: 56
LOS: 6 days | Discharge: HOME | End: 2021-08-31
Attending: EMERGENCY MEDICINE | Admitting: STUDENT IN AN ORGANIZED HEALTH CARE EDUCATION/TRAINING PROGRAM
Payer: COMMERCIAL

## 2021-08-25 VITALS
HEART RATE: 114 BPM | DIASTOLIC BLOOD PRESSURE: 52 MMHG | TEMPERATURE: 98.3 F | BODY MASS INDEX: 31.34 KG/M2 | RESPIRATION RATE: 16 BRPM | OXYGEN SATURATION: 98 % | WEIGHT: 200.1 LBS | SYSTOLIC BLOOD PRESSURE: 91 MMHG

## 2021-08-25 VITALS
SYSTOLIC BLOOD PRESSURE: 88 MMHG | DIASTOLIC BLOOD PRESSURE: 58 MMHG | HEART RATE: 84 BPM | RESPIRATION RATE: 18 BRPM | TEMPERATURE: 98.4 F | OXYGEN SATURATION: 96 %

## 2021-08-25 DIAGNOSIS — C49.9 SARCOMA (H): ICD-10-CM

## 2021-08-25 DIAGNOSIS — R50.9 FEVER AND CHILLS: Primary | ICD-10-CM

## 2021-08-25 DIAGNOSIS — R65.21 BACTEREMIC SHOCK (H): ICD-10-CM

## 2021-08-25 DIAGNOSIS — R50.9 FEVER AND CHILLS: ICD-10-CM

## 2021-08-25 DIAGNOSIS — D63.0 ANEMIA IN NEOPLASTIC DISEASE: ICD-10-CM

## 2021-08-25 DIAGNOSIS — C78.00 MALIGNANT NEOPLASM METASTATIC TO LUNG, UNSPECIFIED LATERALITY (H): ICD-10-CM

## 2021-08-25 DIAGNOSIS — G62.0 DRUG-INDUCED POLYNEUROPATHY (H): ICD-10-CM

## 2021-08-25 DIAGNOSIS — D84.9 IMMUNOSUPPRESSION (H): ICD-10-CM

## 2021-08-25 DIAGNOSIS — A41.9 BACTEREMIC SHOCK (H): ICD-10-CM

## 2021-08-25 DIAGNOSIS — N39.0 UTI (URINARY TRACT INFECTION), UNCOMPLICATED: ICD-10-CM

## 2021-08-25 DIAGNOSIS — C49.9 SARCOMA OF SOFT TISSUE (H): Primary | ICD-10-CM

## 2021-08-25 DIAGNOSIS — A41.9 SEPSIS, DUE TO UNSPECIFIED ORGANISM, UNSPECIFIED WHETHER ACUTE ORGAN DYSFUNCTION PRESENT (H): ICD-10-CM

## 2021-08-25 LAB
ALBUMIN SERPL-MCNC: 2.2 G/DL (ref 3.4–5)
ALBUMIN UR-MCNC: 50 MG/DL
ALP SERPL-CCNC: 245 U/L (ref 40–150)
ALT SERPL W P-5'-P-CCNC: 19 U/L (ref 0–50)
ANION GAP SERPL CALCULATED.3IONS-SCNC: 9 MMOL/L (ref 3–14)
APPEARANCE UR: CLEAR
AST SERPL W P-5'-P-CCNC: 8 U/L (ref 0–45)
BASOPHILS # BLD AUTO: 0 10E3/UL (ref 0–0.2)
BASOPHILS NFR BLD AUTO: 0 %
BILIRUB SERPL-MCNC: 0.9 MG/DL (ref 0.2–1.3)
BILIRUB UR QL STRIP: NEGATIVE
BUN SERPL-MCNC: 10 MG/DL (ref 7–30)
CALCIUM SERPL-MCNC: 9.2 MG/DL (ref 8.5–10.1)
CHLORIDE BLD-SCNC: 104 MMOL/L (ref 94–109)
CO2 SERPL-SCNC: 25 MMOL/L (ref 20–32)
COLOR UR AUTO: YELLOW
CREAT SERPL-MCNC: 0.74 MG/DL (ref 0.52–1.04)
CREAT SERPL-MCNC: 1.01 MG/DL (ref 0.52–1.04)
EOSINOPHIL # BLD AUTO: 0.2 10E3/UL (ref 0–0.7)
EOSINOPHIL NFR BLD AUTO: 2 %
ERYTHROCYTE [DISTWIDTH] IN BLOOD BY AUTOMATED COUNT: 17.6 % (ref 10–15)
GFR SERPL CREATININE-BSD FRML MDRD: 62 ML/MIN/1.73M2
GFR SERPL CREATININE-BSD FRML MDRD: >90 ML/MIN/1.73M2
GLUCOSE BLD-MCNC: 121 MG/DL (ref 70–99)
GLUCOSE UR STRIP-MCNC: NEGATIVE MG/DL
HCT VFR BLD AUTO: 25.4 % (ref 35–47)
HGB BLD-MCNC: 7.8 G/DL (ref 11.7–15.7)
HGB UR QL STRIP: NEGATIVE
HOLD SPECIMEN: NORMAL
IMM GRANULOCYTES # BLD: 0.1 10E3/UL
IMM GRANULOCYTES NFR BLD: 1 %
KETONES UR STRIP-MCNC: NEGATIVE MG/DL
LACTATE SERPL-SCNC: 0.8 MMOL/L (ref 0.7–2)
LACTATE SERPL-SCNC: 0.8 MMOL/L (ref 0.7–2)
LEUKOCYTE ESTERASE UR QL STRIP: NEGATIVE
LYMPHOCYTES # BLD AUTO: 0.8 10E3/UL (ref 0.8–5.3)
LYMPHOCYTES NFR BLD AUTO: 8 %
MCH RBC QN AUTO: 28.9 PG (ref 26.5–33)
MCHC RBC AUTO-ENTMCNC: 30.7 G/DL (ref 31.5–36.5)
MCV RBC AUTO: 94 FL (ref 78–100)
MONOCYTES # BLD AUTO: 1 10E3/UL (ref 0–1.3)
MONOCYTES NFR BLD AUTO: 10 %
MUCOUS THREADS #/AREA URNS LPF: PRESENT /LPF
NEUTROPHILS # BLD AUTO: 8.5 10E3/UL (ref 1.6–8.3)
NEUTROPHILS NFR BLD AUTO: 79 %
NITRATE UR QL: NEGATIVE
NRBC # BLD AUTO: 0 10E3/UL
NRBC BLD AUTO-RTO: 0 /100
PH UR STRIP: 6 [PH] (ref 5–7)
PLAT MORPH BLD: NORMAL
PLATELET # BLD AUTO: 186 10E3/UL (ref 150–450)
POTASSIUM BLD-SCNC: 3.4 MMOL/L (ref 3.4–5.3)
PROCALCITONIN SERPL-MCNC: 0.3 NG/ML
PROT SERPL-MCNC: 7.1 G/DL (ref 6.8–8.8)
RBC # BLD AUTO: 2.7 10E6/UL (ref 3.8–5.2)
RBC MORPH BLD: NORMAL
RBC URINE: <1 /HPF
SARS-COV-2 RNA RESP QL NAA+PROBE: NEGATIVE
SODIUM SERPL-SCNC: 138 MMOL/L (ref 133–144)
SP GR UR STRIP: 1.02 (ref 1–1.03)
SQUAMOUS EPITHELIAL: <1 /HPF
T4 FREE SERPL-MCNC: 1.31 NG/DL (ref 0.76–1.46)
TSH SERPL DL<=0.005 MIU/L-ACNC: 4.97 MU/L (ref 0.4–4)
UROBILINOGEN UR STRIP-MCNC: 2 MG/DL
WBC # BLD AUTO: 10.6 10E3/UL (ref 4–11)
WBC URINE: 5 /HPF

## 2021-08-25 PROCEDURE — 120N000002 HC R&B MED SURG/OB UMMC

## 2021-08-25 PROCEDURE — 36415 COLL VENOUS BLD VENIPUNCTURE: CPT

## 2021-08-25 PROCEDURE — 96374 THER/PROPH/DIAG INJ IV PUSH: CPT

## 2021-08-25 PROCEDURE — 250N000011 HC RX IP 250 OP 636: Performed by: STUDENT IN AN ORGANIZED HEALTH CARE EDUCATION/TRAINING PROGRAM

## 2021-08-25 PROCEDURE — 71045 X-RAY EXAM CHEST 1 VIEW: CPT

## 2021-08-25 PROCEDURE — 250N000013 HC RX MED GY IP 250 OP 250 PS 637: Performed by: STUDENT IN AN ORGANIZED HEALTH CARE EDUCATION/TRAINING PROGRAM

## 2021-08-25 PROCEDURE — 36415 COLL VENOUS BLD VENIPUNCTURE: CPT | Performed by: PHYSICIAN ASSISTANT

## 2021-08-25 PROCEDURE — 250N000011 HC RX IP 250 OP 636: Performed by: PHYSICIAN ASSISTANT

## 2021-08-25 PROCEDURE — 99223 1ST HOSP IP/OBS HIGH 75: CPT | Mod: AI | Performed by: STUDENT IN AN ORGANIZED HEALTH CARE EDUCATION/TRAINING PROGRAM

## 2021-08-25 PROCEDURE — 71045 X-RAY EXAM CHEST 1 VIEW: CPT | Mod: 26 | Performed by: RADIOLOGY

## 2021-08-25 PROCEDURE — 99215 OFFICE O/P EST HI 40 MIN: CPT | Performed by: PHYSICIAN ASSISTANT

## 2021-08-25 PROCEDURE — 84439 ASSAY OF FREE THYROXINE: CPT | Performed by: PHYSICIAN ASSISTANT

## 2021-08-25 PROCEDURE — 99285 EMERGENCY DEPT VISIT HI MDM: CPT | Mod: 25

## 2021-08-25 PROCEDURE — 36415 COLL VENOUS BLD VENIPUNCTURE: CPT | Performed by: STUDENT IN AN ORGANIZED HEALTH CARE EDUCATION/TRAINING PROGRAM

## 2021-08-25 PROCEDURE — 258N000003 HC RX IP 258 OP 636: Performed by: PHYSICIAN ASSISTANT

## 2021-08-25 PROCEDURE — 99285 EMERGENCY DEPT VISIT HI MDM: CPT | Performed by: EMERGENCY MEDICINE

## 2021-08-25 PROCEDURE — 84145 PROCALCITONIN (PCT): CPT | Performed by: STUDENT IN AN ORGANIZED HEALTH CARE EDUCATION/TRAINING PROGRAM

## 2021-08-25 PROCEDURE — 250N000013 HC RX MED GY IP 250 OP 250 PS 637

## 2021-08-25 PROCEDURE — 85025 COMPLETE CBC W/AUTO DIFF WBC: CPT | Performed by: PHYSICIAN ASSISTANT

## 2021-08-25 PROCEDURE — 36415 COLL VENOUS BLD VENIPUNCTURE: CPT | Performed by: EMERGENCY MEDICINE

## 2021-08-25 PROCEDURE — 81001 URINALYSIS AUTO W/SCOPE: CPT | Performed by: EMERGENCY MEDICINE

## 2021-08-25 PROCEDURE — 82040 ASSAY OF SERUM ALBUMIN: CPT | Performed by: PHYSICIAN ASSISTANT

## 2021-08-25 PROCEDURE — 83605 ASSAY OF LACTIC ACID: CPT | Performed by: EMERGENCY MEDICINE

## 2021-08-25 PROCEDURE — 258N000003 HC RX IP 258 OP 636: Performed by: STUDENT IN AN ORGANIZED HEALTH CARE EDUCATION/TRAINING PROGRAM

## 2021-08-25 PROCEDURE — 250N000009 HC RX 250: Performed by: PHYSICIAN ASSISTANT

## 2021-08-25 PROCEDURE — 96361 HYDRATE IV INFUSION ADD-ON: CPT

## 2021-08-25 PROCEDURE — 96360 HYDRATION IV INFUSION INIT: CPT

## 2021-08-25 PROCEDURE — 87040 BLOOD CULTURE FOR BACTERIA: CPT

## 2021-08-25 PROCEDURE — 36591 DRAW BLOOD OFF VENOUS DEVICE: CPT | Performed by: PHYSICIAN ASSISTANT

## 2021-08-25 PROCEDURE — 82565 ASSAY OF CREATININE: CPT | Performed by: STUDENT IN AN ORGANIZED HEALTH CARE EDUCATION/TRAINING PROGRAM

## 2021-08-25 PROCEDURE — 36591 DRAW BLOOD OFF VENOUS DEVICE: CPT | Performed by: STUDENT IN AN ORGANIZED HEALTH CARE EDUCATION/TRAINING PROGRAM

## 2021-08-25 PROCEDURE — U0003 INFECTIOUS AGENT DETECTION BY NUCLEIC ACID (DNA OR RNA); SEVERE ACUTE RESPIRATORY SYNDROME CORONAVIRUS 2 (SARS-COV-2) (CORONAVIRUS DISEASE [COVID-19]), AMPLIFIED PROBE TECHNIQUE, MAKING USE OF HIGH THROUGHPUT TECHNOLOGIES AS DESCRIBED BY CMS-2020-01-R: HCPCS

## 2021-08-25 PROCEDURE — 83605 ASSAY OF LACTIC ACID: CPT | Performed by: STUDENT IN AN ORGANIZED HEALTH CARE EDUCATION/TRAINING PROGRAM

## 2021-08-25 PROCEDURE — 84443 ASSAY THYROID STIM HORMONE: CPT | Performed by: PHYSICIAN ASSISTANT

## 2021-08-25 PROCEDURE — 258N000003 HC RX IP 258 OP 636: Performed by: EMERGENCY MEDICINE

## 2021-08-25 RX ORDER — CODEINE PHOSPHATE AND GUAIFENESIN 10; 100 MG/5ML; MG/5ML
5 SOLUTION ORAL EVERY 6 HOURS PRN
Status: DISCONTINUED | OUTPATIENT
Start: 2021-08-25 | End: 2021-08-31 | Stop reason: HOSPADM

## 2021-08-25 RX ORDER — ALBUTEROL SULFATE 90 UG/1
1-2 AEROSOL, METERED RESPIRATORY (INHALATION) EVERY 6 HOURS PRN
Status: DISCONTINUED | OUTPATIENT
Start: 2021-08-25 | End: 2021-08-28

## 2021-08-25 RX ORDER — PROCHLORPERAZINE MALEATE 10 MG
10 TABLET ORAL EVERY 6 HOURS PRN
Status: DISCONTINUED | OUTPATIENT
Start: 2021-08-25 | End: 2021-08-31 | Stop reason: HOSPADM

## 2021-08-25 RX ORDER — HEPARIN SODIUM (PORCINE) LOCK FLUSH IV SOLN 100 UNIT/ML 100 UNIT/ML
5 SOLUTION INTRAVENOUS
Status: COMPLETED | OUTPATIENT
Start: 2021-08-25 | End: 2021-08-25

## 2021-08-25 RX ORDER — ACETAMINOPHEN 325 MG/1
650 TABLET ORAL ONCE
Status: CANCELLED
Start: 2021-08-25 | End: 2021-08-25

## 2021-08-25 RX ORDER — CEFEPIME HYDROCHLORIDE 2 G/1
2 INJECTION, POWDER, FOR SOLUTION INTRAVENOUS ONCE
Status: CANCELLED
Start: 2021-08-25 | End: 2021-08-25

## 2021-08-25 RX ORDER — ACETAMINOPHEN 325 MG/1
325-650 TABLET ORAL EVERY 6 HOURS PRN
Status: DISCONTINUED | OUTPATIENT
Start: 2021-08-25 | End: 2021-08-27

## 2021-08-25 RX ORDER — MULTIPLE VITAMINS W/ MINERALS TAB 9MG-400MCG
1 TAB ORAL DAILY
Status: DISCONTINUED | OUTPATIENT
Start: 2021-08-26 | End: 2021-08-31 | Stop reason: HOSPADM

## 2021-08-25 RX ORDER — MONTELUKAST SODIUM 10 MG/1
10 TABLET ORAL DAILY
Status: DISCONTINUED | OUTPATIENT
Start: 2021-08-26 | End: 2021-08-31 | Stop reason: HOSPADM

## 2021-08-25 RX ORDER — CETIRIZINE HYDROCHLORIDE 10 MG/1
10 TABLET ORAL DAILY
Status: DISCONTINUED | OUTPATIENT
Start: 2021-08-26 | End: 2021-08-31 | Stop reason: HOSPADM

## 2021-08-25 RX ORDER — ONDANSETRON 4 MG/1
8 TABLET, FILM COATED ORAL EVERY 8 HOURS PRN
Status: DISCONTINUED | OUTPATIENT
Start: 2021-08-25 | End: 2021-08-31 | Stop reason: HOSPADM

## 2021-08-25 RX ORDER — SODIUM CHLORIDE 9 MG/ML
INJECTION, SOLUTION INTRAVENOUS CONTINUOUS
Status: DISCONTINUED | OUTPATIENT
Start: 2021-08-25 | End: 2021-08-25

## 2021-08-25 RX ORDER — VITAMIN B COMPLEX
50 TABLET ORAL DAILY
Status: DISCONTINUED | OUTPATIENT
Start: 2021-08-26 | End: 2021-08-31 | Stop reason: HOSPADM

## 2021-08-25 RX ORDER — GABAPENTIN 300 MG/1
300 CAPSULE ORAL 3 TIMES DAILY
Status: DISCONTINUED | OUTPATIENT
Start: 2021-08-25 | End: 2021-08-28

## 2021-08-25 RX ORDER — HYDROXYZINE HYDROCHLORIDE 25 MG/1
25 TABLET, FILM COATED ORAL 3 TIMES DAILY PRN
Status: DISCONTINUED | OUTPATIENT
Start: 2021-08-25 | End: 2021-08-31 | Stop reason: HOSPADM

## 2021-08-25 RX ORDER — LIDOCAINE 40 MG/G
CREAM TOPICAL
Status: DISCONTINUED | OUTPATIENT
Start: 2021-08-25 | End: 2021-08-31 | Stop reason: HOSPADM

## 2021-08-25 RX ORDER — CEFEPIME HYDROCHLORIDE 2 G/1
2 INJECTION, POWDER, FOR SOLUTION INTRAVENOUS ONCE
Status: COMPLETED | OUTPATIENT
Start: 2021-08-25 | End: 2021-08-25

## 2021-08-25 RX ADMIN — ACETAMINOPHEN 650 MG: 325 TABLET, FILM COATED ORAL at 15:13

## 2021-08-25 RX ADMIN — SODIUM CHLORIDE 1000 ML: 900 INJECTION, SOLUTION INTRAVENOUS at 16:44

## 2021-08-25 RX ADMIN — ACETAMINOPHEN 650 MG: 325 TABLET, FILM COATED ORAL at 21:52

## 2021-08-25 RX ADMIN — GUAIFENESIN AND CODEINE PHOSPHATE 5 ML: 10; 100 LIQUID ORAL at 23:49

## 2021-08-25 RX ADMIN — SODIUM CHLORIDE 500 ML: 9 INJECTION, SOLUTION INTRAVENOUS at 09:37

## 2021-08-25 RX ADMIN — Medication 1 MG: at 21:42

## 2021-08-25 RX ADMIN — Medication 5 ML: at 07:21

## 2021-08-25 RX ADMIN — GABAPENTIN 300 MG: 300 CAPSULE ORAL at 21:42

## 2021-08-25 RX ADMIN — SODIUM CHLORIDE 1000 ML: 9 INJECTION, SOLUTION INTRAVENOUS at 07:54

## 2021-08-25 RX ADMIN — ENOXAPARIN SODIUM 40 MG: 40 INJECTION SUBCUTANEOUS at 15:14

## 2021-08-25 RX ADMIN — CEFEPIME 2 G: 2 INJECTION, POWDER, FOR SOLUTION INTRAVENOUS at 08:22

## 2021-08-25 RX ADMIN — HYDROXYZINE HYDROCHLORIDE 25 MG: 25 TABLET, FILM COATED ORAL at 23:49

## 2021-08-25 RX ADMIN — GABAPENTIN 300 MG: 300 CAPSULE ORAL at 15:14

## 2021-08-25 ASSESSMENT — PAIN SCALES - GENERAL: PAINLEVEL: MODERATE PAIN (5)

## 2021-08-25 NOTE — NURSING NOTE
"Rapid Response Epic Documentation     Situation:  Pt presents to lab with low BP and fever. RRT called.      Objective:    Pt in lab with hypotension and not feeling well.     Assessment:  Pt is A&Ox4. C/O fever of 101.6 this AM and took ibuprofen. Pt B/P is low and she feels \"crappy\". Denies C/P, SOB, N/A, H/A.           BP:  87/56 sitting, 91/59 lying    Pulse: 104  Respiration: 18  SPO2: 96 %  Mental Status: Alert  CMS: Intact  Stroke Scale: Negative  EKG: Not Performed      Treatment: Provider came and ordered cultures, antibiotics and fluids. Provider will work on admission and pt moved to infusion for treatment until transfer to hospital.     Medication: None      Location: Lab         Disposition:      Tranfer to infusion    Protocol Used:     Hypotension        "

## 2021-08-25 NOTE — ED NOTES
[facility]   ED Nurse to Floor Handoff     Richa Ashby is a 56 year old female who speaks English and lives with a spouse,  in a home  They arrived in the ED by car from emergency room    ED Chief Complaint: Hypotension    ED Dx;   Final diagnoses:   UTI (urinary tract infection), uncomplicated   Sepsis, due to unspecified organism, unspecified whether acute organ dysfunction present (H)   Immunosuppression (H)   Fever and chills         Needed?: No    Allergies:   Allergies   Allergen Reactions     Pentobarbital      resp arrest   .  Past Medical Hx:   Past Medical History:   Diagnosis Date     Asthma, chronic      Sarcoma (H)     Right lower thigh      Baseline Mental status: WDL  Current Mental Status changes: at basesline    Infection present or suspected this encounter: cultures pending  Sepsis suspected: No  Isolation type: No active isolations  Patient tested for COVID 19 prior to admission: YES     Activity level - Baseline/Home:  Independent  Activity Level - Current:   Independent    Bariatric equipment needed?: No    In the ED these meds were given:   Medications   acetaminophen (TYLENOL) tablet 325-650 mg (650 mg Oral Given 8/25/21 1513)   fluticasone-salmeterol (ADVAIR) 100-50 MCG/DOSE diskus inhaler 1 puff (has no administration in time range)   albuterol (PROAIR HFA/PROVENTIL HFA/VENTOLIN HFA) 108 (90 Base) MCG/ACT inhaler 1-2 puff (has no administration in time range)   cetirizine (zyrTEC) tablet 10 mg (has no administration in time range)   gabapentin (NEURONTIN) capsule 300 mg (300 mg Oral Given 8/25/21 1514)   hydrOXYzine (ATARAX) tablet 25 mg (has no administration in time range)   montelukast (SINGULAIR) tablet 10 mg (has no administration in time range)   multivitamin w/minerals (THERA-VIT-M) tablet 1 tablet (has no administration in time range)   ondansetron (ZOFRAN) tablet 8 mg (has no administration in time range)    prochlorperazine (COMPAZINE) tablet 10 mg (has no administration in time range)   Vitamin D3 (CHOLECALCIFEROL) tablet 50 mcg (has no administration in time range)   lidocaine 1 % 0.1-1 mL (has no administration in time range)   lidocaine (LMX4) cream (has no administration in time range)   sodium chloride (PF) 0.9% PF flush 3 mL (3 mLs Intracatheter Not Given 8/25/21 1601)   sodium chloride (PF) 0.9% PF flush 3 mL (has no administration in time range)   melatonin tablet 1 mg (has no administration in time range)   enoxaparin ANTICOAGULANT (LOVENOX) injection 40 mg (40 mg Subcutaneous Given 8/25/21 1514)   0.9% sodium chloride BOLUS (0 mLs Intravenous Stopped 8/25/21 1215)   0.9% sodium chloride BOLUS (1,000 mLs Intravenous New Bag 8/25/21 1644)       Drips running?  Yes    Home pump  No    Current LDAs  Port A Cath Single 10/28/20 Right Chest wall (Active)   Number of days: 301       Incision/Surgical Site 03/12/20 Posterior;Right Thigh (Active)   Number of days: 531       Incision/Surgical Site 10/28/20 Right Neck (Active)   Number of days: 301       Incision/Surgical Site 10/28/20 Right Chest (Active)   Number of days: 301       Labs results:   Labs Ordered and Resulted from Time of ED Arrival Up to the Time of Departure from the ED   ROUTINE UA WITH MICROSCOPIC REFLEX TO CULTURE - Abnormal; Notable for the following components:       Result Value    Protein Albumin Urine 50  (*)     Mucus Urine Present (*)     All other components within normal limits    Narrative:     Urine Culture not indicated   LACTIC ACID WHOLE BLOOD - Normal   CREATININE - Normal   PROCALCITONIN - Normal   CARDIAC CONTINUOUS MONITORING   IP ASSIGN PROVIDER TEAM TO TREATMENT TEAM   PERIPHERAL IV CATHETER   VITAL SIGNS   PULSE OXIMETRY NURSING   INTAKE AND OUTPUT       Imaging Studies:   Recent Results (from the past 24 hour(s))   XR Chest Port 1 View    Narrative    Exam: XR CHEST PORT 1 VIEW, 8/25/2021 10:06 AM    Indication:  cough    Comparison: 8/23/2021    Findings:   Right chest wall internal jugular Port-A-Cath tip at the high SVC.  Stable bilateral lung masses, the largest in the right paramediastinal  region. The cardiomediastinal silhouette and pulmonary vasculature are  within normal limits. No pleural effusion or pneumothorax. No new  focal airspace opacity.      Impression    Impression: No acute airspace disease. Stable bilateral lung masses.    ADEEL ROJAS DO         SYSTEM ID:  [id number]       Recent vital signs:   /59   Pulse 98   Temp 98.3  F (36.8  C) (Oral)   Resp 20   LMP 03/19/2019   SpO2 98%             Cardiac Rhythm: Normal Sinus and Other  Pt needs tele? No  Skin/wound Issues: None    Code Status: Full Code    Pain control: good    Nausea control: good    Abnormal labs/tests/findings requiring intervention: See chart    Family present during ED course? Yes   Family Comments/Social Situation comments: Patient lives with      Tasks needing completion: None    Vida Kamara, RN      [phone] [facility] ED

## 2021-08-25 NOTE — ED PROVIDER NOTES
[facility]ERGENCY DEPARTMENT ([facility])  8/25/21  History     Chief Complaint   Patient presents with     Hypotension     The history is provided by the patient and medical records.     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    Per review of patient's medical record, she was initially evaluated today at the oncology clinic.  During the visit patient was noted to have been very fatigued and generally unwell.  Patient was noted to be hypotensive with lowest BP at 77/46.  Patient was subsequently brought to infusion lab for IVF and infection work-up due to fevers at home.  Patient was subsequently treated with IVF and IV cefepime.  Patient also had labs drawn earlier today which are notable for TSH at 4.97.  CMP notable for alk phos elevated at 245, albumin low at 2.2.  Blood cultures and COVID-19 testing also done at oncology clinic.  Patient was subsequently transferred here to the ED for further evaluation and admission as she cannot be directly admitted at this time.    Patient reports that on Monday 2 days ago she was diagnosed with UTI and has been taking Levaquin.  She continues to spike fevers up to 101 which she then took medication this morning for.  She has had generalized fatigue.  She has had a cough for several weeks with minimal production.  She was not given her infusion today of chemotherapy due to current condition.  Besides the fevers cough and some mild nausea, she denies other symptoms.  Denies any chest pain or shortness of breath.  Denies any abdominal pain vomiting or diarrhea.  Denies any dysuria or hematuria.      Past Medical History  Past Medical History:   Diagnosis Date     Asthma, chronic      Sarcoma (H)     Right lower thigh     Past Surgical  History:   Procedure Laterality Date     AS RAD RESEC TONSIL/PILLARS       GALLBLADDER SURGERY       INSERT PORT VASCULAR ACCESS Right 10/28/2020    Procedure: single lumen power port placement @0745;  Surgeon: Tex Ackerman MD;  Location: [facility] OR     IR CHEST PORT PLACEMENT > 5 YRS OF AGE  10/28/2020     IR PORT CHECK RIGHT  3/26/2021     RESECT TUMOR LOWER EXTREMITY Right 3/12/2020    Procedure: Resection of right thigh tumor;  Surgeon: Guillermo Johnson MD;  Location: [facility] OR     ROTATOR CUFF REPAIR RT/LT  2012     SHOULDER SURGERY       SINUS SURGERY       acetaminophen (TYLENOL) 325 MG tablet  ADVAIR DISKUS 100-50 MCG/DOSE inhaler  albuterol (PROAIR RESPICLICK) 108 (90 Base) MCG/ACT inhaler  cetirizine (ZYRTEC) 10 MG tablet  gabapentin (NEURONTIN) 300 MG capsule  hydrOXYzine (ATARAX) 25 MG tablet  levofloxacin (LEVAQUIN) 750 MG tablet  lidocaine-prilocaine (EMLA) 2.5-2.5 % external cream  melatonin (MELATONIN) 1 MG/ML LIQD liquid  montelukast (SINGULAIR) 10 MG tablet  multivitamin w/minerals (MULTI-VITAMIN) tablet  omeprazole (PRILOSEC) 20 MG DR capsule  ondansetron (ZOFRAN) 8 MG tablet  prochlorperazine (COMPAZINE) 10 MG tablet  vitamin D3 (CHOLECALCIFEROL) 2000 units (50 mcg) tablet      Allergies   Allergen Reactions     Pentobarbital      resp arrest     Family History  Family History   Problem Relation Age of Onset     Lung Cancer Maternal Grandmother      Breast Cancer Paternal Grandmother      Social History   Social History     Tobacco Use     Smoking status: Never Smoker     Smokeless tobacco: Never Used   Substance Use Topics     Alcohol use: Yes     Comment: 2-3 glasses of wine/week     Drug use: Never      Past medical history, past surgical history, medications, allergies, family history, and social history were reviewed with the patient. No additional pertinent items.     I have reviewed the Medications, Allergies, Past Medical and Surgical History, and Social History in the Epic  system.    Review of Systems  A complete review of systems was performed with pertinent positives and negatives noted in the HPI, and all other systems negative.    Physical Exam   BP: 118/58  Pulse: 95  Temp: 98.3  F (36.8  C)  Resp: 20  SpO2: 97 %      Physical Exam  Vitals and nursing note reviewed.   Constitutional:       General: She is not in acute distress.     Appearance: Normal appearance.   HENT:      Head: Normocephalic and atraumatic.      Nose: Nose normal.      Mouth/Throat:      Mouth: Mucous membranes are moist.   Eyes:      Extraocular Movements: Extraocular movements intact.      Pupils: Pupils are equal, round, and reactive to light.   Cardiovascular:      Rate and Rhythm: Normal rate and regular rhythm.      Pulses: Normal pulses.   Pulmonary:      Effort: Pulmonary effort is normal.      Breath sounds: Normal breath sounds. No wheezing, rhonchi or rales.   Abdominal:      General: Abdomen is flat.      Palpations: Abdomen is soft.      Tenderness: There is no abdominal tenderness. There is no guarding or rebound.   Musculoskeletal:         General: No tenderness. Normal range of motion.      Cervical back: Normal range of motion and neck supple.   Neurological:      General: No focal deficit present.      Mental Status: She is alert and oriented to person, place, and time.         ED Course     At 9:11 AM the patient was seen and examined by Alex Liu MD in Room ED16.        Procedures         Results for orders placed or performed during the hospital encounter of 08/25/21 (from the past 24 hour(s))   Lactic acid whole blood   Result Value Ref Range    Lactic Acid 0.8 0.7 - 2.0 mmol/L   UA with Microscopic reflex to Culture    Specimen: Urine, Midstream   Result Value Ref Range    Color Urine Yellow Colorless, Straw, Light Yellow, Yellow    Appearance Urine Clear Clear    Glucose Urine Negative Negative mg/dL    Bilirubin Urine Negative Negative    Ketones Urine Negative Negative mg/dL     Specific Gravity Urine 1.023 1.003 - 1.035    Blood Urine Negative Negative    pH Urine 6.0 5.0 - 7.0    Protein Albumin Urine 50  (A) Negative mg/dL    Urobilinogen Urine 2.0 Normal, 2.0 mg/dL    Nitrite Urine Negative Negative    Leukocyte Esterase Urine Negative Negative    Mucus Urine Present (A) None Seen /LPF    RBC Urine <1 <=2 /HPF    WBC Urine 5 <=5 /HPF    Squamous Epithelials Urine <1 <=1 /HPF    Narrative    Urine Culture not indicated   XR Chest Port 1 View    Narrative    Exam: XR CHEST PORT 1 VIEW, 8/25/2021 10:06 AM    Indication: cough    Comparison: 8/23/2021    Findings:   Right chest wall internal jugular Port-A-Cath tip at the high SVC.  Stable bilateral lung masses, the largest in the right paramediastinal  region. The cardiomediastinal silhouette and pulmonary vasculature are  within normal limits. No pleural effusion or pneumothorax. No new  focal airspace opacity.      Impression    Impression: No acute airspace disease. Stable bilateral lung masses.    ADEEL ROJAS DO         SYSTEM ID:  [id number]     Medications   0.9% sodium chloride BOLUS (500 mLs Intravenous New Bag 8/25/21 0937)     Followed by   sodium chloride 0.9% infusion (has no administration in time range)             Assessments & Plan (with Medical Decision Making)   Patient sent from clinic for hypotension and fever in the setting of immunosuppression due to chemo and immunotherapy for her cancer; I reviewed their notes and treatment from earlier this morning.  She is currently on antibiotics for presumed UTI but continues to spike fevers.  When she went for follow-up visit today at the infusion center she was hypotensive and tachycardic.  She was given fluids and IV antibiotics.  Sepsis labs were performed.  Here her vitals have improved she is afebrile and now not hypotensive heart rate is in the 90s.  Reviewed the labs that were drawn this morning.  Will finish sepsis work-up with repeat urine chest x-ray and lactate,  lactate is negative chest x-ray is stable and urinalysis does not show any signs of infection.  Culture was mixed kamar from 2 days ago, original micro shows a lot of epithelial cells may be slightly contaminated however given the patient's history vitals and test still needs to be treated.  Patient will be admitted for further treatment and evaluation, and discussed with medicine who agreed for admission.    I have reviewed the nursing notes.    I have reviewed the findings, diagnosis, plan and need for follow up with the patient.    New Prescriptions    No medications on file       Final diagnoses:   UTI (urinary tract infection), uncomplicated   Sepsis, due to unspecified organism, unspecified whether acute organ dysfunction present (H)   Immunosuppression (H)   Fever and chills       I, Corbin Saldaña am serving as a trained medical scribe to document services personally performed by Alex Liu MD, based on the provider's statements to me.      Alex FLOWERS MD, was physically present and have reviewed and verified the accuracy of this note documented by Corbin Saldaña.     Alex Liu MD  8/25/2021   [facility] EMERGENCY DEPARTMENT     Alex Liu MD  08/25/21 [id number]

## 2021-08-25 NOTE — PATIENT INSTRUCTIONS
[facility] Triage Nurse Line: [phone]    Call triage nurse with chills and/or temperature greater than or equal to 100.4, uncontrolled nausea/vomiting, diarrhea, constipation, dizziness, shortness of breath, chest pain, bleeding, unexplained bruising, or any new/concerning symptoms, questions/concerns.     If you are having any concerning symptoms or wish to speak to a provider before your next infusion visit, please call your care coordinator or triage to notify them so we can adequately serve you.

## 2021-08-25 NOTE — PROGRESS NOTES
Infusion Nursing Note:  Richa Ashby presents today for IV fluids + antibiotics (chemotherapy HELD).    Patient seen by provider today: Yes: Harshal Schuster PA-C   present during visit today: Not Applicable.    Note: Connie presents today feeling fatigued and generally unwell. Endorses pain to bilateral feet, related to neuropathy; declines intervention at this visit. Connie was brought directly to infusion from lab with low BPs (77/46) for IV fluids and infection workup due to fevers at home. 1L NS bolus ordered (given about 600 mL) with some improvement in BPs (88/58 at time of transfer). Connie denied dizziness/lightheadedness or other symptoms of hypotension. She is afebrile in infusion today.    SHAHEED Schuster PA-C/Anya Lainez RN [phone]  Ordered BC x2 sites, COVID swab, IV cefepime  Will be admission to hospital for further workup and fever management    Blood cultures collected from port and peripheral sites, COVID swab collected, IV cefepime given by writer. Discussed plan for hospital admission with Connie and she expressed understanding.    SHAHEED Schuster PA-C/Anya Lainez RN [phone]  Bed availability at the hospital is zero at this time, Connie should report to the ED to be placed on the admission queue  Ok if her  transports her as long as BPs are stable    Connie's  to transport her to hospital ED. Charge RN aware of her arrival, per Harshal. BPs 88/58 at time of transfer, but Connie continues to deny symptoms and reports feeling a little better. She feels comfortable having her  drive her. Writer discussed contacting 911 if anything changes on the drive to the hospital.      Intravenous Access:  Implanted Port.    Treatment Conditions:  Lab Results   Component Value Date    HGB 7.8 08/25/2021     Lab Results   Component Value Date    WBC 10.6 08/25/2021     Lab Results   Component Value Date    ANEU 8.5 08/25/2021     Lab Results   Component Value Date      08/25/2021     Lab Results   Component Value Date     08/25/2021      Lab Results   Component Value Date    POTASSIUM 3.4 08/25/2021      Lab Results   Component Value Date    CR 1.01 08/25/2021     Lab Results   Component Value Date    VIKTORIA 9.2 08/25/2021      Lab Results   Component Value Date    BILITOTAL 0.9 08/25/2021     Lab Results   Component Value Date    ALBUMIN 2.2 08/25/2021        Lab Results   Component Value Date    ALT 19 08/25/2021       Lab Results   Component Value Date    AST 8 08/25/2021           Post Infusion Assessment:  Patient tolerated infusion without incident.  Blood return noted pre and post infusion.  Site patent and intact, free from redness, edema or discomfort.  No evidence of extravasations.   Port remains accessed for ED to hospital admission.      Discharge Plan:   Patient declined prescription refills.  Discharge instructions reviewed with: Patient.  Patient and/or family verbalized understanding of discharge instructions and all questions answered.  AVS to patient via [employer].  Patient will return 09/08 for next infusion appointment.   Patient discharged in stable condition accompanied by: .  Departure Mode: Wheelchair.  Patient to be transported by family to [facility] ED for admission to hospital for fevers/infection workup.      Anya Lainez RN

## 2021-08-25 NOTE — NURSING NOTE
"Chief Complaint   Patient presents with     Port Draw     labs drawn from port by rn.  vs taken     Port accessed with 20 gauge 3/4\" Power needle and labs drawn by rn.  Port flushed with NS and heparin.  Pt tolerated well.      VS taken; pt hypotensive (see flowsheet, states fever 101.6 this AM, is afeb now). A&Ox3.  Provider notified, rapid response called.  Provider assessed pt in lab.  Pt escorted to Infusion in WC by paramedic.      Kassi Landin RN      "

## 2021-08-25 NOTE — LETTER
8/25/2021         RE: Richa Ashby  [address]  [place] [address]      Oncology/Hematology Visit Note  Aug 25, 2021    Reason for Visit: Follow up of undifferentiated pleomorphic sarcoma     History of Present Illness: Richa Ashby is a 56 year old female with PMH asthma and GERD with undifferentiated pleomorphic sarcoma. 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 She received preoperative radiotherapy and the tumor was resected 3/12/20. She then developed a cough, chest tightness, and scapular pain and imaging revealed lung nodules. Lung biopsy 11/2/20 with sarcoma. She was started on Doxil + Ifos 11/13/20. Had delay in cycle 2 due to COVID dx 12/2/20. CT imaging after cycle 1 with positive response to treatment with decrease in pulmonary nodules. Received cycle 2 12/18/20. Received cycle 3 1/15/21 with dose reduction in Doxil for skin toxicity and mucositis. Received cycle 4 2/15/21.      CT CAP 3/8/21 with positive response to treatment, decreased size of pulmonary mets though one lesion was larger. Did see fibrocystic changes in left breast, recommended breast imaging. Received cycle 5 Doxil + ifos 3/11/21.      Imaging 4/5/21 with overall stable disease except one lung lesion, opted to continue Doxil alone for now and switch to Keytruda in future if ongoing progression.      Started Keytruda 5/10/21. Was seen 5/26/21 cough, SOB, fatigue work-up thought to be 2/2 phrenic nerve involvement of R apical lung mass.      Due to worsening fatigue and cough, repeat CT 6/15/21 concerning for progression vs pseudoprogression. Added Gemzar 6/16/21.     She was admitted 6/17/21-6/20/21 with fever of unknown origin, infectious work-up negative, treated with broad spectrum antibiotics.     She clinically improved on abx. Was able to receive day 8 Gemzar (dose reduced for thrombocytopenia) + Keytruda 6/23/21.      CT  7/12/21 with stable disease.    She called in Monday 8/23/21 with 2 days of fevers, work-up with possible UTI (COVID, RVP, CXR negative, ANC WNL). She was started on Levaquin. She was seen today for oncology follow-up.    Interval History:  Connie was seen today for oncology follow-up. Despite being on Levaquin she has continued to have daily fevers up to 101-102. She takes Tylenol or Ibuprofen and the fever resolves but then comes back a few hours later. She is extremely fatigued and has been sweating. She continues to cough. She denies any other focal symptoms.  She was due for chemotherapy today.     Current Outpatient Medications   Medication Sig Dispense Refill     acetaminophen (TYLENOL) 325 MG tablet Take 325-650 mg by mouth every 6 hours as needed for mild pain       ADVAIR DISKUS 100-50 MCG/DOSE inhaler        albuterol (PROAIR RESPICLICK) 108 (90 Base) MCG/ACT inhaler Inhale 1-2 puffs into the lungs       cetirizine (ZYRTEC) 10 MG tablet Take 10 mg by mouth daily       gabapentin (NEURONTIN) 300 MG capsule Take 1 capsule (300 mg) by mouth 3 times daily 90 capsule 1     hydrOXYzine (ATARAX) 25 MG tablet Take 1 tablet (25 mg) by mouth 3 times daily as needed for itching 30 tablet 1     lidocaine-prilocaine (EMLA) 2.5-2.5 % external cream Apply topically as needed for moderate pain (Patient not taking: Reported on 6/2/2021) 30 g 3     melatonin (MELATONIN) 1 MG/ML LIQD liquid Take 5 mg by mouth       montelukast (SINGULAIR) 10 MG tablet Take 10 mg by mouth daily       multivitamin w/minerals (MULTI-VITAMIN) tablet Take 1 tablet by mouth daily       omeprazole (PRILOSEC) 20 MG DR capsule        ondansetron (ZOFRAN) 8 MG tablet Take 1 tablet (8 mg) by mouth every 8 hours as needed for nausea 60 tablet 1     prochlorperazine (COMPAZINE) 10 MG tablet Take 1 tablet (10 mg) by mouth every 6 hours as needed (Nausea/Vomiting) 30 tablet 5     vitamin D3 (CHOLECALCIFEROL) 2000 units (50 mcg) tablet Take 1 tablet by  mouth daily       Physical Examination:  BP 91/52 (Patient Position: Other (comments))   Pulse 114   Temp 98.3  F (36.8  C) (Oral)   Resp 16   Wt 90.8 kg (200 lb 1.6 oz)   LMP 03/19/2019   SpO2 98%   BMI 31.34 kg/m    Wt Readings from Last 10 Encounters:   08/11/21 93.4 kg (206 lb)   08/09/21 91.8 kg (202 lb 4.8 oz)   08/04/21 93.1 kg (205 lb 4.8 oz)   07/28/21 93.2 kg (205 lb 8 oz)   07/24/21 92.5 kg (204 lb)   07/18/21 93.9 kg (207 lb 1.6 oz)   07/14/21 93.7 kg (206 lb 8 oz)   07/07/21 92.9 kg (204 lb 11.2 oz)   06/30/21 92.5 kg (204 lb)   06/23/21 95.1 kg (209 lb 11.2 oz)     Constitutional: Ill-appearing female in no acute distress.  Eyes: No scleral icterus.  Respiratory: Non-labored breathing  Neurologic: Cranial nerves II through XII are grossly intact.  Skin: No rashes, petechiae, or bruising noted on exposed skin.    Laboratory Data:  Results for STEVEAMADEO (MRN [id number]) as of 8/25/2021 08:02   8/23/2021 11:21 8/23/2021 11:41 8/23/2021 11:47   Sodium 137     Potassium 3.7     Chloride 106     Carbon Dioxide 27     Urea Nitrogen 9     Creatinine 0.77     GFR Estimate 87     Calcium 9.0     Anion Gap 4     Albumin 2.6 (L)     Protein Total 7.3     Bilirubin Total 0.8     Alkaline Phosphatase 249 (H)     ALT 32     AST 11     Glucose 138 (H)     WBC   10.1   Hemoglobin   8.2 (L)   Hematocrit   26.2 (L)   Platelet Count   131 (L)   RBC Count   2.78 (L)   MCV   94   MCH   29.5   MCHC   31.3 (L)   RDW   17.3 (H)   % Neutrophils   75   % Lymphocytes   15   % Monocytes   9   % Eosinophils   1   Absolute Basophils   0.0   % Basophils   0   NRBC/W   1 (H)   Absolute Neutrophil   7.6   Absolute Lymphocytes   1.5   Absolute Monocytes   0.9   Absolute Eosinophils   0.1   Absolute NRBCs   0.1 (H)   RBC Morphology   Confirmed RBC Indices   Platelet Morphology   Automated Count Confirmed. Platelet morphology is normal.   Polychromasia   Slight (A)   Reactive Lymphs   Present (A)   Color Urine   Yellow    Appearance Urine   Clear   Glucose Urine   Negative   Bilirubin Urine   Negative   Ketones Urine   Negative   Specific Gravity Urine   1.028   pH Urine   6.5   Protein Albumin Urine   30 (A)   Urobilinogen mg/dL   4.0 (A)   Nitrite Urine   Negative   Blood Urine   Negative   Leukocyte Esterase Urine   Large (A)   WBC Urine   10-25 (A)   RBC Urine   0-2   Squamous Epithelial /LPF Urine   Many (A)   Transitional Epi   Moderate (A)   Renal Tub Epi   Few (A)   Mucus Urine   Present (A)   Granular Casts   2-5 (A)   Waxy Cast   0-2 (A)     Results for AMADEO WILSON (MRN [id number]) as of 8/25/2021 08:02   8/23/2021 11:41   Coronavirus Not Detected   Influenza A Not Detected   Influenza B Not Detected     Results for AMADEO WILSON (MRN [id number]) as of 8/25/2021 08:02   8/23/2021 11:48   SARS CoV2 PCR Negative     Results for AMADEO WILSON (MRN [id number]) as of 8/25/2021 08:02   8/23/2021 11:41   Adenovirus Not Detected   Chlamydia pneumoniae Not Detected   Human Metapneumovirus Not Detected   Human Rhin/Enterovirus Not Detected   Influenza A 2009 H1N1 Not Detected   Influenza A, H1 Not Detected   Influenza A, H3 Not Detected   Mycoplasma pneumoniae Not Detected   Parainfluenza Virus 1 Not Detected   Parainfluenza Virus 2 Not Detected   Parainfluenza Virus 3 Not Detected   Parainfluenza Virus 4 Not Detected   Respiratory Syncytial Virus A Not Detected   Respiratory Syncytial Virus B Not Detected     CXR 8/25/21  Findings:   Right chest wall internal jugular Port-A-Cath tip projects over the  high SVC. The cardiomediastinal silhouette and pulmonary vasculature  are within normal limits. No pleural effusion or pneumothorax. Stable  to slightly increased bilateral pulmonary nodules/masses, the largest  in the right paramediastinal region. No new focal airspace opacity.  Right upper quadrant cholecystectomy clips.                                                                      Impression:   1. No acute  airspace disease.  2. Stable to minimally increased bilateral pulmonary nodules/masses.     ADEEL ROJAS, DO     Assessment and Plan:  1. Fever  Ongoing since the weekend, work-up included CXR (negative), COVID (negative), RVP (negative), CBC (WNL other than persistent anemia), CMP (stable), UA-possible infection though urine culture urogenital kamar. Has been on Levaquin 750mg daily x 3 days but still having fevers. Now hypotension and tachycardic, concerning for sepsis  -Blood cultures x 2 drawn  -IVF and IV Cefepime here   -COVID repeated  -Send to ED now for further work-up of fever (may need CT) and then admission for sepsis   -HOLD CHEMOTHERAPY    2. Onc  Metastatic UPS, was on Doxil + Ifosfamide (5 cycles) with positive response to treatment initially, then due to poor tolerance switched to Doxil alone (1 cycle) but had progression. Started on Keytruda 5/10/21. Due to tumor growth (progression vs pseudoprogression) added Gemzar 6/16/21. CT imaging mid July with positive response to treatment.      Has had issues with low plt/ANC with Gemzar. Previously changeed Gemzar to 1000mg day 1 and day 15. Continuing Keytruda as well. Plan to repeat imaging early September.     Now with fevers and concern for sepsis will HOLD treatment today. Gemzar fevers on ddx however higher concern for infection with clinical picture.      3. Heme  Anemia: ACD and chemotherapy, has been more of an issue with gemzar. Transfuse to keep hgb >8 in clinic, >7 in hospital.      Thrombocytopenia: 2/2 Gemzar. No bleeding issues. OK today.     Neutropenia: no current issues, wait to add Neulasta for now.      4. Pulm  Asthma: stable, continue inhalers.     COVID: Diagnosed in December, recovered. She is vaccinated.      Cough/SOB: See work-up from 5/26/21 (negative CT PE, RVP, COVID, NTBNP). Thought to be 2/2 phrenic nerve involvement of tumor. Continue Robitussin PRN.      5. Neuro  Neuropathy: 2/2 ifosfamide which was  stopped. Improving. May need to limit prolonged walking due to more pain with this. Continue Gabapentin 300mg TID.     6. MSK  R shoulder/back pain: Suspect 2/2 cancer, improving which is a good sign. Has Tylenol PRN.     Focal R rib pain unclear etiology-resolved.    Issues with inflammatory arthritis last keytruda, resolved with prednisone.    45 minutes spent on the date of the encounter doing chart review, review of test results, interpretation of tests, patient visit, documentation and discussion with other provider(s)     Harshal Schuster PA-C  [facility]  [address] [place] [id number] [phone]

## 2021-08-25 NOTE — H&P
[facility]    History and Physical - [address]        Date of Admission:  8/25/2021    Assessment & Plan      Richa Ashby is a 56 year old female admitted on 8/25/2021. She has a history of asthma, GERD, and pleomorphic sarcoma and is admitted for fevers and hypotension.    #. Fevers  #. Hypotension  Patient with ~ 1 week of fevers and hypotension. Denies other infectious symptoms. Work-up with unremarkable UA and CXR. No evident source on examination. WBC at 10.6 which is elevated for patient (BL ~4). LA not elevated. Procal not elevated. Had been started on Levaquin 3 days ago for suspected UTI, but Cx unremarkable. Concern symptoms may be related to viral illness vs chemotherapy effect. Patient received dose of cefepime at oncology clinic day of admission. Hypotension fluid responsive. Of note patient had similar admission in June.   -Will monitor patient off Abx, further evaluation and treatment per symptoms.  -Follow-up BCx  -s/p 3 L IVF, will CTM and bolus PRN  -Tylenol for fevers PRN    #. Asthma  -Continue PTA cetrizine, Singulair, Advair, and PRN albuterol     #. GERD  -Continue PTA PPI    #. Neuropathic Pain  -Continue PTA gabapentin          Diet: Combination Diet Regular Diet Adult    DVT Prophylaxis: Enoxaparin (Lovenox) SQ  Ness Catheter: Not present  Fluids: PO, bolus PRN  Central Lines: None  Code Status: Full Code        Disposition Plan   Expected discharge: 1-2 days recommended to prior living arrangement once SIRS/Sepsis treated.     The patient's care was discussed with the Attending Physician, Dr. Herrmann.    Diaz Guardado MD  [address]  Securely message with the Vocera Web Console (learn more here)  Text page via [employer] Paging/Directory  Please see sign in/sign out for up to date coverage  information  ______________________________________________________________________    Chief Complaint   Fevers    History is obtained from the patient    History of Present Illness   Richa Ashby is a 56 year old female admitted on 8/25/2021. She has a history of asthma, GERD, and pleomorphic sarcoma and is admitted for fevers and hypotension.    She reports she first noted fevers ~1 week ago. 3 days ago she was evaluated by her PCP and started on Levaquin for suspected UTI. The patient experienced continued fevers which were responsive to tylenol. She presented to oncology clinic today for treatment. Given her fevers and hypotension, she was given 1 L IVFB, given a dose of cefepime, and advised to present to the ED. In the ED she was given a 2nd IVFB.    The patient reports no notable symptoms other than fever. She reports she has had a cough since her cancer diagnosis which has been improving. She denies SOB, CP, abdominal pain, diarrhea, dysuria, joint pains, skin lesions, or neurologic symptoms.     Review of Systems    The 10 point Review of Systems is negative other than noted in the HPI or here.     Past Medical History    I have reviewed this patient's medical history and updated it with pertinent information if needed.   Past Medical History:   Diagnosis Date     Asthma, chronic      Sarcoma (H)     Right lower thigh        Past Surgical History   I have reviewed this patient's surgical history and updated it with pertinent information if needed.  Past Surgical History:   Procedure Laterality Date     AS RAD RESEC TONSIL/PILLARS       GALLBLADDER SURGERY       INSERT PORT VASCULAR ACCESS Right 10/28/2020    Procedure: single lumen power port placement @0745;  Surgeon: Tex Ackerman MD;  Location: [facility] OR     IR CHEST PORT PLACEMENT > 5 YRS OF AGE  10/28/2020     IR PORT CHECK RIGHT  3/26/2021     RESECT TUMOR LOWER EXTREMITY Right 3/12/2020    Procedure: Resection of right thigh tumor;  Surgeon:  Guillermo Johnson MD;  Location: [facility] OR     ROTATOR CUFF REPAIR RT/LT  2012     SHOULDER SURGERY       SINUS SURGERY          Social History   I have reviewed this patient's social history and updated it with pertinent information if needed. Richa Ashby  reports that she has never smoked. She has never used smokeless tobacco. She reports current alcohol use. She reports that she does not use drugs.    Family History   I have reviewed this patient's family history and updated it with pertinent information if needed.  Family History   Problem Relation Age of Onset     Lung Cancer Maternal Grandmother      Breast Cancer Paternal Grandmother        Prior to Admission Medications   Prior to Admission Medications   Prescriptions Last Dose Informant Patient Reported? Taking?   ADVAIR DISKUS 100-50 MCG/DOSE inhaler 8/25/2021 at AM  Yes Yes   Sig: Inhale 1 puff into the lungs daily    acetaminophen (TYLENOL) 325 MG tablet 8/24/2021 at PM  Yes Yes   Sig: Take 325-650 mg by mouth every 6 hours as needed for mild pain   albuterol (PROAIR RESPICLICK) 108 (90 Base) MCG/ACT inhaler Past Month  Yes Yes   Sig: Inhale 1-2 puffs into the lungs   cetirizine (ZYRTEC) 10 MG tablet 8/25/2021 at AM  Yes Yes   Sig: Take 10 mg by mouth daily   gabapentin (NEURONTIN) 300 MG capsule 8/25/2021 at AM  No Yes   Sig: Take 1 capsule (300 mg) by mouth 3 times daily   hydrOXYzine (ATARAX) 25 MG tablet Not Started  Yes No   Sig: Take 1 tablet (25 mg) by mouth 3 times daily as needed for itching   levofloxacin (LEVAQUIN) 750 MG tablet 8/25/2021 at AM  No Yes   Sig: Take 1 tablet (750 mg) by mouth daily   lidocaine-prilocaine (EMLA) 2.5-2.5 % external cream   No No   Sig: Apply topically as needed for moderate pain   Patient not taking: Reported on 6/2/2021   melatonin (MELATONIN) 1 MG/ML LIQD liquid 8/24/2021 at PM  Yes Yes   Sig: Take 5 mg by mouth   montelukast (SINGULAIR) 10 MG tablet 8/25/2021 at AM  Yes Yes   Sig: Take 10 mg by mouth  daily   multivitamin w/minerals (MULTI-VITAMIN) tablet 2021 at AM  Yes Yes   Sig: Take 1 tablet by mouth daily   omeprazole (PRILOSEC) 20 MG DR capsule 2021 at AM  Yes Yes   Si mg daily    ondansetron (ZOFRAN) 8 MG tablet More than a month  No No   Sig: Take 1 tablet (8 mg) by mouth every 8 hours as needed for nausea   prochlorperazine (COMPAZINE) 10 MG tablet 2021 at AM  No Yes   Sig: Take 1 tablet (10 mg) by mouth every 6 hours as needed (Nausea/Vomiting)   vitamin D3 (CHOLECALCIFEROL) 2000 units (50 mcg) tablet 2021 at AM  Yes Yes   Sig: Take 1 tablet by mouth daily      Facility-Administered Medications: None     Allergies   Allergies   Allergen Reactions     Pentobarbital      resp arrest       Physical Exam   Vital Signs: Temp: 98.3  F (36.8  C) Temp src: Oral BP: 112/59 Pulse: 98   Resp: 20 SpO2: 98 % O2 Device: None (Room air)    Weight: 0 lbs 0 oz    General Appearance: NT appearing, in NAD  Eyes: EOMI, visual fields intact  HEENT: MMM, no oropharyngeal erythema  Respiratory: CTAB  Cardiovascular: RRR  GI: NBS, S, ND, NTTP  Genitourinary: No suprapubic or CVA TTP  Skin: No notable lesions  Musculoskeletal: No edema  Neurologic: No nuchal rigidity, strength and sensation intact    Data   Data reviewed today: I reviewed all medications, new labs and imaging results over the last 24 hours.     Recent Labs   Lab 21  1459 21  0810 21  0808 21  1147 21  1121   WBC  --   --  10.6 10.1  --    HGB  --   --  7.8* 8.2*  --    MCV  --   --  94 94  --    PLT  --   --  186 131*  --    NA  --  138  --   --  137   POTASSIUM  --  3.4  --   --  3.7   CHLORIDE  --  104  --   --  106   CO2  --  25  --   --  27   BUN  --  10  --   --  9   CR 0.74 1.01  --   --  0.77   ANIONGAP  --  9  --   --  4   VIKTORIA  --  9.2  --   --  9.0   GLC  --  121*  --   --  138*   ALBUMIN  --  2.2*  --   --  2.6*   PROTTOTAL  --  7.1  --   --  7.3   BILITOTAL  --  0.9  --   --  0.8   ALKPHOS  --   245*  --   --  249*   ALT  --  19  --   --  32   AST  --  8  --   --  11     Recent Results (from the past 24 hour(s))   XR Chest Port 1 View    Narrative    Exam: XR CHEST PORT 1 VIEW, 8/25/2021 10:06 AM    Indication: cough    Comparison: 8/23/2021    Findings:   Right chest wall internal jugular Port-A-Cath tip at the high SVC.  Stable bilateral lung masses, the largest in the right paramediastinal  region. The cardiomediastinal silhouette and pulmonary vasculature are  within normal limits. No pleural effusion or pneumothorax. No new  focal airspace opacity.      Impression    Impression: No acute airspace disease. Stable bilateral lung masses.    ADEEL ROJAS DO         SYSTEM ID:  [id number]

## 2021-08-25 NOTE — PHARMACY-ADMISSION MEDICATION HISTORY
Admission Medication History Completed by Pharmacy    See [facility] Admission Navigator for allergy information, preferred outpatient pharmacy, prior to admission medications and immunization status.     Medication History Sources:     Patient    Care Everywhere     Sure Scripts    Changes made to PTA medication list (reason):    Added: None    Deleted: None    Changed: None    Additional Information:    Patient had excellent recollection of her medications (dose and frequency). She reported consistent medication-taking behavior and was able to remember her last doses. To note, patient reports not taking Ondansetron 8 mg tablet as it results in a headache and prefers prochlorperazine for nausea.     Antimicrobial History  Medication Name: Levofloxacin 750 mg tablet   Indication: UTI  Instructions: Take 1 tablet by mouth daily   Duration: 5 days  Patient has taken 3 of the 5 days of the course of antibiotics (started on 8/23/21).  Complications: No    Medication Name: Azithromycin 250 mg tablet  Indication: Respiratory Infection  Instructions: Take 2 tablets by mouth on the first day, then 1 tablet by mouth for 4 days.  Duration: 5 days (ended 8/14/21)  Patient completed the course of antibiotics.  Complications: No    Medication Name: Nitrofurantoin macro crystal-monohydrate 100 mg capsule  Indication: UTI  Instructions: Take 1 capsule by mouth 2 times daily  Duration: 7 days (ended 7/31/21)  Patient completed the course of antibiotics.  Complications: No    Medication Name: Amoxicillin-Clavulanate 875-125 mg tablet  Indication: High Fever  Instructions: Take 1 tablet by mouth 2 times daily  Duration: 7 days (ended 6/27/21)  Patient completed the course of antibiotics.  Complications: No        Prior to Admission medications    Medication Sig Last Dose Taking? Auth Provider   acetaminophen (TYLENOL) 325 MG tablet Take 325-650 mg by mouth every 6 hours as needed for mild pain 8/24/2021 at PM Yes Reported, Patient    ADVAIR DISKUS 100-50 MCG/DOSE inhaler Inhale 1 puff into the lungs daily  8/25/2021 at AM Yes Reported, Patient   albuterol (PROAIR RESPICLICK) 108 (90 Base) MCG/ACT inhaler Inhale 1-2 puffs into the lungs Past Month Yes Reported, Patient   cetirizine (ZYRTEC) 10 MG tablet Take 10 mg by mouth daily 8/25/2021 at AM Yes Reported, Patient   gabapentin (NEURONTIN) 300 MG capsule Take 1 capsule (300 mg) by mouth 3 times daily 8/25/2021 at AM Yes Harhsal Schuster PA   levofloxacin (LEVAQUIN) 750 MG tablet Take 1 tablet (750 mg) by mouth daily 8/25/2021 at AM Yes Harshal Schuster PA   melatonin (MELATONIN) 1 MG/ML LIQD liquid Take 5 mg by mouth 8/24/2021 at PM Yes Reported, Patient   montelukast (SINGULAIR) 10 MG tablet Take 10 mg by mouth daily 8/25/2021 at AM Yes Reported, Patient   multivitamin w/minerals (MULTI-VITAMIN) tablet Take 1 tablet by mouth daily 8/24/2021 at AM Yes Reported, Patient   omeprazole (PRILOSEC) 20 MG DR capsule 20 mg daily  8/25/2021 at AM Yes Reported, Patient   prochlorperazine (COMPAZINE) 10 MG tablet Take 1 tablet (10 mg) by mouth every 6 hours as needed (Nausea/Vomiting) 8/25/2021 at AM Yes Harshal Schuster PA   vitamin D3 (CHOLECALCIFEROL) 2000 units (50 mcg) tablet Take 1 tablet by mouth daily 8/25/2021 at AM Yes Reported, Patient   hydrOXYzine (ATARAX) 25 MG tablet Take 1 tablet (25 mg) by mouth 3 times daily as needed for itching Not Started  Harshal Schuster PA   lidocaine-prilocaine (EMLA) 2.5-2.5 % external cream Apply topically as needed for moderate pain  Patient not taking: Reported on 6/2/2021   Jose Alberto Lopez MD   ondansetron (ZOFRAN) 8 MG tablet Take 1 tablet (8 mg) by mouth every 8 hours as needed for nausea More than a month  Harshal Schuster PA       Date completed: 08/25/21    Medication history completed by: VELASQUEZ Andres, Pharmacy Intern

## 2021-08-26 LAB
C PNEUM DNA SPEC QL NAA+PROBE: NOT DETECTED
DEPRECATED S PYO AG THROAT QL EIA: NEGATIVE
ERYTHROCYTE [DISTWIDTH] IN BLOOD BY AUTOMATED COUNT: 17.6 % (ref 10–15)
FLUAV H1 2009 PAND RNA SPEC QL NAA+PROBE: NOT DETECTED
FLUAV H1 RNA SPEC QL NAA+PROBE: NOT DETECTED
FLUAV H3 RNA SPEC QL NAA+PROBE: NOT DETECTED
FLUAV RNA SPEC QL NAA+PROBE: NOT DETECTED
FLUBV RNA SPEC QL NAA+PROBE: NOT DETECTED
HADV DNA SPEC QL NAA+PROBE: NOT DETECTED
HCOV PNL SPEC NAA+PROBE: NOT DETECTED
HCT VFR BLD AUTO: 24.2 % (ref 35–47)
HGB BLD-MCNC: 7.4 G/DL (ref 11.7–15.7)
HMPV RNA SPEC QL NAA+PROBE: NOT DETECTED
HPIV1 RNA SPEC QL NAA+PROBE: NOT DETECTED
HPIV2 RNA SPEC QL NAA+PROBE: NOT DETECTED
HPIV3 RNA SPEC QL NAA+PROBE: NOT DETECTED
HPIV4 RNA SPEC QL NAA+PROBE: NOT DETECTED
M PNEUMO DNA SPEC QL NAA+PROBE: NOT DETECTED
MCH RBC QN AUTO: 28.8 PG (ref 26.5–33)
MCHC RBC AUTO-ENTMCNC: 30.6 G/DL (ref 31.5–36.5)
MCV RBC AUTO: 94 FL (ref 78–100)
PLATELET # BLD AUTO: 208 10E3/UL (ref 150–450)
RBC # BLD AUTO: 2.57 10E6/UL (ref 3.8–5.2)
RSV RNA SPEC QL NAA+PROBE: NOT DETECTED
RSV RNA SPEC QL NAA+PROBE: NOT DETECTED
RV+EV RNA SPEC QL NAA+PROBE: NOT DETECTED
WBC # BLD AUTO: 9.6 10E3/UL (ref 4–11)

## 2021-08-26 PROCEDURE — 87633 RESP VIRUS 12-25 TARGETS: CPT | Performed by: STUDENT IN AN ORGANIZED HEALTH CARE EDUCATION/TRAINING PROGRAM

## 2021-08-26 PROCEDURE — 87880 STREP A ASSAY W/OPTIC: CPT | Performed by: STUDENT IN AN ORGANIZED HEALTH CARE EDUCATION/TRAINING PROGRAM

## 2021-08-26 PROCEDURE — 120N000002 HC R&B MED SURG/OB UMMC

## 2021-08-26 PROCEDURE — 87581 M.PNEUMON DNA AMP PROBE: CPT | Performed by: STUDENT IN AN ORGANIZED HEALTH CARE EDUCATION/TRAINING PROGRAM

## 2021-08-26 PROCEDURE — 250N000011 HC RX IP 250 OP 636: Performed by: STUDENT IN AN ORGANIZED HEALTH CARE EDUCATION/TRAINING PROGRAM

## 2021-08-26 PROCEDURE — 36591 DRAW BLOOD OFF VENOUS DEVICE: CPT | Performed by: STUDENT IN AN ORGANIZED HEALTH CARE EDUCATION/TRAINING PROGRAM

## 2021-08-26 PROCEDURE — 99233 SBSQ HOSP IP/OBS HIGH 50: CPT | Mod: GC | Performed by: STUDENT IN AN ORGANIZED HEALTH CARE EDUCATION/TRAINING PROGRAM

## 2021-08-26 PROCEDURE — 85027 COMPLETE CBC AUTOMATED: CPT | Performed by: STUDENT IN AN ORGANIZED HEALTH CARE EDUCATION/TRAINING PROGRAM

## 2021-08-26 PROCEDURE — 258N000003 HC RX IP 258 OP 636: Performed by: STUDENT IN AN ORGANIZED HEALTH CARE EDUCATION/TRAINING PROGRAM

## 2021-08-26 PROCEDURE — 250N000013 HC RX MED GY IP 250 OP 250 PS 637: Performed by: STUDENT IN AN ORGANIZED HEALTH CARE EDUCATION/TRAINING PROGRAM

## 2021-08-26 PROCEDURE — 87476 LYME DIS DNA AMP PROBE: CPT | Performed by: STUDENT IN AN ORGANIZED HEALTH CARE EDUCATION/TRAINING PROGRAM

## 2021-08-26 RX ADMIN — ACETAMINOPHEN 650 MG: 325 TABLET, FILM COATED ORAL at 05:15

## 2021-08-26 RX ADMIN — GABAPENTIN 300 MG: 300 CAPSULE ORAL at 07:51

## 2021-08-26 RX ADMIN — MONTELUKAST 10 MG: 10 TABLET, FILM COATED ORAL at 07:51

## 2021-08-26 RX ADMIN — MOMETASONE FUROATE AND FORMOTEROL FUMARATE DIHYDRATE 1 PUFF: 100; 5 AEROSOL RESPIRATORY (INHALATION) at 07:52

## 2021-08-26 RX ADMIN — SODIUM CHLORIDE 500 ML: 9 INJECTION, SOLUTION INTRAVENOUS at 10:25

## 2021-08-26 RX ADMIN — MULTIPLE VITAMINS W/ MINERALS TAB 1 TABLET: TAB at 07:52

## 2021-08-26 RX ADMIN — GABAPENTIN 300 MG: 300 CAPSULE ORAL at 14:34

## 2021-08-26 RX ADMIN — ONDANSETRON HYDROCHLORIDE 8 MG: 4 TABLET, FILM COATED ORAL at 05:08

## 2021-08-26 RX ADMIN — ENOXAPARIN SODIUM 40 MG: 40 INJECTION SUBCUTANEOUS at 14:34

## 2021-08-26 RX ADMIN — HYDROXYZINE HYDROCHLORIDE 25 MG: 25 TABLET, FILM COATED ORAL at 21:22

## 2021-08-26 RX ADMIN — ACETAMINOPHEN 650 MG: 325 TABLET, FILM COATED ORAL at 16:49

## 2021-08-26 RX ADMIN — ACETAMINOPHEN 650 MG: 325 TABLET, FILM COATED ORAL at 11:58

## 2021-08-26 RX ADMIN — OMEPRAZOLE 20 MG: 20 CAPSULE, DELAYED RELEASE ORAL at 07:51

## 2021-08-26 RX ADMIN — Medication 1 MG: at 21:22

## 2021-08-26 RX ADMIN — ACETAMINOPHEN 650 MG: 325 TABLET, FILM COATED ORAL at 23:55

## 2021-08-26 RX ADMIN — CETIRIZINE HYDROCHLORIDE 10 MG: 10 TABLET, FILM COATED ORAL at 07:51

## 2021-08-26 RX ADMIN — GABAPENTIN 300 MG: 300 CAPSULE ORAL at 19:36

## 2021-08-26 RX ADMIN — Medication 50 MCG: at 07:51

## 2021-08-26 ASSESSMENT — ACTIVITIES OF DAILY LIVING (ADL)
ADLS_ACUITY_SCORE: 17
ADLS_ACUITY_SCORE: 17
DIFFICULTY_COMMUNICATING: NO
ADLS_ACUITY_SCORE: 17
DRESSING/BATHING_DIFFICULTY: NO
ADLS_ACUITY_SCORE: 17
DIFFICULTY_EATING/SWALLOWING: NO
TOILETING_ISSUES: NO
ADLS_ACUITY_SCORE: 17
ADLS_ACUITY_SCORE: 17
DEPENDENT_IADLS:: INDEPENDENT

## 2021-08-26 NOTE — PLAN OF CARE
Status: Admitted for suspected UTI  Vitals: Softer BP since improved Temp max 100.2 tachycardic within parameters   Neuros: WDL  IV: Port  SL  Labs/Electrolytes: Lactic unchanged 0.8  Resp/trach: LSCA  Diet: Reg  Bowel status: BM this shift  : WDL  Skin: Intact  Pain: Denies  Activity: Independent  Plan: Monitor POC    Arrived from:  ED  Belongings/meds:  Remain with patient  2 RN Skin Assessment Completed by:  Jose  Non-intact findings documented (yes/no/NA): No

## 2021-08-26 NOTE — CONSULTS
Care Management Initial Consult    General Information  Assessment completed with: Patient, Spouse or significant other, Patient and Elliot ()  Type of CM/SW Visit: Initial Assessment    Primary Care Provider verified and updated as needed: Yes   Readmission within the last 30 days:        Reason for Consult: discharge planning  Advance Care Planning:            Communication Assessment  Patient's communication style: spoken language (English or Bilingual)    Hearing Difficulty or Deaf: no   Wear Glasses or Blind: no    Cognitive  Cognitive/Neuro/Behavioral: WDL                      Living Environment:   People in home: spouse  Elliot  Current living Arrangements: apartment      Able to return to prior arrangements: yes       Family/Social Support:  Care provided by: spouse/significant other  Provides care for: no one, unable/limited ability to care for self  Marital Status:     Elliot       Description of Support System: Supportive, Involved         Current Resources:   Patient receiving home care services: No     Community Resources: OP Infusion, Palliative Care  Equipment currently used at home: none  Supplies currently used at home: None    Employment/Financial:  Employment Status:          Financial Concerns: No concerns identified           Lifestyle & Psychosocial Needs:  Social Determinants of Health     Tobacco Use: Low Risk      Smoking Tobacco Use: Never Smoker     Smokeless Tobacco Use: Never Used   Alcohol Use: Not At Risk     Frequency of Alcohol Consumption: 2-3 times a week     Average Number of Drinks: 1 or 2     Frequency of Binge Drinking: Never   Financial Resource Strain:      Difficulty of Paying Living Expenses:    Food Insecurity:      Worried About Running Out of Food in the Last Year:      Ran Out of Food in the Last Year:    Transportation Needs:      Lack of Transportation (Medical):      Lack of Transportation (Non-Medical):    Physical Activity:      Days of Exercise per  Week:      Minutes of Exercise per Session:    Stress:      Feeling of Stress :    Social Connections:      Frequency of Communication with Friends and Family:      Frequency of Social Gatherings with Friends and Family:      Attends Bahai Services:      Active Member of Clubs or Organizations:      Attends Club or Organization Meetings:      Marital Status:    Intimate Partner Violence:      Fear of Current or Ex-Partner:      Emotionally Abused:      Physically Abused:      Sexually Abused:    Depression: Not at risk     PHQ-2 Score: 1   Housing Stability:      Unable to Pay for Housing in the Last Year:      Number of Places Lived in the Last Year:      Unstable Housing in the Last Year:        Functional Status:  Prior to admission patient needed assistance:   Dependent ADLs:: Independent  Dependent IADLs:: Independent     Values/Beliefs:  Spiritual, Cultural Beliefs, Bahai Practices, Values that affect care:                 Additional Information:  RNCC met with patient for initial case management assessment as patient was flagged as elevated risk for readmission. 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 Patient tested covid negative. Patient lives in an apartment with spouse, Elliot who was present at bedside, and appears supportive of patient. Patient doesn't currently use any equipment at home, but is looking into getting a portable scooter for longer ambulation outside of the home and has applied for handicap parking tags. Patient does not currently use home care and is not interested in home care or therapies outside of the hospital, though patient does visit an infusion center every other week for chemo treatments, and patient regularly has palliative care team visits. RNCC to remain available for further planning for discharge or home  needs.    Geln Conteh RNCC

## 2021-08-26 NOTE — PLAN OF CARE
Status: Pt admitted for suspected UTI, UA negative, continues to have fevers  Vitals: Tmax 102.9, tachycardic, vitals otherwise stable  Neuros: Intact  IV: Port is SL  Labs/Electrolytes: WDL  Resp/trach: Lung sounds are clear  Diet: Reg, tolerating  Bowel status: Bowel sounds are active, BM this am  : Voiding  Skin: Intact  Pain: Denied  Activity: Independent  Social: Pt has been pleasant and calm,  Rafi visited and is supportive in her cares  Plan: Will continue to monitor and follow POC, emelina MONTERROSO if sputum is collected  Updates this shift: Nasopharyngeal swab and throat culture were collected, tylenol given for fever w/ some relief

## 2021-08-26 NOTE — PROGRESS NOTES
[facility]    Progress Note - Ren[facility] Service        Date of Admission:  8/25/2021    Assessment & Plan             Richa Ashby is a 56 year old female admitted on 8/25/2021. She has a history of asthma, GERD, and pleomorphic sarcoma and is admitted for fevers and hypotension.     #. Fevers  #. Hypotension  Patient with ~ 1 week of fevers and hypotension. Denies other infectious symptoms. Work-up with unremarkable UA and CXR. No evident source on examination. WBC at 10.6 which is elevated for patient (BL ~4). LA not elevated. Procal not elevated. Had been started on Levaquin 3 days ago for suspected UTI, but Cx unremarkable. Concern symptoms may be related to viral illness vs chemotherapy effect. Patient received dose of cefepime at oncology clinic day of admission. Hypotension fluid responsive. Of note patient had similar admission in June.   -Will monitor patient off Abx, further evaluation and treatment per symptoms.  -Will obtain RPP and rapid strep (reporting sore throat)  -Follow-up BCx, NGTD  -s/p 3 L IVF, will CTM and bolus PRN, additional 500 mL this AM  -Tylenol for fevers PRN     #. Asthma  -Continue PTA cetrizine, Singulair, Advair, and PRN albuterol      #. GERD  -Continue PTA PPI     #. Neuropathic Pain  -Continue PTA gabapentin     Diet: Combination Diet Regular Diet Adult    DVT Prophylaxis: Enoxaparin (Lovenox) SQ  Ness Catheter: Not present  Fluids: PO, bolus PRN  Central Lines: None  Code Status: Full Code          Disposition Plan     Expected discharge: 1-2 days recommended to prior living arrangement once SIRS/Sepsis treated.        The patient's care was discussed with the Attending Physician, Dr. Herrmann.     MD Renata Villegas 4 Service  [facility]  Securely message with the Vocera Web Console (learn more here)  Text page via [employer] Paging/Directory  Please see sign in/sign out for up  to date coverage information      ______________________________________________________________________    Interval History   NAEO. Patient reports she slept well. She continues to report fevers as well as malaise, aches, and brain fog. She also endorses some sore throat this AM. Denies other acute symptoms.    4 point ROS otherwise negative    Data reviewed today: I reviewed all medications, new labs and imaging results over the last 24 hours.    Physical Exam   Vital Signs: Temp: 98.6  F (37  C) Temp src: Oral BP: 106/49 Pulse: 88   Resp: 16 SpO2: 98 % O2 Device: None (Room air)    Weight: 0 lbs 0 oz  General Appearance: NT appearing, in NAD  Eyes: EOMI, visual fields intact  HEENT: MMM, no oropharyngeal erythema  Respiratory: CTAB  Cardiovascular: RRR  GI: NBS, S, ND, NTTP  Genitourinary: No suprapubic or CVA TTP  Skin: No notable lesions  Musculoskeletal: No edema  Neurologic: No nuchal rigidity, strength and sensation intact, normal gait    Data   Recent Labs   Lab 08/26/21  0722 08/25/21  1459 08/25/21  0810 08/25/21  0808 08/23/21  1147 08/23/21  1121   WBC 9.6  --   --  10.6 10.1  --    HGB 7.4*  --   --  7.8* 8.2*  --    MCV 94  --   --  94 94  --      --   --  186 131*  --    NA  --   --  138  --   --  137   POTASSIUM  --   --  3.4  --   --  3.7   CHLORIDE  --   --  104  --   --  106   CO2  --   --  25  --   --  27   BUN  --   --  10  --   --  9   CR  --  0.74 1.01  --   --  0.77   ANIONGAP  --   --  9  --   --  4   VIKTORIA  --   --  9.2  --   --  9.0   GLC  --   --  121*  --   --  138*   ALBUMIN  --   --  2.2*  --   --  2.6*   PROTTOTAL  --   --  7.1  --   --  7.3   BILITOTAL  --   --  0.9  --   --  0.8   ALKPHOS  --   --  245*  --   --  249*   ALT  --   --  19  --   --  32   AST  --   --  8  --   --  11     No results found for this or any previous visit (from the past 24 hour(s)).  Medications       sodium chloride 0.9%  500 mL Intravenous Once     cetirizine  10 mg Oral Daily     enoxaparin  ANTICOAGULANT  40 mg Subcutaneous Q24H     gabapentin  300 mg Oral TID     mometasone-formoterol  1 puff Inhalation Daily     montelukast  10 mg Oral Daily     multivitamin w/minerals  1 tablet Oral Daily     omeprazole  20 mg Oral QAM AC     sodium chloride (PF)  3 mL Intracatheter Q8H     vitamin D3  50 mcg Oral Daily

## 2021-08-26 NOTE — UTILIZATION REVIEW
Admission Status; Secondary Review Determination         Under the authority of the Utilization Management Committee, the utilization review process indicated a secondary review on the above patient.  The review outcome is based on review of the medical records, discussions with staff, and applying clinical experience noted on the date of the review.        (x)      Inpatient Status Appropriate - This patient's medical care is consistent with medical management for inpatient care and reasonable inpatient medical practice.     RATIONALE FOR DETERMINATION   The patient is a 56-year-old female admitted on 8/25/2021.  She has a significant history for undifferentiated pleomorphic sarcoma with resection done on on 3/12/2020.  She has lung metastasis.  She was sent to the emergency room from the oncology clinic.  She has a 1 week history of spiking fevers to 101.  She had been treated in the clinic with cefepime IV along with Levaquin for suspected UTI.  Because of an immunosuppressed state and elevated white count for her of 10.6 when her baseline is 4, plus fevers and malaise, she was admitted to the hospital for further evaluation and treatment.  She did have a 101.2 fever yesterday in the hospital.  Her pulse at the time was in the low 100s.  This morning her temp went up to 100.2.  Based on high risk and potential for significant worsening, inpatient status is appropriate.      The severity of illness, intensity of service provided, expected LOS and risk for adverse outcome make the care complex, high risk and appropriate for hospital admission.        The information on this document is developed by the utilization review team in order for the business office to ensure compliance.  This only denotes the appropriateness of proper admission status and does not reflect the quality of care rendered.         The definitions of Inpatient Status and Observation Status used in making the determination above are those  provided in the CMS Coverage Manual, Chapter 1 and Chapter 6, section 70.4.      Sincerely,     Joe Buckley MD  Physician Advisor  Utilization Review/ Case Management  [facility].

## 2021-08-27 LAB
ABO/RH(D): NORMAL
ALBUMIN SERPL-MCNC: 1.8 G/DL (ref 3.4–5)
ALP SERPL-CCNC: 226 U/L (ref 40–150)
ALT SERPL W P-5'-P-CCNC: 15 U/L (ref 0–50)
ANION GAP SERPL CALCULATED.3IONS-SCNC: 5 MMOL/L (ref 3–14)
ANTIBODY SCREEN: NEGATIVE
AST SERPL W P-5'-P-CCNC: 11 U/L (ref 0–45)
BASOPHILS # BLD AUTO: 0 10E3/UL (ref 0–0.2)
BASOPHILS NFR BLD AUTO: 0 %
BILIRUB DIRECT SERPL-MCNC: 0.2 MG/DL (ref 0–0.2)
BILIRUB SERPL-MCNC: 0.6 MG/DL (ref 0.2–1.3)
BILIRUB SERPL-MCNC: 0.6 MG/DL (ref 0.2–1.3)
BLD PROD TYP BPU: NORMAL
BLD PROD TYP BPU: NORMAL
BLOOD COMPONENT TYPE: NORMAL
BLOOD COMPONENT TYPE: NORMAL
BUN SERPL-MCNC: 8 MG/DL (ref 7–30)
C DIFF TOX B STL QL: NEGATIVE
CALCIUM SERPL-MCNC: 8.6 MG/DL (ref 8.5–10.1)
CHLORIDE BLD-SCNC: 106 MMOL/L (ref 94–109)
CO2 SERPL-SCNC: 27 MMOL/L (ref 20–32)
CODING SYSTEM: NORMAL
CODING SYSTEM: NORMAL
CREAT SERPL-MCNC: 0.68 MG/DL (ref 0.52–1.04)
CROSSMATCH: NORMAL
CROSSMATCH: NORMAL
CRP SERPL-MCNC: 320 MG/L (ref 0–8)
CRYPTOC AG SPEC QL: NEGATIVE
EOSINOPHIL # BLD AUTO: 0.2 10E3/UL (ref 0–0.7)
EOSINOPHIL NFR BLD AUTO: 2 %
ERYTHROCYTE [DISTWIDTH] IN BLOOD BY AUTOMATED COUNT: 17.2 % (ref 10–15)
ERYTHROCYTE [DISTWIDTH] IN BLOOD BY AUTOMATED COUNT: 17.4 % (ref 10–15)
GFR SERPL CREATININE-BSD FRML MDRD: >90 ML/MIN/1.73M2
GLUCOSE BLD-MCNC: 104 MG/DL (ref 70–99)
HAPTOGLOB SERPL-MCNC: 495 MG/DL (ref 32–197)
HCT VFR BLD AUTO: 18.9 % (ref 35–47)
HCT VFR BLD AUTO: 23 % (ref 35–47)
HGB BLD-MCNC: 5.7 G/DL (ref 11.7–15.7)
HGB BLD-MCNC: 7.1 G/DL (ref 11.7–15.7)
IMM GRANULOCYTES # BLD: 0.1 10E3/UL
IMM GRANULOCYTES NFR BLD: 1 %
INR PPP: 1.29 (ref 0.85–1.15)
LACTATE SERPL-SCNC: 0.9 MMOL/L (ref 0.7–2)
LDH SERPL L TO P-CCNC: 152 U/L (ref 81–234)
LYMPHOCYTES # BLD AUTO: 1.1 10E3/UL (ref 0.8–5.3)
LYMPHOCYTES NFR BLD AUTO: 9 %
MCH RBC QN AUTO: 29.1 PG (ref 26.5–33)
MCH RBC QN AUTO: 29.1 PG (ref 26.5–33)
MCHC RBC AUTO-ENTMCNC: 30.2 G/DL (ref 31.5–36.5)
MCHC RBC AUTO-ENTMCNC: 30.9 G/DL (ref 31.5–36.5)
MCV RBC AUTO: 94 FL (ref 78–100)
MCV RBC AUTO: 96 FL (ref 78–100)
MONOCYTES # BLD AUTO: 1.3 10E3/UL (ref 0–1.3)
MONOCYTES NFR BLD AUTO: 12 %
NEUTROPHILS # BLD AUTO: 8.7 10E3/UL (ref 1.6–8.3)
NEUTROPHILS NFR BLD AUTO: 76 %
NRBC # BLD AUTO: 0 10E3/UL
NRBC BLD AUTO-RTO: 0 /100
PLATELET # BLD AUTO: 207 10E3/UL (ref 150–450)
PLATELET # BLD AUTO: 238 10E3/UL (ref 150–450)
POTASSIUM BLD-SCNC: 3.1 MMOL/L (ref 3.4–5.3)
POTASSIUM BLD-SCNC: 3.3 MMOL/L (ref 3.4–5.3)
PROCALCITONIN SERPL-MCNC: 0.71 NG/ML
PROT SERPL-MCNC: 6.7 G/DL (ref 6.8–8.8)
RBC # BLD AUTO: 1.96 10E6/UL (ref 3.8–5.2)
RBC # BLD AUTO: 2.44 10E6/UL (ref 3.8–5.2)
RETICS # AUTO: 0.05 10E6/UL (ref 0.03–0.1)
RETICS # AUTO: 0.42 10E6/UL (ref 0.03–0.1)
RETICS/RBC NFR AUTO: 2 % (ref 0.5–2)
RETICS/RBC NFR AUTO: 2.2 % (ref 0.5–2)
SODIUM SERPL-SCNC: 138 MMOL/L (ref 133–144)
SPECIMEN EXPIRATION DATE: NORMAL
UNIT ABO/RH: NORMAL
UNIT ABO/RH: NORMAL
UNIT NUMBER: NORMAL
UNIT NUMBER: NORMAL
UNIT STATUS: NORMAL
UNIT STATUS: NORMAL
UNIT TYPE ISBT: 5100
UNIT TYPE ISBT: 5100
WBC # BLD AUTO: 10 10E3/UL (ref 4–11)
WBC # BLD AUTO: 11.4 10E3/UL (ref 4–11)

## 2021-08-27 PROCEDURE — 87798 DETECT AGENT NOS DNA AMP: CPT | Performed by: STUDENT IN AN ORGANIZED HEALTH CARE EDUCATION/TRAINING PROGRAM

## 2021-08-27 PROCEDURE — 250N000013 HC RX MED GY IP 250 OP 250 PS 637: Performed by: STUDENT IN AN ORGANIZED HEALTH CARE EDUCATION/TRAINING PROGRAM

## 2021-08-27 PROCEDURE — 99255 IP/OBS CONSLTJ NEW/EST HI 80: CPT | Performed by: STUDENT IN AN ORGANIZED HEALTH CARE EDUCATION/TRAINING PROGRAM

## 2021-08-27 PROCEDURE — 86140 C-REACTIVE PROTEIN: CPT | Performed by: STUDENT IN AN ORGANIZED HEALTH CARE EDUCATION/TRAINING PROGRAM

## 2021-08-27 PROCEDURE — 86901 BLOOD TYPING SEROLOGIC RH(D): CPT | Performed by: STUDENT IN AN ORGANIZED HEALTH CARE EDUCATION/TRAINING PROGRAM

## 2021-08-27 PROCEDURE — 83010 ASSAY OF HAPTOGLOBIN QUANT: CPT | Performed by: STUDENT IN AN ORGANIZED HEALTH CARE EDUCATION/TRAINING PROGRAM

## 2021-08-27 PROCEDURE — 85060 BLOOD SMEAR INTERPRETATION: CPT | Performed by: PATHOLOGY

## 2021-08-27 PROCEDURE — 82248 BILIRUBIN DIRECT: CPT | Performed by: STUDENT IN AN ORGANIZED HEALTH CARE EDUCATION/TRAINING PROGRAM

## 2021-08-27 PROCEDURE — 86612 BLASTOMYCES ANTIBODY: CPT | Performed by: STUDENT IN AN ORGANIZED HEALTH CARE EDUCATION/TRAINING PROGRAM

## 2021-08-27 PROCEDURE — 85025 COMPLETE CBC W/AUTO DIFF WBC: CPT | Performed by: STUDENT IN AN ORGANIZED HEALTH CARE EDUCATION/TRAINING PROGRAM

## 2021-08-27 PROCEDURE — 84145 PROCALCITONIN (PCT): CPT | Performed by: STUDENT IN AN ORGANIZED HEALTH CARE EDUCATION/TRAINING PROGRAM

## 2021-08-27 PROCEDURE — 87015 SPECIMEN INFECT AGNT CONCNTJ: CPT | Performed by: STUDENT IN AN ORGANIZED HEALTH CARE EDUCATION/TRAINING PROGRAM

## 2021-08-27 PROCEDURE — 87449 NOS EACH ORGANISM AG IA: CPT | Performed by: STUDENT IN AN ORGANIZED HEALTH CARE EDUCATION/TRAINING PROGRAM

## 2021-08-27 PROCEDURE — 250N000013 HC RX MED GY IP 250 OP 250 PS 637

## 2021-08-27 PROCEDURE — 87899 AGENT NOS ASSAY W/OPTIC: CPT | Performed by: STUDENT IN AN ORGANIZED HEALTH CARE EDUCATION/TRAINING PROGRAM

## 2021-08-27 PROCEDURE — 83605 ASSAY OF LACTIC ACID: CPT

## 2021-08-27 PROCEDURE — 83615 LACTATE (LD) (LDH) ENZYME: CPT | Performed by: STUDENT IN AN ORGANIZED HEALTH CARE EDUCATION/TRAINING PROGRAM

## 2021-08-27 PROCEDURE — 99233 SBSQ HOSP IP/OBS HIGH 50: CPT | Mod: GC | Performed by: STUDENT IN AN ORGANIZED HEALTH CARE EDUCATION/TRAINING PROGRAM

## 2021-08-27 PROCEDURE — 87389 HIV-1 AG W/HIV-1&-2 AB AG IA: CPT | Performed by: STUDENT IN AN ORGANIZED HEALTH CARE EDUCATION/TRAINING PROGRAM

## 2021-08-27 PROCEDURE — 87305 ASPERGILLUS AG IA: CPT | Performed by: STUDENT IN AN ORGANIZED HEALTH CARE EDUCATION/TRAINING PROGRAM

## 2021-08-27 PROCEDURE — 36415 COLL VENOUS BLD VENIPUNCTURE: CPT | Performed by: STUDENT IN AN ORGANIZED HEALTH CARE EDUCATION/TRAINING PROGRAM

## 2021-08-27 PROCEDURE — 84132 ASSAY OF SERUM POTASSIUM: CPT | Performed by: STUDENT IN AN ORGANIZED HEALTH CARE EDUCATION/TRAINING PROGRAM

## 2021-08-27 PROCEDURE — 87207 SMEAR SPECIAL STAIN: CPT | Mod: 26 | Performed by: PEDIATRICS

## 2021-08-27 PROCEDURE — 80053 COMPREHEN METABOLIC PANEL: CPT | Performed by: STUDENT IN AN ORGANIZED HEALTH CARE EDUCATION/TRAINING PROGRAM

## 2021-08-27 PROCEDURE — 87385 HISTOPLASMA CAPSUL AG IA: CPT | Performed by: STUDENT IN AN ORGANIZED HEALTH CARE EDUCATION/TRAINING PROGRAM

## 2021-08-27 PROCEDURE — 85610 PROTHROMBIN TIME: CPT | Performed by: STUDENT IN AN ORGANIZED HEALTH CARE EDUCATION/TRAINING PROGRAM

## 2021-08-27 PROCEDURE — 87040 BLOOD CULTURE FOR BACTERIA: CPT | Performed by: STUDENT IN AN ORGANIZED HEALTH CARE EDUCATION/TRAINING PROGRAM

## 2021-08-27 PROCEDURE — 86923 COMPATIBILITY TEST ELECTRIC: CPT | Performed by: STUDENT IN AN ORGANIZED HEALTH CARE EDUCATION/TRAINING PROGRAM

## 2021-08-27 PROCEDURE — 85045 AUTOMATED RETICULOCYTE COUNT: CPT | Performed by: STUDENT IN AN ORGANIZED HEALTH CARE EDUCATION/TRAINING PROGRAM

## 2021-08-27 PROCEDURE — 36591 DRAW BLOOD OFF VENOUS DEVICE: CPT | Performed by: STUDENT IN AN ORGANIZED HEALTH CARE EDUCATION/TRAINING PROGRAM

## 2021-08-27 PROCEDURE — 250N000011 HC RX IP 250 OP 636: Performed by: STUDENT IN AN ORGANIZED HEALTH CARE EDUCATION/TRAINING PROGRAM

## 2021-08-27 PROCEDURE — 86612 BLASTOMYCES ANTIBODY: CPT | Performed by: INTERNAL MEDICINE

## 2021-08-27 PROCEDURE — 85027 COMPLETE CBC AUTOMATED: CPT | Performed by: STUDENT IN AN ORGANIZED HEALTH CARE EDUCATION/TRAINING PROGRAM

## 2021-08-27 PROCEDURE — 120N000002 HC R&B MED SURG/OB UMMC

## 2021-08-27 PROCEDURE — 87493 C DIFF AMPLIFIED PROBE: CPT | Performed by: STUDENT IN AN ORGANIZED HEALTH CARE EDUCATION/TRAINING PROGRAM

## 2021-08-27 PROCEDURE — 36591 DRAW BLOOD OFF VENOUS DEVICE: CPT

## 2021-08-27 PROCEDURE — 86606 ASPERGILLUS ANTIBODY: CPT | Performed by: STUDENT IN AN ORGANIZED HEALTH CARE EDUCATION/TRAINING PROGRAM

## 2021-08-27 PROCEDURE — 86606 ASPERGILLUS ANTIBODY: CPT | Performed by: INTERNAL MEDICINE

## 2021-08-27 RX ORDER — NYSTATIN 100000/ML
500000 SUSPENSION, ORAL (FINAL DOSE FORM) ORAL 4 TIMES DAILY
Status: DISCONTINUED | OUTPATIENT
Start: 2021-08-27 | End: 2021-08-31 | Stop reason: HOSPADM

## 2021-08-27 RX ORDER — POTASSIUM CHLORIDE 750 MG/1
40 TABLET, EXTENDED RELEASE ORAL ONCE
Status: COMPLETED | OUTPATIENT
Start: 2021-08-27 | End: 2021-08-27

## 2021-08-27 RX ORDER — ACETAMINOPHEN 325 MG/1
650 TABLET ORAL EVERY 4 HOURS PRN
Status: DISCONTINUED | OUTPATIENT
Start: 2021-08-27 | End: 2021-08-31 | Stop reason: HOSPADM

## 2021-08-27 RX ADMIN — ACETAMINOPHEN 650 MG: 325 TABLET, FILM COATED ORAL at 20:32

## 2021-08-27 RX ADMIN — ACETAMINOPHEN 650 MG: 325 TABLET, FILM COATED ORAL at 04:16

## 2021-08-27 RX ADMIN — Medication 1 MG: at 20:36

## 2021-08-27 RX ADMIN — NYSTATIN 500000 UNITS: 500000 SUSPENSION ORAL at 20:34

## 2021-08-27 RX ADMIN — GABAPENTIN 300 MG: 300 CAPSULE ORAL at 07:48

## 2021-08-27 RX ADMIN — GABAPENTIN 300 MG: 300 CAPSULE ORAL at 14:45

## 2021-08-27 RX ADMIN — FLUTICASONE FUROATE AND VILANTEROL TRIFENATATE 1 PUFF: 100; 25 POWDER RESPIRATORY (INHALATION) at 07:49

## 2021-08-27 RX ADMIN — ACETAMINOPHEN 650 MG: 325 TABLET, FILM COATED ORAL at 12:30

## 2021-08-27 RX ADMIN — ACETAMINOPHEN 650 MG: 325 TABLET, FILM COATED ORAL at 08:01

## 2021-08-27 RX ADMIN — ACETAMINOPHEN 650 MG: 325 TABLET, FILM COATED ORAL at 16:32

## 2021-08-27 RX ADMIN — HYDROXYZINE HYDROCHLORIDE 25 MG: 25 TABLET, FILM COATED ORAL at 20:36

## 2021-08-27 RX ADMIN — Medication 50 MCG: at 07:48

## 2021-08-27 RX ADMIN — ENOXAPARIN SODIUM 40 MG: 40 INJECTION SUBCUTANEOUS at 14:45

## 2021-08-27 RX ADMIN — POTASSIUM CHLORIDE 40 MEQ: 750 TABLET, EXTENDED RELEASE ORAL at 12:30

## 2021-08-27 RX ADMIN — MONTELUKAST 10 MG: 10 TABLET, FILM COATED ORAL at 07:48

## 2021-08-27 RX ADMIN — GABAPENTIN 300 MG: 300 CAPSULE ORAL at 20:34

## 2021-08-27 RX ADMIN — CETIRIZINE HYDROCHLORIDE 10 MG: 10 TABLET, FILM COATED ORAL at 07:48

## 2021-08-27 RX ADMIN — NYSTATIN 500000 UNITS: 500000 SUSPENSION ORAL at 17:08

## 2021-08-27 RX ADMIN — MULTIPLE VITAMINS W/ MINERALS TAB 1 TABLET: TAB at 07:48

## 2021-08-27 RX ADMIN — OMEPRAZOLE 20 MG: 20 CAPSULE, DELAYED RELEASE ORAL at 07:48

## 2021-08-27 ASSESSMENT — ACTIVITIES OF DAILY LIVING (ADL)
ADLS_ACUITY_SCORE: 17

## 2021-08-27 NOTE — CONSULTS
[facility] CONSULTATION     Patient:  Richa Ashby   YOB: 1965  Date of Visit:  08/27/2021  Date of Admission: 8/25/2021  Consult Requester:Moises Herrmann DO          Assessment and Recommendations:   ID Problem List:  1. Fevers, non-neutropenic  2. Metastatic pleomorphic sarcoma on Keytruda and Gemzar    Recommendations:  1. Continue to monitor off of antibiotics.  2. Start Nystatin swish and swallow for thrush  3. Check fungal studies:   - fungal antibodies   - urine Histoplasma antigen    - serum Blastomyces antigen   - crytptococcus antigen   - BDG fungitell   - Aspergillus galactomannan  4. Check Babesia PCR  5. Check HIV screen  6. 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 Fevers have been ongoing for the past couple months. Seemed to get better following June admission and with steroids earlier in August. As she has not been persistently neutropenic and is overall clinically stable we can take step-wise approach to diagnosis. She does not have much for localizing symptoms which makes it difficult to target etiology. Basic infectious work-up has thus far been norevealing. We are still awaiting several tests including Lyme, and blood parasitology. Would also check for fungal etiologies and additional tick borne disease. Low risk for HIV, but has not been checked. Would also check CRP to trend from prior lab on 8/9. Would continue to monitor off of antibiotics for now while awaiting additional work-up.     Thank you for the consult. ID will continue to follow with you.     Michelle Pavon PA-C   Pronouns: she/her/hers  Infectious Diseases  Pager:  [phone]  08/27/2021  ________________________________________________________________    Consult Question: Patient with fevers (up to 104) and hypotension, no clear infectious etiology, UA CXR bland, BCx NG2D, Strep -, RPP-, GIPP and C diff pending, tick panel pending  Admission Diagnosis: Fever and chills [R50.9]  UTI (urinary tract infection), uncomplicated [N39.0]  Immunosuppression (H) [D84.9]  Sepsis, due to unspecified organism, unspecified whether acute organ dysfunction present (H) [A41.9]         History of Present Illness:   History obtained from review of chart and discussion with patient.     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    She was previously hospitalized 6/17-6/20 for fevers of unknown etiology. Fevers were waxing and waning for several weeks prior to admission. She also noted profound fatigue. Couldn't tolerate more than 1 outing per day (eg. salon, grocery store, etc).  Infectious work-up was completed including BC (negative), UC (negative), Enteric panel (negative), C diff (negative), COVID (negative), and quant gold (negative). She was on cefepime while inpatient and then transitioned to Augmentin for 7 days on discharge. Fevers apparently resolved while inpatient and on antibiotics. Gemcitabine fevers were in the differential although felt to be less likely given presentation and patient malaise.    She reports that following this admission she felt well for about 2 weeks. She doesn't take her temperature when she feels well and presumably did not have fevers during this time. She then started to have waxing and waning fevers again, sometimes up as high as 102 or so.  She continued to have fatigue. She was treated for a UTI 7/24 due to urinary symptoms with Macrobid for 1 week. UC grew <10K colonies urogenital kamar. She was given a short prednisone taper at the beginning of August due to joint pain which was attributed to Keytruda. Joint pain is improved and involves less joints (currently only hands, feet and ankles at present). She also was given a Z-pack starting 8/9 due to worsening cough. She reports she felt better on the steroids and was not having fevers.     She then noted fevers starting around 8/20. In retrospect she thinks fevers may have started a week before then because she wasn't feeling well for a week prior. She called the Oncology clinic on 8/23. Infectious work-up collected including respiratory panel and COVID PCR which were negative. UA had many squamous cells and 10-25 WBC; UC grew >100K urogenital kamar. Due to concern for UTI she was started on levofloxacin 750mg for 5 days. She was seen for Oncology follow-up on 8/25 and noted to be hypotensive and tachycardic in addition to ongoing fevers at home. She was given IVF and a dose of cefepime. She was advised to go the ED for further evaluation. Of note, most recent gemcitabine infusion was 8/11.    In the ED 2 sets of BC collected, so far NGTD. Repeat UA with 5 WBC and <1 RBC. CXr without acute airspace disease. Respiratory panel negative, COVID negative, Rapid Strep swab negative, C diff collected and pending. WBC remains WNL, lactic acid normal, procal on 8/25 0.30. She developed fever up to 106.6 on 8/26. Additional BC collected 8/27 as well as blood parasitology exam. Lyme disease DNA PCR, and C diff are also pending; [place] virus ordered.    Connie reports stable intermittent, non-productive cough. No SOB, but it does feel like she can't take a full deep breath. She developed some upper abdominal pain today, it feels muscular in nature to her. She had diarrhea this AM. Stools have been ranging from  hard pellets to soft and formed to diarrhea, but not consistently any one consistency. She does have some nausea. No recent emesis. Continues to have some joint pain in hands, feet and ankles along with some mild swelling. Posterior roof of mouth feels a little scratchy. Occasional headaches sometimes from coughing. Some visual blocking where portions of her vision are greyed out. No hallucinations. No significant rashes. No bleeding noted.     Connie and her  live in a [place] apartment. No pets or animals in the house. No recent significant animal exposure (reports she went to the zoo recently, but no direct contact with animals or animal feces). She is somewhat active outside of the home in the urban environment to stores, etc. No recent hiking or time at a cabin. 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          Review of Systems:   10 point review of systems was negative except for noted as above in HPI.            Past Medical History:     Past Medical History:   Diagnosis Date     Asthma, chronic      Sarcoma (H)     Right lower thigh            Past Surgical History:     Past Surgical History:   Procedure Laterality Date     AS RAD RESEC TONSIL/PILLARS       GALLBLADDER SURGERY       INSERT PORT VASCULAR ACCESS Right 10/28/2020    Procedure: single lumen power port placement @0745;  Surgeon: Tex Ackerman MD;  Location: [facility] OR     IR CHEST PORT PLACEMENT > 5 YRS OF AGE  10/28/2020     IR PORT CHECK RIGHT  3/26/2021     RESECT TUMOR LOWER EXTREMITY Right 3/12/2020    Procedure: Resection of right thigh tumor;  Surgeon: Guillermo Johnson MD;  Location: [facility] OR     ROTATOR CUFF REPAIR RT/LT  2012     SHOULDER SURGERY       SINUS SURGERY              Family History:   Reviewed and non-contributory.   Family History   Problem Relation Age of Onset     Lung Cancer Maternal Grandmother      Breast Cancer Paternal Grandmother              Social History:     Social History     Tobacco Use     Smoking status: Never Smoker     Smokeless tobacco: Never Used   Substance Use Topics     Alcohol use: Yes     Comment: 2-3 glasses of wine/week     History   Sexual Activity     Sexual activity: Not on file            Current Medications:       cetirizine  10 mg Oral Daily     enoxaparin ANTICOAGULANT  40 mg Subcutaneous Q24H     fluticasone-vilanterol  1 puff Inhalation Daily     gabapentin  300 mg Oral TID     montelukast  10 mg Oral Daily     multivitamin w/minerals  1 tablet Oral Daily     omeprazole  20 mg Oral QAM AC     sodium chloride (PF)  3 mL Intracatheter Q8H     vitamin D3  50 mcg Oral Daily            Allergies:     Allergies   Allergen Reactions     Pentobarbital      resp arrest            Physical Exam:   Vitals were reviewed  Temp:  [98.1  F (36.7  C)-106.6  F (41.4  C)] 98.1  F (36.7  C)  Pulse:  [] 96  Resp:  [16] 16  BP: ()/(36-51) 103/43  SpO2:  [92 %-100 %] 100 %    There were no vitals filed for this visit.    Constitutional: Adult female seen sitting up in bed, in NAD. Awake, alert, interactive.  HEENT: NC/AT, EOMI, sclera clear, conjunctiva normal, OP with MMM, tongue with white/tan coating, no lesions seen  Respiratory: No increased work of breathing, CTAB, no crackles or wheezing.  Cardiovascular: RRR, no murmur noted. Trace lower and upper extremity edema.  GI: Normal bowel sounds, soft, non-distended and non-tender.  Skin: Warm, dry, well-perfused. No bruising, bleeding, rashes, or lesions on limited exam.  Musculoskeletal: Extremities grossly normal, non-tender. Good strength and ROM in bed.   Neurologic: A&O. Answers questions appropriately, speech normal. Moves all extremities spontaneously.  Neuropsychiatric: Calm. Affect appropriate to situation.           Laboratory Data:     Microbiology:  Culture   Date Value Ref Range Status   08/25/2021 No growth after 1 day  Preliminary   08/25/2021 No growth after 1 day   Preliminary   08/23/2021 >100,000 CFU/mL Mixture of urogenital kamar  Final   08/09/2021 No Growth  Final   08/09/2021 No Growth  Final   07/24/2021 <10,000 CFU/mL Mixture of urogenital kamar  Final     Culture Micro   Date Value Ref Range Status   06/17/2021 No growth  Final   06/17/2021 No growth  Final   06/17/2021 No growth  Final   11/27/2020 >100,000 colonies/mL  Klebsiella pneumoniae   (A)  Final       Inflammatory Markers    Recent Labs   Lab Test 08/09/21  1124   .0*       Metabolic Studies       Recent Labs   Lab Test 08/27/21  0011 08/25/21  2326 08/25/21  1459 08/25/21  0810 08/23/21  1121 08/11/21  1251 08/09/21  1124 08/04/21  1350 07/28/21  1154 07/28/21  1154 06/23/21  0838 06/20/21  0231 06/19/21  0445   NA  --   --   --  138 137 141 137 141  --  140   < > 138 140   POTASSIUM  --   --   --  3.4 3.7 3.0* 3.7 3.7  --  3.8   < > 3.6 3.9   CHLORIDE  --   --   --  104 106 107 104 108  --  106   < > 105 109   CO2  --   --   --  25 27 25 26 27  --  28   < > 28 26   ANIONGAP  --   --   --  9 4 9 7 6  --  6   < > 5 5   BUN  --   --   --  10 9 10 9 14  --  13   < > 8 7   CR  --   --  0.74 1.01 0.77 0.66 0.79 0.72  --  0.70   < > 0.60 0.64   GFRESTIMATED  --   --  >90 62 87 >90 84 >90  --  >90   < > >90 >90   GLC  --   --   --  121* 138* 179* 107* 100*  --  91   < > 92 89   VIKTORIA  --   --   --  9.2 9.0 9.2 9.5 9.1  --  9.2   < > 8.5 8.3*   PHOS  --   --   --   --   --   --   --   --   --   --   --  3.1 3.0   MAG  --   --   --   --   --   --   --   --   --   --   --  2.0 2.1   LACT 0.9 0.8  --   --   --   --   --   --    < >  --   --   --   --     < > = values in this interval not displayed.       Hepatic Studies    Recent Labs   Lab Test 08/25/21  0810 08/23/21  1121 08/11/21  1251 08/09/21  1124 08/04/21  1350 07/28/21  1154 05/26/21  1038 05/10/21  0739 04/13/21  1119   BILITOTAL 0.9 0.8 0.4 1.1 0.6 0.8   < > 0.6 0.7   ALKPHOS 245* 249* 199* 238* 143 144   < > 98 119   ALBUMIN 2.2* 2.6* 2.7* 2.8* 3.0*  3.4   < > 2.8* 3.1*   AST 8 11 13 20 13 17   < > 7 17   ALT 19 32 26 21 26 28   < > 13 27   LDH  --   --   --   --   --   --   --  143 183    < > = values in this interval not displayed.       Hematology Studies      Recent Labs   Lab Test 08/26/21  0722 08/25/21  0808 08/23/21  1147 08/11/21  1251 08/09/21  1124 08/04/21  1350 07/28/21  1154 07/18/21  0904 07/14/21  1013 07/07/21  1127   WBC 9.6 10.6 10.1 6.1 8.7 4.8 4.1 3.3* 1.7* 4.3   ANEU  --   --  7.6  --   --  3.4 2.6 1.9 0.6* 2.8   ALYM  --   --  1.5  --   --  0.8 0.7* 0.8 0.8 0.8   ROGELIO  --   --  0.9  --   --  0.5 0.7 0.4 0.1 0.4   AEOS  --   --  0.1  --   --  0.1 0.2 0.1 0.1 0.2   HGB 7.4* 7.8* 8.2* 7.9* 8.2* 7.5* 8.6* 8.6* 9.0* 9.2*   HCT 24.2* 25.4* 26.2* 25.2* 27.3* 23.5* 26.9* 27.4* 28.3* 29.3*    186 131* 246 188 133* 96* 82* 126* 148*       Urine Studies     Recent Labs   Lab Test 08/25/21  0935 08/23/21  1147 07/24/21  1146 06/17/21  1817 11/27/20  1030   URINEPH 6.0 6.5 7.0 5.5 6.0   NITRITE Negative Negative Negative Negative Negative   LEUKEST Negative Large* Large* Negative Small*   WBCU 5 10-25* 25-50* 1 10-25*       Last check of C difficile  C Diff Toxin B PCR   Date Value Ref Range Status   06/17/2021 Negative NEG^Negative Final     Comment:     Negative: C. difficile target DNA sequences NOT detected, presumed negative   for C.difficile toxin B or the number of bacteria present may be below the   limit of detection for the test.  FDA approved assay performed using [employer] Gene[employer] real-time PCR.  A negative result does not exclude actual disease due to C. difficile and may   be due to improper collection, handling and storage of the specimen or the   number of organisms in the specimen is below the detection limit of the assay.                Imaging:   CXR 8/25  Impression: No acute airspace disease. Stable bilateral lung masses.

## 2021-08-27 NOTE — PLAN OF CARE
Status: Pt admitted 8/25 for suspected UTI, UA negative, continues to have fevers.  Vitals: Tmax 104.2, Low DBP at 36. Sepsis triggered overnight. Lactic acid back at 0.9, normal. Temperature holding around 102-103 with relief from ice packs and PRN tylenol q 4hr.  Neuros: Intact  IV: Port is SL  Labs/Electrolytes: WDL  Resp/trach: Lung sounds are clear  Diet: Regular  Bowel status: BS+, BM this am  : Voiding spontaneously.  Skin: Intact  Pain: Denied  Activity: Independent  Social: Pt has been sleeping in between cares.  Plan: Will continue to monitor and follow POC, emelina MONTERROSO if sputum is collected.

## 2021-08-27 NOTE — PROGRESS NOTES
[facility]    Progress Note - Mar[facility] 4 Service        Date of Admission:  8/25/2021    Assessment & Plan             Richa Ashby is a 56 year old female admitted on 8/25/2021. She has a history of asthma, GERD, and pleomorphic sarcoma and is admitted for fevers and hypotension.     #. Fevers  #. Hypotension  Patient with ~ 1 week of fevers and hypotension. Denies other infectious symptoms. Work-up with unremarkable UA and CXR. No evident source on examination. WBC at 10.6 which is elevated for patient (BL ~4). LA not elevated. Procal not elevated. Had been started on Levaquin 3 days ago for suspected UTI, but Cx unremarkable. Concern symptoms may be related to viral illness vs chemotherapy effect. Patient received dose of cefepime at oncology clinic day of admission. Hypotension fluid responsive. Of note patient had similar admission in June. RPP and strep -.  -Will monitor patient off Abx, further evaluation and treatment per symptoms.  -Follow-up 8/25 BCx, NGTD, will obtain 2nd set 8/27  -Given loose stools and Ab pain, will obtain GIPP and C diff  -Will obtain WNV, Lyme, anaplasma, ehrlichia, and babesia  -ID consulted  -Will repeat procal to see if rising  -Tylenol for fevers PRN    #. Acute on Chronic Anemia  Patient with anemia requiring intermittent transfusions since chemotherapy induction. 8/27 AM Hb down to 5.7 from 7.4, repeated and found to be 7.1.  -Hemolysis labs ordered  -Type and screen ordered  -Will CTM, transfuse for Hb<7     #. Asthma  -Continue PTA cetrizine, Singulair, Advair, and PRN albuterol      #. GERD  -Continue PTA PPI     #. Neuropathic Pain  -Continue PTA gabapentin     Diet: Combination Diet Regular Diet Adult    DVT Prophylaxis: Enoxaparin (Lovenox) SQ  Ness Catheter: Not present  Fluids: PO, bolus PRN  Central Lines: None  Code Status: Full Code          Disposition Plan     Expected discharge: 1-2 days recommended to prior  "living arrangement once SIRS/Sepsis treated.        The patient's care was discussed with the Attending Physician, Dr. Herrmann.     Diaz Guardado MD  [address]  Securely message with the Vocera Web Console (learn more here)  Text page via [facility] Paging/Directory  Please see sign in/sign out for up to date coverage information      ______________________________________________________________________    Interval History   Patient with continued fevers up to 104 overnight. She still feels worn out this AM, like she just was \"in the dryer.\" She also reports she began getting loose stools overnight. She reports she does not have abdominal discomfort this AM.    4 point ROS otherwise negative    Data reviewed today: I reviewed all medications, new labs and imaging results over the last 24 hours.    Physical Exam   Vital Signs: Temp: 98.8  F (37.1  C) Temp src: Oral BP: 102/53 Pulse: 95   Resp: 16 SpO2: 94 % O2 Device: None (Room air)    Weight: 0 lbs 0 oz  General Appearance: NT appearing, in NAD  Eyes: EOMI, visual fields intact  HEENT: MMM, no oropharyngeal erythema  Respiratory: CTAB  Cardiovascular: RRR  GI: NBS, S, ND,RLQ TTP  Genitourinary: No suprapubic or CVA TTP  Skin: No notable lesions  Musculoskeletal: No edema  Neurologic: No nuchal rigidity, strength and sensation intact, normal gait    Data   Recent Labs   Lab 08/27/21  1121 08/27/21  1006 08/26/21  0722 08/25/21  1459 08/25/21  0810 08/23/21  1121   WBC 11.4* 10.0 9.6  --   --   --    HGB 7.1* 5.7* 7.4*  --   --   --    MCV 94 96 94  --   --   --     207 208  --   --   --    INR 1.29*  --   --   --   --   --    NA  --   --   --   --  138 137   POTASSIUM  --   --   --   --  3.4 3.7   CHLORIDE  --   --   --   --  104 106   CO2  --   --   --   --  25 27   BUN  --   --   --   --  10 9   CR  --   --   --  0.74 1.01 0.77   ANIONGAP  --   --   --   --  9 4   VIKTORIA  --   --   --   --  9.2 9.0 "   GLC  --   --   --   --  121* 138*   ALBUMIN  --   --   --   --  2.2* 2.6*   PROTTOTAL  --   --   --   --  7.1 7.3   BILITOTAL  --   --   --   --  0.9 0.8   ALKPHOS  --   --   --   --  245* 249*   ALT  --   --   --   --  19 32   AST  --   --   --   --  8 11     No results found for this or any previous visit (from the past 24 hour(s)).  Medications       cetirizine  10 mg Oral Daily     enoxaparin ANTICOAGULANT  40 mg Subcutaneous Q24H     fluticasone-vilanterol  1 puff Inhalation Daily     gabapentin  300 mg Oral TID     montelukast  10 mg Oral Daily     multivitamin w/minerals  1 tablet Oral Daily     omeprazole  20 mg Oral QAM AC     sodium chloride (PF)  3 mL Intracatheter Q8H     vitamin D3  50 mcg Oral Daily

## 2021-08-27 NOTE — PLAN OF CARE
Status: Pt admitted for undiagnosed fevers, originally suspected UTI however UA negative  Vitals: Tmax 100, tachycardic, vitals otherwise stable  Neuros: Intact  IV: Port is SL  Labs/Electrolytes: WDL  Resp/trach: Lung sounds are clear  Diet: Reg, tolerating  Bowel status: Bowel sounds are active, multiple watery BMs throughout the day  : Voiding  Skin: Intact  Pain: Denied  Activity: Independent  Social: Pt has been pleasant and calm,  Rafi visited and is supportive in her cares  Plan: Will continue to monitor and follow POC, continue to test for cause of fevers  Updates this shift: Stool sample collected

## 2021-08-27 NOTE — PROGRESS NOTES
"CLINICAL NUTRITION SERVICES - ASSESSMENT NOTE     Nutrition Prescription    RECOMMENDATIONS FOR MDs/PROVIDERS TO ORDER:  Encourage PO intake    Malnutrition Status:    Severe malnutrition in the context of chronic illness    Recommendations already ordered by Registered Dietitian (RD):  Snacks/Supplements: Send Special K Bar at HS snack. Allow pt to order Ensure Shakes, other supplements, and snacks with/between meals (like cheese and crackers)    Future/Additional Recommendations:  Monitor adequacy of PO intake. If documentation indicates that pt is consuming <50% nutritionally adequate meals TID, recommend:    Provide additional nutrition education on strategies to increase PO intake    Adjust supplement schedule per pt preference    Calorie Counts     REASON FOR ASSESSMENT  Richa Ashby is a/an 56 year old female assessed by the dietitian for Admission Nutrition Risk Screen for positive- weight loss and decreased appetite     Clinical History: asthma, GERD, and pleomorphic sarcoma and is admitted for fevers and hypotension    NUTRITION HISTORY  Connie reports that her appetite has been poor due to a low appetite. She does better eating small frequent meals and has been eating mostly shakes/smoothies at home and simple meals. Pt reports that she doesn't like plain Ensure but likes it mixed in smoothies.     CURRENT NUTRITION ORDERS  Diet: Regular  Intake/Tolerance: Pt has been ordering small meals. Her  brought in [employer] for her today.     LABS  Labs reviewed  K+ 3.1 (L)  Alk Phos 226 (H)    MEDICATIONS  Medications reviewed  Thera-vit-m  Vit D3    ANTHROPOMETRICS  Height: 5' 7\"  Most Recent Weight: 90.8 kg on 8/25  IBW: 61.4 kg (148%)   BMI: Obesity Grade I BMI 30-34.9  Weight History: Weight loss of 7.1 kg (7.3%) over the past 3 months. Weight loss of 14.4 kg (14%) over the past 6 months.   Wt Readings from Last 25 Encounters:   08/25/21 90.8 kg (200 lb 1.6 oz)   08/11/21 93.4 kg (206 lb) "   08/09/21 91.8 kg (202 lb 4.8 oz)   08/04/21 93.1 kg (205 lb 4.8 oz)   07/28/21 93.2 kg (205 lb 8 oz)   07/24/21 92.5 kg (204 lb)   07/18/21 93.9 kg (207 lb 1.6 oz)   07/14/21 93.7 kg (206 lb 8 oz)   07/07/21 92.9 kg (204 lb 11.2 oz)   06/30/21 92.5 kg (204 lb)   06/23/21 95.1 kg (209 lb 11.2 oz)   06/20/21 95.2 kg (209 lb 12.8 oz)   06/17/21 96.2 kg (212 lb 1.6 oz)   06/15/21 94.7 kg (208 lb 12.8 oz)   06/02/21 98.4 kg (216 lb 14.4 oz)   05/26/21 97.9 kg (215 lb 12.8 oz)   05/10/21 99.7 kg (219 lb 12.8 oz)   04/13/21 101.6 kg (223 lb 14.4 oz)   03/11/21 102.5 kg (225 lb 14.4 oz)   02/15/21 105.2 kg (232 lb)   01/27/21 105.5 kg (232 lb 8 oz)   01/15/21 105.3 kg (232 lb 3.2 oz)   12/18/20 106.5 kg (234 lb 14.4 oz)   11/13/20 113.4 kg (250 lb 1.6 oz)   11/04/20 108.9 kg (240 lb)     Dosing Weight: 69 kg (adjusted based on most recent weight of 90.8 kg and IBW)     ASSESSED NUTRITION NEEDS  Estimated Energy Needs: 2738-6309 kcals/day (25 - 30 kcals/kg)  Justification: Maintenance  Estimated Protein Needs:  grams protein/day (1.2 - 1.5 grams of pro/kg)  Justification: Hypercatabolism with acute illness  Estimated Fluid Needs: 1 mL/kcal  Justification: Maintenance    PHYSICAL FINDINGS  See malnutrition section below.    MALNUTRITION  % Intake: </=75% for >/= 1 month (severe)  % Weight Loss: > 10% in 6 months (severe)  Subcutaneous Fat Loss: None observed  Muscle Loss: None observed  Fluid Accumulation/Edema: None noted  Malnutrition Diagnosis: Severe malnutrition in the context of chronic illness    NUTRITION DIAGNOSIS  Unintended weight loss related to decreased appetite as evidenced by pt report and 14% weight loss over the past 6 months.       INTERVENTIONS  Implementation  Nutrition Education: Discussed current PO intake/appetite and role of RD. Encouraged small frequent meals and supplements/snacks. Discussed nutrition supplement and snack options.    Medical food supplement therapy: See above      Goals  Patient to consume % of nutritionally adequate meal trays TID, or the equivalent with supplements/snacks.     Monitoring/Evaluation  Progress toward goals will be monitored and evaluated per protocol.    Krystal Whitehead RD, LD  6A/7D RD pager [phone]

## 2021-08-27 NOTE — PROGRESS NOTES
Care Management Follow Up    Length of Stay (days): 2    Expected Discharge Date: 08/30/2021? Pending medical stability.      Concerns to be Addressed: discharge planning     Patient plan of care discussed at interdisciplinary rounds: Yes    Anticipated Discharge Disposition: Home     Additional Information:  In 6A Discharge Rounds nursing reported pt is up independently; having fevers; on Enteric precautions while being ruled out.   In Medicine Rounds it was reported pt having high fevers; doing an infectious work-up; consulting Infectious Disease.         Mariela Jasso RN Care Coordinator  Unit 6A, [facility]

## 2021-08-28 LAB
A PHAG+EHRL SP DNA PNL BLD NAA+NON-PROBE: NEGATIVE
A PHAGOCYTOPH DNA BLD QL NAA+PROBE: NEGATIVE
ANION GAP SERPL CALCULATED.3IONS-SCNC: 5 MMOL/L (ref 3–14)
BABESIA SPEC: NEGATIVE
BLD PROD TYP BPU: NORMAL
BLOOD COMPONENT TYPE: NORMAL
BUN SERPL-MCNC: 10 MG/DL (ref 7–30)
CALCIUM SERPL-MCNC: 8.4 MG/DL (ref 8.5–10.1)
CHLORIDE BLD-SCNC: 106 MMOL/L (ref 94–109)
CO2 SERPL-SCNC: 27 MMOL/L (ref 20–32)
CODING SYSTEM: NORMAL
CREAT SERPL-MCNC: 0.69 MG/DL (ref 0.52–1.04)
CROSSMATCH: NORMAL
ERYTHROCYTE [DISTWIDTH] IN BLOOD BY AUTOMATED COUNT: 17.4 % (ref 10–15)
GFR SERPL CREATININE-BSD FRML MDRD: >90 ML/MIN/1.73M2
GLUCOSE BLD-MCNC: 101 MG/DL (ref 70–99)
HCT VFR BLD AUTO: 20.5 % (ref 35–47)
HGB BLD-MCNC: 6.4 G/DL (ref 11.7–15.7)
HGB BLD-MCNC: 7.3 G/DL (ref 11.7–15.7)
HIV 1+2 AB+HIV1 P24 AG SERPL QL IA: NONREACTIVE
ISSUE DATE AND TIME: NORMAL
MCH RBC QN AUTO: 29.4 PG (ref 26.5–33)
MCHC RBC AUTO-ENTMCNC: 31.2 G/DL (ref 31.5–36.5)
MCV RBC AUTO: 94 FL (ref 78–100)
PLATELET # BLD AUTO: 206 10E3/UL (ref 150–450)
POTASSIUM BLD-SCNC: 3.3 MMOL/L (ref 3.4–5.3)
POTASSIUM BLD-SCNC: 3.7 MMOL/L (ref 3.4–5.3)
RBC # BLD AUTO: 2.18 10E6/UL (ref 3.8–5.2)
SODIUM SERPL-SCNC: 138 MMOL/L (ref 133–144)
UNIT ABO/RH: NORMAL
UNIT NUMBER: NORMAL
UNIT STATUS: NORMAL
UNIT TYPE ISBT: 5100
WBC # BLD AUTO: 8.6 10E3/UL (ref 4–11)

## 2021-08-28 PROCEDURE — 250N000013 HC RX MED GY IP 250 OP 250 PS 637

## 2021-08-28 PROCEDURE — 250N000011 HC RX IP 250 OP 636: Performed by: STUDENT IN AN ORGANIZED HEALTH CARE EDUCATION/TRAINING PROGRAM

## 2021-08-28 PROCEDURE — 36591 DRAW BLOOD OFF VENOUS DEVICE: CPT | Performed by: STUDENT IN AN ORGANIZED HEALTH CARE EDUCATION/TRAINING PROGRAM

## 2021-08-28 PROCEDURE — 258N000003 HC RX IP 258 OP 636

## 2021-08-28 PROCEDURE — 85027 COMPLETE CBC AUTOMATED: CPT | Performed by: STUDENT IN AN ORGANIZED HEALTH CARE EDUCATION/TRAINING PROGRAM

## 2021-08-28 PROCEDURE — 84132 ASSAY OF SERUM POTASSIUM: CPT | Performed by: STUDENT IN AN ORGANIZED HEALTH CARE EDUCATION/TRAINING PROGRAM

## 2021-08-28 PROCEDURE — 99233 SBSQ HOSP IP/OBS HIGH 50: CPT | Performed by: STUDENT IN AN ORGANIZED HEALTH CARE EDUCATION/TRAINING PROGRAM

## 2021-08-28 PROCEDURE — 120N000002 HC R&B MED SURG/OB UMMC

## 2021-08-28 PROCEDURE — 250N000013 HC RX MED GY IP 250 OP 250 PS 637: Performed by: STUDENT IN AN ORGANIZED HEALTH CARE EDUCATION/TRAINING PROGRAM

## 2021-08-28 PROCEDURE — 80048 BASIC METABOLIC PNL TOTAL CA: CPT | Performed by: STUDENT IN AN ORGANIZED HEALTH CARE EDUCATION/TRAINING PROGRAM

## 2021-08-28 PROCEDURE — P9016 RBC LEUKOCYTES REDUCED: HCPCS | Performed by: STUDENT IN AN ORGANIZED HEALTH CARE EDUCATION/TRAINING PROGRAM

## 2021-08-28 PROCEDURE — 86923 COMPATIBILITY TEST ELECTRIC: CPT | Performed by: STUDENT IN AN ORGANIZED HEALTH CARE EDUCATION/TRAINING PROGRAM

## 2021-08-28 PROCEDURE — 87385 HISTOPLASMA CAPSUL AG IA: CPT | Performed by: STUDENT IN AN ORGANIZED HEALTH CARE EDUCATION/TRAINING PROGRAM

## 2021-08-28 PROCEDURE — 85018 HEMOGLOBIN: CPT | Performed by: STUDENT IN AN ORGANIZED HEALTH CARE EDUCATION/TRAINING PROGRAM

## 2021-08-28 RX ORDER — POTASSIUM CHLORIDE 750 MG/1
40 TABLET, EXTENDED RELEASE ORAL ONCE
Status: COMPLETED | OUTPATIENT
Start: 2021-08-28 | End: 2021-08-28

## 2021-08-28 RX ORDER — FLUORIDE TOOTHPASTE
15 TOOTHPASTE DENTAL 4 TIMES DAILY PRN
Status: DISCONTINUED | OUTPATIENT
Start: 2021-08-28 | End: 2021-08-31 | Stop reason: HOSPADM

## 2021-08-28 RX ORDER — GABAPENTIN 300 MG/1
900 CAPSULE ORAL AT BEDTIME
Status: DISCONTINUED | OUTPATIENT
Start: 2021-08-28 | End: 2021-08-31 | Stop reason: HOSPADM

## 2021-08-28 RX ORDER — SODIUM CHLORIDE 9 MG/ML
INJECTION, SOLUTION INTRAVENOUS
Status: COMPLETED
Start: 2021-08-28 | End: 2021-08-28

## 2021-08-28 RX ORDER — HEPARIN SODIUM (PORCINE) LOCK FLUSH IV SOLN 100 UNIT/ML 100 UNIT/ML
5-10 SOLUTION INTRAVENOUS
Status: DISCONTINUED | OUTPATIENT
Start: 2021-08-28 | End: 2021-08-31 | Stop reason: HOSPADM

## 2021-08-28 RX ORDER — HEPARIN SODIUM,PORCINE 10 UNIT/ML
5-10 VIAL (ML) INTRAVENOUS EVERY 24 HOURS
Status: DISCONTINUED | OUTPATIENT
Start: 2021-08-28 | End: 2021-08-31 | Stop reason: HOSPADM

## 2021-08-28 RX ORDER — ALBUTEROL SULFATE 90 UG/1
1-2 AEROSOL, METERED RESPIRATORY (INHALATION) EVERY 4 HOURS PRN
Status: DISCONTINUED | OUTPATIENT
Start: 2021-08-28 | End: 2021-08-31 | Stop reason: HOSPADM

## 2021-08-28 RX ORDER — LIDOCAINE HYDROCHLORIDE 20 MG/ML
1 JELLY TOPICAL 3 TIMES DAILY PRN
Status: DISCONTINUED | OUTPATIENT
Start: 2021-08-28 | End: 2021-08-31 | Stop reason: HOSPADM

## 2021-08-28 RX ORDER — GABAPENTIN 300 MG/1
600 CAPSULE ORAL 2 TIMES DAILY
Status: DISCONTINUED | OUTPATIENT
Start: 2021-08-28 | End: 2021-08-31 | Stop reason: HOSPADM

## 2021-08-28 RX ORDER — HEPARIN SODIUM,PORCINE 10 UNIT/ML
5-10 VIAL (ML) INTRAVENOUS
Status: DISCONTINUED | OUTPATIENT
Start: 2021-08-28 | End: 2021-08-31 | Stop reason: HOSPADM

## 2021-08-28 RX ADMIN — ALBUTEROL SULFATE 2 PUFF: 90 AEROSOL, METERED RESPIRATORY (INHALATION) at 17:40

## 2021-08-28 RX ADMIN — SODIUM CHLORIDE: 9 INJECTION, SOLUTION INTRAVENOUS at 10:00

## 2021-08-28 RX ADMIN — OMEPRAZOLE 20 MG: 20 CAPSULE, DELAYED RELEASE ORAL at 07:24

## 2021-08-28 RX ADMIN — DICLOFENAC SODIUM 4 G: 10 GEL TOPICAL at 21:18

## 2021-08-28 RX ADMIN — FLUTICASONE FUROATE AND VILANTEROL TRIFENATATE 1 PUFF: 100; 25 POWDER RESPIRATORY (INHALATION) at 07:24

## 2021-08-28 RX ADMIN — Medication 1 MG: at 21:45

## 2021-08-28 RX ADMIN — NYSTATIN 500000 UNITS: 500000 SUSPENSION ORAL at 16:47

## 2021-08-28 RX ADMIN — ALBUTEROL SULFATE 2 PUFF: 90 AEROSOL, METERED RESPIRATORY (INHALATION) at 04:37

## 2021-08-28 RX ADMIN — GUAIFENESIN AND CODEINE PHOSPHATE 5 ML: 10; 100 LIQUID ORAL at 19:36

## 2021-08-28 RX ADMIN — ACETAMINOPHEN 650 MG: 325 TABLET, FILM COATED ORAL at 12:42

## 2021-08-28 RX ADMIN — ACETAMINOPHEN 650 MG: 325 TABLET, FILM COATED ORAL at 16:47

## 2021-08-28 RX ADMIN — POTASSIUM CHLORIDE 40 MEQ: 750 TABLET, EXTENDED RELEASE ORAL at 00:25

## 2021-08-28 RX ADMIN — ACETAMINOPHEN 650 MG: 325 TABLET, FILM COATED ORAL at 04:38

## 2021-08-28 RX ADMIN — GABAPENTIN 900 MG: 300 CAPSULE ORAL at 21:11

## 2021-08-28 RX ADMIN — MULTIPLE VITAMINS W/ MINERALS TAB 1 TABLET: TAB at 07:25

## 2021-08-28 RX ADMIN — ALBUTEROL SULFATE 2 PUFF: 90 INHALANT RESPIRATORY (INHALATION) at 21:45

## 2021-08-28 RX ADMIN — GABAPENTIN 600 MG: 300 CAPSULE ORAL at 14:01

## 2021-08-28 RX ADMIN — NYSTATIN 500000 UNITS: 500000 SUSPENSION ORAL at 12:16

## 2021-08-28 RX ADMIN — MONTELUKAST 10 MG: 10 TABLET, FILM COATED ORAL at 07:24

## 2021-08-28 RX ADMIN — ACETAMINOPHEN 650 MG: 325 TABLET, FILM COATED ORAL at 08:49

## 2021-08-28 RX ADMIN — GUAIFENESIN AND CODEINE PHOSPHATE 5 ML: 10; 100 LIQUID ORAL at 00:43

## 2021-08-28 RX ADMIN — GABAPENTIN 300 MG: 300 CAPSULE ORAL at 07:24

## 2021-08-28 RX ADMIN — HYDROXYZINE HYDROCHLORIDE 25 MG: 25 TABLET, FILM COATED ORAL at 21:45

## 2021-08-28 RX ADMIN — CETIRIZINE HYDROCHLORIDE 10 MG: 10 TABLET, FILM COATED ORAL at 07:24

## 2021-08-28 RX ADMIN — ACETAMINOPHEN 650 MG: 325 TABLET, FILM COATED ORAL at 00:25

## 2021-08-28 RX ADMIN — DICLOFENAC SODIUM 4 G: 10 GEL TOPICAL at 12:10

## 2021-08-28 RX ADMIN — BENZOCAINE AND MENTHOL 1 LOZENGE: 15; 3.6 LOZENGE ORAL at 21:18

## 2021-08-28 RX ADMIN — NYSTATIN 500000 UNITS: 500000 SUSPENSION ORAL at 07:25

## 2021-08-28 RX ADMIN — ACETAMINOPHEN 650 MG: 325 TABLET, FILM COATED ORAL at 21:11

## 2021-08-28 RX ADMIN — Medication 50 MCG: at 07:24

## 2021-08-28 RX ADMIN — POTASSIUM CHLORIDE 40 MEQ: 750 TABLET, EXTENDED RELEASE ORAL at 16:47

## 2021-08-28 RX ADMIN — SODIUM CHLORIDE, PRESERVATIVE FREE 5 ML: 5 INJECTION INTRAVENOUS at 13:56

## 2021-08-28 RX ADMIN — NYSTATIN 500000 UNITS: 500000 SUSPENSION ORAL at 21:11

## 2021-08-28 RX ADMIN — ENOXAPARIN SODIUM 40 MG: 40 INJECTION SUBCUTANEOUS at 14:01

## 2021-08-28 ASSESSMENT — ACTIVITIES OF DAILY LIVING (ADL)
ADLS_ACUITY_SCORE: 17

## 2021-08-28 NOTE — PROGRESS NOTES
[facility]    Progress Note - Maroon 4 Service        Date of Admission:  8/25/2021    Assessment & Plan             Richa Ashby is a 56 year old female admitted on 8/25/2021. She has a history of asthma, GERD, and pleomorphic sarcoma and is admitted for fevers and hypotension.     #. Fevers  #. Hypotension  Patient with ~ 1 week of fevers and hypotension. Denies other infectious symptoms. Work-up with unremarkable UA and CXR. No evident source on examination. WBC at 10.6 which is elevated for patient (BL ~4). LA not elevated. Procal not elevated. Had been started on Levaquin 3 days ago for suspected UTI, but Cx unremarkable. Concern symptoms may be related to viral illness vs chemotherapy effect. Patient received dose of cefepime at oncology clinic day of admission. Hypotension fluid responsive. Of note patient had similar admission in June. RVP, strep, lyme, anaplasmosis/erlichia, babesia, cryptococcal antigen, c. Diff all negative so far.  -ID consulted  -Will monitor patient off Abx, further evaluation and treatment per symptoms.  -Follow-up 8/25, 8/27 BCx, NGTD,  - Pending west nile, fungal antibodies, blasto ag, urine histo, galactomannan, BD glucan  -Tylenol for fevers PRN  - Continue to hold off on abx     #. Acute on Chronic Anemia  Patient with anemia requiring intermittent transfusions since chemotherapy induction. 8/27 AM Hb down to 5.7 from 7.4, repeated and found to be 7.1 but again low on 8/28 needing transfusion. Hemolysis labs are negative. After chart review suspect her anemia is most likely due to chemo as her hgb appears to be quite sensitive to it in past.  -Will CTM, transfuse for Hb<7     #. Asthma  -Continue PTA cetrizine, Singulair, Advair, and PRN albuterol      #. GERD  -Continue PTA PPI     #. Neuropathic Pain  Pain has been getting worse during hospital stay so adding adjunctive therapy and increasing gabapentin  - Increased  gabapentin to 600, 600, 900 as she was on prior  - Voltaren Gel and lidocaine gel PRN for pain      Diet: Combination Diet Regular Diet Adult    DVT Prophylaxis: Enoxaparin (Lovenox) SQ  Ness Catheter: Not present  Fluids: PO, bolus PRN  Central Lines: None  Code Status: Full Code          Disposition Plan     Expected discharge: 1-2 days recommended to prior living arrangement once SIRS/Sepsis treated.        The patient's care was discussed with the Attending Physician, Dr. Herrmann.     DO Opal Reina[address]  Securely message with the Vocera Web Console (learn more here)  Text page via [employer] Paging/Directory  Please see sign in/sign out for up to date coverage information      ______________________________________________________________________    Interval History     No fevers overnight but spiked one this morning, hgb low so ordred blood which was transfused and patient had fever which resolved without tylenol so restarted. She does endorse feeling better overall but the neuropathic pain in her feet has increased and is bothering her.    4 point ROS otherwise negative    Data reviewed today: I reviewed all medications, new labs and imaging results over the last 24 hours.    Physical Exam   Vital Signs: Temp: 100.3  F (37.9  C) Temp src: Oral BP: 109/52 Pulse: 88   Resp: 18 SpO2: 97 % O2 Device: None (Room air)    Weight: 0 lbs 0 oz  General Appearance: NT appearing, in NAD  Eyes: EOMI, visual fields intact  HEENT: MMM, no oropharyngeal erythema  Respiratory: CTAB  Cardiovascular: RRR  GI: NBS, S, ND,RLQ TTP  Genitourinary: No suprapubic or CVA TTP  Skin: No notable lesions  Musculoskeletal: No edema  Neurologic: No nuchal rigidity, strength and sensation intact, normal gait    Data   Recent Labs   Lab 08/28/21  0800 08/27/21  2213 08/27/21  1121 08/27/21  1006 08/25/21  1459 08/25/21  0810 08/25/21  0810   WBC 8.6  --  11.4* 10.0  --    < >   --    HGB 6.4*  --  7.1* 5.7*  --    < >  --    MCV 94  --  94 96  --    < >  --      --  238 207  --    < >  --    INR  --   --  1.29*  --   --   --   --      --  138  --   --   --  138   POTASSIUM 3.3* 3.3* 3.1*  --   --   --  3.4   CHLORIDE 106  --  106  --   --   --  104   CO2 27  --  27  --   --   --  25   BUN 10  --  8  --   --   --  10   CR 0.69  --  0.68  --  0.74  --  1.01   ANIONGAP 5  --  5  --   --   --  9   VIKTORIA 8.4*  --  8.6  --   --   --  9.2   *  --  104*  --   --   --  121*   ALBUMIN  --   --  1.8*  --   --   --  2.2*   PROTTOTAL  --   --  6.7*  --   --   --  7.1   BILITOTAL  --   --  0.6 0.6  --   --  0.9   ALKPHOS  --   --  226*  --   --   --  245*   ALT  --   --  15  --   --   --  19   AST  --   --  11  --   --   --  8    < > = values in this interval not displayed.     No results found for this or any previous visit (from the past 24 hour(s)).  Medications       sodium chloride         cetirizine  10 mg Oral Daily     enoxaparin ANTICOAGULANT  40 mg Subcutaneous Q24H     fluticasone-vilanterol  1 puff Inhalation Daily     gabapentin  600 mg Oral BID     gabapentin  900 mg Oral At Bedtime     heparin  5-10 mL Intracatheter Q28 Days     heparin lock flush  5-10 mL Intracatheter Q24H     montelukast  10 mg Oral Daily     multivitamin w/minerals  1 tablet Oral Daily     nystatin  500,000 Units Swish & Swallow 4x Daily     omeprazole  20 mg Oral QAM AC     sodium chloride (PF)  10-20 mL Intracatheter Q28 Days     sodium chloride (PF)  3 mL Intracatheter Q8H     vitamin D3  50 mcg Oral Daily

## 2021-08-28 NOTE — PLAN OF CARE
Status: Pt admitted with fever of unknown origin and hypotension. Got unit of blood, this shift for Hgb of 6.4.  Vitals: T max 100.7 after start of blood infusion. MD notified and Blood transfusion was continued at a slower rate with frequent Vital sign checks and she was stable with soft BP's, has been running as low as 94/44.Has been getting tylenol Q 4 hours.  IV: Port is Heplocked.   Resp/trach: Congested cough. No cough meds needed this shift.  Diet: PO'ing regular diet well.   Bowel status: Several loose stools this shift, but Cdiff negative.   : Voiding spontaneously without difficulty.   Skin: Intact.   Pain: This AM c/o severe pain in calves, ankles, feet, back and HA. Taken care of with Neurontin, Ice packs to legs, hot packs to back and Mattress was changed. Also, coffee for HA. MD increased Neurontin and continued withTylenol Q 4 hours with good relief after all that.   Activity: Up ad andrews in her room.   Plan: Continue with cares as ordered.

## 2021-08-28 NOTE — PLAN OF CARE
Status: Pt admitted for undiagnosed fevers, PMHx. Metastatic Pleomorphic sarcoma  Vitals: Max temp at start of shift of 100.0, came down with tylenol. VSS otherwise.   Neuros: Intact.   IV: Port is accessed SLd at this time. Declined KWAME wipes this shift, reports maybe tomorrow morning.   Labs/Electrolytes: K+ replaced on day shift, recheck drawn this shift, pending at this time.  Resp/trach: Lung sounds clear, sating mid-high 90s on RA.   Diet: Regular, tolerating.   Bowel status: BS +, loose BMS in AM, none this shift. Pt is passing gas.   : Voiding spontaneously without difficulty.   Skin: Intact.   Pain: Endorsed mild foot and low back pain, controlled with tylenol x2.   Activity: Independent in room.   Social: Pt calm, involved in their cares. Pleasant affect, Elliot,  at bedside.   Plan: Continue to monitor and collect labs. Shower/KWAME wipes in AM per pt.   Updates this shift: Urine sample sent for histoplasma this shift, lab tests completed.

## 2021-08-28 NOTE — PLAN OF CARE
Status: Pt admitted for undiagnosed fevers, PMHx. Metastatic Pleomorphic sarcoma  Vitals: Temp WNL this shift, controlled with q4h Tylenol. VSS.  Neuros: Intact.   IV: Port is accessed SLd at this time.   Labs/Electrolytes: K+ replaced overnight, redraw in AM  Resp/trach: Lung sounds with some ex wheezing, sating mid-high 90s on RA, PRN albuterol given x1. Frequent coughing, requesting more frequent cough medications  Diet: Regular, tolerating.   Bowel status: BS +, no loose stool this shift. Pt is passing gas.   : Voiding spontaneously without difficulty.   Skin: Intact.   Pain: Endorsed mild foot and low back pain, controlled with tylenol x2.   Activity: Independent in room.   Plan: Continue to monitor and follow POC

## 2021-08-29 LAB
ALBUMIN SERPL-MCNC: 1.6 G/DL (ref 3.4–5)
ALP SERPL-CCNC: 186 U/L (ref 40–150)
ALT SERPL W P-5'-P-CCNC: 11 U/L (ref 0–50)
ANION GAP SERPL CALCULATED.3IONS-SCNC: 5 MMOL/L (ref 3–14)
AST SERPL W P-5'-P-CCNC: 8 U/L (ref 0–45)
B MICROTI DNA BLD QL NAA+PROBE: NOT DETECTED
BABESIA DNA BLD QL NAA+PROBE: NOT DETECTED
BILIRUB DIRECT SERPL-MCNC: 0.2 MG/DL (ref 0–0.2)
BILIRUB SERPL-MCNC: 0.7 MG/DL (ref 0.2–1.3)
BUN SERPL-MCNC: 9 MG/DL (ref 7–30)
CALCIUM SERPL-MCNC: 8.3 MG/DL (ref 8.5–10.1)
CHLORIDE BLD-SCNC: 104 MMOL/L (ref 94–109)
CO2 SERPL-SCNC: 27 MMOL/L (ref 20–32)
CREAT SERPL-MCNC: 0.67 MG/DL (ref 0.52–1.04)
ERYTHROCYTE [DISTWIDTH] IN BLOOD BY AUTOMATED COUNT: 17.2 % (ref 10–15)
GALACTOMANNAN AG SERPL QL IA: NEGATIVE
GALACTOMANNAN AG SPEC IA-ACNC: 0.04
GFR SERPL CREATININE-BSD FRML MDRD: >90 ML/MIN/1.73M2
GLUCOSE BLD-MCNC: 88 MG/DL (ref 70–99)
H CAPSUL AG UR QL IA: NOT DETECTED
H CAPSUL AG UR QL IA: NOT DETECTED
H CAPSUL AG UR-MCNC: NOT DETECTED NG/ML
H CAPSUL AG UR-MCNC: NOT DETECTED NG/ML
HCT VFR BLD AUTO: 22.5 % (ref 35–47)
HGB BLD-MCNC: 7 G/DL (ref 11.7–15.7)
MCH RBC QN AUTO: 29.4 PG (ref 26.5–33)
MCHC RBC AUTO-ENTMCNC: 31.1 G/DL (ref 31.5–36.5)
MCV RBC AUTO: 95 FL (ref 78–100)
PLATELET # BLD AUTO: 216 10E3/UL (ref 150–450)
POTASSIUM BLD-SCNC: 3.4 MMOL/L (ref 3.4–5.3)
PROT SERPL-MCNC: 6.2 G/DL (ref 6.8–8.8)
RBC # BLD AUTO: 2.38 10E6/UL (ref 3.8–5.2)
SODIUM SERPL-SCNC: 136 MMOL/L (ref 133–144)
WBC # BLD AUTO: 7.2 10E3/UL (ref 4–11)

## 2021-08-29 PROCEDURE — 250N000013 HC RX MED GY IP 250 OP 250 PS 637: Performed by: STUDENT IN AN ORGANIZED HEALTH CARE EDUCATION/TRAINING PROGRAM

## 2021-08-29 PROCEDURE — 86140 C-REACTIVE PROTEIN: CPT | Performed by: INTERNAL MEDICINE

## 2021-08-29 PROCEDURE — 250N000011 HC RX IP 250 OP 636: Performed by: STUDENT IN AN ORGANIZED HEALTH CARE EDUCATION/TRAINING PROGRAM

## 2021-08-29 PROCEDURE — 120N000002 HC R&B MED SURG/OB UMMC

## 2021-08-29 PROCEDURE — 85027 COMPLETE CBC AUTOMATED: CPT | Performed by: STUDENT IN AN ORGANIZED HEALTH CARE EDUCATION/TRAINING PROGRAM

## 2021-08-29 PROCEDURE — 80053 COMPREHEN METABOLIC PANEL: CPT | Performed by: STUDENT IN AN ORGANIZED HEALTH CARE EDUCATION/TRAINING PROGRAM

## 2021-08-29 PROCEDURE — 250N000013 HC RX MED GY IP 250 OP 250 PS 637

## 2021-08-29 PROCEDURE — 99233 SBSQ HOSP IP/OBS HIGH 50: CPT | Mod: GC | Performed by: STUDENT IN AN ORGANIZED HEALTH CARE EDUCATION/TRAINING PROGRAM

## 2021-08-29 PROCEDURE — 82310 ASSAY OF CALCIUM: CPT | Performed by: STUDENT IN AN ORGANIZED HEALTH CARE EDUCATION/TRAINING PROGRAM

## 2021-08-29 PROCEDURE — 36591 DRAW BLOOD OFF VENOUS DEVICE: CPT | Performed by: STUDENT IN AN ORGANIZED HEALTH CARE EDUCATION/TRAINING PROGRAM

## 2021-08-29 PROCEDURE — 82248 BILIRUBIN DIRECT: CPT | Performed by: STUDENT IN AN ORGANIZED HEALTH CARE EDUCATION/TRAINING PROGRAM

## 2021-08-29 RX ADMIN — NYSTATIN 500000 UNITS: 500000 SUSPENSION ORAL at 15:56

## 2021-08-29 RX ADMIN — Medication 50 MCG: at 08:02

## 2021-08-29 RX ADMIN — ENOXAPARIN SODIUM 40 MG: 40 INJECTION SUBCUTANEOUS at 14:11

## 2021-08-29 RX ADMIN — MONTELUKAST 10 MG: 10 TABLET, FILM COATED ORAL at 08:02

## 2021-08-29 RX ADMIN — ACETAMINOPHEN 650 MG: 325 TABLET, FILM COATED ORAL at 04:09

## 2021-08-29 RX ADMIN — ACETAMINOPHEN 650 MG: 325 TABLET, FILM COATED ORAL at 08:02

## 2021-08-29 RX ADMIN — FLUTICASONE FUROATE AND VILANTEROL TRIFENATATE 1 PUFF: 100; 25 POWDER RESPIRATORY (INHALATION) at 08:04

## 2021-08-29 RX ADMIN — ACETAMINOPHEN 650 MG: 325 TABLET, FILM COATED ORAL at 21:21

## 2021-08-29 RX ADMIN — CETIRIZINE HYDROCHLORIDE 10 MG: 10 TABLET, FILM COATED ORAL at 08:02

## 2021-08-29 RX ADMIN — Medication 1 MG: at 21:22

## 2021-08-29 RX ADMIN — GABAPENTIN 600 MG: 300 CAPSULE ORAL at 08:02

## 2021-08-29 RX ADMIN — ACETAMINOPHEN 650 MG: 325 TABLET, FILM COATED ORAL at 12:44

## 2021-08-29 RX ADMIN — GABAPENTIN 600 MG: 300 CAPSULE ORAL at 14:11

## 2021-08-29 RX ADMIN — SODIUM CHLORIDE, PRESERVATIVE FREE 5 ML: 5 INJECTION INTRAVENOUS at 10:32

## 2021-08-29 RX ADMIN — DICLOFENAC SODIUM 4 G: 10 GEL TOPICAL at 08:04

## 2021-08-29 RX ADMIN — ACETAMINOPHEN 650 MG: 325 TABLET, FILM COATED ORAL at 17:01

## 2021-08-29 RX ADMIN — HYDROXYZINE HYDROCHLORIDE 25 MG: 25 TABLET, FILM COATED ORAL at 21:22

## 2021-08-29 RX ADMIN — NYSTATIN 500000 UNITS: 500000 SUSPENSION ORAL at 21:21

## 2021-08-29 RX ADMIN — NYSTATIN 500000 UNITS: 500000 SUSPENSION ORAL at 12:44

## 2021-08-29 RX ADMIN — BENZOCAINE AND MENTHOL 1 LOZENGE: 15; 3.6 LOZENGE ORAL at 08:06

## 2021-08-29 RX ADMIN — DICLOFENAC SODIUM 4 G: 10 GEL TOPICAL at 20:43

## 2021-08-29 RX ADMIN — OMEPRAZOLE 20 MG: 20 CAPSULE, DELAYED RELEASE ORAL at 08:09

## 2021-08-29 RX ADMIN — NYSTATIN 500000 UNITS: 500000 SUSPENSION ORAL at 08:02

## 2021-08-29 RX ADMIN — GABAPENTIN 900 MG: 300 CAPSULE ORAL at 21:20

## 2021-08-29 RX ADMIN — MULTIPLE VITAMINS W/ MINERALS TAB 1 TABLET: TAB at 08:02

## 2021-08-29 ASSESSMENT — ACTIVITIES OF DAILY LIVING (ADL)
ADLS_ACUITY_SCORE: 17

## 2021-08-29 NOTE — PROGRESS NOTES
[facility]    Progress Note - Maroon 4 Service        Date of Admission:  8/25/2021    Assessment & Plan             Richa Ashby is a 56 year old female admitted on 8/25/2021. She has a history of asthma, GERD, and pleomorphic sarcoma and is admitted for fevers and hypotension.     #. Fevers  #. Hypotension  Patient with ~ 1 week of fevers and hypotension. Denies other infectious symptoms. Work-up with unremarkable UA and CXR. No evident source on examination. WBC at 10.6 which is elevated for patient (BL ~4). LA not elevated. Procal not elevated. Had been started on Levaquin 3 days ago for suspected UTI, but Cx unremarkable. Concern symptoms may be related to viral illness vs chemotherapy effect. Patient received dose of cefepime at oncology clinic day of admission. Hypotension fluid responsive. Of note patient had similar admission in June. RVP, strep, lyme, anaplasmosis/erlichia, babesia, cryptococcal antigen, c. Diff all negative so far.  -ID consulted  -Will monitor patient off Abx, further evaluation and treatment per symptoms.  -Follow-up 8/25, 8/27 BCx, NGTD,  -Pending west nile, fungal antibodies, blasto ag, urine histo, galactomannan, BD glucan  -Tylenol for fevers PRN  - Will plan for oncology consult tomorrow as sx seem to be potentially related to chemo    #. Acute on Chronic Anemia  Patient with anemia requiring intermittent transfusions since chemotherapy induction. 8/27 AM Hb down to 5.7 from 7.4, repeated and found to be 7.1 but again low on 8/28 needing transfusion. Hemolysis labs are negative. After chart review suspect her anemia is most likely due to chemo as her hgb appears to be quite sensitive to it in past.  -Will CTM, transfuse for Hb<7     #. Asthma  -Continue PTA cetrizine, Singulair, Advair, and PRN albuterol      #. GERD  -Continue PTA PPI     #. Neuropathic Pain  Pain has been getting worse during hospital stay so adding  adjunctive therapy and increasing gabapentin  - Continue gabapentin to 600, 600, 900   - Voltaren Gel and lidocaine gel PRN for pain     #. Severe Malnutrition in the Context of Chronic Illness  -Calorie counts ordered  -Snacks and supplements ordered     Diet: Combination Diet Regular Diet Adult    DVT Prophylaxis: Enoxaparin (Lovenox) SQ  Ness Catheter: Not present  Fluids: PO, bolus PRN  Central Lines: None  Code Status: Full Code          Disposition Plan     Expected discharge: 1-2 days recommended to prior living arrangement once SIRS/Sepsis treated.        The patient's care was discussed with the Attending Physician, Dr. Herrmann.     MD Opal [facility]Asc[facility] 4 Service  Internal Medicine PGY-2  [facility]  Securely message with the Vocera Web Console (learn more here)  Text page via [employer] Paging/Directory  Please see sign in/sign out for up to date coverage information      ______________________________________________________________________    Interval History   Overnight patient reported increased dyspnea, frequency of PRN albuterol was increased. Biotene was also ordered for subsequent dry mouth. She does not report any fevers but does report night sweats and malaise.    4 point ROS otherwise negative    Data reviewed today: I reviewed all medications, new labs and imaging results over the last 24 hours.    Physical Exam   Vital Signs: Temp: 98.2  F (36.8  C) Temp src: Oral BP: 114/63 Pulse: 83   Resp: 18 SpO2: 95 % O2 Device: None (Room air)    Weight: 0 lbs 0 oz  General Appearance: NT appearing, in NAD  Eyes: EOMI, visual fields intact  HEENT: MMM, no oropharyngeal erythema  Respiratory: CTAB  Cardiovascular: RRR  GI: NBS, S, ND,RLQ TTP  Genitourinary: No suprapubic or CVA TTP  Skin: No notable lesions  Musculoskeletal: No edema  Neurologic: No nuchal rigidity, strength and sensation intact, normal gait    Data   Recent Labs   Lab 08/29/21  0737  08/28/21  2210 08/28/21  0800 08/27/21  1121   WBC 7.2  --  8.6 11.4*   HGB 7.0* 7.3* 6.4* 7.1*   MCV 95  --  94 94     --  206 238   INR  --   --   --  1.29*     --  138 138   POTASSIUM 3.4 3.7 3.3* 3.1*   CHLORIDE 104  --  106 106   CO2 27  --  27 27   BUN 9  --  10 8   CR 0.67  --  0.69 0.68   ANIONGAP 5  --  5 5   VIKTORIA 8.3*  --  8.4* 8.6   GLC 88  --  101* 104*   ALBUMIN 1.6*  --   --  1.8*   PROTTOTAL 6.2*  --   --  6.7*   BILITOTAL 0.7  --   --  0.6   ALKPHOS 186*  --   --  226*   ALT 11  --   --  15   AST 8  --   --  11     No results found for this or any previous visit (from the past 24 hour(s)).  Medications       cetirizine  10 mg Oral Daily     enoxaparin ANTICOAGULANT  40 mg Subcutaneous Q24H     fluticasone-vilanterol  1 puff Inhalation Daily     gabapentin  600 mg Oral BID     gabapentin  900 mg Oral At Bedtime     heparin  5-10 mL Intracatheter Q28 Days     heparin lock flush  5-10 mL Intracatheter Q24H     montelukast  10 mg Oral Daily     multivitamin w/minerals  1 tablet Oral Daily     nystatin  500,000 Units Swish & Swallow 4x Daily     omeprazole  20 mg Oral QAM AC     sodium chloride (PF)  10-20 mL Intracatheter Q28 Days     sodium chloride (PF)  3 mL Intracatheter Q8H     vitamin D3  50 mcg Oral Daily

## 2021-08-29 NOTE — PLAN OF CARE
Status: Pt admitted for undiagnosed fevers, PMHx. Metastatic Pleomorphic sarcoma  Vitals: Max temp at start of shift of 99.3, controlled with tylenol. Soft BP, lowest 90/43.   Neuros: Intact.   IV: Port is accessed hep locked at this time. KWAME wipes completed.   Labs/Electrolytes: K+ replaced at start of shift, recheck 3.7.  HgB 7.3 after 1 unit blood transfusion on day shift.   Resp/trach: Lung sounds clear, ex intermittent wheezing with bouts of coughing at bases, given PRN albuterol inhaler x2, order changed to q4h. Took PRN guaifenesin and Cepacol mouth drop. Sating mid-high 90s on RA.   Diet: Regular, tolerating, good intake, 75% of dinner.   Bowel status: BS +, loose BMS, LBM on day shift per pt.   : Voiding spontaneously without difficulty.   Skin: Intact.  Pain: Endorsed mild foot and low back pain, controlled with tylenol x2, voltaren x1.   Activity: Independent in room.   Social: Pt calm, involved in their cares. Pleasant affect, Elliot,  at bedside at start of shift.  Plan: Continue to monitor soft Bps.  Updates this shift: Biotene added for dry mouth, potassium and HgB checked.

## 2021-08-29 NOTE — PLAN OF CARE
Status: Pt admitted with fever of unknown origin and hypotension.   Vitals: AVSS,BP higher today.  IV: Port is Heplocked.   Diet: PO'ing regular diet - Calorie counts started today. Weight up from admission.  Bowel status: 1 Loose BM this AM.   : Voiding spontaneously without difficulty.   Skin: Intact.   Pain: Pain level better today.Tylenol and Volteran PRN.   Activity: Up ad andrews in her room.   Plan: Poss Discharge in the next couple of days.

## 2021-08-29 NOTE — PLAN OF CARE
Status: Pt admitted for undiagnosed fevers and hypotension. PMHx of Metastatic Pleomorphic sarcoma  Vitals: Vitals stable: Max temp 99.7F. Soft BP, lowest 96/46  Neuros: Intact.   IV: Port HL.   Labs/Electrolytes: K replaced yesterday and 1 unit of blood was transufed - re-draws this AM  Resp/trach: Lung sounds clear  Diet: Regular diet with good PO  Bowel status: BS+x4, loose BM yesterday.   : Voiding spontaneously without difficulty in bathroom  Skin: Intact.  Pain: Mild foot and low back pain well controlled with PRN Tylenol  Activity: Independent in room.   Social: Pt slept well  Plan: Continue to monitor for fevers and soft BPs.

## 2021-08-29 NOTE — PROVIDER NOTIFICATION
Notified Irma Terry MD regarding pt request to have albuterol inhaler changed to q4h instead of q6h.

## 2021-08-30 LAB
1,3 BETA GLUCAN SER-MCNC: <31 PG/ML
ANION GAP SERPL CALCULATED.3IONS-SCNC: 8 MMOL/L (ref 3–14)
B BURGDOR DNA SPEC QL NAA+PROBE: NOT DETECTED
BACTERIA BLD CULT: NO GROWTH
BACTERIA BLD CULT: NO GROWTH
BASOPHILS # BLD AUTO: 0 10E3/UL (ref 0–0.2)
BASOPHILS NFR BLD AUTO: 1 %
BUN SERPL-MCNC: 8 MG/DL (ref 7–30)
CALCIUM SERPL-MCNC: 8.5 MG/DL (ref 8.5–10.1)
CHLORIDE BLD-SCNC: 105 MMOL/L (ref 94–109)
CO2 SERPL-SCNC: 27 MMOL/L (ref 20–32)
CREAT SERPL-MCNC: 0.61 MG/DL (ref 0.52–1.04)
CRP SERPL-MCNC: 230 MG/L (ref 0–8)
CRP SERPL-MCNC: 270 MG/L (ref 0–8)
EOSINOPHIL # BLD AUTO: 0.3 10E3/UL (ref 0–0.7)
EOSINOPHIL NFR BLD AUTO: 4 %
ERYTHROCYTE [DISTWIDTH] IN BLOOD BY AUTOMATED COUNT: 17.2 % (ref 10–15)
ERYTHROCYTE [DISTWIDTH] IN BLOOD BY AUTOMATED COUNT: 17.2 % (ref 10–15)
GFR SERPL CREATININE-BSD FRML MDRD: >90 ML/MIN/1.73M2
GLUCOSE BLD-MCNC: 83 MG/DL (ref 70–99)
HCT VFR BLD AUTO: 23.9 % (ref 35–47)
HCT VFR BLD AUTO: 24.6 % (ref 35–47)
HGB BLD-MCNC: 7.2 G/DL (ref 11.7–15.7)
HGB BLD-MCNC: 7.6 G/DL (ref 11.7–15.7)
IMM GRANULOCYTES # BLD: 0.1 10E3/UL
IMM GRANULOCYTES NFR BLD: 1 %
LYMPHOCYTES # BLD AUTO: 0.8 10E3/UL (ref 0.8–5.3)
LYMPHOCYTES NFR BLD AUTO: 12 %
MCH RBC QN AUTO: 29.3 PG (ref 26.5–33)
MCH RBC QN AUTO: 29.6 PG (ref 26.5–33)
MCHC RBC AUTO-ENTMCNC: 30.1 G/DL (ref 31.5–36.5)
MCHC RBC AUTO-ENTMCNC: 30.9 G/DL (ref 31.5–36.5)
MCV RBC AUTO: 96 FL (ref 78–100)
MCV RBC AUTO: 97 FL (ref 78–100)
MONOCYTES # BLD AUTO: 0.7 10E3/UL (ref 0–1.3)
MONOCYTES NFR BLD AUTO: 11 %
NEUTROPHILS # BLD AUTO: 4.6 10E3/UL (ref 1.6–8.3)
NEUTROPHILS NFR BLD AUTO: 71 %
NRBC # BLD AUTO: 0 10E3/UL
NRBC BLD AUTO-RTO: 0 /100
OBSERVATION IMP: NEGATIVE
PATH REPORT.COMMENTS IMP SPEC: NORMAL
PATH REPORT.COMMENTS IMP SPEC: NORMAL
PATH REPORT.FINAL DX SPEC: NORMAL
PATH REPORT.MICROSCOPIC SPEC OTHER STN: NORMAL
PATH REPORT.MICROSCOPIC SPEC OTHER STN: NORMAL
PATH REPORT.RELEVANT HX SPEC: NORMAL
PLATELET # BLD AUTO: 219 10E3/UL (ref 150–450)
PLATELET # BLD AUTO: 234 10E3/UL (ref 150–450)
POTASSIUM BLD-SCNC: 3.3 MMOL/L (ref 3.4–5.3)
PROCALCITONIN SERPL-MCNC: 1.28 NG/ML
RBC # BLD AUTO: 2.46 10E6/UL (ref 3.8–5.2)
RBC # BLD AUTO: 2.57 10E6/UL (ref 3.8–5.2)
RETICS # AUTO: 0.05 10E6/UL (ref 0.03–0.1)
RETICS/RBC NFR AUTO: 1.8 % (ref 0.5–2)
SODIUM SERPL-SCNC: 140 MMOL/L (ref 133–144)
WBC # BLD AUTO: 5.7 10E3/UL (ref 4–11)
WBC # BLD AUTO: 6.4 10E3/UL (ref 4–11)

## 2021-08-30 PROCEDURE — 99233 SBSQ HOSP IP/OBS HIGH 50: CPT | Performed by: INTERNAL MEDICINE

## 2021-08-30 PROCEDURE — 86140 C-REACTIVE PROTEIN: CPT | Performed by: INTERNAL MEDICINE

## 2021-08-30 PROCEDURE — 36591 DRAW BLOOD OFF VENOUS DEVICE: CPT | Performed by: STUDENT IN AN ORGANIZED HEALTH CARE EDUCATION/TRAINING PROGRAM

## 2021-08-30 PROCEDURE — 80048 BASIC METABOLIC PNL TOTAL CA: CPT | Performed by: STUDENT IN AN ORGANIZED HEALTH CARE EDUCATION/TRAINING PROGRAM

## 2021-08-30 PROCEDURE — 250N000013 HC RX MED GY IP 250 OP 250 PS 637: Performed by: STUDENT IN AN ORGANIZED HEALTH CARE EDUCATION/TRAINING PROGRAM

## 2021-08-30 PROCEDURE — 250N000013 HC RX MED GY IP 250 OP 250 PS 637

## 2021-08-30 PROCEDURE — 250N000011 HC RX IP 250 OP 636: Performed by: STUDENT IN AN ORGANIZED HEALTH CARE EDUCATION/TRAINING PROGRAM

## 2021-08-30 PROCEDURE — 85027 COMPLETE CBC AUTOMATED: CPT | Performed by: INTERNAL MEDICINE

## 2021-08-30 PROCEDURE — 84145 PROCALCITONIN (PCT): CPT | Performed by: INTERNAL MEDICINE

## 2021-08-30 PROCEDURE — 99233 SBSQ HOSP IP/OBS HIGH 50: CPT | Mod: GC | Performed by: STUDENT IN AN ORGANIZED HEALTH CARE EDUCATION/TRAINING PROGRAM

## 2021-08-30 PROCEDURE — 85025 COMPLETE CBC W/AUTO DIFF WBC: CPT | Performed by: STUDENT IN AN ORGANIZED HEALTH CARE EDUCATION/TRAINING PROGRAM

## 2021-08-30 PROCEDURE — 99222 1ST HOSP IP/OBS MODERATE 55: CPT | Mod: GC | Performed by: INTERNAL MEDICINE

## 2021-08-30 PROCEDURE — 36591 DRAW BLOOD OFF VENOUS DEVICE: CPT | Performed by: INTERNAL MEDICINE

## 2021-08-30 PROCEDURE — 120N000002 HC R&B MED SURG/OB UMMC

## 2021-08-30 PROCEDURE — 85045 AUTOMATED RETICULOCYTE COUNT: CPT | Performed by: INTERNAL MEDICINE

## 2021-08-30 RX ORDER — POTASSIUM CHLORIDE 750 MG/1
40 TABLET, EXTENDED RELEASE ORAL ONCE
Status: COMPLETED | OUTPATIENT
Start: 2021-08-30 | End: 2021-08-30

## 2021-08-30 RX ADMIN — NYSTATIN 500000 UNITS: 500000 SUSPENSION ORAL at 16:17

## 2021-08-30 RX ADMIN — ACETAMINOPHEN 650 MG: 325 TABLET, FILM COATED ORAL at 17:46

## 2021-08-30 RX ADMIN — POTASSIUM CHLORIDE 40 MEQ: 750 TABLET, EXTENDED RELEASE ORAL at 09:42

## 2021-08-30 RX ADMIN — Medication 50 MCG: at 07:43

## 2021-08-30 RX ADMIN — ENOXAPARIN SODIUM 40 MG: 40 INJECTION SUBCUTANEOUS at 14:14

## 2021-08-30 RX ADMIN — MULTIPLE VITAMINS W/ MINERALS TAB 1 TABLET: TAB at 07:43

## 2021-08-30 RX ADMIN — DICLOFENAC SODIUM 4 G: 10 GEL TOPICAL at 07:46

## 2021-08-30 RX ADMIN — HYDROXYZINE HYDROCHLORIDE 25 MG: 25 TABLET, FILM COATED ORAL at 21:40

## 2021-08-30 RX ADMIN — GUAIFENESIN AND CODEINE PHOSPHATE 5 ML: 10; 100 LIQUID ORAL at 02:23

## 2021-08-30 RX ADMIN — FLUTICASONE FUROATE AND VILANTEROL TRIFENATATE 1 PUFF: 100; 25 POWDER RESPIRATORY (INHALATION) at 07:44

## 2021-08-30 RX ADMIN — ACETAMINOPHEN 650 MG: 325 TABLET, FILM COATED ORAL at 21:40

## 2021-08-30 RX ADMIN — NYSTATIN 500000 UNITS: 500000 SUSPENSION ORAL at 20:35

## 2021-08-30 RX ADMIN — BENZOCAINE AND MENTHOL 1 LOZENGE: 15; 3.6 LOZENGE ORAL at 14:21

## 2021-08-30 RX ADMIN — GABAPENTIN 600 MG: 300 CAPSULE ORAL at 07:43

## 2021-08-30 RX ADMIN — NYSTATIN 500000 UNITS: 500000 SUSPENSION ORAL at 07:44

## 2021-08-30 RX ADMIN — DICLOFENAC SODIUM 4 G: 10 GEL TOPICAL at 15:15

## 2021-08-30 RX ADMIN — GABAPENTIN 900 MG: 300 CAPSULE ORAL at 21:40

## 2021-08-30 RX ADMIN — Medication 1 MG: at 21:40

## 2021-08-30 RX ADMIN — OMEPRAZOLE 20 MG: 20 CAPSULE, DELAYED RELEASE ORAL at 07:44

## 2021-08-30 RX ADMIN — ACETAMINOPHEN 650 MG: 325 TABLET, FILM COATED ORAL at 12:54

## 2021-08-30 RX ADMIN — SODIUM CHLORIDE, PRESERVATIVE FREE 5 ML: 5 INJECTION INTRAVENOUS at 07:41

## 2021-08-30 RX ADMIN — NYSTATIN 500000 UNITS: 500000 SUSPENSION ORAL at 11:54

## 2021-08-30 RX ADMIN — ACETAMINOPHEN 650 MG: 325 TABLET, FILM COATED ORAL at 02:23

## 2021-08-30 RX ADMIN — CETIRIZINE HYDROCHLORIDE 10 MG: 10 TABLET, FILM COATED ORAL at 07:43

## 2021-08-30 RX ADMIN — GABAPENTIN 600 MG: 300 CAPSULE ORAL at 14:14

## 2021-08-30 RX ADMIN — GUAIFENESIN AND CODEINE PHOSPHATE 5 ML: 10; 100 LIQUID ORAL at 21:40

## 2021-08-30 RX ADMIN — MONTELUKAST 10 MG: 10 TABLET, FILM COATED ORAL at 07:44

## 2021-08-30 RX ADMIN — ACETAMINOPHEN 650 MG: 325 TABLET, FILM COATED ORAL at 07:43

## 2021-08-30 RX ADMIN — BENZOCAINE AND MENTHOL 1 LOZENGE: 15; 3.6 LOZENGE ORAL at 07:47

## 2021-08-30 ASSESSMENT — ACTIVITIES OF DAILY LIVING (ADL)
ADLS_ACUITY_SCORE: 17

## 2021-08-30 NOTE — PROGRESS NOTES
[facility]    Progress Note - Mardariela 4 Service        Date of Admission:  8/25/2021    Assessment & Plan        Richa Ashby is a 56 year old female admitted on 8/25/2021. She has a history of asthma, GERD, and pleomorphic sarcoma and is admitted for fevers and hypotension.     #. Fevers  #. Hypotension  Patient with ~ 1 week of fevers and hypotension. Denies other infectious symptoms. Work-up with unremarkable UA and CXR. No evident source on examination. WBC at 10.6 which is elevated for patient (BL ~4). LA not elevated. Procal not elevated. Had been started on Levaquin 3 days ago for suspected UTI, but Cx unremarkable. Concern symptoms may be related to viral illness vs chemotherapy effect. Patient received dose of cefepime at oncology clinic day of admission. Hypotension fluid responsive. Of note patient had similar admission in June. Given improving symptoms without infectious treatment and negative work-up, suspect these symptoms may be related to chemotherapy.   -ID consulted  -Will monitor patient off Abx, further evaluation and treatment per symptoms.  -Follow-up 8/25, 8/27 BCx, NGTD  -Pending west nile and blasto ag, other infectious work-up has been negative  -Tylenol for fevers PRN  - Will consult oncology as sx seem to be related to chemo    #. Acute on Chronic Anemia  Patient with anemia requiring intermittent transfusions since chemotherapy induction. 8/27 AM Hb down to 5.7 from 7.4, repeated and found to be 7.1 but again low on 8/28 needing transfusion. Hemolysis labs are negative. After chart review suspect her anemia is most likely due to chemo as her hgb appears to be quite sensitive to it in past.  -Will CTM, transfuse for Hb<7     #. Asthma  -Continue PTA cetrizine, Singulair, Advair, and PRN albuterol      #. GERD  -Continue PTA PPI     #. Neuropathic Pain  Pain has been getting worse during hospital stay so adding adjunctive therapy and  increasing gabapentin  - Continue gabapentin to 600, 600, 900   - Voltaren Gel and lidocaine gel PRN for pain     #. Severe Malnutrition in the Context of Chronic Illness  -Calorie counts ordered  -Snacks and supplements ordered     #. Hypokalemia  -On protocol     Diet: Combination Diet Regular Diet Adult    DVT Prophylaxis: Enoxaparin (Lovenox) SQ  Ness Catheter: Not present  Fluids: PO, bolus PRN  Central Lines: None  Code Status: Full Code          Disposition Plan     Expected discharge: tomorrow days recommended to prior living arrangement once oncology evaluation completed.        The patient's care was discussed with the Attending Physician, Dr. Herrmann.     MD Opal [facility] Service  Internal Medicine PGY-2  [facility]  Securely message with the Voc[employer] Web Console (learn more here)  Text page via [employer] Paging/Directory  Please see sign in/sign out for up to date coverage information      ______________________________________________________________________    Interval History   NAEO. Patient does not report any fevers over the past day. She does report continued weakness and myalgias. No other acute symptoms    4 point ROS otherwise negative    Data reviewed today: I reviewed all medications, new labs and imaging results over the last 24 hours.    Physical Exam   Vital Signs: Temp: 98.5  F (36.9  C) Temp src: Oral BP: 104/50 Pulse: 96   Resp: 16 SpO2: 93 % O2 Device: None (Room air)    Weight: 199 lbs 15.32 oz  General Appearance: NT appearing, in NAD  Eyes: EOMI, visual fields intact  HEENT: MMM, no oropharyngeal erythema  Respiratory: CTAB  Cardiovascular: RRR  GI: NBS, S, ND,RLQ TTP  Genitourinary: No suprapubic or CVA TTP  Skin: No notable lesions  Musculoskeletal: No edema  Neurologic: No nuchal rigidity, strength and sensation intact, normal gait    Data   Recent Labs   Lab 08/30/21  1112 08/30/21  0739 08/29/21  0737 08/28/21  2213  08/28/21  0800 08/28/21  0800 08/27/21  1121   WBC 6.4 5.7 7.2  --   --  8.6 11.4*   HGB 7.6* 7.2* 7.0* 7.3*   < > 6.4* 7.1*   MCV 96 97 95  --   --  94 94    219 216  --   --  206 238   INR  --   --   --   --   --   --  1.29*   NA  --  140 136  --   --  138 138   POTASSIUM  --  3.3* 3.4 3.7  --  3.3* 3.1*   CHLORIDE  --  105 104  --   --  106 106   CO2  --  27 27  --   --  27 27   BUN  --  8 9  --   --  10 8   CR  --  0.61 0.67  --   --  0.69 0.68   ANIONGAP  --  8 5  --   --  5 5   VIKTORIA  --  8.5 8.3*  --   --  8.4* 8.6   GLC  --  83 88  --   --  101* 104*   ALBUMIN  --   --  1.6*  --   --   --  1.8*   PROTTOTAL  --   --  6.2*  --   --   --  6.7*   BILITOTAL  --   --  0.7  --   --   --  0.6   ALKPHOS  --   --  186*  --   --   --  226*   ALT  --   --  11  --   --   --  15   AST  --   --  8  --   --   --  11    < > = values in this interval not displayed.     No results found for this or any previous visit (from the past 24 hour(s)).  Medications       cetirizine  10 mg Oral Daily     enoxaparin ANTICOAGULANT  40 mg Subcutaneous Q24H     fluticasone-vilanterol  1 puff Inhalation Daily     gabapentin  600 mg Oral BID     gabapentin  900 mg Oral At Bedtime     heparin  5-10 mL Intracatheter Q28 Days     heparin lock flush  5-10 mL Intracatheter Q24H     montelukast  10 mg Oral Daily     multivitamin w/minerals  1 tablet Oral Daily     nystatin  500,000 Units Swish & Swallow 4x Daily     omeprazole  20 mg Oral QAM AC     sodium chloride (PF)  10-20 mL Intracatheter Q28 Days     sodium chloride (PF)  3 mL Intracatheter Q8H     vitamin D3  50 mcg Oral Daily

## 2021-08-30 NOTE — PLAN OF CARE
Status: Pt admitted for undiagnosed fevers, PMHx. Metastatic Pleomorphic sarcoma  Vitals: Max temp at start of shift of 99.3, controlled with tylenol.   Neuros: Intact.   IV: Port is accessed hep locked at this time. KWAME wipes completed.   Labs/Electrolytes: K+ 3.4.  HgB 7.0 this morning.  Resp/trach: Lung sounds clear, ex intermittent wheezing with bouts of coughing at bases, given PRN albuterol inhaler x1. Sating mid-high 90s on RA.   Diet: Regular, tolerating, good intake, 75% of dinner. On ezra counts.  Bowel status: BS +, loose BMS, LBM on day shift per pt.   : Voiding spontaneously without difficulty.   Skin: Intact.  Pain: Endorsed mild foot pain bilaterally, controlled with tylenol x2, voltaren x1.   Activity: Independent in room.   Social: Pt calm, involved in their cares. Pleasant affect, Elliot,  at bedside at start of shift.  Plan: Possible discharge tomorrow.

## 2021-08-30 NOTE — PLAN OF CARE
Status: Pt admitted for undiagnosed fevers, PMHx. Metastatic Pleomorphic sarcoma  Vitals: Max temp at start of shift of 99.1, controlled with tylenol.   Neuros: Intact  IV: Port is accessed & hep locked  Labs/Electrolytes: HgB 7.0 last morning.  Resp/trach: Lung sounds clear with intermittent wheezing at bases. Intermittent coughing fits - robitussin given x1. Sating mid-high 90s on RA.   Diet: Regular diet with ezra counts.  Bowel status: BS +, loose BMs, LBM 8/29  : Voiding spontaneously without difficulty.   Skin: Intact.  Pain: Endorsed mild foot pain bilaterally, controlled with tylenol q4h  Activity: Independent in room.   Social: Pt calm, cooperative, involved in her cares.  Plan: Possible discharge today

## 2021-08-30 NOTE — PLAN OF CARE
Status: Pt admitted with fever of unknown origin and hypotension.   Vitals: No temp this shift, but is still getting tylenol about Q 4-5 hours..BP higher today.  IV: Port is Heplocked.   Diet: PO'ing regular diet - Calorie counts restarted today.   Bowel status: 1 soft BM this AM.   : Voiding spontaneously without difficulty.   Skin: Intact.   Pain: Tylenol and Volteran PRN. Cough drops x 2.  Activity: Up ad andrews in her room.   Plan: Need Oncology input, before discharge can happen.

## 2021-08-30 NOTE — CONSULTS
Oncology  Consult Note   Date of Service: 08/30/2021    Patient: Richa Ashby  MRN: [id number]  Admission Date: 8/25/2021  Hospital Day # 5  Cancer Diagnosis: Undifferentiated Pleomorphic Sarcoma   Primary Outpatient Oncologist: Dr. Lopez   Current Treatment Plan: Gemzar and Keytruda     Reason for Consult: pleomorphic sarcoma treated with gemzar. Multiple episodes of fevers, hypotension, and anemia. Seems non-infectious. Possibly related to chemo      History of Present Illness:    Richa Ashby is a 56 year old year old female with Undifferentiated Pleomorphic Sarcoma is admitted with fever and hypotension.     She is currently on gemzar and keytruda since June 2021.     She has been having fevers with hypotension since the past 1 week , was treated with Levaquin for suspected Urinary tract infection. Urinanalysis was normal , CXR normal, Blood cultures no growth till date. She has been off antibiotics since admission and no fevers.     She had similar admission in June and no source of infection was identified. Her symptoms improved by antibiotics and was discharged home.     Infectious disease was consulted, Pending [place], fungal antibodies. But blasto ag has been negative , urine histo negative , galactomannan, BD glucan has been negative.      She is doing well , no fevers since the past 24 hours. No lightheadedness or dizziness. No cough or shortness of breath. No abdominal pain. Has some nausea , no vomiting. No diarrhea or constipation. No blood in the stool. She has some red erythematous rash on her upper chest and arms.         Oncologic History:   Richa Ashby is a 56 year old female with PMH asthma and GERD with undifferentiated pleomorphic sarcoma. 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 She received preoperative radiotherapy and the tumor was resected 3/12/20. She then developed a cough, chest tightness, and  scapular pain and imaging revealed lung nodules. Lung biopsy 11/2/20 with sarcoma. She was started on Doxil + Ifos 11/13/20. Had delay in cycle 2 due to COVID dx 12/2/20. CT imaging after cycle 1 with positive response to treatment with decrease in pulmonary nodules. Received cycle 2 12/18/20. Received cycle 3 1/15/21 with dose reduction in Doxil for skin toxicity and mucositis. Received cycle 4 2/15/21.      CT CAP 3/8/21 with positive response to treatment, decreased size of pulmonary mets though one lesion was larger. Did see fibrocystic changes in left breast, recommended breast imaging. Received cycle 5 Doxil + ifos 3/11/21.      Imaging 4/5/21 with overall stable disease except one lung lesion, opted to continue Doxil alone for now and switch to Keytruda in future if ongoing progression.      Started Keytruda 5/10/21. Was seen 5/26/21 cough, SOB, fatigue work-up thought to be 2/2 phrenic nerve involvement of R apical lung mass.      Due to worsening fatigue and cough, repeat CT 6/15/21 concerning for progression vs pseudoprogression. Added Gemzar 6/16/21.     She was admitted 6/17/21-6/20/21 with fever of unknown origin, infectious work-up negative, treated with broad spectrum antibiotics.     She clinically improved on abx. Was able to receive day 8 Gemzar (dose reduced for thrombocytopenia) + Keytruda 6/23/21.      CT 7/12/21 with stable disease.        Review of Systems:  A comprehensive ROS was performed and found to be negative or non-contributory with the exception of that noted in the HPI above.    Past Medical History:  Past Medical History:   Diagnosis Date     Asthma, chronic      Sarcoma (H)     Right lower thigh       Past Surgical History:  Past Surgical History:   Procedure Laterality Date     AS RAD RESEC TONSIL/PILLARS       GALLBLADDER SURGERY       INSERT PORT VASCULAR ACCESS Right 10/28/2020    Procedure: single lumen power port placement @0745;  Surgeon: Tex Ackerman MD;  Location:  [facility] OR     IR CHEST PORT PLACEMENT > 5 YRS OF AGE  10/28/2020     IR PORT CHECK RIGHT  3/26/2021     RESECT TUMOR LOWER EXTREMITY Right 3/12/2020    Procedure: Resection of right thigh tumor;  Surgeon: Guillermo Johnson MD;  Location: [facility] OR     ROTATOR CUFF REPAIR RT/LT  2012     SHOULDER SURGERY       SINUS SURGERY         Social History:  Social History     Socioeconomic History     Marital status:      Spouse name: Not on file     Number of children: Not on file     Years of education: Not on file     Highest education level: Not on file   Occupational History     Not on file   Tobacco Use     Smoking status: Never Smoker     Smokeless tobacco: Never Used   Substance and Sexual Activity     Alcohol use: Yes     Comment: 2-3 glasses of wine/week     Drug use: Never     Sexual activity: Not on file   Other Topics Concern     Not on file   Social History Narrative     Not on file     Social Determinants of Health     Financial Resource Strain:      Difficulty of Paying Living Expenses:    Food Insecurity:      Worried About Running Out of Food in the Last Year:      Ran Out of Food in the Last Year:    Transportation Needs:      Lack of Transportation (Medical):      Lack of Transportation (Non-Medical):    Physical Activity:      Days of Exercise per Week:      Minutes of Exercise per Session:    Stress:      Feeling of Stress :    Social Connections:      Frequency of Communication with Friends and Family:      Frequency of Social Gatherings with Friends and Family:      Attends Yazidi Services:      Active Member of Clubs or Organizations:      Attends Club or Organization Meetings:      Marital Status:    Intimate Partner Violence:      Fear of Current or Ex-Partner:      Emotionally Abused:      Physically Abused:      Sexually Abused:         Family History  Family History   Problem Relation Age of Onset     Lung Cancer Maternal Grandmother      Breast Cancer Paternal Grandmother         Outpatient Medications:  heparin 100 UNIT/ML injection 5 mL  heparin 100 UNIT/ML injection 5 mL  sodium chloride (PF) 0.9% PF flush 3-20 mL    acetaminophen (TYLENOL) 325 MG tablet, Take 325-650 mg by mouth every 6 hours as needed for mild pain  ADVAIR DISKUS 100-50 MCG/DOSE inhaler, Inhale 1 puff into the lungs daily   albuterol (PROAIR RESPICLICK) 108 (90 Base) MCG/ACT inhaler, Inhale 1-2 puffs into the lungs  cetirizine (ZYRTEC) 10 MG tablet, Take 10 mg by mouth daily  gabapentin (NEURONTIN) 300 MG capsule, Take 1 capsule (300 mg) by mouth 3 times daily  levofloxacin (LEVAQUIN) 750 MG tablet, Take 1 tablet (750 mg) by mouth daily  melatonin (MELATONIN) 1 MG/ML LIQD liquid, Take 5 mg by mouth  montelukast (SINGULAIR) 10 MG tablet, Take 10 mg by mouth daily  multivitamin w/minerals (MULTI-VITAMIN) tablet, Take 1 tablet by mouth daily  omeprazole (PRILOSEC) 20 MG DR capsule, 20 mg daily   prochlorperazine (COMPAZINE) 10 MG tablet, Take 1 tablet (10 mg) by mouth every 6 hours as needed (Nausea/Vomiting)  vitamin D3 (CHOLECALCIFEROL) 2000 units (50 mcg) tablet, Take 1 tablet by mouth daily  hydrOXYzine (ATARAX) 25 MG tablet, Take 1 tablet (25 mg) by mouth 3 times daily as needed for itching  lidocaine-prilocaine (EMLA) 2.5-2.5 % external cream, Apply topically as needed for moderate pain (Patient not taking: Reported on 6/2/2021)  ondansetron (ZOFRAN) 8 MG tablet, Take 1 tablet (8 mg) by mouth every 8 hours as needed for nausea         Physical Exam:    Blood pressure 108/54, pulse 94, temperature 98.4  F (36.9  C), temperature source Oral, resp. rate 16, weight 90.7 kg (199 lb 15.3 oz), last menstrual period 03/19/2019, SpO2 98 %, not currently breastfeeding.  General: alert and cooperative, lying in bed, no acute distress  HEENT: sclera anicteric,  Neck: supple, normal ROM  CV: RRR, no murmurs  Resp: CTAB, normal respiratory effort on ambient air  GI: soft, non-tender, non-distended, bowel sounds present and  normoactive  MSK: warm and well-perfused, normal tone  Skin: Red erythematous rash in the upper chest and arms.   Neuro: Alert and interactive, moves all extremities equally, no focal deficits    Labs & Studies: I personally reviewed the following studies:  ROUTINE LABS (Last four results):  CMP  Recent Labs   Lab 08/30/21  0739 08/29/21  0737 08/28/21 2210 08/28/21  0800 08/27/21  1121 08/27/21  1006 08/25/21  0810 08/23/21  1121    136  --  138 138  --  138 137   POTASSIUM 3.3* 3.4 3.7 3.3* 3.1*  --  3.4 3.7   CHLORIDE 105 104  --  106 106  --  104 106   CO2 27 27  --  27 27  --  25 27   ANIONGAP 8 5  --  5 5  --  9 4   GLC 83 88  --  101* 104*  --  121* 138*   BUN 8 9  --  10 8  --  10 9   CR 0.61 0.67  --  0.69 0.68  --  1.01 0.77   GFRESTIMATED >90 >90  --  >90 >90  --  62 87   VIKTORIA 8.5 8.3*  --  8.4* 8.6  --  9.2 9.0   PROTTOTAL  --  6.2*  --   --  6.7*  --  7.1 7.3   ALBUMIN  --  1.6*  --   --  1.8*  --  2.2* 2.6*   BILITOTAL  --  0.7  --   --  0.6 0.6 0.9 0.8   ALKPHOS  --  186*  --   --  226*  --  245* 249*   AST  --  8  --   --  11  --  8 11   ALT  --  11  --   --  15  --  19 32     CBC  Recent Labs   Lab 08/30/21  0739 08/29/21  0737 08/28/21 2210 08/28/21  0800 08/27/21  1121   WBC 5.7 7.2  --  8.6 11.4*   RBC 2.46* 2.38*  --  2.18* 2.44*   HGB 7.2* 7.0* 7.3* 6.4* 7.1*   HCT 23.9* 22.5*  --  20.5* 23.0*   MCV 97 95  --  94 94   MCH 29.3 29.4  --  29.4 29.1   MCHC 30.1* 31.1*  --  31.2* 30.9*   RDW 17.2* 17.2*  --  17.4* 17.2*    216  --  206 238     INR  Recent Labs   Lab 08/27/21  1121   INR 1.29*       Assessment & Plan:   Richa Ashby is a 56 year old female with Undifferentiated Pleomorphic Sarcoma is admitted with fever and hypotension. She has been on Gemzar and Keytruda since June 2021. All the infectious work up has been negative so far.     Recommendations:   - This is unlikely the fevers are from Gemcitabine / chemotherapy , possible fevers are from underling malignancy  itself.   - We have ordered a peripheral smear , to look for Gemcitabine related hemolytic fevers. But this is very unlikely.   - If she is clinically stable, okay to be discharged from out standpoint.   - Follow up with Oncology Outpatient as scheduled on 9/8/2021 with Harshal Gore PA-C.      Please do not hesitate to page with any questions or concerns.    Patient was seen and plan of care was discussed with attending physician Dr. Levine.    Danii Joyner MD  Hematology/Oncology/Transplant Fellow   Pgr :: [phone]

## 2021-08-30 NOTE — PROGRESS NOTES
[facility] CONSULTATION     Patient:  Richa Ashby   YOB: 1965  Date of Visit:  08/30/2021  Date of Admission: 8/25/2021  Consult Requester:Moises Herrmann DO          Assessment and Recommendations:   ID Problem List:  1. Fevers, non-neutropenic - differential of Cancer vs. Infection  1. Elevated CRP  2. Mildly elevated procalcitonin (neither joseph high nor normal)  3. Antecedent antibiotics with levofloxacin and Cefepime -- this may have falsely sterilized cultures and suppressed procalcitonin (also azithromycin)  4. At risk of post-obstructive pneumonia, but CXR does not reveal this.  2. Metastatic pleomorphic sarcoma on Keytruda and Gemzar  1. Keytruda = Antineoplastic-Anti-Programmed Cell Death Receptor-1 (PD-1) inhibitor = potential cause of fevers and/or exaggerated response to infection. This is likely why the temp was so high.      Recommendations:  1. Trend CRP and procalcitonin  1. If these biomarkers continue to rise, then likely would favor empiric trial of therapy, such as doxycycline 100mg BID -- in the absence of a definitive diagnosis.   2. Continue to monitor off of antibiotics (ordered).  3. Redraw blood cultures with am labs. (ordered)  4. 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 Fevers have been ongoing for the past couple months. Seemed to get better following June admission and with steroids earlier in August. As she has not been persistently neutropenic and is overall clinically stable we can take step-wise approach to diagnosis. She does not have much for localizing symptoms which makes it difficult to target etiology. Basic infectious work-up has thus far been norevealing.    Negative:  -Lyme PCR  -blood parasitology negative.   -HIV neg  -Beta-D-glucan normal  -COVID neg  -Influenza neg  -Aspergillus galactomannin  -Strep Group A  - Blood cultures 8/27 NGTD    Thank you for the consult. ID will continue to follow with you.     Jez Lopez MD  [phone]  ________________________________________________________________    Consult Question: Patient with fevers (up to 104) and hypotension, no clear infectious etiology, UA CXR bland, BCx NG2D, Strep -, RPP-, GIPP and C diff pending, tick panel pending           History of Present Illness:   History obtained from review of chart and discussion with patient.     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    She was previously hospitalized 6/17-6/20 for fevers of unknown etiology. Fevers were waxing and waning for several weeks prior to admission. She also noted profound fatigue. Couldn't tolerate more than 1 outing per day (eg. salon, grocery store, etc).  Infectious work-up was completed including BC (negative), UC (negative), Enteric panel (negative), C diff (negative), COVID (negative), and quant gold (negative). She was on cefepime while inpatient and then transitioned to Augmentin for 7 days on discharge. Fevers apparently resolved while inpatient and on antibiotics. Gemcitabine fevers were in the differential although felt to be less likely given presentation and patient malaise.    She reports that following this admission she felt well for about 2 weeks. She doesn't take her temperature when she feels well and presumably did not have fevers during this time. She then started to have waxing and waning fevers  again, sometimes up as high as 102 or so. She continued to have fatigue. She was treated for a UTI 7/24 due to urinary symptoms with Macrobid for 1 week. UC grew <10K colonies urogenital kamar. She was given a short prednisone taper at the beginning of August due to joint pain which was attributed to Keytruda. Joint pain is improved and involves less joints (currently only hands, feet and ankles at present). She also was given a Z-pack starting 8/9 due to worsening cough. She reports she felt better on the steroids and was not having fevers.     She then noted fevers starting around 8/20. In retrospect she thinks fevers may have started a week before then because she wasn't feeling well for a week prior. She called the Oncology clinic on 8/23. Infectious work-up collected including respiratory panel and COVID PCR which were negative. UA had many squamous cells and 10-25 WBC; UC grew >100K urogenital kamar. Due to concern for UTI she was started on levofloxacin 750mg for 5 days. She was seen for Oncology follow-up on 8/25 and noted to be hypotensive and tachycardic in addition to ongoing fevers at home. She was given IVF and a dose of cefepime. She was advised to go the ED for further evaluation. Of note, most recent gemcitabine infusion was 8/11.    In the ED 2 sets of BC collected, so far NGTD. Repeat UA with 5 WBC and <1 RBC. CXr without acute airspace disease. Respiratory panel negative, COVID negative, Rapid Strep swab negative, C diff collected and negative, WBC remains WNL, lactic acid normal, procal on 8/25 0.30. She developed fever up to 106.6 on 8/26. Additional BC collected 8/27 NGTD as well as blood parasitology exam (negative for Anaplasma and Babesia). Lyme disease DNA PCR, and C diff are negative;  [place] virus ordered.    Connie reports stable intermittent, non-productive cough. No SOB, but it does feel like she can't take a full deep breath. She developed some upper abdominal pain today, it feels  muscular in nature to her. She had diarrhea this AM. Stools have been ranging from hard pellets to soft and formed to diarrhea, but not consistently any one consistency. She does have some nausea. No recent emesis. Continues to have some joint pain in hands, feet and ankles along with some mild swelling. Posterior roof of mouth feels a little scratchy. Occasional headaches sometimes from coughing. Some visual blocking where portions of her vision are greyed out. No hallucinations. No significant rashes. No bleeding noted.     Connie and her  live in a [place] apartment. No pets or animals in the house. No recent significant animal exposure (reports she went to the zoo recently, but no direct contact with animals or animal feces). She is somewhat active outside of the home in the urban environment to stores, etc. No recent hiking or time at a cabin. 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          Review of Systems:   10 point review of systems was negative except for noted as above in HPI.            Current Medications:       cetirizine  10 mg Oral Daily     enoxaparin ANTICOAGULANT  40 mg Subcutaneous Q24H     fluticasone-vilanterol  1 puff Inhalation Daily     gabapentin  600 mg Oral BID     gabapentin  900 mg Oral At Bedtime     heparin  5-10 mL Intracatheter Q28 Days     heparin lock flush  5-10 mL Intracatheter Q24H     montelukast  10 mg Oral Daily     multivitamin w/minerals  1 tablet Oral Daily     nystatin  500,000 Units Swish & Swallow 4x Daily     omeprazole  20 mg Oral QAM AC     sodium chloride (PF)  10-20 mL Intracatheter Q28 Days     sodium chloride (PF)  3 mL Intracatheter Q8H     vitamin D3  50 mcg Oral Daily            Allergies:     Allergies   Allergen Reactions     Pentobarbital      resp arrest            Physical Exam:   Vitals were reviewed  Temp:  [98.3  F (36.8  C)-99.3  F (37.4  C)] 98.5  F (36.9  C)  Pulse:  [87-96]  96  Resp:  [14-16] 16  BP: ()/(38-56) 104/50  SpO2:  [93 %-98 %] 93 %    Vitals:    08/29/21 1000 08/30/21 0810   Weight: 91.4 kg (201 lb 6.4 oz) 90.7 kg (199 lb 15.3 oz)       Constitutional: Adult female seen sitting up in bed, in NAD. Awake, alert, interactive.  HEENT: NC/AT, EOMI, sclera clear, conjunctiva normal, OP with MMM, tongue with white/tan coating, no lesions seen  Respiratory: No increased work of breathing, CTAB, no crackles or wheezing.  Cardiovascular: RRR, no murmur noted. Trace lower and upper extremity edema.  GI: Normal bowel sounds, soft, non-distended and non-tender.  Skin: Warm, dry, well-perfused. No bruising, bleeding, rashes, or lesions on limited exam.  Musculoskeletal: Extremities grossly normal, non-tender. Good strength and ROM in bed.   Neurologic: A&O. Answers questions appropriately, speech normal. Moves all extremities spontaneously.  Neuropsychiatric: Calm. Affect appropriate to situation.           Laboratory Data:     Microbiology:  Culture   Date Value Ref Range Status   08/27/2021 No growth after 3 days  Preliminary   08/27/2021 No growth after 3 days  Preliminary   08/25/2021 No Growth  Final   08/25/2021 No Growth  Final   08/23/2021 >100,000 CFU/mL Mixture of urogenital kamar  Final   08/09/2021 No Growth  Final   08/09/2021 No Growth  Final   07/24/2021 <10,000 CFU/mL Mixture of urogenital kamar  Final     Culture Micro   Date Value Ref Range Status   06/17/2021 No growth  Final   06/17/2021 No growth  Final   06/17/2021 No growth  Final   11/27/2020 >100,000 colonies/mL  Klebsiella pneumoniae   (A)  Final       Inflammatory Markers    Recent Labs   Lab Test 08/27/21  1121 08/09/21  1124   .0* 257.0*       Metabolic Studies       Recent Labs   Lab Test 08/30/21  0739 08/29/21  0737 08/28/21  2210 08/28/21  0800 08/27/21  2213 08/27/21  1121 08/27/21  0011 08/25/21  2326 08/25/21  1459 08/25/21  0810 08/23/21  1121 08/23/21  1121 06/23/21  0838 06/20/21  0231  06/19/21  0445    136  --  138  --  138  --   --   --  138  --  137   < > 138 140   POTASSIUM 3.3* 3.4 3.7 3.3* 3.3* 3.1*  --   --   --  3.4  --  3.7   < > 3.6 3.9   CHLORIDE 105 104  --  106  --  106  --   --   --  104  --  106   < > 105 109   CO2 27 27  --  27  --  27  --   --   --  25  --  27   < > 28 26   ANIONGAP 8 5  --  5  --  5  --   --   --  9  --  4   < > 5 5   BUN 8 9  --  10  --  8  --   --   --  10  --  9   < > 8 7   CR 0.61 0.67  --  0.69  --  0.68  --   --  0.74 1.01  --  0.77   < > 0.60 0.64   GFRESTIMATED >90 >90  --  >90  --  >90  --   --  >90 62  --  87   < > >90 >90   GLC 83 88  --  101*  --  104*  --   --   --  121*  --  138*   < > 92 89   VIKTORIA 8.5 8.3*  --  8.4*  --  8.6  --   --   --  9.2  --  9.0   < > 8.5 8.3*   PHOS  --   --   --   --   --   --   --   --   --   --   --   --   --  3.1 3.0   MAG  --   --   --   --   --   --   --   --   --   --   --   --   --  2.0 2.1   LACT  --   --   --   --   --   --  0.9 0.8  --   --    < >  --   --   --   --     < > = values in this interval not displayed.       Hepatic Studies    Recent Labs   Lab Test 08/29/21  0737 08/27/21  1121 08/27/21  1006 08/25/21  0810 08/23/21  1121 08/11/21  1251 08/09/21  1124 05/26/21  1038 05/10/21  0739   BILITOTAL 0.7 0.6 0.6 0.9 0.8 0.4 1.1   < > 0.6   ALKPHOS 186* 226*  --  245* 249* 199* 238*   < > 98   ALBUMIN 1.6* 1.8*  --  2.2* 2.6* 2.7* 2.8*   < > 2.8*   AST 8 11  --  8 11 13 20   < > 7   ALT 11 15  --  19 32 26 21   < > 13   LDH  --  152  --   --   --   --   --   --  143    < > = values in this interval not displayed.       Hematology Studies      Recent Labs   Lab Test 08/30/21  1112 08/30/21  0739 08/29/21  0737 08/28/21  2210 08/28/21  0800 08/27/21  1121 08/27/21  1006 08/23/21  1147 08/04/21  1350 07/28/21  1154 07/18/21  0904 07/14/21  1013 07/07/21  1127   WBC 6.4 5.7 7.2  --  8.6 11.4* 10.0 10.1 4.8 4.1 3.3* 1.7* 4.3   ANEU  --   --   --   --   --   --   --  7.6 3.4 2.6 1.9 0.6* 2.8   ALYM  --   --    --   --   --   --   --  1.5 0.8 0.7* 0.8 0.8 0.8   ROGELIO  --   --   --   --   --   --   --  0.9 0.5 0.7 0.4 0.1 0.4   AEOS  --   --   --   --   --   --   --  0.1 0.1 0.2 0.1 0.1 0.2   HGB 7.6* 7.2* 7.0* 7.3* 6.4* 7.1* 5.7* 8.2* 7.5* 8.6* 8.6* 9.0* 9.2*   HCT 24.6* 23.9* 22.5*  --  20.5* 23.0* 18.9* 26.2* 23.5* 26.9* 27.4* 28.3* 29.3*    219 216  --  206 238 207 131* 133* 96* 82* 126* 148*       Urine Studies     Recent Labs   Lab Test 08/25/21  0935 08/23/21  1147 07/24/21  1146 06/17/21  1817 11/27/20  1030   URINEPH 6.0 6.5 7.0 5.5 6.0   NITRITE Negative Negative Negative Negative Negative   LEUKEST Negative Large* Large* Negative Small*   WBCU 5 10-25* 25-50* 1 10-25*       Last check of C difficile  C Diff Toxin B PCR   Date Value Ref Range Status   06/17/2021 Negative NEG^Negative Final     Comment:     Negative: C. difficile target DNA sequences NOT detected, presumed negative   for C.difficile toxin B or the number of bacteria present may be below the   limit of detection for the test.  FDA approved assay performed using [employer] GeneXpert real-time PCR.  A negative result does not exclude actual disease due to C. difficile and may   be due to improper collection, handling and storage of the specimen or the   number of organisms in the specimen is below the detection limit of the assay.       C Difficile Toxin B by PCR   Date Value Ref Range Status   08/27/2021 Negative Negative Final     Comment:     A negative result does not exclude actual disease due to C. difficile and may be due to improper collection, handling and storage of the specimen or the number of organisms in the specimen is below the detection limit of the assay.              Imaging:   CXR 8/25  Impression: No acute airspace disease. Stable bilateral lung masses.

## 2021-08-31 VITALS
RESPIRATION RATE: 16 BRPM | OXYGEN SATURATION: 98 % | WEIGHT: 199.96 LBS | HEART RATE: 98 BPM | SYSTOLIC BLOOD PRESSURE: 123 MMHG | TEMPERATURE: 99.6 F | DIASTOLIC BLOOD PRESSURE: 70 MMHG | BODY MASS INDEX: 31.32 KG/M2

## 2021-08-31 LAB
ABO/RH(D): NORMAL
ALBUMIN SERPL-MCNC: 1.7 G/DL (ref 3.4–5)
ALP SERPL-CCNC: 186 U/L (ref 40–150)
ALT SERPL W P-5'-P-CCNC: 13 U/L (ref 0–50)
ANION GAP SERPL CALCULATED.3IONS-SCNC: 6 MMOL/L (ref 3–14)
ANTIBODY SCREEN: NEGATIVE
ASPERGILLUS AB TITR SER CF: NORMAL {TITER}
AST SERPL W P-5'-P-CCNC: 11 U/L (ref 0–45)
B BURGDOR IGG+IGM SER QL: 0.24
B DERMAT AB SER-ACNC: 0.3 IV
BILIRUB DIRECT SERPL-MCNC: 0.1 MG/DL (ref 0–0.2)
BILIRUB SERPL-MCNC: 0.4 MG/DL (ref 0.2–1.3)
BLD PROD TYP BPU: NORMAL
BLOOD COMPONENT TYPE: NORMAL
BUN SERPL-MCNC: 6 MG/DL (ref 7–30)
CALCIUM SERPL-MCNC: 8.3 MG/DL (ref 8.5–10.1)
CHLORIDE BLD-SCNC: 106 MMOL/L (ref 94–109)
CO2 SERPL-SCNC: 26 MMOL/L (ref 20–32)
COCCIDIOIDES AB TITR SER CF: NORMAL {TITER}
CODING SYSTEM: NORMAL
CREAT SERPL-MCNC: 0.61 MG/DL (ref 0.52–1.04)
CROSSMATCH: NORMAL
CRP SERPL-MCNC: 180 MG/L (ref 0–8)
ERYTHROCYTE [DISTWIDTH] IN BLOOD BY AUTOMATED COUNT: 17.4 % (ref 10–15)
GFR SERPL CREATININE-BSD FRML MDRD: >90 ML/MIN/1.73M2
GLUCOSE BLD-MCNC: 103 MG/DL (ref 70–99)
H CAPSUL MYC AB TITR SER CF: NORMAL {TITER}
H CAPSUL YST AB TITR SER CF: NORMAL {TITER}
HCT VFR BLD AUTO: 22.6 % (ref 35–47)
HGB BLD-MCNC: 6.8 G/DL (ref 11.7–15.7)
ISSUE DATE AND TIME: NORMAL
MCH RBC QN AUTO: 28.8 PG (ref 26.5–33)
MCHC RBC AUTO-ENTMCNC: 30.1 G/DL (ref 31.5–36.5)
MCV RBC AUTO: 96 FL (ref 78–100)
PLATELET # BLD AUTO: 233 10E3/UL (ref 150–450)
POTASSIUM BLD-SCNC: 3.2 MMOL/L (ref 3.4–5.3)
PROCALCITONIN SERPL-MCNC: 0.78 NG/ML
PROT SERPL-MCNC: 6.2 G/DL (ref 6.8–8.8)
RBC # BLD AUTO: 2.36 10E6/UL (ref 3.8–5.2)
SODIUM SERPL-SCNC: 138 MMOL/L (ref 133–144)
SPECIMEN EXPIRATION DATE: NORMAL
UNIT ABO/RH: NORMAL
UNIT NUMBER: NORMAL
UNIT STATUS: NORMAL
UNIT TYPE ISBT: 5100
WBC # BLD AUTO: 5.7 10E3/UL (ref 4–11)
WNV RNA SPEC QL NAA+PROBE: NOT DETECTED

## 2021-08-31 PROCEDURE — P9016 RBC LEUKOCYTES REDUCED: HCPCS | Performed by: STUDENT IN AN ORGANIZED HEALTH CARE EDUCATION/TRAINING PROGRAM

## 2021-08-31 PROCEDURE — 86618 LYME DISEASE ANTIBODY: CPT | Performed by: INTERNAL MEDICINE

## 2021-08-31 PROCEDURE — 250N000013 HC RX MED GY IP 250 OP 250 PS 637: Performed by: STUDENT IN AN ORGANIZED HEALTH CARE EDUCATION/TRAINING PROGRAM

## 2021-08-31 PROCEDURE — 250N000013 HC RX MED GY IP 250 OP 250 PS 637

## 2021-08-31 PROCEDURE — 250N000011 HC RX IP 250 OP 636: Performed by: STUDENT IN AN ORGANIZED HEALTH CARE EDUCATION/TRAINING PROGRAM

## 2021-08-31 PROCEDURE — 36591 DRAW BLOOD OFF VENOUS DEVICE: CPT | Performed by: STUDENT IN AN ORGANIZED HEALTH CARE EDUCATION/TRAINING PROGRAM

## 2021-08-31 PROCEDURE — 85027 COMPLETE CBC AUTOMATED: CPT | Performed by: STUDENT IN AN ORGANIZED HEALTH CARE EDUCATION/TRAINING PROGRAM

## 2021-08-31 PROCEDURE — 99239 HOSP IP/OBS DSCHRG MGMT >30: CPT | Mod: GC | Performed by: INTERNAL MEDICINE

## 2021-08-31 PROCEDURE — 82248 BILIRUBIN DIRECT: CPT | Performed by: STUDENT IN AN ORGANIZED HEALTH CARE EDUCATION/TRAINING PROGRAM

## 2021-08-31 PROCEDURE — 99233 SBSQ HOSP IP/OBS HIGH 50: CPT | Performed by: INTERNAL MEDICINE

## 2021-08-31 PROCEDURE — 84145 PROCALCITONIN (PCT): CPT | Performed by: STUDENT IN AN ORGANIZED HEALTH CARE EDUCATION/TRAINING PROGRAM

## 2021-08-31 PROCEDURE — 87040 BLOOD CULTURE FOR BACTERIA: CPT | Performed by: INTERNAL MEDICINE

## 2021-08-31 PROCEDURE — 80048 BASIC METABOLIC PNL TOTAL CA: CPT | Performed by: STUDENT IN AN ORGANIZED HEALTH CARE EDUCATION/TRAINING PROGRAM

## 2021-08-31 PROCEDURE — 86140 C-REACTIVE PROTEIN: CPT | Performed by: INTERNAL MEDICINE

## 2021-08-31 PROCEDURE — 86923 COMPATIBILITY TEST ELECTRIC: CPT | Performed by: STUDENT IN AN ORGANIZED HEALTH CARE EDUCATION/TRAINING PROGRAM

## 2021-08-31 PROCEDURE — 86901 BLOOD TYPING SEROLOGIC RH(D): CPT | Performed by: STUDENT IN AN ORGANIZED HEALTH CARE EDUCATION/TRAINING PROGRAM

## 2021-08-31 PROCEDURE — 250N000013 HC RX MED GY IP 250 OP 250 PS 637: Performed by: INTERNAL MEDICINE

## 2021-08-31 RX ORDER — GABAPENTIN 300 MG/1
CAPSULE ORAL
Qty: 90 CAPSULE | Refills: 1 | Status: SHIPPED | OUTPATIENT
Start: 2021-08-31 | End: 2021-09-10

## 2021-08-31 RX ORDER — POTASSIUM CHLORIDE 750 MG/1
40 TABLET, EXTENDED RELEASE ORAL ONCE
Status: COMPLETED | OUTPATIENT
Start: 2021-08-31 | End: 2021-08-31

## 2021-08-31 RX ADMIN — GABAPENTIN 600 MG: 300 CAPSULE ORAL at 08:16

## 2021-08-31 RX ADMIN — NYSTATIN 500000 UNITS: 500000 SUSPENSION ORAL at 08:17

## 2021-08-31 RX ADMIN — Medication 15 ML: at 10:53

## 2021-08-31 RX ADMIN — ALBUTEROL SULFATE 2 PUFF: 90 INHALANT RESPIRATORY (INHALATION) at 05:25

## 2021-08-31 RX ADMIN — GUAIFENESIN AND CODEINE PHOSPHATE 5 ML: 10; 100 LIQUID ORAL at 05:07

## 2021-08-31 RX ADMIN — ACETAMINOPHEN 650 MG: 325 TABLET, FILM COATED ORAL at 08:17

## 2021-08-31 RX ADMIN — MULTIPLE VITAMINS W/ MINERALS TAB 1 TABLET: TAB at 08:17

## 2021-08-31 RX ADMIN — ACETAMINOPHEN 650 MG: 325 TABLET, FILM COATED ORAL at 14:31

## 2021-08-31 RX ADMIN — NYSTATIN 500000 UNITS: 500000 SUSPENSION ORAL at 12:33

## 2021-08-31 RX ADMIN — MONTELUKAST 10 MG: 10 TABLET, FILM COATED ORAL at 08:17

## 2021-08-31 RX ADMIN — OMEPRAZOLE 20 MG: 20 CAPSULE, DELAYED RELEASE ORAL at 08:16

## 2021-08-31 RX ADMIN — CETIRIZINE HYDROCHLORIDE 10 MG: 10 TABLET, FILM COATED ORAL at 08:17

## 2021-08-31 RX ADMIN — GABAPENTIN 600 MG: 300 CAPSULE ORAL at 13:41

## 2021-08-31 RX ADMIN — SODIUM CHLORIDE, PRESERVATIVE FREE 5 ML: 5 INJECTION INTRAVENOUS at 14:22

## 2021-08-31 RX ADMIN — POTASSIUM CHLORIDE 40 MEQ: 750 TABLET, EXTENDED RELEASE ORAL at 12:36

## 2021-08-31 RX ADMIN — Medication 50 MCG: at 08:16

## 2021-08-31 RX ADMIN — FLUTICASONE FUROATE AND VILANTEROL TRIFENATATE 1 PUFF: 100; 25 POWDER RESPIRATORY (INHALATION) at 08:17

## 2021-08-31 RX ADMIN — SODIUM CHLORIDE, PRESERVATIVE FREE 5 ML: 5 INJECTION INTRAVENOUS at 08:16

## 2021-08-31 RX ADMIN — BENZOCAINE AND MENTHOL 1 LOZENGE: 15; 3.6 LOZENGE ORAL at 01:02

## 2021-08-31 ASSESSMENT — ACTIVITIES OF DAILY LIVING (ADL)
ADLS_ACUITY_SCORE: 17

## 2021-08-31 NOTE — PLAN OF CARE
Pt left unit with  at 1445. AVS reviewed with pt and , all questions answered. Pt reminded of all follow-up appointments and medication changes. Med to be picked-up from outside pharmacy. Port de-accessed. Pt updated on plan going forward by team. Pt and  comfortable with plan.

## 2021-08-31 NOTE — PLAN OF CARE
Tmax 99.8 oral.  Reports mild headache; declined interventions.  Brief neuros intact except baseline n/t to bottom of feet.  Left Port HL'ed.  Voiding spontaneously.  Last BM on 8/30.  Up ad andrews.  On a regular diet, ezra counts day 2.  Improving PO intake yesterday per previous RN and patient report.  Has unchanged dry cough; coughs drops and Robitussin PRN. Possible discharge home today pending Oncology recs. Continue with POC.

## 2021-08-31 NOTE — PLAN OF CARE
Status: here d/t fever and hypotension, infection work-up. Hx of metastatic pleomorphic sarcoma  Vitals: hypotensive at 1500, recheck WNL. Tmax 99.1  Neuros: Intact  IV: Port, heparin locked. Dressing changed this shift  Resp/trach: LS clear, denies SOB  Diet: Regular diet, calorie counts  Bowel status: LBM 8/30  : voiding spontaneously  Skin: Intact  Pain: Mild HA, Tylenol given with relief. Volteran gel for bilateral ankle pain  Activity: up ad andrews in room  Social:  at bedside this afternoon, supportive of care  Plan: discharge home tomorrow by 11am

## 2021-08-31 NOTE — PROGRESS NOTES
Calorie Count  Intake recorded for: 8/30  Total Kcals: 881 Total Protein: 52g  Kcals from Hospital Food: 881  Protein: 52g  Kcals from Outside Food (average):0 Protein: 0g  # Meals Ordered from Kitchen: 3 meals   # Meals Recorded: 3 meals (First - 100% cheerios w/ sugar & 4oz milk)      (Second - 100% 3 packets saltines, 4 slices of cheese, peaches)      (Third - 100% chicken noodle soup, Greek yogurt)  # Supplements Recorded: 100% 1 [employer]    Note: pt also had pesto noodle & chicken from [employer] but not enough information about amounts given to calculate calories/protein.

## 2021-08-31 NOTE — PROVIDER NOTIFICATION
DATE:  8/31/2021   TIME OF RECEIPT FROM LAB:  0758  LAB TEST:  Hemoglobin  LAB VALUE:  6.8  RESULTS GIVEN WITH READ-BACK TO (PROVIDER):  Diaz Guardado MD  TIME LAB VALUE REPORTED TO PROVIDER:   0801

## 2021-08-31 NOTE — PROGRESS NOTES
BRIEF ONCOLOGY NOTE      This patient was not seen by the oncology team today. Chart and labs were reviewed. Patient was discussed with primary team.     Patient discharging today. She has oncology follow up on 9/8 with Harshal EMERY. Details regarding oncology follow up and discharge instructions included in AVS.     Thank you for involving us in the care of this patient. Please do not hesitate to reach out if there are questions/concerns.     Jaquelin Engel PA-C  Hematology/Oncology  Pager: [phone]  Desk phone: [phone]

## 2021-08-31 NOTE — DISCHARGE SUMMARY
[facility]  Discharge Summary - Medicine & Pediatrics       Date of Admission:  8/25/2021  Date of Discharge:  8/31/2021  Discharging Provider: Dr. 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       Call [phone] if you have questions about appointments at [facility].             Unresulted Labs Ordered in the Past 30 Days of this Admission     Date and Time Order Name Status Description    8/31/2021  8:45 AM Prepare red blood cells (unit) Preliminary     8/31/2021 12:01 AM Blood Culture Peripheral Blood In process     8/30/2021 12:58 PM Fungal Antibodies In process     8/27/2021  4:43 PM Blastomyces Agn Quant EIA Blood In process     8/27/2021 11:00 AM Prepare red blood cells (unit) Preliminary     8/27/2021 11:00 AM Prepare red blood cells (unit) Preliminary     8/27/2021  9:19 AM Blood Culture Arm, Left Preliminary     8/27/2021  9:19 AM Blood Culture Peripheral Blood Preliminary         These results will be followed up by Oncology     Discharge Disposition   Discharged to home  Condition at discharge: Stable      Hospital Course   Richa Ashby is a 57 yo female with pleomorphic sarcoma (mets to lungs), asthma, and GERD, who was admitted on 8/25/2021 for hypotension and 1-2 week h/o cyclical fevers of unknown etiology.    Of note, previously admitted 6/17/21- 6/20/21 with severe fatigue and cyclical fevers. Treated with cefepime as inpatient and  transitioned to 7 day course of Augmentin at discharge. Felt well for ~2 weeks, but then developed fevers and was treated for UTI with 7 day course of Macrobid starting 7/24.     Developed waxing/waning fevers on 8/20, possibly earlier. Seen by PCP 3 days PTA (8/22) and started on Levaquin for suspected UTI. Continued to experience tylenol-responsive fevers. Presented to oncology clinic on the day of admission (8/25) for treatment. Evaluation in clinic revealed fever and hypotension. Given 1L IVFB and a dose of cefepime and advised to f/u in the ED.    In the ED, WBC 10.6 (elevated from pt's baseline ~4). UA and CXR were unremarkable. Lactate and procalcitonin wnl. No clear source on exam. Hypotension was fluid response and pt denied other infectious symptoms.     On admission, ID and Heme/Onc were consulted, given concern for infectious vs chemo-related source of fevers. Infectious workup, including lyme PCR, blood parasitology, HIV, beta-D-glucan, COVID, influenza, aspergillus galactomannin, West Nile, and strep group A was unrevealing. Blood cultures from 8/27 with NGTD. ID recommended trending procalcitonin and CRP, which improved to a range wnl on day of discharge (8/31). Also recommended giving rifaximin (550 mg PO BID) x10 days during cycles of chemotherapy to decrease any small bowel overgrowth or transient bacterial infection. Could also consider empiric tx of Bactrim (1 tab BID x 7-10 days) if symptoms worsening at oncology follow-up to cover opportunistic pathogens (I.e. nocardia, toxo, PCP) that could be contributing to resp disease (cough). Per Heme/Onc evaluation, fevers likely not chemo-related.    During admission, also managed acute-on-chronic anemia, which required transfusion x2 (1u pRBCs on 8/28 for Hgb 6.4 and 1u pRBCs on 8/31 for Hgb 6.8). Based on chart review, anemia likely chemo-related as Hgb has been sensitive to treatment. Discussed with Heme/Onc. Given previous h/o transfusion for  chemo-related acute-on-chronic anemia, pt did not require continued observation after transfusion on day of discharge (8/31).     At discharge, pt denied infectious symptoms apart from chronic cough and mild congestion. Had a fever to 100.4F on morning of discharge, which normalized with tylenol. She was in stable condition and agreeable to discharge home with follow up with Oncology in ~1.5 weeks (appointment 8/9/21).    The following problems were addressed during her hospitalization:    Fevers of unknown etiology  Hypotension  Acute on chronic anemia (requiring pRBC transfusion x2)    Consultations This Hospital Stay   CARE MANAGEMENT / SOCIAL WORK IP CONSULT  INFECTIOUS DISEASE GENERAL ADULT IP CONSULT  ONCOLOGY ADULT IP CONSULT  VASCULAR ACCESS CARE ADULT IP CONSULT    Code Status   Full Code       The patient was discussed with Dr. Nixon.    Mason Guardado MD  [address] [address]  Phone: [phone]  ______________________________________________________________________    Physical Exam   Vital Signs: Temp: 98  F (36.7  C) Temp src: Oral BP: 112/65 Pulse: 89   Resp: 18 SpO2: 98 % O2 Device: None (Room air)    Weight: 199 lbs 15.32 oz  General Appearance: NT appearing, in NAD  Eyes: EOMI, visual fields intact  HEENT: MMM, no oropharyngeal erythema  Respiratory: CTAB  Cardiovascular: RRR  GI: NBS, S, ND, no TTP  Genitourinary: No suprapubic or CVA TTP  Skin: No notable lesions  Musculoskeletal: No edema  Neurologic: No nuchal rigidity, strength and sensation intact, normal gait      Primary Care Physician   King Diaz    Discharge Orders      Adult [facility] Follow-up and recommended labs and tests    You have an oncology appointment with Harshal on 9/8 to make sure you are doing well outside of the hospital and to discuss further treatment plans.     Call [phone] if you have questions about appointments at [facility].     When to  contact your care team    [facility] cancer clinic triage line at [phone] for temp > or = 100.4, uncontrolled nausea/vomiting/diarrhea/constipation, unrelieved pain, bleeding not relieved with pressure, dizziness, chest pain, shortness of breath, loss of consciousness, and any new or concerning symptoms.       Significant Results and Procedures   Results for orders placed or performed during the hospital encounter of 08/25/21   XR Chest Port 1 View    Narrative    Exam: XR CHEST PORT 1 VIEW, 8/25/2021 10:06 AM    Indication: cough    Comparison: 8/23/2021    Findings:   Right chest wall internal jugular Port-A-Cath tip at the high SVC.  Stable bilateral lung masses, the largest in the right paramediastinal  region. The cardiomediastinal silhouette and pulmonary vasculature are  within normal limits. No pleural effusion or pneumothorax. No new  focal airspace opacity.      Impression    Impression: No acute airspace disease. Stable bilateral lung masses.    ADEEL ROJAS DO         SYSTEM ID:  [id number]       Discharge Medications   Current Discharge Medication List      CONTINUE these medications which have NOT CHANGED    Details   acetaminophen (TYLENOL) 325 MG tablet Take 325-650 mg by mouth every 6 hours as needed for mild pain      ADVAIR DISKUS 100-50 MCG/DOSE inhaler Inhale 1 puff into the lungs daily       albuterol (PROAIR RESPICLICK) 108 (90 Base) MCG/ACT inhaler Inhale 1-2 puffs into the lungs      cetirizine (ZYRTEC) 10 MG tablet Take 10 mg by mouth daily      gabapentin (NEURONTIN) 300 MG capsule Take 1 capsule (300 mg) by mouth 3 times daily  Qty: 90 capsule, Refills: 1    Associated Diagnoses: Drug-induced polyneuropathy (H)      levofloxacin (LEVAQUIN) 750 MG tablet Take 1 tablet (750 mg) by mouth daily  Qty: 5 tablet, Refills: 0    Associated Diagnoses: Acute cystitis without hematuria; Fever, unspecified fever cause; Sarcoma of soft tissue (H)      melatonin (MELATONIN) 1 MG/ML LIQD liquid  Take 5 mg by mouth      montelukast (SINGULAIR) 10 MG tablet Take 10 mg by mouth daily      multivitamin w/minerals (MULTI-VITAMIN) tablet Take 1 tablet by mouth daily      omeprazole (PRILOSEC) 20 MG DR capsule 20 mg daily       prochlorperazine (COMPAZINE) 10 MG tablet Take 1 tablet (10 mg) by mouth every 6 hours as needed (Nausea/Vomiting)  Qty: 30 tablet, Refills: 5    Associated Diagnoses: Anemia in neoplastic disease; Malignant neoplasm metastatic to lung, unspecified laterality (H); Sarcoma of soft tissue (H); Sarcoma (H)      vitamin D3 (CHOLECALCIFEROL) 2000 units (50 mcg) tablet Take 1 tablet by mouth daily      hydrOXYzine (ATARAX) 25 MG tablet Take 1 tablet (25 mg) by mouth 3 times daily as needed for itching  Qty: 30 tablet, Refills: 1    Associated Diagnoses: Generalized pruritus      lidocaine-prilocaine (EMLA) 2.5-2.5 % external cream Apply topically as needed for moderate pain  Qty: 30 g, Refills: 3    Associated Diagnoses: Sarcoma of soft tissue (H)      ondansetron (ZOFRAN) 8 MG tablet Take 1 tablet (8 mg) by mouth every 8 hours as needed for nausea  Qty: 60 tablet, Refills: 1    Associated Diagnoses: Sarcoma (H)           Allergies   Allergies   Allergen Reactions     Pentobarbital      resp arrest

## 2021-08-31 NOTE — PROGRESS NOTES
[facility] INFECTIOUS DISEASES CONSULTATION     Patient:  Richa Ashby   YOB: 1965  Date of Visit:  08/31/2021  Date of Admission: 8/25/2021  Consult Requester:Moises Herrmann DO          Assessment and Recommendations:   ID Problem List:  1. Fevers, non-neutropenic - differential of Cancer vs. Infection  1. Elevated CRP -- this is improving over time. 270 -> 230 -> 180 mg/L  2. Mildly elevated procalcitonin (neither joseph high nor normal), improving 1.28 -> 0.78 ng/mL  3. Antecedent antibiotics with levofloxacin and Cefepime -- this may have falsely sterilized cultures and suppressed procalcitonin (also azithromycin)  4. At risk of post-obstructive pneumonia, but CXR does not reveal this.  2. Metastatic pleomorphic sarcoma on Keytruda and Gemzar  1. Keytruda = Antineoplastic-Anti-Programmed Cell Death Receptor-1 (PD-1) inhibitor = potential cause of fevers and/or exaggerated response to infection. This is likely why the temp was so high.      Recommendations:  1. Consider an empiric treatment course of TMP/SMZ 1 tab BID x 7-10 days.  This would cover nocardia, toxo, PCP,  and a few other opportunistic pathogens which could be contributing to smoldering lung disease (i.e. cough).  1. Mild elevation of procalcitonin means (to ID) that this cannot all be attributable to sarcoma.  2. Previously had no clinical response to fluoroquinolones x 3 days (thus if there is a bacterial etiology, not something that a fluoroquinolone covers).  2. Trend CRP   3. During cycles of chemotherapy, ID would recommend giving rifaximin 550mg PO BID x 10 days to attempt to decrease any small bowel overgrowth and/or transient bacterial infection, during any relative WBC neutropenia.       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 Fevers have been ongoing for the past couple months. Seemed to get better following June admission and with steroids earlier in August. As she has not been persistently neutropenic and is overall clinically stable we can take step-wise approach to diagnosis. She does not have much for localizing symptoms which makes it difficult to target etiology. Basic infectious work-up has thus far been norevealing.   Negative:  -Lyme PCR  -blood parasitology negative.   -HIV neg  -Beta-D-glucan normal  -COVID neg  -Influenza neg  -Aspergillus galactomannin  -Strep Group A  - Blood cultures 8/27 NGTD    Thank you for the consult. ID will continue to follow with you.     Jez Lopez MD  [phone]  ________________________________________________________________    Consult Question: Patient with fevers (up to 104) and hypotension, no clear infectious etiology, UA CXR bland, BCx NG2D, Strep -, RPP-, GIPP and C diff pending, tick panel pending           History of Present Illness:   History obtained from review of chart and discussion with patient.     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    She was previously hospitalized 6/17-6/20 for fevers of unknown etiology. Fevers were waxing and waning for several weeks prior to admission. She also noted profound fatigue. Couldn't tolerate more than 1 outing per day (eg. salon, grocery store, etc).  Infectious work-up was completed including BC (negative), UC (negative), Enteric panel (negative), C diff (negative), COVID (negative), and quant gold (negative). She was on cefepime while inpatient and then  transitioned to Augmentin for 7 days on discharge. Fevers apparently resolved while inpatient and on antibiotics. Gemcitabine fevers were in the differential although felt to be less likely given presentation and patient malaise.    She reports that following this admission she felt well for about 2 weeks. She doesn't take her temperature when she feels well and presumably did not have fevers during this time. She then started to have waxing and waning fevers again, sometimes up as high as 102 or so. She continued to have fatigue. She was treated for a UTI 7/24 due to urinary symptoms with Macrobid for 1 week. UC grew <10K colonies urogenital kamar. She was given a short prednisone taper at the beginning of August due to joint pain which was attributed to Keytruda. Joint pain is improved and involves less joints (currently only hands, feet and ankles at present). She also was given a Z-pack starting 8/9 due to worsening cough. She reports she felt better on the steroids and was not having fevers.     She then noted fevers starting around 8/20. In retrospect she thinks fevers may have started a week before then because she wasn't feeling well for a week prior. She called the Oncology clinic on 8/23. Infectious work-up collected including respiratory panel and COVID PCR which were negative. UA had many squamous cells and 10-25 WBC; UC grew >100K urogenital kamar. Due to concern for UTI she was started on levofloxacin 750mg for 5 days. She was seen for Oncology follow-up on 8/25 and noted to be hypotensive and tachycardic in addition to ongoing fevers at home. She was given IVF and a dose of cefepime. She was advised to go the ED for further evaluation. Of note, most recent gemcitabine infusion was 8/11.    In the ED 2 sets of BC collected, so far NGTD. Repeat UA with 5 WBC and <1 RBC. CXr without acute airspace disease. Respiratory panel negative, COVID negative, Rapid Strep swab negative, C diff collected and  negative, WBC remains WNL, lactic acid normal, procal on 8/25 0.30. She developed fever up to 106.6 on 8/26. Additional BC collected 8/27 NGTD as well as blood parasitology exam (negative for Anaplasma and Babesia). Lyme disease DNA PCR, and C diff are negative;  West nile virus ordered.    Connie reports stable intermittent, non-productive cough. No SOB, but it does feel like she can't take a full deep breath. She developed some upper abdominal pain today, it feels muscular in nature to her. She had diarrhea this AM. Stools have been ranging from hard pellets to soft and formed to diarrhea, but not consistently any one consistency. She does have some nausea. No recent emesis. Continues to have some joint pain in hands, feet and ankles along with some mild swelling. Posterior roof of mouth feels a little scratchy. Occasional headaches sometimes from coughing. Some visual blocking where portions of her vision are greyed out. No hallucinations. No significant rashes. No bleeding noted.     Connie and her  live in a [place] apartment. No pets or animals in the house. No recent significant animal exposure (reports she went to the zoo recently, but no direct contact with animals or animal feces). She is somewhat active outside of the home in the urban environment to stores, etc. No recent hiking or time at a cabin. 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          Review of Systems:   10 point review of systems was negative except for noted as above in HPI.            Current Medications:       cetirizine  10 mg Oral Daily     enoxaparin ANTICOAGULANT  40 mg Subcutaneous Q24H     fluticasone-vilanterol  1 puff Inhalation Daily     gabapentin  600 mg Oral BID     gabapentin  900 mg Oral At Bedtime     heparin  5-10 mL Intracatheter Q28 Days     heparin lock flush  5-10 mL Intracatheter Q24H     montelukast  10 mg Oral Daily     multivitamin w/minerals  1  tablet Oral Daily     nystatin  500,000 Units Swish & Swallow 4x Daily     omeprazole  20 mg Oral QAM AC     sodium chloride (PF)  10-20 mL Intracatheter Q28 Days     sodium chloride (PF)  3 mL Intracatheter Q8H     vitamin D3  50 mcg Oral Daily            Allergies:     Allergies   Allergen Reactions     Pentobarbital      resp arrest            Physical Exam:   Vitals were reviewed  Temp:  [98.4  F (36.9  C)-100.4  F (38  C)] 98.4  F (36.9  C)  Pulse:  [89-97] 93  Resp:  [14-18] 18  BP: ()/(41-60) 116/60  SpO2:  [93 %-97 %] 95 %    Vitals:    08/29/21 1000 08/30/21 0810   Weight: 91.4 kg (201 lb 6.4 oz) 90.7 kg (199 lb 15.3 oz)       Constitutional: Adult female seen sitting up in bed, in NAD. Awake, alert, interactive.  HEENT: NC/AT, EOMI, sclera clear, conjunctiva normal, OP with MMM, tongue with white/tan coating, no lesions seen  Respiratory: No increased work of breathing, CTAB, no crackles or wheezing.  Cardiovascular: RRR, no murmur noted. Trace lower and upper extremity edema.  GI: Normal bowel sounds, soft, non-distended and non-tender.  Skin: Warm, dry, well-perfused. No bruising, bleeding, rashes, or lesions on limited exam.  Musculoskeletal: Extremities grossly normal, non-tender. Good strength and ROM in bed.   Neurologic: A&O. Answers questions appropriately, speech normal. Moves all extremities spontaneously.  Neuropsychiatric: Calm. Affect appropriate to situation.           Laboratory Data:     Microbiology:  Culture   Date Value Ref Range Status   08/27/2021 No growth after 3 days  Preliminary   08/27/2021 No growth after 3 days  Preliminary   08/25/2021 No Growth  Final   08/25/2021 No Growth  Final   08/23/2021 >100,000 CFU/mL Mixture of urogenital kamar  Final   08/09/2021 No Growth  Final   08/09/2021 No Growth  Final   07/24/2021 <10,000 CFU/mL Mixture of urogenital kamar  Final     Culture Micro   Date Value Ref Range Status   06/17/2021 No growth  Final   06/17/2021 No growth  Final    06/17/2021 No growth  Final   11/27/2020 >100,000 colonies/mL  Klebsiella pneumoniae   (A)  Final       Inflammatory Markers    Recent Labs   Lab Test 08/31/21  0724 08/30/21  0739 08/29/21  0737 08/27/21  1121 08/09/21  1124   .0* 230.0* 270.0* 320.0* 257.0*       Metabolic Studies       Recent Labs   Lab Test 08/31/21  0724 08/30/21  0739 08/29/21  0737 08/28/21  2210 08/28/21  0800 08/27/21  2213 08/27/21  1121 08/27/21  0011 08/25/21  2326 08/25/21  1459 08/25/21  0810 06/23/21  0838 06/20/21  0231 06/19/21  0445    140 136  --  138  --  138  --   --   --  138   < > 138 140   POTASSIUM 3.2* 3.3* 3.4 3.7 3.3* 3.3* 3.1*  --   --   --  3.4   < > 3.6 3.9   CHLORIDE 106 105 104  --  106  --  106  --   --   --  104   < > 105 109   CO2 26 27 27  --  27  --  27  --   --   --  25   < > 28 26   ANIONGAP 6 8 5  --  5  --  5  --   --   --  9   < > 5 5   BUN 6* 8 9  --  10  --  8  --   --   --  10   < > 8 7   CR 0.61 0.61 0.67  --  0.69  --  0.68  --   --  0.74 1.01   < > 0.60 0.64   GFRESTIMATED >90 >90 >90  --  >90  --  >90  --   --  >90 62   < > >90 >90   * 83 88  --  101*  --  104*  --   --   --  121*   < > 92 89   VIKTORIA 8.3* 8.5 8.3*  --  8.4*  --  8.6  --   --   --  9.2   < > 8.5 8.3*   PHOS  --   --   --   --   --   --   --   --   --   --   --   --  3.1 3.0   MAG  --   --   --   --   --   --   --   --   --   --   --   --  2.0 2.1   LACT  --   --   --   --   --   --   --  0.9 0.8  --   --    < >  --   --     < > = values in this interval not displayed.       Hepatic Studies    Recent Labs   Lab Test 08/31/21  0724 08/29/21  0737 08/27/21  1121 08/27/21  1006 08/25/21  0810 08/23/21  1121 08/11/21  1251 05/26/21  1038 05/10/21  0739   BILITOTAL 0.4 0.7 0.6 0.6 0.9 0.8 0.4   < > 0.6   ALKPHOS 186* 186* 226*  --  245* 249* 199*   < > 98   ALBUMIN 1.7* 1.6* 1.8*  --  2.2* 2.6* 2.7*   < > 2.8*   AST 11 8 11  --  8 11 13   < > 7   ALT 13 11 15  --  19 32 26   < > 13   LDH  --   --  152  --   --   --   --    --  143    < > = values in this interval not displayed.       Hematology Studies      Recent Labs   Lab Test 08/31/21  0724 08/30/21  1112 08/30/21  0739 08/29/21  0737 08/28/21  2210 08/28/21  0800 08/27/21  1121 08/23/21  1147 08/04/21  1350 07/28/21  1154 07/18/21  0904 07/14/21  1013 07/07/21  1127   WBC 5.7 6.4 5.7 7.2  --  8.6 11.4* 10.1 4.8 4.1 3.3* 1.7* 4.3   ANEU  --   --   --   --   --   --   --  7.6 3.4 2.6 1.9 0.6* 2.8   ALYM  --   --   --   --   --   --   --  1.5 0.8 0.7* 0.8 0.8 0.8   ROGELIO  --   --   --   --   --   --   --  0.9 0.5 0.7 0.4 0.1 0.4   AEOS  --   --   --   --   --   --   --  0.1 0.1 0.2 0.1 0.1 0.2   HGB 6.8* 7.6* 7.2* 7.0* 7.3* 6.4* 7.1* 8.2* 7.5* 8.6* 8.6* 9.0* 9.2*   HCT 22.6* 24.6* 23.9* 22.5*  --  20.5* 23.0* 26.2* 23.5* 26.9* 27.4* 28.3* 29.3*    234 219 216  --  206 238 131* 133* 96* 82* 126* 148*       Urine Studies     Recent Labs   Lab Test 08/25/21  0935 08/23/21  1147 07/24/21  1146 06/17/21  1817 11/27/20  1030   URINEPH 6.0 6.5 7.0 5.5 6.0   NITRITE Negative Negative Negative Negative Negative   LEUKEST Negative Large* Large* Negative Small*   WBCU 5 10-25* 25-50* 1 10-25*       Last check of C difficile  C Diff Toxin B PCR   Date Value Ref Range Status   06/17/2021 Negative NEG^Negative Final     Comment:     Negative: C. difficile target DNA sequences NOT detected, presumed negative   for C.difficile toxin B or the number of bacteria present may be below the   limit of detection for the test.  FDA approved assay performed using [employer] real-time PCR.  A negative result does not exclude actual disease due to C. difficile and may   be due to improper collection, handling and storage of the specimen or the   number of organisms in the specimen is below the detection limit of the assay.       C Difficile Toxin B by PCR   Date Value Ref Range Status   08/27/2021 Negative Negative Final     Comment:     A negative result does not exclude actual disease due to  C. difficile and may be due to improper collection, handling and storage of the specimen or the number of organisms in the specimen is below the detection limit of the assay.              Imaging:   CXR 8/25  Impression: No acute airspace disease. Stable bilateral lung masses.

## 2021-09-01 ENCOUNTER — PATIENT OUTREACH (OUTPATIENT)
Dept: ONCOLOGY | Facility: CLINIC | Age: 56
End: 2021-09-01

## 2021-09-01 ENCOUNTER — NURSE TRIAGE (OUTPATIENT)
Dept: NURSING | Facility: CLINIC | Age: 56
End: 2021-09-01

## 2021-09-01 ENCOUNTER — TELEPHONE (OUTPATIENT)
Dept: INFECTIOUS DISEASES | Facility: CLINIC | Age: 56
End: 2021-09-01

## 2021-09-01 ENCOUNTER — PATIENT OUTREACH (OUTPATIENT)
Dept: CARE COORDINATION | Facility: CLINIC | Age: 56
End: 2021-09-01

## 2021-09-01 DIAGNOSIS — Z71.89 OTHER SPECIFIED COUNSELING: ICD-10-CM

## 2021-09-01 LAB
BACTERIA BLD CULT: NO GROWTH
BACTERIA BLD CULT: NO GROWTH
SCANNED LAB RESULT: NORMAL

## 2021-09-01 NOTE — TELEPHONE ENCOUNTER
[facility] Call Center    Phone Message    May a detailed message be left on voicemail: yes     Reason for Call: Other: . Per Pata states patient was in the hospital and had seen Dr. Lopez. Para states the patient thought Dr. Lopez was going to put in an  order for the Bactrim medication for patient. Pata states patient never received the medication. Patient was discharged yesterday at the Hospital. Mychal is wanting to have the clinic reached out to the patient at [phone] to have that confirmed. Please advise.         Action Taken: Message routed to:  [facility] ([facility]): ID    Travel Screening: Not Applicable

## 2021-09-01 NOTE — TELEPHONE ENCOUNTER
Reason for call; :   Hibba  from [employer] conferenced Pt to FNA ( after doing a after- hospitalization call- Back to see how Pt is doing ) ,  because Pt  has discharge question about Rx .  Pt  Declines triage.    Pt wanted to know if infectious disease specialist Jez Lopez MD  Wanted Pt to be on Bactrim at discharge or not ?     Recommendation / teaching ; Send a message to Dr Lopez but while waiting  Pt  Read thru her discharge instruction and realized  Bactrim was a suggestion for next time she get chemo and not recommended by Dr Lopez  for her hospital discharge . Call back and cancelled  message to Dr Lopez nurse and no follow up needed.      Caller Verbalizes understanding and denies further questions and will call back if further symptoms to triage or questions  .  Lilian Ireland RN  - [place] Nurse Advisor

## 2021-09-01 NOTE — PROGRESS NOTES
Clinic Care Coordination Contact  Community Health Worker Initial Outreach    Called Pt to complete post discharge assessment flowsheet, Pt stated that she didn't have antibiotic from pharmacy. And have a question for nurse to check if it's needed to have the meds.     CHW transferred phone call to Dar[place] nurse line [phone] .        Clinic Care Coordination Contact  [facility]: Post-Discharge Note  SITUATION                                                      Admission:    Admission Date: 08/25/21   Reason for Admission: Fevers of unknown etiology (improved)Hypotension, fluid responsive (resolved)Acute on chronic anemia s/p pRBC transfusion [id number] coughPleomorphic sarcoma with mets to lung MalnutritionGERDAsthmaPeripheral neuropathy  Discharge:   Discharge Date: 08/31/21  Discharge Diagnosis: Fevers of unknown etiology (improved)Hypotension, fluid responsive (resolved)Acute on chronic anemia s/p pRBC transfusion k5Kdfstug coughPleomorphic sarcoma with mets to lung MalnutritionGERDAsthmaPeripheral neuropathy    BACKGROUND                                                      Richa Ashby is a 55 yo female with pleomorphic sarcoma (mets to lungs), asthma, and GERD, who was admitted on 8/25/2021 for hypotension and 1-2 week h/o cyclical fevers of unknown etiology.      ASSESSMENT      Enrollment  Primary Care Care Coordination Status: Not a Candidate    Discharge Assessment  How are you doing now that you are home?: feeling tired  How are your symptoms? (Red Flag symptoms escalate to triage hotline per guidelines): Improved  Do you feel your condition is stable enough to be safe at home until your provider visit?: Yes  Does the patient have their discharge instructions? : Yes  Does the patient have questions regarding their discharge instructions? : Yes (see comment)  Were you started on any new medications or were there changes to any of your previous medications? : No  Do you have all of your  needed medical supplies or equipment (DME)?  (i.e. oxygen tank, CPAP, cane, etc.): Yes  Discharge follow-up appointment scheduled within 14 calendar days? : Yes  Discharge Follow Up Appointment Date: 09/07/21  Discharge Follow Up Appointment Scheduled with?: Specialty Care Provider    Post-op (CHW CTA Only)  If the patient had a surgery or procedure, do they have any questions for a nurse?: Yes (see comment)    Post-op (Clinicians Only)  Did the patient have surgery or a procedure: No  Fever: No  Chills: No  Eating & Drinking: eating and drinking without complaints/concerns  PO Intake: regular diet  Bowel Function: normal  Urinary Status: indwelling urinary catheter        PLAN                                                      Outpatient Plan:        Follow-ups Needed After Discharge   Follow-up Appointments     Adult [facility] Follow-up and recommended labs and tests      You have an oncology appointment with Harshal on 9/8 to make sure you are   doing well outside of the hospital and to discuss further treatment plans.         Call [phone] if you have questions about appointments at [facility].            Future Appointments   Date Time Provider Department Center   9/7/2021 12:20 PM [facility]   9/8/2021 10:45 AM  [facility] LAB DRAW [facility]L [facility]   9/8/2021 11:30 AM Harshal Schuster PA [facility]   9/8/2021  1:00 PM  ONC INFUSION NURSE [facility]   9/10/2021  4:20 PM Robert Blanca MD [facility]         For any urgent concerns, please contact our 24 hour nurse triage line: [phone] ([phone])         Shayy Tapia MA  Clinic Care Coordination

## 2021-09-04 LAB
ASPERGILLUS AB TITR SER CF: NORMAL {TITER}
B DERMAT AB SER-ACNC: 0.3 IV
COCCIDIOIDES AB TITR SER CF: NORMAL {TITER}
H CAPSUL MYC AB TITR SER CF: NORMAL {TITER}
H CAPSUL YST AB TITR SER CF: NORMAL {TITER}

## 2021-09-04 NOTE — RESULT ENCOUNTER NOTE
Labs were reviewed by me and are within acceptable range without evidence of fungal infection.  No further changes are necessary at this time.       Kodi Conklin MD

## 2021-09-05 LAB — BACTERIA BLD CULT: NO GROWTH

## 2021-09-06 DIAGNOSIS — C49.9 SARCOMA OF SOFT TISSUE (H): Primary | ICD-10-CM

## 2021-09-07 ENCOUNTER — LAB (OUTPATIENT)
Dept: LAB | Facility: CLINIC | Age: 56
End: 2021-09-07
Attending: INTERNAL MEDICINE
Payer: COMMERCIAL

## 2021-09-07 ENCOUNTER — ANCILLARY PROCEDURE (OUTPATIENT)
Dept: CT IMAGING | Facility: CLINIC | Age: 56
End: 2021-09-07
Attending: PHYSICIAN ASSISTANT
Payer: COMMERCIAL

## 2021-09-07 DIAGNOSIS — Z95.828 PORT-A-CATH IN PLACE: Primary | ICD-10-CM

## 2021-09-07 DIAGNOSIS — C49.9 SARCOMA OF SOFT TISSUE (H): ICD-10-CM

## 2021-09-07 LAB
ABO/RH(D): NORMAL
ALBUMIN SERPL-MCNC: 2 G/DL (ref 3.4–5)
ALP SERPL-CCNC: 157 U/L (ref 40–150)
ALT SERPL W P-5'-P-CCNC: 12 U/L (ref 0–50)
ANION GAP SERPL CALCULATED.3IONS-SCNC: 8 MMOL/L (ref 3–14)
ANTIBODY SCREEN: NEGATIVE
AST SERPL W P-5'-P-CCNC: 10 U/L (ref 0–45)
BASOPHILS # BLD AUTO: 0 10E3/UL (ref 0–0.2)
BASOPHILS NFR BLD AUTO: 0 %
BILIRUB SERPL-MCNC: 0.5 MG/DL (ref 0.2–1.3)
BUN SERPL-MCNC: 8 MG/DL (ref 7–30)
CALCIUM SERPL-MCNC: 8.9 MG/DL (ref 8.5–10.1)
CHLORIDE BLD-SCNC: 103 MMOL/L (ref 94–109)
CO2 SERPL-SCNC: 27 MMOL/L (ref 20–32)
CREAT SERPL-MCNC: 0.65 MG/DL (ref 0.52–1.04)
EOSINOPHIL # BLD AUTO: 0.2 10E3/UL (ref 0–0.7)
EOSINOPHIL NFR BLD AUTO: 2 %
ERYTHROCYTE [DISTWIDTH] IN BLOOD BY AUTOMATED COUNT: 17.6 % (ref 10–15)
GFR SERPL CREATININE-BSD FRML MDRD: >90 ML/MIN/1.73M2
GLUCOSE BLD-MCNC: 127 MG/DL (ref 70–99)
HCT VFR BLD AUTO: 26.2 % (ref 35–47)
HGB BLD-MCNC: 8 G/DL (ref 11.7–15.7)
IMM GRANULOCYTES # BLD: 0.1 10E3/UL
IMM GRANULOCYTES NFR BLD: 1 %
LYMPHOCYTES # BLD AUTO: 1.1 10E3/UL (ref 0.8–5.3)
LYMPHOCYTES NFR BLD AUTO: 10 %
MCH RBC QN AUTO: 28.8 PG (ref 26.5–33)
MCHC RBC AUTO-ENTMCNC: 30.5 G/DL (ref 31.5–36.5)
MCV RBC AUTO: 94 FL (ref 78–100)
MONOCYTES # BLD AUTO: 1.2 10E3/UL (ref 0–1.3)
MONOCYTES NFR BLD AUTO: 10 %
NEUTROPHILS # BLD AUTO: 9 10E3/UL (ref 1.6–8.3)
NEUTROPHILS NFR BLD AUTO: 77 %
NRBC # BLD AUTO: 0 10E3/UL
NRBC BLD AUTO-RTO: 0 /100
PLATELET # BLD AUTO: 328 10E3/UL (ref 150–450)
POTASSIUM BLD-SCNC: 3.4 MMOL/L (ref 3.4–5.3)
PROT SERPL-MCNC: 6.9 G/DL (ref 6.8–8.8)
RBC # BLD AUTO: 2.78 10E6/UL (ref 3.8–5.2)
SODIUM SERPL-SCNC: 138 MMOL/L (ref 133–144)
SPECIMEN EXPIRATION DATE: NORMAL
TSH SERPL DL<=0.005 MIU/L-ACNC: 2.49 MU/L (ref 0.4–4)
WBC # BLD AUTO: 11.6 10E3/UL (ref 4–11)

## 2021-09-07 PROCEDURE — 36591 DRAW BLOOD OFF VENOUS DEVICE: CPT | Performed by: INTERNAL MEDICINE

## 2021-09-07 PROCEDURE — 85025 COMPLETE CBC W/AUTO DIFF WBC: CPT

## 2021-09-07 PROCEDURE — 82040 ASSAY OF SERUM ALBUMIN: CPT

## 2021-09-07 PROCEDURE — 36591 DRAW BLOOD OFF VENOUS DEVICE: CPT

## 2021-09-07 PROCEDURE — 86900 BLOOD TYPING SEROLOGIC ABO: CPT | Performed by: INTERNAL MEDICINE

## 2021-09-07 PROCEDURE — 250N000011 HC RX IP 250 OP 636: Performed by: INTERNAL MEDICINE

## 2021-09-07 PROCEDURE — 84443 ASSAY THYROID STIM HORMONE: CPT

## 2021-09-07 PROCEDURE — 86140 C-REACTIVE PROTEIN: CPT | Performed by: PHYSICIAN ASSISTANT

## 2021-09-07 PROCEDURE — 71260 CT THORAX DX C+: CPT | Mod: GC | Performed by: RADIOLOGY

## 2021-09-07 RX ORDER — IOPAMIDOL 755 MG/ML
97 INJECTION, SOLUTION INTRAVASCULAR ONCE
Status: COMPLETED | OUTPATIENT
Start: 2021-09-07 | End: 2021-09-07

## 2021-09-07 RX ORDER — HEPARIN SODIUM (PORCINE) LOCK FLUSH IV SOLN 100 UNIT/ML 100 UNIT/ML
500 SOLUTION INTRAVENOUS ONCE
Status: DISCONTINUED | OUTPATIENT
Start: 2021-09-07 | End: 2021-10-06

## 2021-09-07 RX ORDER — HEPARIN SODIUM (PORCINE) LOCK FLUSH IV SOLN 100 UNIT/ML 100 UNIT/ML
5 SOLUTION INTRAVENOUS ONCE
Status: COMPLETED | OUTPATIENT
Start: 2021-09-07 | End: 2021-09-07

## 2021-09-07 RX ADMIN — IOPAMIDOL 97 ML: 755 INJECTION, SOLUTION INTRAVASCULAR at 12:46

## 2021-09-07 RX ADMIN — Medication 5 ML: at 12:18

## 2021-09-07 NOTE — NURSING NOTE
Chief Complaint   Patient presents with     Port Draw     Labs drawn via port by RN in lab.      Port accessed with 20 gauge 3/4 inch flat needle and labs drawn by RN.  Port flushed with saline and heparin.  Pt tolerated well.      Fe Golden RN

## 2021-09-08 ENCOUNTER — ONCOLOGY VISIT (OUTPATIENT)
Dept: ONCOLOGY | Facility: CLINIC | Age: 56
End: 2021-09-08
Attending: PHYSICIAN ASSISTANT
Payer: COMMERCIAL

## 2021-09-08 VITALS
SYSTOLIC BLOOD PRESSURE: 118 MMHG | RESPIRATION RATE: 16 BRPM | TEMPERATURE: 99 F | WEIGHT: 199.5 LBS | HEART RATE: 109 BPM | HEIGHT: 67 IN | DIASTOLIC BLOOD PRESSURE: 75 MMHG | BODY MASS INDEX: 31.31 KG/M2 | OXYGEN SATURATION: 98 %

## 2021-09-08 DIAGNOSIS — C49.9 SARCOMA OF SOFT TISSUE (H): Primary | ICD-10-CM

## 2021-09-08 DIAGNOSIS — R19.7 DIARRHEA, UNSPECIFIED TYPE: ICD-10-CM

## 2021-09-08 DIAGNOSIS — R50.9 FEVER AND CHILLS: ICD-10-CM

## 2021-09-08 LAB — CRP SERPL-MCNC: 249 MG/L (ref 0–8)

## 2021-09-08 PROCEDURE — 99215 OFFICE O/P EST HI 40 MIN: CPT | Performed by: PHYSICIAN ASSISTANT

## 2021-09-08 PROCEDURE — G0463 HOSPITAL OUTPT CLINIC VISIT: HCPCS

## 2021-09-08 RX ORDER — NYSTATIN 100000/ML
500000 SUSPENSION, ORAL (FINAL DOSE FORM) ORAL 4 TIMES DAILY
Qty: 400 ML | Refills: 0 | Status: SHIPPED | OUTPATIENT
Start: 2021-09-08 | End: 2021-09-22

## 2021-09-08 RX ORDER — SULFAMETHOXAZOLE/TRIMETHOPRIM 800-160 MG
1 TABLET ORAL 2 TIMES DAILY
Qty: 14 TABLET | Refills: 0 | Status: SHIPPED | OUTPATIENT
Start: 2021-09-08 | End: 2021-09-15

## 2021-09-08 RX ORDER — PREDNISONE 10 MG/1
TABLET ORAL
Qty: 15 TABLET | Refills: 0 | Status: SHIPPED | OUTPATIENT
Start: 2021-09-08 | End: 2021-09-18

## 2021-09-08 ASSESSMENT — PAIN SCALES - GENERAL: PAINLEVEL: MILD PAIN (2)

## 2021-09-08 ASSESSMENT — MIFFLIN-ST. JEOR: SCORE: 1527.68

## 2021-09-08 NOTE — Clinical Note
9/8/2021         RE: Richa Ashby  [address]  [place] [address]        Dear Colleague,    Thank you for referring your patient, Richa Ashby, to the [facility]. Please see a copy of my visit note below.    Oncology/Hematology Visit Note  Sep 8, 2021    Reason for Visit: Follow up of     History of Present Illness: Richa Ashby is a 56 year old female with ***. He/She is currently undergoing treatment with ***.     Interval History:  Fever   Diarrhea x 5 per day   Sore bottom  Cough  Aches  Hand and feet swelling    Review of Systems:  Patient denies fevers, chills, night sweats, unexplained weight changes, headaches, dizziness, vision or hearing changes, new lumps or bumps, chest pain, shortness of breath, cough, abdominal pain, nausea, vomiting, changes to bowel or bladder, swelling of extremities, bleeding issues, or rash.    Current Outpatient Medications   Medication Sig Dispense Refill     acetaminophen (TYLENOL) 325 MG tablet Take 325-650 mg by mouth every 6 hours as needed for mild pain       ADVAIR DISKUS 100-50 MCG/DOSE inhaler Inhale 1 puff into the lungs daily        albuterol (PROAIR RESPICLICK) 108 (90 Base) MCG/ACT inhaler Inhale 1-2 puffs into the lungs       cetirizine (ZYRTEC) 10 MG tablet Take 10 mg by mouth daily       gabapentin (NEURONTIN) 300 MG capsule Take 600 mg in the AM, 600 mg mid-day, and 900 mg at night 90 capsule 1     hydrOXYzine (ATARAX) 25 MG tablet Take 1 tablet (25 mg) by mouth 3 times daily as needed for itching 30 tablet 1     melatonin (MELATONIN) 1 MG/ML LIQD liquid Take 5 mg by mouth       montelukast (SINGULAIR) 10 MG tablet Take 10 mg by mouth daily       multivitamin w/minerals (MULTI-VITAMIN) tablet Take 1 tablet by mouth daily       omeprazole (PRILOSEC) 20 MG DR capsule 20 mg daily        ondansetron (ZOFRAN) 8 MG tablet Take 1 tablet (8 mg) by mouth every 8 hours as needed for nausea 60  tablet 1     prochlorperazine (COMPAZINE) 10 MG tablet Take 1 tablet (10 mg) by mouth every 6 hours as needed (Nausea/Vomiting) 30 tablet 5     vitamin D3 (CHOLECALCIFEROL) 2000 units (50 mcg) tablet Take 1 tablet by mouth daily         Physical Examination:  /75 (BP Location: Right arm, Patient Position: Chair, Cuff Size: Adult Regular)   Pulse 109   Temp 99  F (37.2  C) (Tympanic)   Resp 16   Wt 90.5 kg (199 lb 8 oz)   LMP 03/19/2019   SpO2 98%   BMI 31.25 kg/m    Wt Readings from Last 10 Encounters:   09/08/21 90.5 kg (199 lb 8 oz)   08/30/21 90.7 kg (199 lb 15.3 oz)   08/25/21 90.8 kg (200 lb 1.6 oz)   08/11/21 93.4 kg (206 lb)   08/09/21 91.8 kg (202 lb 4.8 oz)   08/04/21 93.1 kg (205 lb 4.8 oz)   07/28/21 93.2 kg (205 lb 8 oz)   07/24/21 92.5 kg (204 lb)   07/18/21 93.9 kg (207 lb 1.6 oz)   07/14/21 93.7 kg (206 lb 8 oz)     Constitutional: Well-appearing female in no acute distress.  Eyes: EOMI, PERRL. No scleral icterus.  ENT: Oral mucosa is moist without lesions or thrush.   Lymphatic: Neck is supple without cervical or supraclavicular lymphadenopathy. No axillary lymphadenopathy.  Cardiovascular: Regular rate and rhythm. No murmurs, gallops, or rubs. No peripheral edema.  Respiratory: Clear to auscultation bilaterally. No wheezes or crackles.  Gastrointestinal: Bowel sounds present. Abdomen soft, non-tender. No palpable hepatosplenomegaly or masses.   Neurologic: Cranial nerves II through XII are grossly intact.  Skin: No rashes, petechiae, or bruising noted on exposed skin.    Laboratory Data:        Assessment and Plan:        Harshal Schuster PA-C  [facility] [address] [address]  [phone]        Again, thank you for allowing me to participate in the care of your patient.        Sincerely,        RUPERT Guaman

## 2021-09-08 NOTE — NURSING NOTE
"Oncology Rooming Note    September 8, 2021 11:46 AM   Richa Ashby is a 56 year old female who presents for:    Chief Complaint   Patient presents with     Oncology Clinic Visit     [facility] RETURN - SARCOMA     Initial Vitals: /75 (BP Location: Right arm, Patient Position: Chair, Cuff Size: Adult Regular)   Pulse 109   Temp 99  F (37.2  C) (Tympanic)   Resp 16   Ht 1.702 m (5' 7.01\")   Wt 90.5 kg (199 lb 8 oz)   LMP 03/19/2019   SpO2 98%   BMI 31.24 kg/m   Estimated body mass index is 31.24 kg/m  as calculated from the following:    Height as of this encounter: 1.702 m (5' 7.01\").    Weight as of this encounter: 90.5 kg (199 lb 8 oz). Body surface area is 2.07 meters squared.  Mild Pain (2) Comment: Data Unavailable   Patient's last menstrual period was 03/19/2019.  Allergies reviewed: Yes  Medications reviewed: Yes    Medications: Medication refills not needed today.  Pharmacy name entered into [employer]:    [id number] - [place], [place] - [address] AT [facility] [place], [place] - [phone]    Clinical concerns: No new concerns. Harshal was notified.      Wicho Varner LPN            "

## 2021-09-08 NOTE — LETTER
9/8/2021         RE: Richa Ashby  [address]  [address]  [place] [address]      Oncology/Hematology Visit Note  Sep 8, 2021    Reason for Visit: Follow up of undifferentiated pleomorphic sarcoma     History of Present Illness: Richa Ashby is a 56 year old female with PMH asthma and GERD with undifferentiated pleomorphic sarcoma. 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 She received preoperative radiotherapy and the tumor was resected 3/12/20. She then developed a cough, chest tightness, and scapular pain and imaging revealed lung nodules. Lung biopsy 11/2/20 with sarcoma. She was started on Doxil + Ifos 11/13/20. Had delay in cycle 2 due to COVID dx 12/2/20. CT imaging after cycle 1 with positive response to treatment with decrease in pulmonary nodules. Received cycle 2 12/18/20. Received cycle 3 1/15/21 with dose reduction in Doxil for skin toxicity and mucositis. Received cycle 4 2/15/21.      CT CAP 3/8/21 with positive response to treatment, decreased size of pulmonary mets though one lesion was larger. Did see fibrocystic changes in left breast, recommended breast imaging. Received cycle 5 Doxil + ifos 3/11/21.      Imaging 4/5/21 with overall stable disease except one lung lesion, opted to continue Doxil alone for now and switch to Keytruda in future if ongoing progression.      Started Keytruda 5/10/21. Was seen 5/26/21 cough, SOB, fatigue work-up thought to be 2/2 phrenic nerve involvement of R apical lung mass.      Due to worsening fatigue and cough, repeat CT 6/15/21 concerning for progression vs pseudoprogression. Added Gemzar 6/16/21.     She was admitted 6/17/21-6/20/21 with fever of unknown origin, infectious work-up negative, treated with broad spectrum antibiotics.     She clinically improved on abx. Was able to receive day 8 Gemzar (dose reduced for thrombocytopenia) + Keytruda 6/23/21.      CT 7/12/21  with stable disease.     She was admitted 8/25/21-8/31/21 for fevers, hypotension, acute on chronic anemia. Extensive infectious work-up negative so etiology of fevers still unknown.     She was seen today for oncology follow-up.     Interval History:  Connie was seen today for follow-up. She is not feeling well. She continues to have fevers daily ranging from  that respond to scheduled Tylenol. When her fever breaks she feels weak and exhausted. She generally feels deconditioned after being in the hospital for 6 days. She continues to have diarrhea up to 5x per day, unchanged since being in the hospital (C Diff was negative). She is not taking Imodium as she was told not to. She continues to cough. No SOB or chest pain. She feels achy and notes recurrent swelling on hands and feet.    She denies headaches, dizziness, nausea/vomiting, urinary symptoms, rashes. She is eating and drinking OK.    She is going to [place] this weekend and will be seeing her son who is being deployed.     Current Outpatient Medications   Medication Sig Dispense Refill     acetaminophen (TYLENOL) 325 MG tablet Take 325-650 mg by mouth every 6 hours as needed for mild pain       ADVAIR DISKUS 100-50 MCG/DOSE inhaler Inhale 1 puff into the lungs daily        albuterol (PROAIR RESPICLICK) 108 (90 Base) MCG/ACT inhaler Inhale 1-2 puffs into the lungs       cetirizine (ZYRTEC) 10 MG tablet Take 10 mg by mouth daily       gabapentin (NEURONTIN) 300 MG capsule Take 600 mg in the AM, 600 mg mid-day, and 900 mg at night 90 capsule 1     hydrOXYzine (ATARAX) 25 MG tablet Take 1 tablet (25 mg) by mouth 3 times daily as needed for itching 30 tablet 1     melatonin (MELATONIN) 1 MG/ML LIQD liquid Take 5 mg by mouth       montelukast (SINGULAIR) 10 MG tablet Take 10 mg by mouth daily       multivitamin w/minerals (MULTI-VITAMIN) tablet Take 1 tablet by mouth daily       omeprazole (PRILOSEC) 20 MG DR capsule 20 mg daily        ondansetron  (ZOFRAN) 8 MG tablet Take 1 tablet (8 mg) by mouth every 8 hours as needed for nausea 60 tablet 1     prochlorperazine (COMPAZINE) 10 MG tablet Take 1 tablet (10 mg) by mouth every 6 hours as needed (Nausea/Vomiting) 30 tablet 5     vitamin D3 (CHOLECALCIFEROL) 2000 units (50 mcg) tablet Take 1 tablet by mouth daily         Physical Examination:  /75 (BP Location: Right arm, Patient Position: Chair, Cuff Size: Adult Regular)   Pulse 109   Temp 99  F (37.2  C) (Tympanic)   Resp 16   Wt 90.5 kg (199 lb 8 oz)   LMP 03/19/2019   SpO2 98%   BMI 31.25 kg/m    Wt Readings from Last 10 Encounters:   09/08/21 90.5 kg (199 lb 8 oz)   08/30/21 90.7 kg (199 lb 15.3 oz)   08/25/21 90.8 kg (200 lb 1.6 oz)   08/11/21 93.4 kg (206 lb)   08/09/21 91.8 kg (202 lb 4.8 oz)   08/04/21 93.1 kg (205 lb 4.8 oz)   07/28/21 93.2 kg (205 lb 8 oz)   07/24/21 92.5 kg (204 lb)   07/18/21 93.9 kg (207 lb 1.6 oz)   07/14/21 93.7 kg (206 lb 8 oz)     Constitutional: Well-appearing female, fatigued, in no acute distress.  Eyes: EOMI, PERRL. No scleral icterus.  ENT: Deferred.  Lymphatic: Neck is supple without cervical or supraclavicular lymphadenopathy.   Cardiovascular: Slightly tachycardic rate and regular rhythm. No murmurs, gallops, or rubs. Mild bilateral non-pitting peripheral edema.  Respiratory: Clear to auscultation bilaterally. No wheezes or crackles.  Gastrointestinal: Bowel sounds present. Abdomen soft, non-tender.   Neurologic: Cranial nerves II through XII are grossly intact.  Skin: No rashes, petechiae, or bruising noted on exposed skin.    Laboratory Data:  Results for AMADEO WILSON (MRN [id number]) as of 9/9/2021 19:55   9/7/2021 12:24   Sodium 138   Potassium 3.4   Chloride 103   Carbon Dioxide 27   Urea Nitrogen 8   Creatinine 0.65   GFR Estimate >90   Calcium 8.9   Anion Gap 8   Albumin 2.0 (L)   Protein Total 6.9   Bilirubin Total 0.5   Alkaline Phosphatase 157 (H)   ALT 12   AST 10   CRP Inflammation 249.0 (H)    TSH 2.49   Glucose 127 (H)   WBC 11.6 (H)   Hemoglobin 8.0 (L)   Hematocrit 26.2 (L)   Platelet Count 328   RBC Count 2.78 (L)   MCV 94   MCH 28.8   MCHC 30.5 (L)   RDW 17.6 (H)   % Neutrophils 77   % Lymphocytes 10   % Monocytes 10   % Eosinophils 2   Absolute Basophils 0.0   % Basophils 0   Absolute Eosinophils 0.2   Absolute Immature Granulocytes 0.1 (H)   Absolute Lymphocytes 1.1   Absolute Monocytes 1.2   % Immature Granulocytes 1   Absolute Neutrophils 9.0 (H)   Absolute NRBCs 0.0   NRBCs per 100 WBC 0     CT CAP 9/7/21  Mediastinum/Neck Base: Right IJ Port-A-Cath tip terminating in the  distal SVC. Partially visualized thyroid lobes are homogeneous. The  heart is not enlarged. Normal caliber ascending aorta and main  pulmonary artery. No central pulmonary embolism. Normal three-vessel  aortic arch. Mildly increased prominence of the prevascular  mediastinal lymph nodes (series 2, image 20) no pathologically  enlarged thoracic lymph nodes by size criteria.      Lungs: Central tracheobronchial tree is clear. No peripheral mucous  plugging. Evidence of disease progression, with the heterogeneous  right paramediastinal mass in the right upper lobe now measuring 12.2  x 8.7 x 10.0 cm, now with increased heterogeneity of contrast  enhancement and central necrosis. This lesion previously measured 8.4  x 5.6 cm when measured in a similar fashion. A similar trend,  increased size in the 3.8 x 3.5 cm mass (previously 3.3 x 2.7 cm) in  the peripheral right middle lobe, now with increased heterogeneous  contrast enhancement and internal necrosis (series 4, image 157 was  performed. 1.1 x 0.8 cm solid nodule in the anterior right middle lobe  (series 4, image 69), previously measuring 0.9 x 0.6 cm. 3.0 x 3.5 cm  necrotic heterogeneously enhancing mass in the superior segment left  lower lobe (series 4, image 163) previously measuring 2.7 x 2.3 cm.  Increased size of the 1.7 x 1.4 cm necrotic nodule in the  superior  segment right lower lobe (series 4, image 25), previously 0.9 x 0.6 cm  when measured in similar fashion. Increased size of the 2.0 x 1.6 cm  necrotic subpleural nodule in the left costophrenic angle (series 4,  image 227), this previously measured 8 x 7 mm.     Upper abdomen: Limited. Changes of cholecystectomy. Small accessory  splenule. No acute findings in the abdomen.     Bones and soft tissues: No suspicious bone or soft tissue lesions . No  acute abnormalities.                                                                       IMPRESSION:   1.  Overall increased size (since 7/12/2021) of numerous bilateral  pulmonary masses and nodules as described, including a markedly  enlarged right paramediastinal mass, now with increased necrosis and  heterogeneous contrast enhancement. Necrosis and heterogeneous  contrast enhancement applies to several of the enlarging nodules or  masses.  2.  Mildly increased prominence of the nonpathologically enlarged  mediastinal lymph nodes  3.  No suspicious soft tissue or osseous lesions.     I have personally reviewed the examination and initial interpretation  and I agree with the findings.     ADEEL ROJAS, DO     Assessment and Plan:  1. Onc  Metastatic UPS, was on Doxil + Ifosfamide (5 cycles) with positive response to treatment initially, then due to poor tolerance switched to Doxil alone (1 cycle) but had progression. Started on Keytruda 5/10/21. Due to tumor growth (progression vs pseudoprogression) added Gemzar 6/16/21. CT imaging mid July with positive response to treatment.    CT from yesterday with concerning growth, however noted areas of necrosis. She has not done well clinically with Gemzar/Keytruda with fevers and inflammatory arthritis. Discussed with Dr. Lopez. Hold treatment today to give her time to recover from recent hospitalization (and so she can go on her family trip). Will repeat CT upon return (2 weeks) to see if treatment is in fact  "progressing. If so, will switch to Pazopanib (sent message to oral chemo). If areas are improved, will continue with Gemzar +Keytruda.     Did discuss with patient and  overall poor prognosis if she continues to have cancer progression.     2. ID  Fevers of unknown origin. Negative test:  -Lyme PCR  -blood parasitology negative.   -HIV neg  -Beta-D-glucan normal  -COVID neg  -Influenza neg  -Aspergillus galactomannin  -Strep Group A  - Blood cultures 8/27 NGTD    ID saw in hospital and recommended empiric course of Bactrim. Since she is still having fevers and WBC elevated. will do Bactrim DS 1 tab BID x 7 days. She has hx of thrush with Bactirm, will also prescribe Nystatin.     ID also recommended \"giving rifaximin 550mg PO BID x 10 days to attempt to decrease any small bowel overgrowth and/or transient bacterial infection, during any relative WBC neutropenia.\" Consider this if we continue Gemzar.    3. Heme  Anemia: ACD and chemotherapy, has been more of an issue with gemzar. Hgb 8 today. She declined transfusion. Recheck in 2 weeks. Did receive pRBC inpatient x 2.      Thrombocytopenia: 2/2 Gemzar. No bleeding issues. OK today.     Neutropenia: No current issues.     4. Pulm  Asthma: stable, continue inhalers.     COVID: Diagnosed in December, recovered. She is vaccinated.      Cough/SOB: See work-up from 5/26/21 (negative CT PE, RVP, COVID, NTBNP). Thought to be 2/2 phrenic nerve involvement of tumor. Continue Robitussin PRN.      5. 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 Continue Gabapentin 600/600/900- dose was increased inpatient.     Deconditioning: PT referral for cancer rehab.      6. MSK  R shoulder/back pain: Suspect 2/2 cancer, has been improved. Tylenol PRN     Recurrent inflammatory arthritis symptoms (diffuse joint pain and swelling) and CRP elevated. Will repeat prednisone course 20mg x 5 days and 10mg x 5 days. She is on PPI.     7. GI  Diarrhea, unclear etiology, C diff " negative. Has had intermittent issues throughout treatment, Possibly immune mediated? Prednisone as above and OK to use Imodium PRN since C. Diff was negative.     OVERALL PLAN  -No treatment today   -CT CAP, Labs, KALINA, infusion in 2 weeks  -Message to oral chemo about Pazopanib   -Bactrim DS 1 tab BID x 7 days  -Nystatin 4x daily while on Bactrim  -Prednisone 20mg x 5 days, 10mg x 5 days   -Imodium PRN  -PT referral     70 minutes spent on the date of the encounter doing chart review, review of test results, interpretation of tests, patient visit, documentation and discussion with other provider(s)     Harshal Schuster PA-C  [facility]  [address] [place] [address] [phone]          RUPERT Guaman

## 2021-09-08 NOTE — PROGRESS NOTES
Oncology/Hematology Visit Note  Sep 8, 2021    Reason for Visit: Follow up of undifferentiated pleomorphic sarcoma     History of Present Illness: Richa Ashby is a 56 year old female with PMH asthma and GERD with undifferentiated pleomorphic sarcoma. 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 She received preoperative radiotherapy and the tumor was resected 3/12/20. She then developed a cough, chest tightness, and scapular pain and imaging revealed lung nodules. Lung biopsy 11/2/20 with sarcoma. She was started on Doxil + Ifos 11/13/20. Had delay in cycle 2 due to COVID dx 12/2/20. CT imaging after cycle 1 with positive response to treatment with decrease in pulmonary nodules. Received cycle 2 12/18/20. Received cycle 3 1/15/21 with dose reduction in Doxil for skin toxicity and mucositis. Received cycle 4 2/15/21.      CT CAP 3/8/21 with positive response to treatment, decreased size of pulmonary mets though one lesion was larger. Did see fibrocystic changes in left breast, recommended breast imaging. Received cycle 5 Doxil + ifos 3/11/21.      Imaging 4/5/21 with overall stable disease except one lung lesion, opted to continue Doxil alone for now and switch to Keytruda in future if ongoing progression.      Started Keytruda 5/10/21. Was seen 5/26/21 cough, SOB, fatigue work-up thought to be 2/2 phrenic nerve involvement of R apical lung mass.      Due to worsening fatigue and cough, repeat CT 6/15/21 concerning for progression vs pseudoprogression. Added Gemzar 6/16/21.     She was admitted 6/17/21-6/20/21 with fever of unknown origin, infectious work-up negative, treated with broad spectrum antibiotics.     She clinically improved on abx. Was able to receive day 8 Gemzar (dose reduced for thrombocytopenia) + Keytruda 6/23/21.      CT 7/12/21 with stable disease.     She was admitted 8/25/21-8/31/21 for fevers, hypotension, acute on chronic  anemia. Extensive infectious work-up negative so etiology of fevers still unknown.     She was seen today for oncology follow-up.     Interval History:  Connie was seen today for follow-up. She is not feeling well. She continues to have fevers daily ranging from  that respond to scheduled Tylenol. When her fever breaks she feels weak and exhausted. She generally feels deconditioned after being in the hospital for 6 days. She continues to have diarrhea up to 5x per day, unchanged since being in the hospital (C Diff was negative). She is not taking Imodium as she was told not to. She continues to cough. No SOB or chest pain. She feels achy and notes recurrent swelling on hands and feet.    She denies headaches, dizziness, nausea/vomiting, urinary symptoms, rashes. She is eating and drinking OK.    She is going to [place] this weekend and will be seeing her son who is being deployed.     Current Outpatient Medications   Medication Sig Dispense Refill     acetaminophen (TYLENOL) 325 MG tablet Take 325-650 mg by mouth every 6 hours as needed for mild pain       ADVAIR DISKUS 100-50 MCG/DOSE inhaler Inhale 1 puff into the lungs daily        albuterol (PROAIR RESPICLICK) 108 (90 Base) MCG/ACT inhaler Inhale 1-2 puffs into the lungs       cetirizine (ZYRTEC) 10 MG tablet Take 10 mg by mouth daily       gabapentin (NEURONTIN) 300 MG capsule Take 600 mg in the AM, 600 mg mid-day, and 900 mg at night 90 capsule 1     hydrOXYzine (ATARAX) 25 MG tablet Take 1 tablet (25 mg) by mouth 3 times daily as needed for itching 30 tablet 1     melatonin (MELATONIN) 1 MG/ML LIQD liquid Take 5 mg by mouth       montelukast (SINGULAIR) 10 MG tablet Take 10 mg by mouth daily       multivitamin w/minerals (MULTI-VITAMIN) tablet Take 1 tablet by mouth daily       omeprazole (PRILOSEC) 20 MG DR capsule 20 mg daily        ondansetron (ZOFRAN) 8 MG tablet Take 1 tablet (8 mg) by mouth every 8 hours as needed for nausea 60 tablet 1      prochlorperazine (COMPAZINE) 10 MG tablet Take 1 tablet (10 mg) by mouth every 6 hours as needed (Nausea/Vomiting) 30 tablet 5     vitamin D3 (CHOLECALCIFEROL) 2000 units (50 mcg) tablet Take 1 tablet by mouth daily         Physical Examination:  /75 (BP Location: Right arm, Patient Position: Chair, Cuff Size: Adult Regular)   Pulse 109   Temp 99  F (37.2  C) (Tympanic)   Resp 16   Wt 90.5 kg (199 lb 8 oz)   LMP 03/19/2019   SpO2 98%   BMI 31.25 kg/m    Wt Readings from Last 10 Encounters:   09/08/21 90.5 kg (199 lb 8 oz)   08/30/21 90.7 kg (199 lb 15.3 oz)   08/25/21 90.8 kg (200 lb 1.6 oz)   08/11/21 93.4 kg (206 lb)   08/09/21 91.8 kg (202 lb 4.8 oz)   08/04/21 93.1 kg (205 lb 4.8 oz)   07/28/21 93.2 kg (205 lb 8 oz)   07/24/21 92.5 kg (204 lb)   07/18/21 93.9 kg (207 lb 1.6 oz)   07/14/21 93.7 kg (206 lb 8 oz)     Constitutional: Well-appearing female, fatigued, in no acute distress.  Eyes: EOMI, PERRL. No scleral icterus.  ENT: Deferred.  Lymphatic: Neck is supple without cervical or supraclavicular lymphadenopathy.   Cardiovascular: Slightly tachycardic rate and regular rhythm. No murmurs, gallops, or rubs. Mild bilateral non-pitting peripheral edema.  Respiratory: Clear to auscultation bilaterally. No wheezes or crackles.  Gastrointestinal: Bowel sounds present. Abdomen soft, non-tender.   Neurologic: Cranial nerves II through XII are grossly intact.  Skin: No rashes, petechiae, or bruising noted on exposed skin.    Laboratory Data:  Results for AMADEO WILSON (MRN [id number]) as of 9/9/2021 19:55   9/7/2021 12:24   Sodium 138   Potassium 3.4   Chloride 103   Carbon Dioxide 27   Urea Nitrogen 8   Creatinine 0.65   GFR Estimate >90   Calcium 8.9   Anion Gap 8   Albumin 2.0 (L)   Protein Total 6.9   Bilirubin Total 0.5   Alkaline Phosphatase 157 (H)   ALT 12   AST 10   CRP Inflammation 249.0 (H)   TSH 2.49   Glucose 127 (H)   WBC 11.6 (H)   Hemoglobin 8.0 (L)   Hematocrit 26.2 (L)   Platelet  Count 328   RBC Count 2.78 (L)   MCV 94   MCH 28.8   MCHC 30.5 (L)   RDW 17.6 (H)   % Neutrophils 77   % Lymphocytes 10   % Monocytes 10   % Eosinophils 2   Absolute Basophils 0.0   % Basophils 0   Absolute Eosinophils 0.2   Absolute Immature Granulocytes 0.1 (H)   Absolute Lymphocytes 1.1   Absolute Monocytes 1.2   % Immature Granulocytes 1   Absolute Neutrophils 9.0 (H)   Absolute NRBCs 0.0   NRBCs per 100 WBC 0     CT CAP 9/7/21  Mediastinum/Neck Base: Right IJ Port-A-Cath tip terminating in the  distal SVC. Partially visualized thyroid lobes are homogeneous. The  heart is not enlarged. Normal caliber ascending aorta and main  pulmonary artery. No central pulmonary embolism. Normal three-vessel  aortic arch. Mildly increased prominence of the prevascular  mediastinal lymph nodes (series 2, image 20) no pathologically  enlarged thoracic lymph nodes by size criteria.      Lungs: Central tracheobronchial tree is clear. No peripheral mucous  plugging. Evidence of disease progression, with the heterogeneous  right paramediastinal mass in the right upper lobe now measuring 12.2  x 8.7 x 10.0 cm, now with increased heterogeneity of contrast  enhancement and central necrosis. This lesion previously measured 8.4  x 5.6 cm when measured in a similar fashion. A similar trend,  increased size in the 3.8 x 3.5 cm mass (previously 3.3 x 2.7 cm) in  the peripheral right middle lobe, now with increased heterogeneous  contrast enhancement and internal necrosis (series 4, image 157 was  performed. 1.1 x 0.8 cm solid nodule in the anterior right middle lobe  (series 4, image 69), previously measuring 0.9 x 0.6 cm. 3.0 x 3.5 cm  necrotic heterogeneously enhancing mass in the superior segment left  lower lobe (series 4, image 163) previously measuring 2.7 x 2.3 cm.  Increased size of the 1.7 x 1.4 cm necrotic nodule in the superior  segment right lower lobe (series 4, image 25), previously 0.9 x 0.6 cm  when measured in similar  fashion. Increased size of the 2.0 x 1.6 cm  necrotic subpleural nodule in the left costophrenic angle (series 4,  image 227), this previously measured 8 x 7 mm.     Upper abdomen: Limited. Changes of cholecystectomy. Small accessory  splenule. No acute findings in the abdomen.     Bones and soft tissues: No suspicious bone or soft tissue lesions . No  acute abnormalities.                                                                       IMPRESSION:   1.  Overall increased size (since 7/12/2021) of numerous bilateral  pulmonary masses and nodules as described, including a markedly  enlarged right paramediastinal mass, now with increased necrosis and  heterogeneous contrast enhancement. Necrosis and heterogeneous  contrast enhancement applies to several of the enlarging nodules or  masses.  2.  Mildly increased prominence of the nonpathologically enlarged  mediastinal lymph nodes  3.  No suspicious soft tissue or osseous lesions.     I have personally reviewed the examination and initial interpretation  and I agree with the findings.     ADEEL ROJAS, DO     Assessment and Plan:  1. Onc  Metastatic UPS, was on Doxil + Ifosfamide (5 cycles) with positive response to treatment initially, then due to poor tolerance switched to Doxil alone (1 cycle) but had progression. Started on Keytruda 5/10/21. Due to tumor growth (progression vs pseudoprogression) added Gemzar 6/16/21. CT imaging mid July with positive response to treatment.    CT from yesterday with concerning growth, however noted areas of necrosis. She has not done well clinically with Gemzar/Keytruda with fevers and inflammatory arthritis. Discussed with Dr. Lopez. Hold treatment today to give her time to recover from recent hospitalization (and so she can go on her family trip). Will repeat CT upon return (2 weeks) to see if treatment is in fact progressing. If so, will switch to Pazopanib (sent message to oral chemo). If areas are improved, will  "continue with Gemzar +Keytruda.     Did discuss with patient and  overall poor prognosis if she continues to have cancer progression.     2. ID  Fevers of unknown origin. Negative test:  -Lyme PCR  -blood parasitology negative.   -HIV neg  -Beta-D-glucan normal  -COVID neg  -Influenza neg  -Aspergillus galactomannin  -Strep Group A  - Blood cultures 8/27 NGTD    ID saw in hospital and recommended empiric course of Bactrim. Since she is still having fevers and WBC elevated. will do Bactrim DS 1 tab BID x 7 days. She has hx of thrush with Bactirm, will also prescribe Nystatin.     ID also recommended \"giving rifaximin 550mg PO BID x 10 days to attempt to decrease any small bowel overgrowth and/or transient bacterial infection, during any relative WBC neutropenia.\" Consider this if we continue Gemzar.    3. Heme  Anemia: ACD and chemotherapy, has been more of an issue with gemzar. Hgb 8 today. She declined transfusion. Recheck in 2 weeks. Did receive pRBC inpatient x 2.      Thrombocytopenia: 2/2 Gemzar. No bleeding issues. OK today.     Neutropenia: No current issues.     4. Pulm  Asthma: stable, continue inhalers.     COVID: Diagnosed in December, recovered. She is vaccinated.      Cough/SOB: See work-up from 5/26/21 (negative CT PE, RVP, COVID, NTBNP). Thought to be 2/2 phrenic nerve involvement of tumor. Continue Robitussin PRN.      5. 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 Continue Gabapentin 600/600/900- dose was increased inpatient.     Deconditioning: PT referral for cancer rehab.      6. MSK  R shoulder/back pain: Suspect 2/2 cancer, has been improved. Tylenol PRN     Recurrent inflammatory arthritis symptoms (diffuse joint pain and swelling) and CRP elevated. Will repeat prednisone course 20mg x 5 days and 10mg x 5 days. She is on PPI.     7. GI  Diarrhea, unclear etiology, C diff negative. Has had intermittent issues throughout treatment, Possibly immune mediated? Prednisone as above " and OK to use Imodium PRN since C. Diff was negative.     OVERALL PLAN  -No treatment today   -CT CAP, Labs, KALINA, infusion in 2 weeks  -Message to oral chemo about Pazopanib   -Bactrim DS 1 tab BID x 7 days  -Nystatin 4x daily while on Bactrim  -Prednisone 20mg x 5 days, 10mg x 5 days   -Imodium PRN  -PT referral     70 minutes spent on the date of the encounter doing chart review, review of test results, interpretation of tests, patient visit, documentation and discussion with other provider(s)     Harshal Schuster PA-C  [facility]  [address], [place] [id number] [phone]

## 2021-09-10 ENCOUNTER — VIRTUAL VISIT (OUTPATIENT)
Dept: PALLIATIVE CARE | Facility: CLINIC | Age: 56
End: 2021-09-10
Attending: INTERNAL MEDICINE
Payer: COMMERCIAL

## 2021-09-10 ENCOUNTER — DOCUMENTATION ONLY (OUTPATIENT)
Dept: ONCOLOGY | Facility: CLINIC | Age: 56
End: 2021-09-10

## 2021-09-10 ENCOUNTER — TELEPHONE (OUTPATIENT)
Dept: ONCOLOGY | Facility: CLINIC | Age: 56
End: 2021-09-10

## 2021-09-10 DIAGNOSIS — G62.0 DRUG-INDUCED POLYNEUROPATHY (H): ICD-10-CM

## 2021-09-10 PROCEDURE — 99214 OFFICE O/P EST MOD 30 MIN: CPT | Mod: 95 | Performed by: INTERNAL MEDICINE

## 2021-09-10 PROCEDURE — 999N001193 HC VIDEO/TELEPHONE VISIT; NO CHARGE

## 2021-09-10 RX ORDER — GABAPENTIN 300 MG/1
CAPSULE ORAL
Qty: 210 CAPSULE | Refills: 3 | Status: SHIPPED | OUTPATIENT
Start: 2021-09-17 | End: 2021-12-01

## 2021-09-10 NOTE — TELEPHONE ENCOUNTER
PA Initiation    Medication: Votrient   Insurance Company: [employer] - Phone [phone] Fax [phone]  Pharmacy Filling the Rx: [facility] - [place], [place] - [facility] [address]  Filling Pharmacy Phone:    Filling Pharmacy Fax:    Start Date: 9/10/2021

## 2021-09-10 NOTE — TELEPHONE ENCOUNTER
Prior Authorization Approval    Authorization Effective Date: 8/11/2021  Authorization Expiration Date: 9/9/2024  Medication: Votrient   Approved Dose/Quantity: 800MG (#120 / 30 DS)  Reference #: Key: [id number] - PA Case ID: [id number]   Insurance Company: [employer] - Phone [phone] Fax [phone]  Expected CoPay: $0      CoPay Card Available:      Foundation Assistance Needed:    Which Pharmacy is filling the prescription (Not needed for infusion/clinic administered): [address]  Pharmacy Notified: No  Patient Notified: No

## 2021-09-10 NOTE — PROGRESS NOTES
Connie is a 56 year old who is being evaluated via a billable video visit.      How would you like to obtain your AVS? Rithmiohart  If the video visit is dropped, the invitation should be resent by: Text to cell phone: [phone]  Will anyone else be joining your video visit? 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   5/2021 started pembrolizumab.   6/16/21 added gemcitabine due to progression vs pseudoprogression  6/2021 hospitalized with fevers, no clear source   8/2021 hospitalized with fevers, hypotension, no clear source, question of inflammatory arthritis from pembro  9/2021 onc visit-->scan with growth however areas of necrosis; tx held, close follow-up.     [facility]     Social - Lives with . 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 Son in [place] with his family; Dtr in [facility].      Care Planning - Multiple ACP discussions with us eg 6/16/2021, 7/2021. Completing HCD, well informed about the natural history of her cancer.       History:  History gathered today from: patient, medical chart    Continues to have fevers; treatment held this week given fevers and everything else. Question of progression--however given necrosis perhaps pseudoprogression-->plan to rescan in a couple weeks and go from there. She thinks if the scan is good in a couple weeks probably she'll want to continue the pembro since 'nothing else has worked' she observes.     Got steroids and abx this week--now feeling better. Going to [place] tomorrow so at least perhaps she'll feel ok for that trip. Hopes for lots of pool time. Will see her son there who is being deployed soon.    Cancer--d/w Harshal 'worst case scenarios' recently. Knows there are add'l lines of systemic tx available but no one's enthusiastic  about the chances of those helping her. [place] px maybe '6 months' *without* continuing systemic therapy/or if systemic therapy never helps her. In that context, glad she's going to [place], seeing her son, etc.    She feels she will probably not return to work; that's not an easy decision, but she thinks doing 2h of work a day even would be a stretch. So, mixed emotions about that but feels it is probably the right thing.  Thinking of moving to [facility] where her dtr is, they are not from [place] and don't have strong ties here.     Sleep ok, maybe too well, so tired with fevers; that's changed with steroids    Pain ok/mild; on gabap for neuropathy which helps. Steroids helping joint pains well at the moment. Has some Codeine cough syrup she takes for cough/chest wall pain rarely and it helps. Gabap increased to 600/600/900 mg in hospital    Resps ok; coughs though with laughter or talking too much    PE: LMP 03/19/2019    Wt Readings from Last 3 Encounters:   09/08/21 90.5 kg (199 lb 8 oz)   08/30/21 90.7 kg (199 lb 15.3 oz)   08/25/21 90.8 kg (200 lb 1.6 oz)     Alert NAD Hair coming back in  Clear      Data reviewed:  I reviewed recent labs and imaging, my comments:  Cr 0.65,     CT 1.  Overall increased size (since 7/12/2021) of numerous bilateral  pulmonary masses and nodules as described, including a markedly  enlarged right paramediastinal mass, now with increased necrosis and  heterogeneous contrast enhancement. Necrosis and heterogeneous  contrast enhancement applies to several of the enlarging nodules or  masses.    [facility] database reviewed: y      Impression & Recommendations:  57 yo with metastatic sarcoma  We discussed many supportive care issues as summarized above.  Did not make any new changes, refilled her gabapentin.  She is processing in what appears to be a healthy way the possibility of dying in the next half year, and preparing for the implications of that. May move down to [facility] where her dtr is to  expand their caregiving [place]. Her mood/spirits seem good/congruent to the situation. Physical sx well controlled now on steroids, although if her scan coming up is going (showing eg actual response to pembro) she'll probably resume it and have to live with the fevers, arthralgias, etc she's been having and she seems to be accepting of that.     Follow-up 4-6wk    33 minutes spent on the date of the encounter doing chart review, history and exam, patient education & counseling, documentation and other activities as noted above.    Thank you for involving us in the patient's care.   Robert Blanca MD / Palliative Medicine / [place] me via [employer].      Video Start Time: 1608  Video-Visit Details    Type of service:  Video Visit    Video End Time:4:27 PM    Originating Location (pt. Location): Home    Distant Location (provider location):  [facility]     Platform used for Video Visit: [employer]

## 2021-09-10 NOTE — LETTER
9/10/2021       RE: Richa Ashby  [address]  [address]  [place] [address]     Dear Colleague,    Thank you for referring your patient, Richa Ashby[facility]. Please see a copy of my visit note below.    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   5/2021 started pembrolizumab.   6/16/21 added gemcitabine due to progression vs pseudoprogression  6/2021 hospitalized with fevers, no clear source   8/2021 hospitalized with fevers, hypotension, no clear source, question of inflammatory arthritis from pembro  9/2021 onc visit-->scan with growth however areas of necrosis; tx held, close follow-up.     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.      Care Planning - Multiple ACP discussions with us eg 6/16/2021, 7/2021. Completing HCD, well informed about the natural history of her cancer.       History:  History gathered today from: patient, medical chart    Continues to have fevers; treatment held this week given fevers and everything else. Question of progression--however given necrosis perhaps pseudoprogression-->plan to rescan in a couple weeks and go from there. She thinks if the scan is good in a couple weeks probably she'll want to continue the pembro since 'nothing else has worked' she observes.     Got steroids and abx this week--now feeling better. Going to [place] tomorrow so at least perhaps she'll feel ok for that trip. Hopes for lots of pool time. Will see her son there who is being deployed soon.    Cancer--d/w Harshal 'worst case scenarios' recently. Knows there are add'l lines of  systemic tx available but no one's enthusiastic about the chances of those helping her. [place] px maybe '6 months' *without* continuing systemic therapy/or if systemic therapy never helps her. In that context, glad she's going to [place], seeing her son, etc.    She feels she will probably not return to work; that's not an easy decision, but she thinks doing 2h of work a day even would be a stretch. So, mixed emotions about that but feels it is probably the right thing.  Thinking of moving to [facility] where her dtr is, they are not from [place] and don't have strong ties here.     Sleep ok, maybe too well, so tired with fevers; that's changed with steroids    Pain ok/mild; on gabap for neuropathy which helps. Steroids helping joint pains well at the moment. Has some Codeine cough syrup she takes for cough/chest wall pain rarely and it helps. Gabap increased to 600/600/900 mg in hospital    Resps ok; coughs though with laughter or talking too much    PE: LMP 03/19/2019    Wt Readings from Last 3 Encounters:   09/08/21 90.5 kg (199 lb 8 oz)   08/30/21 90.7 kg (199 lb 15.3 oz)   08/25/21 90.8 kg (200 lb 1.6 oz)     Alert NAD Hair coming back in  Clear      Data reviewed:  I reviewed recent labs and imaging, my comments:  Cr 0.65,     CT 1.  Overall increased size (since 7/12/2021) of numerous bilateral  pulmonary masses and nodules as described, including a markedly  enlarged right paramediastinal mass, now with increased necrosis and  heterogeneous contrast enhancement. Necrosis and heterogeneous  contrast enhancement applies to several of the enlarging nodules or  masses.    [facility] database reviewed: y      Impression & Recommendations:  57 yo with metastatic sarcoma  We discussed many supportive care issues as summarized above.  Did not make any new changes, refilled her gabapentin.  She is processing in what appears to be a healthy way the possibility of dying in the next half year, and preparing for the implications of that.  May move down to [facility] where her dtr is to expand their caregiving [place]. Her mood/spirits seem good/congruent to the situation. Physical sx well controlled now on steroids, although if her scan coming up is going (showing eg actual response to pembro) she'll probably resume it and have to live with the fevers, arthralgias, etc she's been having and she seems to be accepting of that.     Follow-up 4-6wk    33 minutes spent on the date of the encounter doing chart review, history and exam, patient education & counseling, documentation and other activities as noted above.    Thank you for involving us in the patient's care.   Robert Blanca MD / Palliative Medicine / Text me via [facility].        Again, thank you for allowing me to participate in the care of your patient.      Sincerely,    Robert Blanca MD

## 2021-09-15 ENCOUNTER — PATIENT OUTREACH (OUTPATIENT)
Dept: CARE COORDINATION | Facility: CLINIC | Age: 56
End: 2021-09-15

## 2021-09-15 DIAGNOSIS — D63.0 ANEMIA IN NEOPLASTIC DISEASE: ICD-10-CM

## 2021-09-15 DIAGNOSIS — C49.9 SARCOMA OF SOFT TISSUE (H): Primary | ICD-10-CM

## 2021-09-15 DIAGNOSIS — C78.00 MALIGNANT NEOPLASM METASTATIC TO LUNG, UNSPECIFIED LATERALITY (H): ICD-10-CM

## 2021-09-15 DIAGNOSIS — C49.9 SARCOMA (H): ICD-10-CM

## 2021-09-15 RX ORDER — PAZOPANIB 200 MG/1
800 TABLET, FILM COATED ORAL DAILY
Qty: 120 TABLET | Refills: 0 | Status: ON HOLD | OUTPATIENT
Start: 2021-09-15 | End: 2021-11-02

## 2021-09-15 NOTE — PROGRESS NOTES
Oncology Distress Screening Follow-up  Clinical Social Work  [facility]    Identified Concern and Score From Distress Screenin. How concerned are you about work and home life issues that may be affected by your cancer?   7Abnormal            Date of Distress Screenin/10/21    Data: Richa Ashby is a 56 year old female who presents for follow up of undifferentiated pleomorphic sarcoma.         Intervention/Education Provided:: SW called and spoke with patient explaining reason for call. Patient reported they felt the medications their provided prescribed were helpful and noticed an increase in their energy level which was good as they were visiting with their son prior to their deployment. Patient stated they were currently in [place] visiting with their son. SW provided validation and encouraged patient to reach out as needed for supports and provided their contact information for on going supports.  Patient stated they appreciated the call and agreed to call back as needed for supports.      Follow-up Required: None needed at this time. Sw will remain available as needed for on going supports.         Heavenly LAZARO, ABELARDO  - Oncology  Phone : [phone]  Pager: [phone]

## 2021-09-22 ENCOUNTER — INFUSION THERAPY VISIT (OUTPATIENT)
Dept: ONCOLOGY | Facility: CLINIC | Age: 56
End: 2021-09-22
Attending: PHYSICIAN ASSISTANT
Payer: COMMERCIAL

## 2021-09-22 ENCOUNTER — APPOINTMENT (OUTPATIENT)
Dept: LAB | Facility: CLINIC | Age: 56
End: 2021-09-22
Attending: PHYSICIAN ASSISTANT
Payer: COMMERCIAL

## 2021-09-22 ENCOUNTER — HOSPITAL ENCOUNTER (OUTPATIENT)
Dept: CT IMAGING | Facility: CLINIC | Age: 56
End: 2021-09-22
Attending: PHYSICIAN ASSISTANT
Payer: COMMERCIAL

## 2021-09-22 ENCOUNTER — TELEPHONE (OUTPATIENT)
Dept: ONCOLOGY | Facility: CLINIC | Age: 56
End: 2021-09-22

## 2021-09-22 VITALS
OXYGEN SATURATION: 97 % | DIASTOLIC BLOOD PRESSURE: 60 MMHG | WEIGHT: 196.8 LBS | HEART RATE: 109 BPM | RESPIRATION RATE: 16 BRPM | TEMPERATURE: 97.6 F | SYSTOLIC BLOOD PRESSURE: 104 MMHG | BODY MASS INDEX: 30.82 KG/M2

## 2021-09-22 VITALS
RESPIRATION RATE: 16 BRPM | SYSTOLIC BLOOD PRESSURE: 123 MMHG | TEMPERATURE: 100.9 F | DIASTOLIC BLOOD PRESSURE: 76 MMHG | OXYGEN SATURATION: 98 % | HEART RATE: 114 BPM

## 2021-09-22 DIAGNOSIS — C49.9 SARCOMA OF SOFT TISSUE (H): ICD-10-CM

## 2021-09-22 DIAGNOSIS — T45.1X5A CHEMOTHERAPY-INDUCED NAUSEA: ICD-10-CM

## 2021-09-22 DIAGNOSIS — R05.9 COUGH: ICD-10-CM

## 2021-09-22 DIAGNOSIS — R11.0 CHEMOTHERAPY-INDUCED NAUSEA: ICD-10-CM

## 2021-09-22 DIAGNOSIS — D63.0 ANEMIA IN NEOPLASTIC DISEASE: ICD-10-CM

## 2021-09-22 DIAGNOSIS — D63.0 ANEMIA IN NEOPLASTIC DISEASE: Primary | ICD-10-CM

## 2021-09-22 DIAGNOSIS — C78.00 MALIGNANT NEOPLASM METASTATIC TO LUNG, UNSPECIFIED LATERALITY (H): ICD-10-CM

## 2021-09-22 DIAGNOSIS — G89.3 CANCER RELATED PAIN: ICD-10-CM

## 2021-09-22 DIAGNOSIS — C49.9 SARCOMA (H): ICD-10-CM

## 2021-09-22 DIAGNOSIS — R50.9 FEVER AND CHILLS: ICD-10-CM

## 2021-09-22 DIAGNOSIS — C49.9 SARCOMA OF SOFT TISSUE (H): Primary | ICD-10-CM

## 2021-09-22 LAB
ABO/RH(D): NORMAL
ALBUMIN SERPL-MCNC: 1.7 G/DL (ref 3.4–5)
ALP SERPL-CCNC: 187 U/L (ref 40–150)
ALT SERPL W P-5'-P-CCNC: 12 U/L (ref 0–50)
ANION GAP SERPL CALCULATED.3IONS-SCNC: 11 MMOL/L (ref 3–14)
ANTIBODY SCREEN: NEGATIVE
AST SERPL W P-5'-P-CCNC: 11 U/L (ref 0–45)
BASOPHILS # BLD AUTO: 0 10E3/UL (ref 0–0.2)
BASOPHILS NFR BLD AUTO: 0 %
BILIRUB SERPL-MCNC: 0.7 MG/DL (ref 0.2–1.3)
BLD PROD TYP BPU: NORMAL
BLD PROD TYP BPU: NORMAL
BLOOD COMPONENT TYPE: NORMAL
BLOOD COMPONENT TYPE: NORMAL
BUN SERPL-MCNC: 11 MG/DL (ref 7–30)
CALCIUM SERPL-MCNC: 8.7 MG/DL (ref 8.5–10.1)
CHLORIDE BLD-SCNC: 100 MMOL/L (ref 94–109)
CO COMPONENTS: NORMAL
CO2 SERPL-SCNC: 26 MMOL/L (ref 20–32)
CODING SYSTEM: NORMAL
CODING SYSTEM: NORMAL
COMMENTS: NORMAL
CREAT SERPL-MCNC: 0.66 MG/DL (ref 0.52–1.04)
CROSSMATCH: NORMAL
CROSSMATCH: NORMAL
EOSINOPHIL # BLD AUTO: 0.1 10E3/UL (ref 0–0.7)
EOSINOPHIL NFR BLD AUTO: 1 %
ERYTHROCYTE [DISTWIDTH] IN BLOOD BY AUTOMATED COUNT: 18.4 % (ref 10–15)
GFR SERPL CREATININE-BSD FRML MDRD: >90 ML/MIN/1.73M2
GLUCOSE BLD-MCNC: 128 MG/DL (ref 70–99)
GRAM/CULTURE INDICATED?: NO
HCT VFR BLD AUTO: 24 % (ref 35–47)
HGB BLD-MCNC: 7 G/DL (ref 11.7–15.7)
IMM GRANULOCYTES # BLD: 0.1 10E3/UL
IMM GRANULOCYTES NFR BLD: 1 %
ISSUE DATE AND TIME: NORMAL
ISSUE DATE AND TIME: NORMAL
LYMPHOCYTES # BLD AUTO: 0.9 10E3/UL (ref 0.8–5.3)
LYMPHOCYTES NFR BLD AUTO: 7 %
MCH RBC QN AUTO: 28.2 PG (ref 26.5–33)
MCHC RBC AUTO-ENTMCNC: 29.2 G/DL (ref 31.5–36.5)
MCV RBC AUTO: 97 FL (ref 78–100)
MONOCYTES # BLD AUTO: 1.2 10E3/UL (ref 0–1.3)
MONOCYTES NFR BLD AUTO: 9 %
NEUTROPHILS # BLD AUTO: 11.4 10E3/UL (ref 1.6–8.3)
NEUTROPHILS NFR BLD AUTO: 82 %
NRBC # BLD AUTO: 0 10E3/UL
NRBC BLD AUTO-RTO: 0 /100
OTHER UNITS TRANSFUSED: NORMAL
PLATELET # BLD AUTO: 276 10E3/UL (ref 150–450)
POST RXN CLERICAL CHECK: NORMAL
POST SPECIMEN APPEARANCE: NORMAL
POST-RXN ABO/RH: NORMAL
POST-RXN POLY DAT: NORMAL
POTASSIUM BLD-SCNC: 3.3 MMOL/L (ref 3.4–5.3)
PRODUCT CODE: NORMAL
PROT SERPL-MCNC: 7.4 G/DL (ref 6.8–8.8)
RBC # BLD AUTO: 2.48 10E6/UL (ref 3.8–5.2)
SODIUM SERPL-SCNC: 137 MMOL/L (ref 133–144)
SPECIMEN EXPIRATION DATE: NORMAL
TSH SERPL DL<=0.005 MIU/L-ACNC: 3.58 MU/L (ref 0.4–4)
UNIT ABO/RH: NORMAL
UNIT ABO/RH: NORMAL
UNIT NUMBER: NORMAL
UNIT STATUS: NORMAL
UNIT STATUS: NORMAL
UNIT TYPE ISBT: 5100
UNIT TYPE ISBT: 5100
WBC # BLD AUTO: 13.8 10E3/UL (ref 4–11)

## 2021-09-22 PROCEDURE — 86923 COMPATIBILITY TEST ELECTRIC: CPT | Performed by: PHYSICIAN ASSISTANT

## 2021-09-22 PROCEDURE — 86900 BLOOD TYPING SEROLOGIC ABO: CPT | Performed by: PHYSICIAN ASSISTANT

## 2021-09-22 PROCEDURE — 82040 ASSAY OF SERUM ALBUMIN: CPT

## 2021-09-22 PROCEDURE — 36591 DRAW BLOOD OFF VENOUS DEVICE: CPT | Performed by: PHYSICIAN ASSISTANT

## 2021-09-22 PROCEDURE — 250N000013 HC RX MED GY IP 250 OP 250 PS 637: Performed by: PHYSICIAN ASSISTANT

## 2021-09-22 PROCEDURE — 36591 DRAW BLOOD OFF VENOUS DEVICE: CPT

## 2021-09-22 PROCEDURE — 85004 AUTOMATED DIFF WBC COUNT: CPT

## 2021-09-22 PROCEDURE — 99417 PROLNG OP E/M EACH 15 MIN: CPT | Performed by: PHYSICIAN ASSISTANT

## 2021-09-22 PROCEDURE — 71260 CT THORAX DX C+: CPT | Mod: 26 | Performed by: RADIOLOGY

## 2021-09-22 PROCEDURE — 250N000011 HC RX IP 250 OP 636: Performed by: PHYSICIAN ASSISTANT

## 2021-09-22 PROCEDURE — 84443 ASSAY THYROID STIM HORMONE: CPT

## 2021-09-22 PROCEDURE — G0463 HOSPITAL OUTPT CLINIC VISIT: HCPCS | Mod: 25

## 2021-09-22 PROCEDURE — P9016 RBC LEUKOCYTES REDUCED: HCPCS

## 2021-09-22 PROCEDURE — 99215 OFFICE O/P EST HI 40 MIN: CPT | Performed by: PHYSICIAN ASSISTANT

## 2021-09-22 PROCEDURE — 250N000011 HC RX IP 250 OP 636: Performed by: STUDENT IN AN ORGANIZED HEALTH CARE EDUCATION/TRAINING PROGRAM

## 2021-09-22 PROCEDURE — 36430 TRANSFUSION BLD/BLD COMPNT: CPT

## 2021-09-22 PROCEDURE — G0463 HOSPITAL OUTPT CLINIC VISIT: HCPCS

## 2021-09-22 PROCEDURE — 86078 PHYS BLOOD BANK SERV REACTJ: CPT | Performed by: PATHOLOGY

## 2021-09-22 PROCEDURE — 71260 CT THORAX DX C+: CPT

## 2021-09-22 RX ORDER — CODEINE PHOSPHATE AND GUAIFENESIN 10; 100 MG/5ML; MG/5ML
1-2 SOLUTION ORAL EVERY 4 HOURS PRN
Qty: 473 ML | Refills: 1 | Status: ON HOLD | OUTPATIENT
Start: 2021-09-22 | End: 2022-02-18

## 2021-09-22 RX ORDER — PROCHLORPERAZINE MALEATE 10 MG
10 TABLET ORAL EVERY 6 HOURS PRN
Qty: 90 TABLET | Refills: 3 | Status: SHIPPED | OUTPATIENT
Start: 2021-09-22 | End: 2021-11-23

## 2021-09-22 RX ORDER — INDOMETHACIN 25 MG/1
25 CAPSULE ORAL
Qty: 30 CAPSULE | Refills: 0 | Status: SHIPPED | OUTPATIENT
Start: 2021-09-22 | End: 2021-09-30

## 2021-09-22 RX ORDER — TRAMADOL HYDROCHLORIDE 50 MG/1
50 TABLET ORAL EVERY 6 HOURS PRN
Qty: 60 TABLET | Refills: 0 | Status: SHIPPED | OUTPATIENT
Start: 2021-09-22 | End: 2021-09-22

## 2021-09-22 RX ORDER — HEPARIN SODIUM (PORCINE) LOCK FLUSH IV SOLN 100 UNIT/ML 100 UNIT/ML
5 SOLUTION INTRAVENOUS
Status: CANCELLED | OUTPATIENT
Start: 2021-09-22

## 2021-09-22 RX ORDER — OXYCODONE HYDROCHLORIDE 5 MG/1
2.5-5 TABLET ORAL EVERY 6 HOURS PRN
Qty: 60 TABLET | Refills: 0 | Status: ON HOLD | OUTPATIENT
Start: 2021-09-22 | End: 2021-11-09

## 2021-09-22 RX ORDER — CODEINE PHOSPHATE AND GUAIFENESIN 10; 100 MG/5ML; MG/5ML
1-2 SOLUTION ORAL EVERY 4 HOURS PRN
COMMUNITY
End: 2021-09-22

## 2021-09-22 RX ORDER — IOPAMIDOL 755 MG/ML
97 INJECTION, SOLUTION INTRAVASCULAR ONCE
Status: COMPLETED | OUTPATIENT
Start: 2021-09-22 | End: 2021-09-22

## 2021-09-22 RX ORDER — HEPARIN SODIUM (PORCINE) LOCK FLUSH IV SOLN 100 UNIT/ML 100 UNIT/ML
5 SOLUTION INTRAVENOUS
Status: DISCONTINUED | OUTPATIENT
Start: 2021-09-22 | End: 2021-09-22 | Stop reason: HOSPADM

## 2021-09-22 RX ORDER — HEPARIN SODIUM (PORCINE) LOCK FLUSH IV SOLN 100 UNIT/ML 100 UNIT/ML
5 SOLUTION INTRAVENOUS ONCE
Status: COMPLETED | OUTPATIENT
Start: 2021-09-22 | End: 2021-09-22

## 2021-09-22 RX ORDER — POTASSIUM CHLORIDE 1500 MG/1
40 TABLET, EXTENDED RELEASE ORAL ONCE
Status: COMPLETED | OUTPATIENT
Start: 2021-09-22 | End: 2021-09-22

## 2021-09-22 RX ORDER — HEPARIN SODIUM,PORCINE 10 UNIT/ML
5 VIAL (ML) INTRAVENOUS
Status: CANCELLED | OUTPATIENT
Start: 2021-09-22

## 2021-09-22 RX ADMIN — Medication 5 ML: at 11:29

## 2021-09-22 RX ADMIN — Medication 5 ML: at 17:53

## 2021-09-22 RX ADMIN — POTASSIUM CHLORIDE 40 MEQ: 1500 TABLET, EXTENDED RELEASE ORAL at 13:25

## 2021-09-22 RX ADMIN — IOPAMIDOL 97 ML: 755 INJECTION, SOLUTION INTRAVENOUS at 10:42

## 2021-09-22 ASSESSMENT — PAIN SCALES - GENERAL: PAINLEVEL: NO PAIN (0)

## 2021-09-22 NOTE — PATIENT INSTRUCTIONS
Contact Numbers  [facility]: [phone] (for symptom and scheduling needs)    Please call the [facility] Triage line if you experience a temperature greater than or equal to 100.4, shaking chills, have uncontrolled nausea, vomiting and/or diarrhea, dizziness, shortness of breath, chest pain, bleeding, unexplained bruising, or if you have any other new/concerning symptoms, questions or concerns.     If you are having any concerning symptoms or wish to speak to a provider before your next infusion visit, please call your care coordinator or triage to notify them so we can adequately serve you.     If you need a refill on a narcotic prescription or other medication, please call triage before your infusion appointment.          September 2021 Sunday Monday Tuesday Wednesday Thursday Friday Saturday                  1     2     3     4       5     6     7    LAB CENTRAL  11:45 AM   (15 min.)   [facility]   [facility]    CT CHEST WO  12:20 PM   (20 min.)   [id number]   [facility] [place] [address]    RETURN  11:15 AM   (45 min.)   Harshal Schuster PA   [facility]    ONC INFUSION 2 HR (120 MIN)   1:00 PM   (120 min.)   [facility]ION NURSE   [facility] 9     10    VIDEO VISIT RETURN   4:05 PM   (40 min.)   Robert Blanca MD   [facility] 11       12     13     14     15     16     17     18       19     20     21     22    CT CHEST W  10:20 AM   (20 min.)   [facility]   [facility]    LAB CENTRAL  11:30 AM   (15 min.)   [facility]   [facility]    RETURN  11:45 AM   (45 min.)   Harshal Schuster PA   [facility]    ONC INFUSION 2 HR (120 MIN)   1:00 PM   (120 min.)   [facility] ON[facility] INFUSION NURSE   [facility] 23     24     25       26     27     28      29 30 October 2021 Sunday Monday Tuesday Wednesday Thursday Friday Saturday                            1     2       3     4     5     6     7     8     9       10     11    CANCER REHAB EVAL  10:00 AM   (60 min.)   Kateryna Barbour, PT   [facility] 12     13     14     15     16       17     18     19     20     21     22     23       24     25     26     27     28     29     30       31                                                   Lab Results:  Recent Results (from the past 12 hour(s))   Comprehensive metabolic panel    Collection Time: 09/22/21 11:34 AM   Result Value Ref Range    Sodium 137 133 - 144 mmol/L    Potassium 3.3 (L) 3.4 - 5.3 mmol/L    Chloride 100 94 - 109 mmol/L    Carbon Dioxide (CO2) 26 20 - 32 mmol/L    Anion Gap 11 3 - 14 mmol/L    Urea Nitrogen 11 7 - 30 mg/dL    Creatinine 0.66 0.52 - 1.04 mg/dL    Calcium 8.7 8.5 - 10.1 mg/dL    Glucose 128 (H) 70 - 99 mg/dL    Alkaline Phosphatase 187 (H) 40 - 150 U/L    AST 11 0 - 45 U/L    ALT 12 0 - 50 U/L    Protein Total 7.4 6.8 - 8.8 g/dL    Albumin 1.7 (L) 3.4 - 5.0 g/dL    Bilirubin Total 0.7 0.2 - 1.3 mg/dL    GFR Estimate >90 >60 mL/min/1.73m2   TSH with free T4 reflex    Collection Time: 09/22/21 11:34 AM   Result Value Ref Range    TSH 3.58 0.40 - 4.00 mU/L   CBC with platelets and differential    Collection Time: 09/22/21 11:34 AM   Result Value Ref Range    WBC Count 13.8 (H) 4.0 - 11.0 10e3/uL    RBC Count 2.48 (L) 3.80 - 5.20 10e6/uL    Hemoglobin 7.0 (L) 11.7 - 15.7 g/dL    Hematocrit 24.0 (L) 35.0 - 47.0 %    MCV 97 78 - 100 fL    MCH 28.2 26.5 - 33.0 pg    MCHC 29.2 (L) 31.5 - 36.5 g/dL    RDW 18.4 (H) 10.0 - 15.0 %    Platelet Count 276 150 - 450 10e3/uL    % Neutrophils 82 %    % Lymphocytes 7 %    % Monocytes 9 %    % Eosinophils 1 %    % Basophils 0 %    % Immature Granulocytes 1 %    NRBCs per 100 WBC 0 <1 /100    Absolute Neutrophils 11.4 (H) 1.6 - 8.3 10e3/uL     Absolute Lymphocytes 0.9 0.8 - 5.3 10e3/uL    Absolute Monocytes 1.2 0.0 - 1.3 10e3/uL    Absolute Eosinophils 0.1 0.0 - 0.7 10e3/uL    Absolute Basophils 0.0 0.0 - 0.2 10e3/uL    Absolute Immature Granulocytes 0.1 (H) <=0.0 10e3/uL    Absolute NRBCs 0.0 10e3/uL

## 2021-09-22 NOTE — Clinical Note
9/22/2021         RE: Richa Ashby  [address]  [place] [address]        Dear Colleague,    Thank you for referring your patient, Richa Ashby, to the [facility]. Please see a copy of my visit note below.    Oncology/Hematology Visit Note  Sep 22, 2021    Reason for Visit: Follow up of undifferentiated pleomorphic sarcoma     History of Present Illness: Richa Ashby is a 56 year old female with PMH asthma and GERD with undifferentiated pleomorphic sarcoma. 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 She received preoperative radiotherapy and the tumor was resected 3/12/20. She then developed a cough, chest tightness, and scapular pain and imaging revealed lung nodules. Lung biopsy 11/2/20 with sarcoma. She was started on Doxil + Ifos 11/13/20. Had delay in cycle 2 due to COVID dx 12/2/20. CT imaging after cycle 1 with positive response to treatment with decrease in pulmonary nodules. Received cycle 2 12/18/20. Received cycle 3 1/15/21 with dose reduction in Doxil for skin toxicity and mucositis. Received cycle 4 2/15/21.      CT CAP 3/8/21 with positive response to treatment, decreased size of pulmonary mets though one lesion was larger. Did see fibrocystic changes in left breast, recommended breast imaging. Received cycle 5 Doxil + ifos 3/11/21.      Imaging 4/5/21 with overall stable disease except one lung lesion, opted to continue Doxil alone for now and switch to Keytruda in future if ongoing progression.      Started Keytruda 5/10/21. Was seen 5/26/21 cough, SOB, fatigue work-up thought to be 2/2 phrenic nerve involvement of R apical lung mass.      Due to worsening fatigue and cough, repeat CT 6/15/21 concerning for progression vs pseudoprogression. Added Gemzar 6/16/21.     She was admitted 6/17/21-6/20/21 with fever of unknown origin, infectious work-up negative, treated  with broad spectrum antibiotics.     She clinically improved on abx. Was able to receive day 8 Gemzar (dose reduced for thrombocytopenia) + Keytruda 6/23/21.      CT 7/12/21 with stable disease.     She was admitted 8/25/21-8/31/21 for fevers, hypotension, acute on chronic anemia. Extensive infectious work-up negative so etiology of fevers still unknown.     CT 9/7/21 with growth but also showed multiple areas of necrosis, clinically still not feeling well so plan to hold treatment and repeat imaging with visit in 2 weeks     She was seen today for oncology follow-up.     Interval History:  Connie was seen today for follow-up. She initially did feel better on prednisone 20mg, noting improvement in energy and no fevers. Once she tapered the steroids however she began to feel worse again. She is now having daily fevers ranging from 100-103, worse in the evening. She continues to take Tylenol scheduled. She notes increased fatigue and body aches when the fever breaks. Her cough has worsened. She also has more R back/shoulder pain. She isn't taking any pain meds at this time. She denies SOB except sometimes when she lies flat at night she notes when she wakes up she feels like her O2 is low (measures 85-90 on pulse Ox). She also notes a worsening cough with deep breathes.    She has mild headaches and dizziness. Hasn't been eating or drinking well. Denies chest pain. No nausea, vomiting, abdominal pain. Mild diarrhea stable with Imodium. No urinary concerns. Joint swelling and pain improved. Neuropathy stable.    Current Outpatient Medications   Medication Sig Dispense Refill     acetaminophen (TYLENOL) 325 MG tablet Take 325-650 mg by mouth every 6 hours as needed for mild pain       ADVAIR DISKUS 100-50 MCG/DOSE inhaler Inhale 1 puff into the lungs daily        albuterol (PROAIR RESPICLICK) 108 (90 Base) MCG/ACT inhaler Inhale 1-2 puffs into the lungs       cetirizine (ZYRTEC) 10 MG tablet Take 10 mg by mouth daily        gabapentin (NEURONTIN) 300 MG capsule Take 600 mg in the AM, 600 mg mid-day, and 900 mg at night 210 capsule 3     hydrOXYzine (ATARAX) 25 MG tablet Take 1 tablet (25 mg) by mouth 3 times daily as needed for itching (Patient not taking: Reported on 9/10/2021) 30 tablet 1     melatonin (MELATONIN) 1 MG/ML LIQD liquid Take 5 mg by mouth       montelukast (SINGULAIR) 10 MG tablet Take 10 mg by mouth daily       multivitamin w/minerals (MULTI-VITAMIN) tablet Take 1 tablet by mouth daily       nystatin (MYCOSTATIN) 772116 UNIT/ML suspension Take 5 mLs (500,000 Units) by mouth 4 times daily 400 mL 0     omeprazole (PRILOSEC) 20 MG DR capsule 20 mg daily        ondansetron (ZOFRAN) 8 MG tablet Take 1 tablet (8 mg) by mouth every 8 hours as needed for nausea 60 tablet 1     pazopanib (VOTRIENT) 200 MG tablet Take 4 tablets (800 mg) by mouth daily Take on an empty stomach 1 hour before or 2 hours after a meal. 120 tablet 0     vitamin D3 (CHOLECALCIFEROL) 2000 units (50 mcg) tablet Take 1 tablet by mouth daily         Physical Examination:  /60 (BP Location: Right arm, Patient Position: Sitting, Cuff Size: Adult Regular)   Pulse 109   Temp 97.6  F (36.4  C) (Oral)   Resp 16   Wt 89.3 kg (196 lb 12.8 oz)   LMP 03/19/2019   SpO2 97%   BMI 30.82 kg/m    Wt Readings from Last 10 Encounters:   09/08/21 90.5 kg (199 lb 8 oz)   08/30/21 90.7 kg (199 lb 15.3 oz)   08/25/21 90.8 kg (200 lb 1.6 oz)   08/11/21 93.4 kg (206 lb)   08/09/21 91.8 kg (202 lb 4.8 oz)   08/04/21 93.1 kg (205 lb 4.8 oz)   07/28/21 93.2 kg (205 lb 8 oz)   07/24/21 92.5 kg (204 lb)   07/18/21 93.9 kg (207 lb 1.6 oz)   07/14/21 93.7 kg (206 lb 8 oz)     Constitutional: Well-appearing female in no acute distress.  Eyes: EOMI, PERRL. No scleral icterus.  ENT: Deferred  Lymphatic: Neck is supple without cervical or supraclavicular lymphadenopathy.   Cardiovascular: Regular rate and rhythm. No murmurs, gallops, or rubs. Trace peripheral  edema.  Respiratory: Clear to auscultation bilaterally, though slightly diminished in RUL. Coughing. No wheezes or crackles.  Gastrointestinal: Bowel sounds present. Abdomen soft, non-tender. No palpable hepatosplenomegaly or masses.   Neurologic: Cranial nerves II through XII are grossly intact.  Skin: No rashes, petechiae, or bruising noted on exposed skin.    Laboratory Data:    Results for AMADEO IWLSON (MRN [id number]) as of 9/23/2021 09:35   9/22/2021 11:34   Sodium 137   Potassium 3.3 (L)   Chloride 100   Carbon Dioxide 26   Urea Nitrogen 11   Creatinine 0.66   GFR Estimate >90   Calcium 8.7   Anion Gap 11   Albumin 1.7 (L)   Protein Total 7.4   Bilirubin Total 0.7   Alkaline Phosphatase 187 (H)   ALT 12   AST 11   TSH 3.58   Glucose 128 (H)   WBC 13.8 (H)   Hemoglobin 7.0 (L)   Hematocrit 24.0 (L)   Platelet Count 276   RBC Count 2.48 (L)   MCV 97   MCH 28.2   MCHC 29.2 (L)   RDW 18.4 (H)   % Neutrophils 82   % Lymphocytes 7   % Monocytes 9   % Eosinophils 1   Absolute Basophils 0.0   % Basophils 0   Absolute Eosinophils 0.1   Absolute Immature Granulocytes 0.1 (H)   Absolute Lymphocytes 0.9   Absolute Monocytes 1.2   % Immature Granulocytes 1   Absolute NRBCs 0.0   NRBCs per 100 WBC 0     CT Chest W Contrast 9/22/21  FINDINGS:     Lungs: The trachea and central airways are patent. No evidence of  disease progression, with the heterogenous right paramediastinal mass  in the right upper lobe now measuring 14.4 x 9.7 x 13.8 cm, previously  12.2 x 8.7 x 10.0 cm (series 6 image 118 and series 4 image 34). This  also effaces and anteriorly displaces the right mainstem bronchus.  Increased size of peripheral right middle lobe mass now measuring 4.1  x 4.3 cm, previously 3.8 x 3.5 cm (series 6 image 170). Increased size  of mass in the superior aspect of the left lower lobe measuring 4.4 x  3.7 cm, previously 2.7 x 2.3 cm (series 6 image 172). Increased size  of the necrotic nodule in the inferior right lower  lobe now measuring  1.6 x 2.1 cm, previously 1.3 x 1.7 cm (series 6 image 200). Increased  size of the necrotic subpleural nodule in the left costophrenic angle  now measuring 2.7 x 1.3 cm, previously 2.0 x 1.6 cm (series 6 image  238). Slightly increased spiculated nodule in the anterior subpleural  right middle lobe measuring 7.4 x 13.4 mm, previously 7.0 x 11.3 mm  when measured in a similar fashion (series 6 image 177).     Mediastinum: Right chest wall Port-A-Cath with tip in the high SVC.  The visualized thyroid gland is unremarkable. The heart size is within  normal limits. No pericardial effusion. The ascending aorta and main  pulmonary artery diameters are within normal limits. Normal appearance  and configuration of the great vessels off of the aortic arch.  Slightly increased size of prevascular mediastinal lymph nodes  measuring up to 8 mm (series 2 image 22). Axillary, mediastinal, and  hilar lymph nodes are not enlarged by size criteria. Marked narrowing  of the upper SVC. Minimal SVC is opacified with contrast and empties  into the right atrium. There is beaking of the bridging innominate  vein from the left as it schillings up with the right-sided SVC  (series 2 image 22).     Bones and soft tissues: Incidental mild multilevel vertebral spurring,  consistent with degenerative change.      Upper Abdomen: Hepatic pseudolesion about the anterior aspect of the  falciform ligament, likely benign focal fat and not well seen on  orthogonal imaging. No acute pathology. Accessory splenule.                                                                         IMPRESSION:   1. Increased size of numerous bilateral pulmonary masses or nodules as  above, including markedly enlarged right paramediastinal mass. Marked  flattening SVC which may only be mildly open due to the presence of  the catheter. Beaking of the bridging innominate vein as it enters to  join with the right innominate vein to form the SVC.  2.  Mildly increased prominence of nonpathologically enlarged  mediastinal lymph nodes measuring up to 8 mm.  3. No suspicious extra thoracic soft tissue or osseous lesions.     I have personally reviewed the examination and initial interpretation  and I agree with the findings.     SHIRLEY INMAN MD       Assessment and Plan:  IN PROGRESS  *Start Votrient  *Start Oxycodone for pain and cough  *Start Indomethacin for tumor fevers-if no improvement will try prednisone again   *2 units pRBC today          Harshal Schuster PA-C  [facility] [address] [id number] [phone]        Again, thank you for allowing me to participate in the care of your patient.        Sincerely,        RUPERT Guaman

## 2021-09-22 NOTE — PROGRESS NOTES
Infusion Nursing Note:  Richa Ashby presents today for Cycle 4 Day 1 Gemzar and Keytruda (Not Given) and 2 units of RBC's.    Patient seen by provider today: Yes: RUPERT Guaman   present during visit today: Not Applicable.    Note: Patient presents to infusion today doing well. Denies any new questions or concerns following visit with RUPERT Guaman.     TORB @ 6134 RUPERT Guaman/ Anita Manjarrez, RN:  - Give 2 units of RBC's for Hgb 7.0 today, okay to return tomorrow if you can't fit in both units  - No Keytruda/Gemzar today  - Replace potassium per protocol    40 mEq oral potassium given for potassium of 3.3 per replacement protocol.     During 2nd unit of blood, temperature started to rise slightly. Pt has daily tumor fevers. Pt states she usually takes her Tylenol scheduled and her temperature usually rises about 30minutes prior to the next dose. Pt took the new medication Indocin, which Harshal prescribed today instead of her tylenol. At the end of transfusion, temp spiked to 100.9. Harshal notified.     TORB: 9/22/21 @ 9413 RUPERT Murphy/Bhumi Barrett, RN - Have patient continue taking her Indocin and Tylenol as scheduled. Complete blood transfusion work up in case.    Pt verbalized understanding and took her Tylenol she brought with her.       Intravenous Access:  Implanted Port.    Treatment Conditions:  Lab Results   Component Value Date    HGB 7.0 (L) 09/22/2021    WBC 13.8 (H) 09/22/2021    ANEU 7.6 08/23/2021    ANEUTAUTO 11.4 (H) 09/22/2021     09/22/2021      Lab Results   Component Value Date     09/22/2021    POTASSIUM 3.3 (L) 09/22/2021    MAG 2.0 06/20/2021    CR 0.66 09/22/2021    VIKTORIA 8.7 09/22/2021    BILITOTAL 0.7 09/22/2021    ALBUMIN 1.7 (L) 09/22/2021    ALT 12 09/22/2021    AST 11 09/22/2021     Results reviewed, labs did NOT meet treatment parameters: 2 units of RBC's needed for Hgb < 8.  Blood transfusion consent signed  1/7/2021.    Post Infusion Assessment:  Patient tolerated infusion without incident.  Blood return noted pre and post infusion.  Site patent and intact, free from redness, edema or discomfort.  No evidence of extravasations.  Access discontinued per protocol.     Discharge Plan:   Prescription refills given for Robitussin, compazine, oxycodone and indomethacin.  Discharge instructions reviewed with: Patient.  Patient and/or family verbalized understanding of discharge instructions and all questions answered.  AVS to patient via freeeHART. Next infusion appointment not yet made at time of discharge. Patient aware to watch MYCHART and to call triage if she doesn't see anything get scheduled.   Patient discharged in stable condition accompanied by: self.  Departure Mode: Ambulatory.      Anita Manjarrez RN

## 2021-09-22 NOTE — LETTER
September 22, 2021    Richa Ashby  [address]  [address]  [place] [address]      To whom it may concern,    Ms. Ashby is under my care for cancer. She is unable to work due to symptoms related to her cancer and toxicities related to her cancer treatment. She will not be able to return to work and we do not have any anticipated time to return. Please allow for any necessary accommodations based on her inability to work.    Thank you,      Harshal Schuster PA-C  [facility]  [address] [place] [address]  [phone]

## 2021-09-22 NOTE — ORAL ONC MGMT
Oral Chemotherapy Monitoring Program     Placed call to patient in follow up of planned Votrient therapy. Connie said she would like to have her  present during the education session and preferred call back tomorrow for that. She said she has not yet received delivery of the Votrient. Plan to call again tomorrow to complete Votrient education. She expressed understanding and agreement with this plan and thanked me for the call.    Kyaw Mcneill PharmD  [facility]  [phone]  September 22, 2021

## 2021-09-22 NOTE — NURSING NOTE
"Oncology Rooming Note    September 22, 2021 11:56 AM   Richa Ashby is a 56 year old female who presents for:    Chief Complaint   Patient presents with     Port Draw     Labs drawn from port by RN in lab. VS taken.      Oncology Clinic Visit     Sarcoma of soft tissue      Initial Vitals: /60 (BP Location: Right arm, Patient Position: Sitting, Cuff Size: Adult Regular)   Pulse 109   Temp 97.6  F (36.4  C) (Oral)   Resp 16   Wt 89.3 kg (196 lb 12.8 oz)   LMP 03/19/2019   SpO2 97%   BMI 30.82 kg/m   Estimated body mass index is 30.82 kg/m  as calculated from the following:    Height as of 9/8/21: 1.702 m (5' 7.01\").    Weight as of this encounter: 89.3 kg (196 lb 12.8 oz). Body surface area is 2.05 meters squared.  No Pain (0) Comment: Data Unavailable   Patient's last menstrual period was 03/19/2019.  Allergies reviewed: Yes  Medications reviewed: Yes    Medications: MEDICATION REFILLS NEEDED TODAY. Provider was notified.  Pharmacy name entered into DermaGen:    [id number] - [place], [place] - [address], [place] AT [facility] [place], [place] - [address] [place] [address] [phone]  [place], [place] - [address] [place] [address]    Clinical concerns: Medication refill: Compazine       Ida Avina LPN              "

## 2021-09-22 NOTE — NURSING NOTE
Chief Complaint   Patient presents with     Port Draw     Labs drawn from port by RN in lab. VS taken.      Labs drawn by RN from already accessed port. Port flushed with saline and heparin locked.  Pt tolerated well.  VS taken.  Pt checked in for next appt.    Fe Golden RN

## 2021-09-22 NOTE — LETTER
9/22/2021         RE: Richa Ashby  [address]  [facility] [address]      Oncology/Hematology Visit Note  Sep 22, 2021    Reason for Visit: Follow up of undifferentiated pleomorphic sarcoma     History of Present Illness: Richa Ashby is a 56 year old female with PMH asthma and GERD with undifferentiated pleomorphic sarcoma. 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 She received preoperative radiotherapy and the tumor was resected 3/12/20. She then developed a cough, chest tightness, and scapular pain and imaging revealed lung nodules. Lung biopsy 11/2/20 with sarcoma. She was started on Doxil + Ifos 11/13/20. Had delay in cycle 2 due to COVID dx 12/2/20. CT imaging after cycle 1 with positive response to treatment with decrease in pulmonary nodules. Received cycle 2 12/18/20. Received cycle 3 1/15/21 with dose reduction in Doxil for skin toxicity and mucositis. Received cycle 4 2/15/21.      CT CAP 3/8/21 with positive response to treatment, decreased size of pulmonary mets though one lesion was larger. Did see fibrocystic changes in left breast, recommended breast imaging. Received cycle 5 Doxil + ifos 3/11/21.      Imaging 4/5/21 with overall stable disease except one lung lesion, opted to continue Doxil alone for now and switch to Keytruda in future if ongoing progression.      Started Keytruda 5/10/21. Was seen 5/26/21 cough, SOB, fatigue work-up thought to be 2/2 phrenic nerve involvement of R apical lung mass.      Due to worsening fatigue and cough, repeat CT 6/15/21 concerning for progression vs pseudoprogression. Added Gemzar 6/16/21.     She was admitted 6/17/21-6/20/21 with fever of unknown origin, infectious work-up negative, treated with broad spectrum antibiotics.     She clinically improved on abx. Was able to receive day 8 Gemzar (dose reduced for thrombocytopenia) + Keytruda 6/23/21.      CT  7/12/21 with stable disease.     She was admitted 8/25/21-8/31/21 for fevers, hypotension, acute on chronic anemia. Extensive infectious work-up negative so etiology of fevers still unknown.     CT 9/7/21 with growth but also showed multiple areas of necrosis, clinically still not feeling well so plan to hold treatment and repeat imaging with visit in 2 weeks     She was seen today for oncology follow-up.     Interval History:  Connie was seen today for follow-up. She initially did feel better on prednisone 20mg, noting improvement in energy and no fevers. Once she tapered the steroids however she began to feel worse again. She is now having daily fevers ranging from 100-103, worse in the evening. She continues to take Tylenol scheduled. She notes increased fatigue and body aches when the fever breaks. Her cough has worsened. She also has more R back/shoulder pain. She isn't taking any pain meds at this time. She denies SOB except sometimes when she lies flat at night she notes when she wakes up she feels like her O2 is low (measures 85-90 on pulse Ox). She also notes a worsening cough with deep breathes.    She has mild headaches and dizziness. Hasn't been eating or drinking well. Denies chest pain. No nausea, vomiting, abdominal pain. Mild diarrhea stable with Imodium. No urinary concerns. Joint swelling and pain improved. Neuropathy stable.    Current Outpatient Medications   Medication Sig Dispense Refill     acetaminophen (TYLENOL) 325 MG tablet Take 325-650 mg by mouth every 6 hours as needed for mild pain       ADVAIR DISKUS 100-50 MCG/DOSE inhaler Inhale 1 puff into the lungs daily        albuterol (PROAIR RESPICLICK) 108 (90 Base) MCG/ACT inhaler Inhale 1-2 puffs into the lungs       cetirizine (ZYRTEC) 10 MG tablet Take 10 mg by mouth daily       gabapentin (NEURONTIN) 300 MG capsule Take 600 mg in the AM, 600 mg mid-day, and 900 mg at night 210 capsule 3     hydrOXYzine (ATARAX) 25 MG tablet Take 1 tablet  (25 mg) by mouth 3 times daily as needed for itching (Patient not taking: Reported on 9/10/2021) 30 tablet 1     melatonin (MELATONIN) 1 MG/ML LIQD liquid Take 5 mg by mouth       montelukast (SINGULAIR) 10 MG tablet Take 10 mg by mouth daily       multivitamin w/minerals (MULTI-VITAMIN) tablet Take 1 tablet by mouth daily       nystatin (MYCOSTATIN) 739628 UNIT/ML suspension Take 5 mLs (500,000 Units) by mouth 4 times daily 400 mL 0     omeprazole (PRILOSEC) 20 MG DR capsule 20 mg daily        ondansetron (ZOFRAN) 8 MG tablet Take 1 tablet (8 mg) by mouth every 8 hours as needed for nausea 60 tablet 1     pazopanib (VOTRIENT) 200 MG tablet Take 4 tablets (800 mg) by mouth daily Take on an empty stomach 1 hour before or 2 hours after a meal. 120 tablet 0     vitamin D3 (CHOLECALCIFEROL) 2000 units (50 mcg) tablet Take 1 tablet by mouth daily         Physical Examination:  /60 (BP Location: Right arm, Patient Position: Sitting, Cuff Size: Adult Regular)   Pulse 109   Temp 97.6  F (36.4  C) (Oral)   Resp 16   Wt 89.3 kg (196 lb 12.8 oz)   LMP 03/19/2019   SpO2 97%   BMI 30.82 kg/m    Wt Readings from Last 10 Encounters:   09/08/21 90.5 kg (199 lb 8 oz)   08/30/21 90.7 kg (199 lb 15.3 oz)   08/25/21 90.8 kg (200 lb 1.6 oz)   08/11/21 93.4 kg (206 lb)   08/09/21 91.8 kg (202 lb 4.8 oz)   08/04/21 93.1 kg (205 lb 4.8 oz)   07/28/21 93.2 kg (205 lb 8 oz)   07/24/21 92.5 kg (204 lb)   07/18/21 93.9 kg (207 lb 1.6 oz)   07/14/21 93.7 kg (206 lb 8 oz)     Constitutional: Well-appearing female in no acute distress.  Eyes: EOMI, PERRL. No scleral icterus.  ENT: Deferred  Lymphatic: Neck is supple without cervical or supraclavicular lymphadenopathy.   Cardiovascular: Regular rate and rhythm. No murmurs, gallops, or rubs. Trace peripheral edema.  Respiratory: Clear to auscultation bilaterally, though slightly diminished in RUL. Coughing. No wheezes or crackles.  Gastrointestinal: Bowel sounds present. Abdomen soft,  non-tender. No palpable hepatosplenomegaly or masses.   Neurologic: Cranial nerves II through XII are grossly intact.  Skin: No rashes, petechiae, or bruising noted on exposed skin.    Laboratory Data:    Results for AMADEO WILSON (MRN [id number]) as of 9/23/2021 09:35   9/22/2021 11:34   Sodium 137   Potassium 3.3 (L)   Chloride 100   Carbon Dioxide 26   Urea Nitrogen 11   Creatinine 0.66   GFR Estimate >90   Calcium 8.7   Anion Gap 11   Albumin 1.7 (L)   Protein Total 7.4   Bilirubin Total 0.7   Alkaline Phosphatase 187 (H)   ALT 12   AST 11   TSH 3.58   Glucose 128 (H)   WBC 13.8 (H)   Hemoglobin 7.0 (L)   Hematocrit 24.0 (L)   Platelet Count 276   RBC Count 2.48 (L)   MCV 97   MCH 28.2   MCHC 29.2 (L)   RDW 18.4 (H)   % Neutrophils 82   % Lymphocytes 7   % Monocytes 9   % Eosinophils 1   Absolute Basophils 0.0   % Basophils 0   Absolute Eosinophils 0.1   Absolute Immature Granulocytes 0.1 (H)   Absolute Lymphocytes 0.9   Absolute Monocytes 1.2   % Immature Granulocytes 1   Absolute NRBCs 0.0   NRBCs per 100 WBC 0     CT Chest W Contrast 9/22/21  FINDINGS:     Lungs: The trachea and central airways are patent. No evidence of  disease progression, with the heterogenous right paramediastinal mass  in the right upper lobe now measuring 14.4 x 9.7 x 13.8 cm, previously  12.2 x 8.7 x 10.0 cm (series 6 image 118 and series 4 image 34). This  also effaces and anteriorly displaces the right mainstem bronchus.  Increased size of peripheral right middle lobe mass now measuring 4.1  x 4.3 cm, previously 3.8 x 3.5 cm (series 6 image 170). Increased size  of mass in the superior aspect of the left lower lobe measuring 4.4 x  3.7 cm, previously 2.7 x 2.3 cm (series 6 image 172). Increased size  of the necrotic nodule in the inferior right lower lobe now measuring  1.6 x 2.1 cm, previously 1.3 x 1.7 cm (series 6 image 200). Increased  size of the necrotic subpleural nodule in the left costophrenic angle  now measuring 2.7 x  1.3 cm, previously 2.0 x 1.6 cm (series 6 image  238). Slightly increased spiculated nodule in the anterior subpleural  right middle lobe measuring 7.4 x 13.4 mm, previously 7.0 x 11.3 mm  when measured in a similar fashion (series 6 image 177).     Mediastinum: Right chest wall Port-A-Cath with tip in the high SVC.  The visualized thyroid gland is unremarkable. The heart size is within  normal limits. No pericardial effusion. The ascending aorta and main  pulmonary artery diameters are within normal limits. Normal appearance  and configuration of the great vessels off of the aortic arch.  Slightly increased size of prevascular mediastinal lymph nodes  measuring up to 8 mm (series 2 image 22). Axillary, mediastinal, and  hilar lymph nodes are not enlarged by size criteria. Marked narrowing  of the upper SVC. Minimal SVC is opacified with contrast and empties  into the right atrium. There is beaking of the bridging innominate  vein from the left as it schillings up with the right-sided SVC  (series 2 image 22).     Bones and soft tissues: Incidental mild multilevel vertebral spurring,  consistent with degenerative change.      Upper Abdomen: Hepatic pseudolesion about the anterior aspect of the  falciform ligament, likely benign focal fat and not well seen on  orthogonal imaging. No acute pathology. Accessory splenule.                                                                         IMPRESSION:   1. Increased size of numerous bilateral pulmonary masses or nodules as  above, including markedly enlarged right paramediastinal mass. Marked  flattening SVC which may only be mildly open due to the presence of  the catheter. Beaking of the bridging innominate vein as it enters to  join with the right innominate vein to form the SVC.  2. Mildly increased prominence of nonpathologically enlarged  mediastinal lymph nodes measuring up to 8 mm.  3. No suspicious extra thoracic soft tissue or osseous lesions.     I have  personally reviewed the examination and initial interpretation  and I agree with the findings.     SHIRLEY INMAN MD       Assessment and Plan:  1. Onc  Metastatic UPS, was on Doxil + Ifosfamide (5 cycles) with positive response to treatment initially, then due to poor tolerance switched to Doxil alone (1 cycle) but had progression. Started on Keytruda 5/10/21. Due to tumor growth (progression vs pseudoprogression) added Gemzar 6/16/21. CT imaging mid July with positive response to treatment.    Now unfortunately has ongoing disease progression along with continued fevers concerning for tumor fevers. Reviewed CT report and images with patient and Dr. Lopez.    Will stop Keytruda + Gemzar. Will start Pazopanib 800mg daily ASAP. Oral pharm to do education.    Will need weekly labs in MG x 4, EKG 8-10 days after starting. KALINA follow up virtually in 2 weeks. Plan for CT after 1 month of treatment.    2. ID  Fevers of unknown origin. Negative test:  -Lyme PCR  -blood parasitology negative.   -HIV neg  -Beta-D-glucan normal  -COVID neg  -Influenza neg  -Aspergillus galactomannin  -Strep Group A  - Blood cultures 8/27 NGTD     She recently completed course of Bactrim per recommendation of ID with no improvement. Did better on prednisone but as soon as she tapered fevers returned. She has no symptoms of new infection, though does have mild leukocytosis.    Discussed with Dr. Lopez. Concern these are tumor fevers. Continue Tylenol scheduled and add Indomethacin 25mg TID. Also starting new chemotherapy which hopefully will help.    If still no improvement could consider restarting prednisone.    Did discuss with patient and  to go to ED for high fevers, associated symptoms, any other vital sign changes (hypotension, hypoxia).       3. Heme  Anemia: ACD and chemotherapy, has been more of an issue with gemzar. Hgb 7 today. She is fatigued. Will do 2 units pRBC today. Fever after transfusion unlikely related to  blood as she has been having cyclical fevers. Transfusion reaction work-up was ordered to be safe.      Thrombocytopenia: 2/2 Gemzar. No bleeding issues. OK today.     Neutropenia: No current issues. As above WBC higher.      4. Pulm  Asthma: stable, continue inhalers.      COVID: Diagnosed in December, recovered. She is vaccinated.      Cough/SOB: See work-up from 5/26/21 (negative CT PE, RVP, COVID, NTBNP). Thought to be 2/2 phrenic nerve involvement of tumor. Continue Robitussin-AC PRN. Also add Oxycodone 2.5-5mg for R sided chest wall pain 2/2 tumor and progressive cough after discussion with Dr. Blanca.     She has SVC flattening on CT and will sleep elevated. Consider pulm intervention for stenting in future.      5. 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 Continue Gabapentin 600/600/900- dose was increased inpatient.      Deconditioning: PT referral for cancer rehab done last visit.      6. MSK  R shoulder/back pain: Suspect 2/2 cancer, doing Tylenol PRN. Adding Oxycodone 2.5-5mg PRN.      Recurrent inflammatory arthritis symptoms (diffuse joint pain and swelling) and CRP elevated. Improved with prednisone, no current issues.      7. GI  Diarrhea, unclear etiology, C diff negative. Has had intermittent issues throughout treatment, Possibly immune mediated? Stable to improved, continue Imodium PRN.    Discussed Compazine 30min prior to Pazopanib in anticipation of nausea.      OVERALL PLAN  -Stop Gemzar/Keytruda. Start Pazopanib  -Weekly CBC, CMP, Mag, Phos x 4  -EKG day 8-10  -KALINA virtual in 2 weeks  -Most likely CT in one month pending how she is doing clinically  -2 units pRBC today  -Add Indomethacin TID with Tylenol for fevers  -Add Oxycodone for pain and cough, continue Robitussin AC    75 minutes spent on the date of the encounter doing chart review, review of test results, interpretation of tests, patient visit, documentation, discussion with other provider(s) and discussion with family      Harshal Schuster PA-C  [facility]  [address] [place] [address]  [phone]          RUPERT Guaman

## 2021-09-22 NOTE — PROGRESS NOTES
Oncology/Hematology Visit Note  Sep 22, 2021    Reason for Visit: Follow up of undifferentiated pleomorphic sarcoma     History of Present Illness: Richa Ashby is a 56 year old female with PMH asthma and GERD with undifferentiated pleomorphic sarcoma. 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 She received preoperative radiotherapy and the tumor was resected 3/12/20. She then developed a cough, chest tightness, and scapular pain and imaging revealed lung nodules. Lung biopsy 11/2/20 with sarcoma. She was started on Doxil + Ifos 11/13/20. Had delay in cycle 2 due to COVID dx 12/2/20. CT imaging after cycle 1 with positive response to treatment with decrease in pulmonary nodules. Received cycle 2 12/18/20. Received cycle 3 1/15/21 with dose reduction in Doxil for skin toxicity and mucositis. Received cycle 4 2/15/21.      CT CAP 3/8/21 with positive response to treatment, decreased size of pulmonary mets though one lesion was larger. Did see fibrocystic changes in left breast, recommended breast imaging. Received cycle 5 Doxil + ifos 3/11/21.      Imaging 4/5/21 with overall stable disease except one lung lesion, opted to continue Doxil alone for now and switch to Keytruda in future if ongoing progression.      Started Keytruda 5/10/21. Was seen 5/26/21 cough, SOB, fatigue work-up thought to be 2/2 phrenic nerve involvement of R apical lung mass.      Due to worsening fatigue and cough, repeat CT 6/15/21 concerning for progression vs pseudoprogression. Added Gemzar 6/16/21.     She was admitted 6/17/21-6/20/21 with fever of unknown origin, infectious work-up negative, treated with broad spectrum antibiotics.     She clinically improved on abx. Was able to receive day 8 Gemzar (dose reduced for thrombocytopenia) + Keytruda 6/23/21.      CT 7/12/21 with stable disease.     She was admitted 8/25/21-8/31/21 for fevers, hypotension, acute on chronic  anemia. Extensive infectious work-up negative so etiology of fevers still unknown.     CT 9/7/21 with growth but also showed multiple areas of necrosis, clinically still not feeling well so plan to hold treatment and repeat imaging with visit in 2 weeks     She was seen today for oncology follow-up.     Interval History:  Connie was seen today for follow-up. She initially did feel better on prednisone 20mg, noting improvement in energy and no fevers. Once she tapered the steroids however she began to feel worse again. She is now having daily fevers ranging from 100-103, worse in the evening. She continues to take Tylenol scheduled. She notes increased fatigue and body aches when the fever breaks. Her cough has worsened. She also has more R back/shoulder pain. She isn't taking any pain meds at this time. She denies SOB except sometimes when she lies flat at night she notes when she wakes up she feels like her O2 is low (measures 85-90 on pulse Ox). She also notes a worsening cough with deep breathes.    She has mild headaches and dizziness. Hasn't been eating or drinking well. Denies chest pain. No nausea, vomiting, abdominal pain. Mild diarrhea stable with Imodium. No urinary concerns. Joint swelling and pain improved. Neuropathy stable.    Current Outpatient Medications   Medication Sig Dispense Refill     acetaminophen (TYLENOL) 325 MG tablet Take 325-650 mg by mouth every 6 hours as needed for mild pain       ADVAIR DISKUS 100-50 MCG/DOSE inhaler Inhale 1 puff into the lungs daily        albuterol (PROAIR RESPICLICK) 108 (90 Base) MCG/ACT inhaler Inhale 1-2 puffs into the lungs       cetirizine (ZYRTEC) 10 MG tablet Take 10 mg by mouth daily       gabapentin (NEURONTIN) 300 MG capsule Take 600 mg in the AM, 600 mg mid-day, and 900 mg at night 210 capsule 3     hydrOXYzine (ATARAX) 25 MG tablet Take 1 tablet (25 mg) by mouth 3 times daily as needed for itching (Patient not taking: Reported on 9/10/2021) 30 tablet 1      melatonin (MELATONIN) 1 MG/ML LIQD liquid Take 5 mg by mouth       montelukast (SINGULAIR) 10 MG tablet Take 10 mg by mouth daily       multivitamin w/minerals (MULTI-VITAMIN) tablet Take 1 tablet by mouth daily       nystatin (MYCOSTATIN) 802508 UNIT/ML suspension Take 5 mLs (500,000 Units) by mouth 4 times daily 400 mL 0     omeprazole (PRILOSEC) 20 MG DR capsule 20 mg daily        ondansetron (ZOFRAN) 8 MG tablet Take 1 tablet (8 mg) by mouth every 8 hours as needed for nausea 60 tablet 1     pazopanib (VOTRIENT) 200 MG tablet Take 4 tablets (800 mg) by mouth daily Take on an empty stomach 1 hour before or 2 hours after a meal. 120 tablet 0     vitamin D3 (CHOLECALCIFEROL) 2000 units (50 mcg) tablet Take 1 tablet by mouth daily         Physical Examination:  /60 (BP Location: Right arm, Patient Position: Sitting, Cuff Size: Adult Regular)   Pulse 109   Temp 97.6  F (36.4  C) (Oral)   Resp 16   Wt 89.3 kg (196 lb 12.8 oz)   LMP 03/19/2019   SpO2 97%   BMI 30.82 kg/m    Wt Readings from Last 10 Encounters:   09/08/21 90.5 kg (199 lb 8 oz)   08/30/21 90.7 kg (199 lb 15.3 oz)   08/25/21 90.8 kg (200 lb 1.6 oz)   08/11/21 93.4 kg (206 lb)   08/09/21 91.8 kg (202 lb 4.8 oz)   08/04/21 93.1 kg (205 lb 4.8 oz)   07/28/21 93.2 kg (205 lb 8 oz)   07/24/21 92.5 kg (204 lb)   07/18/21 93.9 kg (207 lb 1.6 oz)   07/14/21 93.7 kg (206 lb 8 oz)     Constitutional: Well-appearing female in no acute distress.  Eyes: EOMI, PERRL. No scleral icterus.  ENT: Deferred  Lymphatic: Neck is supple without cervical or supraclavicular lymphadenopathy.   Cardiovascular: Regular rate and rhythm. No murmurs, gallops, or rubs. Trace peripheral edema.  Respiratory: Clear to auscultation bilaterally, though slightly diminished in RUL. Coughing. No wheezes or crackles.  Gastrointestinal: Bowel sounds present. Abdomen soft, non-tender. No palpable hepatosplenomegaly or masses.   Neurologic: Cranial nerves II through XII are grossly  intact.  Skin: No rashes, petechiae, or bruising noted on exposed skin.    Laboratory Data:    Results for AMADEO WILSON (MRN [id number]) as of 9/23/2021 09:35   9/22/2021 11:34   Sodium 137   Potassium 3.3 (L)   Chloride 100   Carbon Dioxide 26   Urea Nitrogen 11   Creatinine 0.66   GFR Estimate >90   Calcium 8.7   Anion Gap 11   Albumin 1.7 (L)   Protein Total 7.4   Bilirubin Total 0.7   Alkaline Phosphatase 187 (H)   ALT 12   AST 11   TSH 3.58   Glucose 128 (H)   WBC 13.8 (H)   Hemoglobin 7.0 (L)   Hematocrit 24.0 (L)   Platelet Count 276   RBC Count 2.48 (L)   MCV 97   MCH 28.2   MCHC 29.2 (L)   RDW 18.4 (H)   % Neutrophils 82   % Lymphocytes 7   % Monocytes 9   % Eosinophils 1   Absolute Basophils 0.0   % Basophils 0   Absolute Eosinophils 0.1   Absolute Immature Granulocytes 0.1 (H)   Absolute Lymphocytes 0.9   Absolute Monocytes 1.2   % Immature Granulocytes 1   Absolute NRBCs 0.0   NRBCs per 100 WBC 0     CT Chest W Contrast 9/22/21  FINDINGS:     Lungs: The trachea and central airways are patent. No evidence of  disease progression, with the heterogenous right paramediastinal mass  in the right upper lobe now measuring 14.4 x 9.7 x 13.8 cm, previously  12.2 x 8.7 x 10.0 cm (series 6 image 118 and series 4 image 34). This  also effaces and anteriorly displaces the right mainstem bronchus.  Increased size of peripheral right middle lobe mass now measuring 4.1  x 4.3 cm, previously 3.8 x 3.5 cm (series 6 image 170). Increased size  of mass in the superior aspect of the left lower lobe measuring 4.4 x  3.7 cm, previously 2.7 x 2.3 cm (series 6 image 172). Increased size  of the necrotic nodule in the inferior right lower lobe now measuring  1.6 x 2.1 cm, previously 1.3 x 1.7 cm (series 6 image 200). Increased  size of the necrotic subpleural nodule in the left costophrenic angle  now measuring 2.7 x 1.3 cm, previously 2.0 x 1.6 cm (series 6 image  238). Slightly increased spiculated nodule in the anterior  subpleural  right middle lobe measuring 7.4 x 13.4 mm, previously 7.0 x 11.3 mm  when measured in a similar fashion (series 6 image 177).     Mediastinum: Right chest wall Port-A-Cath with tip in the high SVC.  The visualized thyroid gland is unremarkable. The heart size is within  normal limits. No pericardial effusion. The ascending aorta and main  pulmonary artery diameters are within normal limits. Normal appearance  and configuration of the great vessels off of the aortic arch.  Slightly increased size of prevascular mediastinal lymph nodes  measuring up to 8 mm (series 2 image 22). Axillary, mediastinal, and  hilar lymph nodes are not enlarged by size criteria. Marked narrowing  of the upper SVC. Minimal SVC is opacified with contrast and empties  into the right atrium. There is beaking of the bridging innominate  vein from the left as it schillings up with the right-sided SVC  (series 2 image 22).     Bones and soft tissues: Incidental mild multilevel vertebral spurring,  consistent with degenerative change.      Upper Abdomen: Hepatic pseudolesion about the anterior aspect of the  falciform ligament, likely benign focal fat and not well seen on  orthogonal imaging. No acute pathology. Accessory splenule.                                                                         IMPRESSION:   1. Increased size of numerous bilateral pulmonary masses or nodules as  above, including markedly enlarged right paramediastinal mass. Marked  flattening SVC which may only be mildly open due to the presence of  the catheter. Beaking of the bridging innominate vein as it enters to  join with the right innominate vein to form the SVC.  2. Mildly increased prominence of nonpathologically enlarged  mediastinal lymph nodes measuring up to 8 mm.  3. No suspicious extra thoracic soft tissue or osseous lesions.     I have personally reviewed the examination and initial interpretation  and I agree with the findings.     SHIRLEY ROLLINS  MD STORM       Assessment and Plan:  1. Onc  Metastatic UPS, was on Doxil + Ifosfamide (5 cycles) with positive response to treatment initially, then due to poor tolerance switched to Doxil alone (1 cycle) but had progression. Started on Keytruda 5/10/21. Due to tumor growth (progression vs pseudoprogression) added Gemzar 6/16/21. CT imaging mid July with positive response to treatment.    Now unfortunately has ongoing disease progression along with continued fevers concerning for tumor fevers. Reviewed CT report and images with patient and Dr. Lopez.    Will stop Keytruda + Gemzar. Will start Pazopanib 800mg daily ASAP. Oral pharm to do education.    Will need weekly labs in MG x 4, EKG 8-10 days after starting. KALINA follow up virtually in 2 weeks. Plan for CT after 1 month of treatment.    2. ID  Fevers of unknown origin. Negative test:  -Lyme PCR  -blood parasitology negative.   -HIV neg  -Beta-D-glucan normal  -COVID neg  -Influenza neg  -Aspergillus galactomannin  -Strep Group A  - Blood cultures 8/27 NGTD     She recently completed course of Bactrim per recommendation of ID with no improvement. Did better on prednisone but as soon as she tapered fevers returned. She has no symptoms of new infection, though does have mild leukocytosis.    Discussed with Dr. Lopez. Concern these are tumor fevers. Continue Tylenol scheduled and add Indomethacin 25mg TID. Also starting new chemotherapy which hopefully will help.    If still no improvement could consider restarting prednisone.    Did discuss with patient and  to go to ED for high fevers, associated symptoms, any other vital sign changes (hypotension, hypoxia).       3. Heme  Anemia: ACD and chemotherapy, has been more of an issue with gemzar. Hgb 7 today. She is fatigued. Will do 2 units pRBC today. Fever after transfusion unlikely related to blood as she has been having cyclical fevers. Transfusion reaction work-up was ordered to be safe.       Thrombocytopenia: 2/2 Gemzar. No bleeding issues. OK today.     Neutropenia: No current issues. As above WBC higher.      4. Pulm  Asthma: stable, continue inhalers.      COVID: Diagnosed in December, recovered. She is vaccinated.      Cough/SOB: See work-up from 5/26/21 (negative CT PE, RVP, COVID, NTBNP). Thought to be 2/2 phrenic nerve involvement of tumor. Continue Robitussin-AC PRN. Also add Oxycodone 2.5-5mg for R sided chest wall pain 2/2 tumor and progressive cough after discussion with Dr. Blanca.     She has SVC flattening on CT and will sleep elevated. Consider pulm intervention for stenting in future.      5. 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 Continue Gabapentin 600/600/900- dose was increased inpatient.      Deconditioning: PT referral for cancer rehab done last visit.      6. MSK  R shoulder/back pain: Suspect 2/2 cancer, doing Tylenol PRN. Adding Oxycodone 2.5-5mg PRN.      Recurrent inflammatory arthritis symptoms (diffuse joint pain and swelling) and CRP elevated. Improved with prednisone, no current issues.      7. GI  Diarrhea, unclear etiology, C diff negative. Has had intermittent issues throughout treatment, Possibly immune mediated? Stable to improved, continue Imodium PRN.    Discussed Compazine 30min prior to Pazopanib in anticipation of nausea.      OVERALL PLAN  -Stop Gemzar/Keytruda. Start Pazopanib  -Weekly CBC, CMP, Mag, Phos x 4  -EKG day 8-10  -KALINA virtual in 2 weeks  -Most likely CT in one month pending how she is doing clinically  -2 units pRBC today  -Add Indomethacin TID with Tylenol for fevers  -Add Oxycodone for pain and cough, continue Robitussin AC    75 minutes spent on the date of the encounter doing chart review, review of test results, interpretation of tests, patient visit, documentation, discussion with other provider(s) and discussion with family     Harshal Schuster PA-C  [facility]  [address] [place]  [address]  [phone]

## 2021-09-23 LAB
BKR LAB AP TR RXN INTERPRETATION: NORMAL
BKR LAB AP TRAN RXN RECOMMENDATION: NORMAL
BKR LAB AP TRANS SIGNS AND SX: NORMAL
BKR LAB AP TRANSFUSION REACTION: NORMAL
PATH REPORT.COMMENTS IMP SPEC: NORMAL
PATH REPORT.COMMENTS IMP SPEC: NORMAL

## 2021-09-24 ENCOUNTER — HOSPITAL ENCOUNTER (OUTPATIENT)
Dept: PHYSICAL THERAPY | Facility: CLINIC | Age: 56
Setting detail: THERAPIES SERIES
End: 2021-09-24
Attending: PHYSICIAN ASSISTANT
Payer: COMMERCIAL

## 2021-09-24 ENCOUNTER — TELEPHONE (OUTPATIENT)
Dept: ONCOLOGY | Facility: CLINIC | Age: 56
End: 2021-09-24

## 2021-09-24 DIAGNOSIS — C49.9 SARCOMA (H): ICD-10-CM

## 2021-09-24 DIAGNOSIS — Z79.899 ENCOUNTER FOR LONG-TERM (CURRENT) USE OF MEDICATIONS: Primary | ICD-10-CM

## 2021-09-24 PROCEDURE — 97110 THERAPEUTIC EXERCISES: CPT | Mod: GP | Performed by: PHYSICAL THERAPIST

## 2021-09-24 PROCEDURE — 97161 PT EVAL LOW COMPLEX 20 MIN: CPT | Mod: GP | Performed by: PHYSICAL THERAPIST

## 2021-09-24 ASSESSMENT — 6 MINUTE WALK TEST (6MWT)
TOTAL DISTANCE WALKED (METERS): 170
TOTAL DISTANCE WALKED (FT): 561

## 2021-09-24 NOTE — ORAL ONC MGMT
"Oral Chemotherapy Monitoring Program    Lab Monitoring Plan  CBC and CMP [id number] for 2 months then qmonthly  Phos monthly  T4/TSH baseline then [id number]   Subjective/Objective:  Richa Ashby is a 56 year old female contacted by phone for an initial visit for oral chemotherapy education.      ORAL CHEMOTHERAPY 9/10/2021 9/22/2021 9/24/2021   Assessment Type Initial Work up Other New Teach   Diagnosis Code Sarcoma Sarcoma Sarcoma   Providers Dr. John Lopez   [facility]   Drug Name Votrient (pazopanib) Votrient (pazopanib) Votrient (pazopanib)   Dose 800 mg - 800 mg   Current Schedule Daily - Daily   Cycle Details Continuous - Continuous   Start Date of Last Cycle - - 9/24/2021   Planned next cycle start date 9/22/2021 - -   Any new drug interactions? Yes - Yes   Pharmacist Intervention? Yes - Yes   Intervention(s) Drug changed (non-chemo) - Patient Education;Drug changed (non-chemo)   Is the dose as ordered appropriate for the patient? Yes - Yes       Last PHQ-2 Score on record:   PHQ-2 ( 1999 Pfizer) 3/4/2020 12/4/2019   Q1: Little interest or pleasure in doing things 1 0   Q2: Feeling down, depressed or hopeless 0 0   PHQ-2 Score 1 0   Q1: Little interest or pleasure in doing things - Not at all   Q2: Feeling down, depressed or hopeless - Not at all   PHQ-2 Score - 0       Vitals:  BP:   BP Readings from Last 1 Encounters:   09/22/21 123/76     Wt Readings from Last 1 Encounters:   09/22/21 89.3 kg (196 lb 12.8 oz)     Estimated body surface area is 2.05 meters squared as calculated from the following:    Height as of 9/8/21: 1.702 m (5' 7.01\").    Weight as of 9/22/21: 89.3 kg (196 lb 12.8 oz).    Labs:  _  Result Component Current Result Ref Range   Sodium 137 (9/22/2021) 133 - 144 mmol/L     _  Result Component Current Result Ref Range   Potassium 3.3 (L) (9/22/2021) 3.4 - 5.3 mmol/L     _  Result Component Current Result Ref Range   Calcium 8.7 " (9/22/2021) 8.5 - 10.1 mg/dL     No results found for Mag within last 30 days.     No results found for Phos within last 30 days.     _  Result Component Current Result Ref Range   Albumin 1.7 (L) (9/22/2021) 3.4 - 5.0 g/dL     _  Result Component Current Result Ref Range   Urea Nitrogen 11 (9/22/2021) 7 - 30 mg/dL     _  Result Component Current Result Ref Range   Creatinine 0.66 (9/22/2021) 0.52 - 1.04 mg/dL     _  Result Component Current Result Ref Range   AST 11 (9/22/2021) 0 - 45 U/L     _  Result Component Current Result Ref Range   ALT 12 (9/22/2021) 0 - 50 U/L     _  Result Component Current Result Ref Range   Bilirubin Total 0.7 (9/22/2021) 0.2 - 1.3 mg/dL     _  Result Component Current Result Ref Range   WBC Count 13.8 (H) (9/22/2021) 4.0 - 11.0 10e3/uL     _  Result Component Current Result Ref Range   Hemoglobin 7.0 (L) (9/22/2021) 11.7 - 15.7 g/dL     _  Result Component Current Result Ref Range   Platelet Count 276 (9/22/2021) 150 - 450 10e3/uL     6/17/2021: QTc 415ms    Assessment:  Patient is appropriate to start therapy.    Plan:  Basic chemotherapy teaching was reviewed with the patient and the patient's  including indication, start date of therapy, dose, administration, adverse effects, missed doses, food and drug interactions, monitoring, side effect management, office contact information, and safe handling. Written materials were provided in the mail and all questions answered.    Patient will take prochlorperazine 1 hour prior to oral chemotherapy. Patient will monitor blood pressure daily.   Patient discontinued omeprazole.     Follow-Up:  9/30: 1 week follow up assessment     Aaron Jacobs, PharmD  Hematology/Oncology Clinical Pharmacist  [facility]  [facility]

## 2021-09-24 NOTE — DISCHARGE INSTRUCTIONS
PT Homework--     1) walking/endurance program   2-3 times a day   Walk for at least 5-10 minutes     Or     Stationary bike   Try for 10-15 minutes if you can  Walk is tiring to get there     2) For strengthening--     Sit to stand x 5-10 reps for lower body strengthening    Standing heel toe raises x 10-15 reps with light support on counter   Standing calf stretch x 30 secs x 2 reps

## 2021-09-28 ENCOUNTER — LAB (OUTPATIENT)
Dept: ONCOLOGY | Facility: CLINIC | Age: 56
End: 2021-09-28
Payer: COMMERCIAL

## 2021-09-28 DIAGNOSIS — C49.9 SARCOMA (H): ICD-10-CM

## 2021-09-28 DIAGNOSIS — Z79.899 ENCOUNTER FOR LONG-TERM (CURRENT) USE OF MEDICATIONS: ICD-10-CM

## 2021-09-28 LAB
ALBUMIN SERPL-MCNC: 1.9 G/DL (ref 3.4–5)
ALP SERPL-CCNC: 221 U/L (ref 40–150)
ALT SERPL W P-5'-P-CCNC: 30 U/L (ref 0–50)
ANION GAP SERPL CALCULATED.3IONS-SCNC: 5 MMOL/L (ref 3–14)
AST SERPL W P-5'-P-CCNC: 22 U/L (ref 0–45)
BASOPHILS # BLD AUTO: 0 10E3/UL (ref 0–0.2)
BASOPHILS NFR BLD AUTO: 0 %
BILIRUB SERPL-MCNC: 1.3 MG/DL (ref 0.2–1.3)
BUN SERPL-MCNC: 8 MG/DL (ref 7–30)
CALCIUM SERPL-MCNC: 8.7 MG/DL (ref 8.5–10.1)
CHLORIDE BLD-SCNC: 105 MMOL/L (ref 94–109)
CO2 SERPL-SCNC: 28 MMOL/L (ref 20–32)
CREAT SERPL-MCNC: 0.62 MG/DL (ref 0.52–1.04)
EOSINOPHIL # BLD AUTO: 0.2 10E3/UL (ref 0–0.7)
EOSINOPHIL NFR BLD AUTO: 2 %
ERYTHROCYTE [DISTWIDTH] IN BLOOD BY AUTOMATED COUNT: 16.3 % (ref 10–15)
GFR SERPL CREATININE-BSD FRML MDRD: >90 ML/MIN/1.73M2
GLUCOSE BLD-MCNC: 117 MG/DL (ref 70–99)
HCT VFR BLD AUTO: 30.3 % (ref 35–47)
HGB BLD-MCNC: 9.3 G/DL (ref 11.7–15.7)
IMM GRANULOCYTES # BLD: 0.1 10E3/UL
IMM GRANULOCYTES NFR BLD: 1 %
LYMPHOCYTES # BLD AUTO: 1 10E3/UL (ref 0.8–5.3)
LYMPHOCYTES NFR BLD AUTO: 10 %
MCH RBC QN AUTO: 28.9 PG (ref 26.5–33)
MCHC RBC AUTO-ENTMCNC: 30.7 G/DL (ref 31.5–36.5)
MCV RBC AUTO: 94 FL (ref 78–100)
MONOCYTES # BLD AUTO: 0.9 10E3/UL (ref 0–1.3)
MONOCYTES NFR BLD AUTO: 9 %
NEUTROPHILS # BLD AUTO: 7.4 10E3/UL (ref 1.6–8.3)
NEUTROPHILS NFR BLD AUTO: 78 %
NRBC # BLD AUTO: 0 10E3/UL
NRBC BLD AUTO-RTO: 0 /100
PHOSPHATE SERPL-MCNC: 3 MG/DL (ref 2.5–4.5)
PLATELET # BLD AUTO: 204 10E3/UL (ref 150–450)
POTASSIUM BLD-SCNC: 4.4 MMOL/L (ref 3.4–5.3)
PROT SERPL-MCNC: 8 G/DL (ref 6.8–8.8)
RBC # BLD AUTO: 3.22 10E6/UL (ref 3.8–5.2)
SODIUM SERPL-SCNC: 138 MMOL/L (ref 133–144)
TSH SERPL DL<=0.005 MIU/L-ACNC: 3.24 MU/L (ref 0.4–4)
WBC # BLD AUTO: 9.5 10E3/UL (ref 4–11)

## 2021-09-28 PROCEDURE — 83735 ASSAY OF MAGNESIUM: CPT | Performed by: INTERNAL MEDICINE

## 2021-09-28 PROCEDURE — 84100 ASSAY OF PHOSPHORUS: CPT | Performed by: INTERNAL MEDICINE

## 2021-09-28 PROCEDURE — 36591 DRAW BLOOD OFF VENOUS DEVICE: CPT

## 2021-09-28 PROCEDURE — 80050 GENERAL HEALTH PANEL: CPT | Performed by: INTERNAL MEDICINE

## 2021-09-28 RX ORDER — HEPARIN SODIUM (PORCINE) LOCK FLUSH IV SOLN 100 UNIT/ML 100 UNIT/ML
5 SOLUTION INTRAVENOUS EVERY 8 HOURS
Status: DISCONTINUED | OUTPATIENT
Start: 2021-09-28 | End: 2021-09-28 | Stop reason: HOSPADM

## 2021-09-28 RX ADMIN — HEPARIN SODIUM (PORCINE) LOCK FLUSH IV SOLN 100 UNIT/ML 5 ML: 100 SOLUTION at 11:56

## 2021-09-28 NOTE — PROGRESS NOTES
Port needle placed for lab draw access, Sterile technique performed and maintained. Patient tolerated procedure well. Tubes drawn in rainbow order: red, green, purple. Gauze dressing placed with use of paper tape.    RN advised patient to leave gauze in place for 20 mins.     Patient demonstrated verbal understanding.     Lab Monitoring Plan  CBC and CMP x0dydih for 2 months then qmonthly  Phos monthly  T4/TSH baseline then [id number]     Tiffany Mchugh RN  [facility]

## 2021-09-30 ENCOUNTER — MYC MEDICAL ADVICE (OUTPATIENT)
Dept: ONCOLOGY | Facility: CLINIC | Age: 56
End: 2021-09-30

## 2021-09-30 ENCOUNTER — TELEPHONE (OUTPATIENT)
Dept: ONCOLOGY | Facility: CLINIC | Age: 56
End: 2021-09-30

## 2021-09-30 DIAGNOSIS — C49.9 SARCOMA (H): Primary | ICD-10-CM

## 2021-09-30 DIAGNOSIS — C49.9 SARCOMA OF SOFT TISSUE (H): ICD-10-CM

## 2021-09-30 DIAGNOSIS — Z79.899 ENCOUNTER FOR LONG-TERM (CURRENT) USE OF MEDICATIONS: ICD-10-CM

## 2021-09-30 LAB — MAGNESIUM SERPL-MCNC: 2.3 MG/DL (ref 1.6–2.3)

## 2021-09-30 RX ORDER — INDOMETHACIN 25 MG/1
25 CAPSULE ORAL
Qty: 30 CAPSULE | Refills: 0 | Status: SHIPPED | OUTPATIENT
Start: 2021-09-30 | End: 2021-10-02

## 2021-09-30 NOTE — TELEPHONE ENCOUNTER
[facility] Triage    MyChart Message - RX refill for Indocin sent separately, but patient doesn't know if this should be filled.  Questioning high dose prednisone instead.    Connie's concern:  2) I have increased coughing and shoulder pain, SOB, I find myself taking quite a bit of oxy and cough medicine with codiene.   3) I have had some nausea (x2) triggered by the coughing.      Always has fevers which are tumor fevers per providers and uses indocin and tyl which is not alleviating the fevers.    Has had the shoulder pain and cough for a long time, however, the intensity is increasing along with the area. Was behind shoulder blade now the entire should blade, on the right side.    Coughing also from the tumors per patient.    SOB if coughs hard  Pulse OX lowest has been 87-91 during the coughs, 98 normally when she feels well later in the day.  Talks a lot will cough  Difficulty breathing for a long time also  Sleeping with head elevated gives her the most comfortable position.    Patient wants to know if she should stay on the Indocin or should they do the high dose prednisone.  She is still having the fevers and soaking wet.  Was told to contact Maxwell if anything new arises or indocin isn't working.    Paged Harshal Schuster PA 244PM  Routing to John and Gomez Love for follow up    No return page from Andi  Paged Dr. Lopez at 410 pm  John returned page - have Connie increase her indocin to 50 mg 3 times a day and Maxwell will call her tomorrow to check on her.  [facility] refilled indocin at 202 pm    Called Connie - she understands instructions above and when to go to ED.

## 2021-09-30 NOTE — TELEPHONE ENCOUNTER
Oral Chemotherapy Monitoring Program    Subjective/Objective:  Richa Ashby is a 56 year old female contacted by phone for a follow-up visit for oral chemotherapy.  Votrient C1D1: 9/24/21. Pt confirms taking the appropriate dose of Votreint, 800mg ([id number]) daily. Denies missed doses, and recent hospital or ED visits. Patient has not had any recent medication changes. Pt states she experiences some nausea from the Votrient. She combats this by taking compazine one hour prior to taking Votrient, which she takes at bedtime.  This controls her nausea well.      Pt states she sometimes also experiences nausea when she is coughing particularly hard.  This painful cough is not new, but pt thinks it has gotten worse recently.  She attempts to control it using oxycodone and guaifenisin-codeine cough syrup, but it is still very painful and these medications make her very fatigued.  She also notes that she is still having fevers once daily, that she controls with indomethacin and tylenol.      ORAL CHEMOTHERAPY 9/10/2021 9/22/2021 9/24/2021 9/30/2021   Assessment Type Initial Work up Other New Teach Initial Follow up   Diagnosis Code Sarcoma Sarcoma Sarcoma Sarcoma   Providers Dr. John Lopez   [facility]   Drug Name Votrient (pazopanib) Votrient (pazopanib) Votrient (pazopanib) Votrient (pazopanib)   Dose 800 mg - 800 mg 800 mg   Current Schedule Daily - Daily Daily   Cycle Details Continuous - Continuous Continuous   Start Date of Last Cycle - - 9/24/2021 9/24/2021   Planned next cycle start date 9/22/2021 - - -   Doses missed in last 2 weeks - - - 0   Adherence Assessment - - - Adherent   Adverse Effects - - - Fatigue   Nausea - - - Grade 1   Pharmacist Intervention(nausea) - - - No   Fatigue - - - Grade 1   Pharmacist Intervention(fatigue) - - - No   Any new drug interactions? Yes - Yes No   Pharmacist Intervention? Yes - Yes -  "  Intervention(s) Drug changed (non-chemo) - Patient Education;Drug changed (non-chemo) -   Is the dose as ordered appropriate for the patient? Yes - Yes Yes       Last PHQ-2 Score on record:   PHQ-2 ( 1999 Pfizer) 3/4/2020 12/4/2019   Q1: Little interest or pleasure in doing things 1 0   Q2: Feeling down, depressed or hopeless 0 0   PHQ-2 Score 1 0   Q1: Little interest or pleasure in doing things - Not at all   Q2: Feeling down, depressed or hopeless - Not at all   PHQ-2 Score - 0       Vitals:  BP:   BP Readings from Last 1 Encounters:   09/22/21 123/76     Wt Readings from Last 1 Encounters:   09/22/21 89.3 kg (196 lb 12.8 oz)     Estimated body surface area is 2.05 meters squared as calculated from the following:    Height as of 9/8/21: 1.702 m (5' 7.01\").    Weight as of 9/22/21: 89.3 kg (196 lb 12.8 oz).    Labs:  _  Result Component Current Result Ref Range   Sodium 138 (9/28/2021) 133 - 144 mmol/L     _  Result Component Current Result Ref Range   Potassium 4.4 (9/28/2021) 3.4 - 5.3 mmol/L     _  Result Component Current Result Ref Range   Calcium 8.7 (9/28/2021) 8.5 - 10.1 mg/dL     No results found for Mag within last 30 days.     _  Result Component Current Result Ref Range   Phosphorus 3.0 (9/28/2021) 2.5 - 4.5 mg/dL     _  Result Component Current Result Ref Range   Albumin 1.9 (L) (9/28/2021) 3.4 - 5.0 g/dL     _  Result Component Current Result Ref Range   Urea Nitrogen 8 (9/28/2021) 7 - 30 mg/dL     _  Result Component Current Result Ref Range   Creatinine 0.62 (9/28/2021) 0.52 - 1.04 mg/dL     _  Result Component Current Result Ref Range   AST 22 (9/28/2021) 0 - 45 U/L     _  Result Component Current Result Ref Range   ALT 30 (9/28/2021) 0 - 50 U/L     _  Result Component Current Result Ref Range   Bilirubin Total 1.3 (9/28/2021) 0.2 - 1.3 mg/dL     _  Result Component Current Result Ref Range   WBC Count 9.5 (9/28/2021) 4.0 - 11.0 10e3/uL     _  Result Component Current Result Ref Range "   Hemoglobin 9.3 (L) (9/28/2021) 11.7 - 15.7 g/dL     _  Result Component Current Result Ref Range   Platelet Count 204 (9/28/2021) 150 - 450 10e3/uL     No results found for ANC within last 30 days.     Assessment/Plan:  Pt is tolerating Votrient reasonably well so far, with some grade 1 nausea controlled with compazine.      Pt will send S[employer] message to Harshal with update regarding cough and fevers.  These are ongoing, and not related to the Votrient.    No Day 8-10 EKG scheduled yet, sent message to scheduling to get this set up either tomorrow, or early next week.    Follow-Up:  10/6: Review appt with Harshal, labs, EKG.    Grace Myhre, PharmD  Hematology/Oncology Pharmacist  [facility]  [phone]

## 2021-09-30 NOTE — TELEPHONE ENCOUNTER
[facility] Triage    MyChart Message Refill:    Last prescribing provider: Harshal EMERY    Last clinic visit date: 9/22 Harshal EMERY    Any missed appointments or no-shows since last clinic visit?: No    Recommendations for requested medication (if none, N/A): No    Next clinic visit date: 10/6/    Any other pertinent information (if none, N/A):       Discussed with Dr. Lopez. Concern these are tumor fevers. Continue Tylenol scheduled and add Indomethacin 25mg TID. Also starting new chemotherapy which hopefully will help.    If still no improvement could consider restarting prednisone.

## 2021-10-01 ENCOUNTER — TELEPHONE (OUTPATIENT)
Dept: ONCOLOGY | Facility: CLINIC | Age: 56
End: 2021-10-01

## 2021-10-01 NOTE — TELEPHONE ENCOUNTER
10/1/21    Called Connie and she is in [place] ED with pain. She woke up this morning with severe pain in her R flank pain. She took Oxycodone which didn't help and it was so severe that her  called an ambulance. This pain was different than her normal shoulder pain. She went up on the Indomethacin to 50mg and she did not have a fever for the first time. She is still coughing quite a bit.     Spoke with [facility] ED provider. They did a CT CAP that had stable to slightly improved disease. She had no acute findings.    At this point she felt better in ED after IV Dilaudid. Possibly the pain was referred from tumor? At this point given she had no acute findings, felt better, and is afebrile with higher dose of Indomethacin will not change plan. I will call her tomorrow and pending how she is feeling could start prednisone.    Harshal Schuster PA-C

## 2021-10-02 ENCOUNTER — TELEPHONE (OUTPATIENT)
Dept: ONCOLOGY | Facility: CLINIC | Age: 56
End: 2021-10-02

## 2021-10-02 DIAGNOSIS — C49.9 SARCOMA OF SOFT TISSUE (H): ICD-10-CM

## 2021-10-02 RX ORDER — INDOMETHACIN 50 MG/1
50 CAPSULE ORAL
Qty: 60 CAPSULE | Refills: 1 | Status: SHIPPED | OUTPATIENT
Start: 2021-10-02 | End: 2021-10-12

## 2021-10-03 ENCOUNTER — APPOINTMENT (OUTPATIENT)
Dept: PHYSICAL THERAPY | Facility: CLINIC | Age: 56
End: 2021-10-03
Attending: EMERGENCY MEDICINE
Payer: COMMERCIAL

## 2021-10-03 ENCOUNTER — NURSE TRIAGE (OUTPATIENT)
Dept: NURSING | Facility: CLINIC | Age: 56
End: 2021-10-03

## 2021-10-03 ENCOUNTER — HOSPITAL ENCOUNTER (EMERGENCY)
Facility: CLINIC | Age: 56
Discharge: HOME | End: 2021-10-03
Attending: EMERGENCY MEDICINE | Admitting: EMERGENCY MEDICINE
Payer: COMMERCIAL

## 2021-10-03 ENCOUNTER — APPOINTMENT (OUTPATIENT)
Dept: CT IMAGING | Facility: CLINIC | Age: 56
End: 2021-10-03
Attending: EMERGENCY MEDICINE
Payer: COMMERCIAL

## 2021-10-03 ENCOUNTER — APPOINTMENT (OUTPATIENT)
Dept: GENERAL RADIOLOGY | Facility: CLINIC | Age: 56
End: 2021-10-03
Attending: EMERGENCY MEDICINE
Payer: COMMERCIAL

## 2021-10-03 VITALS
HEART RATE: 73 BPM | OXYGEN SATURATION: 93 % | TEMPERATURE: 98.4 F | WEIGHT: 192 LBS | HEIGHT: 67 IN | SYSTOLIC BLOOD PRESSURE: 111 MMHG | DIASTOLIC BLOOD PRESSURE: 67 MMHG | BODY MASS INDEX: 30.13 KG/M2 | RESPIRATION RATE: 8 BRPM

## 2021-10-03 DIAGNOSIS — M62.81 GENERALIZED MUSCLE WEAKNESS: Primary | ICD-10-CM

## 2021-10-03 DIAGNOSIS — D64.9 ANEMIA, UNSPECIFIED TYPE: ICD-10-CM

## 2021-10-03 DIAGNOSIS — C49.9 SARCOMA OF SOFT TISSUE (H): ICD-10-CM

## 2021-10-03 DIAGNOSIS — A41.9 SEPSIS, DUE TO UNSPECIFIED ORGANISM, UNSPECIFIED WHETHER ACUTE ORGAN DYSFUNCTION PRESENT (H): ICD-10-CM

## 2021-10-03 DIAGNOSIS — E88.09 HYPOALBUMINEMIA: ICD-10-CM

## 2021-10-03 LAB
ALBUMIN SERPL-MCNC: 1.4 G/DL (ref 3.4–5)
ALP SERPL-CCNC: 220 U/L (ref 40–150)
ALT SERPL W P-5'-P-CCNC: 18 U/L (ref 0–50)
ANION GAP SERPL CALCULATED.3IONS-SCNC: 4 MMOL/L (ref 3–14)
AST SERPL W P-5'-P-CCNC: 24 U/L (ref 0–45)
ATRIAL RATE - MUSE: 85 BPM
BASOPHILS # BLD AUTO: 0 10E3/UL (ref 0–0.2)
BASOPHILS NFR BLD AUTO: 0 %
BILIRUB SERPL-MCNC: 1.1 MG/DL (ref 0.2–1.3)
BUN SERPL-MCNC: 12 MG/DL (ref 7–30)
CALCIUM SERPL-MCNC: 8.7 MG/DL (ref 8.5–10.1)
CHLORIDE BLD-SCNC: 104 MMOL/L (ref 94–109)
CO2 SERPL-SCNC: 28 MMOL/L (ref 20–32)
CREAT SERPL-MCNC: 0.87 MG/DL (ref 0.52–1.04)
DIASTOLIC BLOOD PRESSURE - MUSE: NORMAL MMHG
EOSINOPHIL # BLD AUTO: 0.1 10E3/UL (ref 0–0.7)
EOSINOPHIL NFR BLD AUTO: 2 %
ERYTHROCYTE [DISTWIDTH] IN BLOOD BY AUTOMATED COUNT: 15.9 % (ref 10–15)
GFR SERPL CREATININE-BSD FRML MDRD: 75 ML/MIN/1.73M2
GLUCOSE BLD-MCNC: 99 MG/DL (ref 70–99)
HCT VFR BLD AUTO: 27.7 % (ref 35–47)
HGB BLD-MCNC: 8.3 G/DL (ref 11.7–15.7)
HOLD SPECIMEN: NORMAL
IMM GRANULOCYTES # BLD: 0.1 10E3/UL
IMM GRANULOCYTES NFR BLD: 2 %
INTERPRETATION ECG - MUSE: NORMAL
LYMPHOCYTES # BLD AUTO: 0.8 10E3/UL (ref 0.8–5.3)
LYMPHOCYTES NFR BLD AUTO: 12 %
MAGNESIUM SERPL-MCNC: 2.1 MG/DL (ref 1.6–2.3)
MCH RBC QN AUTO: 28.6 PG (ref 26.5–33)
MCHC RBC AUTO-ENTMCNC: 30 G/DL (ref 31.5–36.5)
MCV RBC AUTO: 96 FL (ref 78–100)
MONOCYTES # BLD AUTO: 0.5 10E3/UL (ref 0–1.3)
MONOCYTES NFR BLD AUTO: 7 %
NEUTROPHILS # BLD AUTO: 5.4 10E3/UL (ref 1.6–8.3)
NEUTROPHILS NFR BLD AUTO: 77 %
NRBC # BLD AUTO: 0 10E3/UL
NRBC BLD AUTO-RTO: 0 /100
P AXIS - MUSE: 54 DEGREES
PHOSPHATE SERPL-MCNC: 4.1 MG/DL (ref 2.5–4.5)
PLATELET # BLD AUTO: 137 10E3/UL (ref 150–450)
POTASSIUM BLD-SCNC: 4.6 MMOL/L (ref 3.4–5.3)
PR INTERVAL - MUSE: 134 MS
PROCALCITONIN SERPL-MCNC: 1.29 NG/ML
PROT SERPL-MCNC: 7.4 G/DL (ref 6.8–8.8)
QRS DURATION - MUSE: 80 MS
QT - MUSE: 356 MS
QTC - MUSE: 423 MS
R AXIS - MUSE: 4 DEGREES
RBC # BLD AUTO: 2.9 10E6/UL (ref 3.8–5.2)
SARS-COV-2 RNA RESP QL NAA+PROBE: NEGATIVE
SODIUM SERPL-SCNC: 136 MMOL/L (ref 133–144)
SYSTOLIC BLOOD PRESSURE - MUSE: NORMAL MMHG
T AXIS - MUSE: 35 DEGREES
TROPONIN I SERPL-MCNC: <0.015 UG/L (ref 0–0.04)
VENTRICULAR RATE- MUSE: 85 BPM
WBC # BLD AUTO: 7 10E3/UL (ref 4–11)

## 2021-10-03 PROCEDURE — 96360 HYDRATION IV INFUSION INIT: CPT | Performed by: EMERGENCY MEDICINE

## 2021-10-03 PROCEDURE — 97161 PT EVAL LOW COMPLEX 20 MIN: CPT | Mod: GP | Performed by: PHYSICAL THERAPIST

## 2021-10-03 PROCEDURE — 70450 CT HEAD/BRAIN W/O DYE: CPT | Mod: 26 | Performed by: RADIOLOGY

## 2021-10-03 PROCEDURE — 258N000003 HC RX IP 258 OP 636: Performed by: EMERGENCY MEDICINE

## 2021-10-03 PROCEDURE — 84145 PROCALCITONIN (PCT): CPT | Performed by: EMERGENCY MEDICINE

## 2021-10-03 PROCEDURE — 99285 EMERGENCY DEPT VISIT HI MDM: CPT | Mod: 25 | Performed by: EMERGENCY MEDICINE

## 2021-10-03 PROCEDURE — 80053 COMPREHEN METABOLIC PANEL: CPT | Performed by: EMERGENCY MEDICINE

## 2021-10-03 PROCEDURE — 97116 GAIT TRAINING THERAPY: CPT | Mod: GP | Performed by: PHYSICAL THERAPIST

## 2021-10-03 PROCEDURE — 93005 ELECTROCARDIOGRAM TRACING: CPT | Performed by: EMERGENCY MEDICINE

## 2021-10-03 PROCEDURE — 83735 ASSAY OF MAGNESIUM: CPT | Performed by: EMERGENCY MEDICINE

## 2021-10-03 PROCEDURE — U0003 INFECTIOUS AGENT DETECTION BY NUCLEIC ACID (DNA OR RNA); SEVERE ACUTE RESPIRATORY SYNDROME CORONAVIRUS 2 (SARS-COV-2) (CORONAVIRUS DISEASE [COVID-19]), AMPLIFIED PROBE TECHNIQUE, MAKING USE OF HIGH THROUGHPUT TECHNOLOGIES AS DESCRIBED BY CMS-2020-01-R: HCPCS | Performed by: EMERGENCY MEDICINE

## 2021-10-03 PROCEDURE — 84100 ASSAY OF PHOSPHORUS: CPT | Performed by: EMERGENCY MEDICINE

## 2021-10-03 PROCEDURE — 96361 HYDRATE IV INFUSION ADD-ON: CPT | Performed by: EMERGENCY MEDICINE

## 2021-10-03 PROCEDURE — 71046 X-RAY EXAM CHEST 2 VIEWS: CPT | Mod: 26 | Performed by: RADIOLOGY

## 2021-10-03 PROCEDURE — 70450 CT HEAD/BRAIN W/O DYE: CPT

## 2021-10-03 PROCEDURE — 84484 ASSAY OF TROPONIN QUANT: CPT | Performed by: EMERGENCY MEDICINE

## 2021-10-03 PROCEDURE — 93010 ELECTROCARDIOGRAM REPORT: CPT | Performed by: EMERGENCY MEDICINE

## 2021-10-03 PROCEDURE — 85025 COMPLETE CBC W/AUTO DIFF WBC: CPT | Performed by: EMERGENCY MEDICINE

## 2021-10-03 PROCEDURE — 71046 X-RAY EXAM CHEST 2 VIEWS: CPT

## 2021-10-03 PROCEDURE — 250N000011 HC RX IP 250 OP 636: Performed by: EMERGENCY MEDICINE

## 2021-10-03 PROCEDURE — 36415 COLL VENOUS BLD VENIPUNCTURE: CPT | Performed by: EMERGENCY MEDICINE

## 2021-10-03 RX ORDER — HEPARIN SODIUM (PORCINE) LOCK FLUSH IV SOLN 100 UNIT/ML 100 UNIT/ML
5-10 SOLUTION INTRAVENOUS
Status: DISCONTINUED | OUTPATIENT
Start: 2021-10-03 | End: 2021-10-03 | Stop reason: HOSPADM

## 2021-10-03 RX ORDER — OXYCODONE HYDROCHLORIDE 10 MG/1
10 TABLET ORAL ONCE
Status: DISCONTINUED | OUTPATIENT
Start: 2021-10-03 | End: 2021-10-03 | Stop reason: HOSPADM

## 2021-10-03 RX ADMIN — SODIUM CHLORIDE 1000 ML: 9 INJECTION, SOLUTION INTRAVENOUS at 15:23

## 2021-10-03 RX ADMIN — Medication 5 ML: at 17:45

## 2021-10-03 ASSESSMENT — ENCOUNTER SYMPTOMS
DIARRHEA: 0
NAUSEA: 0
PALPITATIONS: 0
FLANK PAIN: 0
HEADACHES: 0
FEVER: 0
LIGHT-HEADEDNESS: 0
COLOR CHANGE: 0
BRUISES/BLEEDS EASILY: 0
FREQUENCY: 0
WEAKNESS: 1
WOUND: 0
RHINORRHEA: 0
SHORTNESS OF BREATH: 1
VOMITING: 0
NUMBNESS: 0
ABDOMINAL PAIN: 0
CONFUSION: 0
BACK PAIN: 0
DYSURIA: 0
FATIGUE: 0
CHILLS: 0
COUGH: 1
HEMATURIA: 0
NECK PAIN: 0
CONSTIPATION: 0

## 2021-10-03 ASSESSMENT — MIFFLIN-ST. JEOR: SCORE: 1493.54

## 2021-10-03 NOTE — TELEPHONE ENCOUNTER
S- Pt's arms and legs have tremors and are weak.    B- Pt with history of Sarcoma, being treated with oral chemotherapy, pain management medications.    A- Pt states that she woke this morning with a temp of 100.0, and her arms have tremors that make her unable to hold objects.  Her legs keep buckling, and she said that she fell twice this morning. She denies any trauma, as her  caught her.  Pt denies any chest pain, or worsening of chronic labored breathing. Pt denies any loss of bowel or bladder function (she does have chronic stress urinary incontinence).    R- Pt advised to be evaluated at the ED. Secondary triage not initiated due to acuity of symptoms.  Radha Little RN 10/03/21 11:48 AM  [facility] Nurse Advisor      Reason for Disposition    Weakness in both sides of the body or weakness all over    Patient sounds very sick or weak to the triager    Protocols used: NEUROLOGIC DEFICIT-A-AH, WEAKNESS (GENERALIZED) AND FATIGUE-A-AH    COVID 19 Nurse Triage Plan/Patient Instructions    Please be aware that novel coronavirus (COVID-19) may be circulating in the community. If you develop symptoms such as fever, cough, or SOB or if you have concerns about the presence of another infection including coronavirus (COVID-19), please contact your health care provider or visit [email or web address].     Disposition/Instructions    ED Visit recommended. Follow protocol based instructions.     Bring Your Own Device:  Please also bring your smart device(s) (smart phones, tablets, laptops) and their charging cables for your personal use and to communicate with your care team during your visit.    Thank you for taking steps to prevent the spread of this virus.  o Limit your contact with others.  o Wear a simple mask to cover your cough.  o Wash your hands well and often.    Resources    [facility]: About COVID-19: [email or web address]    CDC: What to Do If You're Sick:  www.cdc.gov/coronavirus/2019-ncov/about/steps-when-sick.html    CDC: Ending Home Isolation: www.cdc.gov/coronavirus/2019-ncov/hcp/disposition-in-home-patients.html     CDC: Caring for Someone: www.cdc.gov/coronavirus/2019-ncov/if-you-are-sick/care-for-someone.html     [facility]: Interim Guidance for Hospital Discharge to Home: [email or web address]/hcp/hospdischarge.pdf    [facility] clinical trials (COVID-19 research studies): [email or web address]-clinical-trials     Below are the COVID-19 hotlines at the [place] Department of [facility] ([facility]). Interpreters are available.   o For health questions: Call 346-549-9162 or 1-510.524.7663 (7 a.m. to 7 p.m.)  o For questions about schools and childcare: Call 510-164-4450 or 1-773.866.9731 (7 a.m. to 7 p.m.)

## 2021-10-03 NOTE — ED TRIAGE NOTES
[facility]. Pt fell in bathroom this AM - Knees buckled. No LOC, didn't hit head. Pt has felt weakness and muscle spasms for the last few days. Hx of cancer. All VSS in route.

## 2021-10-03 NOTE — TELEPHONE ENCOUNTER
10/2/21    Called patient for follow-up. She has had a good day. She has been taking 5mg Oxycodone every 4 hours during the day, and then 10mg at night and that has been keeping the pain under control. She had no fever over the last 24 hours. She will continue to take Indomethacin 50mg TID. She is having issues with heartburn. She is going to take some baking soda with water for now since it is too close to her Pazopanib dosing. She has been monitoring her vital signs, all WNL.     We will have follow-up on Wednesday.     Harshal Schuster PA-C

## 2021-10-03 NOTE — PROGRESS NOTES
10/03/21 1530   Quick Adds   Type of Visit Initial PT Evaluation      Language english   Living Environment   People in home spouse   Current Living Arrangements apartment   Home Accessibility no concerns   Self-Care   Usual Activity Tolerance moderate   Current Activity Tolerance fair   Activity/Exercise/Self-Care Comment Sometimes will hold onto to  for balance, going to outpatient PT.   Disability/Function   Walking or Climbing Stairs Difficulty no   Dressing/Bathing Difficulty no   Fall history within last six months yes   Number of times patient has fallen within last six months 1   Change in Functional Status Since Onset of Current Illness/Injury yes   General Information   Onset of Illness/Injury or Date of Surgery 10/03/21   Referring Physician Dotty Saini MD   Patient/Family Therapy Goals Statement (PT) return home   Pertinent History of Current Problem (include personal factors and/or comorbidities that impact the POC) Richa Ashby is a 56 year old female who presented for evaluation of generalized weakness.  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  She notes that over the past 2 to 3 days she has had increased generalized pain, was seen in outside emergency department 2 days ago, pain has been improving on her pain regimen at home, last dose of pain medication was at 1 AM this morning.  She notes that yesterday and today she has had increased generalized weakness, she notes that she had to episodes today where she had to lower herself to the ground due to generalized weakness in her lower extremities.  She notes that both these episodes were when she was getting up to stand.  Denies any pain in her neck or back.  Denies headache.  Denies any focal area of weakness.  Denies any numbness.  Other than her chronic pain, baseline mild shortness of breath, baseline cough denies any other exacerbation of baseline  symptoms other than the weakness today.  She did not hit her head, no loss of consciousness, not on anticoagulation.  The patient denies any other physical concerns today.    Existing Precautions/Restrictions fall   General Observations RA, VSS during session.    Strength   Strength Comments Demos grossly 4/5 LE strength   Bed Mobility   Comment (Bed Mobility) SBA supine<>sit slow moving   Transfers   Transfer Safety Comments Sit<>stand CGA to SBA   Gait/Stairs (Locomotion)   Comment (Gait/Stairs) Ambulates 20 ft with no UE, occasionally cruising furniture, slow and guarded gait, no LOB, no buckling.    Balance   Balance Comments Fair, able to stand with no UE x 30 seconds.    Clinical Impression   Criteria for Skilled Therapeutic Intervention yes, treatment indicated   PT Diagnosis (PT) impaired functional mobility   Influenced by the following impairments weakness, deconditioned from chemo   Functional limitations due to impairments gait   Clinical Presentation Stable/Uncomplicated   Clinical Presentation Rationale clinical judgement   Clinical Decision Making (Complexity) low complexity   Therapy Frequency (PT) 5x/week   Predicted Duration of Therapy Intervention (days/wks) 1 week   Planned Therapy Interventions (PT) balance training;gait training;home exercise program   Anticipated Equipment Needs at Discharge (PT) walker, standard   Risk & Benefits of therapy have been explained evaluation/treatment results reviewed;care plan/treatment goals reviewed;risks/benefits reviewed;current/potential barriers reviewed;participants voiced agreement with care plan;participants included;patient;spouse/significant other   PT Discharge Planning    PT Discharge Recommendation (DC Rec) home with assist;home with outpatient physical therapy   PT Rationale for DC Rec Patient is able to transfer and ambulate without assist, fatigued and deconditioned but has support from , should be ok to DC home once medically stable.  Recommend FWW and wheelchair rental to increase safety and mobility. Recommend continued outpatient PT.    PT Brief overview of current status  1A   Total Evaluation Time   Total Evaluation Time (Minutes) 10

## 2021-10-03 NOTE — PLAN OF CARE
[facility]      OUTPATIENT PHYSICAL THERAPY EVALUATION  PLAN OF TREATMENT FOR OUTPATIENT REHABILITATION  (COMPLETE FOR INITIAL CLAIMS ONLY)  Patient's Last Name, First Name, M.I.  YOB: 1965  Richa Ashby                        Provider's Name  [facility] Medical Record No.  [id number]                               Onset Date:  10/03/21   Start of Care Date:         Type:     _X_PT   ___OT   ___SLP Medical Diagnosis:                           PT Diagnosis:  impaired functional mobility   Visits from SOC:  1   _________________________________________________________________________________  Plan of Treatment/Functional Goals    Planned Interventions: balance training, gait training, home exercise program     Goals: See Physical Therapy Goals on Care Plan in [employer] electronic health record.    Therapy Frequency: 5x/week  Predicted Duration of Therapy Intervention: 1 week  _________________________________________________________________________________    I CERTIFY THE NEED FOR THESE SERVICES FURNISHED UNDER        THIS PLAN OF TREATMENT AND WHILE UNDER MY CARE     (Physician co-signature of this document indicates review and certification of the therapy plan).                 ,      Referring Physician: Dotty Saini MD            Initial Assessment        See Physical Therapy evaluation dated   in Epic electronic health record.

## 2021-10-03 NOTE — DISCHARGE INSTRUCTIONS
Follow-up with physical therapy and your oncologist as scheduled for continued monitoring and evaluation.

## 2021-10-03 NOTE — ED PROVIDER NOTES
ED Provider Note  [facility]      History     Chief Complaint   Patient presents with     Fall     HPI  Richa Ashby is a 56 year old female who presented for evaluation of generalized weakness.  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  She notes that over the past 2 to 3 days she has had increased generalized pain, was seen in outside emergency department 2 days ago, pain has been improving on her pain regimen at home, last dose of pain medication was at 1 AM this morning.  She notes that yesterday and today she has had increased generalized weakness, she notes that she had to episodes today where she had to lower herself to the ground due to generalized weakness in her lower extremities.  She notes that both these episodes were when she was getting up to stand.  Denies any pain in her neck or back.  Denies headache.  Denies any focal area of weakness.  Denies any numbness.  Other than her chronic pain, baseline mild shortness of breath, baseline cough denies any other exacerbation of baseline symptoms other than the weakness today.  She did not hit her head, no loss of consciousness, not on anticoagulation.  The patient denies any other physical concerns today.     Past Medical History  Past Medical History:   Diagnosis Date     Asthma, chronic      Sarcoma (H)     Right lower thigh     Past Surgical History:   Procedure Laterality Date     AS RAD RESEC TONSIL/PILLARS       GALLBLADDER SURGERY       INSERT PORT VASCULAR ACCESS Right 10/28/2020    Procedure: single lumen power port placement @0745;  Surgeon: Tex Ackerman MD;  Location: [facility] OR     IR CHEST PORT PLACEMENT > 5 YRS OF AGE  10/28/2020     IR PORT CHECK RIGHT  3/26/2021     RESECT TUMOR LOWER EXTREMITY Right 3/12/2020    Procedure: Resection of right thigh tumor;  Surgeon: Guillermo Johnson MD;  Location:  OR     ROTATOR CUFF REPAIR  RT/LT  2012     SHOULDER SURGERY       SINUS SURGERY       acetaminophen (TYLENOL) 325 MG tablet  ADVAIR DISKUS 100-50 MCG/DOSE inhaler  albuterol (PROAIR RESPICLICK) 108 (90 Base) MCG/ACT inhaler  cetirizine (ZYRTEC) 10 MG tablet  gabapentin (NEURONTIN) 300 MG capsule  guaiFENesin-codeine (ROBITUSSIN AC) 100-10 MG/5ML solution  hydrOXYzine (ATARAX) 25 MG tablet  indomethacin (INDOCIN) 50 MG capsule  melatonin (MELATONIN) 1 MG/ML LIQD liquid  montelukast (SINGULAIR) 10 MG tablet  multivitamin w/minerals (MULTI-VITAMIN) tablet  ondansetron (ZOFRAN) 8 MG tablet  oxyCODONE (ROXICODONE) 5 MG tablet  pazopanib (VOTRIENT) 200 MG tablet  prochlorperazine (COMPAZINE) 10 MG tablet  vitamin D3 (CHOLECALCIFEROL) 2000 units (50 mcg) tablet      Allergies   Allergen Reactions     Pentobarbital      resp arrest     Family History  Family History   Problem Relation Age of Onset     Lung Cancer Maternal Grandmother      Breast Cancer Paternal Grandmother      Social History   Social History     Tobacco Use     Smoking status: Never Smoker     Smokeless tobacco: Never Used   Substance Use Topics     Alcohol use: Yes     Comment: 2-3 glasses of wine/week     Drug use: Never      Past medical history, past surgical history, medications, allergies, family history, and social history were reviewed with the patient. No additional pertinent items.       Review of Systems   Constitutional: Negative for chills, fatigue and fever.   HENT: Negative for congestion, ear pain and rhinorrhea.    Eyes: Negative for visual disturbance.   Respiratory: Positive for cough (Unchanged from baseline) and shortness of breath (Unchanged from baseline).    Cardiovascular: Negative for chest pain and palpitations.   Gastrointestinal: Negative for abdominal pain, constipation, diarrhea, nausea and vomiting.   Genitourinary: Negative for dysuria, flank pain, frequency, hematuria, vaginal bleeding and vaginal discharge.   Musculoskeletal: Negative for back pain  "and neck pain.   Skin: Negative for color change, rash and wound.   Allergic/Immunologic: Positive for immunocompromised state.   Neurological: Positive for weakness (Generalized, nonfocal). Negative for syncope, light-headedness, numbness and headaches.   Hematological: Does not bruise/bleed easily.   Psychiatric/Behavioral: Negative for confusion.   All other systems reviewed and are negative.    A complete review of systems was performed with pertinent positives and negatives noted in the HPI, and all other systems negative.    Physical Exam   BP: 136/76  Pulse: 88  Temp: 98.4  F (36.9  C)  Resp: 18  Height: 170.2 cm (5' 7\")  Weight: 87.1 kg (192 lb)  SpO2: 93 %     Physical Exam  Vitals and nursing note reviewed.   Constitutional:       General: She is not in acute distress.     Appearance: She is normal weight. She is not ill-appearing, toxic-appearing or diaphoretic.   HENT:      Head: Normocephalic and atraumatic.      Right Ear: External ear normal.      Left Ear: External ear normal.      Nose: Nose normal.      Mouth/Throat:      Mouth: Mucous membranes are moist.   Eyes:      Extraocular Movements: Extraocular movements intact.      Conjunctiva/sclera: Conjunctivae normal.      Pupils: Pupils are equal, round, and reactive to light.   Cardiovascular:      Rate and Rhythm: Normal rate and regular rhythm.      Pulses: Normal pulses.      Heart sounds: Normal heart sounds. No murmur heard.   No friction rub. No gallop.    Pulmonary:      Effort: Pulmonary effort is normal. No respiratory distress.      Breath sounds: No stridor. No wheezing.      Comments: Mild coarse breath sounds bilaterally.  Abdominal:      General: Bowel sounds are normal. There is no distension.      Palpations: Abdomen is soft.      Tenderness: There is no abdominal tenderness. There is no right CVA tenderness, left CVA tenderness, guarding or rebound.   Musculoskeletal:         General: Normal range of motion.      Cervical back: " Normal range of motion and neck supple. No rigidity or tenderness.      Right lower leg: No edema.      Left lower leg: No edema.   Skin:     General: Skin is warm.      Capillary Refill: Capillary refill takes less than 2 seconds.   Neurological:      General: No focal deficit present.      Mental Status: She is alert.      Comments: GCS 15.  Cranial nerves II through XII intact.  Strength 5/5 in all all distributions of the upper and lower extremities.  Intact sensation in all distributions of the bilateral upper and lower extremities.    Psychiatric:         Mood and Affect: Mood normal.         Behavior: Behavior normal.         ED Course     ED Course as of Oct 03 1753   Sun Oct 03, 2021   1323 Normal sinus rhythm without acute ST elevation or depression, T wave inversion in V2 and V1.    EKG 12 lead   1410 Stable bilateral pulmonary masses the largest being a right apical mass. Right IJ port catheter tip in the low SVC. Mild patchy opacities in the lung bases are new and could represent atelectasis or pneumonia. No significant pleural effusion or pneumothorax.   Chest XR,  PA & LAT   1433 No acute intracranial pathology.    Head CT w/o contrast   1551 Sodium: 136   1551 Potassium: 4.6   1600 Creatinine: 0.87   1600 Glucose: 99   1600 On chart review, this is at baseline.    Alkaline Phosphatase(!): 220   1600 AST: 24   1600 ALT: 18   1600 Bilirubin Total: 1.1   1600 On chart review, this is at baseline.    Albumin(!): 1.4   1600 Phosphorus: 4.1   1600 Magnesium: 2.1   1600 WBC: 7.0   1600 On chart review, this is at baseline.    Hemoglobin(!): 8.3   1600 Baseline 200-300 on chart review.    Platelet Count(!): 137   1601 Troponin I: <0.015   1710 Procalcitonin: 1.29     Procedures: none.             EKG Interpretation:      Interpreted by Dotty Saini MD  Time reviewed: 13:18   Symptoms at time of EKG: Generalized weakness    Rhythm: normal sinus   Rate: normal  Axis: NORMAL  Ectopy: none  Conduction: normal  ST  Segments/ T Waves: No ST wave changes; T wave inversion V1 and V2 unchanged from previous EKG.   Q Waves: none  Comparison to prior: Unchanged from 6/17/2021.    Clinical Impression: normal EKG   _______________________________________________     The medical record was reviewed and interpreted.  Current labs reviewed and interpreted.  Current images reviewed and interpreted: as above.  EKG reviewed and interpreted: as above.  _______________________________________________     Medications   oxyCODONE IR (ROXICODONE) tablet 10 mg (10 mg Oral Not Given 10/3/21 1741)   sodium chloride (PF) 0.9% PF flush 10-20 mL (has no administration in time range)   heparin 100 UNIT/ML injection 5-10 mL (5 mLs Intracatheter Given 10/3/21 1745)   0.9% sodium chloride BOLUS (0 mLs Intravenous Stopped 10/3/21 1742)        Assessments & Plan (with Medical Decision Making)   Nursing notes and vital signs reviewed.     Presentation, exam, and workup is most consistent with generalized weakness and baseline anemia.    Please see HPI, ROS, exam, and ED course above for pertinent history and findings. In short, the patient presented to the ED for evaluation of generalized weakness with 2 episodes where she had a lower herself to the ground today due to generalized weakness.  Currently on pazopanib which she started approximately 8 days ago.    Of note, patient was in outside emergency department 2 days ago and CT chest, abdomen, and pelvis showed:   1.  Study is negative for pulmonary thromboembolism or pneumothorax.   2.  Significant progression of metastatic disease. Many of the previously identified nodules and masses have increased in size in the interim. The right upper lobe paraspinous mass now measures 13 cm maximum diameter occupying the majority of the right upper hemithorax. Masses elsewhere in the right lung and left lower lobe have also increased in size. One of these posterolaterally on the left appears inseparable from the  intercostal musculature suggesting focal chest wall invasion.   3.  Compression but not occlusion of the superior vena cava against the deep margin of the sternum and right clavicular head.   4.  Small right pleural effusion.     Although patient had also recently had CT here at [facility] and Harshal Schuster PA-C's note from 2 days ago notes that the CT from 2 days ago actually showed stable to slightly improved disease.  She has the patient scheduled for follow-up in 3 days.    Here in the emergency department on exam she has no focal neurological deficits.  No midline tenderness to palpation of the cervical, thoracic, or lumbar spine.  Intact sensation and strength in her extremities.  GCS 15 with no focal abnormality on exam.  She does have this history of sarcoma; CT head negative for acute findings, given no focal neurological deficits on exam today no indication for MRI brain/spine.    Given her generalized weakness, consulted physical therapy and they were actually able to come evaluate her in the emergency department.  They gave her a walker to use for balance at home and to assist her with standing up from seated positions.  Patient does have outpatient physical therapy already scheduled, had initial assessment last week and starts this week, this will likely help with her strengthening.  The patient's bedroom and bathroom are all on the first level and she does not need to use stairs at home thankfully.  No indication at this time for admission for inpatient therapies.    Otherwise, EKG without acute findings, troponin undetectable, chest x-ray globally unchanged from her baseline.  Anemia 8.3 is not acutely changed from her baseline, no indication for transfusion.  Electrolytes without abnormality.  Platelets slightly lower than baseline at 137 however no indication for acute emergent work-up at this time, she has lab draw tomorrow and follow-up with her oncologist in 3 days.  Of note, patient is already on  antibiotics for UTI so no indication for UA today.  She is given 1 L of IV fluids for rehydration, with symptomatic improvement.    On re-evaluation, the patient is resting comfortably after the above interventions. I discussed with the patient the results of the above studies. All questions were answered prior to discharge, and the patient was told to follow up with her primary care physician and oncologist per discharge instructions. Reasons for return as well as follow up were reviewed with the patient.  The patient expressed understanding and agreement.    Disposition: The patient was discharged to home in stable condition.      Final diagnoses:   Generalized muscle weakness   Anemia, unspecified type   Hypoalbuminemia       --  Dotty Saini MD   [facility] EMERGENCY DEPARTMENT  10/3/2021     Dotty Saini MD  10/03/21 [id number]

## 2021-10-04 ENCOUNTER — TELEPHONE (OUTPATIENT)
Dept: ONCOLOGY | Facility: CLINIC | Age: 56
End: 2021-10-04

## 2021-10-04 NOTE — ORAL ONC MGMT
Oral Chemotherapy Monitoring Program     Placed call to patient in follow up of Votrient therapy and treatment of heartburn. Connie said she had a period of heartburn around 4PM the other day and wondered when she could take Tums antacid with respect to Votrient dosing. Recommended she could use Tums as needed at least 2 hours before or after Votrient. She said she has only had one episode of heartburn since starting Votrient and she expressed understanding and agreement with this plan. She will call with any interim concerns, or if heartburn is not controlled by the above plan. She thanked me for the call.     Kyaw EscobedoD  [facility]  [phone]  October 4, 2021

## 2021-10-04 NOTE — PLAN OF CARE
Discharge Summary    Reason for therapy discharge:    Discharged to home.    Progress towards therapy goal(s). See goals on Care Plan in [facility] electronic health record for goal details.  Goals partially met.  Barriers to achieving goals:   discharge from facility.    Therapy recommendation(s):    Continue home exercise program.

## 2021-10-05 ENCOUNTER — LAB (OUTPATIENT)
Dept: ONCOLOGY | Facility: CLINIC | Age: 56
End: 2021-10-05
Payer: COMMERCIAL

## 2021-10-05 VITALS
OXYGEN SATURATION: 98 % | HEART RATE: 110 BPM | SYSTOLIC BLOOD PRESSURE: 125 MMHG | TEMPERATURE: 97.2 F | DIASTOLIC BLOOD PRESSURE: 86 MMHG | RESPIRATION RATE: 20 BRPM

## 2021-10-05 DIAGNOSIS — C49.9 SARCOMA (H): ICD-10-CM

## 2021-10-05 DIAGNOSIS — Z79.899 ENCOUNTER FOR LONG-TERM (CURRENT) USE OF MEDICATIONS: ICD-10-CM

## 2021-10-05 DIAGNOSIS — C49.9 SARCOMA (H): Primary | ICD-10-CM

## 2021-10-05 DIAGNOSIS — T82.594A: ICD-10-CM

## 2021-10-05 DIAGNOSIS — C78.00 MALIGNANT NEOPLASM METASTATIC TO LUNG, UNSPECIFIED LATERALITY (H): ICD-10-CM

## 2021-10-05 DIAGNOSIS — D63.0 ANEMIA IN NEOPLASTIC DISEASE: ICD-10-CM

## 2021-10-05 DIAGNOSIS — C49.9 SARCOMA OF SOFT TISSUE (H): ICD-10-CM

## 2021-10-05 LAB
ALBUMIN SERPL-MCNC: 1.6 G/DL (ref 3.4–5)
ALP SERPL-CCNC: 195 U/L (ref 40–150)
ALT SERPL W P-5'-P-CCNC: 17 U/L (ref 0–50)
ANION GAP SERPL CALCULATED.3IONS-SCNC: 9 MMOL/L (ref 3–14)
AST SERPL W P-5'-P-CCNC: 21 U/L (ref 0–45)
BASOPHILS # BLD AUTO: 0 10E3/UL (ref 0–0.2)
BASOPHILS NFR BLD AUTO: 0 %
BILIRUB SERPL-MCNC: 1.2 MG/DL (ref 0.2–1.3)
BUN SERPL-MCNC: 12 MG/DL (ref 7–30)
CALCIUM SERPL-MCNC: 9 MG/DL (ref 8.5–10.1)
CHLORIDE BLD-SCNC: 105 MMOL/L (ref 94–109)
CO2 SERPL-SCNC: 21 MMOL/L (ref 20–32)
CREAT SERPL-MCNC: 0.76 MG/DL (ref 0.52–1.04)
EOSINOPHIL # BLD AUTO: 0.1 10E3/UL (ref 0–0.7)
EOSINOPHIL NFR BLD AUTO: 1 %
ERYTHROCYTE [DISTWIDTH] IN BLOOD BY AUTOMATED COUNT: 15.9 % (ref 10–15)
GFR SERPL CREATININE-BSD FRML MDRD: 88 ML/MIN/1.73M2
GLUCOSE BLD-MCNC: 108 MG/DL (ref 70–99)
HCT VFR BLD AUTO: 29.9 % (ref 35–47)
HGB BLD-MCNC: 9.6 G/DL (ref 11.7–15.7)
IMM GRANULOCYTES # BLD: 0.1 10E3/UL
IMM GRANULOCYTES NFR BLD: 1 %
LYMPHOCYTES # BLD AUTO: 0.9 10E3/UL (ref 0.8–5.3)
LYMPHOCYTES NFR BLD AUTO: 12 %
MAGNESIUM SERPL-MCNC: 2.3 MG/DL (ref 1.6–2.3)
MCH RBC QN AUTO: 29.1 PG (ref 26.5–33)
MCHC RBC AUTO-ENTMCNC: 32.1 G/DL (ref 31.5–36.5)
MCV RBC AUTO: 91 FL (ref 78–100)
MONOCYTES # BLD AUTO: 0.6 10E3/UL (ref 0–1.3)
MONOCYTES NFR BLD AUTO: 8 %
NEUTROPHILS # BLD AUTO: 5.5 10E3/UL (ref 1.6–8.3)
NEUTROPHILS NFR BLD AUTO: 78 %
NRBC # BLD AUTO: 0 10E3/UL
NRBC BLD AUTO-RTO: 0 /100
PLATELET # BLD AUTO: 133 10E3/UL (ref 150–450)
POTASSIUM BLD-SCNC: 4.2 MMOL/L (ref 3.4–5.3)
PROT SERPL-MCNC: 7.3 G/DL (ref 6.8–8.8)
RBC # BLD AUTO: 3.3 10E6/UL (ref 3.8–5.2)
SODIUM SERPL-SCNC: 135 MMOL/L (ref 133–144)
WBC # BLD AUTO: 7.1 10E3/UL (ref 4–11)

## 2021-10-05 PROCEDURE — 36591 DRAW BLOOD OFF VENOUS DEVICE: CPT

## 2021-10-05 PROCEDURE — 83735 ASSAY OF MAGNESIUM: CPT | Performed by: INTERNAL MEDICINE

## 2021-10-05 PROCEDURE — 80053 COMPREHEN METABOLIC PANEL: CPT | Performed by: INTERNAL MEDICINE

## 2021-10-05 PROCEDURE — 36593 DECLOT VASCULAR DEVICE: CPT

## 2021-10-05 PROCEDURE — 85025 COMPLETE CBC W/AUTO DIFF WBC: CPT | Performed by: INTERNAL MEDICINE

## 2021-10-05 PROCEDURE — 93000 ELECTROCARDIOGRAM COMPLETE: CPT

## 2021-10-05 RX ORDER — HEPARIN SODIUM (PORCINE) LOCK FLUSH IV SOLN 100 UNIT/ML 100 UNIT/ML
500 SOLUTION INTRAVENOUS EVERY 8 HOURS
Status: DISCONTINUED | OUTPATIENT
Start: 2021-10-05 | End: 2021-10-05 | Stop reason: HOSPADM

## 2021-10-05 RX ADMIN — HEPARIN SODIUM (PORCINE) LOCK FLUSH IV SOLN 100 UNIT/ML 500 UNITS: 100 SOLUTION at 11:30

## 2021-10-05 RX ADMIN — HEPARIN SODIUM (PORCINE) LOCK FLUSH IV SOLN 100 UNIT/ML 500 UNITS: 100 SOLUTION at 13:00

## 2021-10-05 RX ADMIN — Medication 2 MG: at 11:58

## 2021-10-05 ASSESSMENT — PAIN SCALES - GENERAL: PAINLEVEL: NO PAIN (0)

## 2021-10-05 NOTE — PROGRESS NOTES
12 lead EKG performed without incident. Printouts initially viewed by Cyndi Bains CNP, then sent to Cardiology for final read, then to [facility] for scanning.     Initial read at 10:54:48 am:    Sinus Tachycardia,  Normal ECG except for rate    Vent Rate: 103 bpm  RR Interval: 582 ms  TX Interval: 128 ms  QRS Duration: 76 ms  QT Interval: 342 ms  QTc Interval: 417 ms  QT Dispersion: 48 ms  P-R-T Axis: 54   -7   45 degrees    Second read at 10:55:44 am:    Sinus arrhythmia, Normal ECG    Vent Rate: 68 bpm  RR Interval: 880 ms  TX Interval: 134 ms  QRS Duration: 80 ms  QT Interval: 384 ms  QTc Interval: 398 ms  QT Dispersion: 16 ms  P-R-T Axis: 55   -1   51 degrees

## 2021-10-05 NOTE — PROGRESS NOTES
Oncology/Hematology Visit Note  Oct 6, 2021    Reason for Visit: Follow up of undifferentiated pleomorphic sarcoma     History of Present Illness: Richa Ashby is a 56 year old female with PMH asthma and GERD with undifferentiated pleomorphic sarcoma. 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 She received preoperative radiotherapy and the tumor was resected 3/12/20. She then developed a cough, chest tightness, and scapular pain and imaging revealed lung nodules. Lung biopsy 11/2/20 with sarcoma. She was started on Doxil + Ifos 11/13/20. Had delay in cycle 2 due to COVID dx 12/2/20. CT imaging after cycle 1 with positive response to treatment with decrease in pulmonary nodules. Received cycle 2 12/18/20. Received cycle 3 1/15/21 with dose reduction in Doxil for skin toxicity and mucositis. Received cycle 4 2/15/21.      CT CAP 3/8/21 with positive response to treatment, decreased size of pulmonary mets though one lesion was larger. Did see fibrocystic changes in left breast, recommended breast imaging. Received cycle 5 Doxil + ifos 3/11/21.      Imaging 4/5/21 with overall stable disease except one lung lesion, opted to continue Doxil alone for now and switch to Keytruda in future if ongoing progression.      Started Keytruda 5/10/21. Was seen 5/26/21 cough, SOB, fatigue work-up thought to be 2/2 phrenic nerve involvement of R apical lung mass.      Due to worsening fatigue and cough, repeat CT 6/15/21 concerning for progression vs pseudoprogression. Added Gemzar 6/16/21.     She was admitted 6/17/21-6/20/21 with fever of unknown origin, infectious work-up negative, treated with broad spectrum antibiotics.     She clinically improved on abx. Was able to receive day 8 Gemzar (dose reduced for thrombocytopenia) + Keytruda 6/23/21.      CT 7/12/21 with stable disease.     She was admitted 8/25/21-8/31/21 for fevers, hypotension, acute on chronic  anemia. Extensive infectious work-up negative so etiology of fevers still unknown.      She has had progressive disease so started on Pazopanib 9/24/21.    She was seen today for oncology follow-up.     Interval History:  Connie was seen today in clinic with the following complaints:  -No fevers but has been taking Indomethacin 50mg TID around the clock  -Has been having headaches, dizziness, vision changes, along with new hallucinations since last week. She also has been dropping items and having a hard time with motor coordinations. This all has been ongoing since Thursday/Friday last week. The hallucinations have NOT improved since she cut back on Oxycodone.  -She has new mouth sores since last week. The sores are limiting her ability to eat and drink. She is doing biotene, MMW, and healios with only the healios providing relief.  -She cut back on the Oxycodone after the ED as there were concerns this was contributing to her weakness. Weakness is better but still persists, she is unable to care for herself. Since cutting back on Oxycodone her pain has worsened, both in her R shoulder and R flank. She has not taken any pain medications (other than Indomethacin for fevers) for over 24 hours  -She is having SOB, worse with exertion though she is unable to do much 2/2 exhaustion. She sleeps part of the night sitting upright. The breathing is slightly better since cutting back on Oxycodone. She is still coughing.   -She has had intermittent nausea, taking Compazine with good relief. No vomiting. Very little eating, doing OK but still less than normal with fluids  -Had diarrhea last night/early morning that was significant. Did not take Imodium  -Is having more dysuria and dark colored urine. Of note did not start ciprofloxacin until last evening (was on keflex but culture came back resistant)   -Leg swelling is improved     Current Outpatient Medications   Medication Sig Dispense Refill     acetaminophen (TYLENOL) 325  "MG tablet Take 325-650 mg by mouth every 6 hours as needed for mild pain       ADVAIR DISKUS 100-50 MCG/DOSE inhaler Inhale 1 puff into the lungs daily        albuterol (PROAIR RESPICLICK) 108 (90 Base) MCG/ACT inhaler Inhale 1-2 puffs into the lungs       cetirizine (ZYRTEC) 10 MG tablet Take 10 mg by mouth daily       gabapentin (NEURONTIN) 300 MG capsule Take 600 mg in the AM, 600 mg mid-day, and 900 mg at night 210 capsule 3     guaiFENesin-codeine (ROBITUSSIN AC) 100-10 MG/5ML solution Take 5-10 mLs by mouth every 4 hours as needed for cough 473 mL 1     hydrOXYzine (ATARAX) 25 MG tablet Take 1 tablet (25 mg) by mouth 3 times daily as needed for itching (Patient not taking: Reported on 9/10/2021) 30 tablet 1     indomethacin (INDOCIN) 50 MG capsule Take 1 capsule (50 mg) by mouth 3 times daily (with meals) 60 capsule 1     melatonin (MELATONIN) 1 MG/ML LIQD liquid Take 5 mg by mouth       montelukast (SINGULAIR) 10 MG tablet Take 10 mg by mouth daily       multivitamin w/minerals (MULTI-VITAMIN) tablet Take 1 tablet by mouth daily       ondansetron (ZOFRAN) 8 MG tablet Take 1 tablet (8 mg) by mouth every 8 hours as needed for nausea (Patient not taking: Reported on 9/22/2021) 60 tablet 1     oxyCODONE (ROXICODONE) 5 MG tablet Take 0.5-1 tablets (2.5-5 mg) by mouth every 6 hours as needed for severe pain 60 tablet 0     pazopanib (VOTRIENT) 200 MG tablet Take 4 tablets (800 mg) by mouth daily Take on an empty stomach 1 hour before or 2 hours after a meal. 120 tablet 0     prochlorperazine (COMPAZINE) 10 MG tablet Take 1 tablet (10 mg) by mouth every 6 hours as needed for nausea or vomiting 90 tablet 3     vitamin D3 (CHOLECALCIFEROL) 2000 units (50 mcg) tablet Take 1 tablet by mouth daily         Physical Examination:  BP (!) 88/70   Pulse 117   Temp 97.8  F (36.6  C) (Tympanic)   Resp 20   Ht 1.702 m (5' 7\")   Wt 87.2 kg (192 lb 3.2 oz)   LMP 03/19/2019   SpO2 97%   BMI 30.10 kg/m    Wt Readings from " Last 10 Encounters:   10/03/21 87.1 kg (192 lb)   09/22/21 89.3 kg (196 lb 12.8 oz)   09/08/21 90.5 kg (199 lb 8 oz)   08/30/21 90.7 kg (199 lb 15.3 oz)   08/25/21 90.8 kg (200 lb 1.6 oz)   08/11/21 93.4 kg (206 lb)   08/09/21 91.8 kg (202 lb 4.8 oz)   08/04/21 93.1 kg (205 lb 4.8 oz)   07/28/21 93.2 kg (205 lb 8 oz)   07/24/21 92.5 kg (204 lb)     Constitutional: Ill-appearing female in no acute distress. Uncomfortable.  Eyes: No scleral icterus.  Cardiovascular: Tachycardic rate and rhythm. No murmurs, gallops, or rubs. No peripheral edema.  Respiratory: Breathing comfortably at rest. Lungs with slight wheezing bilaterally. Lung sounds diminished in RUL.   Gastrointestinal: Bowel sounds present. Abdomen soft, non-tender.   Neurologic: Cranial nerves II through XII are intact however her speech is slightly slurred and she is more easily confused compared to prior visits.  Skin: No rashes, petechiae, or bruising noted on exposed skin.    Laboratory Data:  Results for AMADEO WILSON (MRN [id number]) as of 10/6/2021 16:09   10/6/2021 14:32   Sodium 137   Potassium 3.9   Chloride 104   Carbon Dioxide 22   Urea Nitrogen 11   Creatinine 1.00   GFR Estimate 63   Calcium 8.7   Anion Gap 11   Magnesium 2.0   Phosphorus 4.2   Albumin 1.7 (L)   Protein Total 7.4   Bilirubin Total 1.4 (H)   Alkaline Phosphatase 195 (H)   ALT 18   AST 26   Lactic Acid 2.0   Glucose 103 (H)   WBC 7.2   Hemoglobin 9.4 (L)   Hematocrit 30.6 (L)   Platelet Count 152   RBC Count 3.17 (L)   MCV 97   MCH 29.7   MCHC 30.7 (L)   RDW 16.5 (H)   % Neutrophils 73   % Lymphocytes 16   % Monocytes 9   % Eosinophils 0   Absolute Basophils 0.0   % Basophils 1   Absolute Eosinophils 0.0   Absolute Immature Granulocytes 0.1 (H)   Absolute Lymphocytes 1.1   Absolute Monocytes 0.7   % Immature Granulocytes 1   Absolute Neutrophils 5.3   Absolute NRBCs 0.0       Assessment and Plan:  1. Altered Mental Status, Failure to Thrive, Concern for Complicated UTI  Amadeo  has significantly worsened over the last week with progressive weakness, decreased oral intake, new neurologic issues. Has diagnosed UTI from OSH but was not on appropriate antibiotics until last night. She is hypotensive and tachycardic in clinic. Will admit to hospital  -Labs drawn: CBC, CMP, Mag, Phos, Lactic Acid, Blood cultures x 2  -Repeat UA with culture  -IVF + 1g IV Ertapenem with unstable vitals concerning for early sepsis (afebrile but has been on scheduled Indomethacin given hx tumor fevers)  *Vitals improved after IVF.     Once admitted:  -Follow-up blood culture and UA results and continue IV abx   -Consider CXR with increased wheezing noted on physical exam, no acute respiratory concerns so could also monitor clinically   -Brain MRI to assess new neurologic issues (no concern for stroke given symptoms >5 days and CT on 10/3 negative). Need to r/o brain mets  -PT/OT consult for FTT.   -IVF support with poor oral intake. BP monitoring   -Antiemetics PRN  -If she does have brain mets, start Dex for symptoms (4mg daily) and consult radiation oncology for gamma knife vs whole brain radiation -could do rad onc outpatient    Discussed with Dr. oLpez and WILL CONTINUE Pazopanib while admitted due to her rapidly growing disease and need for ongoing cancer treatment.  will bring Pazopanib from home. Please consult oncology if concerns with oral chemo and we can assist during her admission.     2. Onc  Metastatic UPS, was on Doxil + Ifosfamide (5 cycles) with positive response to treatment initially, then due to poor tolerance switched to Doxil alone (1 cycle) but had progression. Progressed on Keytruda + Gemzar. Started Pazopanib 9/24/21.    Doing poorly as above, however initial scan at OSH showed measurements slightly smaller than CT on 9/22 so would like to continue Pazopnaib if at all possible. No acute toxicities from treatment other than mouth sores and slight increase in bili we can monitor. EKG  reviewed and no issues with QT prolongation.    I will request onc appointment to see her after discharge.     3. ID  Prior fevers of unknown origin. Negative test:  -Lyme PCR  -blood parasitology negative.   -HIV neg  -Beta-D-glucan normal  -COVID neg  -Influenza neg  -Aspergillus galactomannin  -Strep Group A  -Blood cultures 8/27 NGTD     Thought to be tumor fevers, now improved on Indomethacin 50mg TID. Concern for untreated UTI as above.      4. Heme  Anemia: ACD and chemotherapy. Hgb currently stable. Monitor and transfuse if hgb <8 outpatient, <7 inpatient.      Thrombocytopenia: No current issues     Neutropenia: No current issues.      5. Pulm  Asthma: stable, continue inhalers.      COVID: Diagnosed in December, recovered. She is vaccinated.      Cough/SOB: See work-up from 5/26/21 (negative CT PE, RVP, COVID, NTBNP). Thought to be 2/2 phrenic nerve involvement of tumor. Hold narcotics for the moment due to concern about worsening mentation. Continue Robitusinn PRN. CXR for wheezing vs monitoring.      She has SVC flattening on CT and will sleep elevated. Consider pulm intervention for stenting in future. Not discussed in detail at this time. No signs of SVC syndrome.      6. 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 Continue Gabapentin 600/600/900.     See discussion of weakness and hallucinations above.      7. MSK  R shoulder/back pain: 2/2 malignancy. Consider palliative care consult inpatient, try to limit narcotics due to concern about mentation. Consider adding in scheduled Tylenol and topical agents.      Recurrent inflammatory arthritis symptoms (diffuse joint pain and swelling) and CRP elevated. Improved with prednisone, no current issues.       8. GI  Diarrhea, unclear etiology, C diff negative. Has had intermittent issues throughout treatment, Possibly immune mediated? Continue Imodium PRN.     Patient prefers PO Compazine for nausea.     Slight hyperbilirubinemia 2/2 Pazopanib.  Monitor.     75 minutes spent on the date of the encounter doing chart review, review of test results, interpretation of tests, patient visit, documentation and discussion with other provider(s)     Harshal Schuster PA-C  [facility] [address] [id number] [phone]

## 2021-10-05 NOTE — PROGRESS NOTES
"Patient's name and  were verified.  See Doc Flowsheet - IV assess for details.  IVAD accessed with 20G 3/4\" nieves gripper plus needle  blood return positive: No. TPA instilled at 12pm, checked at 12:30 and 1pm, both times no blood return.  Pt stated she has had a dye study done 3/21 and it has been found that port will infuse but may not withdraw blood.  Per study results, the location of the catheter tip in close proximity to the left innominate vein and azygos vein.  TPA withdrew prior to discharging pt.  Port flushed with saline and heparin.  Site without redness, tenderness or swelling: YES  flushed with 20cc NS and 5cc 100u/ml heparin  Needle: de-accessed  Comments: Labs drawn.  Patient tolerated procedure without incident.    Prem Ordonez  RN, BSN    Pt here with  for a port draw and c/o increased SOB, increased fatigue and weakness, dropping things and unable to hold things.  Had been in [facility] ER on Friday with increased R flank pain, instructed to increase oxycodone (Friday 8pm 2(5mg) tabs, Saturday 1243am 2 (5mg tabs), 7:42am 2 (5mg) tabs, 12pm Saturday 1 (5mg) tab, 5pm 1(5mg tab),9pm 2(5mg) tabs and  1am (2 5mg tabs)   went to [facility] ER for increased weakness, fatigue and pt fell x2 at home.  Admits to dropping things at home. Pt states she has been seeing things, hallucinating x 1 week.     Per   gave oxycodone 1 tab with relief of pain at 4pm.  10pm (2.5mg tab)      states pt is waiting on antibiotic for UTI that was called in on Saturday from  hospital but still not in at pharmacy today.   will call [facility] to see about getting new prescription today.    EKG done today and reviewed by Cyndi Bains NP.    Per Cyndi, pt to be seen in person by Harshal tomorrow instead of a video visit.  Pt and  aware.   talked with Harshal while here in clinic and understand they are to see her in [place] tomorrow.    Labs drawn peripherally by " lab.    Upon discharge, pt kat to transfer self to wheelchair, alert and oriented, breathing without discomfort, appeared to be more calm than when she was admitted.    Pt and  discussing where they were going for lunch after discharge.

## 2021-10-06 ENCOUNTER — APPOINTMENT (OUTPATIENT)
Dept: MRI IMAGING | Facility: CLINIC | Age: 56
End: 2021-10-06
Attending: HOSPITALIST
Payer: COMMERCIAL

## 2021-10-06 ENCOUNTER — ONCOLOGY VISIT (OUTPATIENT)
Dept: ONCOLOGY | Facility: CLINIC | Age: 56
End: 2021-10-06
Attending: PHYSICIAN ASSISTANT
Payer: COMMERCIAL

## 2021-10-06 ENCOUNTER — HOSPITAL ENCOUNTER (OUTPATIENT)
Facility: CLINIC | Age: 56
End: 2021-10-06
Payer: COMMERCIAL

## 2021-10-06 ENCOUNTER — HOSPITAL ENCOUNTER (OUTPATIENT)
Facility: CLINIC | Age: 56
Setting detail: OBSERVATION
Discharge: HOME | End: 2021-10-07
Attending: HOSPITALIST | Admitting: HOSPITALIST
Payer: COMMERCIAL

## 2021-10-06 ENCOUNTER — APPOINTMENT (OUTPATIENT)
Dept: GENERAL RADIOLOGY | Facility: CLINIC | Age: 56
End: 2021-10-06
Attending: HOSPITALIST
Payer: COMMERCIAL

## 2021-10-06 ENCOUNTER — INFUSION THERAPY VISIT (OUTPATIENT)
Dept: INFUSION THERAPY | Facility: CLINIC | Age: 56
End: 2021-10-06
Attending: PHYSICIAN ASSISTANT
Payer: COMMERCIAL

## 2021-10-06 VITALS
RESPIRATION RATE: 20 BRPM | WEIGHT: 192.2 LBS | HEIGHT: 67 IN | TEMPERATURE: 97.8 F | SYSTOLIC BLOOD PRESSURE: 88 MMHG | OXYGEN SATURATION: 97 % | HEART RATE: 117 BPM | BODY MASS INDEX: 30.17 KG/M2 | DIASTOLIC BLOOD PRESSURE: 70 MMHG

## 2021-10-06 VITALS
RESPIRATION RATE: 22 BRPM | HEART RATE: 91 BPM | OXYGEN SATURATION: 95 % | DIASTOLIC BLOOD PRESSURE: 72 MMHG | TEMPERATURE: 97.9 F | SYSTOLIC BLOOD PRESSURE: 104 MMHG

## 2021-10-06 DIAGNOSIS — C49.9 SARCOMA (H): Primary | ICD-10-CM

## 2021-10-06 DIAGNOSIS — N39.0 COMPLICATED URINARY TRACT INFECTION: Primary | ICD-10-CM

## 2021-10-06 DIAGNOSIS — N39.0 COMPLICATED URINARY TRACT INFECTION: ICD-10-CM

## 2021-10-06 DIAGNOSIS — R44.3 HALLUCINATIONS: ICD-10-CM

## 2021-10-06 DIAGNOSIS — C49.9 SARCOMA (H): ICD-10-CM

## 2021-10-06 DIAGNOSIS — M62.81 GENERALIZED MUSCLE WEAKNESS: ICD-10-CM

## 2021-10-06 DIAGNOSIS — R62.7 FAILURE TO THRIVE IN ADULT: ICD-10-CM

## 2021-10-06 LAB
ALBUMIN SERPL-MCNC: 1.7 G/DL (ref 3.4–5)
ALBUMIN UR-MCNC: 50 MG/DL
ALP SERPL-CCNC: 195 U/L (ref 40–150)
ALT SERPL W P-5'-P-CCNC: 18 U/L (ref 0–50)
ANION GAP SERPL CALCULATED.3IONS-SCNC: 11 MMOL/L (ref 3–14)
APPEARANCE UR: ABNORMAL
AST SERPL W P-5'-P-CCNC: 26 U/L (ref 0–45)
BASOPHILS # BLD AUTO: 0 10E3/UL (ref 0–0.2)
BASOPHILS NFR BLD AUTO: 1 %
BILIRUB SERPL-MCNC: 1.4 MG/DL (ref 0.2–1.3)
BILIRUB UR QL STRIP: NEGATIVE
BUN SERPL-MCNC: 11 MG/DL (ref 7–30)
CALCIUM SERPL-MCNC: 8.7 MG/DL (ref 8.5–10.1)
CHLORIDE BLD-SCNC: 104 MMOL/L (ref 94–109)
CO2 SERPL-SCNC: 22 MMOL/L (ref 20–32)
COLOR UR AUTO: YELLOW
CREAT SERPL-MCNC: 1 MG/DL (ref 0.52–1.04)
EOSINOPHIL # BLD AUTO: 0 10E3/UL (ref 0–0.7)
EOSINOPHIL NFR BLD AUTO: 0 %
ERYTHROCYTE [DISTWIDTH] IN BLOOD BY AUTOMATED COUNT: 16.5 % (ref 10–15)
GFR SERPL CREATININE-BSD FRML MDRD: 63 ML/MIN/1.73M2
GLUCOSE BLD-MCNC: 103 MG/DL (ref 70–99)
GLUCOSE UR STRIP-MCNC: NEGATIVE MG/DL
HCT VFR BLD AUTO: 30.6 % (ref 35–47)
HGB BLD-MCNC: 9.4 G/DL (ref 11.7–15.7)
HGB UR QL STRIP: NEGATIVE
IMM GRANULOCYTES # BLD: 0.1 10E3/UL
IMM GRANULOCYTES NFR BLD: 1 %
KETONES UR STRIP-MCNC: NEGATIVE MG/DL
LACTATE SERPL-SCNC: 2 MMOL/L (ref 0.7–2)
LEUKOCYTE ESTERASE UR QL STRIP: ABNORMAL
LYMPHOCYTES # BLD AUTO: 1.1 10E3/UL (ref 0.8–5.3)
LYMPHOCYTES NFR BLD AUTO: 16 %
MAGNESIUM SERPL-MCNC: 2 MG/DL (ref 1.6–2.3)
MCH RBC QN AUTO: 29.7 PG (ref 26.5–33)
MCHC RBC AUTO-ENTMCNC: 30.7 G/DL (ref 31.5–36.5)
MCV RBC AUTO: 97 FL (ref 78–100)
MONOCYTES # BLD AUTO: 0.7 10E3/UL (ref 0–1.3)
MONOCYTES NFR BLD AUTO: 9 %
MUCOUS THREADS #/AREA URNS LPF: PRESENT /LPF
NEUTROPHILS # BLD AUTO: 5.3 10E3/UL (ref 1.6–8.3)
NEUTROPHILS NFR BLD AUTO: 73 %
NITRATE UR QL: NEGATIVE
NRBC # BLD AUTO: 0 10E3/UL
NRBC BLD AUTO-RTO: 0 /100
PH UR STRIP: 6 [PH] (ref 5–7)
PHOSPHATE SERPL-MCNC: 4.2 MG/DL (ref 2.5–4.5)
PLATELET # BLD AUTO: 152 10E3/UL (ref 150–450)
POTASSIUM BLD-SCNC: 3.9 MMOL/L (ref 3.4–5.3)
PROT SERPL-MCNC: 7.4 G/DL (ref 6.8–8.8)
RBC # BLD AUTO: 3.17 10E6/UL (ref 3.8–5.2)
RBC URINE: 1 /HPF
SARS-COV-2 RNA RESP QL NAA+PROBE: NEGATIVE
SODIUM SERPL-SCNC: 137 MMOL/L (ref 133–144)
SP GR UR STRIP: 1.02 (ref 1–1.03)
SQUAMOUS EPITHELIAL: 2 /HPF
UROBILINOGEN UR STRIP-MCNC: NORMAL MG/DL
WBC # BLD AUTO: 7.2 10E3/UL (ref 4–11)
WBC URINE: 6 /HPF

## 2021-10-06 PROCEDURE — 250N000013 HC RX MED GY IP 250 OP 250 PS 637: Performed by: HOSPITALIST

## 2021-10-06 PROCEDURE — 120N000001 HC R&B MED SURG/OB

## 2021-10-06 PROCEDURE — 99207 PR CDG-CODE CATEGORY CHANGED: CPT | Performed by: HOSPITALIST

## 2021-10-06 PROCEDURE — 36591 DRAW BLOOD OFF VENOUS DEVICE: CPT

## 2021-10-06 PROCEDURE — 81001 URINALYSIS AUTO W/SCOPE: CPT | Performed by: PHYSICIAN ASSISTANT

## 2021-10-06 PROCEDURE — 99215 OFFICE O/P EST HI 40 MIN: CPT | Performed by: PHYSICIAN ASSISTANT

## 2021-10-06 PROCEDURE — 99223 1ST HOSP IP/OBS HIGH 75: CPT | Mod: AI | Performed by: HOSPITALIST

## 2021-10-06 PROCEDURE — 250N000011 HC RX IP 250 OP 636: Performed by: PHYSICIAN ASSISTANT

## 2021-10-06 PROCEDURE — 258N000003 HC RX IP 258 OP 636: Performed by: PHYSICIAN ASSISTANT

## 2021-10-06 PROCEDURE — 87086 URINE CULTURE/COLONY COUNT: CPT | Performed by: PHYSICIAN ASSISTANT

## 2021-10-06 PROCEDURE — 83605 ASSAY OF LACTIC ACID: CPT | Performed by: PHYSICIAN ASSISTANT

## 2021-10-06 PROCEDURE — 70553 MRI BRAIN STEM W/O & W/DYE: CPT

## 2021-10-06 PROCEDURE — 87040 BLOOD CULTURE FOR BACTERIA: CPT | Performed by: PHYSICIAN ASSISTANT

## 2021-10-06 PROCEDURE — 258N000003 HC RX IP 258 OP 636: Performed by: HOSPITALIST

## 2021-10-06 PROCEDURE — 85025 COMPLETE CBC W/AUTO DIFF WBC: CPT | Performed by: PHYSICIAN ASSISTANT

## 2021-10-06 PROCEDURE — G0378 HOSPITAL OBSERVATION PER HR: HCPCS

## 2021-10-06 PROCEDURE — 83735 ASSAY OF MAGNESIUM: CPT | Performed by: PHYSICIAN ASSISTANT

## 2021-10-06 PROCEDURE — 87635 SARS-COV-2 COVID-19 AMP PRB: CPT | Performed by: HOSPITALIST

## 2021-10-06 PROCEDURE — 80053 COMPREHEN METABOLIC PANEL: CPT | Performed by: PHYSICIAN ASSISTANT

## 2021-10-06 PROCEDURE — 255N000002 HC RX 255 OP 636: Performed by: HOSPITALIST

## 2021-10-06 PROCEDURE — G0463 HOSPITAL OUTPT CLINIC VISIT: HCPCS | Mod: 25

## 2021-10-06 PROCEDURE — 84100 ASSAY OF PHOSPHORUS: CPT | Performed by: PHYSICIAN ASSISTANT

## 2021-10-06 PROCEDURE — 96374 THER/PROPH/DIAG INJ IV PUSH: CPT

## 2021-10-06 PROCEDURE — 250N000009 HC RX 250: Performed by: PHYSICIAN ASSISTANT

## 2021-10-06 PROCEDURE — 96375 TX/PRO/DX INJ NEW DRUG ADDON: CPT

## 2021-10-06 PROCEDURE — A9585 GADOBUTROL INJECTION: HCPCS | Performed by: HOSPITALIST

## 2021-10-06 PROCEDURE — 71045 X-RAY EXAM CHEST 1 VIEW: CPT

## 2021-10-06 RX ORDER — METHYLPREDNISOLONE SODIUM SUCCINATE 125 MG/2ML
125 INJECTION, POWDER, LYOPHILIZED, FOR SOLUTION INTRAMUSCULAR; INTRAVENOUS
Status: CANCELLED
Start: 2021-10-06

## 2021-10-06 RX ORDER — HEPARIN SODIUM,PORCINE 10 UNIT/ML
5 VIAL (ML) INTRAVENOUS
Status: CANCELLED | OUTPATIENT
Start: 2021-10-06

## 2021-10-06 RX ORDER — CEPHALEXIN 500 MG/1
500 CAPSULE ORAL 2 TIMES DAILY
Status: ON HOLD | COMMUNITY
Start: 2021-10-01 | End: 2021-10-06

## 2021-10-06 RX ORDER — CIPROFLOXACIN 500 MG/1
500 TABLET, FILM COATED ORAL 2 TIMES DAILY
COMMUNITY
End: 2021-10-12

## 2021-10-06 RX ORDER — MEPERIDINE HYDROCHLORIDE 25 MG/ML
25 INJECTION INTRAMUSCULAR; INTRAVENOUS; SUBCUTANEOUS EVERY 30 MIN PRN
Status: CANCELLED | OUTPATIENT
Start: 2021-10-06

## 2021-10-06 RX ORDER — EPINEPHRINE 1 MG/ML
0.3 INJECTION, SOLUTION INTRAMUSCULAR; SUBCUTANEOUS EVERY 5 MIN PRN
Status: CANCELLED | OUTPATIENT
Start: 2021-10-06

## 2021-10-06 RX ORDER — ONDANSETRON 2 MG/ML
8 INJECTION INTRAMUSCULAR; INTRAVENOUS ONCE
Status: CANCELLED
Start: 2021-10-06 | End: 2021-10-06

## 2021-10-06 RX ORDER — INDOMETHACIN 50 MG/1
50 CAPSULE ORAL
Status: DISCONTINUED | OUTPATIENT
Start: 2021-10-06 | End: 2021-10-07 | Stop reason: HOSPADM

## 2021-10-06 RX ORDER — NYSTATIN 100000/ML
500000 SUSPENSION, ORAL (FINAL DOSE FORM) ORAL 4 TIMES DAILY
Status: DISCONTINUED | OUTPATIENT
Start: 2021-10-06 | End: 2021-10-07 | Stop reason: HOSPADM

## 2021-10-06 RX ORDER — PROCHLORPERAZINE MALEATE 10 MG
10 TABLET ORAL EVERY EVENING
COMMUNITY
End: 2021-12-15

## 2021-10-06 RX ORDER — MONTELUKAST SODIUM 10 MG/1
10 TABLET ORAL DAILY
Status: DISCONTINUED | OUTPATIENT
Start: 2021-10-07 | End: 2021-10-07 | Stop reason: HOSPADM

## 2021-10-06 RX ORDER — CEFEPIME HYDROCHLORIDE 2 G/1
2 INJECTION, POWDER, FOR SOLUTION INTRAVENOUS ONCE
Status: CANCELLED
Start: 2021-10-06 | End: 2021-10-06

## 2021-10-06 RX ORDER — NALOXONE HYDROCHLORIDE 0.4 MG/ML
0.2 INJECTION, SOLUTION INTRAMUSCULAR; INTRAVENOUS; SUBCUTANEOUS
Status: DISCONTINUED | OUTPATIENT
Start: 2021-10-06 | End: 2021-10-07 | Stop reason: HOSPADM

## 2021-10-06 RX ORDER — VITAMIN B COMPLEX
50 TABLET ORAL DAILY
Status: DISCONTINUED | OUTPATIENT
Start: 2021-10-07 | End: 2021-10-07 | Stop reason: HOSPADM

## 2021-10-06 RX ORDER — ONDANSETRON 2 MG/ML
8 INJECTION INTRAMUSCULAR; INTRAVENOUS ONCE
Status: COMPLETED | OUTPATIENT
Start: 2021-10-06 | End: 2021-10-06

## 2021-10-06 RX ORDER — DIPHENHYDRAMINE HYDROCHLORIDE 50 MG/ML
50 INJECTION INTRAMUSCULAR; INTRAVENOUS
Status: CANCELLED
Start: 2021-10-06

## 2021-10-06 RX ORDER — HEPARIN SODIUM (PORCINE) LOCK FLUSH IV SOLN 100 UNIT/ML 100 UNIT/ML
5 SOLUTION INTRAVENOUS
Status: CANCELLED | OUTPATIENT
Start: 2021-10-06

## 2021-10-06 RX ORDER — CIPROFLOXACIN 500 MG/1
500 TABLET, FILM COATED ORAL EVERY 12 HOURS SCHEDULED
Status: DISCONTINUED | OUTPATIENT
Start: 2021-10-06 | End: 2021-10-07 | Stop reason: HOSPADM

## 2021-10-06 RX ORDER — NALOXONE HYDROCHLORIDE 0.4 MG/ML
0.2 INJECTION, SOLUTION INTRAMUSCULAR; INTRAVENOUS; SUBCUTANEOUS
Status: CANCELLED | OUTPATIENT
Start: 2021-10-06

## 2021-10-06 RX ORDER — NALOXONE HYDROCHLORIDE 0.4 MG/ML
0.4 INJECTION, SOLUTION INTRAMUSCULAR; INTRAVENOUS; SUBCUTANEOUS
Status: DISCONTINUED | OUTPATIENT
Start: 2021-10-06 | End: 2021-10-07 | Stop reason: HOSPADM

## 2021-10-06 RX ORDER — ALBUTEROL SULFATE 90 UG/1
1-2 AEROSOL, METERED RESPIRATORY (INHALATION)
Status: CANCELLED
Start: 2021-10-06

## 2021-10-06 RX ORDER — ONDANSETRON 2 MG/ML
4 INJECTION INTRAMUSCULAR; INTRAVENOUS EVERY 6 HOURS PRN
Status: DISCONTINUED | OUTPATIENT
Start: 2021-10-06 | End: 2021-10-07 | Stop reason: HOSPADM

## 2021-10-06 RX ORDER — LIDOCAINE 40 MG/G
CREAM TOPICAL
Status: DISCONTINUED | OUTPATIENT
Start: 2021-10-06 | End: 2021-10-07 | Stop reason: HOSPADM

## 2021-10-06 RX ORDER — SODIUM CHLORIDE 9 MG/ML
INJECTION, SOLUTION INTRAVENOUS CONTINUOUS
Status: DISCONTINUED | OUTPATIENT
Start: 2021-10-06 | End: 2021-10-07

## 2021-10-06 RX ORDER — ALBUTEROL SULFATE 0.83 MG/ML
2.5 SOLUTION RESPIRATORY (INHALATION)
Status: CANCELLED | OUTPATIENT
Start: 2021-10-06

## 2021-10-06 RX ORDER — GADOBUTROL 604.72 MG/ML
10 INJECTION INTRAVENOUS ONCE
Status: COMPLETED | OUTPATIENT
Start: 2021-10-06 | End: 2021-10-06

## 2021-10-06 RX ORDER — ACETAMINOPHEN 325 MG/1
325-650 TABLET ORAL EVERY 6 HOURS PRN
Status: DISCONTINUED | OUTPATIENT
Start: 2021-10-06 | End: 2021-10-07 | Stop reason: HOSPADM

## 2021-10-06 RX ORDER — ALBUTEROL SULFATE 90 UG/1
2 AEROSOL, METERED RESPIRATORY (INHALATION) EVERY 6 HOURS PRN
Status: DISCONTINUED | OUTPATIENT
Start: 2021-10-06 | End: 2021-10-07

## 2021-10-06 RX ORDER — ONDANSETRON 4 MG/1
4 TABLET, ORALLY DISINTEGRATING ORAL EVERY 6 HOURS PRN
Status: DISCONTINUED | OUTPATIENT
Start: 2021-10-06 | End: 2021-10-07 | Stop reason: HOSPADM

## 2021-10-06 RX ADMIN — ERTAPENEM SODIUM 1 G: 1 INJECTION, POWDER, LYOPHILIZED, FOR SOLUTION INTRAMUSCULAR; INTRAVENOUS at 15:36

## 2021-10-06 RX ADMIN — SODIUM CHLORIDE 1000 ML: 9 INJECTION, SOLUTION INTRAVENOUS at 15:20

## 2021-10-06 RX ADMIN — SODIUM CHLORIDE: 9 INJECTION, SOLUTION INTRAVENOUS at 19:43

## 2021-10-06 RX ADMIN — ONDANSETRON 8 MG: 2 INJECTION INTRAMUSCULAR; INTRAVENOUS at 16:05

## 2021-10-06 RX ADMIN — NYSTATIN 500000 UNITS: 100000 SUSPENSION ORAL at 22:17

## 2021-10-06 RX ADMIN — INDOMETHACIN 50 MG: 50 CAPSULE ORAL at 20:19

## 2021-10-06 RX ADMIN — CIPROFLOXACIN HYDROCHLORIDE 500 MG: 500 TABLET, FILM COATED ORAL at 20:19

## 2021-10-06 RX ADMIN — GADOBUTROL 9 ML: 604.72 INJECTION INTRAVENOUS at 18:52

## 2021-10-06 ASSESSMENT — ACTIVITIES OF DAILY LIVING (ADL)
HEARING_DIFFICULTY_OR_DEAF: NO
ADLS_ACUITY_SCORE: 15
WHICH_OF_THE_ABOVE_FUNCTIONAL_RISKS_HAD_A_RECENT_ONSET_OR_CHANGE?: AMBULATION
CONCENTRATING,_REMEMBERING_OR_MAKING_DECISIONS_DIFFICULTY: YES
WALKING_OR_CLIMBING_STAIRS: AMBULATION DIFFICULTY, REQUIRES EQUIPMENT
DIFFICULTY_COMMUNICATING: NO
DRESSING/BATHING: BATHING DIFFICULTY, ASSISTANCE 1 PERSON
EATING/SWALLOWING_MANAGEMENT: DIFFICULTY
EATING/SWALLOWING: EATING
WEAR_GLASSES_OR_BLIND: YES
EQUIPMENT_CURRENTLY_USED_AT_HOME: WALKER, STANDARD
TOILETING_ISSUES: YES
DOING_ERRANDS_INDEPENDENTLY_DIFFICULTY: YES
FALL_HISTORY_WITHIN_LAST_SIX_MONTHS: YES
DRESSING/BATHING_DIFFICULTY: YES
WALKING_OR_CLIMBING_STAIRS_DIFFICULTY: YES
PATIENT_/_FAMILY_COMMUNICATION_STYLE: SPOKEN LANGUAGE (ENGLISH OR BILINGUAL)
DIFFICULTY_EATING/SWALLOWING: NO
TOILETING_ASSISTANCE: TOILETING DIFFICULTY, REQUIRES EQUIPMENT

## 2021-10-06 ASSESSMENT — MIFFLIN-ST. JEOR
SCORE: 1496.26
SCORE: 1494.44

## 2021-10-06 ASSESSMENT — PAIN SCALES - GENERAL: PAINLEVEL: MILD PAIN (3)

## 2021-10-06 NOTE — PHARMACY-ADMISSION MEDICATION HISTORY
Admission medication history interview status for this patient is complete. See EPIC admission navigator for allergy information, prior to admission medications and immunization status.     Medication history interview source(s):Patient  Medication history resources (including written lists, pill bottles, clinic record):EPIC list, Sure Scripts  Primary pharmacy:[facility]    Changes made to PTA medication list:  Added: scheduled compazine  Deleted: keflex 500mg bid x 5 days (pt took for one day, then was told to switch abx to cipro), heparin flush once entry  Changed: added frequency to melatonin per pt, added frequency to albuterol inhaler    Actions taken by pharmacist (provider contacted, etc):None     Additional medication history information:pt took a days worth of keflex on 10/1, then was told to switch abx.  Never started cipro until 10/5 pm.  Pt received ertapenem 1 gm x 1 at outpt infusion prior to admission today.    Medication reconciliation/reorder completed by provider prior to medication history? Yes, sticky note left for MD      For patients on insulin therapy:N    Prior to Admission medications    Medication Sig Last Dose Taking? Auth Provider   acetaminophen (TYLENOL) 325 MG tablet Take 325-650 mg by mouth every 6 hours as needed for mild pain  at no recent usage Yes Reported, Patient   ADVAIR DISKUS 100-50 MCG/DOSE inhaler Inhale 1 puff into the lungs daily  10/6/2021 at am Yes Reported, Patient   albuterol (PROAIR RESPICLICK) 108 (90 Base) MCG/ACT inhaler Inhale 1-2 puffs into the lungs every 6 hours as needed  Past Week at Unknown time Yes Reported, Patient   cetirizine (ZYRTEC) 10 MG tablet Take 10 mg by mouth daily 10/6/2021 at am Yes Reported, Patient   ciprofloxacin (CIPRO) 500 MG tablet Take 500 mg by mouth 2 times daily 10/6/2021 at am-5 day course started 10/5 pm Yes Unknown, Entered By History   gabapentin (NEURONTIN) 300 MG capsule Take 600 mg in the AM, 600 mg mid-day, and 900 mg  at night 10/6/2021 at 1530 Yes Robert Blanca MD   guaiFENesin-codeine (ROBITUSSIN AC) 100-10 MG/5ML solution Take 5-10 mLs by mouth every 4 hours as needed for cough Past Week at Unknown time Yes Harshal Schuster PA   indomethacin (INDOCIN) 50 MG capsule Take 1 capsule (50 mg) by mouth 3 times daily (with meals) 10/6/2021 at am Yes Harshal Schuster PA   melatonin (MELATONIN) 1 MG/ML LIQD liquid Take 5 mg by mouth At Bedtime  10/5/2021 at pm Yes Reported, Patient   montelukast (SINGULAIR) 10 MG tablet Take 10 mg by mouth daily 10/6/2021 at am Yes Reported, Patient   multivitamin w/minerals (MULTI-VITAMIN) tablet Take 1 tablet by mouth daily 10/6/2021 at am Yes Reported, Patient   ondansetron (ZOFRAN) 8 MG tablet Take 1 tablet (8 mg) by mouth every 8 hours as needed for nausea  at no recent usage Yes Harshal Schuster PA   oxyCODONE (ROXICODONE) 5 MG tablet Take 0.5-1 tablets (2.5-5 mg) by mouth every 6 hours as needed for severe pain Past Week at Unknown time Yes Harshal Schuster PA   pazopanib (VOTRIENT) 200 MG tablet Take 4 tablets (800 mg) by mouth daily Take on an empty stomach 1 hour before or 2 hours after a meal. 10/5/2021 at 2100 Yes Jose Alberto Lopez MD   prochlorperazine (COMPAZINE) 10 MG tablet Take 10 mg by mouth every evening Before votrient 10/5/2021 at 2000 Yes Unknown, Entered By History   prochlorperazine (COMPAZINE) 10 MG tablet Take 1 tablet (10 mg) by mouth every 6 hours as needed for nausea or vomiting Past Month at Unknown time Yes Harshal Schuster PA   vitamin D3 (CHOLECALCIFEROL) 2000 units (50 mcg) tablet Take 1 tablet by mouth daily 10/6/2021 at am Yes Reported, Patient

## 2021-10-06 NOTE — NURSING NOTE
"Oncology Rooming Note    October 6, 2021 1:38 PM   Richa Ashby is a 56 year old female who presents for:    Chief Complaint   Patient presents with     Oncology Clinic Visit     Sarcoma of soft tissue      Initial Vitals: BP (!) 88/70   Pulse 117   Temp 97.8  F (36.6  C) (Tympanic)   Resp 20   Ht 1.702 m (5' 7\")   Wt 87.2 kg (192 lb 3.2 oz)   LMP 03/19/2019   SpO2 97%   BMI 30.10 kg/m   Estimated body mass index is 30.1 kg/m  as calculated from the following:    Height as of this encounter: 1.702 m (5' 7\").    Weight as of this encounter: 87.2 kg (192 lb 3.2 oz). Body surface area is 2.03 meters squared.  Mild Pain (3) Comment: Data Unavailable   Patient's last menstrual period was 03/19/2019.  Allergies reviewed: Yes  Medications reviewed: Yes    Medications: Medication refills not needed today.  Pharmacy name entered into Impact:    [id number] [place], [place] - [address], [place] AT [facility] [place], [place] - [address] [place] [address] [phone]  [place], [place] - [address] [place] [address]    Clinical concerns: f/u c/o fatigue, weakness, dizzy, lightheaded X 5-6 days      Abigail Vuong CMA              "

## 2021-10-06 NOTE — PLAN OF CARE
o Do:  End of Shift Summary  For vital signs and complete assessments, please see documentation flowsheets.     Pertinent assessments:  A&O--- co lower back pain-- port infusing well states difficult to draw off--- at bedside  Major Shift Events  admit to 505  Treatment Plan: monitor   Bedside Nurse: Charissa Shane RN

## 2021-10-06 NOTE — Clinical Note
10/6/2021         RE: Richa Ashby  [address]  [place] [address]        Dear Colleague,    Thank you for referring your patient, Richa Ashby, to the [facility]. Please see a copy of my visit note below.    Oncology/Hematology Visit Note  Oct 6, 2021    Reason for Visit: Follow up of undifferentiated pleomorphic sarcoma     History of Present Illness: Richa Ashby is a 56 year old female with PMH asthma and GERD with undifferentiated pleomorphic sarcoma. 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 She received preoperative radiotherapy and the tumor was resected 3/12/20. She then developed a cough, chest tightness, and scapular pain and imaging revealed lung nodules. Lung biopsy 11/2/20 with sarcoma. She was started on Doxil + Ifos 11/13/20. Had delay in cycle 2 due to COVID dx 12/2/20. CT imaging after cycle 1 with positive response to treatment with decrease in pulmonary nodules. Received cycle 2 12/18/20. Received cycle 3 1/15/21 with dose reduction in Doxil for skin toxicity and mucositis. Received cycle 4 2/15/21.      CT CAP 3/8/21 with positive response to treatment, decreased size of pulmonary mets though one lesion was larger. Did see fibrocystic changes in left breast, recommended breast imaging. Received cycle 5 Doxil + ifos 3/11/21.      Imaging 4/5/21 with overall stable disease except one lung lesion, opted to continue Doxil alone for now and switch to Keytruda in future if ongoing progression.      Started Keytruda 5/10/21. Was seen 5/26/21 cough, SOB, fatigue work-up thought to be 2/2 phrenic nerve involvement of R apical lung mass.      Due to worsening fatigue and cough, repeat CT 6/15/21 concerning for progression vs pseudoprogression. Added Gemzar 6/16/21.     She was admitted 6/17/21-6/20/21 with fever of unknown origin, infectious work-up negative, treated  with broad spectrum antibiotics.     She clinically improved on abx. Was able to receive day 8 Gemzar (dose reduced for thrombocytopenia) + Keytruda 6/23/21.      CT 7/12/21 with stable disease.     She was admitted 8/25/21-8/31/21 for fevers, hypotension, acute on chronic anemia. Extensive infectious work-up negative so etiology of fevers still unknown.      She has had progressive disease so started on Pazopanib 9/24/21.    She was seen today for oncology follow-up.     Interval History:  Connie was seen today in clinic with the following complaints:  -No fevers but has been taking Indomethacin 50mg TID around the clock  -Has been having headaches, dizziness, vision changes, along with new hallucinations since last week. She also has been dropping items and having a hard time with motor coordinations. This all has been ongoing since Thursday/Friday last week. The hallucinations have NOT improved since she cut back on Oxycodone.  -She has new mouth sores since last week. The sores are limiting her ability to eat and drink. She is doing biotene, MMW, and healios with only the healios providing relief.  -She cut back on the Oxycodone after the ED as there were concerns this was contributing to her weakness. Weakness is better but still persists, she is unable to care for herself. Since cutting back on Oxycodone her pain has worsened, both in her R shoulder and R flank. She has not taken any pain medications (other than Indomethacin for fevers) for over 24 hours  -She is having SOB, worse with exertion though she is unable to do much 2/2 exhaustion. She sleeps part of the night sitting upright. The breathing is slightly better since cutting back on Oxycodone. She is still coughing.   -She has had intermittent nausea, taking Compazine with good relief. No vomiting. Very little eating, doing OK but still less than normal with fluids  -Had diarrhea last night/early morning that was significant. Did not take Imodium  -Is  having more dysuria and dark colored urine. Of note did not start ciprofloxacin until last evening (was on keflex but culture came back resistant)   -Leg swelling is improved     Current Outpatient Medications   Medication Sig Dispense Refill     acetaminophen (TYLENOL) 325 MG tablet Take 325-650 mg by mouth every 6 hours as needed for mild pain       ADVAIR DISKUS 100-50 MCG/DOSE inhaler Inhale 1 puff into the lungs daily        albuterol (PROAIR RESPICLICK) 108 (90 Base) MCG/ACT inhaler Inhale 1-2 puffs into the lungs       cetirizine (ZYRTEC) 10 MG tablet Take 10 mg by mouth daily       gabapentin (NEURONTIN) 300 MG capsule Take 600 mg in the AM, 600 mg mid-day, and 900 mg at night 210 capsule 3     guaiFENesin-codeine (ROBITUSSIN AC) 100-10 MG/5ML solution Take 5-10 mLs by mouth every 4 hours as needed for cough 473 mL 1     hydrOXYzine (ATARAX) 25 MG tablet Take 1 tablet (25 mg) by mouth 3 times daily as needed for itching (Patient not taking: Reported on 9/10/2021) 30 tablet 1     indomethacin (INDOCIN) 50 MG capsule Take 1 capsule (50 mg) by mouth 3 times daily (with meals) 60 capsule 1     melatonin (MELATONIN) 1 MG/ML LIQD liquid Take 5 mg by mouth       montelukast (SINGULAIR) 10 MG tablet Take 10 mg by mouth daily       multivitamin w/minerals (MULTI-VITAMIN) tablet Take 1 tablet by mouth daily       ondansetron (ZOFRAN) 8 MG tablet Take 1 tablet (8 mg) by mouth every 8 hours as needed for nausea (Patient not taking: Reported on 9/22/2021) 60 tablet 1     oxyCODONE (ROXICODONE) 5 MG tablet Take 0.5-1 tablets (2.5-5 mg) by mouth every 6 hours as needed for severe pain 60 tablet 0     pazopanib (VOTRIENT) 200 MG tablet Take 4 tablets (800 mg) by mouth daily Take on an empty stomach 1 hour before or 2 hours after a meal. 120 tablet 0     prochlorperazine (COMPAZINE) 10 MG tablet Take 1 tablet (10 mg) by mouth every 6 hours as needed for nausea or vomiting 90 tablet 3     vitamin D3 (CHOLECALCIFEROL) 2000  "units (50 mcg) tablet Take 1 tablet by mouth daily         Physical Examination:  BP (!) 88/70   Pulse 117   Temp 97.8  F (36.6  C) (Tympanic)   Resp 20   Ht 1.702 m (5' 7\")   Wt 87.2 kg (192 lb 3.2 oz)   LMP 03/19/2019   SpO2 97%   BMI 30.10 kg/m    Wt Readings from Last 10 Encounters:   10/03/21 87.1 kg (192 lb)   09/22/21 89.3 kg (196 lb 12.8 oz)   09/08/21 90.5 kg (199 lb 8 oz)   08/30/21 90.7 kg (199 lb 15.3 oz)   08/25/21 90.8 kg (200 lb 1.6 oz)   08/11/21 93.4 kg (206 lb)   08/09/21 91.8 kg (202 lb 4.8 oz)   08/04/21 93.1 kg (205 lb 4.8 oz)   07/28/21 93.2 kg (205 lb 8 oz)   07/24/21 92.5 kg (204 lb)     Constitutional: Ill-appearing female in no acute distress. Uncomfortable.  Eyes: No scleral icterus.  Cardiovascular: Tachycardic rate and rhythm. No murmurs, gallops, or rubs. No peripheral edema.  Respiratory: Breathing comfortably at rest. Lungs with slight wheezing bilaterally. Lung sounds diminished in RUL.   Gastrointestinal: Bowel sounds present. Abdomen soft, non-tender.   Neurologic: Cranial nerves II through XII are intact however her speech is slightly slurred and she is more easily confused compared to prior visits.  Skin: No rashes, petechiae, or bruising noted on exposed skin.    Laboratory Data:  Results for AMADEO WILSON (MRN [id number]) as of 10/6/2021 16:09   10/6/2021 14:32   Sodium 137   Potassium 3.9   Chloride 104   Carbon Dioxide 22   Urea Nitrogen 11   Creatinine 1.00   GFR Estimate 63   Calcium 8.7   Anion Gap 11   Magnesium 2.0   Phosphorus 4.2   Albumin 1.7 (L)   Protein Total 7.4   Bilirubin Total 1.4 (H)   Alkaline Phosphatase 195 (H)   ALT 18   AST 26   Lactic Acid 2.0   Glucose 103 (H)   WBC 7.2   Hemoglobin 9.4 (L)   Hematocrit 30.6 (L)   Platelet Count 152   RBC Count 3.17 (L)   MCV 97   MCH 29.7   MCHC 30.7 (L)   RDW 16.5 (H)   % Neutrophils 73   % Lymphocytes 16   % Monocytes 9   % Eosinophils 0   Absolute Basophils 0.0   % Basophils 1   Absolute Eosinophils 0.0 "   Absolute Immature Granulocytes 0.1 (H)   Absolute Lymphocytes 1.1   Absolute Monocytes 0.7   % Immature Granulocytes 1   Absolute Neutrophils 5.3   Absolute NRBCs 0.0       Assessment and Plan:  1. Altered Mental Status, Failure to Thrive, Concern for Complicated UTI  Connie has significantly worsened over the last week with progressive weakness, decreased oral intake, new neurologic issues. Has diagnosed UTI from OSH but was not on appropriate antibiotics until last night. She is hypotensive and tachycardic in clinic. Will admit to hospital  -Labs drawn: CBC, CMP, Mag, Phos, Lactic Acid, Blood cultures x 2  -Repeat UA with culture  -IVF + 1g IV Ertapenem with unstable vitals concerning for early sepsis (afebrile but has been on scheduled Indomethacin given hx tumor fevers)  *Vitals improved after IVF.     Once admitted:  -Follow-up blood culture and UA results and continue IV abx   -Consider CXR with increased wheezing noted on physical exam, no acute respiratory concerns so could also monitor clinically   -Brain MRI to assess new neurologic issues (no concern for stroke given symptoms >5 days and CT on 10/3 negative). Need to r/o brain mets  -PT/OT consult for FTT.   -IVF support with poor oral intake. BP monitoring   -Antiemetics PRN  -If she does have brain mets, start Dex for symptoms (4mg daily) and consult radiation oncology for gamma knife vs whole brain radiation -could do rad onc outpatient    Discussed with Dr. Lopez and WILL CONTINUE Pazopanib while admitted due to her rapidly growing disease and need for ongoing cancer treatment.  will bring Pazopanib from home.     2. Onc  Metastatic UPS, was on Doxil + Ifosfamide (5 cycles) with positive response to treatment initially, then due to poor tolerance switched to Doxil alone (1 cycle) but had progression. Progressed on Keytruda + Gemzar. Started Pazopanib 9/24/21.    Doing poorly as above, however initial scan at OSH showed measurements slightly  smaller than CT on 9/22 so would like to continue if at all possible. No acute toxicities from treatment other than mouth sores. EKG reviewed and no issues with QT prolongation.    Will arrange onc follow-up after discharge.      3. ID  Prior fevers of unknown origin. Negative test:  -Lyme PCR  -blood parasitology negative.   -HIV neg  -Beta-D-glucan normal  -COVID neg  -Influenza neg  -Aspergillus galactomannin  -Strep Group A  -Blood cultures 8/27 NGTD     Thought to be tumor fevers, now improved on Indomethacin 50mg TID. Concern for untreated UTI as above.      4. Heme  Anemia: ACD and chemotherapy. Hgb currently stable. Monitor and transfuse if hgb <8 outpatient, <7 inpatient.      Thrombocytopenia: No current issues     Neutropenia: No current issues.      5. Pulm  Asthma: stable, continue inhalers.      COVID: Diagnosed in December, recovered. She is vaccinated.      Cough/SOB: See work-up from 5/26/21 (negative CT PE, RVP, COVID, NTBNP). Thought to be 2/2 phrenic nerve involvement of tumor. Hold narcotics for the moment due to concern about worsening mentation. Continue Robitusinn PRN. CXR for wheezing.      She has SVC flattening on CT and will sleep elevated. Consider pulm intervention for stenting in future. Not discussed in detail at this time. No signs of SVC syndrome.      6. 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 Continue Gabapentin 600/600/900.     See discussion of weakness and hallucinations above.      7. MSK  R shoulder/back pain: 2/2 malignancy. Consider palliative care consult inpatient, try to limit narcotics due to concern about mentation. Consider adding in scheduled Tylenol and topical agents.      Recurrent inflammatory arthritis symptoms (diffuse joint pain and swelling) and CRP elevated. Improved with prednisone, no current issues.       8. GI  Diarrhea, unclear etiology, C diff negative. Has had intermittent issues throughout treatment, Possibly immune mediated? Continue  Imodium PRN.     Patient prefers PO Compazine for nausea.     Slight hyperbilirubinemia 2/2 Pazopanib. Monitor.     75 minutes spent on the date of the encounter doing chart review, review of test results, interpretation of tests, patient visit, documentation and discussion with other provider(s)     Harshal Schuster PA-C  [facility] [id number] [place] [address]  [phone]      Writer called over to 5th floor over at [facility]       Again, thank you for allowing me to participate in the care of your patient.        Sincerely,        RUPERT Guaman

## 2021-10-06 NOTE — PROGRESS NOTES
Infusion Nursing Note:  Richa Ashby presents today for IVF, antiemetics, Invanz and labs.    Patient seen by provider today: Yes: Harshal EMERY   present during visit today: Not Applicable.    Note: Patient was add on to infusion,  being admitted to [facility]. See progress note from Harshal.    Unable to get 2 sets of peripheral blood cultures due to poor access. RUPERT Guaman aware and okay with one peripheral set being drawn.  Blood return from port, but insufficient blood return for lab specimens.     Patient given 8 mg IV Zofran for nausea.    Intravenous Access:  Implanted Port.    Treatment Conditions:  Labs, UA and 1 set peripheral blood cultures drawn.    Post Infusion Assessment:  Patient tolerated infusion without incident.       Discharge Plan:   Patient direct admit to room 505. RN Coordinator calling report.Transported with  and RN and MA, ESTEE going while transported.  Departure Mode: Wheelchair.      Irma Lane RN

## 2021-10-06 NOTE — PROGRESS NOTES
Writer called over to 5th floor over at [facility] and gave report to WHITNEY Camp. Patient assigned room 505. Patient port accessed and infusing IVMF. Patient given Zofran prior to transfer. Patient was transported via wheelchair to 5th floor with . Handoff given to RN.     Ramesh Jeffery, RN, BSN.  RN Care Coordinator     [facility]   [phone]

## 2021-10-06 NOTE — H&P
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   She was admitted 6/17/21-6/20/21 with fever of unknown origin, infectious work-up negative, treated with broad spectrum antibiotics She was admitted 8/25/21-8/31/21 for fevers, hypotension, acute on chronic anemia. Extensive infectious work-up negative so etiology of fevers still unknown.     She presents as a direct admit for multiple complaints from her oncology clinic.  Patient has been having urinary symptoms of diarrhea and dark urine for the past week. She was seen at [facility] and was given Keflex her urine cultures showed resistance; thus, she was switched to ciprofloxacin, patient states she only took 1 day of ciprofloxacin yesterday.  During her visit to oncology clinic has had multiple complaints, of them including hallucinations, overall weakness, blurry vision, poor appetite, dyspnea on exertion, diarrhea,  she was noted to have a pressure of 88/70 heart rates in the 117's.  She was given IV fluids and ertapenem and admitted to our hospital.     Currently vitals temp of 98.3 pulse of 92 respiratory rate of 16 blood pressure 122/66 on room air.  Labs showing sodium 137 BUN/creatinine 11/1, LFTs bili 1.4 alk phos 195 ALT 18, AST 26 lactic acid of 2 glucose of 103, WBC 7.2 hemoglobin 9.4/30.6 platelets of 152.       Generalized weakness fatigue poor oral intake, with episode of hypotension in clinic.  Etiology multifactorial suspect most likely component is her underlying malignancy and her chemotherapy.  Indomethacin can also cause fatigue however would be reluctant to stop her indomethacin as it has been used for treatment of her fever of unknown origin in the past. Low suspicion  for sepsis.   -Treat with IV fluids, PT consult  -Treat her thrush with nystatin  -Dietitian consult    Hallucination and blurry vision  -Low suspicion for medication is causing her hallucinations she has been off oxygen for 1 day, unlikely culprit her antibiotics as she is only taken 1 dose  -MRI head ordered to rule out acute intracranial pathology in the setting of diffuse metastatic disease    NAZARIO.  Suspect her etiology of her dyspnea exertion is her progressive lung metastatic disease.  Doubt she has PE she recently had a CT angiogram in our facility October 1 which did not show any evidence of PE but diffuse metastatic disease including to her chest wall.  No Evidence of asthma exacerbation or heart failure  - CT chest Oct 1: Increased size of numerous bilateral pulmonary masses or nodules as above, including markedly enlarged right paramediastinal mass. Marked  flattening SVC which may only be mildly open due to the presence of  the catheter. Beaking of the bridging innominate vein as it enters to  join with the right innominate vein to form the SVC.    Diarrhea  -Monitor if persist will order stool studies    Oral Thrush  -Nystatin ordered    Recent UTI  reviewed sensitivities will continue ciprofloxacin    History of fever of unknown origin  -Resume indomethacin    Metastatic sarcoma  -Oncology consult  - if no longer has hypotensive episodes and remains hemodynamically stable will resume her chemotherapy tomorrow      Asthma  -Resume her inhaler therapies    GERD  -Home therapy once reconciled     Chronic pain related to her malignancy  -Continue Oxycodone  ---------     # Code status: Full   # Anticipated discharge date and Disposition:1-2 days  # DVT: SCDs, pending MRI  # IVF: Normal saline at 100 ml/hour                       Teresita Nicolas MD  Text Page (7am - 6pm, M-F)          Primary Care Physician   King Diaz    Chief Complaint   weakness    History is obtained from the patient    History of  Present Illness   Richa Ashby is a56 year old female with PMH asthma and GERD with undifferentiated pleomorphic sarcoma stage IV follows [place] oncology, and therapy last dose per note 9/24/2021.  She has has multiple complaints.  She states she has overall profound weakness she has been falling last episode was a week ago.  She states she fell because she was profoundly weak she denies any dizziness lightheadedness shortness of breath palpitations prior to her falling episode, she says she did present to the ED and was sent home.  She states she also has shortness of breath and presented to the ED recently for shortness of breath October 1 and had CT chest performed which was negative for PE but does show her diffuse metastatic disease.  She also states she has been having hallucinations blurry vision, headache for the past week.  She also states she has new diarrhea today.  Also states she has been having dysuria darkening of her urine and that she has been treated for UTI.  Otherwise no melena no hematochezia no abdominal pain she has her chronic shoulder pain from her cancer    Past Medical History    I have reviewed this patient's medical history and updated it with pertinent information if needed.   Past Medical History:   Diagnosis Date     Asthma, chronic      Sarcoma (H)     Right lower thigh       Past Surgical History   I have reviewed this patient's surgical history and updated it with pertinent information if needed.  Past Surgical History:   Procedure Laterality Date     AS RAD RESEC TONSIL/PILLARS       GALLBLADDER SURGERY       INSERT PORT VASCULAR ACCESS Right 10/28/2020    Procedure: single lumen power port placement @0745;  Surgeon: Tex Ackerman MD;  Location: [facility] OR     IR CHEST PORT PLACEMENT > 5 YRS OF AGE  10/28/2020     IR PORT CHECK RIGHT  3/26/2021     RESECT TUMOR LOWER EXTREMITY Right 3/12/2020    Procedure: Resection of right thigh tumor;  Surgeon: Guillermo Johnson  MD [place];  Location: [facility] OR     ROTATOR CUFF REPAIR RT/LT       SHOULDER SURGERY       SINUS SURGERY         Prior to Admission Medications   Prior to Admission Medications   Prescriptions Last Dose Informant Patient Reported? Taking?   ADVAIR DISKUS 100-50 MCG/DOSE inhaler   Yes No   Sig: Inhale 1 puff into the lungs daily    acetaminophen (TYLENOL) 325 MG tablet   Yes No   Sig: Take 325-650 mg by mouth every 6 hours as needed for mild pain   albuterol (PROAIR RESPICLICK) 108 (90 Base) MCG/ACT inhaler   Yes No   Sig: Inhale 1-2 puffs into the lungs   cephALEXin (KEFLEX) 500 MG capsule   Yes No   Si mg 2 times daily For 5 days   cetirizine (ZYRTEC) 10 MG tablet   Yes No   Sig: Take 10 mg by mouth daily   gabapentin (NEURONTIN) 300 MG capsule   No No   Sig: Take 600 mg in the AM, 600 mg mid-day, and 900 mg at night   guaiFENesin-codeine (ROBITUSSIN AC) 100-10 MG/5ML solution   No No   Sig: Take 5-10 mLs by mouth every 4 hours as needed for cough   indomethacin (INDOCIN) 50 MG capsule   No No   Sig: Take 1 capsule (50 mg) by mouth 3 times daily (with meals)   melatonin (MELATONIN) 1 MG/ML LIQD liquid   Yes No   Sig: Take 5 mg by mouth   montelukast (SINGULAIR) 10 MG tablet   Yes No   Sig: Take 10 mg by mouth daily   multivitamin w/minerals (MULTI-VITAMIN) tablet   Yes No   Sig: Take 1 tablet by mouth daily   ondansetron (ZOFRAN) 8 MG tablet   No No   Sig: Take 1 tablet (8 mg) by mouth every 8 hours as needed for nausea   oxyCODONE (ROXICODONE) 5 MG tablet   No No   Sig: Take 0.5-1 tablets (2.5-5 mg) by mouth every 6 hours as needed for severe pain   pazopanib (VOTRIENT) 200 MG tablet   No No   Sig: Take 4 tablets (800 mg) by mouth daily Take on an empty stomach 1 hour before or 2 hours after a meal.   prochlorperazine (COMPAZINE) 10 MG tablet   No No   Sig: Take 1 tablet (10 mg) by mouth every 6 hours as needed for nausea or vomiting   vitamin D3 (CHOLECALCIFEROL) 2000 units (50 mcg) tablet   Yes No   Sig:  Take 1 tablet by mouth daily      Facility-Administered Medications Last Administration Doses Remaining   heparin 100 UNIT/ML injection 500 Units None recorded 1        Allergies   Allergies   Allergen Reactions     Pentobarbital      resp arrest       Social History   I have reviewed this patient's social history and updated it with pertinent information if needed. Richa Ashby  reports that she has never smoked. She has never used smokeless tobacco. She reports current alcohol use. She reports that she does not use drugs.    Family History   Family history reviewed with patient and is noncontributory.    ROS  12 point review system discussed with patient negative as per HPI    Physical Exam   Temp: 98.3  F (36.8  C) Temp src: Oral BP: 122/66 Pulse: 92   Resp: 16 SpO2: 94 % O2 Device: None (Room air)    Vital Signs with Ranges  Temp:  [97.8  F (36.6  C)-98.3  F (36.8  C)] 98.3  F (36.8  C)  Pulse:  [] 92  Resp:  [16-22] 16  BP: ()/(66-72) 122/66  SpO2:  [94 %-97 %] 94 %  192 lbs 9.6 oz    General: Pt in NAD, normal appearance  HEENT: PERRLA, EOMI, normocephalic / atraumatic no cervical LAD, no bruit, no pallor, WNL oropharynx, neck supple  Cardiac: +S1, S2, RRR, no MRG, no edema  Lungs: Clear to Auscultation Bilateral, normal breathing without accessory muscle usage, no wheezing, rhonchi or crackles  GI: soft NT/ND +bowel sounds all quadrants, no hepatosplenomegaly  Psych: normal mood and affect, A&Ox3  Neurological: A&O x3, non focal  Skin: warm, dry, normal turgor, no rash    Data   Data reviewed today:  I personally reviewed no images or EKG's today.  Recent Labs   Lab 10/06/21  1432 10/05/21  1334 10/05/21  1333 10/03/21  1525 10/03/21  1525   WBC 7.2 7.1  --   --  7.0   HGB 9.4* 9.6*  --   --  8.3*   MCV 97 91  --   --  96    133*  --   --  137*     --  135  --  136   POTASSIUM 3.9  --  4.2  --  4.6   CHLORIDE 104  --  105  --  104   CO2 22  --  21  --  28   BUN 11  --  12  --  12    CR 1.00  --  0.76  --  0.87   ANIONGAP 11  --  9  --  4   VIKTORIA 8.7  --  9.0  --  8.7   *  --  108*  --  99   ALBUMIN 1.7*  --  1.6*   < > 1.4*   PROTTOTAL 7.4  --  7.3   < > 7.4   BILITOTAL 1.4*  --  1.2   < > 1.1   ALKPHOS 195*  --  195*   < > 220*   ALT 18  --  17   < > 18   AST 26  --  21   < > 24   TROPONIN  --   --   --   --  <0.015    < > = values in this interval not displayed.       No results found for this or any previous visit (from the past 24 hour(s)).

## 2021-10-07 ENCOUNTER — APPOINTMENT (OUTPATIENT)
Dept: PHYSICAL THERAPY | Facility: CLINIC | Age: 56
End: 2021-10-07
Attending: HOSPITALIST
Payer: COMMERCIAL

## 2021-10-07 VITALS
WEIGHT: 193.3 LBS | TEMPERATURE: 96.1 F | OXYGEN SATURATION: 96 % | BODY MASS INDEX: 30.34 KG/M2 | RESPIRATION RATE: 16 BRPM | HEIGHT: 67 IN | SYSTOLIC BLOOD PRESSURE: 126 MMHG | HEART RATE: 73 BPM | DIASTOLIC BLOOD PRESSURE: 70 MMHG

## 2021-10-07 DIAGNOSIS — R11.0 NAUSEA: ICD-10-CM

## 2021-10-07 PROBLEM — R53.1 WEAKNESS: Status: ACTIVE | Noted: 2021-10-07

## 2021-10-07 LAB
ANION GAP SERPL CALCULATED.3IONS-SCNC: 9 MMOL/L (ref 3–14)
BACTERIA UR CULT: NO GROWTH
BUN SERPL-MCNC: 9 MG/DL (ref 7–30)
C DIFF TOX B STL QL: NEGATIVE
CALCIUM SERPL-MCNC: 8.4 MG/DL (ref 8.5–10.1)
CHLORIDE BLD-SCNC: 108 MMOL/L (ref 94–109)
CO2 SERPL-SCNC: 22 MMOL/L (ref 20–32)
CREAT SERPL-MCNC: 0.84 MG/DL (ref 0.52–1.04)
ERYTHROCYTE [DISTWIDTH] IN BLOOD BY AUTOMATED COUNT: 16.9 % (ref 10–15)
GFR SERPL CREATININE-BSD FRML MDRD: 78 ML/MIN/1.73M2
GLUCOSE BLD-MCNC: 103 MG/DL (ref 70–99)
HCT VFR BLD AUTO: 31.4 % (ref 35–47)
HGB BLD-MCNC: 9.1 G/DL (ref 11.7–15.7)
MCH RBC QN AUTO: 28.7 PG (ref 26.5–33)
MCHC RBC AUTO-ENTMCNC: 29 G/DL (ref 31.5–36.5)
MCV RBC AUTO: 99 FL (ref 78–100)
PLATELET # BLD AUTO: 156 10E3/UL (ref 150–450)
POTASSIUM BLD-SCNC: 3.9 MMOL/L (ref 3.4–5.3)
RBC # BLD AUTO: 3.17 10E6/UL (ref 3.8–5.2)
SODIUM SERPL-SCNC: 139 MMOL/L (ref 133–144)
WBC # BLD AUTO: 7.6 10E3/UL (ref 4–11)

## 2021-10-07 PROCEDURE — 97116 GAIT TRAINING THERAPY: CPT | Mod: GP | Performed by: PHYSICAL THERAPIST

## 2021-10-07 PROCEDURE — 80048 BASIC METABOLIC PNL TOTAL CA: CPT | Performed by: HOSPITALIST

## 2021-10-07 PROCEDURE — 97110 THERAPEUTIC EXERCISES: CPT | Mod: GP | Performed by: PHYSICAL THERAPIST

## 2021-10-07 PROCEDURE — 250N000013 HC RX MED GY IP 250 OP 250 PS 637: Performed by: HOSPITALIST

## 2021-10-07 PROCEDURE — 258N000003 HC RX IP 258 OP 636: Performed by: HOSPITALIST

## 2021-10-07 PROCEDURE — 99217 PR OBSERVATION CARE DISCHARGE: CPT | Performed by: HOSPITALIST

## 2021-10-07 PROCEDURE — 99223 1ST HOSP IP/OBS HIGH 75: CPT | Performed by: INTERNAL MEDICINE

## 2021-10-07 PROCEDURE — 87506 IADNA-DNA/RNA PROBE TQ 6-11: CPT | Performed by: HOSPITALIST

## 2021-10-07 PROCEDURE — 36415 COLL VENOUS BLD VENIPUNCTURE: CPT | Performed by: HOSPITALIST

## 2021-10-07 PROCEDURE — 250N000011 HC RX IP 250 OP 636: Performed by: HOSPITALIST

## 2021-10-07 PROCEDURE — 85027 COMPLETE CBC AUTOMATED: CPT | Performed by: HOSPITALIST

## 2021-10-07 PROCEDURE — G0378 HOSPITAL OBSERVATION PER HR: HCPCS

## 2021-10-07 PROCEDURE — 87493 C DIFF AMPLIFIED PROBE: CPT | Mod: XU | Performed by: HOSPITALIST

## 2021-10-07 RX ORDER — CETIRIZINE HYDROCHLORIDE 10 MG/1
10 TABLET ORAL DAILY
Status: DISCONTINUED | OUTPATIENT
Start: 2021-10-07 | End: 2021-10-07 | Stop reason: HOSPADM

## 2021-10-07 RX ORDER — ALBUTEROL SULFATE 90 UG/1
1-2 AEROSOL, METERED RESPIRATORY (INHALATION) EVERY 6 HOURS PRN
Status: DISCONTINUED | OUTPATIENT
Start: 2021-10-07 | End: 2021-10-07 | Stop reason: CLARIF

## 2021-10-07 RX ORDER — GABAPENTIN 600 MG/1
600 TABLET ORAL 2 TIMES DAILY
Status: DISCONTINUED | OUTPATIENT
Start: 2021-10-08 | End: 2021-10-07 | Stop reason: HOSPADM

## 2021-10-07 RX ORDER — HEPARIN SODIUM,PORCINE 10 UNIT/ML
5-10 VIAL (ML) INTRAVENOUS
Status: DISCONTINUED | OUTPATIENT
Start: 2021-10-07 | End: 2021-10-07 | Stop reason: HOSPADM

## 2021-10-07 RX ORDER — PAZOPANIB 200 MG/1
800 TABLET, FILM COATED ORAL EVERY 24 HOURS
Status: DISCONTINUED | OUTPATIENT
Start: 2021-10-07 | End: 2021-10-07 | Stop reason: HOSPADM

## 2021-10-07 RX ORDER — HEPARIN SODIUM,PORCINE 10 UNIT/ML
5-10 VIAL (ML) INTRAVENOUS EVERY 24 HOURS
Status: DISCONTINUED | OUTPATIENT
Start: 2021-10-07 | End: 2021-10-07 | Stop reason: HOSPADM

## 2021-10-07 RX ORDER — HEPARIN SODIUM (PORCINE) LOCK FLUSH IV SOLN 100 UNIT/ML 100 UNIT/ML
5-10 SOLUTION INTRAVENOUS
Status: DISCONTINUED | OUTPATIENT
Start: 2021-10-07 | End: 2021-10-07 | Stop reason: HOSPADM

## 2021-10-07 RX ORDER — ALBUTEROL SULFATE 90 UG/1
1-2 AEROSOL, METERED RESPIRATORY (INHALATION) EVERY 6 HOURS PRN
Status: DISCONTINUED | OUTPATIENT
Start: 2021-10-07 | End: 2021-10-07 | Stop reason: HOSPADM

## 2021-10-07 RX ORDER — GABAPENTIN 300 MG/1
900 CAPSULE ORAL AT BEDTIME
Status: DISCONTINUED | OUTPATIENT
Start: 2021-10-07 | End: 2021-10-07 | Stop reason: HOSPADM

## 2021-10-07 RX ADMIN — INDOMETHACIN 50 MG: 50 CAPSULE ORAL at 12:38

## 2021-10-07 RX ADMIN — INDOMETHACIN 50 MG: 50 CAPSULE ORAL at 08:09

## 2021-10-07 RX ADMIN — CETIRIZINE HYDROCHLORIDE 10 MG: 10 TABLET, FILM COATED ORAL at 16:12

## 2021-10-07 RX ADMIN — ACETAMINOPHEN 650 MG: 325 TABLET, FILM COATED ORAL at 13:42

## 2021-10-07 RX ADMIN — MONTELUKAST 10 MG: 10 TABLET, FILM COATED ORAL at 08:10

## 2021-10-07 RX ADMIN — FLUTICASONE FUROATE AND VILANTEROL TRIFENATATE 1 PUFF: 100; 25 POWDER RESPIRATORY (INHALATION) at 08:11

## 2021-10-07 RX ADMIN — SODIUM CHLORIDE: 9 INJECTION, SOLUTION INTRAVENOUS at 06:02

## 2021-10-07 RX ADMIN — NYSTATIN 500000 UNITS: 100000 SUSPENSION ORAL at 12:39

## 2021-10-07 RX ADMIN — Medication 50 MCG: at 08:09

## 2021-10-07 RX ADMIN — NYSTATIN 500000 UNITS: 100000 SUSPENSION ORAL at 06:10

## 2021-10-07 RX ADMIN — HEPARIN 10 ML: 100 SYRINGE at 15:45

## 2021-10-07 RX ADMIN — ACETAMINOPHEN 650 MG: 325 TABLET, FILM COATED ORAL at 08:16

## 2021-10-07 RX ADMIN — CIPROFLOXACIN HYDROCHLORIDE 500 MG: 500 TABLET, FILM COATED ORAL at 08:08

## 2021-10-07 ASSESSMENT — ACTIVITIES OF DAILY LIVING (ADL): ADLS_ACUITY_SCORE: 15

## 2021-10-07 ASSESSMENT — MIFFLIN-ST. JEOR: SCORE: 1499.43

## 2021-10-07 NOTE — DISCHARGE SUMMARY
Hospitalist Discharge Summary  [facility]    Richa Ashby MRN# [id number]   YOB: 1965 Age: 56 year old     Date of Admission:  10/6/2021  Date of Discharge:  10/8/2021  Admitting Physician:  Teresita Nicolas MD  Discharge Physician:  Elliot Kuhn DO  Discharging Service:  Hospitalist     Primary Provider: King Diaz          Discharge Diagnosis:     Generalized weakness fatigue poor oral intake, with episode of hypotension in clinic  Transient hallucinations and blurry vision, resolved  Dyspnea on exertion diarrhea, improved  Oral thrush  Recent UTI  Metastatic sarcoma  Asthma  GERD  Chronic cancer-related pain             Discharge Disposition:     Discharged to home           Allergies:     Allergies   Allergen Reactions     Pentobarbital      resp arrest              Discharge Medications:     Current Discharge Medication List      CONTINUE these medications which have NOT CHANGED    Details   acetaminophen (TYLENOL) 325 MG tablet Take 325-650 mg by mouth every 6 hours as needed for mild pain      ADVAIR DISKUS 100-50 MCG/DOSE inhaler Inhale 1 puff into the lungs daily       albuterol (PROAIR RESPICLICK) 108 (90 Base) MCG/ACT inhaler Inhale 1-2 puffs into the lungs every 6 hours as needed       cetirizine (ZYRTEC) 10 MG tablet Take 10 mg by mouth daily      ciprofloxacin (CIPRO) 500 MG tablet Take 500 mg by mouth 2 times daily      gabapentin (NEURONTIN) 300 MG capsule Take 600 mg in the AM, 600 mg mid-day, and 900 mg at night  Qty: 210 capsule, Refills: 3    Associated Diagnoses: Drug-induced polyneuropathy (H)      guaiFENesin-codeine (ROBITUSSIN AC) 100-10 MG/5ML solution Take 5-10 mLs by mouth every 4 hours as needed for cough  Qty: 473 mL, Refills: 1    Associated Diagnoses: Cough      indomethacin (INDOCIN) 50 MG capsule Take 1 capsule (50 mg) by mouth 3 times daily (with meals)  Qty: 60 capsule, Refills: 1    Associated Diagnoses: Sarcoma of soft  "tissue (H)      melatonin (MELATONIN) 1 MG/ML LIQD liquid Take 5 mg by mouth At Bedtime       montelukast (SINGULAIR) 10 MG tablet Take 10 mg by mouth daily      multivitamin w/minerals (MULTI-VITAMIN) tablet Take 1 tablet by mouth daily      ondansetron (ZOFRAN) 8 MG tablet Take 1 tablet (8 mg) by mouth every 8 hours as needed for nausea  Qty: 60 tablet, Refills: 1    Associated Diagnoses: Sarcoma (H)      oxyCODONE (ROXICODONE) 5 MG tablet Take 0.5-1 tablets (2.5-5 mg) by mouth every 6 hours as needed for severe pain  Qty: 60 tablet, Refills: 0    Associated Diagnoses: Cancer related pain; Sarcoma of soft tissue (H)      pazopanib (VOTRIENT) 200 MG tablet Take 4 tablets (800 mg) by mouth daily Take on an empty stomach 1 hour before or 2 hours after a meal.  Qty: 120 tablet, Refills: 0    Comments: PLEASE ADD TO DIRECTIONS !!! Please do not start taking medication until pharmacist from [facility] has spoken with you.  Associated Diagnoses: Sarcoma of soft tissue (H); Malignant neoplasm metastatic to lung, unspecified laterality (H); Anemia in neoplastic disease; Sarcoma (H)      !! prochlorperazine (COMPAZINE) 10 MG tablet Take 10 mg by mouth every evening Before votrient      !! prochlorperazine (COMPAZINE) 10 MG tablet Take 1 tablet (10 mg) by mouth every 6 hours as needed for nausea or vomiting  Qty: 90 tablet, Refills: 3    Associated Diagnoses: Chemotherapy-induced nausea      vitamin D3 (CHOLECALCIFEROL) 2000 units (50 mcg) tablet Take 1 tablet by mouth daily       !! - Potential duplicate medications found. Please discuss with provider.                 Condition on Discharge:     Discharge condition: Stable   Discharge vitals: Blood pressure 126/70, pulse 73, temperature (!) 96.1  F (35.6  C), temperature source Oral, resp. rate 16, height 1.702 m (5' 7\"), weight 87.7 kg (193 lb 4.8 oz), last menstrual period 03/19/2019, SpO2 96 %, not currently breastfeeding.   Code status on discharge: Full Code "      BASIC PHYSICAL EXAMINATION:  GENERAL: No apparent distress.  CARDIOVASCULAR: Regular rate and rhythm without murmurs.  PULMONARY: Clear to auscultation bilaterally.   GASTROINTESTINAL: Abdomen soft, non-tender.  EXTREMITIES: No edema, pulses intact.  NEUROLOGIC: No focal deficits.            History of Illness:   See detailed admission note for full details.               Procedures excluding imaging which is summarized below:     Please see details in the electronic medical record.           Consultations:     PHYSICAL THERAPY ADULT IP CONSULT  HEMATOLOGY & ONCOLOGY IP CONSULT  NUTRITION SERVICES ADULT IP CONSULT  CARE MANAGEMENT / SOCIAL WORK IP CONSULT  PHARMACY IP CONSULT          Significant Results:     Results for orders placed or performed during the hospital encounter of 10/06/21   XR Chest Port 1 View    Narrative    EXAM: XR CHEST PORT 1 VIEW  LOCATION: [facility]  DATE/TIME: 10/6/2021 5:31 PM    INDICATION: sob  COMPARISON: 10/03/2021      Impression    IMPRESSION: Stable bilateral pulmonary masses. Stable right basilar linear and patchy opacities, either atelectasis or infiltrate. Trace right pleural effusion. No left pleural effusion. No pneumothorax. Normal heart size. Right IJ port catheter tip in   the SVC.   MR Brain w/o & w Contrast    Narrative    EXAM: MR BRAIN W/O and W CONTRAST  LOCATION: [facility]  DATE/TIME: 10/6/2021 6:50 PM    INDICATION: History of sarcoma. Hallucinations. Vision changes. Possible brain mass or lesion.   COMPARISON: CT head 10/03/2021  CONTRAST: Injected 9mL Gadavist.   TECHNIQUE: Routine multiplanar multisequence head MRI without and with intravenous contrast.    FINDINGS:  INTRACRANIAL CONTENTS: No acute or subacute infarct. No mass, acute hemorrhage, or extra-axial fluid collections. Normal brain parenchymal signal. Normal ventricles and sulci. Normal position of the cerebellar tonsils. No pathologic contrast  enhancement.    SELLA: No abnormality accounting for technique.    OSSEOUS STRUCTURES/SOFT TISSUES: Diffuse loss normal fatty marrow on T1 sequence, nonspecific. Expanded differential includes bone marrow hyperplasias, chronic anemias, HIV, hematopoetic disorders and malignancies, metastatic disease among other   etiologies. Please correlate clinically. The major intracranial vascular flow voids are maintained.     ORBITS: No abnormality accounting for technique.     SINUSES/MASTOIDS: Mild mucosal thickening scattered about the paranasal sinuses. Right mastoid tip mild patchy opacification. Left mastoid cells essentially clear.       Impression    IMPRESSION:  1.  No acute intracranial process identified.  2.  Unremarkable MRI of the brain.  3.  No acute infarct.  4.  No pathologic enhancement identified within the brain parenchyma to suggest intracranial metastatic disease.  5.  Diffuse loss normal fatty marrow on T1 sequence, nonspecific. Expanded differential includes bone marrow hyperplasias, chronic anemias, HIV, hematopoetic disorders and malignancies, metastatic disease among other etiologies. Please correlate   clinically.             Transthoracic Echocardiogram Results:  No results found for this or any previous visit (from the past 4320 hour(s)).             Pending Results:     Unresulted Labs Ordered in the Past 30 Days of this Admission     Date and Time Order Name Status Description    10/7/2021  9:05 AM Enteric Bacteria and Virus Panel by CECILLE Stool In process     10/6/2021  4:27 PM URINE CULTURE Preliminary     10/6/2021  2:31 PM BLOOD CULTURE Preliminary     9/22/2021 12:00 PM PREPARE RED BLOOD CELLS (UNIT) Preliminary     9/7/2021  2:45 PM PREPARE RED BLOOD CELLS (UNIT) Preliminary                       Discharge Instructions and Follow-Up:     Discharge instructions and follow-up:   Discharge Procedure Orders   Follow-up and recommended labs and tests    Order Comments: Follow up with primary care  provider, King Diaz, within 7 days for hospital follow- up.  No follow up labs or test are needed.  Follow-up with Oncology.  Resume Cipro and chemotherapy.     Activity   Order Comments: Your activity upon discharge: activity as tolerated     Order Specific Question Answer Comments   Is discharge order? Yes      Full Code     Order Specific Question Answer Comments   Code status determined by: Discussion with patient/ legal decision maker      Diet   Order Comments: Follow this diet upon discharge: Orders Placed This Encounter      Combination Diet Regular Diet Adult     Order Specific Question Answer Comments   Is discharge order? Yes              Hospital Course:     Richa Ashby is a 56 year old female with history of asthma and GERD with undifferentiated pleomorphic sarcoma stage IV, follows with [facility].  She was admitted 6/17/21-6/20/21 with fever of unknown origin, infectious work-up negative, treated with broad spectrum antibiotics.  She was admitted 8/25/21-8/31/21 for fevers, hypotension, acute on chronic anemia. Extensive infectious work-up negative so etiology of fevers still unknown.      She presents as a direct admit for multiple complaints from her oncology clinic.  Patient has been having urinary symptoms of diarrhea and dark urine for the past week.  She was seen at [facility] and was given Keflex her urine cultures showed resistance; thus, she was switched to ciprofloxacin, patient states she only took 1 day of ciprofloxacin the day prior to admission.       During her visit to oncology clinic has had multiple complaints, of them including hallucinations, overall weakness, blurry vision, poor appetite, dyspnea on exertion, diarrhea,  she was noted to have a pressure of 88/70 heart rates in the 117's.  She was given IV fluids and ertapenem and admitted to our hospital.      Currently vitals temp of 98.3 pulse of 92 respiratory rate of 16 blood pressure 122/66 on room  air.  Labs showing sodium 137, creatinine 1, LFTs with bili 1.4, alk phos 195, ALT 18, AST 26, lactic acid of 2, glucose of 103, WBC 7.2 hemoglobin 9.4, and platelets of 152.   She was admitted for further evaluation.    Blood pressure quickly improved with IV fluids and they were discontinued.  She is tolerating oral diet.  She has thrush and is being treated with nystatin and Magic mouthwash with good improvement.  She has somewhat longstanding diarrhea and had stool testing performed that was negative for C. difficile and enteric pathogens.  No further issues with confusion, hallucinations, or generalized weakness.  She met with PT and was cleared to discharge home with no further needs.  She also met with oncology who has approved her to resume her chemotherapy.  Brain MRI shows no obvious acute abnormalities or evidence of metastases.     The patient was seen, examined, and counseled on this day. The hospitalization and plan of care was reviewed with the patient extensively. All questions were addressed and the patient agreed to follow-up as noted above.      Total time spent in face to face contact with the patient and coordinating discharge was:  35 Minutes    Elliot Kuhn DO MPH  [facility] Hospitalist  [address] [place] [address]  10/07/2021

## 2021-10-07 NOTE — PLAN OF CARE
To Do:  End of Shift Summary  For vital signs and complete assessments, please see documentation flowsheets.     Pertinent assessments: A&O--Up in room-- she allowed lab to draw labs ---co lots of liquid stools sample sent to lab--- port infusing well states awareness that it did not draw for some time with others aware---appetite good -- tylenol given for R flank and shoulder pain ---  Major Shift Events up wit SBA/ independent   Treatment Plan: waiting test results with possible discharge  Bedside Nurse: Charissa Shane RN

## 2021-10-07 NOTE — PROGRESS NOTES
Pt discharged at 1833 to home. Pt is stable. Writer reviewed AVS with pt and pt verbalized understanding.   Pt's belongings were returned.  picked up pt. NA escorted pt out of the hospital.

## 2021-10-07 NOTE — CONSULTS
[facility]    Hematology/Oncology Consult Note      Date of Admission:  10/6/2021  Date of Consult:  10/07/21  Reason for Consult: undifferentiated pleomorphic sarcoma      ASSESSMENT/PLAN:  Richa Ashby is a 56 year old female with:    1) Metastatic undifferentiated pleomorphic sarcoma: started pazopanib 9/24/21.  Patient is feeling better now and will resume her home pazopanib.  -ordered pazopanib - she will use her own medication.  She usually takes it in the evening time, so can resume that here if she stays admitted.      2) Generalized weakness, hypotension: improved with IV fluids.  Blood pressure has improved.  Getting PT consult.      3) Diarrhea:  -she is getting enteric panel and C.diff checked    4) Tumor fevers: Worked up extensively in the past, currently on indomethasin.    5) Recent UTI:   -on ciprofloxacin  -repeat UC pending    6) Chronic anemia: secondary to metastatic malignancy and cancer treatment.  -stable at baseline       If patient remains stable, would be okay to discharge home from oncology standpoint.      HISTORY OF PRESENT ILLNESS: Richa Ashby is a 56 year old female who presents with hypotension, generalized weakness, decreased oral intake.     She is a patient of Dr. Lopez at [facility].    Oncology history, as per the last oncology clinic note:    Connie has PMHx of asthma and GERD, with undifferentiated pleomorphic sarcoma. 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 She received preoperative radiotherapy and the tumor was resected 3/12/20. She then developed a cough, chest tightness, and scapular pain and imaging revealed lung nodules. Lung biopsy 11/2/20 with sarcoma. She was started on Doxil + Ifos 11/13/20. Had delay in cycle 2 due to COVID dx 12/2/20. CT imaging after cycle 1 with positive response to treatment with decrease in pulmonary nodules. Received cycle 2  12/18/20. Received cycle 3 1/15/21 with dose reduction in Doxil for skin toxicity and mucositis. Received cycle 4 2/15/21.      CT CAP 3/8/21 with positive response to treatment, decreased size of pulmonary mets though one lesion was larger. Did see fibrocystic changes in left breast, recommended breast imaging. Received cycle 5 Doxil + ifos 3/11/21.      Imaging 4/5/21 with overall stable disease except one lung lesion, opted to continue Doxil alone for now and switch to Keytruda in future if ongoing progression.      Started Keytruda 5/10/21. Was seen 5/26/21 cough, SOB, fatigue work-up thought to be 2/2 phrenic nerve involvement of R apical lung mass.      Due to worsening fatigue and cough, repeat CT 6/15/21 concerning for progression vs pseudoprogression. Added Gemzar 6/16/21.     She was admitted 6/17/21-6/20/21 with fever of unknown origin, infectious work-up negative, treated with broad spectrum antibiotics.     She clinically improved on abx. Was able to receive day 8 Gemzar (dose reduced for thrombocytopenia) + Keytruda 6/23/21.      CT 7/12/21 with stable disease.     She was admitted 8/25/21-8/31/21 for fevers, hypotension, acute on chronic anemia. Extensive infectious work-up negative so etiology of fevers still unknown.      She has had progressive disease so started on Pazopanib 9/24/21.    Connie says that she is feeling better today, and blood pressure is improved.         REVIEW OF SYSTEMS:   14 point ROS was reviewed and is negative other than as noted above in HPI.       MEDICATIONS:  Current Facility-Administered Medications   Medication     acetaminophen (TYLENOL) tablet 325-650 mg     albuterol (PROAIR HFA/PROVENTIL HFA/VENTOLIN HFA) 108 (90 Base) MCG/ACT inhaler 2 puff     ciprofloxacin (CIPRO) tablet 500 mg     fluticasone-vilanterol (BREO ELLIPTA) 100-25 MCG/INH inhaler 1 puff     heparin 100 UNIT/ML injection 5-10 mL     heparin lock flush 10 UNIT/ML injection 5-10 mL     heparin lock flush  10 UNIT/ML injection 5-10 mL     indomethacin (INDOCIN) capsule 50 mg     lidocaine (LMX4) cream     lidocaine 1 % 0.1-1 mL     melatonin tablet 1 mg     montelukast (SINGULAIR) tablet 10 mg     naloxone (NARCAN) injection 0.2 mg    Or     naloxone (NARCAN) injection 0.4 mg    Or     naloxone (NARCAN) injection 0.2 mg    Or     naloxone (NARCAN) injection 0.4 mg     nystatin (MYCOSTATIN) suspension 500,000 Units     ondansetron (ZOFRAN-ODT) ODT tab 4 mg    Or     ondansetron (ZOFRAN) injection 4 mg     oxyCODONE IR (ROXICODONE) half-tab 2.5-5 mg     pazopanib (VOTRIENT) tablet 800 mg     sodium chloride (PF) 0.9% PF flush 10-20 mL     sodium chloride (PF) 0.9% PF flush 10-20 mL     sodium chloride (PF) 0.9% PF flush 10-20 mL     sodium chloride (PF) 0.9% PF flush 3 mL     sodium chloride (PF) 0.9% PF flush 3 mL     Vitamin D3 (CHOLECALCIFEROL) tablet 50 mcg     Facility-Administered Medications Ordered in Other Encounters   Medication     heparin 100 UNIT/ML injection 5 mL         ALLERGIES:  Allergies   Allergen Reactions     Pentobarbital      resp arrest         PAST MEDICAL HISTORY:  Past Medical History:   Diagnosis Date     Asthma, chronic      Sarcoma (H)     Right lower thigh         PAST SURGICAL HISTORY:  Past Surgical History:   Procedure Laterality Date     AS RAD RESEC TONSIL/PILLARS       GALLBLADDER SURGERY       INSERT PORT VASCULAR ACCESS Right 10/28/2020    Procedure: single lumen power port placement @0745;  Surgeon: Tex Ackerman MD;  Location: [facility] OR     IR CHEST PORT PLACEMENT > 5 YRS OF AGE  10/28/2020     IR PORT CHECK RIGHT  3/26/2021     RESECT TUMOR LOWER EXTREMITY Right 3/12/2020    Procedure: Resection of right thigh tumor;  Surgeon: Guillermo Johnson MD;  Location: [facility] OR     ROTATOR CUFF REPAIR RT/LT  2012     SHOULDER SURGERY       SINUS SURGERY           SOCIAL HISTORY:  Social History     Socioeconomic History     Marital status:      Spouse name: Not on file  "    Number of children: Not on file     Years of education: Not on file     Highest education level: Not on file   Occupational History     Not on file   Tobacco Use     Smoking status: Never Smoker     Smokeless tobacco: Never Used   Substance and Sexual Activity     Alcohol use: Yes     Comment: 2-3 glasses of wine/week     Drug use: Never     Sexual activity: Not on file   Other Topics Concern     Not on file   Social History Narrative     Not on file     Social Determinants of Health     Financial Resource Strain:      Difficulty of Paying Living Expenses:    Food Insecurity:      Worried About Running Out of Food in the Last Year:      Ran Out of Food in the Last Year:    Transportation Needs:      Lack of Transportation (Medical):      Lack of Transportation (Non-Medical):    Physical Activity:      Days of Exercise per Week:      Minutes of Exercise per Session:    Stress:      Feeling of Stress :    Social Connections:      Frequency of Communication with Friends and Family:      Frequency of Social Gatherings with Friends and Family:      Attends [employer] Services:      Active Member of Clubs or Organizations:      Attends Club or Organization Meetings:      Marital Status:    Intimate Partner Violence:      Fear of Current or Ex-Partner:      Emotionally Abused:      Physically Abused:      Sexually Abused:          FAMILY HISTORY:  Family History   Problem Relation Age of Onset     Lung Cancer Maternal Grandmother      Breast Cancer Paternal Grandmother          PHYSICAL EXAM:  Vital signs:  Temp: 98.7  F (37.1  C) Temp src: Oral BP: 124/61 Pulse: 94   Resp: 20 SpO2: 96 % O2 Device: None (Room air)   Height: 170.2 cm (5' 7\") Weight: 87.7 kg (193 lb 4.8 oz)  Estimated body mass index is 30.28 kg/m  as calculated from the following:    Height as of this encounter: 1.702 m (5' 7\").    Weight as of this encounter: 87.7 kg (193 lb 4.8 oz).    GENERAL/CONSTITUTIONAL: No acute distress.  at " bedside.  EYES: No scleral icterus.  RESPIRATORY: Clear to auscultation bilaterally on anterior auscultation.  CARDIOVASCULAR: Regular rate and rhythm.  GASTROINTESTINAL: No tenderness. The patient has normal bowel sounds. No guarding.  MUSCULOSKELETAL: Warm and well-perfused.  NEUROLOGIC: Alert, oriented, answers questions appropriately.  INTEGUMENTARY: No jaundice.  PSYCHIATRIC:  Mentation appears normal, affect normal/bright, judgement and insight intact, normal speech and appearance well-groomed.        LABS:  CBC RESULTS:   Recent Labs   Lab Test 10/07/21  1049   WBC 7.6   RBC 3.17*   HGB 9.1*   HCT 31.4*   MCV 99   MCH 28.7   MCHC 29.0*   RDW 16.9*          Recent Labs   Lab Test 10/07/21  1049 10/06/21  1432    137   POTASSIUM 3.9 3.9   CHLORIDE 108 104   CO2 22 22   ANIONGAP 9 11   * 103*   BUN 9 11   CR 0.84 1.00   VIKTORIA 8.4* 8.7       IMAGING:  MRI brain 10/6/21:  1.  No acute intracranial process identified.  2.  Unremarkable MRI of the brain.  3.  No acute infarct.  4.  No pathologic enhancement identified within the brain parenchyma to suggest intracranial metastatic disease.  5.  Diffuse loss normal fatty marrow on T1 sequence, nonspecific. Expanded differential includes bone marrow hyperplasias, chronic anemias, HIV, hematopoetic disorders and malignancies, metastatic disease among other etiologies. Please correlate   clinically.     Chest x-ray 10/6/21:  Stable bilateral pulmonary masses. Stable right basilar linear and patchy opacities, either atelectasis or infiltrate. Trace right pleural effusion. No left pleural effusion. No pneumothorax. Normal heart size. Right IJ port catheter tip in   the SVC.        Thank you for the opportunity to participate in this patient's care.  Please call with any questions.    Sunshine Sanchez MD  Hematology/Oncology  [facility]

## 2021-10-07 NOTE — CONSULTS
"BRIEF NUTRITION ASSESSMENT      REASON FOR ASSESSMENT:  Richa Ashby is a 56 year old female seen by Registered Dietitian for Provider Order - \"poor appetite, weight loss\".    MALNUTRITION:  Deferred    NUTRITION INTERVENTION:  Nutrition Diagnosis:  Deferred    FOLLOW UP/MONITORING:   Consult deferred per policy as patient is observation status.  Discussed with RN, please formally re-consult if flips inpatient.      Nicky Escobedo RDN, LD  Clinical Dietitian  3rd floor/ICU: [phone]  All other floors: [phone]  Weekend/holiday: [phone]          "

## 2021-10-07 NOTE — PROGRESS NOTES
Pt has hx of UPS, brought own po chemo meds from home, no orders in place, MD paged.     1953: Per Dr Wallis, hold po chemo meds tonight will re-evaluate in the AM.

## 2021-10-07 NOTE — PROGRESS NOTES
PRIMARY DIAGNOSIS: GENERALIZED WEAKNESS    OUTPATIENT/OBSERVATION GOALS TO BE MET BEFORE DISCHARGE  1. Orthostatic performed: N/A    2. Tolerating PO medications: Yes    3. Return to near baseline physical activity: No - up w/A-1, gait belt.     4. Cleared for discharge by consultants (if involved): No    Discharge Planner Nurse   Safe discharge environment identified: Yes  Barriers to discharge: Yes - generalized weakness, Onc consult pending.        Entered by: Sydney Lopez 10/07/2021 5:45 AM     Please review provider order for any additional goals.   Nurse to notify provider when observation goals have been met and patient is ready for discharge.    End of Shift Summary  For vital signs and complete assessments, please see documentation flowsheets.     Pertinent assessments: Pale. VSS. A&O x4. Pale. Denies pain. Denies N/V. Tongue patchy white, sore.   Major Shift Events: Switched to observation status. Loose stools x2.  Treatment Plan: Monitor & supportive cares.   Bedside Nurse: Sydney Lopez RN

## 2021-10-07 NOTE — CONSULTS
Care Management Note    Pt identified as a Service Bundle #3. No needs or assessment needed at this time. Please consult CM/SW  if discharge needs should arise.    Connie Mckeon RN BSN   Inpatient Care Coordination  [facility]  [phone]      Richa Mckeon, RN

## 2021-10-07 NOTE — PROGRESS NOTES
[facility]    Hospitalist Progress Note  Name: Richa Ashby    MRN: [id number]  Provider:  Elliot Kuhn DO MPH  Date of Service: 10/07/2021    Summary of Stay: Richa Ashby is a 56 year old female with history of asthma and GERD with undifferentiated pleomorphic sarcoma stage IV, follows with [facility] [facility].  She was admitted 6/17/21-6/20/21 with fever of unknown origin, infectious work-up negative, treated with broad spectrum antibiotics.  She was admitted 8/25/21-8/31/21 for fevers, hypotension, acute on chronic anemia. Extensive infectious work-up negative so etiology of fevers still unknown.      She presents as a direct admit for multiple complaints from her oncology clinic.  Patient has been having urinary symptoms of diarrhea and dark urine for the past week.  She was seen at [facility] and was given Keflex her urine cultures showed resistance; thus, she was switched to ciprofloxacin, patient states she only took 1 day of ciprofloxacin the day prior to admission.      During her visit to oncology clinic has had multiple complaints, of them including hallucinations, overall weakness, blurry vision, poor appetite, dyspnea on exertion, diarrhea,  she was noted to have a pressure of 88/70 heart rates in the 117's.  She was given IV fluids and ertapenem and admitted to our hospital.      Currently vitals temp of 98.3 pulse of 92 respiratory rate of 16 blood pressure 122/66 on room air.  Labs showing sodium 137, creatinine 1, LFTs with bili 1.4, alk phos 195, ALT 18, AST 26, lactic acid of 2, glucose of 103, WBC 7.2 hemoglobin 9.4, and platelets of 152.   She was admitted for further evaluation.     Problem list:  Generalized weakness fatigue poor oral intake, with episode of hypotension in clinic  Etiology multifactorial suspect most likely component is her underlying malignancy and her chemotherapy.  Indomethacin can also cause fatigue however would be reluctant to stop her  indomethacin as it has been used for treatment of her fever of unknown origin in the past.  Low suspicion for sepsis.   -Treated with IV fluids, appears fluid resuscitated with improved blood pressure so we will discontinue now  -PT consult  -Treat her thrush with nystatin and Magic mouthwash  -Dietitian consult     Hallucination and blurry vision  Unclear etiology.  Mental status is currently baseline and normal.  No ongoing hallucinations.  -MRI head shows no acute abnormality but there is a nonspecific abnormality of T1 that should be reevaluated in the future with extremely broad differential     Dyspnea on exertion  Suspect her etiology of her dyspnea exertion is her progressive lung metastatic disease.  Doubt she has PE she recently had a CT angiogram in our facility October 1 which did not show any evidence of PE but diffuse metastatic disease including to her chest wall.  No Evidence of asthma exacerbation or heart failure.  Lungs clear on examination     Diarrhea  -Check enteric panel and C. difficile PCR    Oral Thrush  -Nystatin and Magic mouthwash ordered     Recent UTI  reviewed sensitivities will continue ciprofloxacin to complete a routine course of antibiotics     History of fever of unknown origin  -Resumed indomethacin     Metastatic sarcoma  -Oncology consulted to assist with care plan  -Patient stable and blood pressure improved, defer to oncology if chemotherapy can be resumed      Asthma  -Resumed her inhaler therapies     GERD  -Home therapy once reconciled     Chronic pain related to her malignancy  -Continue Oxycodone    DVT Prophylaxis: Pneumatic Compression Devices  Code Status: Full Code  Diet: Combination Diet Regular Diet Adult    Ness Catheter: Not present  Disposition: Expected discharge in 0-1 days to home. Goals prior to discharge include Onc and PT consults and evaluate diarrhea.   Incidental Findings: None.  Family updated today: No.     Interval History   Assumed care from  previous hospitalist. The history was fully reviewed.  The patient reports doing well. No chest pain and mild but improved shortness of breath. No cough. No nausea, vomiting, but now reporting diarrhea. No fevers. No urinary complaints. No other specific complaints identified.     -Data reviewed today: I personally reviewed all new labs and imaging results over the last 24 hours.     Physical Exam   Temp: 98.7  F (37.1  C) Temp src: Oral BP: 124/61 Pulse: 94   Resp: 20 SpO2: 96 % O2 Device: None (Room air)    Vitals:    10/06/21 1630 10/07/21 0601   Weight: 87.4 kg (192 lb 9.6 oz) 87.7 kg (193 lb 4.8 oz)     Vital Signs with Ranges  Temp:  [97.8  F (36.6  C)-98.8  F (37.1  C)] 98.7  F (37.1  C)  Pulse:  [] 94  Resp:  [16-22] 20  BP: ()/(61-72) 124/61  SpO2:  [93 %-97 %] 96 %  I/O last 3 completed shifts:  In: 950 [I.V.:950]  Out: -     GENERAL: No apparent distress. Awake, alert, and fully oriented.  HEENT: Normocephalic, atraumatic. Extraocular movements intact.  CARDIOVASCULAR: Regular rate and rhythm without murmurs or rubs. No S3.  PULMONARY: Clear bilaterally.  GASTROINTESTINAL: Soft, non-tender, non-distended. Bowel sounds normoactive.   EXTREMITIES: No cyanosis or clubbing. No edema.  NEUROLOGICAL: CN 2-12 grossly intact, no focal neurological deficits.  DERMATOLOGICAL: No rash, ulcer, bruising, nor jaundice.     Medications       ciprofloxacin  500 mg Oral Q12H ASIM     fluticasone-vilanterol  1 puff Inhalation Daily     indomethacin  50 mg Oral TID w/meals     montelukast  10 mg Oral Daily     nystatin  500,000 Units Swish & Spit 4x Daily     sodium chloride (PF)  3 mL Intracatheter Q8H     vitamin D3  50 mcg Oral Daily     Data     Laboratory:  Recent Labs   Lab 10/07/21  1049 10/06/21  1432 10/05/21  1334   WBC 7.6 7.2 7.1   HGB 9.1* 9.4* 9.6*   HCT 31.4* 30.6* 29.9*   MCV 99 97 91    152 133*     Recent Labs   Lab 10/07/21  1049 10/06/21  1432 10/05/21  1333 10/03/21  1525  10/03/21  1525    137 135   < > 136   POTASSIUM 3.9 3.9 4.2   < > 4.6   CHLORIDE 108 104 105   < > 104   CO2  --  22 21  --  28   ANIONGAP  --  11 9  --  4   GLC  --  103* 108*  --  99   BUN  --  11 12  --  12   CR  --  1.00 0.76  --  0.87   GFRESTIMATED  --  63 88  --  75   VIKTORIA  --  8.7 9.0  --  8.7    < > = values in this interval not displayed.     Recent Labs   Lab 10/06/21  1432 10/05/21  1333 10/03/21  1525   AST 26 21 24   ALT 18 17 18   ALKPHOS 195* 195* 220*   BILITOTAL 1.4* 1.2 1.1     Recent Labs   Lab 10/06/21  1432   LACT 2.0       Recent Labs   Lab 10/03/21  1525   TROPONIN <0.015     Recent Labs   Lab 10/06/21  1432   COLOR Yellow   APPEARANCE Slightly Cloudy*   URINEGLC Negative   URINEBILI Negative   URINEKETONE Negative   SG 1.019   UBLD Negative   URINEPH 6.0   PROTEIN 50 *   NITRITE Negative   LEUKEST Small*   RBCU 1   WBCU 6*     No results for input(s): CULT in the last 168 hours.    Imaging:  Recent Results (from the past 24 hour(s))   XR Chest Port 1 View    Narrative    EXAM: XR CHEST PORT 1 VIEW  LOCATION: [facility]  DATE/TIME: 10/6/2021 5:31 PM    INDICATION: sob  COMPARISON: 10/03/2021      Impression    IMPRESSION: Stable bilateral pulmonary masses. Stable right basilar linear and patchy opacities, either atelectasis or infiltrate. Trace right pleural effusion. No left pleural effusion. No pneumothorax. Normal heart size. Right IJ port catheter tip in   the SVC.   MR Brain w/o & w Contrast    Narrative    EXAM: MR BRAIN W/O and W CONTRAST  LOCATION: [facility]  DATE/TIME: 10/6/2021 6:50 PM    INDICATION: History of sarcoma. Hallucinations. Vision changes. Possible brain mass or lesion.   COMPARISON: CT head 10/03/2021  CONTRAST: Injected 9mL Gadavist.   TECHNIQUE: Routine multiplanar multisequence head MRI without and with intravenous contrast.    FINDINGS:  INTRACRANIAL CONTENTS: No acute or subacute infarct. No mass, acute  hemorrhage, or extra-axial fluid collections. Normal brain parenchymal signal. Normal ventricles and sulci. Normal position of the cerebellar tonsils. No pathologic contrast enhancement.    SELLA: No abnormality accounting for technique.    OSSEOUS STRUCTURES/SOFT TISSUES: Diffuse loss normal fatty marrow on T1 sequence, nonspecific. Expanded differential includes bone marrow hyperplasias, chronic anemias, HIV, hematopoetic disorders and malignancies, metastatic disease among other   etiologies. Please correlate clinically. The major intracranial vascular flow voids are maintained.     ORBITS: No abnormality accounting for technique.     SINUSES/MASTOIDS: Mild mucosal thickening scattered about the paranasal sinuses. Right mastoid tip mild patchy opacification. Left mastoid cells essentially clear.       Impression    IMPRESSION:  1.  No acute intracranial process identified.  2.  Unremarkable MRI of the brain.  3.  No acute infarct.  4.  No pathologic enhancement identified within the brain parenchyma to suggest intracranial metastatic disease.  5.  Diffuse loss normal fatty marrow on T1 sequence, nonspecific. Expanded differential includes bone marrow hyperplasias, chronic anemias, HIV, hematopoetic disorders and malignancies, metastatic disease among other etiologies. Please correlate   clinically.             Elliot Kuhn DO MPH  [facility] Hospitalist  [address] [place] [address]  10/07/2021

## 2021-10-07 NOTE — PLAN OF CARE
Problem: Pain Acute  Goal: Acceptable Pain Control and Functional Ability  Outcome: Improving   Pt is alert and oriented x 4. She remains on RA. Pt reported flank pain but will wait for  her next dose of tylenol. She is independent with cares. Will continue to monitor

## 2021-10-08 ENCOUNTER — PATIENT OUTREACH (OUTPATIENT)
Dept: CARE COORDINATION | Facility: CLINIC | Age: 56
End: 2021-10-08

## 2021-10-08 DIAGNOSIS — Z71.89 OTHER SPECIFIED COUNSELING: ICD-10-CM

## 2021-10-08 NOTE — PROGRESS NOTES
"Clinic Care Coordination Contact  [facility]: Post-Discharge Note  SITUATION                                                      Admission:    Admission Date: 10/06/21   Reason for Admission: weakness  Discharge:   Discharge Date: 10/07/21  Discharge Diagnosis: UTI    BACKGROUND                                                      Richa Ashby is a 56 year old female with PMH asthma and GERD with undifferentiated pleomorphic sarcoma stage IV follows [place] [facility], and therapy last dose per note 9/24/2021.   She was admitted 6/17/21-6/20/21 with fever of unknown origin, infectious work-up negative, treated with broad spectrum antibiotics She was admitted 8/25/21-8/31/21 for fevers, hypotension, acute on chronic anemia. Extensive infectious work-up negative so etiology of fevers still unknown    ASSESSMENT           Discharge Assessment  How are you doing now that you are home?: \" I am doing ok I guess \"  How are your symptoms? (Red Flag symptoms escalate to triage hotline per guidelines): Improved  Do you feel your condition is stable enough to be safe at home until your provider visit?: Yes  Does the patient have their discharge instructions? : Yes  Does the patient have questions regarding their discharge instructions? : No  Were you started on any new medications or were there changes to any of your previous medications? : Yes  Does the patient have all of their medications?: Yes  Do you have questions regarding any of your medications? : No  Do you have all of your needed medical supplies or equipment (DME)?  (i.e. oxygen tank, CPAP, cane, etc.): No - What equipment or supplies are needed?  Discharge follow-up appointment scheduled within 14 calendar days? : Yes  Discharge Follow Up Appointment Date: 10/12/21    Post-op ([facility] Only)  If the patient had a surgery or procedure, do they have any questions for a nurse?: No    Post-op (Clinicians Only)  Eating & Drinking: eating and drinking without " complaints/concerns  PO Intake: regular diet  Additional Symptoms: decreased appetite  Bowel Function: loose stools  Date of last BM: 10/08/21  Urinary Status: voiding without complaint/concerns        PLAN                                                      Outpatient Plan: Follow-up and recommended labs and tests  Follow up with primary care provider, King Diaz, within 7 days for hospital follow- up. No follow up labs or test are  needed.  Follow-up with Oncology.  Resume Cipro and chemotherapy.    Future Appointments   Date Time Provider Department Center   10/11/2021  9:30 AM [facility]   10/11/2021 10:00 AM Kateryna Barbour PT [facility]   10/12/2021 12:30 PM Harshal Schuster PA [facility]   10/18/2021 11:30 AM [facility]   10/26/2021  2:20 PM Robert Blanca MD [facility]   10/28/2021  1:45 PM Abigail Mann PT [facility]         For any urgent concerns, please contact our 24 hour nurse triage line: [phone] ([phone])         Nadia Agee

## 2021-10-08 NOTE — PLAN OF CARE
Physical Therapy Discharge Summary    Reason for therapy discharge:    Discharged to home with outpatient therapy.    Progress towards therapy goal(s). See goals on Care Plan in [facility] electronic health record for goal details.  Goals met    Therapy recommendation(s):    Continued therapy is recommended.  Rationale/Recommendations:  pt would benefit from OP to progress strength.

## 2021-10-10 ENCOUNTER — HEALTH MAINTENANCE LETTER (OUTPATIENT)
Age: 56
End: 2021-10-10

## 2021-10-11 ENCOUNTER — LAB (OUTPATIENT)
Dept: ONCOLOGY | Facility: CLINIC | Age: 56
End: 2021-10-11
Payer: COMMERCIAL

## 2021-10-11 ENCOUNTER — HOSPITAL ENCOUNTER (OUTPATIENT)
Dept: PHYSICAL THERAPY | Facility: CLINIC | Age: 56
Setting detail: THERAPIES SERIES
End: 2021-10-11
Attending: PHYSICIAN ASSISTANT
Payer: COMMERCIAL

## 2021-10-11 DIAGNOSIS — C49.9 SARCOMA (H): ICD-10-CM

## 2021-10-11 DIAGNOSIS — Z79.899 ENCOUNTER FOR LONG-TERM (CURRENT) USE OF MEDICATIONS: ICD-10-CM

## 2021-10-11 LAB
ALBUMIN SERPL-MCNC: 1.8 G/DL (ref 3.4–5)
ALP SERPL-CCNC: 156 U/L (ref 40–150)
ALT SERPL W P-5'-P-CCNC: 12 U/L (ref 0–50)
ANION GAP SERPL CALCULATED.3IONS-SCNC: 10 MMOL/L (ref 3–14)
AST SERPL W P-5'-P-CCNC: 19 U/L (ref 0–45)
BACTERIA BLD CULT: NO GROWTH
BASOPHILS # BLD AUTO: 0.1 10E3/UL (ref 0–0.2)
BASOPHILS NFR BLD AUTO: 1 %
BILIRUB SERPL-MCNC: 1 MG/DL (ref 0.2–1.3)
BUN SERPL-MCNC: 9 MG/DL (ref 7–30)
CALCIUM SERPL-MCNC: 8.1 MG/DL (ref 8.5–10.1)
CHLORIDE BLD-SCNC: 105 MMOL/L (ref 94–109)
CO2 SERPL-SCNC: 22 MMOL/L (ref 20–32)
CREAT SERPL-MCNC: 0.65 MG/DL (ref 0.52–1.04)
EOSINOPHIL # BLD AUTO: 0.1 10E3/UL (ref 0–0.7)
EOSINOPHIL NFR BLD AUTO: 1 %
ERYTHROCYTE [DISTWIDTH] IN BLOOD BY AUTOMATED COUNT: 18.1 % (ref 10–15)
GFR SERPL CREATININE-BSD FRML MDRD: >90 ML/MIN/1.73M2
GLUCOSE BLD-MCNC: 121 MG/DL (ref 70–99)
HCT VFR BLD AUTO: 29.8 % (ref 35–47)
HGB BLD-MCNC: 9.2 G/DL (ref 11.7–15.7)
IMM GRANULOCYTES # BLD: 0.1 10E3/UL
IMM GRANULOCYTES NFR BLD: 1 %
LYMPHOCYTES # BLD AUTO: 1 10E3/UL (ref 0.8–5.3)
LYMPHOCYTES NFR BLD AUTO: 13 %
MCH RBC QN AUTO: 29.4 PG (ref 26.5–33)
MCHC RBC AUTO-ENTMCNC: 30.9 G/DL (ref 31.5–36.5)
MCV RBC AUTO: 95 FL (ref 78–100)
MONOCYTES # BLD AUTO: 0.7 10E3/UL (ref 0–1.3)
MONOCYTES NFR BLD AUTO: 9 %
NEUTROPHILS # BLD AUTO: 5.9 10E3/UL (ref 1.6–8.3)
NEUTROPHILS NFR BLD AUTO: 75 %
NRBC # BLD AUTO: 0 10E3/UL
NRBC BLD AUTO-RTO: 0 /100
PLATELET # BLD AUTO: 147 10E3/UL (ref 150–450)
POTASSIUM BLD-SCNC: 3.7 MMOL/L (ref 3.4–5.3)
PROT SERPL-MCNC: 6.4 G/DL (ref 6.8–8.8)
RBC # BLD AUTO: 3.13 10E6/UL (ref 3.8–5.2)
SODIUM SERPL-SCNC: 137 MMOL/L (ref 133–144)
WBC # BLD AUTO: 7.8 10E3/UL (ref 4–11)

## 2021-10-11 PROCEDURE — 83880 ASSAY OF NATRIURETIC PEPTIDE: CPT | Performed by: INTERNAL MEDICINE

## 2021-10-11 PROCEDURE — 85025 COMPLETE CBC W/AUTO DIFF WBC: CPT | Performed by: INTERNAL MEDICINE

## 2021-10-11 PROCEDURE — 36591 DRAW BLOOD OFF VENOUS DEVICE: CPT

## 2021-10-11 PROCEDURE — 80053 COMPREHEN METABOLIC PANEL: CPT | Performed by: INTERNAL MEDICINE

## 2021-10-11 PROCEDURE — 97110 THERAPEUTIC EXERCISES: CPT | Mod: GP | Performed by: PHYSICAL THERAPIST

## 2021-10-11 RX ORDER — HEPARIN SODIUM (PORCINE) LOCK FLUSH IV SOLN 100 UNIT/ML 100 UNIT/ML
500 SOLUTION INTRAVENOUS EVERY 8 HOURS
Status: DISCONTINUED | OUTPATIENT
Start: 2021-10-11 | End: 2021-10-11 | Stop reason: HOSPADM

## 2021-10-11 RX ADMIN — HEPARIN SODIUM (PORCINE) LOCK FLUSH IV SOLN 100 UNIT/ML 500 UNITS: 100 SOLUTION at 10:56

## 2021-10-11 NOTE — PROGRESS NOTES
Connie is a 56 year old who is being evaluated via a billable video visit.  Refill needed for indomethacin. Pt is currently taking nystatin as well.     How would you like to obtain your AVS? MyChart  If the video visit is dropped, the invitation should be resent by: Text to cell phone: [phone]  Will anyone else be joining your video visit? Yes: , Elliot. How would they like to receive their invitation? Text to cell phone: n/a      Video Visit: 12:25-1:01pm     Oncology/Hematology Visit Note  Oct 12, 2021    Reason for Visit: Follow up of undifferentiated pleomorphic sarcoma     History of Present Illness: Richa Ashby is a 56 year old female with PMH asthma and GERD with undifferentiated pleomorphic sarcoma. 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 She received preoperative radiotherapy and the tumor was resected 3/12/20. She then developed a cough, chest tightness, and scapular pain and imaging revealed lung nodules. Lung biopsy 11/2/20 with sarcoma. She was started on Doxil + Ifos 11/13/20. Had delay in cycle 2 due to COVID dx 12/2/20. CT imaging after cycle 1 with positive response to treatment with decrease in pulmonary nodules. Received cycle 2 12/18/20. Received cycle 3 1/15/21 with dose reduction in Doxil for skin toxicity and mucositis. Received cycle 4 2/15/21.      CT CAP 3/8/21 with positive response to treatment, decreased size of pulmonary mets though one lesion was larger. Did see fibrocystic changes in left breast, recommended breast imaging. Received cycle 5 Doxil + ifos 3/11/21.      Imaging 4/5/21 with overall stable disease except one lung lesion, opted to continue Doxil alone for now and switch to Keytruda in future if ongoing progression.      Started Keytruda 5/10/21. Was seen 5/26/21 cough, SOB, fatigue work-up thought to be 2/2 phrenic nerve involvement of R apical lung mass.      Due to worsening fatigue and  cough, repeat CT 6/15/21 concerning for progression vs pseudoprogression. Added Gemzar 6/16/21.     She was admitted 6/17/21-6/20/21 with fever of unknown origin, infectious work-up negative, treated with broad spectrum antibiotics.     She clinically improved on abx. Was able to receive day 8 Gemzar (dose reduced for thrombocytopenia) + Keytruda 6/23/21.      CT 7/12/21 with stable disease.     She was admitted 8/25/21-8/31/21 for fevers, hypotension, acute on chronic anemia. Extensive infectious work-up negative so etiology of fevers still unknown.      She has had progressive disease so started on Pazopanib 9/24/21.    She was admitted 10/6/21-10/8/21 for weakness, new hallucinations in setting of UTI. Brain MRI without brain mets though did have Diffuse loss normal fatty marrow on T1 sequence, nonspecific. Symptoms improved with abx and IVF.      She was seen today for oncology follow-up.     Interval History:  -Fatigue is still a problem.   -Pain tylenol only, helping  -HR better but still high and can hear in ears  -Thinking clearly, no hallucinations  -Urine improved but still some urethral irritation and pain in R side (worse with coughing), overall better but still present 3-4/10  -Diarrhea persists, greasy and floats, taking Imodium PRN. No abdominal cramping and no bleeding. About the same as when she was in the hospital.  -Feels like she is dehydrated but trying to stay well hydrated. Eating is better with thrush treatment but still not back to normal. Thrush is better with Nystatin. Still has dry mouth.  -Port not functioning  -Breathing about the same. Panic worsens the breathing, trying to mediate. Lying flat makes it work, still sleeping propped up. Cough comes and goes-cough syrup helps. No chest pain.  -No fevers. Ran out of indomethacin, last time she took was Saturday. Will hold off on restarting.  -BP better 111/77. A few episodes of dizziness. Worse with PT yesterday.   -No leg swelling. No  skin issues other than bruising.   -Having nausea, compazine helps. Nausea is worse when she has a lot of coughing. Heartburn is better, diet controlled.     Current Outpatient Medications   Medication Sig Dispense Refill     acetaminophen (TYLENOL) 325 MG tablet Take 325-650 mg by mouth every 6 hours as needed for mild pain       ADVAIR DISKUS 100-50 MCG/DOSE inhaler Inhale 1 puff into the lungs daily        albuterol (PROAIR RESPICLICK) 108 (90 Base) MCG/ACT inhaler Inhale 1-2 puffs into the lungs every 6 hours as needed        cetirizine (ZYRTEC) 10 MG tablet Take 10 mg by mouth daily       ciprofloxacin (CIPRO) 500 MG tablet Take 500 mg by mouth 2 times daily       gabapentin (NEURONTIN) 300 MG capsule Take 600 mg in the AM, 600 mg mid-day, and 900 mg at night 210 capsule 3     guaiFENesin-codeine (ROBITUSSIN AC) 100-10 MG/5ML solution Take 5-10 mLs by mouth every 4 hours as needed for cough 473 mL 1     indomethacin (INDOCIN) 50 MG capsule Take 1 capsule (50 mg) by mouth 3 times daily (with meals) 60 capsule 1     melatonin (MELATONIN) 1 MG/ML LIQD liquid Take 5 mg by mouth At Bedtime        montelukast (SINGULAIR) 10 MG tablet Take 10 mg by mouth daily       multivitamin w/minerals (MULTI-VITAMIN) tablet Take 1 tablet by mouth daily       ondansetron (ZOFRAN) 8 MG tablet Take 1 tablet (8 mg) by mouth every 8 hours as needed for nausea 60 tablet 1     oxyCODONE (ROXICODONE) 5 MG tablet Take 0.5-1 tablets (2.5-5 mg) by mouth every 6 hours as needed for severe pain 60 tablet 0     pazopanib (VOTRIENT) 200 MG tablet Take 4 tablets (800 mg) by mouth daily Take on an empty stomach 1 hour before or 2 hours after a meal. 120 tablet 0     prochlorperazine (COMPAZINE) 10 MG tablet Take 10 mg by mouth every evening Before votrient       prochlorperazine (COMPAZINE) 10 MG tablet Take 1 tablet (10 mg) by mouth every 6 hours as needed for nausea or vomiting 90 tablet 3     vitamin D3 (CHOLECALCIFEROL) 2000 units (50 mcg)  tablet Take 1 tablet by mouth daily       Physical Examination:  LMP 03/19/2019   Wt Readings from Last 10 Encounters:   10/07/21 87.7 kg (193 lb 4.8 oz)   10/06/21 87.2 kg (192 lb 3.2 oz)   10/03/21 87.1 kg (192 lb)   09/22/21 89.3 kg (196 lb 12.8 oz)   09/08/21 90.5 kg (199 lb 8 oz)   08/30/21 90.7 kg (199 lb 15.3 oz)   08/25/21 90.8 kg (200 lb 1.6 oz)   08/11/21 93.4 kg (206 lb)   08/09/21 91.8 kg (202 lb 4.8 oz)   08/04/21 93.1 kg (205 lb 4.8 oz)     Video physical exam  General: Patient appears well in no acute distress.   Skin: No visualized rash or lesions on visualized skin  Eyes: EOMI, no erythema, sclera icterus or discharge noted  Resp: Appears to be breathing comfortably without accessory muscle usage, speaking in full sentences, no cough  MSK: Appears to have normal range of motion based on visualized movements  Neurologic: No apparent tremors, facial movements symmetric  Psych: affect normal, alert and oriented    The rest of a comprehensive physical examination is deferred due to PHE (public health emergency) video restrictions    Laboratory Data:  Results for AMADEO WILSON (MRN [id number]) as of 10/12/2021 12:23   10/11/2021 10:40   Sodium 137   Potassium 3.7   Chloride 105   Carbon Dioxide 22   Urea Nitrogen 9   Creatinine 0.65   GFR Estimate >90   Calcium 8.1 (L)   Anion Gap 10   Albumin 1.8 (L)   Protein Total 6.4 (L)   Bilirubin Total 1.0   Alkaline Phosphatase 156 (H)   ALT 12   AST 19   Glucose 121 (H)   WBC 7.8   Hemoglobin 9.2 (L)   Hematocrit 29.8 (L)   Platelet Count 147 (L)   RBC Count 3.13 (L)   MCV 95   MCH 29.4   MCHC 30.9 (L)   RDW 18.1 (H)   % Neutrophils 75   % Lymphocytes 13   % Monocytes 9   % Eosinophils 1   Absolute Basophils 0.1   % Basophils 1   Absolute Eosinophils 0.1   Absolute Immature Granulocytes 0.1 (H)   Absolute Lymphocytes 1.0   Absolute Monocytes 0.7   % Immature Granulocytes 1   Absolute Neutrophils 5.9   Absolute NRBCs 0.0   NRBCs per 100 WBC 0          Assessment and Plan:  1. Onc  Metastatic UPS, was on Doxil + Ifosfamide (5 cycles) with positive response to treatment initially, then due to poor tolerance switched to Doxil alone (1 cycle) but had progression. Started on Keytruda 5/10/21. Due to tumor growth (progression vs pseudoprogression) added Gemzar 6/16/21. Had ongoing progression. Started on Pazopanib 9/24/21.    Overall tolerating Pazopanib OK-see supportive cares for multiple issues below. Labs reviewed and no concerns. Will repeat CT imaging (do CT CAP with R side pain) first week of November (6 weeks from start of treatment).    Continue labs weekly for month of October, then can go to every other week.    Virtual KALINA visit weekly given multiple concerns.    Her port continues to not work. Will schedule port check (was done back in March and then was working, now not functioning x 2 weeks).     2. ID  Fevers of unknown origin. See prior notes for work-up, all negative for infection. Thought to be tumor fevers. Improved with Indomethacin. Fevers now resolved. Continue to stay off Indomethacin for now. She will call with any recurrent fevers.    UTI, completed abx with Ciprofloxacin.     3. Heme  Anemia: ACD and chemotherapy. Improved at 9.2. Continue weekly lab monitoring.      Thrombocytopenia: 2/2 Gemzar. No bleeding issues. OK today.     Neutropenia: No current issues. WBC WNL.      4. Pulm  Asthma: stable, continue inhalers.      COVID: Diagnosed in December, recovered. She is vaccinated.      Cough/SOB: See work-up from 5/26/21 (negative CT PE, RVP, COVID, NTBNP). Thought to be 2/2 phrenic nerve involvement of tumor. Continue Robitussin-AC PRN. Stable.      She has SVC flattening on CT and will sleep elevated. Consider pulm intervention for stenting in future.     5. Cards  Exercise fatigue and tachycardia: Worse lately, possibly dehydration vs pain vs deconditioning. Think about PE but had CT PE on 10/1 at OSH that was negative. Anemia is  improving so less likely. EKGs reviewed and NSR. QT WNL. Do need to r/o cardiac toxicity from Pazopanib. Will order NT-BNP and echo.    Hypotension: Significant last week, was admitted. Much improved with IVF. Monitor daily at home. She feels dehydrated-will request IVF in clinic.      5. 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 Continue Gabapentin 600/600/900.     Deconditioning: PT referral, limited by poor exercise tolerance as above.    Hallucinations: Brain MRI reviewed and no mets. Does have loss of normal fatty marrow with broad differential. Discussed with neuro onc and unclear findings. At this time symptoms resolved so suspect it was combination of Oxycodone and UTI. LIMIT narcotics due to CNS effects.      6. MSK  R shoulder/back pain: Suspect 2/2 cancer, doing Tylenol PRN. Limit Oxycodone due to CNS effects.    R side pain: ongoing though stable. Will evaluate with CT CAP at restaging.      Recurrent inflammatory arthritis symptoms (diffuse joint pain and swelling) and CRP elevated. Improved with prednisone, no current issues.      7. GI  Diarrhea, unclear etiology, C diff negative. Has had intermittent issues throughout treatment. Will r/o pancreatic insuffiencey with fecal elastase. Continue Imodium, can use up to 8 tabs per day.      Continue Compazine PRN nausea.    Continue Nystatin for thrush, improving.    Continue Tums PRN GERD, take away from Votrient.      OVERALL PLAN  -Continue Pazopanib.  -Labs (CBC, CMP) weekly through end of October, then every 2 weeks  -CT CAP early November  -Weekly KALINA visits   -IVF tomorrow for dehydration concerns  -Echo and NT-BNP within the week  -Check fecal elastase for diarrhea and continue Imodium  -Port check    65 minutes spent on the date of the encounter doing chart review, review of test results, interpretation of tests, patient visit, documentation and discussion with other provider(s)     Harshal Schuster PA-C  [facility]  [address]  [place] [address]  [phone]

## 2021-10-11 NOTE — PROGRESS NOTES
"Patient's name and  were verified.  See Doc Flowsheet - IV assess for details.  IVAD accessed with 20G 3/4\" nieves gripper plus needle  blood return positive: No. Lab nils labs peripherally.  Site without redness, tenderness or swelling: YES  flushed with 30cc NS and 5cc 100u/ml heparin  Needle: De-accessed  Comments: Labs drawn.  Patient tolerated procedure without incident.    Farheen Jackson RN, BSN, OCN    "

## 2021-10-12 ENCOUNTER — DOCUMENTATION ONLY (OUTPATIENT)
Dept: ONCOLOGY | Facility: CLINIC | Age: 56
End: 2021-10-12

## 2021-10-12 ENCOUNTER — VIRTUAL VISIT (OUTPATIENT)
Dept: ONCOLOGY | Facility: CLINIC | Age: 56
End: 2021-10-12
Attending: PHYSICIAN ASSISTANT
Payer: COMMERCIAL

## 2021-10-12 DIAGNOSIS — C49.9 SARCOMA (H): Primary | ICD-10-CM

## 2021-10-12 DIAGNOSIS — R19.7 DIARRHEA, UNSPECIFIED TYPE: ICD-10-CM

## 2021-10-12 DIAGNOSIS — R06.00 DYSPNEA, UNSPECIFIED TYPE: ICD-10-CM

## 2021-10-12 PROCEDURE — 99215 OFFICE O/P EST HI 40 MIN: CPT | Mod: 95 | Performed by: PHYSICIAN ASSISTANT

## 2021-10-12 NOTE — LETTER
10/12/2021         RE: Richa Ashby  [address]  [place] [facility] [address]        Dear Colleague,    Thank you for referring your patient, Richa Ashby, to the [facility]. Please see a copy of my visit note below.    Connie is a 56 year old who is being evaluated via a billable video visit.  Refill needed for indomethacin. Pt is currently taking nystatin as well.     How would you like to obtain your AVS? MyChart  If the video visit is dropped, the invitation should be resent by: Text to cell phone: [phone]  Will anyone else be joining your video visit? Yes: , Elliot. How would they like to receive their invitation? Text to cell phone: n/a      Video Visit: 12:25-1:01pm     Oncology/Hematology Visit Note  Oct 12, 2021    Reason for Visit: Follow up of undifferentiated pleomorphic sarcoma     History of Present Illness: Richa Ashby is a 56 year old female with PMH asthma and GERD with undifferentiated pleomorphic sarcoma. 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 She received preoperative radiotherapy and the tumor was resected 3/12/20. She then developed a cough, chest tightness, and scapular pain and imaging revealed lung nodules. Lung biopsy 11/2/20 with sarcoma. She was started on Doxil + Ifos 11/13/20. Had delay in cycle 2 due to COVID dx 12/2/20. CT imaging after cycle 1 with positive response to treatment with decrease in pulmonary nodules. Received cycle 2 12/18/20. Received cycle 3 1/15/21 with dose reduction in Doxil for skin toxicity and mucositis. Received cycle 4 2/15/21.      CT CAP 3/8/21 with positive response to treatment, decreased size of pulmonary mets though one lesion was larger. Did see fibrocystic changes in left breast, recommended breast imaging. Received cycle 5 Doxil + ifos 3/11/21.      Imaging 4/5/21 with overall stable disease except one lung lesion,  opted to continue Doxil alone for now and switch to Keytruda in future if ongoing progression.      Started Keytruda 5/10/21. Was seen 5/26/21 cough, SOB, fatigue work-up thought to be 2/2 phrenic nerve involvement of R apical lung mass.      Due to worsening fatigue and cough, repeat CT 6/15/21 concerning for progression vs pseudoprogression. Added Gemzar 6/16/21.     She was admitted 6/17/21-6/20/21 with fever of unknown origin, infectious work-up negative, treated with broad spectrum antibiotics.     She clinically improved on abx. Was able to receive day 8 Gemzar (dose reduced for thrombocytopenia) + Keytruda 6/23/21.      CT 7/12/21 with stable disease.     She was admitted 8/25/21-8/31/21 for fevers, hypotension, acute on chronic anemia. Extensive infectious work-up negative so etiology of fevers still unknown.      She has had progressive disease so started on Pazopanib 9/24/21.    She was admitted 10/6/21-10/8/21 for weakness, new hallucinations in setting of UTI. Brain MRI without brain mets though did have Diffuse loss normal fatty marrow on T1 sequence, nonspecific. Symptoms improved with abx and IVF.      She was seen today for oncology follow-up.     Interval History:  -Fatigue is still a problem.   -Pain tylenol only, helping  -HR better but still high and can hear in ears  -Thinking clearly, no hallucinations  -Urine improved but still some urethral irritation and pain in R side (worse with coughing), overall better but still present 3-4/10  -Diarrhea persists, greasy and floats, taking Imodium PRN. No abdominal cramping and no bleeding. About the same as when she was in the hospital.  -Feels like she is dehydrated but trying to stay well hydrated. Eating is better with thrush treatment but still not back to normal. Thrush is better with Nystatin. Still has dry mouth.  -Port not functioning  -Breathing about the same. Panic worsens the breathing, trying to mediate. Lying flat makes it work, still  sleeping propped up. Cough comes and goes-cough syrup helps. No chest pain.  -No fevers. Ran out of indomethacin, last time she took was Saturday. Will hold off on restarting.  -BP better 111/77. A few episodes of dizziness. Worse with PT yesterday.   -No leg swelling. No skin issues other than bruising.   -Having nausea, compazine helps. Nausea is worse when she has a lot of coughing. Heartburn is better, diet controlled.     Current Outpatient Medications   Medication Sig Dispense Refill     acetaminophen (TYLENOL) 325 MG tablet Take 325-650 mg by mouth every 6 hours as needed for mild pain       ADVAIR DISKUS 100-50 MCG/DOSE inhaler Inhale 1 puff into the lungs daily        albuterol (PROAIR RESPICLICK) 108 (90 Base) MCG/ACT inhaler Inhale 1-2 puffs into the lungs every 6 hours as needed        cetirizine (ZYRTEC) 10 MG tablet Take 10 mg by mouth daily       ciprofloxacin (CIPRO) 500 MG tablet Take 500 mg by mouth 2 times daily       gabapentin (NEURONTIN) 300 MG capsule Take 600 mg in the AM, 600 mg mid-day, and 900 mg at night 210 capsule 3     guaiFENesin-codeine (ROBITUSSIN AC) 100-10 MG/5ML solution Take 5-10 mLs by mouth every 4 hours as needed for cough 473 mL 1     indomethacin (INDOCIN) 50 MG capsule Take 1 capsule (50 mg) by mouth 3 times daily (with meals) 60 capsule 1     melatonin (MELATONIN) 1 MG/ML LIQD liquid Take 5 mg by mouth At Bedtime        montelukast (SINGULAIR) 10 MG tablet Take 10 mg by mouth daily       multivitamin w/minerals (MULTI-VITAMIN) tablet Take 1 tablet by mouth daily       ondansetron (ZOFRAN) 8 MG tablet Take 1 tablet (8 mg) by mouth every 8 hours as needed for nausea 60 tablet 1     oxyCODONE (ROXICODONE) 5 MG tablet Take 0.5-1 tablets (2.5-5 mg) by mouth every 6 hours as needed for severe pain 60 tablet 0     pazopanib (VOTRIENT) 200 MG tablet Take 4 tablets (800 mg) by mouth daily Take on an empty stomach 1 hour before or 2 hours after a meal. 120 tablet 0      prochlorperazine (COMPAZINE) 10 MG tablet Take 10 mg by mouth every evening Before votrient       prochlorperazine (COMPAZINE) 10 MG tablet Take 1 tablet (10 mg) by mouth every 6 hours as needed for nausea or vomiting 90 tablet 3     vitamin D3 (CHOLECALCIFEROL) 2000 units (50 mcg) tablet Take 1 tablet by mouth daily       Physical Examination:  LMP 03/19/2019   Wt Readings from Last 10 Encounters:   10/07/21 87.7 kg (193 lb 4.8 oz)   10/06/21 87.2 kg (192 lb 3.2 oz)   10/03/21 87.1 kg (192 lb)   09/22/21 89.3 kg (196 lb 12.8 oz)   09/08/21 90.5 kg (199 lb 8 oz)   08/30/21 90.7 kg (199 lb 15.3 oz)   08/25/21 90.8 kg (200 lb 1.6 oz)   08/11/21 93.4 kg (206 lb)   08/09/21 91.8 kg (202 lb 4.8 oz)   08/04/21 93.1 kg (205 lb 4.8 oz)     Video physical exam  General: Patient appears well in no acute distress.   Skin: No visualized rash or lesions on visualized skin  Eyes: EOMI, no erythema, sclera icterus or discharge noted  Resp: Appears to be breathing comfortably without accessory muscle usage, speaking in full sentences, no cough  MSK: Appears to have normal range of motion based on visualized movements  Neurologic: No apparent tremors, facial movements symmetric  Psych: affect normal, alert and oriented    The rest of a comprehensive physical examination is deferred due to PHE (public health emergency) video restrictions    Laboratory Data:  Results for AMADEO WILSON (MRN [id number]) as of 10/12/2021 12:23   10/11/2021 10:40   Sodium 137   Potassium 3.7   Chloride 105   Carbon Dioxide 22   Urea Nitrogen 9   Creatinine 0.65   GFR Estimate >90   Calcium 8.1 (L)   Anion Gap 10   Albumin 1.8 (L)   Protein Total 6.4 (L)   Bilirubin Total 1.0   Alkaline Phosphatase 156 (H)   ALT 12   AST 19   Glucose 121 (H)   WBC 7.8   Hemoglobin 9.2 (L)   Hematocrit 29.8 (L)   Platelet Count 147 (L)   RBC Count 3.13 (L)   MCV 95   MCH 29.4   MCHC 30.9 (L)   RDW 18.1 (H)   % Neutrophils 75   % Lymphocytes 13   % Monocytes 9   %  Eosinophils 1   Absolute Basophils 0.1   % Basophils 1   Absolute Eosinophils 0.1   Absolute Immature Granulocytes 0.1 (H)   Absolute Lymphocytes 1.0   Absolute Monocytes 0.7   % Immature Granulocytes 1   Absolute Neutrophils 5.9   Absolute NRBCs 0.0   NRBCs per 100 WBC 0         Assessment and Plan:  1. Onc  Metastatic UPS, was on Doxil + Ifosfamide (5 cycles) with positive response to treatment initially, then due to poor tolerance switched to Doxil alone (1 cycle) but had progression. Started on Keytruda 5/10/21. Due to tumor growth (progression vs pseudoprogression) added Gemzar 6/16/21. Had ongoing progression. Started on Pazopanib 9/24/21.    Overall tolerating Pazopanib OK-see supportive cares for multiple issues below. Labs reviewed and no concerns. Will repeat CT imaging (do CT CAP with R side pain) first week of November (6 weeks from start of treatment).    Continue labs weekly for month of October, then can go to every other week.    Virtual KALINA visit weekly given multiple concerns.    Her port continues to not work. Will schedule port check (was done back in March and then was working, now not functioning x 2 weeks).     2. ID  Fevers of unknown origin. See prior notes for work-up, all negative for infection. Thought to be tumor fevers. Improved with Indomethacin. Fevers now resolved. Continue to stay off Indomethacin for now. She will call with any recurrent fevers.    UTI, completed abx with Ciprofloxacin.     3. Heme  Anemia: ACD and chemotherapy. Improved at 9.2. Continue weekly lab monitoring.      Thrombocytopenia: 2/2 Gemzar. No bleeding issues. OK today.     Neutropenia: No current issues. WBC WNL.      4. Pulm  Asthma: stable, continue inhalers.      COVID: Diagnosed in December, recovered. She is vaccinated.      Cough/SOB: See work-up from 5/26/21 (negative CT PE, RVP, COVID, NTBNP). Thought to be 2/2 phrenic nerve involvement of tumor. Continue Robitussin-AC PRN. Stable.      She has SVC  flattening on CT and will sleep elevated. Consider pulm intervention for stenting in future.     5. Cards  Exercise fatigue and tachycardia: Worse lately, possibly dehydration vs pain vs deconditioning. Think about PE but had CT PE on 10/1 at OSH that was negative. Anemia is improving so less likely. EKGs reviewed and NSR. QT WNL. Do need to r/o cardiac toxicity from Pazopanib. Will order NT-BNP and echo.    Hypotension: Significant last week, was admitted. Much improved with IVF. Monitor daily at home. She feels dehydrated-will request IVF in clinic.      5. 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 Continue Gabapentin 600/600/900.     Deconditioning: PT referral, limited by poor exercise tolerance as above.    Hallucinations: Brain MRI reviewed and no mets. Does have loss of normal fatty marrow with broad differential. Discussed with neuro onc and unclear findings. At this time symptoms resolved so suspect it was combination of Oxycodone and UTI. LIMIT narcotics due to CNS effects.      6. MSK  R shoulder/back pain: Suspect 2/2 cancer, doing Tylenol PRN. Limit Oxycodone due to CNS effects.    R side pain: ongoing though stable. Will evaluate with CT CAP at restaging.      Recurrent inflammatory arthritis symptoms (diffuse joint pain and swelling) and CRP elevated. Improved with prednisone, no current issues.      7. GI  Diarrhea, unclear etiology, C diff negative. Has had intermittent issues throughout treatment. Will r/o pancreatic insuffiencey with fecal elastase. Continue Imodium, can use up to 8 tabs per day.      Continue Compazine PRN nausea.    Continue Nystatin for thrush, improving.    Continue Tums PRN GERD, take away from Votrient.      OVERALL PLAN  -Continue Pazopanib.  -Labs (CBC, CMP) weekly through end of October, then every 2 weeks  -CT CAP early November  -Weekly KALINA visits   -IVF tomorrow for dehydration concerns  -Echo and NT-BNP within the week  -Check fecal elastase for diarrhea  and continue Imodium  -Port check    65 minutes spent on the date of the encounter doing chart review, review of test results, interpretation of tests, patient visit, documentation and discussion with other provider(s)     Harshal Schuster PA-C  [facility] [address] [address] [phone]        Again, thank you for allowing me to participate in the care of your patient.        Sincerely,        RUPERT Guaman

## 2021-10-13 ENCOUNTER — INFUSION THERAPY VISIT (OUTPATIENT)
Dept: ONCOLOGY | Facility: CLINIC | Age: 56
End: 2021-10-13
Attending: PHYSICIAN ASSISTANT
Payer: COMMERCIAL

## 2021-10-13 VITALS
RESPIRATION RATE: 18 BRPM | OXYGEN SATURATION: 95 % | TEMPERATURE: 97.3 F | HEART RATE: 89 BPM | SYSTOLIC BLOOD PRESSURE: 106 MMHG | DIASTOLIC BLOOD PRESSURE: 75 MMHG

## 2021-10-13 DIAGNOSIS — C49.9 SARCOMA (H): ICD-10-CM

## 2021-10-13 DIAGNOSIS — R19.7 DIARRHEA, UNSPECIFIED TYPE: ICD-10-CM

## 2021-10-13 DIAGNOSIS — R11.0 NAUSEA: ICD-10-CM

## 2021-10-13 DIAGNOSIS — R50.9 FEVER AND CHILLS: Primary | ICD-10-CM

## 2021-10-13 DIAGNOSIS — N39.0 COMPLICATED URINARY TRACT INFECTION: ICD-10-CM

## 2021-10-13 DIAGNOSIS — Z11.59 ENCOUNTER FOR SCREENING FOR OTHER VIRAL DISEASES: ICD-10-CM

## 2021-10-13 DIAGNOSIS — D63.0 ANEMIA IN NEOPLASTIC DISEASE: ICD-10-CM

## 2021-10-13 LAB — NT-PROBNP SERPL-MCNC: 997 PG/ML (ref 0–125)

## 2021-10-13 PROCEDURE — 258N000003 HC RX IP 258 OP 636: Performed by: PHYSICIAN ASSISTANT

## 2021-10-13 PROCEDURE — 96360 HYDRATION IV INFUSION INIT: CPT

## 2021-10-13 PROCEDURE — 250N000011 HC RX IP 250 OP 636: Performed by: PHYSICIAN ASSISTANT

## 2021-10-13 PROCEDURE — 82656 EL-1 FECAL QUAL/SEMIQ: CPT

## 2021-10-13 RX ORDER — HEPARIN SODIUM (PORCINE) LOCK FLUSH IV SOLN 100 UNIT/ML 100 UNIT/ML
5 SOLUTION INTRAVENOUS
Status: CANCELLED | OUTPATIENT
Start: 2021-10-13

## 2021-10-13 RX ORDER — DIPHENHYDRAMINE HYDROCHLORIDE 50 MG/ML
50 INJECTION INTRAMUSCULAR; INTRAVENOUS
Status: CANCELLED
Start: 2021-10-13

## 2021-10-13 RX ORDER — ALBUTEROL SULFATE 90 UG/1
1-2 AEROSOL, METERED RESPIRATORY (INHALATION)
Status: CANCELLED
Start: 2021-10-13

## 2021-10-13 RX ORDER — METHYLPREDNISOLONE SODIUM SUCCINATE 125 MG/2ML
125 INJECTION, POWDER, LYOPHILIZED, FOR SOLUTION INTRAMUSCULAR; INTRAVENOUS
Status: CANCELLED
Start: 2021-10-13

## 2021-10-13 RX ORDER — ALBUTEROL SULFATE 0.83 MG/ML
2.5 SOLUTION RESPIRATORY (INHALATION)
Status: CANCELLED | OUTPATIENT
Start: 2021-10-13

## 2021-10-13 RX ORDER — HEPARIN SODIUM,PORCINE 10 UNIT/ML
5 VIAL (ML) INTRAVENOUS
Status: DISCONTINUED | OUTPATIENT
Start: 2021-10-13 | End: 2021-10-13 | Stop reason: HOSPADM

## 2021-10-13 RX ORDER — ACETAMINOPHEN 325 MG/1
650 TABLET ORAL ONCE
Status: CANCELLED
Start: 2021-10-13 | End: 2021-10-13

## 2021-10-13 RX ORDER — EPINEPHRINE 1 MG/ML
0.3 INJECTION, SOLUTION INTRAMUSCULAR; SUBCUTANEOUS EVERY 5 MIN PRN
Status: CANCELLED | OUTPATIENT
Start: 2021-10-13

## 2021-10-13 RX ORDER — NALOXONE HYDROCHLORIDE 0.4 MG/ML
0.2 INJECTION, SOLUTION INTRAMUSCULAR; INTRAVENOUS; SUBCUTANEOUS
Status: CANCELLED | OUTPATIENT
Start: 2021-10-13

## 2021-10-13 RX ORDER — MEPERIDINE HYDROCHLORIDE 25 MG/ML
25 INJECTION INTRAMUSCULAR; INTRAVENOUS; SUBCUTANEOUS EVERY 30 MIN PRN
Status: CANCELLED | OUTPATIENT
Start: 2021-10-13

## 2021-10-13 RX ORDER — ONDANSETRON 2 MG/ML
8 INJECTION INTRAMUSCULAR; INTRAVENOUS ONCE
Status: CANCELLED
Start: 2021-10-13 | End: 2021-10-13

## 2021-10-13 RX ORDER — ACETAMINOPHEN 325 MG/1
650 TABLET ORAL ONCE
Status: DISCONTINUED | OUTPATIENT
Start: 2021-10-13 | End: 2021-10-13 | Stop reason: HOSPADM

## 2021-10-13 RX ORDER — HEPARIN SODIUM (PORCINE) LOCK FLUSH IV SOLN 100 UNIT/ML 100 UNIT/ML
5 SOLUTION INTRAVENOUS
Status: DISCONTINUED | OUTPATIENT
Start: 2021-10-13 | End: 2021-10-13 | Stop reason: HOSPADM

## 2021-10-13 RX ORDER — HEPARIN SODIUM,PORCINE 10 UNIT/ML
5 VIAL (ML) INTRAVENOUS
Status: CANCELLED | OUTPATIENT
Start: 2021-10-13

## 2021-10-13 RX ADMIN — SODIUM CHLORIDE 1000 ML: 9 INJECTION, SOLUTION INTRAVENOUS at 11:45

## 2021-10-13 RX ADMIN — Medication 5 ML: at 11:46

## 2021-10-13 ASSESSMENT — PAIN SCALES - GENERAL: PAINLEVEL: MODERATE PAIN (4)

## 2021-10-13 NOTE — PROGRESS NOTES
Infusion Nursing Note:  Richa NOLEN Isma presents today for IVF.    Patient seen by provider: Yes Harshal Schuster PA-C on 10/12   present during visit today: Not Applicable.    Note: Pt saw provider yesterday, no changes overnight.      Intravenous Access:  Implanted Port.    Treatment Conditions:  Not Applicable.      Post Infusion Assessment:  Patient tolerated infusion without incident.  Blood return noted pre and post infusion.  Site patent and intact, free from redness, edema or discomfort.  No evidence of extravasations.  Access discontinued per protocol.       Discharge Plan:   Patient declined prescription refills.  Discharge instructions reviewed with: Patient.  Patient and/or family verbalized understanding of discharge instructions and all questions answered.  AVS to patient via [employer].  Checkout orders not complete from yesterday  Patient discharged in stable condition accompanied by: self and .  Departure Mode: Wheelchair.    Suzan Bishop RN

## 2021-10-15 DIAGNOSIS — C49.9 SARCOMA (H): ICD-10-CM

## 2021-10-15 DIAGNOSIS — D63.0 ANEMIA IN NEOPLASTIC DISEASE: ICD-10-CM

## 2021-10-15 DIAGNOSIS — C49.9 SARCOMA OF SOFT TISSUE (H): Primary | ICD-10-CM

## 2021-10-15 DIAGNOSIS — C78.00 MALIGNANT NEOPLASM METASTATIC TO LUNG, UNSPECIFIED LATERALITY (H): ICD-10-CM

## 2021-10-15 LAB — ELASTASE PANC STL-MCNT: 288 UG/G

## 2021-10-15 RX ORDER — PAZOPANIB 200 MG/1
800 TABLET, FILM COATED ORAL DAILY
Qty: 120 TABLET | Refills: 0 | Status: SHIPPED | OUTPATIENT
Start: 2021-10-15 | End: 2021-11-23

## 2021-10-18 ENCOUNTER — DOCUMENTATION ONLY (OUTPATIENT)
Dept: ONCOLOGY | Facility: CLINIC | Age: 56
End: 2021-10-18

## 2021-10-18 ENCOUNTER — HOSPITAL ENCOUNTER (OUTPATIENT)
Dept: CARDIOLOGY | Facility: CLINIC | Age: 56
Discharge: HOME | End: 2021-10-18
Attending: PHYSICIAN ASSISTANT | Admitting: PHYSICIAN ASSISTANT
Payer: COMMERCIAL

## 2021-10-18 ENCOUNTER — LAB (OUTPATIENT)
Dept: ONCOLOGY | Facility: CLINIC | Age: 56
End: 2021-10-18
Payer: COMMERCIAL

## 2021-10-18 DIAGNOSIS — Z79.899 ENCOUNTER FOR LONG-TERM (CURRENT) USE OF MEDICATIONS: ICD-10-CM

## 2021-10-18 DIAGNOSIS — C49.9 SARCOMA (H): ICD-10-CM

## 2021-10-18 DIAGNOSIS — R06.00 DYSPNEA, UNSPECIFIED TYPE: ICD-10-CM

## 2021-10-18 DIAGNOSIS — Z11.59 ENCOUNTER FOR SCREENING FOR OTHER VIRAL DISEASES: ICD-10-CM

## 2021-10-18 LAB
ALBUMIN SERPL-MCNC: 1.8 G/DL (ref 3.4–5)
ALP SERPL-CCNC: 137 U/L (ref 40–150)
ALT SERPL W P-5'-P-CCNC: 12 U/L (ref 0–50)
ANION GAP SERPL CALCULATED.3IONS-SCNC: 9 MMOL/L (ref 3–14)
AST SERPL W P-5'-P-CCNC: 21 U/L (ref 0–45)
BASOPHILS # BLD AUTO: 0 10E3/UL (ref 0–0.2)
BASOPHILS NFR BLD AUTO: 1 %
BI-PLANE LVEF ECHO: NORMAL
BILIRUB SERPL-MCNC: 0.9 MG/DL (ref 0.2–1.3)
BUN SERPL-MCNC: 8 MG/DL (ref 7–30)
CALCIUM SERPL-MCNC: 8.4 MG/DL (ref 8.5–10.1)
CHLORIDE BLD-SCNC: 100 MMOL/L (ref 94–109)
CO2 SERPL-SCNC: 24 MMOL/L (ref 20–32)
CREAT SERPL-MCNC: 0.51 MG/DL (ref 0.52–1.04)
EOSINOPHIL # BLD AUTO: 0.1 10E3/UL (ref 0–0.7)
EOSINOPHIL NFR BLD AUTO: 1 %
ERYTHROCYTE [DISTWIDTH] IN BLOOD BY AUTOMATED COUNT: 20.9 % (ref 10–15)
GFR SERPL CREATININE-BSD FRML MDRD: >90 ML/MIN/1.73M2
GLUCOSE BLD-MCNC: 121 MG/DL (ref 70–99)
HCT VFR BLD AUTO: 28.3 % (ref 35–47)
HGB BLD-MCNC: 8.8 G/DL (ref 11.7–15.7)
HOLD SPECIMEN: NORMAL
IMM GRANULOCYTES # BLD: 0.1 10E3/UL
IMM GRANULOCYTES NFR BLD: 2 %
LYMPHOCYTES # BLD AUTO: 1.3 10E3/UL (ref 0.8–5.3)
LYMPHOCYTES NFR BLD AUTO: 17 %
MAGNESIUM SERPL-MCNC: 2 MG/DL (ref 1.6–2.3)
MCH RBC QN AUTO: 30.9 PG (ref 26.5–33)
MCHC RBC AUTO-ENTMCNC: 31.1 G/DL (ref 31.5–36.5)
MCV RBC AUTO: 99 FL (ref 78–100)
MONOCYTES # BLD AUTO: 0.5 10E3/UL (ref 0–1.3)
MONOCYTES NFR BLD AUTO: 7 %
NEUTROPHILS # BLD AUTO: 5.8 10E3/UL (ref 1.6–8.3)
NEUTROPHILS NFR BLD AUTO: 72 %
NRBC # BLD AUTO: 0 10E3/UL
NRBC BLD AUTO-RTO: 0 /100
PHOSPHATE SERPL-MCNC: 2.8 MG/DL (ref 2.5–4.5)
PLATELET # BLD AUTO: 145 10E3/UL (ref 150–450)
POTASSIUM BLD-SCNC: 3.6 MMOL/L (ref 3.4–5.3)
PROT SERPL-MCNC: 7.1 G/DL (ref 6.8–8.8)
RBC # BLD AUTO: 2.85 10E6/UL (ref 3.8–5.2)
SODIUM SERPL-SCNC: 133 MMOL/L (ref 133–144)
T4 FREE SERPL-MCNC: 1.09 NG/DL (ref 0.76–1.46)
TSH SERPL DL<=0.005 MIU/L-ACNC: 12.06 MU/L (ref 0.4–4)
WBC # BLD AUTO: 7.9 10E3/UL (ref 4–11)

## 2021-10-18 PROCEDURE — U0005 INFEC AGEN DETEC AMPLI PROBE: HCPCS | Performed by: INTERNAL MEDICINE

## 2021-10-18 PROCEDURE — 93306 TTE W/DOPPLER COMPLETE: CPT | Mod: 26 | Performed by: INTERNAL MEDICINE

## 2021-10-18 PROCEDURE — 83735 ASSAY OF MAGNESIUM: CPT | Performed by: INTERNAL MEDICINE

## 2021-10-18 PROCEDURE — 36415 COLL VENOUS BLD VENIPUNCTURE: CPT | Performed by: INTERNAL MEDICINE

## 2021-10-18 PROCEDURE — 80050 GENERAL HEALTH PANEL: CPT | Performed by: INTERNAL MEDICINE

## 2021-10-18 PROCEDURE — 84100 ASSAY OF PHOSPHORUS: CPT | Performed by: INTERNAL MEDICINE

## 2021-10-18 PROCEDURE — 99207 PR NO CHARGE LOS: CPT

## 2021-10-18 PROCEDURE — 84439 ASSAY OF FREE THYROXINE: CPT | Performed by: INTERNAL MEDICINE

## 2021-10-18 PROCEDURE — 93306 TTE W/DOPPLER COMPLETE: CPT

## 2021-10-18 PROCEDURE — U0003 INFECTIOUS AGENT DETECTION BY NUCLEIC ACID (DNA OR RNA); SEVERE ACUTE RESPIRATORY SYNDROME CORONAVIRUS 2 (SARS-COV-2) (CORONAVIRUS DISEASE [COVID-19]), AMPLIFIED PROBE TECHNIQUE, MAKING USE OF HIGH THROUGHPUT TECHNOLOGIES AS DESCRIBED BY CMS-2020-01-R: HCPCS | Performed by: INTERNAL MEDICINE

## 2021-10-18 RX ORDER — HEPARIN SODIUM (PORCINE) LOCK FLUSH IV SOLN 100 UNIT/ML 100 UNIT/ML
500 SOLUTION INTRAVENOUS
Status: DISCONTINUED | OUTPATIENT
Start: 2021-10-18 | End: 2021-10-18 | Stop reason: HOSPADM

## 2021-10-18 NOTE — PROGRESS NOTES
Pt here for labs and asymptomatic COVID swab for upcoming procedure. She reports her port is not working and has a Port study scheduled for next week. Lab contacted and labs drawn peripherally. COVID swab done and sent to lab.     Renetta Lin RN on 10/18/2021 at 12:35 PM

## 2021-10-18 NOTE — PROGRESS NOTES
Oral Chemotherapy Monitoring Program  Lab Follow Up    Reviewed lab results from 10/18/21.    Labs:  _  Result Component Current Result Ref Range   Sodium 133 (10/18/2021) 133 - 144 mmol/L     _  Result Component Current Result Ref Range   Potassium 3.6 (10/18/2021) 3.4 - 5.3 mmol/L     _  Result Component Current Result Ref Range   Calcium 8.4 (L) (10/18/2021) 8.5 - 10.1 mg/dL     _  Result Component Current Result Ref Range   Magnesium 2.0 (10/18/2021) 1.6 - 2.3 mg/dL     _  Result Component Current Result Ref Range   Phosphorus 2.8 (10/18/2021) 2.5 - 4.5 mg/dL     _  Result Component Current Result Ref Range   Albumin 1.8 (L) (10/18/2021) 3.4 - 5.0 g/dL     _  Result Component Current Result Ref Range   Urea Nitrogen 8 (10/18/2021) 7 - 30 mg/dL     _  Result Component Current Result Ref Range   Creatinine 0.51 (L) (10/18/2021) 0.52 - 1.04 mg/dL       _  Result Component Current Result Ref Range   AST 21 (10/18/2021) 0 - 45 U/L     _  Result Component Current Result Ref Range   ALT 12 (10/18/2021) 0 - 50 U/L     _  Result Component Current Result Ref Range   Bilirubin Total 0.9 (10/18/2021) 0.2 - 1.3 mg/dL       _  Result Component Current Result Ref Range   WBC Count 7.9 (10/18/2021) 4.0 - 11.0 10e3/uL     _  Result Component Current Result Ref Range   Hemoglobin 8.8 (L) (10/18/2021) 11.7 - 15.7 g/dL     _  Result Component Current Result Ref Range   Platelet Count 145 (L) (10/18/2021) 150 - 450 10e3/uL     Assessment & Plan:   No concerning abnormalities. Continue plan of care.    Follow-Up:  10/25: review weekly labs and 10/22 [facility] appointment     Aaron Jacobs, PharmD  Hematology/Oncology Clinical Pharmacist  [facility]  [facility]

## 2021-10-19 LAB — SARS-COV-2 RNA RESP QL NAA+PROBE: NEGATIVE

## 2021-10-19 NOTE — PROGRESS NOTES
Connie is a 56 year old who is being evaluated via a billable video visit.      How would you like to obtain your AVS? MyChart  If the video visit is dropped, the invitation should be resent by: Text to cell phone: [phone]  Will anyone else be joining your video visit? No      Video Start Time: 1:30pm    Video-Visit Details    Type of service:  Video Visit    Video End Time: 2:10pm    Originating Location (pt. 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 She received preoperative radiotherapy and the tumor was resected 3/12/20. She then developed a cough, chest tightness, and scapular pain and imaging revealed lung nodules. Lung biopsy 11/2/20 with sarcoma. She was started on Doxil + Ifos 11/13/20. Had delay in cycle 2 due to COVID dx 12/2/20. CT imaging after cycle 1 with positive response to treatment with decrease in pulmonary nodules. Received cycle 2 12/18/20. Received cycle 3 1/15/21 with dose reduction in Doxil for skin toxicity and mucositis. Received cycle 4 2/15/21.      CT CAP 3/8/21 with positive response to treatment, decreased size of pulmonary mets though one lesion was larger. Did see fibrocystic changes in left breast, recommended breast imaging. Received cycle 5 Doxil + ifos 3/11/21.      Imaging 4/5/21 with overall stable disease except one lung lesion, opted to continue Doxil alone for now and switch to Keytruda in future if ongoing progression.      Started Keytruda 5/10/21. Was seen 5/26/21 cough, SOB, fatigue work-up thought to be 2/2  phrenic nerve involvement of R apical lung mass.      Due to worsening fatigue and cough, repeat CT 6/15/21 concerning for progression vs pseudoprogression. Added Gemzar 6/16/21.     She was admitted 6/17/21-6/20/21 with fever of unknown origin, infectious work-up negative, treated with broad spectrum antibiotics.     She clinically improved on abx. Was able to receive day 8 Gemzar (dose reduced for thrombocytopenia) + Keytruda 6/23/21.      CT 7/12/21 with stable disease.     She was admitted 8/25/21-8/31/21 for fevers, hypotension, acute on chronic anemia. Extensive infectious work-up negative so etiology of fevers still unknown.      She has had progressive disease so started on Pazopanib 9/24/21.     She was admitted 10/6/21-10/8/21 for weakness, new hallucinations in setting of UTI. Brain MRI without brain mets though did have Diffuse loss normal fatty marrow on T1 sequence, nonspecific. Symptoms improved with abx and IVF.      She was seen today for oncology follow-up.     Interval History:  Connie was called today for follow-up:  -Fatigue is still really bothersome. She isn't sleeping well at night and then naps during the day. Naps don't help.  -Still very winded both at rest and with movement. 93-96% on pulse oximeter. Feels like it might be a little better.  -Cough is about the same, comes and goes. When the coughing is worse then she has more nausea, pain, SOB. Cough syrup helps during these episodes. No chest pain except maybe mild tenderness on sternum that can be pleuritic. No radiation.    -Dry mouth persists. Thinks she might still have thrush. Still on Nystatin. No mouth pain. Eating better. Drinking well today-helps temporarily. Hasn't tried biotene.   -Pain comes and goes, Oxycodone overnight. Tylenol during the day and ice packs. Manageable for the moment. Having pain mostly in upper R shoulder/back. Had some pain in L low back with a long car ride last week.  -Diarrhea improved. Hasn't had issues  for 4-5 days. Stool isn't formed. Hasn't needed Imodium for a week.  -Nausea is worse. Taking Compazine more often (1-2x per day) helps. Can't identify a trigger other than possibly smell of food. Has vomiting a few times. No abdominal pain. GERD isn't bothersome.  -No fevers.   -Still mental fogginess. No hallucinations or confusion. No headaches. No dizziness except with standing at times.  -No dysuria-completed abx  -No leg swelling. No joint pain. No skin issues.   -HR better 75 bpm. Doing some exercises at home and doing more for herself each day. BP has been 130/85.       Current Outpatient Medications   Medication Sig Dispense Refill     acetaminophen (TYLENOL) 325 MG tablet Take 325-650 mg by mouth every 6 hours as needed for mild pain       ADVAIR DISKUS 100-50 MCG/DOSE inhaler Inhale 1 puff into the lungs daily        albuterol (PROAIR RESPICLICK) 108 (90 Base) MCG/ACT inhaler Inhale 1-2 puffs into the lungs every 6 hours as needed        cetirizine (ZYRTEC) 10 MG tablet Take 10 mg by mouth daily       gabapentin (NEURONTIN) 300 MG capsule Take 600 mg in the AM, 600 mg mid-day, and 900 mg at night 210 capsule 3     guaiFENesin-codeine (ROBITUSSIN AC) 100-10 MG/5ML solution Take 5-10 mLs by mouth every 4 hours as needed for cough 473 mL 1     melatonin (MELATONIN) 1 MG/ML LIQD liquid Take 5 mg by mouth At Bedtime        montelukast (SINGULAIR) 10 MG tablet Take 10 mg by mouth daily       multivitamin w/minerals (MULTI-VITAMIN) tablet Take 1 tablet by mouth daily       ondansetron (ZOFRAN) 8 MG tablet Take 1 tablet (8 mg) by mouth every 8 hours as needed for nausea 60 tablet 1     oxyCODONE (ROXICODONE) 5 MG tablet Take 0.5-1 tablets (2.5-5 mg) by mouth every 6 hours as needed for severe pain 60 tablet 0     pazopanib (VOTRIENT) 200 MG tablet Take 4 tablets (800 mg) by mouth daily Take on an empty stomach 1 hour before or 2 hours after a meal. 120 tablet 0     pazopanib (VOTRIENT) 200 MG tablet Take 4  tablets (800 mg) by mouth daily Take on an empty stomach 1 hour before or 2 hours after a meal. 120 tablet 0     prochlorperazine (COMPAZINE) 10 MG tablet Take 10 mg by mouth every evening Before votrient       prochlorperazine (COMPAZINE) 10 MG tablet Take 1 tablet (10 mg) by mouth every 6 hours as needed for nausea or vomiting 90 tablet 3     vitamin D3 (CHOLECALCIFEROL) 2000 units (50 mcg) tablet Take 1 tablet by mouth daily         Past Medical History  Past Medical History:   Diagnosis Date     Asthma, chronic      Sarcoma (H)     Right lower thigh     Past Surgical History:   Procedure Laterality Date     AS RAD RESEC TONSIL/PILLARS       GALLBLADDER SURGERY       INSERT PORT VASCULAR ACCESS Right 10/28/2020    Procedure: single lumen power port placement @0745;  Surgeon: Tex Ackerman MD;  Location: [facility] OR     IR CHEST PORT PLACEMENT > 5 YRS OF AGE  10/28/2020     IR PORT CHECK RIGHT  3/26/2021     RESECT TUMOR LOWER EXTREMITY Right 3/12/2020    Procedure: Resection of right thigh tumor;  Surgeon: Guillermo Johnson MD;  Location: [facility] OR     ROTATOR CUFF REPAIR RT/LT  2012     SHOULDER SURGERY       SINUS SURGERY       Allergies   Allergen Reactions     Pentobarbital      resp arrest     Social History   Social History     Tobacco Use     Smoking status: Never Smoker     Smokeless tobacco: Never Used   Substance Use Topics     Alcohol use: Yes     Comment: 2-3 glasses of wine/week     Drug use: Never      Past medical history and social history were reviewed.    Physical Examination:  [facility] 03/19/2019   Wt Readings from Last 10 Encounters:   10/07/21 87.7 kg (193 lb 4.8 oz)   10/06/21 87.2 kg (192 lb 3.2 oz)   10/03/21 87.1 kg (192 lb)   09/22/21 89.3 kg (196 lb 12.8 oz)   09/08/21 90.5 kg (199 lb 8 oz)   08/30/21 90.7 kg (199 lb 15.3 oz)   08/25/21 90.8 kg (200 lb 1.6 oz)   08/11/21 93.4 kg (206 lb)   08/09/21 91.8 kg (202 lb 4.8 oz)   08/04/21 93.1 kg (205 lb 4.8 oz)     Video physical  exam  General: Patient appears well in no acute distress.   Skin: No visualized rash or lesions on visualized skin  Eyes: EOMI, no erythema, sclera icterus or discharge noted  Resp: Appears to be breathing comfortably without accessory muscle usage, speaking in full sentences, no cough  MSK: Appears to have normal range of motion based on visualized movements  Neurologic: No apparent tremors, facial movements symmetric  Psych: affect normal, alert and oriented    The rest of a comprehensive physical examination is deferred due to PHE (public health emergency) video restrictions    Laboratory Data:  Results for AMADEO WILSON (MRN [id number]) as of 10/22/2021 15:43   10/18/2021 11:45   Sodium 133   Potassium 3.6   Chloride 100   Carbon Dioxide 24   Urea Nitrogen 8   Creatinine 0.51 (L)   GFR Estimate >90   Calcium 8.4 (L)   Anion Gap 9   Magnesium 2.0   Phosphorus 2.8   Albumin 1.8 (L)   Protein Total 7.1   Bilirubin Total 0.9   Alkaline Phosphatase 137   ALT 12   AST 21   T4 Free 1.09   TSH 12.06 (H)   Glucose 121 (H)   WBC 7.9   Hemoglobin 8.8 (L)   Hematocrit 28.3 (L)   Platelet Count 145 (L)   RBC Count 2.85 (L)   MCV 99   MCH 30.9   MCHC 31.1 (L)   RDW 20.9 (H)   % Neutrophils 72   % Lymphocytes 17   % Monocytes 7   % Eosinophils 1   % Basophils 1   Absolute Basophils 0.0   Absolute Eosinophils 0.1   Absolute Immature Granulocytes 0.1 (H)   Absolute Lymphocytes 1.3   Absolute Monocytes 0.5   % Immature Granulocytes 2   Absolute Neutrophils 5.8   Absolute NRBCs 0.0   NRBCs per 100 WBC 0       Assessment and Plan:  1. Onc  Metastatic UPS, was on Doxil + Ifosfamide (5 cycles) with positive response to treatment initially, then due to poor tolerance switched to Doxil alone (1 cycle) but had progression. Started on Keytruda 5/10/21. Due to tumor growth (progression vs pseudoprogression) added Gemzar 6/16/21. Had ongoing progression. Started on Pazopanib 9/24/21.     Overall tolerating Pazopanib OK-see supportive  cares for multiple issues below. Labs reviewed and no concerns. Will repeat CT imaging (do CT CAP with R side pain) 11/1.     Continue labs weekly for month of October, then can go to every other week. TSH was slightly off last week-recheck 10/25.      KALINA visit 11/2 after scan.     Port replacement scheduled 11/5/21.      2. ID  Fevers of unknown origin. See prior notes for work-up, all negative for infection. Thought to be tumor fevers. Improved with Indomethacin. Fevers now resolved. Continue to stay off Indomethacin for now. She will call with any recurrent fevers.     UTI, completed abx with Ciprofloxacin. No ongoing issues.      3. Heme  Anemia: ACD and chemotherapy. Slightly worse at 8.8. Continue weekly lab monitoring.      Thrombocytopenia: 2/2 Gemzar. No bleeding issues. OK today.     Neutropenia: No current issues. WBC WNL.      4. Pulm  Asthma: stable, continue inhalers.      COVID: Diagnosed in December, recovered. She is vaccinated.      Cough/SOB: See work-up from 5/26/21 (negative CT PE, RVP, COVID, NTBNP). Thought to be 2/2 phrenic nerve involvement of tumor. Continue Robitussin-AC PRN. Stable.      She has SVC flattening on CT and will sleep elevated. Consider pulm intervention for stenting in future.      5. Cards  Exercise fatigue and NAZARIO: Slightly improved though still an issue. Anemia is generally stable. Prior EKGs reviewed and NSR. QT WNL. NT-BNP was elevated however EF OK at 54%. Discussed echo results with Dr. Lopez who did not feel any concerning change from prior. Suspect large aspect deconditioning from hospitalization and how sick she has been the last month. Since she is improving (albeit slowly) will continue home PT and monitoring.      Hypotension: Much improved as is tachycardia. Continue pushing home fluids and monitoring.       5. 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 Continue Gabapentin 600/600/900.     Deconditioning: PT scheduled      Hallucinations: Brain MRI  reviewed and no mets. Does have loss of normal fatty marrow with broad differential. Discussed with neuro onc and unclear findings. At this time symptoms resolved so suspect it was combination of Oxycodone and UTI. LIMIT narcotics due to CNS effects.      6. MSK  R shoulder/back pain: Suspect 2/2 cancer, doing Tylenol PRN. Limit Oxycodone due to CNS effects.     R side pain: improving. Will evaluate with CT CAP at restaging.      Recurrent inflammatory arthritis symptoms (diffuse joint pain and swelling) and CRP elevated. Improved with prednisone, no current issues.      7. GI  Diarrhea, unclear etiology, C diff negative. Fecal elastase WNL. Has had intermittent issues throughout treatment. Improved, not currently needing Imodium.     Continue Compazine PRN nausea.     Continue Nystatin for thrush, improving. Discussed restarting Biotene for dry mouth.     Continue Tums PRN GERD, take away from Votrient.     She requested RD follow-up.     8. Psych  Admits to anxiety, especially around breathing and feeling like she needs to consciously think about taking a breath. Improved with distraction. Given recent issues with CNS effects of medications do not want to start Ativan. Will reach out to Titus Arango and palliative care SW to do a visit.     OVERALL PLAN  -Continue Pazopanib.  -Labs (CBC, CMP, Mag, Phos) weekly through end of October, then every 2 weeks  -Check TSH as well on 10/25 to ensure no need to start Synthroid.   -CT CAP early November with KALINA visit afterwards   -Port revision scheduled   -Request Titus/MATA and RD visit     45 minutes spent on the date of the encounter doing chart review, review of test results, interpretation of tests, patient visit, documentation and discussion with other provider(s)     Harshal Schuster PA-C  Department of Hematology and Oncology  [facility]

## 2021-10-21 ENCOUNTER — HOSPITAL ENCOUNTER (OUTPATIENT)
Facility: CLINIC | Age: 56
End: 2021-10-21
Admitting: INTERNAL MEDICINE
Payer: COMMERCIAL

## 2021-10-21 ENCOUNTER — ANCILLARY PROCEDURE (OUTPATIENT)
Dept: GENERAL RADIOLOGY | Facility: CLINIC | Age: 56
End: 2021-10-21
Attending: PHYSICIAN ASSISTANT
Payer: COMMERCIAL

## 2021-10-21 DIAGNOSIS — Z11.59 ENCOUNTER FOR SCREENING FOR OTHER VIRAL DISEASES: ICD-10-CM

## 2021-10-21 DIAGNOSIS — C49.9 SARCOMA (H): ICD-10-CM

## 2021-10-21 PROCEDURE — 36598 INJ W/FLUOR EVAL CV DEVICE: CPT | Performed by: PHYSICIAN ASSISTANT

## 2021-10-21 RX ORDER — HEPARIN SODIUM (PORCINE) LOCK FLUSH IV SOLN 100 UNIT/ML 100 UNIT/ML
5 SOLUTION INTRAVENOUS ONCE
Status: COMPLETED | OUTPATIENT
Start: 2021-10-21 | End: 2021-10-21

## 2021-10-21 RX ORDER — IODIXANOL 320 MG/ML
50 INJECTION, SOLUTION INTRAVASCULAR ONCE
Status: COMPLETED | OUTPATIENT
Start: 2021-10-21 | End: 2021-10-21

## 2021-10-21 RX ADMIN — HEPARIN SODIUM (PORCINE) LOCK FLUSH IV SOLN 100 UNIT/ML 5 ML: 100 SOLUTION at 14:43

## 2021-10-21 RX ADMIN — IODIXANOL 20 ML: 320 INJECTION, SOLUTION INTRAVASCULAR at 14:42

## 2021-10-22 ENCOUNTER — VIRTUAL VISIT (OUTPATIENT)
Dept: ONCOLOGY | Facility: CLINIC | Age: 56
End: 2021-10-22
Attending: PHYSICIAN ASSISTANT
Payer: COMMERCIAL

## 2021-10-22 DIAGNOSIS — C49.9 SARCOMA (H): Primary | ICD-10-CM

## 2021-10-22 DIAGNOSIS — D63.0 ANEMIA IN NEOPLASTIC DISEASE: ICD-10-CM

## 2021-10-22 DIAGNOSIS — R53.1 WEAKNESS: ICD-10-CM

## 2021-10-22 PROCEDURE — 99214 OFFICE O/P EST MOD 30 MIN: CPT | Mod: 95 | Performed by: PHYSICIAN ASSISTANT

## 2021-10-22 NOTE — Clinical Note
10/22/2021         RE: Richa Ashby  [address]  [place] [address]        Dear Colleague,    Thank you for referring your patient, Richa Ashby, to the [facility]. Please see a copy of my visit note below.    Connie is a 56 year old who is being evaluated via a billable video visit.      How would you like to obtain your AVS? MyChart  If the video visit is dropped, the invitation should be resent by: Text to cell phone: [phone]  Will anyone else be joining your video visit? No  {If patient encounters technical issues they should call [phone]}    Video Start Time: 1:30pm    Video-Visit Details    Type of service:  Video Visit    Video End Time: 2:10pm    Originating Location (pt. Location): Home    Distant Location (provider location):  [facility]     Platform used for Video Visit: [facility]      Oncology/Hematology Visit Note  Oct 22, 2021    Reason for Visit: Follow up of undifferentiated pleomorphic sarcoma     History of Present Illness: Richa Ashby is a 56 year old female with PMH asthma and GERD with undifferentiated pleomorphic sarcoma. 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 She received preoperative radiotherapy and the tumor was resected 3/12/20. She then developed a cough, chest tightness, and scapular pain and imaging revealed lung nodules. Lung biopsy 11/2/20 with sarcoma. She was started on Doxil + Ifos 11/13/20. Had delay in cycle 2 due to COVID dx 12/2/20. CT imaging after cycle 1 with positive response to treatment with decrease in pulmonary nodules. Received cycle 2 12/18/20. Received cycle 3 1/15/21 with dose reduction in Doxil for skin toxicity and mucositis. Received cycle 4 2/15/21.      CT CAP 3/8/21 with positive response to treatment, decreased size of pulmonary mets though one lesion was larger. Did see fibrocystic  changes in left breast, recommended breast imaging. Received cycle 5 Doxil + ifos 3/11/21.      Imaging 4/5/21 with overall stable disease except one lung lesion, opted to continue Doxil alone for now and switch to Keytruda in future if ongoing progression.      Started Keytruda 5/10/21. Was seen 5/26/21 cough, SOB, fatigue work-up thought to be 2/2 phrenic nerve involvement of R apical lung mass.      Due to worsening fatigue and cough, repeat CT 6/15/21 concerning for progression vs pseudoprogression. Added Gemzar 6/16/21.     She was admitted 6/17/21-6/20/21 with fever of unknown origin, infectious work-up negative, treated with broad spectrum antibiotics.     She clinically improved on abx. Was able to receive day 8 Gemzar (dose reduced for thrombocytopenia) + Keytruda 6/23/21.      CT 7/12/21 with stable disease.     She was admitted 8/25/21-8/31/21 for fevers, hypotension, acute on chronic anemia. Extensive infectious work-up negative so etiology of fevers still unknown.      She has had progressive disease so started on Pazopanib 9/24/21.     She was admitted 10/6/21-10/8/21 for weakness, new hallucinations in setting of UTI. Brain MRI without brain mets though did have Diffuse loss normal fatty marrow on T1 sequence, nonspecific. Symptoms improved with abx and IVF.      She was seen today for oncology follow-up.     Interval History:  Connie was called today for follow-up:  -Fatigue is still really bothersome. She isn't sleeping well at night and then naps during the day. Naps don't help.  -Still very winded both at rest and with movement. 93-96% on pulse oximeter. Feels like it might be a little better.  -Cough is about the same, comes and goes. When the coughing is worse then she has more nausea, pain, SOB. Cough syrup helps during these episodes. No chest pain except maybe mild tenderness on sternum that can be pleuritic. No radiation.    -Dry mouth persists. Thinks she might still have thrush. Still on  Nystatin. No mouth pain. Eating better. Drinking well today-helps temporarily. Hasn't tried biotene.   -Pain comes and goes, Oxycodone overnight. Tylenol during the day and ice packs. Manageable for the moment. Having pain mostly in upper R shoulder/back. Had some pain in L low back with a long car ride last week.  -Diarrhea improved. Hasn't had issues for 4-5 days. Stool isn't formed. Hasn't needed Imodium for a week.  -Nausea is worse. Taking Compazine more often (1-2x per day) helps. Can't identify a trigger other than possibly smell of food. Has vomiting a few times. No abdominal pain. GERD isn't bothersome.  -No fevers.   -Still mental fogginess. No hallucinations or confusion. No headaches. No dizziness except with standing at times.  -No dysuria-completed abx  -No leg swelling. No joint pain. No skin issues.   -HR better 75 bpm. Doing some exercises at home and doing more for herself each day. BP has been 130/85.       Current Outpatient Medications   Medication Sig Dispense Refill     acetaminophen (TYLENOL) 325 MG tablet Take 325-650 mg by mouth every 6 hours as needed for mild pain       ADVAIR DISKUS 100-50 MCG/DOSE inhaler Inhale 1 puff into the lungs daily        albuterol (PROAIR RESPICLICK) 108 (90 Base) MCG/ACT inhaler Inhale 1-2 puffs into the lungs every 6 hours as needed        cetirizine (ZYRTEC) 10 MG tablet Take 10 mg by mouth daily       gabapentin (NEURONTIN) 300 MG capsule Take 600 mg in the AM, 600 mg mid-day, and 900 mg at night 210 capsule 3     guaiFENesin-codeine (ROBITUSSIN AC) 100-10 MG/5ML solution Take 5-10 mLs by mouth every 4 hours as needed for cough 473 mL 1     melatonin (MELATONIN) 1 MG/ML LIQD liquid Take 5 mg by mouth At Bedtime        montelukast (SINGULAIR) 10 MG tablet Take 10 mg by mouth daily       multivitamin w/minerals (MULTI-VITAMIN) tablet Take 1 tablet by mouth daily       ondansetron (ZOFRAN) 8 MG tablet Take 1 tablet (8 mg) by mouth every 8 hours as needed for  nausea 60 tablet 1     oxyCODONE (ROXICODONE) 5 MG tablet Take 0.5-1 tablets (2.5-5 mg) by mouth every 6 hours as needed for severe pain 60 tablet 0     pazopanib (VOTRIENT) 200 MG tablet Take 4 tablets (800 mg) by mouth daily Take on an empty stomach 1 hour before or 2 hours after a meal. 120 tablet 0     pazopanib (VOTRIENT) 200 MG tablet Take 4 tablets (800 mg) by mouth daily Take on an empty stomach 1 hour before or 2 hours after a meal. 120 tablet 0     prochlorperazine (COMPAZINE) 10 MG tablet Take 10 mg by mouth every evening Before votrient       prochlorperazine (COMPAZINE) 10 MG tablet Take 1 tablet (10 mg) by mouth every 6 hours as needed for nausea or vomiting 90 tablet 3     vitamin D3 (CHOLECALCIFEROL) 2000 units (50 mcg) tablet Take 1 tablet by mouth daily         Past Medical History  Past Medical History:   Diagnosis Date     Asthma, chronic      Sarcoma (H)     Right lower thigh     Past Surgical History:   Procedure Laterality Date     AS RAD RESEC TONSIL/PILLARS       GALLBLADDER SURGERY       INSERT PORT VASCULAR ACCESS Right 10/28/2020    Procedure: single lumen power port placement @0745;  Surgeon: Tex Ackerman MD;  Location: [facility] OR     IR CHEST PORT PLACEMENT > 5 YRS OF AGE  10/28/2020     IR PORT CHECK RIGHT  3/26/2021     RESECT TUMOR LOWER EXTREMITY Right 3/12/2020    Procedure: Resection of right thigh tumor;  Surgeon: Guillermo Johnson MD;  Location: [facility] OR     ROTATOR CUFF REPAIR RT/LT  2012     SHOULDER SURGERY       SINUS SURGERY       Allergies   Allergen Reactions     Pentobarbital      resp arrest     Social History   Social History     Tobacco Use     Smoking status: Never Smoker     Smokeless tobacco: Never Used   Substance Use Topics     Alcohol use: Yes     Comment: 2-3 glasses of wine/week     Drug use: Never      Past medical history and social history were reviewed.    Physical Examination:  LMP 03/19/2019   Wt Readings from Last 10 Encounters:   10/07/21 87.7  kg (193 lb 4.8 oz)   10/06/21 87.2 kg (192 lb 3.2 oz)   10/03/21 87.1 kg (192 lb)   09/22/21 89.3 kg (196 lb 12.8 oz)   09/08/21 90.5 kg (199 lb 8 oz)   08/30/21 90.7 kg (199 lb 15.3 oz)   08/25/21 90.8 kg (200 lb 1.6 oz)   08/11/21 93.4 kg (206 lb)   08/09/21 91.8 kg (202 lb 4.8 oz)   08/04/21 93.1 kg (205 lb 4.8 oz)     Video physical exam  General: Patient appears well in no acute distress.   Skin: No visualized rash or lesions on visualized skin  Eyes: EOMI, no erythema, sclera icterus or discharge noted  Resp: Appears to be breathing comfortably without accessory muscle usage, speaking in full sentences, no cough  MSK: Appears to have normal range of motion based on visualized movements  Neurologic: No apparent tremors, facial movements symmetric  Psych: affect normal, alert and oriented    The rest of a comprehensive physical examination is deferred due to PHE (public health emergency) video restrictions    Laboratory Data:  Results for AMADEO WILSON (MRN [id number]) as of 10/22/2021 15:43   10/18/2021 11:45   Sodium 133   Potassium 3.6   Chloride 100   Carbon Dioxide 24   Urea Nitrogen 8   Creatinine 0.51 (L)   GFR Estimate >90   Calcium 8.4 (L)   Anion Gap 9   Magnesium 2.0   Phosphorus 2.8   Albumin 1.8 (L)   Protein Total 7.1   Bilirubin Total 0.9   Alkaline Phosphatase 137   ALT 12   AST 21   T4 Free 1.09   TSH 12.06 (H)   Glucose 121 (H)   WBC 7.9   Hemoglobin 8.8 (L)   Hematocrit 28.3 (L)   Platelet Count 145 (L)   RBC Count 2.85 (L)   MCV 99   MCH 30.9   MCHC 31.1 (L)   RDW 20.9 (H)   % Neutrophils 72   % Lymphocytes 17   % Monocytes 7   % Eosinophils 1   % Basophils 1   Absolute Basophils 0.0   Absolute Eosinophils 0.1   Absolute Immature Granulocytes 0.1 (H)   Absolute Lymphocytes 1.3   Absolute Monocytes 0.5   % Immature Granulocytes 2   Absolute Neutrophils 5.8   Absolute NRBCs 0.0   NRBCs per 100 WBC 0       Assessment and Plan:      *review echo and thyroid Skubitz-echo OK repeat  TSH  *conitnue PT  *continue votrient  *visit 11/2  *MATA or Titus  *RD visit    Harshal Schuster PA-C  Department of Hematology and Oncology  [facility]         Again, thank you for allowing me to participate in the care of your patient.        Sincerely,        RUPERT Guaman

## 2021-10-22 NOTE — Clinical Note
10/22/2021         RE: Richa Ashby  [address]  [address]  [place] [address]        Dear Colleague,    Thank you for referring your patient, Richa Ashby, to the [facility]. Please see a copy of my visit note below.    Connie is a 56 year old who is being evaluated via a billable video visit.      How would you like to obtain your AVS? MyChart  If the video visit is dropped, the invitation should be resent by: Text to cell phone: [phone]  Will anyone else be joining your video visit? No  {If patient encounters technical issues they should call [phone]}    Video Start Time: 1:30pm    Video-Visit Details    Type of service:  Video Visit    Video End Time: 2:10pm    Originating Location (pt. 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 She received preoperative radiotherapy and the tumor was resected 3/12/20. She then developed a cough, chest tightness, and scapular pain and imaging revealed lung nodules. Lung biopsy 11/2/20 with sarcoma. She was started on Doxil + Ifos 11/13/20. Had delay in cycle 2 due to COVID dx 12/2/20. CT imaging after cycle 1 with positive response to treatment with decrease in pulmonary nodules. Received cycle 2 12/18/20. Received cycle 3 1/15/21 with dose reduction in Doxil for skin toxicity and mucositis. Received cycle 4 2/15/21.      CT CAP 3/8/21 with positive response to treatment, decreased size of pulmonary mets though one lesion was larger. Did see fibrocystic  changes in left breast, recommended breast imaging. Received cycle 5 Doxil + ifos 3/11/21.      Imaging 4/5/21 with overall stable disease except one lung lesion, opted to continue Doxil alone for now and switch to Keytruda in future if ongoing progression.      Started Keytruda 5/10/21. Was seen 5/26/21 cough, SOB, fatigue work-up thought to be 2/2 phrenic nerve involvement of R apical lung mass.      Due to worsening fatigue and cough, repeat CT 6/15/21 concerning for progression vs pseudoprogression. Added Gemzar 6/16/21.     She was admitted 6/17/21-6/20/21 with fever of unknown origin, infectious work-up negative, treated with broad spectrum antibiotics.     She clinically improved on abx. Was able to receive day 8 Gemzar (dose reduced for thrombocytopenia) + Keytruda 6/23/21.      CT 7/12/21 with stable disease.     She was admitted 8/25/21-8/31/21 for fevers, hypotension, acute on chronic anemia. Extensive infectious work-up negative so etiology of fevers still unknown.      She has had progressive disease so started on Pazopanib 9/24/21.     She was admitted 10/6/21-10/8/21 for weakness, new hallucinations in setting of UTI. Brain MRI without brain mets though did have Diffuse loss normal fatty marrow on T1 sequence, nonspecific. Symptoms improved with abx and IVF.      She was seen today for oncology follow-up.     Interval History:  Connie was called today for follow-up:  -Fatigue is still really bothersome. She isn't sleeping well at night and then naps during the day. Naps don't help.  -Still very winded both at rest and with movement. 93-96% on pulse oximeter. Feels like it might be a little better.  -Cough is about the same, comes and goes. When the coughing is worse then she has more nausea, pain, SOB. Cough syrup helps during these episodes. No chest pain except maybe mild tenderness on sternum that can be pleuritic. No radiation.    -Dry mouth persists. Thinks she might still have thrush. Still on  Nystatin. No mouth pain. Eating better. Drinking well today-helps temporarily. Hasn't tried biotene.   -Pain comes and goes, Oxycodone overnight. Tylenol during the day and ice packs. Manageable for the moment. Having pain mostly in upper R shoulder/back. Had some pain in L low back with a long car ride last week.  -Diarrhea improved. Hasn't had issues for 4-5 days. Stool isn't formed. Hasn't needed Imodium for a week.  -Nausea is worse. Taking Compazine more often (1-2x per day) helps. Can't identify a trigger other than possibly smell of food. Has vomiting a few times. No abdominal pain. GERD isn't bothersome.  -No fevers.   -Still mental fogginess. No hallucinations or confusion. No headaches. No dizziness except with standing at times.  -No dysuria-completed abx  -No leg swelling. No joint pain. No skin issues.   -HR better 75 bpm. Doing some exercises at home and doing more for herself each day. BP has been 130/85.       Current Outpatient Medications   Medication Sig Dispense Refill     acetaminophen (TYLENOL) 325 MG tablet Take 325-650 mg by mouth every 6 hours as needed for mild pain       ADVAIR DISKUS 100-50 MCG/DOSE inhaler Inhale 1 puff into the lungs daily        albuterol (PROAIR RESPICLICK) 108 (90 Base) MCG/ACT inhaler Inhale 1-2 puffs into the lungs every 6 hours as needed        cetirizine (ZYRTEC) 10 MG tablet Take 10 mg by mouth daily       gabapentin (NEURONTIN) 300 MG capsule Take 600 mg in the AM, 600 mg mid-day, and 900 mg at night 210 capsule 3     guaiFENesin-codeine (ROBITUSSIN AC) 100-10 MG/5ML solution Take 5-10 mLs by mouth every 4 hours as needed for cough 473 mL 1     melatonin (MELATONIN) 1 MG/ML LIQD liquid Take 5 mg by mouth At Bedtime        montelukast (SINGULAIR) 10 MG tablet Take 10 mg by mouth daily       multivitamin w/minerals (MULTI-VITAMIN) tablet Take 1 tablet by mouth daily       ondansetron (ZOFRAN) 8 MG tablet Take 1 tablet (8 mg) by mouth every 8 hours as needed for  nausea 60 tablet 1     oxyCODONE (ROXICODONE) 5 MG tablet Take 0.5-1 tablets (2.5-5 mg) by mouth every 6 hours as needed for severe pain 60 tablet 0     pazopanib (VOTRIENT) 200 MG tablet Take 4 tablets (800 mg) by mouth daily Take on an empty stomach 1 hour before or 2 hours after a meal. 120 tablet 0     pazopanib (VOTRIENT) 200 MG tablet Take 4 tablets (800 mg) by mouth daily Take on an empty stomach 1 hour before or 2 hours after a meal. 120 tablet 0     prochlorperazine (COMPAZINE) 10 MG tablet Take 10 mg by mouth every evening Before votrient       prochlorperazine (COMPAZINE) 10 MG tablet Take 1 tablet (10 mg) by mouth every 6 hours as needed for nausea or vomiting 90 tablet 3     vitamin D3 (CHOLECALCIFEROL) 2000 units (50 mcg) tablet Take 1 tablet by mouth daily         Past Medical History  Past Medical History:   Diagnosis Date     Asthma, chronic      Sarcoma (H)     Right lower thigh     Past Surgical History:   Procedure Laterality Date     AS RAD RESEC TONSIL/PILLARS       GALLBLADDER SURGERY       INSERT PORT VASCULAR ACCESS Right 10/28/2020    Procedure: single lumen power port placement @0745;  Surgeon: Tex Ackerman MD;  Location: [facility] OR     IR CHEST PORT PLACEMENT > 5 YRS OF AGE  10/28/2020     IR PORT CHECK RIGHT  3/26/2021     RESECT TUMOR LOWER EXTREMITY Right 3/12/2020    Procedure: Resection of right thigh tumor;  Surgeon: Guillermo Johnson MD;  Location: [facility] OR     ROTATOR CUFF REPAIR RT/LT  2012     SHOULDER SURGERY       SINUS SURGERY       Allergies   Allergen Reactions     Pentobarbital      resp arrest     Social History   Social History     Tobacco Use     Smoking status: Never Smoker     Smokeless tobacco: Never Used   Substance Use Topics     Alcohol use: Yes     Comment: 2-3 glasses of wine/week     Drug use: Never      Past medical history and social history were reviewed.    Physical Examination:  LMP 03/19/2019   Wt Readings from Last 10 Encounters:   10/07/21 87.7  kg (193 lb 4.8 oz)   10/06/21 87.2 kg (192 lb 3.2 oz)   10/03/21 87.1 kg (192 lb)   09/22/21 89.3 kg (196 lb 12.8 oz)   09/08/21 90.5 kg (199 lb 8 oz)   08/30/21 90.7 kg (199 lb 15.3 oz)   08/25/21 90.8 kg (200 lb 1.6 oz)   08/11/21 93.4 kg (206 lb)   08/09/21 91.8 kg (202 lb 4.8 oz)   08/04/21 93.1 kg (205 lb 4.8 oz)     Video physical exam  General: Patient appears well in no acute distress.   Skin: No visualized rash or lesions on visualized skin  Eyes: EOMI, no erythema, sclera icterus or discharge noted  Resp: Appears to be breathing comfortably without accessory muscle usage, speaking in full sentences, no cough  MSK: Appears to have normal range of motion based on visualized movements  Neurologic: No apparent tremors, facial movements symmetric  Psych: affect normal, alert and oriented    The rest of a comprehensive physical examination is deferred due to PHE (public health emergency) video restrictions    Laboratory Data:  Results for AMADEO WILSON (MRN [id number]) as of 10/22/2021 15:43   10/18/2021 11:45   Sodium 133   Potassium 3.6   Chloride 100   Carbon Dioxide 24   Urea Nitrogen 8   Creatinine 0.51 (L)   GFR Estimate >90   Calcium 8.4 (L)   Anion Gap 9   Magnesium 2.0   Phosphorus 2.8   Albumin 1.8 (L)   Protein Total 7.1   Bilirubin Total 0.9   Alkaline Phosphatase 137   ALT 12   AST 21   T4 Free 1.09   TSH 12.06 (H)   Glucose 121 (H)   WBC 7.9   Hemoglobin 8.8 (L)   Hematocrit 28.3 (L)   Platelet Count 145 (L)   RBC Count 2.85 (L)   MCV 99   MCH 30.9   MCHC 31.1 (L)   RDW 20.9 (H)   % Neutrophils 72   % Lymphocytes 17   % Monocytes 7   % Eosinophils 1   % Basophils 1   Absolute Basophils 0.0   Absolute Eosinophils 0.1   Absolute Immature Granulocytes 0.1 (H)   Absolute Lymphocytes 1.3   Absolute Monocytes 0.5   % Immature Granulocytes 2   Absolute Neutrophils 5.8   Absolute NRBCs 0.0   NRBCs per 100 WBC 0       Assessment and Plan:      *review echo and thyroid Skubitz-echo OK repeat  TSH  *conitnue PT  *continue votrient  *visit 11/2  *MATA or Titus  *RD visit    Harshal Schuster PA-C  Department of Hematology and Oncology  [facility]         Again, thank you for allowing me to participate in the care of your patient.        Sincerely,        RUPERT Guaman

## 2021-10-25 ENCOUNTER — LAB (OUTPATIENT)
Dept: ONCOLOGY | Facility: CLINIC | Age: 56
End: 2021-10-25
Payer: COMMERCIAL

## 2021-10-25 DIAGNOSIS — C49.9 SARCOMA (H): ICD-10-CM

## 2021-10-25 DIAGNOSIS — Z79.899 ENCOUNTER FOR LONG-TERM (CURRENT) USE OF MEDICATIONS: ICD-10-CM

## 2021-10-25 DIAGNOSIS — C49.9 SARCOMA OF SOFT TISSUE (H): ICD-10-CM

## 2021-10-25 DIAGNOSIS — R50.9 FEVER AND CHILLS: ICD-10-CM

## 2021-10-25 LAB
ALBUMIN SERPL-MCNC: 2 G/DL (ref 3.4–5)
ALP SERPL-CCNC: 154 U/L (ref 40–150)
ALT SERPL W P-5'-P-CCNC: 17 U/L (ref 0–50)
ANION GAP SERPL CALCULATED.3IONS-SCNC: 6 MMOL/L (ref 3–14)
AST SERPL W P-5'-P-CCNC: 21 U/L (ref 0–45)
BASOPHILS # BLD MANUAL: 0.1 10E3/UL (ref 0–0.2)
BASOPHILS NFR BLD MANUAL: 1 %
BILIRUB SERPL-MCNC: 0.9 MG/DL (ref 0.2–1.3)
BUN SERPL-MCNC: 7 MG/DL (ref 7–30)
CALCIUM SERPL-MCNC: 8.9 MG/DL (ref 8.5–10.1)
CHLORIDE BLD-SCNC: 103 MMOL/L (ref 94–109)
CO2 SERPL-SCNC: 26 MMOL/L (ref 20–32)
CREAT SERPL-MCNC: 0.51 MG/DL (ref 0.52–1.04)
CRP SERPL-MCNC: 140 MG/L (ref 0–8)
EOSINOPHIL # BLD MANUAL: 0.2 10E3/UL (ref 0–0.7)
EOSINOPHIL NFR BLD MANUAL: 3 %
ERYTHROCYTE [DISTWIDTH] IN BLOOD BY AUTOMATED COUNT: 23.9 % (ref 10–15)
GFR SERPL CREATININE-BSD FRML MDRD: >90 ML/MIN/1.73M2
GLUCOSE BLD-MCNC: 101 MG/DL (ref 70–99)
HCT VFR BLD AUTO: 28.1 % (ref 35–47)
HGB BLD-MCNC: 8.7 G/DL (ref 11.7–15.7)
HOLD SPECIMEN: NORMAL
LYMPHOCYTES # BLD MANUAL: 1.1 10E3/UL (ref 0.8–5.3)
LYMPHOCYTES NFR BLD MANUAL: 22 %
MAGNESIUM SERPL-MCNC: 2.2 MG/DL (ref 1.6–2.3)
MCH RBC QN AUTO: 32.1 PG (ref 26.5–33)
MCHC RBC AUTO-ENTMCNC: 31 G/DL (ref 31.5–36.5)
MCV RBC AUTO: 104 FL (ref 78–100)
MONOCYTES # BLD MANUAL: 0.1 10E3/UL (ref 0–1.3)
MONOCYTES NFR BLD MANUAL: 2 %
MYELOCYTES # BLD MANUAL: 0.1 10E3/UL
MYELOCYTES NFR BLD MANUAL: 1 %
NEUTROPHILS # BLD MANUAL: 3.6 10E3/UL (ref 1.6–8.3)
NEUTROPHILS NFR BLD MANUAL: 71 %
NRBC # BLD AUTO: 0.1 10E3/UL
NRBC BLD MANUAL-RTO: 1 %
PHOSPHATE SERPL-MCNC: 3 MG/DL (ref 2.5–4.5)
PLAT MORPH BLD: ABNORMAL
PLATELET # BLD AUTO: 137 10E3/UL (ref 150–450)
POLYCHROMASIA BLD QL SMEAR: SLIGHT
POTASSIUM BLD-SCNC: 4 MMOL/L (ref 3.4–5.3)
PROT SERPL-MCNC: 7.4 G/DL (ref 6.8–8.8)
RBC # BLD AUTO: 2.71 10E6/UL (ref 3.8–5.2)
RBC MORPH BLD: ABNORMAL
SODIUM SERPL-SCNC: 135 MMOL/L (ref 133–144)
STOMATOCYTES BLD QL SMEAR: SLIGHT
T4 FREE SERPL-MCNC: 0.95 NG/DL (ref 0.76–1.46)
TSH SERPL DL<=0.005 MIU/L-ACNC: 15.29 MU/L (ref 0.4–4)
WBC # BLD AUTO: 5.1 10E3/UL (ref 4–11)

## 2021-10-25 PROCEDURE — 80053 COMPREHEN METABOLIC PANEL: CPT | Performed by: NURSE PRACTITIONER

## 2021-10-25 PROCEDURE — 85027 COMPLETE CBC AUTOMATED: CPT | Performed by: NURSE PRACTITIONER

## 2021-10-25 PROCEDURE — 83735 ASSAY OF MAGNESIUM: CPT | Performed by: NURSE PRACTITIONER

## 2021-10-25 PROCEDURE — 86140 C-REACTIVE PROTEIN: CPT

## 2021-10-25 PROCEDURE — 36415 COLL VENOUS BLD VENIPUNCTURE: CPT

## 2021-10-25 PROCEDURE — 84439 ASSAY OF FREE THYROXINE: CPT | Performed by: NURSE PRACTITIONER

## 2021-10-25 PROCEDURE — 84100 ASSAY OF PHOSPHORUS: CPT | Performed by: NURSE PRACTITIONER

## 2021-10-25 PROCEDURE — 84443 ASSAY THYROID STIM HORMONE: CPT | Performed by: NURSE PRACTITIONER

## 2021-10-25 RX ORDER — HEPARIN SODIUM (PORCINE) LOCK FLUSH IV SOLN 100 UNIT/ML 100 UNIT/ML
500 SOLUTION INTRAVENOUS
Status: DISCONTINUED | OUTPATIENT
Start: 2021-10-25 | End: 2021-10-25 | Stop reason: HOSPADM

## 2021-10-25 NOTE — PROGRESS NOTES
Patient presents for port lab draw.  Upon arrival patient states she does not want her port accessed, as it hasn't been working properly.  Patient prefers peripheral lab draw.    Discussed with lab staff whom agreed to draw lab today.    Bela Rivera RN on 10/25/2021 at 11:49 AM

## 2021-10-26 ENCOUNTER — TELEPHONE (OUTPATIENT)
Dept: ONCOLOGY | Facility: CLINIC | Age: 56
End: 2021-10-26

## 2021-10-26 ENCOUNTER — VIRTUAL VISIT (OUTPATIENT)
Dept: PALLIATIVE CARE | Facility: CLINIC | Age: 56
End: 2021-10-26
Attending: INTERNAL MEDICINE
Payer: COMMERCIAL

## 2021-10-26 DIAGNOSIS — Z71.89 ADVANCE CARE PLANNING: ICD-10-CM

## 2021-10-26 DIAGNOSIS — C49.9 SARCOMA OF SOFT TISSUE (H): Primary | ICD-10-CM

## 2021-10-26 DIAGNOSIS — R06.09 DYSPNEA ON EXERTION: ICD-10-CM

## 2021-10-26 DIAGNOSIS — C49.9 SARCOMA (H): ICD-10-CM

## 2021-10-26 DIAGNOSIS — R53.0 NEOPLASTIC (MALIGNANT) RELATED FATIGUE: ICD-10-CM

## 2021-10-26 DIAGNOSIS — R94.6 THYROID FUNCTION TEST ABNORMAL: Primary | ICD-10-CM

## 2021-10-26 PROCEDURE — 99215 OFFICE O/P EST HI 40 MIN: CPT | Mod: 95 | Performed by: INTERNAL MEDICINE

## 2021-10-26 RX ORDER — LEVOTHYROXINE SODIUM 50 UG/1
50 TABLET ORAL DAILY
Qty: 30 TABLET | Refills: 1 | Status: SHIPPED | OUTPATIENT
Start: 2021-10-26 | End: 2021-12-01

## 2021-10-26 NOTE — LETTER
"10/26/2021       RE: Richa Ashby  [address]  [address]  [place] [address]     Dear Colleague,    Thank you for referring your patient, [facility]a Ashby[facility]. Please see a copy of my visit note below.    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   5/2021 started pembrolizumab.   6/16/21 added gemcitabine due to progression vs pseudoprogression  6/2021 hospitalized with fevers, no clear source   8/2021 hospitalized with fevers, hypotension, no clear source, question of inflammatory arthritis from pembro  9/2021 onc visit-->scan with growth however areas of necrosis; tx held, close follow-up.  9/2021 pazopanib  10/2021 hospitalized for weakness and delirium + UTI.      [facility]     Social - Lives with . 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 Son in [place] with his family; Dtr in [facility].      Care Planning - Multiple ACP discussions with us eg 6/16/2021, 7/2021, 9/2021. Completing HCD, well informed about the natural history of her cancer.     History:  History gathered today from: patient, medical chart    She notes some worsening dyspnea \"I don't know if it's physical or psychological.\" Started when recovering from her hospitalization with the UTI/confusion. She attributes the mental fog to taking 10 mg oxycodone but notes she's taking minimal oxycodone right now and still has mental fog. Has a dry cough. Dyspnea is markedly exertional. She checks her O2 sats and they are normal. Walking across the room 'is rough'. \"I'm wiped out for 20 min\". Avoids oxycodone because it makes her sleepy.  does all the IADLs, she remains independent in her ADLs, but it's an " "effort to shower herself even with a shower stool.  brings her her clothes so she doesn't have to carry the clothes herself, just gets too dyspneic.    Pain--not a major problem. On occasion has a dull ache in R lateral chest wall.     Observes generally more nausea, low appetite, fatigue the last month. Naps twice a day for 4h total~. Tries to eat 5 small meals a day, eg half a cup of fruit or yogurt. Does take compazine regularly, finds it somewhat helpful. Zofran gives her a HA. Takes votrient at night.     Just started lt4, hoping it'll help for energy.    Mood--\"I don't know---I feel useless---I'm not doing anything---not working, can't focus on any paperwork. My  has taken it all over and I appreciate it. All I do is sleep, take medicine, and go to bed.\" Things are a lot worse the last couple months. Also worries: \"will I be there for [place]?\"     Feels good about trying the Votrient--needed to try something, not in a place in which stopping active treatment feels right to her, despite how her QOL has worsened the last couple months. \"Sometimes I struggle with the hope.\" Ie the 'realist' in her notes 'nothing has worked yet and nothing will work.' Overall mood and coping is ok. Her family has planned another trip to [place] [employer] [place] around [facility] and she wonders if she'll be able to do that. Lots of uncertainty.    Has been on Nystatin for thrush, getting better    PE: LMP 03/19/2019    Wt Readings from Last 3 Encounters:   10/07/21 87.7 kg (193 lb 4.8 oz)   10/06/21 87.2 kg (192 lb 3.2 oz)   10/03/21 87.1 kg (192 lb)     Alert NAD  Clear sensorium full affect    Data reviewed:  I reviewed recent labs and imaging, my comments:  Cr 0.5, Alb 2.0, TSH is 15  CT Sept--progressive lung mets  Brain MRI--No acute findings, no mets     database reviewed: y      Impression & Recommendations:  55 yo with metastatic sarcoma on Votrient.    Dyspnea likely related to tumor in lungs.  D/w her low dose opioids can at " times be helpful. She should try 2.5 mg oxycodone on occasion to see if that improves her dyspnea at all.     Fatigue: just starting levothryoxine so let's see how that goes. Given her concentration impairments, if it doesn't help in a few weeks could try Ritalin, may not help her physical fatigue but may improve her concentration. Let her know about this, could try it in a few weeks, asked her to think about it.    We discussed mood, coping, living with a terminal illess + lots of uncertainty. She is very much feeling that, understandably. Overall she seems to be reporting a healthy balance of realism + hope. Importantly, her qol is worse the last couple months, I think some of that is tumor related (lung mets worsened in Sept), some may be thyroid and could improve quickly, some likely Votrient related; she very much wants to continue anticancer tx and feels good about that decision even with the associated problems she's having and I'm glad to hear that.    Follow-up 1 mo    41 minutes spent on the date of the encounter doing chart review, history and exam, patient education & counseling, documentation and other activities as noted above.    Thank you for involving us in the patient's care.   Robert Blanca MD / Palliative Medicine / Text me via [facility].      Again, thank you for allowing me to participate in the care of your patient.      Sincerely,    Robert Blanca MD

## 2021-10-26 NOTE — PROGRESS NOTES
"Connie is a 56 year old who is being evaluated via a billable video visit.      How would you like to obtain your AVS? MyChart  If the video visit is dropped, the invitation should be resent by: Text to cell phone: [phone]  Will anyone else be joining your video visit? 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   5/2021 started pembrolizumab.   6/16/21 added gemcitabine due to progression vs pseudoprogression  6/2021 hospitalized with fevers, no clear source   8/2021 hospitalized with fevers, hypotension, no clear source, question of inflammatory arthritis from pembro  9/2021 onc visit-->scan with growth however areas of necrosis; tx held, close follow-up.  9/2021 pazopanib  10/2021 hospitalized for weakness and delirium + UTI.      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.      Care Planning - Multiple ACP discussions with us eg 6/16/2021, 7/2021, 9/2021. Completing HCD, well informed about the natural history of her cancer.     History:  History gathered today from: patient, medical chart    She notes some worsening dyspnea \"I don't know if it's physical or psychological.\" Started when recovering from her hospitalization with the UTI/confusion. She attributes the mental fog to taking 10 mg oxycodone but notes she's taking minimal oxycodone right now and still has mental fog. Has a dry cough. Dyspnea is markedly exertional. She checks her O2 sats and they are normal. Walking across the room 'is rough'. \"I'm wiped out for 20 min\". Avoids oxycodone because it makes her sleepy.  does all the IADLs, she remains independent in her ADLs, but it's an effort to shower herself even with a shower " "stool.  brings her her clothes so she doesn't have to carry the clothes herself, just gets too dyspneic.    Pain--not a major problem. On occasion has a dull ache in R lateral chest wall.     Observes generally more nausea, low appetite, fatigue the last month. Naps twice a day for 4h total~. Tries to eat 5 small meals a day, eg half a cup of fruit or yogurt. Does take compazine regularly, finds it somewhat helpful. Zofran gives her a HA. Takes votrient at night.     Just started lt4, hoping it'll help for energy.    Mood--\"I don't know---I feel useless---I'm not doing anything---not working, can't focus on any paperwork. My  has taken it all over and I appreciate it. All I do is sleep, take medicine, and go to bed.\" Things are a lot worse the last couple months. Also worries: \"will I be there for [place]?\"     Feels good about trying the Votrient--needed to try something, not in a place in which stopping active treatment feels right to her, despite how her QOL has worsened the last couple months. \"Sometimes I struggle with the hope.\" Ie the 'realist' in her notes 'nothing has worked yet and nothing will work.' Overall mood and coping is ok. Her family has planned another trip to [place] around [facility] and she wonders if she'll be able to do that. Lots of uncertainty.    Has been on Nystatin for thrush, getting better    PE: LMP 03/19/2019    Wt Readings from Last 3 Encounters:   10/07/21 87.7 kg (193 lb 4.8 oz)   10/06/21 87.2 kg (192 lb 3.2 oz)   10/03/21 87.1 kg (192 lb)     Alert NAD  Clear sensorium full affect    Data reviewed:  I reviewed recent labs and imaging, my comments:  Cr 0.5, Alb 2.0, TSH is 15  CT Sept--progressive lung mets  Brain MRI--No acute findings, no mets     database reviewed: y      Impression & Recommendations:  55 yo with metastatic sarcoma on Votrient.    Dyspnea likely related to tumor in lungs.  D/w her low dose opioids can at times be helpful. She should try 2.5 mg " oxycodone on occasion to see if that improves her dyspnea at all.     Fatigue: just starting levothryoxine so let's see how that goes. Given her concentration impairments, if it doesn't help in a few weeks could try Ritalin, may not help her physical fatigue but may improve her concentration. Let her know about this, could try it in a few weeks, asked her to think about it.    We discussed mood, coping, living with a terminal illess + lots of uncertainty. She is very much feeling that, understandably. Overall she seems to be reporting a healthy balance of realism + hope. Importantly, her qol is worse the last couple months, I think some of that is tumor related (lung mets worsened in Sept), some may be thyroid and could improve quickly, some likely Votrient related; she very much wants to continue anticancer tx and feels good about that decision even with the associated problems she's having and I'm glad to hear that.    Follow-up 1 mo    41 minutes spent on the date of the encounter doing chart review, history and exam, patient education & counseling, documentation and other activities as noted above.    Thank you for involving us in the patient's care.   Rboert Blanca MD / Palliative Medicine / Text me via [employer].      Video Start Time: 1426  Video-Visit Details    Type of service:  Video Visit    Video End Time:3:00 PM    Originating Location (pt. Location): Home    Distant Location (provider location): Home  Platform used for Video Visit: James

## 2021-10-26 NOTE — ORAL ONC MGMT
Oral Chemotherapy Monitoring Program    Subjective/Objective:  Richa Ashby is a 56 year old female contacted by phone for a follow-up visit for oral chemotherapy.  Connie said she is doing well with Votrient. She is using compazine as needed for nausea and this has been effective for her. She had no other concerns.    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 John Lopez   [facility]   Drug Name Votrient (pazopanib) Votrient (pazopanib) Votrient (pazopanib) Votrient (pazopanib) Votrient (pazopanib) Votrient (pazopanib) Votrient (pazopanib)   Dose 800 mg 800 mg - 800 mg 800 mg 800 mg 800 mg   Current Schedule Daily Daily - Daily Daily Daily Daily   Cycle Details Continuous Continuous - Continuous Continuous Continuous Continuous   Start Date of Last Cycle 9/24/2021 9/24/2021 - - 9/24/2021 - -   Planned next cycle start date - - - - 10/24/2021 - -   Doses missed in last 2 weeks - 0 - - - - 1   Adherence Assessment - Adherent - - - - Adherent   Adverse Effects - Fatigue - - - - Nausea   Nausea - Grade 1 - - - - Resolved due to intervention   Pharmacist Intervention(nausea) - No - - - - Yes   Intervention(s) - - - - - - Patient education;Rx medication recommendation   Fatigue - Grade 1 - - - - -   Pharmacist Intervention(fatigue) - No - - - - -   Any new drug interactions? Yes No - - - - No   Pharmacist Intervention? Yes - - - - - -   Intervention(s) Patient Education;Drug changed (non-chemo) - - - - - -   Is the dose as ordered appropriate for the patient? Yes Yes - - - - Yes       Last PHQ-2 Score on record:   PHQ-2  "( 1999 [employer]) 3/4/2020 12/4/2019   Q1: Little interest or pleasure in doing things 1 0   Q2: Feeling down, depressed or hopeless 0 0   PHQ-2 Score 1 0   Q1: Little interest or pleasure in doing things - Not at all   Q2: Feeling down, depressed or hopeless - Not at all   PHQ-2 Score - 0       Vitals:  BP:   BP Readings from Last 1 Encounters:   10/13/21 106/75     Wt Readings from Last 1 Encounters:   10/07/21 87.7 kg (193 lb 4.8 oz)     Estimated body surface area is 2.04 meters squared as calculated from the following:    Height as of 10/6/21: 1.702 m (5' 7\").    Weight as of 10/7/21: 87.7 kg (193 lb 4.8 oz).    Labs:  _  Result Component Current Result Ref Range   Sodium 135 (10/25/2021) 133 - 144 mmol/L     _  Result Component Current Result Ref Range   Potassium 4.0 (10/25/2021) 3.4 - 5.3 mmol/L     _  Result Component Current Result Ref Range   Calcium 8.9 (10/25/2021) 8.5 - 10.1 mg/dL     _  Result Component Current Result Ref Range   Magnesium 2.2 (10/25/2021) 1.6 - 2.3 mg/dL     _  Result Component Current Result Ref Range   Phosphorus 3.0 (10/25/2021) 2.5 - 4.5 mg/dL     _  Result Component Current Result Ref Range   Albumin 2.0 (L) (10/25/2021) 3.4 - 5.0 g/dL     _  Result Component Current Result Ref Range   Urea Nitrogen 7 (10/25/2021) 7 - 30 mg/dL     _  Result Component Current Result Ref Range   Creatinine 0.51 (L) (10/25/2021) 0.52 - 1.04 mg/dL     _  Result Component Current Result Ref Range   AST 21 (10/25/2021) 0 - 45 U/L     _  Result Component Current Result Ref Range   ALT 17 (10/25/2021) 0 - 50 U/L     _  Result Component Current Result Ref Range   Bilirubin Total 0.9 (10/25/2021) 0.2 - 1.3 mg/dL     _  Result Component Current Result Ref Range   WBC Count 5.1 (10/25/2021) 4.0 - 11.0 10e3/uL     _  Result Component Current Result Ref Range   Hemoglobin 8.7 (L) (10/25/2021) 11.7 - 15.7 g/dL     _  Result Component Current Result Ref Range   Platelet Count 137 (L) (10/25/2021) 150 - 450 10e3/uL "     _  Result Component Current Result Ref Range   Absolute Neutrophils 3.6 (10/25/2021) 1.6 - 8.3 10e3/uL         Assessment/Plan:  TSH is elevated. Harshal would like to start levothyroixine 50mcg. Continue Votrient. Connie expressed understanding and agreement with this plan and thanked me for the call and care.    Follow-Up:  Pending labs and KALINA visit next week.    Refill Due:  By 11/24/2021    Kyaw Mcneill PharmD  [facility]  [phone]  October 26, 2021

## 2021-10-28 ENCOUNTER — HOSPITAL ENCOUNTER (OUTPATIENT)
Dept: PHYSICAL THERAPY | Facility: CLINIC | Age: 56
Setting detail: THERAPIES SERIES
End: 2021-10-28
Attending: PHYSICIAN ASSISTANT
Payer: COMMERCIAL

## 2021-10-28 DIAGNOSIS — C49.9 SARCOMA (H): Primary | ICD-10-CM

## 2021-10-28 PROCEDURE — 97110 THERAPEUTIC EXERCISES: CPT | Mod: GP | Performed by: PHYSICAL THERAPIST

## 2021-10-28 RX ORDER — SODIUM CHLORIDE 9 MG/ML
INJECTION, SOLUTION INTRAVENOUS CONTINUOUS
Status: CANCELLED | OUTPATIENT
Start: 2021-10-28

## 2021-10-28 RX ORDER — LIDOCAINE 40 MG/G
CREAM TOPICAL
Status: CANCELLED | OUTPATIENT
Start: 2021-10-28

## 2021-10-28 RX ORDER — CEFAZOLIN SODIUM 2 G/100ML
2 INJECTION, SOLUTION INTRAVENOUS
Status: CANCELLED | OUTPATIENT
Start: 2021-10-28

## 2021-11-01 ENCOUNTER — MYC MEDICAL ADVICE (OUTPATIENT)
Dept: INTERVENTIONAL RADIOLOGY/VASCULAR | Facility: CLINIC | Age: 56
End: 2021-11-01

## 2021-11-01 ENCOUNTER — APPOINTMENT (OUTPATIENT)
Dept: GENERAL RADIOLOGY | Facility: CLINIC | Age: 56
End: 2021-11-01
Attending: EMERGENCY MEDICINE
Payer: COMMERCIAL

## 2021-11-01 ENCOUNTER — HOSPITAL ENCOUNTER (INPATIENT)
Facility: CLINIC | Age: 56
LOS: 8 days | Discharge: HOME | End: 2021-11-09
Attending: EMERGENCY MEDICINE | Admitting: INTERNAL MEDICINE
Payer: COMMERCIAL

## 2021-11-01 ENCOUNTER — APPOINTMENT (OUTPATIENT)
Dept: GENERAL RADIOLOGY | Facility: CLINIC | Age: 56
End: 2021-11-01
Payer: COMMERCIAL

## 2021-11-01 ENCOUNTER — TELEPHONE (OUTPATIENT)
Dept: ONCOLOGY | Facility: CLINIC | Age: 56
End: 2021-11-01

## 2021-11-01 ENCOUNTER — ANCILLARY PROCEDURE (OUTPATIENT)
Dept: CT IMAGING | Facility: CLINIC | Age: 56
End: 2021-11-01
Attending: PHYSICIAN ASSISTANT
Payer: COMMERCIAL

## 2021-11-01 DIAGNOSIS — G89.3 CANCER RELATED PAIN: ICD-10-CM

## 2021-11-01 DIAGNOSIS — C49.9 SARCOMA (H): ICD-10-CM

## 2021-11-01 DIAGNOSIS — C49.9 SARCOMA OF SOFT TISSUE (H): ICD-10-CM

## 2021-11-01 DIAGNOSIS — Z11.52 ENCOUNTER FOR SCREENING LABORATORY TESTING FOR SEVERE ACUTE RESPIRATORY SYNDROME CORONAVIRUS 2 (SARS-COV-2): ICD-10-CM

## 2021-11-01 DIAGNOSIS — J93.83 OTHER PNEUMOTHORAX: ICD-10-CM

## 2021-11-01 DIAGNOSIS — J94.8 HYDROTHORAX: ICD-10-CM

## 2021-11-01 LAB
ANION GAP SERPL CALCULATED.3IONS-SCNC: 7 MMOL/L (ref 3–14)
BASOPHILS # BLD AUTO: 0 10E3/UL (ref 0–0.2)
BASOPHILS NFR BLD AUTO: 1 %
BUN SERPL-MCNC: 10 MG/DL (ref 7–30)
CALCIUM SERPL-MCNC: 8.6 MG/DL (ref 8.5–10.1)
CHLORIDE BLD-SCNC: 100 MMOL/L (ref 94–109)
CO2 SERPL-SCNC: 26 MMOL/L (ref 20–32)
CREAT SERPL-MCNC: 0.48 MG/DL (ref 0.52–1.04)
EOSINOPHIL # BLD AUTO: 0.1 10E3/UL (ref 0–0.7)
EOSINOPHIL NFR BLD AUTO: 2 %
ERYTHROCYTE [DISTWIDTH] IN BLOOD BY AUTOMATED COUNT: 24.2 % (ref 10–15)
GFR SERPL CREATININE-BSD FRML MDRD: >90 ML/MIN/1.73M2
GLUCOSE BLD-MCNC: 90 MG/DL (ref 70–99)
HCT VFR BLD AUTO: 26.7 % (ref 35–47)
HGB BLD-MCNC: 8.3 G/DL (ref 11.7–15.7)
HOLD SPECIMEN: NORMAL
IMM GRANULOCYTES # BLD: 0 10E3/UL
IMM GRANULOCYTES NFR BLD: 1 %
INR PPP: 1.24 (ref 0.85–1.15)
LYMPHOCYTES # BLD AUTO: 1.3 10E3/UL (ref 0.8–5.3)
LYMPHOCYTES NFR BLD AUTO: 29 %
MCH RBC QN AUTO: 33.6 PG (ref 26.5–33)
MCHC RBC AUTO-ENTMCNC: 31.1 G/DL (ref 31.5–36.5)
MCV RBC AUTO: 108 FL (ref 78–100)
MONOCYTES # BLD AUTO: 0.4 10E3/UL (ref 0–1.3)
MONOCYTES NFR BLD AUTO: 10 %
NEUTROPHILS # BLD AUTO: 2.5 10E3/UL (ref 1.6–8.3)
NEUTROPHILS NFR BLD AUTO: 57 %
NRBC # BLD AUTO: 0 10E3/UL
NRBC BLD AUTO-RTO: 0 /100
PLATELET # BLD AUTO: 123 10E3/UL (ref 150–450)
POTASSIUM BLD-SCNC: 3.6 MMOL/L (ref 3.4–5.3)
RADIOLOGIST FLAGS: ABNORMAL
RBC # BLD AUTO: 2.47 10E6/UL (ref 3.8–5.2)
SARS-COV-2 RNA RESP QL NAA+PROBE: NEGATIVE
SODIUM SERPL-SCNC: 133 MMOL/L (ref 133–144)
WBC # BLD AUTO: 4.4 10E3/UL (ref 4–11)

## 2021-11-01 PROCEDURE — 32551 INSERTION OF CHEST TUBE: CPT | Performed by: EMERGENCY MEDICINE

## 2021-11-01 PROCEDURE — 74177 CT ABD & PELVIS W/CONTRAST: CPT | Performed by: RADIOLOGY

## 2021-11-01 PROCEDURE — 85025 COMPLETE CBC W/AUTO DIFF WBC: CPT | Performed by: EMERGENCY MEDICINE

## 2021-11-01 PROCEDURE — 93005 ELECTROCARDIOGRAM TRACING: CPT

## 2021-11-01 PROCEDURE — 32551 INSERTION OF CHEST TUBE: CPT

## 2021-11-01 PROCEDURE — 71045 X-RAY EXAM CHEST 1 VIEW: CPT | Mod: 26 | Performed by: RADIOLOGY

## 2021-11-01 PROCEDURE — 71260 CT THORAX DX C+: CPT | Performed by: RADIOLOGY

## 2021-11-01 PROCEDURE — 999N000065 XR CHEST PORT 1 VIEW

## 2021-11-01 PROCEDURE — 85610 PROTHROMBIN TIME: CPT | Performed by: EMERGENCY MEDICINE

## 2021-11-01 PROCEDURE — 36415 COLL VENOUS BLD VENIPUNCTURE: CPT | Performed by: EMERGENCY MEDICINE

## 2021-11-01 PROCEDURE — 99223 1ST HOSP IP/OBS HIGH 75: CPT | Mod: AI | Performed by: INTERNAL MEDICINE

## 2021-11-01 PROCEDURE — 93010 ELECTROCARDIOGRAM REPORT: CPT | Mod: 59 | Performed by: EMERGENCY MEDICINE

## 2021-11-01 PROCEDURE — 250N000011 HC RX IP 250 OP 636: Performed by: EMERGENCY MEDICINE

## 2021-11-01 PROCEDURE — 80048 BASIC METABOLIC PNL TOTAL CA: CPT | Performed by: EMERGENCY MEDICINE

## 2021-11-01 PROCEDURE — 250N000013 HC RX MED GY IP 250 OP 250 PS 637: Performed by: INTERNAL MEDICINE

## 2021-11-01 PROCEDURE — 0W9930Z DRAINAGE OF RIGHT PLEURAL CAVITY WITH DRAINAGE DEVICE, PERCUTANEOUS APPROACH: ICD-10-PCS | Performed by: EMERGENCY MEDICINE

## 2021-11-01 PROCEDURE — C9803 HOPD COVID-19 SPEC COLLECT: HCPCS

## 2021-11-01 PROCEDURE — 99285 EMERGENCY DEPT VISIT HI MDM: CPT | Mod: 25 | Performed by: EMERGENCY MEDICINE

## 2021-11-01 PROCEDURE — 99207 PR CDG-MDM COMPONENT: MEETS MODERATE - UP CODED: CPT | Performed by: INTERNAL MEDICINE

## 2021-11-01 PROCEDURE — 120N000002 HC R&B MED SURG/OB UMMC

## 2021-11-01 PROCEDURE — 99285 EMERGENCY DEPT VISIT HI MDM: CPT | Mod: 25

## 2021-11-01 PROCEDURE — U0003 INFECTIOUS AGENT DETECTION BY NUCLEIC ACID (DNA OR RNA); SEVERE ACUTE RESPIRATORY SYNDROME CORONAVIRUS 2 (SARS-COV-2) (CORONAVIRUS DISEASE [COVID-19]), AMPLIFIED PROBE TECHNIQUE, MAKING USE OF HIGH THROUGHPUT TECHNOLOGIES AS DESCRIBED BY CMS-2020-01-R: HCPCS | Performed by: EMERGENCY MEDICINE

## 2021-11-01 PROCEDURE — 250N000013 HC RX MED GY IP 250 OP 250 PS 637: Performed by: EMERGENCY MEDICINE

## 2021-11-01 RX ORDER — LEVOTHYROXINE SODIUM 50 UG/1
50 TABLET ORAL DAILY
Status: DISCONTINUED | OUTPATIENT
Start: 2021-11-02 | End: 2021-11-09 | Stop reason: HOSPADM

## 2021-11-01 RX ORDER — PROCHLORPERAZINE MALEATE 10 MG
10 TABLET ORAL ONCE
Status: COMPLETED | OUTPATIENT
Start: 2021-11-01 | End: 2021-11-01

## 2021-11-01 RX ORDER — GABAPENTIN 300 MG/1
600 CAPSULE ORAL 3 TIMES DAILY
Status: DISCONTINUED | OUTPATIENT
Start: 2021-11-02 | End: 2021-11-02

## 2021-11-01 RX ORDER — ROPIVACAINE HYDROCHLORIDE 5 MG/ML
40 INJECTION, SOLUTION EPIDURAL; INFILTRATION; PERINEURAL ONCE
Status: COMPLETED | OUTPATIENT
Start: 2021-11-01 | End: 2021-11-01

## 2021-11-01 RX ORDER — CETIRIZINE HYDROCHLORIDE 10 MG/1
10 TABLET ORAL DAILY
Status: DISCONTINUED | OUTPATIENT
Start: 2021-11-02 | End: 2021-11-09 | Stop reason: HOSPADM

## 2021-11-01 RX ORDER — OXYCODONE HYDROCHLORIDE 5 MG/1
5 TABLET ORAL ONCE
Status: COMPLETED | OUTPATIENT
Start: 2021-11-01 | End: 2021-11-01

## 2021-11-01 RX ORDER — PROCHLORPERAZINE MALEATE 5 MG
10 TABLET ORAL EVERY 6 HOURS PRN
Status: DISCONTINUED | OUTPATIENT
Start: 2021-11-01 | End: 2021-11-09 | Stop reason: HOSPADM

## 2021-11-01 RX ORDER — ONDANSETRON 8 MG/1
8 TABLET, FILM COATED ORAL EVERY 8 HOURS PRN
Status: DISCONTINUED | OUTPATIENT
Start: 2021-11-01 | End: 2021-11-09 | Stop reason: HOSPADM

## 2021-11-01 RX ORDER — ACETAMINOPHEN 325 MG/1
325-650 TABLET ORAL EVERY 6 HOURS PRN
Status: DISCONTINUED | OUTPATIENT
Start: 2021-11-01 | End: 2021-11-04

## 2021-11-01 RX ORDER — PROCHLORPERAZINE MALEATE 5 MG
10 TABLET ORAL EVERY EVENING
Status: DISCONTINUED | OUTPATIENT
Start: 2021-11-02 | End: 2021-11-09 | Stop reason: HOSPADM

## 2021-11-01 RX ORDER — MONTELUKAST SODIUM 10 MG/1
10 TABLET ORAL DAILY
Status: DISCONTINUED | OUTPATIENT
Start: 2021-11-02 | End: 2021-11-09 | Stop reason: HOSPADM

## 2021-11-01 RX ORDER — IOPAMIDOL 755 MG/ML
119 INJECTION, SOLUTION INTRAVASCULAR ONCE
Status: COMPLETED | OUTPATIENT
Start: 2021-11-01 | End: 2021-11-01

## 2021-11-01 RX ORDER — LIDOCAINE HYDROCHLORIDE AND EPINEPHRINE 10; 10 MG/ML; UG/ML
10 INJECTION, SOLUTION INFILTRATION; PERINEURAL ONCE
Status: DISCONTINUED | OUTPATIENT
Start: 2021-11-01 | End: 2021-11-03

## 2021-11-01 RX ORDER — EPINEPHRINE IN SOD CHLOR,ISO 1 MG/10 ML
2 SYRINGE (ML) INTRAVENOUS ONCE
Status: DISCONTINUED | OUTPATIENT
Start: 2021-11-01 | End: 2021-11-03

## 2021-11-01 RX ORDER — MULTIPLE VITAMINS W/ MINERALS TAB 9MG-400MCG
1 TAB ORAL DAILY
Status: DISCONTINUED | OUTPATIENT
Start: 2021-11-02 | End: 2021-11-09 | Stop reason: HOSPADM

## 2021-11-01 RX ORDER — VITAMIN B COMPLEX
50 TABLET ORAL DAILY
Status: DISCONTINUED | OUTPATIENT
Start: 2021-11-02 | End: 2021-11-09 | Stop reason: HOSPADM

## 2021-11-01 RX ORDER — LOPERAMIDE HYDROCHLORIDE 2 MG/1
2 TABLET ORAL DAILY PRN
COMMUNITY
End: 2021-11-23

## 2021-11-01 RX ORDER — PAZOPANIB 200 MG/1
800 TABLET, FILM COATED ORAL DAILY
Status: DISCONTINUED | OUTPATIENT
Start: 2021-11-02 | End: 2021-11-09 | Stop reason: HOSPADM

## 2021-11-01 RX ADMIN — IOPAMIDOL 119 ML: 755 INJECTION, SOLUTION INTRAVASCULAR at 12:51

## 2021-11-01 RX ADMIN — OXYCODONE HYDROCHLORIDE 5 MG: 5 TABLET ORAL at 20:08

## 2021-11-01 RX ADMIN — ROPIVACAINE HYDROCHLORIDE 40 ML: 5 INJECTION, SOLUTION EPIDURAL; INFILTRATION; PERINEURAL at 17:53

## 2021-11-01 RX ADMIN — OXYCODONE HYDROCHLORIDE 5 MG: 5 TABLET ORAL at 16:44

## 2021-11-01 RX ADMIN — PROCHLORPERAZINE MALEATE 10 MG: 10 TABLET ORAL at 20:51

## 2021-11-01 ASSESSMENT — MIFFLIN-ST. JEOR: SCORE: 1416.42

## 2021-11-01 ASSESSMENT — ACTIVITIES OF DAILY LIVING (ADL)
ADLS_ACUITY_SCORE: 12

## 2021-11-01 NOTE — TELEPHONE ENCOUNTER
11/1/21    Patient was called today due to radiology finding of hydropneumothorax. She still has NAZARIO though maybe slightly better after the last 24 hours.     We discussed the urgent finding of a hydropneumothorax on her CT scan. She will go to ED now. Called ED with report.     She can continue to take Votrient for now in the event it could be helping, waiting for final CT results.    Harshal Schuster PA-C

## 2021-11-01 NOTE — ED TRIAGE NOTES
56 F - presenting to ED for eval of pneumothorax, confirmed by CT, earlier.  HX:  Cancer.  Patient is SOB, fatigued.  Airway is patent.

## 2021-11-01 NOTE — ED PROVIDER NOTES
"    [facility] ([facility])  11/01/21  History     Chief Complaint   Patient presents with     Shortness of Breath     pneumothorax (by CT)     Back Pain     Flank Pain     Right     Fatigue     HPI  Richa Ashby is a 56 year old female with a past medical history significant for *** who presents to the Emergency Department for evaluation of ***     Per review of the patient's medical record, patient received a CT chest/abdomen today with results below:    CT CHEST/ABDOMEN 11/1/2021 2:22 PM  IMPRESSION: In this patient with history of metastatic sarcoma:  1. New large right-sided hydropneumothorax with near complete collapse  of the right lung and mild leftward mediastinal shift. Recommend  urgent chest tube placement.  2a. Multiple heterogeneous pulmonary masses consistent with metastatic  sarcoma. Compared to 9/22/2021, majority these masses now containing  fluid and gas consistent with necrosis. Additionally, the large right  apical cyst likely ruptured, resulting in the large right  hydropneumothorax.  2b. Solid-appearing left pleural based mass in the left lower lobe,  which is slightly increased in size since prior.  3. Interval placement of right chest wall Port-A-Cath. Small amount of  nonocclusive thrombus surrounding the catheter.     [Urgent Result: Right hydropneumothorax, possible tension]         ***    {Past History:625602}    I have reviewed the Medications, Allergies, Past Medical and Surgical History, and Social History in the Epic system.    Review of Systems  {Complete vs limited ROS:234979::\"A complete review of systems was performed with pertinent positives and negatives noted in the HPI, and all other systems negative.\"}    Physical Exam   BP: 136/88  Pulse: 104  Temp: 97.6  F (36.4  C)  Resp: 18  Height: 170.2 cm (5' 7\")  Weight: 79.4 kg (175 lb)  SpO2: 96 %      Physical Exam    ED Course     At 4:21 PM the patient was seen and examined by Marysol Arroyo MD in " "Room ED***.        Procedures         {EKG done?:080179::\" \"}  {ed addendum:948511::\" \"}  {ED Course Selections:237897}     Results for orders placed or performed during the hospital encounter of 11/01/21 (from the past 24 hour(s))   EKG 12-lead, tracing only   Result Value Ref Range    Systolic Blood Pressure  mmHg    Diastolic Blood Pressure  mmHg    Ventricular Rate 93 BPM    Atrial Rate 93 BPM    MT Interval 138 ms    QRS Duration 78 ms     ms    QTc 425 ms    P Axis 86 degrees    R AXIS 42 degrees    T Axis 57 degrees    Interpretation ECG       Sinus rhythm  Nonspecific T wave abnormality  Abnormal ECG     [place] Draw    Narrative    The following orders were created for panel order [place] Draw.  Procedure                               Abnormality         Status                     ---------                               -----------         ------                     Extra Blue Top Tube[[id number]]                                                         Extra Red Top Tube[[id number]]                                                          Extra Green Top (Lithium...[[id number]]                                                 Extra Purple Top Tube[[id number]]                                                       Extra Blood Bank Purple ...[[id number]]                                                 Extra Green Top (Lithium...[[id number]]                                                   Please view results for these tests on the individual orders.   CBC with platelets differential    Narrative    The following orders were created for panel order CBC with platelets differential.  Procedure                               Abnormality         Status                     ---------                               -----------         ------                     CBC with platelets and d...[[id number]]                                                   Please view results for these tests on the individual orders. "     Medications   ropivacaine (NAROPIN) injection 40 mL (has no administration in time range)   EPINEPHrine (ADRENALIN) injection 0.2 mg (has no administration in time range)   lidocaine 1% with EPINEPHrine 1:100,000 injection 10 mL (has no administration in time range)             Assessments & Plan (with Medical Decision Making)   ***    I have reviewed the nursing notes.    I have reviewed the findings, diagnosis, plan and need for follow up with the patient.    New Prescriptions    No medications on file       Final diagnoses:   None       ILeyda am serving as a trained medical scribe to document services personally performed by Marysol Arroyo MD, based on the provider's statements to me.      IMarysol MD, was physically present and have reviewed and verified the accuracy of this note documented by Leyda Wade.     Marysol Arroyo MD  11/1/2021   [facility] EMERGENCY DEPARTMENT

## 2021-11-02 ENCOUNTER — APPOINTMENT (OUTPATIENT)
Dept: GENERAL RADIOLOGY | Facility: CLINIC | Age: 56
End: 2021-11-02
Attending: HOSPITALIST
Payer: COMMERCIAL

## 2021-11-02 DIAGNOSIS — C78.00 MALIGNANT NEOPLASM METASTATIC TO LUNG, UNSPECIFIED LATERALITY (H): ICD-10-CM

## 2021-11-02 DIAGNOSIS — C49.9 SARCOMA OF SOFT TISSUE (H): Primary | ICD-10-CM

## 2021-11-02 DIAGNOSIS — C49.9 SARCOMA (H): ICD-10-CM

## 2021-11-02 DIAGNOSIS — D63.0 ANEMIA IN NEOPLASTIC DISEASE: ICD-10-CM

## 2021-11-02 LAB
ATRIAL RATE - MUSE: 93 BPM
DIASTOLIC BLOOD PRESSURE - MUSE: NORMAL MMHG
INTERPRETATION ECG - MUSE: NORMAL
P AXIS - MUSE: 86 DEGREES
PR INTERVAL - MUSE: 138 MS
QRS DURATION - MUSE: 78 MS
QT - MUSE: 342 MS
QTC - MUSE: 425 MS
R AXIS - MUSE: 42 DEGREES
SYSTOLIC BLOOD PRESSURE - MUSE: NORMAL MMHG
T AXIS - MUSE: 57 DEGREES
VENTRICULAR RATE- MUSE: 93 BPM

## 2021-11-02 PROCEDURE — 99207 PR CDG-MDM COMPONENT: MEETS MODERATE - DOWN CODED: CPT | Performed by: STUDENT IN AN ORGANIZED HEALTH CARE EDUCATION/TRAINING PROGRAM

## 2021-11-02 PROCEDURE — 36415 COLL VENOUS BLD VENIPUNCTURE: CPT | Performed by: HOSPITALIST

## 2021-11-02 PROCEDURE — 250N000011 HC RX IP 250 OP 636: Performed by: STUDENT IN AN ORGANIZED HEALTH CARE EDUCATION/TRAINING PROGRAM

## 2021-11-02 PROCEDURE — 99232 SBSQ HOSP IP/OBS MODERATE 35: CPT | Performed by: STUDENT IN AN ORGANIZED HEALTH CARE EDUCATION/TRAINING PROGRAM

## 2021-11-02 PROCEDURE — 71045 X-RAY EXAM CHEST 1 VIEW: CPT

## 2021-11-02 PROCEDURE — 120N000002 HC R&B MED SURG/OB UMMC

## 2021-11-02 PROCEDURE — 71045 X-RAY EXAM CHEST 1 VIEW: CPT | Mod: 26 | Performed by: RADIOLOGY

## 2021-11-02 PROCEDURE — 87040 BLOOD CULTURE FOR BACTERIA: CPT | Performed by: HOSPITALIST

## 2021-11-02 PROCEDURE — 99254 IP/OBS CNSLTJ NEW/EST MOD 60: CPT | Mod: GC | Performed by: INTERNAL MEDICINE

## 2021-11-02 PROCEDURE — 250N000013 HC RX MED GY IP 250 OP 250 PS 637: Performed by: INTERNAL MEDICINE

## 2021-11-02 PROCEDURE — 99223 1ST HOSP IP/OBS HIGH 75: CPT | Mod: GC | Performed by: STUDENT IN AN ORGANIZED HEALTH CARE EDUCATION/TRAINING PROGRAM

## 2021-11-02 PROCEDURE — 250N000013 HC RX MED GY IP 250 OP 250 PS 637: Performed by: HOSPITALIST

## 2021-11-02 PROCEDURE — 250N000013 HC RX MED GY IP 250 OP 250 PS 637: Performed by: STUDENT IN AN ORGANIZED HEALTH CARE EDUCATION/TRAINING PROGRAM

## 2021-11-02 RX ORDER — GABAPENTIN 300 MG/1
600 CAPSULE ORAL 2 TIMES DAILY
Status: DISCONTINUED | OUTPATIENT
Start: 2021-11-02 | End: 2021-11-09 | Stop reason: HOSPADM

## 2021-11-02 RX ORDER — PAZOPANIB HYDROCHLORIDE 200 MG/1
TABLET, FILM COATED ORAL
Qty: 120 TABLET | OUTPATIENT
Start: 2021-11-02

## 2021-11-02 RX ORDER — GABAPENTIN 300 MG/1
900 CAPSULE ORAL AT BEDTIME
Status: DISCONTINUED | OUTPATIENT
Start: 2021-11-02 | End: 2021-11-09 | Stop reason: HOSPADM

## 2021-11-02 RX ORDER — GABAPENTIN 300 MG/1
300 CAPSULE ORAL AT BEDTIME
Status: DISCONTINUED | OUTPATIENT
Start: 2021-11-02 | End: 2021-11-02

## 2021-11-02 RX ADMIN — Medication 5 MG: at 14:39

## 2021-11-02 RX ADMIN — GABAPENTIN 300 MG: 300 CAPSULE ORAL at 00:48

## 2021-11-02 RX ADMIN — ACETAMINOPHEN 650 MG: 325 TABLET, FILM COATED ORAL at 03:24

## 2021-11-02 RX ADMIN — Medication 1 TABLET: at 08:48

## 2021-11-02 RX ADMIN — FLUTICASONE PROPIONATE AND SALMETEROL 1 PUFF: 50; 100 POWDER RESPIRATORY (INHALATION) at 15:07

## 2021-11-02 RX ADMIN — Medication 5 MG: at 21:42

## 2021-11-02 RX ADMIN — GABAPENTIN 600 MG: 300 CAPSULE ORAL at 08:48

## 2021-11-02 RX ADMIN — MONTELUKAST 10 MG: 10 TABLET, FILM COATED ORAL at 08:48

## 2021-11-02 RX ADMIN — ACETAMINOPHEN 650 MG: 325 TABLET, FILM COATED ORAL at 08:48

## 2021-11-02 RX ADMIN — GABAPENTIN 600 MG: 300 CAPSULE ORAL at 14:39

## 2021-11-02 RX ADMIN — GABAPENTIN 900 MG: 300 CAPSULE ORAL at 21:42

## 2021-11-02 RX ADMIN — Medication 50 MCG: at 08:48

## 2021-11-02 RX ADMIN — LEVOTHYROXINE SODIUM 50 MCG: 0.05 TABLET ORAL at 08:48

## 2021-11-02 RX ADMIN — CETIRIZINE HYDROCHLORIDE 10 MG: 10 TABLET, FILM COATED ORAL at 08:48

## 2021-11-02 RX ADMIN — ENOXAPARIN SODIUM 40 MG: 40 INJECTION SUBCUTANEOUS at 18:33

## 2021-11-02 RX ADMIN — ACETAMINOPHEN 650 MG: 325 TABLET, FILM COATED ORAL at 17:40

## 2021-11-02 RX ADMIN — PROCHLORPERAZINE MALEATE 10 MG: 5 TABLET ORAL at 20:16

## 2021-11-02 RX ADMIN — Medication 5 MG: at 08:48

## 2021-11-02 RX ADMIN — PAZOPANIB 800 MG: 200 TABLET, FILM COATED ORAL at 21:24

## 2021-11-02 RX ADMIN — Medication 5 MG: at 00:48

## 2021-11-02 ASSESSMENT — ACTIVITIES OF DAILY LIVING (ADL)
ADLS_ACUITY_SCORE: 14
ADLS_ACUITY_SCORE: 12
ADLS_ACUITY_SCORE: 12
ADLS_ACUITY_SCORE: 14
ADLS_ACUITY_SCORE: 12
ADLS_ACUITY_SCORE: 14
ADLS_ACUITY_SCORE: 14
ADLS_ACUITY_SCORE: 12
ADLS_ACUITY_SCORE: 14
ADLS_ACUITY_SCORE: 12
ADLS_ACUITY_SCORE: 14
ADLS_ACUITY_SCORE: 12
ADLS_ACUITY_SCORE: 12
ADLS_ACUITY_SCORE: 14
ADLS_ACUITY_SCORE: 12
ADLS_ACUITY_SCORE: 14
ADLS_ACUITY_SCORE: 12
ADLS_ACUITY_SCORE: 14
ADLS_ACUITY_SCORE: 14
ADLS_ACUITY_SCORE: 12

## 2021-11-02 NOTE — PLAN OF CARE
AOx4. VSS on 1L nc. Up with SBA. Right chest tube to low continuous suction; chest tube clamps at bedside. Regular diet; denies nausea. NPO at midnight for procedure tomorrow. Voiding spontaneously. Chest tube site pain managed with Tylenol and PRN Oxycodone. Nutrition, Pulmonary and Surg onc consulted today. Pt spouse at bedside and supportive of cares. Cont POC.

## 2021-11-02 NOTE — ED PROVIDER NOTES
ED Provider Note  [facility]      History     Chief Complaint   Patient presents with     Shortness of Breath     pneumothorax (by CT)     Back Pain     Flank Pain     Right     Fatigue     HPI  Richa Ashby is a 56 year old female with a history of metastatic sarcoma with known lung mets who presents for evaluation of Shortness of Breath (pneumothorax (by CT)), Back Pain, Flank Pain (Right), and Fatigue    Ms. Ashby was referred to the ED today by her oncology team after outpatient CT showed a new large right sided hydropneumothorax with leftward shift of the mediastinum concerning for possible tension.     She has experienced persistent dyspnea with exertion for several weeks. Initially this was attributed to her known mets or to her chemotherapy but symptoms persisted. Worse with activity, improves with rest. No fevers at home or sick contacts that she is aware of. She is not actively experiencing chest pain. However, she was having difficulty getting around at home due to her degree of dyspnea. No prior history of pneumothorax or pleural effusion. She does not take any blood thinners. She is on daily oral chemotherapy.     Past Medical History  Past Medical History:   Diagnosis Date     Asthma, chronic      Sarcoma (H)     Right lower thigh     Past Surgical History:   Procedure Laterality Date     AS RAD RESEC TONSIL/PILLARS       GALLBLADDER SURGERY       INSERT PORT VASCULAR ACCESS Right 10/28/2020    Procedure: single lumen power port placement @0745;  Surgeon: Tex Ackerman MD;  Location: [facility] OR     IR CHEST PORT PLACEMENT > 5 YRS OF AGE  10/28/2020     IR PORT CHECK RIGHT  3/26/2021     IR PORT CHECK RIGHT  10/21/2021     RESECT TUMOR LOWER EXTREMITY Right 3/12/2020    Procedure: Resection of right thigh tumor;  Surgeon: Guillermo Johnson MD;  Location: [facility] OR     ROTATOR CUFF REPAIR RT/LT  2012     SHOULDER SURGERY       SINUS SURGERY       acetaminophen  "(TYLENOL) 325 MG tablet  ADVAIR DISKUS 100-50 MCG/DOSE inhaler  albuterol (PROAIR RESPICLICK) 108 (90 Base) MCG/ACT inhaler  cetirizine (ZYRTEC) 10 MG tablet  gabapentin (NEURONTIN) 300 MG capsule  guaiFENesin-codeine (ROBITUSSIN AC) 100-10 MG/5ML solution  levothyroxine (SYNTHROID/LEVOTHROID) 50 MCG tablet  loperamide (IMODIUM A-D) 2 MG tablet  melatonin (MELATONIN) 1 MG/ML LIQD liquid  montelukast (SINGULAIR) 10 MG tablet  multivitamin w/minerals (MULTI-VITAMIN) tablet  NASAL SPRAY SALINE NA  ondansetron (ZOFRAN) 8 MG tablet  oxyCODONE (ROXICODONE) 5 MG tablet  pazopanib (VOTRIENT) 200 MG tablet  prochlorperazine (COMPAZINE) 10 MG tablet  vitamin D3 (CHOLECALCIFEROL) 2000 units (50 mcg) tablet  pazopanib (VOTRIENT) 200 MG tablet  prochlorperazine (COMPAZINE) 10 MG tablet      Allergies   Allergen Reactions     Pentobarbital      resp arrest     Family History  Family History   Problem Relation Age of Onset     Lung Cancer Maternal Grandmother      Breast Cancer Paternal Grandmother      Social History   Social History     Tobacco Use     Smoking status: Never Smoker     Smokeless tobacco: Never Used   Substance Use Topics     Alcohol use: Yes     Comment: 2-3 glasses of wine/week     Drug use: Never      Past medical history, past surgical history, medications, allergies, family history, and social history were reviewed with the patient. No additional pertinent items.       Review of Systems  A complete review of systems was performed with pertinent positives and negatives noted in the HPI, and all other systems negative.    Physical Exam   BP: 136/88  Pulse: 104  Temp: 97.6  F (36.4  C)  Resp: 18  Height: 170.2 cm (5' 7\")  Weight: 79.4 kg (175 lb)  SpO2: 96 %  Physical Exam  Vitals and nursing note reviewed.   Constitutional:       Appearance: She is well-developed.   HENT:      Head: Normocephalic and atraumatic.      Mouth/Throat:      Mouth: Mucous membranes are moist.   Eyes:      Extraocular Movements: " Extraocular movements intact.      Pupils: Pupils are equal, round, and reactive to light.   Cardiovascular:      Rate and Rhythm: Normal rate and regular rhythm.   Pulmonary:      Breath sounds: Examination of the right-upper field reveals decreased breath sounds. Examination of the right-middle field reveals decreased breath sounds. Examination of the right-lower field reveals decreased breath sounds. Decreased breath sounds present.   Abdominal:      Palpations: Abdomen is soft.      Tenderness: There is no abdominal tenderness.   Skin:     General: Skin is warm and dry.      Capillary Refill: Capillary refill takes less than 2 seconds.   Neurological:      General: No focal deficit present.      Mental Status: She is alert.   Psychiatric:         Mood and Affect: Mood normal.         Diagnostics/Procedures   [facility]    -Chest Tube Insertion    Date/Time: 11/1/2021 11:11 PM  Performed by: Marysol Arroyo MD  Authorized by: Marysol Arroyo MD       PRE-PROCEDURE DETAILS:     Skin preparation:  ChloraPrep  ANESTHESIA (see MAR for exact dosages):     Anesthesia method: intercostal block.PROCEDURE DETAILS:     Placement location:  R lateral    Scalpel size:  10    Tube size (Fr):  24    Dissection instrument:  Finger and Heavenly clamp    Ultrasound guidance: no      Tension pneumothorax: yes      Tube connected to:  Suction    Drainage characteristics:  Serosanguinous (tissue also present)    Suture material:  0 silk    Dressing:  4x4 sterile gauze and petrolatum-impregnated gauze    POST-PROCEDURE DETAILS:     Post-insertion x-ray findings: tube in good position    PROCEDURE   Patient Tolerance:  Patient tolerated the procedure well with no immediate complications  Describe Procedure: Patient was placed in a semirecumbent position with the head of the bed at 30 degree.  The side noted above was prepped and the patient was medicated as above. An incision was made and  blunt dissection up and over the rib was performed. Access was then obtained to the pleural cavity. Prior to inserting the chest tube, patient began to cough forceful and had a large amount of serosanguinous fluid with copious amounts of associated tissue expelled from the thoracostomy site. A chest tube was inserted and secured as above.             Personally reviewed and interpreted ECG. Normal sinus rhythm, ventricular rate of 93bpm. Normal QRS duration at 78ms and normal QTc at 425ms. No acute ischemic findings. No significant change from prior ECG in Oct 2021.      Results for orders placed or performed during the hospital encounter of 11/01/21   XR Chest Port 1 View     Status: None    Narrative    Exam: XR CHEST PORT 1 VIEW, 11/1/2021 7:11 PM    Indication: chest tube placement    Comparison: Earlier today    Findings:   New right basilar chest tube with significant decrease in the right  basilar pneumothorax. There is probably still an element of  pneumothorax tracking laterally and at the right costophrenic angle.  Increased subcutaneous emphysema compared to the CT earlier today.    Rounded soft tissue mass at the right costophrenic angle. Dense  opacification of the right upper lobe. The cystic mass in the left  lung. Heart within normal limits.    Right IJ Port-A-Cath tip at the confluence of the innominate veins.      Impression    Impression:   1. Patient's known metastatic cancer, better evaluated on CT same  date.  2. New right basilar chest tube with significant decrease in the right  pneumothorax and increase in the amount of subcutaneous air along the  right lateral chest wall.    TONG SMITH MD         SYSTEM ID:  [id number]   Basic metabolic panel     Status: Abnormal   Result Value Ref Range    Sodium 133 133 - 144 mmol/L    Potassium 3.6 3.4 - 5.3 mmol/L    Chloride 100 94 - 109 mmol/L    Carbon Dioxide (CO2) 26 20 - 32 mmol/L    Anion Gap 7 3 - 14 mmol/L    Urea Nitrogen 10 7 - 30 mg/dL     Creatinine 0.48 (L) 0.52 - 1.04 mg/dL    Calcium 8.6 8.5 - 10.1 mg/dL    Glucose 90 70 - 99 mg/dL    GFR Estimate >90 >60 mL/min/1.73m2   INR     Status: Abnormal   Result Value Ref Range    INR 1.24 (H) 0.85 - 1.15   Extra Blue Top Tube     Status: None   Result Value Ref Range    Hold Specimen JIC    Extra Red Top Tube     Status: None   Result Value Ref Range    Hold Specimen JIC    Extra Green Top (Lithium Heparin) Tube     Status: None   Result Value Ref Range    Hold Specimen JIC    Extra Purple Top Tube     Status: None   Result Value Ref Range    Hold Specimen JIC    Extra Blood Bank Purple Top Tube     Status: None   Result Value Ref Range    Hold Specimen JIC    CBC with platelets and differential     Status: Abnormal   Result Value Ref Range    WBC Count 4.4 4.0 - 11.0 10e3/uL    RBC Count 2.47 (L) 3.80 - 5.20 10e6/uL    Hemoglobin 8.3 (L) 11.7 - 15.7 g/dL    Hematocrit 26.7 (L) 35.0 - 47.0 %     (H) 78 - 100 fL    MCH 33.6 (H) 26.5 - 33.0 pg    MCHC 31.1 (L) 31.5 - 36.5 g/dL    RDW 24.2 (H) 10.0 - 15.0 %    Platelet Count 123 (L) 150 - 450 10e3/uL    % Neutrophils 57 %    % Lymphocytes 29 %    % Monocytes 10 %    % Eosinophils 2 %    % Basophils 1 %    % Immature Granulocytes 1 %    NRBCs per 100 WBC 0 <1 /100    Absolute Neutrophils 2.5 1.6 - 8.3 10e3/uL    Absolute Lymphocytes 1.3 0.8 - 5.3 10e3/uL    Absolute Monocytes 0.4 0.0 - 1.3 10e3/uL    Absolute Eosinophils 0.1 0.0 - 0.7 10e3/uL    Absolute Basophils 0.0 0.0 - 0.2 10e3/uL    Absolute Immature Granulocytes 0.0 <=0.0 10e3/uL    Absolute NRBCs 0.0 10e3/uL   Asymptomatic COVID-19 Virus (Coronavirus) by PCR Nose     Status: Normal    Specimen: Nose; Swab   Result Value Ref Range    SARS CoV2 PCR Negative Negative    Narrative    Testing was performed using the iTherX Xpress SARS-CoV-2 Assay on the  [employer]. Additional information about  this Emergency Use Authorization (EUA) assay can be found via the Lab  Guide.  This test should be ordered for the detection of SARS-CoV-2 in  individuals who meet SARS-CoV-2 clinical and/or epidemiological  criteria. Test performance is unknown in asymptomatic patients. This  test is for in vitro diagnostic use under the FDA EUA for  laboratories certified under CLIA to perform high complexity testing.  This test has not been FDA cleared or approved. A negative result  does not rule out the presence of PCR inhibitors in the specimen or  target RNA in concentration below the limit of detection for the  assay. The possibility of a false negative should be considered if  the patient's recent exposure or clinical presentation suggests  COVID-19. This test was validated by the [facility]. This laboratory is certified under  the Clinical Laboratory Improvement Amendments of 1988 (CLIA-88) as  qualified to perform high complexity laboratory testing.     EKG 12-lead, tracing only     Status: None (Preliminary result)   Result Value Ref Range    Systolic Blood Pressure  mmHg    Diastolic Blood Pressure  mmHg    Ventricular Rate 93 BPM    Atrial Rate 93 BPM    NJ Interval 138 ms    QRS Duration 78 ms     ms    QTc 425 ms    P Axis 86 degrees    R AXIS 42 degrees    T Axis 57 degrees    Interpretation ECG       Sinus rhythm  Nonspecific T wave abnormality  Abnormal ECG     [facility] Draw     Status: None (In process)    Narrative    The following orders were created for panel order [facility].  Procedure                               Abnormality         Status                     ---------                               -----------         ------                     Extra Blue Top Tube[[id number]]                              Final result               Extra Red Top Tube[[id number]]                               Final result               Extra Green Top (Lithium...[[id number]]                      Final result               Extra Purple Top  Tube[[id number]]                            Final result               Extra Blood Bank Purple ...[[id number]]                      Final result               Extra Green Top (Lithium...[[id number]]                                                   Please view results for these tests on the individual orders.   CBC with platelets differential     Status: Abnormal    Narrative    The following orders were created for panel order CBC with platelets differential.  Procedure                               Abnormality         Status                     ---------                               -----------         ------                     CBC with platelets and d...[[id number]]  Abnormal            Final result                 Please view results for these tests on the individual orders.     Medications   EPINEPHrine (ADRENALIN) injection 0.2 mg (has no administration in time range)   lidocaine 1% with EPINEPHrine 1:100,000 injection 10 mL (has no administration in time range)   acetaminophen (TYLENOL) tablet 325-650 mg (has no administration in time range)   cetirizine (zyrTEC) tablet 10 mg (has no administration in time range)   gabapentin (NEURONTIN) capsule 600 mg (has no administration in time range)   levothyroxine (SYNTHROID/LEVOTHROID) tablet 50 mcg (has no administration in time range)   montelukast (SINGULAIR) tablet 10 mg (has no administration in time range)   multivitamin w/minerals (THERA-VIT-M) tablet 1 tablet (has no administration in time range)   ondansetron (ZOFRAN) tablet 8 mg (has no administration in time range)   oxyCODONE IR (ROXICODONE) half-tab 2.5-5 mg (has no administration in time range)   pazopanib (VOTRIENT) tablet 800 mg (has no administration in time range)   prochlorperazine (COMPAZINE) tablet 10 mg (has no administration in time range)   prochlorperazine (COMPAZINE) tablet 10 mg (has no administration in time range)   Vitamin D3 (CHOLECALCIFEROL) tablet 50 mcg (has no administration in time  range)   gabapentin (NEURONTIN) capsule 300 mg (has no administration in time range)   ropivacaine (NAROPIN) injection 40 mL (40 mLs Infiltration Given by Other Clinician 11/1/21 1753)   oxyCODONE (ROXICODONE) tablet 5 mg (5 mg Oral Given 11/1/21 1644)   oxyCODONE (ROXICODONE) tablet 5 mg (5 mg Oral Given 11/1/21 2008)   prochlorperazine (COMPAZINE) tablet 10 mg (10 mg Oral Given 11/1/21 2051)        Assessments & Plan (with Medical Decision Making)   Richa Ashby is a 56 year old female presenting with several weeks of dyspnea with exertion found to have a large right hydropneumothorax with concern for tension physiology due leftward shift of the mediastinum. Vital signs are notable for mild tachycardia with HR of 104. RR is 18 and SpO2 96%. Discussed benefits, risks and alternatives to chest tube placement with patient and her . She provided written consent to proceed with chest tube placement. Tube was placed successfully as discussed above. Chest x-ray obtained which shows appropriate placement with re-expansion of the lung. She did develop increased subcutaneous air which is not unaccepted given the coughing during the procedure. This has continued to improve during her ED course. She will be admitted for ongoing evaluation and management.     Discussed the outpatient CT report with the admitting service. There is a small thrombus at the end of her portacath. She is scheduled for replacement on Friday because it not been drawing properly. Discussed risk vs benefits of starting anticoagulation but admitting service recommends holding off at this time.     I have reviewed documentation from prior visits, labs and imaging (images and reports).       ED Course as of Nov 02 0018   Mon Nov 01, 2021 1942 1. Patient's known metastatic cancer, better evaluated on CT same  date.  2. New right basilar chest tube with significant decrease in the right  pneumothorax and increase in the amount of subcutaneous  air along the  right lateral chest wall.   XR Chest Port 1 View       New Prescriptions    No medications on file       Final diagnoses:   Other pneumothorax   Hydrothorax   Sarcoma (H)          Marysol Arroyo MD  11/02/21 0019

## 2021-11-02 NOTE — CONSULTS
Oncology Consult Note     Richa Ashby MRN# [id number]   Age: 56 year old YOB: 1965   Date of Admission: November 2, 2021       Reason For Consult:    Undifferentiated pleomorphic sarcoma         History of Present Illness:   History obtained from chart review and confirmed with patient.    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    Patient had CT CAP for treatment assessment at [place] yesterday 11/1/2021 which showed hydropneumothorax in the right side with new right lung collapse then sent to the [facility] emergency room. Patient had chest tube yesterday with improvement in her breathing.    CT scan also showed incidental none occlusive thrombus in the right IJ associated with her port.  Oncology team was consulted for anticoagulation input giving risk of bleeding with chest tube.  I still dictated this morning with the team.  She mentioned that she has some discomfort from the chest tube.  Her breathing is better and on 1-2 L oxygen.  Denies any fever or chills.  I updated her about incidental finding of small thrombus and necrotic features seen on lung metastatic lesions which may represent a response to pazopanib.    Review of Systems:10 system were reviewed and all negative except what mentioned in the HPI.        Oncology History:       Richa Ashby is a 56 year old female with PMH asthma and GERD with undifferentiated pleomorphic sarcoma. She noticed a lump in right leg November 2019 which led to imaging and biopsy which showed  high-grade UPS. She received preoperative radiotherapy and the tumor was resected 3/12/20. She then developed a cough, chest tightness, and scapular pain and imaging revealed lung nodules. Lung biopsy 11/2/20 with sarcoma. She was started on Doxil + Ifos 11/13/20. Had delay in cycle 2 due to COVID dx 12/2/20. CT imaging after cycle 1 with positive response to treatment with decrease in pulmonary nodules. Received cycle 2 12/18/20. Received cycle 3 1/15/21 with dose reduction in Doxil for skin toxicity and mucositis. Received cycle 4 2/15/21.      CT CAP 3/8/21 with positive response to treatment, decreased size of pulmonary mets though one lesion was larger. Did see fibrocystic changes in left breast, recommended breast imaging. Received cycle 5 Doxil + ifos 3/11/21.   Imaging 4/5/21 with overall stable disease except one lung lesion, opted to continue Doxil alone for now and switch to Keytruda in future if ongoing progression.   Started Keytruda 5/10/21. Was seen 5/26/21 cough, SOB, fatigue work-up thought to be 2/2 phrenic nerve involvement of R apical lung mass.    Due to worsening fatigue and cough, repeat CT 6/15/21 concerning for progression vs pseudoprogression. Added Gemzar 6/16/21.  -Day 8 Gemzar (dose reduced for thrombocytopenia) + Keytruda 6/23/21.   CT 7/12/21 with stable disease.  She was admitted 8/25/21-8/31/21 for fevers, hypotension, acute on chronic anemia. Extensive infectious work-up negative so etiology of fevers still unknown.   She has had progressive disease so started on Pazopanib 9/24/21.  She was admitted 10/6/21-10/8/21 for weakness, new hallucinations in setting of UTI. Brain MRI without brain mets though did have Diffuse loss normal fatty marrow on T1 sequence, nonspecific.            Past Medical History:     Past Medical History:   Diagnosis Date     Asthma, chronic      Sarcoma (H)     Right lower thigh          Past Surgical History:     Past Surgical History:   Procedure  Laterality Date     AS RAD RESEC TONSIL/PILLARS       GALLBLADDER SURGERY       INSERT PORT VASCULAR ACCESS Right 10/28/2020    Procedure: single lumen power port placement @0745;  Surgeon: Tex Ackerman MD;  Location: [facility] OR     IR CHEST PORT PLACEMENT > 5 YRS OF AGE  10/28/2020     IR PORT CHECK RIGHT  3/26/2021     IR PORT CHECK RIGHT  10/21/2021     RESECT TUMOR LOWER EXTREMITY Right 3/12/2020    Procedure: Resection of right thigh tumor;  Surgeon: Guillermo Johnson MD;  Location: [facility] OR     ROTATOR CUFF REPAIR RT/LT  2012     SHOULDER SURGERY       SINUS SURGERY            Social History:     Social History     Tobacco Use     Smoking status: Never Smoker     Smokeless tobacco: Never Used   Substance Use Topics     Alcohol use: Yes     Comment: 2-3 glasses of wine/week     Drug use: Never           Family History:     Family History   Problem Relation Age of Onset     Lung Cancer Maternal Grandmother      Breast Cancer Paternal Grandmother           Allergies:     Allergies   Allergen Reactions     Pentobarbital      resp arrest          Medications:     Medications Prior to Admission   Medication Sig Dispense Refill Last Dose     acetaminophen (TYLENOL) 325 MG tablet Take 325-650 mg by mouth every 6 hours as needed for mild pain   11/1/2021 at 1400     ADVAIR DISKUS 100-50 MCG/DOSE inhaler Inhale 1 puff into the lungs daily    11/1/2021 at 0900     albuterol (PROAIR RESPICLICK) 108 (90 Base) MCG/ACT inhaler Inhale 1-2 puffs into the lungs every 6 hours as needed    10/31/2021 at afternoon     cetirizine (ZYRTEC) 10 MG tablet Take 10 mg by mouth daily   11/1/2021 at 0930     gabapentin (NEURONTIN) 300 MG capsule Take 600 mg in the AM, 600 mg mid-day, and 900 mg at night 210 capsule 3 11/1/2021 at 1400 600 mg     guaiFENesin-codeine (ROBITUSSIN AC) 100-10 MG/5ML solution Take 5-10 mLs by mouth every 4 hours as needed for cough 473 mL 1 11/1/2021 at 0500     levothyroxine (SYNTHROID/LEVOTHROID) 50  "MCG tablet Take 1 tablet (50 mcg) by mouth daily 30 tablet 1 11/1/2021 at 0830     loperamide (IMODIUM A-D) 2 MG tablet Take 2 mg by mouth daily as needed for diarrhea        melatonin (MELATONIN) 1 MG/ML LIQD liquid Take 5 mg by mouth At Bedtime    10/31/2021 at 2100     montelukast (SINGULAIR) 10 MG tablet Take 10 mg by mouth daily   11/1/2021 at 0930     multivitamin w/minerals (MULTI-VITAMIN) tablet Take 1 tablet by mouth daily   11/1/2021 at 0930     NASAL SPRAY SALINE NA    11/1/2021 at am     ondansetron (ZOFRAN) 8 MG tablet Take 1 tablet (8 mg) by mouth every 8 hours as needed for nausea 60 tablet 1 More than a month at Unknown time     oxyCODONE (ROXICODONE) 5 MG tablet Take 0.5-1 tablets (2.5-5 mg) by mouth every 6 hours as needed for severe pain 60 tablet 0 11/1/2021 at am     pazopanib (VOTRIENT) 200 MG tablet Take 4 tablets (800 mg) by mouth daily Take on an empty stomach 1 hour before or 2 hours after a meal. 120 tablet 0 11/1/2021 at 2200     prochlorperazine (COMPAZINE) 10 MG tablet Take 1 tablet (10 mg) by mouth every 6 hours as needed for nausea or vomiting 90 tablet 3 10/31/2021 at 2000     vitamin D3 (CHOLECALCIFEROL) 2000 units (50 mcg) tablet Take 1 tablet by mouth daily   11/1/2021 at 0930     pazopanib (VOTRIENT) 200 MG tablet Take 4 tablets (800 mg) by mouth daily Take on an empty stomach 1 hour before or 2 hours after a meal. 120 tablet 0      prochlorperazine (COMPAZINE) 10 MG tablet Take 10 mg by mouth every evening Before votrient             Physical Exam:     /73 (BP Location: Left arm)   Pulse 86   Temp 97.9  F (36.6  C) (Temporal)   Resp 16   Ht 1.702 m (5' 7\")   Wt 79.4 kg (175 lb)   LMP 03/19/2019   SpO2 99%   BMI 27.41 kg/m    Vitals:    11/01/21 1531   Weight: 79.4 kg (175 lb)     General: Appears well, not in acute distress.  HEENT: NCAT. PERRL, EOMI, anicteric sclera. Oral mucosa pink and moist with no lesions or thrush.  Neck: Neck supple. No jugular venous " distention.  Respiratory: Non-labored breathing, good air exchange, lungs: clear in both lungs.  Cardiovascular: RRR. No murmur or rub.   Gastrointestinal: Soft, NT, ND, BS present. No palpable masses or organomegaly.  EXT: no pitting edema   Skin: No concerning lesions, rash, jaundice, cyanosis, erythema, or ecchymoses on exposed surfaces.  Neurologic: A&O x 3, speech normal.           Data:   CBC  Recent Labs   Lab 11/01/21  1624   WBC 4.4   RBC 2.47*   HGB 8.3*   HCT 26.7*   *   MCH 33.6*   MCHC 31.1*   RDW 24.2*   *     CMP  Recent Labs   Lab 11/01/21  1624      POTASSIUM 3.6   CHLORIDE 100   CO2 26   ANIONGAP 7   GLC 90   BUN 10   CR 0.48*   GFRESTIMATED >90   VIKTORIA 8.6     INR  Recent Labs   Lab 11/01/21  1624   INR 1.24*        Imaging      Results for orders placed or performed during the hospital encounter of 11/01/21   XR Chest Port 1 View    Narrative    Exam: XR CHEST PORT 1 VIEW, 11/1/2021 7:11 PM    Indication: chest tube placement    Comparison: Earlier today    Findings:   New right basilar chest tube with significant decrease in the right  basilar pneumothorax. There is probably still an element of  pneumothorax tracking laterally and at the right costophrenic angle.  Increased subcutaneous emphysema compared to the CT earlier today.    Rounded soft tissue mass at the right costophrenic angle. Dense  opacification of the right upper lobe. The cystic mass in the left  lung. Heart within normal limits.    Right IJ Port-A-Cath tip at the confluence of the innominate veins.      Impression    Impression:   1. Patient's known metastatic cancer, better evaluated on CT same  date.  2. New right basilar chest tube with significant decrease in the right  pneumothorax and increase in the amount of subcutaneous air along the  right lateral chest wall.    TONG SMITH MD         SYSTEM ID:  [id number]   XR Chest Port 1 View    Impression    RESIDENT PRELIMINARY INTERPRETATION  Impression:   1.  Right basilar chest tube in place. There is likely small residual  right lateral pneumothorax which is unchanged from prior.  2. Known metastatic lesions, the largest of which is a cavitary lesion  in the left lower lung fields better seen on prior CT.  3. Extensive subcutaneous air overlying the right chest similar to  prior.            Assessment and Plan:   Richa Ashby is a 56 year old female with undifferentiated pleomorphic sarcoma of the right leg Dx in Nov 2019 s/p CECILLE RT followed by surgery on 3/12/2020, unfortunately had recurrence in the lungs in Nov 2020 treated with chemotherapy ( Doxil/ifosfamide for 5 cycles then Doxil single agent then Keytruda then gemcitabine) and lately started on pazopanib 800 mg on 9/24/2021.    Patient was admitted on 11/1/2021 for right hydropneumothorax with right lung collapse, seen on CT CAP done for cancer assessment, status post chest tube in the ED with improvement in her breathing symptoms.  Also CT scan also showed incidental none occlusive thrombus in the right IJ associated with her port.      I reviewed and updated her primary oncologist Dr. Lopez about this current admission.  Her lung lesions showed necrosis features with thin rim suggested to response to pazopanib.  Measurement of some of these lung mets are slightly bigger comparing with CT done on 9/22/2021.  Patient showed good tolerance to pazopanib therefore we will continue it.  I discussed briefly her CODE STATUS.  Patient in process of preparing some papers.  However she expressed that she is full code and she wants intubation and CPR for reversible causes but not for long-term and irreversible conditions.    # Udifferentiated pleomorphic sarcoma  # Port a line associated non-occlusive thrombus internal jugular     Recommendation   - Heparin gtt after discussion with pulmonary or surgery to evaluate risk of bleeding with chest tube.   - Watch closely for bleeding   - If no bleeding with heparin  then can switch to DOACs    - Cont pazopanib 800 mg   - We will arrange follow-up appointment with oncology clinic     Patient was seen and examined by my attending (Modesto Rodriguez MD ) and I. A/P were created as mentioned above.     Luis Henderson MD  Hematology&Oncology Fellow   Pager: [phone]  November 2, 2021

## 2021-11-02 NOTE — PROGRESS NOTES
CLINICAL NUTRITION SERVICES - ASSESSMENT NOTE     Nutrition Prescription    RECOMMENDATIONS FOR MDs/PROVIDERS TO ORDER:  None at this time     Malnutrition Status:    Severe malnutrition in the context of acute on chronic illness    Recommendations already ordered by Registered Dietitian (RD):  Supplements TID between meals  - Ensure Enlive @ 10 and 2 (chocolate flavor)  - Ensure Enlive shake (made w/ ice cream, chococlate flavor) @ HS    Future/Additional Recommendations:  Encourage patient to consume at least 75% of meals TID or the equivalent with snacks/supplements.  If consuming less than this offer different/additional supplements, scheduled snacks, and/or consider ordering calorie counts to assess PO intake adequacy.       REASON FOR ASSESSMENT  Richa Ashby is a/an 56 year old female assessed by the dietitian for Patient/Family Request    NUTRITION HISTORY  Patient reports ongoing decreased appetite for a while but it has declined even more the past month due to thrush, dry mouth, decreased appetite, and feeling poorly with collapsed lung.  Liquid/shakes have been going best for her lately (drinks Atkins protein drinks at home).      Appears poor appetite has been going on for at least 4 months.  Per OP RD note on 7/13:    Connie tells me that her intake has been low due to low appetite and taste aversions to foods.   -Due to poor intake with chemo, she has had very low energy and motivation to cook meals.  -She tells me that her  is a wonderful caregiver but does not cook the best.  He tend to get carry out or frozen pizza.    -She has been receiving meals from family and friends, however, does not like the meals she is provided as they are all combination meals such as lasagna, casseroles etc.     -She would prefer simple meals such as chicken, fish, tuna with fresh or roasted vegetables.    -Her daughter has been helping her prepare and freeze meals like this which has been helpful and  "increasing her appetite and intake.   -She also gets Home  meals three times/week which she really likes.   -She and her family have been engaging in Zoom meeting/cook sessions which has been very fun her as well.   -She has been pleased with her weight loss, although, understands that rapid, unintentional weight loss is not healthy.      CURRENT NUTRITION ORDERS  Diet: Regular  Intake/Tolerance: poor appetite, pt drank one of her Atkins protein drinks from home so far today    LABS  Labs reviewed    MEDICATIONS  Medications reviewed  - Thera-Vit-M  - Vitamin D3 (50 mcg/day)    ANTHROPOMETRICS  Height: 170.2 cm (5' 7\")  Most Recent Weight: 79.4 kg (175 lb)    IBW: 61.4 kg   BMI: Overweight BMI 25-29.9  Weight History: 18 lb wt loss over the past month (9% wt loss); overall down 27% wt in the past 3 months (13% wt loss)  Wt Readings from Last 10 Encounters:   11/01/21 79.4 kg (175 lb)   10/07/21 87.7 kg (193 lb 4.8 oz)   10/06/21 87.2 kg (192 lb 3.2 oz)   10/03/21 87.1 kg (192 lb)   09/22/21 89.3 kg (196 lb 12.8 oz)   09/08/21 90.5 kg (199 lb 8 oz)   08/30/21 90.7 kg (199 lb 15.3 oz)   08/25/21 90.8 kg (200 lb 1.6 oz)   08/11/21 93.4 kg (206 lb)   08/09/21 91.8 kg (202 lb 4.8 oz)      Dosing Weight: 66 kg (adjusted based on actual wt 79.4 kg and IBW)    ASSESSED NUTRITION NEEDS  Estimated Energy Needs: 1523-4245 kcals/day (30 - 35 kcals/kg)  Justification: Increased needs with chemo and for repletion  Estimated Protein Needs: 79-99 grams protein/day (1.2 - 1.5 grams of pro/kg)  Justification: Increased needs with chemo and for repletion  Estimated Fluid Needs: (1 mL/kcal)   Justification: Maintenance, or other per provider pending fluid status    PHYSICAL FINDINGS  See malnutrition section below.    MALNUTRITION  % Intake: </= 50% for >/= 5 days (severe)  % Weight Loss: > 7.5% in 3 months (severe)  Subcutaneous Fat Loss: Unable to assess due to brevity of visit  Muscle Loss: Unable to assess due to brevity of " visit  Fluid Accumulation/Edema: None noted per chart review  Malnutrition Diagnosis: Severe malnutrition in the context of acute on chronic illness    NUTRITION DIAGNOSIS  Inadequate oral intake related to decreased appetite, thrush, dry mouth as evidenced by decreased appetite the past 3-4 months and subsequent 13% wt loss     INTERVENTIONS  Implementation  Nutrition Education: Provided education on role of RD and nutrition POC   Medical food supplement therapy     Goals  Patient to consume % of nutritionally adequate meal trays TID, or the equivalent with supplements/snacks.     Monitoring/Evaluation  Progress toward goals will be monitored and evaluated per protocol.     Adelaida Dhaliwal, RD, LD  Weekday Pager: [phone]  Weekday Units covered: 7C (all beds) and 5A (beds 5201 through 5211-2)  Weekend/Holiday RD Pager: [phone]

## 2021-11-02 NOTE — CONSULTS
"    Interventional Radiology Consult Service Note    IR consulted for possible urgent port revision.    This is a 56 year old female with past medical history significant for polymorphic undifferentiated sarcoma on oral chemotherapy, asthma, GERD, hypothyroidism was sent to the hospital 11/2 after CT scan showed right side hydropneumothorax. Pt was incidentally noted to be on the IR schedule for port revision 11/5 and we are asked to proceed with intervention sooner given need for anticoagulation. Right chest port was placed 10/20/2020. Presented 10/21/21 to IR clinic with concerns regarding port flushing but not aspirating. She was s/p ?TPA infusion. Based on IR f/u plan we will proceed with port revision (venoplasty, fribrin sheath striping and new port placement).    Case and imaging was reviewed with Dr. Fairbanks from IR. Will proceed with port revision 11/5 as previously scheduled. No urgency in this procedure. Port flushes but does not aspirate. Small port catheter associated thrombus does not require systemic anticoagulation from IR standpoint. If team does opt to anticoagulate the patient this does not need to be held for IR procedure. INR goal <3.0.    Recommendations were reviewed with Dr. Barth.    Vitals:   /74 (BP Location: Left arm)   Pulse 89   Temp 97.9  F (36.6  C) (Temporal)   Resp 17   Ht 1.702 m (5' 7\")   Wt 79.4 kg (175 lb)   LMP 03/19/2019   SpO2 99%   BMI 27.41 kg/m      Pertinent Labs:     Lab Results   Component Value Date    WBC 4.4 11/01/2021    WBC 5.1 10/25/2021    WBC 7.9 10/18/2021    WBC 4.3 07/07/2021    WBC 3.7 (L) 07/02/2021    WBC 2.1 (L) 06/30/2021       Lab Results   Component Value Date    HGB 8.3 11/01/2021    HGB 8.7 10/25/2021    HGB 8.8 10/18/2021    HGB 9.2 07/07/2021    HGB 9.1 07/02/2021    HGB 9.0 06/30/2021       Lab Results   Component Value Date     11/01/2021     10/25/2021     10/18/2021     07/07/2021    PLT 37 07/02/2021    " PLT 35 06/30/2021       Lab Results   Component Value Date    INR 1.24 (H) 11/01/2021    INR 0.90 10/28/2020       Lab Results   Component Value Date    POTASSIUM 3.6 11/01/2021    POTASSIUM 3.9 07/07/2021        NUPUR Beck CNP  Interventional Radiology   Pager: [phone]

## 2021-11-02 NOTE — PROGRESS NOTES
Admitted/transferred from: ER  2 RN full   skin assessment completed by Adelaide Hernandez, WHITNEY and WHITNEY Davis.  Skin assessment finding: chest tube, Port  Interventions/actions: skin interventions continue with plan of care     Will continue to monitor.

## 2021-11-02 NOTE — H&P
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     Acute hypoxic respiratory failure  Hydropneumothorax  CT scan done [facility] for evaluation of her assessment of response chemotherapy showed new large right-sided hydropneumothorax with a near collapse of her right lung with leftward mediastinal shift.  Now status post chest tube in the ED  Plan  -Chest tube to low intermittent suction  -Controlled chest x-ray in the morning/consider pulmonary consult for managing chest tube in the morning    Polymorphic undifferentiated stage IV sarcoma  Cancer pain  Chest CT today  showed slightly increased cancer size of her left lower lobe  - PTA oral Pazopanib 800mg    usually takes an hour after Compazine at night  -Continue PTA oxycodone, PTA gabapentin    #.  Nonocclusive thrombus surrounding her Port-A-Cath:   CT was reviewed with radiology again clot is very small and it is located and internal jugular vein.   Plan  -Discussed with ED provider and oncology fellow.  Given invasive chest to and risk of bleeding or holding anticoagulation overnight and discuss in the morning with heme/oncology attending to decide if anticoagulation can be safely initiated  -Port-A-Cath might need to be exchange according to th ept on wedensday  as it was not functioning properly       Chronic medical condition  Asthma: Continue PTA inhalers  Hypothyroidism: Continue PTA Synthroid      CODE: Full code  Diet/IVF: Advance as  tolerated  DVT ppx: On heparin  Disposition/Admission Status: Anticipate 1-2 days, may be here more than 2 midnights    Yousif Valenzuela MD  Hospitalist/nocturnist  [facility]    Departments of Medicine   Pager: [phone]           Chief Complaint:     Referred for hydropneumothorax       HPI:   Richa Ashby is a 56 year old female with past medical history significant for 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   On admission: Patient reports that she has been feeling shortness of breath for over 2 weeks, worsened over the last week but went to clinic today for surveillance CT scan that showed hydropneumothorax, she does not endorse cough no fever no chest pain             Past Medical History:     Past Medical History:   Diagnosis Date     Asthma, chronic      Sarcoma (H)     Right lower thigh            Past Surgical History:     Past Surgical History:   Procedure Laterality Date     AS RAD RESEC TONSIL/PILLARS       GALLBLADDER SURGERY       INSERT PORT VASCULAR ACCESS Right 10/28/2020    Procedure: single lumen power port placement @0745;  Surgeon: Tex Ackerman MD;  Location: [facility] OR     IR CHEST PORT PLACEMENT > 5 YRS OF AGE  10/28/2020     IR PORT CHECK RIGHT  3/26/2021     IR PORT CHECK RIGHT  10/21/2021     RESECT TUMOR LOWER EXTREMITY Right 3/12/2020    Procedure: Resection of right thigh tumor;  Surgeon: Guillermo Johnson MD;  Location: [facility] OR     ROTATOR CUFF REPAIR RT/LT  2012     SHOULDER SURGERY       SINUS SURGERY                Social History:     Social History     Socioeconomic History     Marital status:      Spouse name: Not on file     Number of children: Not on file     Years of education: Not on file     Highest education level: Not on file   Occupational History     Not on file   Tobacco Use     Smoking status:  Never Smoker     Smokeless tobacco: Never Used   Substance and Sexual Activity     Alcohol use: Yes     Comment: 2-3 glasses of wine/week     Drug use: Never     Sexual activity: Not on file   Other Topics Concern     Not on file   Social History Narrative     Not on file     Social Determinants of Health     Financial Resource Strain:      Difficulty of Paying Living Expenses:    Food Insecurity:      Worried About Running Out of Food in the Last Year:      Ran Out of Food in the Last Year:    Transportation Needs:      Lack of Transportation (Medical):      Lack of Transportation (Non-Medical):    Physical Activity:      Days of Exercise per Week:      Minutes of Exercise per Session:    Stress:      Feeling of Stress :    Social Connections:      Frequency of Communication with Friends and Family:      Frequency of Social Gatherings with Friends and Family:      Attends Muslim Services:      Active Member of Clubs or Organizations:      Attends Club or Organization Meetings:      Marital Status:    Intimate Partner Violence:      Fear of Current or Ex-Partner:      Emotionally Abused:      Physically Abused:      Sexually Abused:             Family History:     Family History   Problem Relation Age of Onset     Lung Cancer Maternal Grandmother      Breast Cancer Paternal Grandmother               Immunizations:     There is no immunization history on file for this patient.           Allergies:      Allergies   Allergen Reactions     Pentobarbital      resp arrest            Medications:     heparin 100 UNIT/ML injection 5 mL  [COMPLETED] iopamidol (ISOVUE-370) solution 119 mL  [COMPLETED] sodium chloride 0.9 % bag 500mL for CT scan flush use    acetaminophen (TYLENOL) 325 MG tablet, Take 325-650 mg by mouth every 6 hours as needed for mild pain  ADVAIR DISKUS 100-50 MCG/DOSE inhaler, Inhale 1 puff into the lungs daily   albuterol (PROAIR RESPICLICK) 108 (90 Base) MCG/ACT inhaler, Inhale 1-2 puffs into the  "lungs every 6 hours as needed   cetirizine (ZYRTEC) 10 MG tablet, Take 10 mg by mouth daily  gabapentin (NEURONTIN) 300 MG capsule, Take 600 mg in the AM, 600 mg mid-day, and 900 mg at night  guaiFENesin-codeine (ROBITUSSIN AC) 100-10 MG/5ML solution, Take 5-10 mLs by mouth every 4 hours as needed for cough  levothyroxine (SYNTHROID/LEVOTHROID) 50 MCG tablet, Take 1 tablet (50 mcg) by mouth daily  melatonin (MELATONIN) 1 MG/ML LIQD liquid, Take 5 mg by mouth At Bedtime   montelukast (SINGULAIR) 10 MG tablet, Take 10 mg by mouth daily  multivitamin w/minerals (MULTI-VITAMIN) tablet, Take 1 tablet by mouth daily  ondansetron (ZOFRAN) 8 MG tablet, Take 1 tablet (8 mg) by mouth every 8 hours as needed for nausea  oxyCODONE (ROXICODONE) 5 MG tablet, Take 0.5-1 tablets (2.5-5 mg) by mouth every 6 hours as needed for severe pain  pazopanib (VOTRIENT) 200 MG tablet, Take 4 tablets (800 mg) by mouth daily Take on an empty stomach 1 hour before or 2 hours after a meal.  pazopanib (VOTRIENT) 200 MG tablet, Take 4 tablets (800 mg) by mouth daily Take on an empty stomach 1 hour before or 2 hours after a meal.  prochlorperazine (COMPAZINE) 10 MG tablet, Take 10 mg by mouth every evening Before votrient  prochlorperazine (COMPAZINE) 10 MG tablet, Take 1 tablet (10 mg) by mouth every 6 hours as needed for nausea or vomiting  vitamin D3 (CHOLECALCIFEROL) 2000 units (50 mcg) tablet, Take 1 tablet by mouth daily             Review of Systems:     A complete ROS was performed and is negative other than what is stated in the HPI.          Physical Exam:   Blood pressure 133/79, pulse 100, temperature 97.6  F (36.4  C), temperature source Oral, resp. rate 18, height 1.702 m (5' 7\"), weight 79.4 kg (175 lb), last menstrual period 03/19/2019, SpO2 98 %, not currently breastfeeding.  General Appearance: Pleasant not in distress   HEENT: No jaundice, moist BM   Respiratory: Skin crepitus over her right chest, chest tube to low intermittent " suction, not tachypneic satting well on room air  Cardiovascular: RRR, s1, s2 no m/g   GI: Soft, mild left lower quadrant tenderness   Genitourinary: No CVAT   Extremities : No Edema   Neurologic: A&Ox3, moves all extremities equally               Data:   Reviewed in Epic

## 2021-11-02 NOTE — PROGRESS NOTES
Brief pulmonary note    Patient evaluate. No evidence of air leak. Will keep to continuous suction tonight and evaluate CXR in AM. If no residual pneumothorax, anticipate placing her to water seal.     Is at high risk for recurrent pneumothorax. Depending on overall prognosis, might benefit from talc pleurodesis through chest tube. Will discuss with oncology.      Elliot Bruner MD

## 2021-11-02 NOTE — PROGRESS NOTES
[facility]    Medicine Progress Note - Hospitalist Service, Gold 11       Date of Admission:  11/1/2021    Assessment & Plan          57 y/o F with PMH of sarcoma, diagnosed in 2019, s/p surgery and radiation in the leg, but since metastasized mostly in the lung, who has been through several chemo, on pazopanib since 9/2021, has been having dyspnea for about two months.  On routine CT CAP found to have large R sided hydropneumothroax, had chest tube placed in the ER  - Chest tube on suction.  Daily CXR  - Pulmonary consulted to help with management.  Wonder if interventional or CT surgery would have anything to offer.  - Onc following: Recommends continuing pazopanib  - Ongoing goals of care discussion.   - Cancer related pain: gabapentin, oxycodone  - Cancer related nausea: Ondansetron    # Port malfunction with small RIJ Clot  - Per IR, RIJ clot associated with port does not need to be treated.  Will hold off on anticoagulation.  IR plans on replacing port on 11/5/21 inpatient or outpatient.     --- CHRONIC ISSUES ---  # Asthma: fluticasone-salmeterol, montelukast  # Hypothyroid: LT4       Diet: Regular Diet Adult  Snacks/Supplements Adult: Other; Chocolate Ensure Enlive @ 10 and 2 + Chocolate Ensure Enlive shake (made w/ ice cream) @ HS; Between Meals  NPO per Anesthesia Guidelines for Procedure/Surgery Except for: Meds    DVT Prophylaxis: Enoxaparin (Lovenox) SQ  Ness Catheter: Not present  Central Lines: None  Code Status: Full Code      Disposition Plan   Expected discharge: 11/08/2021   recommended to prior living arrangement once plan established for hydropneumothorax.     The patient's care was discussed with the Patient and Patient's Family.    Ozzie Barth MD  Hospitalist Service, [address]  Securely message with the Vocera Web Console (learn more here)  Text page via [employer] Paging/Directory    Please  see sign in/sign out for up to date coverage information    Clinically Significant Risk Factors Present on Admission             # Coagulation Defect: INR = 1.24 (Ref range: 0.85 - 1.15) and/or PTT = N/A on admission, will monitor for bleeding  # Thrombocytopenia: Plts = 123 10e3/uL (Ref range: 150 - 450 10e3/uL) on admission, will monitor for bleeding  # Severe Malnutrition, POA: based on Weight loss;Reduced intake (11/02/21 1400)         ______________________________________________________________________    Interval History   Laying in bed.  Feels a lot of pain around the chest tube insertion site.   Breathing is slightly better since chest tube insertion but still dyspneic.    No abd pain or fever or cough / sputum production.     Data reviewed today: I reviewed all medications, new labs and imaging results over the last 24 hours. I personally reviewed.    Physical Exam   Vital Signs: Temp: 97.7  F (36.5  C) Temp src: Temporal BP: 128/75 Pulse: 93   Resp: 16 SpO2: 98 % O2 Device: Nasal cannula Oxygen Delivery: 2 LPM  Weight: 175 lbs 0 oz  General Appearance: Laying in bed, in mild distress with deep breaths.   Respiratory: on 2L oxygen, some crackles on the R side and some sounds of subcutaneous air.  Cardiovascular: RRR  GI: Soft, NTND  Other:Moving all ext  Alert and oriented  Appropriate mood alfie ffect.     Data

## 2021-11-02 NOTE — CONSULTS
"Initial pulmonary consult    Date of service: 11/2/2021    Reason for consult: Chest tube management    History of presenting illness: 56-year-old female with past medical history significant for undifferentiated pleomorphic sarcoma who recently underwent scheduled CT to assess treatment response after starting on pazopanib on 9/24.  CT showed multiple cavitary masses in addition to hydropneumothorax on the right side.  She was called and told to come to the emergency room where a right-sided surgical chest tube was placed.  Following chest tube placement there was only small residual pneumothorax and chest tube is remain on continuous suction.  CT also showed incidental right IJ clot associated with port on the same side.    PMH/PSH:   1. Undifferentiated pleomorphic sarcoma  2. Asthma    Medications: list reviewed.     Social history:  is at bedside. Lifelong non-smoker.     Family history: Reviewed and not relevant to presenting complaint.     Allergies: Reviewed. See EMR.     ROS: Complete ROS performed and is otherwise negative.    Exam:  /75 (BP Location: Left arm)   Pulse 93   Temp 97.7  F (36.5  C) (Temporal)   Resp 16   Ht 1.702 m (5' 7\")   Wt 79.4 kg (175 lb)   LMP 03/19/2019   SpO2 98%   BMI 27.41 kg/m    General: no distress, looks tired  HEENT: atraumatic  Pulmonary: breathing comfortably  Cardiac: regular rhythm, normal rate  Skin: warm, dry  MSK: chest tube in right chest wall, no air leak  Neurologic: alert, oriented    Imaging reviewed.     Assessment/Plan: 56 year-old female with sarcoma and pulmonary metastases. Since starting pazopanib, the pulmonary masses have enlarged and become necrotic--unclear if this is due to disease progression or secondary to effect of targeted therapy on the masses. Subsequently developed secondary spontaneous hydropneumothorax necessitating chest tube.     At present time there is no evidence of ongoing air leak. Will keep on continuous suction " tonight. If no residual pneumothorax on plain film in AM will likely place chest tube to suction.     Is at increased risk of recurrent pneumothorax due to cavitary masses. Depending on prognosis, may be reasonable to perform talc pleurodesis via chest tube, but would like to discuss this with oncology.     Elliot Bruner MD   Pulmonary fellow

## 2021-11-03 ENCOUNTER — APPOINTMENT (OUTPATIENT)
Dept: CT IMAGING | Facility: CLINIC | Age: 56
End: 2021-11-03
Payer: COMMERCIAL

## 2021-11-03 ENCOUNTER — APPOINTMENT (OUTPATIENT)
Dept: GENERAL RADIOLOGY | Facility: CLINIC | Age: 56
End: 2021-11-03
Attending: STUDENT IN AN ORGANIZED HEALTH CARE EDUCATION/TRAINING PROGRAM
Payer: COMMERCIAL

## 2021-11-03 PROCEDURE — 71250 CT THORAX DX C-: CPT

## 2021-11-03 PROCEDURE — 250N000013 HC RX MED GY IP 250 OP 250 PS 637: Performed by: HOSPITALIST

## 2021-11-03 PROCEDURE — 250N000013 HC RX MED GY IP 250 OP 250 PS 637: Performed by: INTERNAL MEDICINE

## 2021-11-03 PROCEDURE — 120N000002 HC R&B MED SURG/OB UMMC

## 2021-11-03 PROCEDURE — 99233 SBSQ HOSP IP/OBS HIGH 50: CPT | Mod: GC | Performed by: INTERNAL MEDICINE

## 2021-11-03 PROCEDURE — 99233 SBSQ HOSP IP/OBS HIGH 50: CPT | Performed by: STUDENT IN AN ORGANIZED HEALTH CARE EDUCATION/TRAINING PROGRAM

## 2021-11-03 PROCEDURE — 71045 X-RAY EXAM CHEST 1 VIEW: CPT | Mod: 26 | Performed by: STUDENT IN AN ORGANIZED HEALTH CARE EDUCATION/TRAINING PROGRAM

## 2021-11-03 PROCEDURE — 71045 X-RAY EXAM CHEST 1 VIEW: CPT

## 2021-11-03 PROCEDURE — 71250 CT THORAX DX C-: CPT | Mod: 26 | Performed by: RADIOLOGY

## 2021-11-03 RX ADMIN — PROCHLORPERAZINE MALEATE 10 MG: 5 TABLET ORAL at 19:53

## 2021-11-03 RX ADMIN — GABAPENTIN 900 MG: 300 CAPSULE ORAL at 21:32

## 2021-11-03 RX ADMIN — MONTELUKAST 10 MG: 10 TABLET, FILM COATED ORAL at 07:38

## 2021-11-03 RX ADMIN — Medication 1 TABLET: at 07:37

## 2021-11-03 RX ADMIN — ACETAMINOPHEN 650 MG: 325 TABLET, FILM COATED ORAL at 07:37

## 2021-11-03 RX ADMIN — PAZOPANIB 800 MG: 200 TABLET, FILM COATED ORAL at 21:28

## 2021-11-03 RX ADMIN — Medication 5 MG: at 23:37

## 2021-11-03 RX ADMIN — ACETAMINOPHEN 650 MG: 325 TABLET, FILM COATED ORAL at 13:50

## 2021-11-03 RX ADMIN — FLUTICASONE PROPIONATE AND SALMETEROL 1 PUFF: 50; 100 POWDER RESPIRATORY (INHALATION) at 07:38

## 2021-11-03 RX ADMIN — ACETAMINOPHEN 650 MG: 325 TABLET, FILM COATED ORAL at 21:32

## 2021-11-03 RX ADMIN — GABAPENTIN 600 MG: 300 CAPSULE ORAL at 07:37

## 2021-11-03 RX ADMIN — Medication 5 MG: at 17:58

## 2021-11-03 RX ADMIN — GABAPENTIN 600 MG: 300 CAPSULE ORAL at 13:53

## 2021-11-03 RX ADMIN — Medication 5 MG: at 04:33

## 2021-11-03 RX ADMIN — CETIRIZINE HYDROCHLORIDE 10 MG: 10 TABLET, FILM COATED ORAL at 07:38

## 2021-11-03 RX ADMIN — LEVOTHYROXINE SODIUM 50 MCG: 0.05 TABLET ORAL at 07:38

## 2021-11-03 RX ADMIN — Medication 5 MG: at 11:03

## 2021-11-03 RX ADMIN — Medication 50 MCG: at 07:38

## 2021-11-03 ASSESSMENT — ACTIVITIES OF DAILY LIVING (ADL)
ADLS_ACUITY_SCORE: 14

## 2021-11-03 NOTE — PROGRESS NOTES
[facility]    Medicine Progress Note - Hospitalist Service, Gold 11       Date of Admission:  11/1/2021    Assessment & Plan          57 y/o F with PMH of sarcoma, diagnosed in 2019, s/p surgery and radiation in the leg, but since metastasized mostly in the lung, who has been through several chemo, on pazopanib since 9/2021, has been having dyspnea for about two months.  On routine CT CAP found to have large R sided hydropneumothroax, had chest tube placed in the ER  - Chest tube on suction.  Daily CXR  - Pulmonary consulted to help with management --> recommends talc pleurodesis planned for 11/4/21 (talc slurry will be administered via the existing chest tube).   - Onc following: Recommends continuing pazopanib, the necrosis of the lung mass may actually be response to therapy.   - Cancer related pain: gabapentin, oxycodone  - Cancer related nausea: Ondansetron    # Port malfunction with small RIJ Clot  - Per IR, RIJ clot associated with port does not need to be treated.  Will hold off on anticoagulation.  IR plans on replacing port on 11/5/21 inpatient or outpatient.     --- CHRONIC ISSUES ---  # Asthma: fluticasone-salmeterol, montelukast  # Hypothyroid: LT4       Diet: Snacks/Supplements Adult: Other; Chocolate Ensure Enlive @ 10 and 2 + Chocolate Ensure Enlive shake (made w/ ice cream) @ HS; Between Meals  NPO per Anesthesia Guidelines for Procedure/Surgery Except for: Meds  Regular Diet Adult    DVT Prophylaxis: Enoxaparin (Lovenox) SQ  Ness Catheter: Not present  Central Lines: None  Code Status: Full Code      Disposition Plan   Expected discharge: 11/08/2021   recommended to prior living arrangement once plan established for hydropneumothorax.     The patient's care was discussed with the Patient and Patient's Family.    Ozzie Barth MD  Hospitalist Service, [address]  Securely message with the [employer]  Web Console (learn more here)  Text page via [facility] Paging/Directory    Please see sign in/sign out for up to date coverage information    Clinically Significant Risk Factors Present on Admission            # Severe Malnutrition, POA: based on Weight loss;Reduced intake (11/02/21 1400)         ______________________________________________________________________    Interval History   Laying in bed.  Feels a lot of pain around the chest tube insertion site.   Breathing is slightly better since chest tube.     No abd pain or fever or cough / sputum production.     Data reviewed today: I reviewed all medications, new labs and imaging results over the last 24 hours. I personally reviewed.    Physical Exam   Vital Signs: Temp: 96.8  F (36  C) Temp src: Temporal BP: 135/75 Pulse: 97   Resp: 20 SpO2: 97 % O2 Device: None (Room air) Oxygen Delivery: 1/2 LPM  Weight: 175 lbs 0 oz  General Appearance: Laying in bed, in mild distress with deep breaths.   Respiratory: on 2L oxygen  Cardiovascular:   GI:   Other:Moving all ext  Alert and oriented  Appropriate mood alfie ffect.     Data

## 2021-11-03 NOTE — PRE-PROCEDURE
- VSS  - alert and oriented x 4  - SBA with 1, ambulated to the bathroom  - Right chest tube to low suction, scant output  - Regular diet but poor diet  - pain managed by scheduled tylenol and gabapentin and PRN oxycodone  - 1L of O2  - Monitor POC

## 2021-11-03 NOTE — PROGRESS NOTES
Pleurodesis procedure planned for tomorrow around noon. Talc slurry will be administered via existing chest tube. Pleurodesis is most successful when steroids and NSAIDs are avoided. IV and local anesthetic will be administered during the procedure. Please keep her NPO after 8AM (she can have breakfast, just skip lunch)

## 2021-11-03 NOTE — PROGRESS NOTES
BRIEF ONCOLOGY NOTE:     Connie Ashby is a 55 yo woman with metastatic undifferentiated pleomorphic sarcoma with progression through neoadjuvant RT/ surgery, liposomal doxo/ ifosfamide, pembro, gemcitabine, now on pazopanib since 9/2021. Now admitted with new large R sided hydropneumothorax s/p chest tube placement on admission. Also noted to have a small RIJ clot associated with port.     Discussed case with patients primary oncologist, Dr Lopez. We suspect she is responding to pazopanib given cavitary appearance of lesion on CT. Hard to report definitive timeline in regards prognosis but given current response we would agree with proceeding with talc pleurodesis given high risk of recurrent pneumothorax. Agree with port revision 11/5 and prophylactic lovenox 40mg daily. We typically do not recommend treatment dose AC with line associated clots.     Patient discussed with Dr Barth and Dr Retana. Patient not seen today as was at CT scan and then out of the room, I plan to see her tomorrow.     Zohra Duke PA-C ([place])    Hematology/Oncology   Pager: [phone]

## 2021-11-03 NOTE — PROGRESS NOTES
"Pulmonary follow-up     Date of service: 11/3/2021     Interval history: No events overnight.  Still no airleak.  Feeling better.  Less discomfort with deep breathing.    Exam:  /75 (BP Location: Left arm)   Pulse 97   Temp 96.8  F (36  C) (Temporal)   Resp 20   Ht 1.702 m (5' 7\")   Wt 79.4 kg (175 lb)   LMP 03/19/2019   SpO2 97%   BMI 27.41 kg/m    General: no distress, looks tired  HEENT: atraumatic  Pulmonary: breathing comfortably  Cardiac: regular rhythm, normal rate  Skin: warm, dry  MSK: chest tube in right chest wall, no air leak  Neurologic: alert, oriented    CT obtained today.  Shows good pleural apposition.    Assessment/Plan: 56 year-old female with sarcoma and pulmonary metastases. Since starting pazopanib, the pulmonary masses have enlarged and become necrotic--unclear if this is due to disease progression or secondary to effect of targeted therapy on the masses. Subsequently developed secondary spontaneous hydropneumothorax necessitating chest tube.     At present time there is no evidence of ongoing air leak.  CT chest obtained today and shows good pleural apposition.  Is at increased risk for recurrent pneumothorax in the future based.  Would benefit from talc pleurodesis to prevent recurrence. Discussed case patient's inpatient and outpatient oncology team, and they are in agreement with this plan.    Dr. Ngo will perform talc pleurodesis through existing chest tube tomorrow afternoon (please see her separate note). Okay to have breakfast but should not have anything thereafter.     Elliot Bruner MD   Pulmonary fellow  "

## 2021-11-03 NOTE — PLAN OF CARE
AOx4. VSS on ra. Pt uses O2 at night at times. Up with assist x1 to carry chest tube. Chest tube with small amount of serosanguinous output. Chest tube to suction. Tolerating reg diet. +flatus, BM x1. Voiding AUO. Chest tube pain site managed with PRN Tylenol and Oxycodone. Port heparin locked. Plan is to get chest port replaced this week. Cont POC.

## 2021-11-03 NOTE — CONSULTS
Care Management Initial Consult    General Information  Assessment completed with: Patient,    Type of CM/SW Visit: Initial Assessment    Primary Care Provider verified and updated as needed: Yes - Dr. Diaz  Readmission within the last 30 days: no previous admission in last 30 days      Reason for Consult: other (see comments)  Advance Care Planning: Advance Care Planning Reviewed: questions answered, education/resources on health care directives provided          Communication Assessment  Patient's communication style: spoken language (English or Bilingual)         Cognitive  Cognitive/Neuro/Behavioral: WDL                      Living Environment:   People in home: spouse, Elliot    Current living Arrangements: apartment, main level      Able to return to prior arrangements: yes       Family/Social Support:  Care provided by: self, spouse/significant other. Spouse, Elliot, assists pt w/ transportation, shower set up, clothes set up, laundry, cooking, cleaning and medication set up.   Provides care for: no one  Marital Status:             Description of Support System: Supportive, Involved    Support Assessment: Adequate family and caregiver support, Adequate social supports    Current Resources:   Patient receiving home care services: No     Community Resources: None  Equipment currently used at home:  Uses walker at home and scooter for longer distances  Supplies currently used at home: None     Employment/Financial:  Employment Status: retired        Financial Concerns: No concerns identified           Lifestyle & Psychosocial Needs:  Social Determinants of Health     Tobacco Use: Low Risk      Smoking Tobacco Use: Never Smoker     Smokeless Tobacco Use: Never Used   Alcohol Use: Not At Risk     Frequency of Alcohol Consumption: 2-3 times a week     Average Number of Drinks: 1 or 2     Frequency of Binge Drinking: Never   Financial Resource Strain:      Difficulty of Paying Living Expenses:    Food  Insecurity:      Worried About Running Out of Food in the Last Year:      Ran Out of Food in the Last Year:    Transportation Needs:      Lack of Transportation (Medical):      Lack of Transportation (Non-Medical):    Physical Activity:      Days of Exercise per Week:      Minutes of Exercise per Session:    Stress:      Feeling of Stress :    Social Connections:      Frequency of Communication with Friends and Family:      Frequency of Social Gatherings with Friends and Family:      Attends Zoroastrianism Services:      Active Member of Clubs or Organizations:      Attends Club or Organization Meetings:      Marital Status:    Intimate Partner Violence:      Fear of Current or Ex-Partner:      Emotionally Abused:      Physically Abused:      Sexually Abused:    Depression: Not at risk     PHQ-2 Score: 1   Housing Stability:      Unable to Pay for Housing in the Last Year:      Number of Places Lived in the Last Year:      Unstable Housing in the Last Year:        Mental Health Status:  Mental Health Status: No formal diagnosis, but pt endorsed feelings of depression and anxiety as it relates to her cancer. Pt described her family as her strongest emotional support.     Chemical Dependency Status:  Chemical Dependency Status: None reported          Values/Beliefs:  Spiritual, Cultural Beliefs, Zoroastrianism Practices, Values that affect care:  Nataly               Additional Information:  SW introduced self/role to pt in her room. Pt requested SW provide a blank copy of a health care directive to fill out. SW provided copy and education to pt. Pt plans on discharging home w/ her , Elliot, who is able to provide transportation. Elliot works FT from home, but pt states his job has been very accommodating and flexible. Pt states when she goes on hospice, she wants the agency to be [facility].     Liliana LAZARO, ABELARDO  [facility]   Phone: [phone]  Pager: [phone]

## 2021-11-03 NOTE — PROGRESS NOTES
Spoke with JL Sierra how gave pt permission to have her family come to hospital and meet in conference room this weekend. Pt son being deployed and pt kids are flying in from out of town to meet up as a family to say goodbye to son being deployed. Passed message on to [employer] The JL Sierra told RN to write note as she will not be here this weekend and wants to make sure this gets passed along to everyone.

## 2021-11-03 NOTE — PROGRESS NOTES
AVSS on 0.5L NC. Pain managed w oxycodone. Right chest tube to low continuous suction w scant output. NPO at midnight. IR procedure sched for 11/5. Up w SBA. Will continue to monitor.

## 2021-11-04 ENCOUNTER — ANESTHESIA (OUTPATIENT)
Dept: SURGERY | Facility: CLINIC | Age: 56
End: 2021-11-04
Payer: COMMERCIAL

## 2021-11-04 ENCOUNTER — APPOINTMENT (OUTPATIENT)
Dept: GENERAL RADIOLOGY | Facility: CLINIC | Age: 56
End: 2021-11-04
Attending: STUDENT IN AN ORGANIZED HEALTH CARE EDUCATION/TRAINING PROGRAM
Payer: COMMERCIAL

## 2021-11-04 ENCOUNTER — ANESTHESIA EVENT (OUTPATIENT)
Dept: SURGERY | Facility: CLINIC | Age: 56
End: 2021-11-04
Payer: COMMERCIAL

## 2021-11-04 LAB
ALBUMIN SERPL-MCNC: 1.7 G/DL (ref 3.4–5)
ALP SERPL-CCNC: 115 U/L (ref 40–150)
ALT SERPL W P-5'-P-CCNC: 14 U/L (ref 0–50)
ANION GAP SERPL CALCULATED.3IONS-SCNC: 4 MMOL/L (ref 3–14)
APPEARANCE FLD: ABNORMAL
AST SERPL W P-5'-P-CCNC: 20 U/L (ref 0–45)
BASOPHILS # BLD AUTO: 0 10E3/UL (ref 0–0.2)
BASOPHILS NFR BLD AUTO: 1 %
BILIRUB SERPL-MCNC: 0.6 MG/DL (ref 0.2–1.3)
BUN SERPL-MCNC: 7 MG/DL (ref 7–30)
CALCIUM SERPL-MCNC: 8.5 MG/DL (ref 8.5–10.1)
CHLORIDE BLD-SCNC: 100 MMOL/L (ref 94–109)
CO2 SERPL-SCNC: 29 MMOL/L (ref 20–32)
COLOR FLD: ABNORMAL
CREAT SERPL-MCNC: 0.47 MG/DL (ref 0.52–1.04)
EOSINOPHIL # BLD AUTO: 0.2 10E3/UL (ref 0–0.7)
EOSINOPHIL NFR BLD AUTO: 4 %
ERYTHROCYTE [DISTWIDTH] IN BLOOD BY AUTOMATED COUNT: 24 % (ref 10–15)
GFR SERPL CREATININE-BSD FRML MDRD: >90 ML/MIN/1.73M2
GLUCOSE BLD-MCNC: 93 MG/DL (ref 70–99)
GRAM STAIN RESULT: NORMAL
GRAM STAIN RESULT: NORMAL
HCT VFR BLD AUTO: 24.9 % (ref 35–47)
HGB BLD-MCNC: 7.7 G/DL (ref 11.7–15.7)
IMM GRANULOCYTES # BLD: 0.1 10E3/UL
IMM GRANULOCYTES NFR BLD: 1 %
LDH FLD L TO P-CCNC: 1601 U/L
LDH SERPL L TO P-CCNC: 213 U/L (ref 81–234)
LYMPHOCYTES # BLD AUTO: 0.7 10E3/UL (ref 0.8–5.3)
LYMPHOCYTES NFR BLD AUTO: 19 %
LYMPHOCYTES NFR FLD MANUAL: NORMAL %
MCH RBC QN AUTO: 33.3 PG (ref 26.5–33)
MCHC RBC AUTO-ENTMCNC: 30.9 G/DL (ref 31.5–36.5)
MCV RBC AUTO: 108 FL (ref 78–100)
MONOCYTES # BLD AUTO: 0.3 10E3/UL (ref 0–1.3)
MONOCYTES NFR BLD AUTO: 9 %
MONOS+MACROS NFR FLD MANUAL: 4 %
NEUTROPHILS # BLD AUTO: 2.5 10E3/UL (ref 1.6–8.3)
NEUTROPHILS NFR BLD AUTO: 66 %
NEUTS BAND NFR FLD MANUAL: 97 %
NRBC # BLD AUTO: 0 10E3/UL
NRBC BLD AUTO-RTO: 1 /100
PH FLD: 8.5 PH
PLATELET # BLD AUTO: 143 10E3/UL (ref 150–450)
POTASSIUM BLD-SCNC: 3.9 MMOL/L (ref 3.4–5.3)
PROT FLD-MCNC: 4.2 G/DL
PROT SERPL-MCNC: 7 G/DL (ref 6.8–8.8)
RBC # BLD AUTO: 2.31 10E6/UL (ref 3.8–5.2)
SODIUM SERPL-SCNC: 133 MMOL/L (ref 133–144)
WBC # BLD AUTO: 3.7 10E3/UL (ref 4–11)
WBC # FLD AUTO: 843 /UL

## 2021-11-04 PROCEDURE — 36591 DRAW BLOOD OFF VENOUS DEVICE: CPT | Performed by: STUDENT IN AN ORGANIZED HEALTH CARE EDUCATION/TRAINING PROGRAM

## 2021-11-04 PROCEDURE — 88112 CYTOPATH CELL ENHANCE TECH: CPT | Mod: 26 | Performed by: PATHOLOGY

## 2021-11-04 PROCEDURE — 82040 ASSAY OF SERUM ALBUMIN: CPT | Performed by: STUDENT IN AN ORGANIZED HEALTH CARE EDUCATION/TRAINING PROGRAM

## 2021-11-04 PROCEDURE — 71045 X-RAY EXAM CHEST 1 VIEW: CPT

## 2021-11-04 PROCEDURE — 999N000127 HC STATISTIC PERIPHERAL IV START W US GUIDANCE

## 2021-11-04 PROCEDURE — 99233 SBSQ HOSP IP/OBS HIGH 50: CPT | Performed by: STUDENT IN AN ORGANIZED HEALTH CARE EDUCATION/TRAINING PROGRAM

## 2021-11-04 PROCEDURE — 87070 CULTURE OTHR SPECIMN AEROBIC: CPT | Performed by: STUDENT IN AN ORGANIZED HEALTH CARE EDUCATION/TRAINING PROGRAM

## 2021-11-04 PROCEDURE — 99233 SBSQ HOSP IP/OBS HIGH 50: CPT | Mod: GC | Performed by: INTERNAL MEDICINE

## 2021-11-04 PROCEDURE — 88305 TISSUE EXAM BY PATHOLOGIST: CPT | Mod: TC | Performed by: STUDENT IN AN ORGANIZED HEALTH CARE EDUCATION/TRAINING PROGRAM

## 2021-11-04 PROCEDURE — 89051 BODY FLUID CELL COUNT: CPT | Performed by: STUDENT IN AN ORGANIZED HEALTH CARE EDUCATION/TRAINING PROGRAM

## 2021-11-04 PROCEDURE — 71045 X-RAY EXAM CHEST 1 VIEW: CPT | Mod: 26 | Performed by: RADIOLOGY

## 2021-11-04 PROCEDURE — 83615 LACTATE (LD) (LDH) ENZYME: CPT | Performed by: STUDENT IN AN ORGANIZED HEALTH CARE EDUCATION/TRAINING PROGRAM

## 2021-11-04 PROCEDURE — 83986 ASSAY PH BODY FLUID NOS: CPT | Performed by: STUDENT IN AN ORGANIZED HEALTH CARE EDUCATION/TRAINING PROGRAM

## 2021-11-04 PROCEDURE — 85025 COMPLETE CBC W/AUTO DIFF WBC: CPT | Performed by: STUDENT IN AN ORGANIZED HEALTH CARE EDUCATION/TRAINING PROGRAM

## 2021-11-04 PROCEDURE — 250N000013 HC RX MED GY IP 250 OP 250 PS 637: Performed by: STUDENT IN AN ORGANIZED HEALTH CARE EDUCATION/TRAINING PROGRAM

## 2021-11-04 PROCEDURE — 250N000013 HC RX MED GY IP 250 OP 250 PS 637: Performed by: INTERNAL MEDICINE

## 2021-11-04 PROCEDURE — 120N000002 HC R&B MED SURG/OB UMMC

## 2021-11-04 PROCEDURE — 84157 ASSAY OF PROTEIN OTHER: CPT | Performed by: STUDENT IN AN ORGANIZED HEALTH CARE EDUCATION/TRAINING PROGRAM

## 2021-11-04 PROCEDURE — 99232 SBSQ HOSP IP/OBS MODERATE 35: CPT | Performed by: STUDENT IN AN ORGANIZED HEALTH CARE EDUCATION/TRAINING PROGRAM

## 2021-11-04 PROCEDURE — 88305 TISSUE EXAM BY PATHOLOGIST: CPT | Mod: 26 | Performed by: PATHOLOGY

## 2021-11-04 PROCEDURE — 87205 SMEAR GRAM STAIN: CPT | Performed by: STUDENT IN AN ORGANIZED HEALTH CARE EDUCATION/TRAINING PROGRAM

## 2021-11-04 PROCEDURE — 89050 BODY FLUID CELL COUNT: CPT | Performed by: STUDENT IN AN ORGANIZED HEALTH CARE EDUCATION/TRAINING PROGRAM

## 2021-11-04 PROCEDURE — 250N000013 HC RX MED GY IP 250 OP 250 PS 637: Performed by: HOSPITALIST

## 2021-11-04 RX ORDER — NALOXONE HYDROCHLORIDE 0.4 MG/ML
0.2 INJECTION, SOLUTION INTRAMUSCULAR; INTRAVENOUS; SUBCUTANEOUS
Status: DISCONTINUED | OUTPATIENT
Start: 2021-11-04 | End: 2021-11-09 | Stop reason: HOSPADM

## 2021-11-04 RX ORDER — NALOXONE HYDROCHLORIDE 0.4 MG/ML
0.2 INJECTION, SOLUTION INTRAMUSCULAR; INTRAVENOUS; SUBCUTANEOUS
Status: CANCELLED | OUTPATIENT
Start: 2021-11-04

## 2021-11-04 RX ORDER — FLUMAZENIL 0.1 MG/ML
0.2 INJECTION, SOLUTION INTRAVENOUS
Status: CANCELLED | OUTPATIENT
Start: 2021-11-04

## 2021-11-04 RX ORDER — NALOXONE HYDROCHLORIDE 0.4 MG/ML
0.4 INJECTION, SOLUTION INTRAMUSCULAR; INTRAVENOUS; SUBCUTANEOUS
Status: DISCONTINUED | OUTPATIENT
Start: 2021-11-04 | End: 2021-11-09 | Stop reason: HOSPADM

## 2021-11-04 RX ORDER — ACETAMINOPHEN 325 MG/1
650 TABLET ORAL
Status: DISCONTINUED | OUTPATIENT
Start: 2021-11-04 | End: 2021-11-04

## 2021-11-04 RX ORDER — FENTANYL CITRATE 50 UG/ML
25-50 INJECTION, SOLUTION INTRAMUSCULAR; INTRAVENOUS
Status: CANCELLED | OUTPATIENT
Start: 2021-11-04

## 2021-11-04 RX ORDER — LIDOCAINE 4 G/G
1 PATCH TOPICAL
Status: DISCONTINUED | OUTPATIENT
Start: 2021-11-04 | End: 2021-11-09 | Stop reason: HOSPADM

## 2021-11-04 RX ORDER — NALOXONE HYDROCHLORIDE 0.4 MG/ML
0.4 INJECTION, SOLUTION INTRAMUSCULAR; INTRAVENOUS; SUBCUTANEOUS
Status: CANCELLED | OUTPATIENT
Start: 2021-11-04

## 2021-11-04 RX ORDER — ACETAMINOPHEN 325 MG/1
975 TABLET ORAL
Status: DISCONTINUED | OUTPATIENT
Start: 2021-11-04 | End: 2021-11-09 | Stop reason: HOSPADM

## 2021-11-04 RX ADMIN — MONTELUKAST 10 MG: 10 TABLET, FILM COATED ORAL at 08:33

## 2021-11-04 RX ADMIN — LIDOCAINE 1 PATCH: 560 PATCH PERCUTANEOUS; TOPICAL; TRANSDERMAL at 16:01

## 2021-11-04 RX ADMIN — ACETAMINOPHEN 650 MG: 325 TABLET, FILM COATED ORAL at 11:13

## 2021-11-04 RX ADMIN — FLUTICASONE PROPIONATE AND SALMETEROL 1 PUFF: 50; 100 POWDER RESPIRATORY (INHALATION) at 08:32

## 2021-11-04 RX ADMIN — GABAPENTIN 600 MG: 300 CAPSULE ORAL at 13:54

## 2021-11-04 RX ADMIN — GABAPENTIN 600 MG: 300 CAPSULE ORAL at 08:32

## 2021-11-04 RX ADMIN — ACETAMINOPHEN 650 MG: 325 TABLET, FILM COATED ORAL at 04:26

## 2021-11-04 RX ADMIN — PROCHLORPERAZINE MALEATE 10 MG: 5 TABLET ORAL at 20:44

## 2021-11-04 RX ADMIN — Medication 1 TABLET: at 08:32

## 2021-11-04 RX ADMIN — LEVOTHYROXINE SODIUM 50 MCG: 0.05 TABLET ORAL at 16:04

## 2021-11-04 RX ADMIN — CETIRIZINE HYDROCHLORIDE 10 MG: 10 TABLET, FILM COATED ORAL at 08:32

## 2021-11-04 RX ADMIN — DICLOFENAC SODIUM 2 G: 10 GEL TOPICAL at 16:03

## 2021-11-04 RX ADMIN — GABAPENTIN 900 MG: 300 CAPSULE ORAL at 21:51

## 2021-11-04 RX ADMIN — Medication 5 MG: at 08:39

## 2021-11-04 RX ADMIN — Medication 50 MCG: at 08:33

## 2021-11-04 RX ADMIN — ACETAMINOPHEN 975 MG: 325 TABLET, FILM COATED ORAL at 17:49

## 2021-11-04 RX ADMIN — PAZOPANIB 800 MG: 200 TABLET, FILM COATED ORAL at 21:52

## 2021-11-04 RX ADMIN — Medication 5 MG: at 20:44

## 2021-11-04 RX ADMIN — DICLOFENAC SODIUM 2 G: 10 GEL TOPICAL at 20:45

## 2021-11-04 RX ADMIN — Medication 5 MG: at 15:09

## 2021-11-04 ASSESSMENT — ACTIVITIES OF DAILY LIVING (ADL)
ADLS_ACUITY_SCORE: 14
ADLS_ACUITY_SCORE: 16
ADLS_ACUITY_SCORE: 14
ADLS_ACUITY_SCORE: 16
ADLS_ACUITY_SCORE: 18
ADLS_ACUITY_SCORE: 16
ADLS_ACUITY_SCORE: 14
ADLS_ACUITY_SCORE: 14
ADLS_ACUITY_SCORE: 18
ADLS_ACUITY_SCORE: 18
ADLS_ACUITY_SCORE: 16
ADLS_ACUITY_SCORE: 14
ADLS_ACUITY_SCORE: 14
ADLS_ACUITY_SCORE: 16
ADLS_ACUITY_SCORE: 14
ADLS_ACUITY_SCORE: 16
ADLS_ACUITY_SCORE: 14
ADLS_ACUITY_SCORE: 18
ADLS_ACUITY_SCORE: 16
ADLS_ACUITY_SCORE: 16
ADLS_ACUITY_SCORE: 14
ADLS_ACUITY_SCORE: 18

## 2021-11-04 NOTE — PROGRESS NOTES
IR follow-up.    Pt was on the IR schedule for port revision 11/5. CT chest 11/3 notes significant amount of subcutaneous chest wall emphysema. This finding makes port revision challenging as it is unlikely incisions over the port body and the venotomy will heal. US imaging is also challenging with this amount of air. Procedure will be cancelled for 11/5.     Discussed with Dr. Barth and patient. Recommend coordination of port revision after improvement in subcutaneous chest wall emphysema. If access is necessary and current port (which flushes but does not aspirate) is insufficient, recommend PICC placement in the LUE.    IR charge updated.    Bing Morrison DNP, APRN  Interventional Radiology   IR on-call pager: [phone]

## 2021-11-04 NOTE — PROGRESS NOTES
"Pulmonary follow-up     Date of service: 11/4/2021     Interval history: No events overnight. Had planned for talc pleurodesis today but formal read on CT yesterday is that there some radiographic evidence of empyema so procedure cancelled. Connie is otherwise feeling well.     Exam:  /72 (BP Location: Right arm)   Pulse 97   Temp 97.2  F (36.2  C) (Temporal)   Resp 16   Ht 1.702 m (5' 7\")   Wt 79.4 kg (175 lb)   LMP 03/19/2019   SpO2 94%   BMI 27.41 kg/m    General: no distress, looks tired  HEENT: atraumatic  Pulmonary: breathing comfortably  Cardiac: regular rhythm, normal rate  Skin: warm, dry  MSK: chest tube in right chest wall, no air leak  Neurologic: alert, oriented    CT obtained today.  Shows good pleural apposition.    Assessment/Plan: 56 year-old female with sarcoma and pulmonary metastases. Since starting pazopanib, the pulmonary masses have enlarged and become necrotic--unclear if this is due to disease progression or secondary to effect of targeted therapy on the masses. Subsequently developed secondary spontaneous hydropneumothorax necessitating chest tube.     At present time there is no evidence of ongoing air leak.  CT obtained yesterday showed good pleural apposition and plain film today shows that the lung is still up.  Is at increased risk for recurrent pneumothorax and had planned to perform talc pleurodesis today, but formal CT read is that there is possible empyema in the right lower lung.    Will send fluid in from right pleural space for culture/analysis. If not consistent with empyema, will still plan for talc pleurodesis, but will need to discuss timing further with IP.     Recommendations:   -- awaiting fluid analysis and culture from right pleural space  -- would leave chest tube to low suction for the time being    Elliot Bruner MD   Pulmonary fellow  "

## 2021-11-04 NOTE — PROGRESS NOTES
.          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 Now admitted with new large R sided hydropneumothorax s/p chest tube placement on admission.     # metastatic undifferentiated pleomorphic sarcoma  # large R sided hydropneumothorax  # possible empyema     Please review initial oncology consult note from 11/2 for full oncologic history. Diagnosed in November 2019 when she presented as lump in the right leg. Treated with neoadjuvant radiation followed by surgery on 3/12/2020, unfortunately had recurrence in the lungs in November 2020. Treated with Doxil/ifosfamide x5 cycles then Doxil monotherapy with PD so then switched to Keytruda then gemcitabine. Most recently initiated on Pazopanib 800 mg on 9/24/2021.     Patient had CT CAP for treatment assessment at [facility] 11/1/2021 which showed hydropneumothorax in the right side with new right lung collapse for which she was admitted to [facility]. S/p chest tube placement. Reviewed and updated her primary oncologist Dr. Lopez about this current admission.  Her lung lesions showed necrosis features with thin rim suggested to response to pazopanib.  Measurement of some of these lung mets are slightly bigger comparing with CT done on 9/22/2021. Plan to continue Pazopanib given response. Patient has good insight into prognosis likely <6 months. She vocalized that her goal is to get to a family [place] vacation this Gena. Plan for talc pleurodesis with IP in coming days  "if negative infectious work up given possible empyema on imaging.     # port associated RIJ clot   - Noted on CT imaging this admission. Agree with ppx Lovenox 40 mg daily given sedentary lifestyle (ECOG 3) and plan for port adjustment 11/5 while inpatient     Recommendations:   - Patient and her  planning to update health care directive today. I recommended DNR/DNI code status which they are considering.   - Agree with talc pleurodesis if negative infectious work up     Patient was seen and plan of care was discussed with attending physician Dr. Retana.    Thank you for the opportunity to partake in this patients plan of care. Please do not hesitate to page with questions. We will continue to follow.     Zohra GonzalezLa Paz Regional HospitalRAYA le    Hematology/Oncology   Pager: [phone]   ___________________________________________________________________    Subjective & Interval History:    No acute events noted overnight. Breathing stable on minimal O2 but does desat when sleeping. Denies pain, reports overall fatigue and intermittent diarrhea that she has notice since starting Pazopanib. Reviewed the plan with her today. Her and her  asked great big picture questions about overall prognosis. State goal is for her to get to a family vacation to [place] this Gena with her one grandchild. Her son is being deployed in 2 weeks. Family hoping to have a little early Thanksgiving here in the hospital in coming days as children are here from out of town. Plans to update HCD today with her .     Physical Exam:    Blood pressure 120/72, pulse 97, temperature 97.2  F (36.2  C), temperature source Temporal, resp. rate 16, height 1.702 m (5' 7\"), weight 79.4 kg (175 lb), last menstrual period 03/19/2019, SpO2 94 %, not currently breastfeeding.    General: sitting up in in bed, no acute distress, chronically ill appearing   HEENT: sclera anicteric, EOMI, MMM  Neck: supple, normal ROM  CV: RRR, normal " S1/S2, no m/r/g  Resp: CTAB, no wheezing/crackles, normal respiratory effort on 1L NC, R chest tube in place   GI: soft, non-tender, non-distended, bowel sounds present and normoactive  MSK: warm and well-perfused, normal tone  Skin: no rashes on limited exam, no jaundice  Neuro: Alert and interactive, moves all extremities equally, no focal deficits    Labs & Studies: I personally reviewed the following studies:  ROUTINE LABS (Last four results):  CMP  Recent Labs   Lab 11/04/21  0917 11/01/21  1624    133   POTASSIUM 3.9 3.6   CHLORIDE 100 100   CO2 29 26   ANIONGAP 4 7   GLC 93 90   BUN 7 10   CR 0.47* 0.48*   GFRESTIMATED >90 >90   VIKTORIA 8.5 8.6   PROTTOTAL 7.0  --    ALBUMIN 1.7*  --    BILITOTAL 0.6  --    ALKPHOS 115  --    AST 20  --    ALT 14  --      CBC  Recent Labs   Lab 11/04/21  0917 11/01/21  1624   WBC 3.7* 4.4   RBC 2.31* 2.47*   HGB 7.7* 8.3*   HCT 24.9* 26.7*   * 108*   MCH 33.3* 33.6*   MCHC 30.9* 31.1*   RDW 24.0* 24.2*   * 123*     INR  Recent Labs   Lab 11/01/21  1624   INR 1.24*       Medications list for reference:  Current Facility-Administered Medications   Medication     acetaminophen (TYLENOL) tablet 325-650 mg     cetirizine (zyrTEC) tablet 10 mg     [Held by provider] enoxaparin ANTICOAGULANT (LOVENOX) injection 40 mg     fluticasone-salmeterol (ADVAIR) 100-50 MCG/DOSE diskus inhaler 1 puff     gabapentin (NEURONTIN) capsule 600 mg     gabapentin (NEURONTIN) capsule 900 mg     levothyroxine (SYNTHROID/LEVOTHROID) tablet 50 mcg     montelukast (SINGULAIR) tablet 10 mg     multivitamin w/minerals (THERA-VIT-M) tablet 1 tablet     ondansetron (ZOFRAN) tablet 8 mg     oxyCODONE IR (ROXICODONE) half-tab 2.5-5 mg     pazopanib (VOTRIENT) tablet 800 mg     prochlorperazine (COMPAZINE) tablet 10 mg     prochlorperazine (COMPAZINE) tablet 10 mg     Vitamin D3 (CHOLECALCIFEROL) tablet 50 mcg     Facility-Administered Medications Ordered in Other Encounters   Medication      heparin 100 UNIT/ML injection 5 mL

## 2021-11-04 NOTE — PROGRESS NOTES
AVSS on 1/2L NC. Pain managed w tylenol and oxycodone. Chest tube to low continuous suction. NPO at 8 am for pleurodesis planned for noon. Up w assist of 1. Port heparin locked. Will continue to monitor.

## 2021-11-04 NOTE — PLAN OF CARE
- vss  - alert and oriented x 4  - SBA wit 1   -  Chest tube output 40mL  - Regular diet tolerated but will go NPO at 8 am tomorrow for Pleurodesis procedure planned for tomorrow at 12:50 pm  - pain managed by scheduled tylenol and gabapentin and PRN oxycodone  - 1/2 L of O2 when sleeping   - Monitor POC

## 2021-11-04 NOTE — OR NURSING
48 hour holter applied today  Start Toprol XL 25 mg daily (wait to start medication until Holter is off)  Schedule Cardiac MRI [phone]      See Dr. Owens in 1 month    Chantelle Goff MA, Hannah LIANG, Cindi MARCUM, and I were pleased to meet with you today.     HERE IS SOME IMPORTANT INFORMATION FOR OUR PATIENTS   If you need refills - call your pharmacist and they will contact our office.   If you have medical concerns after hours, and to make appointments, call [phone].    If you have a question during office hours call [phone].  You will eventually speak with my nurses.  If you need to speak to me directly, let them know and I will get back to you as soon as possible.  Better yet, you can consider signing up for [employer], our popular online communication portal to ask for a refill or contact our staff.  Go to:  [email or web address]    Dr. Jitendra Owens MD           Pre op check

## 2021-11-04 NOTE — PROGRESS NOTES
AVSS. Denied nausea/vomiting. C/O sharp constant pain at chest tube insertions site and generalized aching. Pain partially relieved with prn tylenol and oxycodone. Up with assist of 1. Poor po intake, drinking boost/ensure shakes. Adequate urine output. LBM 11/3. Plan to have epidural placed tomorrow for help with pain control.  at bedside this afternoon and attentive to pt.

## 2021-11-04 NOTE — PROGRESS NOTES
[facility]    Medicine Progress Note - Hospitalist Service, Gold 11       Date of Admission:  11/1/2021    Assessment & Plan          57 y/o F with PMH of sarcoma, diagnosed in 2019, s/p surgery and radiation in the leg, but since metastasized mostly in the lung, who has been through several chemo, on pazopanib since 9/2021, has been having dyspnea for about two months.  On routine CT CAP found to have large R sided hydropneumothroax, had chest tube placed in the ER  - Chest tube on suction.  Daily CXR  - Send fluid for cultures to r/o empyema, mentioned on CT chest  - Pulmonary consulted to help with management --> recommends talc pleurodesis planned for 11/8/21 (talc slurry will be administered via the existing chest tube).   - Onc following: Recommends continuing pazopanib, the necrosis of the lung mass may actually be response to therapy.     #  Chest tube and Cancer related pain:   - Scheduled gabapentin, tylenol, lidocaine patch, diclofenac gel  - PRN oxycodone  - Cancer related nausea: Ondansetron  - Consult anesthesia for epidural pain block     # Port malfunction with small RIJ Clot  - Per IR, RIJ clot associated with port does not need to be treated.  Will hold off on anticoagulation.  IR plans on replacing port outpatient, due to  Subcutaneous air, at least not till end of November.     --- CHRONIC ISSUES ---  # Asthma: fluticasone-salmeterol, montelukast  # Hypothyroid: LT4       Diet: Snacks/Supplements Adult: Other; Chocolate Ensure Enlive @ 10 and 2 + Chocolate Ensure Enlive shake (made w/ ice cream) @ HS; Between Meals  Regular Diet Adult    DVT Prophylaxis: Enoxaparin (Lovenox) SQ  Ness Catheter: Not present  Central Lines: None  Code Status: Full Code      Disposition Plan   Expected discharge: 11/08/2021   recommended to prior living arrangement once plan established for hydropneumothorax.     The patient's care was discussed with the Patient and  Patient's Family.    Ozzie Barth MD  Hospitalist Service, [address]  Securely message with the [employer] Web Console (learn more here)  Text page via [employer] Paging/Directory    Please see sign in/sign out for up to date coverage information    Clinically Significant Risk Factors Present on Admission            # Severe Malnutrition, POA: based on Weight loss;Reduced intake (11/02/21 1400)         ______________________________________________________________________    Interval History   Laying in bed.  Feels a lot of pain around the chest tube insertion site.   Breathing is slightly better since chest tube.    No abd pain or fever or cough / sputum production.    Data reviewed today: I reviewed all medications, new labs and imaging results over the last 24 hours. I personally reviewed.    Physical Exam   Vital Signs: Temp: 98.4  F (36.9  C) Temp src: Temporal BP: (!) 143/76 Pulse: 99   Resp: 16 SpO2: 95 % O2 Device: None (Room air) Oxygen Delivery: 1/2 LPM  Weight: 175 lbs 0 oz     General Appearance: Laying in bed, in mild distress with deep breaths.   Respiratory: on 2L oxygen  Cardiovascular:   GI:   Other:Moving all ext  Alert and oriented  Appropriate mood alfie ffect.

## 2021-11-05 ENCOUNTER — APPOINTMENT (OUTPATIENT)
Dept: GENERAL RADIOLOGY | Facility: CLINIC | Age: 56
End: 2021-11-05
Attending: STUDENT IN AN ORGANIZED HEALTH CARE EDUCATION/TRAINING PROGRAM
Payer: COMMERCIAL

## 2021-11-05 ENCOUNTER — APPOINTMENT (OUTPATIENT)
Dept: MEDSURG UNIT | Facility: CLINIC | Age: 56
End: 2021-11-05
Payer: COMMERCIAL

## 2021-11-05 LAB
ALBUMIN SERPL-MCNC: 1.7 G/DL (ref 3.4–5)
ALP SERPL-CCNC: 108 U/L (ref 40–150)
ALT SERPL W P-5'-P-CCNC: 15 U/L (ref 0–50)
ANION GAP SERPL CALCULATED.3IONS-SCNC: 7 MMOL/L (ref 3–14)
AST SERPL W P-5'-P-CCNC: 20 U/L (ref 0–45)
BASOPHILS # BLD AUTO: 0 10E3/UL (ref 0–0.2)
BASOPHILS NFR BLD AUTO: 1 %
BILIRUB SERPL-MCNC: 0.5 MG/DL (ref 0.2–1.3)
BUN SERPL-MCNC: 7 MG/DL (ref 7–30)
CALCIUM SERPL-MCNC: 8.4 MG/DL (ref 8.5–10.1)
CHLORIDE BLD-SCNC: 99 MMOL/L (ref 94–109)
CO2 SERPL-SCNC: 27 MMOL/L (ref 20–32)
CREAT SERPL-MCNC: 0.44 MG/DL (ref 0.52–1.04)
EOSINOPHIL # BLD AUTO: 0.2 10E3/UL (ref 0–0.7)
EOSINOPHIL NFR BLD AUTO: 7 %
ERYTHROCYTE [DISTWIDTH] IN BLOOD BY AUTOMATED COUNT: 23.9 % (ref 10–15)
GFR SERPL CREATININE-BSD FRML MDRD: >90 ML/MIN/1.73M2
GLUCOSE BLD-MCNC: 87 MG/DL (ref 70–99)
HCT VFR BLD AUTO: 24.5 % (ref 35–47)
HGB BLD-MCNC: 7.5 G/DL (ref 11.7–15.7)
IMM GRANULOCYTES # BLD: 0 10E3/UL
IMM GRANULOCYTES NFR BLD: 1 %
LYMPHOCYTES # BLD AUTO: 0.9 10E3/UL (ref 0.8–5.3)
LYMPHOCYTES NFR BLD AUTO: 25 %
MCH RBC QN AUTO: 33.3 PG (ref 26.5–33)
MCHC RBC AUTO-ENTMCNC: 30.6 G/DL (ref 31.5–36.5)
MCV RBC AUTO: 109 FL (ref 78–100)
MONOCYTES # BLD AUTO: 0.4 10E3/UL (ref 0–1.3)
MONOCYTES NFR BLD AUTO: 10 %
NEUTROPHILS # BLD AUTO: 2 10E3/UL (ref 1.6–8.3)
NEUTROPHILS NFR BLD AUTO: 56 %
NRBC # BLD AUTO: 0 10E3/UL
NRBC BLD AUTO-RTO: 0 /100
PATH REPORT.COMMENTS IMP SPEC: NORMAL
PATH REPORT.FINAL DX SPEC: NORMAL
PATH REPORT.GROSS SPEC: NORMAL
PATH REPORT.RELEVANT HX SPEC: NORMAL
PLATELET # BLD AUTO: 152 10E3/UL (ref 150–450)
POTASSIUM BLD-SCNC: 3.8 MMOL/L (ref 3.4–5.3)
PROT SERPL-MCNC: 6.9 G/DL (ref 6.8–8.8)
RBC # BLD AUTO: 2.25 10E6/UL (ref 3.8–5.2)
SODIUM SERPL-SCNC: 133 MMOL/L (ref 133–144)
WBC # BLD AUTO: 3.5 10E3/UL (ref 4–11)

## 2021-11-05 PROCEDURE — 250N000011 HC RX IP 250 OP 636: Performed by: ANESTHESIOLOGY

## 2021-11-05 PROCEDURE — 999N000141 HC STATISTIC PRE-PROCEDURE NURSING ASSESSMENT

## 2021-11-05 PROCEDURE — 250N000011 HC RX IP 250 OP 636: Performed by: INTERNAL MEDICINE

## 2021-11-05 PROCEDURE — 258N000003 HC RX IP 258 OP 636: Performed by: ANESTHESIOLOGY

## 2021-11-05 PROCEDURE — 80053 COMPREHEN METABOLIC PANEL: CPT | Performed by: STUDENT IN AN ORGANIZED HEALTH CARE EDUCATION/TRAINING PROGRAM

## 2021-11-05 PROCEDURE — 3E0L3GC INTRODUCTION OF OTHER THERAPEUTIC SUBSTANCE INTO PLEURAL CAVITY, PERCUTANEOUS APPROACH: ICD-10-PCS | Performed by: INTERNAL MEDICINE

## 2021-11-05 PROCEDURE — 370N000017 HC ANESTHESIA TECHNICAL FEE, PER MIN

## 2021-11-05 PROCEDURE — 99233 SBSQ HOSP IP/OBS HIGH 50: CPT | Performed by: STUDENT IN AN ORGANIZED HEALTH CARE EDUCATION/TRAINING PROGRAM

## 2021-11-05 PROCEDURE — 250N000013 HC RX MED GY IP 250 OP 250 PS 637: Performed by: STUDENT IN AN ORGANIZED HEALTH CARE EDUCATION/TRAINING PROGRAM

## 2021-11-05 PROCEDURE — G0500 MOD SEDAT ENDO SERVICE >5YRS: HCPCS | Performed by: INTERNAL MEDICINE

## 2021-11-05 PROCEDURE — 250N000013 HC RX MED GY IP 250 OP 250 PS 637: Performed by: INTERNAL MEDICINE

## 2021-11-05 PROCEDURE — 36415 COLL VENOUS BLD VENIPUNCTURE: CPT | Performed by: STUDENT IN AN ORGANIZED HEALTH CARE EDUCATION/TRAINING PROGRAM

## 2021-11-05 PROCEDURE — 32554 ASPIRATE PLEURA W/O IMAGING: CPT | Performed by: INTERNAL MEDICINE

## 2021-11-05 PROCEDURE — 250N000013 HC RX MED GY IP 250 OP 250 PS 637: Performed by: HOSPITALIST

## 2021-11-05 PROCEDURE — 71045 X-RAY EXAM CHEST 1 VIEW: CPT

## 2021-11-05 PROCEDURE — 99233 SBSQ HOSP IP/OBS HIGH 50: CPT | Mod: GC | Performed by: INTERNAL MEDICINE

## 2021-11-05 PROCEDURE — 250N000009 HC RX 250: Performed by: ANESTHESIOLOGY

## 2021-11-05 PROCEDURE — 99232 SBSQ HOSP IP/OBS MODERATE 35: CPT | Performed by: PHYSICIAN ASSISTANT

## 2021-11-05 PROCEDURE — 3E0R3BZ INTRODUCTION OF ANESTHETIC AGENT INTO SPINAL CANAL, PERCUTANEOUS APPROACH: ICD-10-PCS | Performed by: ANESTHESIOLOGY

## 2021-11-05 PROCEDURE — 99207 PR CDG-CUT & PASTE-POTENTIAL IMPACT ON LEVEL: CPT | Performed by: STUDENT IN AN ORGANIZED HEALTH CARE EDUCATION/TRAINING PROGRAM

## 2021-11-05 PROCEDURE — 99152 MOD SED SAME PHYS/QHP 5/>YRS: CPT | Mod: GC | Performed by: INTERNAL MEDICINE

## 2021-11-05 PROCEDURE — 120N000002 HC R&B MED SURG/OB UMMC

## 2021-11-05 PROCEDURE — 85025 COMPLETE CBC W/AUTO DIFF WBC: CPT | Performed by: STUDENT IN AN ORGANIZED HEALTH CARE EDUCATION/TRAINING PROGRAM

## 2021-11-05 PROCEDURE — 32560 TREAT PLEURODESIS W/AGENT: CPT | Mod: GC | Performed by: INTERNAL MEDICINE

## 2021-11-05 PROCEDURE — 71045 X-RAY EXAM CHEST 1 VIEW: CPT | Mod: 26 | Performed by: RADIOLOGY

## 2021-11-05 PROCEDURE — 00HU33Z INSERTION OF INFUSION DEVICE INTO SPINAL CANAL, PERCUTANEOUS APPROACH: ICD-10-PCS | Performed by: ANESTHESIOLOGY

## 2021-11-05 RX ORDER — FLUMAZENIL 0.1 MG/ML
0.2 INJECTION, SOLUTION INTRAVENOUS
Status: DISCONTINUED | OUTPATIENT
Start: 2021-11-05 | End: 2021-11-05 | Stop reason: HOSPADM

## 2021-11-05 RX ORDER — FENTANYL CITRATE 50 UG/ML
INJECTION, SOLUTION INTRAMUSCULAR; INTRAVENOUS PRN
Status: COMPLETED | OUTPATIENT
Start: 2021-11-05 | End: 2021-11-05

## 2021-11-05 RX ORDER — NALOXONE HYDROCHLORIDE 0.4 MG/ML
0.2 INJECTION, SOLUTION INTRAMUSCULAR; INTRAVENOUS; SUBCUTANEOUS
Status: DISCONTINUED | OUTPATIENT
Start: 2021-11-05 | End: 2021-11-05 | Stop reason: HOSPADM

## 2021-11-05 RX ORDER — NALOXONE HYDROCHLORIDE 0.4 MG/ML
0.4 INJECTION, SOLUTION INTRAMUSCULAR; INTRAVENOUS; SUBCUTANEOUS
Status: DISCONTINUED | OUTPATIENT
Start: 2021-11-05 | End: 2021-11-05 | Stop reason: HOSPADM

## 2021-11-05 RX ORDER — FENTANYL CITRATE 50 UG/ML
25-50 INJECTION, SOLUTION INTRAMUSCULAR; INTRAVENOUS
Status: DISCONTINUED | OUTPATIENT
Start: 2021-11-05 | End: 2021-11-05 | Stop reason: HOSPADM

## 2021-11-05 RX ADMIN — Medication 5 MG: at 20:51

## 2021-11-05 RX ADMIN — GABAPENTIN 600 MG: 300 CAPSULE ORAL at 13:48

## 2021-11-05 RX ADMIN — Medication 5 MG: at 03:15

## 2021-11-05 RX ADMIN — GABAPENTIN 600 MG: 300 CAPSULE ORAL at 10:13

## 2021-11-05 RX ADMIN — BUPIVACAINE HYDROCHLORIDE 10 ML: 2.5 INJECTION, SOLUTION EPIDURAL; INFILTRATION; INTRACAUDAL; PERINEURAL at 09:20

## 2021-11-05 RX ADMIN — GABAPENTIN 900 MG: 300 CAPSULE ORAL at 20:51

## 2021-11-05 RX ADMIN — LEVOTHYROXINE SODIUM 50 MCG: 0.05 TABLET ORAL at 07:44

## 2021-11-05 RX ADMIN — PAZOPANIB 800 MG: 200 TABLET, FILM COATED ORAL at 20:51

## 2021-11-05 RX ADMIN — Medication 5 MG: at 16:40

## 2021-11-05 RX ADMIN — DICLOFENAC SODIUM 2 G: 10 GEL TOPICAL at 16:43

## 2021-11-05 RX ADMIN — ACETAMINOPHEN 975 MG: 325 TABLET, FILM COATED ORAL at 16:40

## 2021-11-05 RX ADMIN — BUPIVACAINE HYDROCHLORIDE: 7.5 INJECTION, SOLUTION EPIDURAL; RETROBULBAR at 13:49

## 2021-11-05 RX ADMIN — FLUTICASONE PROPIONATE AND SALMETEROL 1 PUFF: 50; 100 POWDER RESPIRATORY (INHALATION) at 10:22

## 2021-11-05 RX ADMIN — CETIRIZINE HYDROCHLORIDE 10 MG: 10 TABLET, FILM COATED ORAL at 10:13

## 2021-11-05 RX ADMIN — Medication 5 MG: at 06:31

## 2021-11-05 RX ADMIN — PROCHLORPERAZINE MALEATE 10 MG: 5 TABLET ORAL at 20:02

## 2021-11-05 RX ADMIN — DICLOFENAC SODIUM 2 G: 10 GEL TOPICAL at 20:04

## 2021-11-05 RX ADMIN — MIDAZOLAM 1 MG: 1 INJECTION INTRAMUSCULAR; INTRAVENOUS at 13:01

## 2021-11-05 RX ADMIN — ACETAMINOPHEN 975 MG: 325 TABLET, FILM COATED ORAL at 10:14

## 2021-11-05 RX ADMIN — Medication 1 TABLET: at 10:13

## 2021-11-05 RX ADMIN — Medication 5 MG: at 10:48

## 2021-11-05 RX ADMIN — Medication 50 MCG: at 10:14

## 2021-11-05 RX ADMIN — MONTELUKAST 10 MG: 10 TABLET, FILM COATED ORAL at 10:14

## 2021-11-05 RX ADMIN — FENTANYL CITRATE 50 MCG: 50 INJECTION, SOLUTION INTRAMUSCULAR; INTRAVENOUS at 13:01

## 2021-11-05 RX ADMIN — FENTANYL CITRATE 25 MCG: 50 INJECTION, SOLUTION INTRAMUSCULAR; INTRAVENOUS at 08:31

## 2021-11-05 RX ADMIN — DICLOFENAC SODIUM 2 G: 10 GEL TOPICAL at 10:15

## 2021-11-05 ASSESSMENT — ACTIVITIES OF DAILY LIVING (ADL)
WEAR_GLASSES_OR_BLIND: YES
ADLS_ACUITY_SCORE: 16
ADLS_ACUITY_SCORE: 16
DIFFICULTY_COMMUNICATING: NO
WALKING_OR_CLIMBING_STAIRS_DIFFICULTY: NO
ADLS_ACUITY_SCORE: 16
ADLS_ACUITY_SCORE: 18
ADLS_ACUITY_SCORE: 16
ADLS_ACUITY_SCORE: 16
DOING_ERRANDS_INDEPENDENTLY_DIFFICULTY: YES
PATIENT_/_FAMILY_COMMUNICATION_STYLE: SPOKEN LANGUAGE (ENGLISH OR BILINGUAL)
ADLS_ACUITY_SCORE: 18
HEARING_DIFFICULTY_OR_DEAF: NO
ADLS_ACUITY_SCORE: 16
EQUIPMENT_CURRENTLY_USED_AT_HOME: WALKER, STANDARD
TOILETING_ISSUES: NO
DRESSING/BATHING_DIFFICULTY: NO
ADLS_ACUITY_SCORE: 11
NUMBER_OF_TIMES_PATIENT_HAS_FALLEN_WITHIN_LAST_SIX_MONTHS: 1
ADLS_ACUITY_SCORE: 11
DIFFICULTY_EATING/SWALLOWING: NO
ADLS_ACUITY_SCORE: 11
ADLS_ACUITY_SCORE: 18
ADLS_ACUITY_SCORE: 11
ADLS_ACUITY_SCORE: 16
ADLS_ACUITY_SCORE: 18
CONCENTRATING,_REMEMBERING_OR_MAKING_DECISIONS_DIFFICULTY: NO
ADLS_ACUITY_SCORE: 18
ADLS_ACUITY_SCORE: 16
ADLS_ACUITY_SCORE: 18
ADLS_ACUITY_SCORE: 16
FALL_HISTORY_WITHIN_LAST_SIX_MONTHS: YES
ADLS_ACUITY_SCORE: 11
ADLS_ACUITY_SCORE: 18
VISION_MANAGEMENT: GLASSES
ADLS_ACUITY_SCORE: 11

## 2021-11-05 NOTE — ANESTHESIA PROCEDURE NOTES
Epidural catheter Procedure Note    Pre-Procedure   Staff -        Anesthesiologist:  Ancelmo Kenyon MD       Resident/Fellow: Nicole Germain MD       Performed By: resident       Location: pre-op       Procedure Start/Stop Times: 11/5/2021 8:45 AM and 11/5/2021 9:00 AM       Pre-Anesthestic Checklist: patient identified, IV checked, risks and benefits discussed, informed consent, monitors and equipment checked, pre-op evaluation, at physician/surgeon's request and post-op pain management  Timeout:       Correct Patient: Yes        Correct Procedure: Yes        Correct Site: Yes        Correct Position: Yes   Procedure Documentation  Procedure: epidural catheter       Diagnosis: Chest Pain       Patient Position: sitting       Skin prep: Chloraprep       Local skin infiltrated with 5 mL of 1% lidocaine.        Insertion Site: T5-6. (right paramedian approach).       Technique: LORT saline        CHACORTA at 7 cm.       Needle Type: Touhy needle       Needle Gauge: 17.        Needle Length (Inches): 3.5        Catheter: 19 G.         Catheter threaded easily.         Threaded 10 cm at skin.         # of attempts: 2 and  # of redirects:  2    Assessment/Narrative         Paresthesias: Resolved.      Test dose of mL lidocaine 1.5% w/ 1:200,000 epinephrine at 08:55 CDT.         Test dose negative, 3 minutes after injection, for signs of intravascular, subdural, or intrathecal injection.       Insertion/Infusion Method: LORT saline       Aspiration negative for Heme or CSF via Epidural Catheter.     Comments:  Thoracic Epidural

## 2021-11-05 NOTE — OR NURSING
Pt underwent talc pleurodesis under conscious sedation. Chest tube clamped; to be unclamped at 2:30pm and returned to suction. Report called to 7C RN and pt transferred back to floor on 2LPM O2..       Leyda Walker RN

## 2021-11-05 NOTE — ANESTHESIA POSTPROCEDURE EVALUATION
Patient: Richa Ashby    Procedure: Procedure(s):  INJECTION, SPINE, THORACIC, EPIDURAL       Diagnosis:Pain [R52]  Diagnosis Additional Information: No value filed.    Anesthesia Type:  Epidural    Note:  Disposition: Inpatient   Postop Pain Control: Uneventful            Sign Out: Well controlled pain   PONV: No   Neuro/Psych: Uneventful            Sign Out: Acceptable/Baseline neuro status   Airway/Respiratory: Uneventful            Sign Out: Acceptable/Baseline resp. status   CV/Hemodynamics: Uneventful            Sign Out: Acceptable CV status; No obvious hypovolemia; No obvious fluid overload   Other NRE: NONE   DID A NON-ROUTINE EVENT OCCUR?     Event details/Postop Comments:  Patient has buzzing in the ear baseline- was in PACU after the epidural. She received the test dose and a bolus of 0.125% bupivacaine and was monitored for an hour after that.           Last vitals:  Vitals Value Taken Time   BP     Temp     Pulse     Resp     SpO2         Electronically Signed By: Nicole Germain MD  November 5, 2021  11:50 AM

## 2021-11-05 NOTE — OR NURSING
Patient in pre-op for epidural catheter placement. Consent completed on floor yesterday; confirmed paper consent in chart.     Patient epidural placement procedure started 0835 and ended 0900. Test dose was administered at 0855 and 0905. Patient tolerated well; refer to charting for event VS. Patient received 25 mg of fentanyl during the procedure.

## 2021-11-05 NOTE — PLAN OF CARE
Assumed cares from [id number]. VSS on RA, A&Ox4. Reporting pain to chest tube site managed with PRN Oxycodone x2 and scheduled tylenol, lidocaine patch and diclofenac gel. Denied nausea, tolerating small amounts of regular diet. Chest tube CDI to low continuous suction per orders with serosanguineous drainage. Abdomen soft, nontender, bowel sounds normoactive. One formed bowel movement this shift, voiding spontaneously without difficulty. Port and PIV CDI. Continue with POC.

## 2021-11-05 NOTE — ANESTHESIA CARE TRANSFER NOTE
Patient: Richa Ashby    Procedure: Procedure(s):  INJECTION, SPINE, THORACIC, EPIDURAL       Diagnosis: Pain [R52]  Diagnosis Additional Information: No value filed.    Anesthesia Type:   Epidural     Note:    Oropharynx: spontaneously breathing  Level of Consciousness: awake  Oxygen Supplementation: room air    Independent Airway: airway patency satisfactory and stable    Vital Signs Stable: post-procedure vital signs reviewed and stable  Report to RN Given: handoff report given  Patient transferred to: Medical/Surgical Unit    Handoff Report: Identifed the Patient, Identified the Reponsible Provider, Reviewed the pertinent medical history, Discussed the surgical course, Reviewed Intra-OP anesthesia mangement and issues during anesthesia, Set expectations for post-procedure period and Allowed opportunity for questions and acknowledgement of understanding      Vitals:  Vitals Value Taken Time   BP     Temp     Pulse     Resp     SpO2         Electronically Signed By: Nicole Germain MD  November 5, 2021  11:50 AM

## 2021-11-05 NOTE — CONSULTS
PAIN SERVICE NOTE  Consulted by [place] [id number] Service JOSE Barth MD to evaluate patient for epidural placement for chest tube related pain.    11/05/21   Epidural T5-6 catheter placed for analgesia  Please refer to anesthesia pre-procedure note by CARLITA Kenyon MD and procedure note by BRANDON Germain MD for full details    Pain Service will continue to follow and adjust as needed    Sarahi Graf, APRN CNP, CNP  Inpatient Pain Service  November 5, 2021  2:30 PM    Contact Info (for in-house use only):  Regional Anesthesia Job code ID: [id number]   [facility] [place] [facility]99  [facility] 0602  [place] phone: dial * * * [phone], enter jobcode ID, then enter call-back number.    Text: Use [employer] on the Intranet <Paging/Directory> tab and enter Jobcode ID.   If no call back at any time, contact the hospital  and ask for Regional Anesthesia attending or backup

## 2021-11-05 NOTE — ANESTHESIA PREPROCEDURE EVALUATION
Anesthesia Pre-Procedure Evaluation    Patient: Richa Ashby   MRN: [id number] : 1965        Preoperative Diagnosis: Pain [R52]    Procedure : Procedure(s):  INJECTION, SPINE, THORACIC, EPIDURAL          Past Medical History:   Diagnosis Date     Asthma, chronic      Sarcoma (H)     Right lower thigh      Past Surgical History:   Procedure Laterality Date     AS RAD RESEC TONSIL/PILLARS       GALLBLADDER SURGERY       INSERT PORT VASCULAR ACCESS Right 10/28/2020    Procedure: single lumen power port placement @0745;  Surgeon: Tex Ackerman MD;  Location: [facility] OR     IR CHEST PORT PLACEMENT > 5 YRS OF AGE  10/28/2020     IR PORT CHECK RIGHT  3/26/2021     IR PORT CHECK RIGHT  10/21/2021     RESECT TUMOR LOWER EXTREMITY Right 3/12/2020    Procedure: Resection of right thigh tumor;  Surgeon: Guillermo Johnson MD;  Location: [facility] OR     ROTATOR CUFF REPAIR RT/LT       SHOULDER SURGERY       SINUS SURGERY        Allergies   Allergen Reactions     Pentobarbital      resp arrest      Social History     Tobacco Use     Smoking status: Never Smoker     Smokeless tobacco: Never Used   Substance Use Topics     Alcohol use: Yes     Comment: 2-3 glasses of wine/week      Wt Readings from Last 1 Encounters:   21 79.4 kg (175 lb)        Anesthesia Evaluation   Pt has had prior anesthetic. Type of anesthetic: Cardiac arrest with pentothal, otherwise no recent issues.    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   On admission: Patient reports that she has been feeling shortness of breath for over 2 weeks, worsened over the last week but went to clinic today for surveillance CT scan that showed hydropneumothorax, she does not endorse cough no fever no chest pain     OUTSIDE LABS:  CBC:   Lab Results   Component Value Date    WBC 3.7 (L) 11/04/2021    WBC 4.4 11/01/2021    HGB 7.7 (L) 11/04/2021    HGB 8.3 (L) 11/01/2021    HCT 24.9 (L) 11/04/2021    HCT 26.7 (L) 11/01/2021     (L) 11/04/2021     (L) 11/01/2021     BMP:   Lab Results   Component Value Date     11/04/2021     11/01/2021    POTASSIUM 3.9 11/04/2021    POTASSIUM 3.6 11/01/2021    CHLORIDE 100 11/04/2021    CHLORIDE 100 11/01/2021    CO2 29 11/04/2021    CO2 26 11/01/2021    BUN 7 11/04/2021    BUN 10 11/01/2021    CR 0.47 (L) 11/04/2021    CR 0.48 (L) 11/01/2021    GLC 93 11/04/2021    GLC 90 11/01/2021     COAGS:   Lab Results   Component Value Date    INR 1.24 (H) 11/01/2021     POC:   Lab Results   Component Value Date    HCG Negative 03/12/2020     HEPATIC:   Lab Results   Component Value Date    ALBUMIN 1.7 (L) 11/04/2021    PROTTOTAL 7.0 11/04/2021    ALT 14 11/04/2021    AST 20 11/04/2021    ALKPHOS 115 11/04/2021    BILITOTAL 0.6 11/04/2021     OTHER:   Lab Results   Component Value Date    LACT 2.0 10/06/2021    VIKTORIA 8.5 11/04/2021    PHOS 3.0 10/25/2021    MAG 2.2 10/25/2021    TSH 15.29 (H) 10/25/2021    T4 0.95 10/25/2021    .0 (H) 10/25/2021       Anesthesia Plan    ASA Status:  3      Anesthesia Type: Epidural.   Induction: N/a.   Maintenance: N/A.        Consents    Anesthesia Plan(s) and associated risks, benefits, and realistic alternatives discussed. Questions answered and patient/representative(s) expressed understanding.     - Discussed with:  Patient      -  Extended Intubation/Ventilatory Support Discussed: No.      - Patient is DNR/DNI Status: No    Use of blood products discussed: No .     Postoperative Care    Pain management: Neuraxial analgesia.        Comments:                Nicole Germain MD

## 2021-11-05 NOTE — PROGRESS NOTES
REGIONAL ANESTHESIA PAIN SERVICE (RAPS) EVALUATION:  - Evaluation: Patient reports moderate pain intensity with current therapy. mainly localized to Right lower side. Sensory level on right T4-T7, no notable level on the left. Pt reports that the left side is not painful however. Catheter site is without erythema, edema or drainage. Dressing is clean, dry and intact. Provided education regarding positioning changes to augment epidural analgesia to patient and daughter. They expressed understanding.     Clinician administered epidural bolus.  Pt was seated upright in bed (will remain upright for the next 30 min). PF bupivacaine 0.125% 3mL, administered 1825 via epidural pump. No complications noted.  No symptoms of local anesthetic systemic toxicity (LAST).  Remained with and assessed patient for 10 min post-injection. Hemodynamically stable.  Bedside RN aware of need to continue monitoring and document BP, P, and MAP Q 10 min for an additional 20-30 min.     - patient can be evaluated to receive local anesthetic bolus Q 12 hr PRN pain not controlled with continuous infusion.  Bedside RN must page RAPS to request bolus    RAPS Contact Info (24 hour job code pager is the last 4 digits) For in-house use only:  Job code ID: [id number]   [facility] [place] 0599  Peds 0602  [place] phone: dial * * * [phone], enter jobcode ID, then enter call-back number.     Text: Use [employer] on the Intranet <Paging/Directory> tab and enter Jobcode ID.   If no call back at any time, contact the hospital  and ask for RAPS attending or backup     Sherine Guthrie MD

## 2021-11-05 NOTE — PROCEDURES
INTERVENTIONAL PULMONOLOGY       Procedure(s):    Talc Pleurodesis    Indication:  Right pneumothorax, pulmonary metastatic sarcoma    Attending of Record:  Elana Ngo MD     Interventional Pulmonary Fellow   None    Trainees Present:   Elliot Bruner MD     Medications:    17 ml 1% lidocaine  1 mg versed  50 mcg fentanyl  Talc 4 g in saline    Sedation Time:   Total sedation time was Choose Duration: 15 minutes of continuous bedside 1:1 monitoring.    Time Out:  Performed    The patient's medical record has been reviewed.  The indication for the procedure was reviewed.  The necessary history and physical examination was performed and reviewed.  The risks, benefits and alternatives of the procedure were discussed with the the patient in detail and she had the opportunity to ask questions.  I discussed in particular the potential complications including risks of minor or life-threatening bleeding and/or infection, respiratory failure, vocal cord trauma / paralysis, pneumothorax, and discomfort. Sedation risks were also discussed including abnormal heart rhythms, low blood pressure, and respiratory failure. All questions were answered to the best of my ability.  Verbal and written informed consent was obtained.  The proposed procedure and the patient's identification were verified prior to the procedure by the physician and the nurse.    The patient was assessed for the adequacy for the procedure and to receive medications.   Mental Status:  Alert and oriented x 3  Airway examination:  Class II (Complete visualization of uvula)  Pulmonary:  Clear to ausculation bilaterally  CV:  RRR, no murmurs or gallops  ASA Grade:  (II)  Mild systemic disease    After clinical evaluation and reviewing the indication, risks, alternatives and benefits of the procedure the patient was deemed to be in satisfactory condition to undergo the procedure.      Immediately before administration of medications the patient was re-assessed  for adequacy to receive sedatives including the heart rate, respiratory rate, mental status, oxygen saturation, blood pressure and adequacy of pulmonary ventilation. These same parameters were continuously monitored throughout the procedure.    A Tuberculosis risk assessment was performed:  The patient has no known RISK of Tuberculosis    The procedure was performed in a negative airflow room: Yes    Maneuvers / Procedure:    Existing right 24Fr chest tube was cleaned with chloroprep and accessed in a closed circuit.   Lidocaine, 17 ml 1% administered into chest tube.   Talc slurry: 4 g talc mixed in with 75 ml sterile saline instilled into chest tube and clamped  Any disposable equipment was visually inspected and deemed to be intact immediately post procedure.      Relevant Pictures      Recommendations:     -->  Talc slurry pleurodesis to right chest  -->  Clamp chest tube x 1 hour (until 2:30)      Elana Ngo MD    of Medicine  Interventional Pulmonary  Department of Pulmonary, Allergy, Critical Care and Sleep Medicine   [facility]

## 2021-11-05 NOTE — PROGRESS NOTES
[facility]    Medicine Progress Note - Hospitalist Service, Gold 11       Date of Admission:  11/1/2021    Assessment & Plan           57 y/o F with PMH of sarcoma, diagnosed in 2019, s/p surgery and radiation in the leg, but since metastasized mostly in the lung, who has been through several chemo, on pazopanib since 9/2021, has been having dyspnea for about two months.  On routine CT CAP found to have large R sided hydropneumothroax, had chest tube placed in the ER  - Chest tube on suction.  Daily CXR  - Send fluid for cultures to r/o empyema, mentioned on CT chest -- NGTD  - Pulmonary consulted to help with management --> recommends talc pleurodesis planned for 11/5/21 (talc slurry will be administered via the existing chest tube).   - Onc following: Recommends continuing pazopanib, the necrosis of the lung mass may actually be response to therapy.     #  Chest tube and Cancer related pain:   - S/p T5-6 epidural analgesia on 11/5/21  - Scheduled gabapentin, tylenol, lidocaine patch, diclofenac gel  - PRN oxycodone  - Cancer related nausea: Ondansetron  - Consulted anesthesia for epidural pain block     # Port malfunction with small RIJ Clot  - Per IR, RIJ clot associated with port does not need to be treated.  Will hold off on anticoagulation.  IR plans on replacing port outpatient, due to  Subcutaneous air, at least not till end of November.     --- CHRONIC ISSUES ---  # Asthma: fluticasone-salmeterol, montelukast  # Hypothyroid: LT4       Diet: Snacks/Supplements Adult: Other; Chocolate Ensure Enlive @ 10 and 2 + Chocolate Ensure Enlive shake (made w/ ice cream) @ HS; Between Meals  Regular Diet Adult    DVT Prophylaxis: Enoxaparin (Lovenox) SQ  Ness Catheter: Not present  Central Lines: PRESENT       Code Status: Full Code      Disposition Plan   Expected discharge: 11/08/2021   recommended to prior living arrangement once plan established for hydropneumothorax.      The patient's care was discussed with the Patient and Patient's Family.    Ozzie Barth MD  Hospitalist Service, [address]  Securely message with the Vocera Web Console (learn more here)  Text page via [employer] Paging/Directory    Please see sign in/sign out for up to date coverage information    Clinically Significant Risk Factors Present on Admission            # Severe Malnutrition, POA: based on Weight loss;Reduced intake (11/02/21 1400)         ______________________________________________________________________    Interval History   Laying in bed.  Epidural access gained by anesthesia but lidocaine hasn't arrived so patient is still in pain.     No abd pain or fever or cough / sputum production.    Data reviewed today: I reviewed all medications, new labs and imaging results over the last 24 hours. I personally reviewed.    Physical Exam   Vital Signs: Temp: 97.4  F (36.3  C) Temp src: Temporal BP: 129/69 Pulse: 94   Resp: 16 SpO2: 96 % O2 Device: None (Room air) Oxygen Delivery: 2 LPM  Weight: 175 lbs 0 oz     General Appearance: Laying in bed, in mild distress with deep breaths.   Respiratory: on 2L oxygen  + Chest tube on suction, no air leak  Cardiovascular:   GI:   Other:Moving all ext  Alert and oriented  Appropriate mood alfie ffect.

## 2021-11-05 NOTE — PROGRESS NOTES
AVSS on RA. Pain managed w oxycodone and lidocaine patch. Tolerating regular diet. Chest tube to low continuous suction w scant output. Port hep locked w no blood return. Void spont. Up w SBA. Will continue to monitor.

## 2021-11-05 NOTE — PROGRESS NOTES
"Pulmonary follow-up     Date of service: 11/5/21     Interval history: Feels well. No air leak from chest tube. No fevers.     Exam:  /73 (BP Location: Left arm)   Pulse 88   Temp 97.5  F (36.4  C) (Temporal)   Resp 18   Ht 1.702 m (5' 7\")   Wt 79.4 kg (175 lb)   LMP 03/19/2019   SpO2 94%   BMI 27.41 kg/m    General: no distress, looks tired  HEENT: atraumatic  Pulmonary: breathing comfortably  Cardiac: regular rhythm, normal rate  Skin: warm, dry  MSK: chest tube in right chest wall, no air leak  Neurologic: alert, oriented    Pleural fluid exudative but not consistent with empyema.     Assessment/Plan: 56 year-old female with sarcoma and pulmonary metastases. Since starting pazopanib, the pulmonary masses have enlarged and become necrotic--unclear if this is due to disease progression or secondary to effect of targeted therapy on the masses. Subsequently developed secondary spontaneous hydropneumothorax necessitating chest tube.     At present time there is no evidence of ongoing air leak.  CT obtained on 11/3 showed good pleural apposition.  Is at increased risk for recurrent pneumothorax. CT from 11/3 read as possibly consistent with empyema. Pleural fluid analysis not consistent with empyema though, so okay to move forward with talc pleurodesis. Discussed with Dr. Ngo and she is able to add Connie onto her schedule for this afternoon.     Please keep NPO until procedure.     Elliot Bruner MD   Pulmonary fellow  "

## 2021-11-05 NOTE — PROGRESS NOTES
.          Hematology / Oncology  Daily Progress Note   Date of Service: 11/05/2021  Patient: Richa Ashby  MRN: [id number]  Admission Date: 11/1/2021  Hospital Day # 4  Cancer Diagnosis: metastatic undifferentiated pleomorphic sarcoma    Primary Outpatient Oncologist: Dr Lopez   Current Treatment Plan: Pazopanib      Assessment & Plan:   Connie Ashby is a 55 yo woman with metastatic undifferentiated pleomorphic sarcoma with progression through neoadjuvant RT/ surgery, liposomal doxo/ ifosfamide, pembro, gemcitabine, now on pazopanib since 9/2021. Now admitted with new large R sided hydropneumothorax s/p chest tube placement on admission.     # metastatic undifferentiated pleomorphic sarcoma  # large R sided hydropneumothorax  # possible empyema     Please review initial oncology consult note from 11/2 for full oncologic history. Diagnosed in November 2019 when she presented as lump in the right leg. Treated with neoadjuvant radiation followed by surgery on 3/12/2020, unfortunately had recurrence in the lungs in November 2020. Treated with Doxil/ifosfamide x5 cycles then Doxil monotherapy with PD so then switched to Keytruda then gemcitabine. Most recently initiated on Pazopanib 800 mg on 9/24/2021.     Patient had CT CAP for treatment assessment at [place] 11/1/2021 which showed hydropneumothorax in the right side with new right lung collapse for which she was admitted to [facility]. S/p chest tube placement. Reviewed and updated her primary oncologist Dr. Lopez about this current admission.  Her lung lesions showed necrosis features with thin rim suggested to response to pazopanib. Measurement of some of these lung mets are slightly bigger comparing with CT done on 9/22/2021. Plan to continue Pazopanib given response. Patient has good insight into prognosis likely <6 months. She vocalized that her goal is to get to a family [place] vacation this Gena. Plan for talc pleurodesis with IP in coming days if  "negative infectious work up given possible empyema on imaging. Epidural injection planned prior to procedure to help reduce pain.     # port associated RIJ clot   - Noted on CT imaging this admission. Agree with ppx Lovenox 40 mg daily given sedentary lifestyle (ECOG 3). Continue at discharge.   - IR consulted, plan for port revision outpatient to allow time for improvement in subcutaneous chest wall emphysema     Recommendations:   - Agree with talc pleurodesis given high risk for recurrent pneumo with pulm mets.   - Appreciate excellent 7C nursing staff coordination of allowing family to see her prior to her sons deployment   - Oncology follow up scheduled 11/12/21 with RUPERT Caputo. We will sign off.     Patient was seen and plan of care was discussed with attending physician Dr. Retana.    Thank you for the opportunity to partake in this patients plan of care. Please do not hesitate to page with questions. We will sign off. Please re consult us if prolonged hospital stay.     Zohra Duke ([facility]), RAYA    Hematology/Oncology   Pager: [phone]   ___________________________________________________________________    Subjective & Interval History:    No acute events noted overnight. Breathing well on room air today. Pain at chest tube site, hopeful epidural will help to reduce pain. Afebrile and HD stable. Her daughter is at bedside and supportive of cares.     Physical Exam:    Blood pressure 131/69, pulse 79, temperature 98.2  F (36.8  C), temperature source Temporal, resp. rate 18, height 1.702 m (5' 7\"), weight 79.4 kg (175 lb), last menstrual period 03/19/2019, SpO2 95 %, not currently breastfeeding.    General: sitting up in in bed, no acute distress, chronically ill appearing   HEENT: sclera anicteric, EOMI, MMM  Neck: supple, normal ROM  CV: RRR, normal S1/S2, no m/r/g  Resp: CTAB, no wheezing/crackles, normal respiratory effort on RA, R chest tube in place, subcutaneous emphysema R chest wall "   GI: soft, non-tender, non-distended, bowel sounds present and normoactive  MSK: warm and well-perfused, normal tone   Skin: no rashes on limited exam, no jaundice   Neuro: Alert and interactive, moves all extremities equally, no focal deficits    Labs & Studies: I personally reviewed the following studies:  ROUTINE LABS (Last four results):  CMP  Recent Labs   Lab 11/05/21  0709 11/04/21  0917 11/01/21  1624    133 133   POTASSIUM 3.8 3.9 3.6   CHLORIDE 99 100 100   CO2 27 29 26   ANIONGAP 7 4 7   GLC 87 93 90   BUN 7 7 10   CR 0.44* 0.47* 0.48*   GFRESTIMATED >90 >90 >90   VIKTORIA 8.4* 8.5 8.6   PROTTOTAL 6.9 7.0  --    ALBUMIN 1.7* 1.7*  --    BILITOTAL 0.5 0.6  --    ALKPHOS 108 115  --    AST 20 20  --    ALT 15 14  --      CBC  Recent Labs   Lab 11/05/21  0709 11/04/21  0917 11/01/21  1624   WBC 3.5* 3.7* 4.4   RBC 2.25* 2.31* 2.47*   HGB 7.5* 7.7* 8.3*   HCT 24.5* 24.9* 26.7*   * 108* 108*   MCH 33.3* 33.3* 33.6*   MCHC 30.6* 30.9* 31.1*   RDW 23.9* 24.0* 24.2*    143* 123*     INR  Recent Labs   Lab 11/01/21  1624   INR 1.24*       Medications list for reference:  Current Facility-Administered Medications   Medication     acetaminophen (TYLENOL) tablet 975 mg     bupivacaine (MARCAINE) 0.125 % in sodium chloride 0.9 % 250 mL EPIDURAL Infusion     cetirizine (zyrTEC) tablet 10 mg     diclofenac (VOLTAREN) 1 % topical gel 2 g     [Held by provider] enoxaparin ANTICOAGULANT (LOVENOX) injection 40 mg     fluticasone-salmeterol (ADVAIR) 100-50 MCG/DOSE diskus inhaler 1 puff     gabapentin (NEURONTIN) capsule 600 mg     gabapentin (NEURONTIN) capsule 900 mg     levothyroxine (SYNTHROID/LEVOTHROID) tablet 50 mcg     Lidocaine (LIDOCARE) 4 % Patch 1 patch     lidocaine patch in PLACE     medication instruction     montelukast (SINGULAIR) tablet 10 mg     multivitamin w/minerals (THERA-VIT-M) tablet 1 tablet     naloxone (NARCAN) injection 0.2 mg    Or     naloxone (NARCAN) injection 0.4 mg    Or      naloxone (NARCAN) injection 0.2 mg    Or     naloxone (NARCAN) injection 0.4 mg     ondansetron (ZOFRAN) tablet 8 mg     oxyCODONE IR (ROXICODONE) half-tab 2.5-5 mg     pazopanib (VOTRIENT) tablet 800 mg     prochlorperazine (COMPAZINE) tablet 10 mg     prochlorperazine (COMPAZINE) tablet 10 mg     talc 5 g in sodium chloride 0.9 % 150 mL     Vitamin D3 (CHOLECALCIFEROL) tablet 50 mcg     Facility-Administered Medications Ordered in Other Encounters   Medication     heparin 100 UNIT/ML injection 5 mL

## 2021-11-05 NOTE — PLAN OF CARE
AVSS. Pt alert and oriented. Ambulating with sba. Port hep locked, possibly being replaced in the near future d/t no blood return, not using. PIV SL. Chest tube to -20. Serosang output noted. Talc procedure done this afternoon. Epidural placed this am for better pain management. Transparent dressing with small amount of drainage noted. Oxycodone given x 1. Voiding spont. Last bm 11/4. Awaiting MD to change diet from NPO. Daughter at bedside. Continue with poc.

## 2021-11-06 ENCOUNTER — APPOINTMENT (OUTPATIENT)
Dept: GENERAL RADIOLOGY | Facility: CLINIC | Age: 56
End: 2021-11-06
Attending: STUDENT IN AN ORGANIZED HEALTH CARE EDUCATION/TRAINING PROGRAM
Payer: COMMERCIAL

## 2021-11-06 LAB
BASOPHILS # BLD AUTO: 0 10E3/UL (ref 0–0.2)
BASOPHILS NFR BLD AUTO: 1 %
EOSINOPHIL # BLD AUTO: 0.2 10E3/UL (ref 0–0.7)
EOSINOPHIL NFR BLD AUTO: 7 %
ERYTHROCYTE [DISTWIDTH] IN BLOOD BY AUTOMATED COUNT: 24.1 % (ref 10–15)
HCT VFR BLD AUTO: 23.7 % (ref 35–47)
HGB BLD-MCNC: 7.2 G/DL (ref 11.7–15.7)
HOLD SPECIMEN: NORMAL
IMM GRANULOCYTES # BLD: 0 10E3/UL
IMM GRANULOCYTES NFR BLD: 1 %
LYMPHOCYTES # BLD AUTO: 0.7 10E3/UL (ref 0.8–5.3)
LYMPHOCYTES NFR BLD AUTO: 25 %
MCH RBC QN AUTO: 33.8 PG (ref 26.5–33)
MCHC RBC AUTO-ENTMCNC: 30.4 G/DL (ref 31.5–36.5)
MCV RBC AUTO: 111 FL (ref 78–100)
MONOCYTES # BLD AUTO: 0.3 10E3/UL (ref 0–1.3)
MONOCYTES NFR BLD AUTO: 12 %
NEUTROPHILS # BLD AUTO: 1.6 10E3/UL (ref 1.6–8.3)
NEUTROPHILS NFR BLD AUTO: 54 %
NRBC # BLD AUTO: 0 10E3/UL
NRBC BLD AUTO-RTO: 0 /100
PLATELET # BLD AUTO: 125 10E3/UL (ref 150–450)
RBC # BLD AUTO: 2.13 10E6/UL (ref 3.8–5.2)
WBC # BLD AUTO: 2.8 10E3/UL (ref 4–11)

## 2021-11-06 PROCEDURE — 250N000013 HC RX MED GY IP 250 OP 250 PS 637: Performed by: HOSPITALIST

## 2021-11-06 PROCEDURE — 99233 SBSQ HOSP IP/OBS HIGH 50: CPT | Performed by: STUDENT IN AN ORGANIZED HEALTH CARE EDUCATION/TRAINING PROGRAM

## 2021-11-06 PROCEDURE — 71045 X-RAY EXAM CHEST 1 VIEW: CPT

## 2021-11-06 PROCEDURE — 71045 X-RAY EXAM CHEST 1 VIEW: CPT | Mod: 76

## 2021-11-06 PROCEDURE — 250N000011 HC RX IP 250 OP 636: Performed by: ANESTHESIOLOGY

## 2021-11-06 PROCEDURE — 258N000003 HC RX IP 258 OP 636: Performed by: ANESTHESIOLOGY

## 2021-11-06 PROCEDURE — 71045 X-RAY EXAM CHEST 1 VIEW: CPT | Mod: 26 | Performed by: RADIOLOGY

## 2021-11-06 PROCEDURE — 250N000013 HC RX MED GY IP 250 OP 250 PS 637: Performed by: STUDENT IN AN ORGANIZED HEALTH CARE EDUCATION/TRAINING PROGRAM

## 2021-11-06 PROCEDURE — 36415 COLL VENOUS BLD VENIPUNCTURE: CPT | Performed by: STUDENT IN AN ORGANIZED HEALTH CARE EDUCATION/TRAINING PROGRAM

## 2021-11-06 PROCEDURE — 120N000002 HC R&B MED SURG/OB UMMC

## 2021-11-06 PROCEDURE — 250N000013 HC RX MED GY IP 250 OP 250 PS 637: Performed by: INTERNAL MEDICINE

## 2021-11-06 PROCEDURE — 99207 PR CDG-CUT & PASTE-POTENTIAL IMPACT ON LEVEL: CPT | Performed by: STUDENT IN AN ORGANIZED HEALTH CARE EDUCATION/TRAINING PROGRAM

## 2021-11-06 PROCEDURE — 85004 AUTOMATED DIFF WBC COUNT: CPT | Performed by: STUDENT IN AN ORGANIZED HEALTH CARE EDUCATION/TRAINING PROGRAM

## 2021-11-06 RX ORDER — POLYETHYLENE GLYCOL 3350 17 G/17G
17 POWDER, FOR SOLUTION ORAL DAILY
Status: DISCONTINUED | OUTPATIENT
Start: 2021-11-06 | End: 2021-11-09 | Stop reason: HOSPADM

## 2021-11-06 RX ADMIN — Medication 50 MCG: at 08:04

## 2021-11-06 RX ADMIN — ACETAMINOPHEN 975 MG: 325 TABLET, FILM COATED ORAL at 12:36

## 2021-11-06 RX ADMIN — BUPIVACAINE HYDROCHLORIDE: 7.5 INJECTION, SOLUTION EPIDURAL; RETROBULBAR at 20:32

## 2021-11-06 RX ADMIN — Medication 1 TABLET: at 08:03

## 2021-11-06 RX ADMIN — MONTELUKAST 10 MG: 10 TABLET, FILM COATED ORAL at 08:03

## 2021-11-06 RX ADMIN — DICLOFENAC SODIUM 2 G: 10 GEL TOPICAL at 08:04

## 2021-11-06 RX ADMIN — Medication 5 MG: at 08:45

## 2021-11-06 RX ADMIN — GABAPENTIN 900 MG: 300 CAPSULE ORAL at 20:20

## 2021-11-06 RX ADMIN — FLUTICASONE PROPIONATE AND SALMETEROL 1 PUFF: 50; 100 POWDER RESPIRATORY (INHALATION) at 08:04

## 2021-11-06 RX ADMIN — GABAPENTIN 600 MG: 300 CAPSULE ORAL at 08:03

## 2021-11-06 RX ADMIN — DICLOFENAC SODIUM 2 G: 10 GEL TOPICAL at 20:20

## 2021-11-06 RX ADMIN — ACETAMINOPHEN 975 MG: 325 TABLET, FILM COATED ORAL at 08:03

## 2021-11-06 RX ADMIN — PAZOPANIB 800 MG: 200 TABLET, FILM COATED ORAL at 22:00

## 2021-11-06 RX ADMIN — Medication 5 MG: at 02:31

## 2021-11-06 RX ADMIN — DICLOFENAC SODIUM 2 G: 10 GEL TOPICAL at 12:38

## 2021-11-06 RX ADMIN — Medication 5 MG: at 05:43

## 2021-11-06 RX ADMIN — Medication 5 MG: at 22:00

## 2021-11-06 RX ADMIN — CETIRIZINE HYDROCHLORIDE 10 MG: 10 TABLET, FILM COATED ORAL at 08:04

## 2021-11-06 RX ADMIN — GABAPENTIN 600 MG: 300 CAPSULE ORAL at 14:47

## 2021-11-06 RX ADMIN — Medication 5 MG: at 18:06

## 2021-11-06 RX ADMIN — PROCHLORPERAZINE MALEATE 10 MG: 5 TABLET ORAL at 20:20

## 2021-11-06 RX ADMIN — LEVOTHYROXINE SODIUM 50 MCG: 0.05 TABLET ORAL at 08:03

## 2021-11-06 RX ADMIN — ACETAMINOPHEN 975 MG: 325 TABLET, FILM COATED ORAL at 18:06

## 2021-11-06 ASSESSMENT — ACTIVITIES OF DAILY LIVING (ADL)
ADLS_ACUITY_SCORE: 7
ADLS_ACUITY_SCORE: 7
ADLS_ACUITY_SCORE: 11
ADLS_ACUITY_SCORE: 7
ADLS_ACUITY_SCORE: 9
ADLS_ACUITY_SCORE: 11
ADLS_ACUITY_SCORE: 7
ADLS_ACUITY_SCORE: 9
ADLS_ACUITY_SCORE: 11
ADLS_ACUITY_SCORE: 9
ADLS_ACUITY_SCORE: 11
ADLS_ACUITY_SCORE: 7
ADLS_ACUITY_SCORE: 11
ADLS_ACUITY_SCORE: 9
ADLS_ACUITY_SCORE: 11
ADLS_ACUITY_SCORE: 7
ADLS_ACUITY_SCORE: 11

## 2021-11-06 ASSESSMENT — MIFFLIN-ST. JEOR: SCORE: 1436.84

## 2021-11-06 NOTE — PLAN OF CARE
VSS on RA. Pain well managed with epidural, gabapentin, Tylenol and PRN Oxycodone. Epidural dressing with small drainage, anesthesia aware and assessed the pt. Denies LAST symptoms. Tolerating regular diet w/o nausea. Reports last BM 2 days ago, had some prune juice. Voiding with good output. Chest tube to continuous suction, dressing c/d/I (please check for kinks frequently). Up w/ SBA. Continue w/ POC.

## 2021-11-06 NOTE — PROGRESS NOTES
REGIONAL ANESTHESIA PAIN SERVICE CONTINUOUS NERVE INFUSION NOTE  Richa Ashby is a 56 year old female with PMHx of sarcoma with pulmonary mets s/p chest tube placement for a large R sided hydropneumothorax and placement of a  T5-6 epidural catheter for pain control.     SUBJECTIVE:  Interval History: No acute events overnight .  Patient reports pain is well controled with the nerve block continuous infusion and current analgesics (see below).  She denies weakness, paresthesias, circumoral numbness, metallic taste or tinnitus. Patient is ambulating with assistance without any issues.  Currently tolerating a PO diet, denies nausea or vomiting.                 Clinically Aligned Pain Assessment (CAPA):   Comfort (How is your pain?): Comfortably manageable  Change in Pain (Since your last medication/intervention?): Getting better  Pain Control (How are your pain treatments working?):  Fully effective pain control  Functioning (Are you able to do activities to get better?) : Can do everything I need to do  Sleep (Does your pain management allow you to sleep or rest?): She states she was able to sleep but was woken up in the night and requested PO pain meds, but states pain didn't keep her from sleeping                 Pain Intensity using Numerical Rating Scale (NRS): 2-3/10 at rest and with activity        Antithrombotic/Thrombolytic Therapy ordered:  lovenox 40mg. Last administered 11/2/21     Analgesic Medications:               Medications related to Pain Management (From now, onward)     Start     Dose/Rate Route Frequency Ordered Stop     11/05/21 1000   bupivacaine (MARCAINE) 0.125 % in sodium chloride 0.9 % 250 mL EPIDURAL Infusion      8 mL/hr  EPIDURAL CONTINUOUS 11/05/21 0945       11/04/21 1800   acetaminophen (TYLENOL) tablet 975 mg      975 mg Oral 3 TIMES DAILY. 11/04/21 1544       11/04/21 1600   diclofenac (VOLTAREN) 1 % topical gel 2 g      2 g Topical 4 TIMES DAILY 11/04/21 1502 11/11/21 1559      11/04/21 1530   Lidocaine (LIDOCARE) 4 % Patch 1 patch      1 patch  over 12 Hours Transdermal EVERY 24 HOURS 0800 11/04/21 1501       11/04/21 1530   lidocaine patch in PLACE        Transdermal EVERY 8 HOURS 11/04/21 1501       11/04/21 1500   oxyCODONE IR (ROXICODONE) half-tab 2.5-5 mg      2.5-5 mg Oral EVERY 3 HOURS PRN 11/04/21 1459       11/02/21 2200   gabapentin (NEURONTIN) capsule 900 mg      900 mg Oral AT BEDTIME 11/02/21 0347 11/02/21 0800   gabapentin (NEURONTIN) capsule 600 mg      600 mg Oral 2 TIMES DAILY 11/02/21 0347               OBJECTIVE:  Lab results      Recent Labs   Lab Test 11/06/21  0756   WBC 2.8*   RBC 2.13*   HGB 7.2*   HCT 23.7*   *   MCH 33.8*   MCHC 30.4*   RDW 24.1*   *               Lab Results   Component Value Date     INR 1.24 11/01/2021     INR 1.29 08/27/2021     INR 0.90 10/28/2020            Vitals:    Temp:  [36.1  C (97  F)-36.6  C (97.9  F)] 36.6  C (97.9  F)  Pulse:  [82-94] 83  Resp:  [14-16] 16  BP: (120-142)/(69-84) 135/73  SpO2:  [96 %-99 %] 96 %     Exam:   GEN: alert and no distress  NEURO/MSK: Extent of sensory blockade: R) T4-T7. No appreciable level on the L side.  Strength B/L LE 5/5  and overall symmetric  SKIN: T5-6 epidural catheter site with a small amount of blood tinged fluid underneath the dressing but the dressing site is intact. no tenderness, erythema, edema.        ASSESSMENT/PLAN:    Patient is receiving adequate analgesia with current multimodal therapy including T5-6 epidural catheter with infusion of bupivacaine 0.125%  at 8mL/hr. Motor function and adequate sensory block, meeting activity goals.  No evidence of adverse side effects related to local anesthetic.      - continue bupivacaine 0.125% infusion rate at 8mL/hr  - antithrombotic/thrombolytic therapy ordered. Please contact RAPS (#[phone]) prior to any medication changes  - will continue to follow and adjust as needed        Chucho Pedraza MD   Regional Anesthesia Pain  Service  11/6/2021 6:55 PM      RAPS Contact Info (24 hour job code pager is the last 4 digits) For in-house use only:   [employer] phone: [place] [phone], [place] [phone], Peds [phone], then enter call-back number.    Text: Use [employer] on the Intranet <Paging/Directory> tab and enter Jobcode ID.   If no call back at any time, contact the hospital  and ask for RAPS attending or backup

## 2021-11-06 NOTE — PROGRESS NOTES
REGIONAL ANESTHESIA PAIN SERVICE CONTINUOUS NERVE INFUSION NOTE  Richa Ashby is a 56 year old female with PMHx of sarcoma with pulmonary mets s/p chest tube placement for a large R sided hydropneumothorax and placement of a  T5-6 epidural catheter for pain control.    SUBJECTIVE:  Interval History: No acute events overnight .  Patient reports pain is well controled with the nerve block continuous infusion and current analgesics (see below).  She denies weakness, paresthesias, circumoral numbness, metallic taste or tinnitus. Patient is ambulating with assistance without any issues.  Currently tolerating a PO diet, denies nausea or vomiting.     Clinically Aligned Pain Assessment (CAPA):   Comfort (How is your pain?): Comfortably manageable  Change in Pain (Since your last medication/intervention?): Getting better  Pain Control (How are your pain treatments working?):  Fully effective pain control  Functioning (Are you able to do activities to get better?) : Can do everything I need to do  Sleep (Does your pain management allow you to sleep or rest?): She states she was able to sleep but was woken up in the night and requested PO pain meds, but states pain didn't keep her from sleeping     Pain Intensity using Numerical Rating Scale (NRS): 2-3/10 at rest and with activity      Antithrombotic/Thrombolytic Therapy ordered:  lovenox 40mg. Last administered 11/2/21    Analgesic Medications:   Medications related to Pain Management (From now, onward)    Start     Dose/Rate Route Frequency Ordered Stop    11/05/21 1000  bupivacaine (MARCAINE) 0.125 % in sodium chloride 0.9 % 250 mL EPIDURAL Infusion      8 mL/hr  EPIDURAL CONTINUOUS 11/05/21 0945      11/04/21 1800  acetaminophen (TYLENOL) tablet 975 mg      975 mg Oral 3 TIMES DAILY. 11/04/21 1544      11/04/21 1600  diclofenac (VOLTAREN) 1 % topical gel 2 g      2 g Topical 4 TIMES DAILY 11/04/21 1502 11/11/21 1559    11/04/21 1530  Lidocaine (LIDOCARE) 4 % Patch 1  patch      1 patch  over 12 Hours Transdermal EVERY 24 HOURS 0800 11/04/21 1501      11/04/21 1530  lidocaine patch in PLACE       Transdermal EVERY 8 HOURS 11/04/21 1501      11/04/21 1500  oxyCODONE IR (ROXICODONE) half-tab 2.5-5 mg      2.5-5 mg Oral EVERY 3 HOURS PRN 11/04/21 1459      11/02/21 2200  gabapentin (NEURONTIN) capsule 900 mg      900 mg Oral AT BEDTIME 11/02/21 0347      11/02/21 0800  gabapentin (NEURONTIN) capsule 600 mg      600 mg Oral 2 TIMES DAILY 11/02/21 0347             OBJECTIVE:  Lab results  Recent Labs   Lab Test 11/06/21  0756   WBC 2.8*   RBC 2.13*   HGB 7.2*   HCT 23.7*   *   MCH 33.8*   MCHC 30.4*   RDW 24.1*   *       Lab Results   Component Value Date    INR 1.24 11/01/2021    INR 1.29 08/27/2021    INR 0.90 10/28/2020         Vitals:    Temp:  [36.1  C (97  F)-36.6  C (97.9  F)] 36.6  C (97.9  F)  Pulse:  [82-94] 83  Resp:  [14-16] 16  BP: (120-142)/(69-84) 135/73  SpO2:  [96 %-99 %] 96 %    Exam:   GEN: alert and no distress  NEURO/MSK: Extent of sensory blockade: R) T4-T7. No appreciable level on the L side.  Strength B/L LE 5/5  and overall symmetric  SKIN: T5-6 epidural catheter site with a small amount of blood tinged fluid underneath the dressing but the dressing site is intact. no tenderness, erythema, edema.      ASSESSMENT/PLAN:    Patient is receiving adequate analgesia with current multimodal therapy including T5-6 epidural catheter with infusion of bupivacaine 0.125%  at 8mL/hr. Motor function and adequate sensory block, meeting activity goals.  No evidence of adverse side effects related to local anesthetic.     - continue bupivacaine 0.125% infusion rate at 8mL/hr  - antithrombotic/thrombolytic therapy ordered. Please contact RAPS (#[phone]) prior to any medication changes  - will continue to follow and adjust as needed    - discussed plan with attending anesthesiologist    Renny Mayo MD  Regional Anesthesia Pain Service  11/6/2021 10:50  AM    RAPS Contact Info (24 hour job code pager is the last 4 digits) For in-house use only:   [employer] phone: [place] [phone], [place] [phone], Peds [phone], then enter call-back number.    Text: Use [employer] on the Intranet <Paging/Directory> tab and enter Jobcode ID.   If no call back at any time, contact the hospital  and ask for RAPS attending or backup

## 2021-11-06 NOTE — PROGRESS NOTES
[facility]   Pulmonary   Progress Note  Date of Service: 11/06/2021  Richa Ashby MRN: [id number]    Assessment & Plan    56 year-old female with sarcoma and pulmonary metastases. Since starting pazopanib, the pulmonary masses have enlarged and become necrotic--unclear if this is due to disease progression or secondary to effect of targeted therapy on the masses. Subsequently developed secondary spontaneous hydropneumothorax necessitating chest tube.  Initially there was concern for empyema however fluid analysis was negative for empyema.    Patient underwent talc pleurodesis on 11/5/2021 with Dr. Ngo.  Repeat chest x-ray on morning of 11/6 notable for possible right apical pneumothorax that is new, however, this seems less likely to be pneumothorax based on 11/3 chest CT showing air within pulmonary mass which is most likely responsible for this area on chest x-ray today.  We will plan to repeat chest x-ray 6 hours after first chest x-ray this morning to monitor for stability.  We will also plan to repeat chest CT on morning of 11/7.      - We will plan to monitor until morning of 11 7 when we will switch patient to waterseal and if she has no evidence of air leak on waterseal for 24 hours we will clamp chest tube at that time  - Discussed with Dr. Ngo  - We will continue to follow     Patient seen & discussed w/  Dr. Chavarria, who agrees with the above assessment and plan.    Rashid Gunn MD  Pulmonary and Critical Care Medicine Fellow  11/06/2021    Interval History    -No acute events overnight  -Patient notes no issue with dyspnea or cough this morning  -She denies chest pain on the right side and notes that her epidural worked quite well for pain control    Physical Exam Temp:  [96.6  F (35.9  C)-97.9  F (36.6  C)] 96.6  F (35.9  C)  Pulse:  [82-90] 85  Resp:  [16] 16  BP: (123-150)/(72-80) 136/72  SpO2:  [95 %-99 %] 95 %  I/O last 3 completed shifts:  In: 960 [P.O.:960]  Out: 1480  [Urine:1400; Chest Tube:80]  Wt Readings from Last 1 Encounters:   11/06/21 81.4 kg (179 lb 8 oz)    Body mass index is 28.11 kg/m . Resp: 16    Exam:  General: female in no acute distress  HENT:  no rhinorrhea, no epistaxis  Chest: Right-sided chest tube in good position with no evidence of air leak on Pleur-evac, 20 cc of output since yesterday  Lungs: Decreased breath sounds on right side compared to left which is clear, no increased work of breathing  Heart: RRR, no murmurs  Abdomen: soft, non-distended, bowel tones present  Extremities: no pitting edema, no clubbing or cyanosis  Skin: no visible rashes, no mottling  Neurologic: moving all 4 extremities spontaneously, awake and alert    Labs, imaging, diagnostics:  Reviewed and pertinent details discussed above    Medications     acetaminophen  975 mg Oral TID     cetirizine  10 mg Oral Daily     diclofenac  2 g Topical 4x Daily     [Held by provider] enoxaparin ANTICOAGULANT  40 mg Subcutaneous Q24H     fluticasone-salmeterol  1 puff Inhalation Daily     gabapentin  600 mg Oral BID     gabapentin  900 mg Oral At Bedtime     levothyroxine  50 mcg Oral Daily     lidocaine  1 patch Transdermal Q24H     lidocaine   Transdermal Q8H     montelukast  10 mg Oral Daily     multivitamin w/minerals  1 tablet Oral Daily     pazopanib  800 mg Oral Daily     polyethylene glycol  17 g Oral Daily     prochlorperazine  10 mg Oral QPM     vitamin D3  50 mcg Oral Daily

## 2021-11-06 NOTE — PROGRESS NOTES
[facility]    Medicine Progress Note - Hospitalist Service, Gold 11       Date of Admission:  11/1/2021    Assessment & Plan             57 y/o F with PMH of sarcoma, diagnosed in 2019, s/p surgery and radiation in the leg, but since metastasized mostly in the lung, who has been through several chemo, on pazopanib since 9/2021, has been having dyspnea for about two months.  On routine CT CAP found to have large R sided hydropneumothroax, had chest tube placed in the ER  - Chest tube on suction.  Pulm performed talc pleuradesis on 11/5/21.    - Send fluid for cultures to r/o empyema, mentioned on CT chest -- NGTD  - Onc following: Recommends continuing pazopanib, the necrosis of the lung mass may actually be response to therapy.     #  Chest tube and Cancer related pain:   - S/p T5-6 epidural analgesia on 11/5/21  - Scheduled gabapentin, tylenol, lidocaine patch, diclofenac gel  - PRN oxycodone  - Cancer related nausea: Ondansetron  - Consulted anesthesia for epidural pain block     # Port malfunction with small RIJ Clot  - Per IR, RIJ clot associated with port does not need to be treated.  Will hold off on anticoagulation.  IR plans on replacing port outpatient, due to  Subcutaneous air, at least not till end of November.     --- CHRONIC ISSUES ---  # Asthma: fluticasone-salmeterol, montelukast  # Hypothyroid: LT4       Diet: Snacks/Supplements Adult: Other; Chocolate Ensure Enlive @ 10 and 2 + Chocolate Ensure Enlive shake (made w/ ice cream) @ HS; Between Meals  Regular Diet Adult    DVT Prophylaxis: Enoxaparin (Lovenox) SQ  Ness Catheter: Not present  Central Lines: PRESENT       Code Status: Full Code      Disposition Plan   Expected discharge: 11/08/2021   recommended to prior living arrangement once plan established for hydropneumothorax.     The patient's care was discussed with the Patient and Patient's Family.    Ozzie Barth MD  Hospitalist Service, Gold  11  M [facility]  Securely message with the [employer] Web Console (learn more here)  Text page via [facility] Paging/Directory    Please see sign in/sign out for up to date coverage information    Clinically Significant Risk Factors Present on Admission             # Severe Malnutrition, POA: based on Weight loss;Reduced intake (11/02/21 1400)         ______________________________________________________________________    Interval History   Feeling better pain control.       No abd pain or fever or cough / sputum production.    Data reviewed today: I reviewed all medications, new labs and imaging results over the last 24 hours. I personally reviewed.    Physical Exam   Vital Signs: Temp: (!) 96.6  F (35.9  C) Temp src: Oral BP: 136/72 Pulse: 85   Resp: 16 SpO2: 95 % O2 Device: None (Room air) Oxygen Delivery: 2 LPM  Weight: 179 lbs 8 oz     General Appearance: Laying in bed, in mild distress  Respiratory: on RA  + Chest tube on suction, no air leak  Cardiovascular:   GI:   Other:Moving all ext  Alert and oriented  Appropriate mood alfie ffect.

## 2021-11-06 NOTE — PLAN OF CARE
Neuros: A&Ox4  Vitals: VSS, O2 95% on RA while awake  Respiratory: No SOB, on O2 2L NC while sleeping  Bowel status: Via nursing notes, LBM 11/4. BS active and passing flatus, complaining of some constipation. Pt ordered prune juice from the kitchen.   : Voiding spontaneously  Diet: Regular. Reports diminished appetite, denies nausea. Had Ensure and food brought in by .   LDA: R chest tube -20cm suction with serosanguinous output. Epidural bupivacaine 8ml/hr with discharge around insertion site. PIV saline locked. Port hep locked  Pain: Reported pain of 3 during shift. Scheduled tylenol, voltaren gel, bupivacaine, and PRN oxycodone given with relief.   Activity: SBA    Patient had a bed bath and bed linen changed.  has been at bedside most of the shift. Continue with POC and update provider with any changes.     Becki Nascimento, N

## 2021-11-06 NOTE — PLAN OF CARE
[id number]  Status: admitted 11/1. Hx sarcoma with mets to lung  Vitals: VSS, 96% on RA when awake, 2L NC when sleeping  Neuros: A&Ox4  IV: PIV SL'd. Port accessed but not using  Diet: regular diet  Bowel status: LBM yesterday. Active bowel sounds, passing gas  : voiding  Skin: R CT -20cm suction. Epidural bupivacaine 8ml/hr.   Pain: 4-6/10. Oxycodone, tylenol, volteran gel, and bupivicaine  Activity: up SBA

## 2021-11-06 NOTE — PLAN OF CARE
Assumed care for pt [id number]  Pt AOX4 VS and assessment as documented. Pain managed with prn oxycodone, denies nausea,SOB. Voiding spontaneously. Pt passing gas. No BM this shift. Chest tube -20 suction. On 02 2L NC sats >92%. Up with SBA. Needs met, safety maintained. Will continue plan of care.

## 2021-11-07 ENCOUNTER — APPOINTMENT (OUTPATIENT)
Dept: CT IMAGING | Facility: CLINIC | Age: 56
End: 2021-11-07
Attending: STUDENT IN AN ORGANIZED HEALTH CARE EDUCATION/TRAINING PROGRAM
Payer: COMMERCIAL

## 2021-11-07 LAB
BACTERIA BLD CULT: NO GROWTH
BACTERIA BLD CULT: NO GROWTH
BASOPHILS # BLD AUTO: 0 10E3/UL (ref 0–0.2)
BASOPHILS NFR BLD AUTO: 1 %
EOSINOPHIL # BLD AUTO: 0.2 10E3/UL (ref 0–0.7)
EOSINOPHIL NFR BLD AUTO: 5 %
ERYTHROCYTE [DISTWIDTH] IN BLOOD BY AUTOMATED COUNT: 23.9 % (ref 10–15)
HCT VFR BLD AUTO: 24.4 % (ref 35–47)
HGB BLD-MCNC: 7.5 G/DL (ref 11.7–15.7)
HOLD SPECIMEN: NORMAL
IMM GRANULOCYTES # BLD: 0 10E3/UL
IMM GRANULOCYTES NFR BLD: 1 %
LYMPHOCYTES # BLD AUTO: 0.7 10E3/UL (ref 0.8–5.3)
LYMPHOCYTES NFR BLD AUTO: 24 %
MCH RBC QN AUTO: 33.9 PG (ref 26.5–33)
MCHC RBC AUTO-ENTMCNC: 30.7 G/DL (ref 31.5–36.5)
MCV RBC AUTO: 110 FL (ref 78–100)
MONOCYTES # BLD AUTO: 0.4 10E3/UL (ref 0–1.3)
MONOCYTES NFR BLD AUTO: 13 %
NEUTROPHILS # BLD AUTO: 1.7 10E3/UL (ref 1.6–8.3)
NEUTROPHILS NFR BLD AUTO: 56 %
NRBC # BLD AUTO: 0 10E3/UL
NRBC BLD AUTO-RTO: 0 /100
PLATELET # BLD AUTO: 152 10E3/UL (ref 150–450)
RBC # BLD AUTO: 2.21 10E6/UL (ref 3.8–5.2)
WBC # BLD AUTO: 3 10E3/UL (ref 4–11)

## 2021-11-07 PROCEDURE — 71250 CT THORAX DX C-: CPT | Mod: 26 | Performed by: RADIOLOGY

## 2021-11-07 PROCEDURE — 120N000002 HC R&B MED SURG/OB UMMC

## 2021-11-07 PROCEDURE — 250N000013 HC RX MED GY IP 250 OP 250 PS 637: Performed by: STUDENT IN AN ORGANIZED HEALTH CARE EDUCATION/TRAINING PROGRAM

## 2021-11-07 PROCEDURE — 36415 COLL VENOUS BLD VENIPUNCTURE: CPT | Performed by: STUDENT IN AN ORGANIZED HEALTH CARE EDUCATION/TRAINING PROGRAM

## 2021-11-07 PROCEDURE — 85025 COMPLETE CBC W/AUTO DIFF WBC: CPT | Performed by: STUDENT IN AN ORGANIZED HEALTH CARE EDUCATION/TRAINING PROGRAM

## 2021-11-07 PROCEDURE — 99207 PR CDG-CUT & PASTE-POTENTIAL IMPACT ON LEVEL: CPT | Performed by: STUDENT IN AN ORGANIZED HEALTH CARE EDUCATION/TRAINING PROGRAM

## 2021-11-07 PROCEDURE — 99233 SBSQ HOSP IP/OBS HIGH 50: CPT | Performed by: STUDENT IN AN ORGANIZED HEALTH CARE EDUCATION/TRAINING PROGRAM

## 2021-11-07 PROCEDURE — 250N000013 HC RX MED GY IP 250 OP 250 PS 637: Performed by: HOSPITALIST

## 2021-11-07 PROCEDURE — 250N000013 HC RX MED GY IP 250 OP 250 PS 637: Performed by: INTERNAL MEDICINE

## 2021-11-07 PROCEDURE — 99232 SBSQ HOSP IP/OBS MODERATE 35: CPT | Mod: GC | Performed by: INTERNAL MEDICINE

## 2021-11-07 PROCEDURE — 71250 CT THORAX DX C-: CPT

## 2021-11-07 RX ADMIN — FLUTICASONE PROPIONATE AND SALMETEROL 1 PUFF: 50; 100 POWDER RESPIRATORY (INHALATION) at 07:42

## 2021-11-07 RX ADMIN — Medication 5 MG: at 03:36

## 2021-11-07 RX ADMIN — GABAPENTIN 900 MG: 300 CAPSULE ORAL at 20:24

## 2021-11-07 RX ADMIN — Medication 5 MG: at 07:39

## 2021-11-07 RX ADMIN — GABAPENTIN 600 MG: 300 CAPSULE ORAL at 15:37

## 2021-11-07 RX ADMIN — POLYETHYLENE GLYCOL 3350 17 G: 17 POWDER, FOR SOLUTION ORAL at 07:42

## 2021-11-07 RX ADMIN — Medication 5 MG: at 01:26

## 2021-11-07 RX ADMIN — DICLOFENAC SODIUM 2 G: 10 GEL TOPICAL at 15:37

## 2021-11-07 RX ADMIN — PROCHLORPERAZINE MALEATE 10 MG: 5 TABLET ORAL at 20:24

## 2021-11-07 RX ADMIN — Medication 5 MG: at 15:58

## 2021-11-07 RX ADMIN — ACETAMINOPHEN 975 MG: 325 TABLET, FILM COATED ORAL at 17:54

## 2021-11-07 RX ADMIN — Medication 50 MCG: at 07:41

## 2021-11-07 RX ADMIN — DICLOFENAC SODIUM 2 G: 10 GEL TOPICAL at 07:42

## 2021-11-07 RX ADMIN — MONTELUKAST 10 MG: 10 TABLET, FILM COATED ORAL at 07:41

## 2021-11-07 RX ADMIN — DICLOFENAC SODIUM 2 G: 10 GEL TOPICAL at 21:48

## 2021-11-07 RX ADMIN — GABAPENTIN 600 MG: 300 CAPSULE ORAL at 07:41

## 2021-11-07 RX ADMIN — PAZOPANIB 800 MG: 200 TABLET, FILM COATED ORAL at 21:39

## 2021-11-07 RX ADMIN — ACETAMINOPHEN 975 MG: 325 TABLET, FILM COATED ORAL at 07:41

## 2021-11-07 RX ADMIN — CETIRIZINE HYDROCHLORIDE 10 MG: 10 TABLET, FILM COATED ORAL at 07:41

## 2021-11-07 RX ADMIN — ACETAMINOPHEN 975 MG: 325 TABLET, FILM COATED ORAL at 12:39

## 2021-11-07 RX ADMIN — Medication 5 MG: at 21:48

## 2021-11-07 RX ADMIN — Medication 1 TABLET: at 07:42

## 2021-11-07 RX ADMIN — LEVOTHYROXINE SODIUM 50 MCG: 0.05 TABLET ORAL at 07:42

## 2021-11-07 ASSESSMENT — ACTIVITIES OF DAILY LIVING (ADL)
ADLS_ACUITY_SCORE: 9
ADLS_ACUITY_SCORE: 8
ADLS_ACUITY_SCORE: 9
ADLS_ACUITY_SCORE: 8
ADLS_ACUITY_SCORE: 9
ADLS_ACUITY_SCORE: 9
ADLS_ACUITY_SCORE: 8
ADLS_ACUITY_SCORE: 9
ADLS_ACUITY_SCORE: 8
ADLS_ACUITY_SCORE: 9
ADLS_ACUITY_SCORE: 8
ADLS_ACUITY_SCORE: 9
ADLS_ACUITY_SCORE: 9
ADLS_ACUITY_SCORE: 8
ADLS_ACUITY_SCORE: 8
ADLS_ACUITY_SCORE: 9
ADLS_ACUITY_SCORE: 9
ADLS_ACUITY_SCORE: 8

## 2021-11-07 NOTE — DOWNTIME EVENT NOTE
The EMR was down for 2.5 hours on 11/7/2021.    Wilner Castaneda was responsible for completing the paper charting during this time period.     The following information was re-entered into the system by Wilner Castaneda RN: MAR    The following information will remain in the paper chart: LINDA Castaneda RN  11/7/2021

## 2021-11-07 NOTE — PROGRESS NOTES
Regional Anesthesia Brief Note      Patient seen on afternoon rounds     VSS. Catheter site leaking with clear fluid. Dressing removed, area cleaned and catheter reinforced.    No evidence of LAST.     1:20 PM - Administered a bolus of bupivacaine 0.25% 3 mL total through regional block catheter(s) in place. BP: checked: and RN notified to monitor BP and signs of LAST for next 30 minutes.        Addendum:  -Called by RN to see patient due to continued leaking from the catheter site and worsening back pain.    S. Pt reports being uncomfortable with the epidural, she states that she does not believe it is working  - Attempted to obtain a sensory level but no clear level obtained with cold    P. Elected to remove the catheter given the discomfort, lack of sensory level and continued leaking from the catheter site  - Epidural catheter removed at 6:45pm. Catheter tip intact    Ruddy Alas MD   Anesthesiology Resident

## 2021-11-07 NOTE — PLAN OF CARE
- vss   - alert and oriented x 4  - denies nausea and vomiting   - pain managed by oxycodone and schedule tylenol and gabapentin, continuous bupivacane epidural, and anesthesiologist given bolus. BP and HR taken and no LAST noticed  - epidural is leaking, anesthesiologist is notified   - 1 loose BM and voiding spontaneously   - chest tube output 40 mL and is off suction  - CT scan done today MD came to explain results  - pt ambulating to bathroom with assist of 1  - Continue POC

## 2021-11-07 NOTE — PROGRESS NOTES
REGIONAL ANESTHESIA PAIN SERVICE CONTINUOUS NERVE INFUSION NOTE  Richa Ashby is a 56 year old female with PMHx of sarcoma with pulmonary mets s/p chest tube placement for a large R sided hydropneumothorax and placement of a  T5-6 epidural catheter for pain control.     SUBJECTIVE:  Interval History: No acute events overnight .  Patient reports pain is well controled with the nerve block continuous infusion and current analgesics (see below). She denies weakness, paresthesias, circumoral numbness, metallic taste or tinnitus. Patient is ambulating with assistance without any issues.  Currently tolerating a PO diet, denies nausea or vomiting.                 Clinically Aligned Pain Assessment (CAPA):   Comfort (How is your pain?): Comfortably manageable  Change in Pain (Since your last medication/intervention?): Getting better  Pain Control (How are your pain treatments working?):  Fully effective pain control  Functioning (Are you able to do activities to get better?) : Can do everything I need to do  Sleep (Does your pain management allow you to sleep or rest?): She states she was able to sleep but was woken up in the night and requested PO pain meds, but states pain didn't keep her from sleeping                 Pain Intensity using Numerical Rating Scale (NRS): 2-3/10 at rest and with activity        Antithrombotic/Thrombolytic Therapy ordered:  lovenox 40mg. Last administered 11/2/21     Analgesic Medications:                                            Medications related to Pain Management (From now, onward)     Start     Dose/Rate Route Frequency Ordered Stop     11/05/21 1000   bupivacaine (MARCAINE) 0.125 % in sodium chloride 0.9 % 250 mL EPIDURAL Infusion      8 mL/hr  EPIDURAL CONTINUOUS 11/05/21 0945       11/04/21 1800   acetaminophen (TYLENOL) tablet 975 mg      975 mg Oral 3 TIMES DAILY. 11/04/21 1544       11/04/21 1600   diclofenac (VOLTAREN) 1 % topical gel 2 g      2 g Topical 4 TIMES DAILY  11/04/21 1502 11/11/21 1559     11/04/21 1530   Lidocaine (LIDOCARE) 4 % Patch 1 patch      1 patch  over 12 Hours Transdermal EVERY 24 HOURS 0800 11/04/21 1501       11/04/21 1530   lidocaine patch in PLACE        Transdermal EVERY 8 HOURS 11/04/21 1501       11/04/21 1500   oxyCODONE IR (ROXICODONE) half-tab 2.5-5 mg      2.5-5 mg Oral EVERY 3 HOURS PRN 11/04/21 1459       11/02/21 2200   gabapentin (NEURONTIN) capsule 900 mg      900 mg Oral AT BEDTIME 11/02/21 0347 11/02/21 0800   gabapentin (NEURONTIN) capsule 600 mg      600 mg Oral 2 TIMES DAILY 11/02/21 0347               OBJECTIVE:  Lab results        Recent Labs   Lab Test 11/06/21  0756   WBC 2.8*   RBC 2.13*   HGB 7.2*   HCT 23.7*   *   MCH 33.8*   MCHC 30.4*   RDW 24.1*   *                   Lab Results   Component Value Date     INR 1.24 11/01/2021     INR 1.29 08/27/2021     INR 0.90 10/28/2020            Vitals:    Temp:  [36.1  C (97  F)-36.6  C (97.9  F)] 36.6  C (97.9  F)  Pulse:  [82-94] 83  Resp:  [14-16] 16  BP: (120-142)/(69-84) 135/73  SpO2:  [96 %-99 %] 96 %     Exam:   GEN: alert and no distress  NEURO/MSK: Extent of sensory blockade: R) T4-T7. No appreciable level on the L side.  Strength B/L LE 5/5  and overall symmetric  SKIN: T5-6 epidural catheter site with a small amount of fluid underneath the dressing but the dressing site is intact. no tenderness, erythema, edema.        ASSESSMENT/PLAN:    Patient is receiving adequate analgesia with current multimodal therapy including T5-6 epidural catheter with infusion of bupivacaine 0.125%  at 8mL/hr. Motor function and adequate sensory block, meeting activity goals.  No evidence of adverse side effects related to local anesthetic.      - continue bupivacaine 0.125% infusion rate at 8mL/hr  - Patient requesting clinician administered bolus at 13:00. We will reinforce dressings at that time due to continued leaking.  - antithrombotic/thrombolytic therapy ordered. Please  contact RAPS (#[phone]) prior to any medication changes  - will continue to follow and adjust as needed     Chucho Pedraza MD   November 7, 2021 12:30 PM      RAPS Contact Info (24 hour job code pager is the last 4 digits) For in-house use only:   [employer] phone: [place] [phone], [place] [phone], Peds [phone], then enter call-back number.    Text: Use [employer] on the Intranet <Paging/Directory> tab and enter Jobcode ID.   If no call back at any time, contact the hospital  and ask for RAPS attending or backup

## 2021-11-07 NOTE — PLAN OF CARE
A&O, VSS on RA (no supplemental O2 needed overnight, lowest sat noted 92%), afebrile. Pain managed with PRN oxycodone and epidural. Epidural site with scant drainage, anesthesiology assessed site and bolused to confirm correct placement. Anesthesiology to assess again in AM. Chest tube with no drainage overnight (remains on cont suction), site intact with pressure tape. Pt ambulating to bathroom with SBA. Voiding spontaneously. -BM overnight. Tolerating PO intake w/o c/o nausea.

## 2021-11-07 NOTE — PLAN OF CARE
"/72 (BP Location: Left arm)   Pulse 85   Temp (!) 96.6  F (35.9  C) (Oral)   Resp 16   Ht 1.702 m (5' 7\")   Wt 81.4 kg (179 lb 8 oz)   LMP 03/19/2019   SpO2 95%   BMI 28.11 kg/m        1530 - 1930    Pt has been avss. Epidural site leaking, anesthesia was called and came to see the pt. Pt was given Oxycodone with some relief. Chest tube to suction, patent. Will continue to monitor.  "

## 2021-11-07 NOTE — PROGRESS NOTES
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  On routine CT CAP found to have large R sided hydropneumothroax, had chest tube placed in the ER  - Chest tube clamped.  Pulm performed talc pleuradesis on 11/5/21.    - Send fluid for cultures to r/o empyema, mentioned on CT chest -- NGTD  - Onc following: Recommends continuing pazopanib, the necrosis of the lung mass may actually be response to therapy.     #  Chest tube and Cancer related pain:   - S/p T5-6 epidural analgesia with bupivacaine on 11/5/21  - Scheduled gabapentin, tylenol, lidocaine patch, diclofenac gel  - PRN oxycodone  - Cancer related nausea: Ondansetron  - Consulted anesthesia for epidural pain block     # Port malfunction with small RIJ Clot  - Per IR, RIJ clot associated with port does not need to be treated.  Will hold off on anticoagulation.  IR plans on replacing port outpatient, due to  Subcutaneous air, at least not till end of November.     --- CHRONIC ISSUES ---  # Asthma: fluticasone-salmeterol, montelukast  # Hypothyroid: LT4       Diet: Snacks/Supplements Adult: Other; Chocolate Ensure Enlive @ 10 and 2 + Chocolate Ensure Enlive shake (made w/ ice cream) @ HS; Between Meals  Regular Diet Adult    DVT Prophylaxis: Enoxaparin (Lovenox) SQ  Ness Catheter: Not present  Central Lines: PRESENT       Code Status: Full Code      Disposition Plan   Expected discharge: 11/08/2021   recommended to prior living arrangement once plan established for hydropneumothorax.     The patient's care was discussed with the Patient and Patient's Family.    Ozzie Barth MD  Hospitalist  Service, [address]  Securely message with the [employer] Web Console (learn more here)  Text page via [employer] Paging/Directory    Please see sign in/sign out for up to date coverage information    Clinically Significant Risk Factors Present on Admission             # Severe Malnutrition, POA: based on Weight loss;Reduced intake (11/02/21 1400)         ______________________________________________________________________    Interval History   Feeling better pain control.     No abd pain or fever or cough / sputum production.    No SOB.     Data reviewed today: I reviewed all medications, new labs and imaging results over the last 24 hours. I personally reviewed.    Physical Exam   Vital Signs: Temp: (!) 96.7  F (35.9  C) Temp src: Axillary BP: 138/78 Pulse: 98   Resp: 16 SpO2: 96 % O2 Device: None (Room air)    Weight: 179 lbs 8 oz     General Appearance: Laying in bed, in mild distress  Respiratory: on RA  + Chest tube on suction, no air leak  + Epidural catheter in T spine  Cardiovascular:   GI:   Other:Moving all ext  Alert and oriented  Appropriate mood alfie ffect.

## 2021-11-07 NOTE — PROGRESS NOTES
REGIONAL ANESTHESIA PAIN SERVICE CONTINUOUS NERVE INFUSION NOTE  Richa Ashby is a 56 year old female with PMHx of sarcoma with pulmonary mets s/p chest tube placement for a large R sided hydropneumothorax and placement of a  T5-6 epidural catheter for pain control.     SUBJECTIVE:  Interval History: No acute events overnight .  Patient reports pain is well controled with the nerve block continuous infusion and current analgesics (see below). She denies weakness, paresthesias, circumoral numbness, metallic taste or tinnitus. Patient is ambulating with assistance without any issues.  Currently tolerating a PO diet, denies nausea or vomiting.                 Clinically Aligned Pain Assessment (CAPA):   Comfort (How is your pain?): Comfortably manageable  Change in Pain (Since your last medication/intervention?): Getting better  Pain Control (How are your pain treatments working?):  Fully effective pain control  Functioning (Are you able to do activities to get better?) : Can do everything I need to do  Sleep (Does your pain management allow you to sleep or rest?): She states she was able to sleep but was woken up in the night and requested PO pain meds, but states pain didn't keep her from sleeping                 Pain Intensity using Numerical Rating Scale (NRS): 2-3/10 at rest and with activity        Antithrombotic/Thrombolytic Therapy ordered:  lovenox 40mg. Last administered 11/2/21     Analgesic Medications:               Medications related to Pain Management (From now, onward)     Start     Dose/Rate Route Frequency Ordered Stop     11/05/21 1000   bupivacaine (MARCAINE) 0.125 % in sodium chloride 0.9 % 250 mL EPIDURAL Infusion      8 mL/hr  EPIDURAL CONTINUOUS 11/05/21 0945       11/04/21 1800   acetaminophen (TYLENOL) tablet 975 mg      975 mg Oral 3 TIMES DAILY. 11/04/21 1544       11/04/21 1600   diclofenac (VOLTAREN) 1 % topical gel 2 g      2 g Topical 4 TIMES DAILY 11/04/21 1502 11/11/21 1559      11/04/21 1530   Lidocaine (LIDOCARE) 4 % Patch 1 patch      1 patch  over 12 Hours Transdermal EVERY 24 HOURS 0800 11/04/21 1501       11/04/21 1530   lidocaine patch in PLACE        Transdermal EVERY 8 HOURS 11/04/21 1501       11/04/21 1500   oxyCODONE IR (ROXICODONE) half-tab 2.5-5 mg      2.5-5 mg Oral EVERY 3 HOURS PRN 11/04/21 1459       11/02/21 2200   gabapentin (NEURONTIN) capsule 900 mg      900 mg Oral AT BEDTIME 11/02/21 0347 11/02/21 0800   gabapentin (NEURONTIN) capsule 600 mg      600 mg Oral 2 TIMES DAILY 11/02/21 0347               OBJECTIVE:  Lab results      Recent Labs   Lab Test 11/06/21  0756   WBC 2.8*   RBC 2.13*   HGB 7.2*   HCT 23.7*   *   MCH 33.8*   MCHC 30.4*   RDW 24.1*   *               Lab Results   Component Value Date     INR 1.24 11/01/2021     INR 1.29 08/27/2021     INR 0.90 10/28/2020            Vitals:    Temp:  [36.1  C (97  F)-36.6  C (97.9  F)] 36.6  C (97.9  F)  Pulse:  [82-94] 83  Resp:  [14-16] 16  BP: (120-142)/(69-84) 135/73  SpO2:  [96 %-99 %] 96 %     Exam:   GEN: alert and no distress  NEURO/MSK: Extent of sensory blockade: R) T4-T7. No appreciable level on the L side.  Strength B/L LE 5/5  and overall symmetric  SKIN: T5-6 epidural catheter site with a small amount of fluid underneath the dressing but the dressing site is intact. no tenderness, erythema, edema.        ASSESSMENT/PLAN:    Patient is receiving adequate analgesia with current multimodal therapy including T5-6 epidural catheter with infusion of bupivacaine 0.125%  at 8mL/hr. Motor function and adequate sensory block, meeting activity goals.  No evidence of adverse side effects related to local anesthetic.      - continue bupivacaine 0.125% infusion rate at 8mL/hr  - Patient requesting clinician administered bolus at 13:00. We will reinforce dressings at that time due to continued leaking.  - antithrombotic/thrombolytic therapy ordered. Please contact [facility] (#[phone]) prior to any  medication changes  - will continue to follow and adjust as needed        Ruddy Alas MD on 11/7/2021 at 10:33 AM, MD   Anesthesiology Resident       [facility] Contact Info (24 hour job code pager is the last 4 digits) For in-house use only:   [employer] phone: [place] [phone], [place] [phone], Peds [phone], then enter call-back number.    Text: Use [employer] on the Intranet <Paging/Directory> tab and enter Jobcode ID.   If no call back at any time, contact the hospital  and ask for [facility] attending or backup   :

## 2021-11-07 NOTE — PROGRESS NOTES
[facility]   Pulmonary   Progress Note  Date of Service: 11/07/2021  Richa Ashby MRN: [id number]    Summary Statement: 56 yof hx sarcoma w/ pulmonary metastases. Since starting pazopanib, the pulmonary masses have enlarged and become necrotic--unclear if this is due to disease progression or secondary to effect of targeted therapy on the masses. Subsequently developed secondary spontaneous hydropneumothorax necessitating R-sided chest tube. S/p talc pleurodesis 11/5.    Assessment & Plan      Patient underwent talc pleurodesis on 11/5/2021 with Dr. Ngo.  Patient underwent chest CT today notable for resolution of right-sided hydropneumothorax with respect to right middle and right lower lobes.  The chest tube notably appears clogged on chest CT, there is no tidaling of Pleur-evac with breathing, and no evidence of air leak.  We will plan to clamp chest tube today and repeat chest imaging on 11/8/2021 to evaluate for pneumothorax recurrence.  Of note, the patient's right upper lobe on chest x-ray appears as though there is a apical pneumothorax, however, after review of chest CT imaging it seems that this air is actually confined within the borders of her necrotic pulmonary metastases.      -Clamp chest tube today  -Repeat chest x-ray in a.m. on 11/8/2021 to assess for pneumothorax recurrence; if stable in appearance then will consider whether or not to remove chest tube at that time  - We will continue to follow     Patient seen & discussed w/  Dr. Wilkinson, who agrees with the above assessment and plan.    Rashid Gunn MD  Pulmonary and Critical Care Medicine Fellow  11/07/2021    Interval History    - NAEO  -Patient denies respiratory symptoms this morning and notes that her pain is well controlled with epidural  - Patient on RA overnight w/ stable O2, ambulating independently  - Afebrile, VS, sat nl on RA  - WBC 3 (2.8), Hgb 7.5 (7.2), plt 152 (125)  - Chest tube with no output  overnight while on continuous suctioning  - CT chest notable for possible clogging of chest tube with no fenestra communicating with pleural space, large R apical loculated fluid/gas collection, necrotic pulmonary mets, and no evidence of PTX on the R side    Physical Exam Temp:  [96.6  F (35.9  C)-96.7  F (35.9  C)] 96.7  F (35.9  C)  Pulse:  [62-90] 90  Resp:  [16-18] 16  BP: (127-150)/(69-73) 127/69  SpO2:  [95 %-97 %] 96 %  I/O last 3 completed shifts:  In: 480 [P.O.:480]  Out: 1630 [Urine:1600; Chest Tube:30]  Wt Readings from Last 1 Encounters:   11/06/21 81.4 kg (179 lb 8 oz)    Body mass index is 28.11 kg/m . Resp: 16    Exam:  General: female in no acute distress  HENT:  no rhinorrhea, no epistaxis  Chest: Right-sided chest tube in good position with no evidence of air leak on Pleur-evac, 0cc of output since yesterday  Lungs: Decreased breath sounds on right side compared to left which is clear, no increased work of breathing  Heart: RRR, no murmurs  Abdomen: soft, non-distended, bowel tones present  Extremities: no pitting edema, no clubbing or cyanosis  Skin: no visible rashes, no mottling  Neurologic: moving all 4 extremities spontaneously, awake and alert    Labs, imaging, diagnostics:  Reviewed and pertinent details discussed above    Medications     acetaminophen  975 mg Oral TID     cetirizine  10 mg Oral Daily     diclofenac  2 g Topical 4x Daily     [Held by provider] enoxaparin ANTICOAGULANT  40 mg Subcutaneous Q24H     fluticasone-salmeterol  1 puff Inhalation Daily     gabapentin  600 mg Oral BID     gabapentin  900 mg Oral At Bedtime     levothyroxine  50 mcg Oral Daily     lidocaine  1 patch Transdermal Q24H     lidocaine   Transdermal Q8H     montelukast  10 mg Oral Daily     multivitamin w/minerals  1 tablet Oral Daily     pazopanib  800 mg Oral Daily     polyethylene glycol  17 g Oral Daily     prochlorperazine  10 mg Oral QPM     vitamin D3  50 mcg Oral Daily

## 2021-11-08 ENCOUNTER — DOCUMENTATION ONLY (OUTPATIENT)
Dept: OTHER | Facility: CLINIC | Age: 56
End: 2021-11-08

## 2021-11-08 ENCOUNTER — APPOINTMENT (OUTPATIENT)
Dept: GENERAL RADIOLOGY | Facility: CLINIC | Age: 56
End: 2021-11-08
Attending: STUDENT IN AN ORGANIZED HEALTH CARE EDUCATION/TRAINING PROGRAM
Payer: COMMERCIAL

## 2021-11-08 LAB
BASOPHILS # BLD AUTO: 0 10E3/UL (ref 0–0.2)
BASOPHILS NFR BLD AUTO: 0 %
CREAT SERPL-MCNC: 0.52 MG/DL (ref 0.52–1.04)
EOSINOPHIL # BLD AUTO: 0.2 10E3/UL (ref 0–0.7)
EOSINOPHIL NFR BLD AUTO: 7 %
ERYTHROCYTE [DISTWIDTH] IN BLOOD BY AUTOMATED COUNT: 23.8 % (ref 10–15)
GFR SERPL CREATININE-BSD FRML MDRD: >90 ML/MIN/1.73M2
HCT VFR BLD AUTO: 23.7 % (ref 35–47)
HGB BLD-MCNC: 7.2 G/DL (ref 11.7–15.7)
IMM GRANULOCYTES # BLD: 0 10E3/UL
IMM GRANULOCYTES NFR BLD: 1 %
LYMPHOCYTES # BLD AUTO: 0.8 10E3/UL (ref 0.8–5.3)
LYMPHOCYTES NFR BLD AUTO: 27 %
MCH RBC QN AUTO: 33.8 PG (ref 26.5–33)
MCHC RBC AUTO-ENTMCNC: 30.4 G/DL (ref 31.5–36.5)
MCV RBC AUTO: 111 FL (ref 78–100)
MONOCYTES # BLD AUTO: 0.3 10E3/UL (ref 0–1.3)
MONOCYTES NFR BLD AUTO: 10 %
NEUTROPHILS # BLD AUTO: 1.6 10E3/UL (ref 1.6–8.3)
NEUTROPHILS NFR BLD AUTO: 55 %
NRBC # BLD AUTO: 0 10E3/UL
NRBC BLD AUTO-RTO: 0 /100
PLATELET # BLD AUTO: 157 10E3/UL (ref 150–450)
RBC # BLD AUTO: 2.13 10E6/UL (ref 3.8–5.2)
WBC # BLD AUTO: 2.9 10E3/UL (ref 4–11)

## 2021-11-08 PROCEDURE — 85004 AUTOMATED DIFF WBC COUNT: CPT | Performed by: STUDENT IN AN ORGANIZED HEALTH CARE EDUCATION/TRAINING PROGRAM

## 2021-11-08 PROCEDURE — 120N000002 HC R&B MED SURG/OB UMMC

## 2021-11-08 PROCEDURE — 71045 X-RAY EXAM CHEST 1 VIEW: CPT

## 2021-11-08 PROCEDURE — 99233 SBSQ HOSP IP/OBS HIGH 50: CPT | Mod: GC | Performed by: INTERNAL MEDICINE

## 2021-11-08 PROCEDURE — 71045 X-RAY EXAM CHEST 1 VIEW: CPT | Mod: 26 | Performed by: RADIOLOGY

## 2021-11-08 PROCEDURE — 0WP930Z REMOVAL OF DRAINAGE DEVICE FROM RIGHT PLEURAL CAVITY, PERCUTANEOUS APPROACH: ICD-10-PCS | Performed by: INTERNAL MEDICINE

## 2021-11-08 PROCEDURE — 36415 COLL VENOUS BLD VENIPUNCTURE: CPT | Performed by: STUDENT IN AN ORGANIZED HEALTH CARE EDUCATION/TRAINING PROGRAM

## 2021-11-08 PROCEDURE — 250N000013 HC RX MED GY IP 250 OP 250 PS 637: Performed by: HOSPITALIST

## 2021-11-08 PROCEDURE — 250N000013 HC RX MED GY IP 250 OP 250 PS 637: Performed by: STUDENT IN AN ORGANIZED HEALTH CARE EDUCATION/TRAINING PROGRAM

## 2021-11-08 PROCEDURE — 99233 SBSQ HOSP IP/OBS HIGH 50: CPT | Performed by: STUDENT IN AN ORGANIZED HEALTH CARE EDUCATION/TRAINING PROGRAM

## 2021-11-08 PROCEDURE — 250N000013 HC RX MED GY IP 250 OP 250 PS 637: Performed by: INTERNAL MEDICINE

## 2021-11-08 PROCEDURE — 250N000011 HC RX IP 250 OP 636: Performed by: STUDENT IN AN ORGANIZED HEALTH CARE EDUCATION/TRAINING PROGRAM

## 2021-11-08 PROCEDURE — 82565 ASSAY OF CREATININE: CPT | Performed by: STUDENT IN AN ORGANIZED HEALTH CARE EDUCATION/TRAINING PROGRAM

## 2021-11-08 RX ORDER — HEPARIN SODIUM (PORCINE) LOCK FLUSH IV SOLN 100 UNIT/ML 100 UNIT/ML
5-10 SOLUTION INTRAVENOUS
Status: DISCONTINUED | OUTPATIENT
Start: 2021-11-08 | End: 2021-11-09 | Stop reason: HOSPADM

## 2021-11-08 RX ORDER — HEPARIN SODIUM,PORCINE 10 UNIT/ML
5-10 VIAL (ML) INTRAVENOUS EVERY 24 HOURS
Status: DISCONTINUED | OUTPATIENT
Start: 2021-11-08 | End: 2021-11-09 | Stop reason: HOSPADM

## 2021-11-08 RX ORDER — HEPARIN SODIUM,PORCINE 10 UNIT/ML
5-10 VIAL (ML) INTRAVENOUS
Status: DISCONTINUED | OUTPATIENT
Start: 2021-11-08 | End: 2021-11-09 | Stop reason: HOSPADM

## 2021-11-08 RX ADMIN — Medication 1 TABLET: at 07:40

## 2021-11-08 RX ADMIN — Medication 50 MCG: at 07:40

## 2021-11-08 RX ADMIN — DICLOFENAC SODIUM 2 G: 10 GEL TOPICAL at 07:44

## 2021-11-08 RX ADMIN — GABAPENTIN 900 MG: 300 CAPSULE ORAL at 21:13

## 2021-11-08 RX ADMIN — ACETAMINOPHEN 975 MG: 325 TABLET, FILM COATED ORAL at 07:40

## 2021-11-08 RX ADMIN — Medication 2.5 MG: at 16:02

## 2021-11-08 RX ADMIN — CETIRIZINE HYDROCHLORIDE 10 MG: 10 TABLET, FILM COATED ORAL at 07:40

## 2021-11-08 RX ADMIN — Medication 5 MG: at 07:37

## 2021-11-08 RX ADMIN — ACETAMINOPHEN 975 MG: 325 TABLET, FILM COATED ORAL at 18:08

## 2021-11-08 RX ADMIN — GABAPENTIN 600 MG: 300 CAPSULE ORAL at 07:40

## 2021-11-08 RX ADMIN — PROCHLORPERAZINE MALEATE 10 MG: 5 TABLET ORAL at 20:07

## 2021-11-08 RX ADMIN — Medication 5 ML: at 19:04

## 2021-11-08 RX ADMIN — FLUTICASONE PROPIONATE AND SALMETEROL 1 PUFF: 50; 100 POWDER RESPIRATORY (INHALATION) at 07:39

## 2021-11-08 RX ADMIN — ACETAMINOPHEN 975 MG: 325 TABLET, FILM COATED ORAL at 11:54

## 2021-11-08 RX ADMIN — DICLOFENAC SODIUM 2 G: 10 GEL TOPICAL at 11:54

## 2021-11-08 RX ADMIN — ENOXAPARIN SODIUM 40 MG: 40 INJECTION SUBCUTANEOUS at 18:09

## 2021-11-08 RX ADMIN — Medication 5 MG: at 00:51

## 2021-11-08 RX ADMIN — Medication 5 MG: at 04:17

## 2021-11-08 RX ADMIN — MONTELUKAST 10 MG: 10 TABLET, FILM COATED ORAL at 07:40

## 2021-11-08 RX ADMIN — LEVOTHYROXINE SODIUM 50 MCG: 0.05 TABLET ORAL at 07:40

## 2021-11-08 RX ADMIN — DICLOFENAC SODIUM 2 G: 10 GEL TOPICAL at 20:07

## 2021-11-08 RX ADMIN — PAZOPANIB 800 MG: 200 TABLET, FILM COATED ORAL at 21:12

## 2021-11-08 RX ADMIN — Medication 5 MG: at 20:07

## 2021-11-08 RX ADMIN — GABAPENTIN 600 MG: 300 CAPSULE ORAL at 14:30

## 2021-11-08 ASSESSMENT — ACTIVITIES OF DAILY LIVING (ADL)
ADLS_ACUITY_SCORE: 9

## 2021-11-08 NOTE — PROGRESS NOTES
Connie is a 56 year old who is being evaluated via a billable video visit.      How would you like to obtain your AVS? MyChart  If the video visit is dropped, the invitation should be resent by: Text to cell phone: [phone]  Will anyone else be joining your video visit? Yes: Spouse - Elliot. How would they like to receive their invitation? Other e-mail: N/A    Video Start Time: 2:30pm    Video-Visit Details    Type of service:  Video Visit    Video End Time: 3:12pm    Originating Location (pt. Location): Home    Distant Location (provider location):  [facility] [employer]     Platform used for Video Visit: [facility]    Oncology/Hematology Visit Note  Nov 12, 2021    Reason for Visit: Follow up of undifferentiated pleomorphic sarcoma     History of Present Illness: Richa Ashby is a 56 year old female with PMH asthma and GERD with undifferentiated pleomorphic sarcoma. 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 She received preoperative radiotherapy and the tumor was resected 3/12/20. She then developed a cough, chest tightness, and scapular pain and imaging revealed lung nodules. Lung biopsy 11/2/20 with sarcoma. She was started on Doxil + Ifos 11/13/20. Had delay in cycle 2 due to COVID dx 12/2/20. CT imaging after cycle 1 with positive response to treatment with decrease in pulmonary nodules. Received cycle 2 12/18/20. Received cycle 3 1/15/21 with dose reduction in Doxil for skin toxicity and mucositis. Received cycle 4 2/15/21.      CT CAP 3/8/21 with positive response to treatment, decreased size of pulmonary mets though one lesion was larger. Did see fibrocystic changes in left breast, recommended breast imaging. Received cycle 5 Doxil + ifos 3/11/21.      Imaging 4/5/21 with overall stable disease except one lung lesion, opted to continue Doxil alone for now and switch to Keytruda in future if ongoing progression.      Started  Keytruda 5/10/21. Was seen 5/26/21 cough, SOB, fatigue work-up thought to be 2/2 phrenic nerve involvement of R apical lung mass.      Due to worsening fatigue and cough, repeat CT 6/15/21 concerning for progression vs pseudoprogression. Added Gemzar 6/16/21.     She was admitted 6/17/21-6/20/21 with fever of unknown origin, infectious work-up negative, treated with broad spectrum antibiotics.     She clinically improved on abx. Was able to receive day 8 Gemzar (dose reduced for thrombocytopenia) + Keytruda 6/23/21.      CT 7/12/21 with stable disease.     She was admitted 8/25/21-8/31/21 for fevers, hypotension, acute on chronic anemia. Extensive infectious work-up negative so etiology of fevers still unknown.      She has had progressive disease so started on Pazopanib 9/24/21.     She was admitted 10/6/21-10/8/21 for weakness, new hallucinations in setting of UTI. Brain MRI without brain mets though did have Diffuse loss normal fatty marrow on T1 sequence, nonspecific. Symptoms improved with abx and IVF.     She has continued on Pazopanib. CT 11/1/21 for restaging noted majority of masses with fluid and gas consistent with necrosis and the largest cyst likely ruptured causing a right hydropneumothorax with near complete collapse of the R lung. One solid left pleural based lesion was slightly increased. Overall felt this was a positive response to Pazopanib so continued.    She was admitted 11/1/21 for urgent chest tube placement for the hydropneumothorax noted on CT. Due to high risk of recurrence she underwent talc pleurodesis 11/5/21. Port was incidentally found to have non-occlusive thrombus and will be replaced later this month pending resolution of subcutaneous air.     She was called today for hospital follow-up.    Interval History:  Connie has been doing OK since hospital discharge.  -Energy levels improved, though still not back to her baseline   -No fevers or chills.   -Breathing and cough are both  improved, though she still cannot take deep breathes. Denies any chest pain.   -Having pain at chest tube site, stiches to be removed next week. Also at epidural site- has been watching and swelling is almost completely resolved and no erythema or warmth. Pain is trending better but still quite bothersome. Using Voltaren gel and Tylenol during the day and Oxycodone at night when it is more painful-doing 5mg 2x overnight but still uncomfortable   -Feeling anxious about risk of recurrent pneumothorax  -Had 2x vomiting episodes, otherwise nausea manageable with Compazine. Bowels still fluctuate between diarrhea and constipation  -Thrush resolved, still has dry mouth but better. Tongue is sensitive but no open sores  -Neuropathy unchanged  -No pain in shoulder or flank as before. No confusion of hallucinations. No swelling, headaches, skin issues.   -Eating and drinking better and doing recommendations from dietician.   -Still unsure if they are going to go to [place] in December.  -Plans to get COVID booster soon    Current Outpatient Medications   Medication Sig Dispense Refill     acetaminophen (TYLENOL) 325 MG tablet Take 325-650 mg by mouth every 6 hours as needed for mild pain       ADVAIR DISKUS 100-50 MCG/DOSE inhaler Inhale 1 puff into the lungs daily        albuterol (PROAIR RESPICLICK) 108 (90 Base) MCG/ACT inhaler Inhale 1-2 puffs into the lungs every 6 hours as needed        cetirizine (ZYRTEC) 10 MG tablet Take 10 mg by mouth daily       diclofenac (VOLTAREN) 1 % topical gel Apply 4 g topically 4 times daily 350 g 3     gabapentin (NEURONTIN) 300 MG capsule Take 600 mg in the AM, 600 mg mid-day, and 900 mg at night 210 capsule 3     guaiFENesin-codeine (ROBITUSSIN AC) 100-10 MG/5ML solution Take 5-10 mLs by mouth every 4 hours as needed for cough 473 mL 1     levothyroxine (SYNTHROID/LEVOTHROID) 50 MCG tablet Take 1 tablet (50 mcg) by mouth daily 30 tablet 1     loperamide (IMODIUM A-D) 2 MG tablet  Take 2 mg by mouth daily as needed for diarrhea       melatonin (MELATONIN) 1 MG/ML LIQD liquid Take 5 mg by mouth At Bedtime        montelukast (SINGULAIR) 10 MG tablet Take 10 mg by mouth daily       multivitamin w/minerals (MULTI-VITAMIN) tablet Take 1 tablet by mouth daily       NASAL SPRAY SALINE NA        ondansetron (ZOFRAN) 8 MG tablet Take 1 tablet (8 mg) by mouth every 8 hours as needed for nausea 60 tablet 1     oxyCODONE (ROXICODONE) 5 MG tablet Take 0.5-1.5 tablets (2.5-7.5 mg) by mouth every 6 hours as needed for severe pain 60 tablet 0     pazopanib (VOTRIENT) 200 MG tablet Take 4 tablets (800 mg) by mouth daily Take on an empty stomach 1 hour before or 2 hours after a meal. 120 tablet 0     polyethylene glycol (MIRALAX) 17 GM/Dose powder Take 17 g by mouth daily 510 g 0     prochlorperazine (COMPAZINE) 10 MG tablet Take 10 mg by mouth every evening Before votrient       prochlorperazine (COMPAZINE) 10 MG tablet Take 1 tablet (10 mg) by mouth every 6 hours as needed for nausea or vomiting 90 tablet 3     vitamin D3 (CHOLECALCIFEROL) 2000 units (50 mcg) tablet Take 1 tablet by mouth daily       Past Medical History  Past Medical History:   Diagnosis Date     Asthma, chronic      Sarcoma (H)     Right lower thigh     Past Surgical History:   Procedure Laterality Date     AS RAD RESEC TONSIL/PILLARS       GALLBLADDER SURGERY       INJECT EPIDURAL THORACIC N/A 11/5/2021    Procedure: INJECTION, SPINE, THORACIC, EPIDURAL;  Surgeon: GENERIC ANESTHESIA PROVIDER;  Location: [facility] OR     INSERT PORT VASCULAR ACCESS Right 10/28/2020    Procedure: single lumen power port placement @0745;  Surgeon: Tex Ackerman MD;  [place] [facility] [place]     IR CHEST PORT PLACEMENT > 5 YRS OF AGE  10/28/2020     IR PORT CHECK RIGHT  3/26/2021     IR PORT CHECK RIGHT  10/21/2021     RESECT TUMOR LOWER EXTREMITY Right 3/12/2020    Procedure: Resection of right thigh tumor;  Surgeon: Guillermo Johnson MD;  Location:  OR      ROTATOR CUFF REPAIR RT/LT  2012     SHOULDER SURGERY       SINUS SURGERY       THORACENTESIS N/A 11/5/2021    Procedure: Pleurodesis;  Surgeon: Elana Ngo MD;  Location:  GI     Allergies   Allergen Reactions     Pentobarbital      resp arrest     Social History   Social History     Tobacco Use     Smoking status: Never Smoker     Smokeless tobacco: Never Used   Substance Use Topics     Alcohol use: Yes     Comment: 2-3 glasses of wine/week     Drug use: Never      Past medical history and social history were reviewed.    Physical Examination:  LMP 03/19/2019   Wt Readings from Last 10 Encounters:   11/06/21 81.4 kg (179 lb 8 oz)   10/07/21 87.7 kg (193 lb 4.8 oz)   10/06/21 87.2 kg (192 lb 3.2 oz)   10/03/21 87.1 kg (192 lb)   09/22/21 89.3 kg (196 lb 12.8 oz)   09/08/21 90.5 kg (199 lb 8 oz)   08/30/21 90.7 kg (199 lb 15.3 oz)   08/25/21 90.8 kg (200 lb 1.6 oz)   08/11/21 93.4 kg (206 lb)   08/09/21 91.8 kg (202 lb 4.8 oz)     Video physical exam  General: Patient appears well in no acute distress.   Skin: No visualized rash or lesions on visualized skin  Eyes: EOMI, no erythema, sclera icterus or discharge noted  Resp: Appears to be breathing comfortably without accessory muscle usage, speaking in full sentences, no cough  MSK: Appears to have normal range of motion based on visualized movements  Neurologic: No apparent tremors, facial movements symmetric  Psych: affect normal, alert and oriented    The rest of a comprehensive physical examination is deferred due to PHE (public health emergency) video restrictions    Laboratory Data:  Results for STEVEAMADEO (MRN [id number]) as of 11/13/2021 11:04   11/9/2021 06:23   Sodium 136   Potassium 3.8   Chloride 102   Carbon Dioxide 26   Urea Nitrogen 6 (L)   Creatinine 0.52   GFR Estimate >90   Calcium 8.6   Anion Gap 8   Glucose 91   WBC 2.9 (L)   Hemoglobin 7.5 (L)   Hematocrit 24.5 (L)   Platelet Count 155   RBC Count 2.22 (L)    (H)   MCH  33.8 (H)   MCHC 30.6 (L)   RDW 23.5 (H)   % Neutrophils 55   % Lymphocytes 26   % Monocytes 11   % Eosinophils 8   % Basophils 0   Absolute Basophils 0.0   Absolute Eosinophils 0.2   Absolute Immature Granulocytes 0.0   Absolute Lymphocytes 0.8   Absolute Monocytes 0.3   % Immature Granulocytes 0   Absolute Neutrophils 1.6   Absolute NRBCs 0.0   NRBCs per 100 WBC 0     CT Chest 11/9/21  FINDINGS:  Right chest wall port catheter terminates at the innominate  confluence.     Lungs: The airway is patent. Removal of right-sided chest tube. No  significant change in the appearance of the large multiloculated right  upper lobe gas and fluid collection. Grossly unchanged mixed fluid and  air throughout the posterior right pleural space. Stable low-density  mass in the right middle lobe measuring 4.8 x 3.6 cm. Grossly  unchanged cavitary lesion in the left lower lobe measuring up to 4.6  cm in size and pleural based lesion in the left lung base.      Mediastinum: The thyroid is unremarkable. Cardiac size is normal.  Normal caliber aorta and main pulmonary artery. Small pericardial  effusion. No significant coronary artery calcium. No thoracic  lymphadenopathy. Esophagus is normal in caliber.     Bones and soft tissues: Extensive subcutaneous emphysema involving the  anterior and posterior chest wall extending into the right  supraclavicular region, similar to prior CT. No suspicious osseous  lesions.     Upper Abdomen: Limited evaluation of the upper abdomen.  Cholecystectomy.                                                                      IMPRESSION:   1. Essentially unchanged appearance of the large multiloculated gas  and fluid collection in the right upper lobe.  2. Complex small right hydropneumothorax, similar to prior.  3. Grossly unchanged appearance of the multifocal metastatic lesions  in the right middle lobe and left lower lobe.     I have personally reviewed the examination and initial interpretation  and  I agree with the findings.     SEVEN VANCE MD     Assessment and Plan:  1. Onc  Metastatic UPS, was on Doxil + Ifosfamide (5 cycles) with positive response to treatment initially, then due to poor tolerance switched to Doxil alone (1 cycle) but had progression. Started on Keytruda 5/10/21. Due to tumor growth (progression vs pseudoprogression) added Gemzar 6/16/21. Had ongoing progression. Started on Pazopanib 9/24/21.    CT 11/1/21 with significant tumor necrosis/rupture. Given overall clinical improvement and findings of necrosis plan to continue Pazopanib with close follow-up.    Tolerating well, labs reviewed and no concerns other than anemia that will be monitored. Lab check and KALINA follow-up next week.    Plan to repeat CT imaging early December-will confirm with Dr. Lopez.     2. Pulm  R sided hydropneumothorax: Found on 11/1/21 CT scan, thought 2/2 ruptured tumor. Was admitted 11/1 and underwent chest tube placement. Pulm performed talc pleuradesis on 11/5/21 due to risk of recurrence. Pain at chest tube site persists, otherwise breathing and cough improved. Will do CXR next week to monitor. Patient has anxiety about recurrence, could do weekly CXR if concerns. Stiches to be removed by PCP next week.     Subcutaneous chest wall air: After chest tube placement, patient still feels in upper chest/neck but improving. No port replacement until this is resolved.     Asthma: stable, continue inhalers.     COVID: Diagnosed in December, recovered. She is vaccinated. OK to get booster.    Cough: 2/2 malignancy, improved. Continue Robitussin-AC PRN     3. ID  Fevers of unknown origin, thought to be tumor fevers, previously required Indomethacin, resolved.     Possible empyema inpatient, cultures were negative.     4. Vascular  Port malfunction with small RIJ clot: Per IR, RIJ clot associated with port does not need to be treated. IR plans on replacing port outpatient, due to  Subcutaneous air, at least not till  end of November.    5. Pain  Current pain includes R chest wall 2/2 chest tube placement and back 2/2 epidural attempt in hospital (removed) with hematoma (improving). Continue Tylenol and Voltaren gel during the day. Discussed increasing Oxycodone to 7.5mg at night. Could go up to 10mg but monitor for CNS changes that happened previously.    Other cancer related pain areas improved.      6. Heme  Anemia: ACD and chemotherapy. Continue weekly lab monitoring-requested for mid next week.      Thrombocytopenia: 2/2 Gemzar. No bleeding issues. OK today.      Neutropenia: No current issues. WBC WNL.      7. Cards  Exercise fatigue and NAZARIO: Echo was stable. Possibly was related to pneumothorax, though it is unclear when this happened. Overall symptoms improved.      Hypotension: Much improved as is tachycardia. Continue pushing home fluids and monitoring.       8. 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 Continue Gabapentin 600/600/900.     Deconditioning: Doing PT     Hallucinations: Brain MRI reviewed and no mets. Does have loss of normal fatty marrow with broad differential. Discussed with neuro onc and unclear findings. At this time symptoms resolved so suspect it was combination of Oxycodone and UTI. LIMIT narcotics due to CNS effects.      9. GI  Diarrhea/constipation: Fluctuating, has imodium and miralax PRN     Continue Compazine PRN nausea.     Continue salt/soda and biotene for mouth irritation.     Continue Tums PRN GERD, take away from Votrient.      10. Psych  Ongoing anxiety, discussed scheduling cxr to monitor pneumothorax as above. Seeing Titus on Monday.     11. 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 Recheck TSH in one month.    55 minutes spent on the date of the encounter doing chart review, review of test results, interpretation of tests, patient visit and documentation      Harshal Schuster PA-C  Department of Hematology and Oncology  [facility]  [facility]

## 2021-11-08 NOTE — PROGRESS NOTES
[facility]    Medicine Progress Note - Hospitalist Service, Gold 11       Date of Admission:  11/1/2021    Assessment & Plan             57 y/o F with PMH of sarcoma, diagnosed in 2019, s/p surgery and radiation in the leg, but since metastasized mostly in the lung, who has been through several chemo, on pazopanib since 9/2021, has been having dyspnea for about two months.  On routine CT CAP found to have large R sided hydropneumothroax, had chest tube placed in the ER  - Chest tube removed on 11/8/21.  Pulm performed talc pleuradesis on 11/5/21.   If CXR is stable on 11/9/21, patient can discharge.   - Send fluid for cultures to r/o empyema, mentioned on CT chest -- NGTD  - Onc following: Recommends continuing pazopanib, the necrosis of the lung mass may actually be response to therapy.     #  Chest tube and Cancer related pain:   - S/p T5-6 epidural analgesia with bupivacaine on 11/5/21 but didn't help, reomoved on 11/7/21.   - Scheduled gabapentin, tylenol, lidocaine patch, diclofenac gel  - PRN oxycodone  - will finalize a pain regimen on 11/9/21 prior to discharge.   - Cancer related nausea: Ondansetron    # Port malfunction with small RIJ Clot  - Per IR, RIJ clot associated with port does not need to be treated.  Will hold off on anticoagulation.  IR plans on replacing port outpatient, due to  Subcutaneous air, at least not till end of November.     --- CHRONIC ISSUES ---  # Asthma: fluticasone-salmeterol, montelukast  # Hypothyroid: LT4       Diet: Snacks/Supplements Adult: Other; Chocolate Ensure Enlive @ 10 and 2 + Chocolate Ensure Enlive shake (made w/ ice cream) @ HS; Between Meals  Regular Diet Adult    DVT Prophylaxis: Enoxaparin (Lovenox) SQ  Ness Catheter: Not present  Central Lines: None    Code Status: Full Code      Disposition Plan   Expected discharge: 11/09/2021   recommended to prior living arrangement once plan established for hydropneumothorax.      The patient's care was discussed with the Patient and Patient's Family.    Ozzie Barth MD  Hospitalist Service, [address]  Securely message with the Vocera Web Console (learn more here)  Text page via [employer] Paging/Directory    Please see sign in/sign out for up to date coverage information    Clinically Significant Risk Factors Present on Admission             # Severe Malnutrition, POA: based on Weight loss;Reduced intake (11/02/21 1400)         ______________________________________________________________________    Interval History   Having chest tube related pain.  Also pain around epidural site.     The epidural analgesia didn't work, catheter fell off and has been removed.     No abd pain or fever or cough / sputum production.    No SOB.     Data reviewed today: I reviewed all medications, new labs and imaging results over the last 24 hours. I personally reviewed.    Physical Exam   Vital Signs: Temp: 97.4  F (36.3  C) Temp src: Temporal BP: 126/76 Pulse: 85   Resp: 18 SpO2: 96 % O2 Device: None (Room air)    Weight: 179 lbs 8 oz     General Appearance: Laying in bed, in mild distress  Respiratory: on RA  + Chest tube on suction, no air leak  Cardiovascular:   GI:   Other:Moving all ext  Alert and oriented  Appropriate mood alfie ffect.

## 2021-11-08 NOTE — PLAN OF CARE
- VSS  - alert and oriented x4  - denies nausea and vomiting  - pain managed with PRN oxycodone given only once this shift, scheduled tylenol and gabapentin  - Chest tube was removed by the pulmonary MD around 12 noon, tolerated well  - 1 BM today and voiding spontaneously  - pt is ambulated in the ochoa once with assist of 1, then when chest tube was off pt ambulate to the bathroom by herself  - tolerating regular diet well  - Continue POC

## 2021-11-08 NOTE — PROGRESS NOTES
Care Management Follow Up    Length of Stay (days): 7    Expected Discharge Date: 11/09/2021     Concerns to be Addressed: None at this time      Patient plan of care discussed at interdisciplinary rounds: Yes    Anticipated Discharge Disposition: Home     Anticipated Discharge Services: TBD   Anticipated Discharge DME: None    Patient/family educated on Medicare website which has current facility and service quality ratings: NA   Education Provided on the Discharge Plan:  NA  Patient/Family in Agreement with the Plan:  Yes    Referrals Placed by CM/SW:  NA  Private pay costs discussed: Not applicable    Additional Information:  MATA notified by RN that pt has questions about health care directive. SW met w/ pt and her , Elliot, in pt's room. Pt shared that she filled out a HCD and wants to get it notarized. Pt requested this happen soon since she's discharging home tomorrow. MATA called [employer] and scheduled a virtual notary today at 3:15. MATA updated pt and Elliot. MATA will assist w/ virtual notary today at 3:15.     Addendum: MATA assisted pt w/ virtual notary w/ Drea at [employer]. MATA emailed document to Drea and received notarized document back from Drea. MATA made 5 copies for pt per her request and provided them to her. Pt was appreciative and did not have any further questions/needs for SW.     Liliana LAZARO, ABELARDO Carroll   Phone: [phone]  Pager: [phone]

## 2021-11-08 NOTE — PLAN OF CARE
Pt vss and A&Ox4. Pt returned to unit after care conference with family. Pain rated as a 3 and 2- tolerated with PO Tylenol and Oxycodone. Ambulated to bathroom with stand by assist- voiding spontaneously with 1 small BM this shift and is having flatus. Denies nausea- tolerating regular diet well. Chest tube clamped at 1645 (Pt later knocked over cannister while in bathroom). Epidural was leaking and was removed by anesthesia at 1845. Will continue plan of care.

## 2021-11-08 NOTE — PROGRESS NOTES
Assumed cares of pt from [id number]. Pt is AOVSS on RA. Pain managed with scheduled Tylenol and PRN Oxycodone. Ambulating with SBA. Voiding spontaneously and having BMs. Chest tube in place and clamped on right side, no crepitus noted. Continue POC

## 2021-11-08 NOTE — PROVIDER NOTIFICATION
Notified MD at 5:40 PM regarding de-accessing port and concern with clot in port.      Spoke with: Dr. Barth    Orders were obtained.    Comments: MD not concerned about clot, and RN can de-access port with heparin

## 2021-11-08 NOTE — PROGRESS NOTES
"Pulmonary follow-up     Date of service: 11/8/2021     Interval history: Feels well. Chest tube clamped for 24 hours. No signs of recurrent PTX.    Exam:  /77 (BP Location: Left arm)   Pulse 88   Temp 97.3  F (36.3  C) (Temporal)   Resp 18   Ht 1.702 m (5' 7\")   Wt 81.4 kg (179 lb 8 oz)   LMP 03/19/2019   SpO2 97%   BMI 28.11 kg/m    General: no distress  HEENT: atraumatic  Pulmonary: breathing comfortably  Cardiac: regular rhythm, normal rate  Skin: warm, dry  MSK: chest tube in right chest wall, no air leak  Neurologic: alert, oriented    Assessment/Plan: 56 year-old female with sarcoma and pulmonary metastases. Since starting pazopanib, the pulmonary masses have enlarged and become necrotic--unclear if this is due to disease progression or secondary to effect of targeted therapy on the masses. Subsequently developed secondary spontaneous hydropneumothorax necessitating chest tube.     Had good pleural apposition following placement of chest tube, but due to high risk of recurrence decision made to perform talc pleurodesis via existing chest tube on 11/5. Chest tube has been clamped for > 24 hours now with no evidence of recurrent pneumothorax.     Recommendations:   - Surgical chest tube removed at bedside today without complication.   - Recommend obtaining final plain film tomorrow.     Elliot Bruner MD   Pulmonary fellow    I, Bryson Choudhary MD, saw this patient with the resident and agree with the resident/fellow's findings and plan of care as documented in the note.       I personally reviewed vital signs, medications, labs, and imaging.     Key findings:chest x ray was reviewed. It is difficult to interpret due to underlying lung deficits but no significant pneumothorax was noted.  -Chest tube is removed. Will repeat a Chest x ray tomorrow and discharge if stable.   Pulmonary will sign off.      Bryson Choudhary MD  Date of Service (when I saw the patient): 11/08/21  "

## 2021-11-09 ENCOUNTER — APPOINTMENT (OUTPATIENT)
Dept: CT IMAGING | Facility: CLINIC | Age: 56
End: 2021-11-09
Attending: INTERNAL MEDICINE
Payer: COMMERCIAL

## 2021-11-09 ENCOUNTER — APPOINTMENT (OUTPATIENT)
Dept: GENERAL RADIOLOGY | Facility: CLINIC | Age: 56
End: 2021-11-09
Attending: STUDENT IN AN ORGANIZED HEALTH CARE EDUCATION/TRAINING PROGRAM
Payer: COMMERCIAL

## 2021-11-09 VITALS
DIASTOLIC BLOOD PRESSURE: 62 MMHG | HEIGHT: 67 IN | OXYGEN SATURATION: 96 % | TEMPERATURE: 98.8 F | RESPIRATION RATE: 18 BRPM | SYSTOLIC BLOOD PRESSURE: 117 MMHG | WEIGHT: 179.5 LBS | HEART RATE: 85 BPM | BODY MASS INDEX: 28.17 KG/M2

## 2021-11-09 LAB
ANION GAP SERPL CALCULATED.3IONS-SCNC: 8 MMOL/L (ref 3–14)
BACTERIA PLR CULT: NO GROWTH
BASOPHILS # BLD AUTO: 0 10E3/UL (ref 0–0.2)
BASOPHILS NFR BLD AUTO: 0 %
BUN SERPL-MCNC: 6 MG/DL (ref 7–30)
CALCIUM SERPL-MCNC: 8.6 MG/DL (ref 8.5–10.1)
CHLORIDE BLD-SCNC: 102 MMOL/L (ref 94–109)
CO2 SERPL-SCNC: 26 MMOL/L (ref 20–32)
CREAT SERPL-MCNC: 0.52 MG/DL (ref 0.52–1.04)
EOSINOPHIL # BLD AUTO: 0.2 10E3/UL (ref 0–0.7)
EOSINOPHIL NFR BLD AUTO: 8 %
ERYTHROCYTE [DISTWIDTH] IN BLOOD BY AUTOMATED COUNT: 23.5 % (ref 10–15)
GFR SERPL CREATININE-BSD FRML MDRD: >90 ML/MIN/1.73M2
GLUCOSE BLD-MCNC: 91 MG/DL (ref 70–99)
HCT VFR BLD AUTO: 24.5 % (ref 35–47)
HGB BLD-MCNC: 7.5 G/DL (ref 11.7–15.7)
IMM GRANULOCYTES # BLD: 0 10E3/UL
IMM GRANULOCYTES NFR BLD: 0 %
LYMPHOCYTES # BLD AUTO: 0.8 10E3/UL (ref 0.8–5.3)
LYMPHOCYTES NFR BLD AUTO: 26 %
MCH RBC QN AUTO: 33.8 PG (ref 26.5–33)
MCHC RBC AUTO-ENTMCNC: 30.6 G/DL (ref 31.5–36.5)
MCV RBC AUTO: 110 FL (ref 78–100)
MONOCYTES # BLD AUTO: 0.3 10E3/UL (ref 0–1.3)
MONOCYTES NFR BLD AUTO: 11 %
NEUTROPHILS # BLD AUTO: 1.6 10E3/UL (ref 1.6–8.3)
NEUTROPHILS NFR BLD AUTO: 55 %
NRBC # BLD AUTO: 0 10E3/UL
NRBC BLD AUTO-RTO: 0 /100
PLATELET # BLD AUTO: 155 10E3/UL (ref 150–450)
POTASSIUM BLD-SCNC: 3.8 MMOL/L (ref 3.4–5.3)
RBC # BLD AUTO: 2.22 10E6/UL (ref 3.8–5.2)
SODIUM SERPL-SCNC: 136 MMOL/L (ref 133–144)
WBC # BLD AUTO: 2.9 10E3/UL (ref 4–11)

## 2021-11-09 PROCEDURE — 250N000013 HC RX MED GY IP 250 OP 250 PS 637: Performed by: HOSPITALIST

## 2021-11-09 PROCEDURE — 250N000013 HC RX MED GY IP 250 OP 250 PS 637: Performed by: STUDENT IN AN ORGANIZED HEALTH CARE EDUCATION/TRAINING PROGRAM

## 2021-11-09 PROCEDURE — 71250 CT THORAX DX C-: CPT | Mod: 26 | Performed by: RADIOLOGY

## 2021-11-09 PROCEDURE — 90682 RIV4 VACC RECOMBINANT DNA IM: CPT | Performed by: STUDENT IN AN ORGANIZED HEALTH CARE EDUCATION/TRAINING PROGRAM

## 2021-11-09 PROCEDURE — 99233 SBSQ HOSP IP/OBS HIGH 50: CPT | Performed by: INTERNAL MEDICINE

## 2021-11-09 PROCEDURE — 250N000013 HC RX MED GY IP 250 OP 250 PS 637: Performed by: INTERNAL MEDICINE

## 2021-11-09 PROCEDURE — 80048 BASIC METABOLIC PNL TOTAL CA: CPT | Performed by: STUDENT IN AN ORGANIZED HEALTH CARE EDUCATION/TRAINING PROGRAM

## 2021-11-09 PROCEDURE — 250N000011 HC RX IP 250 OP 636: Performed by: STUDENT IN AN ORGANIZED HEALTH CARE EDUCATION/TRAINING PROGRAM

## 2021-11-09 PROCEDURE — 85025 COMPLETE CBC W/AUTO DIFF WBC: CPT | Performed by: STUDENT IN AN ORGANIZED HEALTH CARE EDUCATION/TRAINING PROGRAM

## 2021-11-09 PROCEDURE — 71250 CT THORAX DX C-: CPT

## 2021-11-09 PROCEDURE — G0008 ADMIN INFLUENZA VIRUS VAC: HCPCS | Performed by: STUDENT IN AN ORGANIZED HEALTH CARE EDUCATION/TRAINING PROGRAM

## 2021-11-09 PROCEDURE — 71045 X-RAY EXAM CHEST 1 VIEW: CPT | Mod: 26 | Performed by: RADIOLOGY

## 2021-11-09 PROCEDURE — 36415 COLL VENOUS BLD VENIPUNCTURE: CPT | Performed by: STUDENT IN AN ORGANIZED HEALTH CARE EDUCATION/TRAINING PROGRAM

## 2021-11-09 PROCEDURE — 71045 X-RAY EXAM CHEST 1 VIEW: CPT

## 2021-11-09 RX ORDER — OXYCODONE HYDROCHLORIDE 5 MG/1
2.5-5 TABLET ORAL EVERY 6 HOURS PRN
Qty: 15 TABLET | Refills: 0 | Status: SHIPPED | OUTPATIENT
Start: 2021-11-09 | End: 2021-11-12

## 2021-11-09 RX ORDER — POLYETHYLENE GLYCOL 3350 17 G/17G
17 POWDER, FOR SOLUTION ORAL DAILY
Qty: 510 G | Refills: 0 | Status: SHIPPED | OUTPATIENT
Start: 2021-11-09

## 2021-11-09 RX ADMIN — Medication 1 TABLET: at 08:12

## 2021-11-09 RX ADMIN — CETIRIZINE HYDROCHLORIDE 10 MG: 10 TABLET, FILM COATED ORAL at 08:11

## 2021-11-09 RX ADMIN — Medication 5 MG: at 08:11

## 2021-11-09 RX ADMIN — Medication 5 MG: at 04:27

## 2021-11-09 RX ADMIN — GABAPENTIN 600 MG: 300 CAPSULE ORAL at 08:12

## 2021-11-09 RX ADMIN — POLYETHYLENE GLYCOL 3350 17 G: 17 POWDER, FOR SOLUTION ORAL at 08:12

## 2021-11-09 RX ADMIN — Medication 5 MG: at 00:24

## 2021-11-09 RX ADMIN — MONTELUKAST 10 MG: 10 TABLET, FILM COATED ORAL at 08:12

## 2021-11-09 RX ADMIN — Medication 5 MG: at 14:37

## 2021-11-09 RX ADMIN — DICLOFENAC SODIUM 2 G: 10 GEL TOPICAL at 13:59

## 2021-11-09 RX ADMIN — ACETAMINOPHEN 975 MG: 325 TABLET, FILM COATED ORAL at 12:15

## 2021-11-09 RX ADMIN — INFLUENZA A VIRUS A/WISCONSIN/588/2019 (H1N1) RECOMBINANT HEMAGGLUTININ ANTIGEN, INFLUENZA A VIRUS A/TASMANIA/503/2020 (H3N2) RECOMBINANT HEMAGGLUTININ ANTIGEN, INFLUENZA B VIRUS B/WASHINGTON/02/2019 RECOMBINANT HEMAGGLUTININ ANTIGEN, AND INFLUENZA B VIRUS B/PHUKET/3073/2013 RECOMBINANT HEMAGGLUTININ ANTIGEN 0.5 ML: 45; 45; 45; 45 INJECTION INTRAMUSCULAR at 13:55

## 2021-11-09 RX ADMIN — ACETAMINOPHEN 975 MG: 325 TABLET, FILM COATED ORAL at 08:12

## 2021-11-09 RX ADMIN — FLUTICASONE PROPIONATE AND SALMETEROL 1 PUFF: 50; 100 POWDER RESPIRATORY (INHALATION) at 08:13

## 2021-11-09 RX ADMIN — DICLOFENAC SODIUM 2 G: 10 GEL TOPICAL at 10:04

## 2021-11-09 RX ADMIN — GABAPENTIN 600 MG: 300 CAPSULE ORAL at 13:54

## 2021-11-09 RX ADMIN — LEVOTHYROXINE SODIUM 50 MCG: 0.05 TABLET ORAL at 08:12

## 2021-11-09 RX ADMIN — Medication 50 MCG: at 08:12

## 2021-11-09 RX ADMIN — Medication 5 MG: at 11:15

## 2021-11-09 ASSESSMENT — ACTIVITIES OF DAILY LIVING (ADL)
ADLS_ACUITY_SCORE: 9

## 2021-11-09 NOTE — PROGRESS NOTES
CLINICAL NUTRITION SERVICES - REASSESSMENT NOTE     Nutrition Prescription    RECOMMENDATIONS FOR MDs/PROVIDERS TO ORDER:  None at this time    Malnutrition Status: per 11/2 RD note  Severe malnutrition in the context of acute on chronic illness     Recommendations already ordered by Registered Dietitian (RD):  Provided ed on coping with dry mouth/ thick saliva    Future/Additional Recommendations:  NA- pt to discharge later today     EVALUATION OF THE PROGRESS TOWARD GOALS   Diet: Regular w/ Ensure Enlive BID, and Ensure Enlive Shake once daily    Intake: Pt has been ordering a light meal/snack once a day. Examples are mashed potatoes, strawberry yogurt w/ hummus and veggies, yogurt and apple sauce. There have been two days where she has not ordered any food. Says that her  and son have brought in meals for her some days. She reports she does not like the hospital food and sites many instances where the food was too hot, too cold, or very dry. Understandably, she will now only order prepackaged foods and has been drinking the ensure shakes. She will drink most of them, but cannot have the 8pm one because she needs refrain from food two hrs prior to chemo med at 9pm. She does not have much of an appetite and says that she has some taste differences and mouth dryness from chemo treatment.     NEW FINDINGS  Weight stable/ slightly up during stay  Wt Readings from Last 10 Encounters:   11/06/21 81.4 kg (179 lb 8 oz)   11/01/21 79.4 kg (175 lb)       MALNUTRITION- per 11/2 RD Note  % Intake: </= 50% for >/= 5 days (severe)  % Weight Loss: > 7.5% in 3 months (severe)  Subcutaneous Fat Loss: Unable to assess due to brevity of visit  Muscle Loss: Unable to assess due to brevity of visit  Fluid Accumulation/Edema: None noted per chart review  Malnutrition Diagnosis: Severe malnutrition in the context of acute on chronic illness    Previous Goals   Patient to consume % of nutritionally adequate meal trays TID,  or the equivalent with supplements/snacks.  Evaluation: Not met    Previous Nutrition Diagnosis  Inadequate oral intake related to decreased appetite, thrush, dry mouth as evidenced by decreased appetite the past 3-4 months and subsequent 13% wt loss   Evaluation: No change    CURRENT NUTRITION DIAGNOSIS  Inadequate oral intake related to decreased appetite, dry mouth, and altered taste as evidenced by <50% intake >/= 5d, report of depressed appetite.     INTERVENTIONS  Implementation  Nutrition education for recommended modifications    Goals  Patient to consume % of nutritionally adequate meal trays TID, or the equivalent with supplements/snacks.    Monitoring/Evaluation  Progress toward goals will be monitored and evaluated per protocol.

## 2021-11-09 NOTE — PROGRESS NOTES
Paged by the primary team re the patient.   Pt seen at bedside. Since this AM, pt has been c/o pain, swelling at the epidural site (epidural catheter removed yesterday). She denied: fevers, chills, weakness, numbness or paresthesias of UE and LE, headaches, neck pains or stiffness, nausea, vomiting.   Endorsed discomfort in the back where epidural was placed.     Physical Exam   GEN: alert and no distress, resting supine comfortably   NEURO/MSK: strength 5/5 throughout BL UE ROM, BL LE ROM, overall symmetric. Sensation intact and symmetric   SKIN: T5-6 epidural site with a small amount of swelling. The mass is 1/2 inch in diameter and soft. It is exquisitely tender to palpation, with no radiation. No erythema or discharge is appreciated.     A/P :   This is likely a superficial hematoma. Pain service will follow the patient peripherally until discharge or resolution. If pt develops neuro sx or this worsens please contact us.     Dorian Cabrera,    Anesthesiology [facility]

## 2021-11-09 NOTE — PLAN OF CARE
Pt vss and A&O x4. Pain rated as a 6 and 5 around epidural site- tolerated well with PO oxycodone, PO Tylenol, and ice packs. Denies nausea- Scheduled compazine given prior to oral chemo med- tolerating regular diet well. Pt ambulated to bathroom with stand by assist- voiding spontaneously and is passing gas- no BM this shift. Port was de-accessed with NS flush and heparin around 1900. PIV saline locked. Chest tube site covered with gauze and transparent covering- no new drainage noted. Will continue plan of care.

## 2021-11-09 NOTE — DISCHARGE SUMMARY
[facility]  Hospitalist Discharge Summary      Date of Admission:  11/1/2021  Date of Discharge:  11/9/2021  Discharging Provider: Sonia Boyle MD  Discharge Team: Hospitalist Service, Gold 11    Discharge Diagnoses   1. Large R Hydropneumothorax s/p chest tube, s/p talc pleurodesis 11/5/21  2. Port malfunction and small RIJ clot  3. 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 Asthma  5. Hypothyroid    Follow-ups Needed After Discharge   Follow-up Appointments     Adult [facility] Follow-up and recommended labs and tests      Follow up with primary care provider, King Diaz, within 3-7 days,   for hospital follow- up. No follow up labs or test are needed.     Primary physician to monitor area of small swelling on back related to epidural at the time of follow up appointment. If swelling does not resolve or continue to improve an MRI can be considered.     Appointments on [place] and/or [facility] (with [facility] or [facility]   provider or service). Call [phone] if you haven't heard regarding   these appointments within 7 days of discharge.             Unresulted Labs Ordered in the Past 30 Days of this Admission     No orders found from 10/2/2021 to 11/2/2021.      These results will be followed up by NA    Discharge Disposition   Discharged to home  Condition at discharge: Stable      Hospital Course   55 y/o F with PMH of sarcoma diagnosed in 2019, s/p surgery and radiation to the leg, metastatic to the lung, s/p several chemo on pazopanib since 9/2021 admitted 11/1 with dyspnea for about 2 months. CT CAP showed large R sided hydropneumothorax, chest tube was placed in the ED and removed 11/18/21. S/p talc pleurodesis 11/5/21. On 11/9 CXR was stable and patient was cleared for discharge to home by pulmonary. Patient was found to have a small RIJ clot. Per IR RIJ clot associated with port does not need to be treated. Holding off anticoagulation and IR plans to replace port  as outpatient due to subcutaneous air at ;east not till end of November. She ahd pain issues 2/2 the underlying cancer and chest tube during inpt stay. She was treated briefly with T5-6 epidural analgesia with bupivaccaine on 11/5 but as this did not help and there was some leakage and was removed 11/7. Patient developed a small superficial hematoma at epidural site which improved over next 2-3 days prior to discharge. Primary physician should reassess site at time of outpatient follow up. MRI can be considered if this does not resolve or continue to improve. Use lidocaine patch and voltaren gel for pain relief and PRN oxycodone. Patient was discharged on pazopanib and the necrosis of the lung mass per oncology may actually be response to therapy. Patient will follow up with pulmonary and oncology as outpatient.    Consultations This Hospital Stay   ONCOLOGY ADULT IP CONSULT  PULMONARY GENERAL ADULT IP CONSULT  NUTRITION SERVICES ADULT IP CONSULT  INTERVENTIONAL RADIOLOGY ADULT/PEDS IP CONSULT  CARE MANAGEMENT / SOCIAL WORK IP CONSULT  REGIONAL ANESTHESIA PAIN SERVICE ADULT IP CONSULT  VASCULAR ACCESS CARE ADULT IP CONSULT    Code Status   Full Code    Time Spent on this Encounter   ISonia MD, personally saw the patient today and spent greater than 30 minutes discharging this patient.       Sonia Boyle MD  [facility] [address] [address]  Phone: [phone]  ______________________________________________________________________    Physical Exam   Vital Signs: Temp: 98.8  F (37.1  C) Temp src: Temporal BP: 117/62 Pulse: 85   Resp: 18 SpO2: 96 % O2 Device: None (Room air)    Weight: 179 lbs 8 oz  General Appearance: Well built and nourished, not in any acute cardio pulm distress  Respiratory: CTA b/l  Cardiovascular: S1S2 normal RRR  GI: soft NT  Skin: NAD  Other: aaox3 moving all 4 ext spont        Primary Care Physician   King Diaz    Discharge Orders       Reason for your hospital stay    hydropneumothorax     Activity    Your activity upon discharge: activity as tolerated     Adult [facility] Follow-up and recommended labs and tests    Follow up with primary care provider, King Diaz, within 3-7 days, for hospital follow- up. No follow up labs or test are needed.     Appointments on [place] and/or [facility] (with [facility] or [facility] provider or service). Call [phone] if you haven't heard regarding these appointments within 7 days of discharge.     Diet    Follow this diet upon discharge: Orders Placed This Encounter      Snacks/Supplements Adult: Other; Chocolate Ensure Enlive @ 10 and 2 + Chocolate Ensure Enlive shake (made w/ ice cream) @ HS; Between Meals      Regular Diet Adult       Significant Results and Procedures   Most Recent 3 CBC's:Recent Labs   Lab Test 11/16/21  1236 11/09/21  0623 11/08/21  0713   WBC 4.4 2.9* 2.9*   HGB 8.3* 7.5* 7.2*   * 110* 111*    155 157     Most Recent 3 BMP's:Recent Labs   Lab Test 11/16/21  1236 11/09/21  0623 11/08/21  0713 11/05/21  0709    136  --  133   POTASSIUM 3.9 3.8  --  3.8   CHLORIDE 103 102  --  99   CO2 27 26  --  27   BUN 11 6*  --  7   CR 0.48* 0.52 0.52 0.44*   ANIONGAP 5 8  --  7   VIKTORIA 9.4 8.6  --  8.4*   * 91  --  87     Most Recent 2 LFT's:Recent Labs   Lab Test 11/16/21  1236 11/05/21  0709   AST 16 20   ALT 19 15   ALKPHOS 114 108   BILITOTAL 0.7 0.5     Most Recent 3 INR's:Recent Labs   Lab Test 11/01/21  1624 08/27/21  1121 10/28/20  0750   INR 1.24* 1.29* 0.90   ,   Results for orders placed or performed during the hospital encounter of 11/01/21   XR Chest Port 1 View    Narrative    Exam: XR CHEST PORT 1 VIEW, 11/1/2021 7:11 PM    Indication: chest tube placement    Comparison: Earlier today    Findings:   New right basilar chest tube with significant decrease in the right  basilar pneumothorax. There is probably still an element of  pneumothorax tracking  laterally and at the right costophrenic angle.  Increased subcutaneous emphysema compared to the CT earlier today.    Rounded soft tissue mass at the right costophrenic angle. Dense  opacification of the right upper lobe. The cystic mass in the left  lung. Heart within normal limits.    Right IJ Port-A-Cath tip at the confluence of the innominate veins.      Impression    Impression:   1. Patient's known metastatic cancer, better evaluated on CT same  date.  2. New right basilar chest tube with significant decrease in the right  pneumothorax and increase in the amount of subcutaneous air along the  right lateral chest wall.    TONG SMITH MD         SYSTEM ID:  [id number]   XR Chest Port 1 View    Narrative    Exam: XR CHEST PORT 1 VIEW, 11/2/2021 6:05 AM    Indication: sob    Comparison: 11/1/2021    Findings:   Right chest wall Port-A-Cath distal tip projects over the high  superior vena cava. Right basilar chest tube in place. Extensive  subcutaneous air overlying the right chest similar to prior. Unchanged  air and fluid-filled cystic cavity in the left lower lung fields. No  worsening pneumothorax. There is likely still a small component of  right lateral pneumothorax although this is unchanged from prior.  There is unchanged right pleural fluid. No acute osseous  abnormalities.      Impression    Impression:   1. Right basilar chest tube in place. There is likely small residual  right lateral pneumothorax which is unchanged from prior.  2. Known metastatic lesions, the largest of which is a cavitary lesion  in the left lower lung fields better seen on prior CT.  3. Extensive subcutaneous air overlying the right chest similar to  prior.    I have personally reviewed the examination and initial interpretation  and I agree with the findings.    TONG SMITH MD         SYSTEM ID:  [id number]   XR Chest Port 1 View    Narrative    Exam: XR CHEST PORT 1 VIEW, 11/3/2021 9:01 AM    Indication: Chest tube in  place    Comparison: Chest x-ray 11/2/2021    Findings:     Frontal view chest x-ray. Right chest wall Port-A-Cath and right  basilar chest tube are relatively unchanged. Extensive right chest  wall predominant subcutaneous emphysema. Persistent right-sided  pneumothorax and right upper and mid zone pulmonary opacity. Unchanged  cavitary lesion in the left mid and lower zone. Relatively unchanged  partially obscured cardiomediastinal silhouette. No left pneumothorax.  Small left pleural effusion. No acute osseous abnormality. Visualized  upper abdomen is within normal limits.      Impression    Impression:   1. Persistent right-sided pneumothorax with increased conspicuity of  apical component with unchanged right basilar chest tube  2. Persistent known pulmonary lesions, including a cavitary lesion in  the left lower lung and right pulmonary opacities.  3. Persistent extensive subcutaneous emphysema overlying the right  chest.    KELLEN ROTH MD         SYSTEM ID:  [id number]   CT Chest w/o Contrast    Narrative    CT chest without contrast    INDICATION: Pneumothorax    COMPARISON: 11/1/2021    FINDINGS: No contrast. Right-sided venous catheter tip enters the  right internal jugular vein and progressive as towards its confluence  with the left innominate vein. Extensive subcutaneous emphysema in the  right anterior chest wall. Heart size normal. Loculated right  pneumothorax. Loculated soft tissue/fluid density in the left lower  lung suggesting loculated hydropneumothorax. Large right pleural  effusion with multiple air densities present concerning for empyema.  No pericardial effusion. Accessory splenules are present.  Cholecystectomy. Probable benign focal fatty infiltration near the  falciform ligament in the liver presenting as hypodensity in this  region, not significantly changed from recent previous. There are  groundglass opacities in the right lower lobe also concerning for  possible  inflammation/infection with differential of hemorrhage or  edema. No discrete uniform solid nodules.  Bones appear osteopenic with mild degenerative changes throughout the  thoracic spine. Right chest tube also present.       Impression    IMPRESSION: Apparent loculated right pneumothoraces with  hydropneumothorax in the left lower lung. Possible empyema in the  right lower lung. Extensive subcutaneous chest wall emphysema  extending up into the right side of the neck. Cholecystectomy.  Probable focal fatty infiltration in the liver.    SHIRLEY INMAN MD         SYSTEM ID:  [id number]   XR Chest Port 1 View    Narrative    Exam: XR CHEST PORT 1 VIEW, 11/4/2021 8:16 AM    Indication: Chest tube in place    Comparison: CT and radiograph 11/3/2021    Findings:   Single portable AP view of the chest. Port catheter in place with the  tip in the upper SVC. Right chest tube in place.    Trachea is again mildly deviated to the left. Redemonstration of large  necrotizing left mediastinal mass and right hydropneumothorax. Left  lower lung cavitary lesion with air-fluid level, unchanged from prior.  Hazy opacities of the right lung. Stable cardiac silhouette and  mediastinum. Stable extensive subcutaneous emphysema of the right  chest wall and neck.      Impression    Impression:   1. Large necrotizing mass in the left upper mid hemithorax, better  visualized on CT from 11/03/2021.  2. Grossly unchanged right hydropneumothorax.  3. Unchanged cavitary lesion with air-fluid level in the lower left  lung.  4. Stable appearance of diffuse subcutaneous edema    I have personally reviewed the examination and initial interpretation  and I agree with the findings.    SEVEN VANCE MD         SYSTEM ID:  [id number]   XR Chest Port 1 View    Narrative    Chest one view portable    HISTORY: Chest tube placement    COMPARISON STUDY: 11/4/2021    FINDINGS: Right IJ Port-A-Cath tip in the high SVC. Right basilar  chest tube. Cavitary  masses and nodules in the right apex and both  lower lungs again noted.    Cardiac silhouette is nonenlarged. Subcutaneous emphysema on the right  chest wall. No definite pneumothorax.      Impression    Impression: No significant change in bilateral lung masses without  significant pneumothorax.    SEVEN VANCE MD         SYSTEM ID:  [id number]   XR Chest Port 1 View    Narrative    Exam: XR CHEST PORT 1 VIEW, 11/6/2021 8:19 AM    Indication: Chest tube in place    Comparison: Yesterday    Findings:   Right IJ Port-A-Cath tip in the high superior vena cava. Right basilar  chest tube remains in place.    Cavitary masses and cavitary nodules in both lungs are unchanged.  Heart within normal limits. Subcutaneous air along the right lateral  chest wall. Increased lucency at the right apex likely a small  pneumothorax.      Impression    Impression: Small amount of lucency at the right apex the could  represent a increasing or new right apical pneumothorax. Chest x-ray  otherwise unchanged.    TONG SMITH MD         SYSTEM ID:  [id number]   XR Chest Port 1 View    Narrative    Exam: XR CHEST PORT 1 VIEW, 11/6/2021 2:46 PM    Indication: Follow-up worsening PTX    Comparison: Chest x-ray 8:19 AM and chest CT 11/3/2021    Findings:   Right chest Port-A-Cath tip is unchanged projecting over the high SVC.  Right basilar chest tube is unchanged. No appreciable pneumothorax.  Unchanged right chest wall/axillary subcutaneous emphysema. No  interval change in bilateral right greater than left cavitary masses  correlating with chest CT findings. Predominantly right basilar linear  platelike atelectasis unchanged.      Impression    Impression:   1. No appreciable pneumothorax is questioned.  2. Unchanged supporting lines and tubes.  3. Unchanged large cavitary masses, right basilar opacities and right  lateral chest wall/axillary subcutaneous emphysema.    I have personally reviewed the examination and initial  interpretation  and I agree with the findings.    SEVEN VANCE MD         SYSTEM ID:  [id number]   CT Chest w/o Contrast    Narrative    CT CHEST W/O CONTRAST 11/7/2021 9:27 AM    History: Pneumothorax    Comparison: Chest x-ray 11/6/2021 and CT chest 11/3/2021    Technique: CT of the chest was obtained without intravenous contrast.  Axial, coronal, and sagittal reconstructions were obtained and  reviewed.    Contrast: None    Findings:     Lungs: Similar appearance of right apical loculated fluid collection  containing foci of gas which is unchanged in size suspicious for  empyema. There is a right-sided chest tube which is located within the  major fissure and appears clogged. Some subcutaneous emphysema over  the right chest wall. This chest tube is not within the large  loculated fluid and gas collection at the lung apex. Additional area  of loculated fluid within the right middle lobe measuring up to 4.7 cm  which is likely a foci of necrotic metastases as seen on prior CT  chest with contrast 9/22/2021 (series 4 image 163). Within the left  lung there is a cavitary mass measuring up to 4.7 cm (series 4 image  165) and additional pleural-based lesion near the left lower lobe  measures up to 2.7 cm (series 4 image 230) with extension and soft  tissues of the chest wall.  Airways: Central tracheobronchial tree is clear.  Vessels: Main pulmonary artery and aorta are normal in caliber. Normal  three-vessel arch  Heart: Heart size is normal without pericardial effusion  Lymph nodes: prominent right paratracheal lymph node measuring up to 9  mm (series 2 image 7)  Thyroid: Within normal limits.  Esophagus: Within normal limits    Upper abdomen: Cholecystectomy. Accessory splenule's.    Bones and soft tissues: No suspicious axillary lymphadenopathy or soft  tissue mass. No suspicious osseous lesion.      Impression    Impression:   1. Interfissural placement of right-sided chest tube which appears to  be clogged.  2.  Large right apical loculated fluid and gas collection appears  unchanged.  3. Necrotic pulmonary metastases within the right middle lobe, lower  lobe necrotic pulmonary metastatic lesion, and left lower lobe pleural  space metastatic lesion are similar to prior study.  4. Mildly prominent mediastinal lymph nodes may be reactive in the  setting of acute infection.    I have personally reviewed the examination and initial interpretation  and I agree with the findings.    TONG SMITH MD         SYSTEM ID:  [id number]   XR Chest Port 1 View    Narrative    Portable chest    INDICATION: Chest tube in place    COMPARISON: CT 11/7/2021 in plain film 11/6/2021    FINDINGS: Heart size appears normal. Right IJ catheter tip images near  the distal internal jugular vein before entering into the SVC.  Cavitary lesions in the lungs persist. Right basilar opacities  persist. Right chest wall subcutaneous emphysema again noted. Right  basilar chest tube. Blunting of right costophrenic angle slightly  increased.      Impression    IMPRESSION: Continued appearance of cavitary masses. No definitive  pneumothorax. Possible new pleural effusion.    SHIRLEY INMAN MD         SYSTEM ID:  [id number]   XR Chest Port 1 View    Narrative    Exam: XR CHEST PORT 1 VIEW, 11/9/2021 9:26 AM    Indication: Chest tube removed, please eval for new PTX    Comparison: 11/8/2021    Findings:   Single portable AP view of the chest. Right Port-A-Cath with the tip  in the terminal brachiocephalic vein. Interval removal of right chest  tube.    Trachea is midline. No pneumothorax. Blunting of the right  costophrenic angle, small residual pleural effusion versus scarring.  Right mediastinal mass with air densities within it and left cavitary  lesion with air-fluid level are mostly unchanged. Stable cardiac  silhouette. Diffuse costochondral calcification. No acute osseous  abnormalities.      Impression    Impression:   1. Interval removal of right chest  tube.  2. Mostly unchanged appearance of right mediastinal mass with air  densities and left cavitary lesion with air-fluid level.    I have personally reviewed the examination and initial interpretation  and I agree with the findings.    SEVEN VANCE MD         SYSTEM ID:  [id number]   CT Chest w/o Contrast    Narrative    EXAMINATION: Chest CT  11/9/2021 1:09 PM    CLINICAL HISTORY: Pneumothorax    COMPARISON: CT chest 11/7/2021.    TECHNIQUE: CT imaging obtained through the chest without contrast.  Axial, coronal, and sagittal reconstructions and axial MIP reformatted  images are reviewed.     CONTRAST: None    FINDINGS:  Right chest wall port catheter terminates at the innominate  confluence.    Lungs: The airway is patent. Removal of right-sided chest tube. No  significant change in the appearance of the large multiloculated right  upper lobe gas and fluid collection. Grossly unchanged mixed fluid and  air throughout the posterior right pleural space. Stable low-density  mass in the right middle lobe measuring 4.8 x 3.6 cm. Grossly  unchanged cavitary lesion in the left lower lobe measuring up to 4.6  cm in size and pleural based lesion in the left lung base.     Mediastinum: The thyroid is unremarkable. Cardiac size is normal.  Normal caliber aorta and main pulmonary artery. Small pericardial  effusion. No significant coronary artery calcium. No thoracic  lymphadenopathy. Esophagus is normal in caliber.    Bones and soft tissues: Extensive subcutaneous emphysema involving the  anterior and posterior chest wall extending into the right  supraclavicular region, similar to prior CT. No suspicious osseous  lesions.    Upper Abdomen: Limited evaluation of the upper abdomen.  Cholecystectomy.      Impression    IMPRESSION:   1. Essentially unchanged appearance of the large multiloculated gas  and fluid collection in the right upper lobe.  2. Complex small right hydropneumothorax, similar to prior.  3. Grossly  unchanged appearance of the multifocal metastatic lesions  in the right middle lobe and left lower lobe.    I have personally reviewed the examination and initial interpretation  and I agree with the findings.    SEVEN VANCE MD         SYSTEM ID:  [id number]       Discharge Medications   Current Discharge Medication List      START taking these medications    Details   polyethylene glycol (MIRALAX) 17 GM/Dose powder Take 17 g by mouth daily  Qty: 510 g, Refills: 0    Associated Diagnoses: Cancer related pain         CONTINUE these medications which have CHANGED    Details   oxyCODONE (ROXICODONE) 5 MG tablet Take 0.5-1 tablets (2.5-5 mg) by mouth every 6 hours as needed for severe pain  Qty: 15 tablet, Refills: 0    Associated Diagnoses: Cancer related pain; Sarcoma of soft tissue (H)         CONTINUE these medications which have NOT CHANGED    Details   acetaminophen (TYLENOL) 325 MG tablet Take 325-650 mg by mouth every 6 hours as needed for mild pain      ADVAIR DISKUS 100-50 MCG/DOSE inhaler Inhale 1 puff into the lungs daily       albuterol (PROAIR RESPICLICK) 108 (90 Base) MCG/ACT inhaler Inhale 1-2 puffs into the lungs every 6 hours as needed       cetirizine (ZYRTEC) 10 MG tablet Take 10 mg by mouth daily      gabapentin (NEURONTIN) 300 MG capsule Take 600 mg in the AM, 600 mg mid-day, and 900 mg at night  Qty: 210 capsule, Refills: 3    Associated Diagnoses: Drug-induced polyneuropathy (H)      guaiFENesin-codeine (ROBITUSSIN AC) 100-10 MG/5ML solution Take 5-10 mLs by mouth every 4 hours as needed for cough  Qty: 473 mL, Refills: 1    Associated Diagnoses: Cough      levothyroxine (SYNTHROID/LEVOTHROID) 50 MCG tablet Take 1 tablet (50 mcg) by mouth daily  Qty: 30 tablet, Refills: 1    Associated Diagnoses: Thyroid function test abnormal; Sarcoma (H)      loperamide (IMODIUM A-D) 2 MG tablet Take 2 mg by mouth daily as needed for diarrhea      melatonin (MELATONIN) 1 MG/ML LIQD liquid Take 5 mg by mouth At  Bedtime       montelukast (SINGULAIR) 10 MG tablet Take 10 mg by mouth daily      multivitamin w/minerals (MULTI-VITAMIN) tablet Take 1 tablet by mouth daily      NASAL SPRAY SALINE NA       ondansetron (ZOFRAN) 8 MG tablet Take 1 tablet (8 mg) by mouth every 8 hours as needed for nausea  Qty: 60 tablet, Refills: 1    Associated Diagnoses: Sarcoma (H)      pazopanib (VOTRIENT) 200 MG tablet Take 4 tablets (800 mg) by mouth daily Take on an empty stomach 1 hour before or 2 hours after a meal.  Qty: 120 tablet, Refills: 0    Associated Diagnoses: Sarcoma of soft tissue (H); Malignant neoplasm metastatic to lung, unspecified laterality (H); Anemia in neoplastic disease; Sarcoma (H)      !! prochlorperazine (COMPAZINE) 10 MG tablet Take 1 tablet (10 mg) by mouth every 6 hours as needed for nausea or vomiting  Qty: 90 tablet, Refills: 3    Associated Diagnoses: Chemotherapy-induced nausea      vitamin D3 (CHOLECALCIFEROL) 2000 units (50 mcg) tablet Take 1 tablet by mouth daily      !! prochlorperazine (COMPAZINE) 10 MG tablet Take 10 mg by mouth every evening Before votrient       !! - Potential duplicate medications found. Please discuss with provider.        Allergies   Allergies   Allergen Reactions     Pentobarbital      resp arrest

## 2021-11-09 NOTE — PROGRESS NOTES
"Pulmonary follow-up     Date of service: 11/8/2021     Interval history: Feels well. Chest tube removed. Breathing well. No respiratory distress.     Exam:  /76 (BP Location: Left arm)   Pulse 98   Temp 97.8  F (36.6  C) (Temporal)   Resp 18   Ht 1.702 m (5' 7\")   Wt 81.4 kg (179 lb 8 oz)   LMP 03/19/2019   SpO2 97%   BMI 28.11 kg/m    General: no distress  HEENT: atraumatic  Pulmonary: breathing comfortably  Cardiac: regular rhythm, normal rate  Skin: warm, dry  MSK: chest tube in right chest wall, no air leak  Neurologic: alert, oriented    Assessment/Plan: 56 year-old female with sarcoma and pulmonary metastases. Since starting pazopanib, the pulmonary masses have enlarged and become necrotic--unclear if this is due to disease progression or secondary to effect of targeted therapy on the masses. Subsequently developed secondary spontaneous hydropneumothorax necessitating chest tube.     Had good pleural apposition following placement of chest tube, but due to high risk of recurrence decision made to perform talc pleurodesis via existing chest tube on 11/5.     -Her chest tube was removed. Her repeat imaging has shown stable lung marking without a recurrence of pneumothorax.       Recommendations:   - Safe to discharge home. She will be followed by pulmonary as an outpatient.     Bryson COWART MPH    "

## 2021-11-09 NOTE — PROGRESS NOTES
AVSS on RA. Pain managed w oxycodone. Chest tube site covered with gauze and transparent covering. Epidural site MARGUERITE. Up w SBA. Void spont, no bm this shift. PIV saline locked. Up w SBA. Will continue to monitor.

## 2021-11-09 NOTE — PLAN OF CARE
Patient c/o painful area at epidural site, epidural out, site tender and pencil raised area. Voltaren ointment, Tylenol and Oxycodone for site pain with relief.Old right chest tube site dressing intact with old dried drainage, o2 sats 96-97% on room air, denies SOB, Chest CT and xray done this shift. Poor appetite, drinking supplemental drinks.Voiding spont, up ad andrews. Discharge instructions reviewed with patient/  and discharged home.

## 2021-11-09 NOTE — PROGRESS NOTES
".Pain Service Progress Note  [facility]  Date: November 9, 2021      Patient Name: Richa Ashby  MRN: [id number]  Age: 56 year old  Sex: female      Assessment:  Richa Ashby is a 56 year old female with PMHx of sarcoma with pulmonary mets s/p pleurodesis  s/p chest tube placement for a large R sided hydropneumothorax on 11/5/21 and placement of a  T5-6 epidural catheter for pain control on 11/5/21.  Note that the epidural  catheter was removed on  Sunday evening of 11/7/21 as there was leakage at site and patient found it to be ineffective.       [facility]APS is continuing to follow due to patient's c/o pain at epidural insertion site on 11/8/24, 24 hours after it was removed.  On 11/8/21, [facility]APS evaluated the site and felt that there was a 1/2 inch soft swelling at epidural insertion site, felt that it was a superficial hematoma.    On morning of 11/9/21, [facility]APS (with Dr. Zapata)  followed up with patient to assess site .  Connie states that she has pain only at the \"bump\" in her back, states pain is 7/10 and sharp.  No pain with lying on the back. Connie is ambulating. Denies any numbness, tingling, fever, chills, headaches, stiff neck, nausea, or vomiting.    Dr. Zapata examined the area: there is 1/2 inch swelling at epidural insertion site, it is closed, no redness, no discharge, pain with palpation above the swelling but not at the site of  swelling.  Dr. Zapata felt that the swelling is likely a superficial bruise.  Patient indicated that epidural insertion on 11/5/21 was challenging and required more than 1 attempt.              Plan/Recommendations  1. Continue to monitor the area.   Pt has f/u with PCP outpatient in one  week and advise to continue to monitor area.  IF needed, an MRI can be considered if swelling has not improved or resolved.  2. Recommend lidocaine patch and voltaren gel to the area for pain relief.  Pt also has oxycodone to use.  3. Connie is comfortable " "with this plan.          Pain Service will sign off.    Discussed with attending anesthesiologist    Addendum:  Patient seen and examined at bedside, ambulating without difficulty in her room. Notes tenderness at site where epidural placement previously was. Patient states that she has focal tenderness, but denied any neurological deficits. As she did not find benefit from the epidural and when tested with ice, she did not have a dermatomal level, the epidural was likely not in the epidural space. I discussed with her that the likelihood of severe complication such as an epidural hematoma is very unlikely, and that this most likely is a bruise at site of placement, superficial in the subcutaneous space.  Patient expressed understanding and was comforted to know there is low risk of any complications.  Of note, upon removal of catheter, the tip was documented as intact. Therefore, the likelihood of any retained products is also quite low.  The patient will use lidocaine and voltaren as described above and can follow up outpatient if needed.    Fátima Zapata MD    Pain Medicine  Department of Anesthesiology  [facility]        Diet: Snacks/Supplements Adult: Other; Chocolate Ensure Enlive @ 10 and 2 + Chocolate Ensure Enlive shake (made w/ ice cream) @ HS; Between Meals  Regular Diet Adult    Relevant Labs:  Recent Labs   Lab Test 11/09/21  0623 11/04/21  0917 11/01/21  1624   INR  --   --  1.24*      < > 123*   BUN 6*   < > 10    < > = values in this interval not displayed.       Physical Exam:  Vitals: /76 (BP Location: Left arm)   Pulse 98   Temp 97.8  F (36.6  C) (Temporal)   Resp 18   Ht 1.702 m (5' 7\")   Wt 81.4 kg (179 lb 8 oz)   LMP 03/19/2019   SpO2 97%   BMI 28.11 kg/m      Physical Exam     Orientation:  Alert, oriented, and in no acute distress: No  Sedation: No    Ambulating independently without difficulties from bathroom to bed.    Relevant " Medications:  Current Pain Medications:  Medications related to Pain Management (From now, onward)    Start     Dose/Rate Route Frequency Ordered Stop    11/06/21 1530  polyethylene glycol (MIRALAX) Packet 17 g         17 g Oral DAILY 11/06/21 1512      11/04/21 1800  acetaminophen (TYLENOL) tablet 975 mg         975 mg Oral 3 TIMES DAILY. 11/04/21 1544      11/04/21 1600  diclofenac (VOLTAREN) 1 % topical gel 2 g         2 g Topical 4 TIMES DAILY 11/04/21 1502 11/11/21 1559    11/04/21 1530  Lidocaine (LIDOCARE) 4 % Patch 1 patch         1 patch  over 12 Hours Transdermal EVERY 24 HOURS 0800 11/04/21 1501      11/04/21 1530  lidocaine patch in PLACE          Transdermal EVERY 8 HOURS 11/04/21 1501      11/04/21 1500  oxyCODONE IR (ROXICODONE) half-tab 2.5-5 mg         2.5-5 mg Oral EVERY 3 HOURS PRN 11/04/21 1459      11/02/21 2200  gabapentin (NEURONTIN) capsule 900 mg         900 mg Oral AT BEDTIME 11/02/21 0347      11/02/21 0800  gabapentin (NEURONTIN) capsule 600 mg         600 mg Oral 2 TIMES DAILY 11/02/21 0347                    Eli Oneal PA-C  11/9/2021    Contact Info (24 hour job code pager is the last 4 digits) For in-house use only:   Regional Anesthesia Job code ID: [id number]   [facility] [place], [place] 0599  Peds 0602  [place]y phone: dial * * * [phone], enter jobcode ID, then enter call-back number.    Text: Use [employer] on the Intranet <Paging/Directory> tab and enter Jobcode ID.   If no call back at any time, contact the hospital  and ask for Regional Anesthesia attending or backup

## 2021-11-10 ENCOUNTER — PATIENT OUTREACH (OUTPATIENT)
Dept: CARE COORDINATION | Facility: CLINIC | Age: 56
End: 2021-11-10
Payer: COMMERCIAL

## 2021-11-10 ENCOUNTER — PATIENT OUTREACH (OUTPATIENT)
Dept: ONCOLOGY | Facility: CLINIC | Age: 56
End: 2021-11-10
Payer: COMMERCIAL

## 2021-11-10 DIAGNOSIS — Z71.89 OTHER SPECIFIED COUNSELING: ICD-10-CM

## 2021-11-10 NOTE — PROGRESS NOTES
Clinic Care Coordination Contact  [facility]/Voicemail       Clinical Data: Care Coordinator Outreach  Reason for referral: TCM outreach  Outreach attempted x 1.  Left message on patient's voicemail with call back information and requested return call.  Plan:  Care Coordinator will try to reach patient again in 1-2 business days.       Krystal Perkins  Community Health Worker  [facility]  Ph: [phone]

## 2021-11-11 NOTE — PROGRESS NOTES
Clinic Care Coordination Contact  [facility]/Voicemail       Clinical Data: Care Coordinator Outreach  Outreach attempted x 2.  Left message on patient's voicemail with call back information and requested return call.  Plan:  Care Coordinator will do no further outreaches at this time.    Cathryn GERBER Community Health Worker  Clinic Care Coordination  [facility]  Phone: [phone]

## 2021-11-12 ENCOUNTER — VIRTUAL VISIT (OUTPATIENT)
Dept: ONCOLOGY | Facility: CLINIC | Age: 56
End: 2021-11-12
Attending: PHYSICIAN ASSISTANT
Payer: COMMERCIAL

## 2021-11-12 DIAGNOSIS — C49.9 SARCOMA OF SOFT TISSUE (H): ICD-10-CM

## 2021-11-12 DIAGNOSIS — J93.83 OTHER PNEUMOTHORAX: Primary | ICD-10-CM

## 2021-11-12 DIAGNOSIS — G89.3 CANCER RELATED PAIN: ICD-10-CM

## 2021-11-12 PROCEDURE — 99215 OFFICE O/P EST HI 40 MIN: CPT | Mod: 95 | Performed by: PHYSICIAN ASSISTANT

## 2021-11-12 RX ORDER — OXYCODONE HYDROCHLORIDE 5 MG/1
2.5-7.5 TABLET ORAL EVERY 6 HOURS PRN
Qty: 60 TABLET | Refills: 0 | Status: SHIPPED | OUTPATIENT
Start: 2021-11-12 | End: 2021-11-24

## 2021-11-12 NOTE — Clinical Note
"    11/12/2021         RE: Richa Ashby  [address]  [place] [address]        Dear Colleague,    Thank you for referring your patient, Richa Ashby, to the [facility]. Please see a copy of my visit note below.    Connie is a 56 year old who is being evaluated via a billable video visit.      How would you like to obtain your AVS? MyChart  If the video visit is dropped, the invitation should be resent by: Text to cell phone: [phone]  Will anyone else be joining your video visit? Yes: Spouse - Elliot. How would they like to receive their invitation? Other e-mail: N/A  {If patient encounters technical issues they should call [phone] :[phone]}    Video Start Time: {video visit start/end time for provider to select:972153}  Video-Visit Details    Type of service:  Video Visit    Video End Time:{video visit start/end time for provider to select:512378}    Originating Location (pt. 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 She received preoperative radiotherapy and the tumor was resected 3/12/20. She then developed a cough, chest tightness, and scapular pain and imaging revealed lung nodules. Lung biopsy 11/2/20 with sarcoma. She was started on Doxil + Ifos 11/13/20. Had delay in cycle 2 due to COVID dx 12/2/20. CT imaging after cycle 1 with positive response to treatment with decrease in pulmonary nodules. Received cycle 2 " 12/18/20. Received cycle 3 1/15/21 with dose reduction in Doxil for skin toxicity and mucositis. Received cycle 4 2/15/21.      CT CAP 3/8/21 with positive response to treatment, decreased size of pulmonary mets though one lesion was larger. Did see fibrocystic changes in left breast, recommended breast imaging. Received cycle 5 Doxil + ifos 3/11/21.      Imaging 4/5/21 with overall stable disease except one lung lesion, opted to continue Doxil alone for now and switch to Keytruda in future if ongoing progression.      Started Keytruda 5/10/21. Was seen 5/26/21 cough, SOB, fatigue work-up thought to be 2/2 phrenic nerve involvement of R apical lung mass.      Due to worsening fatigue and cough, repeat CT 6/15/21 concerning for progression vs pseudoprogression. Added Gemzar 6/16/21.     She was admitted 6/17/21-6/20/21 with fever of unknown origin, infectious work-up negative, treated with broad spectrum antibiotics.     She clinically improved on abx. Was able to receive day 8 Gemzar (dose reduced for thrombocytopenia) + Keytruda 6/23/21.      CT 7/12/21 with stable disease.     She was admitted 8/25/21-8/31/21 for fevers, hypotension, acute on chronic anemia. Extensive infectious work-up negative so etiology of fevers still unknown.      She has had progressive disease so started on Pazopanib 9/24/21.     She was admitted 10/6/21-10/8/21 for weakness, new hallucinations in setting of UTI. Brain MRI without brain mets though did have Diffuse loss normal fatty marrow on T1 sequence, nonspecific. Symptoms improved with abx and IVF.     She has continued on Pazopanib. CT 11/1/21 for restaging noted majority of masses with fluid and gas consistent with necrosis and the largest cyst likely ruptured causing a right hydropneumothorax with near complete collapse of the R lung. One solid left pleural based lesion was slightly increased. Overall felt this was a positive response to Pazopanib so continued.    She was admitted  11/1/21 for urgent chest tube placement for the hydropneumothorax noted on CT. Due to high risk of recurrence she underwent talc pleurodesis 11/5/21. Port was incidentally found to have non-occlusive thrombus and will be replaced later this month. *    Interval History:      Review of Systems:  Patient denies fevers, chills, night sweats, unexplained weight changes, headaches, dizziness, vision or hearing changes, new lumps or bumps, chest pain, shortness of breath, cough, abdominal pain, nausea, vomiting, changes to bowel or bladder, swelling of extremities, bleeding issues, or rash.    No current outpatient medications on file.       Past Medical History  Past Medical History:   Diagnosis Date     Asthma, chronic      Sarcoma (H)     Right lower thigh     Past Surgical History:   Procedure Laterality Date     AS RAD RESEC TONSIL/PILLARS       GALLBLADDER SURGERY       INJECT EPIDURAL THORACIC N/A 11/5/2021    Procedure: INJECTION, SPINE, THORACIC, EPIDURAL;  Surgeon: GENERIC ANESTHESIA PROVIDER;  Location: [facility] OR     INSERT PORT VASCULAR ACCESS Right 10/28/2020    Procedure: single lumen power port placement @0745;  Surgeon: Tex Ackerman MD;  Location: [facility] OR     IR CHEST PORT PLACEMENT > 5 YRS OF AGE  10/28/2020     IR PORT CHECK RIGHT  3/26/2021     IR PORT CHECK RIGHT  10/21/2021     RESECT TUMOR LOWER EXTREMITY Right 3/12/2020    Procedure: Resection of right thigh tumor;  Surgeon: Guillermo Johnson MD;  Location: [facility] OR     ROTATOR CUFF REPAIR RT/LT  2012     SHOULDER SURGERY       SINUS SURGERY       THORACENTESIS N/A 11/5/2021    Procedure: Pleurodesis;  Surgeon: Elana Ngo MD;  Location:  GI     Allergies   Allergen Reactions     Pentobarbital      resp arrest     Social History   Social History     Tobacco Use     Smoking status: Never Smoker     Smokeless tobacco: Never Used   Substance Use Topics     Alcohol use: Yes     Comment: 2-3 glasses of wine/week     Drug use: Never       Past medical history and social history were reviewed.    Physical Examination:  LMP 03/19/2019   Wt Readings from Last 10 Encounters:   11/06/21 81.4 kg (179 lb 8 oz)   10/07/21 87.7 kg (193 lb 4.8 oz)   10/06/21 87.2 kg (192 lb 3.2 oz)   10/03/21 87.1 kg (192 lb)   09/22/21 89.3 kg (196 lb 12.8 oz)   09/08/21 90.5 kg (199 lb 8 oz)   08/30/21 90.7 kg (199 lb 15.3 oz)   08/25/21 90.8 kg (200 lb 1.6 oz)   08/11/21 93.4 kg (206 lb)   08/09/21 91.8 kg (202 lb 4.8 oz)     Constitutional: Well-appearing female in no acute distress.  Eyes: EOMI, PERRL. No scleral icterus.  ENT: Oral mucosa is moist without lesions or thrush.   Lymphatic: Neck is supple without cervical or supraclavicular lymphadenopathy. No axillary lymphadenopathy.  Breast: No palpable abnormalities in either breast. Nipples everted without drainage. No erythema or pitting.    Cardiovascular: Regular rate and rhythm. No murmurs, gallops, or rubs. No peripheral edema.  Respiratory: Clear to auscultation bilaterally. No wheezes or crackles.  Gastrointestinal: Bowel sounds present. Abdomen soft, non-tender. No palpable hepatosplenomegaly or masses.   Neurologic: Cranial nerves II through XII are grossly intact.  Skin: No rashes, petechiae, or bruising noted on exposed skin.    Laboratory Data:        Assessment and Plan:          Harshal Schuster PA-C  Department of Hematology and Oncology  [facility]         Again, thank you for allowing me to participate in the care of your patient.        Sincerely,        RUPERT Guaman

## 2021-11-12 NOTE — Clinical Note
"    11/12/2021         RE: Richa Ashby  [address]  [address]  [place] [address]        Dear Colleague,    Thank you for referring your patient, Richa Ashby, to the [facility]. Please see a copy of my visit note below.    Connie is a 56 year old who is being evaluated via a billable video visit.      How would you like to obtain your AVS? MyChart  If the video visit is dropped, the invitation should be resent by: Text to cell phone: [phone]  Will anyone else be joining your video visit? Yes: Spouse - Elliot. How would they like to receive their invitation? Other e-mail: N/A  {If patient encounters technical issues they should call [phone] :[phone]}    Video Start Time: {video visit start/end time for provider to select:702995}  Video-Visit Details    Type of service:  Video Visit    Video End Time:{video visit start/end time for provider to select:504992}    Originating Location (pt. Location): {video visit patient location:342943::\"Home\"}    Distant Location (provider location):  [facility]     Platform used for Video Visit: {Virtual Visit Platforms:727053::\"[employer]\"}    Oncology/Hematology Visit Note  Nov 12, 2021    Reason for Visit: Follow up of undifferentiated pleomorphic sarcoma     History of Present Illness: Richa Ashby is a 56 year old female with PMH asthma and GERD with undifferentiated pleomorphic sarcoma. 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 She received preoperative radiotherapy and the tumor was resected 3/12/20. She then developed a cough, chest tightness, and scapular pain and imaging revealed lung nodules. Lung biopsy 11/2/20 with sarcoma. She was started on Doxil + Ifos 11/13/20. Had delay in cycle 2 due to COVID dx 12/2/20. CT imaging after cycle 1 with positive response to treatment with decrease in pulmonary nodules. Received cycle 2 " 12/18/20. Received cycle 3 1/15/21 with dose reduction in Doxil for skin toxicity and mucositis. Received cycle 4 2/15/21.      CT CAP 3/8/21 with positive response to treatment, decreased size of pulmonary mets though one lesion was larger. Did see fibrocystic changes in left breast, recommended breast imaging. Received cycle 5 Doxil + ifos 3/11/21.      Imaging 4/5/21 with overall stable disease except one lung lesion, opted to continue Doxil alone for now and switch to Keytruda in future if ongoing progression.      Started Keytruda 5/10/21. Was seen 5/26/21 cough, SOB, fatigue work-up thought to be 2/2 phrenic nerve involvement of R apical lung mass.      Due to worsening fatigue and cough, repeat CT 6/15/21 concerning for progression vs pseudoprogression. Added Gemzar 6/16/21.     She was admitted 6/17/21-6/20/21 with fever of unknown origin, infectious work-up negative, treated with broad spectrum antibiotics.     She clinically improved on abx. Was able to receive day 8 Gemzar (dose reduced for thrombocytopenia) + Keytruda 6/23/21.      CT 7/12/21 with stable disease.     She was admitted 8/25/21-8/31/21 for fevers, hypotension, acute on chronic anemia. Extensive infectious work-up negative so etiology of fevers still unknown.      She has had progressive disease so started on Pazopanib 9/24/21.     She was admitted 10/6/21-10/8/21 for weakness, new hallucinations in setting of UTI. Brain MRI without brain mets though did have Diffuse loss normal fatty marrow on T1 sequence, nonspecific. Symptoms improved with abx and IVF.     She has continued on Pazopanib. CT 11/1/21 for restaging noted majority of masses with fluid and gas consistent with necrosis and the largest cyst likely ruptured causing a right hydropneumothorax with near complete collapse of the R lung. One solid left pleural based lesion was slightly increased. Overall felt this was a positive response to Pazopanib so continued.    She was admitted  11/1/21 for urgent chest tube placement for the hydropneumothorax noted on CT. Due to high risk of recurrence she underwent talc pleurodesis 11/5/21. Port was incidentally found to have non-occlusive thrombus and will be replaced later this month. *    Interval History:      Review of Systems:  Patient denies fevers, chills, night sweats, unexplained weight changes, headaches, dizziness, vision or hearing changes, new lumps or bumps, chest pain, shortness of breath, cough, abdominal pain, nausea, vomiting, changes to bowel or bladder, swelling of extremities, bleeding issues, or rash.    No current outpatient medications on file.       Past Medical History  Past Medical History:   Diagnosis Date     Asthma, chronic      Sarcoma (H)     Right lower thigh     Past Surgical History:   Procedure Laterality Date     AS RAD RESEC TONSIL/PILLARS       GALLBLADDER SURGERY       INJECT EPIDURAL THORACIC N/A 11/5/2021    Procedure: INJECTION, SPINE, THORACIC, EPIDURAL;  Surgeon: GENERIC ANESTHESIA PROVIDER;  Location: [facility] OR     INSERT PORT VASCULAR ACCESS Right 10/28/2020    Procedure: single lumen power port placement @0745;  Surgeon: Tex Ackerman MD;  Location: [facility] OR     IR CHEST PORT PLACEMENT > 5 YRS OF AGE  10/28/2020     IR PORT CHECK RIGHT  3/26/2021     IR PORT CHECK RIGHT  10/21/2021     RESECT TUMOR LOWER EXTREMITY Right 3/12/2020    Procedure: Resection of right thigh tumor;  Surgeon: Guillermo Johnson MD;  Location: [facility] OR     ROTATOR CUFF REPAIR RT/LT  2012     SHOULDER SURGERY       SINUS SURGERY       THORACENTESIS N/A 11/5/2021    Procedure: Pleurodesis;  Surgeon: Elana Ngo MD;  Location:  GI     Allergies   Allergen Reactions     Pentobarbital      resp arrest     Social History   Social History     Tobacco Use     Smoking status: Never Smoker     Smokeless tobacco: Never Used   Substance Use Topics     Alcohol use: Yes     Comment: 2-3 glasses of wine/week     Drug use: Never       Past medical history and social history were reviewed.    Physical Examination:  LMP 03/19/2019   Wt Readings from Last 10 Encounters:   11/06/21 81.4 kg (179 lb 8 oz)   10/07/21 87.7 kg (193 lb 4.8 oz)   10/06/21 87.2 kg (192 lb 3.2 oz)   10/03/21 87.1 kg (192 lb)   09/22/21 89.3 kg (196 lb 12.8 oz)   09/08/21 90.5 kg (199 lb 8 oz)   08/30/21 90.7 kg (199 lb 15.3 oz)   08/25/21 90.8 kg (200 lb 1.6 oz)   08/11/21 93.4 kg (206 lb)   08/09/21 91.8 kg (202 lb 4.8 oz)     Constitutional: Well-appearing female in no acute distress.  Eyes: EOMI, PERRL. No scleral icterus.  ENT: Oral mucosa is moist without lesions or thrush.   Lymphatic: Neck is supple without cervical or supraclavicular lymphadenopathy. No axillary lymphadenopathy.  Breast: No palpable abnormalities in either breast. Nipples everted without drainage. No erythema or pitting.    Cardiovascular: Regular rate and rhythm. No murmurs, gallops, or rubs. No peripheral edema.  Respiratory: Clear to auscultation bilaterally. No wheezes or crackles.  Gastrointestinal: Bowel sounds present. Abdomen soft, non-tender. No palpable hepatosplenomegaly or masses.   Neurologic: Cranial nerves II through XII are grossly intact.  Skin: No rashes, petechiae, or bruising noted on exposed skin.    Laboratory Data:        Assessment and Plan:          Harshal Schuster PA-C  Department of Hematology and Oncology  [facility]         Again, thank you for allowing me to participate in the care of your patient.        Sincerely,        RUPERT Guaman

## 2021-11-15 ENCOUNTER — VIRTUAL VISIT (OUTPATIENT)
Dept: ONCOLOGY | Facility: CLINIC | Age: 56
End: 2021-11-15
Attending: PHYSICIAN ASSISTANT
Payer: COMMERCIAL

## 2021-11-15 DIAGNOSIS — C49.9 SARCOMA (H): ICD-10-CM

## 2021-11-15 DIAGNOSIS — F43.21 ADJUSTMENT DISORDER WITH DEPRESSED MOOD: Primary | ICD-10-CM

## 2021-11-15 PROCEDURE — 98968 PH1 ASSMT&MGMT NQHP 21-30: CPT | Mod: 95 | Performed by: PSYCHOLOGIST

## 2021-11-15 NOTE — PROGRESS NOTES
Connie is a 56 year old who is being evaluated via a billable video visit.      How would you like to obtain your AVS? MyChart  If the video visit is dropped, the invitation should be resent by: Text to cell phone: [phone]  Will anyone else be joining your video visit? No       Patient verified meds and allergies are correct via patients echeck in.    Shannan Holly, Virtual Facilitator/LPN    Video Start Time: 2:35pm    Video-Visit Details    Type of service:  Video Visit    Video End Time: 3:04pm    Originating Location (pt. Location): Home    Distant Location (provider location):  [facility]     Platform used for Video Visit: [employer]      Oncology/Hematology Visit Note  Nov 17, 2021    Reason for Visit: Follow up of undifferentiated pleomorphic sarcoma     History of Present Illness: Richa Ashby is a 56 year old female with PMH asthma and GERD with undifferentiated pleomorphic sarcoma. 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 She received preoperative radiotherapy and the tumor was resected 3/12/20. She then developed a cough, chest tightness, and scapular pain and imaging revealed lung nodules. Lung biopsy 11/2/20 with sarcoma. She was started on Doxil + Ifos 11/13/20. Had delay in cycle 2 due to COVID dx 12/2/20. CT imaging after cycle 1 with positive response to treatment with decrease in pulmonary nodules. Received cycle 2 12/18/20. Received cycle 3 1/15/21 with dose reduction in Doxil for skin toxicity and mucositis. Received cycle 4 2/15/21.      CT CAP 3/8/21 with positive response to treatment, decreased size of pulmonary mets though one lesion was larger. Did see fibrocystic changes in left breast, recommended breast imaging. Received cycle 5 Doxil + ifos 3/11/21.      Imaging 4/5/21 with overall stable disease except one lung lesion, opted to continue Doxil alone for now and switch to Keytruda  in future if ongoing progression.      Started Keytruda 5/10/21. Was seen 5/26/21 cough, SOB, fatigue work-up thought to be 2/2 phrenic nerve involvement of R apical lung mass.      Due to worsening fatigue and cough, repeat CT 6/15/21 concerning for progression vs pseudoprogression. Added Gemzar 6/16/21.     She was admitted 6/17/21-6/20/21 with fever of unknown origin, infectious work-up negative, treated with broad spectrum antibiotics.     She clinically improved on abx. Was able to receive day 8 Gemzar (dose reduced for thrombocytopenia) + Keytruda 6/23/21.      CT 7/12/21 with stable disease.     She was admitted 8/25/21-8/31/21 for fevers, hypotension, acute on chronic anemia. Extensive infectious work-up negative so etiology of fevers still unknown.      She has had progressive disease so started on Pazopanib 9/24/21.     She was admitted 10/6/21-10/8/21 for weakness, new hallucinations in setting of UTI. Brain MRI without brain mets though did have Diffuse loss normal fatty marrow on T1 sequence, nonspecific. Symptoms improved with abx and IVF.      She has continued on Pazopanib. CT 11/1/21 for restaging noted majority of masses with fluid and gas consistent with necrosis and the largest cyst likely ruptured causing a right hydropneumothorax with near complete collapse of the R lung. One solid left pleural based lesion was slightly increased. Overall felt this was a positive response to Pazopanib so continued.     She was admitted 11/1/21 for urgent chest tube placement for the hydropneumothorax noted on CT. Due to high risk of recurrence she underwent talc pleurodesis 11/5/21. Port was incidentally found to have non-occlusive thrombus and will be replaced later this month pending resolution of subcutaneous air.      Interval History:  Connie overall feels about the same as last week. Pain in her R chest tube site continues to bother her. She is doing Oxycodone at night but still wakes up in pain. Thinks  she might also need it during the day. No CNS effects. Epidural site pain is improved. Has new discomfort in L hip flexor, thinks its from sleeping on that side.     She has been coughing more the last few days, dry, no blood. Breathing is unchanged and no chest pain. No fevers.     Continues to have nausea and occasional vomiting, worse when she is coughing. Appetite is poor though she is trying different foods and protein shakes. Still has dry mouth.     Current Outpatient Medications   Medication Sig Dispense Refill     acetaminophen (TYLENOL) 325 MG tablet Take 325-650 mg by mouth every 6 hours as needed for mild pain       ADVAIR DISKUS 100-50 MCG/DOSE inhaler Inhale 1 puff into the lungs daily        albuterol (PROAIR RESPICLICK) 108 (90 Base) MCG/ACT inhaler Inhale 1-2 puffs into the lungs every 6 hours as needed        cetirizine (ZYRTEC) 10 MG tablet Take 10 mg by mouth daily       diclofenac (VOLTAREN) 1 % topical gel Apply 4 g topically 4 times daily 350 g 3     gabapentin (NEURONTIN) 300 MG capsule Take 600 mg in the AM, 600 mg mid-day, and 900 mg at night 210 capsule 3     guaiFENesin-codeine (ROBITUSSIN AC) 100-10 MG/5ML solution Take 5-10 mLs by mouth every 4 hours as needed for cough 473 mL 1     levothyroxine (SYNTHROID/LEVOTHROID) 50 MCG tablet Take 1 tablet (50 mcg) by mouth daily 30 tablet 1     loperamide (IMODIUM A-D) 2 MG tablet Take 2 mg by mouth daily as needed for diarrhea       melatonin (MELATONIN) 1 MG/ML LIQD liquid Take 5 mg by mouth At Bedtime        montelukast (SINGULAIR) 10 MG tablet Take 10 mg by mouth daily       multivitamin w/minerals (MULTI-VITAMIN) tablet Take 1 tablet by mouth daily       NASAL SPRAY SALINE NA        ondansetron (ZOFRAN) 8 MG tablet Take 1 tablet (8 mg) by mouth every 8 hours as needed for nausea 60 tablet 1     oxyCODONE (ROXICODONE) 5 MG tablet Take 0.5-1.5 tablets (2.5-7.5 mg) by mouth every 6 hours as needed for severe pain 60 tablet 0     pazopanib  (VOTRIENT) 200 MG tablet Take 4 tablets (800 mg) by mouth daily Take on an empty stomach 1 hour before or 2 hours after a meal. 120 tablet 0     polyethylene glycol (MIRALAX) 17 GM/Dose powder Take 17 g by mouth daily 510 g 0     prochlorperazine (COMPAZINE) 10 MG tablet Take 10 mg by mouth every evening Before votrient       prochlorperazine (COMPAZINE) 10 MG tablet Take 1 tablet (10 mg) by mouth every 6 hours as needed for nausea or vomiting 90 tablet 3     vitamin D3 (CHOLECALCIFEROL) 2000 units (50 mcg) tablet Take 1 tablet by mouth daily         Past Medical History  Past Medical History:   Diagnosis Date     Asthma, chronic      Sarcoma (H)     Right lower thigh     Past Surgical History:   Procedure Laterality Date     AS RAD RESEC TONSIL/PILLARS       GALLBLADDER SURGERY       INJECT EPIDURAL THORACIC N/A 11/5/2021    Procedure: INJECTION, SPINE, THORACIC, EPIDURAL;  Surgeon: GENERIC ANESTHESIA PROVIDER;  Location: [facility] OR     INSERT PORT VASCULAR ACCESS Right 10/28/2020    Procedure: single lumen power port placement @0745;  Surgeon: Tex Ackerman MD;  Location: [facility] OR     IR CHEST PORT PLACEMENT > 5 YRS OF AGE  10/28/2020     IR PORT CHECK RIGHT  3/26/2021     IR PORT CHECK RIGHT  10/21/2021     RESECT TUMOR LOWER EXTREMITY Right 3/12/2020    Procedure: Resection of right thigh tumor;  Surgeon: Guillermo Johnson MD;  Location: [facility] OR     ROTATOR CUFF REPAIR RT/LT  2012     SHOULDER SURGERY       SINUS SURGERY       THORACENTESIS N/A 11/5/2021    Procedure: Pleurodesis;  Surgeon: Elana Ngo MD;  Location:  [facility]     Allergies   Allergen Reactions     Pentobarbital      resp arrest     Social History   Social History     Tobacco Use     Smoking status: Never Smoker     Smokeless tobacco: Never Used   Substance Use Topics     Alcohol use: Yes     Comment: 2-3 glasses of wine/week     Drug use: Never      Past medical history and social history were reviewed.    Physical Examination:  LMP  03/19/2019   Wt Readings from Last 10 Encounters:   11/06/21 81.4 kg (179 lb 8 oz)   10/07/21 87.7 kg (193 lb 4.8 oz)   10/06/21 87.2 kg (192 lb 3.2 oz)   10/03/21 87.1 kg (192 lb)   09/22/21 89.3 kg (196 lb 12.8 oz)   09/08/21 90.5 kg (199 lb 8 oz)   08/30/21 90.7 kg (199 lb 15.3 oz)   08/25/21 90.8 kg (200 lb 1.6 oz)   08/11/21 93.4 kg (206 lb)   08/09/21 91.8 kg (202 lb 4.8 oz)     Video physical exam  General: Patient appears well in no acute distress.   Skin: No visualized rash or lesions on visualized skin  Eyes: EOMI, no erythema, sclera icterus or discharge noted  Resp: Appears to be breathing comfortably without accessory muscle usage, speaking in full sentences, no cough  MSK: Appears to have normal range of motion based on visualized movements  Neurologic: No apparent tremors, facial movements symmetric  Psych: affect normal, alert and oriented    The rest of a comprehensive physical examination is deferred due to PHE (public health emergency) video restrictions    Laboratory Data:  Results for STEVEAMADEO (MRN [id number]) as of 11/17/2021 15:41   11/16/2021 12:36   Sodium 135   Potassium 3.9   Chloride 103   Carbon Dioxide 27   Urea Nitrogen 11   Creatinine 0.48 (L)   GFR Estimate >90   Calcium 9.4   Anion Gap 5   Magnesium 1.8   Phosphorus 3.1   Albumin 2.3 (L)   Protein Total 8.0   Bilirubin Total 0.7   Alkaline Phosphatase 114   ALT 19   AST 16   T4 Free 1.07   TSH 8.32 (H)   Glucose 108 (H)   WBC 4.4   Hemoglobin 8.3 (L)   Hematocrit 26.6 (L)   Platelet Count 202   RBC Count 2.39 (L)    (H)   MCH 34.7 (H)   MCHC 31.2 (L)   RDW 21.6 (H)   % Neutrophils 70   % Lymphocytes 19   % Monocytes 8   % Eosinophils 3   % Basophils 0   Absolute Basophils 0.0   Absolute Neutrophil 3.1   Absolute Lymphocytes 0.8   Absolute Monocytes 0.4   Absolute Eosinophils 0.1     CXR 11/16/21  FINDINGS:   PA and lateral radiograph suggests. Right chest wall port catheter  terminates in the superior SVC. Trachea is  midline. Cardiac silhouette  is within normal limits. Grossly unchanged multiloculated gas and  fluid collection in the right upper lobe. Stable appearance of the  bilateral soft tissue lesions in the right middle lobe and left lower  lobe. Increased pneumatic component of the right basilar  hydropneumothorax. No left pleural effusion or pneumothorax. Decreased  subcutaneous emphysema overlying the right chest wall.                                                                       IMPRESSION:   1. Increased pneumatic component of the right basilar  hydropneumothorax compared to chest x-ray on 11/9/2021.  2. No significant change in the multiloculated gas and fluid  collection in the right upper lobe.  3. Stable metastatic soft tissue lesions in the right middle lobe and  left lower lobe.     I have personally reviewed the examination and initial interpretation  and I agree with the findings.     HAZEL FOURNIER MD     Assessment and Plan:  1. Onc  Metastatic UPS, was on Doxil + Ifosfamide (5 cycles) with positive response to treatment initially, then due to poor tolerance switched to Doxil alone (1 cycle) but had progression. Started on Keytruda 5/10/21. Due to tumor growth (progression vs pseudoprogression) added Gemzar 6/16/21. Had ongoing progression. Started on Pazopanib 9/24/21.     CT 11/1/21 with significant tumor necrosis/rupture. Given overall clinical improvement and findings of necrosis plan to continue Pazopanib with close follow-up.     Tolerating well, labs reviewed and all stable to improved.      Repeat CT next week with ongoing lung pain and return of cough. Discussed with Dr. Lopez. I will see her in person to review results.      2. Pulm  R sided hydropneumothorax: Found on 11/1/21 CT scan, thought 2/2 ruptured tumor. Was admitted 11/1 and underwent chest tube placement. Pulm performed talc pleuradesis on 11/5/21 due to risk of recurrence. CXR 11/15/21 without recurrent lung collapse but does  have more air (compared to prior fluid) in lung base. Having pain and cough. Will get CT next week to further evaluate. She will call if breathing worsens before then.      Subcutaneous chest wall air: After chest tube placement, improving on CXR. No port replacement until this is resolved.      Asthma: stable, continue inhalers.      COVID: Diagnosed in December, recovered. She is vaccinated. OK to get booster.     Cough: 2/2 malignancy, worse. Continue Robitussin-AC PRN, getting CT as above.      3. ID  Fevers of unknown origin, thought to be tumor fevers, previously required Indomethacin, resolved.      Possible empyema inpatient, cultures were negative.      4. Vascular  Port malfunction with small RIJ clot: Per IR, RIJ clot associated with port does not need to be treated. IR plans on replacing port outpatient, due to  Subcutaneous air, at least not till end of November.     5. Pain  Current pain includes R chest wall 2/2 chest tube placement and back 2/2 epidural attempt in hospital. Epidural site improved. Chest wall persists. Increase to Oxycodone 2.5mg PRN during the day and 10mg PRN at night. Continue Tylenol.      Will talk to PT about hip discomfort.      6. Heme  Anemia: ACD and chemotherapy. Improving. Continue monitoring with labs in 2 weeks.       7. Cards  Exercise fatigue and NAZARIO: Echo was stable. Possibly was related to pneumothorax, though it is unclear when this happened. Overall symptoms improved.      Hypotension: Much improved as is tachycardia. Continue pushing home fluids and monitoring.       8. 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 Continue Gabapentin 600/600/900.     Deconditioning: Doing PT     Hallucinations: Brain MRI reviewed and no mets. Does have loss of normal fatty marrow with broad differential. Discussed with neuro onc and unclear findings. At this time symptoms resolved so suspect it was combination of Oxycodone and UTI. LIMIT narcotics due to CNS  effects.      9. GI  Diarrhea/constipation: Fluctuating, has imodium and miralax PRN     Continue Compazine PRN nausea. Discussed adding antiemetics but she will wait for now.      Continue salt/soda and biotene for mouth irritation.     Continue Tums PRN GERD, take away from Votrient.      10. Psych  Ongoing anxiety.Seeing Titus . Not discussed in detail today.     11. 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 Recheck TSH yesterday improved.    40 minutes spent on the date of the encounter doing chart review, review of test results, interpretation of tests, patient visit and documentation     Harshal Schuster PA-C  Department of Hematology and Oncology  [facility]

## 2021-11-15 NOTE — PROGRESS NOTES
"Richa Ashby is a 56 year old female who is being evaluated via a billable telephone visit. Phone call duration: 30 minutes      The patient has been notified of following:      \"This telephone visit will be conducted via a call between you and your physician/provider. We have found that certain health care needs can be provided without the need for a physical exam.  This service lets us provide the care you need with a short phone conversation.  If a prescription is necessary we can send it directly to your pharmacy.  If lab work is needed we can place an order for that and you can then stop by our lab to have the test done at a later time.     Telephone visits are billed at different rates depending on your insurance coverage. During this emergency period, for some insurers they may be billed the same as an in-person visit.  Please reach out to your insurance provider with any questions.     If during the course of the call the physician/provider feels a telephone visit is not appropriate, you will not be charged for this service.\"     Patient has given verbal consent for Telephone visit? Yes    Confidential Summary of Oncology Psychology Initial Visit    Richa Ashby is a 56 year old female who was referred by ISAURA Sawyer for depressed mood secondary to her illness. The patient was seen for an initial 30 minute evaluation on 11/15/2021.    Presenting Concerns: sadness, grief, depressed mood, anxiousness, cognitive slowing, fatigue, cancer ruminations, and uncertainty.     Mental Status/Interview:   Orientation: Richa Ashby was alert, attentive, and oriented to time, place, and person  Affect: Affect was appropriate to the situation and showed normal range and stability.  Speech: Speech was clear with normal fluency, rate, tone, and volume.  Memory: Although not formally assessed, immediate, recent, and remote memory appeared to be intact.   Insight and Judgement: Richa Ashby " demonstrates adequate awareness of the issues discussed and was able to come to reasonable conclusions.  Thought: Thought patterns were coherent and logical. Hallucinations were denied and delusions were not evident.   Lethality: Richa Ashby denied suicidal or homicidal ideation or intention.        Impression: We explored the use of distraction and avoidance as tools to help her reduce her illness ruminations and increase her activity levels. She provided personal examples to demonstrate her understanding of these concepts.     Diagnosis:   Encounter Diagnoses   Name Primary?     Sarcoma (H)      Adjustment disorder with depressed mood Yes     Recommendations/Plan:  1. Use avoidance and distraction to reduce illness ruminations.   2. Return for follow-up     Thank you for this opportunity to participate in your care of this patient.    Rashid Arango Psy.D., L.P.  Director, Oncology Supportive Care

## 2021-11-16 ENCOUNTER — LAB (OUTPATIENT)
Dept: LAB | Facility: CLINIC | Age: 56
End: 2021-11-16
Payer: COMMERCIAL

## 2021-11-16 ENCOUNTER — TELEPHONE (OUTPATIENT)
Dept: ONCOLOGY | Facility: CLINIC | Age: 56
End: 2021-11-16

## 2021-11-16 ENCOUNTER — ANCILLARY PROCEDURE (OUTPATIENT)
Dept: GENERAL RADIOLOGY | Facility: CLINIC | Age: 56
End: 2021-11-16
Attending: PHYSICIAN ASSISTANT
Payer: COMMERCIAL

## 2021-11-16 DIAGNOSIS — C78.00 MALIGNANT NEOPLASM METASTATIC TO LUNG, UNSPECIFIED LATERALITY (H): ICD-10-CM

## 2021-11-16 DIAGNOSIS — D63.0 ANEMIA IN NEOPLASTIC DISEASE: ICD-10-CM

## 2021-11-16 DIAGNOSIS — C49.9 SARCOMA OF SOFT TISSUE (H): ICD-10-CM

## 2021-11-16 DIAGNOSIS — C49.9 SARCOMA (H): ICD-10-CM

## 2021-11-16 DIAGNOSIS — C49.9 SARCOMA OF SOFT TISSUE (H): Primary | ICD-10-CM

## 2021-11-16 DIAGNOSIS — Z79.899 ENCOUNTER FOR LONG-TERM (CURRENT) USE OF MEDICATIONS: ICD-10-CM

## 2021-11-16 DIAGNOSIS — J93.83 OTHER PNEUMOTHORAX: ICD-10-CM

## 2021-11-16 LAB
ALBUMIN SERPL-MCNC: 2.3 G/DL (ref 3.4–5)
ALP SERPL-CCNC: 114 U/L (ref 40–150)
ALT SERPL W P-5'-P-CCNC: 19 U/L (ref 0–50)
ANION GAP SERPL CALCULATED.3IONS-SCNC: 5 MMOL/L (ref 3–14)
AST SERPL W P-5'-P-CCNC: 16 U/L (ref 0–45)
BASOPHILS # BLD MANUAL: 0 10E3/UL (ref 0–0.2)
BASOPHILS NFR BLD MANUAL: 0 %
BILIRUB SERPL-MCNC: 0.7 MG/DL (ref 0.2–1.3)
BUN SERPL-MCNC: 11 MG/DL (ref 7–30)
CALCIUM SERPL-MCNC: 9.4 MG/DL (ref 8.5–10.1)
CHLORIDE BLD-SCNC: 103 MMOL/L (ref 94–109)
CO2 SERPL-SCNC: 27 MMOL/L (ref 20–32)
CREAT SERPL-MCNC: 0.48 MG/DL (ref 0.52–1.04)
EOSINOPHIL # BLD MANUAL: 0.1 10E3/UL (ref 0–0.7)
EOSINOPHIL NFR BLD MANUAL: 3 %
ERYTHROCYTE [DISTWIDTH] IN BLOOD BY AUTOMATED COUNT: 21.6 % (ref 10–15)
GFR SERPL CREATININE-BSD FRML MDRD: >90 ML/MIN/1.73M2
GLUCOSE BLD-MCNC: 108 MG/DL (ref 70–99)
HCT VFR BLD AUTO: 26.6 % (ref 35–47)
HGB BLD-MCNC: 8.3 G/DL (ref 11.7–15.7)
LYMPHOCYTES # BLD MANUAL: 0.8 10E3/UL (ref 0.8–5.3)
LYMPHOCYTES NFR BLD MANUAL: 19 %
MAGNESIUM SERPL-MCNC: 1.8 MG/DL (ref 1.6–2.3)
MCH RBC QN AUTO: 34.7 PG (ref 26.5–33)
MCHC RBC AUTO-ENTMCNC: 31.2 G/DL (ref 31.5–36.5)
MCV RBC AUTO: 111 FL (ref 78–100)
MONOCYTES # BLD MANUAL: 0.4 10E3/UL (ref 0–1.3)
MONOCYTES NFR BLD MANUAL: 8 %
NEUTROPHILS # BLD MANUAL: 3.1 10E3/UL (ref 1.6–8.3)
NEUTROPHILS NFR BLD MANUAL: 70 %
PHOSPHATE SERPL-MCNC: 3.1 MG/DL (ref 2.5–4.5)
PLAT MORPH BLD: NORMAL
PLATELET # BLD AUTO: 202 10E3/UL (ref 150–450)
POTASSIUM BLD-SCNC: 3.9 MMOL/L (ref 3.4–5.3)
PROT SERPL-MCNC: 8 G/DL (ref 6.8–8.8)
RBC # BLD AUTO: 2.39 10E6/UL (ref 3.8–5.2)
RBC MORPH BLD: NORMAL
SODIUM SERPL-SCNC: 135 MMOL/L (ref 133–144)
T4 FREE SERPL-MCNC: 1.07 NG/DL (ref 0.76–1.46)
TSH SERPL DL<=0.005 MIU/L-ACNC: 8.32 MU/L (ref 0.4–4)
WBC # BLD AUTO: 4.4 10E3/UL (ref 4–11)

## 2021-11-16 PROCEDURE — 83735 ASSAY OF MAGNESIUM: CPT

## 2021-11-16 PROCEDURE — 84443 ASSAY THYROID STIM HORMONE: CPT

## 2021-11-16 PROCEDURE — 85027 COMPLETE CBC AUTOMATED: CPT

## 2021-11-16 PROCEDURE — 84100 ASSAY OF PHOSPHORUS: CPT

## 2021-11-16 PROCEDURE — 71046 X-RAY EXAM CHEST 2 VIEWS: CPT | Mod: GC | Performed by: RADIOLOGY

## 2021-11-16 PROCEDURE — 84439 ASSAY OF FREE THYROXINE: CPT

## 2021-11-16 PROCEDURE — 80053 COMPREHEN METABOLIC PANEL: CPT

## 2021-11-16 PROCEDURE — 36415 COLL VENOUS BLD VENIPUNCTURE: CPT

## 2021-11-16 RX ORDER — PAZOPANIB 200 MG/1
800 TABLET, FILM COATED ORAL DAILY
Qty: 120 TABLET | Refills: 0 | Status: SHIPPED | OUTPATIENT
Start: 2021-11-16 | End: 2022-02-08

## 2021-11-16 NOTE — PROGRESS NOTES
Received positive distress screen regarding patient's concern for ability to eat and concern for current weight.  Called and spoke with patient.  Patient states has no appetite and foods taste off even with foods patient desires to eat.  Patient drinking Atkins shakes and Enlive.  Encouraged patient to check calories on supplements and choose 350 calories ONS.  Offered suggestions for adding flavor to foods.  Encouraged patient to contact RD if has further questions.  Patient verbalized understanding of plan.      Tonio Heath, RD, LD  Clinical Dietitian  [facility]  [phone] (direct)

## 2021-11-17 ENCOUNTER — VIRTUAL VISIT (OUTPATIENT)
Dept: ONCOLOGY | Facility: CLINIC | Age: 56
End: 2021-11-17
Attending: PHYSICIAN ASSISTANT
Payer: COMMERCIAL

## 2021-11-17 DIAGNOSIS — J93.83 OTHER PNEUMOTHORAX: ICD-10-CM

## 2021-11-17 DIAGNOSIS — C49.9 SARCOMA OF SOFT TISSUE (H): Primary | ICD-10-CM

## 2021-11-17 DIAGNOSIS — C78.00 MALIGNANT NEOPLASM METASTATIC TO LUNG, UNSPECIFIED LATERALITY (H): ICD-10-CM

## 2021-11-17 PROCEDURE — 99214 OFFICE O/P EST MOD 30 MIN: CPT | Mod: 95 | Performed by: PHYSICIAN ASSISTANT

## 2021-11-18 ENCOUNTER — TELEPHONE (OUTPATIENT)
Dept: ONCOLOGY | Facility: CLINIC | Age: 56
End: 2021-11-18
Payer: COMMERCIAL

## 2021-11-18 NOTE — TELEPHONE ENCOUNTER
Prior Authorization Approval    Authorization Effective Date: 10/19/2021  Authorization Expiration Date: 11/18/2022  Medication: Diclofenac gel approved  Approved Dose/Quantity: 300 grams  Reference #: [id number]   Insurance Company: [employer] - Phone [phone] Fax [phone]  Expected CoPay: $0     CoPay Card Available:      Foundation Assistance Needed:    Which Pharmacy is filling the prescription (Not needed for infusion/clinic administered): [id number] - [place], [place] - [address], [facility]  Pharmacy Notified: Yes  Patient Notified: Yes

## 2021-11-19 ENCOUNTER — HOSPITAL ENCOUNTER (OUTPATIENT)
Dept: PHYSICAL THERAPY | Facility: CLINIC | Age: 56
Setting detail: THERAPIES SERIES
End: 2021-11-19
Attending: PHYSICIAN ASSISTANT
Payer: COMMERCIAL

## 2021-11-19 PROCEDURE — 97110 THERAPEUTIC EXERCISES: CPT | Mod: GP | Performed by: PHYSICAL THERAPIST

## 2021-11-21 ENCOUNTER — DOCUMENTATION ONLY (OUTPATIENT)
Dept: ONCOLOGY | Facility: CLINIC | Age: 56
End: 2021-11-21
Payer: COMMERCIAL

## 2021-11-22 ENCOUNTER — ANCILLARY PROCEDURE (OUTPATIENT)
Dept: CT IMAGING | Facility: CLINIC | Age: 56
End: 2021-11-22
Attending: PHYSICIAN ASSISTANT
Payer: COMMERCIAL

## 2021-11-22 DIAGNOSIS — C49.9 SARCOMA OF SOFT TISSUE (H): ICD-10-CM

## 2021-11-22 DIAGNOSIS — J93.83 OTHER PNEUMOTHORAX: ICD-10-CM

## 2021-11-22 DIAGNOSIS — C78.00 MALIGNANT NEOPLASM METASTATIC TO LUNG, UNSPECIFIED LATERALITY (H): ICD-10-CM

## 2021-11-22 PROCEDURE — 71250 CT THORAX DX C-: CPT | Performed by: RADIOLOGY

## 2021-11-22 NOTE — PROGRESS NOTES
Oncology/Hematology Visit Note  Nov 23, 2021    Reason for Visit: Follow up of undifferentiated pleomorphic sarcoma     History of Present Illness: Richa Ashby is a 56 year old female with PMH asthma and GERD with undifferentiated pleomorphic sarcoma. 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 She received preoperative radiotherapy and the tumor was resected 3/12/20. She then developed a cough, chest tightness, and scapular pain and imaging revealed lung nodules. Lung biopsy 11/2/20 with sarcoma. She was started on Doxil + Ifos 11/13/20. Had delay in cycle 2 due to COVID dx 12/2/20. CT imaging after cycle 1 with positive response to treatment with decrease in pulmonary nodules. Received cycle 2 12/18/20. Received cycle 3 1/15/21 with dose reduction in Doxil for skin toxicity and mucositis. Received cycle 4 2/15/21.      CT CAP 3/8/21 with positive response to treatment, decreased size of pulmonary mets though one lesion was larger. Did see fibrocystic changes in left breast, recommended breast imaging. Received cycle 5 Doxil + ifos 3/11/21.      Imaging 4/5/21 with overall stable disease except one lung lesion, opted to continue Doxil alone for now and switch to Keytruda in future if ongoing progression.      Started Keytruda 5/10/21. Was seen 5/26/21 cough, SOB, fatigue work-up thought to be 2/2 phrenic nerve involvement of R apical lung mass.      Due to worsening fatigue and cough, repeat CT 6/15/21 concerning for progression vs pseudoprogression. Added Gemzar 6/16/21.     She was admitted 6/17/21-6/20/21 with fever of unknown origin, infectious work-up negative, treated with broad spectrum antibiotics.     She clinically improved on abx. Was able to receive day 8 Gemzar (dose reduced for thrombocytopenia) + Keytruda 6/23/21.      CT 7/12/21 with stable disease.     She was admitted 8/25/21-8/31/21 for fevers, hypotension, acute on chronic  anemia. Extensive infectious work-up negative so etiology of fevers still unknown.      She has had progressive disease so started on Pazopanib 9/24/21.     She was admitted 10/6/21-10/8/21 for weakness, new hallucinations in setting of UTI. Brain MRI without brain mets though did have Diffuse loss normal fatty marrow on T1 sequence, nonspecific. Symptoms improved with abx and IVF.      She has continued on Pazopanib. CT 11/1/21 for restaging noted majority of masses with fluid and gas consistent with necrosis and the largest cyst likely ruptured causing a right hydropneumothorax with near complete collapse of the R lung. One solid left pleural based lesion was slightly increased. Overall felt this was a positive response to Pazopanib so continued.     She was admitted 11/1/21 for urgent chest tube placement for the hydropneumothorax noted on CT. Due to high risk of recurrence she underwent talc pleurodesis 11/5/21. Port was incidentally found to have non-occlusive thrombus and will be replaced later this month pending resolution of subcutaneous air.     Interval History:  Connie was seen today for oncology follow-up.  -Having ongoing significant R back/chest wall pain. Using Tylenol, Voltaren, Oxycodone (2.5mg during the day, 10mg at night) but still very uncomfortable. Tearful in clinic.   -Nausea/vomiting remains an issue as is poor appetite. Has continued to lose weight. Taking Compazine PRN.  -Coughing continues, makes her vomit at times. Using cough syrup PRN. Breathing is OK.   -Noticing some skin peeling on hands and feet.   -Using Miralax for constipation.     No fevers, significant headaches or dizziness, chest pain, abdominal pain, urinary symptoms, edema, rashes. Still very weak and fatigued. Anxiety remains an issue as well.     Current Outpatient Medications   Medication Sig Dispense Refill     acetaminophen (TYLENOL) 325 MG tablet Take 325-650 mg by mouth every 6 hours as needed for mild pain        ADVAIR DISKUS 100-50 MCG/DOSE inhaler Inhale 1 puff into the lungs daily        albuterol (PROAIR RESPICLICK) 108 (90 Base) MCG/ACT inhaler Inhale 1-2 puffs into the lungs every 6 hours as needed        cetirizine (ZYRTEC) 10 MG tablet Take 10 mg by mouth daily       diclofenac (VOLTAREN) 1 % topical gel Apply 4 g topically 4 times daily 350 g 3     gabapentin (NEURONTIN) 300 MG capsule Take 600 mg in the AM, 600 mg mid-day, and 900 mg at night 210 capsule 3     guaiFENesin-codeine (ROBITUSSIN AC) 100-10 MG/5ML solution Take 5-10 mLs by mouth every 4 hours as needed for cough 473 mL 1     levothyroxine (SYNTHROID/LEVOTHROID) 50 MCG tablet Take 1 tablet (50 mcg) by mouth daily 30 tablet 1     loperamide (IMODIUM A-D) 2 MG tablet Take 2 mg by mouth daily as needed for diarrhea       melatonin (MELATONIN) 1 MG/ML LIQD liquid Take 5 mg by mouth At Bedtime        montelukast (SINGULAIR) 10 MG tablet Take 10 mg by mouth daily       multivitamin w/minerals (MULTI-VITAMIN) tablet Take 1 tablet by mouth daily       NASAL SPRAY SALINE NA        ondansetron (ZOFRAN) 8 MG tablet Take 1 tablet (8 mg) by mouth every 8 hours as needed for nausea 60 tablet 1     oxyCODONE (ROXICODONE) 5 MG tablet Take 0.5-1.5 tablets (2.5-7.5 mg) by mouth every 6 hours as needed for severe pain 60 tablet 0     pazopanib (VOTRIENT) 200 MG tablet Take 4 tablets (800 mg) by mouth daily Take on an empty stomach 1 hour before or 2 hours after a meal. 120 tablet 0     pazopanib (VOTRIENT) 200 MG tablet Take 4 tablets (800 mg) by mouth daily Take on an empty stomach 1 hour before or 2 hours after a meal. 120 tablet 0     polyethylene glycol (MIRALAX) 17 GM/Dose powder Take 17 g by mouth daily 510 g 0     prochlorperazine (COMPAZINE) 10 MG tablet Take 10 mg by mouth every evening Before votrient       prochlorperazine (COMPAZINE) 10 MG tablet Take 1 tablet (10 mg) by mouth every 6 hours as needed for nausea or vomiting 90 tablet 3     vitamin D3  (CHOLECALCIFEROL) 2000 units (50 mcg) tablet Take 1 tablet by mouth daily         Past Medical History  Past Medical History:   Diagnosis Date     Asthma, chronic      Sarcoma (H)     Right lower thigh     Past Surgical History:   Procedure Laterality Date     AS RAD RESEC TONSIL/PILLARS       GALLBLADDER SURGERY       INJECT EPIDURAL THORACIC N/A 11/5/2021    Procedure: INJECTION, SPINE, THORACIC, EPIDURAL;  Surgeon: GENERIC ANESTHESIA PROVIDER;  Location: [facility] OR     INSERT PORT VASCULAR ACCESS Right 10/28/2020    Procedure: single lumen power port placement @0745;  Surgeon: Tex Ackerman MD;  Location: [facility] OR     IR CHEST PORT PLACEMENT > 5 YRS OF AGE  10/28/2020     IR PORT CHECK RIGHT  3/26/2021     IR PORT CHECK RIGHT  10/21/2021     RESECT TUMOR LOWER EXTREMITY Right 3/12/2020    Procedure: Resection of right thigh tumor;  Surgeon: Guillermo Johnson MD;  Location: [facility] OR     ROTATOR CUFF REPAIR RT/LT  2012     SHOULDER SURGERY       SINUS SURGERY       THORACENTESIS N/A 11/5/2021    Procedure: Pleurodesis;  Surgeon: Elana Ngo MD;  Location: [facility]     Allergies   Allergen Reactions     Pentobarbital      resp arrest     Social History   Social History     Tobacco Use     Smoking status: Never Smoker     Smokeless tobacco: Never Used   Substance Use Topics     Alcohol use: Yes     Comment: 2-3 glasses of wine/week     Drug use: Never      Past medical history and social history were reviewed.    Physical Examination:  /84   Pulse 116   Temp 98  F (36.7  C) (Oral)   Resp 16   Wt 78.6 kg (173 lb 3.2 oz)   LMP 03/19/2019   SpO2 97%   BMI 27.13 kg/m    Wt Readings from Last 10 Encounters:   11/06/21 81.4 kg (179 lb 8 oz)   10/07/21 87.7 kg (193 lb 4.8 oz)   10/06/21 87.2 kg (192 lb 3.2 oz)   10/03/21 87.1 kg (192 lb)   09/22/21 89.3 kg (196 lb 12.8 oz)   09/08/21 90.5 kg (199 lb 8 oz)   08/30/21 90.7 kg (199 lb 15.3 oz)   08/25/21 90.8 kg (200 lb 1.6 oz)   08/11/21 93.4 kg (206  lb)   08/09/21 91.8 kg (202 lb 4.8 oz)     Constitutional: Ill-appearing female who appears uncomfortable but in no acute distress.  Eyes: EOMI, PERRL. No scleral icterus.  ENT: Oral mucosa is moist without lesions or thrush.   Lymphatic: Neck is supple without cervical or supraclavicular lymphadenopathy.   Cardiovascular: Tachycardic rate and regular rhythm. No murmurs, gallops, or rubs. No peripheral edema.  Respiratory: RUL diminished but still good air movement. Remainder of lungs clear. Coughing during visit.   Gastrointestinal: Bowel sounds present. Abdomen soft, non-tender. No palpable hepatosplenomegaly or masses.   Neurologic: Cranial nerves II through XII are grossly intact.  Skin: Feel tricia of peeling on fingertips, no erythema or tenderness. No other rashes, petechiae, or bruising noted on exposed skin.    Laboratory Data:  Results for AMADEO WILSON (MRN [id number]) as of 11/24/2021 12:14   11/16/2021 12:36   Sodium 135   Potassium 3.9   Chloride 103   Carbon Dioxide 27   Urea Nitrogen 11   Creatinine 0.48 (L)   GFR Estimate >90   Calcium 9.4   Anion Gap 5   Magnesium 1.8   Phosphorus 3.1   Albumin 2.3 (L)   Protein Total 8.0   Bilirubin Total 0.7   Alkaline Phosphatase 114   ALT 19   AST 16   T4 Free 1.07   TSH 8.32 (H)   Glucose 108 (H)   WBC 4.4   Hemoglobin 8.3 (L)   Hematocrit 26.6 (L)   Platelet Count 202   RBC Count 2.39 (L)    (H)   MCH 34.7 (H)   MCHC 31.2 (L)   RDW 21.6 (H)   % Neutrophils 70   % Lymphocytes 19   % Monocytes 8   % Eosinophils 3   % Basophils 0   Absolute Basophils 0.0   Absolute Neutrophil 3.1   Absolute Lymphocytes 0.8   Absolute Monocytes 0.4   Absolute Eosinophils 0.1   RBC Morphology Confirmed RBC Indices   Platelet Morphology Automated Count Confirmed. Platelet morphology is normal.     CT Chest 11/22/21  FINDINGS: The included thyroid appears unremarkable. Large right  effusion with air locules noted suggestive of combination of  hydropneumothorax and either  infected fluid or necrotic metastasis.  Other cavitary type lesions are noted bilaterally comment the largest  in the left lower lobe measures approximately 4.5 cm in diameter with  approximate 75% cavitation. Pleural-based mass at the right upper lung  (series 2 image 36) is unchanged and measures 3.9 cm. Pleural-based  opacity at the left lower lobe/chest wall (series 2 image 47) is  approximately 4.3 x 2.4 cm, not significantly changed. Previous  subcutaneous emphysema has decreased. Right chest wall port catheter  tip is in the right innominate vein. Accessory splenule. Aortic  atherosclerosis. Nonenlarged mediastinal lymph nodes.  Bones show mild degenerative changes in the thoracic spine.                                                                      IMPRESSION:  Decreased chest wall subcutaneous emphysema. Otherwise no significant  change in the chest wall/pleural based masses and other  cavitary/necrotic metastatic lesions. Unchanged large necrotic versus  infected right hemithorax lesion. Continued loculated right  hydropneumothorax and other small loculated pneumothoraces on the  right.     SHIRLEY INMAN MD        Assessment and Plan:  1. Onc  Metastatic UPS, was on Doxil + Ifosfamide (5 cycles) with positive response to treatment initially, then due to poor tolerance switched to Doxil alone (1 cycle) but had progression. Started on Keytruda 5/10/21. Due to tumor growth (progression vs pseudoprogression) added Gemzar 6/16/21. Had ongoing progression. Started on Pazopanib 9/24/21.     CT 11/1/21 with significant tumor necrosis/rupture. Given overall clinical improvement and findings of necrosis plan to continue Pazopanib with close follow-up. CT from 11/22/21 with stable disease, necrotic lesions persist. Discussed with Dr. Lopez and likely will need more time to see this resolve.     Continue Pazopanib with close [facility]AL[facility] follow-up. Hold off on scheduling repeat imaging for now pending clinicial  course.     2. Pulm  R sided hydropneumothorax: Found on 11/1/21 CT scan, thought 2/2 ruptured tumor. Was admitted 11/1 and underwent chest tube placement. Pulm performed talc pleuradesis on 11/5/21 due to risk of recurrence. CT from today shows ongoing but stable hydropneumothorax with no signs of lung collapse. Discussed with pulm and they would not recommend any additional interventions at this time.      Subcutaneous chest wall air: After chest tube placement, much improved on CT.      Asthma: stable, continue inhalers.      COVID: Diagnosed in December, recovered. She is vaccinated. OK to get booster.     Cough: 2/2 malignancy, worse. Continue Robitussin-AC PRN. Discussed taking more consistently to stay ahead of cough if able.      3. ID  Fevers of unknown origin, thought to be tumor fevers, previously required Indomethacin, resolved.      Possible empyema inpatient, cultures were negative. No signs of infection.      4. Vascular  Port malfunction with small RIJ clot: Per IR, RIJ clot associated with port does not need to be treated. IR plans on replacing port outpatient once subcutaneous air resolved- it is improved on CT from yesterday. I spoke with IR resident who felt this was enough of an improvement that we can schedule replacement. Sent message to IR team to schedule ASAP due to significant issues with venous access (unable to 4x attempts in clinic today).      5. Pain  R chest wall/back pain, significant. Patient tearful in clinic. Discussed with pulm who notes the pleuradesis may be contributing in addition to pleural based sarcoma. CT with no new findings. Gave 975mg Tylenol in clinic with some relief. Unable to get IV access so limited on narcotics in clinic.     Will increase home Oxycodone to 5mg during the day and 10mg at night. Add Celebrex 200mg BID (discussed with pulm and Dr. Lopez that NSAIDs can be helpful with inflammation from procedure, could do steroids in future if needed but try to  avoid as long as possible to not interfere with effectiveness of pleuradesis). Continue Tylenol and topical Voltaren gel. Has palliative follow-up next week.      6. Heme  Anemia: ACD and chemotherapy. Improving. Continue monitoring with labs (ideally will check after port placement due to significant issues with peripheral access).      7. Cards  Exercise fatigue and NAZARIO: Echo was stable. Possibly was related to pneumothorax, though it is unclear when this happened.       Hypotension: Improved. Continue pushing home fluids and monitoring.       8. 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 Continue Gabapentin 600/600/900.     Deconditioning: Doing PT     Hallucinations: Brain MRI reviewed and no mets. Does have loss of normal fatty marrow with broad differential. Discussed with neuro onc and unclear findings. At this time symptoms resolved so suspect it was combination of Oxycodone and UTI. LIMIT narcotics due to CNS effects.      9. GI  Constipation: Continue miralax PRN. Discussed monitoring with increase in Oxycodone     Nausea, 2/2 chemotherapy and coughing, worse. Will add Zyprexa 5mg at bedtime. This will help with CINV and poor appetite. Check EKG 1-2 weeks after starting to monitor QT prolongation. Continue Compazine, take more consistently if needed.       Continue salt/soda and biotene for mouth irritation.     Continue Tums PRN GERD, take away from Votrient.      10. Psych  Ongoing anxiety. Seeing Titus . Starting Zyprexa as above.     Continue Melatonin plus new Zyprexa PRN insomnia.      11. 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 Check TSH monthly.    Overall Plan  -Port replacement ASAP  -Add Celebrex for pain and increase Oxycodone  -Add Zyprexa for nausea/poor appetite and increase dosing of Compazine   -KALINA virtual visit in one week  -Labs and EKG after port replacement  -Continue Pazopanib     60 minutes spent on the date of the encounter doing chart  review, review of test results, interpretation of tests, patient visit, documentation and discussion with other provider(s) including pulmonary, IR, and Dr. Lopez.     Harshal Schuster PA-C  Department of Hematology and Oncology  [facility]

## 2021-11-23 ENCOUNTER — ONCOLOGY VISIT (OUTPATIENT)
Dept: ONCOLOGY | Facility: CLINIC | Age: 56
End: 2021-11-23
Attending: PHYSICIAN ASSISTANT
Payer: COMMERCIAL

## 2021-11-23 ENCOUNTER — INFUSION THERAPY VISIT (OUTPATIENT)
Dept: INFUSION THERAPY | Facility: CLINIC | Age: 56
End: 2021-11-23
Attending: PHYSICIAN ASSISTANT
Payer: COMMERCIAL

## 2021-11-23 VITALS
HEART RATE: 116 BPM | WEIGHT: 173.2 LBS | DIASTOLIC BLOOD PRESSURE: 84 MMHG | OXYGEN SATURATION: 97 % | SYSTOLIC BLOOD PRESSURE: 128 MMHG | RESPIRATION RATE: 16 BRPM | TEMPERATURE: 98 F | BODY MASS INDEX: 27.13 KG/M2

## 2021-11-23 DIAGNOSIS — C49.9 SARCOMA (H): ICD-10-CM

## 2021-11-23 DIAGNOSIS — G89.3 CANCER ASSOCIATED PAIN: ICD-10-CM

## 2021-11-23 DIAGNOSIS — Z51.11 ENCOUNTER FOR ANTINEOPLASTIC CHEMOTHERAPY: Primary | ICD-10-CM

## 2021-11-23 DIAGNOSIS — R11.0 NAUSEA: ICD-10-CM

## 2021-11-23 DIAGNOSIS — C49.9 SARCOMA OF SOFT TISSUE (H): Primary | ICD-10-CM

## 2021-11-23 DIAGNOSIS — N39.0 COMPLICATED URINARY TRACT INFECTION: ICD-10-CM

## 2021-11-23 DIAGNOSIS — G89.3 CANCER RELATED PAIN: ICD-10-CM

## 2021-11-23 PROCEDURE — 99215 OFFICE O/P EST HI 40 MIN: CPT | Performed by: PHYSICIAN ASSISTANT

## 2021-11-23 PROCEDURE — G0463 HOSPITAL OUTPT CLINIC VISIT: HCPCS

## 2021-11-23 PROCEDURE — 250N000013 HC RX MED GY IP 250 OP 250 PS 637: Performed by: PHYSICIAN ASSISTANT

## 2021-11-23 RX ORDER — ACETAMINOPHEN 325 MG/1
975 TABLET ORAL ONCE
Status: COMPLETED | OUTPATIENT
Start: 2021-11-23 | End: 2021-11-23

## 2021-11-23 RX ORDER — ALBUTEROL SULFATE 90 UG/1
1-2 AEROSOL, METERED RESPIRATORY (INHALATION)
Status: CANCELLED
Start: 2021-11-23

## 2021-11-23 RX ORDER — NALOXONE HYDROCHLORIDE 0.4 MG/ML
0.2 INJECTION, SOLUTION INTRAMUSCULAR; INTRAVENOUS; SUBCUTANEOUS
Status: CANCELLED | OUTPATIENT
Start: 2021-11-23

## 2021-11-23 RX ORDER — HYDROMORPHONE HYDROCHLORIDE 1 MG/ML
0.5 INJECTION, SOLUTION INTRAMUSCULAR; INTRAVENOUS; SUBCUTANEOUS ONCE
Status: DISCONTINUED | OUTPATIENT
Start: 2021-11-23 | End: 2021-11-24 | Stop reason: HOSPADM

## 2021-11-23 RX ORDER — DIPHENHYDRAMINE HYDROCHLORIDE 50 MG/ML
50 INJECTION INTRAMUSCULAR; INTRAVENOUS
Status: CANCELLED
Start: 2021-11-23

## 2021-11-23 RX ORDER — HYDROMORPHONE HYDROCHLORIDE 1 MG/ML
0.5 INJECTION, SOLUTION INTRAMUSCULAR; INTRAVENOUS; SUBCUTANEOUS ONCE
Status: CANCELLED
Start: 2021-11-23 | End: 2021-11-23

## 2021-11-23 RX ORDER — ONDANSETRON 2 MG/ML
8 INJECTION INTRAMUSCULAR; INTRAVENOUS ONCE
Status: CANCELLED
Start: 2021-11-23 | End: 2021-11-23

## 2021-11-23 RX ORDER — OLANZAPINE 5 MG/1
5 TABLET ORAL AT BEDTIME
Qty: 30 TABLET | Refills: 1 | Status: SHIPPED | OUTPATIENT
Start: 2021-11-23 | End: 2021-12-15

## 2021-11-23 RX ORDER — HEPARIN SODIUM (PORCINE) LOCK FLUSH IV SOLN 100 UNIT/ML 100 UNIT/ML
5 SOLUTION INTRAVENOUS
Status: CANCELLED | OUTPATIENT
Start: 2021-11-23

## 2021-11-23 RX ORDER — ALBUTEROL SULFATE 0.83 MG/ML
2.5 SOLUTION RESPIRATORY (INHALATION)
Status: CANCELLED | OUTPATIENT
Start: 2021-11-23

## 2021-11-23 RX ORDER — HEPARIN SODIUM,PORCINE 10 UNIT/ML
5 VIAL (ML) INTRAVENOUS
Status: CANCELLED | OUTPATIENT
Start: 2021-11-23

## 2021-11-23 RX ORDER — CELECOXIB 200 MG/1
200 CAPSULE ORAL 2 TIMES DAILY
Qty: 60 CAPSULE | Refills: 3 | Status: SHIPPED | OUTPATIENT
Start: 2021-11-23

## 2021-11-23 RX ORDER — EPINEPHRINE 1 MG/ML
0.3 INJECTION, SOLUTION INTRAMUSCULAR; SUBCUTANEOUS EVERY 5 MIN PRN
Status: CANCELLED | OUTPATIENT
Start: 2021-11-23

## 2021-11-23 RX ORDER — MEPERIDINE HYDROCHLORIDE 25 MG/ML
25 INJECTION INTRAMUSCULAR; INTRAVENOUS; SUBCUTANEOUS EVERY 30 MIN PRN
Status: CANCELLED | OUTPATIENT
Start: 2021-11-23

## 2021-11-23 RX ORDER — METHYLPREDNISOLONE SODIUM SUCCINATE 125 MG/2ML
125 INJECTION, POWDER, LYOPHILIZED, FOR SOLUTION INTRAMUSCULAR; INTRAVENOUS
Status: CANCELLED
Start: 2021-11-23

## 2021-11-23 RX ORDER — ACETAMINOPHEN 325 MG/1
975 TABLET ORAL ONCE
Status: CANCELLED
Start: 2021-11-23 | End: 2021-11-23

## 2021-11-23 RX ADMIN — ACETAMINOPHEN 975 MG: 325 TABLET, FILM COATED ORAL at 16:41

## 2021-11-23 ASSESSMENT — PAIN SCALES - GENERAL: PAINLEVEL: MODERATE PAIN (4)

## 2021-11-23 NOTE — PROGRESS NOTES
Clinic patient seen in exam room 14 for pain control, unable to obtain IV access after 2 nurses attempt 4x. Pt has a port that is not working at this time. Pt was given 975mg of tylenol and will take oral pain meds when she gets home.

## 2021-11-23 NOTE — LETTER
11/23/2021         RE: Richa Ashby  [id number]  [facility] [address]        Dear Colleague,    Thank you for referring your patient, Richa Ashby, to the [facility]. Please see a copy of my visit note below.    Oncology/Hematology Visit Note  Nov 23, 2021    Reason for Visit: Follow up of undifferentiated pleomorphic sarcoma     History of Present Illness: Richa Ashby is a 56 year old female with PMH asthma and GERD with undifferentiated pleomorphic sarcoma. 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 She received preoperative radiotherapy and the tumor was resected 3/12/20. She then developed a cough, chest tightness, and scapular pain and imaging revealed lung nodules. Lung biopsy 11/2/20 with sarcoma. She was started on Doxil + Ifos 11/13/20. Had delay in cycle 2 due to COVID dx 12/2/20. CT imaging after cycle 1 with positive response to treatment with decrease in pulmonary nodules. Received cycle 2 12/18/20. Received cycle 3 1/15/21 with dose reduction in Doxil for skin toxicity and mucositis. Received cycle 4 2/15/21.      CT CAP 3/8/21 with positive response to treatment, decreased size of pulmonary mets though one lesion was larger. Did see fibrocystic changes in left breast, recommended breast imaging. Received cycle 5 Doxil + ifos 3/11/21.      Imaging 4/5/21 with overall stable disease except one lung lesion, opted to continue Doxil alone for now and switch to Keytruda in future if ongoing progression.      Started Keytruda 5/10/21. Was seen 5/26/21 cough, SOB, fatigue work-up thought to be 2/2 phrenic nerve involvement of R apical lung mass.      Due to worsening fatigue and cough, repeat CT 6/15/21 concerning for progression vs pseudoprogression. Added Gemzar 6/16/21.     She was admitted 6/17/21-6/20/21 with fever of unknown origin, infectious work-up negative, treated  with broad spectrum antibiotics.     She clinically improved on abx. Was able to receive day 8 Gemzar (dose reduced for thrombocytopenia) + Keytruda 6/23/21.      CT 7/12/21 with stable disease.     She was admitted 8/25/21-8/31/21 for fevers, hypotension, acute on chronic anemia. Extensive infectious work-up negative so etiology of fevers still unknown.      She has had progressive disease so started on Pazopanib 9/24/21.     She was admitted 10/6/21-10/8/21 for weakness, new hallucinations in setting of UTI. Brain MRI without brain mets though did have Diffuse loss normal fatty marrow on T1 sequence, nonspecific. Symptoms improved with abx and IVF.      She has continued on Pazopanib. CT 11/1/21 for restaging noted majority of masses with fluid and gas consistent with necrosis and the largest cyst likely ruptured causing a right hydropneumothorax with near complete collapse of the R lung. One solid left pleural based lesion was slightly increased. Overall felt this was a positive response to Pazopanib so continued.     She was admitted 11/1/21 for urgent chest tube placement for the hydropneumothorax noted on CT. Due to high risk of recurrence she underwent talc pleurodesis 11/5/21. Port was incidentally found to have non-occlusive thrombus and will be replaced later this month pending resolution of subcutaneous air.     Interval History:  Connie was seen today for oncology follow-up.  -Having ongoing significant R back/chest wall pain. Using Tylenol, Voltaren, Oxycodone (2.5mg during the day, 10mg at night) but still very uncomfortable. Tearful in clinic.   -Nausea/vomiting remains an issue as is poor appetite. Has continued to lose weight. Taking Compazine PRN.  -Coughing continues, makes her vomit at times. Using cough syrup PRN. Breathing is OK.   -Noticing some skin peeling on hands and feet.   -Using Miralax for constipation.     No fevers, significant headaches or dizziness, chest pain, abdominal pain,  urinary symptoms, edema, rashes. Still very weak and fatigued. Anxiety remains an issue as well.     Current Outpatient Medications   Medication Sig Dispense Refill     acetaminophen (TYLENOL) 325 MG tablet Take 325-650 mg by mouth every 6 hours as needed for mild pain       ADVAIR DISKUS 100-50 MCG/DOSE inhaler Inhale 1 puff into the lungs daily        albuterol (PROAIR RESPICLICK) 108 (90 Base) MCG/ACT inhaler Inhale 1-2 puffs into the lungs every 6 hours as needed        cetirizine (ZYRTEC) 10 MG tablet Take 10 mg by mouth daily       diclofenac (VOLTAREN) 1 % topical gel Apply 4 g topically 4 times daily 350 g 3     gabapentin (NEURONTIN) 300 MG capsule Take 600 mg in the AM, 600 mg mid-day, and 900 mg at night 210 capsule 3     guaiFENesin-codeine (ROBITUSSIN AC) 100-10 MG/5ML solution Take 5-10 mLs by mouth every 4 hours as needed for cough 473 mL 1     levothyroxine (SYNTHROID/LEVOTHROID) 50 MCG tablet Take 1 tablet (50 mcg) by mouth daily 30 tablet 1     loperamide (IMODIUM A-D) 2 MG tablet Take 2 mg by mouth daily as needed for diarrhea       melatonin (MELATONIN) 1 MG/ML LIQD liquid Take 5 mg by mouth At Bedtime        montelukast (SINGULAIR) 10 MG tablet Take 10 mg by mouth daily       multivitamin w/minerals (MULTI-VITAMIN) tablet Take 1 tablet by mouth daily       NASAL SPRAY SALINE NA        ondansetron (ZOFRAN) 8 MG tablet Take 1 tablet (8 mg) by mouth every 8 hours as needed for nausea 60 tablet 1     oxyCODONE (ROXICODONE) 5 MG tablet Take 0.5-1.5 tablets (2.5-7.5 mg) by mouth every 6 hours as needed for severe pain 60 tablet 0     pazopanib (VOTRIENT) 200 MG tablet Take 4 tablets (800 mg) by mouth daily Take on an empty stomach 1 hour before or 2 hours after a meal. 120 tablet 0     pazopanib (VOTRIENT) 200 MG tablet Take 4 tablets (800 mg) by mouth daily Take on an empty stomach 1 hour before or 2 hours after a meal. 120 tablet 0     polyethylene glycol (MIRALAX) 17 GM/Dose powder Take 17 g by  mouth daily 510 g 0     prochlorperazine (COMPAZINE) 10 MG tablet Take 10 mg by mouth every evening Before votrient       prochlorperazine (COMPAZINE) 10 MG tablet Take 1 tablet (10 mg) by mouth every 6 hours as needed for nausea or vomiting 90 tablet 3     vitamin D3 (CHOLECALCIFEROL) 2000 units (50 mcg) tablet Take 1 tablet by mouth daily         Past Medical History  Past Medical History:   Diagnosis Date     Asthma, chronic      Sarcoma (H)     Right lower thigh     Past Surgical History:   Procedure Laterality Date     AS RAD RESEC TONSIL/PILLARS       GALLBLADDER SURGERY       INJECT EPIDURAL THORACIC N/A 11/5/2021    Procedure: INJECTION, SPINE, THORACIC, EPIDURAL;  Surgeon: GENERIC ANESTHESIA PROVIDER;  Location: [facility] OR     INSERT PORT VASCULAR ACCESS Right 10/28/2020    Procedure: single lumen power port placement @0745;  Surgeon: Tex Ackerman MD;  Location: [facility] OR     IR CHEST PORT PLACEMENT > 5 YRS OF AGE  10/28/2020     IR PORT CHECK RIGHT  3/26/2021     IR PORT CHECK RIGHT  10/21/2021     RESECT TUMOR LOWER EXTREMITY Right 3/12/2020    Procedure: Resection of right thigh tumor;  Surgeon: Guillermo Johnson MD;  Location: [facility] OR     ROTATOR CUFF REPAIR RT/LT  2012     SHOULDER SURGERY       SINUS SURGERY       THORACENTESIS N/A 11/5/2021    Procedure: Pleurodesis;  Surgeon: Elana Ngo MD;  Location:  GI     Allergies   Allergen Reactions     Pentobarbital      resp arrest     Social History   Social History     Tobacco Use     Smoking status: Never Smoker     Smokeless tobacco: Never Used   Substance Use Topics     Alcohol use: Yes     Comment: 2-3 glasses of wine/week     Drug use: Never      Past medical history and social history were reviewed.    Physical Examination:  /84   Pulse 116   Temp 98  F (36.7  C) (Oral)   Resp 16   Wt 78.6 kg (173 lb 3.2 oz)   LMP 03/19/2019   SpO2 97%   BMI 27.13 kg/m    Wt Readings from Last 10 Encounters:   11/06/21 81.4 kg (179 lb  8 oz)   10/07/21 87.7 kg (193 lb 4.8 oz)   10/06/21 87.2 kg (192 lb 3.2 oz)   10/03/21 87.1 kg (192 lb)   09/22/21 89.3 kg (196 lb 12.8 oz)   09/08/21 90.5 kg (199 lb 8 oz)   08/30/21 90.7 kg (199 lb 15.3 oz)   08/25/21 90.8 kg (200 lb 1.6 oz)   08/11/21 93.4 kg (206 lb)   08/09/21 91.8 kg (202 lb 4.8 oz)     Constitutional: Ill-appearing female who appears uncomfortable but in no acute distress.  Eyes: EOMI, PERRL. No scleral icterus.  ENT: Oral mucosa is moist without lesions or thrush.   Lymphatic: Neck is supple without cervical or supraclavicular lymphadenopathy.   Cardiovascular: Tachycardic rate and regular rhythm. No murmurs, gallops, or rubs. No peripheral edema.  Respiratory: RUL diminished but still good air movement. Remainder of lungs clear. Coughing during visit.   Gastrointestinal: Bowel sounds present. Abdomen soft, non-tender. No palpable hepatosplenomegaly or masses.   Neurologic: Cranial nerves II through XII are grossly intact.  Skin: Feel tricia of peeling on fingertips, no erythema or tenderness. No other rashes, petechiae, or bruising noted on exposed skin.    Laboratory Data:  Results for AMADEO WILSON (MRN [id number]) as of 11/24/2021 12:14   11/16/2021 12:36   Sodium 135   Potassium 3.9   Chloride 103   Carbon Dioxide 27   Urea Nitrogen 11   Creatinine 0.48 (L)   GFR Estimate >90   Calcium 9.4   Anion Gap 5   Magnesium 1.8   Phosphorus 3.1   Albumin 2.3 (L)   Protein Total 8.0   Bilirubin Total 0.7   Alkaline Phosphatase 114   ALT 19   AST 16   T4 Free 1.07   TSH 8.32 (H)   Glucose 108 (H)   WBC 4.4   Hemoglobin 8.3 (L)   Hematocrit 26.6 (L)   Platelet Count 202   RBC Count 2.39 (L)    (H)   MCH 34.7 (H)   MCHC 31.2 (L)   RDW 21.6 (H)   % Neutrophils 70   % Lymphocytes 19   % Monocytes 8   % Eosinophils 3   % Basophils 0   Absolute Basophils 0.0   Absolute Neutrophil 3.1   Absolute Lymphocytes 0.8   Absolute Monocytes 0.4   Absolute Eosinophils 0.1   RBC Morphology Confirmed RBC  Indices   Platelet Morphology Automated Count Confirmed. Platelet morphology is normal.     CT Chest 11/22/21  FINDINGS: The included thyroid appears unremarkable. Large right  effusion with air locules noted suggestive of combination of  hydropneumothorax and either infected fluid or necrotic metastasis.  Other cavitary type lesions are noted bilaterally comment the largest  in the left lower lobe measures approximately 4.5 cm in diameter with  approximate 75% cavitation. Pleural-based mass at the right upper lung  (series 2 image 36) is unchanged and measures 3.9 cm. Pleural-based  opacity at the left lower lobe/chest wall (series 2 image 47) is  approximately 4.3 x 2.4 cm, not significantly changed. Previous  subcutaneous emphysema has decreased. Right chest wall port catheter  tip is in the right innominate vein. Accessory splenule. Aortic  atherosclerosis. Nonenlarged mediastinal lymph nodes.  Bones show mild degenerative changes in the thoracic spine.                                                                      IMPRESSION:  Decreased chest wall subcutaneous emphysema. Otherwise no significant  change in the chest wall/pleural based masses and other  cavitary/necrotic metastatic lesions. Unchanged large necrotic versus  infected right hemithorax lesion. Continued loculated right  hydropneumothorax and other small loculated pneumothoraces on the  right.     SHIRLEY INMAN MD        Assessment and Plan:  1. Onc  Metastatic UPS, was on Doxil + Ifosfamide (5 cycles) with positive response to treatment initially, then due to poor tolerance switched to Doxil alone (1 cycle) but had progression. Started on Keytruda 5/10/21. Due to tumor growth (progression vs pseudoprogression) added Gemzar 6/16/21. Had ongoing progression. Started on Pazopanib 9/24/21.     CT 11/1/21 with significant tumor necrosis/rupture. Given overall clinical improvement and findings of necrosis plan to continue Pazopanib with close  follow-up. CT from 11/22/21 with stable disease, necrotic lesions persist. Discussed with Dr. Lopez and likely will need more time to see this resolve.     Continue Pazopanib with close KALINA follow-up. Hold off on scheduling repeat imaging for now pending clinicial course.     2. Pulm  R sided hydropneumothorax: Found on 11/1/21 CT scan, thought 2/2 ruptured tumor. Was admitted 11/1 and underwent chest tube placement. Pulm performed talc pleuradesis on 11/5/21 due to risk of recurrence. CT from today shows ongoing but stable hydropneumothorax with no signs of lung collapse. Discussed with pulm and they would not recommend any additional interventions at this time.      Subcutaneous chest wall air: After chest tube placement, much improved on CT.      Asthma: stable, continue inhalers.      COVID: Diagnosed in December, recovered. She is vaccinated. OK to get booster.     Cough: 2/2 malignancy, worse. Continue Robitussin-AC PRN. Discussed taking more consistently to stay ahead of cough if able.      3. ID  Fevers of unknown origin, thought to be tumor fevers, previously required Indomethacin, resolved.      Possible empyema inpatient, cultures were negative. No signs of infection.      4. Vascular  Port malfunction with small RIJ clot: Per IR, RIJ clot associated with port does not need to be treated. IR plans on replacing port outpatient once subcutaneous air resolved- it is improved on CT from yesterday. I spoke with IR resident who felt this was enough of an improvement that we can schedule replacement. Sent message to IR team to schedule ASAP due to significant issues with venous access (unable to 4x attempts in clinic today).      5. Pain  R chest wall/back pain, significant. Patient tearful in clinic. Discussed with pulm who notes the pleuradesis may be contributing in addition to pleural based sarcoma. CT with no new findings. Gave 975mg Tylenol in clinic with some relief. Unable to get IV access so limited  on narcotics in clinic.     Will increase home Oxycodone to 5mg during the day and 10mg at night. Add Celebrex 200mg BID (discussed with yamilet and Dr. Lopez that NSAIDs can be helpful with inflammation from procedure, could do steroids in future if needed but try to avoid as long as possible to not interfere with effectiveness of pleuradesis). Continue Tylenol and topical Voltaren gel. Has palliative follow-up next week.      6. Heme  Anemia: ACD and chemotherapy. Improving. Continue monitoring with labs (ideally will check after port placement due to significant issues with peripheral access).      7. Cards  Exercise fatigue and NAZARIO: Echo was stable. Possibly was related to pneumothorax, though it is unclear when this happened.       Hypotension: Improved. Continue pushing home fluids and monitoring.       8. 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 Continue Gabapentin 600/600/900.     Deconditioning: Doing PT     Hallucinations: Brain MRI reviewed and no mets. Does have loss of normal fatty marrow with broad differential. Discussed with neuro onc and unclear findings. At this time symptoms resolved so suspect it was combination of Oxycodone and UTI. LIMIT narcotics due to CNS effects.      9. GI  Constipation: Continue miralax PRN. Discussed monitoring with increase in Oxycodone     Nausea, 2/2 chemotherapy and coughing, worse. Will add Zyprexa 5mg at bedtime. This will help with CINV and poor appetite. Check EKG 1-2 weeks after starting to monitor QT prolongation. Continue Compazine, take more consistently if needed.       Continue salt/soda and biotene for mouth irritation.     Continue Tums PRN GERD, take away from Votrient.      10. Psych  Ongoing anxiety. Seeing Titus . Starting Zyprexa as above.     Continue Melatonin plus new Zyprexa PRN insomnia.      11. 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 Check TSH monthly.    Overall Plan  -Port replacement ASAP  -Add  "Celebrex for pain and increase Oxycodone  -Add Zyprexa for nausea/poor appetite and increase dosing of Compazine   -KALINA virtual visit in one week  -Labs and EKG after port replacement  -Continue Pazopanib     60 minutes spent on the date of the encounter doing chart review, review of test results, interpretation of tests, patient visit, documentation and discussion with other provider(s) including pulmonary, IR, and Dr. Lopez.     Harshal Schuster PA-C  Department of Hematology and Oncology  [facility]       Oncology Rooming Note    November 23, 2021 3:33 PM   Richa Ashby is a 56 year old female who presents for:    Chief Complaint   Patient presents with     Oncology Clinic Visit     Initial Vitals: LMP 03/19/2019  Estimated body mass index is 28.11 kg/m  as calculated from the following:    Height as of 11/1/21: 1.702 m (5' 7\").    Weight as of 11/6/21: 81.4 kg (179 lb 8 oz). There is no height or weight on file to calculate BSA.  Data Unavailable Comment: Data Unavailable   Patient's last menstrual period was 03/19/2019.  Allergies reviewed: Yes  Medications reviewed: Yes    Medications: MEDICATION REFILLS NEEDED TODAY. Provider was NOT notified.  Pain Meds - Oxycodone?  Pharmacy name entered into [employer]ook[employer]:    [id number] - [place], [place] - [address] [place] [facility] [place], [place] - [address] [place] [address] [address]  [place], [place] - [address] [place] [address]    Clinical concerns: pain control, go over CT results AW was notified.      Shari J. Schoenberger, [facility]                Again, thank you for allowing me to participate in the care of your patient.        Sincerely,        RUPERT Guaman    "

## 2021-11-23 NOTE — PROGRESS NOTES
"Oncology Rooming Note    November 23, 2021 3:33 PM   Richa Ashby is a 56 year old female who presents for:    Chief Complaint   Patient presents with     Oncology Clinic Visit     Initial Vitals: LMP 03/19/2019  Estimated body mass index is 28.11 kg/m  as calculated from the following:    Height as of 11/1/21: 1.702 m (5' 7\").    Weight as of 11/6/21: 81.4 kg (179 lb 8 oz). There is no height or weight on file to calculate BSA.  Data Unavailable Comment: Data Unavailable   Patient's last menstrual period was 03/19/2019.  Allergies reviewed: Yes  Medications reviewed: Yes    Medications: MEDICATION REFILLS NEEDED TODAY. Provider was NOT notified.  Pain Meds - Oxycodone?  Pharmacy name entered into Tap[employer]:    [id number] - [place], [place] - [address] [facility] [place], [place] - [address] [place] [address] [phone]  [place], [place] - [address] [place] [address]    Clinical concerns: pain control, go over CT results AW was notified.      Shari J. Schoenberger, LEYDA            "

## 2021-11-24 ENCOUNTER — TELEPHONE (OUTPATIENT)
Dept: PHARMACY | Facility: CLINIC | Age: 56
End: 2021-11-24
Payer: COMMERCIAL

## 2021-11-24 RX ORDER — OXYCODONE HYDROCHLORIDE 5 MG/1
5-10 TABLET ORAL EVERY 6 HOURS PRN
Qty: 100 TABLET | Refills: 0 | Status: SHIPPED | OUTPATIENT
Start: 2021-11-24 | End: 2021-12-01

## 2021-11-24 NOTE — PROGRESS NOTES
Outpatient IR Referral    Patient is a 55 y/o female with a PMH of asthma, GERD, metastatic undifferentiated pleomorphic sarcoma. Patient had RIJ port placed 10/20/20. Patient has had prior port checks  3/26/21 where a forceful flush corrected malfunction, patient reports prior TPA therapy. On 10/21/21 she had a port check for inability to aspirate, able to infuse.  films noted the port to be retracted within the chest, with the catheter tip located in the SVC and a fibrin sheath. Case was discussed with Dr. Ackerman at that time with recommendations for possible veno-plasty and a new port.     10/21/21 Port check notes IMPRESSION:  Right port with retracted tip likely from mass effect  from mediastinal tumor and fibrin sheath.       PLAN:   Port Revision with possible venoplasty, fibrin sheath  stripping and new port placement.  Should only be attempted at [facility].       Discussed with Dr. Ackerman 10/21/21. Consider saving venotomy site while keeping  wire access then consider venoplasty and fibrin sheath maceration,  place new port and place tip of the catheter deeper into right atrium    Patient was admitted 11/1/21 for urgent chest tube placement for the hydropneumothorax noted on CT. Due to high risk of recurrence she underwent talc pleurodesis 11/5/21. Port was incidentally found to have non-occlusive thrombus with plan for it to be replaced later this month pending resolution of subcutaneous air.     CT 11/22/21 notes Right chest wall port catheter tip is in the right innominate vein.    IMPRESSION:  Decreased chest wall subcutaneous emphysema. Otherwise no significant change in the chest wall/pleural based masses and other cavitary/necrotic metastatic lesions. Unchanged large necrotic versus infected right hemithorax lesion. Continued loculated right hydropneumothorax and other small loculated pneumothoraces on the right.    Case and imaging was reviewed with  from IR and an IR PA  telephone clinic visit is recommended to review port replacement of current site vs femoral/thigh access.    Primary team Harshal EMERY made aware of IR recommendations via epic messaging.        NUPUR Olivares CNP  Interventional Radiology   IR on-call pager: [phone]

## 2021-11-24 NOTE — TELEPHONE ENCOUNTER
Prior Authorization Approval    Authorization Effective Date: 10/25/2021  Authorization Expiration Date: 11/24/2022  Medication: Celecoxib 200MG capsules  Approved Dose/Quantity: 60 for 30 days  Reference #: Key: [id number] - PA Case ID: [id number]   Insurance Company: [employer] - Phone [phone] Fax [phone]  Expected CoPay:       CoPay Card Available:      Foundation Assistance Needed:    Which Pharmacy is filling the prescription (Not needed for infusion/clinic administered): [id number] - [place] - [address], [place] AT [facility]  Pharmacy Notified: Yes, spoke with Edmar at [facility], they will call the patient when this is ready.   Patient Notified: Yes

## 2021-11-25 NOTE — TELEPHONE ENCOUNTER
----- Message from Guillermo Johnson MD sent at 3/18/2020 11:47 AM CDT -----  I called this patient with her results of negative margins.  She needs to be seen in clinic tomorrow 3/19/2020 for drain removal and wound check.   rolling walker

## 2021-11-26 NOTE — TELEPHONE ENCOUNTER
DIAGNOSIS: To discuss procedure   DATE RECEIVED: 11.29.21   NOTES STATUS DETAILS   OFFICE NOTE from referring provider Internal 11.23.21  RUPERT Guaman  [facility] Oncology   OFFICE NOTE from other specialist na    DISCHARGE SUMMARY from hospital Internal Multiple recent encounters   DISCHARGE REPORT from the ER na    OPERATIVE REPORT na    MEDICATION LIST Internal    XRAYS (IMAGES & REPORTS) Internal 11.22.21, 11.9.21, 11.7.21 + more  CT Chest   PATHOLOGY  Slides & report na

## 2021-11-27 ENCOUNTER — DOCUMENTATION ONLY (OUTPATIENT)
Dept: ONCOLOGY | Facility: CLINIC | Age: 56
End: 2021-11-27
Payer: COMMERCIAL

## 2021-11-29 ENCOUNTER — PRE VISIT (OUTPATIENT)
Dept: INTERVENTIONAL RADIOLOGY/VASCULAR | Facility: CLINIC | Age: 56
End: 2021-11-29

## 2021-11-29 ENCOUNTER — VIRTUAL VISIT (OUTPATIENT)
Dept: INTERVENTIONAL RADIOLOGY/VASCULAR | Facility: CLINIC | Age: 56
End: 2021-11-29
Payer: COMMERCIAL

## 2021-11-29 DIAGNOSIS — C49.9 SARCOMA (H): Primary | ICD-10-CM

## 2021-11-29 DIAGNOSIS — Z11.59 ENCOUNTER FOR SCREENING FOR OTHER VIRAL DISEASES: ICD-10-CM

## 2021-11-29 PROCEDURE — 99213 OFFICE O/P EST LOW 20 MIN: CPT | Mod: 95 | Performed by: PHYSICIAN ASSISTANT

## 2021-11-29 NOTE — PROGRESS NOTES
[facility] Vascular      Type of Visit: Virtual: Other: telephone - [phone]    Patient is here for a new visit to discuss Consult to discuss IR Port Replacement Catheter Only Right.        Vitals:  No vitals were obtained today due to virtual visit.      Questions patient would like addressed today are: Patient verbalized no questions/concerns, this has been communicated to the provider.     Refills are needed: No    How would you like to obtain your AVS? Elida Barrett LPN

## 2021-11-29 NOTE — PROGRESS NOTES
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 She had a right single lumen internal jugular chest port placed with IR 10/28/21. She then had issues with aspirating and a port check 3/26/21showing a retracted catheter. She had a second port check on 10/21/21 showing the tip to be retracted and a fibrin sheath.    She uses the port for frequent infusions, draws, and medication administrations. She reports no issues with accessing her port reservoir or infections over the port pocket. She is interesting having another well functioning port. She has chronic pack pain likely related to mass effect. She is allergic to pentobarbital otherwise no other anesthetic complications.    11/1/21 she was noted to have majority chest masses with fluid and gas consistent with necrosis. The largest cyst likely ruptured causing right hydropneumothorax and admitted for urgent chest tube placement and subsequent talc pleurodesis. Per prior IR review, the subcutaneous air has resolved enough based on CT 11/22 that patient may proceed with port replacement.    Imaging:  Port check 10/21/21     Impression/Plan:  Richa Ashby is a 56 year old female with history of sarcoma with mediastinal mass. IR port check done showing fibrin sheath and the catheter retracted with tip in SVC. The port is nonfunctional and patient is interested in having replacement. Recommendations per IR provider at time of port check for replacement of the port with efforts to maintain venous access and perform venoplasty and/or fibrin sheath stripping. Plan to reuse the right side chest port pocket, patient is satisfied with the location of the port. Due to recurrent catheter retraction she may benefit from slightly deeper port catheter placement. We discussed moderate sedation and the need for a  .    Message sent to IR schedulers to coordinate procedure time at [facility].     Medicines to hold:  Not on anticoagulation, None    Kenia Gonzales PA-C  Interventional Radiology    =====    Past Medical History:  Past Medical History:   Diagnosis Date     Asthma, chronic      Sarcoma (H)     Right lower thigh       Past Surgical History:  Past Surgical History:   Procedure Laterality Date     AS RAD RESEC TONSIL/PILLARS       GALLBLADDER SURGERY       INJECT EPIDURAL THORACIC N/A 11/5/2021    Procedure: INJECTION, SPINE, THORACIC, EPIDURAL;  Surgeon: GENERIC ANESTHESIA PROVIDER;  Location: [facility]     INSERT PORT VASCULAR ACCESS Right 10/28/2020    Procedure: single lumen power port placement @0745;  Surgeon: Tex Ackerman MD;  Location: [facility] OR     IR CHEST PORT PLACEMENT > 5 YRS OF AGE  10/28/2020     IR PORT CHECK RIGHT  3/26/2021     IR PORT CHECK RIGHT  10/21/2021     RESECT TUMOR LOWER EXTREMITY Right 3/12/2020    Procedure: Resection of right thigh tumor;  Surgeon: Guillermo Johnson MD;  [facility]     ROTATOR CUFF REPAIR RT/LT  2012     SHOULDER SURGERY       SINUS SURGERY       THORACENTESIS N/A 11/5/2021    Procedure: Pleurodesis;  Surgeon: Elana Ngo MD;  Location: [facility]       Allergies:  Allergies   Allergen Reactions     Pentobarbital      resp arrest       Results Reviewed:  Lab Results   Component Value Date     11/16/2021     07/07/2021     Lab Results   Component Value Date    INR 1.24 11/01/2021    INR 0.90 10/28/2020       Vital Signs:  LMP 03/19/2019     =====

## 2021-11-30 DIAGNOSIS — C78.00 MALIGNANT NEOPLASM METASTATIC TO LUNG, UNSPECIFIED LATERALITY (H): ICD-10-CM

## 2021-11-30 DIAGNOSIS — C49.9 SARCOMA (H): ICD-10-CM

## 2021-11-30 DIAGNOSIS — C49.9 SARCOMA OF SOFT TISSUE (H): ICD-10-CM

## 2021-11-30 DIAGNOSIS — D63.0 ANEMIA IN NEOPLASTIC DISEASE: ICD-10-CM

## 2021-11-30 NOTE — PROGRESS NOTES
Connie is a 56 year old who is being evaluated via a billable video visit.      How would you like to obtain your AVS? MyChart  If the video visit is dropped, the invitation should be resent by: Text to cell phone: [phone]  Will anyone else be joining your video visit? No      Video Start Time: 2:30pm    Video-Visit Details    Type of service:  Video Visit    Video End Time:3:05pm    Originating Location (pt. Location): Home    Distant Location (provider location):  [facility]     Platform used for Video Visit: [facility]    Oncology/Hematology Visit Note  Dec 1, 2021    Reason for Visit: Follow up of undifferentiated pleomorphic sarcoma     History of Present Illness: Richa Ashby is a 56 year old female with PMH asthma and GERD with undifferentiated pleomorphic sarcoma. 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 She received preoperative radiotherapy and the tumor was resected 3/12/20. She then developed a cough, chest tightness, and scapular pain and imaging revealed lung nodules. Lung biopsy 11/2/20 with sarcoma. She was started on Doxil + Ifos 11/13/20. Had delay in cycle 2 due to COVID dx 12/2/20. CT imaging after cycle 1 with positive response to treatment with decrease in pulmonary nodules. Received cycle 2 12/18/20. Received cycle 3 1/15/21 with dose reduction in Doxil for skin toxicity and mucositis. Received cycle 4 2/15/21.      CT CAP 3/8/21 with positive response to treatment, decreased size of pulmonary mets though one lesion was larger. Did see fibrocystic changes in left breast, recommended breast imaging. Received cycle 5 Doxil + ifos 3/11/21.      Imaging 4/5/21 with overall stable disease except one lung lesion, opted to continue Doxil alone for now and switch to Keytruda in future if ongoing progression.      Started Keytruda 5/10/21. Was seen 5/26/21 cough, SOB, fatigue work-up thought to be 2/2  phrenic nerve involvement of R apical lung mass.      Due to worsening fatigue and cough, repeat CT 6/15/21 concerning for progression vs pseudoprogression. Added Gemzar 6/16/21.     She was admitted 6/17/21-6/20/21 with fever of unknown origin, infectious work-up negative, treated with broad spectrum antibiotics.     She clinically improved on abx. Was able to receive day 8 Gemzar (dose reduced for thrombocytopenia) + Keytruda 6/23/21.      CT 7/12/21 with stable disease.     She was admitted 8/25/21-8/31/21 for fevers, hypotension, acute on chronic anemia. Extensive infectious work-up negative so etiology of fevers still unknown.      She has had progressive disease so started on Pazopanib 9/24/21.     She was admitted 10/6/21-10/8/21 for weakness, new hallucinations in setting of UTI. Brain MRI without brain mets though did have Diffuse loss normal fatty marrow on T1 sequence, nonspecific. Symptoms improved with abx and IVF.      She has continued on Pazopanib. CT 11/1/21 for restaging noted majority of masses with fluid and gas consistent with necrosis and the largest cyst likely ruptured causing a right hydropneumothorax with near complete collapse of the R lung. One solid left pleural based lesion was slightly increased. Overall felt this was a positive response to Pazopanib so continued.     She was admitted 11/1/21 for urgent chest tube placement for the hydropneumothorax noted on CT. Due to high risk of recurrence she underwent talc pleurodesis 11/5/21. Port was incidentally found to have non-occlusive thrombus and will be replaced later this month pending resolution of subcutaneous air.     CT 11/22/21 overall with no change other than decreased chest wall subcutaneous emphysema.    Interval History:  Connie was seen today on video for follow-up.  -No fevers, headaches, dizziness. BP and HR within a normal range.  -More mouth irritation especially front of tongue where it is red. Denies obvious signs of  thrush. Not doing s/s at this time.  -R chest wall pain continues to be an issue but is managed with Celebrex BID and Oxycodone 5mg every 3 hours during the day and 10mg overnight. The increase in Oxycodone has made her more sleeply and caused mild confusion/brief hallucinations.   -Breathing feels OK, no change. Cough is improved, using cough syrup as needed.  -Nausea persists, worse in the AM, has occasional vomiting. Doing Compazine PRN. Not sure if Zyprexa is helping, though appetite is slightly better. Doing protein shakes. Still losing weight.   -No abdominal pain. Bowels fluctuate, not eating as much so not going as often but soft when she does.   -No swelling or bleeding concerns. Hand and feet peeling persists, admits to doing urea cream intermittently instead of consistent schedule. Some tenderness in fingertips  -Anxiety is better. Tearful about having to cancel her letty trip.     Current Outpatient Medications   Medication Sig Dispense Refill     acetaminophen (TYLENOL) 325 MG tablet Take 325-650 mg by mouth every 6 hours as needed for mild pain       ADVAIR DISKUS 100-50 MCG/DOSE inhaler Inhale 1 puff into the lungs daily        albuterol (PROAIR RESPICLICK) 108 (90 Base) MCG/ACT inhaler Inhale 1-2 puffs into the lungs every 6 hours as needed        celecoxib (CELEBREX) 200 MG capsule Take 1 capsule (200 mg) by mouth 2 times daily 60 capsule 3     cetirizine (ZYRTEC) 10 MG tablet Take 10 mg by mouth daily       diclofenac (VOLTAREN) 1 % topical gel Apply 4 g topically 4 times daily 350 g 3     gabapentin (NEURONTIN) 300 MG capsule Take 600 mg in the AM, 600 mg mid-day, and 900 mg at night 210 capsule 3     guaiFENesin-codeine (ROBITUSSIN AC) 100-10 MG/5ML solution Take 5-10 mLs by mouth every 4 hours as needed for cough 473 mL 1     levothyroxine (SYNTHROID/LEVOTHROID) 50 MCG tablet Take 1 tablet (50 mcg) by mouth daily 30 tablet 1     melatonin (MELATONIN) 1 MG/ML LIQD liquid Take 5 mg by mouth At  Bedtime        montelukast (SINGULAIR) 10 MG tablet Take 10 mg by mouth daily       multivitamin w/minerals (MULTI-VITAMIN) tablet Take 1 tablet by mouth daily       NASAL SPRAY SALINE NA        OLANZapine (ZYPREXA) 5 MG tablet Take 1 tablet (5 mg) by mouth At Bedtime 30 tablet 1     ondansetron (ZOFRAN) 8 MG tablet Take 1 tablet (8 mg) by mouth every 8 hours as needed for nausea 60 tablet 1     oxyCODONE (ROXICODONE) 5 MG tablet Take 1-2 tablets (5-10 mg) by mouth every 6 hours as needed for severe pain 100 tablet 0     pazopanib (VOTRIENT) 200 MG tablet Take 4 tablets (800 mg) by mouth daily Take on an empty stomach 1 hour before or 2 hours after a meal. 120 tablet 0     polyethylene glycol (MIRALAX) 17 GM/Dose powder Take 17 g by mouth daily 510 g 0     prochlorperazine (COMPAZINE) 10 MG tablet Take 10 mg by mouth every evening Before votrient       vitamin D3 (CHOLECALCIFEROL) 2000 units (50 mcg) tablet Take 1 tablet by mouth daily         Past Medical History  Past Medical History:   Diagnosis Date     Asthma, chronic      Sarcoma (H)     Right lower thigh     Past Surgical History:   Procedure Laterality Date     AS RAD RESEC TONSIL/PILLARS       GALLBLADDER SURGERY       INJECT EPIDURAL THORACIC N/A 11/5/2021    Procedure: INJECTION, SPINE, THORACIC, EPIDURAL;  Surgeon: GENERIC ANESTHESIA PROVIDER;  Location: [facility]     INSERT PORT VASCULAR ACCESS Right 10/28/2020    Procedure: single lumen power port placement @0745;  Surgeon: Tex Ackerman MD;  Location: [facility] OR     IR CHEST PORT PLACEMENT > 5 YRS OF AGE  10/28/2020     IR PORT CHECK RIGHT  3/26/2021     IR PORT CHECK RIGHT  10/21/2021     RESECT TUMOR LOWER EXTREMITY Right 3/12/2020    Procedure: Resection of right thigh tumor;  Surgeon: Guillermo Johnson MD;  [facility]     ROTATOR CUFF REPAIR RT/LT  2012     SHOULDER SURGERY       SINUS SURGERY       THORACENTESIS N/A 11/5/2021    Procedure: Pleurodesis;  Surgeon: Elana Ngo,  MD;  Location:  GI     Allergies   Allergen Reactions     Pentobarbital      resp arrest     Social History   Social History     Tobacco Use     Smoking status: Never Smoker     Smokeless tobacco: Never Used   Substance Use Topics     Alcohol use: Yes     Comment: 2-3 glasses of wine/week     Drug use: Never      Past medical history and social history were reviewed.    Physical Examination:  [facility] 03/19/2019   Wt Readings from Last 10 Encounters:   11/23/21 78.6 kg (173 lb 3.2 oz)   11/06/21 81.4 kg (179 lb 8 oz)   10/07/21 87.7 kg (193 lb 4.8 oz)   10/06/21 87.2 kg (192 lb 3.2 oz)   10/03/21 87.1 kg (192 lb)   09/22/21 89.3 kg (196 lb 12.8 oz)   09/08/21 90.5 kg (199 lb 8 oz)   08/30/21 90.7 kg (199 lb 15.3 oz)   08/25/21 90.8 kg (200 lb 1.6 oz)   08/11/21 93.4 kg (206 lb)     Video physical exam  General: Patient appears well in no acute distress.   Skin: No visualized rash or lesions on visualized skin  Eyes: EOMI, no erythema, sclera icterus or discharge noted  Resp: Appears to be breathing comfortably without accessory muscle usage, speaking in full sentences, no cough  MSK: Appears to have normal range of motion based on visualized movements  Neurologic: No apparent tremors, facial movements symmetric  Psych: affect normal, alert and oriented    The rest of a comprehensive physical examination is deferred due to PHE (public health emergency) video restrictions    Laboratory Data:  Will draw tomorrow when she is at [facility]      Assessment and Plan:  1. Onc  Metastatic UPS, was on Doxil + Ifosfamide (5 cycles) with positive response to treatment initially, then due to poor tolerance switched to Doxil alone (1 cycle) but had progression. Started on Keytruda 5/10/21. Due to tumor growth (progression vs pseudoprogression) added Gemzar 6/16/21. Had ongoing progression. Started on Pazopanib 9/24/21.     CT 11/1/21 with significant tumor necrosis/rupture. Given overall clinical improvement and findings of necrosis plan  to continue Pazopanib with close follow-up. CT from 11/22/21 with stable disease, necrotic lesions persist.      Continue Pazopanib with close KALINA follow-up (2 weeks). Hold off on scheduling repeat imaging for now pending clinicial course. Will also request Dr. Lopez visit.      2. Pulm  R sided hydropneumothorax: Found on 11/1/21 CT scan, thought 2/2 ruptured tumor. Was admitted 11/1 and underwent chest tube placement. Pulm performed talc pleuradesis on 11/5/21 due to risk of recurrence. CT from 11/22 shows ongoing but stable hydropneumothorax with no signs of lung collapse. Has pulm follow-up tomorrow. No new breathing concerns.      Subcutaneous chest wall air: After chest tube placement, much improved on CT.      Asthma: stable, continue inhalers.      COVID: Diagnosed in December, recovered. She is vaccinated. OK to get booster.     Cough: 2/2 malignancy, better. Continue Robitussin AC PRN.      3. ID  Fevers of unknown origin, thought to be tumor fevers, previously required Indomethacin, resolved.      Possible empyema inpatient, cultures were negative. No signs of infection.      4. Vascular  Port malfunction with small RIJ clot: Plan to replace 12/17/21. In the meantime will do labs tomorrow at [facility] with vascular access given difficult stick.      5. Pain  R chest wall/back pain, ongoing. Continue Oxycodone 5mg during the day and 10mg at night along with Celebrex 200mg BID BID, Tylenol and Voltaren Gel PRN. Will update Dr. Blanca who sees her tomorrow. Hesitant to add or increase narcotics given CNS effects.      6. Heme  Anemia: ACD and chemotherapy. Continue monitoring with labs-check tomorrow at [facility] with vascular access.      7. Cards  Exercise fatigue and NAZARIO: Echo was stable. Possibly was related to pneumothorax, though it is unclear when this happened.        Hypotension: Improved. Continue pushing home fluids and monitoring.       8. 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 Continue  Gabapentin 600/600/900.     Deconditioning: Doing PT     Hallucinations: Brain MRI reviewed and no mets. Does have loss of normal fatty marrow with broad differential. Discussed with neuro onc and unclear findings. Starting to have mild effects with increase in Oxycodone. Closely monitor.      9. GI  Constipation: Continue miralax PRN. Discussed adding fiber supplement to add bulk to stool.      Nausea, 2/2 chemotherapy and coughing, ongoing. Continue Zyprexa 5mg at bedtime. Will get EKG at [facility] sometime in the next week to check QT interval. Continue Compazine, take more consistently if needed.       Start salt/soda for mouth irritation. Call if need nystatin for thrush      Continue Tums PRN GERD, take away from Votrient.      10. Psych  Ongoing anxiety. Seeing Titus . Slightly better.      Continue Melatonin plus new Zyprexa PRN insomnia.      11. 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 Check TSH monthly.    12. Derm  Mild hand/foot, continue urea cream and sent Kenalog PRN.      Overall Plan  -Labs tomorrow [place] [facility]  -EKG at [facility] sometime in the next week  -No med changes other than add Kenalog cream. Palliative to help with pain control  -KALINA virtual visit in two weeks  -Continue Pazopanib   -Message [place] about visit     50 minutes spent on the date of the encounter doing chart review, patient visit and documentation     Harshal Schuster PA-C  Department of Hematology and Oncology  [facility]

## 2021-11-30 NOTE — PROGRESS NOTES
LUNG NODULE & INTERVENTIONAL PULMONARY CLINIC  [facility], [facility]     Richa Ashby MRN# [id number]   Age: 56 year old YOB: 1965     Reason for Consultation: Followup after talc pleurodesis    Requesting Physician: Stacey Camarena MD  No address on file       Assessment and Plan:    1. History of right hydropneumothorax s/p chest tube drainage and talc pleurodesis 11/5/21  CT Chest obtained on 11/22 showed stable right hydropneumothorax compared to CT Chest done on 11/9/21 after chest tube drainage and pleurodesis. Is significantly improved compared to 11/1/21. Her respiratory symptoms are stable today since discharge.   --Small wound present at prior chest tube site. Instructed patient to continue monitoring and if not improving in next week to contact PCP or oncology team.     Followup as needed.     Total time: 28 minutes (incudes prep time, reviewing outside records, visit time, and post visit work on the day of the encounter).        Shannan Ackerman MD  Interventional Pulmonology  Department of Pulmonary, Allergy, Critical Care and Sleep Medicine   [facility]           History:     Richa Ashby is a 56 year old female with sig h/o for sarcoma with mets to the lung who is here for followup after talc pleurodesis.    She was admitted on 11/1/21 for hydropneumothorax. Underwent chest tube placement and subsequent talc pleurodesis 11/5/21. Imaging done prior to discharge show improvement in both the air and fluid component and patient experienced improvement in SOB. Since discharge, patient has been feeling about the same. Denies increased SOB, but overall is more short of breath and fatigued compared to a year ago. Had stitches removed about 1.5 weeks ago with small wound remaining. Denies any pain, swelling, or discharge from site.     - My interpretation of the images relevant for this visit includes: Small right hydropneumothorax   - My interpretation  of the PFT's relevant for this visit includes: None         Past Medical History:      Past Medical History:   Diagnosis Date     Asthma, chronic      Sarcoma (H)     Right lower thigh           Past Surgical History:      Past Surgical History:   Procedure Laterality Date     AS RAD RESEC TONSIL/PILLARS       GALLBLADDER SURGERY       INJECT EPIDURAL THORACIC N/A 11/5/2021    Procedure: INJECTION, SPINE, THORACIC, EPIDURAL;  Surgeon: GENERIC ANESTHESIA PROVIDER;  Location: [facility] OR     INSERT PORT VASCULAR ACCESS Right 10/28/2020    Procedure: single lumen power port placement @0745;  Surgeon: Tex Ackerman MD;  Location: [facility] OR     IR CHEST PORT PLACEMENT > 5 YRS OF AGE  10/28/2020     IR PORT CHECK RIGHT  3/26/2021     IR PORT CHECK RIGHT  10/21/2021     RESECT TUMOR LOWER EXTREMITY Right 3/12/2020    Procedure: Resection of right thigh tumor;  Surgeon: Guillermo Johnson MD;  [facility]     ROTATOR CUFF REPAIR RT/LT  2012     SHOULDER SURGERY       SINUS SURGERY       THORACENTESIS N/A 11/5/2021    Procedure: Pleurodesis;  Surgeon: Elana Ngo MD;  Location:  GI          Social History:     Social History     Tobacco Use     Smoking status: Never Smoker     Smokeless tobacco: Never Used   Substance Use Topics     Alcohol use: Yes     Comment: 2-3 glasses of wine/week          Family History:     Family History   Problem Relation Age of Onset     Lung Cancer Maternal Grandmother      Breast Cancer Paternal Grandmother            Allergies:      Allergies   Allergen Reactions     Pentobarbital      resp arrest          Medications:     Current Outpatient Medications   Medication Sig     acetaminophen (TYLENOL) 325 MG tablet Take 325-650 mg by mouth every 6 hours as needed for mild pain     ADVAIR DISKUS 100-50 MCG/DOSE inhaler Inhale 1 puff into the lungs daily      albuterol (PROAIR RESPICLICK) 108 (90 Base) MCG/ACT inhaler Inhale 1-2 puffs into the lungs every 6 hours as needed       "celecoxib (CELEBREX) 200 MG capsule Take 1 capsule (200 mg) by mouth 2 times daily     cetirizine (ZYRTEC) 10 MG tablet Take 10 mg by mouth daily     diclofenac (VOLTAREN) 1 % topical gel Apply 4 g topically 4 times daily     gabapentin (NEURONTIN) 300 MG capsule Take 600 mg in the AM, 600 mg mid-day, and 900 mg at night     guaiFENesin-codeine (ROBITUSSIN AC) 100-10 MG/5ML solution Take 5-10 mLs by mouth every 4 hours as needed for cough     levothyroxine (SYNTHROID/LEVOTHROID) 50 MCG tablet Take 1 tablet (50 mcg) by mouth daily     melatonin (MELATONIN) 1 MG/ML LIQD liquid Take 5 mg by mouth At Bedtime      montelukast (SINGULAIR) 10 MG tablet Take 10 mg by mouth daily     multivitamin w/minerals (MULTI-VITAMIN) tablet Take 1 tablet by mouth daily     NASAL SPRAY SALINE NA      OLANZapine (ZYPREXA) 5 MG tablet Take 1 tablet (5 mg) by mouth At Bedtime     ondansetron (ZOFRAN) 8 MG tablet Take 1 tablet (8 mg) by mouth every 8 hours as needed for nausea     oxyCODONE (ROXICODONE) 5 MG tablet Take 1-2 tablets (5-10 mg) by mouth every 6 hours as needed for severe pain     pazopanib (VOTRIENT) 200 MG tablet Take 4 tablets (800 mg) by mouth daily Take on an empty stomach 1 hour before or 2 hours after a meal.     polyethylene glycol (MIRALAX) 17 GM/Dose powder Take 17 g by mouth daily     prochlorperazine (COMPAZINE) 10 MG tablet Take 10 mg by mouth every evening Before votrient     vitamin D3 (CHOLECALCIFEROL) 2000 units (50 mcg) tablet Take 1 tablet by mouth daily     No current facility-administered medications for this visit.     Facility-Administered Medications Ordered in Other Visits   Medication     heparin 100 UNIT/ML injection 5 mL            Physical Exam:   /77 (BP Location: Right arm, Patient Position: Chair, Cuff Size: Adult Regular)   Pulse 109   Temp 97.2  F (36.2  C) (Oral)   Resp 16   Ht 1.702 m (5' 7.01\")   Wt 74.8 kg (165 lb)   LMP 03/19/2019   SpO2 97%   BMI 25.84 kg/m    Wt Readings " from Last 4 Encounters:   11/23/21 78.6 kg (173 lb 3.2 oz)   11/06/21 81.4 kg (179 lb 8 oz)   10/07/21 87.7 kg (193 lb 4.8 oz)   10/06/21 87.2 kg (192 lb 3.2 oz)     General: Well appearing, nonlabored breathing  Lungs: Nonlabored breathing  Right chest: Small wound present at prior chest tube site, ~1/2 in. No drainage, redness, or swelling. No pain to palpation.   Neuro: Answering questions appropriately  Psych: Normal affect     Imaging/Lab Data   All laboratory and imaging data reviewed.

## 2021-12-01 ENCOUNTER — VIRTUAL VISIT (OUTPATIENT)
Dept: ONCOLOGY | Facility: CLINIC | Age: 56
End: 2021-12-01
Attending: PHYSICIAN ASSISTANT
Payer: COMMERCIAL

## 2021-12-01 DIAGNOSIS — G89.3 CANCER RELATED PAIN: ICD-10-CM

## 2021-12-01 DIAGNOSIS — L27.0 DRUG RASH: ICD-10-CM

## 2021-12-01 DIAGNOSIS — G62.0 DRUG-INDUCED POLYNEUROPATHY (H): ICD-10-CM

## 2021-12-01 DIAGNOSIS — C49.9 SARCOMA OF SOFT TISSUE (H): Primary | ICD-10-CM

## 2021-12-01 DIAGNOSIS — R94.6 THYROID FUNCTION TEST ABNORMAL: ICD-10-CM

## 2021-12-01 DIAGNOSIS — C49.9 SARCOMA (H): ICD-10-CM

## 2021-12-01 PROCEDURE — 99215 OFFICE O/P EST HI 40 MIN: CPT | Mod: 95 | Performed by: PHYSICIAN ASSISTANT

## 2021-12-01 RX ORDER — OXYCODONE HYDROCHLORIDE 5 MG/1
5-10 TABLET ORAL
Qty: 120 TABLET | Refills: 0 | Status: SHIPPED | OUTPATIENT
Start: 2021-12-01 | End: 2021-12-02

## 2021-12-01 RX ORDER — TRIAMCINOLONE ACETONIDE 1 MG/G
CREAM TOPICAL 2 TIMES DAILY
Qty: 30 G | Refills: 1 | Status: SHIPPED | OUTPATIENT
Start: 2021-12-01

## 2021-12-01 RX ORDER — OXYCODONE HYDROCHLORIDE 5 MG/1
5-10 TABLET ORAL
Qty: 100 TABLET | Refills: 0 | COMMUNITY
Start: 2021-12-01 | End: 2021-12-01

## 2021-12-01 RX ORDER — LEVOTHYROXINE SODIUM 50 UG/1
50 TABLET ORAL DAILY
Qty: 30 TABLET | Refills: 3 | Status: ON HOLD | OUTPATIENT
Start: 2021-12-01 | End: 2022-02-20

## 2021-12-01 RX ORDER — PAZOPANIB HYDROCHLORIDE 200 MG/1
TABLET, FILM COATED ORAL
Qty: 120 TABLET | Refills: 0 | OUTPATIENT
Start: 2021-12-01

## 2021-12-01 RX ORDER — GABAPENTIN 300 MG/1
CAPSULE ORAL
Qty: 210 CAPSULE | Refills: 3 | Status: SHIPPED | OUTPATIENT
Start: 2021-12-01

## 2021-12-02 ENCOUNTER — OFFICE VISIT (OUTPATIENT)
Dept: PULMONOLOGY | Facility: CLINIC | Age: 56
End: 2021-12-02
Attending: STUDENT IN AN ORGANIZED HEALTH CARE EDUCATION/TRAINING PROGRAM
Payer: COMMERCIAL

## 2021-12-02 ENCOUNTER — APPOINTMENT (OUTPATIENT)
Dept: LAB | Facility: CLINIC | Age: 56
End: 2021-12-02
Attending: INTERNAL MEDICINE
Payer: COMMERCIAL

## 2021-12-02 ENCOUNTER — VIRTUAL VISIT (OUTPATIENT)
Dept: PALLIATIVE CARE | Facility: CLINIC | Age: 56
End: 2021-12-02
Attending: INTERNAL MEDICINE
Payer: COMMERCIAL

## 2021-12-02 VITALS
RESPIRATION RATE: 16 BRPM | OXYGEN SATURATION: 97 % | HEIGHT: 67 IN | HEART RATE: 109 BPM | TEMPERATURE: 97.2 F | DIASTOLIC BLOOD PRESSURE: 77 MMHG | SYSTOLIC BLOOD PRESSURE: 122 MMHG | BODY MASS INDEX: 25.9 KG/M2 | WEIGHT: 165 LBS

## 2021-12-02 VITALS
OXYGEN SATURATION: 96 % | RESPIRATION RATE: 17 BRPM | SYSTOLIC BLOOD PRESSURE: 137 MMHG | DIASTOLIC BLOOD PRESSURE: 81 MMHG | BODY MASS INDEX: 26.23 KG/M2 | WEIGHT: 167.5 LBS | TEMPERATURE: 97.9 F | HEART RATE: 104 BPM

## 2021-12-02 DIAGNOSIS — G89.3 NEOPLASM RELATED PAIN: ICD-10-CM

## 2021-12-02 DIAGNOSIS — R45.86 MOOD AND AFFECT DISTURBANCE: ICD-10-CM

## 2021-12-02 DIAGNOSIS — C49.9 SARCOMA OF SOFT TISSUE (H): Primary | ICD-10-CM

## 2021-12-02 DIAGNOSIS — J94.8 HYDROPNEUMOTHORAX: Primary | ICD-10-CM

## 2021-12-02 LAB
ALBUMIN SERPL-MCNC: 2.2 G/DL (ref 3.4–5)
ALP SERPL-CCNC: 110 U/L (ref 40–150)
ALT SERPL W P-5'-P-CCNC: 18 U/L (ref 0–50)
ANION GAP SERPL CALCULATED.3IONS-SCNC: 7 MMOL/L (ref 3–14)
AST SERPL W P-5'-P-CCNC: 20 U/L (ref 0–45)
BASOPHILS # BLD AUTO: 0 10E3/UL (ref 0–0.2)
BASOPHILS NFR BLD AUTO: 1 %
BILIRUB SERPL-MCNC: 0.9 MG/DL (ref 0.2–1.3)
BUN SERPL-MCNC: 12 MG/DL (ref 7–30)
CALCIUM SERPL-MCNC: 9.6 MG/DL (ref 8.5–10.1)
CHLORIDE BLD-SCNC: 99 MMOL/L (ref 94–109)
CO2 SERPL-SCNC: 25 MMOL/L (ref 20–32)
CREAT SERPL-MCNC: 0.67 MG/DL (ref 0.52–1.04)
EOSINOPHIL # BLD AUTO: 0.3 10E3/UL (ref 0–0.7)
EOSINOPHIL NFR BLD AUTO: 8 %
ERYTHROCYTE [DISTWIDTH] IN BLOOD BY AUTOMATED COUNT: 18 % (ref 10–15)
GFR SERPL CREATININE-BSD FRML MDRD: >90 ML/MIN/1.73M2
GLUCOSE BLD-MCNC: 118 MG/DL (ref 70–99)
HCT VFR BLD AUTO: 26.3 % (ref 35–47)
HGB BLD-MCNC: 8 G/DL (ref 11.7–15.7)
IMM GRANULOCYTES # BLD: 0 10E3/UL
IMM GRANULOCYTES NFR BLD: 1 %
LYMPHOCYTES # BLD AUTO: 0.9 10E3/UL (ref 0.8–5.3)
LYMPHOCYTES NFR BLD AUTO: 22 %
MAGNESIUM SERPL-MCNC: 2 MG/DL (ref 1.6–2.3)
MCH RBC QN AUTO: 34.9 PG (ref 26.5–33)
MCHC RBC AUTO-ENTMCNC: 30.4 G/DL (ref 31.5–36.5)
MCV RBC AUTO: 115 FL (ref 78–100)
MONOCYTES # BLD AUTO: 0.4 10E3/UL (ref 0–1.3)
MONOCYTES NFR BLD AUTO: 10 %
NEUTROPHILS # BLD AUTO: 2.4 10E3/UL (ref 1.6–8.3)
NEUTROPHILS NFR BLD AUTO: 58 %
NRBC # BLD AUTO: 0 10E3/UL
NRBC BLD AUTO-RTO: 1 /100
PHOSPHATE SERPL-MCNC: 3.7 MG/DL (ref 2.5–4.5)
PLATELET # BLD AUTO: 210 10E3/UL (ref 150–450)
POTASSIUM BLD-SCNC: 3.9 MMOL/L (ref 3.4–5.3)
PROT SERPL-MCNC: 8.1 G/DL (ref 6.8–8.8)
RBC # BLD AUTO: 2.29 10E6/UL (ref 3.8–5.2)
SODIUM SERPL-SCNC: 131 MMOL/L (ref 133–144)
T4 FREE SERPL-MCNC: 1.06 NG/DL (ref 0.76–1.46)
TSH SERPL DL<=0.005 MIU/L-ACNC: 9.53 MU/L (ref 0.4–4)
WBC # BLD AUTO: 4.1 10E3/UL (ref 4–11)

## 2021-12-02 PROCEDURE — 36415 COLL VENOUS BLD VENIPUNCTURE: CPT | Performed by: INTERNAL MEDICINE

## 2021-12-02 PROCEDURE — 999N001193 HC VIDEO/TELEPHONE VISIT; NO CHARGE

## 2021-12-02 PROCEDURE — G0463 HOSPITAL OUTPT CLINIC VISIT: HCPCS

## 2021-12-02 PROCEDURE — 99213 OFFICE O/P EST LOW 20 MIN: CPT | Performed by: STUDENT IN AN ORGANIZED HEALTH CARE EDUCATION/TRAINING PROGRAM

## 2021-12-02 PROCEDURE — 99215 OFFICE O/P EST HI 40 MIN: CPT | Mod: 95 | Performed by: INTERNAL MEDICINE

## 2021-12-02 PROCEDURE — 82040 ASSAY OF SERUM ALBUMIN: CPT | Mod: GT,95 | Performed by: INTERNAL MEDICINE

## 2021-12-02 PROCEDURE — 83735 ASSAY OF MAGNESIUM: CPT | Mod: GT,95 | Performed by: INTERNAL MEDICINE

## 2021-12-02 PROCEDURE — 85025 COMPLETE CBC W/AUTO DIFF WBC: CPT | Mod: GT,95 | Performed by: INTERNAL MEDICINE

## 2021-12-02 PROCEDURE — 84443 ASSAY THYROID STIM HORMONE: CPT | Mod: GT,95 | Performed by: INTERNAL MEDICINE

## 2021-12-02 PROCEDURE — 36415 COLL VENOUS BLD VENIPUNCTURE: CPT | Mod: GT,95 | Performed by: INTERNAL MEDICINE

## 2021-12-02 PROCEDURE — 84100 ASSAY OF PHOSPHORUS: CPT | Mod: GT,95 | Performed by: INTERNAL MEDICINE

## 2021-12-02 PROCEDURE — 84439 ASSAY OF FREE THYROXINE: CPT | Mod: GT,95 | Performed by: INTERNAL MEDICINE

## 2021-12-02 RX ORDER — HYDROMORPHONE HYDROCHLORIDE 2 MG/1
2-4 TABLET ORAL
Qty: 60 TABLET | Refills: 0 | Status: SHIPPED | OUTPATIENT
Start: 2021-12-02 | End: 2021-12-09

## 2021-12-02 ASSESSMENT — PAIN SCALES - GENERAL
PAINLEVEL: MODERATE PAIN (4)
PAINLEVEL: MODERATE PAIN (4)

## 2021-12-02 ASSESSMENT — MIFFLIN-ST. JEOR: SCORE: 1371.19

## 2021-12-02 NOTE — NURSING NOTE
"Oncology Rooming Note    December 2, 2021 11:05 AM   Richa Ashby is a 56 year old female who presents for:    Chief Complaint   Patient presents with     Oncology Clinic Visit     [facility] RETURN - SARCOMA     Initial Vitals: /77 (BP Location: Right arm, Patient Position: Chair, Cuff Size: Adult Regular)   Pulse 109   Temp 97.2  F (36.2  C) (Oral)   Resp 16   Ht 1.702 m (5' 7.01\")   Wt 74.8 kg (165 lb)   LMP 03/19/2019   SpO2 97%   BMI 25.84 kg/m   Estimated body mass index is 25.84 kg/m  as calculated from the following:    Height as of this encounter: 1.702 m (5' 7.01\").    Weight as of this encounter: 74.8 kg (165 lb). Body surface area is 1.88 meters squared.  Moderate Pain (4) Comment: Data Unavailable   Patient's last menstrual period was 03/19/2019.  Allergies reviewed: Yes  Medications reviewed: Yes    Medications: Medication refills not needed today.  Pharmacy name entered into HALFP[facility]:    [id number] - [place], [place] - [address], [place] AT [facility] [place], [place] - [address] [place] [address] [id number]  [place], [place] - [address] [place] [address]    Clinical concerns: No new concerns. Tyron was notified.      Wicho Varner LPN            "

## 2021-12-02 NOTE — LETTER
12/2/2021       RE: Richa Ashby  [address]  [place] [address]     Dear Colleague,    Thank you for referring your patient, [facility]a Ashby[facility]. Please see a copy of my visit note below.    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   5/2021 started pembrolizumab.   6/16/21 added gemcitabine due to progression vs pseudoprogression  6/2021 hospitalized with fevers, no clear source   8/2021 hospitalized with fevers, hypotension, no clear source, question of inflammatory arthritis from pembro  9/2021 onc visit-->scan with growth however areas of necrosis; tx held, close follow-up.  9/2021 pazopanib  10/2021 hospitalized for weakness and delirium + UTI.   11/1/2021 hydroptx and collapse of R lung, admitted-->talc pleurodesis. Remains on pazopanib.     Follows with DONELL Torres palliative LICSW     Social - Lives with . 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      Care Planning - Multiple ACP discussions with us eg 6/16/2021, 7/2021, 9/2021. Completing HCD, well informed about the natural history of her cancer.     History:  History gathered today from: patient, medical chart    Pain is severe since pleurodesis.  Worse at night.  Takes 10 mg at at bedtime.  Pain is worst R lateral chest near CT site, but also posteriorally.  Takes 10 mg oxyIR 2-3x overnight.  During day takes 2.5-5 q3h ATC during the day depending on her pain.  Has though prominent visual illusions--eg seeing people etc on the edge of visual fields. Concentration impairments, brain fog.  Dreams, and wakes up acting out her dreams sometimes--reaching for  things, or speaking.  Modest pain relief with this, not great tho.    Tylenol --takes opposite oxycodone  Diclofenac gel has definitely helped.   Just really started celebrex a couple days ago.     She has not been on other oral opioids.  She recalls doing well with IV dilaudid in the hospital    Daily loose stool. She is on pazopanib.     Globally reports she is tolerating pazopanib well overall, ok with continuing    Mood--low with all the pain, set backs this month. Gladdened her last scan showed stability. Canceled Xmas trip but her relatives are now coming here instead. Son visited in early Nov, but she was in hospital, but she could see him.    PE: /81   Pulse 104   Temp 97.9  F (36.6  C) (Oral)   Resp 17   Wt 76 kg (167 lb 8 oz)   LMP 03/19/2019   SpO2 96%   BMI 26.23 kg/m     Wt Readings from Last 3 Encounters:   12/02/21 76 kg (167 lb 8 oz)   12/02/21 74.8 kg (165 lb)   11/23/21 78.6 kg (173 lb 3.2 oz)     Alert NAD  Clear sensorium full affect, congruent    Data reviewed:  I reviewed recent labs and imaging, my comments: Cr 0.67 T4free normal    CT IMPRESSION:  Decreased chest wall subcutaneous emphysema. Otherwise no significant  change in the chest wall/pleural based masses and other  cavitary/necrotic metastatic lesions. Unchanged large necrotic versus  infected right hemithorax lesion. Continued loculated right  hydropneumothorax and other small loculated pneumothoraces on the  right.     database reviewed: y      Impression & Recommendations:  57 yo with sarcoma    Pain worse related to hydroptx/burst tumor/pleurodesis.  She does not have a good therapeutic window with oxycodone--visual illusions and lucid dreaming, brain fog. Will change oxyIR to dilaudid 2-4mg q3h prn to see if that has a wider window for her.  She should call me in a couple days if the hydromorphone isn't better.  We can adjust the dose; other options include counterstimulation (eg Ritalin, Aderrall which we  discussed today); another opioid rotation (bup, morphine; low dose methadone add on eg 1 mg bid; I don't think other long-acting meds are good given they often have more brain fog than the short acting ones).    We discussed mood, coping, acceptance    Follow-up 1 mo    43 minutes spent on the date of the encounter doing chart review, history and exam, patient education & counseling, documentation and other activities as noted above.    Thank you for involving us in the patient's care.   Robert Blanca MD / Palliative Medicine / Text me via [facility].        Again, thank you for allowing me to participate in the care of your patient.      Sincerely,    Robert Blanca MD

## 2021-12-02 NOTE — NURSING NOTE
Chief Complaint   Patient presents with     Blood Draw     vpt blood draw, vitals taken     Venipuncture labs drawn from WHITNEY Levi MA

## 2021-12-02 NOTE — LETTER
12/2/2021     RE: Richa Ashby  [address]  [address]  [place] [address]     Dear Colleague,    Thank you for referring your patient, Richa Ashby, to the [facility] at [facility]. Please see a copy of my visit note below.    LUNG NODULE & INTERVENTIONAL PULMONARY CLINIC  [facility]     Richa Ashby MRN# [id number]   Age: 56 year old YOB: 1965     Reason for Consultation: Followup after talc pleurodesis    Requesting Physician: Referred Self, MD  No address on file       Assessment and Plan:    1. History of right hydropneumothorax s/p chest tube drainage and talc pleurodesis 11/5/21  CT Chest obtained on 11/22 showed stable right hydropneumothorax compared to CT Chest done on 11/9/21 after chest tube drainage and pleurodesis. Is significantly improved compared to 11/1/21. Her respiratory symptoms are stable today since discharge.   --Small wound present at prior chest tube site. Instructed patient to continue monitoring and if not improving in next week to contact PCP or oncology team.     Followup as needed.     Total time: 28 minutes (incudes prep time, reviewing outside records, visit time, and post visit work on the day of the encounter).        Shannan Ackerman MD  Interventional Pulmonology  Department of Pulmonary, Allergy, Critical Care and Sleep Medicine   [facility]           History:     Richa Ashby is a 56 year old female with sig h/o for sarcoma with mets to the lung who is here for followup after talc pleurodesis.    She was admitted on 11/1/21 for hydropneumothorax. Underwent chest tube placement and subsequent talc pleurodesis 11/5/21. Imaging done prior to discharge show improvement in both the air and fluid component and patient experienced improvement in SOB. Since discharge, patient has been feeling about the same. Denies  increased SOB, but overall is more short of breath and fatigued compared to a year ago. Had stitches removed about 1.5 weeks ago with small wound remaining. Denies any pain, swelling, or discharge from site.     - My interpretation of the images relevant for this visit includes: Small right hydropneumothorax   - My interpretation of the PFT's relevant for this visit includes: None         Past Medical History:      Past Medical History:   Diagnosis Date     Asthma, chronic      Sarcoma (H)     Right lower thigh           Past Surgical History:      Past Surgical History:   Procedure Laterality Date     AS RAD RESEC TONSIL/PILLARS       GALLBLADDER SURGERY       INJECT EPIDURAL THORACIC N/A 11/5/2021    Procedure: INJECTION, SPINE, THORACIC, EPIDURAL;  Surgeon: GENERIC ANESTHESIA PROVIDER;  Location: [facility] OR     INSERT PORT VASCULAR ACCESS Right 10/28/2020    Procedure: single lumen power port placement @0745;  Surgeon: Tex Ackerman MD;  Location: [facility] OR     IR CHEST PORT PLACEMENT > 5 YRS OF AGE  10/28/2020     IR PORT CHECK RIGHT  3/26/2021     IR PORT CHECK RIGHT  10/21/2021     RESECT TUMOR LOWER EXTREMITY Right 3/12/2020    Procedure: Resection of right thigh tumor;  Surgeon: Guillermo Johnson MD;  Location: [facility] OR     ROTATOR CUFF REPAIR RT/LT  2012     SHOULDER SURGERY       SINUS SURGERY       THORACENTESIS N/A 11/5/2021    Procedure: Pleurodesis;  Surgeon: Elana Ngo MD;  Location:  [facility]          Social History:     Social History     Tobacco Use     Smoking status: Never Smoker     Smokeless tobacco: Never Used   Substance Use Topics     Alcohol use: Yes     Comment: 2-3 glasses of wine/week          Family History:     Family History   Problem Relation Age of Onset     Lung Cancer Maternal Grandmother      Breast Cancer Paternal Grandmother            Allergies:      Allergies   Allergen Reactions     Pentobarbital      resp arrest          Medications:     Current Outpatient  Medications   Medication Sig     acetaminophen (TYLENOL) 325 MG tablet Take 325-650 mg by mouth every 6 hours as needed for mild pain     ADVAIR DISKUS 100-50 MCG/DOSE inhaler Inhale 1 puff into the lungs daily      albuterol (PROAIR RESPICLICK) 108 (90 Base) MCG/ACT inhaler Inhale 1-2 puffs into the lungs every 6 hours as needed      celecoxib (CELEBREX) 200 MG capsule Take 1 capsule (200 mg) by mouth 2 times daily     cetirizine (ZYRTEC) 10 MG tablet Take 10 mg by mouth daily     diclofenac (VOLTAREN) 1 % topical gel Apply 4 g topically 4 times daily     gabapentin (NEURONTIN) 300 MG capsule Take 600 mg in the AM, 600 mg mid-day, and 900 mg at night     guaiFENesin-codeine (ROBITUSSIN AC) 100-10 MG/5ML solution Take 5-10 mLs by mouth every 4 hours as needed for cough     levothyroxine (SYNTHROID/LEVOTHROID) 50 MCG tablet Take 1 tablet (50 mcg) by mouth daily     melatonin (MELATONIN) 1 MG/ML LIQD liquid Take 5 mg by mouth At Bedtime      montelukast (SINGULAIR) 10 MG tablet Take 10 mg by mouth daily     multivitamin w/minerals (MULTI-VITAMIN) tablet Take 1 tablet by mouth daily     NASAL SPRAY SALINE NA      OLANZapine (ZYPREXA) 5 MG tablet Take 1 tablet (5 mg) by mouth At Bedtime     ondansetron (ZOFRAN) 8 MG tablet Take 1 tablet (8 mg) by mouth every 8 hours as needed for nausea     oxyCODONE (ROXICODONE) 5 MG tablet Take 1-2 tablets (5-10 mg) by mouth every 6 hours as needed for severe pain     pazopanib (VOTRIENT) 200 MG tablet Take 4 tablets (800 mg) by mouth daily Take on an empty stomach 1 hour before or 2 hours after a meal.     polyethylene glycol (MIRALAX) 17 GM/Dose powder Take 17 g by mouth daily     prochlorperazine (COMPAZINE) 10 MG tablet Take 10 mg by mouth every evening Before votrient     vitamin D3 (CHOLECALCIFEROL) 2000 units (50 mcg) tablet Take 1 tablet by mouth daily     No current facility-administered medications for this visit.     Facility-Administered Medications Ordered in Other Visits  "  Medication     heparin 100 UNIT/ML injection 5 mL            Physical Exam:   /77 (BP Location: Right arm, Patient Position: Chair, Cuff Size: Adult Regular)   Pulse 109   Temp 97.2  F (36.2  C) (Oral)   Resp 16   Ht 1.702 m (5' 7.01\")   Wt 74.8 kg (165 lb)   LMP 03/19/2019   SpO2 97%   BMI 25.84 kg/m    Wt Readings from Last 4 Encounters:   11/23/21 78.6 kg (173 lb 3.2 oz)   11/06/21 81.4 kg (179 lb 8 oz)   10/07/21 87.7 kg (193 lb 4.8 oz)   10/06/21 87.2 kg (192 lb 3.2 oz)     General: Well appearing, nonlabored breathing  Lungs: Nonlabored breathing  Right chest: Small wound present at prior chest tube site, ~1/2 in. No drainage, redness, or swelling. No pain to palpation.   Neuro: Answering questions appropriately  Psych: Normal affect     Imaging/Lab Data   All laboratory and imaging data reviewed.       Again, thank you for allowing me to participate in the care of your patient.      Sincerely,    Shannan Ackerman MD  "

## 2021-12-02 NOTE — PROGRESS NOTES
Connie is a 56 year old who is being evaluated via a billable video visit.      How would you like to obtain your AVS? MyChart  If the video visit is dropped, the invitation should be resent by: Text to cell phone: [phone]  Will anyone else be joining your video visit? 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   5/2021 started pembrolizumab.   6/16/21 added gemcitabine due to progression vs pseudoprogression  6/2021 hospitalized with fevers, no clear source   8/2021 hospitalized with fevers, hypotension, no clear source, question of inflammatory arthritis from pembro  9/2021 onc visit-->scan with growth however areas of necrosis; tx held, close follow-up.  9/2021 pazopanib  10/2021 hospitalized for weakness and delirium + UTI.   11/1/2021 hydroptx and collapse of R lung, admitted-->talc pleurodesis. Remains on pazopanib.     [facility]     Social - Lives with . 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      Care Planning - Multiple ACP discussions with us eg 6/16/2021, 7/2021, 9/2021. Completing HCD, well informed about the natural history of her cancer.     History:  History gathered today from: patient, medical chart    Pain is severe since pleurodesis.  Worse at night.  Takes 10 mg at at bedtime.  Pain is worst R lateral chest near CT site, but also posteriorally.  Takes 10 mg oxyIR 2-3x overnight.  During day takes 2.5-5 q3h ATC during the day depending on her pain.  Has though prominent visual illusions--eg seeing people etc on the edge of visual fields. Concentration impairments, brain fog.  Dreams, and wakes up acting out her dreams sometimes--reaching for things, or speaking.  Modest pain relief with  this, not great tho.    Tylenol --takes opposite oxycodone  Diclofenac gel has definitely helped.   Just really started celebrex a couple days ago.     She has not been on other oral opioids.  She recalls doing well with IV dilaudid in the hospital    Daily loose stool. She is on pazopanib.     Globally reports she is tolerating pazopanib well overall, ok with continuing    Mood--low with all the pain, set backs this month. Gladdened her last scan showed stability. Canceled Xmas trip but her relatives are now coming here instead. Son visited in early Nov, but she was in hospital, but she could see him.    PE: /81   Pulse 104   Temp 97.9  F (36.6  C) (Oral)   Resp 17   Wt 76 kg (167 lb 8 oz)   LMP 03/19/2019   SpO2 96%   BMI 26.23 kg/m     Wt Readings from Last 3 Encounters:   12/02/21 76 kg (167 lb 8 oz)   12/02/21 74.8 kg (165 lb)   11/23/21 78.6 kg (173 lb 3.2 oz)     Alert NAD  Clear sensorium full affect, congruent    Data reviewed:  I reviewed recent labs and imaging, my comments: Cr 0.67 T4free normal    CT IMPRESSION:  Decreased chest wall subcutaneous emphysema. Otherwise no significant  change in the chest wall/pleural based masses and other  cavitary/necrotic metastatic lesions. Unchanged large necrotic versus  infected right hemithorax lesion. Continued loculated right  hydropneumothorax and other small loculated pneumothoraces on the  right.     database reviewed: y      Impression & Recommendations:  55 yo with sarcoma    Pain worse related to hydroptx/burst tumor/pleurodesis.  She does not have a good therapeutic window with oxycodone--visual illusions and lucid dreaming, brain fog. Will change oxyIR to dilaudid 2-4mg q3h prn to see if that has a wider window for her.  She should call me in a couple days if the hydromorphone isn't better.  We can adjust the dose; other options include counterstimulation (eg Ritalin, Aderrall which we discussed today); another opioid rotation (bup,  morphine; low dose methadone add on eg 1 mg bid; I don't think other long-acting meds are good given they often have more brain fog than the short acting ones).    We discussed mood, coping, acceptance    Follow-up 1 mo    43 minutes spent on the date of the encounter doing chart review, history and exam, patient education & counseling, documentation and other activities as noted above.    Thank you for involving us in the patient's care.   Robert Blanca MD / Palliative Medicine / Schuyler gaviria via [facility].      Video Start Time: 1427  Video-Visit Details    Type of service:  Video Visit    Video End Time:3:01 PM    Originating Location (pt. Location): Home    Distant Location (provider location): HOme  Platform used for Video Visit: James

## 2021-12-03 ENCOUNTER — NURSE TRIAGE (OUTPATIENT)
Dept: ONCOLOGY | Facility: CLINIC | Age: 56
End: 2021-12-03
Payer: COMMERCIAL

## 2021-12-03 DIAGNOSIS — C49.9 SARCOMA OF SOFT TISSUE (H): Primary | ICD-10-CM

## 2021-12-03 RX ORDER — NYSTATIN 100000/ML
500000 SUSPENSION, ORAL (FINAL DOSE FORM) ORAL 4 TIMES DAILY
Qty: 400 ML | Refills: 0 | Status: SHIPPED | OUTPATIENT
Start: 2021-12-03 | End: 2022-03-01

## 2021-12-03 NOTE — TELEPHONE ENCOUNTER
[facility] Triage    Incoming call: She has thrush    Copied from Harshal Schuster's note on 12/1/21    Start salt/soda for mouth irritation. Call if need nystatin for thrush     Patient states her tongue is covered in white and sticks to the roof of mouth, dry and some on the sides of her cheeks.  No sores, No fever, no pain.    [facility] [address]    NO refills and needs new RX    Routing to Dr. Lopez and Gomez Love to follow up

## 2021-12-04 ENCOUNTER — DOCUMENTATION ONLY (OUTPATIENT)
Dept: ONCOLOGY | Facility: CLINIC | Age: 56
End: 2021-12-04
Payer: COMMERCIAL

## 2021-12-05 DIAGNOSIS — C49.9 SARCOMA OF SOFT TISSUE (H): Primary | ICD-10-CM

## 2021-12-05 DIAGNOSIS — C78.00 MALIGNANT NEOPLASM METASTATIC TO LUNG, UNSPECIFIED LATERALITY (H): ICD-10-CM

## 2021-12-05 DIAGNOSIS — C49.9 SARCOMA (H): ICD-10-CM

## 2021-12-05 DIAGNOSIS — D63.0 ANEMIA IN NEOPLASTIC DISEASE: ICD-10-CM

## 2021-12-08 ENCOUNTER — VIRTUAL VISIT (OUTPATIENT)
Dept: ONCOLOGY | Facility: CLINIC | Age: 56
End: 2021-12-08
Attending: PSYCHOLOGIST
Payer: COMMERCIAL

## 2021-12-08 DIAGNOSIS — F43.21 ADJUSTMENT DISORDER WITH DEPRESSED MOOD: Primary | ICD-10-CM

## 2021-12-08 PROCEDURE — 90832 PSYTX W PT 30 MINUTES: CPT | Mod: 95 | Performed by: PSYCHOLOGIST

## 2021-12-08 PROCEDURE — 999N001193 HC VIDEO/TELEPHONE VISIT; NO CHARGE

## 2021-12-08 NOTE — PROGRESS NOTES
"Richa Ashby is a 56 year old female who is being evaluated via a billable telephone visit. Phone call duration: 30 minutes      The patient has been notified of following:      \"This telephone visit will be conducted via a call between you and your physician/provider. We have found that certain health care needs can be provided without the need for a physical exam.  This service lets us provide the care you need with a short phone conversation.  If a prescription is necessary we can send it directly to your pharmacy.  If lab work is needed we can place an order for that and you can then stop by our lab to have the test done at a later time.     Telephone visits are billed at different rates depending on your insurance coverage. During this emergency period, for some insurers they may be billed the same as an in-person visit.  Please reach out to your insurance provider with any questions.     If during the course of the call the physician/provider feels a telephone visit is not appropriate, you will not be charged for this service.\"     Patient has given verbal consent for Telephone visit? Yes    Confidential Summary of Oncology Psychology Followup Visit    Richa Ashby is a 56 year old female who presents for Depression  The patient was seen for a 30 minute appointment on 12/8/2021.    Subjective: She has been using the avoidance and distraction tools to good effect. She expressed sadness due to two separate events: 1. Had to cancel Gena trip to [place] and 2. Co-worker brought her all of her office possessions. These events were discussed and the idea of acceptance with problem solving was discussed.     Objective: Affect was appropriate to the content of the therapeutic conversation.     Diagnosis:   Encounter Diagnosis   Name Primary?     Adjustment disorder with depressed mood Yes     Recommendations/Plan:  1. Use acceptance with problem solving  2. Return for follow-up    Thank you for this " opportunity to participate in your care of this patient.    Rashid Arango Psy.D., L.P.  Director, Oncology Supportive Care

## 2021-12-09 ENCOUNTER — PATIENT OUTREACH (OUTPATIENT)
Dept: PALLIATIVE CARE | Facility: CLINIC | Age: 56
End: 2021-12-09
Payer: COMMERCIAL

## 2021-12-09 DIAGNOSIS — C78.00 MALIGNANT NEOPLASM METASTATIC TO LUNG, UNSPECIFIED LATERALITY (H): ICD-10-CM

## 2021-12-09 DIAGNOSIS — C49.9 SARCOMA OF SOFT TISSUE (H): Primary | ICD-10-CM

## 2021-12-09 DIAGNOSIS — C49.9 SARCOMA OF SOFT TISSUE (H): ICD-10-CM

## 2021-12-09 DIAGNOSIS — D63.0 ANEMIA IN NEOPLASTIC DISEASE: ICD-10-CM

## 2021-12-09 DIAGNOSIS — C49.9 SARCOMA (H): ICD-10-CM

## 2021-12-09 DIAGNOSIS — G89.3 NEOPLASM RELATED PAIN: ICD-10-CM

## 2021-12-09 RX ORDER — PAZOPANIB 200 MG/1
800 TABLET, FILM COATED ORAL DAILY
Qty: 120 TABLET | Refills: 0 | Status: SHIPPED | OUTPATIENT
Start: 2021-12-09 | End: 2022-02-08

## 2021-12-09 RX ORDER — HYDROMORPHONE HYDROCHLORIDE 2 MG/1
2-4 TABLET ORAL
Qty: 120 TABLET | Refills: 0 | Status: SHIPPED | OUTPATIENT
Start: 2021-12-09 | End: 2021-12-10

## 2021-12-09 NOTE — PROGRESS NOTES
Received VM from patient requesting refill of hydromorphone. Has enough for the day, but out tomorrow.     Called patient -  She reports that pain seems to be better managed then it was with the oxycodone. Still finds self with very sleepy periods throughout the day. Has kept on a more strict schedule with the dilaudid - taking 1 tab every 3 hours while awake and overnight 2 tabs every 6 hours. She says she  seems to wake up with mild pain at about 6 hours, she is not setting an alarm.     Taking 2 tabs during the day makes her too sleepy, 1 tab during the day lets her be more present with life.     No side effects - bowels have not changed. Takes miralax every other day, hasn't tried senna as she says she has been very sensitive to bowel meds in the past and quickly will get diarrhea.    Last refill: 12/2/2021  Last office visit: 12/2/2021  Scheduled for follow up 1/14/2022    Will route request to MD for review.     Reviewed [place]  Report.

## 2021-12-10 ENCOUNTER — TELEPHONE (OUTPATIENT)
Dept: PALLIATIVE CARE | Facility: CLINIC | Age: 56
End: 2021-12-10
Payer: COMMERCIAL

## 2021-12-10 ENCOUNTER — TELEPHONE (OUTPATIENT)
Dept: ONCOLOGY | Facility: CLINIC | Age: 56
End: 2021-12-10
Payer: COMMERCIAL

## 2021-12-10 ENCOUNTER — MYC MEDICAL ADVICE (OUTPATIENT)
Dept: INTERVENTIONAL RADIOLOGY/VASCULAR | Facility: CLINIC | Age: 56
End: 2021-12-10
Payer: COMMERCIAL

## 2021-12-10 DIAGNOSIS — C49.9 SARCOMA OF SOFT TISSUE (H): ICD-10-CM

## 2021-12-10 DIAGNOSIS — G89.3 NEOPLASM RELATED PAIN: ICD-10-CM

## 2021-12-10 RX ORDER — HYDROMORPHONE HYDROCHLORIDE 2 MG/1
2-4 TABLET ORAL
Qty: 120 TABLET | Refills: 0 | Status: SHIPPED | OUTPATIENT
Start: 2021-12-10 | End: 2021-12-10

## 2021-12-10 RX ORDER — HYDROMORPHONE HYDROCHLORIDE 2 MG/1
2-4 TABLET ORAL
Qty: 120 TABLET | Refills: 0 | Status: SHIPPED | OUTPATIENT
Start: 2021-12-10 | End: 2021-12-13

## 2021-12-10 NOTE — TELEPHONE ENCOUNTER
[facility] Triage - NO  ACCESS    Incoming Call:    [facility] to [facility]    They will need a new one to go to [facility] [place]    Routing to Dr. Blanca and Chelsea Zuleta

## 2021-12-10 NOTE — CONFIDENTIAL NOTE
Calls in this noon stating [facility] not [facility] to fill the prescription. Would like Dr Blanca to send the prescription to [facility] in [facility] at [facility].   Will Route to Dr Blanca and Page Dr Blanca .     No  access.

## 2021-12-10 NOTE — ORAL ONC MGMT
Oral Chemotherapy Monitoring Program    Call placed to Connie to see if thrush was getting better in mouth. Connie states that it is and the Nystatin no longer stings/burns when she uses the nystatin. She states she still is eating soft foods/shakes. However, yesterday she was more hungry for more solid foods.     Reminded patient she has upcoming visit with Harshal on 12/15. She was aware.    Patient had no further questions/concerns.    Mya Salazar, Pharm.D., [facility]  [phone]  12/10/21

## 2021-12-10 NOTE — CONFIDENTIAL NOTE
The last [facility] that her dilaudid prescription was sent to does not have enough medication.   States the [facility] oat [address] in [place] [phone] has enough hydromorphone to fill the prescription.   She has asked that a refill be sent to that location.   No  access  I have paged Dr Blanca to please send a new prescription to that [facility] pharmacy.

## 2021-12-13 ENCOUNTER — PATIENT OUTREACH (OUTPATIENT)
Dept: PALLIATIVE CARE | Facility: CLINIC | Age: 56
End: 2021-12-13
Payer: COMMERCIAL

## 2021-12-13 DIAGNOSIS — C49.9 SARCOMA OF SOFT TISSUE (H): ICD-10-CM

## 2021-12-13 DIAGNOSIS — G89.3 NEOPLASM RELATED PAIN: ICD-10-CM

## 2021-12-13 RX ORDER — HYDROMORPHONE HYDROCHLORIDE 2 MG/1
2-4 TABLET ORAL
Qty: 120 TABLET | Refills: 0 | Status: SHIPPED | OUTPATIENT
Start: 2021-12-13 | End: 2022-01-14

## 2021-12-13 NOTE — PROGRESS NOTES
Checked in with patient following some trouble getting pain med script end of last week. She reports she only got #38 tabs hydromorphone on Friday, her  reports that she needs more by tomorrow.      Last refill: 12/10/2021 (#38 tabs out of 120 prescribed)  Last office visit: 12/2/2021  Scheduled for follow up 1/14/2022    Will route request to MD for review.     Reviewed [place]  Report.

## 2021-12-14 ENCOUNTER — APPOINTMENT (OUTPATIENT)
Dept: LAB | Facility: CLINIC | Age: 56
End: 2021-12-14
Attending: PHYSICIAN ASSISTANT
Payer: COMMERCIAL

## 2021-12-14 ENCOUNTER — HOSPITAL ENCOUNTER (OUTPATIENT)
Dept: PHYSICAL THERAPY | Facility: CLINIC | Age: 56
Setting detail: THERAPIES SERIES
End: 2021-12-14
Attending: PHYSICIAN ASSISTANT
Payer: COMMERCIAL

## 2021-12-14 PROCEDURE — U0003 INFECTIOUS AGENT DETECTION BY NUCLEIC ACID (DNA OR RNA); SEVERE ACUTE RESPIRATORY SYNDROME CORONAVIRUS 2 (SARS-COV-2) (CORONAVIRUS DISEASE [COVID-19]), AMPLIFIED PROBE TECHNIQUE, MAKING USE OF HIGH THROUGHPUT TECHNOLOGIES AS DESCRIBED BY CMS-2020-01-R: HCPCS | Performed by: PHYSICIAN ASSISTANT

## 2021-12-14 PROCEDURE — 97110 THERAPEUTIC EXERCISES: CPT | Mod: GP | Performed by: PHYSICAL THERAPIST

## 2021-12-14 PROCEDURE — U0005 INFEC AGEN DETEC AMPLI PROBE: HCPCS | Performed by: PHYSICIAN ASSISTANT

## 2021-12-15 ENCOUNTER — VIRTUAL VISIT (OUTPATIENT)
Dept: ONCOLOGY | Facility: CLINIC | Age: 56
End: 2021-12-15
Attending: PHYSICIAN ASSISTANT
Payer: COMMERCIAL

## 2021-12-15 DIAGNOSIS — C49.9 SARCOMA (H): Primary | ICD-10-CM

## 2021-12-15 DIAGNOSIS — R11.0 NAUSEA: ICD-10-CM

## 2021-12-15 LAB — SARS-COV-2 RNA RESP QL NAA+PROBE: NEGATIVE

## 2021-12-15 PROCEDURE — 99214 OFFICE O/P EST MOD 30 MIN: CPT | Mod: 95 | Performed by: PHYSICIAN ASSISTANT

## 2021-12-15 RX ORDER — PROCHLORPERAZINE MALEATE 10 MG
10 TABLET ORAL EVERY 6 HOURS PRN
Qty: 90 TABLET | Refills: 3 | Status: SHIPPED | OUTPATIENT
Start: 2021-12-15 | End: 2022-03-01

## 2021-12-15 NOTE — PROGRESS NOTES
Connie is a 56 year old who is being evaluated via a billable video visit.      How would you like to obtain your AVS? MyChart  If the video visit is dropped, the invitation should be resent by: Text to cell phone: [phone]  Will anyone else be joining your video visit? Yes,  might.       Brianda Londono    Video Start Time: 1:24pm    Video-Visit Details    Type of service:  Video Visit    Video End Time: 1:52pm    Originating Location (pt. Location): Home    Distant Location (provider location):  [facility]     Platform used for Video Visit: [facility]     Oncology/Hematology Visit Note  Dec 15, 2021    Reason for Visit: Follow up of undifferentiated pleomorphic sarcoma     History of Present Illness: Richa Ashby is a 56 year old female with PMH asthma and GERD with undifferentiated pleomorphic sarcoma. 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 She received preoperative radiotherapy and the tumor was resected 3/12/20. She then developed a cough, chest tightness, and scapular pain and imaging revealed lung nodules. Lung biopsy 11/2/20 with sarcoma. She was started on Doxil + Ifos 11/13/20. Had delay in cycle 2 due to COVID dx 12/2/20. CT imaging after cycle 1 with positive response to treatment with decrease in pulmonary nodules. Received cycle 2 12/18/20. Received cycle 3 1/15/21 with dose reduction in Doxil for skin toxicity and mucositis. Received cycle 4 2/15/21.      CT CAP 3/8/21 with positive response to treatment, decreased size of pulmonary mets though one lesion was larger. Did see fibrocystic changes in left breast, recommended breast imaging. Received cycle 5 Doxil + ifos 3/11/21.      Imaging 4/5/21 with overall stable disease except one lung lesion, opted to continue Doxil alone for now and switch to Keytruda in future if ongoing progression.      Started Keytruda 5/10/21. Was seen 5/26/21 cough,  SOB, fatigue work-up thought to be 2/2 phrenic nerve involvement of R apical lung mass.      Due to worsening fatigue and cough, repeat CT 6/15/21 concerning for progression vs pseudoprogression. Added Gemzar 6/16/21.     She was admitted 6/17/21-6/20/21 with fever of unknown origin, infectious work-up negative, treated with broad spectrum antibiotics.     She clinically improved on abx. Was able to receive day 8 Gemzar (dose reduced for thrombocytopenia) + Keytruda 6/23/21.      CT 7/12/21 with stable disease.     She was admitted 8/25/21-8/31/21 for fevers, hypotension, acute on chronic anemia. Extensive infectious work-up negative so etiology of fevers still unknown.      She has had progressive disease so started on Pazopanib 9/24/21.     She was admitted 10/6/21-10/8/21 for weakness, new hallucinations in setting of UTI. Brain MRI without brain mets though did have Diffuse loss normal fatty marrow on T1 sequence, nonspecific. Symptoms improved with abx and IVF.      She has continued on Pazopanib. CT 11/1/21 for restaging noted majority of masses with fluid and gas consistent with necrosis and the largest cyst likely ruptured causing a right hydropneumothorax with near complete collapse of the R lung. One solid left pleural based lesion was slightly increased. Overall felt this was a positive response to Pazopanib so continued.     She was admitted 11/1/21 for urgent chest tube placement for the hydropneumothorax noted on CT. Due to high risk of recurrence she underwent talc pleurodesis 11/5/21. Port was incidentally found to have non-occlusive thrombus and will be replaced later this month pending resolution of subcutaneous air.      CT 11/22/21 overall with no change other than decreased chest wall subcutaneous emphysema.    She has continued on Pazopanib.     Interval History:  Connie was called today for follow-up. Overall doing well. Nausea continues to be an issue, decided to try scheduling Compazine which  has helped some. No improvement with Zyprexa. Eating OK but has to do small amounts. Thrush resolved but mouth still dry. Chest wall pain remains an issue, on Dilaudid which has helped but makes her tired. Issues with pain control overnight.     Denies any fevers, headaches, dizziness, neuro concerns. Breathing is about the same. Minimal cough issues. No abdominal pain, bowel or bladder concerns. Skin is still dry but no rash. Family coming into town for the holiday.     Current Outpatient Medications   Medication Sig Dispense Refill     acetaminophen (TYLENOL) 325 MG tablet Take 325-650 mg by mouth every 6 hours as needed for mild pain       ADVAIR DISKUS 100-50 MCG/DOSE inhaler Inhale 1 puff into the lungs daily        albuterol (PROAIR RESPICLICK) 108 (90 Base) MCG/ACT inhaler Inhale 1-2 puffs into the lungs every 6 hours as needed        celecoxib (CELEBREX) 200 MG capsule Take 1 capsule (200 mg) by mouth 2 times daily 60 capsule 3     cetirizine (ZYRTEC) 10 MG tablet Take 10 mg by mouth daily       diclofenac (VOLTAREN) 1 % topical gel Apply 4 g topically 4 times daily 350 g 3     gabapentin (NEURONTIN) 300 MG capsule Take 600 mg in the AM, 600 mg mid-day, and 900 mg at night 210 capsule 3     guaiFENesin-codeine (ROBITUSSIN AC) 100-10 MG/5ML solution Take 5-10 mLs by mouth every 4 hours as needed for cough 473 mL 1     HYDROmorphone (DILAUDID) 2 MG tablet Take 1-2 tablets (2-4 mg) by mouth every 3 hours as needed for severe pain (. Stop oxycodone completely. Do not take this with oxycodone.) 120 tablet 0     levothyroxine (SYNTHROID/LEVOTHROID) 50 MCG tablet Take 1 tablet (50 mcg) by mouth daily 30 tablet 3     melatonin (MELATONIN) 1 MG/ML LIQD liquid Take 5 mg by mouth At Bedtime        montelukast (SINGULAIR) 10 MG tablet Take 10 mg by mouth daily       multivitamin w/minerals (MULTI-VITAMIN) tablet Take 1 tablet by mouth daily       NASAL SPRAY SALINE NA        nystatin (MYCOSTATIN) 345535 UNIT/ML  suspension Take 5 mLs (500,000 Units) by mouth 4 times daily 400 mL 0     OLANZapine (ZYPREXA) 5 MG tablet Take 1 tablet (5 mg) by mouth At Bedtime 30 tablet 1     ondansetron (ZOFRAN) 8 MG tablet Take 1 tablet (8 mg) by mouth every 8 hours as needed for nausea 60 tablet 1     pazopanib (VOTRIENT) 200 MG tablet Take 4 tablets (800 mg) by mouth daily Take on an empty stomach 1 hour before or 2 hours after a meal. 120 tablet 0     pazopanib (VOTRIENT) 200 MG tablet Take 4 tablets (800 mg) by mouth daily Take on an empty stomach 1 hour before or 2 hours after a meal. 120 tablet 0     polyethylene glycol (MIRALAX) 17 GM/Dose powder Take 17 g by mouth daily 510 g 0     prochlorperazine (COMPAZINE) 10 MG tablet Take 10 mg by mouth every evening Before votrient       triamcinolone (KENALOG) 0.1 % external cream Apply topically 2 times daily As needed for skin irritation. 30 g 1     vitamin D3 (CHOLECALCIFEROL) 2000 units (50 mcg) tablet Take 1 tablet by mouth daily         Past Medical History  Past Medical History:   Diagnosis Date     Asthma, chronic      Sarcoma (H)     Right lower thigh     Past Surgical History:   Procedure Laterality Date     AS RAD RESEC TONSIL/PILLARS       GALLBLADDER SURGERY       INJECT EPIDURAL THORACIC N/A 11/5/2021    Procedure: INJECTION, SPINE, THORACIC, EPIDURAL;  Surgeon: GENERIC ANESTHESIA PROVIDER;  Location: [facility] OR     INSERT PORT VASCULAR ACCESS Right 10/28/2020    Procedure: single lumen power port placement @0745;  Surgeon: Tex Ackerman MD;  Location: [facility] OR     IR CHEST PORT PLACEMENT > 5 YRS OF AGE  10/28/2020     IR PORT CHECK RIGHT  3/26/2021     IR PORT CHECK RIGHT  10/21/2021     RESECT TUMOR LOWER EXTREMITY Right 3/12/2020    Procedure: Resection of right thigh tumor;  Surgeon: Guillermo Johnson MD;  Location: [facility] OR     ROTATOR CUFF REPAIR RT/LT  2012     SHOULDER SURGERY       SINUS SURGERY       THORACENTESIS N/A 11/5/2021    Procedure: Pleurodesis;  Surgeon:  Elana Ngo MD;  Location:  GI     Allergies   Allergen Reactions     Pentobarbital      resp arrest     Social History   Social History     Tobacco Use     Smoking status: Never Smoker     Smokeless tobacco: Never Used   Substance Use Topics     Alcohol use: Yes     Comment: 2-3 glasses of wine/week     Drug use: Never      Past medical history and social history were reviewed.    Physical Examination:  [facility] 03/19/2019   Wt Readings from Last 10 Encounters:   12/02/21 76 kg (167 lb 8 oz)   12/02/21 74.8 kg (165 lb)   11/23/21 78.6 kg (173 lb 3.2 oz)   11/06/21 81.4 kg (179 lb 8 oz)   10/07/21 87.7 kg (193 lb 4.8 oz)   10/06/21 87.2 kg (192 lb 3.2 oz)   10/03/21 87.1 kg (192 lb)   09/22/21 89.3 kg (196 lb 12.8 oz)   09/08/21 90.5 kg (199 lb 8 oz)   08/30/21 90.7 kg (199 lb 15.3 oz)     Video physical exam  General: Patient appears well in no acute distress.   Skin: No visualized rash or lesions on visualized skin  Eyes: EOMI, no erythema, sclera icterus or discharge noted  Resp: Appears to be breathing comfortably without accessory muscle usage, speaking in full sentences, no cough  MSK: Appears to have normal range of motion based on visualized movements  Neurologic: No apparent tremors, facial movements symmetric  Psych: affect normal, alert and oriented    The rest of a comprehensive physical examination is deferred due to PHE (public health emergency) video restrictions    Laboratory Data:  Results for AMADEO WILSON (MRN [id number]) as of 12/15/2021 14:00   12/2/2021 11:43   Sodium 131 (L)   Potassium 3.9   Chloride 99   Carbon Dioxide 25   Urea Nitrogen 12   Creatinine 0.67   GFR Estimate >90   Calcium 9.6   Anion Gap 7   Magnesium 2.0   Phosphorus 3.7   Albumin 2.2 (L)   Protein Total 8.1   Bilirubin Total 0.9   Alkaline Phosphatase 110   ALT 18   AST 20   T4 Free 1.06   TSH 9.53 (H)   Glucose 118 (H)   WBC 4.1   Hemoglobin 8.0 (L)   Hematocrit 26.3 (L)   Platelet Count 210   RBC Count 2.29 (L)     (H)   MCH 34.9 (H)   MCHC 30.4 (L)   RDW 18.0 (H)   % Neutrophils 58   % Lymphocytes 22   % Monocytes 10   % Eosinophils 8   % Basophils 1   Absolute Basophils 0.0   Absolute Eosinophils 0.3   Absolute Immature Granulocytes 0.0   Absolute Lymphocytes 0.9   Absolute Monocytes 0.4   % Immature Granulocytes 1   Absolute Neutrophils 2.4   Absolute NRBCs 0.0   NRBCs per 100 WBC 1 (H)       Assessment and Plan:  1. Onc  Metastatic UPS, was on Doxil + Ifosfamide (5 cycles) with positive response to treatment initially, then due to poor tolerance switched to Doxil alone (1 cycle) but had progression. Started on Keytruda 5/10/21. Due to tumor growth (progression vs pseudoprogression) added Gemzar 6/16/21. Had ongoing progression. Started on Pazopanib 9/24/21.     CT 11/1/21 with significant tumor necrosis/rupture. Given overall clinical improvement and findings of necrosis plan to continue Pazopanib with close follow-up. CT from 11/22/21 with stable disease, necrotic lesions persist.      Continue Pazopanib with close KALINA follow-up (2 weeks). Labs and EKG next week. Hold off on scheduling repeat imaging for now pending clinicial course-likely end of January or early Feb 2022. Will also request Dr. Lopez visit.      2. Pulm  R sided hydropneumothorax: Found on 11/1/21 CT scan, thought 2/2 ruptured tumor. Was admitted 11/1 and underwent chest tube placement. Pulm performed talc pleuradesis on 11/5/21 due to risk of recurrence. CT from 11/22 shows ongoing but stable hydropneumothorax with no signs of lung collapse. Pulm had no concerns at follow-up. No new breathing concerns.      Subcutaneous chest wall air: After chest tube placement, much improved on CT.      Asthma: stable, continue inhalers.      COVID: Diagnosed in December, recovered. She is vaccinated. OK to get booster-scheduled 1/3/21 per patient.      Cough: 2/2 malignancy, better. Continue Robitussin AC PRN.      3. ID  Fevers of unknown origin, thought  to be tumor fevers, previously required Indomethacin, resolved.      Possible empyema inpatient, cultures were negative. No signs of infection.      4. Vascular  Port malfunction with small RIJ clot: Plan to replace 12/17/21.     5. Pain  R chest wall/back pain, ongoing. Palliative now managing. Continue Celebrex and Dilaudid. Will reach out to their team about poor overnight control.      6. Heme  Anemia: ACD and chemotherapy. Continue monitoring with labs-next week.     7. Cards  Exercise fatigue and NAZARIO: Echo was stable. Possibly was related to pneumothorax, though it is unclear when this happened.        Hypotension: Improved. Continue pushing home fluids and monitoring.     Check EKG next week for QT monitoring.       8. 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 Continue Gabapentin 600/600/900.     Deconditioning: Doing PT     Hallucinations: Brain MRI reviewed and no mets. Does have loss of normal fatty marrow with broad differential. Discussed with neuro onc and unclear findings. No active concerns.      9. GI  Constipation: Continue metamucil PRN.     Nausea, 2/2 chemotherapy and coughing, ongoing. Zyprexa not helpful so will stop. Schedule Compazine 2-3x daily.      Start salt/soda for mouth irritation. Completed Nystatin course for thrush, can restart if recurrent symptoms.      Continue Tums PRN GERD, take away from Votrient.      10. Psych  Ongoing anxiety. Seeing Titus . Slightly better.      Continue Melatonin PRN insomnia. Stopping Zyprexa.      11. 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 Check TSH monthly. Last check stable.      12. Derm  Mild hand/foot, continue Kenalog PRN. Discussed Aveno lotion.      Overall Plan  -Port placement 12/17/21  -Labs and EKG next week at [place]  -Stop Zyprexa. Schedule Compazine  -KALINA virtual visit in two weeks  -Continue Pazopanib   -John in January     Harshal Schuster PA-C  Department of Hematology and  Oncology  [facility]

## 2021-12-16 ENCOUNTER — PATIENT OUTREACH (OUTPATIENT)
Dept: ONCOLOGY | Facility: CLINIC | Age: 56
End: 2021-12-16
Payer: COMMERCIAL

## 2021-12-16 ENCOUNTER — PATIENT OUTREACH (OUTPATIENT)
Dept: PALLIATIVE CARE | Facility: CLINIC | Age: 56
End: 2021-12-16
Payer: COMMERCIAL

## 2021-12-16 DIAGNOSIS — G89.3 NEOPLASM RELATED PAIN: Primary | ICD-10-CM

## 2021-12-16 DIAGNOSIS — F41.9 ANXIETY: ICD-10-CM

## 2021-12-16 RX ORDER — HYDROMORPHONE HYDROCHLORIDE 12 MG/1
12 TABLET, EXTENDED RELEASE ORAL EVERY EVENING
Qty: 30 TABLET | Refills: 0 | Status: SHIPPED | OUTPATIENT
Start: 2021-12-16 | End: 2022-01-14

## 2021-12-16 RX ORDER — HYDROXYZINE HYDROCHLORIDE 25 MG/1
25-50 TABLET, FILM COATED ORAL 3 TIMES DAILY PRN
Qty: 30 TABLET | Refills: 3 | Status: SHIPPED | OUTPATIENT
Start: 2021-12-16 | End: 2022-02-02

## 2021-12-16 NOTE — PROGRESS NOTES
Called and spoke with patient and her  via speaker phone. Advised of MD's below instructions. We talked at length about hydromorphone ER and how to take that - advised it is okay to continue PRN dilaudid as well if she has breakthrough pain, but she should not be woken up proactively for the PRN med. They verbalized understanding and agreement.     We also talked at length about hydroxyzine - including instructions and side effects to their verbalized comfort.     Also instructed them on narcan prescription that was sent.     Both patient and her  were able to repeat my instructions back to me and verbalized comfort with plan going forward.     Advised I would plan to call Monday to check in and see how she is doing, but asked her to call me sooner with any changes/questions/concerns. Confirmed she does have after hours palliative care MD # for on call physician should any emergent concerns arise over the weekend.

## 2021-12-16 NOTE — PROGRESS NOTES
Received message from ONC KALINA asking me to reach out to patient as she reported some uncontrolled pain overnight.     Called her - she says her overnight sleep is really disrupted by pain. Every day is different per her report, but last night was really bad.     Today she reports she has been feeling really anxious as well, she wonders if it's due to not sleeping well.     Takes 1 tab dilaudid every 3 hours during the day. Starting at about 9PM she will take 2 tabs dilaudid every 6 hours overnight. Does wake up from pain before 6 hour felicita, but has been waiting until 6 hours to take more dilaudid. Says she was wondering if she should just set an alarm for every 3 hours so she takes something.     Advised I do not want her to set an alarm for her pain meds and reminded her that if she wakes up from pain and it has been 3 hours she can take dilaudid then, does not need to wait until 6 hours. She was in agreement and will try that tonight. Will try 1 tab she tells me as she has been sensitive to opiates and doesn't want to take too much. She knows she could try 2 tabs.     Re: anxiety - really anxious today. Says she felt like her heart was pounding out of her chest earlier. She says she used her tools that she has at home and knows her heart rate was in the 80s, but that didn't help her feel any better.     Says she has pain in her rib cage/sternum and right now she feels really worried that if she were to be having a heart attack there would be no room at the hospital for her.     She says anxiety has been creeping up for some time now. Estimates 3 days per week anxiety is disruptive to her. Says she doesn't normally feel as anxious as she is today, has only had 1 or 2 episodes like this in the past. Has a hard time focusing on anything but her anxiety and feels she is fidgety and cannot settle down.     She says she would be open to trying a medication for anxiety as it does frequently come and go.     Advised I  would review this information with Dr. Blanca and follow up with her with his recommendations. She was in agreement with this plan.

## 2021-12-16 NOTE — PROGRESS NOTES
"Oncology Distress Screening Follow-up  Clinical Social Work  [facility]    Identified Concern and Score From Distress Screenin. How concerned are you about your ability to eat?  9 Abnormal        2. How concerned are you about unintended weight loss or your current weight?  9 Abnormal        3. How concerned are you about feeling depressed or very sad?  5       4. How concerned are you about feeling anxious or very scared?  7 Abnormal        5. Do you struggle with the loss of meaning and meño in your life?  Not at all       6. How concerned are you about work and home life issues that may be affected by your cancer?  0       7. How concerned are you about knowing what resources are available to help you?  0       8. Do you currently have what you would describe as Bahai or spiritual struggles?             Not at all         Date of Distress Screenin/15/21    Intervention:   Connie is a 56-year-old woman with a diagnosis of undifferentiated pleomorphic sarcoma. At time of her last provider visit she scored positive on oncology distress screen. This clinician called Connie today with goal of following up on elevated distress.     Connie endorsed that she has been struggling with elevated anxiety, reporting that this morning was especially difficult for her. Connie reports that she often sleeps as a way to escape her anxiety, and she at times gets panicked wondering about \"what is coming next\" and \"when it is happening.\" SW reviewed strategies to help shift focus in moments of panic. Connie acknowledged that movement as a strategy is especially helpful for her, as is \"taking her thoughts to court.\" Connie appreciative of space to talk with Titus Arango about additional strategies as well.     Connie endorsed that she wishes that her focus were better, and feels that she is often in a \"brain fog.\" Connie receptive to adding outpatient OT services to help with cognitive changes. SW to send message to PA that pt is " interested in referral.     Education Provided:   Onc SW contact information  OT services    Follow-up Required:   1) SW will continue to be available as needed for ongoing psychosocial support.  2) Appreciate Titus Arango's ongoing support of pt.     TEJAS Jacques, [facility] Center[facility]  Phone: [phone]  [facility]: M, Thu  *every other Tue, 8am-4:30pm  [facility] W, F, *every other Tue, 8am-4:30pm

## 2021-12-16 NOTE — PROGRESS NOTES
I'd offer her dilaudidER and prn hydroxyzine. I ordered both. The hope with the ER is that she gets some better sleep overnight and doesn't have to wake q3h in pain.

## 2021-12-17 ENCOUNTER — APPOINTMENT (OUTPATIENT)
Dept: INTERVENTIONAL RADIOLOGY/VASCULAR | Facility: CLINIC | Age: 56
End: 2021-12-17
Attending: PHYSICIAN ASSISTANT
Payer: COMMERCIAL

## 2021-12-17 ENCOUNTER — HOSPITAL ENCOUNTER (OUTPATIENT)
Facility: CLINIC | Age: 56
Discharge: HOME | End: 2021-12-17
Attending: INTERNAL MEDICINE | Admitting: PHYSICIAN ASSISTANT
Payer: COMMERCIAL

## 2021-12-17 ENCOUNTER — APPOINTMENT (OUTPATIENT)
Dept: MEDSURG UNIT | Facility: CLINIC | Age: 56
End: 2021-12-17
Attending: INTERNAL MEDICINE
Payer: COMMERCIAL

## 2021-12-17 VITALS
OXYGEN SATURATION: 94 % | TEMPERATURE: 97.8 F | HEIGHT: 67 IN | BODY MASS INDEX: 25.11 KG/M2 | SYSTOLIC BLOOD PRESSURE: 146 MMHG | WEIGHT: 160 LBS | DIASTOLIC BLOOD PRESSURE: 77 MMHG | RESPIRATION RATE: 14 BRPM | HEART RATE: 91 BPM

## 2021-12-17 DIAGNOSIS — C49.9 SARCOMA (H): ICD-10-CM

## 2021-12-17 DIAGNOSIS — Z11.59 ENCOUNTER FOR SCREENING FOR OTHER VIRAL DISEASES: ICD-10-CM

## 2021-12-17 DIAGNOSIS — R41.89 BRAIN FOG: Primary | ICD-10-CM

## 2021-12-17 LAB — INR PPP: 1.12 (ref 0.85–1.15)

## 2021-12-17 PROCEDURE — 85610 PROTHROMBIN TIME: CPT | Performed by: NURSE PRACTITIONER

## 2021-12-17 PROCEDURE — 272N000504 HC NEEDLE CR4

## 2021-12-17 PROCEDURE — 258N000003 HC RX IP 258 OP 636: Performed by: NURSE PRACTITIONER

## 2021-12-17 PROCEDURE — 250N000011 HC RX IP 250 OP 636: Performed by: STUDENT IN AN ORGANIZED HEALTH CARE EDUCATION/TRAINING PROGRAM

## 2021-12-17 PROCEDURE — C1788 PORT, INDWELLING, IMP: HCPCS

## 2021-12-17 PROCEDURE — 99152 MOD SED SAME PHYS/QHP 5/>YRS: CPT | Performed by: PHYSICIAN ASSISTANT

## 2021-12-17 PROCEDURE — 250N000011 HC RX IP 250 OP 636: Performed by: NURSE PRACTITIONER

## 2021-12-17 PROCEDURE — 999N000142 HC STATISTIC PROCEDURE PREP ONLY

## 2021-12-17 PROCEDURE — 36591 DRAW BLOOD OFF VENOUS DEVICE: CPT | Performed by: NURSE PRACTITIONER

## 2021-12-17 PROCEDURE — C1769 GUIDE WIRE: HCPCS

## 2021-12-17 PROCEDURE — 999N000132 HC STATISTIC PP CARE STAGE 1

## 2021-12-17 PROCEDURE — 99152 MOD SED SAME PHYS/QHP 5/>YRS: CPT

## 2021-12-17 PROCEDURE — 36578 REPLACE TUNNELED CV CATH: CPT

## 2021-12-17 PROCEDURE — 99153 MOD SED SAME PHYS/QHP EA: CPT

## 2021-12-17 PROCEDURE — 250N000009 HC RX 250: Performed by: STUDENT IN AN ORGANIZED HEALTH CARE EDUCATION/TRAINING PROGRAM

## 2021-12-17 PROCEDURE — 999N000127 HC STATISTIC PERIPHERAL IV START W US GUIDANCE

## 2021-12-17 PROCEDURE — 250N000011 HC RX IP 250 OP 636: Performed by: PHYSICIAN ASSISTANT

## 2021-12-17 PROCEDURE — 36582 REPLACE TUNNELED CV CATH: CPT | Mod: RT | Performed by: PHYSICIAN ASSISTANT

## 2021-12-17 PROCEDURE — 272N000602 HC WOUND GLUE CR1

## 2021-12-17 PROCEDURE — 77001 FLUOROGUIDE FOR VEIN DEVICE: CPT | Mod: 26 | Performed by: PHYSICIAN ASSISTANT

## 2021-12-17 RX ORDER — HEPARIN SODIUM (PORCINE) LOCK FLUSH IV SOLN 100 UNIT/ML 100 UNIT/ML
5 SOLUTION INTRAVENOUS
Status: COMPLETED | OUTPATIENT
Start: 2021-12-17 | End: 2021-12-17

## 2021-12-17 RX ORDER — HEPARIN SODIUM (PORCINE) LOCK FLUSH IV SOLN 100 UNIT/ML 100 UNIT/ML
5-10 SOLUTION INTRAVENOUS
Status: DISCONTINUED | OUTPATIENT
Start: 2021-12-17 | End: 2021-12-17 | Stop reason: HOSPADM

## 2021-12-17 RX ORDER — DIPHENHYDRAMINE HYDROCHLORIDE 50 MG/ML
25-50 INJECTION INTRAMUSCULAR; INTRAVENOUS
Status: COMPLETED | OUTPATIENT
Start: 2021-12-17 | End: 2021-12-17

## 2021-12-17 RX ORDER — NALOXONE HYDROCHLORIDE 0.4 MG/ML
0.2 INJECTION, SOLUTION INTRAMUSCULAR; INTRAVENOUS; SUBCUTANEOUS
Status: DISCONTINUED | OUTPATIENT
Start: 2021-12-17 | End: 2021-12-17 | Stop reason: HOSPADM

## 2021-12-17 RX ORDER — HEPARIN SODIUM,PORCINE 10 UNIT/ML
5-10 VIAL (ML) INTRAVENOUS EVERY 24 HOURS
Status: DISCONTINUED | OUTPATIENT
Start: 2021-12-17 | End: 2021-12-17 | Stop reason: HOSPADM

## 2021-12-17 RX ORDER — NALOXONE HYDROCHLORIDE 0.4 MG/ML
0.4 INJECTION, SOLUTION INTRAMUSCULAR; INTRAVENOUS; SUBCUTANEOUS
Status: DISCONTINUED | OUTPATIENT
Start: 2021-12-17 | End: 2021-12-17 | Stop reason: HOSPADM

## 2021-12-17 RX ORDER — DEXTROSE MONOHYDRATE 25 G/50ML
25-50 INJECTION, SOLUTION INTRAVENOUS
Status: DISCONTINUED | OUTPATIENT
Start: 2021-12-17 | End: 2021-12-17 | Stop reason: HOSPADM

## 2021-12-17 RX ORDER — HEPARIN SODIUM,PORCINE 10 UNIT/ML
5-10 VIAL (ML) INTRAVENOUS
Status: DISCONTINUED | OUTPATIENT
Start: 2021-12-17 | End: 2021-12-17 | Stop reason: HOSPADM

## 2021-12-17 RX ORDER — FENTANYL CITRATE 50 UG/ML
25-50 INJECTION, SOLUTION INTRAMUSCULAR; INTRAVENOUS EVERY 5 MIN PRN
Status: DISCONTINUED | OUTPATIENT
Start: 2021-12-17 | End: 2021-12-17 | Stop reason: HOSPADM

## 2021-12-17 RX ORDER — FLUMAZENIL 0.1 MG/ML
0.2 INJECTION, SOLUTION INTRAVENOUS
Status: DISCONTINUED | OUTPATIENT
Start: 2021-12-17 | End: 2021-12-17 | Stop reason: HOSPADM

## 2021-12-17 RX ORDER — NICOTINE POLACRILEX 4 MG
15-30 LOZENGE BUCCAL
Status: DISCONTINUED | OUTPATIENT
Start: 2021-12-17 | End: 2021-12-17 | Stop reason: HOSPADM

## 2021-12-17 RX ORDER — LIDOCAINE 40 MG/G
CREAM TOPICAL
Status: DISCONTINUED | OUTPATIENT
Start: 2021-12-17 | End: 2021-12-17 | Stop reason: HOSPADM

## 2021-12-17 RX ORDER — CEFAZOLIN SODIUM 2 G/100ML
2 INJECTION, SOLUTION INTRAVENOUS
Status: COMPLETED | OUTPATIENT
Start: 2021-12-17 | End: 2021-12-17

## 2021-12-17 RX ORDER — SODIUM CHLORIDE 9 MG/ML
INJECTION, SOLUTION INTRAVENOUS CONTINUOUS
Status: DISCONTINUED | OUTPATIENT
Start: 2021-12-17 | End: 2021-12-17 | Stop reason: HOSPADM

## 2021-12-17 RX ADMIN — PROCHLORPERAZINE EDISYLATE 5 MG: 5 INJECTION INTRAMUSCULAR; INTRAVENOUS at 12:27

## 2021-12-17 RX ADMIN — FENTANYL CITRATE 50 MCG: 50 INJECTION, SOLUTION INTRAMUSCULAR; INTRAVENOUS at 13:22

## 2021-12-17 RX ADMIN — FENTANYL CITRATE 25 MCG: 50 INJECTION, SOLUTION INTRAMUSCULAR; INTRAVENOUS at 13:27

## 2021-12-17 RX ADMIN — LIDOCAINE HYDROCHLORIDE 8 ML: 10 INJECTION, SOLUTION EPIDURAL; INFILTRATION; INTRACAUDAL; PERINEURAL at 13:35

## 2021-12-17 RX ADMIN — HEPARIN 5 ML: 100 SYRINGE at 14:09

## 2021-12-17 RX ADMIN — FENTANYL CITRATE 25 MCG: 50 INJECTION, SOLUTION INTRAMUSCULAR; INTRAVENOUS at 13:41

## 2021-12-17 RX ADMIN — SODIUM CHLORIDE: 9 INJECTION, SOLUTION INTRAVENOUS at 11:04

## 2021-12-17 RX ADMIN — DIPHENHYDRAMINE HYDROCHLORIDE 25 MG: 50 INJECTION, SOLUTION INTRAMUSCULAR; INTRAVENOUS at 13:24

## 2021-12-17 RX ADMIN — MIDAZOLAM 1 MG: 1 INJECTION INTRAMUSCULAR; INTRAVENOUS at 13:22

## 2021-12-17 RX ADMIN — MIDAZOLAM 0.5 MG: 1 INJECTION INTRAMUSCULAR; INTRAVENOUS at 13:27

## 2021-12-17 RX ADMIN — CEFAZOLIN SODIUM 2 G: 2 INJECTION, SOLUTION INTRAVENOUS at 11:04

## 2021-12-17 RX ADMIN — MIDAZOLAM 0.5 MG: 1 INJECTION INTRAMUSCULAR; INTRAVENOUS at 13:40

## 2021-12-17 ASSESSMENT — MIFFLIN-ST. JEOR: SCORE: 1348.39

## 2021-12-17 NOTE — DISCHARGE INSTRUCTIONS
[facility]        Interventional Radiology  Discharge Instructions  Following Port Replacement    Your Port has been closed with:    ? Derma Chavez (Skin Glue)    Do not apply any ointments over site    Do not cover with any dressing    This thin layer will slough off in 7-10 days    May gently remove Derma Chavez in 10 days if still present    If there is any oozing or bleeding from the site, apply direct pressure for 5-10 minutes with a gauze pad.  If bleeding continues after 10 minutes call your primary doctor.  If bleeding cannot be controlled with direct pressure, call 911.    Call your Doctor if:    Bleeding as above    Swelling in your neck or arm    Sudden onset of Shortness of Breath, Lightheadedness, Palpitations.    Fever greater than 100.5  F    Other signs of infection such as, redness, tenderness, or drainage from the wound    If you were given sedation:    We recommend an adult stay with you for the first 24 hours.    No driving or alcoholic beverages for 24 hours.        ADDITIONAL INSTRUCTIONS: May use ice packs 3-4 times a day for 15 minutes for minor swelling or pain    No heavy lifting greater than 10 lbs. for one week    No tub bath, hot tub, or swimming until Derma Chavez (Skin Glue) is removed    It is OK to shower and get the incision wet but immediately pat it dry    No Emla Cream to Port site for 1 week.    If you are on Coumadin, restart tonight.  Follow up with your Coumadin Clinic or Primary Care MD to have your INR rechecked.    [facility] INTERVENTIONAL RADIOLOGY DEPARTMENT  Procedure Physician: RUPERT Art   Date of procedure: December 17, 2021    Telephone Numbers: [phone] Monday-Friday 8:00 am to 4:30 pm  [phone] After 4:30 pm Monday-Friday, Weekends & Holidays.   Ask for the Interventional Radiologist on call.  Someone is on call 24 hrs/day  [facility] toll free number: [phone] Monday-Friday 8:00 am to 4:30 pm  [facility] Emergency Dept: [phone]

## 2021-12-17 NOTE — PROCEDURES
[facility]    Procedure: IR Procedure Note    Date/Time: 12/17/2021 2:22 PM  Performed by: Richy Eagle PA-C  Authorized by: Richy Eagle PA-C       UNIVERSAL PROTOCOL   Site Marked: NA  Prior Images Obtained and Reviewed:  Yes  Required items: Required blood products, implants, devices and special equipment available    Patient identity confirmed:  Verbally with patient, arm band, provided demographic data and hospital-assigned identification number  Patient was reevaluated immediately before administering moderate or deep sedation or anesthesia  Confirmation Checklist:  Patient's identity using two indicators, relevant allergies, procedure was appropriate and matched the consent or emergent situation and correct equipment/implants were available  Time out: Immediately prior to the procedure a time out was called    Universal Protocol: the Joint Commission Universal Protocol was followed    Preparation: Patient was prepped and draped in usual sterile fashion       ANESTHESIA    Anesthesia: Local infiltration  Local Anesthetic:  Lidocaine 1% without epinephrine      SEDATION  Patient Sedated: Yes    Vital signs: Vital signs monitored during sedation    See dictated procedure note for full details.  Findings: Sedation medication administered:   Fentanyl:100 Mcg  Versed: 2 Mg   Benadryl 25mg  Sedation time: 25 minutes    Specimens: none    Complications: None    Condition: Stable      PROCEDURE  Describe Procedure: Richa Rileytt  [id number]    Completed re-placement of right chest venous chest port due to malfunction and tip too short.  A new 6 Bhutanese, 27.5 cm (44 to 16.5 cm cuts) Bard ClearVUE Slim brand, single lumen venous chest power-injectable port was placed. Venous access and port pocket were preserved.  Tip lying in the low right atrium. PORT is ready for immediate use.  Dx:  marybeth Eagle. <5      Sedation medication  administered:   Fentanyl:100 Mcg  Versed: 2 Mg   Benadryl 25mg  Sedation time: 25 minutes        Patient Tolerance:  Patient tolerated the procedure well with no immediate complications  Length of time physician/provider present for 1:1 monitoring during sedation: 25

## 2021-12-17 NOTE — PROGRESS NOTES
Pt arrives to 2a, with spouse, for port replacement. H&P is up to date. Consent is signed.   Pt's spouse pointed out to this writer that pt had a drain removed from right lateral chest approximately a month ago. There is a dime sized area that remains open, erythema edges, with some scabbing, wound bed is moist, yellow in color. [facility] notified and reports team will assess when pt arrives to procedure room. Instructed pt to let oncologist know.

## 2021-12-17 NOTE — PROGRESS NOTES
Condition is stable s/p port replacement. Vital Signs are stable/WNL. Right anterior chest site clean, dry and intact, cms intact. Discharge instructions reviewed with patient and questions answered. Patient verbalizes understanding. IV removed. Pain under control with home medication regime. Patient is ambulating and voiding spontaneously. Patient is tolerating regular diet and denies any N/V. Patient to be discharged to home via  [facility]. Patient has all belongings

## 2021-12-17 NOTE — PROGRESS NOTES
Pt arrived via cart with RN from ir s/p port replacement. VSS. Right chest site CDI, no hematoma. Patient complains of pain.   at bedside.

## 2021-12-17 NOTE — PROCEDURES
Patient in IR for port placement. Asked to examine wound on right posterolateral thorax. Concern for non-healing wound after drain removal per patient.     Site looks like old chest tube site. 3 cm incision is 3/4 healed with noticeable suture track marks. The most medial 1/4 has lost it's tissue approximation and is healing by secondary wound healing. There is a small crust present. No swelling, redness, significant pain or drainage.    Advised the patient that the site will likely heal given more time and that she could keep it covered with gauze dressing until better healed.

## 2021-12-17 NOTE — PROGRESS NOTES
Patient Name: Richa Ashby  Medical Record Number: [id number]  Today's Date: 12/17/2021    Start Time: 1317  End of procedure time: 1405  Procedure: R chest port placement  Proceduralist: Blanco EMERY    Sedation   Start: 1317   Sedation End: 1340    Sedation medication administered:   Fentanyl:100 Mcg  Versed: 2 Mg    Benadryl 25mg    Report given to: 2A RN  Time pt departs:  1410  :     Other Notes: Pt arrived to IR room 4 from . Pt denies any questions or concerns regarding procedure. Pt positioned supine and monitored per protocol. Pt tolerated procedure without any complication. Pt transferred back to .    Yaz Nance. WHITNEY

## 2021-12-18 ENCOUNTER — DOCUMENTATION ONLY (OUTPATIENT)
Dept: ONCOLOGY | Facility: CLINIC | Age: 56
End: 2021-12-18
Payer: COMMERCIAL

## 2021-12-20 ENCOUNTER — PATIENT OUTREACH (OUTPATIENT)
Dept: PALLIATIVE CARE | Facility: CLINIC | Age: 56
End: 2021-12-20
Payer: COMMERCIAL

## 2021-12-20 NOTE — PROGRESS NOTES
Called patient to check in on med changes made last week. She unfortunately hasn't started the long acting hydromorphone yet - pharmacy didn't have enough on Friday. She is calling the pharmacy shortly and hoping to  today. She will let me know if there are any other barriers.     She did get the hydroxyzine and thinks it is helping some with her anxiety. Says sometimes it is tough to tell the difference between pain and anxiety. Though notes she is clear hydroxyzine is helping her relax some.     Will call Wednesday to check in, assuming she gets hydromorphone ER today. She will call sooner if further issues at pharmacy.

## 2021-12-21 ENCOUNTER — LAB (OUTPATIENT)
Dept: LAB | Facility: CLINIC | Age: 56
End: 2021-12-21
Attending: PHYSICIAN ASSISTANT
Payer: COMMERCIAL

## 2021-12-21 ENCOUNTER — TELEPHONE (OUTPATIENT)
Dept: ONCOLOGY | Facility: CLINIC | Age: 56
End: 2021-12-21

## 2021-12-21 DIAGNOSIS — D63.0 ANEMIA IN NEOPLASTIC DISEASE: ICD-10-CM

## 2021-12-21 DIAGNOSIS — C49.9 SARCOMA OF SOFT TISSUE (H): ICD-10-CM

## 2021-12-21 DIAGNOSIS — Z79.899 ENCOUNTER FOR LONG-TERM (CURRENT) USE OF MEDICATIONS: ICD-10-CM

## 2021-12-21 DIAGNOSIS — C78.00 MALIGNANT NEOPLASM METASTATIC TO LUNG, UNSPECIFIED LATERALITY (H): ICD-10-CM

## 2021-12-21 DIAGNOSIS — C49.9 SARCOMA (H): ICD-10-CM

## 2021-12-21 LAB
ALBUMIN SERPL-MCNC: 2.3 G/DL (ref 3.4–5)
ALP SERPL-CCNC: 92 U/L (ref 40–150)
ALT SERPL W P-5'-P-CCNC: 14 U/L (ref 0–50)
ANION GAP SERPL CALCULATED.3IONS-SCNC: 8 MMOL/L (ref 3–14)
AST SERPL W P-5'-P-CCNC: 16 U/L (ref 0–45)
BASOPHILS # BLD AUTO: 0 10E3/UL (ref 0–0.2)
BASOPHILS NFR BLD AUTO: 1 %
BILIRUB SERPL-MCNC: 0.7 MG/DL (ref 0.2–1.3)
BUN SERPL-MCNC: 9 MG/DL (ref 7–30)
CALCIUM SERPL-MCNC: 9.4 MG/DL (ref 8.5–10.1)
CHLORIDE BLD-SCNC: 101 MMOL/L (ref 94–109)
CO2 SERPL-SCNC: 28 MMOL/L (ref 20–32)
CREAT SERPL-MCNC: 0.48 MG/DL (ref 0.52–1.04)
EOSINOPHIL # BLD AUTO: 0.1 10E3/UL (ref 0–0.7)
EOSINOPHIL NFR BLD AUTO: 3 %
ERYTHROCYTE [DISTWIDTH] IN BLOOD BY AUTOMATED COUNT: 17.4 % (ref 10–15)
GFR SERPL CREATININE-BSD FRML MDRD: >90 ML/MIN/1.73M2
GLUCOSE BLD-MCNC: 128 MG/DL (ref 70–99)
HCT VFR BLD AUTO: 25.9 % (ref 35–47)
HGB BLD-MCNC: 7.8 G/DL (ref 11.7–15.7)
IMM GRANULOCYTES # BLD: 0 10E3/UL
IMM GRANULOCYTES NFR BLD: 1 %
LYMPHOCYTES # BLD AUTO: 0.8 10E3/UL (ref 0.8–5.3)
LYMPHOCYTES NFR BLD AUTO: 19 %
MAGNESIUM SERPL-MCNC: 2 MG/DL (ref 1.6–2.3)
MCH RBC QN AUTO: 34.4 PG (ref 26.5–33)
MCHC RBC AUTO-ENTMCNC: 30.1 G/DL (ref 31.5–36.5)
MCV RBC AUTO: 114 FL (ref 78–100)
MONOCYTES # BLD AUTO: 0.4 10E3/UL (ref 0–1.3)
MONOCYTES NFR BLD AUTO: 9 %
NEUTROPHILS # BLD AUTO: 3 10E3/UL (ref 1.6–8.3)
NEUTROPHILS NFR BLD AUTO: 67 %
NRBC # BLD AUTO: 0 10E3/UL
NRBC BLD AUTO-RTO: 0 /100
PHOSPHATE SERPL-MCNC: 3.4 MG/DL (ref 2.5–4.5)
PLATELET # BLD AUTO: 195 10E3/UL (ref 150–450)
POTASSIUM BLD-SCNC: 3.9 MMOL/L (ref 3.4–5.3)
PROT SERPL-MCNC: 7.6 G/DL (ref 6.8–8.8)
RBC # BLD AUTO: 2.27 10E6/UL (ref 3.8–5.2)
SODIUM SERPL-SCNC: 137 MMOL/L (ref 133–144)
WBC # BLD AUTO: 4.4 10E3/UL (ref 4–11)

## 2021-12-21 PROCEDURE — 36591 DRAW BLOOD OFF VENOUS DEVICE: CPT

## 2021-12-21 PROCEDURE — 85025 COMPLETE CBC W/AUTO DIFF WBC: CPT

## 2021-12-21 PROCEDURE — 250N000011 HC RX IP 250 OP 636: Performed by: PHYSICIAN ASSISTANT

## 2021-12-21 PROCEDURE — 82247 BILIRUBIN TOTAL: CPT

## 2021-12-21 PROCEDURE — 84100 ASSAY OF PHOSPHORUS: CPT

## 2021-12-21 PROCEDURE — 83735 ASSAY OF MAGNESIUM: CPT

## 2021-12-21 PROCEDURE — 82040 ASSAY OF SERUM ALBUMIN: CPT

## 2021-12-21 RX ORDER — HEPARIN SODIUM (PORCINE) LOCK FLUSH IV SOLN 100 UNIT/ML 100 UNIT/ML
5 SOLUTION INTRAVENOUS ONCE
Status: COMPLETED | OUTPATIENT
Start: 2021-12-21 | End: 2021-12-21

## 2021-12-21 RX ADMIN — Medication 5 ML: at 12:24

## 2021-12-21 NOTE — ORAL ONC MGMT
Oral Chemotherapy Monitoring Program  Lab Follow Up    Reviewed lab results from 12/21/21.    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 John Lopez   [facility]   Drug Name Votrient (pazopanib) Votrient (pazopanib) Votrient (pazopanib) Votrient (pazopanib) Votrient (pazopanib) Votrient (pazopanib) Votrient (pazopanib)   Dose 800 mg 800 mg 800 mg 800 mg 800 mg 800 mg 800 mg   Current Schedule Daily Daily Daily Daily Daily Daily Daily   Cycle Details Continuous Continuous Continuous Continuous Continuous Continuous Continuous   Start Date of Last Cycle - - - - - - -   Planned next cycle start date - - - - - - -   Doses missed in last 2 weeks - - - - - - -   Adherence Assessment - - - - - - -   Adverse Effects - - - - - - -   Nausea - - - - - - -   Pharmacist Intervention(nausea) - - - - - - -   Intervention(s) - - - - - - -   Fatigue - - - - - - -   Pharmacist Intervention(fatigue) - - - - - - -   Any new drug interactions? - - - - - - -   Pharmacist Intervention? - - - - - - -   Intervention(s) - - - - - - -   Is the dose as ordered appropriate for the patient? - - - - - - -       Labs:  _  Result Component Current Result Ref Range   Sodium 137 (12/21/2021) 133 - 144 mmol/L     _  Result Component Current Result Ref Range   Potassium 3.9 (12/21/2021) 3.4 - 5.3 mmol/L     _  Result Component Current Result Ref Range   Calcium 9.4 (12/21/2021) 8.5 - 10.1 mg/dL     _  Result Component Current Result Ref Range   Magnesium 2.0 (12/21/2021) 1.6 - 2.3 mg/dL     _  Result Component Current Result Ref Range   Phosphorus 3.4 (12/21/2021) 2.5 -  4.5 mg/dL     _  Result Component Current Result Ref Range   Albumin 2.3 (L) (12/21/2021) 3.4 - 5.0 g/dL     _  Result Component Current Result Ref Range   Urea Nitrogen 9 (12/21/2021) 7 - 30 mg/dL     _  Result Component Current Result Ref Range   Creatinine 0.48 (L) (12/21/2021) 0.52 - 1.04 mg/dL     _  Result Component Current Result Ref Range   AST 16 (12/21/2021) 0 - 45 U/L     _  Result Component Current Result Ref Range   ALT 14 (12/21/2021) 0 - 50 U/L     _  Result Component Current Result Ref Range   Bilirubin Total 0.7 (12/21/2021) 0.2 - 1.3 mg/dL     _  Result Component Current Result Ref Range   WBC Count 4.4 (12/21/2021) 4.0 - 11.0 10e3/uL     _  Result Component Current Result Ref Range   Hemoglobin 7.8 (L) (12/21/2021) 11.7 - 15.7 g/dL     _  Result Component Current Result Ref Range   Platelet Count 195 (12/21/2021) 150 - 450 10e3/uL     No results found for ANC within last 30 days.       Assessment & Plan:  No concerning abnormalities.    Questions answered to patient's satisfaction.    Follow-Up:  12/23 EKG  12/28 f/u with Harshal Mcintyre, PharmD, BCPS, BCOP  Hematology/Oncology Clinical Pharmacist  Oral Chemotherapy Monitoring Program  [facility]  [phone]

## 2021-12-21 NOTE — NURSING NOTE
Chief Complaint   Patient presents with     Port Draw     Labs drawn via port by rn in lab. VS taken.     Port accessed with 20g 3/4 inch gripper needle by RN, labs collected, line flushed with saline and heparin.      Elliot Alfaro, RN

## 2021-12-22 NOTE — PROGRESS NOTES
"Called patient to check in following addition of hydromorphone ER. She has tried it for the past 2 nights. Says it isn't doing exactly what she though it would. States she thought it would \"knock her out\" but it doesn't really do that.     She says she usually takes this med about 9PM and is sleeping by 10PM. Is finding that she is waking up at 12:30AM but not due to pain. Can usually fall back to sleep quickly. Wakes up again somewhere around 4:30AM/6:30AM which is when she starts to have pain and will take her PRN hydromorphone and tylenol.     Because she has been sensitive to opiates and they have made her very sleepy, she assumed she would be very tired with the hydromorphone ER.     I pointed out it sounds like her pain is better controlled overnight at this point and she agreed. We agreed to give this some more time and to check in early next week to make sure she is still doing okay. Asked her to call me sooner if needed or with any changes.   "

## 2021-12-23 DIAGNOSIS — Z79.899 ENCOUNTER FOR LONG-TERM (CURRENT) USE OF MEDICATIONS: ICD-10-CM

## 2021-12-23 DIAGNOSIS — C49.9 SARCOMA (H): Primary | ICD-10-CM

## 2021-12-23 DIAGNOSIS — C49.9 SARCOMA OF SOFT TISSUE (H): ICD-10-CM

## 2021-12-23 DIAGNOSIS — D63.0 ANEMIA IN NEOPLASTIC DISEASE: ICD-10-CM

## 2021-12-23 DIAGNOSIS — C78.00 MALIGNANT NEOPLASM METASTATIC TO LUNG, UNSPECIFIED LATERALITY (H): ICD-10-CM

## 2021-12-23 LAB
ATRIAL RATE - MUSE: 99 BPM
DIASTOLIC BLOOD PRESSURE - MUSE: NORMAL MMHG
INTERPRETATION ECG - MUSE: NORMAL
P AXIS - MUSE: 61 DEGREES
PR INTERVAL - MUSE: 148 MS
QRS DURATION - MUSE: 78 MS
QT - MUSE: 346 MS
QTC - MUSE: 444 MS
R AXIS - MUSE: 1 DEGREES
SYSTOLIC BLOOD PRESSURE - MUSE: NORMAL MMHG
T AXIS - MUSE: 36 DEGREES
VENTRICULAR RATE- MUSE: 99 BPM

## 2021-12-23 PROCEDURE — 93000 ELECTROCARDIOGRAM COMPLETE: CPT | Performed by: INTERNAL MEDICINE

## 2021-12-24 ENCOUNTER — DOCUMENTATION ONLY (OUTPATIENT)
Dept: ONCOLOGY | Facility: CLINIC | Age: 56
End: 2021-12-24
Payer: COMMERCIAL

## 2021-12-27 NOTE — PROGRESS NOTES
Connie is a 56 year old who is being evaluated via a billable video visit.      How would you like to obtain your AVS? MyChart  If the video visit is dropped, the invitation should be resent by: Text to cell phone: [phone]  Will anyone else be joining your video visit? Marisol Londono    Video Start Time: 1:34pm    Video-Visit Details    Type of service:  Video Visit    Video End Time:1:51pm    Originating Location (pt. 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 She received preoperative radiotherapy and the tumor was resected 3/12/20. She then developed a cough, chest tightness, and scapular pain and imaging revealed lung nodules. Lung biopsy 11/2/20 with sarcoma. She was started on Doxil + Ifos 11/13/20. Had delay in cycle 2 due to COVID dx 12/2/20. CT imaging after cycle 1 with positive response to treatment with decrease in pulmonary nodules. Received cycle 2 12/18/20. Received cycle 3 1/15/21 with dose reduction in Doxil for skin toxicity and mucositis. Received cycle 4 2/15/21.      CT CAP 3/8/21 with positive response to treatment, decreased size of pulmonary mets though one lesion was larger. Did see fibrocystic changes in left breast, recommended breast imaging. Received cycle 5 Doxil + ifos 3/11/21.      Imaging 4/5/21 with overall stable disease except one lung lesion, opted to continue Doxil alone for now and switch to Keytruda in future if ongoing progression.      Started Keytruda 5/10/21. Was seen 5/26/21 cough, SOB, fatigue work-up  thought to be 2/2 phrenic nerve involvement of R apical lung mass.      Due to worsening fatigue and cough, repeat CT 6/15/21 concerning for progression vs pseudoprogression. Added Gemzar 6/16/21.     She was admitted 6/17/21-6/20/21 with fever of unknown origin, infectious work-up negative, treated with broad spectrum antibiotics.     She clinically improved on abx. Was able to receive day 8 Gemzar (dose reduced for thrombocytopenia) + Keytruda 6/23/21.      CT 7/12/21 with stable disease.     She was admitted 8/25/21-8/31/21 for fevers, hypotension, acute on chronic anemia. Extensive infectious work-up negative so etiology of fevers still unknown.      She has had progressive disease so started on Pazopanib 9/24/21.     She was admitted 10/6/21-10/8/21 for weakness, new hallucinations in setting of UTI. Brain MRI without brain mets though did have Diffuse loss normal fatty marrow on T1 sequence, nonspecific. Symptoms improved with abx and IVF.      She has continued on Pazopanib. CT 11/1/21 for restaging noted majority of masses with fluid and gas consistent with necrosis and the largest cyst likely ruptured causing a right hydropneumothorax with near complete collapse of the R lung. One solid left pleural based lesion was slightly increased. Overall felt this was a positive response to Pazopanib so continued.     She was admitted 11/1/21 for urgent chest tube placement for the hydropneumothorax noted on CT. Due to high risk of recurrence she underwent talc pleurodesis 11/5/21. Port was incidentally found to have non-occlusive thrombus and will be replaced later this month pending resolution of subcutaneous air.      CT 11/22/21 overall with no change other than decreased chest wall subcutaneous emphysema.     She has continued on Pazopanib.     Interval History:  Connie was seen today on video for follow-up. Overall she is doing well. She does note lower energy and is wondering about a blood transfusion. Her pain  is better controlled with long acting Dilaudid overnight and short acting during the day. Breathing is about the same though she does note her breath catch at times. Cough is minimal. No fevers. Mouth sores improved. No headaches or dizziness. Nausea continues to be an issue but Compazine helps unless she can't get it on board fast enough. Appetite remains poor but trying to eat. Bowels still a bit constipated but managing. Skin generally stable. Port replaced and working well.     She had a nice holiday with her family. They are looking to move to [place] soon once they put an offer on a house.     Current Outpatient Medications   Medication Sig Dispense Refill     acetaminophen (TYLENOL) 325 MG tablet Take 325-650 mg by mouth every 6 hours as needed for mild pain       ADVAIR DISKUS 100-50 MCG/DOSE inhaler Inhale 1 puff into the lungs daily        albuterol (PROAIR RESPICLICK) 108 (90 Base) MCG/ACT inhaler Inhale 1-2 puffs into the lungs every 6 hours as needed        celecoxib (CELEBREX) 200 MG capsule Take 1 capsule (200 mg) by mouth 2 times daily 60 capsule 3     cetirizine (ZYRTEC) 10 MG tablet Take 10 mg by mouth daily       diclofenac (VOLTAREN) 1 % topical gel Apply 4 g topically 4 times daily 350 g 3     gabapentin (NEURONTIN) 300 MG capsule Take 600 mg in the AM, 600 mg mid-day, and 900 mg at night 210 capsule 3     guaiFENesin-codeine (ROBITUSSIN AC) 100-10 MG/5ML solution Take 5-10 mLs by mouth every 4 hours as needed for cough 473 mL 1     HYDROmorphone (DILAUDID) 2 MG tablet Take 1-2 tablets (2-4 mg) by mouth every 3 hours as needed for severe pain (. Stop oxycodone completely. Do not take this with oxycodone.) 120 tablet 0     HYDROmorphone HCl ER (EXALGO) 12 MG 24 hour tablet Take 1 tablet (12 mg) by mouth every evening 30 tablet 0     hydrOXYzine (ATARAX) 25 MG tablet Take 1-2 tablets (25-50 mg) by mouth 3 times daily as needed for anxiety 30 tablet 3     levothyroxine (SYNTHROID/LEVOTHROID) 50  MCG tablet Take 1 tablet (50 mcg) by mouth daily 30 tablet 3     melatonin (MELATONIN) 1 MG/ML LIQD liquid Take 5 mg by mouth At Bedtime        montelukast (SINGULAIR) 10 MG tablet Take 10 mg by mouth daily       multivitamin w/minerals (MULTI-VITAMIN) tablet Take 1 tablet by mouth daily       naloxone (NARCAN) 4 MG/0.1ML nasal spray Spray 1 spray (4 mg) into one nostril alternating nostrils once as needed for opioid reversal every 2-3 minutes until assistance arrives 0.2 mL 0     NASAL SPRAY SALINE NA        nystatin (MYCOSTATIN) 573794 UNIT/ML suspension Take 5 mLs (500,000 Units) by mouth 4 times daily 400 mL 0     ondansetron (ZOFRAN) 8 MG tablet Take 1 tablet (8 mg) by mouth every 8 hours as needed for nausea 60 tablet 1     pazopanib (VOTRIENT) 200 MG tablet Take 4 tablets (800 mg) by mouth daily Take on an empty stomach 1 hour before or 2 hours after a meal. 120 tablet 0     pazopanib (VOTRIENT) 200 MG tablet Take 4 tablets (800 mg) by mouth daily Take on an empty stomach 1 hour before or 2 hours after a meal. 120 tablet 0     polyethylene glycol (MIRALAX) 17 GM/Dose powder Take 17 g by mouth daily 510 g 0     prochlorperazine (COMPAZINE) 10 MG tablet Take 1 tablet (10 mg) by mouth every 6 hours as needed for nausea or vomiting 90 tablet 3     triamcinolone (KENALOG) 0.1 % external cream Apply topically 2 times daily As needed for skin irritation. 30 g 1     vitamin D3 (CHOLECALCIFEROL) 2000 units (50 mcg) tablet Take 1 tablet by mouth daily         Past Medical History  Past Medical History:   Diagnosis Date     Asthma, chronic      Sarcoma (H)     Right lower thigh     Past Surgical History:   Procedure Laterality Date     AS RAD RESEC TONSIL/PILLARS       GALLBLADDER SURGERY       INJECT EPIDURAL THORACIC N/A 11/5/2021    Procedure: INJECTION, SPINE, THORACIC, EPIDURAL;  Surgeon: GENERIC ANESTHESIA PROVIDER;  Location: [facility] OR     INSERT PORT VASCULAR ACCESS Right 10/28/2020    Procedure: single lumen power  port placement @0745;  Surgeon: Tex Ackerman MD;  Location: [facility] OR     IR CHEST PORT PLACEMENT > 5 YRS OF AGE  10/28/2020     IR PORT CHECK RIGHT  3/26/2021     IR PORT CHECK RIGHT  10/21/2021     RESECT TUMOR LOWER EXTREMITY Right 3/12/2020    Procedure: Resection of right thigh tumor;  Surgeon: Guillermo Johnson MD;  Location: [facility] OR     ROTATOR CUFF REPAIR RT/LT  2012     SHOULDER SURGERY       SINUS SURGERY       THORACENTESIS N/A 11/5/2021    Procedure: Pleurodesis;  Surgeon: Elana Ngo MD;  Location:  GI     Allergies   Allergen Reactions     Pentobarbital      resp arrest     Social History   Social History     Tobacco Use     Smoking status: Never Smoker     Smokeless tobacco: Never Used   Substance Use Topics     Alcohol use: Yes     Comment: 2-3 glasses of wine/week     Drug use: Never      Past medical history and social history were reviewed.    Physical Examination:  [facility] 03/19/2019   Wt Readings from Last 10 Encounters:   12/17/21 72.6 kg (160 lb)   12/02/21 76 kg (167 lb 8 oz)   12/02/21 74.8 kg (165 lb)   11/23/21 78.6 kg (173 lb 3.2 oz)   11/06/21 81.4 kg (179 lb 8 oz)   10/07/21 87.7 kg (193 lb 4.8 oz)   10/06/21 87.2 kg (192 lb 3.2 oz)   10/03/21 87.1 kg (192 lb)   09/22/21 89.3 kg (196 lb 12.8 oz)   09/08/21 90.5 kg (199 lb 8 oz)     Video physical exam  General: Patient appears well in no acute distress.   Skin: No visualized rash or lesions on visualized skin  Eyes: EOMI, no erythema, sclera icterus or discharge noted  Resp: Appears to be breathing comfortably without accessory muscle usage, speaking in full sentences, no cough  MSK: Appears to have normal range of motion based on visualized movements  Neurologic: No apparent tremors, facial movements symmetric  Psych: affect normal, alert and oriented    The rest of a comprehensive physical examination is deferred due to PHE (public health emergency) video restrictions    Laboratory Data:  Results for AMADEO WILSON  (MRN [id number]) as of 12/29/2021 21:11   12/21/2021 12:34   Sodium 137   Potassium 3.9   Chloride 101   Carbon Dioxide 28   Urea Nitrogen 9   Creatinine 0.48 (L)   GFR Estimate >90   Calcium 9.4   Anion Gap 8   Magnesium 2.0   Phosphorus 3.4   Albumin 2.3 (L)   Protein Total 7.6   Bilirubin Total 0.7   Alkaline Phosphatase 92   ALT 14   AST 16   Glucose 128 (H)   WBC 4.4   Hemoglobin 7.8 (L)   Hematocrit 25.9 (L)   Platelet Count 195   RBC Count 2.27 (L)    (H)   MCH 34.4 (H)   MCHC 30.1 (L)   RDW 17.4 (H)   % Neutrophils 67   % Lymphocytes 19   % Monocytes 9   % Eosinophils 3   % Basophils 1   Absolute Basophils 0.0   Absolute Eosinophils 0.1   Absolute Immature Granulocytes 0.0   Absolute Lymphocytes 0.8   Absolute Monocytes 0.4   % Immature Granulocytes 1   Absolute Neutrophils 3.0   Absolute NRBCs 0.0   NRBCs per 100 WBC 0     EKG 12/23/21  mmHg      Diastolic Blood Pressure mmHg    Ventricular Rate BPM 99    Atrial Rate BPM 99    OR Interval ms 148    QRS Duration ms 78    QT ms 346    QTc ms 444    P Axis degrees 61    R AXIS degrees 1    T Axis degrees 36      Assessment and Plan:  1. Onc  Metastatic UPS, was on Doxil + Ifosfamide (5 cycles) with positive response to treatment initially, then due to poor tolerance switched to Doxil alone (1 cycle) but had progression. Started on Keytruda 5/10/21. Due to tumor growth (progression vs pseudoprogression) added Gemzar 6/16/21. Had ongoing progression. Started on Pazopanib 9/24/21.     CT 11/1/21 with significant tumor necrosis/rupture. Given overall clinical improvement and findings of necrosis plan to continue Pazopanib with close follow-up. CT from 11/22/21 with stable disease, necrotic lesions persist.      She continues to do well on Pazopanib. Will continue. Plan to repeat CT chest and see Dr. Lopez mid January. Since she is doing well no KALINA visit needed but she will call if issues.    Will work on getting her records to [facility] in [place].       2. Pulm  R sided hydropneumothorax: Found on 11/1/21 CT scan, thought 2/2 ruptured tumor. Was admitted 11/1 and underwent chest tube placement. Pulm performed talc pleuradesis on 11/5/21 due to risk of recurrence. CT from 11/22 shows ongoing but stable hydropneumothorax with no signs of lung collapse. Pulm had no concerns at follow-up. No new breathing concerns though does catch at times-repeat CT as above.      Subcutaneous chest wall air: Resolved.      Asthma: stable, continue inhalers.      COVID: Diagnosed in December, recovered. She is vaccinated. OK to get booster-scheduled 1/3/21 per patient.      Cough: 2/2 malignancy,much improved. Continue Robitussin AC PRN.      3. ID  Fevers of unknown origin, thought to be tumor fevers, previously required Indomethacin, resolved.      Possible empyema inpatient, cultures were negative. No signs of infection.      4. Vascular  Port malfunction with small RIJ clot, port now replaced and working well.      5. Pain  R chest wall/back pain, ongoing. Palliative now managing. Continue Celebrex and Dilaudid. Better control now.      6. Heme  Anemia: ACD and chemotherapy.Lower and mild symptoms with lower energy. Will do 1 unit pRBC later this week. Updated consent done.      7. Cards  Exercise fatigue and NAZARIO: Echo was stable. Possibly was related to pneumothorax, though it is unclear when this happened.        Hypotension: Improved. Continue pushing home fluids and monitoring.      QT WNL     8. 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 Continue Gabapentin 600/600/900.     Deconditioning: Doing PT     Hallucinations: Brain MRI reviewed and no mets. Does have loss of normal fatty marrow with broad differential. Discussed with neuro onc and unclear findings. No active concerns.      9. GI  Constipation: Continue metamucil PRN.      Nausea, 2/2 chemotherapy and coughing, ongoing. Zyprexa not helpful, stopped. Continue scheduled Compazine 2-3x daily. Sent OTC  Zofran as back up.      Start salt/soda for mouth irritation. Completed Nystatin course for thrush, can restart if recurrent symptoms.      Continue Tums PRN GERD, take away from Votrient.      10. Psych  Ongoing anxiety. Seeing Titus . Slightly better. Palliative added hydroxyzine which has helped too.      Continue Melatonin PRN insomnia.      11. 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 Check TSH monthly. Last check stable.      12. Derm  Mild hand/foot, continue Kenalog PRN. Aveno lotion.      Overall Plan  -1 unit pRBC later this week  -Continue Pazopanib  -CT and John mid January    25 minutes spent on the date of the encounter doing chart review, review of test results, interpretation of tests, patient visit and documentation     Harshal Schuster PA-C  Department of Hematology and Oncology  [facility]

## 2021-12-28 ENCOUNTER — TELEPHONE (OUTPATIENT)
Dept: ONCOLOGY | Facility: CLINIC | Age: 56
End: 2021-12-28

## 2021-12-28 ENCOUNTER — VIRTUAL VISIT (OUTPATIENT)
Dept: ONCOLOGY | Facility: CLINIC | Age: 56
End: 2021-12-28
Attending: PHYSICIAN ASSISTANT
Payer: COMMERCIAL

## 2021-12-28 ENCOUNTER — PATIENT OUTREACH (OUTPATIENT)
Dept: PALLIATIVE CARE | Facility: CLINIC | Age: 56
End: 2021-12-28

## 2021-12-28 ENCOUNTER — HOSPITAL ENCOUNTER (OUTPATIENT)
Dept: PHYSICAL THERAPY | Facility: CLINIC | Age: 56
Setting detail: THERAPIES SERIES
End: 2021-12-28
Attending: PHYSICIAN ASSISTANT
Payer: COMMERCIAL

## 2021-12-28 DIAGNOSIS — R11.0 NAUSEA: ICD-10-CM

## 2021-12-28 DIAGNOSIS — D63.0 ANEMIA IN NEOPLASTIC DISEASE: ICD-10-CM

## 2021-12-28 DIAGNOSIS — C49.9 SARCOMA (H): Primary | ICD-10-CM

## 2021-12-28 DIAGNOSIS — C49.9 SARCOMA OF SOFT TISSUE (H): ICD-10-CM

## 2021-12-28 DIAGNOSIS — C78.00 MALIGNANT NEOPLASM METASTATIC TO LUNG, UNSPECIFIED LATERALITY (H): ICD-10-CM

## 2021-12-28 PROCEDURE — 97110 THERAPEUTIC EXERCISES: CPT | Mod: GP | Performed by: PHYSICAL THERAPIST

## 2021-12-28 PROCEDURE — 99215 OFFICE O/P EST HI 40 MIN: CPT | Mod: 95 | Performed by: PHYSICIAN ASSISTANT

## 2021-12-28 RX ORDER — ONDANSETRON 8 MG/1
8 TABLET, ORALLY DISINTEGRATING ORAL EVERY 8 HOURS PRN
Qty: 30 TABLET | Refills: 3 | Status: SHIPPED | OUTPATIENT
Start: 2021-12-28

## 2021-12-28 NOTE — TELEPHONE ENCOUNTER
**Please Do Not Close This Encounter**               ** Until This Has Been Addressed**          PRIOR AUTHORIZATION REQUIED PER INSURANCE       PATIENT:Richa Ashby YOB: 1965          MEDICATION:Ondanset 8mg                     SIG:Take 1 tablet (8 mg) by mouth every 8 hours as needed for nausea       ND:[address]-60      INSURANCE:Paid/[employer]       INSURANCE PHONE #:[phone]      ID #:[id number]      INSURANCE REJECT:DUR Free Text Message (544-FY): ALLOW QT 9.000, QUANTITY LIMITS APPLY      PHARMACY:[facility]      PHARMACY NPI:[id number]      PHARMACY PHONE #:[phone]                                                          Please let us know when Prior Auth approved/denied, prescriber refusal to complete prior auth or if you would like to change medication/discontinue                                                            Thank you

## 2021-12-28 NOTE — Clinical Note
"    12/28/2021         RE: Richa Ashby  [address]  [facility] [address]        Dear Colleague,    Thank you for referring your patient, Richa Ashby, to the [facility]. Please see a copy of my visit note below.    Connie is a 56 year old who is being evaluated via a billable video visit.      How would you like to obtain your AVS? MyChart  If the video visit is dropped, the invitation should be resent by: Text to cell phone: [phone]  Will anyone else be joining your video visit? No  {If patient encounters technical issues they should call [phone] :[id number]}    Brianda Londono    Video Start Time: {video visit start/end time for provider to select:135100}     Video-Visit Details    Type of service:  Video Visit    Video End Time:{video visit start/end time for provider to select:364898}    Originating Location (pt. Location): {video visit patient location:876544::\"Home\"}    Distant Location (provider location):  [facility]     Platform used for Video Visit: {Virtual Visit Platforms:980392::\"iRise\"}    Oncology/Hematology Visit Note  Dec 28, 2021    Reason for Visit: Follow up of undifferentiated pleomorphic sarcoma     History of Present Illness: Richa Ashby is a 56 year old female with PMH asthma and GERD with undifferentiated pleomorphic sarcoma. 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 She received preoperative radiotherapy and the tumor was resected 3/12/20. She then developed a cough, chest tightness, and scapular pain and imaging revealed lung nodules. Lung biopsy 11/2/20 with sarcoma. She was started on Doxil + Ifos 11/13/20. Had delay in cycle 2 due to COVID dx 12/2/20. CT imaging after cycle 1 with positive response to treatment with decrease in pulmonary nodules. Received cycle 2 12/18/20. Received cycle 3 1/15/21 with dose reduction " in Doxil for skin toxicity and mucositis. Received cycle 4 2/15/21.      CT CAP 3/8/21 with positive response to treatment, decreased size of pulmonary mets though one lesion was larger. Did see fibrocystic changes in left breast, recommended breast imaging. Received cycle 5 Doxil + ifos 3/11/21.      Imaging 4/5/21 with overall stable disease except one lung lesion, opted to continue Doxil alone for now and switch to Keytruda in future if ongoing progression.      Started Keytruda 5/10/21. Was seen 5/26/21 cough, SOB, fatigue work-up thought to be 2/2 phrenic nerve involvement of R apical lung mass.      Due to worsening fatigue and cough, repeat CT 6/15/21 concerning for progression vs pseudoprogression. Added Gemzar 6/16/21.     She was admitted 6/17/21-6/20/21 with fever of unknown origin, infectious work-up negative, treated with broad spectrum antibiotics.     She clinically improved on abx. Was able to receive day 8 Gemzar (dose reduced for thrombocytopenia) + Keytruda 6/23/21.      CT 7/12/21 with stable disease.     She was admitted 8/25/21-8/31/21 for fevers, hypotension, acute on chronic anemia. Extensive infectious work-up negative so etiology of fevers still unknown.      She has had progressive disease so started on Pazopanib 9/24/21.     She was admitted 10/6/21-10/8/21 for weakness, new hallucinations in setting of UTI. Brain MRI without brain mets though did have Diffuse loss normal fatty marrow on T1 sequence, nonspecific. Symptoms improved with abx and IVF.      She has continued on Pazopanib. CT 11/1/21 for restaging noted majority of masses with fluid and gas consistent with necrosis and the largest cyst likely ruptured causing a right hydropneumothorax with near complete collapse of the R lung. One solid left pleural based lesion was slightly increased. Overall felt this was a positive response to Pazopanib so continued.     She was admitted 11/1/21 for urgent chest tube placement for the  hydropneumothorax noted on CT. Due to high risk of recurrence she underwent talc pleurodesis 11/5/21. Port was incidentally found to have non-occlusive thrombus and will be replaced later this month pending resolution of subcutaneous air.      CT 11/22/21 overall with no change other than decreased chest wall subcutaneous emphysema.     She has continued on Pazopanib.   Interval History:      Review of Systems:  Patient denies fevers, chills, night sweats, unexplained weight changes, headaches, dizziness, vision or hearing changes, new lumps or bumps, chest pain, shortness of breath, cough, abdominal pain, nausea, vomiting, changes to bowel or bladder, swelling of extremities, bleeding issues, or rash.    Current Outpatient Medications   Medication Sig Dispense Refill     acetaminophen (TYLENOL) 325 MG tablet Take 325-650 mg by mouth every 6 hours as needed for mild pain       ADVAIR DISKUS 100-50 MCG/DOSE inhaler Inhale 1 puff into the lungs daily        albuterol (PROAIR RESPICLICK) 108 (90 Base) MCG/ACT inhaler Inhale 1-2 puffs into the lungs every 6 hours as needed        celecoxib (CELEBREX) 200 MG capsule Take 1 capsule (200 mg) by mouth 2 times daily 60 capsule 3     cetirizine (ZYRTEC) 10 MG tablet Take 10 mg by mouth daily       diclofenac (VOLTAREN) 1 % topical gel Apply 4 g topically 4 times daily 350 g 3     gabapentin (NEURONTIN) 300 MG capsule Take 600 mg in the AM, 600 mg mid-day, and 900 mg at night 210 capsule 3     guaiFENesin-codeine (ROBITUSSIN AC) 100-10 MG/5ML solution Take 5-10 mLs by mouth every 4 hours as needed for cough 473 mL 1     HYDROmorphone (DILAUDID) 2 MG tablet Take 1-2 tablets (2-4 mg) by mouth every 3 hours as needed for severe pain (. Stop oxycodone completely. Do not take this with oxycodone.) 120 tablet 0     HYDROmorphone HCl ER (EXALGO) 12 MG 24 hour tablet Take 1 tablet (12 mg) by mouth every evening 30 tablet 0     hydrOXYzine (ATARAX) 25 MG tablet Take 1-2 tablets (25-50  mg) by mouth 3 times daily as needed for anxiety 30 tablet 3     levothyroxine (SYNTHROID/LEVOTHROID) 50 MCG tablet Take 1 tablet (50 mcg) by mouth daily 30 tablet 3     melatonin (MELATONIN) 1 MG/ML LIQD liquid Take 5 mg by mouth At Bedtime        montelukast (SINGULAIR) 10 MG tablet Take 10 mg by mouth daily       multivitamin w/minerals (MULTI-VITAMIN) tablet Take 1 tablet by mouth daily       naloxone (NARCAN) 4 MG/0.1ML nasal spray Spray 1 spray (4 mg) into one nostril alternating nostrils once as needed for opioid reversal every 2-3 minutes until assistance arrives 0.2 mL 0     NASAL SPRAY SALINE NA        nystatin (MYCOSTATIN) 675314 UNIT/ML suspension Take 5 mLs (500,000 Units) by mouth 4 times daily 400 mL 0     ondansetron (ZOFRAN) 8 MG tablet Take 1 tablet (8 mg) by mouth every 8 hours as needed for nausea 60 tablet 1     pazopanib (VOTRIENT) 200 MG tablet Take 4 tablets (800 mg) by mouth daily Take on an empty stomach 1 hour before or 2 hours after a meal. 120 tablet 0     pazopanib (VOTRIENT) 200 MG tablet Take 4 tablets (800 mg) by mouth daily Take on an empty stomach 1 hour before or 2 hours after a meal. 120 tablet 0     polyethylene glycol (MIRALAX) 17 GM/Dose powder Take 17 g by mouth daily 510 g 0     prochlorperazine (COMPAZINE) 10 MG tablet Take 1 tablet (10 mg) by mouth every 6 hours as needed for nausea or vomiting 90 tablet 3     triamcinolone (KENALOG) 0.1 % external cream Apply topically 2 times daily As needed for skin irritation. 30 g 1     vitamin D3 (CHOLECALCIFEROL) 2000 units (50 mcg) tablet Take 1 tablet by mouth daily         Past Medical History  Past Medical History:   Diagnosis Date     Asthma, chronic      Sarcoma (H)     Right lower thigh     Past Surgical History:   Procedure Laterality Date     AS RAD RESEC TONSIL/PILLARS       GALLBLADDER SURGERY       INJECT EPIDURAL THORACIC N/A 11/5/2021    Procedure: INJECTION, SPINE, THORACIC, EPIDURAL;  Surgeon: GENERIC ANESTHESIA  PROVIDER;  Location: [facility] OR     INSERT PORT VASCULAR ACCESS Right 10/28/2020    Procedure: single lumen power port placement @0745;  Surgeon: Tex Ackerman MD;  Location: [facility] OR     IR CHEST PORT PLACEMENT > 5 YRS OF AGE  10/28/2020     IR PORT CHECK RIGHT  3/26/2021     IR PORT CHECK RIGHT  10/21/2021     RESECT TUMOR LOWER EXTREMITY Right 3/12/2020    Procedure: Resection of right thigh tumor;  Surgeon: Guillermo Johnson MD;  Location: [facility] OR     ROTATOR CUFF REPAIR RT/LT  2012     SHOULDER SURGERY       SINUS SURGERY       THORACENTESIS N/A 11/5/2021    Procedure: Pleurodesis;  Surgeon: Elana Ngo MD;  Location:  GI     Allergies   Allergen Reactions     Pentobarbital      resp arrest     Social History   Social History     Tobacco Use     Smoking status: Never Smoker     Smokeless tobacco: Never Used   Substance Use Topics     Alcohol use: Yes     Comment: 2-3 glasses of wine/week     Drug use: Never      Past medical history and social history were reviewed.    Physical Examination:  LMP 03/19/2019   Wt Readings from Last 10 Encounters:   12/17/21 72.6 kg (160 lb)   12/02/21 76 kg (167 lb 8 oz)   12/02/21 74.8 kg (165 lb)   11/23/21 78.6 kg (173 lb 3.2 oz)   11/06/21 81.4 kg (179 lb 8 oz)   10/07/21 87.7 kg (193 lb 4.8 oz)   10/06/21 87.2 kg (192 lb 3.2 oz)   10/03/21 87.1 kg (192 lb)   09/22/21 89.3 kg (196 lb 12.8 oz)   09/08/21 90.5 kg (199 lb 8 oz)     Constitutional: Well-appearing female in no acute distress.  Eyes: EOMI, PERRL. No scleral icterus.  ENT: Oral mucosa is moist without lesions or thrush.   Lymphatic: Neck is supple without cervical or supraclavicular lymphadenopathy. No axillary lymphadenopathy.  Breast: No palpable abnormalities in either breast. Nipples everted without drainage. No erythema or pitting.    Cardiovascular: Regular rate and rhythm. No murmurs, gallops, or rubs. No peripheral edema.  Respiratory: Clear to auscultation bilaterally. No wheezes or  crackles.  Gastrointestinal: Bowel sounds present. Abdomen soft, non-tender. No palpable hepatosplenomegaly or masses.   Neurologic: Cranial nerves II through XII are grossly intact.  Skin: No rashes, petechiae, or bruising noted on exposed skin.    Laboratory Data:        Assessment and Plan:          Harshal Schuster PA-C  Department of Hematology and Oncology  [facility]         Again, thank you for allowing me to participate in the care of your patient.        Sincerely,        RUPERT Guaman

## 2021-12-28 NOTE — Clinical Note
"    12/28/2021         RE: Richa Ashby  [address]  [address]  [place] [address]        Dear Colleague,    Thank you for referring your patient, Richa Ashby, to the [facility]. Please see a copy of my visit note below.    Connie is a 56 year old who is being evaluated via a billable video visit.      How would you like to obtain your AVS? MyChart  If the video visit is dropped, the invitation should be resent by: Text to cell phone: [phone]  Will anyone else be joining your video visit? No  {If patient encounters technical issues they should call [phone] :[id number]}    Brianda Londono    Video Start Time: {video visit start/end time for provider to select:962679}     Video-Visit Details    Type of service:  Video Visit    Video End Time:{video visit start/end time for provider to select:975647}    Originating Location (pt. Location): {video visit patient location:606008::\"Home\"}    Distant Location (provider location):  [facility]     Platform used for Video Visit: {Virtual Visit Platforms:419853::\"Skipola\"}    Oncology/Hematology Visit Note  Dec 28, 2021    Reason for Visit: Follow up of undifferentiated pleomorphic sarcoma     History of Present Illness: Richa Ashby is a 56 year old female with PMH asthma and GERD with undifferentiated pleomorphic sarcoma. 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 She received preoperative radiotherapy and the tumor was resected 3/12/20. She then developed a cough, chest tightness, and scapular pain and imaging revealed lung nodules. Lung biopsy 11/2/20 with sarcoma. She was started on Doxil + Ifos 11/13/20. Had delay in cycle 2 due to COVID dx 12/2/20. CT imaging after cycle 1 with positive response to treatment with decrease in pulmonary nodules. Received cycle 2 12/18/20. Received cycle 3 1/15/21 with dose reduction " in Doxil for skin toxicity and mucositis. Received cycle 4 2/15/21.      CT CAP 3/8/21 with positive response to treatment, decreased size of pulmonary mets though one lesion was larger. Did see fibrocystic changes in left breast, recommended breast imaging. Received cycle 5 Doxil + ifos 3/11/21.      Imaging 4/5/21 with overall stable disease except one lung lesion, opted to continue Doxil alone for now and switch to Keytruda in future if ongoing progression.      Started Keytruda 5/10/21. Was seen 5/26/21 cough, SOB, fatigue work-up thought to be 2/2 phrenic nerve involvement of R apical lung mass.      Due to worsening fatigue and cough, repeat CT 6/15/21 concerning for progression vs pseudoprogression. Added Gemzar 6/16/21.     She was admitted 6/17/21-6/20/21 with fever of unknown origin, infectious work-up negative, treated with broad spectrum antibiotics.     She clinically improved on abx. Was able to receive day 8 Gemzar (dose reduced for thrombocytopenia) + Keytruda 6/23/21.      CT 7/12/21 with stable disease.     She was admitted 8/25/21-8/31/21 for fevers, hypotension, acute on chronic anemia. Extensive infectious work-up negative so etiology of fevers still unknown.      She has had progressive disease so started on Pazopanib 9/24/21.     She was admitted 10/6/21-10/8/21 for weakness, new hallucinations in setting of UTI. Brain MRI without brain mets though did have Diffuse loss normal fatty marrow on T1 sequence, nonspecific. Symptoms improved with abx and IVF.      She has continued on Pazopanib. CT 11/1/21 for restaging noted majority of masses with fluid and gas consistent with necrosis and the largest cyst likely ruptured causing a right hydropneumothorax with near complete collapse of the R lung. One solid left pleural based lesion was slightly increased. Overall felt this was a positive response to Pazopanib so continued.     She was admitted 11/1/21 for urgent chest tube placement for the  hydropneumothorax noted on CT. Due to high risk of recurrence she underwent talc pleurodesis 11/5/21. Port was incidentally found to have non-occlusive thrombus and will be replaced later this month pending resolution of subcutaneous air.      CT 11/22/21 overall with no change other than decreased chest wall subcutaneous emphysema.     She has continued on Pazopanib.   Interval History:      Review of Systems:  Patient denies fevers, chills, night sweats, unexplained weight changes, headaches, dizziness, vision or hearing changes, new lumps or bumps, chest pain, shortness of breath, cough, abdominal pain, nausea, vomiting, changes to bowel or bladder, swelling of extremities, bleeding issues, or rash.    Current Outpatient Medications   Medication Sig Dispense Refill     acetaminophen (TYLENOL) 325 MG tablet Take 325-650 mg by mouth every 6 hours as needed for mild pain       ADVAIR DISKUS 100-50 MCG/DOSE inhaler Inhale 1 puff into the lungs daily        albuterol (PROAIR RESPICLICK) 108 (90 Base) MCG/ACT inhaler Inhale 1-2 puffs into the lungs every 6 hours as needed        celecoxib (CELEBREX) 200 MG capsule Take 1 capsule (200 mg) by mouth 2 times daily 60 capsule 3     cetirizine (ZYRTEC) 10 MG tablet Take 10 mg by mouth daily       diclofenac (VOLTAREN) 1 % topical gel Apply 4 g topically 4 times daily 350 g 3     gabapentin (NEURONTIN) 300 MG capsule Take 600 mg in the AM, 600 mg mid-day, and 900 mg at night 210 capsule 3     guaiFENesin-codeine (ROBITUSSIN AC) 100-10 MG/5ML solution Take 5-10 mLs by mouth every 4 hours as needed for cough 473 mL 1     HYDROmorphone (DILAUDID) 2 MG tablet Take 1-2 tablets (2-4 mg) by mouth every 3 hours as needed for severe pain (. Stop oxycodone completely. Do not take this with oxycodone.) 120 tablet 0     HYDROmorphone HCl ER (EXALGO) 12 MG 24 hour tablet Take 1 tablet (12 mg) by mouth every evening 30 tablet 0     hydrOXYzine (ATARAX) 25 MG tablet Take 1-2 tablets (25-50  mg) by mouth 3 times daily as needed for anxiety 30 tablet 3     levothyroxine (SYNTHROID/LEVOTHROID) 50 MCG tablet Take 1 tablet (50 mcg) by mouth daily 30 tablet 3     melatonin (MELATONIN) 1 MG/ML LIQD liquid Take 5 mg by mouth At Bedtime        montelukast (SINGULAIR) 10 MG tablet Take 10 mg by mouth daily       multivitamin w/minerals (MULTI-VITAMIN) tablet Take 1 tablet by mouth daily       naloxone (NARCAN) 4 MG/0.1ML nasal spray Spray 1 spray (4 mg) into one nostril alternating nostrils once as needed for opioid reversal every 2-3 minutes until assistance arrives 0.2 mL 0     NASAL SPRAY SALINE NA        nystatin (MYCOSTATIN) 309744 UNIT/ML suspension Take 5 mLs (500,000 Units) by mouth 4 times daily 400 mL 0     ondansetron (ZOFRAN) 8 MG tablet Take 1 tablet (8 mg) by mouth every 8 hours as needed for nausea 60 tablet 1     pazopanib (VOTRIENT) 200 MG tablet Take 4 tablets (800 mg) by mouth daily Take on an empty stomach 1 hour before or 2 hours after a meal. 120 tablet 0     pazopanib (VOTRIENT) 200 MG tablet Take 4 tablets (800 mg) by mouth daily Take on an empty stomach 1 hour before or 2 hours after a meal. 120 tablet 0     polyethylene glycol (MIRALAX) 17 GM/Dose powder Take 17 g by mouth daily 510 g 0     prochlorperazine (COMPAZINE) 10 MG tablet Take 1 tablet (10 mg) by mouth every 6 hours as needed for nausea or vomiting 90 tablet 3     triamcinolone (KENALOG) 0.1 % external cream Apply topically 2 times daily As needed for skin irritation. 30 g 1     vitamin D3 (CHOLECALCIFEROL) 2000 units (50 mcg) tablet Take 1 tablet by mouth daily         Past Medical History  Past Medical History:   Diagnosis Date     Asthma, chronic      Sarcoma (H)     Right lower thigh     Past Surgical History:   Procedure Laterality Date     AS RAD RESEC TONSIL/PILLARS       GALLBLADDER SURGERY       INJECT EPIDURAL THORACIC N/A 11/5/2021    Procedure: INJECTION, SPINE, THORACIC, EPIDURAL;  Surgeon: GENERIC ANESTHESIA  PROVIDER;  Location: [facility] OR     INSERT PORT VASCULAR ACCESS Right 10/28/2020    Procedure: single lumen power port placement @0745;  Surgeon: Tex Ackerman MD;  Location: [facility] OR     IR CHEST PORT PLACEMENT > 5 YRS OF AGE  10/28/2020     IR PORT CHECK RIGHT  3/26/2021     IR PORT CHECK RIGHT  10/21/2021     RESECT TUMOR LOWER EXTREMITY Right 3/12/2020    Procedure: Resection of right thigh tumor;  Surgeon: Guillermo Johnson MD;  Location: [facility] OR     ROTATOR CUFF REPAIR RT/LT  2012     SHOULDER SURGERY       SINUS SURGERY       THORACENTESIS N/A 11/5/2021    Procedure: Pleurodesis;  Surgeon: Elana Ngo MD;  Location:  GI     Allergies   Allergen Reactions     Pentobarbital      resp arrest     Social History   Social History     Tobacco Use     Smoking status: Never Smoker     Smokeless tobacco: Never Used   Substance Use Topics     Alcohol use: Yes     Comment: 2-3 glasses of wine/week     Drug use: Never      Past medical history and social history were reviewed.    Physical Examination:  LMP 03/19/2019   Wt Readings from Last 10 Encounters:   12/17/21 72.6 kg (160 lb)   12/02/21 76 kg (167 lb 8 oz)   12/02/21 74.8 kg (165 lb)   11/23/21 78.6 kg (173 lb 3.2 oz)   11/06/21 81.4 kg (179 lb 8 oz)   10/07/21 87.7 kg (193 lb 4.8 oz)   10/06/21 87.2 kg (192 lb 3.2 oz)   10/03/21 87.1 kg (192 lb)   09/22/21 89.3 kg (196 lb 12.8 oz)   09/08/21 90.5 kg (199 lb 8 oz)     Constitutional: Well-appearing female in no acute distress.  Eyes: EOMI, PERRL. No scleral icterus.  ENT: Oral mucosa is moist without lesions or thrush.   Lymphatic: Neck is supple without cervical or supraclavicular lymphadenopathy. No axillary lymphadenopathy.  Breast: No palpable abnormalities in either breast. Nipples everted without drainage. No erythema or pitting.    Cardiovascular: Regular rate and rhythm. No murmurs, gallops, or rubs. No peripheral edema.  Respiratory: Clear to auscultation bilaterally. No wheezes or  crackles.  Gastrointestinal: Bowel sounds present. Abdomen soft, non-tender. No palpable hepatosplenomegaly or masses.   Neurologic: Cranial nerves II through XII are grossly intact.  Skin: No rashes, petechiae, or bruising noted on exposed skin.    Laboratory Data:        Assessment and Plan:          Harshal Schuster PA-C  Department of Hematology and Oncology  [facility]         Again, thank you for allowing me to participate in the care of your patient.        Sincerely,        RUPERT Guaman

## 2021-12-28 NOTE — PROGRESS NOTES
Attempted to call patient to check in again regarding hydromorphone ER and pain control. Was unable to reach her. Did leave a VM asking for a call back when she is able.

## 2021-12-29 NOTE — TELEPHONE ENCOUNTER
Central Prior Authorization Team   Phone: [phone]      PA Initiation    Medication: Ondansetron 8 mg ODT-PA iniitated  Insurance Company: [employer] - Phone [phone] Fax [phone]  Pharmacy Filling the Rx: [facility] [place], [place] - [address], [address]  Filling Pharmacy Phone: [phone]  Filling Pharmacy Fax:    Start Date: 12/29/2021

## 2021-12-30 ENCOUNTER — DOCUMENTATION ONLY (OUTPATIENT)
Dept: ONCOLOGY | Facility: CLINIC | Age: 56
End: 2021-12-30

## 2021-12-30 ENCOUNTER — LAB (OUTPATIENT)
Dept: ONCOLOGY | Facility: CLINIC | Age: 56
End: 2021-12-30
Payer: COMMERCIAL

## 2021-12-30 DIAGNOSIS — C49.9 SARCOMA (H): ICD-10-CM

## 2021-12-30 DIAGNOSIS — C78.00 MALIGNANT NEOPLASM METASTATIC TO LUNG, UNSPECIFIED LATERALITY (H): ICD-10-CM

## 2021-12-30 DIAGNOSIS — C49.9 SARCOMA OF SOFT TISSUE (H): Primary | ICD-10-CM

## 2021-12-30 DIAGNOSIS — D63.0 ANEMIA IN NEOPLASTIC DISEASE: ICD-10-CM

## 2021-12-30 LAB
ABO/RH(D): NORMAL
ALBUMIN SERPL-MCNC: 2.4 G/DL (ref 3.4–5)
ALP SERPL-CCNC: 95 U/L (ref 40–150)
ALT SERPL W P-5'-P-CCNC: 14 U/L (ref 0–50)
ANION GAP SERPL CALCULATED.3IONS-SCNC: 5 MMOL/L (ref 3–14)
ANTIBODY SCREEN: NEGATIVE
AST SERPL W P-5'-P-CCNC: 12 U/L (ref 0–45)
BASOPHILS # BLD MANUAL: 0 10E3/UL (ref 0–0.2)
BASOPHILS NFR BLD MANUAL: 0 %
BILIRUB SERPL-MCNC: 0.8 MG/DL (ref 0.2–1.3)
BUN SERPL-MCNC: 10 MG/DL (ref 7–30)
CALCIUM SERPL-MCNC: 9.4 MG/DL (ref 8.5–10.1)
CHLORIDE BLD-SCNC: 100 MMOL/L (ref 94–109)
CO2 SERPL-SCNC: 28 MMOL/L (ref 20–32)
CREAT SERPL-MCNC: 0.51 MG/DL (ref 0.52–1.04)
EOSINOPHIL # BLD MANUAL: 0 10E3/UL (ref 0–0.7)
EOSINOPHIL NFR BLD MANUAL: 0 %
ERYTHROCYTE [DISTWIDTH] IN BLOOD BY AUTOMATED COUNT: 17.2 % (ref 10–15)
GFR SERPL CREATININE-BSD FRML MDRD: >90 ML/MIN/1.73M2
GLUCOSE BLD-MCNC: 106 MG/DL (ref 70–99)
HCT VFR BLD AUTO: 26 % (ref 35–47)
HGB BLD-MCNC: 8.1 G/DL (ref 11.7–15.7)
LYMPHOCYTES # BLD MANUAL: 1 10E3/UL (ref 0.8–5.3)
LYMPHOCYTES NFR BLD MANUAL: 29 %
MCH RBC QN AUTO: 35.1 PG (ref 26.5–33)
MCHC RBC AUTO-ENTMCNC: 31.2 G/DL (ref 31.5–36.5)
MCV RBC AUTO: 113 FL (ref 78–100)
METAMYELOCYTES # BLD MANUAL: 0 10E3/UL
METAMYELOCYTES NFR BLD MANUAL: 1 %
MONOCYTES # BLD MANUAL: 0.2 10E3/UL (ref 0–1.3)
MONOCYTES NFR BLD MANUAL: 6 %
NEUTROPHILS # BLD MANUAL: 2.3 10E3/UL (ref 1.6–8.3)
NEUTROPHILS NFR BLD MANUAL: 64 %
PLAT MORPH BLD: ABNORMAL
PLATELET # BLD AUTO: 198 10E3/UL (ref 150–450)
POLYCHROMASIA BLD QL SMEAR: SLIGHT
POTASSIUM BLD-SCNC: 4 MMOL/L (ref 3.4–5.3)
PROT SERPL-MCNC: 7.9 G/DL (ref 6.8–8.8)
RBC # BLD AUTO: 2.31 10E6/UL (ref 3.8–5.2)
RBC MORPH BLD: ABNORMAL
SODIUM SERPL-SCNC: 133 MMOL/L (ref 133–144)
SPECIMEN EXPIRATION DATE: NORMAL
T4 FREE SERPL-MCNC: 1.1 NG/DL (ref 0.76–1.46)
TSH SERPL DL<=0.005 MIU/L-ACNC: 10.92 MU/L (ref 0.4–4)
WBC # BLD AUTO: 3.6 10E3/UL (ref 4–11)

## 2021-12-30 PROCEDURE — 86900 BLOOD TYPING SEROLOGIC ABO: CPT | Performed by: INTERNAL MEDICINE

## 2021-12-30 PROCEDURE — 84443 ASSAY THYROID STIM HORMONE: CPT

## 2021-12-30 PROCEDURE — 80053 COMPREHEN METABOLIC PANEL: CPT

## 2021-12-30 PROCEDURE — 85027 COMPLETE CBC AUTOMATED: CPT | Performed by: PHYSICIAN ASSISTANT

## 2021-12-30 PROCEDURE — 86850 RBC ANTIBODY SCREEN: CPT | Performed by: INTERNAL MEDICINE

## 2021-12-30 PROCEDURE — 36591 DRAW BLOOD OFF VENOUS DEVICE: CPT

## 2021-12-30 PROCEDURE — 86901 BLOOD TYPING SEROLOGIC RH(D): CPT | Performed by: INTERNAL MEDICINE

## 2021-12-30 PROCEDURE — 84439 ASSAY OF FREE THYROXINE: CPT

## 2021-12-30 RX ORDER — HEPARIN SODIUM,PORCINE 10 UNIT/ML
5 VIAL (ML) INTRAVENOUS
Status: CANCELLED | OUTPATIENT
Start: 2021-12-30

## 2021-12-30 RX ORDER — HEPARIN SODIUM (PORCINE) LOCK FLUSH IV SOLN 100 UNIT/ML 100 UNIT/ML
500 SOLUTION INTRAVENOUS ONCE
Status: COMPLETED | OUTPATIENT
Start: 2021-12-30 | End: 2021-12-30

## 2021-12-30 RX ORDER — HEPARIN SODIUM (PORCINE) LOCK FLUSH IV SOLN 100 UNIT/ML 100 UNIT/ML
5 SOLUTION INTRAVENOUS
Status: CANCELLED | OUTPATIENT
Start: 2021-12-30

## 2021-12-30 RX ADMIN — HEPARIN SODIUM (PORCINE) LOCK FLUSH IV SOLN 100 UNIT/ML 500 UNITS: 100 SOLUTION at 11:55

## 2021-12-30 NOTE — TELEPHONE ENCOUNTER
Prior Authorization Approval    Authorization Effective Date: 11/29/2021  Authorization Expiration Date: 6/27/2022  Medication: Ondansetron 8 mg ODT-PA approved  Approved Dose/Quantity:   Reference #: [id number]   Insurance Company: [employer] - Phone [phone] Fax [phone]  Expected CoPay:       CoPay Card Available:      Foundation Assistance Needed:    Which Pharmacy is filling the prescription (Not needed for infusion/clinic administered): [facility] - [place], [place] - [address], [address]  Pharmacy Notified: Yes  Patient Notified: No

## 2021-12-30 NOTE — PROGRESS NOTES
Patient's port accessed using sterile procedure. Blood return noted. Lab tubes drawn, labeled and sent to lab. Port flushed with saline and heparin. Patient tolerated lab draw well.       Radha Keller RN

## 2021-12-31 ENCOUNTER — VIRTUAL VISIT (OUTPATIENT)
Dept: ONCOLOGY | Facility: CLINIC | Age: 56
End: 2021-12-31
Attending: PSYCHOLOGIST
Payer: COMMERCIAL

## 2021-12-31 DIAGNOSIS — F43.21 ADJUSTMENT DISORDER WITH DEPRESSED MOOD: Primary | ICD-10-CM

## 2021-12-31 LAB
BLD PROD TYP BPU: NORMAL
BLOOD COMPONENT TYPE: NORMAL
CODING SYSTEM: NORMAL
CROSSMATCH: NORMAL
ISSUE DATE AND TIME: NORMAL
UNIT ABO/RH: NORMAL
UNIT NUMBER: NORMAL
UNIT STATUS: NORMAL
UNIT TYPE ISBT: 5100

## 2021-12-31 PROCEDURE — 90832 PSYTX W PT 30 MINUTES: CPT | Mod: 95 | Performed by: PSYCHOLOGIST

## 2021-12-31 PROCEDURE — 999N001193 HC VIDEO/TELEPHONE VISIT; NO CHARGE

## 2021-12-31 RX ORDER — HEPARIN SODIUM (PORCINE) LOCK FLUSH IV SOLN 100 UNIT/ML 100 UNIT/ML
5 SOLUTION INTRAVENOUS
Status: CANCELLED | OUTPATIENT
Start: 2021-12-31

## 2021-12-31 RX ORDER — HEPARIN SODIUM,PORCINE 10 UNIT/ML
5 VIAL (ML) INTRAVENOUS
Status: CANCELLED | OUTPATIENT
Start: 2021-12-31

## 2021-12-31 NOTE — PROGRESS NOTES
"Richa Ashby is a 56 year old female who is being evaluated via a billable telephone visit. Phone call duration: 30 minutes      The patient has been notified of following:      \"This telephone visit will be conducted via a call between you and your physician/provider. We have found that certain health care needs can be provided without the need for a physical exam.  This service lets us provide the care you need with a short phone conversation.  If a prescription is necessary we can send it directly to your pharmacy.  If lab work is needed we can place an order for that and you can then stop by our lab to have the test done at a later time.     Telephone visits are billed at different rates depending on your insurance coverage. During this emergency period, for some insurers they may be billed the same as an in-person visit.  Please reach out to your insurance provider with any questions.     If during the course of the call the physician/provider feels a telephone visit is not appropriate, you will not be charged for this service.\"     Patient has given verbal consent for Telephone visit? Yes    Confidential Summary of Oncology Psychology Followup Visit    Ricah Ashby is a 56 year old female who presents for Depression  The patient was seen for a 30 minute appointment on 12/31/2021.    Subjective: She was low energy and sad affect today. She struggles with eating and this is impacting her emotional state. We discussed behavioral tools to help with eating and she provided personal examples to demonstrate her understanding of the concepts.     Objective: Affect was appropriate to the content of the therapeutic conversation.     Diagnosis:   Encounter Diagnosis   Name Primary?     Adjustment disorder with depressed mood Yes     Recommendations/Plan:  1. Continue to work on PO intake  2. Return for follow-up    Thank you for this opportunity to participate in your care of this patient.    Rashid Arango, " Psy.D., L.P.  Director, Oncology Supportive Care

## 2022-01-01 ENCOUNTER — INFUSION THERAPY VISIT (OUTPATIENT)
Dept: ONCOLOGY | Facility: CLINIC | Age: 57
End: 2022-01-01
Attending: PHYSICIAN ASSISTANT
Payer: COMMERCIAL

## 2022-01-01 VITALS
TEMPERATURE: 98.7 F | OXYGEN SATURATION: 96 % | HEART RATE: 84 BPM | SYSTOLIC BLOOD PRESSURE: 128 MMHG | RESPIRATION RATE: 16 BRPM | DIASTOLIC BLOOD PRESSURE: 81 MMHG

## 2022-01-01 DIAGNOSIS — C49.9 SARCOMA OF SOFT TISSUE (H): ICD-10-CM

## 2022-01-01 DIAGNOSIS — D63.0 ANEMIA IN NEOPLASTIC DISEASE: Primary | ICD-10-CM

## 2022-01-01 PROCEDURE — 86923 COMPATIBILITY TEST ELECTRIC: CPT | Performed by: PHYSICIAN ASSISTANT

## 2022-01-01 PROCEDURE — 250N000011 HC RX IP 250 OP 636: Performed by: PHYSICIAN ASSISTANT

## 2022-01-01 PROCEDURE — P9016 RBC LEUKOCYTES REDUCED: HCPCS

## 2022-01-01 PROCEDURE — 36430 TRANSFUSION BLD/BLD COMPNT: CPT

## 2022-01-01 RX ORDER — HEPARIN SODIUM (PORCINE) LOCK FLUSH IV SOLN 100 UNIT/ML 100 UNIT/ML
500 SOLUTION INTRAVENOUS ONCE
Status: COMPLETED | OUTPATIENT
Start: 2022-01-01 | End: 2022-01-01

## 2022-01-01 RX ADMIN — Medication 500 UNITS: at 09:50

## 2022-01-01 ASSESSMENT — PAIN SCALES - GENERAL: PAINLEVEL: MODERATE PAIN (4)

## 2022-01-01 NOTE — PROGRESS NOTES
Infusion Nursing Note:  Richa Ashby presents today for 1 unit PRBC.    Patient seen by provider today: No   present during visit today: Not Applicable.    Note: Here for 1 unit PRBC for Hgb of 8.1 drawn 12/30/21.  Per notes, will transfuse 1 unit as she is symptomatic.  She saw Harshal burnette on 12/28/21.  She reports no changes since that visit.  She continues to have increased fatigue, lower energy.  Denies fevers/chills/cough/SOB/bleeding.  Reports pain in right shoulder blade, not new, see flow sheet for pain details.      Intravenous Access:  Implanted Port.    Treatment Conditions:  Lab Results   Component Value Date    HGB 8.1 (L) 12/30/2021    WBC 3.6 (L) 12/30/2021    ANEU 2.3 12/30/2021    ANEUTAUTO 3.0 12/21/2021     12/30/2021      Transfusion consent signed  12/29/21    Post Infusion Assessment:  Patient tolerated infusion without incident.  Blood return noted pre and post infusion.  Site patent and intact, free from redness, edema or discomfort.  No evidence of extravasations.  Access discontinued per protocol.       Discharge Plan:   Patient declined prescription refills.  AVS to patient via [employer].  Patient will return as scheduled for next appointment.   Patient discharged in stable condition accompanied by: self.  Departure Mode: her own scooter.  Face to Face time: 0.      Cyndi Pate RN

## 2022-01-03 ENCOUNTER — IMMUNIZATION (OUTPATIENT)
Dept: NURSING | Facility: CLINIC | Age: 57
End: 2022-01-03
Payer: COMMERCIAL

## 2022-01-03 ENCOUNTER — HOSPITAL ENCOUNTER (OUTPATIENT)
Dept: OCCUPATIONAL THERAPY | Facility: CLINIC | Age: 57
Setting detail: THERAPIES SERIES
End: 2022-01-03
Attending: PHYSICIAN ASSISTANT
Payer: COMMERCIAL

## 2022-01-03 DIAGNOSIS — Z23 HIGH PRIORITY FOR 2019-NCOV VACCINE: Primary | ICD-10-CM

## 2022-01-03 PROCEDURE — 97166 OT EVAL MOD COMPLEX 45 MIN: CPT | Mod: GO | Performed by: OCCUPATIONAL THERAPIST

## 2022-01-03 PROCEDURE — 0013A COVID-19,PF,MODERNA (18+ YRS PRIMARY SERIES .5ML): CPT

## 2022-01-03 PROCEDURE — 91301 COVID-19,PF,MODERNA (18+ YRS PRIMARY SERIES .5ML): CPT

## 2022-01-03 PROCEDURE — 99207 PR NO CHARGE LOS: CPT

## 2022-01-03 PROCEDURE — 97535 SELF CARE MNGMENT TRAINING: CPT | Mod: GO | Performed by: OCCUPATIONAL THERAPIST

## 2022-01-06 RX ORDER — PAZOPANIB 200 MG/1
800 TABLET, FILM COATED ORAL DAILY
Qty: 120 TABLET | Refills: 0 | Status: SHIPPED | OUTPATIENT
Start: 2022-01-06 | End: 2022-02-08

## 2022-01-14 ENCOUNTER — VIRTUAL VISIT (OUTPATIENT)
Dept: PALLIATIVE CARE | Facility: CLINIC | Age: 57
End: 2022-01-14
Attending: INTERNAL MEDICINE
Payer: COMMERCIAL

## 2022-01-14 ENCOUNTER — LAB (OUTPATIENT)
Dept: LAB | Facility: CLINIC | Age: 57
End: 2022-01-14
Payer: COMMERCIAL

## 2022-01-14 ENCOUNTER — ANCILLARY PROCEDURE (OUTPATIENT)
Dept: CT IMAGING | Facility: CLINIC | Age: 57
End: 2022-01-14
Attending: PHYSICIAN ASSISTANT
Payer: COMMERCIAL

## 2022-01-14 DIAGNOSIS — D63.0 ANEMIA IN NEOPLASTIC DISEASE: ICD-10-CM

## 2022-01-14 DIAGNOSIS — G89.3 NEOPLASM RELATED PAIN: ICD-10-CM

## 2022-01-14 DIAGNOSIS — Z79.899 ENCOUNTER FOR LONG-TERM (CURRENT) USE OF MEDICATIONS: ICD-10-CM

## 2022-01-14 DIAGNOSIS — C49.9 SARCOMA (H): ICD-10-CM

## 2022-01-14 DIAGNOSIS — C49.9 SARCOMA OF SOFT TISSUE (H): Primary | ICD-10-CM

## 2022-01-14 LAB
ABO/RH(D): NORMAL
ALBUMIN SERPL-MCNC: 2.2 G/DL (ref 3.4–5)
ALP SERPL-CCNC: 90 U/L (ref 40–150)
ALT SERPL W P-5'-P-CCNC: 17 U/L (ref 0–50)
ANION GAP SERPL CALCULATED.3IONS-SCNC: 8 MMOL/L (ref 3–14)
ANTIBODY SCREEN: NEGATIVE
AST SERPL W P-5'-P-CCNC: 14 U/L (ref 0–45)
BASOPHILS # BLD MANUAL: 0 10E3/UL (ref 0–0.2)
BASOPHILS NFR BLD MANUAL: 0 %
BILIRUB SERPL-MCNC: 0.6 MG/DL (ref 0.2–1.3)
BUN SERPL-MCNC: 10 MG/DL (ref 7–30)
CALCIUM SERPL-MCNC: 9.3 MG/DL (ref 8.5–10.1)
CHLORIDE BLD-SCNC: 99 MMOL/L (ref 94–109)
CO2 SERPL-SCNC: 29 MMOL/L (ref 20–32)
CREAT SERPL-MCNC: 0.61 MG/DL (ref 0.52–1.04)
EOSINOPHIL # BLD MANUAL: 0 10E3/UL (ref 0–0.7)
EOSINOPHIL NFR BLD MANUAL: 0 %
ERYTHROCYTE [DISTWIDTH] IN BLOOD BY AUTOMATED COUNT: 19 % (ref 10–15)
GFR SERPL CREATININE-BSD FRML MDRD: >90 ML/MIN/1.73M2
GLUCOSE BLD-MCNC: 120 MG/DL (ref 70–99)
HCT VFR BLD AUTO: 28 % (ref 35–47)
HGB BLD-MCNC: 8.5 G/DL (ref 11.7–15.7)
LYMPHOCYTES # BLD MANUAL: 1.1 10E3/UL (ref 0.8–5.3)
LYMPHOCYTES NFR BLD MANUAL: 28 %
MAGNESIUM SERPL-MCNC: 1.9 MG/DL (ref 1.6–2.3)
MCH RBC QN AUTO: 32.7 PG (ref 26.5–33)
MCHC RBC AUTO-ENTMCNC: 30.4 G/DL (ref 31.5–36.5)
MCV RBC AUTO: 108 FL (ref 78–100)
METAMYELOCYTES # BLD MANUAL: 0.2 10E3/UL
METAMYELOCYTES NFR BLD MANUAL: 5 %
MONOCYTES # BLD MANUAL: 0.3 10E3/UL (ref 0–1.3)
MONOCYTES NFR BLD MANUAL: 7 %
NEUTROPHILS # BLD MANUAL: 2.3 10E3/UL (ref 1.6–8.3)
NEUTROPHILS NFR BLD MANUAL: 60 %
PHOSPHATE SERPL-MCNC: 3.5 MG/DL (ref 2.5–4.5)
PLAT MORPH BLD: ABNORMAL
PLATELET # BLD AUTO: 212 10E3/UL (ref 150–450)
POTASSIUM BLD-SCNC: 4 MMOL/L (ref 3.4–5.3)
PROT SERPL-MCNC: 7.7 G/DL (ref 6.8–8.8)
RBC # BLD AUTO: 2.6 10E6/UL (ref 3.8–5.2)
RBC MORPH BLD: ABNORMAL
SODIUM SERPL-SCNC: 136 MMOL/L (ref 133–144)
SPECIMEN EXPIRATION DATE: NORMAL
T4 FREE SERPL-MCNC: 1.11 NG/DL (ref 0.76–1.46)
TSH SERPL DL<=0.005 MIU/L-ACNC: 7.45 MU/L (ref 0.4–4)
WBC # BLD AUTO: 3.9 10E3/UL (ref 4–11)

## 2022-01-14 PROCEDURE — 80053 COMPREHEN METABOLIC PANEL: CPT

## 2022-01-14 PROCEDURE — 71250 CT THORAX DX C-: CPT | Mod: GC | Performed by: RADIOLOGY

## 2022-01-14 PROCEDURE — 84100 ASSAY OF PHOSPHORUS: CPT

## 2022-01-14 PROCEDURE — 84443 ASSAY THYROID STIM HORMONE: CPT

## 2022-01-14 PROCEDURE — 86901 BLOOD TYPING SEROLOGIC RH(D): CPT

## 2022-01-14 PROCEDURE — 86900 BLOOD TYPING SEROLOGIC ABO: CPT

## 2022-01-14 PROCEDURE — 36415 COLL VENOUS BLD VENIPUNCTURE: CPT

## 2022-01-14 PROCEDURE — 83735 ASSAY OF MAGNESIUM: CPT

## 2022-01-14 PROCEDURE — 84439 ASSAY OF FREE THYROXINE: CPT

## 2022-01-14 PROCEDURE — 85027 COMPLETE CBC AUTOMATED: CPT

## 2022-01-14 PROCEDURE — 99215 OFFICE O/P EST HI 40 MIN: CPT | Mod: 95 | Performed by: INTERNAL MEDICINE

## 2022-01-14 PROCEDURE — 86850 RBC ANTIBODY SCREEN: CPT

## 2022-01-14 RX ORDER — HYDROMORPHONE HYDROCHLORIDE 12 MG/1
12 TABLET, EXTENDED RELEASE ORAL EVERY EVENING
Qty: 30 TABLET | Refills: 0 | Status: SHIPPED | OUTPATIENT
Start: 2022-01-17 | End: 2022-02-08

## 2022-01-14 RX ORDER — HYDROMORPHONE HYDROCHLORIDE 2 MG/1
2-4 TABLET ORAL
Qty: 120 TABLET | Refills: 0 | Status: SHIPPED | OUTPATIENT
Start: 2022-01-17 | End: 2022-02-08

## 2022-01-14 NOTE — PROGRESS NOTES
Connie is a 56 year old who is being evaluated via a billable video visit.      How would you like to obtain your AVS? [employer]  If the video visit is dropped, the invitation should be resent by:[phone]  Will anyone else be joining your video visit? Marisol Londono    Video Start Time: 3p  Video-Visit Details    Type of service:  Video Visit    Video End Time:330p    Originating Location (pt. Location): Home    Distant Location (provider location):  [facility]ER [facility]     Platform used for Video Visit: [employer]

## 2022-01-14 NOTE — LETTER
1/14/2022       RE: Richa Ashby  [address]  [address]  [place] [address]     Dear Colleague,    Thank you for referring your patient, Richa Ashby, to [facility] at [facility]. Please see a copy of my visit note below.      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   5/2021 started pembrolizumab.   6/16/21 added gemcitabine due to progression vs pseudoprogression  6/2021 hospitalized with fevers, no clear source   8/2021 hospitalized with fevers, hypotension, no clear source, question of inflammatory arthritis from pembro  9/2021 onc visit-->scan with growth however areas of necrosis; tx held, close follow-up.  9/2021 pazopanib  10/2021 hospitalized for weakness and delirium + UTI.   11/1/2021 hydroptx and collapse of R lung, admitted-->talc pleurodesis. Remains on pazopanib.     Followed with DONELL Torres palliative LICSW; now with ROGELIO Arango LP     Social - Lives with  Elliot. 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      Care Planning - Multiple ACP discussions with us eg 6/16/2021, 7/2021, 9/2021. Completing HCD, well informed about the natural history of her cancer.     History:  History gathered today from: patient, medical chart     I've adjusted her pain meds a lot the last month.  Now on dilaudidER 12 mg qpm;   No longer taking dilaudid q3h ATC; more like q4-5h. Takes 2mg at a time.  This has helped modestly, sleeping better.    Less mental fog than oxycodone but feels generally poor memory. No more visual illusions or lucid dreams.   Seeing an OT. We discussed options but she notes she doesn't want to rock the boat between analgesia and  tiredness right now. On gabap and celecoxib.    Sleep ok  Naps q afternoon  Bowels ok; stooling daily or so.  Resps ok  Pazopanib: has AM nausea with it; Zofran works well qam. Also compazine qpm.  Appetite: poor. Forcing self to eat but feels it's a chore.     Mood ok.  Elliot is working from home now. Good part of her day is when he's done with work and she can interact with him. Granddaughter FTs every day.   Planning on moving to [facility] area in March where she's from and her dtr/granddtr are.    Scan today, onc follow-up Tuesday.  Hopes/kind of expects the scan to be stable, that's what her sense of her situation is, and acknowledges that in her condition that's pretty good news.     PE: LMP 03/19/2019    Wt Readings from Last 3 Encounters:   12/17/21 72.6 kg (160 lb)   12/02/21 76 kg (167 lb 8 oz)   12/02/21 74.8 kg (165 lb)     Alert NAD  Clear sensorium full affect      Data reviewed:  I reviewed recent labs and imaging, my comments:  Cr 0.6, Alb 2.2    CT today images personally reviewed; large R lung mass, massive; compared with prior CT it's not grossly changed but rads read is pending     database reviewed: y      Impression & Recommendations:  55 yo with metastatic sarcoma on pazopanib    Scan results pending, sees Dr Lopez next week.     Pain:   Substantially better pain relief on the dilaudid; tolerating Exalgo well 12 mg daily (qpm).   Much better than oxycodone.  Tentatively was thinking of changing her to methadone; her relief is moderate and has some side effects yet (tiredness, multifactorial; but undoubtedly from the meds too); but her inclination is to stay the course for now which I am fully supportive of. Continue celecoxib, gabapentin.    Move:  I'll see her back late Feb before the move. I can cover refills for her for a couple months after that if needed as she transitions her care to [facility]; I think she'll be seen in the  system there; let her know there's excellent palliative care there I  hope she can access.        40 minutes spent on the date of the encounter doing chart review, history and exam, patient education & counseling, documentation and other activities as noted above.    Thank you for involving us in the patient's care.   Robert Blanca MD / Palliative Medicine / Text me via [facility].        Again, thank you for allowing me to participate in the care of your patient.      Sincerely,    Robert Blanca MD

## 2022-01-14 NOTE — PROGRESS NOTES
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   5/2021 started pembrolizumab.   6/16/21 added gemcitabine due to progression vs pseudoprogression  6/2021 hospitalized with fevers, no clear source   8/2021 hospitalized with fevers, hypotension, no clear source, question of inflammatory arthritis from pembro  9/2021 onc visit-->scan with growth however areas of necrosis; tx held, close follow-up.  9/2021 pazopanib  10/2021 hospitalized for weakness and delirium + UTI.   11/1/2021 hydroptx and collapse of R lung, admitted-->talc pleurodesis. Remains on pazopanib.     Followed with DONELL Torres palliative LICSW; now with ROGELIO Arango LP     Social - Lives with  Elliot. 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      Care Planning - Multiple ACP discussions with us eg 6/16/2021, 7/2021, 9/2021. Completing HCD, well informed about the natural history of her cancer.     History:  History gathered today from: patient, medical chart     I've adjusted her pain meds a lot the last month.  Now on dilaudidER 12 mg qpm;   No longer taking dilaudid q3h ATC; more like q4-5h. Takes 2mg at a time.  This has helped modestly, sleeping better.    Less mental fog than oxycodone but feels generally poor memory. No more visual illusions or lucid dreams.   Seeing an OT. We discussed options but she notes she doesn't want to rock the boat between analgesia and tiredness right now. On gabap and celecoxib.    Sleep ok  Naps q afternoon  Bowels ok; stooling daily or so.  Resps ok  Pazopanib: has AM nausea with it; Zofran works well qam. Also compazine qpm.  Appetite: poor. Forcing self to eat but feels it's a chore.     Mood ok.  Elliot is working from home now. Good part of her day is  when he's done with work and she can interact with him. Granddaughter FTs every day.   Planning on moving to [facility] area in March where she's from and her dtr/granddtr are.    Scan today, onc follow-up Tuesday.  Hopes/kind of expects the scan to be stable, that's what her sense of her situation is, and acknowledges that in her condition that's pretty good news.     PE: LMP 03/19/2019    Wt Readings from Last 3 Encounters:   12/17/21 72.6 kg (160 lb)   12/02/21 76 kg (167 lb 8 oz)   12/02/21 74.8 kg (165 lb)     Alert NAD  Clear sensorium full affect      Data reviewed:  I reviewed recent labs and imaging, my comments:  Cr 0.6, Alb 2.2    CT today images personally reviewed; large R lung mass, massive; compared with prior CT it's not grossly changed but rads read is pending     database reviewed: y      Impression & Recommendations:  57 yo with metastatic sarcoma on pazopanib    Scan results pending, sees Dr Lopez next week.     Pain:   Substantially better pain relief on the dilaudid; tolerating Exalgo well 12 mg daily (qpm).   Much better than oxycodone.  Tentatively was thinking of changing her to methadone; her relief is moderate and has some side effects yet (tiredness, multifactorial; but undoubtedly from the meds too); but her inclination is to stay the course for now which I am fully supportive of. Continue celecoxib, gabapentin.    Move:  I'll see her back late Feb before the move. I can cover refills for her for a couple months after that if needed as she transitions her care to [facility]; I think she'll be seen in the  system there; let her know there's excellent palliative care there I hope she can access.        40 minutes spent on the date of the encounter doing chart review, history and exam, patient education & counseling, documentation and other activities as noted above.    Thank you for involving us in the patient's care.   Robert Blanca MD / Palliative Medicine / Text me via [facility].

## 2022-01-16 NOTE — PROGRESS NOTES
Connie is a 56 year old who is being evaluated via a billable video visit.      How would you like to obtain your AVS? [employer]  If the video visit is dropped, the invitation should be resent by: Text to cell phone: [phone]  Will anyone else be joining your video visit? Yes, pt  may join at home.       Video Start Timeabout 422  Video-Visit Details    Type of service:  Video Visit    Video End [id number]    Originating Location (pt. Location)home    Distant Location (provider location):  [facility]     [facility]    Elizabethmayito Au          Oncology/Hematology Visit Note    1-18-22     I saw Connie Milian for follow up of undifferentiated pleomorphic sarcoma    Background  She has a PMH asthma and GERD with undifferentiated pleomorphic sarcoma. 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 She received preoperative radiotherapy and the tumor was resected 3/12/20. She then developed a cough, chest tightness, and scapular pain and imaging revealed lung nodules. Lung biopsy 11/2/20 with sarcoma. She was started Doxil + Ifos 11/13/20. Had delay in cycle 2 due to COVID dx 12/2/20. CT imaging after cycle 1 with positive response to treatment with decrease in pulmonary nodules. Received cycle 2 12/18/20. Received cycle 3 1/15/21 with dose reduction in Doxil for skin toxicity and mucositis. Received cycle 4 2/15/21.      CT CAP 3/8/21 with positive response to treatment, decreased size of pulmonary mets though one lesion was larger. Did see fibrocystic changes in left breast, recommended breast imaging. Received cycle 5 Doxil + ifos 3/11/21.      Imaging 4/5/21 with overall stable disease except one lung lesion, opted to continued Doxil alone.     Started Keytruda 5/10/21. Was seen 5/26/21 cough, SOB, fatigue work-up thought to be 2/2 phrenic nerve involvement of R apical lung mass.      Due to  worsening fatigue and cough, repeat CT 6/15/21 concerning for progression vs pseudoprogression. Added Gemzar 6/16/21.     She was admitted 6/17/21-6/20/21 with fever of unknown origin, infectious work-up negative, treated with broad spectrum antibiotics.     She clinically improved on abx. Was able to receive day 8 Gemzar (dose reduced for thrombocytopenia) + Keytruda 6/23/21.      CT 7/12/21 with stable disease.     She was admitted 8/25/21-8/31/21 for fevers, hypotension, acute on chronic anemia. Extensive infectious work-up negative so etiology of fevers still unknown.      She has had progressive disease so started Pazopanib 9/24/21.     She was admitted 10/6/21-10/8/21 for weakness, new hallucinations in setting of UTI. Brain MRI without brain mets though did have Diffuse loss normal fatty marrow on T1 sequence, nonspecific. Symptoms improved with abx and IVF.      She continued on Pazopanib. CT 11/1/21 for restaging noted majority of masses with fluid and gas consistent with necrosis and the largest cyst likely ruptured causing a right hydropneumothorax with near complete collapse of the R lung. One solid left pleural based lesion was slightly increased. Overall felt this was a positive response to Pazopanib so continued.     She was admitted 11/1/21 for urgent chest tube placement for the hydropneumothorax noted on CT. Due to high risk of recurrence she underwent talc pleurodesis 11/5/21. Port was incidentally found to have non-occlusive thrombus and will be replaced later this month pending resolution of subcutaneous air.      CT 11/22/21 overall with no change other than decreased chest wall subcutaneous emphysema.     She continued on Pazopanib.   -      Interval History:    Overall she is doing fairly well all things considered. Pain control is improving, more noticeable in the day and working w palliative on that.    Sleeping OK.    Eating not so much as no appetite.  Saw psychologist recently and she is  thinking of food differently now. She walked 3 min today w PT without taking a break-better than last month.    She will be moving to [facility] around March the are building a house that will be ready in March..    She is walking around the apt and getting out at least every other day and working w PT.  BMs OK.  Port working well.      She has occ nausea w maybe small emesis 2x/week-mostly meds.      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  Eyes: EOMI,  Resp: Appears to be breathing comfortably without accessory muscle usage, speaking in full sentences, no cough  MSK: Appears to have normal range of motion neck  Neurologic: No apparent tremors, facial movements symmetric, normal mentation and speech  Psych: affect normal, alert and oriented      -  CBC, LFT, GNE OK w creat 0.61, though TSH 7.45, alb 2.2, Hb 8.5,     -  I reviewed the new images of 1-14-22 and I think there is not a lot of change; a response is maintained.  However, there is more fluid in some lesions and the L base lesion appears larger (possibly partly due do fluid).      Formal read:  IMPRESSION:   1. Multiple cavitary masses within the lungs bilaterally, consistent  with necrotic metastases. These are not substantially changed in size,  with some change in appearance of cavitation of the masses. Loculated  component in the right base is increased.  2. Large right apical pleural hydropneumothorax with necrotic debris  and convex borders and additional left basilar collection appear  similar to prior. Infection is not excluded.      -  Assessment and Plan:  1. Onc  Metastatic UPS, was on Doxil + Ifosfamide (5 cycles) with positive response to treatment initially, then due to poor tolerance switched to Doxil alone (1 cycle) but had progression. Started on Keytruda 5/10/21. Due to tumor growth (progression vs  pseudoprogression) added Gemzar 6/16/21. Had ongoing progression. Started on Pazopanib 9/24/21.     CT 11/1/21 with significant tumor necrosis/rupture. Given overall clinical improvement and findings of necrosis plan to continue Pazopanib.    We discussed the situation.  Overall she is doing somewhat better symptomatically, and the CT is not markedly worse.    She continues to do well on Pazopanib. Will continue.     Will check on work on getting her records to [facility] in [place].        2. Pulm  R sided hydropneumothorax: Found on 11/1/21 CT scan, thought 2/2 ruptured tumor. Was admitted 11/1 and underwent chest tube placement. Pulm performed talc pleuradesis on 11/5/21 due to risk of recurrence. CT from 11/22 shows ongoing but stable hydropneumothorax.    Asthma: stable, continue inhalers.      COVID: Diagnosed in December, recovered. She is vaccinated.     She got the booster 1/3/21      Cough: 2/2 malignancy,much improved.        3. ID  Fevers of unknown origin, thought to be tumor fevers, previously required Indomethacin, resolved.     4. Vascular  Port malfunction with small RIJ clot, port now replaced and working well.     5. Pain  R chest wall/back pain, ongoing. Palliative now managing. Continue Celebrex and Dilaudid. Better control now.      6. Heme  Anemia: plan rbc tx as needed for sx.      7. Cards  Exercise fatigue and NAZARIO: Echo was stable.   Continue PT-possibly improving       8. 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     Continue Gabapentin 600/600/900.     Deconditioning: Doing PT     Hallucinations: Brain MRI reviewed and no mets. Does have loss of normal fatty marrow with broad differential. Discussed with neuro onc and unclear findings. No active concerns.      9. GI  Constipation: Continue metamucil PRN.      Nausea, 2/2 chemotherapy and coughing, ongoing. Zyprexa not helpful, stopped. Continue scheduled Compazine daily pre votrient. OTC Zofran as back up.       Start salt/soda for mouth irritation. Completed Nystatin course for thrush, can restart if recurrent symptoms.      Continue Tums PRN GERD, take away from Votrient.     10. Psych  Ongoing anxiety. Seeing Titus Martin Slightly better.       Continue Melatonin PRN insomnia.       11. 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 Check TSH monthly. current TSH 7.4-no change in dose now.      12. Derm  Mild hand/foot, continue Kenalog PRN. Aveno lotion.   Doing well now    Jose Alberto Lopez M.D.  Professor  Hematology, Oncology and Transplantation

## 2022-01-18 ENCOUNTER — HOSPITAL ENCOUNTER (OUTPATIENT)
Dept: PHYSICAL THERAPY | Facility: CLINIC | Age: 57
Setting detail: THERAPIES SERIES
End: 2022-01-18
Attending: PHYSICIAN ASSISTANT
Payer: COMMERCIAL

## 2022-01-18 ENCOUNTER — VIRTUAL VISIT (OUTPATIENT)
Dept: ONCOLOGY | Facility: CLINIC | Age: 57
End: 2022-01-18
Attending: INTERNAL MEDICINE
Payer: COMMERCIAL

## 2022-01-18 DIAGNOSIS — C49.9 SARCOMA (H): ICD-10-CM

## 2022-01-18 DIAGNOSIS — C78.00 MALIGNANT NEOPLASM METASTATIC TO LUNG, UNSPECIFIED LATERALITY (H): Primary | ICD-10-CM

## 2022-01-18 DIAGNOSIS — C49.9 SARCOMA OF SOFT TISSUE (H): ICD-10-CM

## 2022-01-18 DIAGNOSIS — D63.0 ANEMIA IN NEOPLASTIC DISEASE: ICD-10-CM

## 2022-01-18 PROCEDURE — 97110 THERAPEUTIC EXERCISES: CPT | Mod: GP | Performed by: PHYSICAL THERAPIST

## 2022-01-18 PROCEDURE — 99215 OFFICE O/P EST HI 40 MIN: CPT | Mod: 95 | Performed by: INTERNAL MEDICINE

## 2022-01-18 NOTE — LETTER
1/18/2022         RE: Richa Ashby  [address]  [address]  [place] [address]        Dear Colleague,    Thank you for referring your patient, Richa Ashby, to the [facility]. Please see a copy of my visit note below.    Connie is a 56 year old who is being evaluated via a billable video visit.      How would you like to obtain your AVS? MyChart  If the video visit is dropped, the invitation should be resent by: Text to cell phone: [phone]  Will anyone else be joining your video visit? Yes, pt  may join at home.     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 She received preoperative radiotherapy and the tumor was resected 3/12/20. She then developed a cough, chest tightness, and scapular pain and imaging revealed lung nodules. Lung biopsy 11/2/20 with sarcoma. She was started Doxil + Ifos 11/13/20. Had delay in cycle 2 due to COVID dx 12/2/20. CT imaging after cycle 1 with positive response to treatment with decrease in pulmonary nodules. Received cycle 2 12/18/20. Received cycle 3 1/15/21 with dose reduction in Doxil for skin toxicity and mucositis. Received cycle 4 2/15/21.      CT CAP 3/8/21 with positive response to treatment, decreased size of pulmonary mets though one lesion was larger. Did see fibrocystic changes in left breast, recommended breast imaging. Received cycle 5 Doxil + ifos 3/11/21.      Imaging 4/5/21 with overall stable disease except one lung lesion, opted to continued Doxil alone.     Started Keytruda 5/10/21. Was seen 5/26/21 cough, SOB, fatigue work-up thought to be 2/2 phrenic nerve involvement of R apical lung mass.      Due to  worsening fatigue and cough, repeat CT 6/15/21 concerning for progression vs pseudoprogression. Added Gemzar 6/16/21.     She was admitted 6/17/21-6/20/21 with fever of unknown origin, infectious work-up negative, treated with broad spectrum antibiotics.     She clinically improved on abx. Was able to receive day 8 Gemzar (dose reduced for thrombocytopenia) + Keytruda 6/23/21.      CT 7/12/21 with stable disease.     She was admitted 8/25/21-8/31/21 for fevers, hypotension, acute on chronic anemia. Extensive infectious work-up negative so etiology of fevers still unknown.      She has had progressive disease so started Pazopanib 9/24/21.     She was admitted 10/6/21-10/8/21 for weakness, new hallucinations in setting of UTI. Brain MRI without brain mets though did have Diffuse loss normal fatty marrow on T1 sequence, nonspecific. Symptoms improved with abx and IVF.      She continued on Pazopanib. CT 11/1/21 for restaging noted majority of masses with fluid and gas consistent with necrosis and the largest cyst likely ruptured causing a right hydropneumothorax with near complete collapse of the R lung. One solid left pleural based lesion was slightly increased. Overall felt this was a positive response to Pazopanib so continued.     She was admitted 11/1/21 for urgent chest tube placement for the hydropneumothorax noted on CT. Due to high risk of recurrence she underwent talc pleurodesis 11/5/21. Port was incidentally found to have non-occlusive thrombus and will be replaced later this month pending resolution of subcutaneous air.      CT 11/22/21 overall with no change other than decreased chest wall subcutaneous emphysema.     She continued on Pazopanib.   -    Interval History:    Overall she is doing fairly well all things considered. Pain control is improving, more noticeable in the day and working w palliative on that.    Sleeping OK.    Eating not so much as no appetite.  Saw psychologist recently and she is  thinking of food differently now. She walked 3 min today w PT without taking a break-better than last month.    She will be moving to [facility] around March the are building a house that will be ready in March..    She is walking around the apt and getting out at least every other day and working w PT.  BMs OK.  Port working well.      She has occ nausea w maybe small emesis 2x/week-mostly meds.    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  Eyes: EOMI,  Resp: Appears to be breathing comfortably without accessory muscle usage, speaking in full sentences, no cough  MSK: Appears to have normal range of motion neck  Neurologic: No apparent tremors, facial movements symmetric, normal mentation and speech  Psych: affect normal, alert and oriented      -  CBC, LFT, GNE OK w creat 0.61, though TSH 7.45, alb 2.2, Hb 8.5,     -  I reviewed the new images of 1-14-22 and I think there is not a lot of change; a response is maintained.  However, there is more fluid in some lesions and the L base lesion appears larger (possibly partly due do fluid).      Formal read:  IMPRESSION:   1. Multiple cavitary masses within the lungs bilaterally, consistent  with necrotic metastases. These are not substantially changed in size,  with some change in appearance of cavitation of the masses. Loculated  component in the right base is increased.  2. Large right apical pleural hydropneumothorax with necrotic debris  and convex borders and additional left basilar collection appear  similar to prior. Infection is not excluded.    -  Assessment and Plan:  1. Onc  Metastatic UPS, was on Doxil + Ifosfamide (5 cycles) with positive response to treatment initially, then due to poor tolerance switched to Doxil alone (1 cycle) but had progression. Started on Keytruda 5/10/21. Due to tumor growth (progression vs  pseudoprogression) added Gemzar 6/16/21. Had ongoing progression. Started on Pazopanib 9/24/21.     CT 11/1/21 with significant tumor necrosis/rupture. Given overall clinical improvement and findings of necrosis plan to continue Pazopanib.    We discussed the situation.  Overall she is doing somewhat better symptomatically, and the CT is not markedly worse.    She continues to do well on Pazopanib. Will continue.     Will check on work on getting her records to [facility] [place].        2. Pulm  R sided hydropneumothorax: Found on 11/1/21 CT scan, thought 2/2 ruptured tumor. Was admitted 11/1 and underwent chest tube placement. Pulm performed talc pleuradesis on 11/5/21 due to risk of recurrence. CT from 11/22 shows ongoing but stable hydropneumothorax.    Asthma: stable, continue inhalers.      COVID: Diagnosed in December, recovered. She is vaccinated.     She got the booster 1/3/21      Cough: 2/2 malignancy,much improved.     3. ID  Fevers of unknown origin, thought to be tumor fevers, previously required Indomethacin, resolved.     4. Vascular  Port malfunction with small RIJ clot, port now replaced and working well.     5. Pain  R chest wall/back pain, ongoing. Palliative now managing. Continue Celebrex and Dilaudid. Better control now.      6. Heme  Anemia: plan rbc tx as needed for sx.      7. Cards  Exercise fatigue and NAZARIO: Echo was stable.   Continue PT-possibly improving       8. 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     Continue Gabapentin 600/600/900.     Deconditioning: Doing PT     Hallucinations: Brain MRI reviewed and no mets. Does have loss of normal fatty marrow with broad differential. Discussed with neuro onc and unclear findings. No active concerns.      9. GI  Constipation: Continue metamucil PRN.      Nausea, 2/2 chemotherapy and coughing, ongoing. Zyprexa not helpful, stopped. Continue scheduled Compazine daily pre votrient. OTC Zofran as back up.       Start salt/soda for mouth irritation. Completed Nystatin course for thrush, can restart if recurrent symptoms.      Continue Tums PRN GERD, take away from Votrient.     10. Psych  Ongoing anxiety. Seeing Titus Martin Slightly better.       Continue Melatonin PRN insomnia.     11. 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 Check TSH monthly. current TSH 7.4-no change in dose now.      12. Derm  Mild hand/foot, continue Kenalog PRN. Aveno lotion.   Doing well now            Again, thank you for allowing me to participate in the care of your patient.      Sincerely,    Jose Alberto Lopez MD

## 2022-01-19 ENCOUNTER — HOSPITAL ENCOUNTER (OUTPATIENT)
Dept: OCCUPATIONAL THERAPY | Facility: CLINIC | Age: 57
Setting detail: THERAPIES SERIES
End: 2022-01-19
Attending: PHYSICIAN ASSISTANT
Payer: COMMERCIAL

## 2022-01-19 ENCOUNTER — PATIENT OUTREACH (OUTPATIENT)
Dept: ONCOLOGY | Facility: CLINIC | Age: 57
End: 2022-01-19
Payer: COMMERCIAL

## 2022-01-19 PROCEDURE — 97535 SELF CARE MNGMENT TRAINING: CPT | Mod: GO | Performed by: OCCUPATIONAL THERAPIST

## 2022-01-19 PROCEDURE — 97110 THERAPEUTIC EXERCISES: CPT | Mod: GO | Performed by: OCCUPATIONAL THERAPIST

## 2022-01-19 NOTE — PROGRESS NOTES
Connie called in stating that she would like her path from her initial pathology faxed to Sarahi at the [facility].  Path reports including the initial biopsy, and surgical resection were faxed to [phone].

## 2022-01-21 ENCOUNTER — VIRTUAL VISIT (OUTPATIENT)
Dept: ONCOLOGY | Facility: CLINIC | Age: 57
End: 2022-01-21
Attending: PSYCHOLOGIST
Payer: COMMERCIAL

## 2022-01-21 DIAGNOSIS — Z53.9 ERRONEOUS ENCOUNTER--DISREGARD: Primary | ICD-10-CM

## 2022-01-21 NOTE — LETTER
1/21/2022         RE: Richa Ashby  [address]  [address]  [place] [facility] [id number]        Dear Colleague,    Thank you for referring your patient, Richa Ashby, to the [facility]. Please see a copy of my visit note below.      This encounter was opened in error. Please disregard.          Again, thank you for allowing me to participate in the care of your patient.      Sincerely,    Rashid Arango PsyD

## 2022-01-22 ENCOUNTER — DOCUMENTATION ONLY (OUTPATIENT)
Dept: ONCOLOGY | Facility: CLINIC | Age: 57
End: 2022-01-22
Payer: COMMERCIAL

## 2022-01-28 ENCOUNTER — PATIENT OUTREACH (OUTPATIENT)
Dept: ONCOLOGY | Facility: CLINIC | Age: 57
End: 2022-01-28
Payer: COMMERCIAL

## 2022-01-28 NOTE — PROGRESS NOTES
Received a call from Connie stating that her [facility] cancer center in [place], [place] will need path slides/tissue sent to them.  She emailed a copy of her TANNER.  A request has been faxed to surgical pathology at [phone].

## 2022-01-30 ENCOUNTER — HEALTH MAINTENANCE LETTER (OUTPATIENT)
Age: 57
End: 2022-01-30

## 2022-02-01 ENCOUNTER — HOSPITAL ENCOUNTER (OUTPATIENT)
Dept: PHYSICAL THERAPY | Facility: CLINIC | Age: 57
Setting detail: THERAPIES SERIES
End: 2022-02-01
Attending: PHYSICIAN ASSISTANT
Payer: COMMERCIAL

## 2022-02-01 PROCEDURE — 97110 THERAPEUTIC EXERCISES: CPT | Mod: GP | Performed by: PHYSICAL THERAPIST

## 2022-02-02 ENCOUNTER — PATIENT OUTREACH (OUTPATIENT)
Dept: PALLIATIVE CARE | Facility: CLINIC | Age: 57
End: 2022-02-02
Payer: COMMERCIAL

## 2022-02-02 DIAGNOSIS — F41.9 ANXIETY: ICD-10-CM

## 2022-02-02 NOTE — PROGRESS NOTES
Received call from patient requesting refill of hydroxyzine. She says she is taking 1 tab TID pretty consistently now.    Last refill: 12/16/2021  Last office visit:1/14/2022  Message to  for follow up     Will route request to MD for review.     Reviewed [place]  Report.

## 2022-02-03 RX ORDER — HYDROXYZINE HYDROCHLORIDE 25 MG/1
25-50 TABLET, FILM COATED ORAL 3 TIMES DAILY PRN
Qty: 90 TABLET | Refills: 3 | Status: SHIPPED | OUTPATIENT
Start: 2022-02-03

## 2022-02-04 DIAGNOSIS — C78.00 MALIGNANT NEOPLASM METASTATIC TO LUNG, UNSPECIFIED LATERALITY (H): ICD-10-CM

## 2022-02-04 DIAGNOSIS — D63.0 ANEMIA IN NEOPLASTIC DISEASE: ICD-10-CM

## 2022-02-04 DIAGNOSIS — C49.9 SARCOMA (H): ICD-10-CM

## 2022-02-04 DIAGNOSIS — C49.9 SARCOMA OF SOFT TISSUE (H): Primary | ICD-10-CM

## 2022-02-04 RX ORDER — PAZOPANIB 200 MG/1
800 TABLET, FILM COATED ORAL DAILY
Qty: 120 TABLET | Refills: 0 | Status: SHIPPED | OUTPATIENT
Start: 2022-02-04

## 2022-02-04 NOTE — PROGRESS NOTES
Connie is a 56 year old who is being evaluated via a billable video visit.      How would you like to obtain your AVS? MyChart  If the video visit is dropped, the invitation should be resent by: Text to cell phone: [phone]   Will anyone else be joining your video visit? No      Video Start Time: 1:36pm    Video-Visit Details    Type of service:  Video Visit    Video End Time: 2:08pm    Originating Location (pt. Location): Home    Distant Location (provider location):  [facility]     Platform used for Video Visit: James Kauffman    Oncology/Hematology Visit Note  Feb 8, 2022    Reason for Visit: Follow up of undifferentiated pleomorphic sarcoma     History of Present Illness: Richa Ashby is a 56 year old female with PMH asthma and GERD with undifferentiated pleomorphic sarcoma. 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 She received preoperative radiotherapy and the tumor was resected 3/12/20. She then developed a cough, chest tightness, and scapular pain and imaging revealed lung nodules. Lung biopsy 11/2/20 with sarcoma. She was started on Doxil + Ifos 11/13/20. Had delay in cycle 2 due to COVID dx 12/2/20. CT imaging after cycle 1 with positive response to treatment with decrease in pulmonary nodules. Received cycle 2 12/18/20. Received cycle 3 1/15/21 with dose reduction in Doxil for skin toxicity and mucositis. Received cycle 4 2/15/21.      CT CAP 3/8/21 with positive response to treatment, decreased size of pulmonary mets though one lesion was larger. Did see fibrocystic changes in left breast, recommended breast imaging. Received cycle 5 Doxil + ifos 3/11/21.      Imaging 4/5/21 with overall stable disease except one lung lesion, opted to continue Doxil alone for now and switch to Keytruda in future if ongoing progression.      Started Keytruda 5/10/21. Was seen 5/26/21 cough, SOB, fatigue work-up  thought to be 2/2 phrenic nerve involvement of R apical lung mass.      Due to worsening fatigue and cough, repeat CT 6/15/21 concerning for progression vs pseudoprogression. Added Gemzar 6/16/21.     She was admitted 6/17/21-6/20/21 with fever of unknown origin, infectious work-up negative, treated with broad spectrum antibiotics.     She clinically improved on abx. Was able to receive day 8 Gemzar (dose reduced for thrombocytopenia) + Keytruda 6/23/21.      CT 7/12/21 with stable disease.     She was admitted 8/25/21-8/31/21 for fevers, hypotension, acute on chronic anemia. Extensive infectious work-up negative so etiology of fevers still unknown.      She has had progressive disease so started on Pazopanib 9/24/21.     She was admitted 10/6/21-10/8/21 for weakness, new hallucinations in setting of UTI. Brain MRI without brain mets though did have Diffuse loss normal fatty marrow on T1 sequence, nonspecific. Symptoms improved with abx and IVF.      She has continued on Pazopanib. CT 11/1/21 for restaging noted majority of masses with fluid and gas consistent with necrosis and the largest cyst likely ruptured causing a right hydropneumothorax with near complete collapse of the R lung. One solid left pleural based lesion was slightly increased. Overall felt this was a positive response to Pazopanib so continued.     She was admitted 11/1/21 for urgent chest tube placement for the hydropneumothorax noted on CT. Due to high risk of recurrence she underwent talc pleurodesis 11/5/21. Port was incidentally found to have non-occlusive thrombus and will be replaced later this month pending resolution of subcutaneous air.      CT 11/22/21 overall with no change other than decreased chest wall subcutaneous emphysema.    CT 1/14/22 with ongoing stable disease.     She has continued on Pazopanib.     Interval History:  Connie was seen today on video for follow-up. Since last Friday she has had increased pain in her R shoulder  blade along with small catch when she tries to take a deep breath and a slight increase in cough. No fevers or worsening SOB. She is managing pain with Dilaudid but does note she is having a harder time getting comfortable to sleep at night. She continues to have intermittent nausea/vomiting that is managed with Compazine and Zofran. Diarrhea has picked up more recently so she took Imodium last night and now hasn't had a bowel movement today. No abdominal pain, bleeding. Eating OK, doing protein drinks, trying to stay well hydrated. BP WNL today at 105/70. Mouth irritation improved. Skin is dry but no rashes. She admits her anxiety has been higher with her move coming up soon. Everything has been sent to [facility] and she has a visit scheduled 3/24/22. She is leaving 3/4/22.     Current Outpatient Medications   Medication Sig Dispense Refill     acetaminophen (TYLENOL) 325 MG tablet Take 325-650 mg by mouth every 6 hours as needed for mild pain       ADVAIR DISKUS 100-50 MCG/DOSE inhaler Inhale 1 puff into the lungs daily        albuterol (PROAIR RESPICLICK) 108 (90 Base) MCG/ACT inhaler Inhale 1-2 puffs into the lungs every 6 hours as needed        celecoxib (CELEBREX) 200 MG capsule Take 1 capsule (200 mg) by mouth 2 times daily 60 capsule 3     cetirizine (ZYRTEC) 10 MG tablet Take 10 mg by mouth daily       diclofenac (VOLTAREN) 1 % topical gel Apply 4 g topically 4 times daily 350 g 3     gabapentin (NEURONTIN) 300 MG capsule Take 600 mg in the AM, 600 mg mid-day, and 900 mg at night 210 capsule 3     guaiFENesin-codeine (ROBITUSSIN AC) 100-10 MG/5ML solution Take 5-10 mLs by mouth every 4 hours as needed for cough 473 mL 1     HYDROmorphone (DILAUDID) 2 MG tablet Take 1-2 tablets (2-4 mg) by mouth every 3 hours as needed for severe pain (. Stop oxycodone completely. Do not take this with oxycodone.) 120 tablet 0     HYDROmorphone HCl ER (EXALGO) 12 MG 24 hour tablet Take 1 tablet (12 mg) by mouth every evening  30 tablet 0     hydrOXYzine (ATARAX) 25 MG tablet Take 1-2 tablets (25-50 mg) by mouth 3 times daily as needed for anxiety 90 tablet 3     levothyroxine (SYNTHROID/LEVOTHROID) 50 MCG tablet Take 1 tablet (50 mcg) by mouth daily 30 tablet 3     melatonin (MELATONIN) 1 MG/ML LIQD liquid Take 5 mg by mouth At Bedtime        montelukast (SINGULAIR) 10 MG tablet Take 10 mg by mouth daily       multivitamin w/minerals (MULTI-VITAMIN) tablet Take 1 tablet by mouth daily       naloxone (NARCAN) 4 MG/0.1ML nasal spray Spray 1 spray (4 mg) into one nostril alternating nostrils once as needed for opioid reversal every 2-3 minutes until assistance arrives 0.2 mL 0     NASAL SPRAY SALINE NA        nystatin (MYCOSTATIN) 960475 UNIT/ML suspension Take 5 mLs (500,000 Units) by mouth 4 times daily 400 mL 0     ondansetron (ZOFRAN-ODT) 8 MG ODT tab Take 1 tablet (8 mg) by mouth every 8 hours as needed for nausea 30 tablet 3     pazopanib (VOTRIENT) 200 MG tablet Take 4 tablets (800 mg) by mouth daily Take on an empty stomach 1 hour before or 2 hours after a meal. 120 tablet 0     pazopanib (VOTRIENT) 200 MG tablet Take 4 tablets (800 mg) by mouth daily Take on an empty stomach 1 hour before or 2 hours after a meal. 120 tablet 0     pazopanib (VOTRIENT) 200 MG tablet Take 4 tablets (800 mg) by mouth daily Take on an empty stomach 1 hour before or 2 hours after a meal. 120 tablet 0     pazopanib (VOTRIENT) 200 MG tablet Take 4 tablets (800 mg) by mouth daily Take on an empty stomach 1 hour before or 2 hours after a meal. 120 tablet 0     polyethylene glycol (MIRALAX) 17 GM/Dose powder Take 17 g by mouth daily 510 g 0     prochlorperazine (COMPAZINE) 10 MG tablet Take 1 tablet (10 mg) by mouth every 6 hours as needed for nausea or vomiting 90 tablet 3     triamcinolone (KENALOG) 0.1 % external cream Apply topically 2 times daily As needed for skin irritation. 30 g 1     vitamin D3 (CHOLECALCIFEROL) 2000 units (50 mcg) tablet Take 1  tablet by mouth daily         Past Medical History  Past Medical History:   Diagnosis Date     Asthma, chronic      Sarcoma (H)     Right lower thigh     Past Surgical History:   Procedure Laterality Date     AS RAD RESEC TONSIL/PILLARS       GALLBLADDER SURGERY       INJECT EPIDURAL THORACIC N/A 11/5/2021    Procedure: INJECTION, SPINE, THORACIC, EPIDURAL;  Surgeon: GENERIC ANESTHESIA PROVIDER;  Location: [facility] OR     INSERT PORT VASCULAR ACCESS Right 10/28/2020    Procedure: single lumen power port placement @0745;  Surgeon: Tex Ackerman MD;  Location: [facility] OR     IR CHEST PORT PLACEMENT > 5 YRS OF AGE  10/28/2020     IR PORT CHECK RIGHT  3/26/2021     IR PORT CHECK RIGHT  10/21/2021     IR PORT REPLACEMENT CATHETER ONLY RIGHT  12/17/2021     RESECT TUMOR LOWER EXTREMITY Right 3/12/2020    Procedure: Resection of right thigh tumor;  Surgeon: Guillermo Johnson MD;  Location: [facility] OR     ROTATOR CUFF REPAIR RT/LT  2012     SHOULDER SURGERY       SINUS SURGERY       THORACENTESIS N/A 11/5/2021    Procedure: Pleurodesis;  Surgeon: Elana Ngo MD;  Location:  GI     Allergies   Allergen Reactions     Pentobarbital      resp arrest     Social History   Social History     Tobacco Use     Smoking status: Never Smoker     Smokeless tobacco: Never Used   Substance Use Topics     Alcohol use: Yes     Comment: 2-3 glasses of wine/week     Drug use: Never      Past medical history and social history were reviewed.    Physical Examination:  LMP 03/19/2019   Wt Readings from Last 10 Encounters:   12/17/21 72.6 kg (160 lb)   12/02/21 76 kg (167 lb 8 oz)   12/02/21 74.8 kg (165 lb)   11/23/21 78.6 kg (173 lb 3.2 oz)   11/06/21 81.4 kg (179 lb 8 oz)   10/07/21 87.7 kg (193 lb 4.8 oz)   10/06/21 87.2 kg (192 lb 3.2 oz)   10/03/21 87.1 kg (192 lb)   09/22/21 89.3 kg (196 lb 12.8 oz)   09/08/21 90.5 kg (199 lb 8 oz)     Video physical exam  General: Patient appears well in no acute distress.   Skin: No visualized  rash or lesions on visualized skin  Eyes: EOMI, no erythema, sclera icterus or discharge noted  Resp: Appears to be breathing comfortably without accessory muscle usage, speaking in full sentences, no cough  MSK: Appears to have normal range of motion based on visualized movements  Neurologic: No apparent tremors, facial movements symmetric  Psych: affect normal, alert and oriented    The rest of a comprehensive physical examination is deferred due to PHE (public health emergency) video restrictions    Laboratory Data:    Results for AMADEO WILSON (MRN [id number]) as of 2/8/2022 14:24   2/7/2022 10:32   Sodium 133   Potassium 3.8   Chloride 97   Carbon Dioxide 28   Urea Nitrogen 11   Creatinine 0.57   GFR Estimate >90   Calcium 8.9   Anion Gap 8   Magnesium 1.6 (L)   Phosphorus 3.7   Albumin 2.0 (L)   Protein Total 7.6   Bilirubin Total 0.7   Alkaline Phosphatase 108   ALT 16   AST 14   T4 Free 1.17   TSH 10.64 (H)   Glucose 99   WBC 4.2   Hemoglobin 8.3 (L)   Hematocrit 27.3 (L)   Platelet Count 204   RBC Count 2.48 (L)    (H)   MCH 33.5 (H)   MCHC 30.4 (L)   RDW 19.3 (H)   % Neutrophils 79   % Lymphocytes 17   % Monocytes 4   % Eosinophils 0   % Basophils 0   Absolute Basophils 0.0   Absolute Neutrophil 3.3   Absolute Lymphocytes 0.7 (L)   Absolute Monocytes 0.2   Absolute Eosinophils 0.0   RBC Morphology Confirmed RBC Indices   Platelet Morphology Automated Count Confirmed. Platelet morphology is normal.     EKG 2/7/22  Sinus tach  Left atrial enlargement  QTc 397    Assessment and Plan:  1. Onc  Metastatic UPS, was on Doxil + Ifosfamide (5 cycles) with positive response to treatment initially, then due to poor tolerance switched to Doxil alone (1 cycle) but had progression. Started on Keytruda 5/10/21. Due to tumor growth (progression vs pseudoprogression) added Gemzar 6/16/21. Had ongoing progression. Started on Pazopanib 9/24/21.     CT 11/1/21 with significant tumor necrosis/rupture. Given overall  clinical improvement and findings of necrosis plan to continue Pazopanib with close follow-up. CT from 11/22/21 with stable disease, necrotic lesions persist.  CT 1/14/22 unchanged.       She continues to do overall well on Pazopanib. Labs and EKG stable. Will continue. I will see her in person with CT beginning of March before she moves. Her records at at [facility] in [place] and she has an appointment scheduled.       2. Pulm  R sided hydropneumothorax: Found on 11/1/21 CT scan, thought 2/2 ruptured tumor. Was admitted 11/1 and underwent chest tube placement. Pulm performed talc pleuradesis on 11/5/21 due to risk of recurrence. CT from 11/22 shows ongoing but stable hydropneumothorax with no signs of lung collapse. Pulm had no concerns at follow-up. Has noted more catching with deep breath and cough-will get CXR to ensure no worsening of pneumothorax. CT in 3 weeks as above.      Subcutaneous chest wall air: Resolved.      Asthma: stable, continue inhalers.      COVID: Diagnosed in December, recovered. She is vaccinated. OK to get booster-scheduled 1/3/21 per patient.      Cough: 2/2 malignancy, slightly worse, CXR as above. Continue Robitussin AC PRN.      3. ID  Fevers of unknown origin, thought to be tumor fevers, previously required Indomethacin, resolved.      Possible empyema inpatient, cultures were negative. No signs of infection.      4. Vascular  Port malfunction with small RIJ clot, port now replaced and working well.      5. Pain  R chest wall/back pain, ongoing. Palliative now managing. Continue Celebrex and Dilaudid. Increased last few days-CXR as above and reached out to palliative team about adjusting meds, especially nighttime dose.      6. Heme  Anemia: ACD and chemotherapy. Hgb stable at 8.3.      7. Cards  Exercise fatigue and NAZARIO: Echo was stable. Possibly was related to pneumothorax, though it is unclear when this happened.        Hypotension: Improved. Continue pushing home fluids and  monitoring.      QT WNL     8. 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 Continue Gabapentin 600/600/900.     Deconditioning: Doing PT     Hallucinations: Brain MRI reviewed and no mets. Does have loss of normal fatty marrow with broad differential. Discussed with neuro onc and unclear findings. No active concerns.      9. GI  Constipation/Diarrhea: Fluctuates, stable for her, has Imodium PRN diarrhea and Miralax PRN constipation. No symptoms to suggest C. Diff.      Nausea, 2/2 chemotherapy and coughing, ongoing. Continue scheduled Compazine 2-3x daily and ODT Zofran PRN.     Start salt/soda for mouth irritation, improved.      Continue Tums PRN GERD, take away from Votrient.     Will do IVF and mag replacement this week, suspect hypomag 2/2 increased diarrhea.      10. Psych  Ongoing anxiety. Seeing Titus. Palliative added hydroxyzine which has helped too. Increased with upcoming move.      Continue Melatonin PRN insomnia.      11. 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 TSH slightly higher but free T4 WNL so keep same dose for now. Check TSH monthly.      12. Derm  Mild hand/foot, continue Kenalog PRN. Aveno lotion. Better.     Overall Plan  -CXR, IVF, IV Mag this week in [place]  -CT Chest, Labs, Harshal in person in [place] 3/1/22  -Continue Pazopanib    40 minutes spent on the date of the encounter doing chart review, review of test results, interpretation of tests, patient visit and documentation     Harshal Schuster PA-C  Department of Hematology and Oncology  [facility]

## 2022-02-07 ENCOUNTER — LAB (OUTPATIENT)
Dept: ONCOLOGY | Facility: CLINIC | Age: 57
End: 2022-02-07
Payer: COMMERCIAL

## 2022-02-07 ENCOUNTER — ONCOLOGY VISIT (OUTPATIENT)
Dept: ONCOLOGY | Facility: CLINIC | Age: 57
End: 2022-02-07
Payer: COMMERCIAL

## 2022-02-07 DIAGNOSIS — Z79.899 ENCOUNTER FOR LONG-TERM (CURRENT) USE OF MEDICATIONS: ICD-10-CM

## 2022-02-07 DIAGNOSIS — C49.9 SARCOMA (H): ICD-10-CM

## 2022-02-07 LAB
ALBUMIN SERPL-MCNC: 2 G/DL (ref 3.4–5)
ALP SERPL-CCNC: 108 U/L (ref 40–150)
ALT SERPL W P-5'-P-CCNC: 16 U/L (ref 0–50)
ANION GAP SERPL CALCULATED.3IONS-SCNC: 8 MMOL/L (ref 3–14)
AST SERPL W P-5'-P-CCNC: 14 U/L (ref 0–45)
BASOPHILS # BLD MANUAL: 0 10E3/UL (ref 0–0.2)
BASOPHILS NFR BLD MANUAL: 0 %
BILIRUB SERPL-MCNC: 0.7 MG/DL (ref 0.2–1.3)
BUN SERPL-MCNC: 11 MG/DL (ref 7–30)
CALCIUM SERPL-MCNC: 8.9 MG/DL (ref 8.5–10.1)
CHLORIDE BLD-SCNC: 97 MMOL/L (ref 94–109)
CO2 SERPL-SCNC: 28 MMOL/L (ref 20–32)
CREAT SERPL-MCNC: 0.57 MG/DL (ref 0.52–1.04)
EOSINOPHIL # BLD MANUAL: 0 10E3/UL (ref 0–0.7)
EOSINOPHIL NFR BLD MANUAL: 0 %
ERYTHROCYTE [DISTWIDTH] IN BLOOD BY AUTOMATED COUNT: 19.3 % (ref 10–15)
GFR SERPL CREATININE-BSD FRML MDRD: >90 ML/MIN/1.73M2
GLUCOSE BLD-MCNC: 99 MG/DL (ref 70–99)
HCT VFR BLD AUTO: 27.3 % (ref 35–47)
HGB BLD-MCNC: 8.3 G/DL (ref 11.7–15.7)
LYMPHOCYTES # BLD MANUAL: 0.7 10E3/UL (ref 0.8–5.3)
LYMPHOCYTES NFR BLD MANUAL: 17 %
MAGNESIUM SERPL-MCNC: 1.6 MG/DL (ref 1.8–2.6)
MCH RBC QN AUTO: 33.5 PG (ref 26.5–33)
MCHC RBC AUTO-ENTMCNC: 30.4 G/DL (ref 31.5–36.5)
MCV RBC AUTO: 110 FL (ref 78–100)
MONOCYTES # BLD MANUAL: 0.2 10E3/UL (ref 0–1.3)
MONOCYTES NFR BLD MANUAL: 4 %
NEUTROPHILS # BLD MANUAL: 3.3 10E3/UL (ref 1.6–8.3)
NEUTROPHILS NFR BLD MANUAL: 79 %
PHOSPHATE SERPL-MCNC: 3.7 MG/DL (ref 2.5–4.5)
PLAT MORPH BLD: ABNORMAL
PLATELET # BLD AUTO: 204 10E3/UL (ref 150–450)
POTASSIUM BLD-SCNC: 3.8 MMOL/L (ref 3.4–5.3)
PROT SERPL-MCNC: 7.6 G/DL (ref 6.8–8.8)
RBC # BLD AUTO: 2.48 10E6/UL (ref 3.8–5.2)
RBC MORPH BLD: ABNORMAL
SODIUM SERPL-SCNC: 133 MMOL/L (ref 133–144)
T4 FREE SERPL-MCNC: 1.17 NG/DL (ref 0.76–1.46)
TSH SERPL DL<=0.005 MIU/L-ACNC: 10.64 MU/L (ref 0.4–4)
WBC # BLD AUTO: 4.2 10E3/UL (ref 4–11)

## 2022-02-07 PROCEDURE — 83735 ASSAY OF MAGNESIUM: CPT | Performed by: INTERNAL MEDICINE

## 2022-02-07 PROCEDURE — 93000 ELECTROCARDIOGRAM COMPLETE: CPT

## 2022-02-07 PROCEDURE — 85027 COMPLETE CBC AUTOMATED: CPT | Performed by: INTERNAL MEDICINE

## 2022-02-07 PROCEDURE — 36591 DRAW BLOOD OFF VENOUS DEVICE: CPT

## 2022-02-07 PROCEDURE — 84100 ASSAY OF PHOSPHORUS: CPT | Performed by: INTERNAL MEDICINE

## 2022-02-07 PROCEDURE — 80053 COMPREHEN METABOLIC PANEL: CPT | Performed by: INTERNAL MEDICINE

## 2022-02-07 PROCEDURE — 84443 ASSAY THYROID STIM HORMONE: CPT | Performed by: INTERNAL MEDICINE

## 2022-02-07 PROCEDURE — 84439 ASSAY OF FREE THYROXINE: CPT | Performed by: INTERNAL MEDICINE

## 2022-02-07 RX ORDER — HEPARIN SODIUM (PORCINE) LOCK FLUSH IV SOLN 100 UNIT/ML 100 UNIT/ML
5 SOLUTION INTRAVENOUS ONCE
Status: COMPLETED | OUTPATIENT
Start: 2022-02-07 | End: 2022-02-07

## 2022-02-07 RX ADMIN — HEPARIN SODIUM (PORCINE) LOCK FLUSH IV SOLN 100 UNIT/ML 5 ML: 100 SOLUTION at 10:40

## 2022-02-07 NOTE — PROGRESS NOTES
12 lead EKG performed without incident- results to be reviewed by DOMENICO Guaman at patient visit tomorrow (2/8/22). Results available for review in Epic.

## 2022-02-07 NOTE — PROGRESS NOTES
"Patient's name and  were verified.  See Doc Flowsheet - IV assess for details.  IVAD accessed with 20G 3/4\" nieves gripper plus needle  blood return positive: YES  Site without redness, tenderness or swelling: YES  flushed with 10cc NS and 5cc 100u/ml heparin  Needle: removed as no treatment needed today.  Comments: Labs drawn.  Patient tolerated procedure without incident.      "

## 2022-02-08 ENCOUNTER — PATIENT OUTREACH (OUTPATIENT)
Dept: PALLIATIVE CARE | Facility: CLINIC | Age: 57
End: 2022-02-08

## 2022-02-08 ENCOUNTER — TELEPHONE (OUTPATIENT)
Dept: PALLIATIVE CARE | Facility: CLINIC | Age: 57
End: 2022-02-08

## 2022-02-08 ENCOUNTER — VIRTUAL VISIT (OUTPATIENT)
Dept: ONCOLOGY | Facility: CLINIC | Age: 57
End: 2022-02-08
Attending: PHYSICIAN ASSISTANT
Payer: COMMERCIAL

## 2022-02-08 DIAGNOSIS — G89.3 NEOPLASM RELATED PAIN: ICD-10-CM

## 2022-02-08 DIAGNOSIS — C49.9 SARCOMA OF SOFT TISSUE (H): ICD-10-CM

## 2022-02-08 DIAGNOSIS — C78.00 MALIGNANT NEOPLASM METASTATIC TO LUNG, UNSPECIFIED LATERALITY (H): ICD-10-CM

## 2022-02-08 DIAGNOSIS — C49.9 SARCOMA (H): Primary | ICD-10-CM

## 2022-02-08 DIAGNOSIS — J93.83 OTHER PNEUMOTHORAX: ICD-10-CM

## 2022-02-08 PROCEDURE — 99215 OFFICE O/P EST HI 40 MIN: CPT | Mod: 95 | Performed by: PHYSICIAN ASSISTANT

## 2022-02-08 RX ORDER — HYDROMORPHONE HYDROCHLORIDE 16 MG/1
16 TABLET, EXTENDED RELEASE ORAL EVERY EVENING
Qty: 30 TABLET | Refills: 0 | Status: SHIPPED | OUTPATIENT
Start: 2022-02-15 | End: 2022-02-09

## 2022-02-08 RX ORDER — MAGNESIUM SULFATE HEPTAHYDRATE 40 MG/ML
2 INJECTION, SOLUTION INTRAVENOUS ONCE
Status: CANCELLED
Start: 2022-02-08 | End: 2022-02-08

## 2022-02-08 RX ORDER — HYDROMORPHONE HYDROCHLORIDE 2 MG/1
2-4 TABLET ORAL
Qty: 120 TABLET | Refills: 0 | Status: SHIPPED | OUTPATIENT
Start: 2022-02-08 | End: 2022-03-04

## 2022-02-08 NOTE — TELEPHONE ENCOUNTER
Hello,    Per pt, she needs her hydromorphone er 16 mg rx tomorrow. It is dated for 2/15/22 and therefore cannot be filled tomorrow. Please send a new rx? Or, at least call us in the am to bless it's filling 2/9/22.  We have to order this product in as most pharmacies will not carry it - cost is over $1500.    Elliot Escobedo. D.  [facility] Pharmacy Manager  [phone]  2/8/2022

## 2022-02-08 NOTE — PROGRESS NOTES
Thank you for calling her and giving her good avice.  I increased her Exalgo to 16mg, can you let her know that.

## 2022-02-08 NOTE — PROGRESS NOTES
Received voicemail from patient requesting refills of dilaudid ER and IR, has about 5 days left and needs new fill before the weekend.     Also received note from ONC KALINA, patient having increased pain and they are getting imaging to get this worked up. KALINA requests we reach out about pain control and potential med adjustment for overnight pain.     Called her. She is taking ER dilaudid at bedtime + 1 tab dilaudid at 9PM and then every 3 hours overnight. She was sleeping through the night, but past 3 nights has been up taking something every 3 hours. During the day takes 1 tab every 3 hours and she thinks this is enough when she gets a good night sleep and doesn't wake up behind in pain.    She has not tried 2 tabs of short acting dilaudid at once, suggested she try this this evening at 9PM and then again if she wakes up with pain. She will do this.     Advised her I would also update the MD and follow up with other suggestions. She will let me know how 2 tabs dilaudid works overnight.    Last refills: 1/17/2022  Last office visit: 1/14/2022  Message to  for follow up apt    Will route request to MD for review.     Reviewed [place]  Report.

## 2022-02-09 RX ORDER — HYDROMORPHONE HYDROCHLORIDE 16 MG/1
16 TABLET, EXTENDED RELEASE ORAL EVERY EVENING
Qty: 30 TABLET | Refills: 0 | Status: SHIPPED | OUTPATIENT
Start: 2022-02-15 | End: 2022-03-04

## 2022-02-09 RX ORDER — HYDROMORPHONE HYDROCHLORIDE 16 MG/1
16 TABLET, EXTENDED RELEASE ORAL EVERY EVENING
Qty: 30 TABLET | Refills: 0 | Status: CANCELLED | OUTPATIENT
Start: 2022-02-09

## 2022-02-09 NOTE — PROGRESS NOTES
Spoke with pharmacy, okay'd fill of Exalgo 16mg today per MD. They have in stock and will fill for patient to  today.      Spoke with patient and advised of Exalgo dose increase. She verbalized understanding. Will try 2 tabs of dilaudid at bedtime and PRN overnight in addition to increased Exalgo.     She says 2 tabs of dilaudid helped a little last night, was able to sleep through the night until 6AM.     We agreed to check in next week to make sure this is going okay - she will call sooner if needed.

## 2022-02-14 ENCOUNTER — APPOINTMENT (OUTPATIENT)
Dept: CT IMAGING | Facility: CLINIC | Age: 57
End: 2022-02-14
Attending: EMERGENCY MEDICINE
Payer: COMMERCIAL

## 2022-02-14 ENCOUNTER — HOSPITAL ENCOUNTER (INPATIENT)
Facility: CLINIC | Age: 57
LOS: 6 days | Discharge: HOME | End: 2022-02-20
Attending: EMERGENCY MEDICINE | Admitting: STUDENT IN AN ORGANIZED HEALTH CARE EDUCATION/TRAINING PROGRAM
Payer: COMMERCIAL

## 2022-02-14 ENCOUNTER — INFUSION THERAPY VISIT (OUTPATIENT)
Dept: INFUSION THERAPY | Facility: CLINIC | Age: 57
End: 2022-02-14
Payer: COMMERCIAL

## 2022-02-14 ENCOUNTER — ANCILLARY PROCEDURE (OUTPATIENT)
Dept: GENERAL RADIOLOGY | Facility: CLINIC | Age: 57
End: 2022-02-14
Attending: PHYSICIAN ASSISTANT
Payer: COMMERCIAL

## 2022-02-14 VITALS
BODY MASS INDEX: 24.79 KG/M2 | RESPIRATION RATE: 20 BRPM | WEIGHT: 158.3 LBS | DIASTOLIC BLOOD PRESSURE: 79 MMHG | SYSTOLIC BLOOD PRESSURE: 122 MMHG | HEART RATE: 103 BPM | OXYGEN SATURATION: 88 % | TEMPERATURE: 99.3 F

## 2022-02-14 DIAGNOSIS — R11.0 NAUSEA: ICD-10-CM

## 2022-02-14 DIAGNOSIS — R94.6 THYROID FUNCTION TEST ABNORMAL: ICD-10-CM

## 2022-02-14 DIAGNOSIS — C49.9 SARCOMA (H): ICD-10-CM

## 2022-02-14 DIAGNOSIS — C78.00 MALIGNANT NEOPLASM METASTATIC TO LUNG, UNSPECIFIED LATERALITY (H): ICD-10-CM

## 2022-02-14 DIAGNOSIS — C49.9 SARCOMA (H): Primary | ICD-10-CM

## 2022-02-14 DIAGNOSIS — R09.02 HYPOXEMIA: ICD-10-CM

## 2022-02-14 DIAGNOSIS — N39.0 COMPLICATED URINARY TRACT INFECTION: ICD-10-CM

## 2022-02-14 DIAGNOSIS — J93.83 OTHER PNEUMOTHORAX: ICD-10-CM

## 2022-02-14 DIAGNOSIS — C49.20: ICD-10-CM

## 2022-02-14 DIAGNOSIS — D84.9 IMMUNOSUPPRESSION (H): ICD-10-CM

## 2022-02-14 DIAGNOSIS — D64.9 ANEMIA, UNSPECIFIED TYPE: ICD-10-CM

## 2022-02-14 DIAGNOSIS — Z79.899 ENCOUNTER FOR LONG-TERM (CURRENT) USE OF MEDICATIONS: ICD-10-CM

## 2022-02-14 DIAGNOSIS — I95.9 HYPOTENSION, UNSPECIFIED HYPOTENSION TYPE: ICD-10-CM

## 2022-02-14 DIAGNOSIS — C49.9 SARCOMA OF SOFT TISSUE (H): Primary | ICD-10-CM

## 2022-02-14 DIAGNOSIS — Z11.52 ENCOUNTER FOR SCREENING LABORATORY TESTING FOR SEVERE ACUTE RESPIRATORY SYNDROME CORONAVIRUS 2 (SARS-COV-2): ICD-10-CM

## 2022-02-14 LAB
ABO/RH(D): NORMAL
ALBUMIN SERPL-MCNC: 1.6 G/DL (ref 3.4–5)
ALBUMIN UR-MCNC: NEGATIVE MG/DL
ALP SERPL-CCNC: 110 U/L (ref 40–150)
ALT SERPL W P-5'-P-CCNC: 16 U/L (ref 0–50)
ANION GAP SERPL CALCULATED.3IONS-SCNC: 8 MMOL/L (ref 3–14)
ANTIBODY SCREEN: NEGATIVE
APPEARANCE UR: ABNORMAL
AST SERPL W P-5'-P-CCNC: 19 U/L (ref 0–45)
BASOPHILS # BLD AUTO: 0 10E3/UL (ref 0–0.2)
BASOPHILS NFR BLD AUTO: 1 %
BILIRUB SERPL-MCNC: 0.6 MG/DL (ref 0.2–1.3)
BILIRUB UR QL STRIP: NEGATIVE
BLD PROD TYP BPU: NORMAL
BLOOD COMPONENT TYPE: NORMAL
BUN SERPL-MCNC: 6 MG/DL (ref 7–30)
CALCIUM SERPL-MCNC: 8.4 MG/DL (ref 8.5–10.1)
CHLORIDE BLD-SCNC: 96 MMOL/L (ref 94–109)
CO2 SERPL-SCNC: 30 MMOL/L (ref 20–32)
CODING SYSTEM: NORMAL
COLOR UR AUTO: YELLOW
CREAT SERPL-MCNC: 0.54 MG/DL (ref 0.52–1.04)
CROSSMATCH: NORMAL
CRP SERPL-MCNC: 240 MG/L (ref 0–8)
D DIMER PPP FEU-MCNC: 3.22 UG/ML FEU (ref 0–0.5)
EOSINOPHIL # BLD AUTO: 0.1 10E3/UL (ref 0–0.7)
EOSINOPHIL NFR BLD AUTO: 1 %
ERYTHROCYTE [DISTWIDTH] IN BLOOD BY AUTOMATED COUNT: 19.6 % (ref 10–15)
FLUAV RNA SPEC QL NAA+PROBE: NEGATIVE
FLUBV RNA RESP QL NAA+PROBE: NEGATIVE
GFR SERPL CREATININE-BSD FRML MDRD: >90 ML/MIN/1.73M2
GLUCOSE BLD-MCNC: 88 MG/DL (ref 70–99)
GLUCOSE UR STRIP-MCNC: NEGATIVE MG/DL
HCT VFR BLD AUTO: 24 % (ref 35–47)
HGB BLD-MCNC: 7.2 G/DL (ref 11.7–15.7)
HGB UR QL STRIP: NEGATIVE
IMM GRANULOCYTES # BLD: 0 10E3/UL
IMM GRANULOCYTES NFR BLD: 1 %
INR PPP: 1.25 (ref 0.85–1.15)
ISSUE DATE AND TIME: NORMAL
KETONES UR STRIP-MCNC: NEGATIVE MG/DL
LACTATE SERPL-SCNC: 0.7 MMOL/L (ref 0.7–2)
LEUKOCYTE ESTERASE UR QL STRIP: ABNORMAL
LYMPHOCYTES # BLD AUTO: 0.6 10E3/UL (ref 0.8–5.3)
LYMPHOCYTES NFR BLD AUTO: 15 %
MAGNESIUM SERPL-MCNC: 1.7 MG/DL (ref 1.6–2.3)
MCH RBC QN AUTO: 33.2 PG (ref 26.5–33)
MCHC RBC AUTO-ENTMCNC: 30 G/DL (ref 31.5–36.5)
MCV RBC AUTO: 111 FL (ref 78–100)
MONOCYTES # BLD AUTO: 0.4 10E3/UL (ref 0–1.3)
MONOCYTES NFR BLD AUTO: 10 %
NEUTROPHILS # BLD AUTO: 3 10E3/UL (ref 1.6–8.3)
NEUTROPHILS NFR BLD AUTO: 72 %
NITRATE UR QL: NEGATIVE
NRBC # BLD AUTO: 0 10E3/UL
NRBC BLD AUTO-RTO: 0 /100
PH UR STRIP: 6 [PH] (ref 5–7)
PHOSPHATE SERPL-MCNC: 3.4 MG/DL (ref 2.5–4.5)
PLATELET # BLD AUTO: 187 10E3/UL (ref 150–450)
POTASSIUM BLD-SCNC: 3.6 MMOL/L (ref 3.4–5.3)
PROCALCITONIN SERPL-MCNC: 0.25 NG/ML
PROT SERPL-MCNC: 6.9 G/DL (ref 6.8–8.8)
RBC # BLD AUTO: 2.17 10E6/UL (ref 3.8–5.2)
RSV RNA SPEC NAA+PROBE: NEGATIVE
SARS-COV-2 RNA RESP QL NAA+PROBE: NEGATIVE
SODIUM SERPL-SCNC: 134 MMOL/L (ref 133–144)
SP GR UR STRIP: 1 (ref 1–1.03)
SPECIMEN EXPIRATION DATE: NORMAL
UNIT ABO/RH: NORMAL
UNIT NUMBER: NORMAL
UNIT STATUS: NORMAL
UNIT TYPE ISBT: 5100
UROBILINOGEN UR STRIP-MCNC: 3 MG/DL
WBC # BLD AUTO: 4.1 10E3/UL (ref 4–11)

## 2022-02-14 PROCEDURE — 71046 X-RAY EXAM CHEST 2 VIEWS: CPT | Performed by: RADIOLOGY

## 2022-02-14 PROCEDURE — 81003 URINALYSIS AUTO W/O SCOPE: CPT | Performed by: EMERGENCY MEDICINE

## 2022-02-14 PROCEDURE — 96372 THER/PROPH/DIAG INJ SC/IM: CPT

## 2022-02-14 PROCEDURE — 86923 COMPATIBILITY TEST ELECTRIC: CPT | Performed by: STUDENT IN AN ORGANIZED HEALTH CARE EDUCATION/TRAINING PROGRAM

## 2022-02-14 PROCEDURE — 99207 PR NO CHARGE LOS: CPT

## 2022-02-14 PROCEDURE — 99285 EMERGENCY DEPT VISIT HI MDM: CPT | Mod: 25 | Performed by: EMERGENCY MEDICINE

## 2022-02-14 PROCEDURE — 250N000011 HC RX IP 250 OP 636: Performed by: STUDENT IN AN ORGANIZED HEALTH CARE EDUCATION/TRAINING PROGRAM

## 2022-02-14 PROCEDURE — 85004 AUTOMATED DIFF WBC COUNT: CPT | Performed by: EMERGENCY MEDICINE

## 2022-02-14 PROCEDURE — 71275 CT ANGIOGRAPHY CHEST: CPT

## 2022-02-14 PROCEDURE — P9016 RBC LEUKOCYTES REDUCED: HCPCS | Performed by: STUDENT IN AN ORGANIZED HEALTH CARE EDUCATION/TRAINING PROGRAM

## 2022-02-14 PROCEDURE — 96361 HYDRATE IV INFUSION ADD-ON: CPT | Performed by: EMERGENCY MEDICINE

## 2022-02-14 PROCEDURE — 85610 PROTHROMBIN TIME: CPT | Performed by: EMERGENCY MEDICINE

## 2022-02-14 PROCEDURE — 86850 RBC ANTIBODY SCREEN: CPT | Performed by: EMERGENCY MEDICINE

## 2022-02-14 PROCEDURE — 96361 HYDRATE IV INFUSION ADD-ON: CPT | Performed by: INTERNAL MEDICINE

## 2022-02-14 PROCEDURE — 83735 ASSAY OF MAGNESIUM: CPT | Performed by: INTERNAL MEDICINE

## 2022-02-14 PROCEDURE — 250N000013 HC RX MED GY IP 250 OP 250 PS 637: Performed by: STUDENT IN AN ORGANIZED HEALTH CARE EDUCATION/TRAINING PROGRAM

## 2022-02-14 PROCEDURE — 99223 1ST HOSP IP/OBS HIGH 75: CPT | Mod: AI | Performed by: STUDENT IN AN ORGANIZED HEALTH CARE EDUCATION/TRAINING PROGRAM

## 2022-02-14 PROCEDURE — 96376 TX/PRO/DX INJ SAME DRUG ADON: CPT | Performed by: EMERGENCY MEDICINE

## 2022-02-14 PROCEDURE — 96365 THER/PROPH/DIAG IV INF INIT: CPT | Mod: 59 | Performed by: EMERGENCY MEDICINE

## 2022-02-14 PROCEDURE — 120N000001 HC R&B MED SURG/OB

## 2022-02-14 PROCEDURE — 258N000003 HC RX IP 258 OP 636: Performed by: EMERGENCY MEDICINE

## 2022-02-14 PROCEDURE — 71275 CT ANGIOGRAPHY CHEST: CPT | Mod: 26 | Performed by: RADIOLOGY

## 2022-02-14 PROCEDURE — 93005 ELECTROCARDIOGRAM TRACING: CPT | Performed by: EMERGENCY MEDICINE

## 2022-02-14 PROCEDURE — 96366 THER/PROPH/DIAG IV INF ADDON: CPT | Performed by: EMERGENCY MEDICINE

## 2022-02-14 PROCEDURE — 86140 C-REACTIVE PROTEIN: CPT | Performed by: STUDENT IN AN ORGANIZED HEALTH CARE EDUCATION/TRAINING PROGRAM

## 2022-02-14 PROCEDURE — 87637 SARSCOV2&INF A&B&RSV AMP PRB: CPT | Performed by: EMERGENCY MEDICINE

## 2022-02-14 PROCEDURE — C9803 HOPD COVID-19 SPEC COLLECT: HCPCS | Performed by: EMERGENCY MEDICINE

## 2022-02-14 PROCEDURE — 84100 ASSAY OF PHOSPHORUS: CPT | Performed by: STUDENT IN AN ORGANIZED HEALTH CARE EDUCATION/TRAINING PROGRAM

## 2022-02-14 PROCEDURE — 86901 BLOOD TYPING SEROLOGIC RH(D): CPT | Performed by: EMERGENCY MEDICINE

## 2022-02-14 PROCEDURE — 96375 TX/PRO/DX INJ NEW DRUG ADDON: CPT | Performed by: EMERGENCY MEDICINE

## 2022-02-14 PROCEDURE — 36415 COLL VENOUS BLD VENIPUNCTURE: CPT | Performed by: EMERGENCY MEDICINE

## 2022-02-14 PROCEDURE — 80053 COMPREHEN METABOLIC PANEL: CPT | Performed by: EMERGENCY MEDICINE

## 2022-02-14 PROCEDURE — 250N000011 HC RX IP 250 OP 636: Performed by: EMERGENCY MEDICINE

## 2022-02-14 PROCEDURE — 93010 ELECTROCARDIOGRAM REPORT: CPT | Performed by: EMERGENCY MEDICINE

## 2022-02-14 PROCEDURE — 85379 FIBRIN DEGRADATION QUANT: CPT | Performed by: EMERGENCY MEDICINE

## 2022-02-14 PROCEDURE — 36430 TRANSFUSION BLD/BLD COMPNT: CPT | Performed by: EMERGENCY MEDICINE

## 2022-02-14 PROCEDURE — 87040 BLOOD CULTURE FOR BACTERIA: CPT | Performed by: EMERGENCY MEDICINE

## 2022-02-14 PROCEDURE — 84145 PROCALCITONIN (PCT): CPT | Performed by: STUDENT IN AN ORGANIZED HEALTH CARE EDUCATION/TRAINING PROGRAM

## 2022-02-14 PROCEDURE — 83605 ASSAY OF LACTIC ACID: CPT | Performed by: EMERGENCY MEDICINE

## 2022-02-14 PROCEDURE — 96360 HYDRATION IV INFUSION INIT: CPT | Performed by: INTERNAL MEDICINE

## 2022-02-14 PROCEDURE — 250N000009 HC RX 250: Performed by: STUDENT IN AN ORGANIZED HEALTH CARE EDUCATION/TRAINING PROGRAM

## 2022-02-14 PROCEDURE — 87086 URINE CULTURE/COLONY COUNT: CPT | Performed by: EMERGENCY MEDICINE

## 2022-02-14 PROCEDURE — 96367 TX/PROPH/DG ADDL SEQ IV INF: CPT | Performed by: EMERGENCY MEDICINE

## 2022-02-14 RX ORDER — LIDOCAINE 40 MG/G
CREAM TOPICAL
Status: DISCONTINUED | OUTPATIENT
Start: 2022-02-14 | End: 2022-02-20 | Stop reason: HOSPADM

## 2022-02-14 RX ORDER — CETIRIZINE HYDROCHLORIDE 10 MG/1
10 TABLET ORAL DAILY
Status: DISCONTINUED | OUTPATIENT
Start: 2022-02-15 | End: 2022-02-20 | Stop reason: HOSPADM

## 2022-02-14 RX ORDER — LEVOTHYROXINE SODIUM 50 UG/1
50 TABLET ORAL DAILY
Status: DISCONTINUED | OUTPATIENT
Start: 2022-02-15 | End: 2022-02-15

## 2022-02-14 RX ORDER — CELECOXIB 100 MG/1
200 CAPSULE ORAL 2 TIMES DAILY
Status: DISCONTINUED | OUTPATIENT
Start: 2022-02-14 | End: 2022-02-20 | Stop reason: HOSPADM

## 2022-02-14 RX ORDER — NALOXONE HYDROCHLORIDE 0.4 MG/ML
0.2 INJECTION, SOLUTION INTRAMUSCULAR; INTRAVENOUS; SUBCUTANEOUS
Status: CANCELLED | OUTPATIENT
Start: 2022-02-14

## 2022-02-14 RX ORDER — HYDROMORPHONE HCL IN WATER/PF 6 MG/30 ML
0.5 PATIENT CONTROLLED ANALGESIA SYRINGE INTRAVENOUS EVERY 30 MIN PRN
Status: DISCONTINUED | OUTPATIENT
Start: 2022-02-14 | End: 2022-02-14

## 2022-02-14 RX ORDER — ACETAMINOPHEN 325 MG/1
325-650 TABLET ORAL EVERY 6 HOURS PRN
Status: DISCONTINUED | OUTPATIENT
Start: 2022-02-14 | End: 2022-02-15

## 2022-02-14 RX ORDER — METHYLPREDNISOLONE SODIUM SUCCINATE 125 MG/2ML
125 INJECTION, POWDER, LYOPHILIZED, FOR SOLUTION INTRAMUSCULAR; INTRAVENOUS
Status: CANCELLED
Start: 2022-02-14

## 2022-02-14 RX ORDER — GABAPENTIN 300 MG/1
600 CAPSULE ORAL 2 TIMES DAILY
Status: DISCONTINUED | OUTPATIENT
Start: 2022-02-15 | End: 2022-02-20 | Stop reason: HOSPADM

## 2022-02-14 RX ORDER — ALBUTEROL SULFATE 0.83 MG/ML
2.5 SOLUTION RESPIRATORY (INHALATION)
Status: CANCELLED | OUTPATIENT
Start: 2022-02-14

## 2022-02-14 RX ORDER — LANOLIN ALCOHOL/MO/W.PET/CERES
6 CREAM (GRAM) TOPICAL AT BEDTIME
Status: DISCONTINUED | OUTPATIENT
Start: 2022-02-14 | End: 2022-02-15

## 2022-02-14 RX ORDER — PAZOPANIB 200 MG/1
800 TABLET, FILM COATED ORAL DAILY
Status: DISCONTINUED | OUTPATIENT
Start: 2022-02-14 | End: 2022-02-20 | Stop reason: HOSPADM

## 2022-02-14 RX ORDER — MEPERIDINE HYDROCHLORIDE 25 MG/ML
25 INJECTION INTRAMUSCULAR; INTRAVENOUS; SUBCUTANEOUS EVERY 30 MIN PRN
Status: CANCELLED | OUTPATIENT
Start: 2022-02-14

## 2022-02-14 RX ORDER — HEPARIN SODIUM (PORCINE) LOCK FLUSH IV SOLN 100 UNIT/ML 100 UNIT/ML
5 SOLUTION INTRAVENOUS
Status: CANCELLED | OUTPATIENT
Start: 2022-02-14

## 2022-02-14 RX ORDER — EPINEPHRINE 1 MG/ML
0.3 INJECTION, SOLUTION INTRAMUSCULAR; SUBCUTANEOUS EVERY 5 MIN PRN
Status: CANCELLED | OUTPATIENT
Start: 2022-02-14

## 2022-02-14 RX ORDER — MONTELUKAST SODIUM 10 MG/1
10 TABLET ORAL DAILY
Status: DISCONTINUED | OUTPATIENT
Start: 2022-02-15 | End: 2022-02-20 | Stop reason: HOSPADM

## 2022-02-14 RX ORDER — ONDANSETRON 8 MG/1
8 TABLET, ORALLY DISINTEGRATING ORAL EVERY 8 HOURS PRN
Status: DISCONTINUED | OUTPATIENT
Start: 2022-02-14 | End: 2022-02-20 | Stop reason: HOSPADM

## 2022-02-14 RX ORDER — DEXTROSE MONOHYDRATE, SODIUM CHLORIDE, AND POTASSIUM CHLORIDE 50; 1.49; 4.5 G/1000ML; G/1000ML; G/1000ML
INJECTION, SOLUTION INTRAVENOUS CONTINUOUS
Status: DISCONTINUED | OUTPATIENT
Start: 2022-02-14 | End: 2022-02-15

## 2022-02-14 RX ORDER — NYSTATIN 100000/ML
500000 SUSPENSION, ORAL (FINAL DOSE FORM) ORAL 4 TIMES DAILY
Status: DISCONTINUED | OUTPATIENT
Start: 2022-02-15 | End: 2022-02-20 | Stop reason: HOSPADM

## 2022-02-14 RX ORDER — DOXYCYCLINE 100 MG/10ML
100 INJECTION, POWDER, LYOPHILIZED, FOR SOLUTION INTRAVENOUS EVERY 12 HOURS
Status: DISCONTINUED | OUTPATIENT
Start: 2022-02-14 | End: 2022-02-16

## 2022-02-14 RX ORDER — HYDROMORPHONE HYDROCHLORIDE 4 MG/1
4 TABLET ORAL EVERY 6 HOURS
Status: COMPLETED | OUTPATIENT
Start: 2022-02-14 | End: 2022-02-15

## 2022-02-14 RX ORDER — GABAPENTIN 300 MG/1
900 CAPSULE ORAL AT BEDTIME
Status: DISCONTINUED | OUTPATIENT
Start: 2022-02-14 | End: 2022-02-20 | Stop reason: HOSPADM

## 2022-02-14 RX ORDER — IOPAMIDOL 755 MG/ML
57 INJECTION, SOLUTION INTRAVASCULAR ONCE
Status: COMPLETED | OUTPATIENT
Start: 2022-02-14 | End: 2022-02-14

## 2022-02-14 RX ORDER — ACETAMINOPHEN 325 MG/1
650 TABLET ORAL ONCE
Status: CANCELLED
Start: 2022-02-14 | End: 2022-02-14

## 2022-02-14 RX ORDER — ALBUTEROL SULFATE 90 UG/1
1-2 AEROSOL, METERED RESPIRATORY (INHALATION)
Status: CANCELLED
Start: 2022-02-14

## 2022-02-14 RX ORDER — PROCHLORPERAZINE MALEATE 5 MG
10 TABLET ORAL EVERY 6 HOURS PRN
Status: DISCONTINUED | OUTPATIENT
Start: 2022-02-14 | End: 2022-02-20 | Stop reason: HOSPADM

## 2022-02-14 RX ORDER — HYDROMORPHONE HYDROCHLORIDE 2 MG/1
2-4 TABLET ORAL
Status: DISCONTINUED | OUTPATIENT
Start: 2022-02-14 | End: 2022-02-16

## 2022-02-14 RX ORDER — SODIUM CHLORIDE 9 MG/ML
INJECTION, SOLUTION INTRAVENOUS CONTINUOUS
Status: DISCONTINUED | OUTPATIENT
Start: 2022-02-14 | End: 2022-02-14

## 2022-02-14 RX ORDER — HEPARIN SODIUM,PORCINE 10 UNIT/ML
5 VIAL (ML) INTRAVENOUS
Status: CANCELLED | OUTPATIENT
Start: 2022-02-14

## 2022-02-14 RX ORDER — HYDROMORPHONE HYDROCHLORIDE 16 MG/1
16 TABLET, EXTENDED RELEASE ORAL EVERY EVENING
Status: DISCONTINUED | OUTPATIENT
Start: 2022-02-14 | End: 2022-02-14

## 2022-02-14 RX ORDER — POLYETHYLENE GLYCOL 3350 17 G/17G
17 POWDER, FOR SOLUTION ORAL DAILY
Status: DISCONTINUED | OUTPATIENT
Start: 2022-02-15 | End: 2022-02-15

## 2022-02-14 RX ORDER — HEPARIN SODIUM (PORCINE) LOCK FLUSH IV SOLN 100 UNIT/ML 100 UNIT/ML
5 SOLUTION INTRAVENOUS
Status: DISCONTINUED | OUTPATIENT
Start: 2022-02-14 | End: 2022-02-14 | Stop reason: HOSPADM

## 2022-02-14 RX ORDER — CEFTRIAXONE 1 G/1
1 INJECTION, POWDER, FOR SOLUTION INTRAMUSCULAR; INTRAVENOUS EVERY 24 HOURS
Status: DISCONTINUED | OUTPATIENT
Start: 2022-02-15 | End: 2022-02-15

## 2022-02-14 RX ORDER — CEFTRIAXONE 1 G/1
1 INJECTION, POWDER, FOR SOLUTION INTRAMUSCULAR; INTRAVENOUS ONCE
Status: COMPLETED | OUTPATIENT
Start: 2022-02-14 | End: 2022-02-14

## 2022-02-14 RX ORDER — HYDROXYZINE HYDROCHLORIDE 25 MG/1
25-50 TABLET, FILM COATED ORAL 3 TIMES DAILY PRN
Status: DISCONTINUED | OUTPATIENT
Start: 2022-02-14 | End: 2022-02-16

## 2022-02-14 RX ORDER — DIPHENHYDRAMINE HYDROCHLORIDE 50 MG/ML
50 INJECTION INTRAMUSCULAR; INTRAVENOUS
Status: CANCELLED
Start: 2022-02-14

## 2022-02-14 RX ORDER — ONDANSETRON 2 MG/ML
8 INJECTION INTRAMUSCULAR; INTRAVENOUS ONCE
Status: CANCELLED
Start: 2022-02-14 | End: 2022-02-14

## 2022-02-14 RX ADMIN — IOPAMIDOL 57 ML: 755 INJECTION, SOLUTION INTRAVENOUS at 15:00

## 2022-02-14 RX ADMIN — HYDROMORPHONE HYDROCHLORIDE 4 MG: 4 TABLET ORAL at 21:06

## 2022-02-14 RX ADMIN — Medication 1000 ML: at 08:57

## 2022-02-14 RX ADMIN — SODIUM CHLORIDE: 9 INJECTION, SOLUTION INTRAVENOUS at 13:00

## 2022-02-14 RX ADMIN — GABAPENTIN 900 MG: 300 CAPSULE ORAL at 20:31

## 2022-02-14 RX ADMIN — SODIUM CHLORIDE: 9 INJECTION, SOLUTION INTRAVENOUS at 18:33

## 2022-02-14 RX ADMIN — HYDROMORPHONE HYDROCHLORIDE 0.5 MG: 0.2 INJECTION, SOLUTION INTRAMUSCULAR; INTRAVENOUS; SUBCUTANEOUS at 16:55

## 2022-02-14 RX ADMIN — HYDROMORPHONE HYDROCHLORIDE 0.5 MG: 0.2 INJECTION, SOLUTION INTRAMUSCULAR; INTRAVENOUS; SUBCUTANEOUS at 12:47

## 2022-02-14 RX ADMIN — Medication 6 MG: at 23:20

## 2022-02-14 RX ADMIN — SODIUM CHLORIDE 500 ML: 9 INJECTION, SOLUTION INTRAVENOUS at 12:00

## 2022-02-14 RX ADMIN — DOXYCYCLINE 100 MG: 100 INJECTION, POWDER, LYOPHILIZED, FOR SOLUTION INTRAVENOUS at 23:52

## 2022-02-14 RX ADMIN — PROCHLORPERAZINE MALEATE 10 MG: 10 TABLET ORAL at 20:31

## 2022-02-14 RX ADMIN — ENOXAPARIN SODIUM 40 MG: 40 INJECTION SUBCUTANEOUS at 23:20

## 2022-02-14 RX ADMIN — CEFTRIAXONE SODIUM 1 G: 1 INJECTION, POWDER, FOR SOLUTION INTRAMUSCULAR; INTRAVENOUS at 15:44

## 2022-02-14 ASSESSMENT — ACTIVITIES OF DAILY LIVING (ADL)
ADLS_ACUITY_SCORE: 5

## 2022-02-14 ASSESSMENT — ENCOUNTER SYMPTOMS
DYSURIA: 0
BLOOD IN STOOL: 0
ANAL BLEEDING: 0
VOMITING: 0
DIFFICULTY URINATING: 0
DIARRHEA: 0
FEVER: 1
COUGH: 0
SHORTNESS OF BREATH: 1

## 2022-02-14 ASSESSMENT — PAIN SCALES - GENERAL: PAINLEVEL: MODERATE PAIN (4)

## 2022-02-14 NOTE — PROGRESS NOTES
2/14/22    CXR with worsening effusion. Now with hypoxia, sating in the 80s on RA at rest, nursing noted even a drop to 78-79%. She needs to go to [facility] ED via ambulance. Spoke with ED MD about getting CT PE and then admission to the hospital.    Harshal Schuster PA-C

## 2022-02-14 NOTE — PROGRESS NOTES
Infusion Nursing Note:  Richa Ashby presents today for IVF.    Patient seen by provider today: No   present during visit today: Not Applicable.    Note: O2 sats monitored for 20 min:  Highest value 88%.  Lowest 76%  Patient alternates between sleep and awake.  Spoke to PA and was advised to send patient to ER.  Because she is unable to maintain her sats without O2, she was sent via ambulance - non emergent.      Intravenous Access:  Implanted Port.    Treatment Conditions:  Not Applicable.      Post Infusion Assessment:  Patient tolerated infusion without incident.       Discharge Plan:   See above      SANDY GAXIOLA RN

## 2022-02-14 NOTE — ED TRIAGE NOTES
Pt arrives via EMS from the [facility] for low O2 saturation. Pt was to receive an infusion. O2 was 70's on RA. With 2L NC pt was in the low 90s. Upon EMS tx pts BP was 80/40 when sleeping.

## 2022-02-14 NOTE — ED NOTES
Bed: ED19  Expected date:   Expected time:   Means of arrival:   Comments:  [address] [address]  56 F  SOB  Yellow  ETA 1100

## 2022-02-14 NOTE — ED PROVIDER NOTES
[place] EMERGENCY DEPARTMENT ([facility])  2/14/22  History     Chief Complaint   Patient presents with     Shortness of Breath     The history is provided by the patient and medical records.     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 Patient states that she has been on treatment and was going to the [facility] today for blood draw and fluid infusion.  Patient does have a port.  The patient was found at [place] to be hypoxic and hypotensive.  The patient does state that she is slightly short of breath and that she thinks she needs a chest x-ray done.  Patient states that she has had part of the tumor collapse in her right lung before which required a chest tube in the past.  Patient denies any coughing, denies any vomiting, diarrhea, melena, or bright blood per rectum.  Patient states that she has not noted a fever until she got to our emergency department.  She denies any UTI symptoms.  She presents here for evaluation and was brought here by ambulance initially after being found with a O2 sat in the 70s on room air and a blood pressure of 80 systolic.  Upon arrival here patient's blood pressure is 129/77 with an O2 sat of 98% on 2 L nasal cannula oxygen.    This part of the medical record was transcribed by Leyda Wade, Medical Scribe, from a dictation done by Pillo Kirby MD.     Per review of the patient's medical record, patient was admitted to [facility] from 11/1/21-11/9/21 with dyspnea for 2 months.  CT CAP showed large right-sided hydropneumothorax, chest tube was placed in the ED and removed 11/18/2021.  Patient underwent talc pleurodesis on 11/5/2021.  She was found to have a RIJ clot associated with  her port that did not need to be treated.  Plans were made to replace the port as outpatient.  Chest x-ray was clear on 11/9 and patient was discharged home.  She was discharged on pazopanib and the necrosis of the lung mass per oncology may actually be response to therapy.    Past Medical History:   Diagnosis Date     Asthma, chronic      Sarcoma (H)     Right lower thigh       Past Surgical History:   Procedure Laterality Date     AS RAD RESEC TONSIL/PILLARS       GALLBLADDER SURGERY       INJECT EPIDURAL THORACIC N/A 11/5/2021    Procedure: INJECTION, SPINE, THORACIC, EPIDURAL;  Surgeon: GENERIC ANESTHESIA PROVIDER;  Location: [facility] OR     INSERT PORT VASCULAR ACCESS Right 10/28/2020    Procedure: single lumen power port placement @0745;  Surgeon: Tex Ackerman MD;  Location: [facility] OR     IR CHEST PORT PLACEMENT > 5 YRS OF AGE  10/28/2020     IR PORT CHECK RIGHT  3/26/2021     IR PORT CHECK RIGHT  10/21/2021     IR PORT REPLACEMENT CATHETER ONLY RIGHT  12/17/2021     RESECT TUMOR LOWER EXTREMITY Right 3/12/2020    Procedure: Resection of right thigh tumor;  Surgeon: Guillermo Johnson MD;  Location: [facility] OR     ROTATOR CUFF REPAIR RT/LT  2012     SHOULDER SURGERY       SINUS SURGERY       THORACENTESIS N/A 11/5/2021    Procedure: Pleurodesis;  Surgeon: Elana Ngo MD;  Location:  GI       Family History   Problem Relation Age of Onset     Lung Cancer Maternal Grandmother      Breast Cancer Paternal Grandmother        Social History     Tobacco Use     Smoking status: Never Smoker     Smokeless tobacco: Never Used   Substance Use Topics     Alcohol use: Yes     Comment: 2-3 glasses of wine/week        Allergies   Allergen Reactions     Pentobarbital      resp arrest        Review of your medicines      UNREVIEWED medicines. Ask your doctor about these medicines      Dose / Directions   acetaminophen 325 MG tablet  Commonly known as: TYLENOL      Dose: 325-650 mg  Take 325-650 mg by mouth every 6  hours as needed for mild pain  Refills: 0     Advair Diskus 100-50 MCG/DOSE inhaler  Generic drug: fluticasone-salmeterol      Dose: 1 puff  Inhale 1 puff into the lungs daily  Refills: 0     albuterol 108 (90 Base) MCG/ACT inhaler  Commonly known as: PROAIR RESPICLICK      Dose: 1-2 puff  Inhale 1-2 puffs into the lungs every 6 hours as needed  Refills: 0     celecoxib 200 MG capsule  Commonly known as: celeBREX  Used for: Cancer related pain      Dose: 200 mg  Take 1 capsule (200 mg) by mouth 2 times daily  Quantity: 60 capsule  Refills: 3     cetirizine 10 MG tablet  Commonly known as: zyrTEC      Dose: 10 mg  Take 10 mg by mouth daily  Refills: 0     diclofenac 1 % topical gel  Commonly known as: VOLTAREN  Used for: Cancer related pain, Sarcoma of soft tissue (H)      Dose: 4 g  Apply 4 g topically 4 times daily  Quantity: 350 g  Refills: 3     gabapentin 300 MG capsule  Commonly known as: NEURONTIN  Used for: Drug-induced polyneuropathy (H)      Take 600 mg in the AM, 600 mg mid-day, and 900 mg at night  Quantity: 210 capsule  Refills: 3     guaiFENesin-codeine 100-10 MG/5ML solution  Commonly known as: ROBITUSSIN AC  Used for: Cough      Dose: 1-2 teaspoonful  Take 5-10 mLs by mouth every 4 hours as needed for cough  Quantity: 473 mL  Refills: 1     * HYDROmorphone 2 MG tablet  Commonly known as: DILAUDID  Used for: Sarcoma of soft tissue (H), Neoplasm related pain      Dose: 2-4 mg  Take 1-2 tablets (2-4 mg) by mouth every 3 hours as needed for severe pain (. Stop oxycodone completely. Do not take this with oxycodone.)  Quantity: 120 tablet  Refills: 0     * HYDROmorphone HCl ER 16 MG 24 hour tablet  Commonly known as: Exalgo  Used for: Sarcoma of soft tissue (H), Neoplasm related pain      Dose: 16 mg  Start taking on: February 15, 2022  Take 1 tablet (16 mg) by mouth every evening  Quantity: 30 tablet  Refills: 0     hydrOXYzine 25 MG tablet  Commonly known as: ATARAX  Used for: Anxiety      Dose: 25-50  mg  Take 1-2 tablets (25-50 mg) by mouth 3 times daily as needed for anxiety  Quantity: 90 tablet  Refills: 3     levothyroxine 50 MCG tablet  Commonly known as: SYNTHROID/LEVOTHROID  Used for: Thyroid function test abnormal, Sarcoma (H)      Dose: 50 mcg  Take 1 tablet (50 mcg) by mouth daily  Quantity: 30 tablet  Refills: 3     montelukast 10 MG tablet  Commonly known as: SINGULAIR      Dose: 10 mg  Take 10 mg by mouth daily  Refills: 0     Multi-vitamin tablet      Dose: 1 tablet  Take 1 tablet by mouth daily  Refills: 0     naloxone 4 MG/0.1ML nasal spray  Commonly known as: NARCAN  Used for: Neoplasm related pain      Dose: 4 mg  Spray 1 spray (4 mg) into one nostril alternating nostrils once as needed for opioid reversal every 2-3 minutes until assistance arrives  Quantity: 0.2 mL  Refills: 0     NASAL SPRAY SALINE NA      Refills: 0     nystatin [id number] UNIT/ML suspension  Commonly known as: MYCOSTATIN  Used for: Sarcoma of soft tissue (H)      Dose: 500,000 Units  Take 5 mLs (500,000 Units) by mouth 4 times daily  Quantity: 400 mL  Refills: 0     ondansetron 8 MG ODT tab  Commonly known as: ZOFRAN-ODT  Used for: Nausea      Dose: 8 mg  Take 1 tablet (8 mg) by mouth every 8 hours as needed for nausea  Quantity: 30 tablet  Refills: 3     pazopanib 200 MG tablet  Commonly known as: VOTRIENT  Used for: Sarcoma of soft tissue (H), Malignant neoplasm metastatic to lung, unspecified laterality (H), Anemia in neoplastic disease, Sarcoma (H)      Dose: 800 mg  Take 4 tablets (800 mg) by mouth daily Take on an empty stomach 1 hour before or 2 hours after a meal.  Quantity: 120 tablet  Refills: 0     polyethylene glycol 17 GM/Dose powder  Commonly known as: MIRALAX  Used for: Cancer related pain      Dose: 17 g  Take 17 g by mouth daily  Quantity: 510 g  Refills: 0     prochlorperazine 10 MG tablet  Commonly known as: COMPAZINE  Used for: Nausea      Dose: 10 mg  Take 1 tablet (10 mg) by mouth every 6 hours as needed  for nausea or vomiting  Quantity: 90 tablet  Refills: 3     triamcinolone 0.1 % external cream  Commonly known as: KENALOG  Used for: Drug rash      Apply topically 2 times daily As needed for skin irritation.  Quantity: 30 g  Refills: 1     vitamin D3 50 mcg (2000 units) tablet  Commonly known as: CHOLECALCIFEROL      Dose: 1 tablet  Take 1 tablet by mouth daily  Refills: 0         * This list has 2 medication(s) that are the same as other medications prescribed for you. Read the directions carefully, and ask your doctor or other care provider to review them with you.            CONTINUE these medicines which have NOT CHANGED      Dose / Directions   melatonin 1 MG/ML Liqd liquid      Dose: 5 mg  Take 5 mg by mouth At Bedtime  Refills: 0            I have reviewed the Medications, Allergies, Past Medical and Surgical History, and Social History in the Epic system.    Review of Systems   Constitutional: Positive for fever.   Respiratory: Positive for shortness of breath. Negative for cough.    Gastrointestinal: Negative for anal bleeding, blood in stool, diarrhea and vomiting.   Genitourinary: Negative for difficulty urinating and dysuria.   All other systems reviewed and are negative.    A complete review of systems was performed with pertinent positives and negatives noted in the HPI, and all other systems negative.    Physical Exam   BP: 129/77  Pulse: 100  Temp: 99.7  F (37.6  C)  Weight: 71.8 kg (158 lb 4.6 oz)  SpO2: 98 %      Physical Exam  Vitals and nursing note reviewed.   Constitutional:       Appearance: She is ill-appearing.      Comments: Pale and appears anemic   HENT:      Head: Atraumatic.   Eyes:      Extraocular Movements: Extraocular movements intact.      Pupils: Pupils are equal, round, and reactive to light.   Pulmonary:      Comments: Good aeration with decreased sounds on the right  Abdominal:      Palpations: Abdomen is soft.   Musculoskeletal:      Right lower leg: No edema.      Left  lower leg: No edema.   Neurological:      General: No focal deficit present.      Mental Status: She is alert and oriented to person, place, and time.   Psychiatric:         Mood and Affect: Mood normal.         ED Course        Procedures         Patient was placed on cardiac monitor and oximetry.  Her port was accessed for blood draw and fluid administration.  Patient's O2 sat bounced up with nasal cannula and systolic blood pressure responded well to minimal fluids.    EKG revealed a normal sinus rhythm at a rate of 96 with a UT interval 0.140 and a QRS duration of point 076.  The patient had some low voltage with some flipped T waves anteriorly that were consistent with previous EKGs.  This is read by me personally.    Results for orders placed or performed during the hospital encounter of 02/14/22 (from the past 24 hour(s))   INR   Result Value Ref Range    INR 1.25 (H) 0.85 - 1.15   Lactic acid whole blood   Result Value Ref Range    Lactic Acid 0.7 0.7 - 2.0 mmol/L   CBC with platelets differential    Narrative    The following orders were created for panel order CBC with platelets differential.  Procedure                               Abnormality         Status                     ---------                               -----------         ------                     CBC with platelets and d...[[id number]]  Abnormal            Final result                 Please view results for these tests on the individual orders.   ABO/Rh type and screen    Narrative    The following orders were created for panel order ABO/Rh type and screen.  Procedure                               Abnormality         Status                     ---------                               -----------         ------                     Adult Type and Screen[[id number]]                            Final result                 Please view results for these tests on the individual orders.   CBC with platelets and differential   Result Value Ref Range     WBC Count 4.1 4.0 - 11.0 10e3/uL    RBC Count 2.17 (L) 3.80 - 5.20 10e6/uL    Hemoglobin 7.2 (L) 11.7 - 15.7 g/dL    Hematocrit 24.0 (L) 35.0 - 47.0 %     (H) 78 - 100 fL    MCH 33.2 (H) 26.5 - 33.0 pg    MCHC 30.0 (L) 31.5 - 36.5 g/dL    RDW 19.6 (H) 10.0 - 15.0 %    Platelet Count 187 150 - 450 10e3/uL    % Neutrophils 72 %    % Lymphocytes 15 %    % Monocytes 10 %    % Eosinophils 1 %    % Basophils 1 %    % Immature Granulocytes 1 %    NRBCs per 100 WBC 0 <1 /100    Absolute Neutrophils 3.0 1.6 - 8.3 10e3/uL    Absolute Lymphocytes 0.6 (L) 0.8 - 5.3 10e3/uL    Absolute Monocytes 0.4 0.0 - 1.3 10e3/uL    Absolute Eosinophils 0.1 0.0 - 0.7 10e3/uL    Absolute Basophils 0.0 0.0 - 0.2 10e3/uL    Absolute Immature Granulocytes 0.0 <=0.4 10e3/uL    Absolute NRBCs 0.0 10e3/uL   Adult Type and Screen   Result Value Ref Range    ABO/RH(D) O POS     Antibody Screen Negative Negative    SPECIMEN EXPIRATION DATE [id number]    Comprehensive metabolic panel   Result Value Ref Range    Sodium 134 133 - 144 mmol/L    Potassium 3.6 3.4 - 5.3 mmol/L    Chloride 96 94 - 109 mmol/L    Carbon Dioxide (CO2) 30 20 - 32 mmol/L    Anion Gap 8 3 - 14 mmol/L    Urea Nitrogen 6 (L) 7 - 30 mg/dL    Creatinine 0.54 0.52 - 1.04 mg/dL    Calcium 8.4 (L) 8.5 - 10.1 mg/dL    Glucose 88 70 - 99 mg/dL    Alkaline Phosphatase 110 40 - 150 U/L    AST 19 0 - 45 U/L    ALT 16 0 - 50 U/L    Protein Total 6.9 6.8 - 8.8 g/dL    Albumin 1.6 (L) 3.4 - 5.0 g/dL    Bilirubin Total 0.6 0.2 - 1.3 mg/dL    GFR Estimate >90 >60 mL/min/1.73m2   EKG 12-lead, tracing only   Result Value Ref Range    Systolic Blood Pressure  mmHg    Diastolic Blood Pressure  mmHg    Ventricular Rate 96 BPM    Atrial Rate 96 BPM    OK Interval 140 ms    QRS Duration 76 ms     ms    QTc 437 ms    P Axis 54 degrees    R AXIS 8 degrees    T Axis 15 degrees    Interpretation ECG       Sinus rhythm  T wave abnormality, consider anterior ischemia  Abnormal ECG      Symptomatic; Yes; 2/14/2022 Influenza A/B & SARS-CoV2 (COVID-19) Virus PCR Multiplex Nasopharyngeal    Specimen: Nasopharyngeal; Swab   Result Value Ref Range    Influenza A PCR Negative Negative    Influenza B PCR Negative Negative    RSV PCR Negative Negative    SARS CoV2 PCR Negative Negative, Testing sent to reference lab. Results will be returned via unsolicited result    Narrative    Testing was performed using the Xpert Xpress CoV2/Flu/RSV Assay on the [employer] GeneXpert Instrument. This test should be ordered for the detection of SARS-CoV-2 and influenza viruses in individuals who meet clinical and/or epidemiological criteria. Test performance is unknown in asymptomatic patients. This test is for in vitro diagnostic use under the FDA EUA for laboratories certified under CLIA to perform high or moderate complexity testing. This test has not been FDA cleared or approved. A negative result does not rule out the presence of PCR inhibitors in the specimen or target RNA in concentration below the limit of detection for the assay. If only one viral target is positive but coinfection with multiple targets is suspected, the sample should be re-tested with another FDA cleared, approved, or authorized test, if coinfection would change clinical management. This test was validated by the [facility] [employer]. These laboratories are certified under the Clinical  Laboratory Improvement Amendments of 1988 (CLIA-88) as qualified to perform high complexity laboratory testing.   UA without Microscopic   Result Value Ref Range    Color Urine Yellow Colorless, Straw, Light Yellow, Yellow    Appearance Urine Slightly Cloudy (A) Clear    Glucose Urine Negative Negative mg/dL    Bilirubin Urine Negative Negative    Ketones Urine Negative Negative mg/dL    Specific Gravity Urine 1.005 1.003 - 1.035    Blood Urine Negative Negative    pH Urine 6.0 5.0 - 7.0    Protein Albumin Urine Negative Negative mg/dL    Urobilinogen  Urine 3.0 (A) Normal, 2.0 mg/dL    Nitrite Urine Negative Negative    Leukocyte Esterase Urine Large (A) Negative   CT Chest Pulmonary Embolism w Contrast    Narrative    EXAM: CTA pulmonary angiogram, 2/14/2022    HISTORY: PE suspected, high prob; sarcoma mets to chest with fever;  r/o PE, r/o pneumonia    COMPARISON: CT 1/14/2022.    TECHNIQUE: Volumetric CT images obtained through the chest with  contrast. Coronal and axial MIP reformatted images obtained.  Three-dimensional (3D) post-processed angiographic images were  reconstructed, archived to PACS and used in interpretation of this  study.     CONTRAST: iopamidol (ISOVUE-370) solution 57 mL ml isovue 370 IV.    FINDINGS:     Vascular: There is good contrast opacification of the pulmonary  arterial vasculature. Question small central filling defects in the  right middle lobe lateral segment (series 7 image 167) and medial  segment (series 7 image 172). The right upper lobe pulmonary arteries  are compressed without definite intraluminal thrombus. Main pulmonary  artery is normal caliber. Heart is normal size without pericardial  effusion. No evidence of right heart strain. Nonaneurysmal thoracic  aorta with increased vessels demonstrating normal branching pattern.     Remaining Chest: Right chest wall Port-A-Cath tip is near the superior  cavoatrial junction. Large multiloculated pleural collection with  enhancing rind with near complete compression of the right upper lobe  and significant compression of the right lower lobe, overall not  significantly changed in size from 1/14/2022 though gas content has  decreased. Cystic/cavitary lesion in the right lower lobe parenchyma  is also seen similar size with decreased gas content. Adjacent 4.3 x  2.7 cm lesion in the left lower lobe pulmonary parenchyma is also  similar size (series 7 image 229).    There are a few new small cystic lesions within and adjacent to the  right major/minor fissures. Aerated portions  of the lung are otherwise  not significantly changed. No significant pneumothorax. Central  tracheobronchial tree is patent with anterior deviation of the trachea  due to mass effect from collections above.     There are multiple enlarged lymph nodes largest being a retrosternal  node measuring 1.9 x 1.2 cm.    Upper Abdomen: No acute abnormality. Accessory splenule.    Bones/Soft Tissues: No acute or suspicious osseous abnormality.      Impression    IMPRESSION:     1. Question small subsegmental thrombi versus contrast mixing artifact  in the right middle lobe pulmonary arteries. Remaining pulmonary  arterial tree is clear. No evidence of right heart strain.    2. Extensive cystic/cavitary lesions in the chest, similar in  size/extent compared to 1/14/2022 with increased fluid component.    3. Minimal change in large necrotic pleural collection with enhancing  rind compressing right upper lobe and displacing the trachea    I have personally reviewed the examination and initial interpretation  and I agree with the findings.    SHIRLEY INMAN MD         SYSTEM ID:  [id number]     Medications   sodium chloride (PF) 0.9% PF flush 3 mL (has no administration in time range)   sodium chloride (PF) 0.9% PF flush 3 mL (has no administration in time range)   sodium chloride 0.9% infusion ( Intravenous New Bag 2/14/22 1300)   HYDROmorphone (DILAUDID) injection 0.5 mg (0.5 mg Intravenous Given 2/14/22 1247)   0.9% sodium chloride BOLUS (0 mLs Intravenous Stopped 2/14/22 1439)   cefTRIAXone (ROCEPHIN) 1 g vial to attach to  mL bag for ADULTS or NS 50 mL bag for PEDS (1 g Intravenous New Bag 2/14/22 1544)   iopamidol (ISOVUE-370) solution 57 mL (57 mLs Intravenous Given 2/14/22 1500)   sodium chloride (PF) 0.9% PF flush 88 mL (88 mLs Intravenous Given 2/14/22 1501)        Orders Placed This Encounter   Procedures     CT Chest Pulmonary Embolism w Contrast     INR     Comprehensive metabolic panel     UA without  Microscopic     Lactic acid whole blood     Symptomatic; Yes; 2/14/2022 Influenza A/B & SARS-CoV2 (COVID-19) Virus PCR Multiplex     CBC with platelets and differential     D dimer quantitative     EKG 12-lead, tracing only     Pulse oximetry nursing     Cardiac Continuous Monitoring     Peripheral IV catheter     Oxygen: Nasal cannula, Oxygen mask     Adult Type and Screen     Admit to Inpatient     CBC with platelets differential     ABO/Rh type and screen            Assessments & Plan (with Medical Decision Making)     I have reviewed the nursing notes.    Patient was noted to have what appeared to be a UTI and was given Rocephin for that after being cultured.  Patient's lactate is within normal limits and suggests no sepsis.    Patient's chest CT to rule out pulmonary embolus may show a subsegmental PE so a D-dimer was added to the patient's blood work to potentially help rule out an actual PE.    Patient is on palliative care but not hospice.  At this time she will be admitted to the medicine service on IV antibiotics with her D-dimer pending.    I have reviewed the findings, diagnosis, plan and need for follow up with the patient.        Final diagnoses:   Hypotension, unspecified hypotension type - transient, with hypoxia; r/o urosepsis, r/o subsegmental PE   Anemia, unspecified type   Sarcoma (H) - mets to lung     Pillo Kirby MD, MD    ILeyda am serving as a trained medical scribe to document services personally performed by Pillo Kirby MD, based on the provider's statements to me.      Pillo FLOWERS MD, was physically present and have reviewed and verified the accuracy of this note documented by Leyda Wade.     Pillo Kirby MD  2/14/2022   [facility] EMERGENCY DEPARTMENT     Pillo Kirby MD  02/14/22 1923

## 2022-02-15 ENCOUNTER — APPOINTMENT (OUTPATIENT)
Dept: ULTRASOUND IMAGING | Facility: CLINIC | Age: 57
End: 2022-02-15
Attending: STUDENT IN AN ORGANIZED HEALTH CARE EDUCATION/TRAINING PROGRAM
Payer: COMMERCIAL

## 2022-02-15 ENCOUNTER — APPOINTMENT (OUTPATIENT)
Dept: CARDIOLOGY | Facility: CLINIC | Age: 57
End: 2022-02-15
Attending: STUDENT IN AN ORGANIZED HEALTH CARE EDUCATION/TRAINING PROGRAM
Payer: COMMERCIAL

## 2022-02-15 ENCOUNTER — APPOINTMENT (OUTPATIENT)
Dept: PHYSICAL THERAPY | Facility: CLINIC | Age: 57
End: 2022-02-15
Attending: INTERNAL MEDICINE
Payer: COMMERCIAL

## 2022-02-15 LAB
ALBUMIN SERPL-MCNC: 1.5 G/DL (ref 3.4–5)
ALP SERPL-CCNC: 103 U/L (ref 40–150)
ALT SERPL W P-5'-P-CCNC: 14 U/L (ref 0–50)
ANION GAP SERPL CALCULATED.3IONS-SCNC: 8 MMOL/L (ref 3–14)
AST SERPL W P-5'-P-CCNC: 18 U/L (ref 0–45)
ATRIAL RATE - MUSE: 96 BPM
BASOPHILS # BLD AUTO: 0 10E3/UL (ref 0–0.2)
BASOPHILS NFR BLD AUTO: 1 %
BILIRUB SERPL-MCNC: 0.7 MG/DL (ref 0.2–1.3)
BUN SERPL-MCNC: 5 MG/DL (ref 7–30)
C COLI+JEJUNI+LARI FUSA STL QL NAA+PROBE: NOT DETECTED
C DIFF TOX B STL QL: NEGATIVE
CALCIUM SERPL-MCNC: 8.7 MG/DL (ref 8.5–10.1)
CHLORIDE BLD-SCNC: 101 MMOL/L (ref 94–109)
CO2 SERPL-SCNC: 27 MMOL/L (ref 20–32)
CREAT SERPL-MCNC: 0.47 MG/DL (ref 0.52–1.04)
CRP SERPL-MCNC: 280 MG/L (ref 0–8)
DIASTOLIC BLOOD PRESSURE - MUSE: NORMAL MMHG
EC STX1 GENE STL QL NAA+PROBE: NOT DETECTED
EC STX2 GENE STL QL NAA+PROBE: NOT DETECTED
EOSINOPHIL # BLD AUTO: 0.1 10E3/UL (ref 0–0.7)
EOSINOPHIL NFR BLD AUTO: 2 %
ERYTHROCYTE [DISTWIDTH] IN BLOOD BY AUTOMATED COUNT: 21 % (ref 10–15)
GFR SERPL CREATININE-BSD FRML MDRD: >90 ML/MIN/1.73M2
GLUCOSE BLD-MCNC: 103 MG/DL (ref 70–99)
HCT VFR BLD AUTO: 29 % (ref 35–47)
HGB BLD-MCNC: 8.9 G/DL (ref 11.7–15.7)
IMM GRANULOCYTES # BLD: 0 10E3/UL
IMM GRANULOCYTES NFR BLD: 1 %
INTERPRETATION ECG - MUSE: NORMAL
LVEF ECHO: NORMAL
LYMPHOCYTES # BLD AUTO: 0.6 10E3/UL (ref 0.8–5.3)
LYMPHOCYTES NFR BLD AUTO: 18 %
MAGNESIUM SERPL-MCNC: 1.5 MG/DL (ref 1.8–2.6)
MCH RBC QN AUTO: 32.8 PG (ref 26.5–33)
MCHC RBC AUTO-ENTMCNC: 30.7 G/DL (ref 31.5–36.5)
MCV RBC AUTO: 107 FL (ref 78–100)
MONOCYTES # BLD AUTO: 0.4 10E3/UL (ref 0–1.3)
MONOCYTES NFR BLD AUTO: 11 %
MRSA DNA SPEC QL NAA+PROBE: NEGATIVE
NEUTROPHILS # BLD AUTO: 2.2 10E3/UL (ref 1.6–8.3)
NEUTROPHILS NFR BLD AUTO: 67 %
NOROV GI+II ORF1-ORF2 JNC STL QL NAA+PR: NOT DETECTED
NRBC # BLD AUTO: 0 10E3/UL
NRBC BLD AUTO-RTO: 0 /100
P AXIS - MUSE: 54 DEGREES
PLATELET # BLD AUTO: 178 10E3/UL (ref 150–450)
POTASSIUM BLD-SCNC: 3.4 MMOL/L (ref 3.4–5.3)
PR INTERVAL - MUSE: 140 MS
PROT SERPL-MCNC: 6.7 G/DL (ref 6.8–8.8)
QRS DURATION - MUSE: 76 MS
QT - MUSE: 346 MS
QTC - MUSE: 437 MS
R AXIS - MUSE: 8 DEGREES
RADIOLOGIST FLAGS: ABNORMAL
RBC # BLD AUTO: 2.71 10E6/UL (ref 3.8–5.2)
RVA NSP5 STL QL NAA+PROBE: NOT DETECTED
SA TARGET DNA: NEGATIVE
SALMONELLA SP RPOD STL QL NAA+PROBE: NOT DETECTED
SHIGELLA SP+EIEC IPAH STL QL NAA+PROBE: NOT DETECTED
SODIUM SERPL-SCNC: 136 MMOL/L (ref 133–144)
SYSTOLIC BLOOD PRESSURE - MUSE: NORMAL MMHG
T AXIS - MUSE: 15 DEGREES
V CHOL+PARA RFBL+TRKH+TNAA STL QL NAA+PR: NOT DETECTED
VENTRICULAR RATE- MUSE: 96 BPM
WBC # BLD AUTO: 3.3 10E3/UL (ref 4–11)
Y ENTERO RECN STL QL NAA+PROBE: NOT DETECTED

## 2022-02-15 PROCEDURE — 87493 C DIFF AMPLIFIED PROBE: CPT | Performed by: INTERNAL MEDICINE

## 2022-02-15 PROCEDURE — 97116 GAIT TRAINING THERAPY: CPT | Mod: GP

## 2022-02-15 PROCEDURE — 250N000013 HC RX MED GY IP 250 OP 250 PS 637: Performed by: INTERNAL MEDICINE

## 2022-02-15 PROCEDURE — 99221 1ST HOSP IP/OBS SF/LOW 40: CPT | Mod: GC | Performed by: STUDENT IN AN ORGANIZED HEALTH CARE EDUCATION/TRAINING PROGRAM

## 2022-02-15 PROCEDURE — 97161 PT EVAL LOW COMPLEX 20 MIN: CPT | Mod: GP

## 2022-02-15 PROCEDURE — 99233 SBSQ HOSP IP/OBS HIGH 50: CPT | Performed by: INTERNAL MEDICINE

## 2022-02-15 PROCEDURE — 250N000013 HC RX MED GY IP 250 OP 250 PS 637: Performed by: STUDENT IN AN ORGANIZED HEALTH CARE EDUCATION/TRAINING PROGRAM

## 2022-02-15 PROCEDURE — 250N000009 HC RX 250: Performed by: INTERNAL MEDICINE

## 2022-02-15 PROCEDURE — 87641 MR-STAPH DNA AMP PROBE: CPT | Performed by: INTERNAL MEDICINE

## 2022-02-15 PROCEDURE — 87506 IADNA-DNA/RNA PROBE TQ 6-11: CPT | Performed by: INTERNAL MEDICINE

## 2022-02-15 PROCEDURE — 93306 TTE W/DOPPLER COMPLETE: CPT

## 2022-02-15 PROCEDURE — 93970 EXTREMITY STUDY: CPT | Mod: 26 | Performed by: RADIOLOGY

## 2022-02-15 PROCEDURE — 85025 COMPLETE CBC W/AUTO DIFF WBC: CPT | Performed by: STUDENT IN AN ORGANIZED HEALTH CARE EDUCATION/TRAINING PROGRAM

## 2022-02-15 PROCEDURE — 96367 TX/PROPH/DG ADDL SEQ IV INF: CPT | Performed by: EMERGENCY MEDICINE

## 2022-02-15 PROCEDURE — 80053 COMPREHEN METABOLIC PANEL: CPT | Performed by: STUDENT IN AN ORGANIZED HEALTH CARE EDUCATION/TRAINING PROGRAM

## 2022-02-15 PROCEDURE — 86140 C-REACTIVE PROTEIN: CPT | Performed by: INTERNAL MEDICINE

## 2022-02-15 PROCEDURE — 93970 EXTREMITY STUDY: CPT

## 2022-02-15 PROCEDURE — 97530 THERAPEUTIC ACTIVITIES: CPT | Mod: GP

## 2022-02-15 PROCEDURE — 258N000003 HC RX IP 258 OP 636: Performed by: STUDENT IN AN ORGANIZED HEALTH CARE EDUCATION/TRAINING PROGRAM

## 2022-02-15 PROCEDURE — 250N000011 HC RX IP 250 OP 636: Performed by: INTERNAL MEDICINE

## 2022-02-15 PROCEDURE — 250N000011 HC RX IP 250 OP 636: Performed by: STUDENT IN AN ORGANIZED HEALTH CARE EDUCATION/TRAINING PROGRAM

## 2022-02-15 PROCEDURE — 36415 COLL VENOUS BLD VENIPUNCTURE: CPT | Performed by: STUDENT IN AN ORGANIZED HEALTH CARE EDUCATION/TRAINING PROGRAM

## 2022-02-15 PROCEDURE — 120N000005 HC R&B MS OVERFLOW UMMC

## 2022-02-15 PROCEDURE — 82040 ASSAY OF SERUM ALBUMIN: CPT | Performed by: STUDENT IN AN ORGANIZED HEALTH CARE EDUCATION/TRAINING PROGRAM

## 2022-02-15 PROCEDURE — 93970 EXTREMITY STUDY: CPT | Mod: XS

## 2022-02-15 PROCEDURE — 258N000003 HC RX IP 258 OP 636: Performed by: INTERNAL MEDICINE

## 2022-02-15 PROCEDURE — 83735 ASSAY OF MAGNESIUM: CPT | Performed by: STUDENT IN AN ORGANIZED HEALTH CARE EDUCATION/TRAINING PROGRAM

## 2022-02-15 PROCEDURE — 93306 TTE W/DOPPLER COMPLETE: CPT | Mod: 26 | Performed by: INTERNAL MEDICINE

## 2022-02-15 RX ORDER — ACETAMINOPHEN 325 MG/1
650 TABLET ORAL EVERY 6 HOURS PRN
Status: DISCONTINUED | OUTPATIENT
Start: 2022-02-15 | End: 2022-02-16

## 2022-02-15 RX ORDER — HYDROMORPHONE HYDROCHLORIDE 16 MG/1
16 TABLET, EXTENDED RELEASE ORAL EVERY EVENING
Status: DISCONTINUED | OUTPATIENT
Start: 2022-02-15 | End: 2022-02-20 | Stop reason: HOSPADM

## 2022-02-15 RX ORDER — CEFTRIAXONE 2 G/1
2 INJECTION, POWDER, FOR SOLUTION INTRAMUSCULAR; INTRAVENOUS EVERY 24 HOURS
Status: DISCONTINUED | OUTPATIENT
Start: 2022-02-15 | End: 2022-02-16

## 2022-02-15 RX ORDER — SODIUM CHLORIDE, SODIUM LACTATE, POTASSIUM CHLORIDE, CALCIUM CHLORIDE 600; 310; 30; 20 MG/100ML; MG/100ML; MG/100ML; MG/100ML
INJECTION, SOLUTION INTRAVENOUS CONTINUOUS
Status: DISCONTINUED | OUTPATIENT
Start: 2022-02-15 | End: 2022-02-16

## 2022-02-15 RX ORDER — MAGNESIUM SULFATE HEPTAHYDRATE 40 MG/ML
4 INJECTION, SOLUTION INTRAVENOUS ONCE
Status: COMPLETED | OUTPATIENT
Start: 2022-02-15 | End: 2022-02-15

## 2022-02-15 RX ORDER — LEVOTHYROXINE SODIUM 75 UG/1
75 TABLET ORAL DAILY
Status: DISCONTINUED | OUTPATIENT
Start: 2022-02-16 | End: 2022-02-20 | Stop reason: HOSPADM

## 2022-02-15 RX ADMIN — POTASSIUM CHLORIDE, DEXTROSE MONOHYDRATE AND SODIUM CHLORIDE: 150; 5; 450 INJECTION, SOLUTION INTRAVENOUS at 01:26

## 2022-02-15 RX ADMIN — NYSTATIN 500000 UNITS: 100000 SUSPENSION ORAL at 16:04

## 2022-02-15 RX ADMIN — ENOXAPARIN SODIUM 70 MG: 80 INJECTION SUBCUTANEOUS at 16:03

## 2022-02-15 RX ADMIN — HYDROMORPHONE HYDROCHLORIDE 4 MG: 2 TABLET ORAL at 21:48

## 2022-02-15 RX ADMIN — HYDROMORPHONE HYDROCHLORIDE 2 MG: 2 TABLET ORAL at 01:40

## 2022-02-15 RX ADMIN — HYDROMORPHONE HYDROCHLORIDE 4 MG: 2 TABLET ORAL at 07:37

## 2022-02-15 RX ADMIN — LEVOTHYROXINE SODIUM 50 MCG: 0.05 TABLET ORAL at 07:35

## 2022-02-15 RX ADMIN — HYDROMORPHONE HYDROCHLORIDE 4 MG: 4 TABLET ORAL at 11:34

## 2022-02-15 RX ADMIN — CEFTRIAXONE SODIUM 2 G: 2 INJECTION, POWDER, FOR SOLUTION INTRAMUSCULAR; INTRAVENOUS at 17:48

## 2022-02-15 RX ADMIN — GABAPENTIN 900 MG: 300 CAPSULE ORAL at 21:49

## 2022-02-15 RX ADMIN — GABAPENTIN 600 MG: 300 CAPSULE ORAL at 07:35

## 2022-02-15 RX ADMIN — MAGNESIUM SULFATE IN WATER 4 G: 40 INJECTION, SOLUTION INTRAVENOUS at 14:08

## 2022-02-15 RX ADMIN — HYDROMORPHONE HYDROCHLORIDE 4 MG: 4 TABLET ORAL at 05:00

## 2022-02-15 RX ADMIN — NYSTATIN 500000 UNITS: 100000 SUSPENSION ORAL at 11:34

## 2022-02-15 RX ADMIN — DOXYCYCLINE 100 MG: 100 INJECTION, POWDER, LYOPHILIZED, FOR SOLUTION INTRAVENOUS at 22:55

## 2022-02-15 RX ADMIN — HYDROMORPHONE HYDROCHLORIDE 16 MG: 16 TABLET, EXTENDED RELEASE ORAL at 21:42

## 2022-02-15 RX ADMIN — HYDROMORPHONE HYDROCHLORIDE 4 MG: 4 TABLET ORAL at 17:48

## 2022-02-15 RX ADMIN — SODIUM CHLORIDE, POTASSIUM CHLORIDE, SODIUM LACTATE AND CALCIUM CHLORIDE: 600; 310; 30; 20 INJECTION, SOLUTION INTRAVENOUS at 16:04

## 2022-02-15 RX ADMIN — NYSTATIN 500000 UNITS: 100000 SUSPENSION ORAL at 07:36

## 2022-02-15 RX ADMIN — CETIRIZINE HYDROCHLORIDE 10 MG: 10 TABLET, FILM COATED ORAL at 07:35

## 2022-02-15 RX ADMIN — FLUTICASONE FUROATE AND VILANTEROL TRIFENATATE 1 PUFF: 100; 25 POWDER RESPIRATORY (INHALATION) at 07:38

## 2022-02-15 RX ADMIN — PROCHLORPERAZINE MALEATE 10 MG: 10 TABLET ORAL at 20:09

## 2022-02-15 RX ADMIN — CELECOXIB 200 MG: 200 CAPSULE ORAL at 07:35

## 2022-02-15 RX ADMIN — ONDANSETRON 8 MG: 8 TABLET, ORALLY DISINTEGRATING ORAL at 07:40

## 2022-02-15 RX ADMIN — PAZOPANIB 800 MG: 200 TABLET, FILM COATED ORAL at 21:36

## 2022-02-15 RX ADMIN — DOXYCYCLINE 100 MG: 100 INJECTION, POWDER, LYOPHILIZED, FOR SOLUTION INTRAVENOUS at 12:57

## 2022-02-15 RX ADMIN — GABAPENTIN 600 MG: 300 CAPSULE ORAL at 13:34

## 2022-02-15 RX ADMIN — MONTELUKAST 10 MG: 10 TABLET, FILM COATED ORAL at 07:35

## 2022-02-15 RX ADMIN — CELECOXIB 200 MG: 200 CAPSULE ORAL at 19:23

## 2022-02-15 RX ADMIN — NYSTATIN 500000 UNITS: 100000 SUSPENSION ORAL at 20:09

## 2022-02-15 RX ADMIN — Medication 5 MG: at 21:49

## 2022-02-15 ASSESSMENT — ACTIVITIES OF DAILY LIVING (ADL)
ADLS_ACUITY_SCORE: 12
ADLS_ACUITY_SCORE: 7
ADLS_ACUITY_SCORE: 5
ADLS_ACUITY_SCORE: 5
ADLS_ACUITY_SCORE: 7
ADLS_ACUITY_SCORE: 5
ADLS_ACUITY_SCORE: 12
ADLS_ACUITY_SCORE: 12
ADLS_ACUITY_SCORE: 5
ADLS_ACUITY_SCORE: 12
ADLS_ACUITY_SCORE: 7
ADLS_ACUITY_SCORE: 5
ADLS_ACUITY_SCORE: 7
ADLS_ACUITY_SCORE: 5
ADLS_ACUITY_SCORE: 12
ADLS_ACUITY_SCORE: 12
ADLS_ACUITY_SCORE: 5
ADLS_ACUITY_SCORE: 5

## 2022-02-15 NOTE — PROGRESS NOTES
CLINICAL NUTRITION SERVICES - ASSESSMENT NOTE     Nutrition Prescription    RECOMMENDATIONS FOR MDs/PROVIDERS TO ORDER:  Encourage oral intake    Malnutrition Status:    Severe malnutrition in the context of acute on chronic illness     Recommendations already ordered by Registered Dietitian (RD):  Ensure Enlive BID + additional PRN     Future/Additional Recommendations:  Monitor ability to tolerate PO intake- consider appetite stimulant as medically appropriate   - If unable to consume adequate PO with use of supplements, consider enteral nutrition   Osmolite 1.5 Codey @ goal of  50ml/hr  (1200ml/day)  + 1 packet prosource will provide: 1840 kcals, 86 g PRO, 914 ml free H20, 244 g CHO, and 0 g fiber daily.  -Start at 10 mL/hr and advance by 10 mL q 8 hours to goal  - water flush 60 mL q 4 hours for tube patency   - head of bed 30 degrees with gastric feeds        REASON FOR ASSESSMENT  Richa Ashby is a/an 56 year old female assessed by the dietitian for Provider Order - Concerns for malnutrition     CLINICAL HISTORY   Admitted on 2/14/2022. 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 Patient has has significant issues with taste changes food is too salty, sweet, bitter and flat which make it unappealing. She tries to eat two meals per day. She ordered cherry jello and strawberry yogurt today. Reported her stomach is very sensitive to small changes- she will take 1 dose of miralax and have diarrhea for multiple days or take one dose of imodium and be constipated for 3 days. She is currently trying to use miralax every other day and this seems to be helpful. Visit shorted due to patient needing to go to the bathroom.   "Although she has lost a significant amount of weight(see history below) she thinks she has been stable for 1-2 weeks.     CURRENT NUTRITION ORDERS  Diet: Regular  Intake/Tolerance: No intakes documented     LABS  Urea Nitrogen 5 (L)  Creatinine 0.47 (L)   (H)  Glucose 103 (H)    MEDICATIONS  Medications reviewed    ANTHROPOMETRICS  Height: 170.2 cm (5' 7\")  Most Recent Weight: 71.8 kg (158 lb 4.6 oz)    IBW: 61.4 kg  BMI: Normal BMI  Weight History:   Wt Readings from Last 15 Encounters:   02/14/22 71.8 kg (158 lb 4.6 oz)   02/14/22 71.8 kg (158 lb 4.8 oz)   12/17/21 72.6 kg (160 lb)   12/02/21 76 kg (167 lb 8 oz)   12/02/21 74.8 kg (165 lb)   11/23/21 78.6 kg (173 lb 3.2 oz)   11/06/21 81.4 kg (179 lb 8 oz)   10/07/21 87.7 kg (193 lb 4.8 oz)   10/06/21 87.2 kg (192 lb 3.2 oz)   10/03/21 87.1 kg (192 lb)   09/22/21 89.3 kg (196 lb 12.8 oz)   09/08/21 90.5 kg (199 lb 8 oz)   08/30/21 90.7 kg (199 lb 15.3 oz)   08/25/21 90.8 kg (200 lb 1.6 oz)   08/11/21 93.4 kg (206 lb)   23% weight loss in less than 6 months     Dosing Weight: 71.8 kg    ASSESSED NUTRITION NEEDS  Estimated Energy Needs: 5998-4346 kcals/day (25 - 30 kcals/kg)  Justification: Maintenance  Estimated Protein Needs:  grams protein/day (1.2 - 1.4 grams of pro/kg)  Justification: Increased needs  Estimated Fluid Needs: 2434-0414 mL/day (1 mL/kcal)   Justification: Maintenance    PHYSICAL FINDINGS  See malnutrition section below.    MALNUTRITION  % Intake: </=75% for >/= 1 month (severe)  % Weight Loss: > 10% in 6 months (severe)  Subcutaneous Fat Loss: Facial region:  Mild (visual assessment only), loss may be more severe  Muscle Loss: Unable to assess   Fluid Accumulation/Edema: None noted  Malnutrition Diagnosis: Severe malnutrition in the context of acute on chronic illness     NUTRITION DIAGNOSIS  Inadequate oral intake related to decreased appetite 2/2 taste changes as evidenced by 23% weight loss in 6 months "     INTERVENTIONS  Implementation  Nutrition Education: RD role in care, coping with taste changes handout    Medical food supplement therapy     Goals  Patient to consume % of nutritionally adequate meal trays TID, or the equivalent with supplements/snacks.     Monitoring/Evaluation  Progress toward goals will be monitored and evaluated per protocol.    Elda Donohue RD, LD  5C/BMT pager: [phone]

## 2022-02-15 NOTE — PROVIDER NOTIFICATION
Paged to Dr Monroy: Ultrasound positive for partially occlusive thrombus of R internal jugular vein. Rec'd order for lovenox.

## 2022-02-15 NOTE — H&P
[facility]    History and Physical - Hospitalist Service, GOLD TEAM        Date of Admission:  2/14/2022    Assessment & Plan      Richa Ashby is a 56 year old female admitted on 2/14/2022. 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      # Mild acute hypoxemic resp failure, on 1-2L oxygen  - CT angiogram done but discussed with radiologist and this is likely a motion artifact rather than a true PE.  D-dimer was ordered by the ER physician and it is elevated but given patient's cancer, I am not sure if this is truly indicative of a clot.  Rads doesn't think VQ scan will be helpful.  Will get LE and UE dopplers and TTE.  Patient may have a component of post-obstructive pneumonia or just ongoing worsening respiratory failure from the cancer burden.  Will empricially treat for CAP with ceftriaxone and doxy.  If not improving, consider re-scanning for a PE.  Will put her on ppx enox.     # Symptomatic anemia  - Hb is 7.2 and given some dyspnea and hypotension, will give 1U PRBC.  CTM    # Sarcoma with mets to the lung  - Oncology consult  - Contineu pazopanib    # Cancer related pain: Continue PTA pain regimen.           Diet: Regular Diet Adult    DVT Prophylaxis: Enoxaparin (Lovenox) SQ  Ness Catheter: Not present  Central Lines: PRESENT     Cardiac Monitoring: None  Code Status: No CPR- Pre-arrest intubation OK      Clinically Significant Risk Factors Present on Admission              # Coagulation Defect: INR = 1.25 (Ref range: 0.85 - 1.15) and/or PTT = N/A on admission, will monitor for bleeding        Disposition Plan   Expected Discharge:  2-3 days   Anticipated discharge location:  Awaiting care coordination huddle  Delays:     home        The patient's care was discussed with the Patient and Patient's Family.    Ozzie Barth  MD  Hospitalist Service, [facility]  Securely message with the [employer] Web Console (learn more here)  Text page via [facility] Paging/Directory   Please see signed in provider for up to date coverage information      ______________________________________________________________________    Chief Complaint   Hypoxemia    History is obtained from the patient    History of Present Illness   Richa Ashby is a 56 year old female admitted on 2/14/2022. 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   She was also hypotensive in the ambulance but resolved with 500ml of IVFs and patient says she had fallen asleep.  She says she has been feeling like her usual self, was just found to be hypoxic, but she has felt like this for a few weeks now.      Review of Systems    The 10 point Review of Systems is negative other than noted in the HPI or here.     Past Medical History    I have reviewed this patient's medical history and updated it with pertinent information if needed.   Past Medical History:   Diagnosis Date     Asthma, chronic      Sarcoma (H)     Right lower thigh       Past Surgical History   I have reviewed this patient's surgical history and updated it with pertinent information if needed.  Past Surgical History:   Procedure Laterality Date     AS RAD RESEC TONSIL/PILLARS       GALLBLADDER SURGERY       INJECT EPIDURAL THORACIC N/A 11/5/2021    Procedure: INJECTION, SPINE, THORACIC, EPIDURAL;  Surgeon: GENERIC ANESTHESIA PROVIDER;  Location: [facility] OR     INSERT PORT VASCULAR ACCESS Right 10/28/2020    Procedure: single lumen power port placement @0745;  Surgeon: Tex Ackerman MD;  Location: [facility] OR     IR CHEST PORT PLACEMENT > 5 YRS OF AGE  10/28/2020     IR PORT CHECK RIGHT  3/26/2021     IR PORT CHECK RIGHT  10/21/2021     IR  PORT REPLACEMENT CATHETER ONLY RIGHT  12/17/2021     RESECT TUMOR LOWER EXTREMITY Right 3/12/2020    Procedure: Resection of right thigh tumor;  Surgeon: Guillermo Johnson MD;  Location: [facility] OR     ROTATOR CUFF REPAIR RT/LT  2012     SHOULDER SURGERY       SINUS SURGERY       THORACENTESIS N/A 11/5/2021    Procedure: Pleurodesis;  Surgeon: Elana Ngo MD;  Location: [facility] GI       Social History   I have reviewed this patient's social history and updated it with pertinent information if needed.  Social History     Tobacco Use     Smoking status: Never Smoker     Smokeless tobacco: Never Used   Substance Use Topics     Alcohol use: Yes     Comment: 2-3 glasses of wine/week     Drug use: Never       Family History   I have reviewed this patient's family history and updated it with pertinent information if needed.  Family History   Problem Relation Age of Onset     Lung Cancer Maternal Grandmother      Breast Cancer Paternal Grandmother        Prior to Admission Medications   Prior to Admission Medications   Prescriptions Last Dose Informant Patient Reported? Taking?   ADVAIR DISKUS 100-50 MCG/DOSE inhaler  Self Yes No   Sig: Inhale 1 puff into the lungs daily    HYDROmorphone (DILAUDID) 2 MG tablet   No No   Sig: Take 1-2 tablets (2-4 mg) by mouth every 3 hours as needed for severe pain (. Stop oxycodone completely. Do not take this with oxycodone.)   HYDROmorphone HCl ER (EXALGO) 16 MG 24 hour tablet   No No   Sig: Take 1 tablet (16 mg) by mouth every evening   NASAL SPRAY SALINE NA  Self Yes No   acetaminophen (TYLENOL) 325 MG tablet  Self Yes No   Sig: Take 325-650 mg by mouth every 6 hours as needed for mild pain   albuterol (PROAIR RESPICLICK) 108 (90 Base) MCG/ACT inhaler  Self Yes No   Sig: Inhale 1-2 puffs into the lungs every 6 hours as needed    celecoxib (CELEBREX) 200 MG capsule  Self No No   Sig: Take 1 capsule (200 mg) by mouth 2 times daily   cetirizine (ZYRTEC) 10 MG tablet  Self Yes No    Sig: Take 10 mg by mouth daily   diclofenac (VOLTAREN) 1 % topical gel  Self No No   Sig: Apply 4 g topically 4 times daily   gabapentin (NEURONTIN) 300 MG capsule  Self No No   Sig: Take 600 mg in the AM, 600 mg mid-day, and 900 mg at night   guaiFENesin-codeine (ROBITUSSIN AC) 100-10 MG/5ML solution  Self No No   Sig: Take 5-10 mLs by mouth every 4 hours as needed for cough   hydrOXYzine (ATARAX) 25 MG tablet   No No   Sig: Take 1-2 tablets (25-50 mg) by mouth 3 times daily as needed for anxiety   levothyroxine (SYNTHROID/LEVOTHROID) 50 MCG tablet  Self No No   Sig: Take 1 tablet (50 mcg) by mouth daily   melatonin (MELATONIN) 1 MG/ML LIQD liquid  Self Yes No   Sig: Take 5 mg by mouth At Bedtime    montelukast (SINGULAIR) 10 MG tablet  Self Yes No   Sig: Take 10 mg by mouth daily   multivitamin w/minerals (MULTI-VITAMIN) tablet  Self Yes No   Sig: Take 1 tablet by mouth daily   naloxone (NARCAN) 4 MG/0.1ML nasal spray  Self No No   Sig: Spray 1 spray (4 mg) into one nostril alternating nostrils once as needed for opioid reversal every 2-3 minutes until assistance arrives   nystatin (MYCOSTATIN) 511754 UNIT/ML suspension  Self No No   Sig: Take 5 mLs (500,000 Units) by mouth 4 times daily   ondansetron (ZOFRAN-ODT) 8 MG ODT tab   No No   Sig: Take 1 tablet (8 mg) by mouth every 8 hours as needed for nausea   pazopanib (VOTRIENT) 200 MG tablet   No No   Sig: Take 4 tablets (800 mg) by mouth daily Take on an empty stomach 1 hour before or 2 hours after a meal.   polyethylene glycol (MIRALAX) 17 GM/Dose powder  Self No No   Sig: Take 17 g by mouth daily   prochlorperazine (COMPAZINE) 10 MG tablet  Self No No   Sig: Take 1 tablet (10 mg) by mouth every 6 hours as needed for nausea or vomiting   triamcinolone (KENALOG) 0.1 % external cream  Self No No   Sig: Apply topically 2 times daily As needed for skin irritation.   vitamin D3 (CHOLECALCIFEROL) 2000 units (50 mcg) tablet  Self Yes No   Sig: Take 1 tablet by mouth  daily      Facility-Administered Medications: None     Allergies   Allergies   Allergen Reactions     Pentobarbital      resp arrest       Physical Exam   Vital Signs: Temp: 98.7  F (37.1  C) Temp src: Oral BP: 117/73 Pulse: 85   Resp: 18 SpO2: 97 % O2 Device: Nasal cannula Oxygen Delivery: 2 LPM  Weight: 158 lbs 4.64 oz    General Appearance: NAD  Eyes: EOMI  HEENT: Neck supple  Respiratory: Breathing comfortably on 2L oxygen  Cardiovascular: RRR  GI: Soft, NTND  Skin: NO rash on exposed areas  Musculoskeletal: No LE edema  Neurologic: Moving all ext  Psychiatric: Alert and oriented    Data   Data reviewed today: I reviewed all medications, new labs and imaging results over the last 24 hours. I personally reviewed.

## 2022-02-15 NOTE — PROGRESS NOTES
[facility]    Hospitalist Progress Note      Assessment & Plan   56 year old female admitted on 2/14/2022. 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      1.  Sepsis and acute hypoxic respiratory failure secondary to likely bacterial pneumonia, all are present on admission  This is the main problem that led to this admission.  Sepsis criteria include heart rate of 102 and white blood cell count of 3.3.  The patient qualifies for diagnosis of acute hypoxic respiratory failure secondary to needing oxygen at 2 to 3 L continuous saturation above 92% when at home the patient is not on any oxygen supplement.  The patient qualifies for diagnosis of bacterial pneumonia given pleuritic chest pain and abnormal CT chest imaging.  Procalcitonin is minimally elevated at 0.25 and CRP is significantly elevated at 280.     -Increased ceftriaxone to 2 g IV every 24 hours given sepsis with increased vasodilatation in order to improve the medication pharmacodynamics  -Continue doxycycline at 100 mg IV every 12 hours that was initiated by my colleague on admission  -Duration of antibiotics will be 7 days, stop date was placed in electronic medical records  -The patient will be discharged on oral antibiotics when she is medically ready for discharge  -Continue Breo Ellipta 1 puff daily  -Continue oxygen therapy, I plan to start weaning oxygen within the next 48 hours after allowing for therapeutic effect for antibiotics and anticoagulation as detailed below  -I will consider thoracentesis on the right side given slight increase in loculated right basilar pleural fluid collection if the patient oxygen requirements does not improve in the next 48 hours with the current therapy.    2.  Acute hypoxic respiratory failure secondary to provoked pulmonary embolus due  to port associated deep venous thrombus of right internal jugular vein, class IV pulmonary embolism severity index indicating high risk for mortality, all are present on admission  This is the second problem that led to this admission.  Although the patient had questionable pulmonary embolus on CT PE, the presence of internal jugular vein deep venous thrombus in the setting of Port-A-Cath and active malignancy will increase the pretest probability for the CT PE and decrease its negative predictive value.  Moreover, subsegmental pulmonary embolism with negative work-up for deep venous thrombus is associated with 5.7% of recurrent pulmonary embolism, unfortunately this is not the case for this patient who has active malignancy and deep venous thrombus.  -Started therapeutic enoxaparin at 1 mg/kg every 12 hours while inpatient  -Plan to start apixaban prior to discharge for duration of at least 3 months given the provoked nature of the venous thromboembolism    3.  Hypomagnesemia secondary to diarrhea, all are present on admission  This is the third problem that led to this admission.  I ordered C. difficile battery and enteric panel.  I also ordered magnesium replacement protocol.    4.  Acute anemia secondary to anemia of critical illness on top of pancytopenia secondary to malignancy, all are present on admission  The patient hemoglobin increased to a baseline of 8.9 after 1 unit of blood transfusion which effectively rules out continued bleeding or hemolysis as an etiology for the anemia.    5.  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  I highly appreciate input of oncology service.    6. Severe malnutrition in the context of acute on chronic illness, all are present on admission  We will follow recommendations of adult dietitian    DVT Prophylaxis: Enoxaparin (Lovenox) SQ  Code Status: No CPR- Pre-arrest intubation  OK  Expected Discharge:    Anticipated discharge location:  Awaiting care coordination huddle  Delays:         Qamar Monroy MD  Text Page (7am - 6pm, M-F)    Interval History   The patient reported chest pain that increases on deep inspiration.  The patient denied any shortness of breath or subjective fever.  Four-point review of systems is otherwise    -Data reviewed today: I reviewed all new labs and imaging results over the last 24 hours. I personally reviewed the chest CT image(s) showing remarkable cavitary lesions.    Physical Exam   Temp: 98.6  F (37  C) Temp src: Oral BP: 131/73 Pulse: 85   Resp: 18 SpO2: 96 % O2 Device: Nasal cannula Oxygen Delivery: 2 LPM  Vitals:    02/14/22 1121   Weight: 71.8 kg (158 lb 4.6 oz)     Vital Signs with Ranges  Temp:  [98.6  F (37  C)-99.7  F (37.6  C)] 98.6  F (37  C)  Pulse:  [] 85  Resp:  [16-20] 18  BP: (114-132)/(70-84) 131/73  SpO2:  [88 %-99 %] 96 %  I/O last 3 completed shifts:  In: 1300 [I.V.:700; IV Piggyback:600]  Out: 700 [Urine:700]    Constitutional: Awake, alert, cooperative, no apparent distress.  Eyes: Conjunctiva and pupils examined and normal.  HEENT: Moist mucous membranes, normal dentition.  Respiratory: Clear to auscultation bilaterally, no crackles or wheezing.  Cardiovascular: Regular rate and rhythm, normal S1 and S2, and no murmur noted.  GI: Soft, non-distended, non-tender, normal bowel sounds.  Lymph/Hematologic: No anterior cervical or supraclavicular adenopathy.  Skin: No rashes, no cyanosis, no edema.  Musculoskeletal: No joint swelling, erythema or tenderness.  Neurologic: Cranial nerves 2-12 intact, normal strength and sensation.  Psychiatric: Alert, oriented to person, place and time, no obvious anxiety or depression.    Medications       cefTRIAXone  2 g Intravenous Q24H     celecoxib  200 mg Oral BID     cetirizine  10 mg Oral Daily     doxycycline (VIBRAMYCIN) IV  100 mg Intravenous Q12H     enoxaparin ANTICOAGULANT  40 mg  Subcutaneous Q24H     fluticasone-vilanterol  1 puff Inhalation Daily     gabapentin  600 mg Oral BID     gabapentin  900 mg Oral At Bedtime     HYDROmorphone  4 mg Oral Q6H     levothyroxine  50 mcg Oral Daily     melatonin  5 mg Oral At Bedtime     montelukast  10 mg Oral Daily     nystatin  500,000 Units Oral 4x Daily     pazopanib  800 mg Oral Daily     polyethylene glycol  17 g Oral Daily     sodium chloride (PF)  3 mL Intracatheter Q8H     sodium chloride (PF)  3 mL Intracatheter Q8H       Data   Recent Labs   Lab 02/15/22  0539 02/14/22  1144 02/14/22  1142 02/14/22  1141   WBC 3.3*  --  4.1  --    HGB 8.9*  --  7.2*  --    *  --  111*  --      --  187  --    INR  --   --   --  1.25*    134  --   --    POTASSIUM 3.4 3.6  --   --    CHLORIDE 101 96  --   --    CO2 27 30  --   --    BUN 5* 6*  --   --    CR 0.47* 0.54  --   --    ANIONGAP 8 8  --   --    VIKTORIA 8.7 8.4*  --   --    * 88  --   --    ALBUMIN 1.5* 1.6*  --   --    PROTTOTAL 6.7* 6.9  --   --    BILITOTAL 0.7 0.6  --   --    ALKPHOS 103 110  --   --    ALT 14 16  --   --    AST 18 19  --   --        Recent Results (from the past 24 hour(s))   XR Chest 2 Views    Narrative    Chest 2 views    INDICATION: Increased pain and shortness of breath, rule out recurrent  pneumothorax, for metastatic sarcoma    COMPARISON: Correlation with chest CT 1/14/2022 in comparison with  most recent plain film 11/16/2021    FINDINGS: Heart size appears normal. Increased opacity in the right  upper lung field with more completely opaque appearance than on the  recent CT. Other pulmonary nodules, including cavitary nodules, again  noted. Continued left costophrenic angle blunting. Cholecystectomy  clips in the right upper abdomen. Right costophrenic angle blunting  again noted.      Impression    IMPRESSION: Increased fluid/necrotic density in the right upper lung.  Slight increased right pleural effusion. Other cavitary pulmonary  nodules  redemonstrated from CT last month. Small left pleural effusion  unchanged.    SHIRLEY INMAN MD         SYSTEM ID:  [id number]   CT Chest Pulmonary Embolism w Contrast    Narrative    EXAM: CTA pulmonary angiogram, 2/14/2022    HISTORY: PE suspected, high prob; sarcoma mets to chest with fever;  r/o PE, r/o pneumonia    COMPARISON: CT 1/14/2022.    TECHNIQUE: Volumetric CT images obtained through the chest with  contrast. Coronal and axial MIP reformatted images obtained.  Three-dimensional (3D) post-processed angiographic images were  reconstructed, archived to PACS and used in interpretation of this  study.     CONTRAST: iopamidol (ISOVUE-370) solution 57 mL ml isovue 370 IV.    FINDINGS:     Vascular: There is good contrast opacification of the pulmonary  arterial vasculature. Question small central filling defects in the  right middle lobe lateral segment (series 7 image 167) and medial  segment (series 7 image 172). The right upper lobe pulmonary arteries  are compressed without definite intraluminal thrombus. Main pulmonary  artery is normal caliber. Heart is normal size without pericardial  effusion. No evidence of right heart strain. Nonaneurysmal thoracic  aorta with increased vessels demonstrating normal branching pattern.     Remaining Chest: Right chest wall Port-A-Cath tip is near the superior  cavoatrial junction. Large multiloculated pleural collection with  enhancing rind with near complete compression of the right upper lobe  and significant compression of the right lower lobe, overall not  significantly changed in size from 1/14/2022 though gas content has  decreased. Cystic/cavitary lesion in the right lower lobe parenchyma  is also seen similar size with decreased gas content. Adjacent 4.3 x  2.7 cm lesion in the left lower lobe pulmonary parenchyma is also  similar size (series 7 image 229).    There are a few new small cystic lesions within and adjacent to the  right major/minor fissures.  Aerated portions of the lung are otherwise  not significantly changed. No significant pneumothorax. Central  tracheobronchial tree is patent with anterior deviation of the trachea  due to mass effect from collections above.     There are multiple enlarged lymph nodes largest being a retrosternal  node measuring 1.9 x 1.2 cm.    Upper Abdomen: No acute abnormality. Accessory splenule.    Bones/Soft Tissues: No acute or suspicious osseous abnormality.      Impression    IMPRESSION:     1. Question small subsegmental thrombi versus contrast mixing artifact  in the right middle lobe pulmonary arteries. Remaining pulmonary  arterial tree is clear. No evidence of right heart strain.    2. Extensive cystic/cavitary lesions in the chest, similar in  size/extent compared to 1/14/2022 with increased fluid component.    3. Minimal change in large necrotic pleural collection with enhancing  rind compressing right upper lobe and displacing the trachea    I have personally reviewed the examination and initial interpretation  and I agree with the findings.    SHIRLEY INMAN MD         SYSTEM ID:  [id number]

## 2022-02-15 NOTE — PROGRESS NOTES
"   02/15/22 1313   Quick Adds   Type of Visit Initial PT Evaluation       Present no   Living Environment   People in home spouse   Current Living Arrangements apartment   Home Accessibility no concerns   Transportation Anticipated family or friend will provide   Living Environment Comments Pt lives in single story apartment with . Pt reports walk in shower with shower chair. Pt owns FWW, but has not needed to use in the home.    Self-Care   Usual Activity Tolerance moderate   Current Activity Tolerance fair   Regular Exercise No   Equipment Currently Used at Home shower chair;walker, rolling  (electric scooter)   Activity/Exercise/Self-Care Comment Pt ind at baseline with mobility in the home without use of AD. Uses electric scooter when out in community. Ind with ADL's.  performs IADL's. Has FWW but does not currently use.    Disability/Function   Hearing Difficulty or Deaf no   Wear Glasses or Blind yes   Vision Management glasses   Concentrating, Remembering or Making Decisions Difficulty yes   Difficulty Communicating yes   Difficulty Eating/Swallowing no   Walking or Climbing Stairs Difficulty no   Mobility Management for household distances   Dressing/Bathing Difficulty no   Toileting issues no   Doing Errands Independently Difficulty (such as shopping) yes   Errands Management  assists   Fall history within last six months no  (but does report one fall in last year)   Change in Functional Status Since Onset of Current Illness/Injury yes   General Information   Onset of Illness/Injury or Date of Surgery 02/14/22   Referring Physician Qamar Monroy MD   Patient/Family Therapy Goals Statement (PT) to return home   Pertinent History of Current Problem (include personal factors and/or comorbidities that impact the POC) Per EMR: \"Richa Ashby is a 56 year old female admitted on 2/14/2022. She has a hx of Sarcoma diagnosed in 2019, that has metastasized to the " "lung with R sided hydropneumohroax, s/p talc pleuradesis, who presents from clinic today due to hypoxia.\"   Existing Precautions/Restrictions fall;oxygen therapy device and L/min  (2L NC)   General Observations Activity: Ambulate with assist   Cognition   Orientation Status (Cognition) oriented x 4   Affect/Mental Status (Cognition) WFL   Follows Commands (Cognition) WFL   Pain Assessment   Patient Currently in Pain No   Integumentary/Edema   Integumentary/Edema no deficits were identifed   Posture    Posture Forward head position;Protracted shoulders   Range of Motion (ROM)   ROM Comment B LE grossly WFL; L LE \"stiff\" compared to R LE   Strength   Strength Comments B LE grossly 3+/5; functional weakness observed   Bed Mobility   Comment (Bed Mobility) sup to sit with SBA, HOB elevated   Transfers   Transfer Safety Comments sit <> stand without AD with CGA; with FWW, performs with SBA-CGA   Gait/Stairs (Locomotion)   Comment (Gait/Stairs) Pt ambulates in room without AD and use of IV pole and CGA. Pt demos slowed gait speed, flexed forward posture, short steps bilaterally, and general fatigue. Pt reports feeling \"off balance\".    Balance   Balance Comments Pt demos good seated balance at EOB with mod I. Unsteadiness noted in standing, used IV pole or FWW for stability and SBA-CGA.    Sensory Examination   Sensory Perception Comments Pt reports numbness/tingling in B feet, starting within the past day. Light touch intact.   Clinical Impression   Criteria for Skilled Therapeutic Intervention yes, treatment indicated   PT Diagnosis (PT) impaired functional mobility   Influenced by the following impairments decreased endurance/activity tolerance, weakness, impaired balance   Functional limitations due to impairments transfers, gait   Clinical Presentation Stable/Uncomplicated   Clinical Presentation Rationale clinical judgement   Clinical Decision Making (Complexity) low complexity   Therapy Frequency (PT) 5x/week "   Predicted Duration of Therapy Intervention (days/wks) 1-2 weeks   Planned Therapy Interventions (PT) balance training;bed mobility training;gait training;neuromuscular re-education;patient/family education;stair training;strengthening;transfer training;progressive activity/exercise   Risk & Benefits of therapy have been explained evaluation/treatment results reviewed;care plan/treatment goals reviewed   Clinical Impression Comments Pt presenting with the aformentioned impairments that are impacting safety and indepedence with functional mobility tasks. Pt to benefit from skilled inpatient PT in order to address impairments and progress towards PLOF.   PT Discharge Planning    PT Discharge Recommendation (DC Rec) home with assist;home with home care physical therapy   PT Rationale for DC Rec Pt below functinal mobility baseline, currently limited in activity tolerance secondary to fatigue. Anticpate through LOS and participation in therapies that pt will progress to d/c home with spouse assist. May benefit from HHPT or OP PT upon discharge. Will continue to assess and update recommendations through LOS.    PT Brief overview of current status  Ax1 with FWW or IV pole and gait belt   Total Evaluation Time   Total Evaluation Time (Minutes) 10

## 2022-02-15 NOTE — PLAN OF CARE
SHIFT: [id number]    Cardio: Denied chest pain  Resp: Breathing comfortably, no resp distress, on O2 at 2L  Neuro: AOX4  GI/: Had 2 loose BM, improved: no dysuria noted  Pain: c/o back pain: with dilaudid PRN meds and scheduled  VS: /73   Pulse 85   Temp 98.6  F (37  C)   Resp 18   Wt 71.8 kg (158 lb 4.6 oz)   LMP 03/19/2019   SpO2 96%   BMI 24.79 kg/m

## 2022-02-15 NOTE — PROGRESS NOTES
[id number] progress note. Care from: 11:00 - 15:00     /82 (BP Location: Right arm, Patient Position: Sitting)   Pulse 83   Temp 97.7  F (36.5  C) (Oral)   Resp 18   Wt 72.5 kg (159 lb 14.4 oz)   LMP 03/19/2019   SpO2 98%   BMI 25.04 kg/m         Vitals: VSS     Neuro: WDL   o Pain: Pt has chronic pain, currently acceptably controlled on current regimen. Rec'd order for ER dilaudid to start this evening   o Mood/Behavior: Calm, cooperative     Activity: Up with standby assist, ambulating in ochoa and in room     Cardiovascular: WDL ex identified clot in internal jugular vein. Rec'd order for lovenox     Respiratory: SpO2 stable on 2-3 LPM O2 via NC. Pt has dyspnea on exertion, shortness of breath     GI & Nutrition: Regular diet, fair appetite   o Nausea: Denies   o Bowel Movement: Last BM yesterday. Ruling out C diff and enteric panel     : Urinary frequency. Known UTI. Urine is light yellow.     Skin, Wounds & LDAs: No skin deficits. Port accessed, infusing     Notable Labs: Low Mg this AM, replacement in process     Events & Plan Updates: Continuing on IV abx for presumed CAP. Pt has two home meds approved by pharmacy for hospital use (oral chemo and dilaudid ER).

## 2022-02-16 LAB
ALBUMIN SERPL-MCNC: 1.4 G/DL (ref 3.4–5)
ALP SERPL-CCNC: 93 U/L (ref 40–150)
ALT SERPL W P-5'-P-CCNC: 13 U/L (ref 0–50)
ANION GAP SERPL CALCULATED.3IONS-SCNC: 5 MMOL/L (ref 3–14)
AST SERPL W P-5'-P-CCNC: 16 U/L (ref 0–45)
BASOPHILS # BLD MANUAL: 0 10E3/UL (ref 0–0.2)
BASOPHILS NFR BLD MANUAL: 0 %
BILIRUB SERPL-MCNC: 0.4 MG/DL (ref 0.2–1.3)
BUN SERPL-MCNC: 4 MG/DL (ref 7–30)
CALCIUM SERPL-MCNC: 8.4 MG/DL (ref 8.5–10.1)
CHLORIDE BLD-SCNC: 102 MMOL/L (ref 94–109)
CO2 SERPL-SCNC: 29 MMOL/L (ref 20–32)
CREAT SERPL-MCNC: 0.35 MG/DL (ref 0.52–1.04)
CRP SERPL-MCNC: 190 MG/L (ref 0–8)
EOSINOPHIL # BLD MANUAL: 0.1 10E3/UL (ref 0–0.7)
EOSINOPHIL NFR BLD MANUAL: 4 %
ERYTHROCYTE [DISTWIDTH] IN BLOOD BY AUTOMATED COUNT: 20.4 % (ref 10–15)
GFR SERPL CREATININE-BSD FRML MDRD: >90 ML/MIN/1.73M2
GLUCOSE BLD-MCNC: 89 MG/DL (ref 70–99)
HCT VFR BLD AUTO: 29.1 % (ref 35–47)
HGB BLD-MCNC: 8.8 G/DL (ref 11.7–15.7)
HOLD SPECIMEN: NORMAL
LYMPHOCYTES # BLD MANUAL: 0.7 10E3/UL (ref 0.8–5.3)
LYMPHOCYTES NFR BLD MANUAL: 24 %
MAGNESIUM SERPL-MCNC: 2 MG/DL (ref 1.8–2.6)
MCH RBC QN AUTO: 32.6 PG (ref 26.5–33)
MCHC RBC AUTO-ENTMCNC: 30.2 G/DL (ref 31.5–36.5)
MCV RBC AUTO: 108 FL (ref 78–100)
MONOCYTES # BLD MANUAL: 0.2 10E3/UL (ref 0–1.3)
MONOCYTES NFR BLD MANUAL: 6 %
NEUTROPHILS # BLD MANUAL: 2 10E3/UL (ref 1.6–8.3)
NEUTROPHILS NFR BLD MANUAL: 66 %
NRBC # BLD AUTO: 0.1 10E3/UL
NRBC BLD MANUAL-RTO: 3 %
PLAT MORPH BLD: ABNORMAL
PLATELET # BLD AUTO: 173 10E3/UL (ref 150–450)
POTASSIUM BLD-SCNC: 3.4 MMOL/L (ref 3.4–5.3)
POTASSIUM BLD-SCNC: 3.6 MMOL/L (ref 3.4–5.3)
PROT SERPL-MCNC: 6.4 G/DL (ref 6.8–8.8)
RBC # BLD AUTO: 2.7 10E6/UL (ref 3.8–5.2)
RBC MORPH BLD: ABNORMAL
SODIUM SERPL-SCNC: 136 MMOL/L (ref 133–144)
WBC # BLD AUTO: 3 10E3/UL (ref 4–11)

## 2022-02-16 PROCEDURE — 250N000013 HC RX MED GY IP 250 OP 250 PS 637: Performed by: STUDENT IN AN ORGANIZED HEALTH CARE EDUCATION/TRAINING PROGRAM

## 2022-02-16 PROCEDURE — 99233 SBSQ HOSP IP/OBS HIGH 50: CPT | Performed by: INTERNAL MEDICINE

## 2022-02-16 PROCEDURE — 83735 ASSAY OF MAGNESIUM: CPT | Performed by: INTERNAL MEDICINE

## 2022-02-16 PROCEDURE — 250N000011 HC RX IP 250 OP 636: Performed by: INTERNAL MEDICINE

## 2022-02-16 PROCEDURE — 258N000003 HC RX IP 258 OP 636: Performed by: INTERNAL MEDICINE

## 2022-02-16 PROCEDURE — 94640 AIRWAY INHALATION TREATMENT: CPT

## 2022-02-16 PROCEDURE — 86140 C-REACTIVE PROTEIN: CPT | Performed by: STUDENT IN AN ORGANIZED HEALTH CARE EDUCATION/TRAINING PROGRAM

## 2022-02-16 PROCEDURE — 250N000009 HC RX 250: Performed by: INTERNAL MEDICINE

## 2022-02-16 PROCEDURE — 85027 COMPLETE CBC AUTOMATED: CPT | Performed by: INTERNAL MEDICINE

## 2022-02-16 PROCEDURE — 250N000013 HC RX MED GY IP 250 OP 250 PS 637: Performed by: INTERNAL MEDICINE

## 2022-02-16 PROCEDURE — 36415 COLL VENOUS BLD VENIPUNCTURE: CPT | Performed by: INTERNAL MEDICINE

## 2022-02-16 PROCEDURE — 250N000011 HC RX IP 250 OP 636: Performed by: STUDENT IN AN ORGANIZED HEALTH CARE EDUCATION/TRAINING PROGRAM

## 2022-02-16 PROCEDURE — 120N000005 HC R&B MS OVERFLOW UMMC

## 2022-02-16 PROCEDURE — 84132 ASSAY OF SERUM POTASSIUM: CPT | Performed by: INTERNAL MEDICINE

## 2022-02-16 PROCEDURE — 80053 COMPREHEN METABOLIC PANEL: CPT | Performed by: STUDENT IN AN ORGANIZED HEALTH CARE EDUCATION/TRAINING PROGRAM

## 2022-02-16 PROCEDURE — 999N000128 HC STATISTIC PERIPHERAL IV START W/O US GUIDANCE

## 2022-02-16 PROCEDURE — 99207 PR CDG-CUT & PASTE-POTENTIAL IMPACT ON LEVEL: CPT | Performed by: INTERNAL MEDICINE

## 2022-02-16 RX ORDER — ACETAMINOPHEN 325 MG/1
975 TABLET ORAL EVERY 8 HOURS PRN
Status: DISCONTINUED | OUTPATIENT
Start: 2022-02-16 | End: 2022-02-20 | Stop reason: HOSPADM

## 2022-02-16 RX ORDER — ALBUTEROL SULFATE 90 UG/1
1-2 AEROSOL, METERED RESPIRATORY (INHALATION) EVERY 6 HOURS PRN
Status: DISCONTINUED | OUTPATIENT
Start: 2022-02-16 | End: 2022-02-20 | Stop reason: HOSPADM

## 2022-02-16 RX ORDER — FUROSEMIDE 10 MG/ML
20 INJECTION INTRAMUSCULAR; INTRAVENOUS ONCE
Status: COMPLETED | OUTPATIENT
Start: 2022-02-16 | End: 2022-02-16

## 2022-02-16 RX ORDER — POTASSIUM CHLORIDE 7.45 MG/ML
10 INJECTION INTRAVENOUS
Status: COMPLETED | OUTPATIENT
Start: 2022-02-16 | End: 2022-02-16

## 2022-02-16 RX ORDER — HYDROMORPHONE HYDROCHLORIDE 2 MG/1
2 TABLET ORAL
Status: DISCONTINUED | OUTPATIENT
Start: 2022-02-16 | End: 2022-02-20 | Stop reason: HOSPADM

## 2022-02-16 RX ORDER — HYDROXYZINE HYDROCHLORIDE 25 MG/1
25 TABLET, FILM COATED ORAL 2 TIMES DAILY
Status: DISCONTINUED | OUTPATIENT
Start: 2022-02-16 | End: 2022-02-18

## 2022-02-16 RX ORDER — ALBUTEROL SULFATE 0.83 MG/ML
2.5 SOLUTION RESPIRATORY (INHALATION)
Status: DISCONTINUED | OUTPATIENT
Start: 2022-02-16 | End: 2022-02-20 | Stop reason: HOSPADM

## 2022-02-16 RX ORDER — MAGNESIUM OXIDE 400 MG/1
400 TABLET ORAL 2 TIMES DAILY
Status: COMPLETED | OUTPATIENT
Start: 2022-02-16 | End: 2022-02-17

## 2022-02-16 RX ORDER — ONDANSETRON 4 MG/1
4 TABLET, ORALLY DISINTEGRATING ORAL DAILY
Status: DISCONTINUED | OUTPATIENT
Start: 2022-02-16 | End: 2022-02-20 | Stop reason: HOSPADM

## 2022-02-16 RX ORDER — LEVOFLOXACIN 5 MG/ML
750 INJECTION, SOLUTION INTRAVENOUS EVERY 24 HOURS
Status: DISCONTINUED | OUTPATIENT
Start: 2022-02-16 | End: 2022-02-20 | Stop reason: HOSPADM

## 2022-02-16 RX ORDER — PROCHLORPERAZINE MALEATE 5 MG
5 TABLET ORAL DAILY
Status: DISCONTINUED | OUTPATIENT
Start: 2022-02-16 | End: 2022-02-20 | Stop reason: HOSPADM

## 2022-02-16 RX ADMIN — GABAPENTIN 900 MG: 300 CAPSULE ORAL at 21:21

## 2022-02-16 RX ADMIN — ENOXAPARIN SODIUM 70 MG: 80 INJECTION SUBCUTANEOUS at 13:15

## 2022-02-16 RX ADMIN — POTASSIUM CHLORIDE 10 MEQ: 7.46 INJECTION, SOLUTION INTRAVENOUS at 09:55

## 2022-02-16 RX ADMIN — ONDANSETRON 8 MG: 8 TABLET, ORALLY DISINTEGRATING ORAL at 07:19

## 2022-02-16 RX ADMIN — CELECOXIB 200 MG: 200 CAPSULE ORAL at 07:06

## 2022-02-16 RX ADMIN — HYDROMORPHONE HYDROCHLORIDE 4 MG: 2 TABLET ORAL at 01:04

## 2022-02-16 RX ADMIN — SODIUM CHLORIDE, POTASSIUM CHLORIDE, SODIUM LACTATE AND CALCIUM CHLORIDE 1000 ML: 600; 310; 30; 20 INJECTION, SOLUTION INTRAVENOUS at 07:21

## 2022-02-16 RX ADMIN — FUROSEMIDE 20 MG: 10 INJECTION, SOLUTION INTRAVENOUS at 13:38

## 2022-02-16 RX ADMIN — ACETAMINOPHEN 650 MG: 325 TABLET, FILM COATED ORAL at 07:23

## 2022-02-16 RX ADMIN — NYSTATIN 500000 UNITS: 100000 SUSPENSION ORAL at 20:15

## 2022-02-16 RX ADMIN — CETIRIZINE HYDROCHLORIDE 10 MG: 10 TABLET, FILM COATED ORAL at 07:58

## 2022-02-16 RX ADMIN — GABAPENTIN 600 MG: 300 CAPSULE ORAL at 07:53

## 2022-02-16 RX ADMIN — ENOXAPARIN SODIUM 70 MG: 80 INJECTION SUBCUTANEOUS at 04:15

## 2022-02-16 RX ADMIN — NYSTATIN 500000 UNITS: 100000 SUSPENSION ORAL at 07:53

## 2022-02-16 RX ADMIN — ALBUTEROL SULFATE 2 PUFF: 90 AEROSOL, METERED RESPIRATORY (INHALATION) at 07:36

## 2022-02-16 RX ADMIN — NYSTATIN 500000 UNITS: 100000 SUSPENSION ORAL at 11:03

## 2022-02-16 RX ADMIN — POTASSIUM CHLORIDE 10 MEQ: 7.46 INJECTION, SOLUTION INTRAVENOUS at 07:51

## 2022-02-16 RX ADMIN — PROCHLORPERAZINE MALEATE 5 MG: 5 TABLET ORAL at 20:15

## 2022-02-16 RX ADMIN — PAZOPANIB 800 MG: 200 TABLET, FILM COATED ORAL at 21:16

## 2022-02-16 RX ADMIN — Medication 5 MG: at 21:22

## 2022-02-16 RX ADMIN — ALBUTEROL SULFATE 2.5 MG: 2.5 SOLUTION RESPIRATORY (INHALATION) at 14:14

## 2022-02-16 RX ADMIN — HYDROXYZINE HYDROCHLORIDE 25 MG: 25 TABLET, FILM COATED ORAL at 09:58

## 2022-02-16 RX ADMIN — FLUTICASONE FUROATE AND VILANTEROL TRIFENATATE 1 PUFF: 100; 25 POWDER RESPIRATORY (INHALATION) at 07:58

## 2022-02-16 RX ADMIN — Medication 400 MG: at 20:15

## 2022-02-16 RX ADMIN — MONTELUKAST 10 MG: 10 TABLET, FILM COATED ORAL at 09:58

## 2022-02-16 RX ADMIN — HYDROMORPHONE HYDROCHLORIDE 2 MG: 2 TABLET ORAL at 18:12

## 2022-02-16 RX ADMIN — HYDROMORPHONE HYDROCHLORIDE 2 MG: 2 TABLET ORAL at 11:26

## 2022-02-16 RX ADMIN — GABAPENTIN 600 MG: 300 CAPSULE ORAL at 13:02

## 2022-02-16 RX ADMIN — POTASSIUM CHLORIDE 10 MEQ: 7.46 INJECTION, SOLUTION INTRAVENOUS at 11:03

## 2022-02-16 RX ADMIN — HYDROMORPHONE HYDROCHLORIDE 2 MG: 2 TABLET ORAL at 14:56

## 2022-02-16 RX ADMIN — LEVOFLOXACIN 750 MG: 5 INJECTION, SOLUTION INTRAVENOUS at 13:02

## 2022-02-16 RX ADMIN — POTASSIUM CHLORIDE 10 MEQ: 7.46 INJECTION, SOLUTION INTRAVENOUS at 08:54

## 2022-02-16 RX ADMIN — Medication 400 MG: at 13:03

## 2022-02-16 RX ADMIN — CELECOXIB 200 MG: 200 CAPSULE ORAL at 20:15

## 2022-02-16 RX ADMIN — NYSTATIN 500000 UNITS: 100000 SUSPENSION ORAL at 16:31

## 2022-02-16 RX ADMIN — LEVOTHYROXINE SODIUM 75 MCG: 75 TABLET ORAL at 07:53

## 2022-02-16 RX ADMIN — HYDROXYZINE HYDROCHLORIDE 25 MG: 25 TABLET ORAL at 14:54

## 2022-02-16 RX ADMIN — HYDROMORPHONE HYDROCHLORIDE 2 MG: 2 TABLET ORAL at 21:21

## 2022-02-16 RX ADMIN — ONDANSETRON 8 MG: 8 TABLET, ORALLY DISINTEGRATING ORAL at 18:55

## 2022-02-16 RX ADMIN — ALBUTEROL SULFATE 2 PUFF: 90 AEROSOL, METERED RESPIRATORY (INHALATION) at 21:19

## 2022-02-16 RX ADMIN — HYDROMORPHONE HYDROCHLORIDE 4 MG: 2 TABLET ORAL at 07:58

## 2022-02-16 RX ADMIN — HYDROMORPHONE HYDROCHLORIDE 4 MG: 2 TABLET ORAL at 04:30

## 2022-02-16 RX ADMIN — HYDROMORPHONE HYDROCHLORIDE 16 MG: 16 TABLET, EXTENDED RELEASE ORAL at 20:15

## 2022-02-16 ASSESSMENT — ACTIVITIES OF DAILY LIVING (ADL)
ADLS_ACUITY_SCORE: 13
ADLS_ACUITY_SCORE: 12
ADLS_ACUITY_SCORE: 13
ADLS_ACUITY_SCORE: 15
ADLS_ACUITY_SCORE: 13
ADLS_ACUITY_SCORE: 15
ADLS_ACUITY_SCORE: 13
ADLS_ACUITY_SCORE: 12
ADLS_ACUITY_SCORE: 13
ADLS_ACUITY_SCORE: 13
ADLS_ACUITY_SCORE: 15
ADLS_ACUITY_SCORE: 13
ADLS_ACUITY_SCORE: 15
ADLS_ACUITY_SCORE: 13

## 2022-02-16 NOTE — PLAN OF CARE
[id number]:   /65   Pulse 88   Temp 97.6  F (36.4  C) (Oral)   Resp 18   Wt 73.3 kg (161 lb 11.2 oz)   LMP 03/19/2019   SpO2 98%   BMI 25.33 kg/m    VSS, pt afebrile on 2L NC sating 98%. Pt reporting dyspnea, LS diminished/coarse in bases. Pt reporting pain in right shoulder and back; scheduled po dilaudid given with some improvement in pain. Pt voiding with 800 mL output. Pt having poor appetite. Pt experienced episode of nausea/dry heaving with small amt of emesis, PRN ODT zofran given. Nausea triggered by smell of dinner. Nystatin given for thrush prevention. Port infusing TKO, patent and intact. K recheck 3.6. Pt up with astX1.  at bedside, supportive with cares. Pt was scheduled for outpatient palliative visit 2/16, could be beneficial to consult palliative for symptom management while inpatient. Continue POC.

## 2022-02-16 NOTE — PROGRESS NOTES
Pt c/o headache after this writer wrote her notes. She was given her AM Celebrex early then he threw up normal bile secretion. She opted to take Tylenol. ODT Zofran given. Albuterol inhaler given for wheezes. Continue to monitor pt and notify team with any changes.

## 2022-02-16 NOTE — PLAN OF CARE
Goal Outcome Evaluation:        Blood pressure 121/82, pulse 85, temperature 97.7  F (36.5  C), temperature source Axillary, resp. rate 16, weight 72.5 kg (159 lb 14.4 oz), last menstrual period 03/19/2019, SpO2 92 %, not currently breastfeeding.    AVSS as recorded above Pt is A/O x 4. She c/o SOB at rest and 02 applied 2 L NC. Pt was wheezing and waiting for pharmacy to send inhaler. She seems weak and uses call light appropriately for assist to use the bathroom. Pt is voiding good and no stool noted/reported this shift. Pt got po chemo, Pazopanib after premedication compazine given. She has LR infusing at 75 ML/hour. Lovenox subcutaneous given in her right lower abdomen this morning. She needs electrolyte replacement order and text sent to provider on call to order replacement. Pt will be getting her po chemo and potassium replacement when order is in place. Continue to monitor pt and follow plan of care.      Problem: Adult Inpatient Plan of Care  Goal: Optimal Comfort and Wellbeing  Outcome: Ongoing, Progressing     Problem: Infection  Goal: Absence of Infection Signs and Symptoms  Outcome: Ongoing, Progressing     Problem: Fluid Imbalance (Pneumonia)  Goal: Fluid Balance  Outcome: Ongoing, Progressing     Problem: Infection (Pneumonia)  Goal: Resolution of Infection Signs and Symptoms  Outcome: Ongoing, Progressing     Problem: Respiratory Compromise (Pneumonia)  Goal: Effective Oxygenation and Ventilation  Outcome: Ongoing, Progressing  Intervention: Promote Airway Secretion Clearance  Recent Flowsheet Documentation  Taken 2/16/2022 0521 by Richa Bach RN  Cough And Deep Breathing: done with encouragement  Taken 2/15/2022 2008 by Richa Bach RN  Cough And Deep Breathing: done with encouragement  Intervention: Optimize Oxygenation and Ventilation  Recent Flowsheet Documentation  Taken 2/16/2022 0521 by Richa Bach RN  Head of Bed (HOB) Positioning: HOB at 20 degrees  Taken 2/15/2022 2008  by Richa Bach, RN  Head of Bed (HOB) Positioning: HOB at 20 degrees

## 2022-02-16 NOTE — PROGRESS NOTES
[facility]    Hospitalist Progress Note      Assessment & Plan   56 year old female admitted on 2/14/2022. 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      1.  Sepsis and acute hypoxic respiratory failure secondary to likely bacterial pneumonia complicated by reactive airway disease, all are present on admission  This is the main problem that led to this admission.  Sepsis criteria include heart rate of 102 and white blood cell count of 3.3.  The patient qualifies for diagnosis of acute hypoxic respiratory failure secondary to needing oxygen at 2 to 3 L continuous saturation above 92% when at home the patient is not on any oxygen supplement.  The patient qualifies for diagnosis of bacterial pneumonia given pleuritic chest pain and abnormal CT chest imaging.  Procalcitonin is minimally elevated at 0.25 and CRP is significantly elevated at 280.  The patient received ceftriaxone and doxycycline between 2/14/2022 and 2/15/2022.    The patient reported wheezing during this admission.    -Started levofloxacin for coverage of both community-acquired pneumonia and Klebsiella pneumonia possible urinary tract infection  -Duration of antibiotics will be 10 days, stop date was placed in electronic medical records  -The patient will be discharged on oral antibiotics when she is medically ready for discharge  -Started albuterol nebulizer every 6 hours along with albuterol as needed  -Continue Breo Ellipta 1 puff daily  -Continue oxygen therapy, I plan to start weaning oxygen within the next 48 hours after allowing for therapeutic effect for antibiotics and anticoagulation as detailed below    2.  Acute hypoxic respiratory failure secondary to provoked pulmonary embolus due to port associated deep venous thrombus of right internal jugular vein, class IV  pulmonary embolism severity index indicating high risk for mortality, all are present on admission  This is the second problem that led to this admission.  Although the patient had questionable pulmonary embolus on CT PE, the presence of internal jugular vein deep venous thrombus in the setting of Port-A-Cath and active malignancy will increase the pretest probability for the CT PE and decrease its negative predictive value.  Moreover, subsegmental pulmonary embolism with negative work-up for deep venous thrombus is associated with 5.7% of recurrent pulmonary embolism, unfortunately this is not the case for this patient who has active malignancy and deep venous thrombus.  -Continue therapeutic enoxaparin at 1 mg/kg every 12 hours while inpatient  -Plan to start apixaban prior to discharge for duration of at least 3 months given the provoked nature of the venous thromboembolism    3.  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  I adjusted the dosage and frequency of narcotics for this patient based on her home regimen after verifying with the records of the pharmacy and with her .  I also adjusted the dosage and the frequency of antiemetics.  I highly appreciate input of oncology service.  I consulted with palliative care service for help addressing the symptoms burden.    4.  Hypomagnesemia secondary to diarrhea, all are present on admission  This is the third problem that led to this admission.  I ordered C. difficile battery and enteric panel.  I also ordered magnesium replacement protocol.    5.  Acute anemia secondary to anemia of critical illness on top of pancytopenia secondary to malignancy, all are present on admission  The patient hemoglobin increased to a baseline of 8.9 after 1 unit of blood transfusion which effectively rules out continued bleeding or hemolysis as an etiology for the anemia.    6. Severe  malnutrition in the context of acute on chronic illness, all are present on admission  We will follow recommendations of adult dietitian    DVT Prophylaxis: Enoxaparin (Lovenox) SQ  Code Status: No CPR- Pre-arrest intubation OK  Expected Discharge:    Anticipated discharge location:  Awaiting care coordination huddle  Delays:         Qamar Monroy MD  Text Page (7am - 6pm, M-F)    Interval History   The patient reported wheezing.  The patient denied any active chest pain, shortness of breath, or subjective fever.  Four-point review of systems is otherwise    -Data reviewed today: I reviewed all new labs and imaging results over the last 24 hours. I personally reviewed no images or EKG's today.    Physical Exam   Temp: 97.6  F (36.4  C) Temp src: Oral BP: 127/87 Pulse: 84   Resp: 16 SpO2: 98 % O2 Device: Nasal cannula Oxygen Delivery: 2 LPM  Vitals:    02/14/22 1121 02/15/22 1121 02/16/22 0745   Weight: 71.8 kg (158 lb 4.6 oz) 72.5 kg (159 lb 14.4 oz) 73.3 kg (161 lb 11.2 oz)     Vital Signs with Ranges  Temp:  [97.4  F (36.3  C)-98.2  F (36.8  C)] 97.6  F (36.4  C)  Pulse:  [82-89] 84  Resp:  [16-18] 16  BP: (121-141)/(73-87) 127/87  SpO2:  [92 %-98 %] 98 %  I/O last 3 completed shifts:  In: 925 [I.V.:925]  Out: 775 [Urine:775]    Constitutional: Awake, alert, cooperative, no apparent distress.  Eyes: Conjunctiva and pupils examined and normal.  HEENT: Moist mucous membranes, normal dentition.  Respiratory: Clear to auscultation bilaterally, no crackles or wheezing.  Cardiovascular: Regular rate and rhythm, normal S1 and S2, and no murmur noted.  GI: Soft, non-distended, non-tender, normal bowel sounds.  Lymph/Hematologic: No anterior cervical or supraclavicular adenopathy.  Skin: No rashes, no cyanosis, no edema.  Musculoskeletal: No joint swelling, erythema or tenderness.  Neurologic: Cranial nerves 2-12 intact, normal strength and sensation.  Psychiatric: Alert, oriented to person, place and time, no obvious  anxiety or depression.    Medications     lactated ringers 1,000 mL (02/16/22 0721)       albuterol  2.5 mg Nebulization Q6H     celecoxib  200 mg Oral BID     cetirizine  10 mg Oral Daily     enoxaparin ANTICOAGULANT  1 mg/kg Subcutaneous Q12H     fluticasone-vilanterol  1 puff Inhalation Daily     gabapentin  600 mg Oral BID     gabapentin  900 mg Oral At Bedtime     HYDROmorphone HCl ER  16 mg Oral QPM     levofloxacin  750 mg Intravenous Q24H     levothyroxine  75 mcg Oral Daily     melatonin  5 mg Oral At Bedtime     montelukast  10 mg Oral Daily     nystatin  500,000 Units Oral 4x Daily     pazopanib  800 mg Oral Daily     potassium chloride  10 mEq Intravenous Q1H     sodium chloride (PF)  3 mL Intracatheter Q8H     sodium chloride (PF)  3 mL Intracatheter Q8H       Data   Recent Labs   Lab 02/16/22  0425 02/15/22  0539 02/14/22  1144 02/14/22  1144 02/14/22  1142 02/14/22  1141   WBC 3.0* 3.3*  --   --  4.1  --    HGB 8.8* 8.9*  --   --  7.2*  --    * 107*  --   --  111*  --     178  --   --  187  --    INR  --   --   --   --   --  1.25*    136  --  134  --   --    POTASSIUM 3.4 3.4  --  3.6  --   --    CHLORIDE 102 101  --  96  --   --    CO2 29 27  --  30  --   --    BUN 4* 5*  --  6*  --   --    CR 0.35* 0.47*  --  0.54  --   --    ANIONGAP 5 8  --  8  --   --    VIKTORIA 8.4* 8.7  --  8.4*  --   --    GLC 89 103*  --  88  --   --    ALBUMIN 1.4* 1.5*   < > 1.6*  --   --    PROTTOTAL 6.4* 6.7*   < > 6.9  --   --    BILITOTAL 0.4 0.7   < > 0.6  --   --    ALKPHOS 93 103   < > 110  --   --    ALT 13 14   < > 16  --   --    AST 16 18   < > 19  --   --     < > = values in this interval not displayed.       Recent Results (from the past 24 hour(s))   Echocardiogram Complete   Result Value    LVEF  55-60%    [facility]    [id number]  [id number]  [id number]  [id number]^MARTY^MAX     [facility],[place]  Echocardiography Laboratory  [address] [place] [id number]      Name: DIMAS WILSON  MRN: [id number]  : 1965  Study Date: 02/15/2022 12:00 PM  Age: 56 yrs  Gender: Female  Patient Location: [facility]  Reason For Study: Pulmonary Emboli  Ordering Physician: MAX FERGUSON  Performed By: Irma Darling RDCS     BSA: 1.8 m2  Height: 67 in  Weight: 158 lb  HR: 84  BP: 122/84 mmHg  ______________________________________________________________________________  Procedure  Complete Portable Echo Adult. Echocardiogram with two-dimensional, color and  spectral Doppler performed.  ______________________________________________________________________________  Interpretation Summary  Global and regional left ventricular function is normal with an EF of 55-60%.  Global right ventricular function is mildly reduced.  No significant valvular abnormalities present.  Pulmonary artery systolic pressure cannot be assessed.  The inferior vena cava was normal in size with preserved respiratory  variability.  Trivial pericardial effusion is present.     ______________________________________________________________________________  Left Ventricle  Left ventricular size is normal. Left ventricular wall thickness is normal.  Global and regional left ventricular function is normal with an EF of 55-60%.  No regional wall motion abnormalities are seen.     Right Ventricle  The right ventricle is normal size. Global right ventricular function is  mildly reduced.     Atria  Both atria appear normal.     Mitral Valve  The mitral valve is normal.     Aortic Valve  Aortic valve is normal in structure and function.     Tricuspid Valve  The tricuspid valve is normal. Trace to mild tricuspid insufficiency is  present. Pulmonary artery systolic pressure cannot be assessed.     Vessels  The aorta root is normal. The inferior vena cava was normal in size with  preserved respiratory variability.     Pericardium  Trivial pericardial effusion is present.     Miscellaneous  A left pleural effusion is  present.     Compared to Previous Study  This study was compared with the study from 10.18.21 . RV function is slightly  reduced.  ______________________________________________________________________________  MMode/2D Measurements & Calculations     IVSd: 0.88 cm  LVIDd: 4.8 cm  LVIDs: 3.5 cm  LVPWd: 0.80 cm  FS: 27.6 %  LV mass(C)d: 134.8 grams  LV mass(C)dI: 73.7 grams/m2  RWT: 0.34     Doppler Measurements & Calculations  MV E max axel: 58.1 cm/sec  MV A max axel: 68.2 cm/sec  MV E/A: 0.85  MV dec slope: 261.0 cm/sec2  E/E' av.8  Lateral E/e': 8.1  Medial E/e': 9.6     ______________________________________________________________________________  Report approved by: Yohana Bradford 02/15/2022 01:43 PM

## 2022-02-16 NOTE — PROGRESS NOTES
BP (!) 140/89 (BP Location: Right arm)   Pulse 89   Temp 97.5  F (36.4  C) (Oral)   Resp 16   Wt 73.3 kg (161 lb 11.2 oz)   LMP 03/19/2019   SpO2 95%   BMI 25.33 kg/m    Neuro: A&Ox4.   Cardiac: Afebrile, VSS.   Respiratory: 2L NC. C/o SOB, IV fluids stopped, IV 20 mg lasix given, pt reports to be feeling better  GI/: Voiding spontaneously. No BM this shift.   Diet/appetite: Tolerating diet.Poor appetite. Denies nausea   Activity:Up SBA   Pain: Denies   Skin: No new deficits noted.  Lines:PIV, CVC  Drains:None  No new complaints.Will continue to monitor and follow plan of care.

## 2022-02-17 ENCOUNTER — APPOINTMENT (OUTPATIENT)
Dept: PHYSICAL THERAPY | Facility: CLINIC | Age: 57
End: 2022-02-17
Payer: COMMERCIAL

## 2022-02-17 LAB
ALBUMIN SERPL-MCNC: 1.5 G/DL (ref 3.4–5)
ALP SERPL-CCNC: 86 U/L (ref 40–150)
ALT SERPL W P-5'-P-CCNC: 15 U/L (ref 0–50)
ANION GAP SERPL CALCULATED.3IONS-SCNC: 4 MMOL/L (ref 3–14)
AST SERPL W P-5'-P-CCNC: 15 U/L (ref 0–45)
BACTERIA UR CULT: ABNORMAL
BASOPHILS # BLD MANUAL: 0 10E3/UL (ref 0–0.2)
BASOPHILS NFR BLD MANUAL: 0 %
BILIRUB SERPL-MCNC: 0.4 MG/DL (ref 0.2–1.3)
BUN SERPL-MCNC: 3 MG/DL (ref 7–30)
CALCIUM SERPL-MCNC: 8.7 MG/DL (ref 8.5–10.1)
CHLORIDE BLD-SCNC: 101 MMOL/L (ref 94–109)
CO2 SERPL-SCNC: 31 MMOL/L (ref 20–32)
CREAT SERPL-MCNC: 0.42 MG/DL (ref 0.52–1.04)
CRP SERPL-MCNC: 160 MG/L (ref 0–8)
EOSINOPHIL # BLD MANUAL: 0.2 10E3/UL (ref 0–0.7)
EOSINOPHIL NFR BLD MANUAL: 6 %
ERYTHROCYTE [DISTWIDTH] IN BLOOD BY AUTOMATED COUNT: 19.9 % (ref 10–15)
GFR SERPL CREATININE-BSD FRML MDRD: >90 ML/MIN/1.73M2
GLUCOSE BLD-MCNC: 90 MG/DL (ref 70–99)
HCT VFR BLD AUTO: 27.2 % (ref 35–47)
HGB BLD-MCNC: 8.1 G/DL (ref 11.7–15.7)
LYMPHOCYTES # BLD MANUAL: 0.5 10E3/UL (ref 0.8–5.3)
LYMPHOCYTES NFR BLD MANUAL: 19 %
MCH RBC QN AUTO: 32.3 PG (ref 26.5–33)
MCHC RBC AUTO-ENTMCNC: 29.8 G/DL (ref 31.5–36.5)
MCV RBC AUTO: 108 FL (ref 78–100)
MONOCYTES # BLD MANUAL: 0.1 10E3/UL (ref 0–1.3)
MONOCYTES NFR BLD MANUAL: 5 %
NEUTROPHILS # BLD MANUAL: 2 10E3/UL (ref 1.6–8.3)
NEUTROPHILS NFR BLD MANUAL: 70 %
PLAT MORPH BLD: ABNORMAL
PLATELET # BLD AUTO: 169 10E3/UL (ref 150–450)
POTASSIUM BLD-SCNC: 3.7 MMOL/L (ref 3.4–5.3)
POTASSIUM BLD-SCNC: 3.7 MMOL/L (ref 3.4–5.3)
PROCALCITONIN SERPL-MCNC: 0.16 NG/ML
PROT SERPL-MCNC: 6.5 G/DL (ref 6.8–8.8)
RBC # BLD AUTO: 2.51 10E6/UL (ref 3.8–5.2)
RBC MORPH BLD: ABNORMAL
SODIUM SERPL-SCNC: 136 MMOL/L (ref 133–144)
TROPONIN I SERPL HS-MCNC: 4 NG/L
WBC # BLD AUTO: 2.8 10E3/UL (ref 4–11)

## 2022-02-17 PROCEDURE — 250N000013 HC RX MED GY IP 250 OP 250 PS 637: Performed by: STUDENT IN AN ORGANIZED HEALTH CARE EDUCATION/TRAINING PROGRAM

## 2022-02-17 PROCEDURE — 84145 PROCALCITONIN (PCT): CPT | Performed by: INTERNAL MEDICINE

## 2022-02-17 PROCEDURE — 250N000009 HC RX 250: Performed by: INTERNAL MEDICINE

## 2022-02-17 PROCEDURE — 99223 1ST HOSP IP/OBS HIGH 75: CPT | Performed by: CLINICAL NURSE SPECIALIST

## 2022-02-17 PROCEDURE — 84132 ASSAY OF SERUM POTASSIUM: CPT | Performed by: INTERNAL MEDICINE

## 2022-02-17 PROCEDURE — 97116 GAIT TRAINING THERAPY: CPT | Mod: GP

## 2022-02-17 PROCEDURE — 99207 PR CDG-CUT & PASTE-POTENTIAL IMPACT ON LEVEL: CPT | Performed by: INTERNAL MEDICINE

## 2022-02-17 PROCEDURE — 85027 COMPLETE CBC AUTOMATED: CPT | Performed by: INTERNAL MEDICINE

## 2022-02-17 PROCEDURE — 93005 ELECTROCARDIOGRAM TRACING: CPT

## 2022-02-17 PROCEDURE — 250N000013 HC RX MED GY IP 250 OP 250 PS 637: Performed by: INTERNAL MEDICINE

## 2022-02-17 PROCEDURE — 84484 ASSAY OF TROPONIN QUANT: CPT | Performed by: STUDENT IN AN ORGANIZED HEALTH CARE EDUCATION/TRAINING PROGRAM

## 2022-02-17 PROCEDURE — 97530 THERAPEUTIC ACTIVITIES: CPT | Mod: GP

## 2022-02-17 PROCEDURE — 250N000011 HC RX IP 250 OP 636: Performed by: INTERNAL MEDICINE

## 2022-02-17 PROCEDURE — 99233 SBSQ HOSP IP/OBS HIGH 50: CPT | Performed by: INTERNAL MEDICINE

## 2022-02-17 PROCEDURE — 97110 THERAPEUTIC EXERCISES: CPT | Mod: GP

## 2022-02-17 PROCEDURE — 80053 COMPREHEN METABOLIC PANEL: CPT | Performed by: INTERNAL MEDICINE

## 2022-02-17 PROCEDURE — 120N000005 HC R&B MS OVERFLOW UMMC

## 2022-02-17 PROCEDURE — 86140 C-REACTIVE PROTEIN: CPT | Performed by: INTERNAL MEDICINE

## 2022-02-17 PROCEDURE — 93010 ELECTROCARDIOGRAM REPORT: CPT | Performed by: INTERNAL MEDICINE

## 2022-02-17 RX ORDER — DEXAMETHASONE 4 MG/1
4 TABLET ORAL DAILY
Status: DISCONTINUED | OUTPATIENT
Start: 2022-02-17 | End: 2022-02-18

## 2022-02-17 RX ORDER — ONDANSETRON 2 MG/ML
4 INJECTION INTRAMUSCULAR; INTRAVENOUS EVERY 6 HOURS PRN
Status: DISCONTINUED | OUTPATIENT
Start: 2022-02-17 | End: 2022-02-20 | Stop reason: HOSPADM

## 2022-02-17 RX ORDER — PANTOPRAZOLE SODIUM 40 MG/1
40 TABLET, DELAYED RELEASE ORAL
Status: DISCONTINUED | OUTPATIENT
Start: 2022-02-17 | End: 2022-02-20 | Stop reason: HOSPADM

## 2022-02-17 RX ORDER — HYDROXYZINE HYDROCHLORIDE 25 MG/1
25 TABLET, FILM COATED ORAL ONCE
Status: COMPLETED | OUTPATIENT
Start: 2022-02-17 | End: 2022-02-17

## 2022-02-17 RX ORDER — FUROSEMIDE 10 MG/ML
20 INJECTION INTRAMUSCULAR; INTRAVENOUS ONCE
Status: COMPLETED | OUTPATIENT
Start: 2022-02-17 | End: 2022-02-17

## 2022-02-17 RX ADMIN — ALBUTEROL SULFATE 2 PUFF: 90 AEROSOL, METERED RESPIRATORY (INHALATION) at 05:51

## 2022-02-17 RX ADMIN — GABAPENTIN 600 MG: 300 CAPSULE ORAL at 13:18

## 2022-02-17 RX ADMIN — HYDROXYZINE HYDROCHLORIDE 25 MG: 25 TABLET ORAL at 08:35

## 2022-02-17 RX ADMIN — HYDROMORPHONE HYDROCHLORIDE 2 MG: 2 TABLET ORAL at 15:17

## 2022-02-17 RX ADMIN — LEVOTHYROXINE SODIUM 75 MCG: 75 TABLET ORAL at 08:36

## 2022-02-17 RX ADMIN — HYDROMORPHONE HYDROCHLORIDE 2 MG: 2 TABLET ORAL at 02:39

## 2022-02-17 RX ADMIN — DEXAMETHASONE 4 MG: 4 TABLET ORAL at 12:10

## 2022-02-17 RX ADMIN — HYDROXYZINE HYDROCHLORIDE 25 MG: 25 TABLET ORAL at 22:14

## 2022-02-17 RX ADMIN — ENOXAPARIN SODIUM 70 MG: 80 INJECTION SUBCUTANEOUS at 02:38

## 2022-02-17 RX ADMIN — PAZOPANIB 800 MG: 200 TABLET, FILM COATED ORAL at 21:36

## 2022-02-17 RX ADMIN — ONDANSETRON 4 MG: 2 INJECTION INTRAMUSCULAR; INTRAVENOUS at 12:25

## 2022-02-17 RX ADMIN — NYSTATIN 500000 UNITS: 100000 SUSPENSION ORAL at 20:19

## 2022-02-17 RX ADMIN — HYDROMORPHONE HYDROCHLORIDE 16 MG: 16 TABLET, EXTENDED RELEASE ORAL at 20:40

## 2022-02-17 RX ADMIN — HYDROMORPHONE HYDROCHLORIDE 2 MG: 2 TABLET ORAL at 12:10

## 2022-02-17 RX ADMIN — HYDROMORPHONE HYDROCHLORIDE 2 MG: 2 TABLET ORAL at 05:51

## 2022-02-17 RX ADMIN — ALBUTEROL SULFATE 2 PUFF: 90 AEROSOL, METERED RESPIRATORY (INHALATION) at 02:38

## 2022-02-17 RX ADMIN — HYDROMORPHONE HYDROCHLORIDE 2 MG: 2 TABLET ORAL at 00:23

## 2022-02-17 RX ADMIN — LEVOFLOXACIN 750 MG: 5 INJECTION, SOLUTION INTRAVENOUS at 08:36

## 2022-02-17 RX ADMIN — NYSTATIN 500000 UNITS: 100000 SUSPENSION ORAL at 15:17

## 2022-02-17 RX ADMIN — PANTOPRAZOLE SODIUM 40 MG: 40 TABLET, DELAYED RELEASE ORAL at 12:10

## 2022-02-17 RX ADMIN — PROCHLORPERAZINE MALEATE 5 MG: 5 TABLET ORAL at 20:18

## 2022-02-17 RX ADMIN — CELECOXIB 200 MG: 200 CAPSULE ORAL at 08:35

## 2022-02-17 RX ADMIN — FUROSEMIDE 20 MG: 10 INJECTION, SOLUTION INTRAVENOUS at 11:00

## 2022-02-17 RX ADMIN — FLUTICASONE FUROATE AND VILANTEROL TRIFENATATE 1 PUFF: 100; 25 POWDER RESPIRATORY (INHALATION) at 08:36

## 2022-02-17 RX ADMIN — ALBUTEROL SULFATE 2.5 MG: 2.5 SOLUTION RESPIRATORY (INHALATION) at 13:18

## 2022-02-17 RX ADMIN — Medication 400 MG: at 20:18

## 2022-02-17 RX ADMIN — MONTELUKAST 10 MG: 10 TABLET, FILM COATED ORAL at 08:36

## 2022-02-17 RX ADMIN — NYSTATIN 500000 UNITS: 100000 SUSPENSION ORAL at 08:35

## 2022-02-17 RX ADMIN — Medication 400 MG: at 08:35

## 2022-02-17 RX ADMIN — GABAPENTIN 600 MG: 300 CAPSULE ORAL at 08:35

## 2022-02-17 RX ADMIN — HYDROMORPHONE HYDROCHLORIDE 2 MG: 2 TABLET ORAL at 08:35

## 2022-02-17 RX ADMIN — ONDANSETRON 4 MG: 4 TABLET, ORALLY DISINTEGRATING ORAL at 08:36

## 2022-02-17 RX ADMIN — HYDROMORPHONE HYDROCHLORIDE 2 MG: 2 TABLET ORAL at 18:09

## 2022-02-17 RX ADMIN — Medication 5 MG: at 21:40

## 2022-02-17 RX ADMIN — HYDROMORPHONE HYDROCHLORIDE 2 MG: 2 TABLET ORAL at 21:40

## 2022-02-17 RX ADMIN — CETIRIZINE HYDROCHLORIDE 10 MG: 10 TABLET, FILM COATED ORAL at 08:35

## 2022-02-17 RX ADMIN — ENOXAPARIN SODIUM 70 MG: 80 INJECTION SUBCUTANEOUS at 13:28

## 2022-02-17 RX ADMIN — HYDROXYZINE HYDROCHLORIDE 25 MG: 25 TABLET ORAL at 15:17

## 2022-02-17 RX ADMIN — GABAPENTIN 900 MG: 300 CAPSULE ORAL at 20:16

## 2022-02-17 RX ADMIN — ALBUTEROL SULFATE 2.5 MG: 2.5 SOLUTION RESPIRATORY (INHALATION) at 08:32

## 2022-02-17 RX ADMIN — ALBUTEROL SULFATE 2.5 MG: 2.5 SOLUTION RESPIRATORY (INHALATION) at 20:40

## 2022-02-17 RX ADMIN — CELECOXIB 200 MG: 200 CAPSULE ORAL at 20:17

## 2022-02-17 RX ADMIN — NYSTATIN 500000 UNITS: 100000 SUSPENSION ORAL at 12:10

## 2022-02-17 ASSESSMENT — ACTIVITIES OF DAILY LIVING (ADL)
ADLS_ACUITY_SCORE: 16
ADLS_ACUITY_SCORE: 15
ADLS_ACUITY_SCORE: 16
ADLS_ACUITY_SCORE: 15
ADLS_ACUITY_SCORE: 15
ADLS_ACUITY_SCORE: 16
ADLS_ACUITY_SCORE: 15
ADLS_ACUITY_SCORE: 16
ADLS_ACUITY_SCORE: 15
ADLS_ACUITY_SCORE: 15
ADLS_ACUITY_SCORE: 16
ADLS_ACUITY_SCORE: 16
ADLS_ACUITY_SCORE: 15
DEPENDENT_IADLS:: INDEPENDENT
ADLS_ACUITY_SCORE: 15

## 2022-02-17 NOTE — PROGRESS NOTES
SPIRITUAL HEALTH SERVICES  SPIRITUAL ASSESSMENT Progress Note (Palliative Focus)  [facility] ([place]) 5C    REFERRAL SOURCE: Palliative care consult    Visited with pt Connie and spouse Elliot. Offered emotional and spiritual support. Connie and Elliot are [employer] and they described how important their navi in God is in the midst of Connie's illness. They are hoping Connie will discharge soon, and then are planning to move to the [facility] to be closer to their daughter and granddaughter. Connie wants to prioritize spending time with her family in the time she has left. We prayed together. Connie told me that she greatly appreciated our visit. I informed her that  support continues to be available during her hospitalization.    Plan: Will continue to follow for support while palliative care is consulted.    Kunal Fagan  Palliative Chaplain Resident  Pager [phone]    [facility] Palliative Care Inpatient Team Consult pager [phone] (M-F 8-4:30)  After-hours Answering Service [phone]

## 2022-02-17 NOTE — PROGRESS NOTES
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 Lopez   Current Treatment Plan: Pazopanib     Recommendations:   - Ok with low dose steroids; no contraindication with currently treatment   - Could consider walk test in upcoming days to see if patient may qualify for home O2   - Will help arrange follow up once discharge date becomes more clear     Assessment & Plan:   Richa Ashby is a 56 year old female with pmh metastatic UPS (diagnosed 2019), right sided hydropneumothorax s/p talc pleurodesis (Nov 2021) currently admitted for hypoxia.     # Metastatic undifferentiated pleomorphic sarcoma ([facility])   # R Hydropneumothorax s/p tacl pleurodesis   Please review outpatient oncology note for full detailed history. In brief, she was diagnosed in November 2019 and presenting with lump on the right leg. She had neoadjuvant radiation followed by surgery March 2020. Recurrence in the lungs found in Nov. 2020. Treated with Doxil/ifosfamide x5 cycles then Doxil monotherapy with PD so then switched to Keytruda then gemcitabine. Most recently initiated on Pazopanib 800 mg on 9/24/2021. She was found to have R sided hydropenumothroax in Nov.2021 with right lung collapse. She had a talc pleurodesis 11//5/21.  - Imaging with overall stable disease; ok to continue Pazopanib while inpatient.  - Of note, patient is moving to [place] in ~2 weeks and has already made an appointment to establish with cancer care team down there on March 24th.     # Acute Hypoxic Respiratory Failure likely multifactorial (malignancy, PE, infection)  CT PE 2/14 with question small subsegmental thrombi vs contrast mixing artifact. Extensive cystic/cavitary lesions stable compared in Jan 2022 imaging. LE US neg for DVT but UE US  "noted partially occlusive thrombus in the right internal jugular vein.   - Currently on Lovenox 1 mg/kg; with plan to transition to DOAC at discharge   - Levaquin for possible pneumonia     # Klebsiella UTI   Urine Culture 2/14 growing >100K Klebsiella Pneumoniae   - Sensitive to Levaquin     Patient was seen and plan of care was discussed with attending physician Dr. Retana.    Thank you for the opportunity to partake in this patients plan of care. Please do not hesitate to page with questions. We will continue to follow.     I spent 30 minutes face-to-face or coordinating care of Richa Ashby. Over 50% of our time on the unit was spent counseling the patient and/or coordinating care.    Lupe Damian PA-C   Hematology/Oncology   Pager: [phone]   ___________________________________________________________________    Subjective & Interval History:    No acute events noted overnight. Connie states her breathing is \"ragged\" right now. She thinks it might be slightly worse since being admitted. She feels like she is just more aware of it. She was up in the hallway ambulating which she reports went well. She did mention a new \"poke\" type pain in her right shoulder that she noticed. Rates it about 3-4/10 more bothersome. All questions answered at this time.     A comprehensive review of systems was obtained and is negative other than noted here or in the HPI.       Physical Exam:    Blood pressure 112/71, pulse 92, temperature 98.5  F (36.9  C), temperature source Oral, resp. rate 16, weight 73 kg (161 lb), last menstrual period 03/19/2019, SpO2 93 %, not currently breastfeeding.    General: lying in bed, no acute distress  HEENT: sclera anicteric, EOMI, MMM  Neck: supple, normal ROM  Resp:  normal respiratory effort on 1L NC, speaking full sentences.   MSK: warm and well-perfused, normal tone  Skin: no rashes on limited exam, no jaundice  Neuro: Alert and interactive, moves all extremities equally, no focal " deficits    Labs & Studies: I personally reviewed the following studies:  ROUTINE LABS (Last four results):  CMP  Recent Labs   Lab 02/17/22 0237 02/16/22  1708 02/16/22  0425 02/15/22  0539 02/14/22  1144 02/14/22  1144 02/14/22  0847     --  136 136  --  134  --    POTASSIUM 3.7  3.7 3.6 3.4 3.4   < > 3.6  --    CHLORIDE 101  --  102 101  --  96  --    CO2 31  --  29 27  --  30  --    ANIONGAP 4  --  5 8  --  8  --    GLC 90  --  89 103*  --  88  --    BUN 3*  --  4* 5*  --  6*  --    CR 0.42*  --  0.35* 0.47*  --  0.54  --    GFRESTIMATED >90  --  >90 >90  --  >90  --    VIKTORIA 8.7  --  8.4* 8.7  --  8.4*  --    MAG  --   --  2.0 1.5*  --   --  1.7   PHOS  --   --   --   --   --  3.4  --    PROTTOTAL 6.5*  --  6.4* 6.7*  --  6.9  --    ALBUMIN 1.5*  --  1.4* 1.5*  --  1.6*  --    BILITOTAL 0.4  --  0.4 0.7  --  0.6  --    ALKPHOS 86  --  93 103  --  110  --    AST 15  --  16 18  --  19  --    ALT 15  --  13 14  --  16  --     < > = values in this interval not displayed.     CBC  Recent Labs   Lab 02/17/22  0236 02/16/22  0425 02/15/22  0539 02/14/22  1142   WBC 2.8* 3.0* 3.3* 4.1   RBC 2.51* 2.70* 2.71* 2.17*   HGB 8.1* 8.8* 8.9* 7.2*   HCT 27.2* 29.1* 29.0* 24.0*   * 108* 107* 111*   MCH 32.3 32.6 32.8 33.2*   MCHC 29.8* 30.2* 30.7* 30.0*   RDW 19.9* 20.4* 21.0* 19.6*    173 178 187     INR  Recent Labs   Lab 02/14/22  1141   INR 1.25*       Medications list for reference:  Current Facility-Administered Medications   Medication     acetaminophen (TYLENOL) tablet 975 mg     albuterol (PROVENTIL HFA/VENTOLIN HFA) inhaler     albuterol (PROVENTIL) neb solution 2.5 mg     celecoxib (celeBREX) capsule 200 mg     cetirizine (zyrTEC) tablet 10 mg     dexamethasone (DECADRON) tablet 4 mg     diclofenac (VOLTAREN) 1 % topical gel 4 g     enoxaparin ANTICOAGULANT (LOVENOX) injection 70 mg     fluticasone-vilanterol (BREO ELLIPTA) 100-25 MCG/INH inhaler 1 puff     gabapentin (NEURONTIN) capsule 600 mg      gabapentin (NEURONTIN) capsule 900 mg     HYDROmorphone (DILAUDID) tablet 2 mg     HYDROmorphone (DILAUDID) tablet 2 mg     HYDROmorphone HCl ER (EXALGO) 24 hour tablet 16 mg     hydrOXYzine (ATARAX) tablet 25 mg     levofloxacin (LEVAQUIN) infusion 750 mg     levothyroxine (SYNTHROID/LEVOTHROID) tablet 75 mcg     lidocaine (LMX4) cream     lidocaine 1 % 0.1-1 mL     magnesium oxide (MAG-OX) tablet 400 mg     melatonin tablet 5 mg     montelukast (SINGULAIR) tablet 10 mg     nystatin (MYCOSTATIN) suspension 500,000 Units     ondansetron (ZOFRAN) injection 4 mg     ondansetron (ZOFRAN-ODT) ODT tab 4 mg     ondansetron (ZOFRAN-ODT) ODT tab 8 mg     pantoprazole (PROTONIX) EC tablet 40 mg     pazopanib (VOTRIENT) tablet 800 mg     prochlorperazine (COMPAZINE) tablet 10 mg     prochlorperazine (COMPAZINE) tablet 5 mg     sodium chloride (PF) 0.9% PF flush 3 mL     sodium chloride (PF) 0.9% PF flush 3 mL     sodium chloride (PF) 0.9% PF flush 3 mL     sodium chloride (PF) 0.9% PF flush 3 mL     Facility-Administered Medications Ordered in Other Encounters   Medication     heparin 100 UNIT/ML injection 5 mL

## 2022-02-17 NOTE — CONSULTS
[facility]  Palliative Care Consultation Note    Patient: Richa Ashby  Date of Admission:  2/14/2022    Requesting Clinician / Team: Qamar Monroy MD  Reason for consult: Symptom management    Recommendations:    Continue current regimen, feels that its working well at this time, and for complicated reasons cannot go up on long acting inpatient    Reached out to OP PC provider who is in support of her plan to move and will continue to prescribe for 3 months to allow for transition to a new prescriber     Feels that her nausea regimen is acceptable, continue with this as planned    Highly encourage work with counseling for anxiety and offered PC SW inpatient, would most certainly get her an appointment with her OP therapist after discharge    Numbness/tingling in feet, could consider duloxetine 30 mg daily for this, did not discuss with patient so can follow up on this tomorrow    These recommendations have been discussed with primary team.      Thank you for the opportunity to participate in the care of this patient and family. Our team: will continue to follow.     During regular M-F work hours -- if you are not sure who specifically to contact -- please contact us by sending a text page to our team consult pager at [phone].    After regular work hours and on weekends/holidays, you can call our answering service at [phone]. Also, who's on call for us is available in [employer].       Assessments:  Richa Ashby is a 56 year old female with a past medical history of metastatic sarcoma with lung mets, admitted with hypoxia and hypotension due to sepsis from a bacteria pneumonia and PE. Her hospital course has been complicated by respiratory failure, acute anemia, nausea, and ongoing cancer related pain.    Today, the patient was seen for:  Metastatic sarcoma  Cancer related pain  Nausea  Anxiety     Prognosis, Goals, & Planning:       Functional Status just prior to hospitalization: 1 (Restricted in physically strenuous activity but ambulatory and able to carry out work of a light or sedentary nature)    2 (Ambulatory and capable of all selfcare but unable to carry out any work activities; may need help with IADLs up and about > 50% of waking hours)      Prognosis, Goals, and/or Advance Care Planning were addressed today: Yes        Summary/Comments: treatment focused, hoping to move to [facility] in about 2 weeks.      Patient's decision making preferences: shared with support from loved ones          Patient has decision-making capacity today for complex decisions: Yes            I have concerns about the patient/family's health literacy today: No           Patient has a completed Health Care Directive: Yes, and on file.      Code status: No CPR; pre-arrest intubation OK    Coping, Meaning, & Spirituality:   Mood, coping, and/or meaning in the context of serious illness were addressed today: Yes  Summary/Comments: Connie does endorse anxiety which she is working with her OP therapist on. At times has anxiety around pain control as well.    Social:     Key family / caregivers: spouse    History of Present Illness:  History gathered today from: medical chart    Richa Ashby is a 56 year old female with a past medical history of metastatic sarcoma with lung mets, admitted with hypoxia and hypotension due to sepsis from a bacteria pneumonia and PE. Her hospital course has been complicated by respiratory failure, acute anemia, nausea, and ongoing cancer related pain.    Palliative care consulted 2/17 for symptom management    Key Palliative Symptom Data:  # Pain severity the last 12 hours: moderate  # Nausea severity the last 12 hours: low    Patient is on opioids: assessed and bowels ok/no needed changes to plan of care today.    ROS:  Comprehensive ROS is reviewed and is negative except as here & per HPI:      Past Medical History:  Past Medical  History:   Diagnosis Date     Asthma, chronic      Sarcoma (H)     Right lower thigh        Past Surgical History:  Past Surgical History:   Procedure Laterality Date     AS RAD RESEC TONSIL/PILLARS       GALLBLADDER SURGERY       INJECT EPIDURAL THORACIC N/A 11/5/2021    Procedure: INJECTION, SPINE, THORACIC, EPIDURAL;  Surgeon: GENERIC ANESTHESIA PROVIDER;  Location: [facility] OR     INSERT PORT VASCULAR ACCESS Right 10/28/2020    Procedure: single lumen power port placement @0745;  Surgeon: Tex Ackerman MD;  Location: [facility] OR     IR CHEST PORT PLACEMENT > 5 YRS OF AGE  10/28/2020     IR PORT CHECK RIGHT  3/26/2021     IR PORT CHECK RIGHT  10/21/2021     IR PORT REPLACEMENT CATHETER ONLY RIGHT  12/17/2021     RESECT TUMOR LOWER EXTREMITY Right 3/12/2020    Procedure: Resection of right thigh tumor;  Surgeon: Guillermo Johnson MD;  Location: [facility] OR     ROTATOR CUFF REPAIR RT/LT  2012     SHOULDER SURGERY       SINUS SURGERY       THORACENTESIS N/A 11/5/2021    Procedure: Pleurodesis;  Surgeon: Elana Ngo MD;  Location: [facility]         Family History:  Family History   Problem Relation Age of Onset     Lung Cancer Maternal Grandmother      Breast Cancer Paternal Grandmother         Allergies:  Allergies   Allergen Reactions     Pentobarbital      resp arrest        Medications:  I have reviewed this patient's medication profile and medications from this hospitalization.   Noted:  Celecoxib 200 mg BID  Gabapentin 600 mg BID and 900 mg at bedtime  Hydromorphone 2 mg q3h  Hydromorphone ER 16 mg daily  Hydroxyzine 25 mg BID  Melatonin 5 mg at bedtime  Ondansetron 4 mg daily  Compazine 5 mg daily  Acetaminophen PRN- x1  Ondansetron PRN- x2    34 mg hydromorphone over last 24 hours = 136 OME    Outpatient: dialudidER 16 mg, hydromorphone 2-4 mg q3h    Physical Exam:  Vital Signs: Temp: 98.5  F (36.9  C) Temp src: Oral BP: 112/71 Pulse: 92   Resp: 16 SpO2: 92 % O2 Device: Nasal cannula Oxygen Delivery: 2  LPM  Weight: 161 lbs 0 oz    Constitutional: Awake, alert, cooperative, no apparent distress  ENT: Normocephalic, without obvious abnormality, atramatic  Lungs: No increased work of breathing, good air exchange  Musculoskeletal: No redness, warmth, or swelling of the joints.    Neurologic: Awake, alert, oriented to name, place and time.    Neuropsychiatric: Normal affect, mood, orientation, memory and insight.  Skin: No rashes, erythema, pallor, petechia or purpura.    Data reviewed:  Recent imaging reviewed, my comments on pertinents:   Interpretation Summary   Global and regional left ventricular function is normal with an EF of 55-60%.   Global right ventricular function is mildly reduced.   No significant valvular abnormalities present.   Pulmonary artery systolic pressure cannot be assessed.   The inferior vena cava was normal in size with preserved respiratory   variability.   Trivial pericardial effusion is present.     Recent lab data reviewed, my comments on pertinents:   Sodium 136  Potassium 3.7  Creatinine 0.42  GFR > 90  WBC 2.8  Hemoglobin 8.1  Platelets 169    NUPUR Robles CNS  Palliative Care Consult Team  Pager: [phone]    75 minutes spent. This includes 40 minutes face to face with patient and spouse discussing current complex health conditions, goals of care, and symptom management, psychosocial support. Coordination of care with the primary team, palliative  and SW, OP PC MD regarding symptom management, goals of care, psychosocial support.

## 2022-02-17 NOTE — PLAN OF CARE
Goal Outcome Evaluation:        Blood pressure 122/81, pulse 85, temperature 97.5  F (36.4  C), temperature source Axillary, resp. rate 16, weight 73.3 kg (161 lb 11.2 oz), last menstrual period 03/19/2019, SpO2 95 %, not currently breastfeeding.    A/O x 4. AVSS as recorded above. Pt continue to have wheezes in upper lobes. Pt is using Albuterol inhaler. Pt was given Incentive inspirometer to use at bedside when she wakes up. She is using 02 NC at 2L With good Continue to need pain medication 2 mg of po Dilaudid q 3 hours. Port-a-cath at TKO. She seems very weak and needs stand by assist to the bathroom. She is voiding well with no problem. She had x 1 loose stool in the toilet during the night. She has Palliative [place] order. Continue to monitor and follow plan of care.      Problem: Adult Inpatient Plan of Care  Goal: Plan of Care Review  Outcome: Ongoing, Progressing     Problem: Adult Inpatient Plan of Care  Goal: Patient-Specific Goal (Individualized)  Outcome: Ongoing, Progressing     Problem: Adult Inpatient Plan of Care  Goal: Optimal Comfort and Wellbeing  Outcome: Ongoing, Progressing     Problem: Fluid Imbalance (Pneumonia)  Goal: Fluid Balance  Outcome: Ongoing, Progressing     Problem: Infection (Pneumonia)  Goal: Resolution of Infection Signs and Symptoms  Outcome: Ongoing, Progressing     Problem: Respiratory Compromise (Pneumonia)  Goal: Effective Oxygenation and Ventilation  Outcome: Ongoing, Progressing  Intervention: Promote Airway Secretion Clearance  Recent Flowsheet Documentation  Taken 2/17/2022 0400 by Richa Bach RN  Cough And Deep Breathing: done with encouragement  Taken 2/16/2022 2000 by Richa Bach RN  Cough And Deep Breathing: done with encouragement  Intervention: Optimize Oxygenation and Ventilation  Recent Flowsheet Documentation  Taken 2/17/2022 0400 by Richa Bach RN  Head of Bed (HOB) Positioning: HOB at 20 degrees  Taken 2/16/2022 2000 by Richa Bach  AMY, RN  Head of Bed (HOB) Positioning: HOB at 20 degrees     Problem: Adult Inpatient Plan of Care  Goal: Absence of Hospital-Acquired Illness or Injury  Intervention: Prevent and Manage VTE (Venous Thromboembolism) Risk  Recent Flowsheet Documentation  Taken 2/17/2022 0400 by Richa Bach, RN  Activity Management:   up ad andrews   ambulated to bathroom   activity adjusted per tolerance  Taken 2/16/2022 2000 by Richa Bach, RN  Activity Management:   up ad andrews   ambulated to bathroom   activity adjusted per tolerance

## 2022-02-17 NOTE — PROGRESS NOTES
[facility]    Hospitalist Progress Note      Assessment & Plan   56 year old female admitted on 2/14/2022. 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      1.  Sepsis and acute hypoxic respiratory failure secondary to likely bacterial pneumonia complicated by reactive airway disease, all are present on admission  This is the main problem that led to this admission.  Sepsis criteria include heart rate of 102 and white blood cell count of 3.3.  The patient qualifies for diagnosis of acute hypoxic respiratory failure secondary to needing oxygen at 2 to 3 L continuous saturation above 92% when at home the patient is not on any oxygen supplement.  The patient qualifies for diagnosis of bacterial pneumonia given pleuritic chest pain and abnormal CT chest imaging.  Procalcitonin is minimally elevated at 0.25 and CRP is significantly elevated at 280.  The patient received ceftriaxone and doxycycline between 2/14/2022 and 2/15/2022.    The patient reported wheezing during this admission.    -Weaning oxygen as tolerated targeting oxygen saturation of 92% on room air.  -Continue levofloxacin for coverage of both community-acquired pneumonia and Klebsiella pneumonia possible urinary tract infection  -Duration of antibiotics will be 10 days, stop date was placed in electronic medical records  -The patient will be discharged on oral antibiotics when she is medically ready for discharge  -Continue albuterol nebulizer every 6 hours along with albuterol as needed  -Started dexamethasone at 4 mg daily to help combat reactive airway disease secondary to pneumonia  -Continue Breo Ellipta 1 puff daily  -Administered 1 dose of furosemide in order to help with treating the volume administered upon hospitalization for treatment of sepsis related to  pneumonia  -Weaning oxygen therapy    2.  Acute hypoxic respiratory failure secondary to provoked pulmonary embolus due to port associated deep venous thrombus of right internal jugular vein, class IV pulmonary embolism severity index indicating high risk for mortality, all are present on admission  This is the second problem that led to this admission.  Although the patient had questionable pulmonary embolus on CT PE, the presence of internal jugular vein deep venous thrombus in the setting of Port-A-Cath and active malignancy will increase the pretest probability for the CT PE and decrease its negative predictive value.  Moreover, subsegmental pulmonary embolism with negative work-up for deep venous thrombus is associated with 5.7% of recurrent pulmonary embolism, unfortunately this is not the case for this patient who has active malignancy and deep venous thrombus.    Acute hypoxic respiratory failure is improving.    -Continue therapeutic enoxaparin at 1 mg/kg every 12 hours while inpatient  -Plan to start apixaban prior to discharge for duration of at least 3 months given the provoked nature of the venous thromboembolism    3.  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  I adjusted the dosage and frequency of narcotics for this patient based on her home regimen after verifying with the records of the pharmacy and with her .  I also adjusted the dosage and the frequency of antiemetics.  I highly appreciate input of oncology service.  I consulted with palliative care service for help addressing the symptoms burden.  -Dexamethasone at 4 mg daily can help his chemotherapy related vomiting, malignancy related pain, and malignancy related decreased oral intake  -Based on discussion with palliative care and the eminent transfer of care of this patient to [place], we elected not to start the patient on long-acting  narcotics given the risk of respiratory depression and toxic encephalopathy in the setting of lack of continue to of care as outpatient since the [place] is [place]oc[place] out of state    4.  Hypomagnesemia secondary to diarrhea, all are present on admission, resolved  This is the third problem that led to this admission.  The patient has been ruled out for C. difficile colitis.  I also ordered magnesium replacement protocol.    5.  Acute anemia secondary to anemia of critical illness on top of pancytopenia secondary to malignancy, all are present on admission, stable  The patient hemoglobin increased to a baseline of 8.9 after 1 unit of blood transfusion which effectively rules out continued bleeding or hemolysis as an etiology for the anemia.    6. Severe malnutrition in the context of acute on chronic illness, all are present on admission  We will follow recommendations of adult dietitian    DVT Prophylaxis: Enoxaparin (Lovenox) SQ  Code Status: No CPR- Pre-arrest intubation OK  Expected Discharge: Once the patient acute hypoxic respiratory failure resolved, expected 2/19/2022  Anticipated discharge location: home with family    Delays:         Qamar Monroy MD  Text Page (7am - 6pm, M-F)    Interval History   The patient continues to report wheezing.  The patient denied any active chest pain, shortness of breath, or subjective fever.  Four-point review of systems is otherwise    -Data reviewed today: I reviewed all new labs and imaging results over the last 24 hours. I personally reviewed no images or EKG's today.    Physical Exam   Temp: 98.2  F (36.8  C) Temp src: Oral BP: 115/65 Pulse: 94   Resp: 16 SpO2: 90 % O2 Device: Nasal cannula Oxygen Delivery: 1 LPM  Vitals:    02/15/22 1121 02/16/22 0745 02/17/22 0820   Weight: 72.5 kg (159 lb 14.4 oz) 73.3 kg (161 lb 11.2 oz) 73 kg (161 lb)     Vital Signs with Ranges  Temp:  [97.5  F (36.4  C)-98.5  F (36.9  C)] 98.2  F (36.8  C)  Pulse:  [85-94] 94  Resp:  [16-18]  16  BP: (112-124)/(65-84) 115/65  SpO2:  [90 %-98 %] 90 %  I/O last 3 completed shifts:  In: 345 [P.O.:50; I.V.:295]  Out: 2790 [Urine:2755; Emesis/NG output:35]    Constitutional: Awake, alert, cooperative, no apparent distress.  Eyes: Conjunctiva and pupils examined and normal.  HEENT: Moist mucous membranes, normal dentition.  Respiratory: Clear to auscultation bilaterally, no crackles or wheezing.  Cardiovascular: Regular rate and rhythm, normal S1 and S2, and no murmur noted.  GI: Soft, non-distended, non-tender, normal bowel sounds.  Lymph/Hematologic: No anterior cervical or supraclavicular adenopathy.  Skin: No rashes, no cyanosis, no edema.  Musculoskeletal: No joint swelling, erythema or tenderness.  Neurologic: Cranial nerves 2-12 intact, normal strength and sensation.  Psychiatric: Alert, oriented to person, place and time, no obvious anxiety or depression.    Medications       albuterol  2.5 mg Nebulization Q6H     celecoxib  200 mg Oral BID     cetirizine  10 mg Oral Daily     dexamethasone  4 mg Oral Daily     enoxaparin ANTICOAGULANT  1 mg/kg Subcutaneous Q12H     fluticasone-vilanterol  1 puff Inhalation Daily     gabapentin  600 mg Oral BID     gabapentin  900 mg Oral At Bedtime     HYDROmorphone  2 mg Oral Q3H     HYDROmorphone HCl ER  16 mg Oral QPM     hydrOXYzine  25 mg Oral BID     levofloxacin  750 mg Intravenous Q24H     levothyroxine  75 mcg Oral Daily     magnesium oxide  400 mg Oral BID     melatonin  5 mg Oral At Bedtime     montelukast  10 mg Oral Daily     nystatin  500,000 Units Oral 4x Daily     ondansetron  4 mg Oral Daily     pantoprazole  40 mg Oral QAM AC     pazopanib  800 mg Oral Daily     prochlorperazine  5 mg Oral Daily     sodium chloride (PF)  3 mL Intracatheter Q8H     sodium chloride (PF)  3 mL Intracatheter Q8H       Data   Recent Labs   Lab 02/17/22  0237 02/17/22  0236 02/16/22  1708 02/16/22  0425 02/15/22  0539 02/14/22  1142 02/14/22  1141   WBC  --  2.8*  --  3.0*  3.3*   < >  --    HGB  --  8.1*  --  8.8* 8.9*   < >  --    MCV  --  108*  --  108* 107*   < >  --    PLT  --  169  --  173 178   < >  --    INR  --   --   --   --   --   --  1.25*     --   --  136 136   < >  --    POTASSIUM 3.7  3.7  --  3.6 3.4 3.4   < >  --    CHLORIDE 101  --   --  102 101   < >  --    CO2 31  --   --  29 27   < >  --    BUN 3*  --   --  4* 5*   < >  --    CR 0.42*  --   --  0.35* 0.47*   < >  --    ANIONGAP 4  --   --  5 8   < >  --    VIKTORIA 8.7  --   --  8.4* 8.7   < >  --    GLC 90  --   --  89 103*   < >  --    ALBUMIN 1.5*  --   --  1.4* 1.5*   < >  --    PROTTOTAL 6.5*  --   --  6.4* 6.7*   < >  --    BILITOTAL 0.4  --   --  0.4 0.7   < >  --    ALKPHOS 86  --   --  93 103   < >  --    ALT 15  --   --  13 14   < >  --    AST 15  --   --  16 18   < >  --     < > = values in this interval not displayed.       No results found for this or any previous visit (from the past 24 hour(s)).

## 2022-02-17 NOTE — CONSULTS
Care Management Initial Consult    General Information  Assessment completed with: Patient, Family, spouse, Elliot  Type of CM/SW Visit: Initial Assessment/ Elevated Risk score    Primary Care Provider verified and updated as needed: Yes     Communication Assessment  Patient's communication style: spoken language English   Hearing Difficulty or Deaf: no   Wear Glasses or Blind: yes    Cognitive  Cognitive/Neuro/Behavioral: WDL                      Living Environment:   People in home: spouse  Elliot  Current living Arrangements: apartment      Able to return to prior arrangements: yes     Family/Social Support:  Care provided by: self, spouse/significant other  Provides care for: no one  Marital Status:     Elliot       Description of Support System: Supportive, Involved       Current Resources:   Patient receiving home care services: No     Community Resources: None  Equipment currently used at home: shower chair  Supplies currently used at home: None    Employment/Financial:  Employment Status:   Not assessed       Financial Concerns:  None identified at this time.     Lifestyle & Psychosocial Needs:  Social Determinants of Health     Tobacco Use: Low Risk      Smoking Tobacco Use: Never Smoker     Smokeless Tobacco Use: Never Used   Alcohol Use: Not on file   Financial Resource Strain: Not on file   Food Insecurity: Not on file   Transportation Needs: Not on file   Physical Activity: Not on file   Stress: Not on file   Social Connections: Not on file   Intimate Partner Violence: Not on file   Depression: Not at risk     PHQ-2 Score: 1   Housing Stability: Not on file       Functional Status:  Prior to admission patient needed assistance: pt does most of her cares.  Pt spouse assist her as needed.  Dependent ADLs:: Independent  Dependent IADLs:: Independent     Additional Information:  Pt admitted on 2/14/2022.  Pt has a hx of Sarcoma diagnosed in 2019, that has metastasized to the lung with R sided  hydropneumothorax, s/p talc pleuradesis, who presents from clinic due to hypoxia.     RNCC visited pt and spouse to introduce RNCC role and assess discharge needs.  Pt was getting ready for a walk with PT and spoke mostly with pt spouse, Elliot.  Pt lives with spouse.  Pt is ind. with most of her cares, spouse assist her as needed.  Elliot stated he works from home and assist her as needed.  RNCC discussed about home care service.  Elliot stated they have appointment with OP PT/OT for 2 weeks and they are planning to continue that.  PT was evaluating pt and agreed with OP therapy.  Elliot stated they are in the process of moving to [facility] to see specialist and be close to their dtr.  They already have provider, approved by insurance and all her medical record have been sent.  Pt and spouse declined for any discharge assistance need at this time.  pt spouse will provide transportation.     No discharge assistance need identified at this time.  Pt will discharge to home with OP PT/OT.  RNCC will cont to follow plan of care.      Jessica Goff RN, PHN, BSN  4A and 4E/ ICU  Care Coordinator  Phone: [phone]  Pager: [phone]    To get in touch with weekend & Holiday on call RN Care Coordinator  Page [phone] or Care Coordinator Job code/pager- [phone]

## 2022-02-17 NOTE — PROGRESS NOTES
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 Mets progress to lungs, shoulders and flank area now and has done chemo, keytruda and now on oral medication. Recenlty having issues with fevers, she has been inpatient 2 times this summer because of it. Continues to get these in the night and feels very fatigued when they happen. Two untis of blood on Wednesday so is feeling a bit better. She has trouble getting up from toilet and from chair. She feels she cannot walk very far, uses a scooter sometimes. Looking to purchase one. Breathing is the limiting factor and gets light headed. Sustained standing is very hard for her even with UE support. Feels like she can't get out because she is fatigue. She has tumor pain in R upper shoulder blade pain, worst spot and sometimes R flank and L flank area that comes and goes, she has tumor in these areas. Stress incontinence seems to increase as she is fatigued.    Pertinent Visual History  wears glasses    Prior level of function comment walking into build was tiring for her; spouse is helping with most iADLs; no formal exercsies, does a lot of ankle pumps/circles for swelling; needs to sit during ADLs    Patient role/Employment history Other/comments  (was nurse manager, looking into long term disability )   Living environment Apartment/condo   Home/Community Accessibility Comments walking in the hallways and down to parking garage    Patient/Family Goals Statement  get up from surfaces easier, walk further, feel stronger    General Information Comments arrives with her spouse, Elliot    Fall Risk Screen   Fall screen completed by PT   Have you fallen 2 or more times in the past year? No   Have you fallen and had an injury in the past year? No   Timed Up and Go score (seconds) not tested    Is patient a fall risk? No   Fall screen comments she moves slowly but aware of her balance    Abuse Screen (yes response referral indicated)   Feels Unsafe at Home or Work/School no   Feels Threatened by Someone no   Does Anyone Try to Keep You From Having Contact with Others or Doing Things Outside Your Home? no   Physical Signs of Abuse Present no   System Outcome Measures   Outcome Measures Cancer Rehab   FACIT Fatigue Subscale (score out of 52). The higher the score, the better the QOL. 10   Six Minute Walk (meters). An increase of 70 or more meters indicates statistically significant change. 170   Pain   Pain comments reports tumor site pain in R shoulder and B flanks    Vitals Signs   Heart Rate 114   Blood Pressure 112/78   Vital Signs Comments 124 HR, 97% after 6MWT    Cognitive Status Examination   Orientation orientation to person, place and time   Level of Consciousness alert   Follows Commands and Answers Questions 100% of the time   Personal Safety and Judgment intact   Memory intact   Integumentary   Integumentary Comments swelling in B LEs, non pitting    Posture   Posture Comments forward shoulder and head posture    Range of Motion (ROM)   ROM Comment heel cord tightness B, pec tightness as noted with decreased thoracic extension and reporting increased tension through anterior chest musculature    Strength   Strength Comments 4/5 DF, 4/5 hip flexion, sit to stand without UEs but challenging for her; increased work of breathing with all standing and walking during session    Transfer Skills   Transfer Comments sit to stand with preference with use of UEs on the chair    Gait    Gait Comments ambulates with slow, steady gait with heel toe gait pattern, decreased arm swing    Gait Special Tests   Gait Special Tests 25 FOOT TIMED WALK;FUNCTIONAL GAIT ASSESSMENT;SIX MINUTE WALK TEST   Gait Special Tests Six Minute Walk Test   Feet 561 Feet   Comments increased work of breathing--  and 02 97%    Balance   Balance Comments did not do formal assessment today with gait-- decreased stability with eyes closed and decreased single leg stance time    Balance Special Tests   Balance Special Tests Modified CTSIB Conditions;Single leg stance right;Single leg stance left;Sit to stand reps   Balance Special Tests Single Leg Stance Right,   Right, seconds 3 Seconds   Balance Special Tests Single Leg Stance Left   Left, seconds 8 Seconds   Balance Special Tests Modified CTSIB Conditions   Condition 1, seconds 30 Seconds   Condition 2, seconds 21 Seconds   Balance Special Tests Sit to Stand Reps in 30 Seconds   Reps in 30 seconds 5   Height 18   Comments increased work of breathing, knee pain in the front of the knees    Sensory Examination   Sensory Perception Comments neuropathy in B feet    Planned Therapy Interventions   Planned Therapy Interventions IADL retraining;balance training;gait training;neuromuscular re-education;ROM;strengthening;stretching;transfer training   Clinical Impression   Criteria for Skilled Therapeutic Interventions Met yes, treatment indicated   PT Diagnosis decreased functional activity tolerance and strength    Influenced by the following impairments SOB, fatigue, weakness, decreased ROM, balance impairments    Functional limitations due to impairments unable to perfor iADLS and needs increased rest/breaks with all ADLS    Clinical Presentation Stable/Uncomplicated   Clinical Presentation Rationale stable medically, PT impairments, clinical judgement    Clinical Decision Making (Complexity) Low complexity   Therapy Frequency other (see comments)  (every other week )    Predicted Duration of Therapy Intervention (days/wks) up to 60-90 days   Risk & Benefits of therapy have been explained Yes   Patient, Family & other staff in agreement with plan of care Yes   Clinical Impression Comments Patient presents with decreased functional activity tolerance and strength in setting of CA. She is having trouble getting up from surfaces and walking for more than a couple minutes. Patient will benefit from PT to improve her overall functional endurance and work on exercises for her strength and balance.    GOALS   PT Eval Goals 1;2;3   Goal 1   Goal Identifier 6MWT   Goal Description Connie will improve her distance on 6MWT by 70 meters (240 meters total) in order to show improved ability to walk at store or apartment building with less fatigue.    Target Date 11/22/21   Goal 2   Goal Identifier sit to stand    Goal Description Connie will perform sit to stand x 10 reps in 30 secs in order to show improved strength needed for transfers at home with out assist or use of her arms.    Target Date 11/22/21   Goal 3   Goal Identifier walking/biking program    Goal Description Connie will participate in walking and/or stationary bike 5 times per week for at least 10-15 minutes in order to improve her walking abilities for community ambulation.    Target Date 11/22/21   Total Evaluation Time   PT Eval, Low Complexity Minutes (52776) 33   Therapy Certification   Certification date from 09/24/21   Certification date to 12/22/21   Medical Diagnosis soft tissue sarcoma; fever and chills    Certification I certify the need for these services furnished under this plan of treatment and while under my care.  (Physician co-signature of this document indicates review and certification of the therapy plan).      No information could not be obtained

## 2022-02-18 ENCOUNTER — APPOINTMENT (OUTPATIENT)
Dept: GENERAL RADIOLOGY | Facility: CLINIC | Age: 57
End: 2022-02-18
Attending: INTERNAL MEDICINE
Payer: COMMERCIAL

## 2022-02-18 ENCOUNTER — APPOINTMENT (OUTPATIENT)
Dept: PHYSICAL THERAPY | Facility: CLINIC | Age: 57
End: 2022-02-18
Payer: COMMERCIAL

## 2022-02-18 LAB
ALBUMIN SERPL-MCNC: 1.6 G/DL (ref 3.4–5)
ALP SERPL-CCNC: 81 U/L (ref 40–150)
ALT SERPL W P-5'-P-CCNC: 14 U/L (ref 0–50)
ANION GAP SERPL CALCULATED.3IONS-SCNC: 1 MMOL/L (ref 3–14)
AST SERPL W P-5'-P-CCNC: 14 U/L (ref 0–45)
BASOPHILS # BLD AUTO: 0 10E3/UL (ref 0–0.2)
BASOPHILS NFR BLD AUTO: 0 %
BILIRUB SERPL-MCNC: 0.4 MG/DL (ref 0.2–1.3)
BUN SERPL-MCNC: 4 MG/DL (ref 7–30)
CALCIUM SERPL-MCNC: 8.7 MG/DL (ref 8.5–10.1)
CHLORIDE BLD-SCNC: 101 MMOL/L (ref 94–109)
CO2 SERPL-SCNC: 33 MMOL/L (ref 20–32)
CREAT SERPL-MCNC: 0.41 MG/DL (ref 0.52–1.04)
CRP SERPL-MCNC: 160 MG/L (ref 0–8)
EOSINOPHIL # BLD AUTO: 0 10E3/UL (ref 0–0.7)
EOSINOPHIL NFR BLD AUTO: 0 %
ERYTHROCYTE [DISTWIDTH] IN BLOOD BY AUTOMATED COUNT: 19.6 % (ref 10–15)
GFR SERPL CREATININE-BSD FRML MDRD: >90 ML/MIN/1.73M2
GLUCOSE BLD-MCNC: 122 MG/DL (ref 70–99)
HCT VFR BLD AUTO: 26.4 % (ref 35–47)
HGB BLD-MCNC: 7.9 G/DL (ref 11.7–15.7)
IMM GRANULOCYTES # BLD: 0 10E3/UL
IMM GRANULOCYTES NFR BLD: 1 %
LYMPHOCYTES # BLD AUTO: 0.4 10E3/UL (ref 0.8–5.3)
LYMPHOCYTES NFR BLD AUTO: 12 %
MCH RBC QN AUTO: 32.1 PG (ref 26.5–33)
MCHC RBC AUTO-ENTMCNC: 29.9 G/DL (ref 31.5–36.5)
MCV RBC AUTO: 107 FL (ref 78–100)
MONOCYTES # BLD AUTO: 0.3 10E3/UL (ref 0–1.3)
MONOCYTES NFR BLD AUTO: 11 %
NEUTROPHILS # BLD AUTO: 2.1 10E3/UL (ref 1.6–8.3)
NEUTROPHILS NFR BLD AUTO: 76 %
NRBC # BLD AUTO: 0 10E3/UL
NRBC BLD AUTO-RTO: 1 /100
PLATELET # BLD AUTO: 175 10E3/UL (ref 150–450)
POTASSIUM BLD-SCNC: 4 MMOL/L (ref 3.4–5.3)
PROT SERPL-MCNC: 6.6 G/DL (ref 6.8–8.8)
RBC # BLD AUTO: 2.46 10E6/UL (ref 3.8–5.2)
SODIUM SERPL-SCNC: 135 MMOL/L (ref 133–144)
WBC # BLD AUTO: 2.8 10E3/UL (ref 4–11)

## 2022-02-18 PROCEDURE — 85025 COMPLETE CBC W/AUTO DIFF WBC: CPT | Performed by: INTERNAL MEDICINE

## 2022-02-18 PROCEDURE — 97116 GAIT TRAINING THERAPY: CPT | Mod: GP

## 2022-02-18 PROCEDURE — 250N000013 HC RX MED GY IP 250 OP 250 PS 637: Performed by: INTERNAL MEDICINE

## 2022-02-18 PROCEDURE — 94640 AIRWAY INHALATION TREATMENT: CPT | Mod: 76

## 2022-02-18 PROCEDURE — 250N000009 HC RX 250: Performed by: INTERNAL MEDICINE

## 2022-02-18 PROCEDURE — 94640 AIRWAY INHALATION TREATMENT: CPT

## 2022-02-18 PROCEDURE — 99233 SBSQ HOSP IP/OBS HIGH 50: CPT | Performed by: INTERNAL MEDICINE

## 2022-02-18 PROCEDURE — 250N000013 HC RX MED GY IP 250 OP 250 PS 637: Performed by: STUDENT IN AN ORGANIZED HEALTH CARE EDUCATION/TRAINING PROGRAM

## 2022-02-18 PROCEDURE — 99233 SBSQ HOSP IP/OBS HIGH 50: CPT | Performed by: CLINICAL NURSE SPECIALIST

## 2022-02-18 PROCEDURE — 71046 X-RAY EXAM CHEST 2 VIEWS: CPT | Mod: 26 | Performed by: RADIOLOGY

## 2022-02-18 PROCEDURE — 80053 COMPREHEN METABOLIC PANEL: CPT | Performed by: INTERNAL MEDICINE

## 2022-02-18 PROCEDURE — 86140 C-REACTIVE PROTEIN: CPT | Performed by: INTERNAL MEDICINE

## 2022-02-18 PROCEDURE — 120N000005 HC R&B MS OVERFLOW UMMC

## 2022-02-18 PROCEDURE — 250N000011 HC RX IP 250 OP 636: Performed by: INTERNAL MEDICINE

## 2022-02-18 PROCEDURE — 71046 X-RAY EXAM CHEST 2 VIEWS: CPT

## 2022-02-18 PROCEDURE — 99207 PR CDG-CUT & PASTE-POTENTIAL IMPACT ON LEVEL: CPT | Performed by: INTERNAL MEDICINE

## 2022-02-18 RX ORDER — HYDROXYZINE HYDROCHLORIDE 25 MG/1
25-50 TABLET, FILM COATED ORAL 2 TIMES DAILY
Status: DISCONTINUED | OUTPATIENT
Start: 2022-02-19 | End: 2022-02-20 | Stop reason: HOSPADM

## 2022-02-18 RX ORDER — HEPARIN SODIUM,PORCINE 10 UNIT/ML
5-10 VIAL (ML) INTRAVENOUS
Status: DISCONTINUED | OUTPATIENT
Start: 2022-02-18 | End: 2022-02-20 | Stop reason: HOSPADM

## 2022-02-18 RX ORDER — HYDROXYZINE HYDROCHLORIDE 25 MG/1
25 TABLET, FILM COATED ORAL EVERY 6 HOURS PRN
Status: DISCONTINUED | OUTPATIENT
Start: 2022-02-18 | End: 2022-02-20 | Stop reason: HOSPADM

## 2022-02-18 RX ORDER — DEXAMETHASONE 2 MG/1
2 TABLET ORAL DAILY
Status: DISCONTINUED | OUTPATIENT
Start: 2022-02-19 | End: 2022-02-19

## 2022-02-18 RX ORDER — HEPARIN SODIUM (PORCINE) LOCK FLUSH IV SOLN 100 UNIT/ML 100 UNIT/ML
5-10 SOLUTION INTRAVENOUS
Status: DISCONTINUED | OUTPATIENT
Start: 2022-02-18 | End: 2022-02-20 | Stop reason: HOSPADM

## 2022-02-18 RX ORDER — MIRTAZAPINE 15 MG/1
7.5 TABLET, ORALLY DISINTEGRATING ORAL AT BEDTIME
Status: DISCONTINUED | OUTPATIENT
Start: 2022-02-18 | End: 2022-02-20 | Stop reason: HOSPADM

## 2022-02-18 RX ORDER — HEPARIN SODIUM,PORCINE 10 UNIT/ML
5-10 VIAL (ML) INTRAVENOUS EVERY 24 HOURS
Status: DISCONTINUED | OUTPATIENT
Start: 2022-02-18 | End: 2022-02-20 | Stop reason: HOSPADM

## 2022-02-18 RX ADMIN — HYDROMORPHONE HYDROCHLORIDE 2 MG: 2 TABLET ORAL at 12:14

## 2022-02-18 RX ADMIN — FLUTICASONE FUROATE AND VILANTEROL TRIFENATATE 1 PUFF: 100; 25 POWDER RESPIRATORY (INHALATION) at 08:29

## 2022-02-18 RX ADMIN — Medication 5 ML: at 19:56

## 2022-02-18 RX ADMIN — DEXAMETHASONE 4 MG: 4 TABLET ORAL at 08:28

## 2022-02-18 RX ADMIN — ALBUTEROL SULFATE 2.5 MG: 2.5 SOLUTION RESPIRATORY (INHALATION) at 21:28

## 2022-02-18 RX ADMIN — PROCHLORPERAZINE MALEATE 5 MG: 5 TABLET ORAL at 19:56

## 2022-02-18 RX ADMIN — ENOXAPARIN SODIUM 70 MG: 80 INJECTION SUBCUTANEOUS at 02:51

## 2022-02-18 RX ADMIN — CETIRIZINE HYDROCHLORIDE 10 MG: 10 TABLET, FILM COATED ORAL at 08:29

## 2022-02-18 RX ADMIN — HYDROMORPHONE HYDROCHLORIDE 2 MG: 2 TABLET ORAL at 09:07

## 2022-02-18 RX ADMIN — GABAPENTIN 900 MG: 300 CAPSULE ORAL at 21:20

## 2022-02-18 RX ADMIN — HYDROMORPHONE HYDROCHLORIDE 2 MG: 2 TABLET ORAL at 00:24

## 2022-02-18 RX ADMIN — ALBUTEROL SULFATE 1 PUFF: 90 AEROSOL, METERED RESPIRATORY (INHALATION) at 06:14

## 2022-02-18 RX ADMIN — NYSTATIN 500000 UNITS: 100000 SUSPENSION ORAL at 16:49

## 2022-02-18 RX ADMIN — HYDROMORPHONE HYDROCHLORIDE 2 MG: 2 TABLET ORAL at 02:50

## 2022-02-18 RX ADMIN — Medication 7.5 MG: at 22:08

## 2022-02-18 RX ADMIN — LEVOTHYROXINE SODIUM 75 MCG: 75 TABLET ORAL at 08:28

## 2022-02-18 RX ADMIN — HYDROMORPHONE HYDROCHLORIDE 2 MG: 2 TABLET ORAL at 06:09

## 2022-02-18 RX ADMIN — NYSTATIN 500000 UNITS: 100000 SUSPENSION ORAL at 21:20

## 2022-02-18 RX ADMIN — LEVOFLOXACIN 750 MG: 5 INJECTION, SOLUTION INTRAVENOUS at 09:08

## 2022-02-18 RX ADMIN — CELECOXIB 200 MG: 200 CAPSULE ORAL at 08:27

## 2022-02-18 RX ADMIN — NYSTATIN 500000 UNITS: 100000 SUSPENSION ORAL at 08:29

## 2022-02-18 RX ADMIN — HYDROMORPHONE HYDROCHLORIDE 16 MG: 16 TABLET, EXTENDED RELEASE ORAL at 21:19

## 2022-02-18 RX ADMIN — HYDROXYZINE HYDROCHLORIDE 25 MG: 25 TABLET ORAL at 15:03

## 2022-02-18 RX ADMIN — HYDROMORPHONE HYDROCHLORIDE 2 MG: 2 TABLET ORAL at 15:02

## 2022-02-18 RX ADMIN — ENOXAPARIN SODIUM 70 MG: 80 INJECTION SUBCUTANEOUS at 14:30

## 2022-02-18 RX ADMIN — GABAPENTIN 600 MG: 300 CAPSULE ORAL at 08:27

## 2022-02-18 RX ADMIN — ALBUTEROL SULFATE 2.5 MG: 2.5 SOLUTION RESPIRATORY (INHALATION) at 02:55

## 2022-02-18 RX ADMIN — PANTOPRAZOLE SODIUM 40 MG: 40 TABLET, DELAYED RELEASE ORAL at 08:29

## 2022-02-18 RX ADMIN — ONDANSETRON 4 MG: 4 TABLET, ORALLY DISINTEGRATING ORAL at 08:28

## 2022-02-18 RX ADMIN — HYDROMORPHONE HYDROCHLORIDE 2 MG: 2 TABLET ORAL at 17:52

## 2022-02-18 RX ADMIN — PAZOPANIB 800 MG: 200 TABLET, FILM COATED ORAL at 21:25

## 2022-02-18 RX ADMIN — GABAPENTIN 600 MG: 300 CAPSULE ORAL at 14:30

## 2022-02-18 RX ADMIN — MONTELUKAST 10 MG: 10 TABLET, FILM COATED ORAL at 08:28

## 2022-02-18 RX ADMIN — NYSTATIN 500000 UNITS: 100000 SUSPENSION ORAL at 12:14

## 2022-02-18 RX ADMIN — ALBUTEROL SULFATE 2.5 MG: 2.5 SOLUTION RESPIRATORY (INHALATION) at 13:55

## 2022-02-18 RX ADMIN — Medication 5 MG: at 21:20

## 2022-02-18 RX ADMIN — HYDROXYZINE HYDROCHLORIDE 25 MG: 25 TABLET ORAL at 08:28

## 2022-02-18 RX ADMIN — CELECOXIB 200 MG: 200 CAPSULE ORAL at 21:20

## 2022-02-18 RX ADMIN — HYDROMORPHONE HYDROCHLORIDE 2 MG: 2 TABLET ORAL at 21:20

## 2022-02-18 ASSESSMENT — ACTIVITIES OF DAILY LIVING (ADL)
ADLS_ACUITY_SCORE: 15
ADLS_ACUITY_SCORE: 13
ADLS_ACUITY_SCORE: 15
ADLS_ACUITY_SCORE: 13
ADLS_ACUITY_SCORE: 15
ADLS_ACUITY_SCORE: 13
ADLS_ACUITY_SCORE: 15
ADLS_ACUITY_SCORE: 13
ADLS_ACUITY_SCORE: 13
ADLS_ACUITY_SCORE: 15
ADLS_ACUITY_SCORE: 15
ADLS_ACUITY_SCORE: 13
ADLS_ACUITY_SCORE: 15
ADLS_ACUITY_SCORE: 13
ADLS_ACUITY_SCORE: 15
ADLS_ACUITY_SCORE: 13
ADLS_ACUITY_SCORE: 15

## 2022-02-18 NOTE — PLAN OF CARE
VSS.  AF.  Pain in shoulders controlled with Dilaudid.  Up in chair and ambulated in room.  Bed bath given and hair washed.  Emesis x 1.  Zofran IV given with relief.  Lasix 20 mg given with good urine output.  Weaned oxygen to 1L to keep sats> 92%.  Declines trying on room air this shift.  Feels short of breath.  Neb given per orders.  Large loose BM x 1.   present and supportive.  Will continue with POC.    Problem: Adult Inpatient Plan of Care  Goal: Plan of Care Review  Outcome: Ongoing, Progressing  Goal: Patient-Specific Goal (Individualized)  Outcome: Ongoing, Progressing  Goal: Absence of Hospital-Acquired Illness or Injury  Outcome: Ongoing, Progressing  Intervention: Identify and Manage Fall Risk  Recent Flowsheet Documentation  Taken 2/17/2022 0800 by Tiana Banks RN  Safety Promotion/Fall Prevention:   assistive device/personal items within reach   fall prevention program maintained   increase visualization of patient   lighting adjusted   nonskid shoes/slippers when out of bed   patient and family education  Intervention: Prevent Skin Injury  Recent Flowsheet Documentation  Taken 2/17/2022 0800 by Tiana Banks RN  Body Position: position changed independently  Intervention: Prevent and Manage VTE (Venous Thromboembolism) Risk  Recent Flowsheet Documentation  Taken 2/17/2022 0800 by Tiana Banks RN  Activity Management:   activity adjusted per tolerance   ambulated to bathroom   up in chair  Goal: Optimal Comfort and Wellbeing  Outcome: Ongoing, Progressing  Goal: Readiness for Transition of Care  Outcome: Ongoing, Progressing     Problem: Infection  Goal: Absence of Infection Signs and Symptoms  Outcome: Ongoing, Progressing     Problem: Fluid Imbalance (Pneumonia)  Goal: Fluid Balance  Outcome: Ongoing, Progressing     Problem: Infection (Pneumonia)  Goal: Resolution of Infection Signs and Symptoms  Outcome: Ongoing, Progressing     Problem: Respiratory Compromise (Pneumonia)  Goal:  Effective Oxygenation and Ventilation  Outcome: Ongoing, Progressing   Goal Outcome Evaluation:

## 2022-02-18 NOTE — PLAN OF CARE
Goal Outcome Evaluation:    Plan of Care Reviewed With: patient Pain management with current scheduled dilaudid po medication is adequately controlling pain,rated pain 1-2/10.Up in chair  most of the day.ambulating with standby assist in the ochoa.Infrequent  non productive cough, need sputum sample.Chest X-ray done,result pending.Ate 25% of meal and drank 240 ml supplemental drink.No nausea,no bowel movement.Continue per plan of care.

## 2022-02-18 NOTE — PROGRESS NOTES
[facility]    Hospitalist Progress Note      Assessment & Plan   56 year old female admitted on 2/14/2022. 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      1.  Sepsis and acute hypoxic respiratory failure secondary to likely bacterial pneumonia complicated by reactive airway disease, all are present on admission  This is the main problem that led to this admission.  Sepsis criteria include heart rate of 102 and white blood cell count of 3.3.  The patient qualifies for diagnosis of acute hypoxic respiratory failure secondary to needing oxygen at 2 to 3 L continuous saturation above 92% when at home the patient is not on any oxygen supplement.  The patient qualifies for diagnosis of bacterial pneumonia given pleuritic chest pain and abnormal CT chest imaging.  Procalcitonin is minimally elevated at 0.25 and CRP is significantly elevated at 280.  The patient received ceftriaxone and doxycycline between 2/14/2022 and 2/15/2022.    The patient reported wheezing during this admission.  -I ordered overnight oxygen qualifier at 1 L of oxygen  -Weaning oxygen as tolerated targeting oxygen saturation of 92% on room air.  -Continue levofloxacin for coverage of both community-acquired pneumonia and Klebsiella pneumonia possible urinary tract infection  -Duration of antibiotics will be 10 days, stop date was placed in electronic medical records  -The patient will be discharged on oral antibiotics when she is medically ready for discharge  -Continue albuterol nebulizer every 6 hours along with albuterol as needed  -Continue dexamethasone at 4 mg daily to help combat reactive airway disease secondary to pneumonia  -Continue Breo Ellipta 1 puff daily  -Administered 1 dose of furosemide in order to help with treating the volume administered upon  hospitalization for treatment of sepsis related to pneumonia  -Weaning oxygen therapy    2.  Acute hypoxic respiratory failure secondary to provoked pulmonary embolus due to port associated deep venous thrombus of right internal jugular vein, class IV pulmonary embolism severity index indicating high risk for mortality, all are present on admission  This is the second problem that led to this admission.  Although the patient had questionable pulmonary embolus on CT PE, the presence of internal jugular vein deep venous thrombus in the setting of Port-A-Cath and active malignancy will increase the pretest probability for the CT PE and decrease its negative predictive value.  Moreover, subsegmental pulmonary embolism with negative work-up for deep venous thrombus is associated with 5.7% of recurrent pulmonary embolism, unfortunately this is not the case for this patient who has active malignancy and deep venous thrombus.    Acute hypoxic respiratory failure is improving.    -Continue therapeutic enoxaparin at 1 mg/kg every 12 hours while inpatient  -Plan to start apixaban prior to discharge for duration of at least 3 months given the provoked nature of the venous thromboembolism    3.  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  I adjusted the dosage and frequency of narcotics for this patient based on her home regimen after verifying with the records of the pharmacy and with her .  I also adjusted the dosage and the frequency of antiemetics.  I highly appreciate input of oncology service.  I consulted with palliative care service for help addressing the symptoms burden.  -Dexamethasone at 4 mg daily can help his chemotherapy related vomiting, malignancy related pain, and malignancy related decreased oral intake, the patient received 4 mg doses for 2 days, we plan to decrease the amount of dexamethasone to 2 mg  daily  -Started mirtazapine at 7.5 mg nightly based on recommendations of palliative care  -Made hydroxyzine available at 25 mg to 50 twice daily for this patient based on recommendations of palliative care  -Based on discussion with palliative care and the eminent transfer of care of this patient to [place], we elected not to start the patient on long-acting narcotics given the risk of respiratory depression and toxic encephalopathy in the setting of lack of continue to of care as outpatient since the [place]oc[place] out of state    4.  Hypomagnesemia secondary to diarrhea, all are present on admission, resolved  This is the third problem that led to this admission.  The patient has been ruled out for C. difficile colitis.  I also ordered magnesium replacement protocol.    5.  Acute anemia secondary to anemia of critical illness on top of pancytopenia secondary to malignancy, all are present on admission, stable  The patient hemoglobin increased to a baseline of 8.9 after 1 unit of blood transfusion which effectively rules out continued bleeding or hemolysis as an etiology for the anemia.    6. Severe malnutrition in the context of acute on chronic illness, all are present on admission  We will follow recommendations of adult dietitian    DVT Prophylaxis: Enoxaparin (Lovenox) SQ  Code Status: No CPR- Pre-arrest intubation OK  Expected Discharge: Once the patient acute hypoxic respiratory failure resolved, expected 2/19/2022  Anticipated discharge location: home with family    Delays:         Qamar Monroy MD  Text Page (7am - 6pm, M-F)    Interval History   The patient has reported improvement in her appetite, improvement in her pain management, improvement in her nausea and vomiting, and reduction in her wheezing.  Four-point review of systems is otherwise    -Data reviewed today: I reviewed all new labs and imaging results over the last 24 hours. I personally reviewed the chest x-ray image(s) showing  no evidence of pulmonary edema.    Physical Exam   Temp: 98  F (36.7  C) Temp src: Oral BP: 117/71 Pulse: 94   Resp: 16 SpO2: 93 % O2 Device: Nasal cannula Oxygen Delivery: 1 LPM  Vitals:    02/16/22 0745 02/17/22 0820 02/18/22 0818   Weight: 73.3 kg (161 lb 11.2 oz) 73 kg (161 lb) 73.1 kg (161 lb 3.2 oz)     Vital Signs with Ranges  Temp:  [98  F (36.7  C)-98.5  F (36.9  C)] 98  F (36.7  C)  Pulse:  [91-97] 94  Resp:  [16] 16  BP: (106-117)/(65-71) 117/71  SpO2:  [90 %-95 %] 93 %  I/O last 3 completed shifts:  In: 990 [P.O.:480; I.V.:510]  Out: 2600 [Urine:2300; Emesis/NG output:300]    Constitutional: Awake, alert, cooperative, no apparent distress.  Eyes: Conjunctiva and pupils examined and normal.  HEENT: Moist mucous membranes, normal dentition.  Respiratory: Clear to auscultation bilaterally, no crackles or wheezing.  Cardiovascular: Regular rate and rhythm, normal S1 and S2, and no murmur noted.  GI: Soft, non-distended, non-tender, normal bowel sounds.  Lymph/Hematologic: No anterior cervical or supraclavicular adenopathy.  Skin: No rashes, no cyanosis, no edema.  Musculoskeletal: No joint swelling, erythema or tenderness.  Neurologic: Cranial nerves 2-12 intact, normal strength and sensation.  Psychiatric: Alert, oriented to person, place and time, no obvious anxiety or depression.    Medications       albuterol  2.5 mg Nebulization Q6H     celecoxib  200 mg Oral BID     cetirizine  10 mg Oral Daily     dexamethasone  4 mg Oral Daily     enoxaparin ANTICOAGULANT  1 mg/kg Subcutaneous Q12H     fluticasone-vilanterol  1 puff Inhalation Daily     gabapentin  600 mg Oral BID     gabapentin  900 mg Oral At Bedtime     HYDROmorphone  2 mg Oral Q3H     HYDROmorphone HCl ER  16 mg Oral QPM     hydrOXYzine  25 mg Oral BID     levofloxacin  750 mg Intravenous Q24H     levothyroxine  75 mcg Oral Daily     melatonin  5 mg Oral At Bedtime     montelukast  10 mg Oral Daily     nystatin  500,000 Units Oral 4x Daily      ondansetron  4 mg Oral Daily     pantoprazole  40 mg Oral QAM AC     pazopanib  800 mg Oral Daily     prochlorperazine  5 mg Oral Daily     sodium chloride (PF)  3 mL Intracatheter Q8H     sodium chloride (PF)  3 mL Intracatheter Q8H       Data   Recent Labs   Lab 02/18/22  0243 02/17/22  0237 02/17/22  0236 02/16/22  1708 02/16/22  0425 02/14/22  1142 02/14/22  1141   WBC 2.8*  --  2.8*  --  3.0*   < >  --    HGB 7.9*  --  8.1*  --  8.8*   < >  --    *  --  108*  --  108*   < >  --      --  169  --  173   < >  --    INR  --   --   --   --   --   --  1.25*    136  --   --  136   < >  --    POTASSIUM 4.0 3.7  3.7  --  3.6 3.4   < >  --    CHLORIDE 101 101  --   --  102   < >  --    CO2 33* 31  --   --  29   < >  --    BUN 4* 3*  --   --  4*   < >  --    CR 0.41* 0.42*  --   --  0.35*   < >  --    ANIONGAP 1* 4  --   --  5   < >  --    VIKTORIA 8.7 8.7  --   --  8.4*   < >  --    * 90  --   --  89   < >  --    ALBUMIN 1.6* 1.5*  --   --  1.4*   < >  --    PROTTOTAL 6.6* 6.5*  --   --  6.4*   < >  --    BILITOTAL 0.4 0.4  --   --  0.4   < >  --    ALKPHOS 81 86  --   --  93   < >  --    ALT 14 15  --   --  13   < >  --    AST 14 15  --   --  16   < >  --     < > = values in this interval not displayed.       No results found for this or any previous visit (from the past 24 hour(s)).

## 2022-02-18 NOTE — PROGRESS NOTES
Hematology / Oncology  Daily Progress Note   Date of Service: 02/18/2022  Patient: Richa Ashby  MRN: [id number]  Admission Date: 2/14/2022  Hospital Day # 4  Cancer Diagnosis: Metastatic undifferentiated pleomorphic sarcoma   Primary Outpatient Oncologist: Dr. Lopez   Current Treatment Plan: Pazopanib     Recommendations:   - Continue to optimize respiratory status medically; discussed with patient that she may need to discharge with some oxygen depending on her oxygen requirements  - Continue Pazopanib   - Requested outpatient follow up for late next week.      Assessment & Plan:   Richa Ashby is a 56 year old female with pmh metastatic UPS (diagnosed 2019), right sided hydropneumothorax s/p talc pleurodesis (Nov 2021) currently admitted for hypoxia.     # Metastatic undifferentiated pleomorphic sarcoma (UPS)   # R Hydropneumothorax s/p tacl pleurodesis   Please review outpatient oncology note for full detailed history. In brief, she was diagnosed in November 2019 and presenting with lump on the right leg. She had neoadjuvant radiation followed by surgery March 2020. Recurrence in the lungs found in Nov. 2020. Treated with Doxil/ifosfamide x5 cycles then Doxil monotherapy with PD so then switched to Keytruda then gemcitabine. Most recently initiated on Pazopanib 800 mg on 9/24/2021. She was found to have R sided hydropenumothroax in Nov.2021 with right lung collapse. She had a talc pleurodesis 11//5/21.  - Imaging with overall stable disease; ok to continue Pazopanib while inpatient.  - Of note, patient is moving to [place] in ~2 weeks and has already made an appointment to establish with cancer care team down there on March 24th.     # Acute Hypoxic Respiratory Failure likely multifactorial (malignancy, PE, infection)  CT PE 2/14 with question small subsegmental thrombi vs contrast mixing artifact. Extensive cystic/cavitary lesions stable compared in Jan 2022 imaging. LE US neg for DVT but  UE US noted partially occlusive thrombus in the right internal jugular vein.   - Currently on Lovenox 1 mg/kg; with plan to transition to DOAC at discharge   - Levaquin for possible pneumonia   - Started on Dexamethasone 4 mg daily (x2/17)     # Klebsiella UTI   Urine Culture 2/14 growing >100K Klebsiella Pneumoniae   - Sensitive to Levaquin     Patient was seen and plan of care was discussed with attending physician Dr. Retana.    Thank you for the opportunity to partake in this patients plan of care. Please do not hesitate to page with questions. We will continue to follow.     I spent 30 minutes face-to-face or coordinating care of Richa Ashby. Over 50% of our time on the unit was spent counseling the patient and/or coordinating care.    Lupe Damian PA-C   Hematology/Oncology   Pager: [phone]   ___________________________________________________________________    Subjective & Interval History:    No acute events noted overnight. Connie states her breathing might be slightly better. She noticed some numbness in her chest which has been ongoing for last couple days. She states the more she sits and thinks about it the worse it gets. She feels like it might be related to anxiety/nerve. All questions answered at this time.     A comprehensive review of systems was obtained and is negative other than noted here or in the HPI.       Physical Exam:    Blood pressure 117/71, pulse 94, temperature 98  F (36.7  C), temperature source Oral, resp. rate 16, weight 73.1 kg (161 lb 3.2 oz), last menstrual period 03/19/2019, SpO2 93 %, not currently breastfeeding.    General: lying in bed, no acute distress  HEENT: sclera anicteric, EOMI, MMM  Neck: supple, normal ROM  Resp:  normal respiratory effort on 1L NC, speaking full sentences.   MSK: warm and well-perfused, normal tone  Skin: no rashes on limited exam, no jaundice  Neuro: Alert and interactive, moves all extremities equally, no focal deficits    Labs &  Studies: I personally reviewed the following studies:  ROUTINE LABS (Last four results):  CMP  Recent Labs   Lab 02/18/22  0243 02/17/22  0237 02/16/22  1708 02/16/22  0425 02/15/22  0539 02/14/22  1144 02/14/22  1144 02/14/22  0847    136  --  136 136   < > 134  --    POTASSIUM 4.0 3.7  3.7 3.6 3.4 3.4   < > 3.6  --    CHLORIDE 101 101  --  102 101   < > 96  --    CO2 33* 31  --  29 27   < > 30  --    ANIONGAP 1* 4  --  5 8   < > 8  --    * 90  --  89 103*   < > 88  --    BUN 4* 3*  --  4* 5*   < > 6*  --    CR 0.41* 0.42*  --  0.35* 0.47*   < > 0.54  --    GFRESTIMATED >90 >90  --  >90 >90   < > >90  --    VIKTORIA 8.7 8.7  --  8.4* 8.7   < > 8.4*  --    MAG  --   --   --  2.0 1.5*  --   --  1.7   PHOS  --   --   --   --   --   --  3.4  --    PROTTOTAL 6.6* 6.5*  --  6.4* 6.7*   < > 6.9  --    ALBUMIN 1.6* 1.5*  --  1.4* 1.5*   < > 1.6*  --    BILITOTAL 0.4 0.4  --  0.4 0.7   < > 0.6  --    ALKPHOS 81 86  --  93 103   < > 110  --    AST 14 15  --  16 18   < > 19  --    ALT 14 15  --  13 14   < > 16  --     < > = values in this interval not displayed.     CBC  Recent Labs   Lab 02/18/22  0243 02/17/22  0236 02/16/22  0425 02/15/22  0539   WBC 2.8* 2.8* 3.0* 3.3*   RBC 2.46* 2.51* 2.70* 2.71*   HGB 7.9* 8.1* 8.8* 8.9*   HCT 26.4* 27.2* 29.1* 29.0*   * 108* 108* 107*   MCH 32.1 32.3 32.6 32.8   MCHC 29.9* 29.8* 30.2* 30.7*   RDW 19.6* 19.9* 20.4* 21.0*    169 173 178     INR  Recent Labs   Lab 02/14/22  1141   INR 1.25*       Medications list for reference:  Current Facility-Administered Medications   Medication     acetaminophen (TYLENOL) tablet 975 mg     albuterol (PROVENTIL HFA/VENTOLIN HFA) inhaler     albuterol (PROVENTIL) neb solution 2.5 mg     celecoxib (celeBREX) capsule 200 mg     cetirizine (zyrTEC) tablet 10 mg     dexamethasone (DECADRON) tablet 4 mg     diclofenac (VOLTAREN) 1 % topical gel 4 g     enoxaparin ANTICOAGULANT (LOVENOX) injection 70 mg     fluticasone-vilanterol  (BREO ELLIPTA) 100-25 MCG/INH inhaler 1 puff     gabapentin (NEURONTIN) capsule 600 mg     gabapentin (NEURONTIN) capsule 900 mg     HYDROmorphone (DILAUDID) tablet 2 mg     HYDROmorphone (DILAUDID) tablet 2 mg     HYDROmorphone HCl ER (EXALGO) 24 hour tablet 16 mg     hydrOXYzine (ATARAX) tablet 25 mg     hydrOXYzine (ATARAX) tablet 25 mg     levofloxacin (LEVAQUIN) infusion 750 mg     levothyroxine (SYNTHROID/LEVOTHROID) tablet 75 mcg     lidocaine (LMX4) cream     lidocaine 1 % 0.1-1 mL     melatonin tablet 5 mg     montelukast (SINGULAIR) tablet 10 mg     nystatin (MYCOSTATIN) suspension 500,000 Units     ondansetron (ZOFRAN) injection 4 mg     ondansetron (ZOFRAN-ODT) ODT tab 4 mg     ondansetron (ZOFRAN-ODT) ODT tab 8 mg     pantoprazole (PROTONIX) EC tablet 40 mg     pazopanib (VOTRIENT) tablet 800 mg     prochlorperazine (COMPAZINE) tablet 10 mg     prochlorperazine (COMPAZINE) tablet 5 mg     sodium chloride (PF) 0.9% PF flush 3 mL     sodium chloride (PF) 0.9% PF flush 3 mL     sodium chloride (PF) 0.9% PF flush 3 mL     sodium chloride (PF) 0.9% PF flush 3 mL     Facility-Administered Medications Ordered in Other Encounters   Medication     heparin 100 UNIT/ML injection 5 mL

## 2022-02-18 NOTE — PROGRESS NOTES
[facility]  Palliative Care Daily Progress Note       Recommendations & Counseling       Mirtazapine 7.5 mg at bedtime    Would decrease dexamethasone to 2 mg daily for three days and then stop    Agree with allowing for additional PRN hydroxyzine doses and can increase scheduled doses to 50 mg BID    Continue current regimen for symptoms          Assessments          Richa Ashby is a 56 year old female with a past medical history of metastatic sarcoma with lung mets, admitted with hypoxia and hypotension due to sepsis from a bacteria pneumonia and PE. Her hospital course has been complicated by respiratory failure, acute anemia, nausea, and ongoing cancer related pain.     Today, the patient was seen for:  Metastatic sarcoma  Cancer related pain  Nausea  Anxiety     Prognosis, Goals, or Advance Care Planning was addressed today with: No.    Mood, coping, and/or meaning in the context of serious illness were addressed today: Yes.  Summary/Comments: Anxiety is increased. Overwhelmed.             Interval History:     Chart review/discussion with unit or clinical team members:   Notes reviewed, no acute events. Did have some complaints of chest numbness leading to work up last evening/night.     Per patient or family/caregivers today:  Patient is doing ok today, spouse at bedside and supportive. Having anxiety, pain, lack of appetite. Feels that overall pain control is going fairly well. Seems to endorse maybe the anxiety is more of the issue lately given lots of stressors with her health and moving soon. Also struggling to eat, rarely feels hungry, food doesn't taste good. Wants to focus mainly on trying to increase her appetite and nutrition first.    Key Palliative Symptoms:  # Pain severity the last 12 hours: low  # Nausea severity the last 12 hours: low  # Anxiety severity the last 12 hours: moderate    Patient is on opioids: assessed and bowels ok/no needed  changes to plan of care today.           Review of Systems:     Besides above, ROS was reviewed and is unremarkable          Medications:     I have reviewed this patient's medication profile and medications during this hospitalization.    Noted meds:    celexocib 200 mg BID  Dexamethasone 4 mg daily  Gabapentin 600 mg BID and 900 mg at bedtime  Hydromorphone 2 mg q3h  Hydromorphone ER 16 mg daily  Hydroxyzine 25 mg BID  Melatonin 5 mg daily at bedtime  Ondansetron 4 mg daily in AM  protonix 40 mg daily  Compazine 5 mg at bedtime               Physical Exam:   Vitals were reviewed  Temp: 98  F (36.7  C) Temp src: Oral BP: 117/71 Pulse: 94   Resp: 16 SpO2: 93 % O2 Device: Nasal cannula Oxygen Delivery: 1 LPM    Intake/Output Summary (Last 24 hours) at 2/18/2022 1301  Last data filed at 2/18/2022 1000  Gross per 24 hour   Intake 480 ml   Output 1360 ml   Net -880 ml     Constitutional: Awake, alert, cooperative, no apparent distress  ENT: Normocephalic, without obvious abnormality, atramatic  Lungs: No increased work of breathing, good air exchange  Musculoskeletal: No redness, warmth, or swelling of the joints.    Neurologic: Awake, alert, oriented to name, place and time.    Neuropsychiatric: Normal affect, mood, orientation, memory and insight.  Skin: No rashes, erythema, pallor, petechia or purpura.             Data Reviewed:     Reviewed recent pertinent imaging, comments:   IMPRESSION:. No evidence of pulmonary edema. Large right apical mass   and right-sided cavitary lesion again noted. Bilateral pleural   effusions with bibasilar atelectasis and/or consolidation     Reviewed recent labs, comments:   Sodium 135  Potassium 4.0  Creatinine 0.41  GFR > 90  WBC 2.8  Hemoglobin 7.9  Platelets 175    NUPUR Robles CNS  Palliative Care Consult Team  Pager: [phone]    35 minutes spent. This includes 20 minutes face to face with patient and spouse discussing current complex health conditions, symptom  management, and psychosocial needs. Coordination of care with the primary team, palliative  and SW regarding symptom management and psychosocial support needs..

## 2022-02-18 NOTE — PHARMACY-ADMISSION MEDICATION HISTORY
Admission Medication History Completed by Pharmacy    See [facility] Admission Navigator for allergy information, preferred outpatient pharmacy, prior to admission medications and immunization status.     Medication History Sources:     Patient and patient's     Surescripts records    Changes made to PTA medication list (reason):    Added: None    Deleted: guaifenesin solution (no longer takes)    Changed: None    Additional Information:    Spoke with patient about how she uses her medications and the use of prn medications  o Acetaminophen - takes usually once per day  o Voltaren gel - uses infrequently  o Nystatin - states this is only used when she is on antibiotics as she will have thrush following antibiotic therapy  o Ondansetron - takes every morning and as needed throughout the day and alternates with prochlorperazine  o Polyethylene glycol - doesn't use, but has it available  o Loperamide (not listed) - patient will buy OTC loperamide if she has diarrhea    Prior to Admission medications    Medication Sig Last Dose Taking? Auth Provider   acetaminophen (TYLENOL) 325 MG tablet Take 325-650 mg by mouth every 6 hours as needed for mild pain Past Week at Unknown time Yes Reported, Patient   ADVAIR DISKUS 100-50 MCG/DOSE inhaler Inhale 1 puff into the lungs daily  Past Week at Unknown time Yes Reported, Patient   albuterol (PROAIR RESPICLICK) 108 (90 Base) MCG/ACT inhaler Inhale 1-2 puffs into the lungs every 6 hours as needed  Past Week at Unknown time Yes Reported, Patient   celecoxib (CELEBREX) 200 MG capsule Take 1 capsule (200 mg) by mouth 2 times daily Past Week at Unknown time Yes Harshal Schuster PA   cetirizine (ZYRTEC) 10 MG tablet Take 10 mg by mouth daily Past Week at Unknown time Yes Reported, Patient   diclofenac (VOLTAREN) 1 % topical gel Apply 4 g topically 4 times daily Past Month at Unknown time Yes Harshal Schuster PA   gabapentin (NEURONTIN) 300 MG capsule Take 600 mg in the AM,  600 mg mid-day, and 900 mg at night Past Week at Unknown time Yes Harshal Schuster PA   HYDROmorphone (DILAUDID) 2 MG tablet Take 1-2 tablets (2-4 mg) by mouth every 3 hours as needed for severe pain (. Stop oxycodone completely. Do not take this with oxycodone.) Past Week at Unknown time Yes Robert Blanca MD   HYDROmorphone HCl ER (EXALGO) 16 MG 24 hour tablet Take 1 tablet (16 mg) by mouth every evening Past Week at Unknown time Yes Robert Blanca MD   hydrOXYzine (ATARAX) 25 MG tablet Take 1-2 tablets (25-50 mg) by mouth 3 times daily as needed for anxiety Past Week at Unknown time Yes Robert Blanca MD   levothyroxine (SYNTHROID/LEVOTHROID) 50 MCG tablet Take 1 tablet (50 mcg) by mouth daily Past Week at Unknown time Yes Harshal Schuster PA   melatonin (MELATONIN) 1 MG/ML LIQD liquid Take 5 mg by mouth At Bedtime  Past Week at Unknown time Yes Reported, Patient   montelukast (SINGULAIR) 10 MG tablet Take 10 mg by mouth daily Past Week at Unknown time Yes Reported, Patient   multivitamin w/minerals (MULTI-VITAMIN) tablet Take 1 tablet by mouth daily Past Week at Unknown time Yes Reported, Patient   naloxone (NARCAN) 4 MG/0.1ML nasal spray Spray 1 spray (4 mg) into one nostril alternating nostrils once as needed for opioid reversal every 2-3 minutes until assistance arrives Unknown at Unknown time Yes Robert Blanca MD   NASAL SPRAY SALINE NA  More than a month at Unknown time Yes Reported, Patient   nystatin (MYCOSTATIN) 646548 UNIT/ML suspension Take 5 mLs (500,000 Units) by mouth 4 times daily Past Month at Unknown time Yes Harshal Schuster PA   ondansetron (ZOFRAN-ODT) 8 MG ODT tab Take 1 tablet (8 mg) by mouth every 8 hours as needed for nausea Past Week at Unknown time Yes Harshal Schuster PA   pazopanib (VOTRIENT) 200 MG tablet Take 4 tablets (800 mg) by mouth daily Take on an empty stomach 1 hour before or 2 hours after a meal. Past Week at Unknown time Yes  Jose Alberto Lopez MD   polyethylene glycol (MIRALAX) 17 GM/Dose powder Take 17 g by mouth daily More than a month at Unknown time Yes Sonia Boyle MD   prochlorperazine (COMPAZINE) 10 MG tablet Take 1 tablet (10 mg) by mouth every 6 hours as needed for nausea or vomiting Past Week at Unknown time Yes Harshal Schuster PA   triamcinolone (KENALOG) 0.1 % external cream Apply topically 2 times daily As needed for skin irritation. More than a month at Unknown time Yes Harshal Schuster PA   vitamin D3 (CHOLECALCIFEROL) 2000 units (50 mcg) tablet Take 1 tablet by mouth daily Past Week at Unknown time Yes Reported, Patient       Date completed: 02/18/22    Medication history completed by: Fior Wild, PharmD

## 2022-02-18 NOTE — PLAN OF CARE
VSS. Up standby assist in her room. Denies nausea, vomiting, or diarrhea. Rating shoulder pain 1-3/10. Has adequate relief with scheduled Q3H Dilaudid. Nasal cannula at 1 LPM. Stated Q6H neb treatments work better for her breathing vs. Inhaler. Used PRN Albuterol inhaler x1. Complained of a band of numbness running across her chest around 2140. Provider paged and ordered a stat Troponin 1 level (came back 4) and an EKG 12-lead. 1x PRN dose of Hydroxyzine given for anxiety/sleep. CHG wipes completed before bed. Will continue plan of care.    Problem: Adult Inpatient Plan of Care  Goal: Plan of Care Review  Outcome: Ongoing, Progressing  Goal: Patient-Specific Goal (Individualized)  Outcome: Ongoing, Progressing  Goal: Absence of Hospital-Acquired Illness or Injury  Outcome: Ongoing, Progressing  Intervention: Identify and Manage Fall Risk  Recent Flowsheet Documentation  Taken 2/17/2022 2000 by Maximino Lizarraga, RN  Safety Promotion/Fall Prevention:    assistive device/personal items within reach    clutter free environment maintained    fall prevention program maintained    lighting adjusted    nonskid shoes/slippers when out of bed    patient and family education    room organization consistent    safety round/check completed  Intervention: Prevent Skin Injury  Recent Flowsheet Documentation  Taken 2/17/2022 2000 by Maximino Lizarraga, RN  Body Position: position changed independently  Intervention: Prevent and Manage VTE (Venous Thromboembolism) Risk  Recent Flowsheet Documentation  Taken 2/17/2022 2000 by Maximino Lizarraga, RN  Activity Management: activity adjusted per tolerance  Goal: Optimal Comfort and Wellbeing  Outcome: Ongoing, Progressing  Goal: Readiness for Transition of Care  Outcome: Ongoing, Progressing     Problem: Infection  Goal: Absence of Infection Signs and Symptoms  Outcome: Ongoing, Progressing     Problem: Fluid Imbalance (Pneumonia)  Goal: Fluid Balance  Outcome: Ongoing, Progressing     Problem:  Infection (Pneumonia)  Goal: Resolution of Infection Signs and Symptoms  Outcome: Ongoing, Progressing     Problem: Respiratory Compromise (Pneumonia)  Goal: Effective Oxygenation and Ventilation  Outcome: Ongoing, Progressing  Intervention: Promote Airway Secretion Clearance  Recent Flowsheet Documentation  Taken 2/18/2022 0100 by Maximino Lizarraga RN  Cough And Deep Breathing: done with encouragement  Taken 2/17/2022 2200 by Maximino Lizarraga, RN  Cough And Deep Breathing: done with encouragement  Taken 2/17/2022 2000 by Maximino Lizarraga RN  Cough And Deep Breathing: done with encouragement  Intervention: Optimize Oxygenation and Ventilation  Recent Flowsheet Documentation  Taken 2/17/2022 2000 by Maximino Lizarraga, RN  Head of Bed (HOB) Positioning: HOB at 20-30 degrees

## 2022-02-19 LAB
ALBUMIN SERPL-MCNC: 1.8 G/DL (ref 3.4–5)
ALP SERPL-CCNC: 73 U/L (ref 40–150)
ALT SERPL W P-5'-P-CCNC: 13 U/L (ref 0–50)
ANION GAP SERPL CALCULATED.3IONS-SCNC: 5 MMOL/L (ref 3–14)
AST SERPL W P-5'-P-CCNC: 12 U/L (ref 0–45)
BACTERIA BLD CULT: NO GROWTH
BACTERIA BLD CULT: NO GROWTH
BASOPHILS # BLD AUTO: 0 10E3/UL (ref 0–0.2)
BASOPHILS NFR BLD AUTO: 0 %
BILIRUB SERPL-MCNC: 0.3 MG/DL (ref 0.2–1.3)
BUN SERPL-MCNC: 6 MG/DL (ref 7–30)
CALCIUM SERPL-MCNC: 9 MG/DL (ref 8.5–10.1)
CHLORIDE BLD-SCNC: 103 MMOL/L (ref 94–109)
CO2 SERPL-SCNC: 31 MMOL/L (ref 20–32)
CREAT SERPL-MCNC: 0.35 MG/DL (ref 0.52–1.04)
CRP SERPL-MCNC: 89 MG/L (ref 0–8)
EOSINOPHIL # BLD AUTO: 0 10E3/UL (ref 0–0.7)
EOSINOPHIL NFR BLD AUTO: 0 %
ERYTHROCYTE [DISTWIDTH] IN BLOOD BY AUTOMATED COUNT: 19.9 % (ref 10–15)
GFR SERPL CREATININE-BSD FRML MDRD: >90 ML/MIN/1.73M2
GLUCOSE BLD-MCNC: 99 MG/DL (ref 70–99)
HCT VFR BLD AUTO: 27.2 % (ref 35–47)
HGB BLD-MCNC: 8 G/DL (ref 11.7–15.7)
IMM GRANULOCYTES # BLD: 0 10E3/UL
IMM GRANULOCYTES NFR BLD: 1 %
LYMPHOCYTES # BLD AUTO: 0.8 10E3/UL (ref 0.8–5.3)
LYMPHOCYTES NFR BLD AUTO: 20 %
MCH RBC QN AUTO: 32 PG (ref 26.5–33)
MCHC RBC AUTO-ENTMCNC: 29.4 G/DL (ref 31.5–36.5)
MCV RBC AUTO: 109 FL (ref 78–100)
MONOCYTES # BLD AUTO: 0.5 10E3/UL (ref 0–1.3)
MONOCYTES NFR BLD AUTO: 12 %
NEUTROPHILS # BLD AUTO: 2.5 10E3/UL (ref 1.6–8.3)
NEUTROPHILS NFR BLD AUTO: 67 %
NRBC # BLD AUTO: 0 10E3/UL
NRBC BLD AUTO-RTO: 0 /100
PLATELET # BLD AUTO: 174 10E3/UL (ref 150–450)
POTASSIUM BLD-SCNC: 3.8 MMOL/L (ref 3.4–5.3)
PROCALCITONIN SERPL-MCNC: 0.07 NG/ML
PROT SERPL-MCNC: 6.4 G/DL (ref 6.8–8.8)
RBC # BLD AUTO: 2.5 10E6/UL (ref 3.8–5.2)
SODIUM SERPL-SCNC: 139 MMOL/L (ref 133–144)
WBC # BLD AUTO: 3.7 10E3/UL (ref 4–11)

## 2022-02-19 PROCEDURE — 94762 N-INVAS EAR/PLS OXIMTRY CONT: CPT

## 2022-02-19 PROCEDURE — 120N000005 HC R&B MS OVERFLOW UMMC

## 2022-02-19 PROCEDURE — 250N000013 HC RX MED GY IP 250 OP 250 PS 637: Performed by: STUDENT IN AN ORGANIZED HEALTH CARE EDUCATION/TRAINING PROGRAM

## 2022-02-19 PROCEDURE — 85004 AUTOMATED DIFF WBC COUNT: CPT | Performed by: INTERNAL MEDICINE

## 2022-02-19 PROCEDURE — 250N000011 HC RX IP 250 OP 636: Performed by: INTERNAL MEDICINE

## 2022-02-19 PROCEDURE — 99233 SBSQ HOSP IP/OBS HIGH 50: CPT | Performed by: INTERNAL MEDICINE

## 2022-02-19 PROCEDURE — 80053 COMPREHEN METABOLIC PANEL: CPT | Performed by: INTERNAL MEDICINE

## 2022-02-19 PROCEDURE — 94640 AIRWAY INHALATION TREATMENT: CPT

## 2022-02-19 PROCEDURE — 94640 AIRWAY INHALATION TREATMENT: CPT | Mod: 76

## 2022-02-19 PROCEDURE — 86140 C-REACTIVE PROTEIN: CPT | Performed by: INTERNAL MEDICINE

## 2022-02-19 PROCEDURE — 95801 SLP STDY UNATND W/ANAL: CPT

## 2022-02-19 PROCEDURE — 82040 ASSAY OF SERUM ALBUMIN: CPT | Performed by: INTERNAL MEDICINE

## 2022-02-19 PROCEDURE — 250N000013 HC RX MED GY IP 250 OP 250 PS 637: Performed by: INTERNAL MEDICINE

## 2022-02-19 PROCEDURE — 84145 PROCALCITONIN (PCT): CPT | Performed by: INTERNAL MEDICINE

## 2022-02-19 PROCEDURE — 99207 PR CDG-CUT & PASTE-POTENTIAL IMPACT ON LEVEL: CPT | Performed by: INTERNAL MEDICINE

## 2022-02-19 PROCEDURE — 250N000009 HC RX 250: Performed by: INTERNAL MEDICINE

## 2022-02-19 RX ORDER — DEXAMETHASONE 2 MG/1
2 TABLET ORAL ONCE
Status: COMPLETED | OUTPATIENT
Start: 2022-02-19 | End: 2022-02-19

## 2022-02-19 RX ORDER — RIVAROXABAN 15 MG-20MG
KIT ORAL
Qty: 111 EACH | Refills: 0 | Status: SHIPPED | OUTPATIENT
Start: 2022-02-19 | End: 2022-05-20

## 2022-02-19 RX ORDER — POLYETHYLENE GLYCOL 3350 17 G/17G
17 POWDER, FOR SOLUTION ORAL DAILY
Status: DISCONTINUED | OUTPATIENT
Start: 2022-02-19 | End: 2022-02-20 | Stop reason: HOSPADM

## 2022-02-19 RX ORDER — DEXAMETHASONE 4 MG/1
4 TABLET ORAL DAILY
Status: DISCONTINUED | OUTPATIENT
Start: 2022-02-20 | End: 2022-02-20 | Stop reason: HOSPADM

## 2022-02-19 RX ADMIN — ENOXAPARIN SODIUM 70 MG: 80 INJECTION SUBCUTANEOUS at 03:30

## 2022-02-19 RX ADMIN — POLYETHYLENE GLYCOL 3350 17 G: 17 POWDER, FOR SOLUTION ORAL at 11:37

## 2022-02-19 RX ADMIN — CELECOXIB 200 MG: 200 CAPSULE ORAL at 21:44

## 2022-02-19 RX ADMIN — GABAPENTIN 900 MG: 300 CAPSULE ORAL at 21:44

## 2022-02-19 RX ADMIN — HYDROMORPHONE HYDROCHLORIDE 2 MG: 2 TABLET ORAL at 12:27

## 2022-02-19 RX ADMIN — HYDROMORPHONE HYDROCHLORIDE 2 MG: 2 TABLET ORAL at 08:13

## 2022-02-19 RX ADMIN — HYDROXYZINE HYDROCHLORIDE 25 MG: 25 TABLET ORAL at 08:13

## 2022-02-19 RX ADMIN — HYDROXYZINE HYDROCHLORIDE 25 MG: 25 TABLET ORAL at 16:04

## 2022-02-19 RX ADMIN — MONTELUKAST 10 MG: 10 TABLET, FILM COATED ORAL at 08:13

## 2022-02-19 RX ADMIN — ONDANSETRON 4 MG: 4 TABLET, ORALLY DISINTEGRATING ORAL at 08:13

## 2022-02-19 RX ADMIN — CELECOXIB 200 MG: 200 CAPSULE ORAL at 08:23

## 2022-02-19 RX ADMIN — DEXAMETHASONE 2 MG: 2 TABLET ORAL at 18:06

## 2022-02-19 RX ADMIN — RIVAROXABAN 15 MG: 15 TABLET, FILM COATED ORAL at 21:51

## 2022-02-19 RX ADMIN — ALBUTEROL SULFATE 2.5 MG: 2.5 SOLUTION RESPIRATORY (INHALATION) at 20:32

## 2022-02-19 RX ADMIN — HYDROMORPHONE HYDROCHLORIDE 2 MG: 2 TABLET ORAL at 06:06

## 2022-02-19 RX ADMIN — NYSTATIN 500000 UNITS: 100000 SUSPENSION ORAL at 20:21

## 2022-02-19 RX ADMIN — HYDROMORPHONE HYDROCHLORIDE 2 MG: 2 TABLET ORAL at 16:05

## 2022-02-19 RX ADMIN — PROCHLORPERAZINE MALEATE 5 MG: 5 TABLET ORAL at 20:21

## 2022-02-19 RX ADMIN — GABAPENTIN 600 MG: 300 CAPSULE ORAL at 08:12

## 2022-02-19 RX ADMIN — PANTOPRAZOLE SODIUM 40 MG: 40 TABLET, DELAYED RELEASE ORAL at 08:13

## 2022-02-19 RX ADMIN — CETIRIZINE HYDROCHLORIDE 10 MG: 10 TABLET, FILM COATED ORAL at 08:12

## 2022-02-19 RX ADMIN — LEVOFLOXACIN 750 MG: 5 INJECTION, SOLUTION INTRAVENOUS at 09:33

## 2022-02-19 RX ADMIN — ALBUTEROL SULFATE 2.5 MG: 2.5 SOLUTION RESPIRATORY (INHALATION) at 09:52

## 2022-02-19 RX ADMIN — GABAPENTIN 600 MG: 300 CAPSULE ORAL at 13:40

## 2022-02-19 RX ADMIN — NYSTATIN 500000 UNITS: 100000 SUSPENSION ORAL at 16:04

## 2022-02-19 RX ADMIN — DEXAMETHASONE 2 MG: 4 TABLET ORAL at 08:14

## 2022-02-19 RX ADMIN — Medication 5 ML: at 11:38

## 2022-02-19 RX ADMIN — NYSTATIN 500000 UNITS: 100000 SUSPENSION ORAL at 08:13

## 2022-02-19 RX ADMIN — LEVOTHYROXINE SODIUM 75 MCG: 75 TABLET ORAL at 08:13

## 2022-02-19 RX ADMIN — FLUTICASONE FUROATE AND VILANTEROL TRIFENATATE 1 PUFF: 100; 25 POWDER RESPIRATORY (INHALATION) at 08:16

## 2022-02-19 RX ADMIN — RIVAROXABAN 15 MG: 15 TABLET, FILM COATED ORAL at 13:40

## 2022-02-19 RX ADMIN — Medication 5 MG: at 21:44

## 2022-02-19 RX ADMIN — Medication 7.5 MG: at 21:50

## 2022-02-19 RX ADMIN — ALBUTEROL SULFATE 2.5 MG: 2.5 SOLUTION RESPIRATORY (INHALATION) at 14:42

## 2022-02-19 RX ADMIN — Medication 5 ML: at 12:27

## 2022-02-19 RX ADMIN — HYDROMORPHONE HYDROCHLORIDE 2 MG: 2 TABLET ORAL at 21:50

## 2022-02-19 RX ADMIN — Medication 5 ML: at 03:31

## 2022-02-19 RX ADMIN — PAZOPANIB 800 MG: 200 TABLET, FILM COATED ORAL at 21:45

## 2022-02-19 RX ADMIN — HYDROMORPHONE HYDROCHLORIDE 2 MG: 2 TABLET ORAL at 23:39

## 2022-02-19 RX ADMIN — HYDROMORPHONE HYDROCHLORIDE 2 MG: 2 TABLET ORAL at 03:30

## 2022-02-19 RX ADMIN — HYDROMORPHONE HYDROCHLORIDE 2 MG: 2 TABLET ORAL at 00:47

## 2022-02-19 RX ADMIN — NYSTATIN 500000 UNITS: 100000 SUSPENSION ORAL at 12:27

## 2022-02-19 RX ADMIN — HYDROMORPHONE HYDROCHLORIDE 2 MG: 2 TABLET ORAL at 18:06

## 2022-02-19 RX ADMIN — HYDROMORPHONE HYDROCHLORIDE 16 MG: 16 TABLET, EXTENDED RELEASE ORAL at 20:21

## 2022-02-19 ASSESSMENT — ACTIVITIES OF DAILY LIVING (ADL)
ADLS_ACUITY_SCORE: 13

## 2022-02-19 NOTE — PROGRESS NOTES
I certify that this patient, Richa Ashby has been under my care (or a nurse practitioner or physican's assistant working with me). This is the face-to-face encounter for oxygen medical necessity.      Richa Ashby is now in a chronic stable state and continues to require supplemental oxygen. Patient has continued oxygen desaturation due to Chronic Respiratory Failure with Hypoxia J93.11.    Alternative treatment(s) tried or considered and deemed clinically infective for treatment of Chronic Respiratory Failure with Hypoxia J93.11 include nebulizers and steroids.  If portability is ordered, is the patient mobile within the home? yes    **Patients who qualify for home O2 coverage under the CMS guidelines require ABG tests or O2 sat readings obtained closest to, but no earlier than 2 days prior to the discharge, as evidence of the need for home oxygen therapy. Testing must be performed while patient is in the chronic stable state. See notes for O2 sats.**    Patient has been assessed for Home Oxygen by a credentialed professional during activity/exercise and in a chronic stable state.     (Activity/Exercise should mimic patient s home activity/exercise and ADLs)      1) Resting saturation on Room Air: 92  2) Resting saturation on O2: 94 on 1 LPM         4) Activity/Exercise saturation on Room Air: 85   5) Activity/Exercise saturation on O2: 94 on 2 LPM

## 2022-02-19 NOTE — PROGRESS NOTES
[facility]    Hospitalist Progress Note      Assessment & Plan   56 year old female admitted on 2/14/2022. 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      1.  Sepsis and acute hypoxic respiratory failure secondary to likely bacterial pneumonia complicated by reactive airway disease, all are present on admission  This is the main problem that led to this admission.  Sepsis criteria include heart rate of 102 and white blood cell count of 3.3.  The patient qualifies for diagnosis of acute hypoxic respiratory failure secondary to needing oxygen at 2 to 3 L continuous saturation above 92% when at home the patient is not on any oxygen supplement.  The patient qualifies for diagnosis of bacterial pneumonia given pleuritic chest pain and abnormal CT chest imaging.  Procalcitonin is minimally elevated at 0.25 and CRP is significantly elevated at 280.  The patient received ceftriaxone and doxycycline between 2/14/2022 and 2/15/2022.    The patient reported wheezing during this admission.  The patient qualified for oxygen only on activity secondary to her oxygen saturation decreasing to as low as 85% on room air.  The patient will need 2 L of oxygen on activity.  The patient does not qualify for supplemental oxygen at rest since her oxygen saturation at rest on room air is 92%.  The patient does not qualify for requiring oxygen while asleep since the longest.  Of desaturation below 88% was 8 seconds.  Although it can also be used just for symptoms management overnight.    -Weaning oxygen as tolerated targeting oxygen saturation of 92% on room air.  -Continue levofloxacin for coverage of both community-acquired pneumonia and Klebsiella pneumonia possible urinary tract infection  -Duration of antibiotics will be 10 days, stop date was placed in  electronic medical records  -The patient will be discharged on oral antibiotics when she is medically ready for discharge  -Continue albuterol nebulizer every 6 hours along with albuterol as needed  -Decrease dexamethasone to 2 mg daily. oncology service has agreed to resuming dexamethasone at 4 mg daily and possibly discharging the patient on the given that it has been helpful for treatment of symptoms related to her malignancy and her chemotherapy including reduction of nausea, better pain control, and increasing appetite.  I am awaiting further recommendations from palliative care service.  -Continue Breo Ellipta 1 puff daily  -Administered 1 dose of furosemide in order to help with treating the volume administered upon hospitalization for treatment of sepsis related to pneumonia  -Weaning oxygen therapy    2.  Acute hypoxic respiratory failure secondary to provoked pulmonary embolus due to port associated deep venous thrombus of right internal jugular vein, class IV pulmonary embolism severity index indicating high risk for mortality, all are present on admission  This is the second problem that led to this admission.  Although the patient had questionable pulmonary embolus on CT PE, the presence of internal jugular vein deep venous thrombus in the setting of Port-A-Cath and active malignancy will increase the pretest probability for the CT PE and decrease its negative predictive value.  Moreover, subsegmental pulmonary embolism with negative work-up for deep venous thrombus is associated with 5.7% of recurrent pulmonary embolism, unfortunately this is not the case for this patient who has active malignancy and deep venous thrombus.    Acute hypoxic respiratory failure is improving.    -Started rivaroxaban for treatment of pulmonary embolism given data to support the use of rivaroxaban in patients with solid tumors.  I have started the patient on the starter pack for the rivaroxaban.  It has been approved by her  insurance for total duration of 3 months of therapy    3.  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  I adjusted the dosage and frequency of narcotics for this patient based on her home regimen after verifying with the records of the pharmacy and with her .  I also adjusted the dosage and the frequency of antiemetics.  I highly appreciate input of oncology service.  I consulted with palliative care service for help addressing the symptoms burden.    -As detailed above the oncology service has agreed to resuming dexamethasone at 4 mg daily and possibly discharging the patient on the given that it has been helpful for treatment of symptoms related to her malignancy and her chemotherapy including reduction of nausea, better pain control, and increasing appetite.  I am awaiting further recommendations from palliative care service.  -Continue mirtazapine at 7.5 mg nightly based on recommendations of palliative care  -Made hydroxyzine available at 25 mg to 50 twice daily for this patient based on recommendations of palliative care  -Based on discussion with palliative care and the eminent transfer of care of this patient to [place], we elected not to start the patient on long-acting narcotics given the risk of respiratory depression and toxic encephalopathy in the setting of lack of continue to of care as outpatient since the [place]oc[place] out of state    4.  Hypomagnesemia secondary to diarrhea, all are present on admission, resolved  This is the third problem that led to this admission.  The patient has been ruled out for C. difficile colitis.  I also ordered magnesium replacement protocol.    5.  Acute anemia secondary to anemia of critical illness on top of pancytopenia secondary to malignancy, all are present on admission, stable  The patient hemoglobin increased to a baseline of 8.9 after 1 unit of blood  transfusion which effectively rules out continued bleeding or hemolysis as an etiology for the anemia.    6. Severe malnutrition in the context of acute on chronic illness, all are present on admission  We will follow recommendations of adult dietitian    DVT Prophylaxis: Enoxaparin (Lovenox) SQ  Code Status: No CPR- Pre-arrest intubation OK  Expected Discharge: Once the patient acute hypoxic respiratory failure resolved, expected 2/19/2022  Anticipated discharge location: home with family    Delays:         Qamar Monroy MD  Text Page (7am - 6pm, M-F)    Interval History   The patient continues to report shortness of breath on minimal exertion, however she also reports remarkable improvement since admission.  She denies any active chest pain or subjective fever.  Four-point review of systems is otherwise    -Data reviewed today: I reviewed all new labs and imaging results over the last 24 hours. I personally reviewed no images or EKG's today.    Physical Exam   Temp: 98.8  F (37.1  C) Temp src: Oral BP: 130/69 Pulse: 89   Resp: 16 SpO2: 93 % O2 Device: Nasal cannula Oxygen Delivery: 1 LPM  Vitals:    02/17/22 0820 02/18/22 0818 02/19/22 0700   Weight: 73 kg (161 lb) 73.1 kg (161 lb 3.2 oz) 73.4 kg (161 lb 12.8 oz)     Vital Signs with Ranges  Temp:  [97.7  F (36.5  C)-98.8  F (37.1  C)] 98.8  F (37.1  C)  Pulse:  [83-90] 89  Resp:  [16] 16  BP: (109-133)/(67-75) 130/69  SpO2:  [92 %-95 %] 93 %  I/O last 3 completed shifts:  In: 600 [P.O.:600]  Out: 1160 [Urine:1160]    Constitutional: Awake, alert, cooperative, no apparent distress.  Eyes: Conjunctiva and pupils examined and normal.  HEENT: Moist mucous membranes, normal dentition.  Respiratory: Clear to auscultation bilaterally, no crackles or wheezing.  Cardiovascular: Regular rate and rhythm, normal S1 and S2, and no murmur noted.  GI: Soft, non-distended, non-tender, normal bowel sounds.  Lymph/Hematologic: No anterior cervical or supraclavicular  adenopathy.  Skin: No rashes, no cyanosis, no edema.  Musculoskeletal: No joint swelling, erythema or tenderness.  Neurologic: Cranial nerves 2-12 intact, normal strength and sensation.  Psychiatric: Alert, oriented to person, place and time, no obvious anxiety or depression.    Medications       albuterol  2.5 mg Nebulization Q6H     celecoxib  200 mg Oral BID     cetirizine  10 mg Oral Daily     dexamethasone  2 mg Oral Daily     fluticasone-vilanterol  1 puff Inhalation Daily     gabapentin  600 mg Oral BID     gabapentin  900 mg Oral At Bedtime     heparin  5-10 mL Intracatheter Q28 Days     heparin  5-10 mL Intracatheter Q28 Days     heparin lock flush  5-10 mL Intracatheter Q24H     heparin lock flush  5-10 mL Intracatheter Q24H     HYDROmorphone  2 mg Oral Q3H     HYDROmorphone HCl ER  16 mg Oral QPM     hydrOXYzine  25-50 mg Oral BID     levofloxacin  750 mg Intravenous Q24H     levothyroxine  75 mcg Oral Daily     melatonin  5 mg Oral At Bedtime     mirtazapine  7.5 mg Orally disintegrating tablet At Bedtime     montelukast  10 mg Oral Daily     nystatin  500,000 Units Oral 4x Daily     ondansetron  4 mg Oral Daily     pantoprazole  40 mg Oral QAM AC     pazopanib  800 mg Oral Daily     prochlorperazine  5 mg Oral Daily     rivaroxaban ANTICOAGULANT  15 mg Oral Daily with supper     sodium chloride (PF)  10-20 mL Intracatheter Q28 Days     sodium chloride (PF)  10-20 mL Intracatheter Q28 Days     sodium chloride (PF)  3 mL Intracatheter Q8H       Data   Recent Labs   Lab 02/19/22  0345 02/18/22  0243 02/17/22  0237 02/17/22  0236 02/14/22  1142 02/14/22  1141   WBC 3.7* 2.8*  --  2.8*   < >  --    HGB 8.0* 7.9*  --  8.1*   < >  --    * 107*  --  108*   < >  --     175  --  169   < >  --    INR  --   --   --   --   --  1.25*    135 136  --    < >  --    POTASSIUM 3.8 4.0 3.7  3.7  --    < >  --    CHLORIDE 103 101 101  --    < >  --    CO2 31 33* 31  --    < >  --    BUN 6* 4* 3*  --     < >  --    CR 0.35* 0.41* 0.42*  --    < >  --    ANIONGAP 5 1* 4  --    < >  --    VIKTORIA 9.0 8.7 8.7  --    < >  --    GLC 99 122* 90  --    < >  --    ALBUMIN 1.8* 1.6* 1.5*  --    < >  --    PROTTOTAL 6.4* 6.6* 6.5*  --    < >  --    BILITOTAL 0.3 0.4 0.4  --    < >  --    ALKPHOS 73 81 86  --    < >  --    ALT 13 14 15  --    < >  --    AST 12 14 15  --    < >  --     < > = values in this interval not displayed.       Recent Results (from the past 24 hour(s))   XR Chest 2 Views    Narrative    PA and lateral chest    HISTORY: Follow-up bacterial pneumonia versus volume overload sarcoma    COMPARISON STUDY: 2/14/2022    FINDINGS: Large right upper lung mass again noted. Right IJ  Port-A-Cath tip in the mid SVC. Moderate bilateral pleural effusions  with basilar atelectasis and/or consolidation. Known cavitary lesions  better seen on the right.      Impression    IMPRESSION:. No evidence of pulmonary edema. Large right apical mass  and right-sided cavitary lesion again noted. Bilateral pleural  effusions with bibasilar atelectasis and/or consolidation    SEVEN VANCE MD         SYSTEM ID:  [id number]

## 2022-02-19 NOTE — PROGRESS NOTES
Patient has been assessed for Home Oxygen by a credentialed professional during activity/exercise and in a chronic stable state.    (Activity/Exercise should mimic patient s home activity/exercise and ADLs)     1) Resting saturation on Room Air: 92  2) Resting saturation on O2: 94 on 1 LPM       4) Activity/Exercise saturation on Room Air: 85   5) Activity/Exercise saturation on O2: 94 on 2 LPM       Please notify patient s RN Care Coordinator when you ve completed this dot phrase.

## 2022-02-19 NOTE — PLAN OF CARE
Neuro: A&Ox4.   Cardiac: Afebrile, VSS.            Respiratory: 1L NC, sats 91-94%. Pt is on overnight oxymetry study. NAZARIO when up to bathroom.  GI/: Voiding in adequate amount. No BM this shift.   Diet/appetite: Regular diet ordered.  Activity:Up SBA to the bathroom.  Pain: Rated R shoulde pain 3/10; pt is on scheduled hydromorphone  2 mg tab po Q 3 hrs,with good pain control.  Skin: No deficits noted.  Lines:Oliver cath hep locked. Good blood return.  Continue with POC. Notify MDs with concerns.

## 2022-02-19 NOTE — PLAN OF CARE
Goal Outcome Evaluation:    Plan of Care Reviewed With: patient      Neuro: A&Ox4.   Cardiac: Afebrile, VSS.            Respiratory: 1L NC, sats 93% and above; Pt is on overnight oxymetry study  GI/: Voiding spontaneously; adequate amount. No BM this shift.   Diet/appetite: Tolerating diet.Poor appetite.Denies nausea   Activity:Up SBA   Pain: Rated R shoulde pain 3/10; pt is on scheduled hydromorphone  2 mg tab po Q 3 hrs, hydromorphone ER 16 mg every evening with good pain control.  Skin: No new deficits noted.  Lines:Oliver cath hep locked.  Drains:None   was in room; supportive of pt.  Continue with POC

## 2022-02-19 NOTE — PROGRESS NOTES
Care Management Follow Up    Length of Stay (days): 5    Expected Discharge Date: 02/19/2022     Concerns to be Addressed:     Home oxygen  Patient plan of care discussed at interdisciplinary rounds: Yes    Anticipated Discharge Disposition: Home     Anticipated Discharge Services: Other (see comment) (Out patient  PT/OT)  Anticipated Discharge DME: None    Patient/family educated on Medicare website which has current facility and service quality ratings:  N/A  Education Provided on the Discharge Plan:  N/A  Patient/Family in Agreement with the Plan: yes    Referrals Placed by CM/SW:  N/A      Additional Information:  Notified by Dr. Monroy that team anticipates possible discharge tomorrow.  Team anticipates pt will need oxygen set up for home.  Overnight Oxizimetry study done  And filed in pt chart. Pt will have 02 walk test done today.     Spoke with WHITNEY Zamorano regarding anticipated need for O2.  Reviewed new process where bedside RN assists with new home oxygen set up.  Agreed to place pt on weekend RNCC list should bedside RN need assistance in arranging O2 for discharge.    Alexandra Simon RN BSN, PHN, ACM-RN  To contact the weekend RNCC, Page: [phone]    2/19/2022 12:02 PM

## 2022-02-19 NOTE — PROGRESS NOTES
[facility]  Palliative Care Daily Progress Note       Recommendations & Counseling       No specific changes rec'd today. We spent a long time and reviewed her overall symptom POC as she's leaving the hospital perhaps tomorrow and moving to [facility] in a couple weeks. I think continiung the mirtazapine is a good idea. The dex made her feel a lot better, as it often does; I'd rec giving her a low dose burst of it, 4mg daily, for 10 days then stop: the good buzz from it is often petering out by then and this also helps her avoid the problems that can occur with weakess etc from being on it chronically    Her pain is well controlled, but on an less-than-convenient regimen; I don't love the fact that she's on q3h dilaudid (I'd discharge her with an order for q3h prn) and if she was staying in town we'd like transition her to methadone but right now is not the time to do that and I think just continuing the Exalgo and prn dilaudid (and her other analgesics, gabap,  etc) as she puts her energy into the big move is the right thing to do.    Reminded her I'm available to help troubleshoot pain and other problems for her as she moves until she establishes with medical oncology +/- pal care at [facility] in [facility].     I spoke about her plan of care with Dr Monroy today    45 min on unit over half counseling    Robert Blanca MD / Palliative Medicine / Text me via [facility].        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  Mood, coping, and/or meaning in the context of serious illness were addressed today: Yes.  Summary/Comments: stress about the move            Interval History:     Chart review/discussion with unit or  "clinical team members:   Feels better on dex.  Pain well controlled \"2-3/10\" most of the time she offers to me    Per patient or family/caregivers today:  As above               Review of Systems:     Besides above, an additional 3 system ROS was reviewed and is unremarkable          Medications:     I have reviewed this patient's medication profile and medications during this hospitalization.    Noted meds:  celebrex 200 mg bid  Decadron 2mg daily  gabap 600/600/900  Dilaudid PO 2mg q3h PO, ordered scheduled no prn  hydromorphoneER 16 mg qpm  Mirtaz 7.5 mg            Physical Exam:   Vitals were reviewed  Temp: 97.9  F (36.6  C) Temp src: Oral BP: 122/67 Pulse: 89   Resp: 16 SpO2: 96 % O2 Device: Nasal cannula Oxygen Delivery: 1 LPM  Alert NAD  resps unlabored  Speaking full sentences  Clear sensorium full affect             Data Reviewed:     Reviewed recent pertinent imaging, comments:   CT 2/14 images reviewed: massive R lung mass occupying about half of her hemithorax    Reviewed recent labs, comments: Cr 0.3, LFTs nl          "

## 2022-02-20 VITALS
DIASTOLIC BLOOD PRESSURE: 73 MMHG | BODY MASS INDEX: 25.34 KG/M2 | TEMPERATURE: 97.6 F | SYSTOLIC BLOOD PRESSURE: 127 MMHG | HEART RATE: 94 BPM | WEIGHT: 161.8 LBS | RESPIRATION RATE: 18 BRPM | OXYGEN SATURATION: 94 %

## 2022-02-20 LAB
ALBUMIN SERPL-MCNC: 1.9 G/DL (ref 3.4–5)
ALP SERPL-CCNC: 72 U/L (ref 40–150)
ALT SERPL W P-5'-P-CCNC: 14 U/L (ref 0–50)
ANION GAP SERPL CALCULATED.3IONS-SCNC: 2 MMOL/L (ref 3–14)
AST SERPL W P-5'-P-CCNC: 11 U/L (ref 0–45)
BASOPHILS # BLD AUTO: 0 10E3/UL (ref 0–0.2)
BASOPHILS NFR BLD AUTO: 0 %
BILIRUB SERPL-MCNC: 0.4 MG/DL (ref 0.2–1.3)
BUN SERPL-MCNC: 6 MG/DL (ref 7–30)
CALCIUM SERPL-MCNC: 8.8 MG/DL (ref 8.5–10.1)
CHLORIDE BLD-SCNC: 103 MMOL/L (ref 94–109)
CO2 SERPL-SCNC: 31 MMOL/L (ref 20–32)
CREAT SERPL-MCNC: 0.42 MG/DL (ref 0.52–1.04)
CRP SERPL-MCNC: 83 MG/L (ref 0–8)
EOSINOPHIL # BLD AUTO: 0 10E3/UL (ref 0–0.7)
EOSINOPHIL NFR BLD AUTO: 0 %
ERYTHROCYTE [DISTWIDTH] IN BLOOD BY AUTOMATED COUNT: 19.9 % (ref 10–15)
GFR SERPL CREATININE-BSD FRML MDRD: >90 ML/MIN/1.73M2
GLUCOSE BLD-MCNC: 112 MG/DL (ref 70–99)
HCT VFR BLD AUTO: 26.3 % (ref 35–47)
HGB BLD-MCNC: 8 G/DL (ref 11.7–15.7)
IMM GRANULOCYTES # BLD: 0 10E3/UL
IMM GRANULOCYTES NFR BLD: 1 %
LYMPHOCYTES # BLD AUTO: 0.6 10E3/UL (ref 0.8–5.3)
LYMPHOCYTES NFR BLD AUTO: 15 %
MCH RBC QN AUTO: 33.2 PG (ref 26.5–33)
MCHC RBC AUTO-ENTMCNC: 30.4 G/DL (ref 31.5–36.5)
MCV RBC AUTO: 109 FL (ref 78–100)
MONOCYTES # BLD AUTO: 0.3 10E3/UL (ref 0–1.3)
MONOCYTES NFR BLD AUTO: 9 %
NEUTROPHILS # BLD AUTO: 3 10E3/UL (ref 1.6–8.3)
NEUTROPHILS NFR BLD AUTO: 75 %
NRBC # BLD AUTO: 0 10E3/UL
NRBC BLD AUTO-RTO: 0 /100
PLATELET # BLD AUTO: 159 10E3/UL (ref 150–450)
POTASSIUM BLD-SCNC: 4.2 MMOL/L (ref 3.4–5.3)
PROT SERPL-MCNC: 6.5 G/DL (ref 6.8–8.8)
RBC # BLD AUTO: 2.41 10E6/UL (ref 3.8–5.2)
SODIUM SERPL-SCNC: 136 MMOL/L (ref 133–144)
WBC # BLD AUTO: 4 10E3/UL (ref 4–11)

## 2022-02-20 PROCEDURE — 80053 COMPREHEN METABOLIC PANEL: CPT | Performed by: INTERNAL MEDICINE

## 2022-02-20 PROCEDURE — 999N000157 HC STATISTIC RCP TIME EA 10 MIN

## 2022-02-20 PROCEDURE — 85025 COMPLETE CBC W/AUTO DIFF WBC: CPT | Performed by: INTERNAL MEDICINE

## 2022-02-20 PROCEDURE — 999N000147 HC STATISTIC PT IP EVAL DEFER: Performed by: PHYSICAL THERAPIST

## 2022-02-20 PROCEDURE — 250N000013 HC RX MED GY IP 250 OP 250 PS 637: Performed by: INTERNAL MEDICINE

## 2022-02-20 PROCEDURE — 94640 AIRWAY INHALATION TREATMENT: CPT | Mod: 76

## 2022-02-20 PROCEDURE — 250N000013 HC RX MED GY IP 250 OP 250 PS 637: Performed by: STUDENT IN AN ORGANIZED HEALTH CARE EDUCATION/TRAINING PROGRAM

## 2022-02-20 PROCEDURE — 94640 AIRWAY INHALATION TREATMENT: CPT

## 2022-02-20 PROCEDURE — 250N000011 HC RX IP 250 OP 636: Performed by: NURSE PRACTITIONER

## 2022-02-20 PROCEDURE — 250N000011 HC RX IP 250 OP 636: Performed by: INTERNAL MEDICINE

## 2022-02-20 PROCEDURE — 99239 HOSP IP/OBS DSCHRG MGMT >30: CPT | Performed by: INTERNAL MEDICINE

## 2022-02-20 PROCEDURE — 250N000009 HC RX 250: Performed by: INTERNAL MEDICINE

## 2022-02-20 PROCEDURE — 86140 C-REACTIVE PROTEIN: CPT | Performed by: INTERNAL MEDICINE

## 2022-02-20 RX ORDER — LEVOFLOXACIN 750 MG/1
750 TABLET, FILM COATED ORAL DAILY
Qty: 3 TABLET | Refills: 0 | Status: SHIPPED | OUTPATIENT
Start: 2022-02-20 | End: 2022-03-01

## 2022-02-20 RX ORDER — DEXAMETHASONE 4 MG/1
4 TABLET ORAL DAILY
Qty: 10 TABLET | Refills: 0 | Status: SHIPPED | OUTPATIENT
Start: 2022-02-20 | End: 2022-03-01

## 2022-02-20 RX ORDER — PANTOPRAZOLE SODIUM 40 MG/1
40 TABLET, DELAYED RELEASE ORAL
Qty: 10 TABLET | Refills: 0 | Status: SHIPPED | OUTPATIENT
Start: 2022-02-20 | End: 2022-03-01

## 2022-02-20 RX ORDER — MIRTAZAPINE 7.5 MG/1
7.5 TABLET, FILM COATED ORAL AT BEDTIME
Qty: 30 TABLET | Refills: 0 | Status: SHIPPED | OUTPATIENT
Start: 2022-02-20

## 2022-02-20 RX ORDER — LEVOTHYROXINE SODIUM 50 UG/1
75 TABLET ORAL DAILY
Qty: 30 TABLET | Refills: 0 | Status: SHIPPED | OUTPATIENT
Start: 2022-02-20 | End: 2022-03-01

## 2022-02-20 RX ADMIN — CETIRIZINE HYDROCHLORIDE 10 MG: 10 TABLET, FILM COATED ORAL at 09:25

## 2022-02-20 RX ADMIN — HYDROMORPHONE HYDROCHLORIDE 2 MG: 2 TABLET ORAL at 03:34

## 2022-02-20 RX ADMIN — POLYETHYLENE GLYCOL 3350 17 G: 17 POWDER, FOR SOLUTION ORAL at 09:26

## 2022-02-20 RX ADMIN — HYDROMORPHONE HYDROCHLORIDE 2 MG: 2 TABLET ORAL at 05:55

## 2022-02-20 RX ADMIN — ONDANSETRON 4 MG: 4 TABLET, ORALLY DISINTEGRATING ORAL at 08:32

## 2022-02-20 RX ADMIN — ALBUTEROL SULFATE 2.5 MG: 2.5 SOLUTION RESPIRATORY (INHALATION) at 01:25

## 2022-02-20 RX ADMIN — HYDROMORPHONE HYDROCHLORIDE 2 MG: 2 TABLET ORAL at 09:24

## 2022-02-20 RX ADMIN — LEVOFLOXACIN 750 MG: 5 INJECTION, SOLUTION INTRAVENOUS at 09:41

## 2022-02-20 RX ADMIN — NYSTATIN 500000 UNITS: 100000 SUSPENSION ORAL at 10:53

## 2022-02-20 RX ADMIN — Medication 5 ML: at 03:34

## 2022-02-20 RX ADMIN — DEXAMETHASONE 4 MG: 4 TABLET ORAL at 09:41

## 2022-02-20 RX ADMIN — ALBUTEROL SULFATE 2.5 MG: 2.5 SOLUTION RESPIRATORY (INHALATION) at 09:47

## 2022-02-20 RX ADMIN — RIVAROXABAN 15 MG: 15 TABLET, FILM COATED ORAL at 09:41

## 2022-02-20 RX ADMIN — CELECOXIB 200 MG: 200 CAPSULE ORAL at 09:25

## 2022-02-20 RX ADMIN — LEVOTHYROXINE SODIUM 75 MCG: 75 TABLET ORAL at 08:32

## 2022-02-20 RX ADMIN — PANTOPRAZOLE SODIUM 40 MG: 40 TABLET, DELAYED RELEASE ORAL at 08:32

## 2022-02-20 RX ADMIN — HYDROXYZINE HYDROCHLORIDE 25 MG: 25 TABLET ORAL at 09:24

## 2022-02-20 RX ADMIN — Medication 5 ML: at 13:13

## 2022-02-20 RX ADMIN — HYDROMORPHONE HYDROCHLORIDE 2 MG: 2 TABLET ORAL at 13:12

## 2022-02-20 RX ADMIN — FLUTICASONE FUROATE AND VILANTEROL TRIFENATATE 1 PUFF: 100; 25 POWDER RESPIRATORY (INHALATION) at 09:41

## 2022-02-20 RX ADMIN — MONTELUKAST 10 MG: 10 TABLET, FILM COATED ORAL at 09:25

## 2022-02-20 RX ADMIN — GABAPENTIN 600 MG: 300 CAPSULE ORAL at 09:24

## 2022-02-20 ASSESSMENT — ACTIVITIES OF DAILY LIVING (ADL)
ADLS_ACUITY_SCORE: 13

## 2022-02-20 NOTE — PROGRESS NOTES
Patient has been assessed for Home Oxygen needs. Oxygen readings:    *Pulse oximetry (SpO2) = 90% on room air at rest while awake.    *SpO2 improved to 94 % on 1 liters/minute at rest.    *SpO2 = 77% on room air during activity/with exercise.    *SpO2 improved to  94% on 3 liters/minute during activity/with exercise.

## 2022-02-20 NOTE — PLAN OF CARE
/77 (BP Location: Left arm)   Pulse 93   Temp 97.9  F (36.6  C) (Oral)   Resp 17   Wt 73.4 kg (161 lb 12.8 oz)   LMP 03/19/2019   SpO2 94%   BMI 25.34 kg/m      VSS, afebrile, Sat O2 kept above 94% on 1 LPM O2. Patient alert and oriented *4, stand-by assist to bathroom. Continuous right shoulder and back pain, well controlled with scheduled Dilaudid and Exalgo. Urination good, no BM this shift. Blood drawn, no replacement needed. Pt is planned to discharge today, AM RN need to call weekend care coordinator about ordering home O2. Keep current POC.           Goal Outcome Evaluation:    Plan of Care Reviewed With: patient, spouse     Overall Patient Progress: improving         Problem: Respiratory Compromise (Pneumonia)  Goal: Effective Oxygenation and Ventilation  Intervention: Promote Airway Secretion Clearance  Recent Flowsheet Documentation  Taken 2/19/2022 2000 by Mitul Limon, RN  Cough And Deep Breathing: done independently per patient

## 2022-02-20 NOTE — PLAN OF CARE
Discharge  A&Ox4.AVSS.Pt makes her needs known. Home O2 test done this morning that indicated pt needs oxygen to take home.  Discharge instruction revised with pt and pt's . Pt's and pt's  questions are answered by Dr. Eliana Foote. Pt and wife stated understanding of discharge instruction. Discharge instruction paper given to pt.Pt's  picked discharge meds from discharge pharmacy.Pt is given back patient's own supply of hydromorphone ER 16mg (Exalgo 24 hr tablet) 19 tablets that was kept in [facility]  patient specific narc drawer. Pt's home supply  chemo tab pazopanib 800 mg tablets that was in a bottle in pt's bin in med room returned to pt.   Olivre allen deaccessed per protocol.  Pt's home O2 got delivered to pt's room.   to give pt ride home.  Pt to continue to take med and to follow up with clinic appointments per discharge instruction.

## 2022-02-20 NOTE — PROGRESS NOTES
Care Management Follow Up    Length of Stay (days): 6    Expected Discharge Date: 02/20/2022     Concerns to be Addressed:     New Oxygen Process    Additional Information:  Bedside RN to do walk test   - (needs to be less than or equal to 88% on RA)    Provider enter DME for Oxygen and enter MD face to face in progress note      Bedside RN call    -[facility] #[phone] follow prompts for hospital needs    RNCC can be reached today at [phone] phone, for any guidance       WHITNEY Maciel RN, BSN  5B RN Care Coordinator  [phone] phone  [phone] pager    Weekend or Holiday Care Coordinator [phone] pager  Job Code [id number]

## 2022-02-20 NOTE — PHARMACY
Regarding use of patient's own supply of hydromorphone ER 16mg (Exalgo 24 hr tablet) during hospitalization:  In preparation for discharge, 19 tablets removed from patient specific narc drawer of 5C1 pyxis and given to WHITNEY Shearer to give back to the patient.  This medication was stored in the narc drawer but was not initially loaded per typical procedure, so we did not have a blue inventory piece of paper.  Initial count per handwritten note on bottle was 24 and 5 were charted as given during this admission to = 19 tablets left and returned to patient prior to discharge.      Anita Best [facility] on 2/20/2022 at 11:47 AM

## 2022-02-20 NOTE — PLAN OF CARE
VSS. Walking sats done and showed need for home O2. RN tomorrow will need to call weekend care coordinator about ordering home O2, tomorrow. Pain well controlled with scheduled dilaudid. Miralax started today. Lovenox transitioned to Xarelto. Pt appetite poor and food not tasting good to her. On ezra counts. She drank 2 ensures today and is re-ordering dinner after not liking the first meal sent up. Ben-olga ordered for chair and pt sitting up for most of day. Urine output low and pt reports low oral intake. Continue to monitor.    Problem: Adult Inpatient Plan of Care  Goal: Plan of Care Review  Outcome: Ongoing, Progressing  Flowsheets (Taken 2/19/2022 1835)  Plan of Care Reviewed With:    patient    spouse  Overall Patient Progress: improving     Problem: Adult Inpatient Plan of Care  Goal: Patient-Specific Goal (Individualized)  Outcome: Ongoing, Progressing     Problem: Adult Inpatient Plan of Care  Goal: Absence of Hospital-Acquired Illness or Injury  Outcome: Ongoing, Progressing     Problem: Adult Inpatient Plan of Care  Goal: Absence of Hospital-Acquired Illness or Injury  Intervention: Identify and Manage Fall Risk  Recent Flowsheet Documentation  Taken 2/19/2022 0813 by Lona Turner, RN  Safety Promotion/Fall Prevention:    safety round/check completed    room organization consistent    patient and family education    nonskid shoes/slippers when out of bed    fall prevention program maintained    clutter free environment maintained    assistive device/personal items within reach    activity supervised     Problem: Adult Inpatient Plan of Care  Goal: Absence of Hospital-Acquired Illness or Injury  Intervention: Prevent Skin Injury  Recent Flowsheet Documentation  Taken 2/19/2022 0813 by Lona Turner, RN  Body Position: position changed independently     Problem: Adult Inpatient Plan of Care  Goal: Absence of Hospital-Acquired Illness or Injury  Intervention: Prevent and Manage VTE (Venous  Thromboembolism) Risk  Recent Flowsheet Documentation  Taken 2/19/2022 0813 by Lona Turner, RN  Activity Management: activity adjusted per tolerance   Goal Outcome Evaluation:    Plan of Care Reviewed With: patient, spouse     Overall Patient Progress: improving

## 2022-02-20 NOTE — PROGRESS NOTES
Calorie Count  Intake recorded for: 2/19  Total Kcals: 831 Total Protein: 42g  Kcals from Hospital Food: 831   Protein: 42g  Kcals from Outside Food (average):0 Protein: 0g  # Meals Ordered from Kitchen: 3 meals + food from nursing  # Meals Recorded: 1 meal - 100% sprite (from nursing), 10% cheese quesadilla w/ sour cream  # Supplements Recorded: 100% of two Ensure Enlive

## 2022-02-21 ENCOUNTER — PATIENT OUTREACH (OUTPATIENT)
Dept: CARE COORDINATION | Facility: CLINIC | Age: 57
End: 2022-02-21
Payer: COMMERCIAL

## 2022-02-21 DIAGNOSIS — Z71.89 OTHER SPECIFIED COUNSELING: ICD-10-CM

## 2022-02-21 LAB
ATRIAL RATE - MUSE: 98 BPM
DIASTOLIC BLOOD PRESSURE - MUSE: NORMAL MMHG
INTERPRETATION ECG - MUSE: NORMAL
P AXIS - MUSE: 55 DEGREES
PR INTERVAL - MUSE: 144 MS
QRS DURATION - MUSE: 76 MS
QT - MUSE: 354 MS
QTC - MUSE: 451 MS
R AXIS - MUSE: 22 DEGREES
SYSTOLIC BLOOD PRESSURE - MUSE: NORMAL MMHG
T AXIS - MUSE: 51 DEGREES
VENTRICULAR RATE- MUSE: 98 BPM

## 2022-02-21 NOTE — PROGRESS NOTES
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   She was also hypotensive in the ambulance but resolved with 500ml of IVFs and patient says she had fallen asleep.  She says she has been feeling like her usual self, was just found to be hypoxic, but she has felt like this for a few weeks now.      ASSESSMENT      Enrollment  Primary Care Care Coordination Status: Not a Candidate    Discharge Assessment  How are you doing now that you are home?: Always good to be home  How are your symptoms? (Red Flag symptoms escalate to triage hotline per guidelines): Improved  Do you feel your condition is stable enough to be safe at home until your provider visit?: Yes (learning to figure out when to use the oxygen (specifically if she needs it at night) - [facility] offered to connect her with a nurse or provider [facility] RN phone # - pt declined, she said she will call her clinic if she has further questions.)  Does the patient have their discharge instructions? : Yes  Does the patient have questions regarding their discharge instructions? : No  Were you started on any new medications or were there changes to any of your  previous medications? : Yes  Does the patient have all of their medications?: Yes  Do you have questions regarding any of your medications? : No  Do you have all of your needed medical supplies or equipment (DME)?  (i.e. oxygen tank, CPAP, cane, etc.): Yes  Discharge follow-up appointment scheduled within 14 calendar days? : Yes  Discharge Follow Up Appointment Date: 02/22/22  Discharge Follow Up Appointment Scheduled with?: Specialty Care Provider (Patient will follow up with oncologist and palliative care providers rather than PCP for now.)    Post-op ([facility] CTA Only)  If the patient had a surgery or procedure, do they have any questions for a nurse?: No    PLAN                                                      Outpatient Plan:      Follow up with primary care provider, King Diaz, within 7 days for hospital follow- up. No follow up labs or test are needed.    Follow-up with oncology within 1 to 2 weeks of discharge.    Follow-up with palliative care within 1 to 2 weeks of discharge.      Future Appointments   Date Time Provider Department Center   2/22/2022  3:15 PM LAB ONC [facility]   2/22/2022  3:45 PM Cyndi Bains APRN CNP [facility]   3[facility]1/2022  9:40 [facility]   3[facility]1/2022 11:00 [facility]   3/1/2022  1:00 PM Abigail Mann PT [facility]   3/3/2022  2:00 PM Afsaneh Robins OT [facility]         For any urgent concerns, please contact our 24 hour nurse triage line: [phone] ([phone])       Krystal Perkins  Community Health Worker  [facility] Care [facility]  Ph: [phone]

## 2022-02-21 NOTE — PLAN OF CARE
Physical Therapy Discharge Summary    Reason for therapy discharge:    Discharged to home with outpatient therapy.    Progress towards therapy goal(s). See goals on Care Plan in [facility] electronic health record for goal details.  Goals met    Therapy recommendation(s):    Continued therapy is recommended.  Rationale/Recommendations:  to improve functional strength and dynamic balance.

## 2022-02-22 ENCOUNTER — LAB (OUTPATIENT)
Dept: ONCOLOGY | Facility: CLINIC | Age: 57
End: 2022-02-22
Payer: COMMERCIAL

## 2022-02-22 ENCOUNTER — ONCOLOGY VISIT (OUTPATIENT)
Dept: ONCOLOGY | Facility: CLINIC | Age: 57
End: 2022-02-22
Payer: COMMERCIAL

## 2022-02-22 VITALS
OXYGEN SATURATION: 99 % | DIASTOLIC BLOOD PRESSURE: 73 MMHG | BODY MASS INDEX: 25.11 KG/M2 | HEIGHT: 67 IN | HEART RATE: 94 BPM | WEIGHT: 160 LBS | SYSTOLIC BLOOD PRESSURE: 111 MMHG

## 2022-02-22 DIAGNOSIS — C49.9 SARCOMA (H): Primary | ICD-10-CM

## 2022-02-22 DIAGNOSIS — E88.09 HYPOALBUMINEMIA: ICD-10-CM

## 2022-02-22 DIAGNOSIS — Z79.899 ENCOUNTER FOR LONG-TERM (CURRENT) USE OF MEDICATIONS: ICD-10-CM

## 2022-02-22 DIAGNOSIS — R65.20 SEPSIS WITH ACUTE HYPOXIC RESPIRATORY FAILURE, DUE TO UNSPECIFIED ORGANISM, UNSPECIFIED WHETHER SEPTIC SHOCK PRESENT (H): ICD-10-CM

## 2022-02-22 DIAGNOSIS — R63.0 POOR APPETITE: ICD-10-CM

## 2022-02-22 DIAGNOSIS — G89.3 CANCER RELATED PAIN: ICD-10-CM

## 2022-02-22 DIAGNOSIS — J96.01 SEPSIS WITH ACUTE HYPOXIC RESPIRATORY FAILURE, DUE TO UNSPECIFIED ORGANISM, UNSPECIFIED WHETHER SEPTIC SHOCK PRESENT (H): ICD-10-CM

## 2022-02-22 DIAGNOSIS — A41.9 SEPSIS WITH ACUTE HYPOXIC RESPIRATORY FAILURE, DUE TO UNSPECIFIED ORGANISM, UNSPECIFIED WHETHER SEPTIC SHOCK PRESENT (H): ICD-10-CM

## 2022-02-22 DIAGNOSIS — C49.9 SARCOMA (H): ICD-10-CM

## 2022-02-22 LAB
ALBUMIN SERPL-MCNC: 2 G/DL (ref 3.4–5)
ALP SERPL-CCNC: 93 U/L (ref 40–150)
ALT SERPL W P-5'-P-CCNC: 25 U/L (ref 0–50)
ANION GAP SERPL CALCULATED.3IONS-SCNC: 5 MMOL/L (ref 3–14)
AST SERPL W P-5'-P-CCNC: 22 U/L (ref 0–45)
BASOPHILS # BLD AUTO: 0 10E3/UL (ref 0–0.2)
BASOPHILS NFR BLD AUTO: 0 %
BILIRUB SERPL-MCNC: 0.4 MG/DL (ref 0.2–1.3)
BUN SERPL-MCNC: 10 MG/DL (ref 7–30)
CALCIUM SERPL-MCNC: 8.6 MG/DL (ref 8.5–10.1)
CHLORIDE BLD-SCNC: 103 MMOL/L (ref 94–109)
CO2 SERPL-SCNC: 30 MMOL/L (ref 20–32)
CREAT SERPL-MCNC: 0.4 MG/DL (ref 0.52–1.04)
EOSINOPHIL # BLD AUTO: 0 10E3/UL (ref 0–0.7)
EOSINOPHIL NFR BLD AUTO: 0 %
ERYTHROCYTE [DISTWIDTH] IN BLOOD BY AUTOMATED COUNT: 20.5 % (ref 10–15)
GFR SERPL CREATININE-BSD FRML MDRD: >90 ML/MIN/1.73M2
GLUCOSE BLD-MCNC: 136 MG/DL (ref 70–99)
HCT VFR BLD AUTO: 28 % (ref 35–47)
HGB BLD-MCNC: 8.5 G/DL (ref 11.7–15.7)
HOLD SPECIMEN: NORMAL
IMM GRANULOCYTES # BLD: 0 10E3/UL
IMM GRANULOCYTES NFR BLD: 0 %
LYMPHOCYTES # BLD AUTO: 0.3 10E3/UL (ref 0.8–5.3)
LYMPHOCYTES NFR BLD AUTO: 7 %
MAGNESIUM SERPL-MCNC: 1.7 MG/DL (ref 1.6–2.3)
MCH RBC QN AUTO: 33.9 PG (ref 26.5–33)
MCHC RBC AUTO-ENTMCNC: 30.4 G/DL (ref 31.5–36.5)
MCV RBC AUTO: 112 FL (ref 78–100)
MONOCYTES # BLD AUTO: 0.3 10E3/UL (ref 0–1.3)
MONOCYTES NFR BLD AUTO: 7 %
NEUTROPHILS # BLD AUTO: 4 10E3/UL (ref 1.6–8.3)
NEUTROPHILS NFR BLD AUTO: 86 %
NRBC # BLD AUTO: 0 10E3/UL
NRBC BLD AUTO-RTO: 0 /100
PHOSPHATE SERPL-MCNC: 2.7 MG/DL (ref 2.5–4.5)
PLATELET # BLD AUTO: 185 10E3/UL (ref 150–450)
POTASSIUM BLD-SCNC: 3.8 MMOL/L (ref 3.4–5.3)
PROT SERPL-MCNC: 6.9 G/DL (ref 6.8–8.8)
RBC # BLD AUTO: 2.51 10E6/UL (ref 3.8–5.2)
SODIUM SERPL-SCNC: 138 MMOL/L (ref 133–144)
WBC # BLD AUTO: 4.6 10E3/UL (ref 4–11)

## 2022-02-22 PROCEDURE — 99207 PR NO CHARGE LOS: CPT

## 2022-02-22 PROCEDURE — 99214 OFFICE O/P EST MOD 30 MIN: CPT | Performed by: NURSE PRACTITIONER

## 2022-02-22 PROCEDURE — 80053 COMPREHEN METABOLIC PANEL: CPT

## 2022-02-22 PROCEDURE — 83735 ASSAY OF MAGNESIUM: CPT

## 2022-02-22 PROCEDURE — 85025 COMPLETE CBC W/AUTO DIFF WBC: CPT

## 2022-02-22 PROCEDURE — 84100 ASSAY OF PHOSPHORUS: CPT

## 2022-02-22 RX ORDER — HEPARIN SODIUM (PORCINE) LOCK FLUSH IV SOLN 100 UNIT/ML 100 UNIT/ML
5 SOLUTION INTRAVENOUS EVERY 8 HOURS
Status: DISCONTINUED | OUTPATIENT
Start: 2022-02-22 | End: 2022-02-22 | Stop reason: HOSPADM

## 2022-02-22 RX ADMIN — HEPARIN SODIUM (PORCINE) LOCK FLUSH IV SOLN 100 UNIT/ML 5 ML: 100 SOLUTION at 15:20

## 2022-02-22 ASSESSMENT — PAIN SCALES - GENERAL: PAINLEVEL: MILD PAIN (3)

## 2022-02-22 NOTE — PROGRESS NOTES
Right port accessed with a 20 gauge 3/4 inch nieves needle using sterile technique.  Labs drawn and sent, flushed with saline and heparin, gauze, deacessed with a  dressing applied. See flow sheet.    Glenda Field RN

## 2022-02-22 NOTE — DISCHARGE SUMMARY
[facility]    Discharge Summary  Hospitalist    Date of Admission:  2/14/2022  Date of Discharge:  2/20/2022  1:43 PM  Discharging Provider: Qamar Monroy MD  Date of Service (when I saw the patient): 2/20/22    Discharge Diagnoses   1.  Sepsis and acute hypoxic respiratory failure secondary to likely bacterial pneumonia complicated by reactive airway disease, all are present on admission  2.  Acute hypoxic respiratory failure secondary to provoked pulmonary embolus due to port associated deep venous thrombus of right internal jugular vein, class IV pulmonary embolism severity index indicating high risk for mortality, all are present on admission  3.  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  Hypomagnesemia secondary to diarrhea, all are present on admission, resolved  5.  Acute anemia secondary to anemia of critical illness on top of pancytopenia secondary to malignancy, all are present on admission, stable  6. Severe malnutrition in the context of acute on chronic illness, all are present on admission    History of Present Illness   56 year old female admitted on 2/14/2022. 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      Hospital Course   Richa Ashby was admitted on 2/14/2022.  The following problems were addressed during her hospitalization:    2.  Acute hypoxic respiratory failure secondary to provoked pulmonary embolus due to port associated deep venous thrombus of right internal jugular vein, class IV pulmonary embolism severity index indicating high risk for mortality, all are present on admission  This is the second problem that led to this admission.  Although the patient had questionable pulmonary embolus on CT PE, the presence of internal jugular vein deep venous  thrombus in the setting of Port-A-Cath and active malignancy will increase the pretest probability for the CT PE and decrease its negative predictive value.  Moreover, subsegmental pulmonary embolism with negative work-up for deep venous thrombus is associated with 5.7% of recurrent pulmonary embolism, unfortunately this is not the case for this patient who has active malignancy and deep venous thrombus.     Acute hypoxic respiratory failure is improving.     -Started rivaroxaban for treatment of pulmonary embolism given data to support the use of rivaroxaban in patients with solid tumors.  I have started the patient on the starter pack for the rivaroxaban.  It has been approved by her insurance for total duration of 3 months of therapy     3.  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  I adjusted the dosage and frequency of narcotics for this patient based on her home regimen after verifying with the records of the pharmacy and with her .  I also adjusted the dosage and the frequency of antiemetics.  I highly appreciate input of oncology service.  I consulted with palliative care service for help addressing the symptoms burden.     -As detailed above the oncology service has agreed to resuming dexamethasone at 4 mg daily and possibly discharging the patient on the given that it has been helpful for treatment of symptoms related to her malignancy and her chemotherapy including reduction of nausea, better pain control, and increasing appetite.  I am awaiting further recommendations from palliative care service.  -Continue mirtazapine at 7.5 mg nightly based on recommendations of palliative care  -Made hydroxyzine available at 25 mg to 50 twice daily for this patient based on recommendations of palliative care  -Based on discussion with palliative care and the eminent transfer of care of this patient to [place], we  elected not to start the patient on long-acting narcotics given the risk of respiratory depression and toxic encephalopathy in the setting of lack of continue to of care as outpatient since the [place]oc[place] out of state     4.  Hypomagnesemia secondary to diarrhea, all are present on admission, resolved  This is the third problem that led to this admission.  The patient has been ruled out for C. difficile colitis.  I also ordered magnesium replacement protocol.     5.  Acute anemia secondary to anemia of critical illness on top of pancytopenia secondary to malignancy, all are present on admission, stable  The patient hemoglobin increased to a baseline of 8.9 after 1 unit of blood transfusion which effectively rules out continued bleeding or hemolysis as an etiology for the anemia.     6. Severe malnutrition in the context of acute on chronic illness, all are present on admission  We will follow recommendations of adult dietitian  Qamar Monroy MD    Significant Results and Procedures   As detailed below    Pending Results   None    Code Status   DNR / DNI       Primary Care Physician   King Diaz    Physical Exam                      Vitals:    02/17/22 0820 02/18/22 0818 02/19/22 0700   Weight: 73 kg (161 lb) 73.1 kg (161 lb 3.2 oz) 73.4 kg (161 lb 12.8 oz)     Vital Signs with Ranges     I/O last 3 completed shifts:  In: 995 [P.O.:820; I.V.:175]  Out: 1600 [Urine:1600]    Constitutional: Awake, alert, cooperative, no apparent distress.  Eyes: Conjunctiva and pupils examined and normal.  HEENT: Moist mucous membranes, normal dentition.  Respiratory: Clear to auscultation bilaterally, no crackles or wheezing.  Cardiovascular: Regular rate and rhythm, normal S1 and S2, and no murmur noted.  GI: Soft, non-distended, non-tender, normal bowel sounds.  Lymph/Hematologic: No anterior cervical or supraclavicular adenopathy.  Skin: No rashes, no cyanosis, no edema.  Musculoskeletal: No joint swelling,  erythema or tenderness.  Neurologic: Cranial nerves 2-12 intact, normal strength and sensation.  Psychiatric: Alert, oriented to person, place and time, no obvious anxiety or depression.    Discharge Disposition   Discharged to home  Condition at discharge: Stable    Consultations This Hospital Stay   ONCOLOGY ADULT IP CONSULT  NUTRITION SERVICES ADULT IP CONSULT  PHYSICAL THERAPY ADULT IP CONSULT  VASCULAR ACCESS CARE ADULT IP CONSULT  PALLIATIVE CARE ADULT IP CONSULT  CARE MANAGEMENT / SOCIAL WORK IP CONSULT  SPIRITUAL HEALTH SERVICES IP CONSULT    Time Spent on this Encounter   IQamar MD, personally saw the patient today and spent greater than 30 minutes discharging this patient.    Discharge Orders      Activity    Your activity upon discharge: activity as tolerated     Reason for your hospital stay    You have been admitted to the hospital for further management of pneumonia and newly diagnosed pulmonary embolism.  You have done extremely well with medications.  Please follow-up with oncology and palliative care as outpatient.  It has been an honor and a pleasure to take care of you.     Adult [facility] Follow-up and recommended labs and tests    Follow up with primary care provider, King Diaz, within 7 days for hospital follow- up.  No follow up labs or test are needed.  Follow-up with oncology within 1 to 2 weeks of discharge  Follow-up with palliative care within 1 to 2 weeks of discharge    Appointments on [place] and/or [facility] (with [facility] or [facility] provider or service). Call [phone] if you haven't heard regarding these appointments within 7 days of discharge.     Oxygen Adult/Peds    Oxygen Documentation:   I certify that this patient, Richa Ashby has been under my care (or a nurse practitioner or physican's assistant working with me). This is the face-to-face encounter for oxygen medical necessity.      Richa Ashby is now in a chronic stable state and  continues to require supplemental oxygen. Patient has continued oxygen desaturation due to Chronic Respiratory Failure with Hypoxia J93.11.    Alternative treatment(s) tried or considered and deemed clinically infective for treatment of Chronic Respiratory Failure with Hypoxia J93.11 include nebulizers and steroids.  If portability is ordered, is the patient mobile within the home? yes    **Patients who qualify for home O2 coverage under the CMS guidelines require ABG tests or O2 sat readings obtained closest to, but no earlier than 2 days prior to the discharge, as evidence of the need for home oxygen therapy. Testing must be performed while patient is in the chronic stable state. See notes for O2 sats.**     Diet    Follow this diet upon discharge: Orders Placed This Encounter      Snacks/Supplements Adult: Ensure Enlive; Between Meals      Calorie Counts      Regular Diet Adult     Discharge Medications   Discharge Medication List as of 2/20/2022 11:44 AM      START taking these medications    Details   dexamethasone (DECADRON) 4 MG tablet Take 1 tablet (4 mg) by mouth daily, Disp-10 tablet, R-0, E-Prescribe      levofloxacin (LEVAQUIN) 750 MG tablet Take 1 tablet (750 mg) by mouth daily, Disp-3 tablet, R-0, E-Prescribe      mirtazapine (REMERON) 7.5 MG tablet Take 1 tablet (7.5 mg) by mouth At Bedtime, Disp-30 tablet, R-0, E-Prescribe      pantoprazole (PROTONIX) 40 MG EC tablet Take 1 tablet (40 mg) by mouth every morning (before breakfast), Disp-10 tablet, R-0, E-Prescribe      Rivaroxaban ANTICOAGULANT (XARELTO STARTER PACK ANTICOAGULANT) 15 & 20 MG TBPK Take 15 mg by mouth 2 times daily for 21 days, THEN 20 mg daily (with dinner)., Disp-111 each, R-0, E-PrescribeMedication order for Xarelto updated to starter pack per verbal order. Updated RX sent to the pharmacy and medication list in Epic updated. Allan Varela, PharmD, BCACP         CONTINUE these medications which have CHANGED    Details   levothyroxine  (SYNTHROID/LEVOTHROID) 50 MCG tablet Take 1.5 tablets (75 mcg) by mouth daily, Disp-30 tablet, R-0, E-Prescribe         CONTINUE these medications which have NOT CHANGED    Details   acetaminophen (TYLENOL) 325 MG tablet Take 325-650 mg by mouth every 6 hours as needed for mild pain, Historical      ADVAIR DISKUS 100-50 MCG/DOSE inhaler Inhale 1 puff into the lungs daily , LAURA, Historical      albuterol (PROAIR RESPICLICK) 108 (90 Base) MCG/ACT inhaler Inhale 1-2 puffs into the lungs every 6 hours as needed , Historical      celecoxib (CELEBREX) 200 MG capsule Take 1 capsule (200 mg) by mouth 2 times daily, Disp-60 capsule, R-3, E-Prescribe      cetirizine (ZYRTEC) 10 MG tablet Take 10 mg by mouth daily, Historical      diclofenac (VOLTAREN) 1 % topical gel Apply 4 g topically 4 times daily, Disp-350 g, R-3, E-Prescribe      gabapentin (NEURONTIN) 300 MG capsule Take 600 mg in the AM, 600 mg mid-day, and 900 mg at night, Disp-210 capsule, R-3, E-Prescribe      HYDROmorphone (DILAUDID) 2 MG tablet Take 1-2 tablets (2-4 mg) by mouth every 3 hours as needed for severe pain (. Stop oxycodone completely. Do not take this with oxycodone.), Disp-120 tablet, R-0, E-Prescribe      HYDROmorphone HCl ER (EXALGO) 16 MG 24 hour tablet Take 1 tablet (16 mg) by mouth every evening, Disp-30 tablet, R-0, E-Prescribe      hydrOXYzine (ATARAX) 25 MG tablet Take 1-2 tablets (25-50 mg) by mouth 3 times daily as needed for anxiety, Disp-90 tablet, R-3, E-Prescribe      melatonin (MELATONIN) 1 MG/ML LIQD liquid Take 5 mg by mouth At Bedtime , Historical      melatonin 5 MG tablet Take 1 tablet (5 mg) by mouth At Bedtime, Disp-30 tablet, R-0, E-Prescribe      montelukast (SINGULAIR) 10 MG tablet Take 10 mg by mouth daily, Historical      multivitamin w/minerals (MULTI-VITAMIN) tablet Take 1 tablet by mouth daily, Historical      naloxone (NARCAN) 4 MG/0.1ML nasal spray Spray 1 spray (4 mg) into one nostril alternating nostrils once as  needed for opioid reversal every 2-3 minutes until assistance arrives, Disp-0.2 mL, R-0, E-Prescribe      NASAL SPRAY SALINE NA Historical      nystatin (MYCOSTATIN) 682342 UNIT/ML suspension Take 5 mLs (500,000 Units) by mouth 4 times dailyDisp-400 mL, [id number]      ondansetron (ZOFRAN-ODT) 8 MG ODT tab Take 1 tablet (8 mg) by mouth every 8 hours as needed for nausea, Disp-30 tablet, R-3, E-Prescribe      pazopanib (VOTRIENT) 200 MG tablet Take 4 tablets (800 mg) by mouth daily Take on an empty stomach 1 hour before or 2 hours after a meal., Disp-120 tablet, R-0, E-Prescribe      polyethylene glycol (MIRALAX) 17 GM/Dose powder Take 17 g by mouth daily, Disp-510 g, R-0, Local Print      prochlorperazine (COMPAZINE) 10 MG tablet Take 1 tablet (10 mg) by mouth every 6 hours as needed for nausea or vomiting, Disp-90 tablet, R-3, E-Prescribe      triamcinolone (KENALOG) 0.1 % external cream Apply topically 2 times daily As needed for skin irritation.Disp-30 g, [id number]      vitamin D3 (CHOLECALCIFEROL) 2000 units (50 mcg) tablet Take 1 tablet by mouth daily, Historical           Allergies   Allergies   Allergen Reactions     Pentobarbital      resp arrest     Data   Most Recent 3 CBC's:Recent Labs   Lab Test 02/20/22  0332 02/19/22  0345 02/18/22  0243   WBC 4.0 3.7* 2.8*   HGB 8.0* 8.0* 7.9*   * 109* 107*    174 175      Most Recent 3 BMP's:  Recent Labs   Lab Test 02/20/22  0333 02/19/22  0345 02/18/22  0243    139 135   POTASSIUM 4.2 3.8 4.0   CHLORIDE 103 103 101   CO2 31 31 33*   BUN 6* 6* 4*   CR 0.42* 0.35* 0.41*   ANIONGAP 2* 5 1*   VIKTORIA 8.8 9.0 8.7   * 99 122*     Most Recent 2 LFT's:  Recent Labs   Lab Test 02/20/22  0333 02/19/22  0345   AST 11 12   ALT 14 13   ALKPHOS 72 73   BILITOTAL 0.4 0.3     Most Recent INR's and Anticoagulation Dosing History:  Anticoagulation Dose History     Recent Dosing and Labs Latest Ref Rng & Units 10/28/2020 8/27/2021 11/1/2021 12/17/2021  2/14/2022    INR 0.85 - 1.15 0.90 1.29(H) 1.24(H) 1.12 1.25(H)        Most Recent 3 Troponin's:  Recent Labs   Lab Test 10/03/21  1525 06/17/21  1558 06/15/21  1325 11/04/20  1631   TROPI  --  <0.015 <0.015 <0.015   TROPONIN <0.015  --   --   --      Most Recent Cholesterol Panel:No lab results found.  Most Recent 6 Bacteria Isolates From Any Culture (See EPIC Reports for Culture Details):  Recent Labs   Lab Test 06/17/21  1817 06/17/21  1349 06/17/21  1348 11/27/20  1030   CULT No growth No growth No growth >100,000 colonies/mL  Klebsiella pneumoniae  *     Most Recent TSH, T4 and A1c Labs:  Recent Labs   Lab Test 02/07/22  1032   TSH 10.64*   T4 1.17     Results for orders placed or performed during the hospital encounter of 02/14/22   CT Chest Pulmonary Embolism w Contrast    Narrative    EXAM: CTA pulmonary angiogram, 2/14/2022    HISTORY: PE suspected, high prob; sarcoma mets to chest with fever;  r/o PE, r/o pneumonia    COMPARISON: CT 1/14/2022.    TECHNIQUE: Volumetric CT images obtained through the chest with  contrast. Coronal and axial MIP reformatted images obtained.  Three-dimensional (3D) post-processed angiographic images were  reconstructed, archived to PACS and used in interpretation of this  study.     CONTRAST: iopamidol (ISOVUE-370) solution 57 mL ml isovue 370 IV.    FINDINGS:     Vascular: There is good contrast opacification of the pulmonary  arterial vasculature. Question small central filling defects in the  right middle lobe lateral segment (series 7 image 167) and medial  segment (series 7 image 172). The right upper lobe pulmonary arteries  are compressed without definite intraluminal thrombus. Main pulmonary  artery is normal caliber. Heart is normal size without pericardial  effusion. No evidence of right heart strain. Nonaneurysmal thoracic  aorta with increased vessels demonstrating normal branching pattern.     Remaining Chest: Right chest wall Port-A-Cath tip is near the  superior  cavoatrial junction. Large multiloculated pleural collection with  enhancing rind with near complete compression of the right upper lobe  and significant compression of the right lower lobe, overall not  significantly changed in size from 1/14/2022 though gas content has  decreased. Cystic/cavitary lesion in the right lower lobe parenchyma  is also seen similar size with decreased gas content. Adjacent 4.3 x  2.7 cm lesion in the left lower lobe pulmonary parenchyma is also  similar size (series 7 image 229).    There are a few new small cystic lesions within and adjacent to the  right major/minor fissures. Aerated portions of the lung are otherwise  not significantly changed. No significant pneumothorax. Central  tracheobronchial tree is patent with anterior deviation of the trachea  due to mass effect from collections above.     There are multiple enlarged lymph nodes largest being a retrosternal  node measuring 1.9 x 1.2 cm.    Upper Abdomen: No acute abnormality. Accessory splenule.    Bones/Soft Tissues: No acute or suspicious osseous abnormality.      Impression    IMPRESSION:     1. Question small subsegmental thrombi versus contrast mixing artifact  in the right middle lobe pulmonary arteries. Remaining pulmonary  arterial tree is clear. No evidence of right heart strain.    2. Extensive cystic/cavitary lesions in the chest, similar in  size/extent compared to 1/14/2022 with increased fluid component.    3. Minimal change in large necrotic pleural collection with enhancing  rind compressing right upper lobe and displacing the trachea    I have personally reviewed the examination and initial interpretation  and I agree with the findings.    SHIRLEY INMAN MD         SYSTEM ID:  [id number]   US Upper Extremity Venous Duplex Bilat     Value    Radiologist flags Partially occlusive right IJ thrombus (Urgent)    Narrative    EXAMINATION: DOPPLER VENOUS ULTRASOUND OF BILATERAL UPPER  EXTREMTIES,  2/15/2022 10:13 AM     COMPARISON: CT chest 2/14/2022    HISTORY: Elevated d-dimer, rule out DVT    TECHNIQUE:  Gray-scale evaluation with compression, spectral flow, and  color Doppler assessment of the deep venous system of both upper  extremities.    FINDINGS:  Partially occlusive thrombus of the right internal jugular vein. The  right innominate vein is not well visualized. The right cephalic vein,  brachial vein axillary vein and subclavian vein are patent and fully  compressible.    Normal blood flow and waveforms are demonstrated in the left internal  jugular, innominate, subclavian, and axillary veins bilaterally. There  is normal compressibility of the brachial, basilic and cephalic veins.        Impression    IMPRESSION:  Partially occlusive thrombus of the right internal jugular vein. The  right innominate vein is well-visualized.    [Access Center: Partially occlusive right IJ thrombus]    This report will be copied to the [facility] to ensure a  provider acknowledges the finding. Access Center is available Monday  through Friday 8am-3:30 pm.     I have personally reviewed the examination and initial interpretation  and I agree with the findings.    TARIQ LIRA MD         SYSTEM ID:  [id number]   US Lower Extremity Venous Duplex Bilateral    Narrative    EXAMINATION: DOPPLER VENOUS ULTRASOUND OF BILATERAL LOWER EXTREMITIES,  2/15/2022 10:13 AM     COMPARISON: Lower extremity venous ultrasound 6/17/2021    HISTORY: Elevated d-dimer, rule out DVT    TECHNIQUE:  Gray-scale evaluation with compression, spectral flow and  color Doppler assessment of the deep venous system of both legs from  groin to knee, and then at the ankles.    FINDINGS:  In both lower extremities, the common femoral, femoral, popliteal and  posterior tibial veins demonstrate normal compressibility and blood  flow.      Impression    IMPRESSION:  1.  No evidence of deep venous thrombosis in either lower  extremity.    I have personally reviewed the examination and initial interpretation  and I agree with the findings.    TARIQ LIRA MD         SYSTEM ID:  [id number]   XR Chest 2 Views    Narrative    PA and lateral chest    HISTORY: Follow-up bacterial pneumonia versus volume overload sarcoma    COMPARISON STUDY: 2022    FINDINGS: Large right upper lung mass again noted. Right IJ  Port-A-Cath tip in the mid SVC. Moderate bilateral pleural effusions  with basilar atelectasis and/or consolidation. Known cavitary lesions  better seen on the right.      Impression    IMPRESSION:. No evidence of pulmonary edema. Large right apical mass  and right-sided cavitary lesion again noted. Bilateral pleural  effusions with bibasilar atelectasis and/or consolidation    SEVEN VANCE MD         SYSTEM ID:  [id number]   Echocardiogram Complete     Value    LVEF  55-60%    Narrative    [id number]  [id number]  [id number]  [id number]^MARTY^MAX     [facility],[place]  Echocardiography Laboratory  [address] [place] [id number]     Name: DIMAS WILSON  MRN: [id number]  : 1965  Study Date: 02/15/2022 12:00 PM  Age: 56 yrs  Gender: Female  Patient Location: [facility]  Reason For Study: Pulmonary Emboli  Ordering Physician: MAX FERGUSON  Performed By: Irma Darling RDCS     BSA: 1.8 m2  Height: 67 in  Weight: 158 lb  HR: 84  BP: 122/84 mmHg  ______________________________________________________________________________  Procedure  Complete Portable Echo Adult. Echocardiogram with two-dimensional, color and  spectral Doppler performed.  ______________________________________________________________________________  Interpretation Summary  Global and regional left ventricular function is normal with an EF of 55-60%.  Global right ventricular function is mildly reduced.  No significant valvular abnormalities present.  Pulmonary artery systolic pressure cannot be assessed.  The inferior  vena cava was normal in size with preserved respiratory  variability.  Trivial pericardial effusion is present.     ______________________________________________________________________________  Left Ventricle  Left ventricular size is normal. Left ventricular wall thickness is normal.  Global and regional left ventricular function is normal with an EF of 55-60%.  No regional wall motion abnormalities are seen.     Right Ventricle  The right ventricle is normal size. Global right ventricular function is  mildly reduced.     Atria  Both atria appear normal.     Mitral Valve  The mitral valve is normal.     Aortic Valve  Aortic valve is normal in structure and function.     Tricuspid Valve  The tricuspid valve is normal. Trace to mild tricuspid insufficiency is  present. Pulmonary artery systolic pressure cannot be assessed.     Vessels  The aorta root is normal. The inferior vena cava was normal in size with  preserved respiratory variability.     Pericardium  Trivial pericardial effusion is present.     Miscellaneous  A left pleural effusion is present.     Compared to Previous Study  This study was compared with the study from 10.18.21 . RV function is slightly  reduced.  ______________________________________________________________________________  MMode/2D Measurements & Calculations     IVSd: 0.88 cm  LVIDd: 4.8 cm  LVIDs: 3.5 cm  LVPWd: 0.80 cm  FS: 27.6 %  LV mass(C)d: 134.8 grams  LV mass(C)dI: 73.7 grams/m2  RWT: 0.34     Doppler Measurements & Calculations  MV E max axel: 58.1 cm/sec  MV A max axel: 68.2 cm/sec  MV E/A: 0.85  MV dec slope: 261.0 cm/sec2  E/E' av.8  Lateral E/e': 8.1  Medial E/e': 9.6     ______________________________________________________________________________  Report approved by: Yohana Bradford 02/15/ 01:43 PM

## 2022-02-22 NOTE — PROGRESS NOTES
Oncology Follow Up Visit: February 22, 2022    Oncologist: Dr Lopez  PCP: King Diaz    Diagnosis: Undifferentiated pleomorphic sarcoma   Richa Ashby is a 55 yo female with PMH asthma and GERD with undifferentiated pleomorphic sarcoma who presented with a lump in right leg 11/2019. Imaging and biopsy showed high-grade UPS.   Treatment:  2/2020 -preoperative radiotherapy  3/12/20- tumor resection.   11/2/20 developed a cough, chest tightness, and scapular pain with imaging revealing lung nodules. Lung biopsy determined sarcoma.   11/13/2020 -started Doxil + IfosHad delay in cycle 2 due to COVID dx 12/2/20. CT imaging after cycle 1 with positive response to treatment with decrease in pulmonary nodules. Received cycle 2 12/18/20. Received cycle 3 1/15/21 with dose reduction in Doxil for skin toxicity and mucositis. Received cycle 4 2/15/21.      3/8/21 CT CAP with positive response to treatment, decreased size of pulmonary mets though one lesion was larger. Did see fibrocystic changes in left breast, recommended breast imaging. Received cycle 5 Doxil + ifos 3/11/21.      Imaging 4/5/21 with overall stable disease except one lung lesion, opted to continued Doxil alone.     5/10/21- started Keytruda. Was seen 5/26/21 cough, SOB, fatigue work-up thought to be 2/2 phrenic nerve involvement of R apical lung mass.      Added Gemzar 6/16/21-Due to worsening fatigue and cough, repeat CT 6/15/21 concerning for progression vs pseudoprogression.     She was admitted 6/17/21-6/20/21 with fever of unknown origin, infectious work-up negative, treated with broad spectrum antibiotics.     She clinically improved on abx. Was able to receive day 8 Gemzar (dose reduced for thrombocytopenia) + Keytruda 6/23/21.      CT 7/12/21 with stable disease.     She was admitted 8/25/21-8/31/21 for fevers, hypotension, acute on chronic anemia. Extensive infectious work-up negative so etiology of fevers still unknown.      She has had  progressive disease so started Pazopanib 9/24/21.     She was admitted 10/6/21-10/8/21 for weakness, new hallucinations in setting of UTI. Brain MRI without brain mets though did have Diffuse loss normal fatty marrow on T1 sequence, nonspecific. Symptoms improved with abx and IVF.      She continued on Pazopanib. CT 11/1/21 for restaging noted majority of masses with fluid and gas consistent with necrosis and the largest cyst likely ruptured causing a right hydropneumothorax with near complete collapse of the R lung. One solid left pleural based lesion was slightly increased. Overall felt this was a positive response to Pazopanib so continued.     She was admitted 11/1/21 for urgent chest tube placement for the hydropneumothorax noted on CT. Due to high risk of recurrence she underwent talc pleurodesis 11/5/21. Port was incidentally found to have non-occlusive thrombus and will be replaced later this month pending resolution of subcutaneous air.      CT 11/22/21 overall with no change other than decreased chest wall subcutaneous emphysema.     She continues on Pazopanib.     2/14-20/2022-hospital admission for pneumonia, sepsis, respiratory failure    Interval History: Ms. Ashby comes to clinic with  for follow-up to her hospitalization.  Patient has O2 concentrator with her currently set at 2 L/min of oxygen as seated.  Patient states she is starting to feel better and has trialed some sedentary and sleeping without oxygen with good results.  States the Remeron she was started on it hospitalization has been very good to increase her intake and her appetite however this is not showing up yet on her albumin level.  She is continue to take her pazopanib throughout hospitalization and since discharge.  She has been taking fluids well and states she is urinating often and does not feel she is dehydrated.  Denies headaches fevers dizziness chest pain.  Bowels have been good but needing MiraLAX intermittently.   Continues with less pain-currently ranked 2/10 with use of Celebrex 200 mg 2 times daily and using the Dilaudid 2 to 4 mg every 3-6 hours and has hydromorphone every evening and is able to go 4 to 6 hours through the night.  Patient [place] in [place] couple weeks.  Rest of comprehensive and complete ROS is reviewed and is negative.   Past Medical History:   Diagnosis Date     Asthma, chronic      Sarcoma (H)     Right lower thigh     Current Outpatient Medications   Medication     acetaminophen (TYLENOL) 325 MG tablet     ADVAIR DISKUS 100-50 MCG/DOSE inhaler     albuterol (PROAIR RESPICLICK) 108 (90 Base) MCG/ACT inhaler     celecoxib (CELEBREX) 200 MG capsule     cetirizine (ZYRTEC) 10 MG tablet     dexamethasone (DECADRON) 4 MG tablet     diclofenac (VOLTAREN) 1 % topical gel     gabapentin (NEURONTIN) 300 MG capsule     HYDROmorphone (DILAUDID) 2 MG tablet     HYDROmorphone HCl ER (EXALGO) 16 MG 24 hour tablet     hydrOXYzine (ATARAX) 25 MG tablet     levofloxacin (LEVAQUIN) 750 MG tablet     levothyroxine (SYNTHROID/LEVOTHROID) 50 MCG tablet     melatonin (MELATONIN) 1 MG/ML LIQD liquid     melatonin 5 MG tablet     mirtazapine (REMERON) 7.5 MG tablet     montelukast (SINGULAIR) 10 MG tablet     multivitamin w/minerals (MULTI-VITAMIN) tablet     naloxone (NARCAN) 4 MG/0.1ML nasal spray     NASAL SPRAY SALINE NA     nystatin (MYCOSTATIN) 822369 UNIT/ML suspension     ondansetron (ZOFRAN-ODT) 8 MG ODT tab     pantoprazole (PROTONIX) 40 MG EC tablet     pazopanib (VOTRIENT) 200 MG tablet     polyethylene glycol (MIRALAX) 17 GM/Dose powder     prochlorperazine (COMPAZINE) 10 MG tablet     Rivaroxaban ANTICOAGULANT (XARELTO STARTER PACK ANTICOAGULANT) 15 & 20 MG TBPK     triamcinolone (KENALOG) 0.1 % external cream     vitamin D3 (CHOLECALCIFEROL) 2000 units (50 mcg) tablet     No current facility-administered medications for this visit.     Facility-Administered  "Medications Ordered in Other Visits   Medication     heparin 100 UNIT/ML injection 5 mL     heparin 100 UNIT/ML injection 5 mL     sodium chloride (PF) 0.9% PF flush 20 mL     Allergies   Allergen Reactions     Pentobarbital      resp arrest       Physical Exam:/73 (BP Location: Right arm, Patient Position: Chair, Cuff Size: Adult Regular)   Pulse 94   Ht 1.702 m (5' 7\")   Wt 72.6 kg (160 lb)   LMP 03/19/2019   SpO2 99%   BMI 25.06 kg/m     ECOG PS- 1-2  Constitutional: Alert, and in no apparent distress.  Moving well on own short distances but using a wheelchair for longer walking due to oxygen need.  Appears pale  ENT: Eyes bright , No mouth sores  Neck: Supple, No adenopathy.  Cardiac: Heart rate and rhythm is regular and strong without murmur  Respiratory: Breathing easy. Lung sounds clear to auscultation but oxygen is on through concentrator at this time.  GI: Abdomen is soft, non-tender, BS normal. No masses or organomegaly  MS: Muscle tone normal, extremities normal with no edema.   Skin: No suspicious lesions or rashes.  Here is turning silver related to the pazopanib  Neuro: Sensory grossly WNL, gait normal.   Lymph: Normal ant/post cervical, axillary, supraclavicular nodes  Psych: Mentation appears normal and affect normal/bright with good conversation.    Laboratory Results:   Results for orders placed or performed in visit on 02/22/22   Magnesium     Status: Normal   Result Value Ref Range    Magnesium 1.7 1.6 - 2.3 mg/dL   Phosphorus     Status: Normal   Result Value Ref Range    Phosphorus 2.7 2.5 - 4.5 mg/dL   Comprehensive metabolic panel     Status: Abnormal   Result Value Ref Range    Sodium 138 133 - 144 mmol/L    Potassium 3.8 3.4 - 5.3 mmol/L    Chloride 103 94 - 109 mmol/L    Carbon Dioxide (CO2) 30 20 - 32 mmol/L    Anion Gap 5 3 - 14 mmol/L    Urea Nitrogen 10 7 - 30 mg/dL    Creatinine 0.40 (L) 0.52 - 1.04 mg/dL    Calcium 8.6 8.5 - 10.1 mg/dL    Glucose 136 (H) 70 - 99 mg/dL    " Alkaline Phosphatase 93 40 - 150 U/L    AST 22 0 - 45 U/L    ALT 25 0 - 50 U/L    Protein Total 6.9 6.8 - 8.8 g/dL    Albumin 2.0 (L) 3.4 - 5.0 g/dL    Bilirubin Total 0.4 0.2 - 1.3 mg/dL    GFR Estimate >90 >60 mL/min/1.73m2   CBC with platelets and differential     Status: Abnormal   Result Value Ref Range    WBC Count 4.6 4.0 - 11.0 10e3/uL    RBC Count 2.51 (L) 3.80 - 5.20 10e6/uL    Hemoglobin 8.5 (L) 11.7 - 15.7 g/dL    Hematocrit 28.0 (L) 35.0 - 47.0 %     (H) 78 - 100 fL    MCH 33.9 (H) 26.5 - 33.0 pg    MCHC 30.4 (L) 31.5 - 36.5 g/dL    RDW 20.5 (H) 10.0 - 15.0 %    Platelet Count 185 150 - 450 10e3/uL    % Neutrophils 86 %    % Lymphocytes 7 %    % Monocytes 7 %    % Eosinophils 0 %    % Basophils 0 %    % Immature Granulocytes 0 %    NRBCs per 100 WBC 0 <1 /100    Absolute Neutrophils 4.0 1.6 - 8.3 10e3/uL    Absolute Lymphocytes 0.3 (L) 0.8 - 5.3 10e3/uL    Absolute Monocytes 0.3 0.0 - 1.3 10e3/uL    Absolute Eosinophils 0.0 0.0 - 0.7 10e3/uL    Absolute Basophils 0.0 0.0 - 0.2 10e3/uL    Absolute Immature Granulocytes 0.0 <=0.4 10e3/uL    Absolute NRBCs 0.0 10e3/uL   CBC with platelets differential     Status: Abnormal    Narrative    The following orders were created for panel order CBC with platelets differential.  Procedure                               Abnormality         Status                     ---------                               -----------         ------                     CBC with platelets and d...[[id number]]  Abnormal            Final result                 Please view results for these tests on the individual orders.     Assessment and Plan:   Undifferentiated pleomorphic sarcoma with recent hospitalization for sepsis and pneumonia-patient was hospitalized at [facility] for sepsis pneumonia respiratory failure from 2/14-2/20/22.  Today she has no signs of recurrence of her infection and is noted to be doing well without need for further support from fluids or electrolytes.   She will finish up her last Levaquin 750 mg tomorrow.  She has continued her pazopanib 800 mg daily p.o. throughout hospitalization and continues today and feels she is having no new side effects.  She is told to wean slowly off the oxygen and at this point shared she is in no hurry to do so but would like to see her do short spurts of exercises within the home to improve stamina prior to giving up the oxygen.  She is due to see KALINA after imaging will be completed on 3/1/2022.  She is aware to notify us with any changes in her oxygen status with increasing shortness of breath or with any new fevers or significant changes.  Poor appetite and nutrition-patient began using mirtazapine with hospitalization and starting to see an improvement in appetite however I am not seeing improvement in her albumin yet at this time.  She is taking fluids well and see no need for additional fluid support today- electrolytes are also normal.  Cancer related pain-patient is seeing palliative care and is now on hydromorphone and feels this is doing well with her pain level at 2/10 at present and stretching out time between dosing.  Using MiraLAX as needed for constipation.  History of pulmonary embolism-continues on rivaroxaban  The total time of this encounter amounted to 30 minutes. This time included face-to-face time spent with the patient, prep work, ordering tests, and performing post visit documentation.  Cyndi Bains,Cnp

## 2022-02-22 NOTE — NURSING NOTE
"Oncology Rooming Note    February 22, 2022 3:41 PM   Richa Ashby is a 56 year old female who presents for:    Chief Complaint   Patient presents with     Oncology Clinic Visit     Follow up     Initial Vitals: /73 (BP Location: Right arm, Patient Position: Chair, Cuff Size: Adult Regular)   Pulse 94   Ht 1.702 m (5' 7\")   Wt 72.6 kg (160 lb)   LMP 03/19/2019   SpO2 99%   BMI 25.06 kg/m   Estimated body mass index is 25.06 kg/m  as calculated from the following:    Height as of this encounter: 1.702 m (5' 7\").    Weight as of this encounter: 72.6 kg (160 lb). Body surface area is 1.85 meters squared.  Mild Pain (3) Comment: Data Unavailable   Patient's last menstrual period was 03/19/2019.  Allergies reviewed: Yes  Medications reviewed: Yes    Medications: Medication refills not needed today.  Pharmacy name entered into EPIC:    [id number] - [place], [place] - [address], [place] AT [facility] [place], [place] - [address] [place] [address] [phone]  [address] [address] [place] [facility] - [place], [place] - [id number] [place] [address] [facility] [place] - [place], [place] - [address], [id number]    Clinical concerns: Questions about oxygen     Cyndi was notified.      Diana Sandoval CMA              "

## 2022-02-22 NOTE — LETTER
2/22/2022         RE: Richa Ashby  [address]  [place] [address]        Dear Colleague,    Thank you for referring your patient, Richa Ashby, to the [facility]. Please see a copy of my visit note below.    Oncology Follow Up Visit: February 22, 2022    Oncologist: Dr Lopez  PCP: King Diaz    Diagnosis: Undifferentiated pleomorphic sarcoma   Richa Ashby is a 55 yo female with PMH asthma and GERD with undifferentiated pleomorphic sarcoma who presented with a lump in right leg 11/2019. Imaging and biopsy showed high-grade UPS.   Treatment:  2/2020 -preoperative radiotherapy  3/12/20- tumor resection.   11/2/20 developed a cough, chest tightness, and scapular pain with imaging revealing lung nodules. Lung biopsy determined sarcoma.   11/13/2020 -started Doxil + IfosHad delay in cycle 2 due to COVID dx 12/2/20. CT imaging after cycle 1 with positive response to treatment with decrease in pulmonary nodules. Received cycle 2 12/18/20. Received cycle 3 1/15/21 with dose reduction in Doxil for skin toxicity and mucositis. Received cycle 4 2/15/21.      3/8/21 CT CAP with positive response to treatment, decreased size of pulmonary mets though one lesion was larger. Did see fibrocystic changes in left breast, recommended breast imaging. Received cycle 5 Doxil + ifos 3/11/21.      Imaging 4/5/21 with overall stable disease except one lung lesion, opted to continued Doxil alone.     5/10/21- started Keytruda. Was seen 5/26/21 cough, SOB, fatigue work-up thought to be 2/2 phrenic nerve involvement of R apical lung mass.      Added Gemzar 6/16/21-Due to worsening fatigue and cough, repeat CT 6/15/21 concerning for progression vs pseudoprogression.     She was admitted 6/17/21-6/20/21 with fever of unknown origin, infectious work-up negative, treated with broad spectrum antibiotics.     She clinically improved on abx. Was able to receive day 8  Gemzar (dose reduced for thrombocytopenia) + Keytruda 6/23/21.      CT 7/12/21 with stable disease.     She was admitted 8/25/21-8/31/21 for fevers, hypotension, acute on chronic anemia. Extensive infectious work-up negative so etiology of fevers still unknown.      She has had progressive disease so started Pazopanib 9/24/21.     She was admitted 10/6/21-10/8/21 for weakness, new hallucinations in setting of UTI. Brain MRI without brain mets though did have Diffuse loss normal fatty marrow on T1 sequence, nonspecific. Symptoms improved with abx and IVF.      She continued on Pazopanib. CT 11/1/21 for restaging noted majority of masses with fluid and gas consistent with necrosis and the largest cyst likely ruptured causing a right hydropneumothorax with near complete collapse of the R lung. One solid left pleural based lesion was slightly increased. Overall felt this was a positive response to Pazopanib so continued.     She was admitted 11/1/21 for urgent chest tube placement for the hydropneumothorax noted on CT. Due to high risk of recurrence she underwent talc pleurodesis 11/5/21. Port was incidentally found to have non-occlusive thrombus and will be replaced later this month pending resolution of subcutaneous air.      CT 11/22/21 overall with no change other than decreased chest wall subcutaneous emphysema.     She continues on Pazopanib.     2/14-20/2022-hospital admission for pneumonia, sepsis, respiratory failure    Interval History: Ms. Ashby comes to clinic with  for follow-up to her hospitalization.  Patient has O2 concentrator with her currently set at 2 L/min of oxygen as seated.  Patient states she is starting to feel better and has trialed some sedentary and sleeping without oxygen with good results.  States the Remeron she was started on it hospitalization has been very good to increase her intake and her appetite however this is not showing up yet on her albumin level.  She is continue to  take her pazopanib throughout hospitalization and since discharge.  She has been taking fluids well and states she is urinating often and does not feel she is dehydrated.  Denies headaches fevers dizziness chest pain.  Bowels have been good but needing MiraLAX intermittently.  Continues with less pain-currently ranked 2/10 with use of Celebrex 200 mg 2 times daily and using the Dilaudid 2 to 4 mg every 3-6 hours and has hydromorphone every evening and is able to go 4 to 6 hours through the night.  Patient [place] weeks.  Rest of comprehensive and complete ROS is reviewed and is negative.   Past Medical History:   Diagnosis Date     Asthma, chronic      Sarcoma (H)     Right lower thigh     Current Outpatient Medications   Medication     acetaminophen (TYLENOL) 325 MG tablet     ADVAIR DISKUS 100-50 MCG/DOSE inhaler     albuterol (PROAIR RESPICLICK) 108 (90 Base) MCG/ACT inhaler     celecoxib (CELEBREX) 200 MG capsule     cetirizine (ZYRTEC) 10 MG tablet     dexamethasone (DECADRON) 4 MG tablet     diclofenac (VOLTAREN) 1 % topical gel     gabapentin (NEURONTIN) 300 MG capsule     HYDROmorphone (DILAUDID) 2 MG tablet     HYDROmorphone HCl ER (EXALGO) 16 MG 24 hour tablet     hydrOXYzine (ATARAX) 25 MG tablet     levofloxacin (LEVAQUIN) 750 MG tablet     levothyroxine (SYNTHROID/LEVOTHROID) 50 MCG tablet     melatonin (MELATONIN) 1 MG/ML LIQD liquid     melatonin 5 MG tablet     mirtazapine (REMERON) 7.5 MG tablet     montelukast (SINGULAIR) 10 MG tablet     multivitamin w/minerals (MULTI-VITAMIN) tablet     naloxone (NARCAN) 4 MG/0.1ML nasal spray     NASAL SPRAY SALINE NA     nystatin (MYCOSTATIN) 556951 UNIT/ML suspension     ondansetron (ZOFRAN-ODT) 8 MG ODT tab     pantoprazole (PROTONIX) 40 MG EC tablet     pazopanib (VOTRIENT) 200 MG tablet     polyethylene glycol (MIRALAX) 17 GM/Dose powder     prochlorperazine (COMPAZINE) 10 MG tablet      "Rivaroxaban ANTICOAGULANT (XARELTO STARTER PACK ANTICOAGULANT) 15 & 20 MG TBPK     triamcinolone (KENALOG) 0.1 % external cream     vitamin D3 (CHOLECALCIFEROL) 2000 units (50 mcg) tablet     No current facility-administered medications for this visit.     Facility-Administered Medications Ordered in Other Visits   Medication     heparin 100 UNIT/ML injection 5 mL     heparin 100 UNIT/ML injection 5 mL     sodium chloride (PF) 0.9% PF flush 20 mL     Allergies   Allergen Reactions     Pentobarbital      resp arrest       Physical Exam:/73 (BP Location: Right arm, Patient Position: Chair, Cuff Size: Adult Regular)   Pulse 94   Ht 1.702 m (5' 7\")   Wt 72.6 kg (160 lb)   LMP 03/19/2019   SpO2 99%   BMI 25.06 kg/m     ECOG PS- 1-2  Constitutional: Alert, and in no apparent distress.  Moving well on own short distances but using a wheelchair for longer walking due to oxygen need.  Appears pale  ENT: Eyes bright , No mouth sores  Neck: Supple, No adenopathy.  Cardiac: Heart rate and rhythm is regular and strong without murmur  Respiratory: Breathing easy. Lung sounds clear to auscultation but oxygen is on through concentrator at this time.  GI: Abdomen is soft, non-tender, BS normal. No masses or organomegaly  MS: Muscle tone normal, extremities normal with no edema.   Skin: No suspicious lesions or rashes.  Here is turning silver related to the pazopanib  Neuro: Sensory grossly WNL, gait normal.   Lymph: Normal ant/post cervical, axillary, supraclavicular nodes  Psych: Mentation appears normal and affect normal/bright with good conversation.    Laboratory Results:   Results for orders placed or performed in visit on 02/22/22   Magnesium     Status: Normal   Result Value Ref Range    Magnesium 1.7 1.6 - 2.3 mg/dL   Phosphorus     Status: Normal   Result Value Ref Range    Phosphorus 2.7 2.5 - 4.5 mg/dL   Comprehensive metabolic panel     Status: Abnormal   Result Value Ref Range    Sodium 138 133 - 144 mmol/L "    Potassium 3.8 3.4 - 5.3 mmol/L    Chloride 103 94 - 109 mmol/L    Carbon Dioxide (CO2) 30 20 - 32 mmol/L    Anion Gap 5 3 - 14 mmol/L    Urea Nitrogen 10 7 - 30 mg/dL    Creatinine 0.40 (L) 0.52 - 1.04 mg/dL    Calcium 8.6 8.5 - 10.1 mg/dL    Glucose 136 (H) 70 - 99 mg/dL    Alkaline Phosphatase 93 40 - 150 U/L    AST 22 0 - 45 U/L    ALT 25 0 - 50 U/L    Protein Total 6.9 6.8 - 8.8 g/dL    Albumin 2.0 (L) 3.4 - 5.0 g/dL    Bilirubin Total 0.4 0.2 - 1.3 mg/dL    GFR Estimate >90 >60 mL/min/1.73m2   CBC with platelets and differential     Status: Abnormal   Result Value Ref Range    WBC Count 4.6 4.0 - 11.0 10e3/uL    RBC Count 2.51 (L) 3.80 - 5.20 10e6/uL    Hemoglobin 8.5 (L) 11.7 - 15.7 g/dL    Hematocrit 28.0 (L) 35.0 - 47.0 %     (H) 78 - 100 fL    MCH 33.9 (H) 26.5 - 33.0 pg    MCHC 30.4 (L) 31.5 - 36.5 g/dL    RDW 20.5 (H) 10.0 - 15.0 %    Platelet Count 185 150 - 450 10e3/uL    % Neutrophils 86 %    % Lymphocytes 7 %    % Monocytes 7 %    % Eosinophils 0 %    % Basophils 0 %    % Immature Granulocytes 0 %    NRBCs per 100 WBC 0 <1 /100    Absolute Neutrophils 4.0 1.6 - 8.3 10e3/uL    Absolute Lymphocytes 0.3 (L) 0.8 - 5.3 10e3/uL    Absolute Monocytes 0.3 0.0 - 1.3 10e3/uL    Absolute Eosinophils 0.0 0.0 - 0.7 10e3/uL    Absolute Basophils 0.0 0.0 - 0.2 10e3/uL    Absolute Immature Granulocytes 0.0 <=0.4 10e3/uL    Absolute NRBCs 0.0 10e3/uL   CBC with platelets differential     Status: Abnormal    Narrative    The following orders were created for panel order CBC with platelets differential.  Procedure                               Abnormality         Status                     ---------                               -----------         ------                     CBC with platelets and d...[[id number]]  Abnormal            Final result                 Please view results for these tests on the individual orders.     Assessment and Plan:   Undifferentiated pleomorphic sarcoma with recent  hospitalization for sepsis and pneumonia-patient was hospitalized at [facility] for sepsis pneumonia respiratory failure from 2/14-2/20/22.  Today she has no signs of recurrence of her infection and is noted to be doing well without need for further support from fluids or electrolytes.  She will finish up her last Levaquin 750 mg tomorrow.  She has continued her pazopanib 800 mg daily p.o. throughout hospitalization and continues today and feels she is having no new side effects.  She is told to wean slowly off the oxygen and at this point shared she is in no hurry to do so but would like to see her do short spurts of exercises within the home to improve stamina prior to giving up the oxygen.  She is due to see KALINA after imaging will be completed on 3/1/2022.  She is aware to notify us with any changes in her oxygen status with increasing shortness of breath or with any new fevers or significant changes.  Poor appetite and nutrition-patient began using mirtazapine with hospitalization and starting to see an improvement in appetite however I am not seeing improvement in her albumin yet at this time.  She is taking fluids well and see no need for additional fluid support today- electrolytes are also normal.  Cancer related pain-patient is seeing palliative care and is now on hydromorphone and feels this is doing well with her pain level at 2/10 at present and stretching out time between dosing.  Using MiraLAX as needed for constipation.  History of pulmonary embolism-continues on rivaroxaban  The total time of this encounter amounted to 30 minutes. This time included face-to-face time spent with the patient, prep work, ordering tests, and performing post visit documentation.  Cyndi Bains Cnp        Again, thank you for allowing me to participate in the care of your patient.        Sincerely,        Cyndi Bains, RIOS, APRN CNP

## 2022-02-28 NOTE — PROGRESS NOTES
Oncology/Hematology Visit Note  Mar 1, 2022    Reason for Visit: Follow up of undifferentiated pleomorphic sarcoma     History of Present Illness: Richa Ashby is a 56 year old female with PMH asthma and GERD with undifferentiated pleomorphic sarcoma. 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 She received preoperative radiotherapy and the tumor was resected 3/12/20. She then developed a cough, chest tightness, and scapular pain and imaging revealed lung nodules. Lung biopsy 11/2/20 with sarcoma. She was started on Doxil + Ifos 11/13/20. Had delay in cycle 2 due to COVID dx 12/2/20. CT imaging after cycle 1 with positive response to treatment with decrease in pulmonary nodules. Received cycle 2 12/18/20. Received cycle 3 1/15/21 with dose reduction in Doxil for skin toxicity and mucositis. Received cycle 4 2/15/21.      CT CAP 3/8/21 with positive response to treatment, decreased size of pulmonary mets though one lesion was larger. Did see fibrocystic changes in left breast, recommended breast imaging. Received cycle 5 Doxil + ifos 3/11/21.      Imaging 4/5/21 with overall stable disease except one lung lesion, opted to continue Doxil alone for now and switch to Keytruda in future if ongoing progression.      Started Keytruda 5/10/21. Was seen 5/26/21 cough, SOB, fatigue work-up thought to be 2/2 phrenic nerve involvement of R apical lung mass.      Due to worsening fatigue and cough, repeat CT 6/15/21 concerning for progression vs pseudoprogression. Added Gemzar 6/16/21.     She was admitted 6/17/21-6/20/21 with fever of unknown origin, infectious work-up negative, treated with broad spectrum antibiotics.     She clinically improved on abx. Was able to receive day 8 Gemzar (dose reduced for thrombocytopenia) + Keytruda 6/23/21.      CT 7/12/21 with stable disease.     She was admitted 8/25/21-8/31/21 for fevers, hypotension, acute on chronic  anemia. Extensive infectious work-up negative so etiology of fevers still unknown.      She has had progressive disease so started on Pazopanib 9/24/21.     She was admitted 10/6/21-10/8/21 for weakness, new hallucinations in setting of UTI. Brain MRI without brain mets though did have Diffuse loss normal fatty marrow on T1 sequence, nonspecific. Symptoms improved with abx and IVF.      She has continued on Pazopanib. CT 11/1/21 for restaging noted majority of masses with fluid and gas consistent with necrosis and the largest cyst likely ruptured causing a right hydropneumothorax with near complete collapse of the R lung. One solid left pleural based lesion was slightly increased. Overall felt this was a positive response to Pazopanib so continued.     She was admitted 11/1/21 for urgent chest tube placement for the hydropneumothorax noted on CT. Due to high risk of recurrence she underwent talc pleurodesis 11/5/21. Port was incidentally found to have non-occlusive thrombus and will be replaced later this month pending resolution of subcutaneous air.      CT 11/22/21 overall with no change other than decreased chest wall subcutaneous emphysema.     CT 1/14/22 with ongoing stable disease.    She was admitted 2/14/22-2/20/22 for acute hypoxic respiratory failure 2/2 bacterial pneumonia and PE. Treated with abx and started on Xarelto. Also given Dex for nausea, pain, and anorexia.      She has continued on Pazopanib.     Interval History:  Connie was seen for routine follow-up for treatment of undifferentiated pleomorphic sarcoma. On 2/14/22 she was hospitalized for pneumonia and PE. She reports feeling significantly better and has been able to decrease O2 to 1L at rest and with minimal activity. She has completed her course of antibiotics and reports 4 days left of 4mg Dex daily. She continues on Xarelto BID without any concerns or noted bruising or bleedings. She and her , Elliot, also noted understanding that  dose will change and be once/day after initial 21 days for PE treatment. She continues to take her pazopanib without issues and reports nausea pretty well controlled with noting 2 bouts since hospitalization and continues to take Ondansetron in the AM and Compazine in the evening. States appetite and caloric intake has improved since hospitalization and could be attributed to both the addition of Remeron and Dex. Connie continues to have pain in her right shoulder/upper back that she reports is generally well controlled during the day with 2mg Dilaudid q3hrs. She continues on 16mg extended release Dilaudid at , but reports waking up in severe pain almost every night as well as moderate to severe pain in the mornings. She has been working with palliative care for pain management. Of note, Connie is moving to [facility] on Friday to be near her family. She reports feeling both anxious and eager for the move. She has a scheduled appointment with her new care team in 3 weeks in [place].      Review of Systems:  Patient denies fevers, chills, night sweats, unexplained weight changes, headaches, dizziness, vision or hearing changes, new lumps or bumps, chest pain,  cough, abdominal pain, vomiting, changes to bowel or bladder, swelling of extremities, bleeding issues, or rash.    Current Outpatient Medications   Medication Sig Dispense Refill     acetaminophen (TYLENOL) 325 MG tablet Take 325-650 mg by mouth every 6 hours as needed for mild pain       ADVAIR DISKUS 100-50 MCG/DOSE inhaler Inhale 1 puff into the lungs daily        albuterol (PROAIR RESPICLICK) 108 (90 Base) MCG/ACT inhaler Inhale 1-2 puffs into the lungs every 6 hours as needed        celecoxib (CELEBREX) 200 MG capsule Take 1 capsule (200 mg) by mouth 2 times daily 60 capsule 3     cetirizine (ZYRTEC) 10 MG tablet Take 10 mg by mouth daily       dexamethasone (DECADRON) 4 MG tablet Take 1 tablet (4 mg) by mouth daily 10 tablet 0     diclofenac  (VOLTAREN) 1 % topical gel Apply 4 g topically 4 times daily 350 g 3     gabapentin (NEURONTIN) 300 MG capsule Take 600 mg in the AM, 600 mg mid-day, and 900 mg at night 210 capsule 3     HYDROmorphone (DILAUDID) 2 MG tablet Take 1-2 tablets (2-4 mg) by mouth every 3 hours as needed for severe pain (. Stop oxycodone completely. Do not take this with oxycodone.) 120 tablet 0     HYDROmorphone HCl ER (EXALGO) 16 MG 24 hour tablet Take 1 tablet (16 mg) by mouth every evening 30 tablet 0     hydrOXYzine (ATARAX) 25 MG tablet Take 1-2 tablets (25-50 mg) by mouth 3 times daily as needed for anxiety 90 tablet 3     levofloxacin (LEVAQUIN) 750 MG tablet Take 1 tablet (750 mg) by mouth daily 3 tablet 0     levothyroxine (SYNTHROID/LEVOTHROID) 50 MCG tablet Take 1.5 tablets (75 mcg) by mouth daily 30 tablet 0     melatonin (MELATONIN) 1 MG/ML LIQD liquid Take 5 mg by mouth At Bedtime        melatonin 5 MG tablet Take 1 tablet (5 mg) by mouth At Bedtime 30 tablet 0     mirtazapine (REMERON) 7.5 MG tablet Take 1 tablet (7.5 mg) by mouth At Bedtime 30 tablet 0     montelukast (SINGULAIR) 10 MG tablet Take 10 mg by mouth daily       multivitamin w/minerals (MULTI-VITAMIN) tablet Take 1 tablet by mouth daily       naloxone (NARCAN) 4 MG/0.1ML nasal spray Spray 1 spray (4 mg) into one nostril alternating nostrils once as needed for opioid reversal every 2-3 minutes until assistance arrives 0.2 mL 0     NASAL SPRAY SALINE NA        nystatin (MYCOSTATIN) 576249 UNIT/ML suspension Take 5 mLs (500,000 Units) by mouth 4 times daily 400 mL 0     ondansetron (ZOFRAN-ODT) 8 MG ODT tab Take 1 tablet (8 mg) by mouth every 8 hours as needed for nausea 30 tablet 3     pantoprazole (PROTONIX) 40 MG EC tablet Take 1 tablet (40 mg) by mouth every morning (before breakfast) 10 tablet 0     pazopanib (VOTRIENT) 200 MG tablet Take 4 tablets (800 mg) by mouth daily Take on an empty stomach 1 hour before or 2 hours after a meal. 120 tablet 0      polyethylene glycol (MIRALAX) 17 GM/Dose powder Take 17 g by mouth daily 510 g 0     prochlorperazine (COMPAZINE) 10 MG tablet Take 1 tablet (10 mg) by mouth every 6 hours as needed for nausea or vomiting 90 tablet 3     Rivaroxaban ANTICOAGULANT (XARELTO STARTER PACK ANTICOAGULANT) 15 & 20 MG TBPK Take 15 mg by mouth 2 times daily for 21 days, THEN 20 mg daily (with dinner). 111 each 0     triamcinolone (KENALOG) 0.1 % external cream Apply topically 2 times daily As needed for skin irritation. 30 g 1     vitamin D3 (CHOLECALCIFEROL) 2000 units (50 mcg) tablet Take 1 tablet by mouth daily         Past Medical History  Past Medical History:   Diagnosis Date     Asthma, chronic      Sarcoma (H)     Right lower thigh     Past Surgical History:   Procedure Laterality Date     AS RAD RESEC TONSIL/PILLARS       GALLBLADDER SURGERY       INJECT EPIDURAL THORACIC N/A 11/5/2021    Procedure: INJECTION, SPINE, THORACIC, EPIDURAL;  Surgeon: GENERIC ANESTHESIA PROVIDER;  Location: [facility] OR     INSERT PORT VASCULAR ACCESS Right 10/28/2020    Procedure: single lumen power port placement @0745;  Surgeon: Tex Ackerman MD;  Location: [facility] OR     IR CHEST PORT PLACEMENT > 5 YRS OF AGE  10/28/2020     IR PORT CHECK RIGHT  3/26/2021     IR PORT CHECK RIGHT  10/21/2021     IR PORT REPLACEMENT CATHETER ONLY RIGHT  12/17/2021     RESECT TUMOR LOWER EXTREMITY Right 3/12/2020    Procedure: Resection of right thigh tumor;  Surgeon: Guillermo Johnson MD;  Location: [facility] OR     ROTATOR CUFF REPAIR RT/LT  2012     SHOULDER SURGERY       SINUS SURGERY       THORACENTESIS N/A 11/5/2021    Procedure: Pleurodesis;  Surgeon: Elana Ngo MD;  Location:  GI     Allergies   Allergen Reactions     Pentobarbital      resp arrest     Social History   Social History     Tobacco Use     Smoking status: Never Smoker     Smokeless tobacco: Never Used   Substance Use Topics     Alcohol use: Yes     Comment: 2-3 glasses of wine/week      Drug use: Never      Past medical history and social history were reviewed.    Physical Examination:  /81   Pulse 95   Temp 97.3  F (36.3  C) (Oral)   Resp 18   Wt 70.2 kg (154 lb 12.8 oz)   LMP 03/19/2019   SpO2 100%   BMI 24.25 kg/m    Wt Readings from Last 10 Encounters:   02/22/22 72.6 kg (160 lb)   02/19/22 73.4 kg (161 lb 12.8 oz)   02/14/22 71.8 kg (158 lb 4.8 oz)   12/17/21 72.6 kg (160 lb)   12/02/21 76 kg (167 lb 8 oz)   12/02/21 74.8 kg (165 lb)   11/23/21 78.6 kg (173 lb 3.2 oz)   11/06/21 81.4 kg (179 lb 8 oz)   10/07/21 87.7 kg (193 lb 4.8 oz)   10/06/21 87.2 kg (192 lb 3.2 oz)     Constitutional: Well-appearing female in no acute distress.  Eyes: EOMI, PERRL. No scleral icterus.  ENT: Oral mucosa is moist without lesions or thrush.   Lymphatic: Neck is supple without cervical or supraclavicular lymphadenopathy.  Cardiovascular: Regular rate and rhythm. No murmurs, gallops, or rubs. No peripheral edema.  Respiratory: Diminished in RUL. No wheezes or crackles.  Gastrointestinal: Bowel sounds present. Abdomen soft, non-tender. No palpable hepatosplenomegaly or masses.   Neurologic: Cranial nerves II through XII are grossly intact.  Skin: No rashes, petechiae, or bruising noted on exposed skin.    Imaging: Personally reviewed and discussed with patient.   CT CHEST WITHOUT CONTRAST 3/1/2022      HISTORY: Metastatic sarcoma, monitoring for response to chemotherapy;  Sarcoma (H); Malignant neoplasm metastatic to lung, unspecified  laterality (H).     COMPARISON: February 14, 2022     TECHNIQUE: Volumetric helical acquisition of CT images of the chest  from the clavicles to the kidneys were acquired without IV contrast.  Radiation dose for this scan was reduced using automated exposure  control, adjustment of the mA and/or kV according to patient size, or  iterative reconstruction technique.     FINDINGS:  Stable cystic and cavitary lesions bilaterally since the  recent comparison study.  Representative lesion in the left lobe  measures 3.8 cm unchanged from previous. Decreased pleural fluid on  the left compared to previous. Cystic and/or necrotic mass at the  right apex is unchanged at 12.5 cm AP dimension. No definite new  lesions. Similar pleural fluid on the right, previously seen air in  the pleural space has resolved. Epiphrenic adenopathy again evident,  lymph node measures 2.6 cm currently, previously 2.0 cm. No acute  findings in the visualized upper abdomen. No frankly destructive bony  lesions.                                                                 IMPRESSION:  1. No significant change in pulmonary lesions since the comparison  study.  2. Worsening epiphrenic adenopathy.    Assessment and Plan:  1. Oncology  Metastatic UPS, was on Doxil + Ifosfamide (5 cycles) with positive response to treatment initially, then due to poor tolerance switched to Doxil alone (1 cycle) but had progression. Started on Keytruda 5/10/21. Due to tumor growth (progression vs pseudoprogression) added Gemzar 6/16/21. Had ongoing progression. Started on Pazopanib 9/24/21.     CT 11/1/21 with significant tumor necrosis/rupture. Given overall clinical improvement and findings of necrosis plan to continue Pazopanib with close follow-up. CT from 11/22/21 with stable disease, necrotic lesions persist.  CT 1/14/22 unchanged.       CT 3/1/22: No significant change in pulmonary lesions since the comparison  Study. Radiologist noted worsening epiphrenic adenopathy. Results discussed with Dr. Lopez and noted that adenopathy could be related to recent respiratory infection. No change to plan at this time.     She continues to do overall well on Pazopanib. Will continue.    Her records at at [facility] and she has an appointment scheduled in three weeks.      2. Pulm  R sided hydropneumothorax: Found on 11/1/21 CT scan, thought 2/2 ruptured tumor. Was admitted 11/1 and underwent chest tube placement. Pulm  performed talc pleuradesis on 11/5/21 due to risk of recurrence. No recurrent pneumothorax.     2/14/22-2/20/22 admitted for sepsis pneumonia respiratory failure and PE. She has since finished Levaquin course of antibiotics and does not have signs of a recurrent infection. She was also discharged on O2 and was previously at 2L but has been able to titrate to 1L. She was started on 4mg of Dex during hospitalization d/t concern of restricted airway, it has helped her breathing and likely appetite. Will start slow taper starting tomorrow of 10 days at 2mg, 10days at 1mg, 10 days 0.5mg which can then be re-evaluated by new care team.     Connie reports significant improvement in breathing since hospitalization. She is aware to seek medical care if she has worsening SOB, chest pain, fevers or signs/symptoms of infection. Messaged [facility] to assist with home O2 once she moves to [place].      Subcutaneous chest wall air: Resolved.      Asthma: stable, continue inhalers.      COVID: Diagnosed in December, recovered. She is vaccinated.       3. ID-Resolved  Fevers of unknown origin, thought to be tumor fevers, previously required Indomethacin, resolved.      Was treated for pneumonia inpatient, no ongoing concerns. Finished Levaquin.    4. Vascular  Port malfunction with small RIJ clot, port now replaced and working well.     PE: Continue on Rivaroxaban-after 3 weeks will go to 20mg daily. Connie and  aware of dose change and frequency after first 21 days.    5. Pain  R chest wall/back pain, ongoing. Palliative now managing. Continue Celebrex and Dilaudid. Connie reports daily time pain pretty well controlled with 2mg Dilaudid q3hrs, but continues to have breakthrough pain at night as well as significant pain in the AM. Will reach out to palliative team about adjusting meds, especially nighttime dose.      6. Heme  Anemia: ACD and chemotherapy. Hgb stable at 8.8.      7. Cards  Exercise fatigue and NAZARIO: Echo was stable.  Possibly was related to pneumothorax, though it is unclear when this happened.  Improved.       Hypotension: Resolved. Continue encouraging home fluids and monitoring.      QT WNL     8. 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 Continue Gabapentin 600/600/900.     Deconditioning: Doing PT    9. GI  Constipation/Diarrhea: Fluctuates, stable for her, has Imodium PRN diarrhea and Miralax PRN constipation.     Nausea, 2/2 chemotherapy and coughing, ongoing. Continue scheduled Compazine in evening daily and ODT Zofran in AM and PRN.      Appetite improved, could be due to Dex + Remeron. Reports increased protein and overall caloric intake. Daily Ensure. Continue to encourage and monitor. Dex taper as above.     10. Psych  Ongoing anxiety. Seeing Titus. Palliative added hydroxyzine which has helped too. Reports anxiety about move, but also eager to be near her family.      Remeron HS. Continue Melatonin PRN insomnia.       11. 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 Tolerating well. Check TSH monthly.      12. Derm-Resolved  Mild hand/foot, continue Kenalog PRN. Aveno lotion.      Overall Plan  -Continue Pazopanib  -Dex taper (2mg x 10 days, 1mg x 10 days, 0.5mg x 10 days)  -Appointment scheduled with new care team at [facility] in [place]. After this she will no longer be following with our team  -Palliative care for pain management    I saw the patient in conjunction with Linda Barbour NP student and agree with her assessment and documentation.     80 minutes spent on the date of the encounter doing chart review, review of test results, interpretation of tests, patient visit, documentation and discussion with other provider(s)     Harshal Schuster PA-C  Department of Hematology and Oncology  [facility]

## 2022-03-01 ENCOUNTER — HOSPITAL ENCOUNTER (OUTPATIENT)
Dept: CT IMAGING | Facility: CLINIC | Age: 57
Discharge: HOME | End: 2022-03-01
Attending: PHYSICIAN ASSISTANT | Admitting: PHYSICIAN ASSISTANT
Payer: COMMERCIAL

## 2022-03-01 ENCOUNTER — ONCOLOGY VISIT (OUTPATIENT)
Dept: ONCOLOGY | Facility: CLINIC | Age: 57
End: 2022-03-01
Attending: PHYSICIAN ASSISTANT
Payer: COMMERCIAL

## 2022-03-01 VITALS
BODY MASS INDEX: 24.25 KG/M2 | SYSTOLIC BLOOD PRESSURE: 125 MMHG | DIASTOLIC BLOOD PRESSURE: 81 MMHG | TEMPERATURE: 97.3 F | HEART RATE: 95 BPM | OXYGEN SATURATION: 100 % | RESPIRATION RATE: 18 BRPM | WEIGHT: 154.8 LBS

## 2022-03-01 DIAGNOSIS — D63.0 ANEMIA IN NEOPLASTIC DISEASE: ICD-10-CM

## 2022-03-01 DIAGNOSIS — C49.9 SARCOMA (H): ICD-10-CM

## 2022-03-01 DIAGNOSIS — C49.9 SARCOMA OF SOFT TISSUE (H): Primary | ICD-10-CM

## 2022-03-01 DIAGNOSIS — D84.9 IMMUNOSUPPRESSION (H): ICD-10-CM

## 2022-03-01 DIAGNOSIS — C78.00 MALIGNANT NEOPLASM METASTATIC TO LUNG, UNSPECIFIED LATERALITY (H): ICD-10-CM

## 2022-03-01 DIAGNOSIS — I27.82 OTHER CHRONIC PULMONARY EMBOLISM, UNSPECIFIED WHETHER ACUTE COR PULMONALE PRESENT (H): ICD-10-CM

## 2022-03-01 DIAGNOSIS — R94.6 THYROID FUNCTION TEST ABNORMAL: ICD-10-CM

## 2022-03-01 DIAGNOSIS — R11.0 NAUSEA: ICD-10-CM

## 2022-03-01 PROCEDURE — 99215 OFFICE O/P EST HI 40 MIN: CPT | Performed by: PHYSICIAN ASSISTANT

## 2022-03-01 PROCEDURE — G0463 HOSPITAL OUTPT CLINIC VISIT: HCPCS

## 2022-03-01 PROCEDURE — 71250 CT THORAX DX C-: CPT

## 2022-03-01 RX ORDER — PROCHLORPERAZINE MALEATE 10 MG
10 TABLET ORAL EVERY 6 HOURS PRN
Qty: 90 TABLET | Refills: 3 | Status: SHIPPED | OUTPATIENT
Start: 2022-03-01

## 2022-03-01 RX ORDER — LEVOTHYROXINE SODIUM 75 UG/1
75 TABLET ORAL DAILY
Qty: 60 TABLET | Refills: 0 | Status: SHIPPED | OUTPATIENT
Start: 2022-03-01

## 2022-03-01 RX ORDER — DEXAMETHASONE 1 MG
TABLET ORAL
Qty: 10 TABLET | Refills: 0 | Status: SHIPPED | OUTPATIENT
Start: 2022-03-01 | End: 2022-03-01

## 2022-03-01 RX ORDER — DEXAMETHASONE 1 MG
TABLET ORAL
Qty: 35 TABLET | Refills: 0 | Status: SHIPPED | OUTPATIENT
Start: 2022-03-01 | End: 2022-03-31

## 2022-03-01 ASSESSMENT — PAIN SCALES - GENERAL: PAINLEVEL: MILD PAIN (3)

## 2022-03-01 NOTE — LETTER
3/1/2022         RE: Richa Ashby  [id number]  [place] [address]        Dear Colleague,    Thank you for referring your patient, Richa Ashby, to the [facility]. Please see a copy of my visit note below.    Oncology/Hematology Visit Note  Mar 1, 2022    Reason for Visit: Follow up of undifferentiated pleomorphic sarcoma     History of Present Illness: Richa Ashby is a 56 year old female with PMH asthma and GERD with undifferentiated pleomorphic sarcoma. 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 She received preoperative radiotherapy and the tumor was resected 3/12/20. She then developed a cough, chest tightness, and scapular pain and imaging revealed lung nodules. Lung biopsy 11/2/20 with sarcoma. She was started on Doxil + Ifos 11/13/20. Had delay in cycle 2 due to COVID dx 12/2/20. CT imaging after cycle 1 with positive response to treatment with decrease in pulmonary nodules. Received cycle 2 12/18/20. Received cycle 3 1/15/21 with dose reduction in Doxil for skin toxicity and mucositis. Received cycle 4 2/15/21.      CT CAP 3/8/21 with positive response to treatment, decreased size of pulmonary mets though one lesion was larger. Did see fibrocystic changes in left breast, recommended breast imaging. Received cycle 5 Doxil + ifos 3/11/21.      Imaging 4/5/21 with overall stable disease except one lung lesion, opted to continue Doxil alone for now and switch to Keytruda in future if ongoing progression.      Started Keytruda 5/10/21. Was seen 5/26/21 cough, SOB, fatigue work-up thought to be 2/2 phrenic nerve involvement of R apical lung mass.      Due to worsening fatigue and cough, repeat CT 6/15/21 concerning for progression vs pseudoprogression. Added Gemzar 6/16/21.     She was admitted 6/17/21-6/20/21 with fever of unknown origin, infectious work-up negative, treated with  broad spectrum antibiotics.     She clinically improved on abx. Was able to receive day 8 Gemzar (dose reduced for thrombocytopenia) + Keytruda 6/23/21.      CT 7/12/21 with stable disease.     She was admitted 8/25/21-8/31/21 for fevers, hypotension, acute on chronic anemia. Extensive infectious work-up negative so etiology of fevers still unknown.      She has had progressive disease so started on Pazopanib 9/24/21.     She was admitted 10/6/21-10/8/21 for weakness, new hallucinations in setting of UTI. Brain MRI without brain mets though did have Diffuse loss normal fatty marrow on T1 sequence, nonspecific. Symptoms improved with abx and IVF.      She has continued on Pazopanib. CT 11/1/21 for restaging noted majority of masses with fluid and gas consistent with necrosis and the largest cyst likely ruptured causing a right hydropneumothorax with near complete collapse of the R lung. One solid left pleural based lesion was slightly increased. Overall felt this was a positive response to Pazopanib so continued.     She was admitted 11/1/21 for urgent chest tube placement for the hydropneumothorax noted on CT. Due to high risk of recurrence she underwent talc pleurodesis 11/5/21. Port was incidentally found to have non-occlusive thrombus and will be replaced later this month pending resolution of subcutaneous air.      CT 11/22/21 overall with no change other than decreased chest wall subcutaneous emphysema.     CT 1/14/22 with ongoing stable disease.    She was admitted 2/14/22-2/20/22 for acute hypoxic respiratory failure 2/2 bacterial pneumonia and PE. Treated with abx and started on Xarelto. Also given Dex for nausea, pain, and anorexia.      She has continued on Pazopanib.     Interval History:  Connie was seen for routine follow-up for treatment of undifferentiated pleomorphic sarcoma. On 2/14/22 she was hospitalized for pneumonia and PE. She reports feeling significantly better and has been able to decrease O2  to 1L at rest and with minimal activity. She has completed her course of antibiotics and reports 4 days left of 4mg Dex daily. She continues on Xarelto BID without any concerns or noted bruising or bleedings. She and her , Elliot, also noted understanding that dose will change and be once/day after initial 21 days for PE treatment. She continues to take her pazopanib without issues and reports nausea pretty well controlled with noting 2 bouts since hospitalization and continues to take Ondansetron in the AM and Compazine in the evening. States appetite and caloric intake has improved since hospitalization and could be attributed to both the addition of Remeron and Dex. Connie continues to have pain in her right shoulder/upper back that she reports is generally well controlled during the day with 2mg Dilaudid q3hrs. She continues on 16mg extended release Dilaudid at , but reports waking up in severe pain almost every night as well as moderate to severe pain in the mornings. She has been working with palliative care for pain management. Of note, Connie is moving to [facility] on Friday to be near her family. She reports feeling both anxious and eager for the move. She has a scheduled appointment with her new care team in 3 weeks in [place].      Review of Systems:  Patient denies fevers, chills, night sweats, unexplained weight changes, headaches, dizziness, vision or hearing changes, new lumps or bumps, chest pain,  cough, abdominal pain, vomiting, changes to bowel or bladder, swelling of extremities, bleeding issues, or rash.    Current Outpatient Medications   Medication Sig Dispense Refill     acetaminophen (TYLENOL) 325 MG tablet Take 325-650 mg by mouth every 6 hours as needed for mild pain       ADVAIR DISKUS 100-50 MCG/DOSE inhaler Inhale 1 puff into the lungs daily        albuterol (PROAIR RESPICLICK) 108 (90 Base) MCG/ACT inhaler Inhale 1-2 puffs into the lungs every 6 hours as needed         celecoxib (CELEBREX) 200 MG capsule Take 1 capsule (200 mg) by mouth 2 times daily 60 capsule 3     cetirizine (ZYRTEC) 10 MG tablet Take 10 mg by mouth daily       dexamethasone (DECADRON) 4 MG tablet Take 1 tablet (4 mg) by mouth daily 10 tablet 0     diclofenac (VOLTAREN) 1 % topical gel Apply 4 g topically 4 times daily 350 g 3     gabapentin (NEURONTIN) 300 MG capsule Take 600 mg in the AM, 600 mg mid-day, and 900 mg at night 210 capsule 3     HYDROmorphone (DILAUDID) 2 MG tablet Take 1-2 tablets (2-4 mg) by mouth every 3 hours as needed for severe pain (. Stop oxycodone completely. Do not take this with oxycodone.) 120 tablet 0     HYDROmorphone HCl ER (EXALGO) 16 MG 24 hour tablet Take 1 tablet (16 mg) by mouth every evening 30 tablet 0     hydrOXYzine (ATARAX) 25 MG tablet Take 1-2 tablets (25-50 mg) by mouth 3 times daily as needed for anxiety 90 tablet 3     levofloxacin (LEVAQUIN) 750 MG tablet Take 1 tablet (750 mg) by mouth daily 3 tablet 0     levothyroxine (SYNTHROID/LEVOTHROID) 50 MCG tablet Take 1.5 tablets (75 mcg) by mouth daily 30 tablet 0     melatonin (MELATONIN) 1 MG/ML LIQD liquid Take 5 mg by mouth At Bedtime        melatonin 5 MG tablet Take 1 tablet (5 mg) by mouth At Bedtime 30 tablet 0     mirtazapine (REMERON) 7.5 MG tablet Take 1 tablet (7.5 mg) by mouth At Bedtime 30 tablet 0     montelukast (SINGULAIR) 10 MG tablet Take 10 mg by mouth daily       multivitamin w/minerals (MULTI-VITAMIN) tablet Take 1 tablet by mouth daily       naloxone (NARCAN) 4 MG/0.1ML nasal spray Spray 1 spray (4 mg) into one nostril alternating nostrils once as needed for opioid reversal every 2-3 minutes until assistance arrives 0.2 mL 0     NASAL SPRAY SALINE NA        nystatin (MYCOSTATIN) 190524 UNIT/ML suspension Take 5 mLs (500,000 Units) by mouth 4 times daily 400 mL 0     ondansetron (ZOFRAN-ODT) 8 MG ODT tab Take 1 tablet (8 mg) by mouth every 8 hours as needed for nausea 30 tablet 3     pantoprazole  (PROTONIX) 40 MG EC tablet Take 1 tablet (40 mg) by mouth every morning (before breakfast) 10 tablet 0     pazopanib (VOTRIENT) 200 MG tablet Take 4 tablets (800 mg) by mouth daily Take on an empty stomach 1 hour before or 2 hours after a meal. 120 tablet 0     polyethylene glycol (MIRALAX) 17 GM/Dose powder Take 17 g by mouth daily 510 g 0     prochlorperazine (COMPAZINE) 10 MG tablet Take 1 tablet (10 mg) by mouth every 6 hours as needed for nausea or vomiting 90 tablet 3     Rivaroxaban ANTICOAGULANT (XARELTO STARTER PACK ANTICOAGULANT) 15 & 20 MG TBPK Take 15 mg by mouth 2 times daily for 21 days, THEN 20 mg daily (with dinner). 111 each 0     triamcinolone (KENALOG) 0.1 % external cream Apply topically 2 times daily As needed for skin irritation. 30 g 1     vitamin D3 (CHOLECALCIFEROL) 2000 units (50 mcg) tablet Take 1 tablet by mouth daily         Past Medical History  Past Medical History:   Diagnosis Date     Asthma, chronic      Sarcoma (H)     Right lower thigh     Past Surgical History:   Procedure Laterality Date     AS RAD RESEC TONSIL/PILLARS       GALLBLADDER SURGERY       INJECT EPIDURAL THORACIC N/A 11/5/2021    Procedure: INJECTION, SPINE, THORACIC, EPIDURAL;  Surgeon: GENERIC ANESTHESIA PROVIDER;  Location: [facility] OR     INSERT PORT VASCULAR ACCESS Right 10/28/2020    Procedure: single lumen power port placement @0745;  Surgeon: Tex Ackerman MD;  Location: [facility] OR     IR CHEST PORT PLACEMENT > 5 YRS OF AGE  10/28/2020     IR PORT CHECK RIGHT  3/26/2021     IR PORT CHECK RIGHT  10/21/2021     IR PORT REPLACEMENT CATHETER ONLY RIGHT  12/17/2021     RESECT TUMOR LOWER EXTREMITY Right 3/12/2020    Procedure: Resection of right thigh tumor;  Surgeon: Guillermo Johnson MD;  Location: [facility] OR     ROTATOR CUFF REPAIR RT/LT  2012     SHOULDER SURGERY       SINUS SURGERY       THORACENTESIS N/A 11/5/2021    Procedure: Pleurodesis;  Surgeon: Elana Ngo MD;  Location: [facility] GI     Allergies    Allergen Reactions     Pentobarbital      resp arrest     Social History   Social History     Tobacco Use     Smoking status: Never Smoker     Smokeless tobacco: Never Used   Substance Use Topics     Alcohol use: Yes     Comment: 2-3 glasses of wine/week     Drug use: Never      Past medical history and social history were reviewed.    Physical Examination:  /81   Pulse 95   Temp 97.3  F (36.3  C) (Oral)   Resp 18   Wt 70.2 kg (154 lb 12.8 oz)   LMP 03/19/2019   SpO2 100%   BMI 24.25 kg/m    Wt Readings from Last 10 Encounters:   02/22/22 72.6 kg (160 lb)   02/19/22 73.4 kg (161 lb 12.8 oz)   02/14/22 71.8 kg (158 lb 4.8 oz)   12/17/21 72.6 kg (160 lb)   12/02/21 76 kg (167 lb 8 oz)   12/02/21 74.8 kg (165 lb)   11/23/21 78.6 kg (173 lb 3.2 oz)   11/06/21 81.4 kg (179 lb 8 oz)   10/07/21 87.7 kg (193 lb 4.8 oz)   10/06/21 87.2 kg (192 lb 3.2 oz)     Constitutional: Well-appearing female in no acute distress.  Eyes: EOMI, PERRL. No scleral icterus.  ENT: Oral mucosa is moist without lesions or thrush.   Lymphatic: Neck is supple without cervical or supraclavicular lymphadenopathy.  Cardiovascular: Regular rate and rhythm. No murmurs, gallops, or rubs. No peripheral edema.  Respiratory: Diminished in RUL. No wheezes or crackles.  Gastrointestinal: Bowel sounds present. Abdomen soft, non-tender. No palpable hepatosplenomegaly or masses.   Neurologic: Cranial nerves II through XII are grossly intact.  Skin: No rashes, petechiae, or bruising noted on exposed skin.    Imaging: Personally reviewed and discussed with patient.   CT CHEST WITHOUT CONTRAST 3/1/2022      HISTORY: Metastatic sarcoma, monitoring for response to chemotherapy;  Sarcoma (H); Malignant neoplasm metastatic to lung, unspecified  laterality (H).     COMPARISON: February 14, 2022     TECHNIQUE: Volumetric helical acquisition of CT images of the chest  from the clavicles to the kidneys were acquired without IV contrast.  Radiation dose for  this scan was reduced using automated exposure  control, adjustment of the mA and/or kV according to patient size, or  iterative reconstruction technique.     FINDINGS:  Stable cystic and cavitary lesions bilaterally since the  recent comparison study. Representative lesion in the left lobe  measures 3.8 cm unchanged from previous. Decreased pleural fluid on  the left compared to previous. Cystic and/or necrotic mass at the  right apex is unchanged at 12.5 cm AP dimension. No definite new  lesions. Similar pleural fluid on the right, previously seen air in  the pleural space has resolved. Epiphrenic adenopathy again evident,  lymph node measures 2.6 cm currently, previously 2.0 cm. No acute  findings in the visualized upper abdomen. No frankly destructive bony  lesions.                                                                 IMPRESSION:  1. No significant change in pulmonary lesions since the comparison  study.  2. Worsening epiphrenic adenopathy.    Assessment and Plan:  1. Oncology  Metastatic UPS, was on Doxil + Ifosfamide (5 cycles) with positive response to treatment initially, then due to poor tolerance switched to Doxil alone (1 cycle) but had progression. Started on Keytruda 5/10/21. Due to tumor growth (progression vs pseudoprogression) added Gemzar 6/16/21. Had ongoing progression. Started on Pazopanib 9/24/21.     CT 11/1/21 with significant tumor necrosis/rupture. Given overall clinical improvement and findings of necrosis plan to continue Pazopanib with close follow-up. CT from 11/22/21 with stable disease, necrotic lesions persist.  CT 1/14/22 unchanged.       CT 3/1/22: No significant change in pulmonary lesions since the comparison  Study. Radiologist noted worsening epiphrenic adenopathy. Results discussed with Dr. Lopez and noted that adenopathy could be related to recent respiratory infection. No change to plan at this time.     She continues to do overall well on Pazopanib. Will  continue.    Her records at at [facility] in [place] and she has an appointment scheduled in three weeks.      2. Pulm  R sided hydropneumothorax: Found on 11/1/21 CT scan, thought 2/2 ruptured tumor. Was admitted 11/1 and underwent chest tube placement. Pulm performed talc pleuradesis on 11/5/21 due to risk of recurrence. No recurrent pneumothorax.     2/14/22-2/20/22 admitted for sepsis pneumonia respiratory failure and PE. She has since finished Levaquin course of antibiotics and does not have signs of a recurrent infection. She was also discharged on O2 and was previously at 2L but has been able to titrate to 1L. She was started on 4mg of Dex during hospitalization d/t concern of restricted airway, it has helped her breathing and likely appetite. Will start slow taper starting tomorrow of 10 days at 2mg, 10days at 1mg, 10 days 0.5mg which can then be re-evaluated by new care team.     Connie reports significant improvement in breathing since hospitalization. She is aware to seek medical care if she has worsening SOB, chest pain, fevers or signs/symptoms of infection. Messaged RNCC to assist with home O2 once she moves to [place].      Subcutaneous chest wall air: Resolved.      Asthma: stable, continue inhalers.      COVID: Diagnosed in December, recovered. She is vaccinated.       3. ID-Resolved  Fevers of unknown origin, thought to be tumor fevers, previously required Indomethacin, resolved.      Was treated for pneumonia inpatient, no ongoing concerns. Finished Levaquin.    4. Vascular  Port malfunction with small RIJ clot, port now replaced and working well.     PE: Continue on Rivaroxaban-after 3 weeks will go to 20mg daily. Connie and  aware of dose change and frequency after first 21 days.    5. Pain  R chest wall/back pain, ongoing. Palliative now managing. Continue Celebrex and Dilaudid. Connie reports daily time pain pretty well controlled with 2mg Dilaudid q3hrs, but continues to have  breakthrough pain at night as well as significant pain in the AM. Will reach out to palliative team about adjusting meds, especially nighttime dose.      6. Heme  Anemia: ACD and chemotherapy. Hgb stable at 8.8.      7. Cards  Exercise fatigue and NAZARIO: Echo was stable. Possibly was related to pneumothorax, though it is unclear when this happened.  Improved.       Hypotension: Resolved. Continue encouraging home fluids and monitoring.      QT WNL     8. 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 Continue Gabapentin 600/600/900.     Deconditioning: Doing PT    9. GI  Constipation/Diarrhea: Fluctuates, stable for her, has Imodium PRN diarrhea and Miralax PRN constipation.     Nausea, 2/2 chemotherapy and coughing, ongoing. Continue scheduled Compazine in evening daily and ODT Zofran in AM and PRN.      Appetite improved, could be due to Dex + Remeron. Reports increased protein and overall caloric intake. Daily Ensure. Continue to encourage and monitor. Dex taper as above.     10. Psych  Ongoing anxiety. Seeing Titus. Palliative added hydroxyzine which has helped too. Reports anxiety about move, but also eager to be near her family.      Remeron HS. Continue Melatonin PRN insomnia.       11. 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 Tolerating well. Check TSH monthly.      12. Derm-Resolved  Mild hand/foot, continue Kenalog PRN. Aveno lotion.      Overall Plan  -Continue Pazopanib  -Dex taper (2mg x 10 days, 1mg x 10 days, 0.5mg x 10 days)  -Appointment scheduled with new care team at [facility] in [place]. After this she will no longer be following with our team  -Palliative care for pain management    I saw the patient in conjunction with Linda Barbour NP student and agree with her assessment and documentation.     80 minutes spent on the date of the encounter doing chart review, review of test results, interpretation of tests, patient visit, documentation and discussion with  "other provider(s)     Harshal Schuster PA-C  Department of Hematology and Oncology  [facility]       Oncology Rooming Note    March 1, 2022 10:55 AM   Richa Ashby is a 56 year old female who presents for:    Chief Complaint   Patient presents with     Oncology Clinic Visit     Initial Vitals: Resp 18   Wt 70.2 kg (154 lb 12.8 oz)   LMP 03/19/2019   BMI 24.25 kg/m   Estimated body mass index is 24.25 kg/m  as calculated from the following:    Height as of 2/22/22: 1.702 m (5' 7\").    Weight as of this encounter: 70.2 kg (154 lb 12.8 oz). Body surface area is 1.82 meters squared.  Mild Pain (3) Comment: Data Unavailable   Patient's last menstrual period was 03/19/2019.  Allergies reviewed: Yes  Medications reviewed: Yes    Medications: Medication refills not needed today.  Pharmacy name entered into [employer]:    [id number] - [place], [place] - [address] [place] AT [facility] [place], [place] - [address] [place] [address] [address] [place] [id number] [place] [facility] - [place], [place] - [id number] [place] [address] PHARMACY [place] - [place], [place] - [id number]    Clinical concerns: no       Blossom Ying                Again, thank you for allowing me to participate in the care of your patient.        Sincerely,        RUPERT Guaman    "

## 2022-03-01 NOTE — PROGRESS NOTES
"Oncology Rooming Note    March 1, 2022 10:55 AM   Richa Ashby is a 56 year old female who presents for:    Chief Complaint   Patient presents with     Oncology Clinic Visit     Initial Vitals: Resp 18   Wt 70.2 kg (154 lb 12.8 oz)   LMP 03/19/2019   BMI 24.25 kg/m   Estimated body mass index is 24.25 kg/m  as calculated from the following:    Height as of 2/22/22: 1.702 m (5' 7\").    Weight as of this encounter: 70.2 kg (154 lb 12.8 oz). Body surface area is 1.82 meters squared.  Mild Pain (3) Comment: Data Unavailable   Patient's last menstrual period was 03/19/2019.  Allergies reviewed: Yes  Medications reviewed: Yes    Medications: Medication refills not needed today.  Pharmacy name entered into [employer]:    [id number] - [place], [place] - [address] AT [facility] [place], [place] - [address] [facility] [address] [place] [address] [address] [place] [id number] [place] [facility] - [place], [place] - [address]  [place] [facility] [place] - [place], [place] - [address]    Clinical concerns: no       Blossom Ying            "

## 2022-03-01 NOTE — TELEPHONE ENCOUNTER
Elisha from [facility] is calling to verify quantity on the Dexamethasone. Please review and resend when able.    Michelle Lema RN, BSN, PHN  [facility]

## 2022-03-02 ENCOUNTER — PATIENT OUTREACH (OUTPATIENT)
Dept: ONCOLOGY | Facility: CLINIC | Age: 57
End: 2022-03-02
Payer: COMMERCIAL

## 2022-03-02 DIAGNOSIS — D63.0 ANEMIA IN NEOPLASTIC DISEASE: ICD-10-CM

## 2022-03-02 DIAGNOSIS — C49.9 SARCOMA OF SOFT TISSUE (H): Primary | ICD-10-CM

## 2022-03-02 DIAGNOSIS — C49.9 SARCOMA (H): ICD-10-CM

## 2022-03-02 DIAGNOSIS — C78.00 MALIGNANT NEOPLASM METASTATIC TO LUNG, UNSPECIFIED LATERALITY (H): Primary | ICD-10-CM

## 2022-03-02 DIAGNOSIS — C78.00 MALIGNANT NEOPLASM METASTATIC TO LUNG, UNSPECIFIED LATERALITY (H): ICD-10-CM

## 2022-03-02 RX ORDER — PAZOPANIB 200 MG/1
800 TABLET, FILM COATED ORAL DAILY
Qty: 120 TABLET | Refills: 0 | Status: SHIPPED | OUTPATIENT
Start: 2022-03-02

## 2022-03-02 NOTE — PROGRESS NOTES
[facility] Oral Chemotherapy Monitoring Program    Thank you for the opportunity to be a part in the care of this patient's oral chemotherapy. The oncology pharmacy will no longer be following this patient for oral chemotherapy. If there are any questions or the plan changes, feel free to contact us.    ORAL CHEMOTHERAPY 12/21/2021 12/24/2021 12/30/2021 1/22/2022 2/4/2022 3/2/2022 3/2/2022   Assessment Type Lab Monitoring Lab Monitoring Chart Review Chart Review Refill Refill Discontinuation   Stop Date - - - - - - 3/24/2022   Reason for Discontinuation - - - - - - Patient seeking care elsewhere   Diagnosis Code Sarcoma Sarcoma Sarcoma Sarcoma Sarcoma Sarcoma Sarcoma   Providers Dr. John Lopez   [facility]   Drug Name Votrient (pazopanib) Votrient (pazopanib) Votrient (pazopanib) Votrient (pazopanib) Votrient (pazopanib) Votrient (pazopanib) Votrient (pazopanib)   Dose 800 mg 800 mg 800 mg 800 mg 800 mg 800 mg -   Current Schedule Daily Daily Daily Daily Daily Daily -   Cycle Details Continuous Continuous Continuous Continuous Continuous Continuous -   Start Date of Last Cycle - - - - - - -   Planned next cycle start date - - - - - - -   Doses missed in last 2 weeks - - - - - - -   Adherence Assessment - - - - - - -   Adverse Effects - - - - - - -   Nausea - - - - - - -   Pharmacist Intervention(nausea) - - - - - - -   Intervention(s) - - - - - - -   Fatigue - - - - - - -   Pharmacist Intervention(fatigue) - - - - - - -   Any new drug interactions? - - - - - - -   Pharmacist Intervention? - - - - - - -   Intervention(s) - - - - - - -   Is the dose as ordered appropriate for the patient? - - - - - - -       Lexis Elizondo PharmD, BCACP  Oral Chemotherapy Monitoring Program  [facility]  [phone]  March 2, 2022

## 2022-03-04 ENCOUNTER — NURSE TRIAGE (OUTPATIENT)
Dept: ONCOLOGY | Facility: CLINIC | Age: 57
End: 2022-03-04
Payer: COMMERCIAL

## 2022-03-04 DIAGNOSIS — C49.9 SARCOMA OF SOFT TISSUE (H): ICD-10-CM

## 2022-03-04 DIAGNOSIS — G89.3 NEOPLASM RELATED PAIN: ICD-10-CM

## 2022-03-04 RX ORDER — HYDROMORPHONE HYDROCHLORIDE 2 MG/1
2-4 TABLET ORAL
Qty: 120 TABLET | Refills: 0 | Status: SHIPPED | OUTPATIENT
Start: 2022-03-04

## 2022-03-04 RX ORDER — HYDROMORPHONE HYDROCHLORIDE 16 MG/1
16 TABLET, EXTENDED RELEASE ORAL EVERY EVENING
Qty: 30 TABLET | Refills: 0 | Status: SHIPPED | OUTPATIENT
Start: 2022-03-04

## 2022-03-04 NOTE — TELEPHONE ENCOUNTER
[facility] Triage    [facility] has 2 RX from a new patient and RN writer confirmed.

## 2022-03-04 NOTE — TELEPHONE ENCOUNTER
Received telephone call from patient's  requesting refill of hydromorphone and exalgo.     Pt and  are leaving today to move to [place], [place]. Dr. Blanca will provide refills for up to 3 months since last visit.  reports pt is set up for a visit with her new providers on 3/24/22, so transition will happen soon.    Last refill: 2/9/22  Last office visit: 2/19/22 (In hospital)  Scheduled for follow up on 3/24/22 in [place].     Will route request to MD for review.     Reviewed [place]  Report.

## 2022-03-27 ENCOUNTER — HEALTH MAINTENANCE LETTER (OUTPATIENT)
Age: 57
End: 2022-03-27

## 2022-09-18 ENCOUNTER — HEALTH MAINTENANCE LETTER (OUTPATIENT)
Age: 57
End: 2022-09-18

## 2023-05-07 ENCOUNTER — HEALTH MAINTENANCE LETTER (OUTPATIENT)
Age: 58
End: 2023-05-07

## 2023-07-04 NOTE — PROGRESS NOTES
This is a recent snapshot of the patient's [facility] medical record.  For current drug dose and complete information and questions, call [phone] or In Basket pool, [facility] ([id number])  CSN Number:  [id number]       English

## 2023-09-13 NOTE — CONSULTS
Admission H&P    Subjective  HPI: 28yoF  at 31w1d gestational age presents for evaluation of maternal tachycardia. Patient states that she has been feeling generally unwell for the last 1.5 weeks. States that she had a syncopal episode while at her cousin's rehearsal dinner last week. She was in her usual state of health when she suddenly started feeling palpitations, nauseous and weak. She became pale and diaphoretic according to family members and then lost consciousness for a very short period (~1 minute). She returned to consciousness spontaneously and was taken to a hospital in [place] for evaluation. Fetal status was reassuring in the hospital and patient was slightly hypotensive but was discharged after fluid resuscitation. Since this syncopal episode, patient has been feeling unwell. Reports multiple episodes of palpitations (palpated -130), nausea, weakness, numbness/tingling. These episodes last several minutes at a time but self resolve. She has not had any further syncopal episodes. Patient was seen by her OB, Dr. Bailey, and cardio OB Dr. Saleh this week. Patient was sent into OB triage for cardiac workup by Julee Bailey and Delfino given that her symptoms have not resolved.     Patient denies any labor complaints during this period. At time of eval in OB triage, patient denies any symptoms.     – PNC: Patient reports some tachycardia in the first trimester of pregnancy. She was seen by cardio OB at that time and underwent a full workup, including EKG, holter monitor, and echo. States that the Holter monitor showed sinus tachycardia and echo showed mild pericardial effusion, which was just being monitored by the cardiologist. Until the syncopal episode last weekend, patient had had no cardiac symptoms since the first trimester.     Patient was also seen in triage at 27w5d GA for r/o PTL. She was IV hydrated and found not to be in PTL. Discharged with return precautions     – OBHx: P0  – GynHx: Remote hx of ovarian cysts, not requiring surgical management   – PMH: An episode of SVT last year with negative workup  – PSH: tonsillectomy 2015   – Psych: denies   – Social: denies   – Meds: PNV, Vit D    – Allergies: NKDA         [facility]   Hematology/Oncology Consult Note    Richa Ashby MRN# [id number]   Age: 56 year old YOB: 1965          Reason for Consult:   Undifferentiated pleomorphic sarcoma.         Assessment and Plan:   Richa Ashby is a 56 year old female with a PMH of asthma and GERD, who was diagnosed with undifferentiated pleomorphic sarcoma in 11/2019. She underwent preop XRT and surgical resection but unfortunately developed metastatic disease in 11/2020. She was initially on doxil/ifos, followed by second line keytruda at time of progression, then gemcitabine, and now currently on pazopanib since 9/2021. Her course has been complicated by a right sided hydropneumothorax for which she has undergone talc pleurodesis in 11/2021.   She is currently admitted for mild acute hypoxemic respiratory failure requiring 1-2L of oxygen. There was a questionable area on CTA that was read as possible PE,but admitting team discussed with radiology and motion artifact was favored over true PE. She did have lower and upper extremity ultrasounds done today. No DVTs detected in the lower extremities, but she is found to have a right internal jugular DVT, partially occlusive. She does have a right sided port in place and this was recently re-placed in 12/2021 due to malfunction. Port has been accessed on admission to the hospital, and is functioning without issue  For the acute hypoxic respiratory failure, she is empirically being treated for community acquired pneumonia with ceftriaxone and doxycycline. Her oxygen saturations have been >95% on 2L nasal cannula. CT scan comments that the extensive cystic/cavitary lesions in the chest are similar to prior assessment in 1/2022, and that there is minimal change in the known large necrotic pleural collection of the right upper lobe.  Also of note, she was hypotensive on presentation that has improved with fluids.     Problem  list:   # Acute hyoxemic respiratory failure  # Right sided hydropneumothorax s/p talc pleurodesis  #Undifferentiated pleomorphic sarcoma, currently on pazopanib  #Macrocytic anemia  # Right internal jugular DVT,     Summary of Recommendations:    Antibiotics per primary team    Would recommend treating the Right internal jugular DVT with 3 months of anticoagulation. If that imaging finding on CTA  is truly a PE , then that would be treated too with this plan.      For choice of anticoagulant, her renal and liver function are wnl so there is no contraindication to a DOAC.     Okay to continue pazopanib while inpatient. If any concerns for worsening infection or clinical status, then please hold the drug.     If her respiratory status does not improve, then we would need to consider drainage of any of those fluid collections (if amenable). CT does comment on slight increase in the loculated right basilar pleural collection.     Keep follow up in Oncology as scheduled    We will notify Dr. Lopez of her admission.     Thank you for involving us in the care of this patient. We will continue to follow during the hospitalization.    Patient was seen and plan of care developed with Dr. Luna           History of Present Illness:   History obtained from chart review and confirmed with patient.    Richa Ashby is a 56 year old female with a history of undifferentiated pleomorphic sarcoma as outlined below       Oncology history - from clinic notes and updated  Richa Ashby is a 56 year old female with PMH asthma and GERD with undifferentiated pleomorphic sarcoma. 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 She received preoperative radiotherapy and the tumor was resected 3/12/20. She then developed a cough, chest tightness, and scapular pain and imaging revealed lung nodules. Lung biopsy 11/2/20 with sarcoma. She was started on Doxil + Ifos 11/13/20.  Had delay in cycle 2 due to COVID dx 12/2/20. CT imaging after cycle 1 with positive response to treatment with decrease in pulmonary nodules. Received cycle 2 12/18/20. Received cycle 3 1/15/21 with dose reduction in Doxil for skin toxicity and mucositis. Received cycle 4 2/15/21.      CT CAP 3/8/21 with positive response to treatment, decreased size of pulmonary mets though one lesion was larger. Did see fibrocystic changes in left breast, recommended breast imaging. Received cycle 5 Doxil + ifos 3/11/21.      Imaging 4/5/21 with overall stable disease except one lung lesion, opted to continue Doxil alone for now and switch to Keytruda in future if ongoing progression.      Started Keytruda 5/10/21. Was seen 5/26/21 cough, SOB, fatigue work-up thought to be 2/2 phrenic nerve involvement of R apical lung mass.      Due to worsening fatigue and cough, repeat CT 6/15/21 concerning for progression vs pseudoprogression. Added Gemzar 6/16/21.     She was admitted 6/17/21-6/20/21 with fever of unknown origin, infectious work-up negative, treated with broad spectrum antibiotics.     She clinically improved on abx. Was able to receive day 8 Gemzar (dose reduced for thrombocytopenia) + Keytruda 6/23/21.      CT 7/12/21 with stable disease.  She was admitted 8/25/21-8/31/21 for fevers, hypotension, acute on chronic anemia. Extensive infectious work-up negative so etiology of fevers still unknown.   She has had progressive disease so started on Pazopanib 9/24/21.  She was admitted 10/6/21-10/8/21 for weakness, new hallucinations in setting of UTI. Brain MRI without brain mets though did have Diffuse loss normal fatty marrow on T1 sequence, nonspecific. Symptoms improved with abx and IVF.   She has continued on Pazopanib. CT 11/1/21 for restaging noted majority of masses with fluid and gas consistent with necrosis and the largest cyst likely ruptured causing a right hydropneumothorax with near complete collapse of the R lung.  One solid left pleural based lesion was slightly increased. Overall felt this was a positive response to Pazopanib so continued.  She was admitted 11/1/21 for urgent chest tube placement for the hydropneumothorax noted on CT. Due to high risk of recurrence she underwent talc pleurodesis 11/5/21. Port was incidentally found to have non-occlusive thrombus and will be replaced later this month pending resolution of subcutaneous air.   CT 11/22/21 overall with no change other than decreased chest wall subcutaneous emphysema.  CT 1/14/22 with ongoing stable disease.  She has continued on Pazopanib.       She is currently admitted with mild acute hypoxemic respiratory failure. She was also hypotensive on presentation which has resolved with fluids. She is being treated for a possible pneumonia. She underwent a CTA that showed a questionable PE but could also just be a motion artifact. At time of interview, she reports feeling okay but still is having some trouble catching her breath.          Review of Systems:   A comprehensive ROS was performed with the patient and was found to be negative with the exception of that noted in the HPI above.       Past Medical History:     Past Medical History:   Diagnosis Date     Asthma, chronic      Sarcoma (H)     Right lower thigh            Past Surgical History:     Past Surgical History:   Procedure Laterality Date     AS RAD RESEC TONSIL/PILLARS       GALLBLADDER SURGERY       INJECT EPIDURAL THORACIC N/A 11/5/2021    Procedure: INJECTION, SPINE, THORACIC, EPIDURAL;  Surgeon: GENERIC ANESTHESIA PROVIDER;  Location: [facility] OR     INSERT PORT VASCULAR ACCESS Right 10/28/2020    Procedure: single lumen power port placement @0745;  Surgeon: Tex Ackerman MD;  Location: [facility] OR     IR CHEST PORT PLACEMENT > 5 YRS OF AGE  10/28/2020     IR PORT CHECK RIGHT  3/26/2021     IR PORT CHECK RIGHT  10/21/2021     IR PORT REPLACEMENT CATHETER ONLY RIGHT  12/17/2021     RESECT TUMOR LOWER  EXTREMITY Right 3/12/2020    Procedure: Resection of right thigh tumor;  Surgeon: Guillermo Johnson MD;  Location: [facility] OR     ROTATOR CUFF REPAIR RT/LT  2012     SHOULDER SURGERY       SINUS SURGERY       THORACENTESIS N/A 11/5/2021    Procedure: Pleurodesis;  Surgeon: Elana Ngo MD;  [facility]            Social History:     Social History     Socioeconomic History     Marital status:      Spouse name: Not on file     Number of children: Not on file     Years of education: Not on file     Highest education level: Not on file   Occupational History     Not on file   Tobacco Use     Smoking status: Never Smoker     Smokeless tobacco: Never Used   Substance and Sexual Activity     Alcohol use: Yes     Comment: 2-3 glasses of wine/week     Drug use: Never     Sexual activity: Not on file   Other Topics Concern     Not on file   Social History Narrative     Not on file     Social Determinants of Health     Financial Resource Strain: Not on file   Food Insecurity: Not on file   Transportation Needs: Not on file   Physical Activity: Not on file   Stress: Not on file   Social Connections: Not on file   Intimate Partner Violence: Not on file   Housing Stability: Not on file            Family History:     Family History   Problem Relation Age of Onset     Lung Cancer Maternal Grandmother      Breast Cancer Paternal Grandmother             Allergies:     Allergies   Allergen Reactions     Pentobarbital      resp arrest            Medications:     Medications Prior to Admission   Medication Sig Dispense Refill Last Dose     acetaminophen (TYLENOL) 325 MG tablet Take 325-650 mg by mouth every 6 hours as needed for mild pain        ADVAIR DISKUS 100-50 MCG/DOSE inhaler Inhale 1 puff into the lungs daily         albuterol (PROAIR RESPICLICK) 108 (90 Base) MCG/ACT inhaler Inhale 1-2 puffs into the lungs every 6 hours as needed         celecoxib (CELEBREX) 200 MG capsule Take 1 capsule (200 mg) by mouth  2 times daily 60 capsule 3      cetirizine (ZYRTEC) 10 MG tablet Take 10 mg by mouth daily        diclofenac (VOLTAREN) 1 % topical gel Apply 4 g topically 4 times daily 350 g 3      gabapentin (NEURONTIN) 300 MG capsule Take 600 mg in the AM, 600 mg mid-day, and 900 mg at night 210 capsule 3      guaiFENesin-codeine (ROBITUSSIN AC) 100-10 MG/5ML solution Take 5-10 mLs by mouth every 4 hours as needed for cough 473 mL 1      HYDROmorphone (DILAUDID) 2 MG tablet Take 1-2 tablets (2-4 mg) by mouth every 3 hours as needed for severe pain (. Stop oxycodone completely. Do not take this with oxycodone.) 120 tablet 0      HYDROmorphone HCl ER (EXALGO) 16 MG 24 hour tablet Take 1 tablet (16 mg) by mouth every evening 30 tablet 0      hydrOXYzine (ATARAX) 25 MG tablet Take 1-2 tablets (25-50 mg) by mouth 3 times daily as needed for anxiety 90 tablet 3      levothyroxine (SYNTHROID/LEVOTHROID) 50 MCG tablet Take 1 tablet (50 mcg) by mouth daily 30 tablet 3      melatonin (MELATONIN) 1 MG/ML LIQD liquid Take 5 mg by mouth At Bedtime         montelukast (SINGULAIR) 10 MG tablet Take 10 mg by mouth daily        multivitamin w/minerals (MULTI-VITAMIN) tablet Take 1 tablet by mouth daily        naloxone (NARCAN) 4 MG/0.1ML nasal spray Spray 1 spray (4 mg) into one nostril alternating nostrils once as needed for opioid reversal every 2-3 minutes until assistance arrives 0.2 mL 0      NASAL SPRAY SALINE NA         nystatin (MYCOSTATIN) 674283 UNIT/ML suspension Take 5 mLs (500,000 Units) by mouth 4 times daily 400 mL 0      ondansetron (ZOFRAN-ODT) 8 MG ODT tab Take 1 tablet (8 mg) by mouth every 8 hours as needed for nausea 30 tablet 3      pazopanib (VOTRIENT) 200 MG tablet Take 4 tablets (800 mg) by mouth daily Take on an empty stomach 1 hour before or 2 hours after a meal. 120 tablet 0      polyethylene glycol (MIRALAX) 17 GM/Dose powder Take 17 g by mouth daily 510 g 0      prochlorperazine (COMPAZINE) 10 MG tablet Take 1  tablet (10 mg) by mouth every 6 hours as needed for nausea or vomiting 90 tablet 3      triamcinolone (KENALOG) 0.1 % external cream Apply topically 2 times daily As needed for skin irritation. 30 g 1      vitamin D3 (CHOLECALCIFEROL) 2000 units (50 mcg) tablet Take 1 tablet by mouth daily               Physical Exam:   /75   Pulse 84   Temp 97.7  F (36.5  C) (Oral)   Resp 18   Wt 72.5 kg (159 lb 14.4 oz)   LMP 03/19/2019   SpO2 97%   BMI 25.04 kg/m    Vitals:    02/14/22 1121 02/15/22 1121   Weight: 71.8 kg (158 lb 4.6 oz) 72.5 kg (159 lb 14.4 oz)     General: Appears tired, lying in bed, no distress  On 2L nasal cannula  Abdomen soft  No edema         Data:   I have personally reviewed the following labs/imaging:  CBC  Recent Labs   Lab 02/15/22  0539 02/14/22  1142   WBC 3.3* 4.1   RBC 2.71* 2.17*   HGB 8.9* 7.2*   HCT 29.0* 24.0*   * 111*   MCH 32.8 33.2*   MCHC 30.7* 30.0*   RDW 21.0* 19.6*    187     CMP  Recent Labs   Lab 02/15/22  0539 02/14/22  1144 02/14/22  0847    134  --    POTASSIUM 3.4 3.6  --    CHLORIDE 101 96  --    CO2 27 30  --    ANIONGAP 8 8  --    * 88  --    BUN 5* 6*  --    CR 0.47* 0.54  --    GFRESTIMATED >90 >90  --    VIKTORIA 8.7 8.4*  --    MAG 1.5*  --  1.7   PHOS  --  3.4  --    PROTTOTAL 6.7* 6.9  --    ALBUMIN 1.5* 1.6*  --    BILITOTAL 0.7 0.6  --    ALKPHOS 103 110  --    AST 18 19  --    ALT 14 16  --      INR  Recent Labs   Lab 02/14/22  1141   INR 1.25*

## 2023-10-08 ENCOUNTER — HEALTH MAINTENANCE LETTER (OUTPATIENT)
Age: 58
End: 2023-10-08

## 2024-06-28 NOTE — LETTER
"12/2/2020       RE: Richa Ashby  [address]  [place] [address]     Dear Colleague,    Thank you for referring your patient, Richa Ashby, to the [facility] at [facility]. Please see a copy of my visit note below.    Richa Ashby is a 55 year old female who is being evaluated via a billable video visit.      The patient has been notified of following:     \"This video visit will be conducted via a call between you and your physician/provider. We have found that certain health care needs can be provided without the need for an in-person physical exam.  This service lets us provide the care you need with a video conversation.  If a prescription is necessary we can send it directly to your pharmacy.  If lab work is needed we can place an order for that and you can then stop by our lab to have the test done at a later time.    Video visits are billed at different rates depending on your insurance coverage.  Please reach out to your insurance provider with any questions.    If during the course of the call the physician/provider feels a video visit is not appropriate, you will not be charged for this service.\"    Patient has given verbal consent for Video visit? Yes  How would you like to obtain your AVS? MyChart  If you are dropped from the video visit, the video invite should be resent to: Text to cell phone: [phone]  Will anyone else be joining your video visit? No       Video-Visit Details    Type of service:  Video Visit    Video Start Time: 12:48  Video End Time: 1:48pm    Originating Location (pt. Location): Home    Distant Location (provider location):  [facility]     Platform used for Video Visit: James Sheth MD  Fellow, Hospice and Palliative Medicine  This visit was staffed with Dr Robert Blanca.    I have reviewed and updated the patient's allergies and " Patient called and said she did not \"feel well\".  Upon assessment she stated she felt sweaty, very hot, weak and short of breath.  Vital signs were all normal except heart rate was now mid 40's.  Patient had received lasix, tylenol and tramadol just about an hour and 15 minutes prior to symptoms.  Dr Aparicio notified and stat EKG obtained.  EKG showed junctional bradycardia.  Dr Aparicio at bedside and 1 dose of narcan given to see if that would alleviate symptoms.  Patient now feels chilled and heart rate in upper to mid 40's, still junctional bradycardia per telemetry tech.  Patient denies continued nausea and so will hold nausea medication for now per her request.    medication list. Patient was asked if they had any patient reported vital signs to present, if yes, please see documented vitals.  Patient was also asked for their current weight and height, if presented, documented in vitals.    Concerns: Pt would like to discuss depression and anxiety.    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   The patient's primary care provider is:  King Diaz.     History of Present Illness:  Connie noted a R thigh lump in 11/2019 which grew larger.   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  She received radiation therapy (January) to the site and then tumor resection from R thigh 3/12/20.  Subsequently, developed cough, chest tightness and subscapular pain.  With her history of mild persistent asthma, initially thought it was an exacerbation but imaging performed confirmed presence of multiple bilateral lung nodules which were biopsied (11/02) and confirmed to be metastatic sarcoma.  Connie has followed with Dr Lopez for oncologic treatment of her sarcoma; port placed 10/28/20. Her treatment plan consists of doxil/ifosfamide initially, and possibly keytruda down the line.     Prognosis: per consultation note Oct 2020 w Dr Lopez:  We did go over the serious nature of the problem with the very limited chance of cure and the limited overall survival on average. Nevertheless, some people do quite well and we pointed out we won't know how things go until we try that. She does not want to see palliaitive yet but will keep it in mind for the  "future.    Distressing Symptom/s:  She has the following concerns/needs at today's palliative medicine consultation: noting acute on chronic difficulties with sleep, hard to fall and stay asleep at night (x past year).  Much more tearful in past month or so.    Feels she is having a lot more symptoms than she expected from chemotherapy regimen.  Noting mucositis symptoms, hand and foot syndrome (using lotion but still burning and dry skin on feet).  Notes profound fatigue.  Feels overwhelmed at times with the illness, unable to plan for future.  Lots of uncertainty.  Notices differences between her grief style and her  Elliot's (\"he shuts down emotionally\")    Has pain symptoms that vary in location.  Up until two days ago she noted pain on occasion with taking a deep breath that was sharp and brief; also had occasional subscapular pain.  Two days ago, she stopped noticing pain as much.  Also has chronic \"achiness\" and soreness over whole body that she notes at night when sleep is restless.      No nausea, no vomiting.  BMs fluctuate from sl constipation to loose; takes miralax if she hasn't had a BM in 24 hrs.    Notes chronically restless sleep for the past year.  Wakes with aching, notes back and shoulder pain.  No history snoring or ANDIE.    Describes a lot of distress with diagnosis, worries about future.  Additional situational stress this week as her  dx as COVID positive two days ago, pt's test was negative.  History of medication use for depression symptoms 25 years ago, was for seasonal affective disorder, doesn't remember what she was prescribed, took it for several months and then stopped.      Last, had fever last week with dysuria and fatigue.  Positive UC klebsiella senstive to bactrim, just started bactrim a few days ago.  Feeling better.     Patient's Disease Understanding: aware she has metastatic sarcoma, that it has approx 5% chance of cure, and that it's unclear if she will have good " response to chemotherapy or not.  Aware that prognosis could be very serious.    Coping:  Tearful, struggling w  Social isolation from COVID.  Talks with her family and friends and gets support from that.  Misses work and meaningful activities.    Social History  Living Situation: lives with .   Children: two adult children, daughter and her /granddaughter live in [place]; adult son is in the [employer] stationed at [employer] and currently training in [place].  Actual/Potential Caregiver(s): 1  Support System: , family members, friends.  Has been tough w social isolation w covid.  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     Substance Use/History of misuse: no JAYY history.  1-2 glasses of wine/week prior to getting ill, no alcohol in past month.  Financial Concerns: not discussed  Spiritual Background: not discussed     Social History     Tobacco Use     Smoking status: Never Smoker     Smokeless tobacco: Never Used   Substance Use Topics     Alcohol use: Yes     Frequency: 2-3 times a week     Drinks per session: 1 or 2     Binge frequency: Never     Comment: 2-3 glasses of wine/week     Drug use: Never       Family History  Family History   Problem Relation Age of Onset     Lung Cancer Maternal Grandmother      Breast Cancer Paternal Grandmother      Patient's Involvement with Prior History of Serious Illness in Family: father  of frontotemporal dementia at 80; was in hospice 12 hrs before he .    Advance Care Planning:  Advance Directive:    none  Has form, hasn't looked at it much; feels overwhelmed.    Allergies   Allergen Reactions     Pentobarbital      resp arrest     Current Outpatient Medications   Medication Sig Dispense Refill     acetaminophen (TYLENOL) 325 MG tablet Take 325-650 mg by mouth every 6 hours as needed for mild pain       ADVAIR DISKUS 500-50 MCG/DOSE inhaler TAKE 1 PUFF BY MOUTH TWICE A DAY       albuterol (PROAIR  RESPICLICK) 108 (90 Base) MCG/ACT inhaler Inhale 1-2 puffs into the lungs       cetirizine (ZYRTEC) 10 MG tablet Take 10 mg by mouth daily       fluconazole (DIFLUCAN) 100 MG tablet Take 1 tablet (100 mg) by mouth daily for 5 days 5 tablet 0     granisetron (KYTRIL) 1 MG tablet Take 2 tablets (2 mg) by mouth daily Take 2 tablets (2 mg) every morning on Days 3 through 8. 12 tablet 3     ibuprofen (ADVIL/MOTRIN) 200 MG tablet Take 400 mg by mouth every 4 hours as needed for mild pain       lidocaine-prilocaine (EMLA) 2.5-2.5 % external cream Apply topically as needed for moderate pain 30 g 3     loratadine (CLARITIN) 10 MG tablet Take 10 mg by mouth       melatonin (MELATONIN) 1 MG/ML LIQD liquid Take 5 mg by mouth       montelukast (SINGULAIR) 10 MG tablet Take 10 mg by mouth daily       nystatin (MYCOSTATIN) 291443 UNIT/ML suspension Take 5 mLs (500,000 Units) by mouth 4 times daily 140 mL 0     omeprazole (PRILOSEC) 20 MG DR capsule        prochlorperazine (COMPAZINE) 10 MG tablet Take 1 tablet (10 mg) by mouth every 6 hours as needed (Nausea/Vomiting) 30 tablet 3     sulfamethoxazole-trimethoprim (BACTRIM DS) 800-160 MG tablet Take 1 tablet by mouth 2 times daily for 7 days 14 tablet 0     vitamin C (ASCORBIC ACID) 100 MG tablet Take 100 mg by mouth daily       vitamin D3 (CHOLECALCIFEROL) 2000 units (50 mcg) tablet Take 1 tablet by mouth daily       Past Medical History:   Diagnosis Date     Asthma, chronic      Sarcoma (H)     Right lower thigh     Past Surgical History:   Procedure Laterality Date     AS RAD RESEC TONSIL/PILLARS       GALLBLADDER SURGERY       INSERT PORT VASCULAR ACCESS Right 10/28/2020    Procedure: single lumen power port placement @0745;  Surgeon: Tex Ackerman MD;  Location: [facility] [place]     IR CHEST PORT PLACEMENT > 5 YRS OF AGE  10/28/2020     RESECT TUMOR LOWER EXTREMITY Right 3/12/2020    Procedure: Resection of right thigh tumor;  Surgeon: Guillermo Johnson MD;  Location:   OR     ROTATOR CUFF REPAIR RT/LT       SHOULDER SURGERY       SINUS SURGERY         REVIEW OF SYSTEMS:   ROS: 10 point ROS neg other than the symptoms noted above in the HPI and here:  Palliative Symptom Review (0=no symptom/no concern, 1=mild, 2=moderate, 3=severe):      Pain: 1      Fatigue: 3      Nausea: 0      Constipation: 0      Diarrhea: 0      Depressive Symptoms: 3      Anxiety: 3      Drowsiness: 2      Poor Appetite: 2      Shortness of Breath: 1      Insomnia: 2  Losing hair in clumps; shaved her head last week; got a wig.    PE  Alert 56 yo woman, pleasant and tearful at times in discussing recent diagnosis and medical issues.  Wearing a surgical mask ( is seated >10 feet away, masked, to be supportive but to try to isolate w covid)  No pallor.  Speech fluent, normal thought content and not suicidal.  Normal psychomotor activity.  Breathing is nonlabored, no coughing or audible wheezing.      Data Reviewed:  LABS: wbc 5.0, hgb 10.5, plat 229 on   UC  positive for >100,000 cfu klebsiela  Neg covid test     IMAGING: reviewed CT chest reports from October from [facility]         Impressions:  Palliative Performance Score:  80%  Decision Making Capacity:  intact       Recommendations & Counselin yo woman with undifferentiated pleomorphic sarcoma, metastatic.  Experiencing pain due to tumor.    Pain, cancer related:  - continue acetaminophen, ibuprofen, ice/heat.  - has limited supply of oxycodone and tramadol from her lung biopsy and prior surgery; hasn't needed to use.  - pt instructed to call for update/instructions should pain symptoms progress/worsen.    Insomnia  - start doxepin 3-6ml PO at bedtime  - increase physical activity as tolerated daily.  - encourage good sleep hygeine as able.     Existential distress  - anxiety and depression mixed d/t acute grief reaction w serious medical diagnosis.  - refer to Tiffany daily for counselling; may be helpful to include  pt's  as they have different grieving styles.  - didn't start selective serotonin reuptake inhibitor but would consider if severe symptoms persist in month(s) ahead.    Hand and foot syndrome  - d/t chemotherapy  - continue emollient use  - add urea cream, Rx sent.    Fatigue, due to chemotherapy treatment    Goals of care: life prolonging.  Will discuss HCD at future visit but needed to address symptoms, provide support to pt first.    Support: LIS visits w Tiffany Astorga requested.  Follow up in clinic in 1 month with palliative medicine    Olivia Sheth MD  Fellow, Hospice and Palliative Medicine  This visit was staffed with Dr Robert Blanca.    Attending Note:  Patient seen and evaluated with Dr Sheth and I agree with/confirm their findings/recs in this note.      Thank you for involving us in the patient's care.   Robert Blanca MD / Palliative Medicine / Text me via [facility].

## 2024-07-14 ENCOUNTER — HEALTH MAINTENANCE LETTER (OUTPATIENT)
Age: 59
End: 2024-07-14

## (undated) DEVICE — GLOVE PROTEXIS POWDER FREE SMT 7.0  2D72PT70X

## (undated) DEVICE — SU VICRYL 4-0 P-3 18" UND  J494H

## (undated) DEVICE — PREP DURAPREP REMOVER 4OZ 8611

## (undated) DEVICE — ADH SKIN CLOSURE PREMIERPRO EXOFIN 1.0ML 3470

## (undated) DEVICE — PACK CENTRAL LINE INSERTION SAN32CLFCG

## (undated) DEVICE — KNIFE HANDLE W/15 BLADE 371615

## (undated) DEVICE — GLOVE PROTEXIS POWDER FREE SMT 8.0  2D72PT80X

## (undated) DEVICE — SUCTION MANIFOLD NEPTUNE 2 SYS 1 PORT 702-025-000

## (undated) DEVICE — PREP POVIDONE IODINE SCRUB 7.5% 120ML

## (undated) DEVICE — SU MONOCRYL 4-0 PS-2 18" UND Y496G

## (undated) DEVICE — SPECIMEN CONTAINER 5OZ STERILE 2600SA

## (undated) DEVICE — SYR BULB IRRIG 50ML LATEX FREE 0035280

## (undated) DEVICE — LINEN GOWN XLG 5407

## (undated) DEVICE — Device

## (undated) DEVICE — DRAPE STOCKINETTE IMPERVIOUS 12" 1587

## (undated) DEVICE — SU MONOCRYL 4-0 P-3 18" UND Y494G

## (undated) DEVICE — DRAPE STERI U 1015

## (undated) DEVICE — KIT INTRODUCER FLUENT MICRO 5FRX10CM ECHO TIP KIT-038-04

## (undated) DEVICE — ESU PENCIL SMOKE EVAC W/ROCKER SWITCH 0703-047-000

## (undated) DEVICE — GLOVE PROTEXIS W/NEU-THERA 7.0  2D73TE70

## (undated) DEVICE — PREP SKIN SCRUB TRAY 4461A

## (undated) DEVICE — SOL NACL 0.9% IRRIG 1000ML BOTTLE 2F7124

## (undated) DEVICE — LINEN TOWEL PACK X5 5464

## (undated) DEVICE — DRAPE CONVERTORS U-DRAPE 60X72" 8476

## (undated) DEVICE — GOWN XLG DISP 9545

## (undated) DEVICE — COVER ULTRASOUND PROBE W/GEL FLEXI-FEEL 6"X58" LF  25-FF658

## (undated) DEVICE — DECANTER BAG 2002S

## (undated) DEVICE — PREP DURAPREP 26ML APL 8630

## (undated) DEVICE — ESU GROUND PAD ADULT W/CORD E7507

## (undated) DEVICE — SU VICRYL 2-0 SH 27" UND J417H

## (undated) DEVICE — DRAIN JACKSON PRATT CHANNEL 15FR ROUND HUBLESS SIL JP-2228

## (undated) DEVICE — LINEN ORTHO PACK 5446

## (undated) DEVICE — GLOVE PROTEXIS BLUE W/NEU-THERA 7.5  2D73EB75

## (undated) DEVICE — CONNECTOR MALE TO MALE LL

## (undated) RX ORDER — HEPARIN SODIUM (PORCINE) LOCK FLUSH IV SOLN 100 UNIT/ML 100 UNIT/ML
SOLUTION INTRAVENOUS
Status: DISPENSED
Start: 2021-12-17

## (undated) RX ORDER — HEPARIN SODIUM (PORCINE) LOCK FLUSH IV SOLN 100 UNIT/ML 100 UNIT/ML
SOLUTION INTRAVENOUS
Status: DISPENSED
Start: 2020-10-28

## (undated) RX ORDER — DEXAMETHASONE SODIUM PHOSPHATE 4 MG/ML
INJECTION, SOLUTION INTRA-ARTICULAR; INTRALESIONAL; INTRAMUSCULAR; INTRAVENOUS; SOFT TISSUE
Status: DISPENSED
Start: 2020-03-12

## (undated) RX ORDER — GABAPENTIN 300 MG/1
CAPSULE ORAL
Status: DISPENSED
Start: 2020-03-12

## (undated) RX ORDER — CEFAZOLIN SODIUM 1 G/3ML
INJECTION, POWDER, FOR SOLUTION INTRAMUSCULAR; INTRAVENOUS
Status: DISPENSED
Start: 2020-10-28

## (undated) RX ORDER — FENTANYL CITRATE 50 UG/ML
INJECTION, SOLUTION INTRAMUSCULAR; INTRAVENOUS
Status: DISPENSED
Start: 2020-03-12

## (undated) RX ORDER — LIDOCAINE HYDROCHLORIDE 20 MG/ML
INJECTION, SOLUTION EPIDURAL; INFILTRATION; INTRACAUDAL; PERINEURAL
Status: DISPENSED
Start: 2020-03-12

## (undated) RX ORDER — HEPARIN SODIUM (PORCINE) LOCK FLUSH IV SOLN 100 UNIT/ML 100 UNIT/ML
SOLUTION INTRAVENOUS
Status: DISPENSED
Start: 2021-08-09

## (undated) RX ORDER — BUPIVACAINE HYDROCHLORIDE 2.5 MG/ML
INJECTION, SOLUTION EPIDURAL; INFILTRATION; INTRACAUDAL
Status: DISPENSED
Start: 2020-03-12

## (undated) RX ORDER — OXYCODONE HYDROCHLORIDE 5 MG/1
TABLET ORAL
Status: DISPENSED
Start: 2020-01-13

## (undated) RX ORDER — HEPARIN SODIUM (PORCINE) LOCK FLUSH IV SOLN 100 UNIT/ML 100 UNIT/ML
SOLUTION INTRAVENOUS
Status: DISPENSED
Start: 2021-10-21

## (undated) RX ORDER — DIPHENHYDRAMINE HYDROCHLORIDE 50 MG/ML
INJECTION INTRAMUSCULAR; INTRAVENOUS
Status: DISPENSED
Start: 2021-12-17

## (undated) RX ORDER — LIDOCAINE HYDROCHLORIDE 10 MG/ML
INJECTION, SOLUTION EPIDURAL; INFILTRATION; INTRACAUDAL; PERINEURAL
Status: DISPENSED
Start: 2021-12-17

## (undated) RX ORDER — CEFAZOLIN SODIUM 2 G/50ML
SOLUTION INTRAVENOUS
Status: DISPENSED
Start: 2020-03-12

## (undated) RX ORDER — CEFAZOLIN SODIUM 2 G/100ML
INJECTION, SOLUTION INTRAVENOUS
Status: DISPENSED
Start: 2021-12-17

## (undated) RX ORDER — FENTANYL CITRATE 50 UG/ML
INJECTION, SOLUTION INTRAMUSCULAR; INTRAVENOUS
Status: DISPENSED
Start: 2020-10-28

## (undated) RX ORDER — FENTANYL CITRATE 50 UG/ML
INJECTION, SOLUTION INTRAMUSCULAR; INTRAVENOUS
Status: DISPENSED
Start: 2021-12-17

## (undated) RX ORDER — PROPOFOL 10 MG/ML
INJECTION, EMULSION INTRAVENOUS
Status: DISPENSED
Start: 2020-03-12

## (undated) RX ORDER — FENTANYL CITRATE 50 UG/ML
INJECTION, SOLUTION INTRAMUSCULAR; INTRAVENOUS
Status: DISPENSED
Start: 2021-11-05

## (undated) RX ORDER — HEPARIN SODIUM (PORCINE) LOCK FLUSH IV SOLN 100 UNIT/ML 100 UNIT/ML
SOLUTION INTRAVENOUS
Status: DISPENSED
Start: 2021-09-22

## (undated) RX ORDER — SODIUM CHLORIDE 9 MG/ML
INJECTION, SOLUTION INTRAVENOUS
Status: DISPENSED
Start: 2020-11-02

## (undated) RX ORDER — KETOROLAC TROMETHAMINE 30 MG/ML
INJECTION, SOLUTION INTRAMUSCULAR; INTRAVENOUS
Status: DISPENSED
Start: 2020-03-12

## (undated) RX ORDER — HEPARIN SODIUM (PORCINE) LOCK FLUSH IV SOLN 100 UNIT/ML 100 UNIT/ML
SOLUTION INTRAVENOUS
Status: DISPENSED
Start: 2021-09-07

## (undated) RX ORDER — OXYCODONE HYDROCHLORIDE 5 MG/1
TABLET ORAL
Status: DISPENSED
Start: 2020-03-12

## (undated) RX ORDER — FENTANYL CITRATE 50 UG/ML
INJECTION, SOLUTION INTRAMUSCULAR; INTRAVENOUS
Status: DISPENSED
Start: 2020-11-02

## (undated) RX ORDER — HEPARIN SODIUM,PORCINE 10 UNIT/ML
VIAL (ML) INTRAVENOUS
Status: DISPENSED
Start: 2020-10-28

## (undated) RX ORDER — ACETAMINOPHEN 325 MG/1
TABLET ORAL
Status: DISPENSED
Start: 2020-03-12

## (undated) RX ORDER — IBUPROFEN 200 MG
TABLET ORAL
Status: DISPENSED
Start: 2020-11-02

## (undated) RX ORDER — ONDANSETRON 2 MG/ML
INJECTION INTRAMUSCULAR; INTRAVENOUS
Status: DISPENSED
Start: 2020-03-12

## (undated) RX ORDER — SODIUM CHLORIDE 9 MG/ML
INJECTION, SOLUTION INTRAVENOUS
Status: DISPENSED
Start: 2021-12-17

## (undated) RX ORDER — LIDOCAINE HYDROCHLORIDE 10 MG/ML
INJECTION, SOLUTION EPIDURAL; INFILTRATION; INTRACAUDAL; PERINEURAL
Status: DISPENSED
Start: 2021-11-05

## (undated) RX ORDER — HEPARIN SODIUM (PORCINE) LOCK FLUSH IV SOLN 100 UNIT/ML 100 UNIT/ML
SOLUTION INTRAVENOUS
Status: DISPENSED
Start: 2021-03-26

## (undated) RX ORDER — LIDOCAINE HYDROCHLORIDE 10 MG/ML
INJECTION, SOLUTION EPIDURAL; INFILTRATION; INTRACAUDAL; PERINEURAL
Status: DISPENSED
Start: 2019-12-04

## (undated) RX ORDER — LIDOCAINE HYDROCHLORIDE 20 MG/ML
INJECTION, SOLUTION EPIDURAL; INFILTRATION; INTRACAUDAL; PERINEURAL
Status: DISPENSED
Start: 2021-11-05